# Patient Record
Sex: FEMALE | Race: WHITE | Employment: OTHER | URBAN - NONMETROPOLITAN AREA
[De-identification: names, ages, dates, MRNs, and addresses within clinical notes are randomized per-mention and may not be internally consistent; named-entity substitution may affect disease eponyms.]

---

## 2017-01-02 DIAGNOSIS — E11.42 DIABETIC PERIPHERAL NEUROPATHY (HCC): ICD-10-CM

## 2017-01-03 ENCOUNTER — OFFICE VISIT (OUTPATIENT)
Dept: PULMONOLOGY | Age: 77
End: 2017-01-03

## 2017-01-03 VITALS
HEIGHT: 61 IN | SYSTOLIC BLOOD PRESSURE: 146 MMHG | WEIGHT: 254.3 LBS | BODY MASS INDEX: 48.01 KG/M2 | OXYGEN SATURATION: 98 % | HEART RATE: 79 BPM | DIASTOLIC BLOOD PRESSURE: 80 MMHG

## 2017-01-03 DIAGNOSIS — F51.01 PRIMARY INSOMNIA: ICD-10-CM

## 2017-01-03 DIAGNOSIS — G47.33 OSA ON CPAP: ICD-10-CM

## 2017-01-03 DIAGNOSIS — Z99.89 OSA ON CPAP: ICD-10-CM

## 2017-01-03 PROCEDURE — 99213 OFFICE O/P EST LOW 20 MIN: CPT | Performed by: PHYSICIAN ASSISTANT

## 2017-01-03 RX ORDER — ZOLPIDEM TARTRATE 10 MG/1
10 TABLET ORAL NIGHTLY PRN
Qty: 30 TABLET | Refills: 5 | Status: SHIPPED | OUTPATIENT
Start: 2017-01-03 | End: 2017-06-21 | Stop reason: SDUPTHER

## 2017-01-03 RX ORDER — NEBULIZER ACCESSORIES
1 EACH MISCELLANEOUS ONCE
Qty: 1 EACH | Refills: 3 | Status: SHIPPED | OUTPATIENT
Start: 2017-01-03 | End: 2017-01-03

## 2017-01-09 ENCOUNTER — TELEPHONE (OUTPATIENT)
Dept: FAMILY MEDICINE CLINIC | Age: 77
End: 2017-01-09

## 2017-01-09 ENCOUNTER — OFFICE VISIT (OUTPATIENT)
Dept: FAMILY MEDICINE CLINIC | Age: 77
End: 2017-01-09

## 2017-01-09 VITALS
WEIGHT: 243.8 LBS | DIASTOLIC BLOOD PRESSURE: 80 MMHG | RESPIRATION RATE: 12 BRPM | HEIGHT: 61 IN | SYSTOLIC BLOOD PRESSURE: 110 MMHG | BODY MASS INDEX: 46.03 KG/M2 | HEART RATE: 88 BPM

## 2017-01-09 DIAGNOSIS — E11.42 DIABETIC PERIPHERAL NEUROPATHY (HCC): ICD-10-CM

## 2017-01-09 DIAGNOSIS — E66.01 MORBID OBESITY WITH BMI OF 45.0-49.9, ADULT (HCC): ICD-10-CM

## 2017-01-09 DIAGNOSIS — E11.40 TYPE 2 DIABETES MELLITUS WITH DIABETIC NEUROPATHY, WITHOUT LONG-TERM CURRENT USE OF INSULIN (HCC): Primary | ICD-10-CM

## 2017-01-09 DIAGNOSIS — M81.0 OSTEOPOROSIS: ICD-10-CM

## 2017-01-09 DIAGNOSIS — E78.00 PURE HYPERCHOLESTEROLEMIA: ICD-10-CM

## 2017-01-09 DIAGNOSIS — I10 ESSENTIAL HYPERTENSION: ICD-10-CM

## 2017-01-09 DIAGNOSIS — I10 BENIGN ESSENTIAL HTN: ICD-10-CM

## 2017-01-09 DIAGNOSIS — K21.9 GASTROESOPHAGEAL REFLUX DISEASE, ESOPHAGITIS PRESENCE NOT SPECIFIED: ICD-10-CM

## 2017-01-09 LAB
CREATININE URINE POCT: NORMAL
MICROALBUMIN/CREAT 24H UR: 50 MG/G{CREAT}

## 2017-01-09 PROCEDURE — 99214 OFFICE O/P EST MOD 30 MIN: CPT | Performed by: FAMILY MEDICINE

## 2017-01-09 PROCEDURE — 82044 UR ALBUMIN SEMIQUANTITATIVE: CPT | Performed by: FAMILY MEDICINE

## 2017-01-09 RX ORDER — PREGABALIN 150 MG/1
CAPSULE ORAL
Qty: 90 CAPSULE | Refills: 5 | Status: SHIPPED | OUTPATIENT
Start: 2017-01-09 | End: 2017-06-21 | Stop reason: SDUPTHER

## 2017-01-09 RX ORDER — PIOGLITAZONEHYDROCHLORIDE 30 MG/1
30 TABLET ORAL DAILY
Qty: 90 TABLET | Refills: 3 | Status: SHIPPED | OUTPATIENT
Start: 2017-01-09 | End: 2018-01-05 | Stop reason: SDUPTHER

## 2017-01-09 RX ORDER — OMEPRAZOLE 20 MG/1
CAPSULE, DELAYED RELEASE ORAL
Qty: 90 CAPSULE | Refills: 3 | Status: SHIPPED | OUTPATIENT
Start: 2017-01-09 | End: 2017-09-22 | Stop reason: SDUPTHER

## 2017-01-09 RX ORDER — GLIMEPIRIDE 4 MG/1
TABLET ORAL
Qty: 180 TABLET | Refills: 3 | Status: SHIPPED | OUTPATIENT
Start: 2017-01-09 | End: 2018-01-05 | Stop reason: SDUPTHER

## 2017-01-09 RX ORDER — LISINOPRIL AND HYDROCHLOROTHIAZIDE 20; 12.5 MG/1; MG/1
1 TABLET ORAL DAILY
Qty: 90 TABLET | Refills: 3 | Status: ON HOLD | OUTPATIENT
Start: 2017-01-09 | End: 2017-06-15

## 2017-01-09 RX ORDER — ATORVASTATIN CALCIUM 40 MG/1
40 TABLET, FILM COATED ORAL NIGHTLY
Qty: 90 TABLET | Refills: 3 | Status: SHIPPED | OUTPATIENT
Start: 2017-01-09 | End: 2017-12-26 | Stop reason: SDUPTHER

## 2017-01-09 RX ORDER — RISEDRONATE SODIUM 35 MG/1
35 TABLET, FILM COATED ORAL
Qty: 13 TABLET | Refills: 3 | Status: SHIPPED | OUTPATIENT
Start: 2017-01-09 | End: 2018-01-05 | Stop reason: SDUPTHER

## 2017-01-09 ASSESSMENT — ENCOUNTER SYMPTOMS
BLOOD IN STOOL: 0
VOMITING: 0
SORE THROAT: 0
SHORTNESS OF BREATH: 0
DIARRHEA: 0
RHINORRHEA: 0
ABDOMINAL PAIN: 0
CHEST TIGHTNESS: 0
NAUSEA: 0
BACK PAIN: 0
CONSTIPATION: 0
EYE PAIN: 0
WHEEZING: 0
COUGH: 0

## 2017-01-09 ASSESSMENT — PATIENT HEALTH QUESTIONNAIRE - PHQ9
SUM OF ALL RESPONSES TO PHQ QUESTIONS 1-9: 0
2. FEELING DOWN, DEPRESSED OR HOPELESS: 0
1. LITTLE INTEREST OR PLEASURE IN DOING THINGS: 0
SUM OF ALL RESPONSES TO PHQ9 QUESTIONS 1 & 2: 0

## 2017-01-10 ENCOUNTER — TELEPHONE (OUTPATIENT)
Dept: FAMILY MEDICINE CLINIC | Age: 77
End: 2017-01-10

## 2017-01-10 DIAGNOSIS — R80.9 TYPE 2 DIABETES MELLITUS WITH MICROALBUMINURIA, WITHOUT LONG-TERM CURRENT USE OF INSULIN (HCC): Primary | ICD-10-CM

## 2017-01-10 DIAGNOSIS — E11.29 TYPE 2 DIABETES MELLITUS WITH MICROALBUMINURIA, WITHOUT LONG-TERM CURRENT USE OF INSULIN (HCC): Primary | ICD-10-CM

## 2017-01-10 LAB
ALBUMIN SERPL-MCNC: 4.1 G/DL (ref 3.2–5.3)
ALK PHOSPHATASE: 99 IU/L (ref 35–121)
ALT SERPL-CCNC: 20 IU/L (ref 5–59)
ANION GAP SERPL CALCULATED.3IONS-SCNC: 14 MMOL/L
AST SERPL-CCNC: 15 IU/L (ref 10–42)
AVERAGE GLUCOSE: 128 MG/DL (ref 66–114)
BILIRUB SERPL-MCNC: 0.4 MG/DL (ref 0.2–1.3)
BUN BLDV-MCNC: 14 MG/DL (ref 9–24)
CALCIUM SERPL-MCNC: 9.7 MG/DL (ref 8.7–10.8)
CHLORIDE BLD-SCNC: 99 MMOL/L (ref 95–111)
CHOLESTEROL, TOTAL: 112 MG/DL
CHOLESTEROL/HDL RATIO: 2.7
CHOLESTEROL/HDL RATIO: 2.7
CHOLESTEROL: 112 MG/DL
CO2: 27 MMOL/L (ref 21–32)
CREAT SERPL-MCNC: 0.9 MG/DL (ref 0.5–1.3)
EGFR AFRICAN AMERICAN: 74
EGFR IF NONAFRICAN AMERICAN: 61
GLUCOSE: 157 MG/DL (ref 70–100)
HBA1C MFR BLD: 6.1 %
HBA1C MFR BLD: 6.1 % (ref 4.2–5.8)
HDLC SERPL-MCNC: 41 MG/DL (ref 35–70)
HDLC SERPL-MCNC: 41 MG/DL (ref 40–60)
LDL CHOLESTEROL CALCULATED: 41 MG/DL
LDL CHOLESTEROL CALCULATED: 41 MG/DL (ref 0–160)
LDL/HDL RATIO: 1
POTASSIUM SERPL-SCNC: 4.5 MMOL/L (ref 3.5–5.4)
SODIUM BLD-SCNC: 135 MMOL/L (ref 134–147)
TOTAL PROTEIN: 6.5 G/DL (ref 5.8–8)
TRIGL SERPL-MCNC: 148 MG/DL
TRIGL SERPL-MCNC: 148 MG/DL
VLDLC SERPL CALC-MCNC: 30 MG/DL
VLDLC SERPL CALC-MCNC: 30 MG/DL

## 2017-03-28 ENCOUNTER — TELEPHONE (OUTPATIENT)
Dept: PULMONOLOGY | Age: 77
End: 2017-03-28

## 2017-06-15 PROBLEM — G44.53 PRIMARY THUNDERCLAP HEADACHE: Status: ACTIVE | Noted: 2017-06-15

## 2017-06-15 PROBLEM — J01.41 ACUTE RECURRENT PANSINUSITIS: Status: ACTIVE | Noted: 2017-06-15

## 2017-06-21 ENCOUNTER — OFFICE VISIT (OUTPATIENT)
Dept: FAMILY MEDICINE CLINIC | Age: 77
End: 2017-06-21

## 2017-06-21 VITALS
DIASTOLIC BLOOD PRESSURE: 74 MMHG | HEART RATE: 60 BPM | SYSTOLIC BLOOD PRESSURE: 142 MMHG | WEIGHT: 243.6 LBS | BODY MASS INDEX: 46.03 KG/M2 | RESPIRATION RATE: 16 BRPM | OXYGEN SATURATION: 96 %

## 2017-06-21 DIAGNOSIS — I10 BENIGN ESSENTIAL HTN: ICD-10-CM

## 2017-06-21 DIAGNOSIS — E11.42 DIABETIC PERIPHERAL NEUROPATHY (HCC): ICD-10-CM

## 2017-06-21 DIAGNOSIS — J32.4 CHRONIC PANSINUSITIS: Primary | ICD-10-CM

## 2017-06-21 DIAGNOSIS — E66.01 MORBID OBESITY WITH BMI OF 45.0-49.9, ADULT (HCC): ICD-10-CM

## 2017-06-21 DIAGNOSIS — F51.01 PRIMARY INSOMNIA: ICD-10-CM

## 2017-06-21 PROCEDURE — G8427 DOCREV CUR MEDS BY ELIG CLIN: HCPCS | Performed by: FAMILY MEDICINE

## 2017-06-21 PROCEDURE — 1123F ACP DISCUSS/DSCN MKR DOCD: CPT | Performed by: FAMILY MEDICINE

## 2017-06-21 PROCEDURE — 1111F DSCHRG MED/CURRENT MED MERGE: CPT | Performed by: FAMILY MEDICINE

## 2017-06-21 PROCEDURE — G8417 CALC BMI ABV UP PARAM F/U: HCPCS | Performed by: FAMILY MEDICINE

## 2017-06-21 PROCEDURE — 99214 OFFICE O/P EST MOD 30 MIN: CPT | Performed by: FAMILY MEDICINE

## 2017-06-21 PROCEDURE — 1090F PRES/ABSN URINE INCON ASSESS: CPT | Performed by: FAMILY MEDICINE

## 2017-06-21 PROCEDURE — 3017F COLORECTAL CA SCREEN DOC REV: CPT | Performed by: FAMILY MEDICINE

## 2017-06-21 PROCEDURE — 1036F TOBACCO NON-USER: CPT | Performed by: FAMILY MEDICINE

## 2017-06-21 PROCEDURE — G8400 PT W/DXA NO RESULTS DOC: HCPCS | Performed by: FAMILY MEDICINE

## 2017-06-21 PROCEDURE — 4040F PNEUMOC VAC/ADMIN/RCVD: CPT | Performed by: FAMILY MEDICINE

## 2017-06-21 PROCEDURE — 3046F HEMOGLOBIN A1C LEVEL >9.0%: CPT | Performed by: FAMILY MEDICINE

## 2017-06-21 RX ORDER — PREGABALIN 150 MG/1
CAPSULE ORAL
Qty: 90 CAPSULE | Refills: 5 | Status: SHIPPED | OUTPATIENT
Start: 2017-06-30 | End: 2018-01-05 | Stop reason: SDUPTHER

## 2017-06-21 RX ORDER — ZOLPIDEM TARTRATE 10 MG/1
10 TABLET ORAL NIGHTLY PRN
Qty: 30 TABLET | Refills: 5 | Status: SHIPPED | OUTPATIENT
Start: 2017-06-21 | End: 2018-01-05 | Stop reason: SDUPTHER

## 2017-06-21 ASSESSMENT — ENCOUNTER SYMPTOMS
SHORTNESS OF BREATH: 0
DIARRHEA: 0
RHINORRHEA: 0
CONSTIPATION: 0
VOMITING: 0
SORE THROAT: 0
EYE PAIN: 0
ABDOMINAL PAIN: 0
BLOOD IN STOOL: 0
CHEST TIGHTNESS: 0
WHEEZING: 0
COUGH: 0
BACK PAIN: 0
NAUSEA: 0

## 2017-07-18 ENCOUNTER — OFFICE VISIT (OUTPATIENT)
Dept: INTERNAL MEDICINE CLINIC | Age: 77
End: 2017-07-18
Payer: MEDICARE

## 2017-07-18 VITALS
BODY MASS INDEX: 47.58 KG/M2 | DIASTOLIC BLOOD PRESSURE: 84 MMHG | HEIGHT: 61 IN | WEIGHT: 252 LBS | HEART RATE: 64 BPM | SYSTOLIC BLOOD PRESSURE: 160 MMHG

## 2017-07-18 DIAGNOSIS — E11.29 TYPE 2 DIABETES MELLITUS WITH MICROALBUMINURIA, WITHOUT LONG-TERM CURRENT USE OF INSULIN (HCC): ICD-10-CM

## 2017-07-18 DIAGNOSIS — G47.33 OSA ON CPAP: ICD-10-CM

## 2017-07-18 DIAGNOSIS — Z01.818 PREOP EXAMINATION: Primary | ICD-10-CM

## 2017-07-18 DIAGNOSIS — M51.36 DDD (DEGENERATIVE DISC DISEASE), LUMBAR: ICD-10-CM

## 2017-07-18 DIAGNOSIS — I10 BENIGN ESSENTIAL HTN: ICD-10-CM

## 2017-07-18 DIAGNOSIS — E66.01 MORBID OBESITY WITH BMI OF 45.0-49.9, ADULT (HCC): ICD-10-CM

## 2017-07-18 DIAGNOSIS — Z99.89 OSA ON CPAP: ICD-10-CM

## 2017-07-18 DIAGNOSIS — R80.9 TYPE 2 DIABETES MELLITUS WITH MICROALBUMINURIA, WITHOUT LONG-TERM CURRENT USE OF INSULIN (HCC): ICD-10-CM

## 2017-07-18 PROCEDURE — 99214 OFFICE O/P EST MOD 30 MIN: CPT | Performed by: INTERNAL MEDICINE

## 2017-07-18 PROCEDURE — 3046F HEMOGLOBIN A1C LEVEL >9.0%: CPT | Performed by: INTERNAL MEDICINE

## 2017-07-18 PROCEDURE — 3017F COLORECTAL CA SCREEN DOC REV: CPT | Performed by: INTERNAL MEDICINE

## 2017-07-18 PROCEDURE — 1036F TOBACCO NON-USER: CPT | Performed by: INTERNAL MEDICINE

## 2017-07-18 PROCEDURE — 4040F PNEUMOC VAC/ADMIN/RCVD: CPT | Performed by: INTERNAL MEDICINE

## 2017-07-18 PROCEDURE — G8400 PT W/DXA NO RESULTS DOC: HCPCS | Performed by: INTERNAL MEDICINE

## 2017-07-18 PROCEDURE — G8427 DOCREV CUR MEDS BY ELIG CLIN: HCPCS | Performed by: INTERNAL MEDICINE

## 2017-07-18 PROCEDURE — 1123F ACP DISCUSS/DSCN MKR DOCD: CPT | Performed by: INTERNAL MEDICINE

## 2017-07-18 PROCEDURE — G8417 CALC BMI ABV UP PARAM F/U: HCPCS | Performed by: INTERNAL MEDICINE

## 2017-07-18 PROCEDURE — 1090F PRES/ABSN URINE INCON ASSESS: CPT | Performed by: INTERNAL MEDICINE

## 2017-07-26 ENCOUNTER — HOSPITAL ENCOUNTER (OUTPATIENT)
Dept: NON INVASIVE DIAGNOSTICS | Age: 77
Discharge: HOME | End: 2017-07-26
Payer: MEDICARE

## 2017-07-26 LAB
LV EF: 55 %
LVEF MODALITY: NORMAL

## 2017-07-26 PROCEDURE — 93306 TTE W/DOPPLER COMPLETE: CPT

## 2017-07-27 ENCOUNTER — INITIAL CONSULT (OUTPATIENT)
Dept: PHYSICAL MEDICINE AND REHAB | Age: 77
End: 2017-07-27
Payer: MEDICARE

## 2017-07-27 VITALS
BODY MASS INDEX: 47.63 KG/M2 | DIASTOLIC BLOOD PRESSURE: 78 MMHG | SYSTOLIC BLOOD PRESSURE: 144 MMHG | WEIGHT: 252.3 LBS | HEART RATE: 65 BPM | HEIGHT: 61 IN

## 2017-07-27 DIAGNOSIS — G44.209 TENSION HEADACHE: Primary | ICD-10-CM

## 2017-07-27 PROCEDURE — 3017F COLORECTAL CA SCREEN DOC REV: CPT | Performed by: PSYCHIATRY & NEUROLOGY

## 2017-07-27 PROCEDURE — 1090F PRES/ABSN URINE INCON ASSESS: CPT | Performed by: PSYCHIATRY & NEUROLOGY

## 2017-07-27 PROCEDURE — 1123F ACP DISCUSS/DSCN MKR DOCD: CPT | Performed by: PSYCHIATRY & NEUROLOGY

## 2017-07-27 PROCEDURE — G8427 DOCREV CUR MEDS BY ELIG CLIN: HCPCS | Performed by: PSYCHIATRY & NEUROLOGY

## 2017-07-27 PROCEDURE — G8417 CALC BMI ABV UP PARAM F/U: HCPCS | Performed by: PSYCHIATRY & NEUROLOGY

## 2017-07-27 PROCEDURE — G8400 PT W/DXA NO RESULTS DOC: HCPCS | Performed by: PSYCHIATRY & NEUROLOGY

## 2017-07-27 PROCEDURE — 1036F TOBACCO NON-USER: CPT | Performed by: PSYCHIATRY & NEUROLOGY

## 2017-07-27 PROCEDURE — 4040F PNEUMOC VAC/ADMIN/RCVD: CPT | Performed by: PSYCHIATRY & NEUROLOGY

## 2017-07-27 PROCEDURE — 99204 OFFICE O/P NEW MOD 45 MIN: CPT | Performed by: PSYCHIATRY & NEUROLOGY

## 2017-07-27 RX ORDER — TOPIRAMATE 25 MG/1
25 TABLET ORAL DAILY
Qty: 30 TABLET | Refills: 1 | Status: ON HOLD | OUTPATIENT
Start: 2017-07-27 | End: 2018-03-07

## 2017-07-27 RX ORDER — CIPROFLOXACIN 500 MG/1
500 TABLET, FILM COATED ORAL
COMMUNITY
End: 2018-01-05 | Stop reason: ALTCHOICE

## 2017-08-01 ENCOUNTER — HOSPITAL ENCOUNTER (OUTPATIENT)
Dept: NEUROLOGY | Age: 77
Discharge: HOME | End: 2017-08-01
Payer: MEDICARE

## 2017-08-01 DIAGNOSIS — G44.209 TENSION HEADACHE: ICD-10-CM

## 2017-08-01 PROCEDURE — 95819 EEG AWAKE AND ASLEEP: CPT

## 2017-09-22 ENCOUNTER — HOSPITAL ENCOUNTER (OUTPATIENT)
Age: 77
Setting detail: SPECIMEN
Discharge: HOME | End: 2017-09-22
Payer: MEDICARE

## 2017-09-22 DIAGNOSIS — K21.9 GASTROESOPHAGEAL REFLUX DISEASE, ESOPHAGITIS PRESENCE NOT SPECIFIED: ICD-10-CM

## 2017-09-22 LAB
ALBUMIN SERPL-MCNC: 3.9 G/DL (ref 3.5–5.1)
ALP BLD-CCNC: 79 U/L (ref 38–126)
ALT SERPL-CCNC: 24 U/L (ref 11–66)
ANION GAP SERPL CALCULATED.3IONS-SCNC: 14 MEQ/L (ref 8–16)
AST SERPL-CCNC: 24 U/L (ref 5–40)
BILIRUB SERPL-MCNC: 0.4 MG/DL (ref 0.3–1.2)
BILIRUBIN DIRECT: < 0.2 MG/DL (ref 0–0.3)
BUN BLDV-MCNC: 19 MG/DL (ref 7–22)
CALCIUM SERPL-MCNC: 9 MG/DL (ref 8.5–10.5)
CHLORIDE BLD-SCNC: 95 MEQ/L (ref 98–111)
CO2: 26 MEQ/L (ref 23–33)
CREAT SERPL-MCNC: 0.9 MG/DL (ref 0.4–1.2)
GFR SERPL CREATININE-BSD FRML MDRD: 61 ML/MIN/1.73M2
GLUCOSE BLD-MCNC: 150 MG/DL (ref 70–108)
HCT VFR BLD CALC: 32.1 % (ref 37–47)
HEMOGLOBIN: 10.8 GM/DL (ref 12–16)
MCH RBC QN AUTO: 28.9 PG (ref 27–31)
MCHC RBC AUTO-ENTMCNC: 33.6 GM/DL (ref 33–37)
MCV RBC AUTO: 86 FL (ref 81–99)
PDW BLD-RTO: 15.3 % (ref 11.5–14.5)
PLATELET # BLD: 171 THOU/MM3 (ref 130–400)
PMV BLD AUTO: 7.5 MCM (ref 7.4–10.4)
POTASSIUM SERPL-SCNC: 4 MEQ/L (ref 3.5–5.2)
RBC # BLD: 3.74 MILL/MM3 (ref 4.2–5.4)
SODIUM BLD-SCNC: 135 MEQ/L (ref 135–145)
TOTAL PROTEIN: 6.4 G/DL (ref 6.1–8)
WBC # BLD: 6.9 THOU/MM3 (ref 4.8–10.8)

## 2017-09-22 PROCEDURE — 85027 COMPLETE CBC AUTOMATED: CPT

## 2017-09-22 PROCEDURE — 86140 C-REACTIVE PROTEIN: CPT

## 2017-09-22 PROCEDURE — 80053 COMPREHEN METABOLIC PANEL: CPT

## 2017-09-22 PROCEDURE — 82248 BILIRUBIN DIRECT: CPT

## 2017-09-22 PROCEDURE — 82550 ASSAY OF CK (CPK): CPT

## 2017-09-22 RX ORDER — OMEPRAZOLE 20 MG/1
CAPSULE, DELAYED RELEASE ORAL
Qty: 90 CAPSULE | Refills: 1 | Status: SHIPPED | OUTPATIENT
Start: 2017-09-22 | End: 2018-01-05 | Stop reason: SDUPTHER

## 2017-09-25 LAB
C-REACTIVE PROTEIN: 0.75 MG/DL (ref 0–1)
TOTAL CK: 45 U/L (ref 30–135)

## 2017-09-27 ENCOUNTER — HOSPITAL ENCOUNTER (OUTPATIENT)
Age: 77
Setting detail: SPECIMEN
Discharge: HOME | End: 2017-09-27
Payer: MEDICARE

## 2017-09-27 LAB
ALBUMIN SERPL-MCNC: 3.7 G/DL (ref 3.5–5.1)
ALP BLD-CCNC: 89 U/L (ref 38–126)
ALT SERPL-CCNC: 20 U/L (ref 11–66)
ANION GAP SERPL CALCULATED.3IONS-SCNC: 11 MEQ/L (ref 8–16)
ANISOCYTOSIS: ABNORMAL
AST SERPL-CCNC: 15 U/L (ref 5–40)
BASOPHILS # BLD: 0.5 %
BASOPHILS ABSOLUTE: 0 THOU/MM3 (ref 0–0.1)
BILIRUB SERPL-MCNC: 0.2 MG/DL (ref 0.3–1.2)
BILIRUBIN DIRECT: < 0.2 MG/DL (ref 0–0.3)
BUN BLDV-MCNC: 18 MG/DL (ref 7–22)
C-REACTIVE PROTEIN: 0.2 MG/DL (ref 0–1)
CALCIUM SERPL-MCNC: 9 MG/DL (ref 8.5–10.5)
CHLORIDE BLD-SCNC: 96 MEQ/L (ref 98–111)
CO2: 26 MEQ/L (ref 23–33)
CREAT SERPL-MCNC: 1.1 MG/DL (ref 0.4–1.2)
EOSINOPHIL # BLD: 3.6 %
EOSINOPHILS ABSOLUTE: 0.3 THOU/MM3 (ref 0–0.4)
GFR SERPL CREATININE-BSD FRML MDRD: 48 ML/MIN/1.73M2
GLUCOSE BLD-MCNC: 120 MG/DL (ref 70–108)
HCT VFR BLD CALC: 29.6 % (ref 37–47)
HEMOGLOBIN: 10.3 GM/DL (ref 12–16)
LYMPHOCYTES # BLD: 24.1 %
LYMPHOCYTES ABSOLUTE: 1.7 THOU/MM3 (ref 1–4.8)
MCH RBC QN AUTO: 29.9 PG (ref 27–31)
MCHC RBC AUTO-ENTMCNC: 34.7 GM/DL (ref 33–37)
MCV RBC AUTO: 86.1 FL (ref 81–99)
MONOCYTES # BLD: 9.7 %
MONOCYTES ABSOLUTE: 0.7 THOU/MM3 (ref 0.4–1.3)
NUCLEATED RED BLOOD CELLS: 0 /100 WBC
PDW BLD-RTO: 15.7 % (ref 11.5–14.5)
PLATELET # BLD: 162 THOU/MM3 (ref 130–400)
PMV BLD AUTO: 7.4 MCM (ref 7.4–10.4)
POTASSIUM SERPL-SCNC: 4.1 MEQ/L (ref 3.5–5.2)
RBC # BLD: 3.44 MILL/MM3 (ref 4.2–5.4)
RBC # BLD: NORMAL 10*6/UL
SEG NEUTROPHILS: 62.1 %
SEGMENTED NEUTROPHILS ABSOLUTE COUNT: 4.3 THOU/MM3 (ref 1.8–7.7)
SODIUM BLD-SCNC: 133 MEQ/L (ref 135–145)
TOTAL CK: 61 U/L (ref 30–135)
TOTAL PROTEIN: 6.3 G/DL (ref 6.1–8)
WBC # BLD: 7 THOU/MM3 (ref 4.8–10.8)

## 2017-09-27 PROCEDURE — 86140 C-REACTIVE PROTEIN: CPT

## 2017-09-27 PROCEDURE — 85025 COMPLETE CBC W/AUTO DIFF WBC: CPT

## 2017-09-27 PROCEDURE — 82248 BILIRUBIN DIRECT: CPT

## 2017-09-27 PROCEDURE — 82550 ASSAY OF CK (CPK): CPT

## 2017-09-27 PROCEDURE — 80053 COMPREHEN METABOLIC PANEL: CPT

## 2017-10-03 ENCOUNTER — APPOINTMENT (OUTPATIENT)
Dept: CT IMAGING | Age: 77
End: 2017-10-03
Payer: MEDICARE

## 2017-10-03 ENCOUNTER — HOSPITAL ENCOUNTER (EMERGENCY)
Age: 77
Discharge: HOME | End: 2017-10-03
Payer: MEDICARE

## 2017-10-03 VITALS
TEMPERATURE: 97.8 F | RESPIRATION RATE: 18 BRPM | HEIGHT: 60 IN | WEIGHT: 250 LBS | OXYGEN SATURATION: 100 % | SYSTOLIC BLOOD PRESSURE: 155 MMHG | DIASTOLIC BLOOD PRESSURE: 73 MMHG | BODY MASS INDEX: 49.08 KG/M2 | HEART RATE: 62 BPM
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
## 2017-10-03 DIAGNOSIS — S09.90XA HEAD INJURY, INITIAL ENCOUNTER: Primary | ICD-10-CM
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
## 2017-10-03 LAB
EKG ATRIAL RATE: 71 BPM
EKG P AXIS: -25 DEGREES
EKG P-R INTERVAL: 184 MS
EKG Q-T INTERVAL: 408 MS
EKG QRS DURATION: 98 MS
EKG QTC CALCULATION (BAZETT): 443 MS
EKG R AXIS: 55 DEGREES
EKG T AXIS: 8 DEGREES
EKG VENTRICULAR RATE: 71 BPM

## 2017-10-03 PROCEDURE — 70450 CT HEAD/BRAIN W/O DYE: CPT

## 2017-10-03 PROCEDURE — 6360000002 HC RX W HCPCS: Performed by: PHYSICIAN ASSISTANT

## 2017-10-03 PROCEDURE — 99284 EMERGENCY DEPT VISIT MOD MDM: CPT

## 2017-10-03 PROCEDURE — 6370000000 HC RX 637 (ALT 250 FOR IP): Performed by: PHYSICIAN ASSISTANT

## 2017-10-03 PROCEDURE — 90471 IMMUNIZATION ADMIN: CPT | Performed by: PHYSICIAN ASSISTANT

## 2017-10-03 PROCEDURE — 90715 TDAP VACCINE 7 YRS/> IM: CPT | Performed by: PHYSICIAN ASSISTANT

## 2017-10-03 PROCEDURE — 93005 ELECTROCARDIOGRAM TRACING: CPT | Performed by: INTERNAL MEDICINE

## 2017-10-03 RX ORDER — TRAMADOL HYDROCHLORIDE 50 MG/1
50 TABLET ORAL ONCE
Status: COMPLETED | OUTPATIENT
Start: 2017-10-03 | End: 2017-10-03

## 2017-10-03 RX ADMIN — TRAMADOL HYDROCHLORIDE 50 MG: 50 TABLET, FILM COATED ORAL at 10:55

## 2017-10-03 RX ADMIN — TETANUS TOXOID, REDUCED DIPHTHERIA TOXOID AND ACELLULAR PERTUSSIS VACCINE, ADSORBED 0.5 ML: 5; 2.5; 8; 8; 2.5 SUSPENSION INTRAMUSCULAR at 09:41

## 2017-10-03 ASSESSMENT — ENCOUNTER SYMPTOMS
WHEEZING: 0
DIARRHEA: 0
VOMITING: 0
EYE DISCHARGE: 0
COUGH: 0
RHINORRHEA: 0
ABDOMINAL PAIN: 0
EYE PAIN: 0
BACK PAIN: 0
SORE THROAT: 0
SHORTNESS OF BREATH: 0
NAUSEA: 0

## 2017-10-03 ASSESSMENT — PAIN DESCRIPTION - PAIN TYPE: TYPE: ACUTE PAIN

## 2017-10-03 ASSESSMENT — PAIN SCALES - GENERAL
PAINLEVEL_OUTOF10: 4
PAINLEVEL_OUTOF10: 8

## 2017-10-03 ASSESSMENT — PAIN DESCRIPTION - ORIENTATION: ORIENTATION: POSTERIOR

## 2017-10-03 ASSESSMENT — PAIN DESCRIPTION - DESCRIPTORS: DESCRIPTORS: ACHING

## 2017-10-03 ASSESSMENT — PAIN DESCRIPTION - LOCATION: LOCATION: HEAD

## 2017-10-03 NOTE — ED PROVIDER NOTES
by mouth nightly as needed for Sleep, Disp-30 tablet, R-5Fill monthly, refill as of 2017. Normal      hydrALAZINE (APRESOLINE) 50 MG tablet Take 1 tablet by mouth 3 times daily, Disp-90 tablet, R-3Normal      lisinopril-hydrochlorothiazide (PRINZIDE;ZESTORETIC) 20-12.5 MG per tablet Take 2 tablets by mouth daily, Disp-180 tablet, R-3Normal      propranolol (INDERAL) 20 MG tablet Take 1 tablet by mouth 3 times daily, Disp-90 tablet, R-3Normal      glimepiride (AMARYL) 4 MG tablet TAKE 1 TABLET BY MOUTH TWO TIMES A DAY WITH MEALS, Disp-180 tablet, R-3      atorvastatin (LIPITOR) 40 MG tablet Take 1 tablet by mouth nightly, Disp-90 tablet, R-3      metFORMIN (GLUCOPHAGE) 1000 MG tablet Take 1 tabs in AM and 1 tab in PM, Disp-180 tablet, R-3      pioglitazone (ACTOS) 30 MG tablet Take 1 tablet by mouth daily, Disp-90 tablet, R-3      risedronate (ACTONEL) 35 MG tablet Take 1 tablet by mouth every 7 days, Disp-13 tablet, R-3      Handicap Placard MISC Starting 2014, Until Discontinued, Disp-1 each, R-0, PrintRequest parking placard due to medical conditions. Duration of 5 years. CycloSPORINE (RESTASIS OP) Place 1 drop into both eyes 2 times daily Historical Med      tetrahydrozoline 0.05 % ophthalmic solution Place 1 drop into both eyes 3 times daily Historical Med      pregabalin (LYRICA) 150 MG capsule TAKE 1 CAPSULE BY MOUTH IN THE MORNING AND 2 CAPS AT BEDTIME, Disp-90 capsule, R-5Normal      fluticasone (FLONASE) 50 MCG/ACT nasal spray 2 sprays by Nasal route 2 times daily, Disp-1 Bottle, R-3Normal             ALLERGIES     is allergic to hydrocodone; morphine; percocet [oxycodone-acetaminophen]; tylenol [acetaminophen]; and tape [adhesive tape]. FAMILY HISTORY     indicated that her mother is . She indicated that her father is . She indicated that two of her three sisters are alive. She indicated that eight of her ten brothers are alive.   family history includes Cancer in her brother and sister; Diabetes in her mother; Heart Attack in her brother; Heart Disease in her brother, brother, and father; No Known Problems in her brother, brother, and brother; Obesity in her sister and sister; Other in her brother, brother, and brother. SOCIAL HISTORY      reports that she has never smoked. She has never used smokeless tobacco. She reports that she does not drink alcohol or use illicit drugs. PHYSICAL EXAM     INITIAL VITALS:  height is 5' (1.524 m) and weight is 250 lb (113.4 kg). Her oral temperature is 97.8 °F (36.6 °C). Her blood pressure is 155/73 (abnormal) and her pulse is 62. Her respiration is 18 and oxygen saturation is 100%. Physical Exam   Constitutional: She is oriented to person, place, and time. Vital signs are normal. She appears well-developed and well-nourished. She is active and cooperative. Non-toxic appearance. No distress. HENT:   Head: Normocephalic. Head is without Trammell's sign. Right Ear: Tympanic membrane, external ear and ear canal normal. No hemotympanum. Left Ear: Tympanic membrane, external ear and ear canal normal. No hemotympanum. Nose: Nose normal. No rhinorrhea or nasal deformity. No epistaxis. Mouth/Throat: Uvula is midline, oropharynx is clear and moist and mucous membranes are normal.   Hematoma with small abrasion in the occipital area. Eyes: Conjunctivae, EOM and lids are normal. Pupils are equal, round, and reactive to light. No periorbital trauma   Neck: Trachea normal and normal range of motion. Neck supple. No spinous process tenderness and no muscular tenderness present. No tracheal deviation and normal range of motion present. Cardiovascular: Normal rate, regular rhythm and normal heart sounds. Pulmonary/Chest: Effort normal and breath sounds normal. No respiratory distress. She has no decreased breath sounds. She has no wheezes. Abdominal: Soft. There is no tenderness. There is no rigidity and no guarding. Musculoskeletal: Normal range of motion. Cervical back: Normal.        Thoracic back: Normal.        Lumbar back: Normal.   Good strength appreciated in all muscle groups. Lymphadenopathy:     She has no cervical adenopathy. Neurological: She is alert and oriented to person, place, and time. She has normal strength. No cranial nerve deficit or sensory deficit. Gait normal. GCS eye subscore is 4. GCS verbal subscore is 5. GCS motor subscore is 6. Cranial nerves II-XII intact   Skin: Skin is warm, dry and intact. No bruising, no ecchymosis and no rash noted. She is not diaphoretic. No pallor. Psychiatric: She has a normal mood and affect. Her speech is normal and behavior is normal. Thought content normal. Cognition and memory are normal.   Nursing note and vitals reviewed. DIFFERENTIAL DIAGNOSIS:   Including but not limited to Head injury, dizziness, intracranial bleeding, abrasion, contusion    DIAGNOSTIC RESULTS     EKG: All EKG's are interpreted by the Emergency Department Physician who either signs or Co-signs this chart in the absence of a cardiologist.  EKG read and interpreted by myself with comparison to February 15, 2017 gives impression of normal sinus rhythm with heart rate of 71; interval 184; QRS 98;QTc 443; axis -25 55 8. No STEMI      RADIOLOGY: non-plain film images(s) such as CT, Ultrasound and MRI are read by the radiologist.  Plain radiographic images are visualized and preliminarily interpreted by the emergency physician unless otherwise stated below. CT HEAD WO CONTRAST   Final Result   1. No acute intracranial findings. 2. Left parieto-occipital scalp hematoma. No acute skull fracture. **This report has been created using voice recognition software. It may contain minor errors which are inherent in voice recognition technology. **      Final report electronically signed by Dr. Jerrod Hyatt on 10/3/2017 10:42 AM          LABS:   Labs Reviewed - No data to display    EMERGENCY DEPARTMENT COURSE:   Vitals:    Vitals:    10/03/17 0842 10/03/17 0948 10/03/17 1053   BP: (!) 102/31 134/86 (!) 155/73   Pulse: 74 64 62   Resp:  18 18   Temp: 97.8 °F (36.6 °C)     TempSrc: Oral     SpO2: 100% 100% 100%   Weight: 250 lb (113.4 kg)     Height: 5' (1.524 m)         8:50 AM Patient was seen and evaluated in a timely fashion. Patient was seen and evaluated by me at bedside For injuries sustained in a mechanical fall. Patient did not appear in any distress. History and physical exam were obtained. Appropriate imaging studies were ordered and reviewed. Patient was given ultram and boostrix. Patient verbalized relief with these medications. All results were discussed with patient. The patient has been observed in the ER for some time. The patient has remained stable, neurologically intact with good vital signs. No distress has been apparent. The patient has tolerated oral intake with no emesis. Steady gait has been observed. The patient was comfortable with the plan of discharge home and to follow up with PCP as discussed. Anticipatory guidance was given. The patient is instructed to return to the emergency department for any worsening or otherwise change in their symptoms. Patient discharged from the emergency department in good condition with all questions answered. See disposition below. I have given the patient strict written and verbal instructions about care at home, follow-up, and sign and symptoms of worsening of condition and they did verbalize understanding. CRITICAL CARE:   None    CONSULTS:  None    PROCEDURES:  None    FINAL IMPRESSION      1. Head injury, initial encounter    2. Fall at home, initial encounter          DISPOSITION/PLAN   Patient was discharged in stable condition. Will return if symptoms change or worsen, or for any sign or symptom deemed emergent by the patient or family members. Follow up as an outpatient, sooner if symptoms warrant. PATIENT REFERRED TO:  Macario Ramirez MD  [address]  [phone]    In 1 day        DISCHARGE MEDICATIONS:  Discharge Medication List as of 10/3/2017 11:11 AM          (Please note that portions of this note were completed with a voice recognition program.  Efforts were made to edit the dictations but occasionally words are mis-transcribed.)      Scribe: This document serves as a record of the services and decisions personally performed and made by Jennifer Tran PA-C. It was created on their behalf by Henry Hernandez, a trained medical scribe. The creation of this document is based the provider's statements to the medical scribe. Signed by: Viv Harp, 10/05/17 11:24 PM    Provider: The information in this document, created by the medical scribe for me, accurately reflects the services I personally performed and the decisions made by me.       Jennifer Tran PA-C 11:24 PM               Jennifer Tran PA-C  10/05/17 3098

## 2017-10-03 NOTE — ED AVS SNAPSHOT
this packet, you will find information about the topics listed below:    · Instructions about your medications including a list of your home medications  · A summary of your hospital visit  · Follow-up appointments once you have left the hospital  · Your care plan at home      You may receive a survey regarding the care you received during your stay. Your input is valuable to us. We encourage you to complete and return your survey in the envelope provided. We hope you will choose us in the future for your healthcare needs. Patient Information     Patient Name DOB Rachele Hamman 1940      Care Provided at:     Name Address Phone       [address] [phone]            Your Visit    Here you will find information about your visit, including the reason for your visit. Please take this sheet with you when you visit your doctor or other health care provider in the future. It will help determine the best possible medical care for you at that time. If you have any questions once you leave the hospital, please call the department phone number listed below. Diagnoses this visit     Your diagnoses were HEAD INJURY, INITIAL ENCOUNTER and FALL AT [address] [place] [address]. Visit Information     Date of Visit Department Dept Phone    10/3/2017 [facility] EMERGENCY DEPT [phone]      You were seen by     You were seen by Lillian Gunter PA-C. Follow-up Appointments    Below is a list of your follow-up and future appointments. This may not be a complete list as you may have made appointments directly with providers that we are not aware of or your providers may have made some for you. Please call your providers to confirm appointments. It is important to keep your appointments.  Please bring your current insurance card, photo ID, co-pay, and all medication bottles to your Learning About a Closed Head Injury  What is a closed head injury? A closed head injury happens when your head gets hit hard. The strong force of the blow causes your brain to shake in your skull. This movement can cause the brain to bruise, swell, or tear. Sometimes nerves or blood vessels also get damaged. This can cause bleeding in or around the brain. A concussion is a type of closed head injury. What are the symptoms? If you have a mild concussion, you may have a mild headache or feel \"not quite right. \" These symptoms are common. They usually go away over a few days to 4 weeks. But sometimes after a concussion, you feel like you can't function as well as before the injury. And you have new symptoms. This is called postconcussive syndrome. You may:  · Find it harder to solve problems, think, concentrate, or remember. · Have headaches. · Have changes in your sleep patterns, such as not being able to sleep or sleeping all the time. · Have changes in your personality. · Not be interested in your usual activities. · Feel angry or anxious without a clear reason. · Lose your sense of taste or smell. · Be dizzy, lightheaded, or unsteady. It may be hard to stand or walk. How is a closed head injury treated? Any person who may have a concussion needs to see a doctor. Some people have to stay in the hospital to be watched. Others can go home safely. If you go home, follow your doctor's instructions. He or she will tell you if you need someone to watch you closely for the next 24 hours or longer. Rest is the best treatment. Get plenty of sleep at night. And try to rest during the day. · Avoid activities that are physically or mentally demanding. These include housework, exercise, and schoolwork. And don't play video games, send text messages, or use the computer. You may need to change your school or work schedule to be able to avoid these activities.

## 2017-10-04 ENCOUNTER — HOSPITAL ENCOUNTER (OUTPATIENT)
Age: 77
Setting detail: SPECIMEN
Discharge: HOME | End: 2017-10-04
Payer: MEDICARE

## 2017-10-04 LAB
ALBUMIN SERPL-MCNC: 3.7 G/DL (ref 3.5–5.1)
ALP BLD-CCNC: 98 U/L (ref 38–126)
ALT SERPL-CCNC: 15 U/L (ref 11–66)
ANION GAP SERPL CALCULATED.3IONS-SCNC: 13 MEQ/L (ref 8–16)
AST SERPL-CCNC: 16 U/L (ref 5–40)
BILIRUB SERPL-MCNC: 0.3 MG/DL (ref 0.3–1.2)
BILIRUBIN DIRECT: < 0.2 MG/DL (ref 0–0.3)
BUN BLDV-MCNC: 16 MG/DL (ref 7–22)
CALCIUM SERPL-MCNC: 8.7 MG/DL (ref 8.5–10.5)
CHLORIDE BLD-SCNC: 96 MEQ/L (ref 98–111)
CO2: 23 MEQ/L (ref 23–33)
CREAT SERPL-MCNC: 0.7 MG/DL (ref 0.4–1.2)
GFR SERPL CREATININE-BSD FRML MDRD: 81 ML/MIN/1.73M2
GLUCOSE BLD-MCNC: 206 MG/DL (ref 70–108)
HCT VFR BLD CALC: 31.4 % (ref 37–47)
HEMOGLOBIN: 10.6 GM/DL (ref 12–16)
MCH RBC QN AUTO: 29.2 PG (ref 27–31)
MCHC RBC AUTO-ENTMCNC: 33.9 GM/DL (ref 33–37)
MCV RBC AUTO: 86.1 FL (ref 81–99)
PDW BLD-RTO: 15.2 % (ref 11.5–14.5)
PLATELET # BLD: 150 THOU/MM3 (ref 130–400)
PMV BLD AUTO: 8.1 MCM (ref 7.4–10.4)
POTASSIUM SERPL-SCNC: 4.2 MEQ/L (ref 3.5–5.2)
RBC # BLD: 3.65 MILL/MM3 (ref 4.2–5.4)
SODIUM BLD-SCNC: 132 MEQ/L (ref 135–145)
TOTAL CK: 92 U/L (ref 30–135)
TOTAL PROTEIN: 5.9 G/DL (ref 6.1–8)
WBC # BLD: 5.6 THOU/MM3 (ref 4.8–10.8)

## 2017-10-04 PROCEDURE — 80053 COMPREHEN METABOLIC PANEL: CPT

## 2017-10-04 PROCEDURE — 85027 COMPLETE CBC AUTOMATED: CPT

## 2017-10-04 PROCEDURE — 82248 BILIRUBIN DIRECT: CPT

## 2017-10-04 PROCEDURE — 82550 ASSAY OF CK (CPK): CPT

## 2017-10-05 ENCOUNTER — OFFICE VISIT (OUTPATIENT)
Dept: FAMILY MEDICINE CLINIC | Age: 77
End: 2017-10-05
Payer: MEDICARE

## 2017-10-05 VITALS
BODY MASS INDEX: 49.76 KG/M2 | RESPIRATION RATE: 20 BRPM | HEART RATE: 72 BPM | DIASTOLIC BLOOD PRESSURE: 72 MMHG | WEIGHT: 254.8 LBS | TEMPERATURE: 97.6 F | SYSTOLIC BLOOD PRESSURE: 134 MMHG

## 2017-10-05 DIAGNOSIS — S00.03XD SCALP HEMATOMA, SUBSEQUENT ENCOUNTER: Primary | ICD-10-CM

## 2017-10-05 DIAGNOSIS — C30.0 CARCINOMA OF NASAL CAVITY (HCC): ICD-10-CM

## 2017-10-05 PROCEDURE — 3017F COLORECTAL CA SCREEN DOC REV: CPT | Performed by: FAMILY MEDICINE

## 2017-10-05 PROCEDURE — G8417 CALC BMI ABV UP PARAM F/U: HCPCS | Performed by: FAMILY MEDICINE

## 2017-10-05 PROCEDURE — 4040F PNEUMOC VAC/ADMIN/RCVD: CPT | Performed by: FAMILY MEDICINE

## 2017-10-05 PROCEDURE — 1090F PRES/ABSN URINE INCON ASSESS: CPT | Performed by: FAMILY MEDICINE

## 2017-10-05 PROCEDURE — 1036F TOBACCO NON-USER: CPT | Performed by: FAMILY MEDICINE

## 2017-10-05 PROCEDURE — 99213 OFFICE O/P EST LOW 20 MIN: CPT | Performed by: FAMILY MEDICINE

## 2017-10-05 PROCEDURE — G8427 DOCREV CUR MEDS BY ELIG CLIN: HCPCS | Performed by: FAMILY MEDICINE

## 2017-10-05 PROCEDURE — G8484 FLU IMMUNIZE NO ADMIN: HCPCS | Performed by: FAMILY MEDICINE

## 2017-10-05 PROCEDURE — 1123F ACP DISCUSS/DSCN MKR DOCD: CPT | Performed by: FAMILY MEDICINE

## 2017-10-05 PROCEDURE — G8400 PT W/DXA NO RESULTS DOC: HCPCS | Performed by: FAMILY MEDICINE

## 2017-10-05 RX ORDER — ERTAPENEM 1 G/1
1000 INJECTION, POWDER, LYOPHILIZED, FOR SOLUTION INTRAMUSCULAR; INTRAVENOUS EVERY 24 HOURS
Status: ON HOLD | COMMUNITY
End: 2018-03-07 | Stop reason: ALTCHOICE

## 2017-10-05 ASSESSMENT — ENCOUNTER SYMPTOMS
CONSTIPATION: 0
ABDOMINAL PAIN: 0
COUGH: 0
SHORTNESS OF BREATH: 0
BLOOD IN STOOL: 0
WHEEZING: 0
VOMITING: 0
SORE THROAT: 0
DIARRHEA: 0
NAUSEA: 0
EYE PAIN: 0
RHINORRHEA: 0
CHEST TIGHTNESS: 0
BACK PAIN: 0

## 2017-10-05 NOTE — PROGRESS NOTES
Subjective:      Patient ID: Waqas Esqueda is a 77 y.o. female. HPI  Pt here to follow up from ED after fall at home. Was putting on sweater and lost balance, fell backward, hit back of head. REviewed CT scan. Scalp hematoma. Still having headaches. No LOC. Saw [facility] today, recent sinus surgery for cancer. Reviewed BMI of 49. Encouraged diet, exercise and weight loss. , non smoker,pmh reviewed. Review of Systems   Constitutional: Negative for chills, fatigue, fever and unexpected weight change. HENT: Negative for congestion, ear pain, rhinorrhea and sore throat. Eyes: Negative for pain and visual disturbance. Respiratory: Negative for cough, chest tightness, shortness of breath and wheezing. Cardiovascular: Negative for chest pain and palpitations. Gastrointestinal: Negative for abdominal pain, blood in stool, constipation, diarrhea, nausea and vomiting. Genitourinary: Negative for difficulty urinating, frequency, hematuria and urgency. Musculoskeletal: Negative for back pain, joint swelling, myalgias and neck pain. Skin: Negative for rash. Neurological: Positive for headaches. Negative for dizziness. Hematological: Negative for adenopathy. Does not bruise/bleed easily. Psychiatric/Behavioral: Negative for behavioral problems and sleep disturbance. The patient is not nervous/anxious. Objective:   Physical Exam   Constitutional: She is oriented to person, place, and time. She appears well-developed and well-nourished. HENT:   Head: Normocephalic and atraumatic. Right Ear: External ear normal.   Left Ear: External ear normal.   Nose: Nose normal.   Mouth/Throat: Oropharynx is clear and moist.   Eyes: EOM are normal. Pupils are equal, round, and reactive to light. Neck: Neck supple. No thyromegaly present. Cardiovascular: Normal rate, regular rhythm and normal heart sounds. Pulmonary/Chest: Breath sounds normal. She has no wheezes. She has no rales. Abdominal: Soft. Bowel sounds are normal. There is no tenderness. There is no rebound and no guarding. Musculoskeletal: Normal range of motion. She exhibits no edema. Lymphadenopathy:     She has no cervical adenopathy. Neurological: She is alert and oriented to person, place, and time. She has normal reflexes. No cranial nerve deficit. Skin: Skin is warm and dry. No rash noted. Psychiatric: She has a normal mood and affect. Nursing note and vitals reviewed. Assessment:      Scalp hematoma  Adenocarcinoma of sinuses      Plan:      Heat to hematoma 2 -3 times daily  Follow up with [facility] as scheduled.

## 2017-10-05 NOTE — MR AVS SNAPSHOT
Learn more at: [email or web address]          Instructions         Hematoma: Care Instructions  Your Care Instructions  A hematoma is a bad bruise. It happens when an injury causes blood to collect and pool under the skin. The pooling blood gives the skin a spongy, rubbery, lumpy feel. A hematoma usually is not a cause for concern. It is not the same thing as a blood clot in a vein, and it does not cause blood clots. Follow-up care is a key part of your treatment and safety. Be sure to make and go to all appointments, and call your doctor if you are having problems. It's also a good idea to know your test results and keep a list of the medicines you take. How can you care for yourself at home? · Rest and protect the bruised area. · Put ice or a cold pack on the area for 10 to 20 minutes at a time. · Prop up the bruised area on a pillow when you ice it or anytime you sit or lie down during the next 3 days. Try to keep it above the level of your heart. This will help reduce swelling. · Wrapping the bruised area with an elastic bandage such as an Ace wrap will help decrease swelling. Don't wrap it too tightly, as this can cause more swelling below the affected area. · Take an over-the-counter pain medicine, such as acetaminophen (Tylenol), ibuprofen (Advil, Motrin), or naproxen (Aleve). · Do not take two or more pain medicines at the same time unless the doctor told you to. Many pain medicines have acetaminophen, which is Tylenol. Too much acetaminophen (Tylenol) can be harmful. When should you call for help? Call your doctor now or seek immediate medical care if:  · You have signs of skin infection, such as:  ¨ Increased pain, swelling, warmth, or redness. ¨ Red streaks leading from the area. ¨ Pus draining from the area. ¨ A fever. Watch closely for changes in your health, and be sure to contact your doctor if:  · The bruise lasts longer than 4 weeks. · The bruise gets bigger or becomes more painful. · You do not get better as expected. Where can you learn more? Go to https://chpepiceweb.[facility]. org and sign in to your [employer] account. Enter P911 in the C4M box to learn more about \"Hematoma: Care Instructions. \"     If you do not have an account, please click on the \"Sign Up Now\" link. Current as of: March 20, 2017  Content Version: 11.3  © 7111-6003 [employer]. Care instructions adapted under license by [facility] ([facility]). If you have questions about a medical condition or this instruction, always ask your healthcare professional. [place] [address] any warranty or liability for your use of this information. Head Injury: Care Instructions  Your Care Instructions  Most injuries to the head are minor. Bumps, cuts, and scrapes on the head and face usually heal well and can be treated the same as injuries to other parts of the body. Although it's rare, once in a while a more serious problem shows up after you are home. So it's good to be on the lookout for symptoms for a day or two. Follow-up care is a key part of your treatment and safety. Be sure to make and go to all appointments, and call your doctor if you are having problems. It's also a good idea to know your test results and keep a list of the medicines you take. How can you care for yourself at home? · Follow your doctor's instructions. He or she will tell you if you need someone to watch you closely for the next 24 hours or longer. · Take it easy for the next few days or more if you are not feeling well. · Ask your doctor when it's okay for you to go back to activities like driving a car, riding a bike, or operating machinery. When should you call for help? Call 911 anytime you think you may need emergency care. For example, call if:  · You have a seizure. · You passed out (lost consciousness). · You are confused or can't stay awake. Call your doctor now or seek immediate medical care if:  · You have new or worse vomiting. · You feel less alert. · You have new weakness or numbness in any part of your body. Watch closely for changes in your health, and be sure to contact your doctor if:  · You do not get better as expected. · You have new symptoms, such as headaches, trouble concentrating, or changes in mood. Where can you learn more? Go to https://[employer].eb.[employer]. org and sign in to your [employer] account. Enter G648 in the [employer] box to learn more about \"Head Injury: Care Instructions. \"     If you do not have an account, please click on the \"Sign Up Now\" link. Current as of: October 14, 2016  Content Version: 11.3  © 6872-7902 [employer]. Care instructions adapted under license by [facility] ([facility]). If you have questions about a medical condition or this instruction, always ask your healthcare professional. [address] any warranty or liability for your use of this information.               Medications and Orders      Your Current Medications Are              DAPTOMYCIN IV Infuse intravenously    ertapenem (INVANZ) 1 GM injection Inject 1,000 mg into the muscle every 24 hours    omeprazole (PRILOSEC) 20 MG delayed release capsule TAKE ONE CAPSULE BY MOUTH ONE TIME DAILY    ciprofloxacin (CIPRO) 500 MG tablet Take 500 mg by mouth    topiramate (TOPAMAX) 25 MG tablet Take 1 tablet by mouth daily    zolpidem (AMBIEN) 10 MG tablet Take 1 tablet by mouth nightly as needed for Sleep    pregabalin (LYRICA) 150 MG capsule TAKE 1 CAPSULE BY MOUTH IN THE MORNING AND 2 CAPS AT BEDTIME    hydrALAZINE (APRESOLINE) 50 MG tablet Take 1 tablet by mouth 3 times daily    lisinopril-hydrochlorothiazide (PRINZIDE;ZESTORETIC) 20-12.5 MG per tablet Take 2 tablets by mouth daily    propranolol (INDERAL) 20 MG tablet Take 1 tablet by mouth 3 times daily fluticasone (FLONASE) 50 MCG/ACT nasal spray 2 sprays by Nasal route 2 times daily    glimepiride (AMARYL) 4 MG tablet TAKE 1 TABLET BY MOUTH TWO TIMES A DAY WITH MEALS    atorvastatin (LIPITOR) 40 MG tablet Take 1 tablet by mouth nightly    metFORMIN (GLUCOPHAGE) 1000 MG tablet Take 1 tabs in AM and 1 tab in PM    pioglitazone (ACTOS) 30 MG tablet Take 1 tablet by mouth daily    risedronate (ACTONEL) 35 MG tablet Take 1 tablet by mouth every 7 days    Handicap Placard MISC by Does not apply route. Request parking placard due to medical conditions. Duration of 5 years.     CycloSPORINE (RESTASIS OP) Place 1 drop into both eyes 2 times daily     tetrahydrozoline 0.05 % ophthalmic solution Place 1 drop into both eyes 3 times daily       Allergies              Hydrocodone     headaches    Morphine     headaches    Percocet [Oxycodone-acetaminophen] Other (See Comments)    Headaches    Tylenol [Acetaminophen] Other (See Comments)    TYLENOL 3 causes hallucinations    Tape [Adhesive Tape] Rash         Additional Information        Basic Information     Date Of Birth Sex Race Ethnicity Preferred Language Preferred Written Language    1940 Female White Non-/Non  English English      Problem List as of 10/5/2017  Date Reviewed: 10/5/2017                Acute recurrent pansinusitis    Primary thunderclap headache    Diabetic peripheral neuropathy (HCC)    SWAPNA on CPAP    Benign essential HTN    DDD (degenerative disc disease), lumbar    Morbid obesity with BMI of 45.0-49.9, adult ([facility]    Type 2 diabetes mellitus with microalbuminuria, without long-term current use of insulin ([facility]    Hypercholesterolemia    Osteoarthritis    Osteoporosis      Your Goals as of 10/5/2017 at 3:39 PM              Today    Today    Today       Blood Pressure    Blood Pressure < 140/90   134/72  146/70  156/80       Result Component    HEMOGLOBIN A1C < 8           LDL CALC < 100              Weight    Weight < 241

## 2017-10-05 NOTE — PATIENT INSTRUCTIONS
Hematoma: Care Instructions  Your Care Instructions  A hematoma is a bad bruise. It happens when an injury causes blood to collect and pool under the skin. The pooling blood gives the skin a spongy, rubbery, lumpy feel. A hematoma usually is not a cause for concern. It is not the same thing as a blood clot in a vein, and it does not cause blood clots. Follow-up care is a key part of your treatment and safety. Be sure to make and go to all appointments, and call your doctor if you are having problems. It's also a good idea to know your test results and keep a list of the medicines you take. How can you care for yourself at home? · Rest and protect the bruised area. · Put ice or a cold pack on the area for 10 to 20 minutes at a time. · Prop up the bruised area on a pillow when you ice it or anytime you sit or lie down during the next 3 days. Try to keep it above the level of your heart. This will help reduce swelling. · Wrapping the bruised area with an elastic bandage such as an Ace wrap will help decrease swelling. Don't wrap it too tightly, as this can cause more swelling below the affected area. · Take an over-the-counter pain medicine, such as acetaminophen (Tylenol), ibuprofen (Advil, Motrin), or naproxen (Aleve). · Do not take two or more pain medicines at the same time unless the doctor told you to. Many pain medicines have acetaminophen, which is Tylenol. Too much acetaminophen (Tylenol) can be harmful. When should you call for help? Call your doctor now or seek immediate medical care if:  · You have signs of skin infection, such as:  ¨ Increased pain, swelling, warmth, or redness. ¨ Red streaks leading from the area. ¨ Pus draining from the area. ¨ A fever. Watch closely for changes in your health, and be sure to contact your doctor if:  · The bruise lasts longer than 4 weeks. · The bruise gets bigger or becomes more painful. · You do not get better as expected.   Where can you learn

## 2017-10-11 ENCOUNTER — HOSPITAL ENCOUNTER (OUTPATIENT)
Age: 77
Setting detail: SPECIMEN
Discharge: HOME | End: 2017-10-11
Payer: MEDICARE

## 2017-10-11 LAB
ALBUMIN SERPL-MCNC: 3.9 G/DL (ref 3.5–5.1)
ALP BLD-CCNC: 83 U/L (ref 38–126)
ALT SERPL-CCNC: 18 U/L (ref 11–66)
ANION GAP SERPL CALCULATED.3IONS-SCNC: 17 MEQ/L (ref 8–16)
ANISOCYTOSIS: ABNORMAL
AST SERPL-CCNC: 20 U/L (ref 5–40)
BASOPHILS # BLD: 0.5 %
BASOPHILS ABSOLUTE: 0 THOU/MM3 (ref 0–0.1)
BILIRUB SERPL-MCNC: 0.4 MG/DL (ref 0.3–1.2)
BILIRUBIN DIRECT: < 0.2 MG/DL (ref 0–0.3)
BUN BLDV-MCNC: 18 MG/DL (ref 7–22)
C-REACTIVE PROTEIN: 0.21 MG/DL (ref 0–1)
CHLORIDE BLD-SCNC: 96 MEQ/L (ref 98–111)
CO2: 24 MEQ/L (ref 23–33)
CREAT SERPL-MCNC: 0.7 MG/DL (ref 0.4–1.2)
EOSINOPHIL # BLD: 7.4 %
EOSINOPHILS ABSOLUTE: 0.4 THOU/MM3 (ref 0–0.4)
GFR SERPL CREATININE-BSD FRML MDRD: 81 ML/MIN/1.73M2
HCT VFR BLD CALC: 32.3 % (ref 37–47)
HEMOGLOBIN: 11.1 GM/DL (ref 12–16)
LYMPHOCYTES # BLD: 18.6 %
LYMPHOCYTES ABSOLUTE: 1.1 THOU/MM3 (ref 1–4.8)
MCH RBC QN AUTO: 29.2 PG (ref 27–31)
MCHC RBC AUTO-ENTMCNC: 34.3 GM/DL (ref 33–37)
MCV RBC AUTO: 85.3 FL (ref 81–99)
MONOCYTES # BLD: 8.5 %
MONOCYTES ABSOLUTE: 0.5 THOU/MM3 (ref 0.4–1.3)
NUCLEATED RED BLOOD CELLS: 0 /100 WBC
PDW BLD-RTO: 14.6 % (ref 11.5–14.5)
PLATELET # BLD: 145 THOU/MM3 (ref 130–400)
PMV BLD AUTO: 8.1 MCM (ref 7.4–10.4)
POTASSIUM SERPL-SCNC: 4.2 MEQ/L (ref 3.5–5.2)
RBC # BLD: 3.79 MILL/MM3 (ref 4.2–5.4)
RBC # BLD: NORMAL 10*6/UL
SEDIMENTATION RATE, ERYTHROCYTE: 25 MM/HR (ref 0–20)
SEG NEUTROPHILS: 65 %
SEGMENTED NEUTROPHILS ABSOLUTE COUNT: 3.9 THOU/MM3 (ref 1.8–7.7)
SODIUM BLD-SCNC: 137 MEQ/L (ref 135–145)
TOTAL CK: 150 U/L (ref 30–135)
TOTAL PROTEIN: 6.3 G/DL (ref 6.1–8)
WBC # BLD: 6 THOU/MM3 (ref 4.8–10.8)

## 2017-10-11 PROCEDURE — 85651 RBC SED RATE NONAUTOMATED: CPT

## 2017-10-11 PROCEDURE — 80051 ELECTROLYTE PANEL: CPT

## 2017-10-11 PROCEDURE — 85025 COMPLETE CBC W/AUTO DIFF WBC: CPT

## 2017-10-11 PROCEDURE — 84520 ASSAY OF UREA NITROGEN: CPT

## 2017-10-11 PROCEDURE — 80076 HEPATIC FUNCTION PANEL: CPT

## 2017-10-11 PROCEDURE — 82550 ASSAY OF CK (CPK): CPT

## 2017-10-11 PROCEDURE — 82565 ASSAY OF CREATININE: CPT

## 2017-10-11 PROCEDURE — 86140 C-REACTIVE PROTEIN: CPT

## 2017-10-18 ENCOUNTER — HOSPITAL ENCOUNTER (OUTPATIENT)
Age: 77
Setting detail: SPECIMEN
Discharge: HOME | End: 2017-10-18
Payer: MEDICARE

## 2017-10-18 LAB
ALBUMIN SERPL-MCNC: 3.9 G/DL (ref 3.5–5.1)
ALP BLD-CCNC: 90 U/L (ref 38–126)
ALT SERPL-CCNC: 23 U/L (ref 11–66)
ANION GAP SERPL CALCULATED.3IONS-SCNC: 16 MEQ/L (ref 8–16)
ANISOCYTOSIS: ABNORMAL
AST SERPL-CCNC: 27 U/L (ref 5–40)
BASOPHILS # BLD: 0.6 %
BASOPHILS ABSOLUTE: 0 THOU/MM3 (ref 0–0.1)
BILIRUB SERPL-MCNC: 0.3 MG/DL (ref 0.3–1.2)
BILIRUBIN DIRECT: < 0.2 MG/DL (ref 0–0.3)
BUN BLDV-MCNC: 16 MG/DL (ref 7–22)
C-REACTIVE PROTEIN: 0.42 MG/DL (ref 0–1)
CALCIUM SERPL-MCNC: 9 MG/DL (ref 8.5–10.5)
CHLORIDE BLD-SCNC: 93 MEQ/L (ref 98–111)
CO2: 26 MEQ/L (ref 23–33)
CREAT SERPL-MCNC: 0.7 MG/DL (ref 0.4–1.2)
EOSINOPHIL # BLD: 6.2 %
EOSINOPHILS ABSOLUTE: 0.3 THOU/MM3 (ref 0–0.4)
GFR SERPL CREATININE-BSD FRML MDRD: 81 ML/MIN/1.73M2
GLUCOSE BLD-MCNC: 168 MG/DL (ref 70–108)
HCT VFR BLD CALC: 34.7 % (ref 37–47)
HEMOGLOBIN: 12.1 GM/DL (ref 12–16)
LYMPHOCYTES # BLD: 22.9 %
LYMPHOCYTES ABSOLUTE: 1.2 THOU/MM3 (ref 1–4.8)
MCH RBC QN AUTO: 29.6 PG (ref 27–31)
MCHC RBC AUTO-ENTMCNC: 34.8 GM/DL (ref 33–37)
MCV RBC AUTO: 85.1 FL (ref 81–99)
MONOCYTES # BLD: 9.8 %
MONOCYTES ABSOLUTE: 0.5 THOU/MM3 (ref 0.4–1.3)
NUCLEATED RED BLOOD CELLS: 0 /100 WBC
PDW BLD-RTO: 14.7 % (ref 11.5–14.5)
PLATELET # BLD: 171 THOU/MM3 (ref 130–400)
PMV BLD AUTO: 7.9 MCM (ref 7.4–10.4)
POTASSIUM SERPL-SCNC: 4.2 MEQ/L (ref 3.5–5.2)
RBC # BLD: 4.07 MILL/MM3 (ref 4.2–5.4)
RBC # BLD: NORMAL 10*6/UL
SEDIMENTATION RATE, ERYTHROCYTE: 15 MM/HR (ref 0–20)
SEG NEUTROPHILS: 60.5 %
SEGMENTED NEUTROPHILS ABSOLUTE COUNT: 3.3 THOU/MM3 (ref 1.8–7.7)
SODIUM BLD-SCNC: 135 MEQ/L (ref 135–145)
TOTAL CK: 320 U/L (ref 30–135)
TOTAL PROTEIN: 6.3 G/DL (ref 6.1–8)
WBC # BLD: 5.4 THOU/MM3 (ref 4.8–10.8)

## 2017-10-18 PROCEDURE — 80053 COMPREHEN METABOLIC PANEL: CPT

## 2017-10-18 PROCEDURE — 85025 COMPLETE CBC W/AUTO DIFF WBC: CPT

## 2017-10-18 PROCEDURE — 82550 ASSAY OF CK (CPK): CPT

## 2017-10-18 PROCEDURE — 82248 BILIRUBIN DIRECT: CPT

## 2017-10-18 PROCEDURE — 85651 RBC SED RATE NONAUTOMATED: CPT

## 2017-10-18 PROCEDURE — 86140 C-REACTIVE PROTEIN: CPT

## 2017-10-25 ENCOUNTER — HOSPITAL ENCOUNTER (OUTPATIENT)
Age: 77
Setting detail: SPECIMEN
Discharge: HOME | End: 2017-10-25
Payer: MEDICARE

## 2017-10-25 LAB
ALBUMIN SERPL-MCNC: 3.7 G/DL (ref 3.5–5.1)
ALP BLD-CCNC: 111 U/L (ref 38–126)
ALT SERPL-CCNC: 18 U/L (ref 11–66)
ANION GAP SERPL CALCULATED.3IONS-SCNC: 10 MEQ/L (ref 8–16)
ANISOCYTOSIS: ABNORMAL
AST SERPL-CCNC: 18 U/L (ref 5–40)
BASOPHILS # BLD: 0.4 %
BASOPHILS ABSOLUTE: 0 THOU/MM3 (ref 0–0.1)
BILIRUB SERPL-MCNC: 0.3 MG/DL (ref 0.3–1.2)
BILIRUBIN DIRECT: < 0.2 MG/DL (ref 0–0.3)
BUN BLDV-MCNC: 16 MG/DL (ref 7–22)
C-REACTIVE PROTEIN: 0.08 MG/DL (ref 0–1)
CHLORIDE BLD-SCNC: 91 MEQ/L (ref 98–111)
CO2: 25 MEQ/L (ref 23–33)
CREAT SERPL-MCNC: 0.6 MG/DL (ref 0.4–1.2)
EOSINOPHIL # BLD: 5.4 %
EOSINOPHILS ABSOLUTE: 0.3 THOU/MM3 (ref 0–0.4)
GFR SERPL CREATININE-BSD FRML MDRD: > 90 ML/MIN/1.73M2
HCT VFR BLD CALC: 34.3 % (ref 37–47)
HEMOGLOBIN: 11.5 GM/DL (ref 12–16)
LYMPHOCYTES # BLD: 22.5 %
LYMPHOCYTES ABSOLUTE: 1.2 THOU/MM3 (ref 1–4.8)
MCH RBC QN AUTO: 28.2 PG (ref 27–31)
MCHC RBC AUTO-ENTMCNC: 33.5 GM/DL (ref 33–37)
MCV RBC AUTO: 84.2 FL (ref 81–99)
MONOCYTES # BLD: 9 %
MONOCYTES ABSOLUTE: 0.5 THOU/MM3 (ref 0.4–1.3)
NUCLEATED RED BLOOD CELLS: 0 /100 WBC
PDW BLD-RTO: 14.9 % (ref 11.5–14.5)
PLATELET # BLD: 176 THOU/MM3 (ref 130–400)
PMV BLD AUTO: 7.7 MCM (ref 7.4–10.4)
POTASSIUM SERPL-SCNC: 4.1 MEQ/L (ref 3.5–5.2)
RBC # BLD: 4.07 MILL/MM3 (ref 4.2–5.4)
SEDIMENTATION RATE, ERYTHROCYTE: 12 MM/HR (ref 0–20)
SEG NEUTROPHILS: 62.7 %
SEGMENTED NEUTROPHILS ABSOLUTE COUNT: 3.4 THOU/MM3 (ref 1.8–7.7)
SODIUM BLD-SCNC: 126 MEQ/L (ref 135–145)
TOTAL CK: 106 U/L (ref 30–135)
TOTAL PROTEIN: 6.1 G/DL (ref 6.1–8)
WBC # BLD: 5.4 THOU/MM3 (ref 4.8–10.8)

## 2017-10-25 PROCEDURE — 80076 HEPATIC FUNCTION PANEL: CPT

## 2017-10-25 PROCEDURE — 82565 ASSAY OF CREATININE: CPT

## 2017-10-25 PROCEDURE — 80051 ELECTROLYTE PANEL: CPT

## 2017-10-25 PROCEDURE — 85651 RBC SED RATE NONAUTOMATED: CPT

## 2017-10-25 PROCEDURE — 82550 ASSAY OF CK (CPK): CPT

## 2017-10-25 PROCEDURE — 86140 C-REACTIVE PROTEIN: CPT

## 2017-10-25 PROCEDURE — 85025 COMPLETE CBC W/AUTO DIFF WBC: CPT

## 2017-10-25 PROCEDURE — 84520 ASSAY OF UREA NITROGEN: CPT

## 2017-11-14 DIAGNOSIS — I10 ESSENTIAL HYPERTENSION: ICD-10-CM

## 2017-11-14 RX ORDER — LISINOPRIL AND HYDROCHLOROTHIAZIDE 20; 12.5 MG/1; MG/1
2 TABLET ORAL DAILY
Qty: 60 TABLET | Refills: 0 | Status: SHIPPED | OUTPATIENT
Start: 2017-11-14 | End: 2017-11-16 | Stop reason: SDUPTHER

## 2017-11-14 RX ORDER — PROPRANOLOL HYDROCHLORIDE 20 MG/1
20 TABLET ORAL 3 TIMES DAILY
Qty: 90 TABLET | Refills: 0 | Status: SHIPPED | OUTPATIENT
Start: 2017-11-14 | End: 2017-11-16 | Stop reason: SDUPTHER

## 2017-11-16 ENCOUNTER — OFFICE VISIT (OUTPATIENT)
Dept: FAMILY MEDICINE CLINIC | Age: 77
End: 2017-11-16
Payer: MEDICARE

## 2017-11-16 VITALS
BODY MASS INDEX: 49.8 KG/M2 | RESPIRATION RATE: 14 BRPM | HEART RATE: 76 BPM | WEIGHT: 255 LBS | SYSTOLIC BLOOD PRESSURE: 130 MMHG | DIASTOLIC BLOOD PRESSURE: 76 MMHG

## 2017-11-16 DIAGNOSIS — C30.0 CARCINOMA OF NASAL CAVITY (HCC): ICD-10-CM

## 2017-11-16 DIAGNOSIS — E66.01 MORBID OBESITY WITH BMI OF 45.0-49.9, ADULT (HCC): ICD-10-CM

## 2017-11-16 DIAGNOSIS — E11.29 TYPE 2 DIABETES MELLITUS WITH MICROALBUMINURIA, WITHOUT LONG-TERM CURRENT USE OF INSULIN (HCC): ICD-10-CM

## 2017-11-16 DIAGNOSIS — I10 ESSENTIAL HYPERTENSION: ICD-10-CM

## 2017-11-16 DIAGNOSIS — R80.9 TYPE 2 DIABETES MELLITUS WITH MICROALBUMINURIA, WITHOUT LONG-TERM CURRENT USE OF INSULIN (HCC): ICD-10-CM

## 2017-11-16 DIAGNOSIS — I10 BENIGN ESSENTIAL HTN: Primary | ICD-10-CM

## 2017-11-16 PROCEDURE — 99214 OFFICE O/P EST MOD 30 MIN: CPT | Performed by: FAMILY MEDICINE

## 2017-11-16 PROCEDURE — 1090F PRES/ABSN URINE INCON ASSESS: CPT | Performed by: FAMILY MEDICINE

## 2017-11-16 PROCEDURE — G8417 CALC BMI ABV UP PARAM F/U: HCPCS | Performed by: FAMILY MEDICINE

## 2017-11-16 PROCEDURE — G8484 FLU IMMUNIZE NO ADMIN: HCPCS | Performed by: FAMILY MEDICINE

## 2017-11-16 PROCEDURE — G8427 DOCREV CUR MEDS BY ELIG CLIN: HCPCS | Performed by: FAMILY MEDICINE

## 2017-11-16 PROCEDURE — G8400 PT W/DXA NO RESULTS DOC: HCPCS | Performed by: FAMILY MEDICINE

## 2017-11-16 PROCEDURE — 4040F PNEUMOC VAC/ADMIN/RCVD: CPT | Performed by: FAMILY MEDICINE

## 2017-11-16 PROCEDURE — 1036F TOBACCO NON-USER: CPT | Performed by: FAMILY MEDICINE

## 2017-11-16 PROCEDURE — 1123F ACP DISCUSS/DSCN MKR DOCD: CPT | Performed by: FAMILY MEDICINE

## 2017-11-16 RX ORDER — LISINOPRIL AND HYDROCHLOROTHIAZIDE 20; 12.5 MG/1; MG/1
2 TABLET ORAL DAILY
Qty: 180 TABLET | Refills: 0 | Status: SHIPPED | OUTPATIENT
Start: 2017-11-16 | End: 2018-01-05 | Stop reason: SDUPTHER

## 2017-11-16 RX ORDER — PROPRANOLOL HYDROCHLORIDE 20 MG/1
20 TABLET ORAL 3 TIMES DAILY
Qty: 270 TABLET | Refills: 0 | Status: SHIPPED | OUTPATIENT
Start: 2017-11-16 | End: 2018-01-05 | Stop reason: SDUPTHER

## 2017-11-16 ASSESSMENT — ENCOUNTER SYMPTOMS
ABDOMINAL PAIN: 0
VOMITING: 0
SORE THROAT: 0
SHORTNESS OF BREATH: 0
WHEEZING: 0
NAUSEA: 0
CONSTIPATION: 0
BLOOD IN STOOL: 0
EYE PAIN: 0
CHEST TIGHTNESS: 0
BACK PAIN: 0
RHINORRHEA: 0
DIARRHEA: 0
COUGH: 0

## 2017-11-16 NOTE — PATIENT INSTRUCTIONS
[employer] account. Enter C553 in the [facility] box to learn more about \"Type 2 Diabetes: Care Instructions. \"     If you do not have an account, please click on the \"Sign Up Now\" link. Current as of: March 13, 2017  Content Version: 11.3  © 0945-6134 [email or web address]. Care instructions adapted under license by [facility] ([facility]). If you have questions about a medical condition or this instruction, always ask your healthcare professional. [address] any warranty or liability for your use of this information. Patient Education        DASH Diet: Care Instructions  Your Care Instructions  The DASH diet is an eating plan that can help lower your blood pressure. DASH stands for Dietary Approaches to Stop Hypertension. Hypertension is high blood pressure. The DASH diet focuses on eating foods that are high in calcium, potassium, and magnesium. These nutrients can lower blood pressure. The foods that are highest in these nutrients are fruits, vegetables, low-fat dairy products, nuts, seeds, and legumes. But taking calcium, potassium, and magnesium supplements instead of eating foods that are high in those nutrients does not have the same effect. The DASH diet also includes whole grains, fish, and poultry. The DASH diet is one of several lifestyle changes your doctor may recommend to lower your high blood pressure. Your doctor may also want you to decrease the amount of sodium in your diet. Lowering sodium while following the DASH diet can lower blood pressure even further than just the DASH diet alone. Follow-up care is a key part of your treatment and safety. Be sure to make and go to all appointments, and call your doctor if you are having problems. It's also a good idea to know your test results and keep a list of the medicines you take. How can you care for yourself at home? Following the DASH diet  · Eat 4 to 5 servings of fruit each day.  A serving is 1 medium-sized day.  ¨ Try yogurt topped with fruit and nuts for a snack or healthy dessert. ¨ Add lettuce, tomato, cucumber, and onion to sandwiches. ¨ Combine a ready-made pizza crust with low-fat mozzarella cheese and lots of vegetable toppings. Try using tomatoes, squash, spinach, broccoli, carrots, cauliflower, and onions. ¨ Have a variety of cut-up vegetables with a low-fat dip as an appetizer instead of chips and dip. ¨ Sprinkle sunflower seeds or chopped almonds over salads. Or try adding chopped walnuts or almonds to cooked vegetables. ¨ Try some vegetarian meals using beans and peas. Add garbanzo or kidney beans to salads. Make burritos and tacos with mashed lockhart beans or black beans. Where can you learn more? Go to https://[employer].ZeaVision. org and sign in to your [employer] account. Enter O335 in the [employer] box to learn more about \"DASH Diet: Care Instructions. \"     If you do not have an account, please click on the \"Sign Up Now\" link. Current as of: April 3, 2017  Content Version: 11.3  © 2348-3094 [employer]. Care instructions adapted under license by [facility] ([facility]). If you have questions about a medical condition or this instruction, always ask your healthcare professional. [place] [address] any warranty or liability for your use of this information. Patient Education        High Blood Pressure: Care Instructions  Your Care Instructions  If your blood pressure is usually above 140/90, you have high blood pressure, or hypertension. That means the top number is 140 or higher or the bottom number is 90 or higher, or both. Despite what a lot of people think, high blood pressure usually doesn't cause headaches or make you feel dizzy or lightheaded. It usually has no symptoms. But it does increase your risk for heart attack, stroke, and kidney or eye damage. The higher your blood pressure, the more your risk increases.   Your doctor will give you a goal away.  Call your doctor now or seek immediate care if:  · Your blood pressure is much higher than normal (such as 180/110 or higher), but you don't have symptoms. · You think high blood pressure is causing symptoms, such as:  ¨ Severe headache. ¨ Blurry vision. Watch closely for changes in your health, and be sure to contact your doctor if:  · Your blood pressure measures 140/90 or higher at least 2 times. That means the top number is 140 or higher or the bottom number is 90 or higher, or both. · You think you may be having side effects from your blood pressure medicine. · Your blood pressure is usually normal, but it goes above normal at least 2 times. Where can you learn more? Go to https://Sketchfab.Burt. org and sign in to your [employer] account. Enter L980 in the [employer] box to learn more about \"High Blood Pressure: Care Instructions. \"     If you do not have an account, please click on the \"Sign Up Now\" link. Current as of: August 8, 2016  Content Version: 11.3  © 2954-6129 [employer]. Care instructions adapted under license by [facility] ([facility]). If you have questions about a medical condition or this instruction, always ask your healthcare professional. [address] any warranty or liability for your use of this information.

## 2017-11-16 NOTE — PROGRESS NOTES
Subjective:      Patient ID: Lili Gallegos is a 69 y.o. female. HPI  Pt here for a check up. Reviewed BMI of 49. Encouraged diet, exercise and weight loss. Reviewed health maintenance. Neds med refills. Blood pressure has been spiking. Cardiologist not concerned. , non smoker, pmh reviewed. Having falls, ?? Etiology. Review of Systems   Constitutional: Negative for chills, fatigue, fever and unexpected weight change. HENT: Negative for congestion, ear pain, rhinorrhea and sore throat. Eyes: Negative for pain and visual disturbance. Respiratory: Negative for cough, chest tightness, shortness of breath and wheezing. Cardiovascular: Negative for chest pain and palpitations. Gastrointestinal: Negative for abdominal pain, blood in stool, constipation, diarrhea, nausea and vomiting. Genitourinary: Negative for difficulty urinating, frequency, hematuria and urgency. Musculoskeletal: Negative for back pain, joint swelling, myalgias and neck pain. Skin: Negative for rash. Neurological: Positive for weakness. Negative for dizziness and headaches. Hematological: Negative for adenopathy. Does not bruise/bleed easily. Psychiatric/Behavioral: Negative for behavioral problems and sleep disturbance. The patient is not nervous/anxious. Objective:   Physical Exam   Constitutional: She is oriented to person, place, and time. She appears well-developed and well-nourished. HENT:   Head: Normocephalic and atraumatic. Right Ear: External ear normal.   Left Ear: External ear normal.   Nose: Nose normal.   Mouth/Throat: Oropharynx is clear and moist.   Eyes: EOM are normal. Pupils are equal, round, and reactive to light. Neck: Neck supple. Carotid bruit is not present. No thyromegaly present. Cardiovascular: Normal rate, regular rhythm and normal heart sounds. Pulmonary/Chest: Breath sounds normal. She has no wheezes. She has no rales. Abdominal: Soft.  Bowel sounds are normal. There is no tenderness. There is no rebound and no guarding. Musculoskeletal: Normal range of motion. She exhibits no edema. Lymphadenopathy:     She has no cervical adenopathy. Neurological: She is alert and oriented to person, place, and time. She has normal reflexes. No cranial nerve deficit. Skin: Skin is warm and dry. No rash noted. Psychiatric: She has a normal mood and affect. Nursing note and vitals reviewed. Health Maintenance   Topic Date Due    Lipid screen  01/10/2022    DTaP/Tdap/Td vaccine (4 - Td) 10/03/2027    Zostavax vaccine  Addressed    DEXA (modify frequency per FRAX score)  Addressed    Flu vaccine  Completed    Pneumococcal low/med risk  Completed     Lab Results   Component Value Date    LABA1C 6.1 01/10/2017     No results found for: EAG    Assessment:      Essential HTN  DM 2  Carcinoma of nasal cavity  Morbid obesity      Plan:      Refill meds  Reviewed blood work  Encouraged diet, exercise and weight loss. Dash diet  Reviewed health maintenance    Cate received counseling on the following healthy behaviors: nutrition, exercise and medication adherence    Patient given educational materials on Diabetes, Hyperlipidemia, Nutrition, Exercise and Hypertension    I have instructed Caryn Yeager to complete a self tracking handout on Blood Sugars , Blood Pressures  and Weights and instructed them to bring it with them to her next appointment. Discussed use, benefit, and side effects of prescribed medications. Barriers to medication compliance addressed. All patient questions answered. Pt voiced understanding.

## 2017-11-27 RX ORDER — HYDRALAZINE HYDROCHLORIDE 50 MG/1
50 TABLET, FILM COATED ORAL 3 TIMES DAILY
Qty: 90 TABLET | Refills: 3 | Status: SHIPPED | OUTPATIENT
Start: 2017-11-27 | End: 2018-01-05 | Stop reason: SDUPTHER

## 2017-12-26 DIAGNOSIS — E78.00 PURE HYPERCHOLESTEROLEMIA: ICD-10-CM

## 2017-12-26 RX ORDER — ATORVASTATIN CALCIUM 40 MG/1
TABLET, FILM COATED ORAL
Qty: 30 TABLET | Refills: 0 | Status: SHIPPED | OUTPATIENT
Start: 2017-12-26 | End: 2018-01-05 | Stop reason: SDUPTHER

## 2017-12-27 NOTE — TELEPHONE ENCOUNTER
Called/LM for pt Zee Alicea to call regarding a medicine refill request and need to schedule annual appt.

## 2018-01-05 ENCOUNTER — OFFICE VISIT (OUTPATIENT)
Dept: FAMILY MEDICINE CLINIC | Age: 78
End: 2018-01-05
Payer: MEDICARE

## 2018-01-05 VITALS
WEIGHT: 245 LBS | RESPIRATION RATE: 12 BRPM | HEART RATE: 72 BPM | SYSTOLIC BLOOD PRESSURE: 130 MMHG | HEIGHT: 61 IN | BODY MASS INDEX: 46.26 KG/M2 | DIASTOLIC BLOOD PRESSURE: 66 MMHG

## 2018-01-05 DIAGNOSIS — E11.29 TYPE 2 DIABETES MELLITUS WITH MICROALBUMINURIA, WITHOUT LONG-TERM CURRENT USE OF INSULIN (HCC): ICD-10-CM

## 2018-01-05 DIAGNOSIS — I10 BENIGN ESSENTIAL HTN: ICD-10-CM

## 2018-01-05 DIAGNOSIS — E11.42 DIABETIC PERIPHERAL NEUROPATHY (HCC): ICD-10-CM

## 2018-01-05 DIAGNOSIS — M81.0 AGE-RELATED OSTEOPOROSIS WITHOUT CURRENT PATHOLOGICAL FRACTURE: ICD-10-CM

## 2018-01-05 DIAGNOSIS — I10 ESSENTIAL HYPERTENSION: ICD-10-CM

## 2018-01-05 DIAGNOSIS — R80.9 TYPE 2 DIABETES MELLITUS WITH MICROALBUMINURIA, WITHOUT LONG-TERM CURRENT USE OF INSULIN (HCC): ICD-10-CM

## 2018-01-05 DIAGNOSIS — E11.40 TYPE 2 DIABETES MELLITUS WITH DIABETIC NEUROPATHY, WITHOUT LONG-TERM CURRENT USE OF INSULIN (HCC): ICD-10-CM

## 2018-01-05 DIAGNOSIS — K21.9 GASTROESOPHAGEAL REFLUX DISEASE, ESOPHAGITIS PRESENCE NOT SPECIFIED: ICD-10-CM

## 2018-01-05 DIAGNOSIS — E78.00 PURE HYPERCHOLESTEROLEMIA: ICD-10-CM

## 2018-01-05 DIAGNOSIS — E66.01 MORBID OBESITY WITH BMI OF 45.0-49.9, ADULT (HCC): ICD-10-CM

## 2018-01-05 DIAGNOSIS — F51.01 PRIMARY INSOMNIA: ICD-10-CM

## 2018-01-05 PROCEDURE — G8400 PT W/DXA NO RESULTS DOC: HCPCS | Performed by: FAMILY MEDICINE

## 2018-01-05 PROCEDURE — 99214 OFFICE O/P EST MOD 30 MIN: CPT | Performed by: FAMILY MEDICINE

## 2018-01-05 PROCEDURE — G8427 DOCREV CUR MEDS BY ELIG CLIN: HCPCS | Performed by: FAMILY MEDICINE

## 2018-01-05 PROCEDURE — 1090F PRES/ABSN URINE INCON ASSESS: CPT | Performed by: FAMILY MEDICINE

## 2018-01-05 PROCEDURE — 4040F PNEUMOC VAC/ADMIN/RCVD: CPT | Performed by: FAMILY MEDICINE

## 2018-01-05 PROCEDURE — G8484 FLU IMMUNIZE NO ADMIN: HCPCS | Performed by: FAMILY MEDICINE

## 2018-01-05 PROCEDURE — G8417 CALC BMI ABV UP PARAM F/U: HCPCS | Performed by: FAMILY MEDICINE

## 2018-01-05 PROCEDURE — 1036F TOBACCO NON-USER: CPT | Performed by: FAMILY MEDICINE

## 2018-01-05 PROCEDURE — 1123F ACP DISCUSS/DSCN MKR DOCD: CPT | Performed by: FAMILY MEDICINE

## 2018-01-05 RX ORDER — LISINOPRIL AND HYDROCHLOROTHIAZIDE 20; 12.5 MG/1; MG/1
2 TABLET ORAL DAILY
Qty: 180 TABLET | Refills: 3 | Status: SHIPPED | OUTPATIENT
Start: 2018-01-05 | End: 2018-04-19 | Stop reason: SINTOL

## 2018-01-05 RX ORDER — PREGABALIN 150 MG/1
CAPSULE ORAL
Qty: 90 CAPSULE | Refills: 5 | Status: SHIPPED | OUTPATIENT
Start: 2018-01-05 | End: 2018-07-31 | Stop reason: SDUPTHER

## 2018-01-05 RX ORDER — PIOGLITAZONEHYDROCHLORIDE 30 MG/1
30 TABLET ORAL DAILY
Qty: 90 TABLET | Refills: 3 | Status: ON HOLD | OUTPATIENT
Start: 2018-01-05 | End: 2018-09-28

## 2018-01-05 RX ORDER — GLIMEPIRIDE 4 MG/1
TABLET ORAL
Qty: 180 TABLET | Refills: 3 | Status: ON HOLD | OUTPATIENT
Start: 2018-01-05 | End: 2018-03-29 | Stop reason: HOSPADM

## 2018-01-05 RX ORDER — OMEPRAZOLE 20 MG/1
CAPSULE, DELAYED RELEASE ORAL
Qty: 90 CAPSULE | Refills: 3 | Status: SHIPPED | OUTPATIENT
Start: 2018-01-05 | End: 2019-02-09 | Stop reason: SDUPTHER

## 2018-01-05 RX ORDER — HYDRALAZINE HYDROCHLORIDE 50 MG/1
50 TABLET, FILM COATED ORAL 3 TIMES DAILY
Qty: 90 TABLET | Refills: 3 | Status: SHIPPED | OUTPATIENT
Start: 2018-01-05 | End: 2018-06-09 | Stop reason: SDUPTHER

## 2018-01-05 RX ORDER — PROPRANOLOL HYDROCHLORIDE 20 MG/1
20 TABLET ORAL 3 TIMES DAILY
Qty: 270 TABLET | Refills: 3 | Status: ON HOLD | OUTPATIENT
Start: 2018-01-05 | End: 2018-09-28

## 2018-01-05 RX ORDER — ZOLPIDEM TARTRATE 10 MG/1
10 TABLET ORAL NIGHTLY PRN
Qty: 30 TABLET | Refills: 5 | Status: SHIPPED | OUTPATIENT
Start: 2018-01-05 | End: 2018-07-30 | Stop reason: SDUPTHER

## 2018-01-05 RX ORDER — ATORVASTATIN CALCIUM 40 MG/1
TABLET, FILM COATED ORAL
Qty: 90 TABLET | Refills: 3 | Status: ON HOLD | OUTPATIENT
Start: 2018-01-05 | End: 2018-10-12 | Stop reason: HOSPADM

## 2018-01-05 RX ORDER — RISEDRONATE SODIUM 35 MG/1
35 TABLET, FILM COATED ORAL
Qty: 13 TABLET | Refills: 3 | Status: ON HOLD | OUTPATIENT
Start: 2018-01-05 | End: 2018-09-27 | Stop reason: ALTCHOICE

## 2018-01-05 ASSESSMENT — ENCOUNTER SYMPTOMS
SORE THROAT: 0
RHINORRHEA: 0
DIARRHEA: 0
VOMITING: 0
SHORTNESS OF BREATH: 0
CHEST TIGHTNESS: 0
WHEEZING: 0
BLOOD IN STOOL: 0
COUGH: 0
EYE PAIN: 0
BACK PAIN: 0
ABDOMINAL PAIN: 0
CONSTIPATION: 0
NAUSEA: 0

## 2018-01-05 NOTE — PROGRESS NOTES
LABALBU 3.7 10/25/2017    BILITOT 0.3 10/25/2017    ALKPHOS 111 10/25/2017    AST 18 10/25/2017    ALT 18 10/25/2017    LABGLOM >90 10/25/2017         Assessment:      DM 2  Diabetic peripheral neuropathy  Morbid obesity      Plan:      Refill meds  Fasting blood work orders  Encouraged diet, exercise and weight loss. Dash diet  Reviewed health maintenance    Cate received counseling on the following healthy behaviors: nutrition, exercise and medication adherence    Patient given educational materials on Diabetes, Hyperlipidemia, Nutrition, Exercise and Hypertension    I have instructed Leah Rick to complete a self tracking handout on Blood Sugars , Blood Pressures  and Weights and instructed them to bring it with them to her next appointment. Discussed use, benefit, and side effects of prescribed medications. Barriers to medication compliance addressed. All patient questions answered. Pt voiced understanding. Quality & Risk Score Accuracy - MEDICARE ADVANTAGE    Visit Dx:  Diabetic peripheral neuropathy ([facility] [email or web address] [facility]  Stable Diabetes type 2 and complications based on lab values and symptoms. Continue present treatment plan, control of risk factors, and recheck at least yearly. Visit Dx:  Type 2 diabetes mellitus with microalbuminuria, without long-term current use of insulin (HCC)  Stable Diabetes type 2 and complications based on lab values and symptoms. Continue present treatment plan, control of risk factors, and recheck at least yearly. Visit Dx:  Morbid obesity with BMI of 45.0-49.9, adult ([facility] [address]  Stable on review of most recent BMI. Patient counseled on diet and exercise. Additional documentation:  Based on review of the following: The BMI is 46.29 kg/m2. Last edited 01/05/18 15:58 EST by Kj Sauceda MD        Controlled Substances Monitoring:     Documentation: No signs of potential drug abuse or diversion identified.  Kj Sauceda MD)

## 2018-01-05 NOTE — PATIENT INSTRUCTIONS
Patient Education        Type 2 Diabetes: Care Instructions  Your Care Instructions    Type 2 diabetes is a disease that develops when the body's tissues cannot use insulin properly. Over time, the pancreas cannot make enough insulin. Insulin is a hormone that helps the body's cells use sugar (glucose) for energy. It also helps the body store extra sugar in muscle, fat, and liver cells. Without insulin, the sugar cannot get into the cells to do its work. It stays in the blood instead. This can cause high blood sugar levels. A person has diabetes when the blood sugar stays too high too much of the time. Over time, diabetes can lead to diseases of the heart, blood vessels, nerves, kidneys, and eyes. You may be able to control your blood sugar by losing weight, eating a healthy diet, and getting daily exercise. You may also have to take insulin or other diabetes medicine. Follow-up care is a key part of your treatment and safety. Be sure to make and go to all appointments. Call your doctor if you are having problems. It's also a good idea to know your test results and keep a list of the medicines you take. How can you care for yourself at home? · Keep your blood sugar at a target level (which you set with your doctor). ¨ Eat a good diet that spreads carbohydrate throughout the day. Carbohydrate-the body's main source of fuel-affects blood sugar more than any other nutrient. Carbohydrate is in fruits, vegetables, milk, and yogurt. It also is in breads, cereals, vegetables such as potatoes and corn, and sugary foods such as candy and cakes. ¨ Aim for 30 minutes of exercise on most, preferably all, days of the week. Walking is a good choice. You also may want to do other activities, such as running, swimming, cycling, or playing tennis or team sports. If your doctor says it's okay, do muscle-strengthening exercises at least 2 times a week. ¨ Take your medicines exactly as prescribed.  Call your doctor if you give you a goal for your blood pressure. Your goal will be based on your health and your age. An example of a goal is to keep your blood pressure below 140/90. Lifestyle changes, such as eating healthy and being active, are always important to help lower blood pressure. You might also take medicine to reach your blood pressure goal.  Follow-up care is a key part of your treatment and safety. Be sure to make and go to all appointments, and call your doctor if you are having problems. It's also a good idea to know your test results and keep a list of the medicines you take. How can you care for yourself at home? Medical treatment  · If you stop taking your medicine, your blood pressure will go back up. You may take one or more types of medicine to lower your blood pressure. Be safe with medicines. Take your medicine exactly as prescribed. Call your doctor if you think you are having a problem with your medicine. · Talk to your doctor before you start taking aspirin every day. Aspirin can help certain people lower their risk of a heart attack or stroke. But taking aspirin isn't right for everyone, because it can cause serious bleeding. · See your doctor regularly. You may need to see the doctor more often at first or until your blood pressure comes down. · If you are taking blood pressure medicine, talk to your doctor before you take decongestants or anti-inflammatory medicine, such as ibuprofen. Some of these medicines can raise blood pressure. · Learn how to check your blood pressure at home. Lifestyle changes  · Stay at a healthy weight. This is especially important if you put on weight around the waist. Losing even 10 pounds can help you lower your blood pressure. · If your doctor recommends it, get more exercise. Walking is a good choice. Bit by bit, increase the amount you walk every day. Try for at least 30 minutes on most days of the week.  You also may want to swim, bike, or do other activities. · Avoid or limit alcohol. Talk to your doctor about whether you can drink any alcohol. · Try to limit how much sodium you eat to less than 2,300 milligrams (mg) a day. Your doctor may ask you to try to eat less than 1,500 mg a day. · Eat plenty of fruits (such as bananas and oranges), vegetables, legumes, whole grains, and low-fat dairy products. · Lower the amount of saturated fat in your diet. Saturated fat is found in animal products such as milk, cheese, and meat. Limiting these foods may help you lose weight and also lower your risk for heart disease. · Do not smoke. Smoking increases your risk for heart attack and stroke. If you need help quitting, talk to your doctor about stop-smoking programs and medicines. These can increase your chances of quitting for good. When should you call for help? Call 911 anytime you think you may need emergency care. This may mean having symptoms that suggest that your blood pressure is causing a serious heart or blood vessel problem. Your blood pressure may be over 180/110. ? For example, call 911 if:  ? · You have symptoms of a heart attack. These may include:  ¨ Chest pain or pressure, or a strange feeling in the chest.  ¨ Sweating. ¨ Shortness of breath. ¨ Nausea or vomiting. ¨ Pain, pressure, or a strange feeling in the back, neck, jaw, or upper belly or in one or both shoulders or arms. ¨ Lightheadedness or sudden weakness. ¨ A fast or irregular heartbeat. ? · You have symptoms of a stroke. These may include:  ¨ Sudden numbness, tingling, weakness, or loss of movement in your face, arm, or leg, especially on only one side of your body. ¨ Sudden vision changes. ¨ Sudden trouble speaking. ¨ Sudden confusion or trouble understanding simple statements. ¨ Sudden problems with walking or balance. ¨ A sudden, severe headache that is different from past headaches. ? · You have severe back or belly pain.    ?Do not wait until your blood pressure comes down on its own. Get help right away. ?Call your doctor now or seek immediate care if:  ? · Your blood pressure is much higher than normal (such as 180/110 or higher), but you don't have symptoms. ? · You think high blood pressure is causing symptoms, such as:  ¨ Severe headache. ¨ Blurry vision. ? Watch closely for changes in your health, and be sure to contact your doctor if:  ? · Your blood pressure measures 140/90 or higher at least 2 times. That means the top number is 140 or higher or the bottom number is 90 or higher, or both. ? · You think you may be having side effects from your blood pressure medicine. ? · Your blood pressure is usually normal, but it goes above normal at least 2 times. Where can you learn more? Go to https://Workday.Zin.gl. org and sign in to your [employer] account. Enter N980 in the [employer] box to learn more about \"High Blood Pressure: Care Instructions. \"     If you do not have an account, please click on the \"Sign Up Now\" link. Current as of: Alida 10, 2017  Content Version: 11.5  © 6272-5079 Healthwise, Incorporated. Care instructions adapted under license by [facility] ([facility]). If you have questions about a medical condition or this instruction, always ask your healthcare professional. [address] any warranty or liability for your use of this information.

## 2018-01-24 ENCOUNTER — OFFICE VISIT (OUTPATIENT)
Dept: FAMILY MEDICINE CLINIC | Age: 78
End: 2018-01-24
Payer: MEDICARE

## 2018-01-24 VITALS
WEIGHT: 248.2 LBS | RESPIRATION RATE: 28 BRPM | BODY MASS INDEX: 46.9 KG/M2 | HEART RATE: 84 BPM | SYSTOLIC BLOOD PRESSURE: 158 MMHG | DIASTOLIC BLOOD PRESSURE: 90 MMHG

## 2018-01-24 DIAGNOSIS — L30.9 DERMATITIS: Primary | ICD-10-CM

## 2018-01-24 DIAGNOSIS — E66.01 MORBID OBESITY WITH BMI OF 45.0-49.9, ADULT (HCC): ICD-10-CM

## 2018-01-24 DIAGNOSIS — J01.41 ACUTE RECURRENT PANSINUSITIS: ICD-10-CM

## 2018-01-24 PROCEDURE — 99214 OFFICE O/P EST MOD 30 MIN: CPT | Performed by: FAMILY MEDICINE

## 2018-01-24 PROCEDURE — 1036F TOBACCO NON-USER: CPT | Performed by: FAMILY MEDICINE

## 2018-01-24 PROCEDURE — 4040F PNEUMOC VAC/ADMIN/RCVD: CPT | Performed by: FAMILY MEDICINE

## 2018-01-24 PROCEDURE — 1123F ACP DISCUSS/DSCN MKR DOCD: CPT | Performed by: FAMILY MEDICINE

## 2018-01-24 PROCEDURE — 1090F PRES/ABSN URINE INCON ASSESS: CPT | Performed by: FAMILY MEDICINE

## 2018-01-24 PROCEDURE — G8484 FLU IMMUNIZE NO ADMIN: HCPCS | Performed by: FAMILY MEDICINE

## 2018-01-24 PROCEDURE — G8417 CALC BMI ABV UP PARAM F/U: HCPCS | Performed by: FAMILY MEDICINE

## 2018-01-24 PROCEDURE — G8400 PT W/DXA NO RESULTS DOC: HCPCS | Performed by: FAMILY MEDICINE

## 2018-01-24 PROCEDURE — G8427 DOCREV CUR MEDS BY ELIG CLIN: HCPCS | Performed by: FAMILY MEDICINE

## 2018-01-24 RX ORDER — PREDNISONE 20 MG/1
TABLET ORAL
Qty: 15 TABLET | Refills: 0 | Status: ON HOLD | OUTPATIENT
Start: 2018-01-24 | End: 2018-03-07 | Stop reason: ALTCHOICE

## 2018-01-24 RX ORDER — SULFAMETHOXAZOLE AND TRIMETHOPRIM 800; 160 MG/1; MG/1
1 TABLET ORAL 2 TIMES DAILY
Qty: 20 TABLET | Refills: 0 | Status: SHIPPED | OUTPATIENT
Start: 2018-01-24 | End: 2018-02-03

## 2018-01-24 ASSESSMENT — ENCOUNTER SYMPTOMS
EYE ITCHING: 1
BLOOD IN STOOL: 0
VOMITING: 0
CONSTIPATION: 0
WHEEZING: 0
CHEST TIGHTNESS: 0
DIARRHEA: 0
EYE DISCHARGE: 1
BACK PAIN: 0
NAUSEA: 0
EYE PAIN: 0
ABDOMINAL PAIN: 0
RHINORRHEA: 0
SHORTNESS OF BREATH: 0
SORE THROAT: 0
COUGH: 0

## 2018-01-24 ASSESSMENT — PATIENT HEALTH QUESTIONNAIRE - PHQ9
1. LITTLE INTEREST OR PLEASURE IN DOING THINGS: 0
SUM OF ALL RESPONSES TO PHQ9 QUESTIONS 1 & 2: 0
2. FEELING DOWN, DEPRESSED OR HOPELESS: 0
SUM OF ALL RESPONSES TO PHQ QUESTIONS 1-9: 0

## 2018-01-24 NOTE — PATIENT INSTRUCTIONS
account, please click on the \"Sign Up Now\" link. Current as of: October 13, 2016  Content Version: 11.5  © 2838-5024 [employer]. Care instructions adapted under license by [facility] ([facility]). If you have questions about a medical condition or this instruction, always ask your healthcare professional. [address] any warranty or liability for your use of this information. Patient Education        DASH Diet: Care Instructions  Your Care Instructions    The DASH diet is an eating plan that can help lower your blood pressure. DASH stands for Dietary Approaches to Stop Hypertension. Hypertension is high blood pressure. The DASH diet focuses on eating foods that are high in calcium, potassium, and magnesium. These nutrients can lower blood pressure. The foods that are highest in these nutrients are fruits, vegetables, low-fat dairy products, nuts, seeds, and legumes. But taking calcium, potassium, and magnesium supplements instead of eating foods that are high in those nutrients does not have the same effect. The DASH diet also includes whole grains, fish, and poultry. The DASH diet is one of several lifestyle changes your doctor may recommend to lower your high blood pressure. Your doctor may also want you to decrease the amount of sodium in your diet. Lowering sodium while following the DASH diet can lower blood pressure even further than just the DASH diet alone. Follow-up care is a key part of your treatment and safety. Be sure to make and go to all appointments, and call your doctor if you are having problems. It's also a good idea to know your test results and keep a list of the medicines you take. How can you care for yourself at home? Following the DASH diet  · Eat 4 to 5 servings of fruit each day. A serving is 1 medium-sized piece of fruit, ½ cup chopped or canned fruit, 1/4 cup dried fruit, or 4 ounces (½ cup) of fruit juice.  Choose fruit more often than fruit

## 2018-01-24 NOTE — PROGRESS NOTES
sounds. Pulmonary/Chest: Breath sounds normal. She has no wheezes. She has no rales. Abdominal: Soft. Bowel sounds are normal. There is no tenderness. There is no rebound and no guarding. Musculoskeletal: Normal range of motion. She exhibits no edema. Lymphadenopathy:     She has no cervical adenopathy. Neurological: She is alert and oriented to person, place, and time. She has normal reflexes. No cranial nerve deficit. Skin: Skin is warm and dry. No rash noted. Diffuse sores throughout body   Psychiatric: She has a normal mood and affect. Nursing note and vitals reviewed. Assessment:      Dermatitis  Acute sinusitis  Morbid obesity      Plan:      Bactrim DS BID x 10 days  Prednisone 20 mg BID /QD  Encouraged diet, exercise and weight loss. Dash diet    Cate received counseling on the following healthy behaviors: nutrition, exercise and medication adherence    Patient given educational materials on Diabetes, Hyperlipidemia, Nutrition, Exercise and Hypertension    I have instructed Analy Walker to complete a self tracking handout on Blood Sugars , Blood Pressures  and Weights and instructed them to bring it with them to her next appointment. Discussed use, benefit, and side effects of prescribed medications. Barriers to medication compliance addressed. All patient questions answered. Pt voiced understanding.

## 2018-02-04 ENCOUNTER — APPOINTMENT (OUTPATIENT)
Dept: GENERAL RADIOLOGY | Age: 78
End: 2018-02-04
Payer: MEDICARE

## 2018-02-04 ENCOUNTER — HOSPITAL ENCOUNTER (EMERGENCY)
Age: 78
Discharge: HOME | End: 2018-02-04
Attending: EMERGENCY MEDICINE
Payer: MEDICARE

## 2018-02-04 ENCOUNTER — APPOINTMENT (OUTPATIENT)
Dept: CT IMAGING | Age: 78
End: 2018-02-04
Payer: MEDICARE

## 2018-02-04 VITALS
TEMPERATURE: 98.6 F | HEART RATE: 67 BPM | OXYGEN SATURATION: 97 % | RESPIRATION RATE: 18 BRPM | DIASTOLIC BLOOD PRESSURE: 76 MMHG | SYSTOLIC BLOOD PRESSURE: 169 MMHG

## 2018-02-04 DIAGNOSIS — S42.91XA SHOULDER FRACTURE, RIGHT, CLOSED, INITIAL ENCOUNTER: Primary | ICD-10-CM

## 2018-02-04 DIAGNOSIS — S00.83XA CONTUSION OF FACE, INITIAL ENCOUNTER: ICD-10-CM

## 2018-02-04 LAB
ALBUMIN SERPL-MCNC: 3.4 G/DL (ref 3.5–5.1)
ALP BLD-CCNC: 86 U/L (ref 38–126)
ALT SERPL-CCNC: 10 U/L (ref 11–66)
ANION GAP SERPL CALCULATED.3IONS-SCNC: 12 MEQ/L (ref 8–16)
ANISOCYTOSIS: ABNORMAL
AST SERPL-CCNC: 9 U/L (ref 5–40)
BASOPHILS # BLD: 0.3 %
BASOPHILS ABSOLUTE: 0 THOU/MM3 (ref 0–0.1)
BILIRUB SERPL-MCNC: 0.2 MG/DL (ref 0.3–1.2)
BILIRUBIN DIRECT: < 0.2 MG/DL (ref 0–0.3)
BUN BLDV-MCNC: 25 MG/DL (ref 7–22)
CALCIUM SERPL-MCNC: 8.7 MG/DL (ref 8.5–10.5)
CHLORIDE BLD-SCNC: 99 MEQ/L (ref 98–111)
CO2: 24 MEQ/L (ref 23–33)
CREAT SERPL-MCNC: 1.2 MG/DL (ref 0.4–1.2)
EKG ATRIAL RATE: 63 BPM
EKG P AXIS: -22 DEGREES
EKG P-R INTERVAL: 170 MS
EKG Q-T INTERVAL: 396 MS
EKG QRS DURATION: 94 MS
EKG QTC CALCULATION (BAZETT): 405 MS
EKG R AXIS: 58 DEGREES
EKG T AXIS: 19 DEGREES
EKG VENTRICULAR RATE: 63 BPM
EOSINOPHIL # BLD: 2.8 %
EOSINOPHILS ABSOLUTE: 0.2 THOU/MM3 (ref 0–0.4)
GFR SERPL CREATININE-BSD FRML MDRD: 44 ML/MIN/1.73M2
GLUCOSE BLD-MCNC: 152 MG/DL (ref 70–108)
HCT VFR BLD CALC: 32 % (ref 37–47)
HEMOGLOBIN: 10.8 GM/DL (ref 12–16)
LIPASE: 49.8 U/L (ref 5.6–51.3)
LYMPHOCYTES # BLD: 10.2 %
LYMPHOCYTES ABSOLUTE: 0.7 THOU/MM3 (ref 1–4.8)
MAGNESIUM: 1.8 MG/DL (ref 1.6–2.4)
MCH RBC QN AUTO: 28.9 PG (ref 27–31)
MCHC RBC AUTO-ENTMCNC: 33.8 GM/DL (ref 33–37)
MCV RBC AUTO: 85.5 FL (ref 81–99)
MONOCYTES # BLD: 11.6 %
MONOCYTES ABSOLUTE: 0.8 THOU/MM3 (ref 0.4–1.3)
NUCLEATED RED BLOOD CELLS: 0 /100 WBC
OSMOLALITY CALCULATION: 277.5 MOSMOL/KG (ref 275–300)
PDW BLD-RTO: 19.4 % (ref 11.5–14.5)
PLATELET # BLD: 161 THOU/MM3 (ref 130–400)
PMV BLD AUTO: 7.1 FL (ref 7.4–10.4)
POTASSIUM SERPL-SCNC: 5.3 MEQ/L (ref 3.5–5.2)
RBC # BLD: 3.74 MILL/MM3 (ref 4.2–5.4)
SEG NEUTROPHILS: 75.1 %
SEGMENTED NEUTROPHILS ABSOLUTE COUNT: 5.3 THOU/MM3 (ref 1.8–7.7)
SODIUM BLD-SCNC: 135 MEQ/L (ref 135–145)
TOTAL PROTEIN: 6 G/DL (ref 6.1–8)
WBC # BLD: 7 THOU/MM3 (ref 4.8–10.8)

## 2018-02-04 PROCEDURE — 36415 COLL VENOUS BLD VENIPUNCTURE: CPT

## 2018-02-04 PROCEDURE — 99284 EMERGENCY DEPT VISIT MOD MDM: CPT

## 2018-02-04 PROCEDURE — 83735 ASSAY OF MAGNESIUM: CPT

## 2018-02-04 PROCEDURE — 72170 X-RAY EXAM OF PELVIS: CPT

## 2018-02-04 PROCEDURE — 83690 ASSAY OF LIPASE: CPT

## 2018-02-04 PROCEDURE — 93005 ELECTROCARDIOGRAM TRACING: CPT

## 2018-02-04 PROCEDURE — 96372 THER/PROPH/DIAG INJ SC/IM: CPT

## 2018-02-04 PROCEDURE — 70450 CT HEAD/BRAIN W/O DYE: CPT

## 2018-02-04 PROCEDURE — 6360000002 HC RX W HCPCS: Performed by: EMERGENCY MEDICINE

## 2018-02-04 PROCEDURE — 70486 CT MAXILLOFACIAL W/O DYE: CPT

## 2018-02-04 PROCEDURE — 73030 X-RAY EXAM OF SHOULDER: CPT

## 2018-02-04 PROCEDURE — 93000 ELECTROCARDIOGRAM COMPLETE: CPT | Performed by: EMERGENCY MEDICINE

## 2018-02-04 PROCEDURE — 85025 COMPLETE CBC W/AUTO DIFF WBC: CPT

## 2018-02-04 PROCEDURE — 71045 X-RAY EXAM CHEST 1 VIEW: CPT

## 2018-02-04 PROCEDURE — 82248 BILIRUBIN DIRECT: CPT

## 2018-02-04 PROCEDURE — A4565 SLINGS: HCPCS

## 2018-02-04 PROCEDURE — 6370000000 HC RX 637 (ALT 250 FOR IP): Performed by: EMERGENCY MEDICINE

## 2018-02-04 PROCEDURE — 80053 COMPREHEN METABOLIC PANEL: CPT

## 2018-02-04 RX ORDER — OXYCODONE HYDROCHLORIDE AND ACETAMINOPHEN 5; 325 MG/1; MG/1
1 TABLET ORAL EVERY 6 HOURS PRN
Qty: 12 TABLET | Refills: 0 | Status: SHIPPED | OUTPATIENT
Start: 2018-02-04 | End: 2018-02-07

## 2018-02-04 RX ORDER — HYDROCODONE BITARTRATE AND ACETAMINOPHEN 5; 325 MG/1; MG/1
1 TABLET ORAL ONCE
Status: COMPLETED | OUTPATIENT
Start: 2018-02-04 | End: 2018-02-04

## 2018-02-04 RX ADMIN — HYDROMORPHONE HYDROCHLORIDE 1 MG: 1 INJECTION, SOLUTION INTRAMUSCULAR; INTRAVENOUS; SUBCUTANEOUS at 10:58

## 2018-02-04 RX ADMIN — HYDROCODONE BITARTRATE AND ACETAMINOPHEN 1 TABLET: 5; 325 TABLET ORAL at 09:35

## 2018-02-04 ASSESSMENT — ENCOUNTER SYMPTOMS
COUGH: 0
PHOTOPHOBIA: 0
RHINORRHEA: 0
EYE DISCHARGE: 0
BLOOD IN STOOL: 0
CHEST TIGHTNESS: 0
ABDOMINAL PAIN: 0
SORE THROAT: 0
CONSTIPATION: 0
DIARRHEA: 0
EYE ITCHING: 0
SINUS PRESSURE: 0
EYE REDNESS: 0
WHEEZING: 0
SHORTNESS OF BREATH: 0
EYE PAIN: 0
NAUSEA: 0
VOICE CHANGE: 0
BACK PAIN: 0
VOMITING: 0
CHOKING: 0
ABDOMINAL DISTENTION: 0
TROUBLE SWALLOWING: 0

## 2018-02-04 ASSESSMENT — PAIN SCALES - GENERAL: PAINLEVEL_OUTOF10: 10

## 2018-02-04 NOTE — ED PROVIDER NOTES
Positive for arthralgias (right shoulder pain without deformity). Negative for back pain, gait problem, myalgias, neck pain and neck stiffness. Skin: Negative for pallor and rash. Allergic/Immunologic: Negative for immunocompromised state. Neurological: Negative for dizziness, tremors, seizures, syncope, facial asymmetry, weakness, light-headedness, numbness and headaches. Hematological: Negative for adenopathy. Does not bruise/bleed easily. Psychiatric/Behavioral: Negative for agitation, hallucinations and suicidal ideas. The patient is not nervous/anxious. 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 SURGICAL HISTORY      has a past surgical history that includes ventral hernia repair (1992); Cholecystectomy (03/1988); Hysterectomy, total abdominal (1980); Hemorrhoid surgery (1980s); Total knee arthroplasty (08/2003); Carpal tunnel release (Bilateral, 2008, 2009); sinus surgery (last 2009); Cataract removal (2011); Eye surgery (Left, 01/30/2015); sinus surgery (02/01/2016); sinus surgery (2016 x 2); joint replacement (bilat 2005/ 2007); Colonoscopy (2016); Endoscopy, colon, diagnostic; eye surgery; hernia repair; Tonsillectomy; sinus surgery (09/18/2017); and sinus surgery (08/09/2017).     CURRENT MEDICATIONS       Previous Medications    ATORVASTATIN (LIPITOR) 40 MG TABLET

## 2018-02-04 NOTE — ED NOTES
Bed: 001A  Expected date: 2/4/18  Expected time:   Means of arrival: [facility] Dept  Comments:     Minh Norton MA  02/04/18 5871

## 2018-02-06 ENCOUNTER — CARE COORDINATION (OUTPATIENT)
Dept: CASE MANAGEMENT | Age: 78
End: 2018-02-06

## 2018-02-06 NOTE — CARE COORDINATION
Care Transition  ED Follow up Call    Reason for ED visit: Fall, right shoulder fracture  How are you feeling? \"okay\" rated pain 4/10  Status:     improved      Do you have any questions related to your discharge instructions? no    Review of Instructions:    Discharged with new prescription? yes - Percocet, taking without difficulty    Review Medications:  Yes   Do you have any questions related to your medications? no    Understands what to report/when to return?:  Yes   Do you have a follow up appointment scheduled? Yes  Specific review if indicated (symptom, services, etc): Spoke with Analy Walker, introduced self/role. Patient rates right arm pain 4/10, taking Percocet with relief. Denies headache, dizziness, blurred vision, confusion, difficulty with ambulation or balance. Patient had follow up appointment with [facility] 2/5/18. Scheduled follow up appointment with Dr. Fallon Infante on Friday, February 9th at 1030. Patient denies further needs/services at this time. Reported family is taking turns staying with her. Advised to call Dr. Shan Mac office or [facility] for any new or worsening concerns. Verbalized understanding. Do you have any needs or concerns I can assist you with? no     FU appts/Provider:    No future appointments.

## 2018-02-09 ENCOUNTER — OFFICE VISIT (OUTPATIENT)
Dept: FAMILY MEDICINE CLINIC | Age: 78
End: 2018-02-09
Payer: MEDICARE

## 2018-02-09 VITALS
WEIGHT: 260 LBS | HEART RATE: 122 BPM | HEIGHT: 61 IN | DIASTOLIC BLOOD PRESSURE: 66 MMHG | RESPIRATION RATE: 24 BRPM | BODY MASS INDEX: 49.09 KG/M2 | SYSTOLIC BLOOD PRESSURE: 134 MMHG | TEMPERATURE: 97.3 F | OXYGEN SATURATION: 93 %

## 2018-02-09 DIAGNOSIS — S42.221D CLOSED 2-PART DISPLACED FRACTURE OF SURGICAL NECK OF RIGHT HUMERUS WITH ROUTINE HEALING, SUBSEQUENT ENCOUNTER: Primary | ICD-10-CM

## 2018-02-09 DIAGNOSIS — E66.01 MORBID OBESITY WITH BODY MASS INDEX (BMI) OF 45.0 TO 49.9 IN ADULT (HCC): ICD-10-CM

## 2018-02-09 DIAGNOSIS — A49.02 MRSA INFECTION: ICD-10-CM

## 2018-02-09 PROCEDURE — 99214 OFFICE O/P EST MOD 30 MIN: CPT | Performed by: FAMILY MEDICINE

## 2018-02-09 PROCEDURE — 1123F ACP DISCUSS/DSCN MKR DOCD: CPT | Performed by: FAMILY MEDICINE

## 2018-02-09 PROCEDURE — G8400 PT W/DXA NO RESULTS DOC: HCPCS | Performed by: FAMILY MEDICINE

## 2018-02-09 PROCEDURE — G8427 DOCREV CUR MEDS BY ELIG CLIN: HCPCS | Performed by: FAMILY MEDICINE

## 2018-02-09 PROCEDURE — 1036F TOBACCO NON-USER: CPT | Performed by: FAMILY MEDICINE

## 2018-02-09 PROCEDURE — G8484 FLU IMMUNIZE NO ADMIN: HCPCS | Performed by: FAMILY MEDICINE

## 2018-02-09 PROCEDURE — G8417 CALC BMI ABV UP PARAM F/U: HCPCS | Performed by: FAMILY MEDICINE

## 2018-02-09 PROCEDURE — 1090F PRES/ABSN URINE INCON ASSESS: CPT | Performed by: FAMILY MEDICINE

## 2018-02-09 PROCEDURE — 4040F PNEUMOC VAC/ADMIN/RCVD: CPT | Performed by: FAMILY MEDICINE

## 2018-02-09 RX ORDER — OXYCODONE HYDROCHLORIDE AND ACETAMINOPHEN 5; 325 MG/1; MG/1
1 TABLET ORAL EVERY 6 HOURS PRN
Status: ON HOLD | COMMUNITY
End: 2018-03-29

## 2018-02-09 RX ORDER — SULFAMETHOXAZOLE AND TRIMETHOPRIM 800; 160 MG/1; MG/1
1 TABLET ORAL 2 TIMES DAILY
Qty: 20 TABLET | Refills: 0 | Status: SHIPPED | OUTPATIENT
Start: 2018-02-09 | End: 2018-02-19

## 2018-02-09 ASSESSMENT — ENCOUNTER SYMPTOMS
BLOOD IN STOOL: 0
DIARRHEA: 0
WHEEZING: 0
BACK PAIN: 0
NAUSEA: 0
VOMITING: 0
SORE THROAT: 0
SHORTNESS OF BREATH: 0
ABDOMINAL PAIN: 0
EYE PAIN: 0
CHEST TIGHTNESS: 0
RHINORRHEA: 0
COUGH: 0
CONSTIPATION: 0

## 2018-02-09 NOTE — PATIENT INSTRUCTIONS
meals using beans and peas. Add garbanzo or kidney beans to salads. Make burritos and tacos with mashed lockhart beans or black beans. Where can you learn more? Go to https://west.Quofore. org and sign in to your careersmore account. Enter L052 in the [facility] box to learn more about \"DASH Diet: Care Instructions. \"     If you do not have an account, please click on the \"Sign Up Now\" link. Current as of: September 21, 2016  Content Version: 11.5  © 2706-1460 [employer]. Care instructions adapted under license by [facility] ([facility]). If you have questions about a medical condition or this instruction, always ask your healthcare professional. [address] any warranty or liability for your use of this information. Patient Education        MRSA: Care Instructions  Your Care Instructions    MRSA stands for methicillin-resistant Staphylococcus aureus. It is a type of bacteria that can cause a staph infection. But it cannot be killed by the antibiotic methicillin and some other antibiotics. This sometimes makes it harder to treat. The bacteria are widespread on skin and in the nose. MRSA can cause infections of the skin, heart, blood, and bones. The bacteria can spread quickly in the body and cause serious problems. MRSA can also be spread from person to person. Depending on how serious your infection is, the doctor may drain your wound and you may get antibiotics through a small tube placed in a vein (IV). Your doctor may also give you an antibiotic ointment to use on sores or in your nose. Follow-up care is a key part of your treatment and safety. Be sure to make and go to all appointments, and call your doctor if you are having problems. It's also a good idea to know your test results and keep a list of the medicines you take. How can you care for yourself at home? · Take your antibiotics as directed. Do not stop taking them just because you feel better. You need to take the full course of antibiotics. · Keep any cuts or other wounds covered while they heal.  · Wash your hands often, especially after you touch elastic bandages or other dressings over a wound. This can keep the bacteria from spreading. Wrap bandages in a plastic bag before you throw them away. · Do not share towels, washcloths, razors, clothing, or other items that touched your wound or bandage. Wash your sheets, towels, and clothes with warm water and detergent. Dry them in a hot dryer, if possible. · Keep shared areas clean by wiping down surfaces (such as countertops, doorknobs, and light switches) with a disinfectant. When should you call for help? Call your doctor now or seek immediate medical care if:  ? · You have worse symptoms of infection, such as:  ¨ Increased pain, swelling, warmth, or redness. ¨ Red streaks leading from the area. ¨ Pus draining from the area. ¨ A fever. ? Watch closely for changes in your health, and be sure to contact your doctor if:  ? · You do not get better as expected. Where can you learn more? Go to https://[employer]."[employer]. org and sign in to your [employer]ius account. Enter K704 in the [employer] box to learn more about \"MRSA: Care Instructions. \"     If you do not have an account, please click on the \"Sign Up Now\" link. Current as of: March 3, 2017  Content Version: 11.5  © 8375-5073 [employer], [employer]. Care instructions adapted under license by [facility] ([facility]). If you have questions about a medical condition or this instruction, always ask your healthcare professional. [place] [address] any warranty or liability for your use of this information.

## 2018-02-09 NOTE — PROGRESS NOTES
Visit Information    Have you changed or started any medications since your last visit including any over-the-counter medicines, vitamins, or herbal medicines? yes - see med list   Are you having any side effects from any of your medications? -  no  Have you stopped taking any of your medications? Is so, why? -  yes - see med list    Have you seen any other physician or provider since your last visit? Yes - Records Obtained  Have you had any other diagnostic tests since your last visit? Yes - Records Obtained  Have you been seen in the emergency room and/or had an admission to a hospital since we last saw you? Yes - Records Obtained  Have you had your routine dental cleaning in the past 6 months? no    Have you activated your [employer] account? If not, what are your barriers?  Yes     Patient Care Team:  Saintclair Payment, MD as PCP - General (Family Medicine)  Saintclair Payment, MD as PCP - [facility] Attributed Provider    Medical History Review  Past Medical, Family, and Social History reviewed and does contribute to the patient presenting condition    Health Maintenance   Topic Date Due    Potassium monitoring  02/04/2019    Creatinine monitoring  02/04/2019    Lipid screen  01/10/2022    DTaP/Tdap/Td vaccine (4 - Td) 10/03/2027    Zostavax vaccine  Addressed    DEXA (modify frequency per FRAX score)  Addressed    Flu vaccine  Completed    Pneumococcal low/med risk  Completed

## 2018-03-05 ENCOUNTER — HOSPITAL ENCOUNTER (INPATIENT)
Age: 78
LOS: 7 days | Discharge: HOME | DRG: 868 | End: 2018-03-13
Attending: INTERNAL MEDICINE | Admitting: INTERNAL MEDICINE
Payer: MEDICARE

## 2018-03-05 DIAGNOSIS — E66.01 MORBID OBESITY WITH BMI OF 45.0-49.9, ADULT (HCC): ICD-10-CM

## 2018-03-05 DIAGNOSIS — L03.119 CELLULITIS OF LOWER EXTREMITY, UNSPECIFIED LATERALITY: Primary | ICD-10-CM

## 2018-03-05 DIAGNOSIS — L58.9 POST-RADIATION DERMATITIS: ICD-10-CM

## 2018-03-05 DIAGNOSIS — E87.1 HYPONATREMIA: ICD-10-CM

## 2018-03-05 DIAGNOSIS — R21 RASH OF ENTIRE BODY: ICD-10-CM

## 2018-03-05 DIAGNOSIS — B95.62 INFECTION OF SKIN DUE TO METHICILLIN RESISTANT STAPHYLOCOCCUS AUREUS (MRSA): ICD-10-CM

## 2018-03-05 DIAGNOSIS — L08.9 INFECTION OF SKIN DUE TO METHICILLIN RESISTANT STAPHYLOCOCCUS AUREUS (MRSA): ICD-10-CM

## 2018-03-05 LAB
ALBUMIN SERPL-MCNC: 3.2 G/DL (ref 3.5–5.1)
ALP BLD-CCNC: 107 U/L (ref 38–126)
ALT SERPL-CCNC: 9 U/L (ref 11–66)
ANION GAP SERPL CALCULATED.3IONS-SCNC: 14 MEQ/L (ref 8–16)
ANISOCYTOSIS: ABNORMAL
AST SERPL-CCNC: 14 U/L (ref 5–40)
BASOPHILS # BLD: 1.3 %
BASOPHILS ABSOLUTE: 0.1 THOU/MM3 (ref 0–0.1)
BILIRUB SERPL-MCNC: 0.3 MG/DL (ref 0.3–1.2)
BILIRUBIN DIRECT: < 0.2 MG/DL (ref 0–0.3)
BUN BLDV-MCNC: 25 MG/DL (ref 7–22)
CALCIUM SERPL-MCNC: 8.6 MG/DL (ref 8.5–10.5)
CHLORIDE BLD-SCNC: 90 MEQ/L (ref 98–111)
CO2: 24 MEQ/L (ref 23–33)
CREAT SERPL-MCNC: 1.1 MG/DL (ref 0.4–1.2)
EOSINOPHIL # BLD: 3 %
EOSINOPHILS ABSOLUTE: 0.3 THOU/MM3 (ref 0–0.4)
GFR SERPL CREATININE-BSD FRML MDRD: 48 ML/MIN/1.73M2
GLUCOSE BLD-MCNC: 127 MG/DL (ref 70–108)
HCT VFR BLD CALC: 25 % (ref 37–47)
HEMOGLOBIN: 8.3 GM/DL (ref 12–16)
LACTIC ACID: 1.1 MMOL/L (ref 0.5–2.2)
LIPASE: 25.9 U/L (ref 5.6–51.3)
LYMPHOCYTES # BLD: 6.8 %
LYMPHOCYTES ABSOLUTE: 0.6 THOU/MM3 (ref 1–4.8)
MCH RBC QN AUTO: 28.8 PG (ref 27–31)
MCHC RBC AUTO-ENTMCNC: 33.4 GM/DL (ref 33–37)
MCV RBC AUTO: 86.2 FL (ref 81–99)
MONOCYTES # BLD: 8 %
MONOCYTES ABSOLUTE: 0.7 THOU/MM3 (ref 0.4–1.3)
NUCLEATED RED BLOOD CELLS: 0 /100 WBC
OSMOLALITY CALCULATION: 263.1 MOSMOL/KG (ref 275–300)
PDW BLD-RTO: 19.3 % (ref 11.5–14.5)
PLATELET # BLD: 252 THOU/MM3 (ref 130–400)
PMV BLD AUTO: 6.6 FL (ref 7.4–10.4)
POTASSIUM SERPL-SCNC: 4.7 MEQ/L (ref 3.5–5.2)
PRO-BNP: 379.2 PG/ML (ref 0–1800)
RBC # BLD: 2.9 MILL/MM3 (ref 4.2–5.4)
SEG NEUTROPHILS: 80.9 %
SEGMENTED NEUTROPHILS ABSOLUTE COUNT: 7.3 THOU/MM3 (ref 1.8–7.7)
SODIUM BLD-SCNC: 128 MEQ/L (ref 135–145)
TOTAL PROTEIN: 6 G/DL (ref 6.1–8)
TROPONIN T: < 0.01 NG/ML
WBC # BLD: 9 THOU/MM3 (ref 4.8–10.8)

## 2018-03-05 PROCEDURE — 86694 HERPES SIMPLEX NES ANTBDY: CPT

## 2018-03-05 PROCEDURE — 80053 COMPREHEN METABOLIC PANEL: CPT

## 2018-03-05 PROCEDURE — 87040 BLOOD CULTURE FOR BACTERIA: CPT

## 2018-03-05 PROCEDURE — 87070 CULTURE OTHR SPECIMN AEROBIC: CPT

## 2018-03-05 PROCEDURE — 87529 HSV DNA AMP PROBE: CPT

## 2018-03-05 PROCEDURE — 99284 EMERGENCY DEPT VISIT MOD MDM: CPT

## 2018-03-05 PROCEDURE — 84484 ASSAY OF TROPONIN QUANT: CPT

## 2018-03-05 PROCEDURE — 87186 SC STD MICRODIL/AGAR DIL: CPT

## 2018-03-05 PROCEDURE — 87077 CULTURE AEROBIC IDENTIFY: CPT

## 2018-03-05 PROCEDURE — 82248 BILIRUBIN DIRECT: CPT

## 2018-03-05 PROCEDURE — 87147 CULTURE TYPE IMMUNOLOGIC: CPT

## 2018-03-05 PROCEDURE — 87205 SMEAR GRAM STAIN: CPT

## 2018-03-05 PROCEDURE — 84145 PROCALCITONIN (PCT): CPT

## 2018-03-05 PROCEDURE — 96375 TX/PRO/DX INJ NEW DRUG ADDON: CPT

## 2018-03-05 PROCEDURE — 83605 ASSAY OF LACTIC ACID: CPT

## 2018-03-05 PROCEDURE — 83880 ASSAY OF NATRIURETIC PEPTIDE: CPT

## 2018-03-05 PROCEDURE — 6360000002 HC RX W HCPCS: Performed by: NURSE PRACTITIONER

## 2018-03-05 PROCEDURE — 36415 COLL VENOUS BLD VENIPUNCTURE: CPT

## 2018-03-05 PROCEDURE — 96374 THER/PROPH/DIAG INJ IV PUSH: CPT

## 2018-03-05 PROCEDURE — 99223 1ST HOSP IP/OBS HIGH 75: CPT | Performed by: INTERNAL MEDICINE

## 2018-03-05 PROCEDURE — 83690 ASSAY OF LIPASE: CPT

## 2018-03-05 PROCEDURE — 85025 COMPLETE CBC W/AUTO DIFF WBC: CPT

## 2018-03-05 RX ORDER — HYDROCODONE BITARTRATE AND ACETAMINOPHEN 5; 325 MG/1; MG/1
1 TABLET ORAL ONCE
Status: DISCONTINUED | OUTPATIENT
Start: 2018-03-05 | End: 2018-03-05

## 2018-03-05 RX ORDER — 0.9 % SODIUM CHLORIDE 0.9 %
1000 INTRAVENOUS SOLUTION INTRAVENOUS ONCE
Status: COMPLETED | OUTPATIENT
Start: 2018-03-06 | End: 2018-03-06

## 2018-03-05 RX ORDER — ONDANSETRON 2 MG/ML
4 INJECTION INTRAMUSCULAR; INTRAVENOUS ONCE
Status: COMPLETED | OUTPATIENT
Start: 2018-03-05 | End: 2018-03-05

## 2018-03-05 RX ADMIN — ONDANSETRON 4 MG: 2 INJECTION INTRAMUSCULAR; INTRAVENOUS at 23:37

## 2018-03-05 RX ADMIN — HYDROMORPHONE HYDROCHLORIDE 1 MG: 1 INJECTION, SOLUTION INTRAMUSCULAR; INTRAVENOUS; SUBCUTANEOUS at 23:37

## 2018-03-05 ASSESSMENT — PAIN DESCRIPTION - DESCRIPTORS: DESCRIPTORS: BURNING

## 2018-03-05 ASSESSMENT — ENCOUNTER SYMPTOMS
BACK PAIN: 0
EYE DISCHARGE: 0
COUGH: 0
NAUSEA: 0
DIARRHEA: 0
WHEEZING: 0
VOMITING: 0
EYE PAIN: 0
SHORTNESS OF BREATH: 0
SORE THROAT: 0
ABDOMINAL PAIN: 0
RHINORRHEA: 0

## 2018-03-05 ASSESSMENT — PAIN SCALES - GENERAL
PAINLEVEL_OUTOF10: 10

## 2018-03-05 ASSESSMENT — PAIN DESCRIPTION - ORIENTATION: ORIENTATION: RIGHT;LEFT

## 2018-03-05 ASSESSMENT — PAIN DESCRIPTION - PAIN TYPE: TYPE: ACUTE PAIN

## 2018-03-05 ASSESSMENT — PAIN DESCRIPTION - LOCATION: LOCATION: LEG

## 2018-03-06 ENCOUNTER — APPOINTMENT (OUTPATIENT)
Dept: INTERVENTIONAL RADIOLOGY/VASCULAR | Age: 78
DRG: 868 | End: 2018-03-06
Payer: MEDICARE

## 2018-03-06 PROBLEM — Z88.1 DRUG ALLERGY, ANTIBIOTIC: Status: ACTIVE | Noted: 2018-03-06

## 2018-03-06 PROBLEM — L08.9 INFECTION OF SKIN DUE TO METHICILLIN RESISTANT STAPHYLOCOCCUS AUREUS (MRSA): Status: ACTIVE | Noted: 2018-03-06

## 2018-03-06 PROBLEM — B95.62 INFECTION OF SKIN DUE TO METHICILLIN RESISTANT STAPHYLOCOCCUS AUREUS (MRSA): Status: ACTIVE | Noted: 2018-03-06

## 2018-03-06 PROBLEM — C31.0: Status: ACTIVE | Noted: 2018-03-06

## 2018-03-06 PROBLEM — R21 RASH OF ENTIRE BODY: Status: ACTIVE | Noted: 2018-03-06

## 2018-03-06 LAB
ALBUMIN SERPL-MCNC: 3 G/DL (ref 3.5–5.1)
ANION GAP SERPL CALCULATED.3IONS-SCNC: 16 MEQ/L (ref 8–16)
ANISOCYTOSIS: ABNORMAL
AVERAGE GLUCOSE: 105 MG/DL (ref 70–126)
BASOPHILS # BLD: 0.3 %
BASOPHILS ABSOLUTE: 0 THOU/MM3 (ref 0–0.1)
BILIRUBIN URINE: NEGATIVE
BLOOD, URINE: NEGATIVE
BUN BLDV-MCNC: 20 MG/DL (ref 7–22)
C-REACTIVE PROTEIN: 12.5 MG/DL (ref 0–1)
CA 125: 9 U/ML
CALCIUM SERPL-MCNC: 8.6 MG/DL (ref 8.5–10.5)
CEA: 2 NG/ML (ref 0–5)
CHARACTER, URINE: CLEAR
CHLORIDE BLD-SCNC: 94 MEQ/L (ref 98–111)
CO2: 20 MEQ/L (ref 23–33)
COLOR: YELLOW
CREAT SERPL-MCNC: 1 MG/DL (ref 0.4–1.2)
EOSINOPHIL # BLD: 2.4 %
EOSINOPHILS ABSOLUTE: 0.2 THOU/MM3 (ref 0–0.4)
GFR SERPL CREATININE-BSD FRML MDRD: 54 ML/MIN/1.73M2
GLUCOSE BLD-MCNC: 181 MG/DL (ref 70–108)
GLUCOSE BLD-MCNC: 216 MG/DL (ref 70–108)
GLUCOSE BLD-MCNC: 294 MG/DL (ref 70–108)
GLUCOSE BLD-MCNC: 336 MG/DL (ref 70–108)
GLUCOSE BLD-MCNC: 345 MG/DL (ref 70–108)
GLUCOSE URINE: NEGATIVE MG/DL
HBA1C MFR BLD: 5.5 % (ref 4.4–6.4)
HCT VFR BLD CALC: 23.5 % (ref 37–47)
HCT VFR BLD CALC: 26.6 % (ref 37–47)
HEMOGLOBIN: 7.8 GM/DL (ref 12–16)
HEMOGLOBIN: 8.8 GM/DL (ref 12–16)
KETONES, URINE: NEGATIVE
LEUKOCYTE ESTERASE, URINE: NEGATIVE
LYMPHOCYTES # BLD: 5.6 %
LYMPHOCYTES ABSOLUTE: 0.5 THOU/MM3 (ref 1–4.8)
MCH RBC QN AUTO: 28.7 PG (ref 27–31)
MCH RBC QN AUTO: 29 PG (ref 27–31)
MCHC RBC AUTO-ENTMCNC: 33 GM/DL (ref 33–37)
MCHC RBC AUTO-ENTMCNC: 33.2 GM/DL (ref 33–37)
MCV RBC AUTO: 86.3 FL (ref 81–99)
MCV RBC AUTO: 87.7 FL (ref 81–99)
MONOCYTES # BLD: 6.8 %
MONOCYTES ABSOLUTE: 0.6 THOU/MM3 (ref 0.4–1.3)
NITRITE, URINE: NEGATIVE
NUCLEATED RED BLOOD CELLS: 0 /100 WBC
PDW BLD-RTO: 19.3 % (ref 11.5–14.5)
PDW BLD-RTO: 19.4 % (ref 11.5–14.5)
PH UA: 5.5
PHOSPHORUS: 2.9 MG/DL (ref 2.4–4.7)
PLATELET # BLD: 245 THOU/MM3 (ref 130–400)
PLATELET # BLD: 270 THOU/MM3 (ref 130–400)
PMV BLD AUTO: 6.9 FL (ref 7.4–10.4)
PMV BLD AUTO: 6.9 FL (ref 7.4–10.4)
POTASSIUM SERPL-SCNC: 4.9 MEQ/L (ref 3.5–5.2)
PROCALCITONIN: 0.09 NG/ML (ref 0.01–0.09)
PROTEIN UA: NEGATIVE
RBC # BLD: 2.72 MILL/MM3 (ref 4.2–5.4)
RBC # BLD: 3.03 MILL/MM3 (ref 4.2–5.4)
SEDIMENTATION RATE, ERYTHROCYTE: 102 MM/HR (ref 0–20)
SEG NEUTROPHILS: 84.9 %
SEGMENTED NEUTROPHILS ABSOLUTE COUNT: 7.9 THOU/MM3 (ref 1.8–7.7)
SODIUM BLD-SCNC: 130 MEQ/L (ref 135–145)
SPECIFIC GRAVITY, URINE: 1.01 (ref 1–1.03)
UROBILINOGEN, URINE: 0.2 EU/DL
WBC # BLD: 10.4 THOU/MM3 (ref 4.8–10.8)
WBC # BLD: 9.3 THOU/MM3 (ref 4.8–10.8)

## 2018-03-06 PROCEDURE — 6370000000 HC RX 637 (ALT 250 FOR IP): Performed by: INTERNAL MEDICINE

## 2018-03-06 PROCEDURE — 6370000000 HC RX 637 (ALT 250 FOR IP): Performed by: HOSPITALIST

## 2018-03-06 PROCEDURE — 99233 SBSQ HOSP IP/OBS HIGH 50: CPT | Performed by: HOSPITALIST

## 2018-03-06 PROCEDURE — 6360000002 HC RX W HCPCS: Performed by: INTERNAL MEDICINE

## 2018-03-06 PROCEDURE — 80069 RENAL FUNCTION PANEL: CPT

## 2018-03-06 PROCEDURE — 2580000003 HC RX 258: Performed by: INTERNAL MEDICINE

## 2018-03-06 PROCEDURE — 86304 IMMUNOASSAY TUMOR CA 125: CPT

## 2018-03-06 PROCEDURE — 2580000003 HC RX 258: Performed by: NURSE PRACTITIONER

## 2018-03-06 PROCEDURE — 82378 CARCINOEMBRYONIC ANTIGEN: CPT

## 2018-03-06 PROCEDURE — 81003 URINALYSIS AUTO W/O SCOPE: CPT

## 2018-03-06 PROCEDURE — 85025 COMPLETE CBC W/AUTO DIFF WBC: CPT

## 2018-03-06 PROCEDURE — 85027 COMPLETE CBC AUTOMATED: CPT

## 2018-03-06 PROCEDURE — 1200000003 HC TELEMETRY R&B

## 2018-03-06 PROCEDURE — 85651 RBC SED RATE NONAUTOMATED: CPT

## 2018-03-06 PROCEDURE — 87040 BLOOD CULTURE FOR BACTERIA: CPT

## 2018-03-06 PROCEDURE — 96376 TX/PRO/DX INJ SAME DRUG ADON: CPT

## 2018-03-06 PROCEDURE — 83036 HEMOGLOBIN GLYCOSYLATED A1C: CPT

## 2018-03-06 PROCEDURE — 86140 C-REACTIVE PROTEIN: CPT

## 2018-03-06 PROCEDURE — 6360000002 HC RX W HCPCS

## 2018-03-06 PROCEDURE — 96375 TX/PRO/DX INJ NEW DRUG ADDON: CPT

## 2018-03-06 PROCEDURE — 82948 REAGENT STRIP/BLOOD GLUCOSE: CPT

## 2018-03-06 PROCEDURE — 93970 EXTREMITY STUDY: CPT

## 2018-03-06 PROCEDURE — 86038 ANTINUCLEAR ANTIBODIES: CPT

## 2018-03-06 PROCEDURE — 36415 COLL VENOUS BLD VENIPUNCTURE: CPT

## 2018-03-06 PROCEDURE — 51702 INSERT TEMP BLADDER CATH: CPT

## 2018-03-06 PROCEDURE — 6360000002 HC RX W HCPCS: Performed by: NURSE PRACTITIONER

## 2018-03-06 PROCEDURE — P9046 ALBUMIN (HUMAN), 25%, 20 ML: HCPCS | Performed by: INTERNAL MEDICINE

## 2018-03-06 RX ORDER — GLIMEPIRIDE 4 MG/1
4 TABLET ORAL
Status: DISCONTINUED | OUTPATIENT
Start: 2018-03-06 | End: 2018-03-13 | Stop reason: HOSPADM

## 2018-03-06 RX ORDER — DEXTROSE MONOHYDRATE 50 MG/ML
100 INJECTION, SOLUTION INTRAVENOUS PRN
Status: DISCONTINUED | OUTPATIENT
Start: 2018-03-06 | End: 2018-03-13 | Stop reason: HOSPADM

## 2018-03-06 RX ORDER — CHLORHEXIDINE GLUCONATE 0.12 MG/ML
15 RINSE ORAL 2 TIMES DAILY
Status: DISCONTINUED | OUTPATIENT
Start: 2018-03-06 | End: 2018-03-13 | Stop reason: HOSPADM

## 2018-03-06 RX ORDER — PROPRANOLOL HYDROCHLORIDE 20 MG/1
20 TABLET ORAL 3 TIMES DAILY
Status: DISCONTINUED | OUTPATIENT
Start: 2018-03-06 | End: 2018-03-13 | Stop reason: HOSPADM

## 2018-03-06 RX ORDER — HYDRALAZINE HYDROCHLORIDE 50 MG/1
50 TABLET, FILM COATED ORAL 3 TIMES DAILY
Status: DISCONTINUED | OUTPATIENT
Start: 2018-03-06 | End: 2018-03-13 | Stop reason: HOSPADM

## 2018-03-06 RX ORDER — OXYCODONE HYDROCHLORIDE AND ACETAMINOPHEN 5; 325 MG/1; MG/1
2 TABLET ORAL EVERY 6 HOURS PRN
Status: DISCONTINUED | OUTPATIENT
Start: 2018-03-06 | End: 2018-03-13 | Stop reason: HOSPADM

## 2018-03-06 RX ORDER — DEXTROSE MONOHYDRATE 25 G/50ML
12.5 INJECTION, SOLUTION INTRAVENOUS PRN
Status: DISCONTINUED | OUTPATIENT
Start: 2018-03-06 | End: 2018-03-13 | Stop reason: HOSPADM

## 2018-03-06 RX ORDER — ATORVASTATIN CALCIUM 40 MG/1
40 TABLET, FILM COATED ORAL NIGHTLY
Status: DISCONTINUED | OUTPATIENT
Start: 2018-03-06 | End: 2018-03-13 | Stop reason: HOSPADM

## 2018-03-06 RX ORDER — OXYCODONE HYDROCHLORIDE AND ACETAMINOPHEN 5; 325 MG/1; MG/1
1 TABLET ORAL EVERY 6 HOURS PRN
Status: DISCONTINUED | OUTPATIENT
Start: 2018-03-06 | End: 2018-03-06

## 2018-03-06 RX ORDER — LISINOPRIL 40 MG/1
40 TABLET ORAL DAILY
Status: DISCONTINUED | OUTPATIENT
Start: 2018-03-06 | End: 2018-03-13 | Stop reason: HOSPADM

## 2018-03-06 RX ORDER — METHYLPREDNISOLONE SODIUM SUCCINATE 40 MG/ML
40 INJECTION, POWDER, LYOPHILIZED, FOR SOLUTION INTRAMUSCULAR; INTRAVENOUS EVERY 6 HOURS
Status: DISCONTINUED | OUTPATIENT
Start: 2018-03-06 | End: 2018-03-06

## 2018-03-06 RX ORDER — ERTAPENEM 1 G/1
1000 INJECTION, POWDER, LYOPHILIZED, FOR SOLUTION INTRAMUSCULAR; INTRAVENOUS EVERY 24 HOURS
Status: DISCONTINUED | OUTPATIENT
Start: 2018-03-06 | End: 2018-03-06 | Stop reason: ALTCHOICE

## 2018-03-06 RX ORDER — HYDROCORTISONE ACETATE 25 MG/1
25 SUPPOSITORY RECTAL 2 TIMES DAILY
Status: DISCONTINUED | OUTPATIENT
Start: 2018-03-06 | End: 2018-03-13 | Stop reason: HOSPADM

## 2018-03-06 RX ORDER — PROMETHAZINE HYDROCHLORIDE 25 MG/ML
INJECTION, SOLUTION INTRAMUSCULAR; INTRAVENOUS
Status: COMPLETED
Start: 2018-03-06 | End: 2018-03-06

## 2018-03-06 RX ORDER — CHLORHEXIDINE GLUCONATE 4 G/100ML
SOLUTION TOPICAL DAILY
Status: DISCONTINUED | OUTPATIENT
Start: 2018-03-06 | End: 2018-03-13 | Stop reason: HOSPADM

## 2018-03-06 RX ORDER — NICOTINE POLACRILEX 4 MG
15 LOZENGE BUCCAL PRN
Status: DISCONTINUED | OUTPATIENT
Start: 2018-03-06 | End: 2018-03-13 | Stop reason: HOSPADM

## 2018-03-06 RX ORDER — TETRAHYDROZOLINE HCL 0.05 %
1 DROPS OPHTHALMIC (EYE) 3 TIMES DAILY
Status: DISCONTINUED | OUTPATIENT
Start: 2018-03-06 | End: 2018-03-13 | Stop reason: HOSPADM

## 2018-03-06 RX ORDER — PREGABALIN 75 MG/1
300 CAPSULE ORAL NIGHTLY
Status: DISCONTINUED | OUTPATIENT
Start: 2018-03-06 | End: 2018-03-06 | Stop reason: ALTCHOICE

## 2018-03-06 RX ORDER — RISEDRONATE SODIUM 35 MG/1
35 TABLET, FILM COATED ORAL
Status: DISCONTINUED | OUTPATIENT
Start: 2018-03-06 | End: 2018-03-06 | Stop reason: SDUPTHER

## 2018-03-06 RX ORDER — PANTOPRAZOLE SODIUM 40 MG/1
40 TABLET, DELAYED RELEASE ORAL
Status: DISCONTINUED | OUTPATIENT
Start: 2018-03-06 | End: 2018-03-13 | Stop reason: HOSPADM

## 2018-03-06 RX ORDER — ALBUMIN (HUMAN) 12.5 G/50ML
25 SOLUTION INTRAVENOUS 2 TIMES DAILY
Status: DISCONTINUED | OUTPATIENT
Start: 2018-03-06 | End: 2018-03-06

## 2018-03-06 RX ORDER — PROMETHAZINE HYDROCHLORIDE 25 MG/ML
12.5 INJECTION, SOLUTION INTRAMUSCULAR; INTRAVENOUS EVERY 6 HOURS PRN
Status: DISCONTINUED | OUTPATIENT
Start: 2018-03-06 | End: 2018-03-06

## 2018-03-06 RX ORDER — TOPIRAMATE 25 MG/1
25 TABLET ORAL DAILY
Status: DISCONTINUED | OUTPATIENT
Start: 2018-03-06 | End: 2018-03-07 | Stop reason: CLARIF

## 2018-03-06 RX ORDER — CYCLOSPORINE 0.5 MG/ML
1 EMULSION OPHTHALMIC 2 TIMES DAILY
Status: DISCONTINUED | OUTPATIENT
Start: 2018-03-06 | End: 2018-03-13 | Stop reason: HOSPADM

## 2018-03-06 RX ORDER — OMEPRAZOLE 40 MG/1
40 CAPSULE, DELAYED RELEASE ORAL
Status: DISCONTINUED | OUTPATIENT
Start: 2018-03-06 | End: 2018-03-06 | Stop reason: CLARIF

## 2018-03-06 RX ORDER — PIOGLITAZONEHYDROCHLORIDE 30 MG/1
30 TABLET ORAL DAILY
Status: DISCONTINUED | OUTPATIENT
Start: 2018-03-06 | End: 2018-03-06

## 2018-03-06 RX ORDER — LISINOPRIL AND HYDROCHLOROTHIAZIDE 20; 12.5 MG/1; MG/1
2 TABLET ORAL DAILY
Status: DISCONTINUED | OUTPATIENT
Start: 2018-03-06 | End: 2018-03-06 | Stop reason: SDUPTHER

## 2018-03-06 RX ORDER — VANCOMYCIN HYDROCHLORIDE 1 G/200ML
1000 INJECTION, SOLUTION INTRAVENOUS EVERY 8 HOURS
Status: DISCONTINUED | OUTPATIENT
Start: 2018-03-06 | End: 2018-03-06 | Stop reason: SDUPTHER

## 2018-03-06 RX ORDER — FLUTICASONE PROPIONATE 50 MCG
2 SPRAY, SUSPENSION (ML) NASAL 2 TIMES DAILY
Status: DISCONTINUED | OUTPATIENT
Start: 2018-03-06 | End: 2018-03-13 | Stop reason: HOSPADM

## 2018-03-06 RX ORDER — HYDROCHLOROTHIAZIDE 25 MG/1
25 TABLET ORAL DAILY
Status: DISCONTINUED | OUTPATIENT
Start: 2018-03-06 | End: 2018-03-07

## 2018-03-06 RX ADMIN — OXYCODONE HYDROCHLORIDE AND ACETAMINOPHEN 1 TABLET: 5; 325 TABLET ORAL at 04:51

## 2018-03-06 RX ADMIN — CEFTRIAXONE 1 G: 1 INJECTION, POWDER, FOR SOLUTION INTRAMUSCULAR; INTRAVENOUS at 22:15

## 2018-03-06 RX ADMIN — CYCLOSPORINE 1 DROP: 0.5 EMULSION OPHTHALMIC at 20:20

## 2018-03-06 RX ADMIN — MUPIROCIN: 20 OINTMENT TOPICAL at 16:18

## 2018-03-06 RX ADMIN — HYDROCORTISONE ACETATE 25 MG: 25 SUPPOSITORY RECTAL at 08:39

## 2018-03-06 RX ADMIN — HYDROMORPHONE HYDROCHLORIDE 1 MG: 1 INJECTION, SOLUTION INTRAMUSCULAR; INTRAVENOUS; SUBCUTANEOUS at 01:47

## 2018-03-06 RX ADMIN — TETRAHYDROZOLINE HYDROCHLORIDE 1 DROP: 0.05 SOLUTION/ DROPS OPHTHALMIC at 08:39

## 2018-03-06 RX ADMIN — PROMETHAZINE HYDROCHLORIDE 25 MG: 25 INJECTION INTRAMUSCULAR; INTRAVENOUS at 01:48

## 2018-03-06 RX ADMIN — HYDROCHLOROTHIAZIDE 25 MG: 25 TABLET ORAL at 08:29

## 2018-03-06 RX ADMIN — PIPERACILLIN SODIUM,TAZOBACTAM SODIUM 3.38 G: 3; .375 INJECTION, POWDER, FOR SOLUTION INTRAVENOUS at 11:57

## 2018-03-06 RX ADMIN — PROPRANOLOL HYDROCHLORIDE 20 MG: 20 TABLET ORAL at 08:29

## 2018-03-06 RX ADMIN — CHLORHEXIDINE GLUCONATE: 4 SOLUTION TOPICAL at 11:57

## 2018-03-06 RX ADMIN — PIOGLITAZONE 30 MG: 30 TABLET ORAL at 08:29

## 2018-03-06 RX ADMIN — MUPIROCIN: 20 OINTMENT TOPICAL at 20:47

## 2018-03-06 RX ADMIN — METHYLPREDNISOLONE SODIUM SUCCINATE 40 MG: 40 INJECTION, POWDER, FOR SOLUTION INTRAMUSCULAR; INTRAVENOUS at 05:01

## 2018-03-06 RX ADMIN — ENOXAPARIN SODIUM 40 MG: 40 INJECTION SUBCUTANEOUS at 08:49

## 2018-03-06 RX ADMIN — PROPRANOLOL HYDROCHLORIDE 20 MG: 20 TABLET ORAL at 13:13

## 2018-03-06 RX ADMIN — FLUTICASONE PROPIONATE 2 SPRAY: 50 SPRAY, METERED NASAL at 08:30

## 2018-03-06 RX ADMIN — PIPERACILLIN SODIUM,TAZOBACTAM SODIUM 3.38 G: 3; .375 INJECTION, POWDER, FOR SOLUTION INTRAVENOUS at 05:01

## 2018-03-06 RX ADMIN — MUPIROCIN: 20 OINTMENT TOPICAL at 08:31

## 2018-03-06 RX ADMIN — METHYLPREDNISOLONE SODIUM SUCCINATE 40 MG: 40 INJECTION, POWDER, FOR SOLUTION INTRAMUSCULAR; INTRAVENOUS at 08:39

## 2018-03-06 RX ADMIN — SODIUM CHLORIDE 1000 ML: 9 INJECTION, SOLUTION INTRAVENOUS at 00:32

## 2018-03-06 RX ADMIN — TOPIRAMATE 25 MG: 25 TABLET, FILM COATED ORAL at 08:39

## 2018-03-06 RX ADMIN — PROPRANOLOL HYDROCHLORIDE 20 MG: 20 TABLET ORAL at 20:45

## 2018-03-06 RX ADMIN — Medication 15 ML: at 05:01

## 2018-03-06 RX ADMIN — LISINOPRIL 40 MG: 40 TABLET ORAL at 08:29

## 2018-03-06 RX ADMIN — HYDRALAZINE HYDROCHLORIDE 50 MG: 50 TABLET ORAL at 13:13

## 2018-03-06 RX ADMIN — HYDROMORPHONE HYDROCHLORIDE 1 MG: 1 INJECTION, SOLUTION INTRAMUSCULAR; INTRAVENOUS; SUBCUTANEOUS at 02:45

## 2018-03-06 RX ADMIN — Medication 15 ML: at 20:43

## 2018-03-06 RX ADMIN — INSULIN LISPRO 3 UNITS: 100 INJECTION, SOLUTION INTRAVENOUS; SUBCUTANEOUS at 20:57

## 2018-03-06 RX ADMIN — TETRAHYDROZOLINE HYDROCHLORIDE 1 DROP: 0.05 SOLUTION/ DROPS OPHTHALMIC at 13:13

## 2018-03-06 RX ADMIN — VANCOMYCIN HYDROCHLORIDE 1500 MG: 1 INJECTION, POWDER, LYOPHILIZED, FOR SOLUTION INTRAVENOUS at 01:25

## 2018-03-06 RX ADMIN — HYDRALAZINE HYDROCHLORIDE 50 MG: 50 TABLET ORAL at 08:31

## 2018-03-06 RX ADMIN — GLIMEPIRIDE 4 MG: 4 TABLET ORAL at 08:29

## 2018-03-06 RX ADMIN — TETRAHYDROZOLINE HYDROCHLORIDE 1 DROP: 0.05 SOLUTION/ DROPS OPHTHALMIC at 20:46

## 2018-03-06 RX ADMIN — MUPIROCIN: 20 OINTMENT TOPICAL at 11:58

## 2018-03-06 RX ADMIN — ATORVASTATIN CALCIUM 40 MG: 40 TABLET, FILM COATED ORAL at 20:44

## 2018-03-06 RX ADMIN — PANTOPRAZOLE SODIUM 40 MG: 40 TABLET, DELAYED RELEASE ORAL at 08:29

## 2018-03-06 RX ADMIN — ALBUMIN (HUMAN) 25 G: 0.25 INJECTION, SOLUTION INTRAVENOUS at 08:39

## 2018-03-06 RX ADMIN — HYDROMORPHONE HYDROCHLORIDE 1 MG: 1 INJECTION, SOLUTION INTRAMUSCULAR; INTRAVENOUS; SUBCUTANEOUS at 06:48

## 2018-03-06 RX ADMIN — CYCLOSPORINE 1 DROP: 0.5 EMULSION OPHTHALMIC at 08:29

## 2018-03-06 RX ADMIN — OXYCODONE HYDROCHLORIDE AND ACETAMINOPHEN 1 TABLET: 5; 325 TABLET ORAL at 19:06

## 2018-03-06 RX ADMIN — FLUTICASONE PROPIONATE 2 SPRAY: 50 SPRAY, METERED NASAL at 20:43

## 2018-03-06 RX ADMIN — Medication 8 UNITS: at 17:26

## 2018-03-06 RX ADMIN — HYDRALAZINE HYDROCHLORIDE 50 MG: 50 TABLET ORAL at 20:44

## 2018-03-06 ASSESSMENT — PAIN DESCRIPTION - DESCRIPTORS: DESCRIPTORS: BURNING

## 2018-03-06 ASSESSMENT — PAIN DESCRIPTION - PAIN TYPE
TYPE: ACUTE PAIN
TYPE: ACUTE PAIN

## 2018-03-06 ASSESSMENT — PAIN DESCRIPTION - LOCATION: LOCATION: SHOULDER

## 2018-03-06 ASSESSMENT — PAIN DESCRIPTION - ORIENTATION: ORIENTATION: RIGHT

## 2018-03-06 ASSESSMENT — PAIN SCALES - GENERAL
PAINLEVEL_OUTOF10: 8
PAINLEVEL_OUTOF10: 6
PAINLEVEL_OUTOF10: 10
PAINLEVEL_OUTOF10: 10
PAINLEVEL_OUTOF10: 7
PAINLEVEL_OUTOF10: 9

## 2018-03-06 ASSESSMENT — PAIN DESCRIPTION - FREQUENCY: FREQUENCY: CONTINUOUS

## 2018-03-06 NOTE — ED NOTES
Patient resting on cot, respirations easy and unlabored. Anderson catheter placed per verbal order from Dr. Emily Welch.       [address] [place] [address]  03/06/18 4013

## 2018-03-06 NOTE — CARE COORDINATION
3/6/18, 1:36 PM      Milagro Hammond       Admitted from: ER, patient presented with a rash noted on her body in multiple areas. 3/5/2018/ 2056 Hospital day: 0   Location: -12/012-A Reason for admit: Rash of entire body [R21] Status: Inpt. 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 Procedure: N/A  Pertinent abnormal Imaging:Dopplers of bilateral lower extremities. Medications:  Scheduled Meds:   tetrahydrozoline  1 drop Both Eyes TID    cycloSPORINE  1 drop Both Eyes BID    fluticasone  2 spray Nasal BID    topiramate  25 mg Oral Daily    glimepiride  4 mg Oral Daily with breakfast    propranolol  20 mg Oral TID    hydrALAZINE  50 mg Oral TID    atorvastatin  40 mg Oral Nightly    mupirocin   Topical 4x Daily    enoxaparin  40 mg Subcutaneous Daily    hydrocortisone  25 mg Rectal BID    chlorhexidine   Topical Daily    chlorhexidine  15 mL Mouth/Throat BID    pantoprazole  40 mg Oral QAM AC    lisinopril  40 mg Oral Daily    And    hydrochlorothiazide  25 mg Oral Daily    predniSONE  50 mg Oral Daily     Continuous Infusions:   Pertinent Info/Orders/Treatment Plan:  ID consult, IV Vancomycin x 1 dose, Zosyn x 2 doses,  IV Solumedrol x 1 dose, prn Dilaudid or Percocet for pain management, contact isolation, telemetry.    Diet: DIET CARDIAC;   DVT Prophylaxis: Lovenox  Smoking status:  reports that she has never smoked. She has never used smokeless tobacco.   Influenza Vaccination Screening Completed: yes  Pneumonia Vaccination Screening Completed: yes  Core measures: VTE  PCP: Pearl Lopez MD  Readmission:   No  Risk Score: 8.75   Discharge Planning  Current Residence:  Private Residence (Lives with daughter Just since she broke her arm)  Living Arrangements:  Family Members   Support Systems:  Family Members, Children  Current Services PTA:     Potential Assistance Needed:     Potential Assistance Purchasing Medications:  No  Does patient want to participate in local refill/ meds to beds program?  No  Type of Home Care Services:  None  Patient expects to be discharged to:  private residence  Expected Discharge date:  03/08/18  Follow Up Appointment: Best Day/ Time: Wednesday PM  Discharge Plan: Met with [facility]. She was living alone prior to her fall and fracture of her right humerus. Her daughter has been staying with her since the fall. [facility] states her daughters are very helpful to her. They wrap her legs and when she was home with iv antibiotic therapy her daughters administered it for her. [facility] states she has has medical issue since September when the cancer was found in her nasal cavity. [facility] denies a need for services at discharge, she says her daughters provide all the help she needs at home.  Evaluation: no, not at this time.

## 2018-03-06 NOTE — H&P
[address] [place] [address]                               HISTORY AND PHYSICAL    PATIENT NAME: Romana Bellamy                       :        1940  MED REC NO:   [id number]                           ROOM:       0012  ACCOUNT NO:   [de-identified]                           ADMIT DATE: 2018  PROVIDER:     Ramandeep Aguilera MD, [facility]    CHIEF COMPLAINT:  A 36-year-old white female with chief complaint of  generalized body rash, itching, and painful; hemorrhoids; adenocarcinoma of  the nasopharynx. HISTORY OF PRESENT ILLNESS:  Saw this patient a couple of years ago. She  had a lesion in the nasopharynx, had an MRI of the head. It showed diffuse  extensive pansinusitis with complete opacification of the bilateral  maxillary sinuses. Soft tissue masses could not be ruled out. She went to  [facility] and was diagnosed as having, she had several ENT surgeries, and finally  they found adenocarcinoma. Dr. Rosalia Jhaveri was the ENT oncologist at [facility]. Several radiations were done last year. No chemotherapy. While she was  there, she developed MRSA infection, was put on daptomycin. She has  several antibiotics. Five weeks ago, she noted a rash which was  generalized, went and saw her primary care doctor. She was put on Bactrim. She went back for something else and the rash was a little better but not  gone completely. She had fallen and had a fracture of her right arm, right  subcapital fracture. While he was there, he decided to put her back on  another dose of Bactrim. This was getting into February. Five days ago,  she broke out in this rash which is all over her body. There are  maculopapular rashes all over the body, especially in the lower extremities  and in the buttocks area, in the lower abdomen. They are itching. They  are painful. She cannot scratch them because her right arm is held up in  the sling.   She is miserable from

## 2018-03-06 NOTE — PROGRESS NOTES
Topical 4x Daily    enoxaparin  40 mg Subcutaneous Daily    hydrocortisone  25 mg Rectal BID    chlorhexidine   Topical Daily    chlorhexidine  15 mL Mouth/Throat BID    pantoprazole  40 mg Oral QAM AC    lisinopril  40 mg Oral Daily    And    hydrochlorothiazide  25 mg Oral Daily    predniSONE  50 mg Oral Daily     PRN Meds: oxyCODONE-acetaminophen, HYDROmorphone, promethazine, mupirocin    No intake or output data in the 24 hours ending 03/06/18 1508    Diet:  DIET CARDIAC; Exam:  /63   Pulse 111   Temp 99.1 °F (37.3 °C) (Oral)   Resp 20   Ht 5' (1.524 m)   Wt 265 lb (120.2 kg)   SpO2 93%   BMI 51.75 kg/m²      Gen: Not in distress. Alert. Chronically ill. Head: Normocephalic. Atraumatic. Eyes: Conjunctivae/corneas clear. ENT: Oral mucosa dry  Neck: No JVD. No obvious thyromegaly. CVS: Nml S1S2, no MRG, RRR  Pulmomary: Clear bilaterally. No crackles. No wheezes. Gastrointestinal: Soft, non tender, non distend,  Positive bowel sounds. Musculoskeletal: right arm sling  Neuro: No focal deficit. Moves extremity spontaneously. Psychiatry: Appropriate affect. Not agitated. Skin, multiple   Skin plaque with scaling. Periureteric. Labs:   Recent Labs      03/05/18 2252 03/06/18   0642  03/06/18   0947   WBC  9.0  9.3  10.4   HGB  8.3*  7.8*  8.8*   HCT  25.0*  23.5*  26.6*   PLT  252  245  270     Recent Labs      03/05/18 2252 03/06/18   0947   NA  128*  130*   K  4.7  4.9   CL  90*  94*   CO2  24  20*   BUN  25*  20   CREATININE  1.1  1.0   CALCIUM  8.6  8.6   PHOS   --   2.9     Recent Labs      03/05/18 2252   AST  14   ALT  9*   BILIDIR  <0.2   BILITOT  0.3   ALKPHOS  107     No results for input(s): INR in the last 72 hours. No results for input(s): [place] in the last 72 hours.     Urinalysis:      Lab Results   Component Value Date    NITRU NEGATIVE 03/06/2018    WBCUA 0 07/25/2014    BACTERIA NONE 07/25/2014    RBCUA 0 07/25/2014    BLOODU NEGATIVE

## 2018-03-06 NOTE — PROGRESS NOTES
PHARMACY NOTE  Sofia London was ordered Actonel. Per the [address], this medication is non-formulary and not stocked by pharmacy. The medication can be reordered at discharge.     Charli Soriano 3/6/2018 2:42 AM

## 2018-03-06 NOTE — ED PROVIDER NOTES
This patient presents to the emergency department for evaluation of a diffuse rash. 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 She states that her last round of radiation therapy was in January. Since that time, the patient's family states that the patient has developed a painful, burning, and itching rash of her extremities. She states that this rash is worse on her legs. The patient states that her PCP thought she had radiation sickness. The patient's family states that she has had multiple rounds of Bactrim prescribed by her PCP and finished her most recent round of Bactrim approximately 5 days ago. Despite several rounds of antibiotics, the patient's family states that this rash has only worsened. She states that the rash has been draining profusely, particularly on the legs. The patient's family states that any dressings become soaked with this drainage and that it is dripping on the floor. The patient denies any fevers, chest pain, shortness of breath, nausea, or vomiting. She denies any weakness, numbness, or tingling. She reports chills. There are no additional collision this time. The patient has her lower extremities wrapped in gauze bandaging. There is exudate draining from the rash on her right leg onto a paper towel laid on the floor. Rash has appeared open lesion. I do not believe that this rash can be managed appropriately in Intake due to the patient's history of radiation therapy and multiple rounds of antibiotics with worsening of her rash. Therefore, the patient was transferred to [place] [address] for additional evaluation and management.     Bing Zaragoza PA-C  3/5/18  2203     Bing Zaragoza PA-C  03/05/18 2203
drink alcohol or use drugs. PHYSICAL EXAM     INITIAL VITALS:  height is 5' (1.524 m) and weight is 265 lb (120.2 kg). Her oral temperature is 98.1 °F (36.7 °C). Her blood pressure is 138/62 and her pulse is 108. Her respiration is 20 and oxygen saturation is 95%. Physical Exam   Constitutional: She is oriented to person, place, and time. She appears well-developed and well-nourished. HENT:   Head: Normocephalic and atraumatic. Right Ear: External ear normal.   Left Ear: External ear normal.   Bilateral nasal cavities eluded with scar tissue   Eyes: Conjunctivae are normal. Right eye exhibits no discharge. Left eye exhibits no discharge. No scleral icterus. Neck: Normal range of motion. Neck supple. No JVD present. Cardiovascular: Normal rate, regular rhythm and normal heart sounds. Exam reveals no gallop and no friction rub. No murmur heard. Pulmonary/Chest: Effort normal and breath sounds normal. No respiratory distress. She has no decreased breath sounds. She has no wheezes. She has no rhonchi. She has no rales. Abdominal: Soft. She exhibits no distension. There is no tenderness. There is no rebound and no guarding. Musculoskeletal: Normal range of motion. She exhibits no edema. Neurological: She is alert and oriented to person, place, and time. She exhibits normal muscle tone. She displays no seizure activity. GCS eye subscore is 4. GCS verbal subscore is 5. GCS motor subscore is 6. Skin: Skin is warm and dry. Rash noted. Rash is pustular (bilateral lower extremities has pustular rash with painful draining. Ascending pustualar crusting over the body). She is not diaphoretic. Psychiatric: She has a normal mood and affect. Her behavior is normal. Thought content normal.   Nursing note and vitals reviewed.                 DIFFERENTIAL DIAGNOSIS:   Including but not limited to Radiation sickness, Cellulitis, and Herpes simplex    DIAGNOSTIC RESULTS     EKG: All EKG's are interpreted by

## 2018-03-07 ENCOUNTER — APPOINTMENT (OUTPATIENT)
Dept: GENERAL RADIOLOGY | Age: 78
DRG: 868 | End: 2018-03-07
Payer: MEDICARE

## 2018-03-07 LAB
ABO: NORMAL
ALBUMIN SERPL-MCNC: 3 G/DL (ref 3.5–5.1)
ANION GAP SERPL CALCULATED.3IONS-SCNC: 13 MEQ/L (ref 8–16)
ANISOCYTOSIS: ABNORMAL
ANTIBODY SCREEN: NORMAL
BACTERIA: ABNORMAL
BASOPHILIA: SLIGHT
BASOPHILS # BLD: 0 %
BASOPHILS ABSOLUTE: 0 THOU/MM3 (ref 0–0.1)
BILIRUBIN URINE: NEGATIVE
BLOOD, URINE: ABNORMAL
BUN BLDV-MCNC: 17 MG/DL (ref 7–22)
CALCIUM SERPL-MCNC: 8.1 MG/DL (ref 8.5–10.5)
CASTS: ABNORMAL /LPF
CASTS: ABNORMAL /LPF
CHARACTER, URINE: CLEAR
CHLORIDE BLD-SCNC: 86 MEQ/L (ref 98–111)
CO2: 24 MEQ/L (ref 23–33)
COLOR: YELLOW
CREAT SERPL-MCNC: 0.9 MG/DL (ref 0.4–1.2)
CREATININE URINE: 8 MG/DL
CRENATED RBC'S: ABNORMAL
CRYSTALS: ABNORMAL
EOSINOPHIL # BLD: 0 %
EOSINOPHILS ABSOLUTE: 0 THOU/MM3 (ref 0–0.4)
EPITHELIAL CELLS, UA: ABNORMAL /HPF
FERRITIN: 251 NG/ML (ref 10–291)
FOLATE: 7.6 NG/ML (ref 4.8–24.2)
GFR SERPL CREATININE-BSD FRML MDRD: 61 ML/MIN/1.73M2
GLUCOSE BLD-MCNC: 154 MG/DL (ref 70–108)
GLUCOSE BLD-MCNC: 192 MG/DL (ref 70–108)
GLUCOSE BLD-MCNC: 208 MG/DL (ref 70–108)
GLUCOSE BLD-MCNC: 251 MG/DL (ref 70–108)
GLUCOSE BLD-MCNC: 355 MG/DL (ref 70–108)
GLUCOSE, URINE: NEGATIVE MG/DL
HCT VFR BLD CALC: 20.1 % (ref 37–47)
HCT VFR BLD CALC: 21.5 % (ref 37–47)
HEMOGLOBIN: 6.7 GM/DL (ref 12–16)
HEMOGLOBIN: 6.9 GM/DL (ref 12–16)
IRON SATURATION: 16 % (ref 20–50)
IRON: 31 UG/DL (ref 50–170)
KETONES, URINE: NEGATIVE
LD: 158 U/L (ref 100–190)
LEUKOCYTE ESTERASE, URINE: NEGATIVE
LYMPHOCYTES # BLD: 6.5 %
LYMPHOCYTES ABSOLUTE: 0.7 THOU/MM3 (ref 1–4.8)
MCH RBC QN AUTO: 28.5 PG (ref 27–31)
MCHC RBC AUTO-ENTMCNC: 33.4 GM/DL (ref 33–37)
MCV RBC AUTO: 85.4 FL (ref 81–99)
MISCELLANEOUS LAB TEST RESULT: ABNORMAL
MONOCYTES # BLD: 6 %
MONOCYTES ABSOLUTE: 0.7 THOU/MM3 (ref 0.4–1.3)
NITRITE, URINE: NEGATIVE
NUCLEATED RED BLOOD CELLS: 0 /100 WBC
OSMOLALITY URINE: 232 MOSMOL/KG (ref 250–750)
PDW BLD-RTO: 19.9 % (ref 11.5–14.5)
PH UA: 5.5
PHOSPHORUS: 2 MG/DL (ref 2.4–4.7)
PLATELET # BLD: 261 THOU/MM3 (ref 130–400)
PLATELET ESTIMATE: ADEQUATE
PMV BLD AUTO: 6.4 FL (ref 7.4–10.4)
POIKILOCYTES: SLIGHT
POTASSIUM SERPL-SCNC: 4.1 MEQ/L (ref 3.5–5.2)
PROT/CREAT RATIO, UR: 0.5
PROTEIN UA: NEGATIVE MG/DL
PROTEIN, URINE: < 4 MG/DL
RBC # BLD: 2.35 MILL/MM3 (ref 4.2–5.4)
RBC URINE: ABNORMAL /HPF
RENAL EPITHELIAL, UA: ABNORMAL
RETICULOCYTE ABSOLUTE COUNT: 3.3 % (ref 0.5–2)
RH FACTOR: NORMAL
SCAN OF BLOOD SMEAR: NORMAL
SEG NEUTROPHILS: 87.5 %
SEGMENTED NEUTROPHILS ABSOLUTE COUNT: 9.9 THOU/MM3 (ref 1.8–7.7)
SODIUM BLD-SCNC: 123 MEQ/L (ref 135–145)
SODIUM BLD-SCNC: 125 MEQ/L (ref 135–145)
SODIUM URINE: 81 MEQ/L
SPECIFIC GRAVITY UA: 1.01 (ref 1–1.03)
T4 FREE: 1.43 NG/DL (ref 0.93–1.76)
TOTAL IRON BINDING CAPACITY: 195 UG/DL (ref 171–450)
TOXIC GRANULATION: SLIGHT
TSH SERPL DL<=0.05 MIU/L-ACNC: 1.62 UIU/ML (ref 0.4–4.2)
UROBILINOGEN, URINE: 0.2 EU/DL
VITAMIN B-12: 646 PG/ML (ref 211–911)
WBC # BLD: 11.3 THOU/MM3 (ref 4.8–10.8)
WBC UA: ABNORMAL /HPF
YEAST: ABNORMAL

## 2018-03-07 PROCEDURE — 85045 AUTOMATED RETICULOCYTE COUNT: CPT

## 2018-03-07 PROCEDURE — 84439 ASSAY OF FREE THYROXINE: CPT

## 2018-03-07 PROCEDURE — 99233 SBSQ HOSP IP/OBS HIGH 50: CPT | Performed by: HOSPITALIST

## 2018-03-07 PROCEDURE — 86900 BLOOD TYPING SEROLOGIC ABO: CPT

## 2018-03-07 PROCEDURE — 85025 COMPLETE CBC W/AUTO DIFF WBC: CPT

## 2018-03-07 PROCEDURE — 36430 TRANSFUSION BLD/BLD COMPNT: CPT

## 2018-03-07 PROCEDURE — 83010 ASSAY OF HAPTOGLOBIN QUANT: CPT

## 2018-03-07 PROCEDURE — 83540 ASSAY OF IRON: CPT

## 2018-03-07 PROCEDURE — 36415 COLL VENOUS BLD VENIPUNCTURE: CPT

## 2018-03-07 PROCEDURE — 82607 VITAMIN B-12: CPT

## 2018-03-07 PROCEDURE — 83550 IRON BINDING TEST: CPT

## 2018-03-07 PROCEDURE — 81001 URINALYSIS AUTO W/SCOPE: CPT

## 2018-03-07 PROCEDURE — 2580000003 HC RX 258: Performed by: HOSPITALIST

## 2018-03-07 PROCEDURE — 83935 ASSAY OF URINE OSMOLALITY: CPT

## 2018-03-07 PROCEDURE — 82570 ASSAY OF URINE CREATININE: CPT

## 2018-03-07 PROCEDURE — 6370000000 HC RX 637 (ALT 250 FOR IP): Performed by: INTERNAL MEDICINE

## 2018-03-07 PROCEDURE — P9016 RBC LEUKOCYTES REDUCED: HCPCS

## 2018-03-07 PROCEDURE — 82728 ASSAY OF FERRITIN: CPT

## 2018-03-07 PROCEDURE — 84156 ASSAY OF PROTEIN URINE: CPT

## 2018-03-07 PROCEDURE — 6360000002 HC RX W HCPCS: Performed by: INTERNAL MEDICINE

## 2018-03-07 PROCEDURE — 6370000000 HC RX 637 (ALT 250 FOR IP): Performed by: HOSPITALIST

## 2018-03-07 PROCEDURE — 84300 ASSAY OF URINE SODIUM: CPT

## 2018-03-07 PROCEDURE — 82948 REAGENT STRIP/BLOOD GLUCOSE: CPT

## 2018-03-07 PROCEDURE — 85018 HEMOGLOBIN: CPT

## 2018-03-07 PROCEDURE — 86850 RBC ANTIBODY SCREEN: CPT

## 2018-03-07 PROCEDURE — 85014 HEMATOCRIT: CPT

## 2018-03-07 PROCEDURE — 71046 X-RAY EXAM CHEST 2 VIEWS: CPT

## 2018-03-07 PROCEDURE — 84443 ASSAY THYROID STIM HORMONE: CPT

## 2018-03-07 PROCEDURE — 84295 ASSAY OF SERUM SODIUM: CPT

## 2018-03-07 PROCEDURE — 80069 RENAL FUNCTION PANEL: CPT

## 2018-03-07 PROCEDURE — 86923 COMPATIBILITY TEST ELECTRIC: CPT

## 2018-03-07 PROCEDURE — 2580000003 HC RX 258: Performed by: INTERNAL MEDICINE

## 2018-03-07 PROCEDURE — 6360000002 HC RX W HCPCS: Performed by: HOSPITALIST

## 2018-03-07 PROCEDURE — 83615 LACTATE (LD) (LDH) ENZYME: CPT

## 2018-03-07 PROCEDURE — 1200000003 HC TELEMETRY R&B

## 2018-03-07 PROCEDURE — 86901 BLOOD TYPING SEROLOGIC RH(D): CPT

## 2018-03-07 PROCEDURE — 84466 ASSAY OF TRANSFERRIN: CPT

## 2018-03-07 PROCEDURE — 82746 ASSAY OF FOLIC ACID SERUM: CPT

## 2018-03-07 PROCEDURE — 99221 1ST HOSP IP/OBS SF/LOW 40: CPT | Performed by: INTERNAL MEDICINE

## 2018-03-07 PROCEDURE — 99221 1ST HOSP IP/OBS SF/LOW 40: CPT | Performed by: NURSE PRACTITIONER

## 2018-03-07 RX ORDER — CYCLOSPORINE 0.5 MG/ML
1 EMULSION OPHTHALMIC 2 TIMES DAILY
Status: ON HOLD | COMMUNITY
End: 2020-12-28

## 2018-03-07 RX ORDER — ZOLPIDEM TARTRATE 10 MG/1
10 TABLET ORAL NIGHTLY PRN
COMMUNITY
End: 2018-07-30 | Stop reason: SDUPTHER

## 2018-03-07 RX ORDER — FUROSEMIDE 10 MG/ML
40 INJECTION INTRAMUSCULAR; INTRAVENOUS ONCE
Status: DISCONTINUED | OUTPATIENT
Start: 2018-03-07 | End: 2018-03-07

## 2018-03-07 RX ORDER — FUROSEMIDE 10 MG/ML
40 INJECTION INTRAMUSCULAR; INTRAVENOUS 2 TIMES DAILY
Status: DISCONTINUED | OUTPATIENT
Start: 2018-03-07 | End: 2018-03-08

## 2018-03-07 RX ORDER — ACETAMINOPHEN 325 MG/1
650 TABLET ORAL EVERY 4 HOURS PRN
Status: DISCONTINUED | OUTPATIENT
Start: 2018-03-07 | End: 2018-03-13 | Stop reason: HOSPADM

## 2018-03-07 RX ORDER — 0.9 % SODIUM CHLORIDE 0.9 %
250 INTRAVENOUS SOLUTION INTRAVENOUS ONCE
Status: COMPLETED | OUTPATIENT
Start: 2018-03-07 | End: 2018-03-08

## 2018-03-07 RX ORDER — 0.9 % SODIUM CHLORIDE 0.9 %
250 INTRAVENOUS SOLUTION INTRAVENOUS ONCE
Status: DISCONTINUED | OUTPATIENT
Start: 2018-03-07 | End: 2018-03-07 | Stop reason: CLARIF

## 2018-03-07 RX ADMIN — FUROSEMIDE 40 MG: 10 INJECTION, SOLUTION INTRAMUSCULAR; INTRAVENOUS at 17:03

## 2018-03-07 RX ADMIN — MUPIROCIN: 20 OINTMENT TOPICAL at 17:30

## 2018-03-07 RX ADMIN — FLUTICASONE PROPIONATE 2 SPRAY: 50 SPRAY, METERED NASAL at 10:54

## 2018-03-07 RX ADMIN — LISINOPRIL 40 MG: 40 TABLET ORAL at 10:54

## 2018-03-07 RX ADMIN — TETRAHYDROZOLINE HYDROCHLORIDE 1 DROP: 0.05 SOLUTION/ DROPS OPHTHALMIC at 10:55

## 2018-03-07 RX ADMIN — MUPIROCIN: 20 OINTMENT TOPICAL at 10:54

## 2018-03-07 RX ADMIN — Medication 4 UNITS: at 12:37

## 2018-03-07 RX ADMIN — HYDROCHLOROTHIAZIDE 25 MG: 25 TABLET ORAL at 10:54

## 2018-03-07 RX ADMIN — Medication 5 ML: at 20:45

## 2018-03-07 RX ADMIN — HYDROCORTISONE ACETATE 25 MG: 25 SUPPOSITORY RECTAL at 10:54

## 2018-03-07 RX ADMIN — CEFTRIAXONE 1 G: 1 INJECTION, POWDER, FOR SOLUTION INTRAMUSCULAR; INTRAVENOUS at 22:04

## 2018-03-07 RX ADMIN — GLIMEPIRIDE 4 MG: 4 TABLET ORAL at 10:54

## 2018-03-07 RX ADMIN — CYCLOSPORINE 1 DROP: 0.5 EMULSION OPHTHALMIC at 20:45

## 2018-03-07 RX ADMIN — PROPRANOLOL HYDROCHLORIDE 20 MG: 20 TABLET ORAL at 15:26

## 2018-03-07 RX ADMIN — FUROSEMIDE 40 MG: 10 INJECTION, SOLUTION INTRAMUSCULAR; INTRAVENOUS at 20:57

## 2018-03-07 RX ADMIN — Medication 6 UNITS: at 17:46

## 2018-03-07 RX ADMIN — OXYCODONE HYDROCHLORIDE AND ACETAMINOPHEN 2 TABLET: 5; 325 TABLET ORAL at 17:43

## 2018-03-07 RX ADMIN — ACETAMINOPHEN 650 MG: 325 TABLET ORAL at 07:57

## 2018-03-07 RX ADMIN — TOPIRAMATE 25 MG: 25 TABLET, FILM COATED ORAL at 10:54

## 2018-03-07 RX ADMIN — CHLORHEXIDINE GLUCONATE: 4 SOLUTION TOPICAL at 10:00

## 2018-03-07 RX ADMIN — Medication 15 ML: at 10:54

## 2018-03-07 RX ADMIN — Medication 15 ML: at 20:46

## 2018-03-07 RX ADMIN — ATORVASTATIN CALCIUM 40 MG: 40 TABLET, FILM COATED ORAL at 20:45

## 2018-03-07 RX ADMIN — HYDROMORPHONE HYDROCHLORIDE 1 MG: 1 INJECTION, SOLUTION INTRAMUSCULAR; INTRAVENOUS; SUBCUTANEOUS at 02:38

## 2018-03-07 RX ADMIN — OXYCODONE HYDROCHLORIDE AND ACETAMINOPHEN 2 TABLET: 5; 325 TABLET ORAL at 11:22

## 2018-03-07 RX ADMIN — TETRAHYDROZOLINE HYDROCHLORIDE 1 DROP: 0.05 SOLUTION/ DROPS OPHTHALMIC at 15:27

## 2018-03-07 RX ADMIN — PANTOPRAZOLE SODIUM 40 MG: 40 TABLET, DELAYED RELEASE ORAL at 05:43

## 2018-03-07 RX ADMIN — MUPIROCIN: 20 OINTMENT TOPICAL at 15:27

## 2018-03-07 RX ADMIN — OXYCODONE HYDROCHLORIDE AND ACETAMINOPHEN 2 TABLET: 5; 325 TABLET ORAL at 23:30

## 2018-03-07 RX ADMIN — HYDRALAZINE HYDROCHLORIDE 50 MG: 50 TABLET ORAL at 05:43

## 2018-03-07 RX ADMIN — FLUTICASONE PROPIONATE 2 SPRAY: 50 SPRAY, METERED NASAL at 20:44

## 2018-03-07 RX ADMIN — TETRAHYDROZOLINE HYDROCHLORIDE 1 DROP: 0.05 SOLUTION/ DROPS OPHTHALMIC at 20:45

## 2018-03-07 RX ADMIN — PROPRANOLOL HYDROCHLORIDE 20 MG: 20 TABLET ORAL at 10:54

## 2018-03-07 RX ADMIN — PREDNISONE 50 MG: 10 TABLET ORAL at 10:54

## 2018-03-07 RX ADMIN — CYCLOSPORINE 1 DROP: 0.5 EMULSION OPHTHALMIC at 10:54

## 2018-03-07 RX ADMIN — HYDRALAZINE HYDROCHLORIDE 50 MG: 50 TABLET ORAL at 20:45

## 2018-03-07 RX ADMIN — HYDRALAZINE HYDROCHLORIDE 50 MG: 50 TABLET ORAL at 15:27

## 2018-03-07 RX ADMIN — PROPRANOLOL HYDROCHLORIDE 20 MG: 20 TABLET ORAL at 20:45

## 2018-03-07 RX ADMIN — OXYCODONE HYDROCHLORIDE AND ACETAMINOPHEN 2 TABLET: 5; 325 TABLET ORAL at 00:14

## 2018-03-07 RX ADMIN — SODIUM CHLORIDE 250 ML: 9 INJECTION, SOLUTION INTRAVENOUS at 17:07

## 2018-03-07 RX ADMIN — ENOXAPARIN SODIUM 40 MG: 40 INJECTION SUBCUTANEOUS at 10:55

## 2018-03-07 RX ADMIN — INSULIN LISPRO 5 UNITS: 100 INJECTION, SOLUTION INTRAVENOUS; SUBCUTANEOUS at 23:07

## 2018-03-07 RX ADMIN — MUPIROCIN: 20 OINTMENT TOPICAL at 20:42

## 2018-03-07 ASSESSMENT — PAIN SCALES - GENERAL
PAINLEVEL_OUTOF10: 10
PAINLEVEL_OUTOF10: 10
PAINLEVEL_OUTOF10: 8
PAINLEVEL_OUTOF10: 7
PAINLEVEL_OUTOF10: 3
PAINLEVEL_OUTOF10: 10
PAINLEVEL_OUTOF10: 4
PAINLEVEL_OUTOF10: 8
PAINLEVEL_OUTOF10: 3
PAINLEVEL_OUTOF10: 3
PAINLEVEL_OUTOF10: 8
PAINLEVEL_OUTOF10: 10
PAINLEVEL_OUTOF10: 6

## 2018-03-07 ASSESSMENT — PAIN DESCRIPTION - ORIENTATION: ORIENTATION: RIGHT

## 2018-03-07 ASSESSMENT — PAIN DESCRIPTION - LOCATION
LOCATION: SHOULDER
LOCATION: LEG;ARM

## 2018-03-07 ASSESSMENT — PAIN DESCRIPTION - PAIN TYPE: TYPE: ACUTE PAIN

## 2018-03-07 NOTE — PROGRESS NOTES
Hospitalist Progress Note    Patient: Asha Sanchez      Unit/Bed:5K-12/012-A    YOB: 1940    MRN: [id number]       Acct: [de-identified]     PCP: Beatriz Haddad MD    Date of Admission: 3/5/2018  --------------------------    Chief Complaint:      skin rash    Hospital Course: Asha Sanchez is a 69 y.o. female admitted to [address] [place] [address] on 3/5/2018   With skin rash . Assessment:       1. Generalized skin rash ( plaques with scale ) ? Infection, ? Guttate psoriasis, ? Drug eruption. No mucosal involvement ? Radiation related. 2. Worsening hypoNa  3. Anasarca   4. Severe hyperglycemia sec to steroid   5. Worsening hb in the setting of anemia , no overt bleeding       comorbidities:    · Adenocarcinoma of the nasopharynx  Status post surgical resection 9/19/2017,   Completed adjuvant radiation therapy. · morbid obesity  · Right humerus fracture status post sling  · Obesity Body mass index is 51.75 kg/m². · Ess HNT  · Dyslipidemia   · DM type on Amaryl   ·      Plan:  1. Obtain CXR, echo, start IV diuresis, check protein creatinine ratio  2. Consult nephrology for hypoNa , monitor Na  3. Abx per ID  4. Anemia workup , transfuse 2 unit  of PRBC, anemia workup    Continue trial of steroid . Patient already Has follow-up with dermatology   5. Continue Percocet as needed for pain. Code Status: Full Code         DVT prophylaxis:  lovenox     Disposition: PT/OT home vs ecf    I discussed my though processes at length with patient/family and patient understood.  Question and concerns  Addressed     Discussed with RN     ----------------  Subjective:     Patient seen and examined  Overnight events noted  RN and ancillary staff note reviewed     report   orthopnea and LE edema   Hs mild sore throat              Diet: DIET CARDIAC;      Medications:  Reviewed    Infusion Medications    dextrose       Scheduled Medications    0.9 % sodium chloride  250 mL Intravenous Once   

## 2018-03-07 NOTE — CONSULTS
Nephrology Consult Note    Patient - Munira Kaplan   MRN -  [id number]      - 1940   69 y.o. Date of Admission -  3/5/2018  8:56 PM        Date of evaluation -  3/7/2018 3:07 PM    Reason for Consult  Hyponatremia  Requesting Physician:  Grupo Chen MD  History Obtained From: Nursing staff, Patient, Electronic medical records    [facility] / HPI:   The patient is a 69 y.o. female with past medical history of DM II, HTN, CAD, hyponatremia, adenocarcinoma of nasopharynx who presented with c/o of gradually worsening generalized blistering rash that began several weeks ago, associated with itching and lower extremity edema. Pt reports that her Radiation Oncologist, from [facility], said that the rash may be related to her Radiation therapy since the rash started at the rim of her facial mask she wore during her Radiation therapy. Has been taking bactrim as out pt. She has history of hyponatremia with Na range 126-135 since . Na on admission was 128 and dropped to 123 today. Home meds include lisinopril/HCTZ for several years. Recent fall with Fracture Rt humerus. Blood sugar was up to 345 on 3/6. Pt reports that she has been drinking \"like a rainstorm\". UOP 3625/24h post Lasix. Active Problems:    Rash of entire body    Infection of skin due to methicillin resistant Staphylococcus aureus (MRSA)    Drug allergy, antibiotic  likely bactrim    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[address]  Resolved Problems:    * No resolved hospital problems.  *       Past Medical History:      Diagnosis Date    Cancer [facility])     adenocarcinoma of her sinuses    Cataract of both eyes     DDD (degenerative disc disease), lumbar     Dysphagia, pharyngoesophageal 2016    Fibrocystic breast     H/O ventral hernia repair     Headache 2017    Hypercholesteremia     Hyperlipidemia     Hypertension     Iron deficiency anemia     LPRD (laryngopharyngeal reflux disease) 2016    Neuropathy
release, cataract removal, cholecystectomy, esophageal dilatation,  hemorrhoid surgery, hysterectomy, sinus surgery, and ventral hernia repair. CURRENT MEDICATIONS:  Include tetrahydrozoline, cephalosporin, _____,  propanolol, hydralazine, mupirocin, hydrocortisone suppository, and  chlorhexidine. ALLERGIES:  Include BACTRIM, PERCOCET, TYLENOL, TAPE, HYDROCODONE, and  MORPHINE. REVIEW OF SYSTEMS:  As noted in the HPI. She is hard of hearing. She has  nasal blockage. No chest pain. No abdominal pain. The rest were as noted  in the HPI. PHYSICAL EXAMINATION:  VITAL SIGNS:  Temperature is 97.4, respirations 18, pulse 76, and blood  pressure 149/70. HEENT:  She has erythematous rash on her face. Nose is very dry and  crusted. CHEST:  Bilateral air entry. CARDIOVASCULAR:  Regular. ABDOMEN:  Soft. EXTREMITIES:  She has edema of both legs. She has multiple maculopapular  eruptions with pustules distributed on her upper arms and legs. CNS:  She is conscious, oriented, to person, place, and time. DIAGNOSTICS:  Available lab work was reviewed. IMPRESSION:  She is a 71-year-old female patient admitted to the hospital  with eruption on her lower extremity with pustules, precipitating factor is  not clear. Concern for previously administered Bactrim. However, she has  secondary bacterial infection as  evidenced by the growth of gram-positive  cocci in cluster and chains. RECOMMENDATIONS:  We will place her on IV antibiotics. We will apply Ace  wrap and Kerlix to the legs. We will continue to monitor her progress. If  there is no improvement in few days, we will make arrangement for punch  biopsy. Thank you for the consultation.         Catherine Pearson M.D.    D: 03/06/2018 18:37:37       T: 03/06/2018 19:06:53     [email or web address]  Job#: [id number]     Doc#: [id number]    CC:

## 2018-03-08 LAB
AEROBIC CULTURE: ABNORMAL
ALBUMIN SERPL-MCNC: 3.2 G/DL (ref 3.5–5.1)
ANA SCREEN: NORMAL
ANION GAP SERPL CALCULATED.3IONS-SCNC: 13 MEQ/L (ref 8–16)
ANISOCYTOSIS: ABNORMAL
BASOPHILS # BLD: 0.2 %
BASOPHILS ABSOLUTE: 0 THOU/MM3 (ref 0–0.1)
BUN BLDV-MCNC: 18 MG/DL (ref 7–22)
CALCIUM SERPL-MCNC: 8.4 MG/DL (ref 8.5–10.5)
CHLORIDE BLD-SCNC: 86 MEQ/L (ref 98–111)
CO2: 27 MEQ/L (ref 23–33)
CREAT SERPL-MCNC: 0.7 MG/DL (ref 0.4–1.2)
EOSINOPHIL # BLD: 0.1 %
EOSINOPHILS ABSOLUTE: 0 THOU/MM3 (ref 0–0.4)
GFR SERPL CREATININE-BSD FRML MDRD: 81 ML/MIN/1.73M2
GLUCOSE BLD-MCNC: 151 MG/DL (ref 70–108)
GLUCOSE BLD-MCNC: 162 MG/DL (ref 70–108)
GLUCOSE BLD-MCNC: 196 MG/DL (ref 70–108)
GLUCOSE BLD-MCNC: 257 MG/DL (ref 70–108)
GLUCOSE BLD-MCNC: 328 MG/DL (ref 70–108)
GRAM STAIN RESULT: ABNORMAL
HAPTOGLOBIN: 286 MG/DL (ref 30–200)
HCT VFR BLD CALC: 27.7 % (ref 37–47)
HEMOGLOBIN: 9.1 GM/DL (ref 12–16)
HERPES SIMPLEX VIRUS BY PCR (MISC): NOT DETECTED
HERPES TYPE 1/2 IGM COMBINED: 0.27 IV
HSV SOURCE: NORMAL
LYMPHOCYTES # BLD: 8.5 %
LYMPHOCYTES ABSOLUTE: 1 THOU/MM3 (ref 1–4.8)
MCH RBC QN AUTO: 28.5 PG (ref 27–31)
MCHC RBC AUTO-ENTMCNC: 32.9 GM/DL (ref 33–37)
MCV RBC AUTO: 86.7 FL (ref 81–99)
MONOCYTES # BLD: 7.9 %
MONOCYTES ABSOLUTE: 0.9 THOU/MM3 (ref 0.4–1.3)
NUCLEATED RED BLOOD CELLS: 0 /100 WBC
ORGANISM: ABNORMAL
ORGANISM: ABNORMAL
PDW BLD-RTO: 18.1 % (ref 11.5–14.5)
PHOSPHORUS: 2 MG/DL (ref 2.4–4.7)
PLATELET # BLD: 304 THOU/MM3 (ref 130–400)
PMV BLD AUTO: 6.6 FL (ref 7.4–10.4)
POTASSIUM SERPL-SCNC: 3.8 MEQ/L (ref 3.5–5.2)
RBC # BLD: 3.19 MILL/MM3 (ref 4.2–5.4)
SEG NEUTROPHILS: 83.3 %
SEGMENTED NEUTROPHILS ABSOLUTE COUNT: 9.5 THOU/MM3 (ref 1.8–7.7)
SODIUM BLD-SCNC: 126 MEQ/L (ref 135–145)
TRANSFERRIN: 146 MG/DL (ref 200–400)
WBC # BLD: 11.4 THOU/MM3 (ref 4.8–10.8)

## 2018-03-08 PROCEDURE — G8988 SELF CARE GOAL STATUS: HCPCS

## 2018-03-08 PROCEDURE — 99233 SBSQ HOSP IP/OBS HIGH 50: CPT | Performed by: HOSPITALIST

## 2018-03-08 PROCEDURE — 80069 RENAL FUNCTION PANEL: CPT

## 2018-03-08 PROCEDURE — 97530 THERAPEUTIC ACTIVITIES: CPT

## 2018-03-08 PROCEDURE — 97166 OT EVAL MOD COMPLEX 45 MIN: CPT

## 2018-03-08 PROCEDURE — 82948 REAGENT STRIP/BLOOD GLUCOSE: CPT

## 2018-03-08 PROCEDURE — A6402 STERILE GAUZE <= 16 SQ IN: HCPCS

## 2018-03-08 PROCEDURE — 6370000000 HC RX 637 (ALT 250 FOR IP): Performed by: NURSE PRACTITIONER

## 2018-03-08 PROCEDURE — G8987 SELF CARE CURRENT STATUS: HCPCS

## 2018-03-08 PROCEDURE — 93307 TTE W/O DOPPLER COMPLETE: CPT

## 2018-03-08 PROCEDURE — 36415 COLL VENOUS BLD VENIPUNCTURE: CPT

## 2018-03-08 PROCEDURE — 99232 SBSQ HOSP IP/OBS MODERATE 35: CPT | Performed by: NURSE PRACTITIONER

## 2018-03-08 PROCEDURE — 6370000000 HC RX 637 (ALT 250 FOR IP): Performed by: HOSPITALIST

## 2018-03-08 PROCEDURE — 6360000002 HC RX W HCPCS: Performed by: INTERNAL MEDICINE

## 2018-03-08 PROCEDURE — 6370000000 HC RX 637 (ALT 250 FOR IP): Performed by: INTERNAL MEDICINE

## 2018-03-08 PROCEDURE — 85025 COMPLETE CBC W/AUTO DIFF WBC: CPT

## 2018-03-08 PROCEDURE — 6360000002 HC RX W HCPCS: Performed by: HOSPITALIST

## 2018-03-08 PROCEDURE — 1200000003 HC TELEMETRY R&B

## 2018-03-08 PROCEDURE — 2580000003 HC RX 258: Performed by: INTERNAL MEDICINE

## 2018-03-08 RX ORDER — BISACODYL 10 MG
10 SUPPOSITORY, RECTAL RECTAL DAILY PRN
Status: DISCONTINUED | OUTPATIENT
Start: 2018-03-08 | End: 2018-03-13 | Stop reason: HOSPADM

## 2018-03-08 RX ORDER — SODIUM PHOSPHATE,MONO-DIBASIC 19G-7G/118
1 ENEMA (ML) RECTAL
Status: ACTIVE | OUTPATIENT
Start: 2018-03-08 | End: 2018-03-08

## 2018-03-08 RX ORDER — POLYETHYLENE GLYCOL 3350 17 G/17G
17 POWDER, FOR SOLUTION ORAL DAILY
Status: DISCONTINUED | OUTPATIENT
Start: 2018-03-08 | End: 2018-03-13 | Stop reason: HOSPADM

## 2018-03-08 RX ORDER — PREDNISONE 20 MG/1
40 TABLET ORAL DAILY
Status: DISCONTINUED | OUTPATIENT
Start: 2018-03-09 | End: 2018-03-10

## 2018-03-08 RX ORDER — FUROSEMIDE 40 MG/1
40 TABLET ORAL 2 TIMES DAILY
Status: DISCONTINUED | OUTPATIENT
Start: 2018-03-08 | End: 2018-03-11

## 2018-03-08 RX ORDER — SENNA AND DOCUSATE SODIUM 50; 8.6 MG/1; MG/1
2 TABLET, FILM COATED ORAL DAILY PRN
Status: DISCONTINUED | OUTPATIENT
Start: 2018-03-08 | End: 2018-03-13 | Stop reason: HOSPADM

## 2018-03-08 RX ORDER — POTASSIUM CHLORIDE 750 MG/1
40 TABLET, FILM COATED, EXTENDED RELEASE ORAL ONCE
Status: COMPLETED | OUTPATIENT
Start: 2018-03-08 | End: 2018-03-08

## 2018-03-08 RX ADMIN — Medication 15 ML: at 20:59

## 2018-03-08 RX ADMIN — HYDRALAZINE HYDROCHLORIDE 50 MG: 50 TABLET ORAL at 09:40

## 2018-03-08 RX ADMIN — HYDRALAZINE HYDROCHLORIDE 50 MG: 50 TABLET ORAL at 16:48

## 2018-03-08 RX ADMIN — MUPIROCIN: 20 OINTMENT TOPICAL at 14:18

## 2018-03-08 RX ADMIN — VANCOMYCIN HYDROCHLORIDE 1750 MG: 10 INJECTION, POWDER, LYOPHILIZED, FOR SOLUTION INTRAVENOUS at 14:14

## 2018-03-08 RX ADMIN — GLIMEPIRIDE 4 MG: 4 TABLET ORAL at 09:40

## 2018-03-08 RX ADMIN — TETRAHYDROZOLINE HYDROCHLORIDE 1 DROP: 0.05 SOLUTION/ DROPS OPHTHALMIC at 14:18

## 2018-03-08 RX ADMIN — CYCLOSPORINE 1 DROP: 0.5 EMULSION OPHTHALMIC at 09:41

## 2018-03-08 RX ADMIN — HYDROMORPHONE HYDROCHLORIDE 0.5 MG: 1 INJECTION, SOLUTION INTRAMUSCULAR; INTRAVENOUS; SUBCUTANEOUS at 12:36

## 2018-03-08 RX ADMIN — HYDROCORTISONE ACETATE 25 MG: 25 SUPPOSITORY RECTAL at 20:59

## 2018-03-08 RX ADMIN — OXYCODONE HYDROCHLORIDE AND ACETAMINOPHEN 2 TABLET: 5; 325 TABLET ORAL at 06:49

## 2018-03-08 RX ADMIN — PANTOPRAZOLE SODIUM 40 MG: 40 TABLET, DELAYED RELEASE ORAL at 05:18

## 2018-03-08 RX ADMIN — FUROSEMIDE 40 MG: 10 INJECTION, SOLUTION INTRAMUSCULAR; INTRAVENOUS at 09:40

## 2018-03-08 RX ADMIN — MUPIROCIN: 20 OINTMENT TOPICAL at 21:00

## 2018-03-08 RX ADMIN — HYDRALAZINE HYDROCHLORIDE 50 MG: 50 TABLET ORAL at 20:59

## 2018-03-08 RX ADMIN — OXYCODONE HYDROCHLORIDE AND ACETAMINOPHEN 2 TABLET: 5; 325 TABLET ORAL at 20:27

## 2018-03-08 RX ADMIN — CHLORHEXIDINE GLUCONATE: 4 SOLUTION TOPICAL at 11:00

## 2018-03-08 RX ADMIN — PROPRANOLOL HYDROCHLORIDE 20 MG: 20 TABLET ORAL at 20:59

## 2018-03-08 RX ADMIN — TETRAHYDROZOLINE HYDROCHLORIDE 1 DROP: 0.05 SOLUTION/ DROPS OPHTHALMIC at 09:40

## 2018-03-08 RX ADMIN — FUROSEMIDE 40 MG: 40 TABLET ORAL at 16:48

## 2018-03-08 RX ADMIN — PROPRANOLOL HYDROCHLORIDE 20 MG: 20 TABLET ORAL at 09:40

## 2018-03-08 RX ADMIN — PROPRANOLOL HYDROCHLORIDE 20 MG: 20 TABLET ORAL at 16:48

## 2018-03-08 RX ADMIN — CYCLOSPORINE 1 DROP: 0.5 EMULSION OPHTHALMIC at 21:00

## 2018-03-08 RX ADMIN — Medication 2 UNITS: at 09:44

## 2018-03-08 RX ADMIN — POTASSIUM CHLORIDE 40 MEQ: 750 TABLET, FILM COATED, EXTENDED RELEASE ORAL at 14:14

## 2018-03-08 RX ADMIN — LISINOPRIL 40 MG: 40 TABLET ORAL at 09:40

## 2018-03-08 RX ADMIN — PREDNISONE 50 MG: 10 TABLET ORAL at 09:40

## 2018-03-08 RX ADMIN — MUPIROCIN: 20 OINTMENT TOPICAL at 09:57

## 2018-03-08 RX ADMIN — MUPIROCIN: 20 OINTMENT TOPICAL at 16:48

## 2018-03-08 RX ADMIN — Medication 8 UNITS: at 17:40

## 2018-03-08 RX ADMIN — INSULIN LISPRO 3 UNITS: 100 INJECTION, SOLUTION INTRAVENOUS; SUBCUTANEOUS at 21:00

## 2018-03-08 RX ADMIN — Medication 2 UNITS: at 12:30

## 2018-03-08 RX ADMIN — FLUTICASONE PROPIONATE 2 SPRAY: 50 SPRAY, METERED NASAL at 21:00

## 2018-03-08 RX ADMIN — ACETAMINOPHEN 650 MG: 325 TABLET ORAL at 04:24

## 2018-03-08 RX ADMIN — POLYETHYLENE GLYCOL 3350 17 G: 17 POWDER, FOR SOLUTION ORAL at 14:14

## 2018-03-08 RX ADMIN — ATORVASTATIN CALCIUM 40 MG: 40 TABLET, FILM COATED ORAL at 20:59

## 2018-03-08 RX ADMIN — Medication 15 ML: at 09:40

## 2018-03-08 RX ADMIN — FLUTICASONE PROPIONATE 2 SPRAY: 50 SPRAY, METERED NASAL at 09:40

## 2018-03-08 RX ADMIN — OXYCODONE HYDROCHLORIDE AND ACETAMINOPHEN 2 TABLET: 5; 325 TABLET ORAL at 15:27

## 2018-03-08 RX ADMIN — TETRAHYDROZOLINE HYDROCHLORIDE 1 DROP: 0.05 SOLUTION/ DROPS OPHTHALMIC at 21:00

## 2018-03-08 ASSESSMENT — PAIN SCALES - GENERAL
PAINLEVEL_OUTOF10: 10
PAINLEVEL_OUTOF10: 10
PAINLEVEL_OUTOF10: 9
PAINLEVEL_OUTOF10: 3
PAINLEVEL_OUTOF10: 10
PAINLEVEL_OUTOF10: 8
PAINLEVEL_OUTOF10: 8
PAINLEVEL_OUTOF10: 9
PAINLEVEL_OUTOF10: 10
PAINLEVEL_OUTOF10: 2
PAINLEVEL_OUTOF10: 5
PAINLEVEL_OUTOF10: 10

## 2018-03-08 ASSESSMENT — PAIN DESCRIPTION - FREQUENCY
FREQUENCY: CONTINUOUS
FREQUENCY: CONTINUOUS
FREQUENCY: INTERMITTENT

## 2018-03-08 ASSESSMENT — PAIN DESCRIPTION - ORIENTATION
ORIENTATION: RIGHT

## 2018-03-08 ASSESSMENT — PAIN DESCRIPTION - DESCRIPTORS
DESCRIPTORS: BURNING;CONSTANT
DESCRIPTORS: SHARP;SHOOTING

## 2018-03-08 ASSESSMENT — PAIN DESCRIPTION - LOCATION
LOCATION: SHOULDER

## 2018-03-08 ASSESSMENT — PAIN DESCRIPTION - PAIN TYPE
TYPE: ACUTE PAIN

## 2018-03-08 ASSESSMENT — PAIN DESCRIPTION - ONSET: ONSET: ON-GOING

## 2018-03-08 NOTE — PROGRESS NOTES
recognition technology. **      Final report electronically signed by Dr. Kolb Lay on 3/7/2018 3:00 PM      VL DUP LOWER EXTREMITY VENOUS BILATERAL   Final Result   Normal venous ultrasound. No evidence for acute deep venous thrombosis. **This report has been created using voice recognition software. It may contain minor errors which are inherent in voice recognition technology. **      Final report electronically signed by Dr. Kolb Lay on 3/6/2018 9:17 AM                  Electronically signed by Jeancarlos Green MD on 3/8/2018 at 12:57 PM

## 2018-03-08 NOTE — PROGRESS NOTES
Patient with complaints of pain status post therapy session. Secure message to Dr. Abraham Mock to update. Waiting for response.

## 2018-03-08 NOTE — PLAN OF CARE
Problem: Pain:  Goal: Pain level will decrease  Pain level will decrease   Outcome: Ongoing  Patient pain being controlled with 10 mg oxycodone Q6. Patient not fully satisfied with pain interventions; however, patient is being weaned off IV dilaudid. Problem: Falls - Risk of  Goal: Absence of falls  Outcome: Ongoing  Patient was reeducated about the call light button to summon assistance. Patient belongings were all kept within reach. Side rails were up 2/4, and the bed was in the lowest position. Patient did not have a fall throughout this shift. Problem: Risk for Impaired Skin Integrity  Goal: Tissue integrity - skin and mucous membranes  Structural intactness and normal physiological function of skin and  mucous membranes. Outcome: Ongoing  The patient was turned throughout the night to promote tissue perfusion. The patient tolerated the turns well. During turning, the patient's back was checked for any signs of skin breakdown. Patient spent most of the day in the chair. She continues to have the rash with sores on her BLE and on her arms and lower back.

## 2018-03-08 NOTE — PLAN OF CARE
Problem: Pain:  Goal: Pain level will decrease  Pain level will decrease   Outcome: Ongoing  PRN pain medication per physician order. Patient reassessed 30 minutes post medication. Problem: Falls - Risk of  Goal: Absence of falls  Outcome: Ongoing  Fall precautions in place. Bed in low position. Call light and side table within reach. No falls noted this shift. Problem: Risk for Impaired Skin Integrity  Goal: Tissue integrity - skin and mucous membranes  Structural intactness and normal physiological function of skin and  mucous membranes. Outcome: Ongoing  Rash continues to improve. No new skin issues noted this shift. Comments: Care plan reviewed with patient. Patient verbalized understanding of the plan of care and contribute to goal setting.

## 2018-03-08 NOTE — PROGRESS NOTES
and oriented to person, place, and time. Recent and remote memory intact. Pt is attentive. Thought is coherant. SKIN: Diffuse rash, No significant bruises  MUSCULOSKELETAL: Movement is coordinated. Moves all extremities   EXTREMITIES: Distal lower extremity temp is warm, trace lower extremity edema. Ace wraps bilaterally  PSYCHIATRIC: mood and affect appropriate. Medications:   Scheduled Meds:   vancomycin (VANCOCIN) intermittent dosing (placeholder)   Other RX Placeholder    vancomycin  1,750 mg Intravenous Q24H    polyethylene glycol  17 g Oral Daily    furosemide  40 mg Intravenous BID    tetrahydrozoline  1 drop Both Eyes TID    cycloSPORINE  1 drop Both Eyes BID    fluticasone  2 spray Nasal BID    glimepiride  4 mg Oral Daily with breakfast    propranolol  20 mg Oral TID    hydrALAZINE  50 mg Oral TID    atorvastatin  40 mg Oral Nightly    mupirocin   Topical 4x Daily    hydrocortisone  25 mg Rectal BID    chlorhexidine   Topical Daily    chlorhexidine  15 mL Mouth/Throat BID    pantoprazole  40 mg Oral QAM AC    lisinopril  40 mg Oral Daily    predniSONE  50 mg Oral Daily    insulin lispro  0-12 Units Subcutaneous TID WC    insulin lispro  0-6 Units Subcutaneous Nightly     Continuous Infusions:   dextrose         Labs:     Recent Labs      03/06/18   0947  03/07/18   1216  03/07/18   1437  03/08/18   0556   NA  130*  123*  125*  126*   K  4.9  4.1   --   3.8   CL  94*  86*   --   86*   CO2  20*  24   --   27   BUN  20  17   --   18   CREATININE  1.0  0.9   --   0.7   LABGLOM  54*  61*   --   81*   GLUCOSE  216*  192*   --   162*   CALCIUM  8.6  8.1*   --   8.4*     Recent Labs      03/06/18   0947  03/07/18   0617  03/07/18   1216  03/08/18   0556   WBC  10.4  11.3*   --   11.4*   RBC  3.03*  2.35*   --   3.19*   HGB  8.8*  6.7*  6.9*  9.1*   HCT  26.6*  20.1*  21.5*  27.7*   PLT  270  261   --   304        ASSESSMENT:  1.  Acute on chronic hyponatremia, likely 2nd to  thiazide

## 2018-03-08 NOTE — PROGRESS NOTES
Pharmacy Note  Vancomycin Consult    James Lama is a 69 y.o. female started on Vancomycin for MRSA cellulitis; consult received from Dr. Allan Perla to manage therapy. Patient Active Problem List   Diagnosis    Hypercholesterolemia    Osteoarthritis    Osteoporosis    Type 2 diabetes mellitus with microalbuminuria, without long-term current use of insulin ([facility]    Morbid obesity with BMI of 45.0-49.9, adult ([facility]    DDD (degenerative disc disease), lumbar    Benign essential HTN    SWAPNA on CPAP    Diabetic peripheral neuropathy (HCC)    Acute recurrent pansinusitis    Primary thunderclap headache    Rash of entire body    Infection of skin due to methicillin resistant Staphylococcus aureus (MRSA)    Drug allergy, antibiotic  likely bactrim    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HCC)    Skin plaque    Hyponatremia    Hypervolemia    Cellulitis of lower extremity       Allergies:  Bactrim [sulfamethoxazole-trimethoprim]; Hydrocodone; Morphine; Percocet [oxycodone-acetaminophen]; Tylenol [acetaminophen]; and Tape [adhesive tape]     Temp max: 99.4    Recent Labs      03/07/18   1216  03/08/18   0556   BUN  17  18       Recent Labs      03/07/18   1216  03/08/18   0556   CREATININE  0.9  0.7       Recent Labs      03/07/18   0617  03/08/18   0556   WBC  11.3*  11.4*         Intake/Output Summary (Last 24 hours) at 03/08/18 0815  Last data filed at 03/08/18 0505   Gross per 24 hour   Intake 1213.33 ml   Output 4900 ml   Net -3686.67 ml       Culture Date Source Results   3/5/18 Leg MRSA, group B strep   3/5/18 Blood NGTD x4   3/6/18 HSV PCR None       Ht Readings from Last 1 Encounters:   03/05/18 5' (1.524 m)        Wt Readings from Last 1 Encounters:   03/05/18 265 lb (120.2 kg)         Body mass index is 51.75 kg/m². CrCl: 49 when calculated using ideal body weight      Assessment/Plan:  Will initiate vancomycin 1,750 mg IV every 24 hours.   Timing of trough level will be determined based on culture results, renal function, and clinical response. Thank you for the consult. Will continue to follow. Nikhil Scott, PharmD  3/8/2018 8:22 AM

## 2018-03-08 NOTE — PROGRESS NOTES
[facility]PATIENT OCCUPATIONAL THERAPY  [facility] ONC MED 5K  EVALUATION    Time:  Time In: 4815  Time Out: 1141  Timed Code Treatment Minutes: 15 Minutes  Minutes: 30        Date: 3/8/2018  Patient Name: Jeanette Meredith,   Gender: female      MRN: [id number]  : 1940  (69 y.o.)  Referring Practitioner: Kimmy Roberts MD  Diagnosis: Rash of entire body  Additional Pertinent Hx: patient presents to the emergency department for evaluation of a diffuse rash. The patient's family reports that the patient has been treated at [facility] with radiation therapy for adenocarcinoma of her sinuses. She states that her last round of radiation therapy was in January. Since that time, the patient's family states that the patient has developed a painful, burning, and itching rash of her extremities. She states that this rash is worse on her legs. The patient states that her PCP thought she had radiation sickness. The patient's family states that she has had multiple rounds of Bactrim prescribed by her PCP and finished her most recent round of Bactrim approximately 5 days ago. Despite several rounds of antibiotics, the patient's family states that this rash has only worsened.     Restrictions/Precautions:  Restrictions/Precautions: Contact Precautions, General Precautions, Fall Risk  Required Braces or Orthoses?: No  Other position/activity restrictions: R humerus fx, NWB, shoulder immobilizer    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    Cataract of both eyes     DDD (degenerative disc disease), lumbar     Dysphagia, pharyngoesophageal 2016    Fibrocystic breast     H/O ventral hernia repair     Headache 2017    Hypercholesteremia     Hyperlipidemia     Hypertension     Iron deficiency anemia     LPRD (laryngopharyngeal reflux disease) 2016    Neuropathy ([facility] [address]     peripheral    Obesity     Orbital abscess     Orbital cellulitis assistance  Functional Mobility Comments: min assist due to assist required to progress RW as NWB to RUE      Activity Tolerance:  Activity Tolerance: Patient Tolerated treatment well, Patient limited by pain  Activity Tolerance: Treatment initiated: Pt demonstrates impulsivity with functional mobility and decreased safety awareness requiring cueing of this therapist.  Pt demonstrates decreased activity tolerance with ADL taks and is significantly limited by RUE fx. Assessment:  Assessment: Pt requires skilled OT intervention to address above deficits and for safe return to PLOF and next level of care  Performance deficits / Impairments: Decreased functional mobility , Decreased ADL status, Decreased ROM, Decreased strength, Decreased safe awareness, Decreased balance, Decreased endurance  Prognosis: Fair  Discharge Recommendations: Continue to assess pending progress, Home with Home health OT, Patient would benefit from continued therapy after discharge (pt resistant to [address])    Clinical Decision Making: Clinical Decision making was of Moderate Complexity as the result of analysis of data from a detailed assessment, a consideration of several treatment options, the presence of comorbidities affecting the plan of care and the need for minimal to moderate modifications or assistance required to complete the evaluation. Patient Education:  Patient Education: Role of OT, OT POC and goals, safety, t/f tech    Equipment Recommendations: Other: cont to assess    Safety:  Safety Devices in place: Yes  Type of devices:  All fall risk precautions in place, Chair alarm in place, Call light within reach, Nurse notified, Patient at risk for falls (left with STNA in bathroom)    Plan:  Times per week: 3-5x  Current Treatment Recommendations: Strengthening, Self-Care / ADL, Balance Training, Functional Mobility Training, Endurance Training, Patient/Caregiver Education & Training, Safety Education & Training    Goals:  Patient goals : To be independent and go home    Short term goals  Time Frame for Short term goals: Two weeks  Short term goal 1: Pt to complete functional mobility using cane at [facility] for increased independence with toileting  Short term goal 2: Pt to identify 2 energy conservation strategies to increase ease with ADL tasks and therefore increase safety  Short term goal 3: Pt to complete sit<>stand txs at [facility] from a variety of surfaces to increase safety accessing home evniroment  Short term goal 4: Pt to tolerate sitting EOB for greater than 10 minutes at A for increased safety with bathin gtasks  Long term goals  Time Frame for Long term goals : No LTG d/t short est. LOS    Evaluation Complexity: Based on the findings of patient history, examination, clinical presentation, and decision making during this evaluation, this patient is of medium complexity.     AM-PAC Inpatient Daily Activity Raw Score: 11  AM-PAC Inpatient ADL T-Scale Score : 29.04  ADL Inpatient CMS 0-100% Score: 70.42  ADL Inpatient CMS G-Code Modifier : CL

## 2018-03-09 LAB
ALBUMIN SERPL-MCNC: 3.3 G/DL (ref 3.5–5.1)
ANION GAP SERPL CALCULATED.3IONS-SCNC: 11 MEQ/L (ref 8–16)
ANISOCYTOSIS: ABNORMAL
BASOPHILS # BLD: 0.3 %
BASOPHILS ABSOLUTE: 0 THOU/MM3 (ref 0–0.1)
BUN BLDV-MCNC: 15 MG/DL (ref 7–22)
CALCIUM SERPL-MCNC: 9.2 MG/DL (ref 8.5–10.5)
CHLORIDE BLD-SCNC: 91 MEQ/L (ref 98–111)
CO2: 30 MEQ/L (ref 23–33)
CREAT SERPL-MCNC: 0.6 MG/DL (ref 0.4–1.2)
EOSINOPHIL # BLD: 0.3 %
EOSINOPHILS ABSOLUTE: 0 THOU/MM3 (ref 0–0.4)
GFR SERPL CREATININE-BSD FRML MDRD: > 90 ML/MIN/1.73M2
GLUCOSE BLD-MCNC: 136 MG/DL (ref 70–108)
GLUCOSE BLD-MCNC: 141 MG/DL (ref 70–108)
GLUCOSE BLD-MCNC: 252 MG/DL (ref 70–108)
GLUCOSE BLD-MCNC: 276 MG/DL (ref 70–108)
GLUCOSE BLD-MCNC: 302 MG/DL (ref 70–108)
HCT VFR BLD CALC: 30 % (ref 37–47)
HEMOGLOBIN: 9.8 GM/DL (ref 12–16)
IGE: 189 IU/ML
LYMPHOCYTES # BLD: 12 %
LYMPHOCYTES ABSOLUTE: 1.1 THOU/MM3 (ref 1–4.8)
MCH RBC QN AUTO: 28.3 PG (ref 27–31)
MCHC RBC AUTO-ENTMCNC: 32.8 GM/DL (ref 33–37)
MCV RBC AUTO: 86.5 FL (ref 81–99)
MONOCYTES # BLD: 11.5 %
MONOCYTES ABSOLUTE: 1 THOU/MM3 (ref 0.4–1.3)
NUCLEATED RED BLOOD CELLS: 0 /100 WBC
PDW BLD-RTO: 18.4 % (ref 11.5–14.5)
PHOSPHORUS: 1.8 MG/DL (ref 2.4–4.7)
PLATELET # BLD: 356 THOU/MM3 (ref 130–400)
PMV BLD AUTO: 6.4 FL (ref 7.4–10.4)
POTASSIUM SERPL-SCNC: 3.8 MEQ/L (ref 3.5–5.2)
RBC # BLD: 3.46 MILL/MM3 (ref 4.2–5.4)
SEG NEUTROPHILS: 75.9 %
SEGMENTED NEUTROPHILS ABSOLUTE COUNT: 6.7 THOU/MM3 (ref 1.8–7.7)
SODIUM BLD-SCNC: 132 MEQ/L (ref 135–145)
WBC # BLD: 8.8 THOU/MM3 (ref 4.8–10.8)

## 2018-03-09 PROCEDURE — 80069 RENAL FUNCTION PANEL: CPT

## 2018-03-09 PROCEDURE — 6370000000 HC RX 637 (ALT 250 FOR IP): Performed by: HOSPITALIST

## 2018-03-09 PROCEDURE — 6370000000 HC RX 637 (ALT 250 FOR IP): Performed by: FAMILY MEDICINE

## 2018-03-09 PROCEDURE — 6370000000 HC RX 637 (ALT 250 FOR IP): Performed by: INTERNAL MEDICINE

## 2018-03-09 PROCEDURE — 6360000002 HC RX W HCPCS: Performed by: FAMILY MEDICINE

## 2018-03-09 PROCEDURE — G8979 MOBILITY GOAL STATUS: HCPCS

## 2018-03-09 PROCEDURE — 99233 SBSQ HOSP IP/OBS HIGH 50: CPT | Performed by: HOSPITALIST

## 2018-03-09 PROCEDURE — 6370000000 HC RX 637 (ALT 250 FOR IP): Performed by: NURSE PRACTITIONER

## 2018-03-09 PROCEDURE — 85025 COMPLETE CBC W/AUTO DIFF WBC: CPT

## 2018-03-09 PROCEDURE — 2500000003 HC RX 250 WO HCPCS: Performed by: HOSPITALIST

## 2018-03-09 PROCEDURE — 99231 SBSQ HOSP IP/OBS SF/LOW 25: CPT | Performed by: INTERNAL MEDICINE

## 2018-03-09 PROCEDURE — 2580000003 HC RX 258: Performed by: INTERNAL MEDICINE

## 2018-03-09 PROCEDURE — 6360000002 HC RX W HCPCS: Performed by: INTERNAL MEDICINE

## 2018-03-09 PROCEDURE — 0HBKXZX EXCISION OF RIGHT LOWER LEG SKIN, EXTERNAL APPROACH, DIAGNOSTIC: ICD-10-PCS | Performed by: INTERNAL MEDICINE

## 2018-03-09 PROCEDURE — 97162 PT EVAL MOD COMPLEX 30 MIN: CPT

## 2018-03-09 PROCEDURE — 36415 COLL VENOUS BLD VENIPUNCTURE: CPT

## 2018-03-09 PROCEDURE — 82948 REAGENT STRIP/BLOOD GLUCOSE: CPT

## 2018-03-09 PROCEDURE — 88350 IMFLUOR EA ADDL 1ANTB STN PX: CPT

## 2018-03-09 PROCEDURE — 97110 THERAPEUTIC EXERCISES: CPT

## 2018-03-09 PROCEDURE — 88346 IMFLUOR 1ST 1ANTB STAIN PX: CPT

## 2018-03-09 PROCEDURE — 1200000003 HC TELEMETRY R&B

## 2018-03-09 PROCEDURE — 82785 ASSAY OF IGE: CPT

## 2018-03-09 PROCEDURE — G8978 MOBILITY CURRENT STATUS: HCPCS

## 2018-03-09 PROCEDURE — 88300 SURGICAL PATH GROSS: CPT

## 2018-03-09 RX ORDER — LIDOCAINE HYDROCHLORIDE 20 MG/ML
2 INJECTION, SOLUTION EPIDURAL; INFILTRATION; INTRACAUDAL; PERINEURAL ONCE
Status: DISCONTINUED | OUTPATIENT
Start: 2018-03-09 | End: 2018-03-13 | Stop reason: HOSPADM

## 2018-03-09 RX ORDER — PROMETHAZINE HYDROCHLORIDE 25 MG/1
12.5 TABLET ORAL ONCE
Status: COMPLETED | OUTPATIENT
Start: 2018-03-09 | End: 2018-03-09

## 2018-03-09 RX ORDER — ZOLPIDEM TARTRATE 10 MG/1
10 TABLET ORAL NIGHTLY PRN
Status: DISCONTINUED | OUTPATIENT
Start: 2018-03-09 | End: 2018-03-13 | Stop reason: HOSPADM

## 2018-03-09 RX ORDER — SODIUM PHOSPHATE,MONO-DIBASIC 19G-7G/118
1 ENEMA (ML) RECTAL DAILY PRN
Status: DISCONTINUED | OUTPATIENT
Start: 2018-03-09 | End: 2018-03-13 | Stop reason: HOSPADM

## 2018-03-09 RX ORDER — LIDOCAINE 50 MG/G
1 PATCH TOPICAL ONCE
Status: COMPLETED | OUTPATIENT
Start: 2018-03-09 | End: 2018-03-09

## 2018-03-09 RX ADMIN — POLYETHYLENE GLYCOL 3350 17 G: 17 POWDER, FOR SOLUTION ORAL at 09:09

## 2018-03-09 RX ADMIN — MUPIROCIN: 20 OINTMENT TOPICAL at 14:35

## 2018-03-09 RX ADMIN — PROPRANOLOL HYDROCHLORIDE 20 MG: 20 TABLET ORAL at 21:35

## 2018-03-09 RX ADMIN — ATORVASTATIN CALCIUM 40 MG: 40 TABLET, FILM COATED ORAL at 21:34

## 2018-03-09 RX ADMIN — INSULIN LISPRO 3 UNITS: 100 INJECTION, SOLUTION INTRAVENOUS; SUBCUTANEOUS at 21:36

## 2018-03-09 RX ADMIN — OXYCODONE HYDROCHLORIDE AND ACETAMINOPHEN 2 TABLET: 5; 325 TABLET ORAL at 10:07

## 2018-03-09 RX ADMIN — ZOLPIDEM TARTRATE 10 MG: 10 TABLET, FILM COATED ORAL at 01:57

## 2018-03-09 RX ADMIN — MUPIROCIN: 20 OINTMENT TOPICAL at 21:34

## 2018-03-09 RX ADMIN — MUPIROCIN: 20 OINTMENT TOPICAL at 10:14

## 2018-03-09 RX ADMIN — MICONAZOLE NITRATE: 2 POWDER TOPICAL at 13:13

## 2018-03-09 RX ADMIN — Medication 8 UNITS: at 13:04

## 2018-03-09 RX ADMIN — VANCOMYCIN HYDROCHLORIDE 1750 MG: 10 INJECTION, POWDER, LYOPHILIZED, FOR SOLUTION INTRAVENOUS at 10:09

## 2018-03-09 RX ADMIN — LISINOPRIL 40 MG: 40 TABLET ORAL at 09:05

## 2018-03-09 RX ADMIN — TETRAHYDROZOLINE HYDROCHLORIDE 1 DROP: 0.05 SOLUTION/ DROPS OPHTHALMIC at 14:36

## 2018-03-09 RX ADMIN — MUPIROCIN: 20 OINTMENT TOPICAL at 18:50

## 2018-03-09 RX ADMIN — SODIUM PHOSPHATE 1 ENEMA: 7; 19 ENEMA RECTAL at 12:20

## 2018-03-09 RX ADMIN — OXYCODONE HYDROCHLORIDE AND ACETAMINOPHEN 2 TABLET: 5; 325 TABLET ORAL at 22:23

## 2018-03-09 RX ADMIN — FLUTICASONE PROPIONATE 2 SPRAY: 50 SPRAY, METERED NASAL at 21:45

## 2018-03-09 RX ADMIN — FLUTICASONE PROPIONATE 2 SPRAY: 50 SPRAY, METERED NASAL at 09:21

## 2018-03-09 RX ADMIN — OXYCODONE HYDROCHLORIDE AND ACETAMINOPHEN 2 TABLET: 5; 325 TABLET ORAL at 16:18

## 2018-03-09 RX ADMIN — DOCUSATE SODIUM AND SENNOSIDES 2 TABLET: 8.6; 5 TABLET, FILM COATED ORAL at 09:04

## 2018-03-09 RX ADMIN — PROMETHAZINE HYDROCHLORIDE 12.5 MG: 25 TABLET ORAL at 09:56

## 2018-03-09 RX ADMIN — Medication 15 ML: at 21:38

## 2018-03-09 RX ADMIN — ACETAMINOPHEN 650 MG: 325 TABLET ORAL at 06:20

## 2018-03-09 RX ADMIN — CHLORHEXIDINE GLUCONATE: 4 SOLUTION TOPICAL at 12:35

## 2018-03-09 RX ADMIN — GLIMEPIRIDE 4 MG: 4 TABLET ORAL at 09:03

## 2018-03-09 RX ADMIN — HYDRALAZINE HYDROCHLORIDE 50 MG: 50 TABLET ORAL at 09:05

## 2018-03-09 RX ADMIN — PROMETHAZINE HYDROCHLORIDE 12.5 MG: 25 TABLET ORAL at 02:18

## 2018-03-09 RX ADMIN — Medication 6 UNITS: at 17:49

## 2018-03-09 RX ADMIN — FUROSEMIDE 40 MG: 40 TABLET ORAL at 09:03

## 2018-03-09 RX ADMIN — Medication 15 ML: at 09:06

## 2018-03-09 RX ADMIN — OXYCODONE HYDROCHLORIDE AND ACETAMINOPHEN 2 TABLET: 5; 325 TABLET ORAL at 02:15

## 2018-03-09 RX ADMIN — PANTOPRAZOLE SODIUM 40 MG: 40 TABLET, DELAYED RELEASE ORAL at 02:07

## 2018-03-09 RX ADMIN — TETRAHYDROZOLINE HYDROCHLORIDE 1 DROP: 0.05 SOLUTION/ DROPS OPHTHALMIC at 21:36

## 2018-03-09 RX ADMIN — HYDRALAZINE HYDROCHLORIDE 50 MG: 50 TABLET ORAL at 21:34

## 2018-03-09 RX ADMIN — CYCLOSPORINE 1 DROP: 0.5 EMULSION OPHTHALMIC at 09:07

## 2018-03-09 RX ADMIN — MICONAZOLE NITRATE: 2 POWDER TOPICAL at 21:34

## 2018-03-09 RX ADMIN — CYCLOSPORINE 1 DROP: 0.5 EMULSION OPHTHALMIC at 21:35

## 2018-03-09 RX ADMIN — PROPRANOLOL HYDROCHLORIDE 20 MG: 20 TABLET ORAL at 14:37

## 2018-03-09 RX ADMIN — HYDROCORTISONE ACETATE 25 MG: 25 SUPPOSITORY RECTAL at 12:29

## 2018-03-09 RX ADMIN — PROPRANOLOL HYDROCHLORIDE 20 MG: 20 TABLET ORAL at 09:03

## 2018-03-09 RX ADMIN — POTASSIUM & SODIUM PHOSPHATES POWDER PACK 280-160-250 MG 500 MG: 280-160-250 PACK at 14:45

## 2018-03-09 RX ADMIN — FUROSEMIDE 40 MG: 40 TABLET ORAL at 18:50

## 2018-03-09 RX ADMIN — HYDRALAZINE HYDROCHLORIDE 50 MG: 50 TABLET ORAL at 14:36

## 2018-03-09 RX ADMIN — PREDNISONE 40 MG: 20 TABLET ORAL at 09:03

## 2018-03-09 ASSESSMENT — PAIN DESCRIPTION - ORIENTATION
ORIENTATION: RIGHT;LEFT
ORIENTATION: RIGHT;LEFT

## 2018-03-09 ASSESSMENT — PAIN SCALES - GENERAL
PAINLEVEL_OUTOF10: 8
PAINLEVEL_OUTOF10: 0
PAINLEVEL_OUTOF10: 0
PAINLEVEL_OUTOF10: 10
PAINLEVEL_OUTOF10: 6
PAINLEVEL_OUTOF10: 6
PAINLEVEL_OUTOF10: 10
PAINLEVEL_OUTOF10: 3
PAINLEVEL_OUTOF10: 8
PAINLEVEL_OUTOF10: 0

## 2018-03-09 ASSESSMENT — PAIN DESCRIPTION - PAIN TYPE
TYPE: ACUTE PAIN
TYPE: ACUTE PAIN

## 2018-03-09 ASSESSMENT — PAIN DESCRIPTION - LOCATION
LOCATION: LEG
LOCATION: LEG

## 2018-03-09 ASSESSMENT — PAIN DESCRIPTION - DESCRIPTORS: DESCRIPTORS: DISCOMFORT;SORE

## 2018-03-09 NOTE — PROGRESS NOTES
INFECTIOUS DISEASES  PROGRESS NOTE    Pt Name: Shaheed Huff  MRN: [id number]  [id number]  YOB: 1940  Admit Date: 3/5/2018  8:56 PM  Date of evaluation: 3/9/2018  Primary Care Physician: Jorge Park MD   5K-12/012-A    71-year-old female patient currently on treatment of  adenocarcinoma of the nasopharynx. She has been on radiation treatment, she has had rash present. She does not sleep in bed. Cultures grew MRSA and Strep. Subjective: Interval History: patient up in chair and educated about punch biopsy. Questions answered    Active ATBs: vancomycin 3/8    Pt/Chart review:  Fever No, Diarrhea No, Dyspnea no but orthopnea, Nausea:None      Data:   Scheduled Meds:   lidocaine  1 patch Transdermal Once    promethazine  12.5 mg Oral Once    lidocaine PF  2 mL Intradermal Once    vancomycin (VANCOCIN) intermittent dosing (placeholder)   Other RX Placeholder    vancomycin  1,750 mg Intravenous Q24H    polyethylene glycol  17 g Oral Daily    furosemide  40 mg Oral BID    predniSONE  40 mg Oral Daily    tetrahydrozoline  1 drop Both Eyes TID    cycloSPORINE  1 drop Both Eyes BID    fluticasone  2 spray Nasal BID    glimepiride  4 mg Oral Daily with breakfast    propranolol  20 mg Oral TID    hydrALAZINE  50 mg Oral TID    atorvastatin  40 mg Oral Nightly    mupirocin   Topical 4x Daily    hydrocortisone  25 mg Rectal BID    chlorhexidine   Topical Daily    chlorhexidine  15 mL Mouth/Throat BID    pantoprazole  40 mg Oral QAM AC    lisinopril  40 mg Oral Daily    insulin lispro  0-12 Units Subcutaneous TID WC    insulin lispro  0-6 Units Subcutaneous Nightly     Continuous Infusions:   dextrose         I & O's:  I/O last 3 completed shifts: In: 4019 [P.O.:1660; I.V.:10]  Out: 6875 [Urine:6875]  No intake/output data recorded.     Intake/Output Summary (Last 24 hours) at 03/09/18 0837  Last data filed at 03/09/18 0614   Gross per 24 hour   Intake             1670 ml Output             6875 ml   Net            -5205 ml       Date 03/09/18 0000 - 03/09/18 2359   Shift 0103-9584 7675-3580 5844-2359 24 Hour Total   I  N  T  A  K  E   P.O. 300   300    I.V.  (mL/kg/hr) 0  (0)   0    Shift Total  (mL/kg) 300  (2.6)   300  (2.6)   O  U  T  P  U  T   Urine  (mL/kg/hr) 1945  (2.1)   1945    Shift Total  (mL/kg) 1945  (16.8)   1945  (16.8)   Weight (kg) 115.6 115.6 115.6 115.6           CBC:   Recent Labs      03/07/18   0617  03/07/18   1216  03/08/18   0556  03/09/18   0622   WBC  11.3*   --   11.4*  8.8   HGB  6.7*  6.9*  9.1*  9.8*   PLT  261   --   304  356     BMP:    Recent Labs      03/07/18   1216  03/07/18   1437  03/08/18   0556  03/09/18   0622   NA  123*  125*  126*  132*   K  4.1   --   3.8  3.8   CL  86*   --   86*  91*   CO2  24   --   27  30   BUN  17   --   18  15   CREATININE  0.9   --   0.7  0.6   GLUCOSE  192*   --   162*  136*     Hepatic: No results for input(s): AST, ALT, ALB, BILITOT, ALKPHOS in the last 72 hours. BNP: No results for input(s): BNP in the last 72 hours. URINALYSIS:   Results for Elba Brooks (MRN [id number]) as of 3/8/2018 19:23   Ref.  Range 3/7/2018 18:02   Color, UA Latest Ref Range: YELLOW-STR  YELLOW   Bilirubin, Urine Latest Ref Range: NEGATIVE  NEGATIVE   Ketones, Urine Latest Ref Range: NEGATIVE  NEGATIVE   Specific Gravity, UA Latest Ref Range: 1.002 - 1.03  1.006   Blood, Urine Latest Ref Range: NEGATIVE  MODERATE (A)   pH, UA Latest Ref Range: 5.0 - 9.0  5.5   Protein, UA Latest Ref Range: NEGATIVE mg/dl NEGATIVE   Urobilinogen, Urine Latest Ref Range: 0.0 - 1.0 eu/dl 0.2   Nitrite, Urine Latest Ref Range: NEGATIVE  NEGATIVE   Leukocyte Esterase, Urine Latest Ref Range: NEGATIVE  NEGATIVE   Glucose, Urine Latest Ref Range: NEGATIVE mg/dl NEGATIVE   Protein, Urine Latest Units: mg/dl < 4.0   Casts Latest Units: /lpf NONE SEEN   WBC, UA Latest Ref Range: 0-4/hpf /hpf 0-2   RBC, UA Latest Ref Range: 0-2/hpf /hpf 15-25   Epi Cells Latest

## 2018-03-09 NOTE — PROGRESS NOTES
Hospitalist Progress Note    Patient: Wei Ramirez      Unit/Bed:5K-12/012-A    YOB: 1940    MRN: [id number]       Acct: [de-identified]     PCP: Alejandro Zamarripa MD    Date of Admission: 3/5/2018  --------------------------    Chief Complaint:      skin rash    Hospital Course: Wei Ramirez is a 69 y.o. female admitted to [facility] on 3/5/2018   With skin rash . Assessment:       1. Generalized skin rash  Suspecting infectious etiology. Culture grew strep and staph. Other differential diagnosis including drug reaction, guttate psoriasis  2.  hyponatremia, multifactorial (volume overload, hydrochlorothiazide use)  3. Anasarca likely secondary to hypoalbuminemia. , chest x-ray  No pulmonary edema. Proteinuria is not significant. 0.5 mg/dl  4. Severe hyperglycemia sec to steroid , improving   5. Worsening hb in the setting of anemia , no overt bleeding, hb stable after 2 unit of PRBC  6. hypophosphatemia        comorbidities:    · Adenocarcinoma of the nasopharynx  Status post surgical resection 9/19/2017,   Completed adjuvant radiation therapy. · morbid obesity Body mass index is 49.76 kg/m². · Right humerus fracture status post sling  · Obesity Body mass index is 49.76 kg/m². · Ess HNT  · Dyslipidemia   · DM type on Amaryl   ·      Plan:  1. Continue PO lasix, Na improving within the goal. PO4 repleted   2.  echo noted and normal   3. Enema today   4. Continue to monitor hemoglobin, check fecal occult blood testing. Recent colonoscopy 2 years ago. 5.   Continue  vancomycin in the light of the MRSA growth  6.  weaned off his steroid. Outpatient follow-up with orthopedics for PTA right humerus fracture. 7.        Code Status: Full Code         DVT prophylaxis:  lovenox     Disposition: PT/OT home vs ecf d/ic in 1-2  days    I discussed my though processes at length with patient/family and patient understood.  Question and concerns  Addressed     Discussed Final report electronically signed by Dr. Magda Lennon on 3/7/2018 3:00 PM      VL DUP LOWER EXTREMITY VENOUS BILATERAL   Final Result   Normal venous ultrasound. No evidence for acute deep venous thrombosis. **This report has been created using voice recognition software. It may contain minor errors which are inherent in voice recognition technology. **      Final report electronically signed by Dr. Magda Lennon on 3/6/2018 9:17 AM                  Electronically signed by Kody Underwood MD on 3/9/2018 at 1:07 PM

## 2018-03-09 NOTE — PLAN OF CARE
Problem: Pain:  Goal: Pain level will decrease  Pain level will decrease   Outcome: Ongoing  Pt experiencing pain in both BLE and right shoulder throughout night. PRN percocet given q 6 hrs with pt verbalizing that pain was remaining around a 10/10. Pain goal of \"3\" not met. Pt refusing to wear brace multiple times and removing while staff was not in room. Pt educated multiple times throughout night on importance to stabilize arm and help decrease pain due to prior injury and fall. Pt verbalized understanding, but then would continue to remove and state that, \"the doctors didn't know what they were talking about\". Daughter came around 10PM to help pt with comfort of brace and attempted second one, which she stated was more uncomfortable. [facility] nurse asked to come to floor and was able to check brace and ensure it was on correctly and fit to pt for most comfort. Goal: Control of acute pain  Control of acute pain   Outcome: Ongoing  Pt experiencing acute pain in shoulder throughout night, rated around a 10. Due to pt's loss of IV access a lidocaine patch was able to be put on right shoulder, which pt verbalized did help decrease pain along with PRN percocet. Pain goal of \"3\" not met, however. Goal: Control of chronic pain  Control of chronic pain   Outcome: Completed Date Met: 03/09/18  Pt denied any chronic pain at this time. Will re-add if needed. Problem: Falls - Risk of  Goal: Absence of falls  Outcome: Ongoing  Pt at very high risk for falls. Pt set both bed and chair alarm off frequently during night with frequent rounding performed by staff. Pt verbalized understanding with need for staff to be in room (especially due to caicedo cather in place and pt ambulating with risk of pulling at this), however pt continue to ambulate by self. When educated pt would state that she, \"was not an invalid and able to walk by herself\". Staff continue to monitor pt and re-educate on need for safety.   Pt steady with 1assist/walker, but forgot about IV and/or catheter frequently. Problem: Risk for Impaired Skin Integrity  Goal: Tissue integrity - skin and mucous membranes  Structural intactness and normal physiological function of skin and  mucous membranes. Outcome: Ongoing  Pt did not have any new skin breakdown per skin assessment, but does have multiple areas at risk due to prior rash and/or open sores. Skin is excoriated and red/damp under breasts, around groin, and under abdominal folds. Pt refused bath and assistance with staff for powder and new silver sheets--became very tearful/emotional and anxious with this. Was educated on risk for skin breakdown due to dampness and current redness, but pt continued to refuse. Comments: Pt continues to plan on discharge to come with daughter Johanny Martínez and declines need for further assistance at this time. Care plan reviewed with patient this shift. Patient verbalizes understanding of the plan of care and is attempting to contribute to goal setting.

## 2018-03-09 NOTE — PROGRESS NOTES
R.N. and specific instructions given and infectious disease issues addressed. Will follow the patient closely with you. Also please see the orders for updated patient care orders written by ID team.    Thank you for involving us in this patient's care. I will be discussing this case with the treating physicians. Please don't hesitate to call me if any questions, issues  or concerns      Please see orders for updated patient care orders written today.   Electronically signed by Torres Ellis on 3/8/2018 at 7:29 PM

## 2018-03-09 NOTE — PROGRESS NOTES
Renal Progress Note    Assessment and Plan: 1. Hyponatremia much improved  2. Diabetes mellitus type 2  3. Hypertension primary  4.   Deconditioning  PLAN:   labs reviewed  Medications reviewed  No changes  Follow    Patient Active Problem List:     Hypercholesterolemia     Osteoarthritis     Osteoporosis     Type 2 diabetes mellitus with microalbuminuria, without long-term current use of insulin (HCC)     Morbid obesity with BMI of 45.0-49.9, adult (HCC)     DDD (degenerative disc disease), lumbar     Benign essential HTN     SWAPNA on CPAP     Diabetic peripheral neuropathy (HCC)     Acute recurrent pansinusitis     Primary thunderclap headache     Rash of entire body     Infection of skin due to methicillin resistant Staphylococcus aureus (MRSA)     Drug allergy, antibiotic  likely bactrim     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HCC)     Skin plaque     Hyponatremia     Hypervolemia     Cellulitis of lower extremity      Subjective:   Admit Date: 3/5/2018    Interval History: Late entry  Seen for hyponatremia  Very sleepy but easily arousable  No complaint  Blood pressure is stable  Urine output is good      Medications:   Scheduled Meds:   lidocaine PF  2 mL Intradermal Once    miconazole   Topical BID    vancomycin (VANCOCIN) intermittent dosing (placeholder)   Other RX Placeholder    vancomycin  1,750 mg Intravenous Q24H    polyethylene glycol  17 g Oral Daily    furosemide  40 mg Oral BID    predniSONE  40 mg Oral Daily    tetrahydrozoline  1 drop Both Eyes TID    cycloSPORINE  1 drop Both Eyes BID    fluticasone  2 spray Nasal BID    glimepiride  4 mg Oral Daily with breakfast    propranolol  20 mg Oral TID    hydrALAZINE  50 mg Oral TID    atorvastatin  40 mg Oral Nightly    mupirocin   Topical 4x Daily    hydrocortisone  25 mg Rectal BID    chlorhexidine   Topical Daily    chlorhexidine  15 mL Mouth/Throat BID    pantoprazole  40 mg Oral QAM AC    lisinopril  40 mg Oral Daily    with no deficit      Electronically signed by Veto Chapman MD on 3/9/2018 at 5:55 PM

## 2018-03-10 LAB
ALBUMIN SERPL-MCNC: 3.5 G/DL (ref 3.5–5.1)
ANION GAP SERPL CALCULATED.3IONS-SCNC: 13 MEQ/L (ref 8–16)
ANISOCYTOSIS: ABNORMAL
BASOPHILS # BLD: 0.3 %
BASOPHILS ABSOLUTE: 0 THOU/MM3 (ref 0–0.1)
BUN BLDV-MCNC: 13 MG/DL (ref 7–22)
CALCIUM SERPL-MCNC: 9.2 MG/DL (ref 8.5–10.5)
CHLORIDE BLD-SCNC: 94 MEQ/L (ref 98–111)
CO2: 31 MEQ/L (ref 23–33)
CREAT SERPL-MCNC: 0.6 MG/DL (ref 0.4–1.2)
EOSINOPHIL # BLD: 0.8 %
EOSINOPHILS ABSOLUTE: 0.1 THOU/MM3 (ref 0–0.4)
GFR SERPL CREATININE-BSD FRML MDRD: > 90 ML/MIN/1.73M2
GLUCOSE BLD-MCNC: 120 MG/DL (ref 70–108)
GLUCOSE BLD-MCNC: 139 MG/DL (ref 70–108)
GLUCOSE BLD-MCNC: 285 MG/DL (ref 70–108)
GLUCOSE BLD-MCNC: 291 MG/DL (ref 70–108)
GLUCOSE BLD-MCNC: 330 MG/DL (ref 70–108)
HCT VFR BLD CALC: 30.9 % (ref 37–47)
HEMOGLOBIN: 10.2 GM/DL (ref 12–16)
LYMPHOCYTES # BLD: 15 %
LYMPHOCYTES ABSOLUTE: 1.5 THOU/MM3 (ref 1–4.8)
MCH RBC QN AUTO: 28.6 PG (ref 27–31)
MCHC RBC AUTO-ENTMCNC: 33 GM/DL (ref 33–37)
MCV RBC AUTO: 86.6 FL (ref 81–99)
MONOCYTES # BLD: 9.8 %
MONOCYTES ABSOLUTE: 1 THOU/MM3 (ref 0.4–1.3)
NUCLEATED RED BLOOD CELLS: 0 /100 WBC
PDW BLD-RTO: 19.1 % (ref 11.5–14.5)
PHOSPHORUS: 3.2 MG/DL (ref 2.4–4.7)
PLATELET # BLD: 373 THOU/MM3 (ref 130–400)
PMV BLD AUTO: 6.2 FL (ref 7.4–10.4)
POTASSIUM SERPL-SCNC: 3.6 MEQ/L (ref 3.5–5.2)
RBC # BLD: 3.56 MILL/MM3 (ref 4.2–5.4)
SEG NEUTROPHILS: 74.1 %
SEGMENTED NEUTROPHILS ABSOLUTE COUNT: 7.4 THOU/MM3 (ref 1.8–7.7)
SODIUM BLD-SCNC: 138 MEQ/L (ref 135–145)
WBC # BLD: 10 THOU/MM3 (ref 4.8–10.8)

## 2018-03-10 PROCEDURE — 82948 REAGENT STRIP/BLOOD GLUCOSE: CPT

## 2018-03-10 PROCEDURE — 1200000003 HC TELEMETRY R&B

## 2018-03-10 PROCEDURE — 85025 COMPLETE CBC W/AUTO DIFF WBC: CPT

## 2018-03-10 PROCEDURE — 6370000000 HC RX 637 (ALT 250 FOR IP): Performed by: INTERNAL MEDICINE

## 2018-03-10 PROCEDURE — 80069 RENAL FUNCTION PANEL: CPT

## 2018-03-10 PROCEDURE — 6360000002 HC RX W HCPCS: Performed by: INTERNAL MEDICINE

## 2018-03-10 PROCEDURE — 2580000003 HC RX 258: Performed by: INTERNAL MEDICINE

## 2018-03-10 PROCEDURE — 6370000000 HC RX 637 (ALT 250 FOR IP): Performed by: HOSPITALIST

## 2018-03-10 PROCEDURE — 36415 COLL VENOUS BLD VENIPUNCTURE: CPT

## 2018-03-10 PROCEDURE — 6370000000 HC RX 637 (ALT 250 FOR IP): Performed by: NURSE PRACTITIONER

## 2018-03-10 PROCEDURE — 99233 SBSQ HOSP IP/OBS HIGH 50: CPT | Performed by: HOSPITALIST

## 2018-03-10 PROCEDURE — 6370000000 HC RX 637 (ALT 250 FOR IP): Performed by: FAMILY MEDICINE

## 2018-03-10 RX ORDER — POTASSIUM CHLORIDE 750 MG/1
40 TABLET, FILM COATED, EXTENDED RELEASE ORAL ONCE
Status: COMPLETED | OUTPATIENT
Start: 2018-03-10 | End: 2018-03-10

## 2018-03-10 RX ADMIN — PANTOPRAZOLE SODIUM 40 MG: 40 TABLET, DELAYED RELEASE ORAL at 06:35

## 2018-03-10 RX ADMIN — CYCLOSPORINE 1 DROP: 0.5 EMULSION OPHTHALMIC at 20:54

## 2018-03-10 RX ADMIN — TETRAHYDROZOLINE HYDROCHLORIDE 1 DROP: 0.05 SOLUTION/ DROPS OPHTHALMIC at 14:05

## 2018-03-10 RX ADMIN — ZOLPIDEM TARTRATE 10 MG: 10 TABLET, FILM COATED ORAL at 00:03

## 2018-03-10 RX ADMIN — FUROSEMIDE 40 MG: 40 TABLET ORAL at 08:56

## 2018-03-10 RX ADMIN — ACETAMINOPHEN 650 MG: 325 TABLET ORAL at 17:41

## 2018-03-10 RX ADMIN — PROPRANOLOL HYDROCHLORIDE 20 MG: 20 TABLET ORAL at 14:05

## 2018-03-10 RX ADMIN — POTASSIUM CHLORIDE 40 MEQ: 750 TABLET, FILM COATED, EXTENDED RELEASE ORAL at 11:32

## 2018-03-10 RX ADMIN — PROPRANOLOL HYDROCHLORIDE 20 MG: 20 TABLET ORAL at 08:56

## 2018-03-10 RX ADMIN — FLUTICASONE PROPIONATE 2 SPRAY: 50 SPRAY, METERED NASAL at 08:58

## 2018-03-10 RX ADMIN — HYDRALAZINE HYDROCHLORIDE 50 MG: 50 TABLET ORAL at 08:56

## 2018-03-10 RX ADMIN — PROPRANOLOL HYDROCHLORIDE 20 MG: 20 TABLET ORAL at 20:55

## 2018-03-10 RX ADMIN — ATORVASTATIN CALCIUM 40 MG: 40 TABLET, FILM COATED ORAL at 20:54

## 2018-03-10 RX ADMIN — POLYETHYLENE GLYCOL 3350 17 G: 17 POWDER, FOR SOLUTION ORAL at 08:57

## 2018-03-10 RX ADMIN — DOCUSATE SODIUM AND SENNOSIDES 2 TABLET: 8.6; 5 TABLET, FILM COATED ORAL at 08:56

## 2018-03-10 RX ADMIN — HYDRALAZINE HYDROCHLORIDE 50 MG: 50 TABLET ORAL at 20:54

## 2018-03-10 RX ADMIN — MUPIROCIN: 20 OINTMENT TOPICAL at 20:55

## 2018-03-10 RX ADMIN — OXYCODONE HYDROCHLORIDE AND ACETAMINOPHEN 2 TABLET: 5; 325 TABLET ORAL at 08:57

## 2018-03-10 RX ADMIN — TETRAHYDROZOLINE HYDROCHLORIDE 1 DROP: 0.05 SOLUTION/ DROPS OPHTHALMIC at 08:58

## 2018-03-10 RX ADMIN — FUROSEMIDE 40 MG: 40 TABLET ORAL at 17:26

## 2018-03-10 RX ADMIN — GLIMEPIRIDE 4 MG: 4 TABLET ORAL at 08:56

## 2018-03-10 RX ADMIN — INSULIN LISPRO 4 UNITS: 100 INJECTION, SOLUTION INTRAVENOUS; SUBCUTANEOUS at 20:55

## 2018-03-10 RX ADMIN — HYDROCORTISONE ACETATE 25 MG: 25 SUPPOSITORY RECTAL at 08:56

## 2018-03-10 RX ADMIN — Medication 6 UNITS: at 12:20

## 2018-03-10 RX ADMIN — Medication 6 UNITS: at 17:39

## 2018-03-10 RX ADMIN — PREDNISONE 40 MG: 20 TABLET ORAL at 08:56

## 2018-03-10 RX ADMIN — VANCOMYCIN HYDROCHLORIDE 1750 MG: 10 INJECTION, POWDER, LYOPHILIZED, FOR SOLUTION INTRAVENOUS at 08:58

## 2018-03-10 RX ADMIN — MUPIROCIN: 20 OINTMENT TOPICAL at 08:58

## 2018-03-10 RX ADMIN — MICONAZOLE NITRATE: 2 POWDER TOPICAL at 20:55

## 2018-03-10 RX ADMIN — HYDRALAZINE HYDROCHLORIDE 50 MG: 50 TABLET ORAL at 14:05

## 2018-03-10 RX ADMIN — TETRAHYDROZOLINE HYDROCHLORIDE 1 DROP: 0.05 SOLUTION/ DROPS OPHTHALMIC at 20:54

## 2018-03-10 RX ADMIN — FLUTICASONE PROPIONATE 2 SPRAY: 50 SPRAY, METERED NASAL at 20:53

## 2018-03-10 RX ADMIN — CHLORHEXIDINE GLUCONATE: 4 SOLUTION TOPICAL at 10:30

## 2018-03-10 RX ADMIN — CYCLOSPORINE 1 DROP: 0.5 EMULSION OPHTHALMIC at 08:57

## 2018-03-10 RX ADMIN — LISINOPRIL 40 MG: 40 TABLET ORAL at 08:56

## 2018-03-10 RX ADMIN — ZOLPIDEM TARTRATE 10 MG: 10 TABLET, FILM COATED ORAL at 23:33

## 2018-03-10 RX ADMIN — MICONAZOLE NITRATE: 2 POWDER TOPICAL at 08:58

## 2018-03-10 ASSESSMENT — PAIN SCALES - GENERAL
PAINLEVEL_OUTOF10: 0
PAINLEVEL_OUTOF10: 8

## 2018-03-10 NOTE — PROGRESS NOTES
Hospitalist Progress Note    Patient: Mao Taylor      Unit/Bed:5K-12/012-A    YOB: 1940    MRN: [id number]       Acct: [de-identified]     PCP: Sharmin Medeiros MD    Date of Admission: 3/5/2018  --------------------------    Chief Complaint:      skin rash    Hospital Course: Mao Taylor is a 69 y.o. female admitted to [address] [place] [address] on 3/5/2018   With skin rash . Assessment:       1. Generalized skin rash  Suspecting infectious etiology. Culture grew strep and staph. Other differential diagnosis including drug reaction, guttate psoriasis  2.  hyponatremia, multifactorial (volume overload, hydrochlorothiazide use)  3. Anasarca likely secondary to hypoalbuminemia. , chest x-ray  No pulmonary edema. Proteinuria is not significant. 0.5 mg/dl  4. Severe hyperglycemia sec to steroid , improving   5. Worsening hb in the setting of anemia , no overt bleeding, hb stable after 2 unit of PRBC  6. Hypophosphatemia. 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 · morbid obesity Body mass index is 47.57 kg/m². · Right humerus fracture status post sling  · Obesity Body mass index is 47.57 kg/m². · Ess HNT  · Dyslipidemia   · DM type on Amaryl   ·      Plan:  1. Continue PO lasix, Na improved    2. Continue  vancomycin per ID ? duration    3. Wound care, best kept open to dry out  4.  further weaned off  steroid. Outpatient follow-up with orthopedics for PTA right humerus fracture. Code Status: Full Code         DVT prophylaxis:  lovenox     Disposition: ecf on Monday     I discussed my though processes at length with patient/family and patient understood.  Question and concerns  Addressed     Discussed with RN     ----------------  Subjective:     Patient seen and examined  Overnight events noted  RN and ancillary staff note reviewed    Feels better  Diuresing 12 L negative so for since admission        Diet: DIET CARDIAC; Daily Fluid Restriction: 1500 ml      Medications:  Reviewed    Infusion Medications    dextrose       Scheduled Medications    lidocaine PF  2 mL Intradermal Once    miconazole   Topical BID    vancomycin (VANCOCIN) intermittent dosing (placeholder)   Other RX Placeholder    vancomycin  1,750 mg Intravenous Q24H    polyethylene glycol  17 g Oral Daily    furosemide  40 mg Oral BID    predniSONE  40 mg Oral Daily    tetrahydrozoline  1 drop Both Eyes TID    cycloSPORINE  1 drop Both Eyes BID    fluticasone  2 spray Nasal BID    glimepiride  4 mg Oral Daily with breakfast    propranolol  20 mg Oral TID    hydrALAZINE  50 mg Oral TID    atorvastatin  40 mg Oral Nightly    mupirocin   Topical 4x Daily    hydrocortisone  25 mg Rectal BID    chlorhexidine   Topical Daily    chlorhexidine  15 mL Mouth/Throat BID    pantoprazole  40 mg Oral QAM AC    lisinopril  40 mg Oral Daily    insulin lispro  0-12 Units Subcutaneous TID WC    insulin lispro  0-6 Units Subcutaneous Nightly     PRN Meds: zolpidem, sodium phosphate, bisacodyl, sennosides-docusate sodium, acetaminophen, Magic Mouthwash, mupirocin, glucose, dextrose, glucagon (rDNA), dextrose, sodium chloride, oxyCODONE-acetaminophen      Intake/Output Summary (Last 24 hours) at 03/10/18 1337  Last data filed at 03/10/18 0330   Gross per 24 hour   Intake              700 ml   Output             4100 ml   Net            -3400 ml       Diet:  DIET CARDIAC; Daily Fluid Restriction: 1500 ml    Exam:  /81   Pulse 73   Temp 97.6 °F (36.4 °C) (Oral)   Resp 16   Ht 5' (1.524 m)   Wt 243 lb 9.6 oz (110.5 kg)   SpO2 95%   BMI 47.57 kg/m²      Gen: Not in distress. Alert. Chronically ill. Head: Normocephalic. Atraumatic. Eyes: Conjunctivae/corneas clear. ENT:   Mucus drainage from her nose. Neck: No JVD. No obvious thyromegaly.   CVS: Nml S1S2, no MRG,

## 2018-03-10 NOTE — PLAN OF CARE
Problem: Pain:  Goal: Pain level will decrease  Pain level will decrease   Outcome: Ongoing  Patient states pain relief from PRN pain medications. Pain reassessed one hour post PRN pain medication given. Patient rates pain 7 on VIMAL 0-10 scale. Problem: Falls - Risk of  Goal: Absence of falls  Outcome: Met This Shift  Fall assessment completed. Patient using call light appropriately to call for assistance with ambulation to bathroom. Personal items within reach. Patient is also compliant with use of non-skid slippers. Problem: Risk for Impaired Skin Integrity  Goal: Tissue integrity - skin and mucous membranes  Structural intactness and normal physiological function of skin and  mucous membranes. Outcome: Ongoing  No skin breakdown this shift. Patient being assisted with turning. Patients states understanding of repositioning every two hours. Problem: Discharge Planning:  Goal: Discharged to appropriate level of care  Discharged to appropriate level of care   Outcome: Ongoing  Discharge plan is  In process. Plan discharge home with family. Problem: Bowel/Gastric:  Goal: Ability to achieve a regular elimination pattern will improve  Ability to achieve a regular elimination pattern will improve   Outcome: Ongoing  Patient bowel sounds active. Passing/not passing flatus. Pt taking prescribed medication to assist with BM. 1 BM today. Problem: Coping:  Goal: Ability to remain calm will improve  Ability to remain calm will improve   Outcome: Ongoing  Emotional support given. Comments: Care plan reviewed with patient andfamily. Patient and family verbalize understanding of the plan of care and contribute to goal setting.

## 2018-03-10 NOTE — PROGRESS NOTES
to get up to ambulate  Short term goal 3: Pt to ambulate 70 ft with RW with Supervision for household distances  Long term goals  Time Frame for Long term goals : not set due to short ELOS    Evaluation Complexity: Based on the findings of patient history, examination, clinical presentation, and decision making during this evaluation, the evaluation of Tamica Macedo is of medium complexity. PT G-Codes  Functional Limitation: Mobility: Walking and moving around  Mobility: Walking and Moving Around Current Status (): At least 40 percent but less than 60 percent impaired, limited or restricted  Mobility: Walking and Moving Around Goal Status (): At least 40 percent but less than 60 percent impaired, limited or restricted       AM-PAC Inpatient Mobility without Stair Climbing Raw Score : 15  AM-PAC Inpatient without Stair Climbing T-Scale Score : 43.03  Mobility Inpatient CMS 0-100% Score: 47.43  Mobility Inpatient without Stair CMS G-Code Modifier : LIANA Mcghee, [place] 8

## 2018-03-10 NOTE — PROGRESS NOTES
INFECTIOUS DISEASES  PROGRESS NOTE    Pt Name: Luis Herman  MRN: [id number]  [id number]  YOB: 1940  Admit Date: 3/5/2018  8:56 PM  Date of evaluation: 3/10/2018  Primary Care Physician: Fela Ramos MD   5K-12/012-A    36-year-old female patient currently on treatment of  adenocarcinoma of the nasopharynx. She has been on radiation treatment, she has had rash present. She does not sleep in bed. Cultures grew MRSA and Strep. Subjective: Interval History: patient up in chair and no new complaint. Questions answered    Active ATBs: vancomycin 3/8    Pt/Chart review:  Fever No, Diarrhea No, Dyspnea no but orthopnea, Nausea:None      Data:   Scheduled Meds:   potassium chloride  40 mEq Oral Once    lidocaine PF  2 mL Intradermal Once    miconazole   Topical BID    vancomycin (VANCOCIN) intermittent dosing (placeholder)   Other RX Placeholder    vancomycin  1,750 mg Intravenous Q24H    polyethylene glycol  17 g Oral Daily    furosemide  40 mg Oral BID    predniSONE  40 mg Oral Daily    tetrahydrozoline  1 drop Both Eyes TID    cycloSPORINE  1 drop Both Eyes BID    fluticasone  2 spray Nasal BID    glimepiride  4 mg Oral Daily with breakfast    propranolol  20 mg Oral TID    hydrALAZINE  50 mg Oral TID    atorvastatin  40 mg Oral Nightly    mupirocin   Topical 4x Daily    hydrocortisone  25 mg Rectal BID    chlorhexidine   Topical Daily    chlorhexidine  15 mL Mouth/Throat BID    pantoprazole  40 mg Oral QAM AC    lisinopril  40 mg Oral Daily    insulin lispro  0-12 Units Subcutaneous TID WC    insulin lispro  0-6 Units Subcutaneous Nightly     Continuous Infusions:   dextrose         I & O's:  I/O last 3 completed shifts: In: 2447 [P.O.:740; I.V.:300]  Out: 4100 [Urine:4100]  No intake/output data recorded.     Intake/Output Summary (Last 24 hours) at 03/10/18 1107  Last data filed at 03/10/18 0330   Gross per 24 hour   Intake              800 ml   Output 4100 ml   Net            -3300 ml       Date 03/10/18 0000 - 03/10/18 2359   Shift 9068-2875 2571-5086 9502-0694 24 Hour Total   I  N  T  A  K  E   P.O.  (mL/kg/hr) 50  (0.1)   50    I.V.  (mL/kg) 0  (0)   0  (0)    Shift Total  (mL/kg) 50  (0.4)   50  (0.5)   O  U  T  P  U  T   Urine  (mL/kg/hr) 350  (0.4)   350    Shift Total  (mL/kg) 350  (3)   350  (3.2)   Weight (kg) 115.6 110.5 110.5 110.5           CBC:   Recent Labs      03/08/18   0556  03/09/18   0622  03/10/18   0733   WBC  11.4*  8.8  10.0   HGB  9.1*  9.8*  10.2*   PLT  304  356  373     BMP:    Recent Labs      03/08/18   0556  03/09/18   0622  03/10/18   0733   NA  126*  132*  138   K  3.8  3.8  3.6   CL  86*  91*  94*   CO2  27  30  31   BUN  18  15  13   CREATININE  0.7  0.6  0.6   GLUCOSE  162*  136*  120*     Hepatic: No results for input(s): AST, ALT, ALB, BILITOT, ALKPHOS in the last 72 hours. BNP: No results for input(s): BNP in the last 72 hours. URINALYSIS:   Results for Benigno Andres (MRN [id number]) as of 3/8/2018 19:23   Ref.  Range 3/7/2018 18:02   Color, UA Latest Ref Range: YELLOW-STR  YELLOW   Bilirubin, Urine Latest Ref Range: NEGATIVE  NEGATIVE   Ketones, Urine Latest Ref Range: NEGATIVE  NEGATIVE   Specific Gravity, UA Latest Ref Range: 1.002 - 1.03  1.006   Blood, Urine Latest Ref Range: NEGATIVE  MODERATE (A)   pH, UA Latest Ref Range: 5.0 - 9.0  5.5   Protein, UA Latest Ref Range: NEGATIVE mg/dl NEGATIVE   Urobilinogen, Urine Latest Ref Range: 0.0 - 1.0 eu/dl 0.2   Nitrite, Urine Latest Ref Range: NEGATIVE  NEGATIVE   Leukocyte Esterase, Urine Latest Ref Range: NEGATIVE  NEGATIVE   Glucose, Urine Latest Ref Range: NEGATIVE mg/dl NEGATIVE   Protein, Urine Latest Units: mg/dl < 4.0   Casts Latest Units: /lpf NONE SEEN   WBC, UA Latest Ref Range: 0-4/hpf /hpf 0-2   RBC, UA Latest Ref Range: 0-2/hpf /hpf 15-25   Epi Cells Latest Ref Range: 3-5/hpf /hpf NONE SEEN   Renal Epithelial, Urine Latest Ref Range: NONE SEEN  NONE SEEN 2. There is a known Relatively recent nondisplaced, mildly angulated surgical neck fracture proximal right humerus. .             Objective:   Vitals: BP (!) 174/77   Pulse 120   Temp 97.8 °F (36.6 °C) (Oral)   Resp 16   Ht 5' (1.524 m)   Wt 243 lb 9.6 oz (110.5 kg)   SpO2 95%   BMI 47.57 kg/m²   HEENT: Head: Normocephalic, no lesions, without obvious abnormality. Lungs: clear to auscultation bilaterally  Heart: S1, S2 normal  Abdomen: soft, non-tender; bowel sounds normal; no masses,  no organomegaly  Extremities: edema with seepage positive with rash positive   Neurologic: Mental status: Alert, oriented, thought content appropriate   Wound/Ulcers: Skin Ulcer: legs wrapped     Assessment:   Skin rash/eputions lower extremities   Cellulitis of the legs  Venous ulcer ?   S/p adenocarcinoma of the sinuses   S/p fall and right shoulder fracture  Leg secondarily infected with MRSA and streptococcus  Type 2 diabetes mellitus with microalbuminuria, without long-term current use of insulin (HCC)     Morbid obesity with BMI of 45.0-49.9, adult [facility])      Patient Active Problem List:     Hypercholesterolemia     Osteoarthritis     Osteoporosis        DDD (degenerative disc disease), lumbar     Benign essential HTN     SWAPNA on CPAP     Diabetic peripheral neuropathy (HCC)     Acute recurrent pansinusitis     Primary thunderclap headache     Rash of entire body     Infection of skin due to methicillin resistant Staphylococcus aureus (MRSA)     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HCC)     Skin plaque     Hyponatremia     Hypervolemia     Cellulitis of lower extremity      Plan:  Continue  vancomycin  Follow  IGE levels   Compression to legs      Electronically signed by Yahaira Arriola MD on 3/10/2018 at 11:07 AM    I

## 2018-03-11 LAB
ALBUMIN SERPL-MCNC: 3.3 G/DL (ref 3.5–5.1)
ANION GAP SERPL CALCULATED.3IONS-SCNC: 14 MEQ/L (ref 8–16)
BLOOD CULTURE, ROUTINE: NORMAL
BUN BLDV-MCNC: 14 MG/DL (ref 7–22)
CALCIUM SERPL-MCNC: 9.1 MG/DL (ref 8.5–10.5)
CHLORIDE BLD-SCNC: 92 MEQ/L (ref 98–111)
CO2: 28 MEQ/L (ref 23–33)
CREAT SERPL-MCNC: 0.6 MG/DL (ref 0.4–1.2)
GFR SERPL CREATININE-BSD FRML MDRD: > 90 ML/MIN/1.73M2
GLUCOSE BLD-MCNC: 120 MG/DL (ref 70–108)
GLUCOSE BLD-MCNC: 121 MG/DL (ref 70–108)
GLUCOSE BLD-MCNC: 225 MG/DL (ref 70–108)
GLUCOSE BLD-MCNC: 268 MG/DL (ref 70–108)
GLUCOSE BLD-MCNC: 284 MG/DL (ref 70–108)
PHOSPHORUS: 3.3 MG/DL (ref 2.4–4.7)
POTASSIUM SERPL-SCNC: 3.8 MEQ/L (ref 3.5–5.2)
SODIUM BLD-SCNC: 134 MEQ/L (ref 135–145)
TROPONIN T: < 0.01 NG/ML
VANCOMYCIN TROUGH: 14.1 UG/ML (ref 5–15)

## 2018-03-11 PROCEDURE — 2580000003 HC RX 258: Performed by: INTERNAL MEDICINE

## 2018-03-11 PROCEDURE — 36415 COLL VENOUS BLD VENIPUNCTURE: CPT

## 2018-03-11 PROCEDURE — 82948 REAGENT STRIP/BLOOD GLUCOSE: CPT

## 2018-03-11 PROCEDURE — 99232 SBSQ HOSP IP/OBS MODERATE 35: CPT | Performed by: HOSPITALIST

## 2018-03-11 PROCEDURE — 80069 RENAL FUNCTION PANEL: CPT

## 2018-03-11 PROCEDURE — 6370000000 HC RX 637 (ALT 250 FOR IP): Performed by: INTERNAL MEDICINE

## 2018-03-11 PROCEDURE — 80202 ASSAY OF VANCOMYCIN: CPT

## 2018-03-11 PROCEDURE — 6370000000 HC RX 637 (ALT 250 FOR IP): Performed by: FAMILY MEDICINE

## 2018-03-11 PROCEDURE — 84484 ASSAY OF TROPONIN QUANT: CPT

## 2018-03-11 PROCEDURE — 1200000003 HC TELEMETRY R&B

## 2018-03-11 PROCEDURE — 6370000000 HC RX 637 (ALT 250 FOR IP): Performed by: HOSPITALIST

## 2018-03-11 PROCEDURE — 6370000000 HC RX 637 (ALT 250 FOR IP): Performed by: NURSE PRACTITIONER

## 2018-03-11 PROCEDURE — 6360000002 HC RX W HCPCS: Performed by: INTERNAL MEDICINE

## 2018-03-11 RX ORDER — FUROSEMIDE 40 MG/1
40 TABLET ORAL DAILY
Status: DISCONTINUED | OUTPATIENT
Start: 2018-03-12 | End: 2018-03-13 | Stop reason: HOSPADM

## 2018-03-11 RX ORDER — DOXYCYCLINE HYCLATE 100 MG
100 TABLET ORAL EVERY 12 HOURS SCHEDULED
Status: DISCONTINUED | OUTPATIENT
Start: 2018-03-11 | End: 2018-03-13 | Stop reason: HOSPADM

## 2018-03-11 RX ORDER — AMLODIPINE BESYLATE 5 MG/1
5 TABLET ORAL DAILY
Status: DISCONTINUED | OUTPATIENT
Start: 2018-03-11 | End: 2018-03-12

## 2018-03-11 RX ORDER — ONDANSETRON 2 MG/ML
4 INJECTION INTRAMUSCULAR; INTRAVENOUS EVERY 6 HOURS PRN
Status: DISCONTINUED | OUTPATIENT
Start: 2018-03-11 | End: 2018-03-13 | Stop reason: HOSPADM

## 2018-03-11 RX ADMIN — POLYETHYLENE GLYCOL 3350 17 G: 17 POWDER, FOR SOLUTION ORAL at 08:19

## 2018-03-11 RX ADMIN — LISINOPRIL 40 MG: 40 TABLET ORAL at 08:19

## 2018-03-11 RX ADMIN — DOXYCYCLINE HYCLATE 100 MG: 100 TABLET, COATED ORAL at 20:36

## 2018-03-11 RX ADMIN — GLIMEPIRIDE 4 MG: 4 TABLET ORAL at 08:19

## 2018-03-11 RX ADMIN — ATORVASTATIN CALCIUM 40 MG: 40 TABLET, FILM COATED ORAL at 20:36

## 2018-03-11 RX ADMIN — HYDRALAZINE HYDROCHLORIDE 50 MG: 50 TABLET ORAL at 06:35

## 2018-03-11 RX ADMIN — Medication 6 UNITS: at 17:03

## 2018-03-11 RX ADMIN — CYCLOSPORINE 1 DROP: 0.5 EMULSION OPHTHALMIC at 08:19

## 2018-03-11 RX ADMIN — MICONAZOLE NITRATE: 2 POWDER TOPICAL at 20:37

## 2018-03-11 RX ADMIN — NITROGLYCERIN 0.5 INCH: 20 OINTMENT TOPICAL at 06:53

## 2018-03-11 RX ADMIN — FLUTICASONE PROPIONATE 2 SPRAY: 50 SPRAY, METERED NASAL at 08:19

## 2018-03-11 RX ADMIN — MUPIROCIN: 20 OINTMENT TOPICAL at 08:25

## 2018-03-11 RX ADMIN — PROPRANOLOL HYDROCHLORIDE 20 MG: 20 TABLET ORAL at 20:36

## 2018-03-11 RX ADMIN — PANTOPRAZOLE SODIUM 40 MG: 40 TABLET, DELAYED RELEASE ORAL at 06:18

## 2018-03-11 RX ADMIN — VANCOMYCIN HYDROCHLORIDE 1750 MG: 10 INJECTION, POWDER, LYOPHILIZED, FOR SOLUTION INTRAVENOUS at 10:05

## 2018-03-11 RX ADMIN — MUPIROCIN: 20 OINTMENT TOPICAL at 14:05

## 2018-03-11 RX ADMIN — MUPIROCIN: 20 OINTMENT TOPICAL at 20:36

## 2018-03-11 RX ADMIN — Medication 4 UNITS: at 11:49

## 2018-03-11 RX ADMIN — Medication 15 ML: at 20:37

## 2018-03-11 RX ADMIN — CYCLOSPORINE 1 DROP: 0.5 EMULSION OPHTHALMIC at 20:36

## 2018-03-11 RX ADMIN — ZOLPIDEM TARTRATE 10 MG: 10 TABLET, FILM COATED ORAL at 23:42

## 2018-03-11 RX ADMIN — AMLODIPINE BESYLATE 5 MG: 5 TABLET ORAL at 11:49

## 2018-03-11 RX ADMIN — TETRAHYDROZOLINE HYDROCHLORIDE 1 DROP: 0.05 SOLUTION/ DROPS OPHTHALMIC at 14:05

## 2018-03-11 RX ADMIN — MICONAZOLE NITRATE: 2 POWDER TOPICAL at 08:20

## 2018-03-11 RX ADMIN — PREDNISONE 30 MG: 10 TABLET ORAL at 08:23

## 2018-03-11 RX ADMIN — TETRAHYDROZOLINE HYDROCHLORIDE 1 DROP: 0.05 SOLUTION/ DROPS OPHTHALMIC at 20:41

## 2018-03-11 RX ADMIN — TETRAHYDROZOLINE HYDROCHLORIDE 1 DROP: 0.05 SOLUTION/ DROPS OPHTHALMIC at 08:23

## 2018-03-11 RX ADMIN — ACETAMINOPHEN 650 MG: 325 TABLET ORAL at 14:38

## 2018-03-11 RX ADMIN — DOCUSATE SODIUM AND SENNOSIDES 2 TABLET: 8.6; 5 TABLET, FILM COATED ORAL at 06:18

## 2018-03-11 RX ADMIN — INSULIN LISPRO 3 UNITS: 100 INJECTION, SOLUTION INTRAVENOUS; SUBCUTANEOUS at 20:42

## 2018-03-11 RX ADMIN — CHLORHEXIDINE GLUCONATE: 4 SOLUTION TOPICAL at 08:25

## 2018-03-11 RX ADMIN — FLUTICASONE PROPIONATE 2 SPRAY: 50 SPRAY, METERED NASAL at 20:37

## 2018-03-11 RX ADMIN — HYDROCORTISONE ACETATE 25 MG: 25 SUPPOSITORY RECTAL at 08:20

## 2018-03-11 RX ADMIN — HYDRALAZINE HYDROCHLORIDE 50 MG: 50 TABLET ORAL at 14:04

## 2018-03-11 RX ADMIN — PROPRANOLOL HYDROCHLORIDE 20 MG: 20 TABLET ORAL at 14:05

## 2018-03-11 RX ADMIN — PROPRANOLOL HYDROCHLORIDE 20 MG: 20 TABLET ORAL at 08:19

## 2018-03-11 RX ADMIN — OXYCODONE HYDROCHLORIDE AND ACETAMINOPHEN 2 TABLET: 5; 325 TABLET ORAL at 21:40

## 2018-03-11 RX ADMIN — HYDRALAZINE HYDROCHLORIDE 50 MG: 50 TABLET ORAL at 20:36

## 2018-03-11 RX ADMIN — OXYCODONE HYDROCHLORIDE AND ACETAMINOPHEN 2 TABLET: 5; 325 TABLET ORAL at 03:33

## 2018-03-11 RX ADMIN — FUROSEMIDE 40 MG: 40 TABLET ORAL at 08:19

## 2018-03-11 ASSESSMENT — PAIN DESCRIPTION - PAIN TYPE
TYPE: ACUTE PAIN

## 2018-03-11 ASSESSMENT — PAIN SCALES - GENERAL
PAINLEVEL_OUTOF10: 2
PAINLEVEL_OUTOF10: 6
PAINLEVEL_OUTOF10: 6
PAINLEVEL_OUTOF10: 7
PAINLEVEL_OUTOF10: 8
PAINLEVEL_OUTOF10: 6

## 2018-03-11 ASSESSMENT — PAIN DESCRIPTION - LOCATION
LOCATION: LEG
LOCATION: LEG
LOCATION: FOOT

## 2018-03-11 ASSESSMENT — PAIN DESCRIPTION - ORIENTATION
ORIENTATION: LEFT
ORIENTATION: LOWER
ORIENTATION: RIGHT;LEFT

## 2018-03-11 ASSESSMENT — PAIN DESCRIPTION - FREQUENCY
FREQUENCY: INTERMITTENT
FREQUENCY: INTERMITTENT

## 2018-03-11 NOTE — PROGRESS NOTES
I  N  T  A  K  E   P.O.  (mL/kg/hr) 100  (0.1)   100    I.V.  (mL/kg) 0  (0)   0  (0)    Shift Total  (mL/kg) 100  (0.9)   100  (0.9)   O  U  T  P  U  T   Urine  (mL/kg/hr) 800  (0.9)   800    Shift Total  (mL/kg) 800  (7.2)   800  (7.2)   Weight (kg) 110.5 110.5 110.5 110.5           CBC:   Recent Labs      03/09/18   0622  03/10/18   0733   WBC  8.8  10.0   HGB  9.8*  10.2*   PLT  356  373     BMP:    Recent Labs      03/09/18   0622  03/10/18   0733  03/11/18   0710   NA  132*  138  134*   K  3.8  3.6  3.8   CL  91*  94*  92*   CO2  30  31  28   BUN  15  13  14   CREATININE  0.6  0.6  0.6   GLUCOSE  136*  120*  121*     Hepatic: No results for input(s): AST, ALT, ALB, BILITOT, ALKPHOS in the last 72 hours. BNP: No results for input(s): BNP in the last 72 hours. URINALYSIS:   Results for Reddy Wynn (MRN [id number]) as of 3/8/2018 19:23   Ref.  Range 3/7/2018 18:02   Color, UA Latest Ref Range: YELLOW-STR  YELLOW   Bilirubin, Urine Latest Ref Range: NEGATIVE  NEGATIVE   Ketones, Urine Latest Ref Range: NEGATIVE  NEGATIVE   Specific Gravity, UA Latest Ref Range: 1.002 - 1.03  1.006   Blood, Urine Latest Ref Range: NEGATIVE  MODERATE (A)   pH, UA Latest Ref Range: 5.0 - 9.0  5.5   Protein, UA Latest Ref Range: NEGATIVE mg/dl NEGATIVE   Urobilinogen, Urine Latest Ref Range: 0.0 - 1.0 eu/dl 0.2   Nitrite, Urine Latest Ref Range: NEGATIVE  NEGATIVE   Leukocyte Esterase, Urine Latest Ref Range: NEGATIVE  NEGATIVE   Glucose, Urine Latest Ref Range: NEGATIVE mg/dl NEGATIVE   Protein, Urine Latest Units: mg/dl < 4.0   Casts Latest Units: /lpf NONE SEEN   WBC, UA Latest Ref Range: 0-4/hpf /hpf 0-2   RBC, UA Latest Ref Range: 0-2/hpf /hpf 15-25   Epi Cells Latest Ref Range: 3-5/hpf /hpf NONE SEEN   Renal Epithelial, Urine Latest Ref Range: NONE SEEN  NONE SEEN   Bacteria, UA Latest Ref Range: FEW/NONE S  NONE   Yeast, Urine Latest Ref Range: NONE SEEN  NONE SEEN   Crystals Latest Ref Range: NONE SEEN  NONE SEEN

## 2018-03-11 NOTE — PROGRESS NOTES
Amlodipine was added for blood pressure control. Patient to follow up with her oncology team and dermatology and [facility]. I discussed my though processes at length with patient/family and patient understood. Question and concerns  Addressed     Discussed with RN     ----------------  Subjective:     Patient seen and examined  Overnight events noted  RN and ancillary staff note reviewed    No new complain   Elevated bp noted.  Nitro patch placed         Diet: DIET CARDIAC; Daily Fluid Restriction: 1500 ml      Medications:  Reviewed    Infusion Medications    dextrose       Scheduled Medications    [START ON 3/12/2018] furosemide  40 mg Oral Daily    amLODIPine  5 mg Oral Daily    doxycycline hyclate  100 mg Oral 2 times per day    lidocaine PF  2 mL Intradermal Once    miconazole   Topical BID    polyethylene glycol  17 g Oral Daily    tetrahydrozoline  1 drop Both Eyes TID    cycloSPORINE  1 drop Both Eyes BID    fluticasone  2 spray Nasal BID    glimepiride  4 mg Oral Daily with breakfast    propranolol  20 mg Oral TID    hydrALAZINE  50 mg Oral TID    atorvastatin  40 mg Oral Nightly    mupirocin   Topical 4x Daily    hydrocortisone  25 mg Rectal BID    chlorhexidine   Topical Daily    chlorhexidine  15 mL Mouth/Throat BID    pantoprazole  40 mg Oral QAM AC    lisinopril  40 mg Oral Daily    insulin lispro  0-12 Units Subcutaneous TID WC    insulin lispro  0-6 Units Subcutaneous Nightly     PRN Meds: zolpidem, sodium phosphate, bisacodyl, sennosides-docusate sodium, acetaminophen, Magic Mouthwash, mupirocin, glucose, dextrose, glucagon (rDNA), dextrose, sodium chloride, oxyCODONE-acetaminophen      Intake/Output Summary (Last 24 hours) at 03/11/18 1250  Last data filed at 03/11/18 0333   Gross per 24 hour   Intake             1366 ml   Output             2650 ml   Net            -1284 ml       Diet:  DIET CARDIAC; Daily Fluid Restriction: 1500 ml    Exam:  BP (!) 170/76   Pulse 83   Temp 98 3/7/2018 3:00 PM      VL DUP LOWER EXTREMITY VENOUS BILATERAL   Final Result   Normal venous ultrasound. No evidence for acute deep venous thrombosis. **This report has been created using voice recognition software. It may contain minor errors which are inherent in voice recognition technology. **      Final report electronically signed by Dr. Linda Cartagena on 3/6/2018 9:17 AM                  Electronically signed by Shaaron Leyden, MD on 3/11/2018 at 12:50 PM

## 2018-03-11 NOTE — PLAN OF CARE
Problem: Pain:  Goal: Pain level will decrease  Pain level will decrease   Outcome: Ongoing  Pt with intermittent BLE pain related to wounds. Pain relieved with PRN pain medications. Goal: Control of acute pain  Control of acute pain   Outcome: Ongoing  Pain goal = 4/10. Pain goal intermittently achieved with PRN pain medications. Problem: Falls - Risk of  Goal: Absence of falls  Outcome: Met This Shift  Pt free of falls this shift. Pt up with 1 person assist with walker. Alarms utilized. Call light within reach. Problem: Risk for Impaired Skin Integrity  Goal: Tissue integrity - skin and mucous membranes  Structural intactness and normal physiological function of skin and  mucous membranes. Outcome: Met This Shift  Pt with wounds to BLE. No further skin breakdown noted this shift. Encourage pt to reposition frequently. Problem: Discharge Planning:  Goal: Discharged to appropriate level of care  Discharged to appropriate level of care   Outcome: Met This Shift  Pt active participant in plan of care. Potential discharge to nursing facility for rehab on Monday. Social work consulted to assist with placement. Problem: Bowel/Gastric:  Goal: Ability to achieve a regular elimination pattern will improve  Ability to achieve a regular elimination pattern will improve   Outcome: Ongoing  Pt with constipation this admission. Mirilax and senna given. Encourage fluids and ambulation. Problem: Coping:  Goal: Ability to remain calm will improve  Ability to remain calm will improve   Outcome: Met This Shift  Pt calm and cooperative this shift. Emotional support offered as needed. Problem: Safety:  Goal: Ability to remain free from injury will improve  Ability to remain free from injury will improve   Outcome: Met This Shift  Pt free of falls this shift. Comments: Care plan reviewed with patient. Patient verbalizes understanding of the plan of care and contribute to goal setting.

## 2018-03-12 LAB
ALBUMIN SERPL-MCNC: 2.9 G/DL (ref 3.5–5.1)
ANION GAP SERPL CALCULATED.3IONS-SCNC: 15 MEQ/L (ref 8–16)
BUN BLDV-MCNC: 13 MG/DL (ref 7–22)
CALCIUM SERPL-MCNC: 8.6 MG/DL (ref 8.5–10.5)
CHLORIDE BLD-SCNC: 89 MEQ/L (ref 98–111)
CO2: 28 MEQ/L (ref 23–33)
CREAT SERPL-MCNC: 0.6 MG/DL (ref 0.4–1.2)
EKG ATRIAL RATE: 72 BPM
EKG P AXIS: -20 DEGREES
EKG P-R INTERVAL: 166 MS
EKG Q-T INTERVAL: 410 MS
EKG QRS DURATION: 100 MS
EKG QTC CALCULATION (BAZETT): 448 MS
EKG R AXIS: 46 DEGREES
EKG T AXIS: 16 DEGREES
EKG VENTRICULAR RATE: 72 BPM
GFR SERPL CREATININE-BSD FRML MDRD: > 90 ML/MIN/1.73M2
GLUCOSE BLD-MCNC: 130 MG/DL (ref 70–108)
GLUCOSE BLD-MCNC: 138 MG/DL (ref 70–108)
GLUCOSE BLD-MCNC: 169 MG/DL (ref 70–108)
GLUCOSE BLD-MCNC: 193 MG/DL (ref 70–108)
GLUCOSE BLD-MCNC: 230 MG/DL (ref 70–108)
PHOSPHORUS: 3.2 MG/DL (ref 2.4–4.7)
POTASSIUM SERPL-SCNC: 3.4 MEQ/L (ref 3.5–5.2)
SODIUM BLD-SCNC: 132 MEQ/L (ref 135–145)
TROPONIN T: < 0.01 NG/ML
TROPONIN T: < 0.01 NG/ML

## 2018-03-12 PROCEDURE — 84484 ASSAY OF TROPONIN QUANT: CPT

## 2018-03-12 PROCEDURE — 93005 ELECTROCARDIOGRAM TRACING: CPT | Performed by: FAMILY MEDICINE

## 2018-03-12 PROCEDURE — 97116 GAIT TRAINING THERAPY: CPT

## 2018-03-12 PROCEDURE — 99232 SBSQ HOSP IP/OBS MODERATE 35: CPT | Performed by: NURSE PRACTITIONER

## 2018-03-12 PROCEDURE — 80069 RENAL FUNCTION PANEL: CPT

## 2018-03-12 PROCEDURE — 97110 THERAPEUTIC EXERCISES: CPT

## 2018-03-12 PROCEDURE — 6370000000 HC RX 637 (ALT 250 FOR IP): Performed by: NURSE PRACTITIONER

## 2018-03-12 PROCEDURE — 82948 REAGENT STRIP/BLOOD GLUCOSE: CPT

## 2018-03-12 PROCEDURE — 97535 SELF CARE MNGMENT TRAINING: CPT

## 2018-03-12 PROCEDURE — 6370000000 HC RX 637 (ALT 250 FOR IP): Performed by: INTERNAL MEDICINE

## 2018-03-12 PROCEDURE — 6370000000 HC RX 637 (ALT 250 FOR IP): Performed by: HOSPITALIST

## 2018-03-12 PROCEDURE — 6370000000 HC RX 637 (ALT 250 FOR IP): Performed by: FAMILY MEDICINE

## 2018-03-12 PROCEDURE — 93010 ELECTROCARDIOGRAM REPORT: CPT | Performed by: INTERNAL MEDICINE

## 2018-03-12 PROCEDURE — 36415 COLL VENOUS BLD VENIPUNCTURE: CPT

## 2018-03-12 PROCEDURE — 1200000003 HC TELEMETRY R&B

## 2018-03-12 PROCEDURE — 99232 SBSQ HOSP IP/OBS MODERATE 35: CPT | Performed by: INTERNAL MEDICINE

## 2018-03-12 RX ORDER — AMLODIPINE BESYLATE 10 MG/1
10 TABLET ORAL DAILY
Status: DISCONTINUED | OUTPATIENT
Start: 2018-03-12 | End: 2018-03-13 | Stop reason: HOSPADM

## 2018-03-12 RX ORDER — POTASSIUM CHLORIDE 750 MG/1
40 TABLET, FILM COATED, EXTENDED RELEASE ORAL ONCE
Status: COMPLETED | OUTPATIENT
Start: 2018-03-12 | End: 2018-03-12

## 2018-03-12 RX ORDER — HYDRALAZINE HYDROCHLORIDE 20 MG/ML
10 INJECTION INTRAMUSCULAR; INTRAVENOUS EVERY 6 HOURS PRN
Status: DISCONTINUED | OUTPATIENT
Start: 2018-03-12 | End: 2018-03-13 | Stop reason: HOSPADM

## 2018-03-12 RX ADMIN — OXYCODONE HYDROCHLORIDE AND ACETAMINOPHEN 2 TABLET: 5; 325 TABLET ORAL at 21:36

## 2018-03-12 RX ADMIN — Medication 2 UNITS: at 17:09

## 2018-03-12 RX ADMIN — MUPIROCIN: 20 OINTMENT TOPICAL at 12:29

## 2018-03-12 RX ADMIN — HYDRALAZINE HYDROCHLORIDE 50 MG: 50 TABLET ORAL at 13:16

## 2018-03-12 RX ADMIN — MUPIROCIN: 20 OINTMENT TOPICAL at 09:45

## 2018-03-12 RX ADMIN — DOXYCYCLINE HYCLATE 100 MG: 100 TABLET, COATED ORAL at 09:42

## 2018-03-12 RX ADMIN — MICONAZOLE NITRATE: 2 POWDER TOPICAL at 09:45

## 2018-03-12 RX ADMIN — CYCLOSPORINE 1 DROP: 0.5 EMULSION OPHTHALMIC at 21:29

## 2018-03-12 RX ADMIN — MUPIROCIN: 20 OINTMENT TOPICAL at 21:29

## 2018-03-12 RX ADMIN — FLUTICASONE PROPIONATE 2 SPRAY: 50 SPRAY, METERED NASAL at 09:52

## 2018-03-12 RX ADMIN — POTASSIUM CHLORIDE 40 MEQ: 750 TABLET, FILM COATED, EXTENDED RELEASE ORAL at 09:41

## 2018-03-12 RX ADMIN — CHLORHEXIDINE GLUCONATE: 4 SOLUTION TOPICAL at 10:50

## 2018-03-12 RX ADMIN — LISINOPRIL 40 MG: 40 TABLET ORAL at 09:41

## 2018-03-12 RX ADMIN — MICONAZOLE NITRATE: 2 POWDER TOPICAL at 21:29

## 2018-03-12 RX ADMIN — ATORVASTATIN CALCIUM 40 MG: 40 TABLET, FILM COATED ORAL at 21:28

## 2018-03-12 RX ADMIN — TETRAHYDROZOLINE HYDROCHLORIDE 1 DROP: 0.05 SOLUTION/ DROPS OPHTHALMIC at 13:17

## 2018-03-12 RX ADMIN — Medication 4 UNITS: at 12:28

## 2018-03-12 RX ADMIN — PROPRANOLOL HYDROCHLORIDE 20 MG: 20 TABLET ORAL at 13:16

## 2018-03-12 RX ADMIN — PROPRANOLOL HYDROCHLORIDE 20 MG: 20 TABLET ORAL at 09:41

## 2018-03-12 RX ADMIN — FUROSEMIDE 40 MG: 40 TABLET ORAL at 09:42

## 2018-03-12 RX ADMIN — DOXYCYCLINE HYCLATE 100 MG: 100 TABLET, COATED ORAL at 21:28

## 2018-03-12 RX ADMIN — HYDRALAZINE HYDROCHLORIDE 50 MG: 50 TABLET ORAL at 21:28

## 2018-03-12 RX ADMIN — INSULIN LISPRO 1 UNITS: 100 INJECTION, SOLUTION INTRAVENOUS; SUBCUTANEOUS at 21:32

## 2018-03-12 RX ADMIN — AMLODIPINE BESYLATE 10 MG: 10 TABLET ORAL at 09:42

## 2018-03-12 RX ADMIN — HYDRALAZINE HYDROCHLORIDE 50 MG: 50 TABLET ORAL at 09:42

## 2018-03-12 RX ADMIN — TETRAHYDROZOLINE HYDROCHLORIDE 1 DROP: 0.05 SOLUTION/ DROPS OPHTHALMIC at 09:46

## 2018-03-12 RX ADMIN — TETRAHYDROZOLINE HYDROCHLORIDE 1 DROP: 0.05 SOLUTION/ DROPS OPHTHALMIC at 21:29

## 2018-03-12 RX ADMIN — PROPRANOLOL HYDROCHLORIDE 20 MG: 20 TABLET ORAL at 22:32

## 2018-03-12 RX ADMIN — ZOLPIDEM TARTRATE 10 MG: 10 TABLET, FILM COATED ORAL at 23:22

## 2018-03-12 RX ADMIN — OXYCODONE HYDROCHLORIDE AND ACETAMINOPHEN 2 TABLET: 5; 325 TABLET ORAL at 13:16

## 2018-03-12 RX ADMIN — PANTOPRAZOLE SODIUM 40 MG: 40 TABLET, DELAYED RELEASE ORAL at 06:04

## 2018-03-12 RX ADMIN — FLUTICASONE PROPIONATE 2 SPRAY: 50 SPRAY, METERED NASAL at 21:29

## 2018-03-12 RX ADMIN — CYCLOSPORINE 1 DROP: 0.5 EMULSION OPHTHALMIC at 09:44

## 2018-03-12 RX ADMIN — Medication 15 ML: at 09:44

## 2018-03-12 RX ADMIN — GLIMEPIRIDE 4 MG: 4 TABLET ORAL at 09:42

## 2018-03-12 ASSESSMENT — PAIN SCALES - GENERAL
PAINLEVEL_OUTOF10: 0
PAINLEVEL_OUTOF10: 10
PAINLEVEL_OUTOF10: 9
PAINLEVEL_OUTOF10: 3
PAINLEVEL_OUTOF10: 0
PAINLEVEL_OUTOF10: 0
PAINLEVEL_OUTOF10: 9

## 2018-03-12 ASSESSMENT — PAIN DESCRIPTION - DESCRIPTORS: DESCRIPTORS: ACHING;DISCOMFORT;BURNING

## 2018-03-12 ASSESSMENT — PAIN DESCRIPTION - LOCATION: LOCATION: LEG

## 2018-03-12 ASSESSMENT — PAIN DESCRIPTION - PAIN TYPE: TYPE: ACUTE PAIN

## 2018-03-12 ASSESSMENT — PAIN DESCRIPTION - ORIENTATION: ORIENTATION: RIGHT;LEFT

## 2018-03-12 ASSESSMENT — PAIN DESCRIPTION - FREQUENCY: FREQUENCY: CONTINUOUS

## 2018-03-12 NOTE — PROGRESS NOTES
Physical Therapy   [address] ONC MED 5K - 5K-12/012-A    Time In: 3717  Time Out: 6114  Timed Code Treatment Minutes: 25 Minutes  Minutes: 25          Date: 3/12/2018  Patient Name: Shaheed Huff,  Gender:  female        MRN: [id number]  : 1940  (69 y.o.)     Referring Practitioner: Tammie Mills. Kailey Segovia MD     Additional Pertinent Hx: is a 69 y.o. female who presents to the ED for evaluation of rash. The patient has a diffuse rash covering her body. 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 She had surgery and then 8 weeks of radiation 5 days a week that ended January 10. The rash began shortly after this and has been worsening for the past few days. The patient's rash began to drain on her legs and become extremely painful. She describes the pain as burning and rates it as 10/10. The patient has seen her PCP Dr Zonia Gonzalez. She has been put on Bactrim. The rash was clearing up. The patient fell in February and broke her shoulder. When this happened, the rash returned. The patient was put on a second round Bactrim with little relief. The patient has been using anti itch and pain relief creams that seemed to help the rash slightly. When the creams were applied less, the rash returned. The patient has soaked her legs in warm water and baking soda with no relief. The patient wraps her legs with 4x4 and compression tape.      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    Cataract of both eyes     DDD (degenerative disc disease), lumbar     Dysphagia, pharyngoesophageal 2016    Fibrocystic breast     H/O ventral hernia repair     Headache 2017    Hypercholesteremia     Hyperlipidemia     Hypertension     Iron deficiency anemia     LPRD (laryngopharyngeal reflux disease) 2016    Neuropathy ([address]     peripheral    Obesity     Orbital abscess     Orbital cellulitis 01/22/2014    SWAPNA (obstructive sleep apnea)     Osteoarthritis     Osteoporosis     Persistent proteinuria associated with type 2 diabetes mellitus ([facility] [address] 01/2017    urine micral of 50.  Sinusitis     Type II or unspecified type diabetes mellitus without mention of complication, not stated as uncontrolled     diagnoses approx 2000    Velopharyngeal incompetence 09/26/2016     Past Surgical History:   Procedure Laterality Date    ABDOMEN SURGERY      APPENDECTOMY      CARPAL TUNNEL RELEASE Bilateral 2008, 2009    CATARACT REMOVAL  2011    bilat    CHOLECYSTECTOMY  03/1988    COLONOSCOPY  2016    DILATATION, ESOPHAGUS      ENDOSCOPY, COLON, DIAGNOSTIC      EYE SURGERY Left 01/30/2015    EYE SURGERY      CATARACT REMOVAL- BILATERAL    HEMORRHOID SURGERY  1980s    HERNIA REPAIR      HYSTERECTOMY, TOTAL ABDOMINAL  1980    JOINT REPLACEMENT  bilat 2005/ 2007    knees    SINUS SURGERY  last 2009    removed a bone (2)--2 times no construction     SINUS SURGERY  02/01/2016    SINUS SURGERY  2016 x 2    SINUS SURGERY  09/18/2017    [facility] - Dr Shelli Germain SINUS SURGERY  08/09/2017 [facility]    TONSILLECTOMY      TOTAL KNEE ARTHROPLASTY  08/2003    Left TKR    VENTRAL HERNIA REPAIR  1992       Restrictions/Precautions:  Contact Precautions, General Precautions, Fall Risk      Other position/activity restrictions: R humerus fx, NWB, shoulder immobilizer       Subjective:  Chart Reviewed: Yes  Family / Caregiver Present: No       Pain:  Denies.           Social/Functional:  Lives With: Family (daughter)  Type of Home: House  Home Layout: One level  Home Access:  (1/2 step to enter)  Home Equipment: 4 wheeled walker, Standard walker, Cane     Objective:       Transfers  Sit to Stand: Contact guard assistance (Several transfers from bedside chair. )  Stand to sit: Stand by assistance    WB Status: R UE NWB  Ambulation 1  Surface: level tile  Device: Rollator  Assistance: Contact guard

## 2018-03-12 NOTE — CARE COORDINATION
DISCHARGE BARRIERS  3/12/18, 10:31 AM    Reason for Referral: discharge to rehab  Mental Status: alert and oriented-communicates appropriately  Decision Making: patient makes her own decisions. Family/Social/Home Environment: Patient is a 69year old,  female. She resides with her daughter, Shelly Edwards. They reside in a 1 story home with level entry. Bathroom has a walk in shower. Patient states she and daughter are considering having bars installed. She does have a shower seat and reports  that she remains independent with bathing. She states that she helps with cooking, cleaning and laundry when she can. Children transport to appointments. Daughter is employed outside the home. Patient states that her her other children (2 daughters and a son) and her sister in laws (2) check on her throughout the day. Patient admitted due to rash on her legs. She has a history of cancer of the nasopharynx and a fractured right humerus (2/18). Current Services: none PTA per patient  Current Equipment: walker with seat and wheels and a shower seat. Patient states that she does not feel that she needs other equipment. Payment Source: Medicare/Medicaid  Concerns or Barriers to Discharge: none indicated  Collabrative List of ECF/HH were provided: not at this time. Teach Back Method used with patient regarding care plan   Patient verbalize understanding of the plan of care and contribute to goal setting. Anticipated Needs/Discharge Plan: patient does not foresee a need for any services at discharge including home health for continued therapy. She is willing to discuss needs further when family arrives later today.      Electronically signed by DINORAH Duval on 3/12/2018 at 10:31 AM

## 2018-03-12 NOTE — PROGRESS NOTES
fatigue  Activity Tolerance: increased SOB during activity requiring rest break after     Assessment:     Performance deficits / Impairments: Decreased functional mobility , Decreased ADL status, Decreased ROM, Decreased strength, Decreased safe awareness, Decreased balance, Decreased endurance  Prognosis: Fair  Discharge Recommendations: Continue to assess pending progress, Home with Home health OT, Patient would benefit from continued therapy after discharge (pt resistant to [facility])    Patient Education:  Patient Education: Role of OT, OT POC and goals, safety, t/f tech    Equipment Recommendations: Other: cont to assess    Safety:  Safety Devices in place: Yes  Type of devices: All fall risk precautions in place, Chair alarm in place, Call light within reach, Nurse notified, Patient at risk for falls, Left in chair    Plan:  Times per week: 3-5x  Current Treatment Recommendations: Strengthening, Self-Care / ADL, Balance Training, Functional Mobility Training, Endurance Training, Patient/Caregiver Education & Training, Safety Education & Training    Goals:  Patient goals : To be independent and go home    Short term goals  Time Frame for Short term goals:  Two weeks  Short term goal 1: Pt to complete functional mobility using cane at [facility] for increased independence with toileting  Short term goal 2: Pt to identify 2 energy conservation strategies to increase ease with ADL tasks and therefore increase safety  Short term goal 3: Pt to complete sit<>stand txs at [facility] from a variety of surfaces to increase safety accessing home evniroment  Short term goal 4: Pt to tolerate sitting EOB for greater than 10 minutes at A for increased safety with bathin gtasks  Long term goals  Time Frame for Long term goals : No LTG d/t short est. LOS

## 2018-03-12 NOTE — PROGRESS NOTES
5' (1.524 m)   Wt 244 lb (110.7 kg)   SpO2 94%   BMI 47.65 kg/m²     Gen/overall appearance: Not in acute distress. Alert. Head: Normocephalic, atraumatic  Eyes: EOMI, no scleral icterus  CVS: regular rate and rhythm, Normal S1S2  Pulm: Clear to auscultation bilaterally. No crackles/wheezes  Gastrointestinal: Soft, nontender, nondistended, no guarding or rebound  Extremities: R arm sling, skin biopsy lesion, LEs wrapped, no significant erythema  Neuro: No gross focal deficits noted  Skin: Warm, dry    Labs:   Recent Labs      03/10/18   0733   WBC  10.0   HGB  10.2*   HCT  30.9*   PLT  373     Recent Labs      03/10/18   0733  03/11/18   0710  03/12/18   0431   NA  138  134*  132*   K  3.6  3.8  3.4*   CL  94*  92*  89*   CO2  31  28  28   BUN  13  14  13   CREATININE  0.6  0.6  0.6   CALCIUM  9.2  9.1  8.6   PHOS  3.2  3.3  3.2     No results for input(s): AST, ALT, BILIDIR, BILITOT, ALKPHOS in the last 72 hours. No results for input(s): INR in the last 72 hours. No results for input(s): Davy Dus in the last 72 hours. Urinalysis:    Lab Results   Component Value Date    NITRU NEGATIVE 03/07/2018    WBCUA 0-2 03/07/2018    BACTERIA NONE 03/07/2018    RBCUA 15-25 03/07/2018    BLOODU MODERATE 03/07/2018    SPECGRAV 1.006 03/07/2018    GLUCOSEU NEGATIVE 03/06/2018       Radiology:  Xr Chest Standard (2 Vw)    Result Date: 3/7/2018  PROCEDURE: XR CHEST (2 VW) CLINICAL INFORMATION: orthopenia. Cough., COMPARISON: 2/4/2018 TECHNIQUE: AP upright and lateral views of the chest were obtained. 1. Normal chest. Normal heart size. No acute infiltrates or effusions are seen. 2. There is a known Relatively recent nondisplaced, mildly angulated surgical neck fracture proximal right humerus. . **This report has been created using voice recognition software. It may contain minor errors which are inherent in voice recognition technology. ** Final report electronically signed by Dr. Eliud Ordonez on 3/7/2018 3:00 PM    Vl Dup Lower Extremity Venous Bilateral    Result Date: 3/6/2018  PROCEDURE: VL DUP LOWER EXTREMITY VENOUS BILATERAL CLINICAL INFORMATION: leg swellings and possible dvt, COMPARISON: No prior study. TECHNIQUE: Multiple grayscale and color flow images of the major veins of both lower extremities were obtained from the level of the groin to the level of the ankle. Multiple compression and augmentation maneuvers were performed. Examination somewhat difficult, due to patient movement and body habitus. FINDINGS: RIGHT LOWER EXTREMITY:All the deep veins of the right lower extremity are widely patent with normal flow and normal compressibility. LEFT LOWER EXTREMITY:All the  deep veins of the left are widely patent with normal flow and normal compressibility. Normal venous ultrasound. No evidence for acute deep venous thrombosis. **This report has been created using voice recognition software. It may contain minor errors which are inherent in voice recognition technology. ** Final report electronically signed by Dr. Gerson Macedo on 3/6/2018 9:17 AM      Diet: DIET CARDIAC; Daily Fluid Restriction: 1500 ml     Disposition:    [x] Home       [] TCU       [] Rehab       [] Psych       [x] SNF       [] [place]       [] Other-    Code Status: Full Code    Assessment/Plan:    Active Hospital Problems    Diagnosis Date Noted    Rash of entire body [R21] 03/06/2018     Priority: High     Class: Acute    Infection of skin due to methicillin resistant Staphylococcus aureus (MRSA) [A49.02] 03/06/2018     Priority: High     Class: Acute    Drug allergy, antibiotic  likely bactrim [Z88.1] 03/06/2018     Priority: High     Class: Acute    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 [address] [address] [C31.0] 03/06/2018     Priority: High     Class: Acute    Cellulitis of lower extremity [L03.119]     Skin plaque [L98.8]     Hyponatremia [E87.1]     Hypervolemia [E87.70]      Assessment:         1.  Generalized

## 2018-03-12 NOTE — PROGRESS NOTES
Renal Progress Note    Patient - Adelaida Hernandez   MRN -  [id number]   Lindsey # - [de-identified]      - 1940    69 y.o. Admit Date: 3/5/2018  Hospital Day: 6  Location: -[facility]  Date of evaluation -  3/12/2018    Subjective:   CC: rash  Denies sob or cough. UOP 1050 + voids  /-180/  Afebrile    Objective:   VITALS:  BP (!) 187/89   Pulse 72   Temp 99.7 °F (37.6 °C) (Oral)   Resp 18   Ht 5' (1.524 m)   Wt 244 lb (110.7 kg)   SpO2 98%   BMI 47.65 kg/m²    Patient Vitals for the past 24 hrs:   BP Temp Temp src Pulse Resp SpO2 Weight   18 0400 (!) 187/89 99.7 °F (37.6 °C) Oral 72 18 98 % -   18 0000 (!) 187/90 99 °F (37.2 °C) Oral 70 18 98 % -   18 2000 (!) 182/84 99.5 °F (37.5 °C) Oral 84 18 98 % -   18 1515 (!) 162/68 98.2 °F (36.8 °C) Oral 75 16 95 % -   18 1438 - - - - - - 244 lb (110.7 kg)   18 1030 (!) 170/76 - - - - - -   18 0806 (!) 174/80 98 °F (36.7 °C) Oral 83 16 96 % -        Patient Vitals for the past 96 hrs (Last 3 readings):   Weight   18 1438 244 lb (110.7 kg)   03/10/18 1026 243 lb 9.6 oz (110.5 kg)   18 2145 254 lb 12.8 oz (115.6 kg)       Intake/Output Summary (Last 24 hours) at 18 0800  Last data filed at 18 0600   Gross per 24 hour   Intake             1100 ml   Output             1050 ml   Net               50 ml       EXAM:  CONSTITUTIONAL:  No acute distress. Lying comfortable in bed. HEENT:  Head is normocephalic, Extraocular movement intact, Mucus membranes moist. Neck is supple. Voice is clear. CARDIOVASCULAR:  S1, S2  regular rate and rhythm. RESPIRATORY: Clear to ausculation bilaterally. Equal breath sounds. No wheezes. No shortness of breath noted at rest.  ABDOMEN: soft, non tender  NEUROLOGICAL: Patient is alert and oriented to person, place, and time. Recent and remote memory intact. Pt is attentive. Thought is coherant.   SKIN: Diffuse rash, No significant bruises  MUSCULOSKELETAL: Movement is coordinated. Moves all extremities   EXTREMITIES: Distal lower extremity temp is warm, trace lower extremity edema. Ace wraps bilaterally  PSYCHIATRIC: mood and affect appropriate. Medications:   Scheduled Meds:   furosemide  40 mg Oral Daily    amLODIPine  5 mg Oral Daily    doxycycline hyclate  100 mg Oral 2 times per day    lidocaine PF  2 mL Intradermal Once    miconazole   Topical BID    polyethylene glycol  17 g Oral Daily    tetrahydrozoline  1 drop Both Eyes TID    cycloSPORINE  1 drop Both Eyes BID    fluticasone  2 spray Nasal BID    glimepiride  4 mg Oral Daily with breakfast    propranolol  20 mg Oral TID    hydrALAZINE  50 mg Oral TID    atorvastatin  40 mg Oral Nightly    mupirocin   Topical 4x Daily    hydrocortisone  25 mg Rectal BID    chlorhexidine   Topical Daily    chlorhexidine  15 mL Mouth/Throat BID    pantoprazole  40 mg Oral QAM AC    lisinopril  40 mg Oral Daily    insulin lispro  0-12 Units Subcutaneous TID WC    insulin lispro  0-6 Units Subcutaneous Nightly     Continuous Infusions:   dextrose         Labs:     Recent Labs      03/10/18   0733  03/11/18   0710  03/12/18   0431   NA  138  134*  132*   K  3.6  3.8  3.4*   CL  94*  92*  89*   CO2  31  28  28   BUN  13  14  13   CREATININE  0.6  0.6  0.6   LABGLOM  >90  >90  >90   GLUCOSE  120*  121*  130*   CALCIUM  9.2  9.1  8.6     Recent Labs      03/10/18   0733   WBC  10.0   RBC  3.56*   HGB  10.2*   HCT  30.9*   PLT  373        ASSESSMENT:  1. Chronic hyponatremia, likely 2nd to  thiazide diuretic compounded by hypervolemia from large water intake, TSH and T 4 ok . Continue Fluid restriction 1500 ml/24h. 2. Generalized skin rash, with edema. Cultures positive for MRSA and Streptococcus agalactiae , ID following,   3. Hypokalemia, Replace with 40 meq PO  4. Adenocarcinoma nasopharynx on radiation from [place]. 5. 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  Rt humerus

## 2018-03-12 NOTE — PROGRESS NOTES
INFECTIOUS DISEASES  PROGRESS NOTE    Pt Name: James Lama  MRN: [id number]  [id number]  YOB: 1940  Admit Date: 3/5/2018  8:56 PM  Date of evaluation: 3/12/2018  Primary Care Physician: Mercy Paulson MD   5K-12/012-A    78-year-old female patient currently on treatment of  adenocarcinoma of the nasopharynx. She has been on radiation treatment, she has had rash present on the legs . She does not sleep in bed. Cultures grew MRSA and Strep. Subjective: Interval History: patient up in chair and doing well. Plan of care discussed with nurse   Active ATBs: vancomycin 3/8-3/11 doxy 3/11    Pt/Chart review:  Fever No, Diarrhea No, Dyspnea no but orthopnea, Nausea:None      Data:   Scheduled Meds:   amLODIPine  10 mg Oral Daily    potassium replacement protocol   Other RX Placeholder    furosemide  40 mg Oral Daily    doxycycline hyclate  100 mg Oral 2 times per day    lidocaine PF  2 mL Intradermal Once    miconazole   Topical BID    polyethylene glycol  17 g Oral Daily    tetrahydrozoline  1 drop Both Eyes TID    cycloSPORINE  1 drop Both Eyes BID    fluticasone  2 spray Nasal BID    glimepiride  4 mg Oral Daily with breakfast    propranolol  20 mg Oral TID    hydrALAZINE  50 mg Oral TID    atorvastatin  40 mg Oral Nightly    mupirocin   Topical 4x Daily    hydrocortisone  25 mg Rectal BID    chlorhexidine   Topical Daily    chlorhexidine  15 mL Mouth/Throat BID    pantoprazole  40 mg Oral QAM AC    lisinopril  40 mg Oral Daily    insulin lispro  0-12 Units Subcutaneous TID WC    insulin lispro  0-6 Units Subcutaneous Nightly     Continuous Infusions:   dextrose         I & O's:  I/O last 3 completed shifts: In: 1100 [P.O.:1100]  Out: 1050 [Urine:1050]  No intake/output data recorded.     Intake/Output Summary (Last 24 hours) at 03/12/18 1009  Last data filed at 03/12/18 0600   Gross per 24 hour   Intake              680 ml   Output             1050 ml   Net -370 ml       Date 03/12/18 0000 - 03/12/18 2359   Shift 6432-7742 4345-5618 6078-9375 24 Hour Total   I  N  T  A  K  E   P.O. 80   80    I.V.  (mL/kg/hr) 0  (0)   0    Shift Total  (mL/kg) 80  (0.7)   80  (0.7)   O  U  T  P  U  T   Shift Total  (mL/kg)       Weight (kg) 110.7 110.7 110.7 110.7           CBC:   Recent Labs      03/10/18   0733   WBC  10.0   HGB  10.2*   PLT  373     BMP:    Recent Labs      03/10/18   0733  03/11/18   0710  03/12/18   0431   NA  138  134*  132*   K  3.6  3.8  3.4*   CL  94*  92*  89*   CO2  31  28  28   BUN  13  14  13   CREATININE  0.6  0.6  0.6   GLUCOSE  120*  121*  130*     Hepatic: No results for input(s): AST, ALT, ALB, BILITOT, ALKPHOS in the last 72 hours. BNP: No results for input(s): BNP in the last 72 hours. URINALYSIS:   Results for Betsy Gregorio (MRN [id number]) as of 3/8/2018 19:23   Ref.  Range 3/7/2018 18:02   Color, UA Latest Ref Range: YELLOW-STR  YELLOW   Bilirubin, Urine Latest Ref Range: NEGATIVE  NEGATIVE   Ketones, Urine Latest Ref Range: NEGATIVE  NEGATIVE   Specific Gravity, UA Latest Ref Range: 1.002 - 1.03  1.006   Blood, Urine Latest Ref Range: NEGATIVE  MODERATE (A)   pH, UA Latest Ref Range: 5.0 - 9.0  5.5   Protein, UA Latest Ref Range: NEGATIVE mg/dl NEGATIVE   Urobilinogen, Urine Latest Ref Range: 0.0 - 1.0 eu/dl 0.2   Nitrite, Urine Latest Ref Range: NEGATIVE  NEGATIVE   Leukocyte Esterase, Urine Latest Ref Range: NEGATIVE  NEGATIVE   Glucose, Urine Latest Ref Range: NEGATIVE mg/dl NEGATIVE   Protein, Urine Latest Units: mg/dl < 4.0   Casts Latest Units: /lpf NONE SEEN   WBC, UA Latest Ref Range: 0-4/hpf /hpf 0-2   RBC, UA Latest Ref Range: 0-2/hpf /hpf 15-25   Epi Cells Latest Ref Range: 3-5/hpf /hpf NONE SEEN   Renal Epithelial, Urine Latest Ref Range: NONE SEEN  NONE SEEN   Bacteria, UA Latest Ref Range: FEW/NONE S  NONE   Yeast, Urine Latest Ref Range: NONE SEEN  NONE SEEN   Crystals Latest Ref Range: NONE SEEN  NONE SEEN   Character, Urine signed by Antony Oliva MD on 3/12/18 at 4:10 PM

## 2018-03-13 ENCOUNTER — TELEPHONE (OUTPATIENT)
Dept: FAMILY MEDICINE CLINIC | Age: 78
End: 2018-03-13

## 2018-03-13 VITALS
HEIGHT: 60 IN | DIASTOLIC BLOOD PRESSURE: 80 MMHG | BODY MASS INDEX: 47.91 KG/M2 | HEART RATE: 82 BPM | WEIGHT: 244 LBS | SYSTOLIC BLOOD PRESSURE: 144 MMHG | TEMPERATURE: 97.8 F | OXYGEN SATURATION: 98 % | RESPIRATION RATE: 18 BRPM

## 2018-03-13 LAB
ALBUMIN SERPL-MCNC: 3.2 G/DL (ref 3.5–5.1)
ANION GAP SERPL CALCULATED.3IONS-SCNC: 13 MEQ/L (ref 8–16)
BUN BLDV-MCNC: 14 MG/DL (ref 7–22)
CALCIUM SERPL-MCNC: 8.9 MG/DL (ref 8.5–10.5)
CHLORIDE BLD-SCNC: 92 MEQ/L (ref 98–111)
CO2: 30 MEQ/L (ref 23–33)
CREAT SERPL-MCNC: 0.7 MG/DL (ref 0.4–1.2)
GFR SERPL CREATININE-BSD FRML MDRD: 81 ML/MIN/1.73M2
GLUCOSE BLD-MCNC: 148 MG/DL (ref 70–108)
GLUCOSE BLD-MCNC: 162 MG/DL (ref 70–108)
GLUCOSE BLD-MCNC: 195 MG/DL (ref 70–108)
HCT VFR BLD CALC: 32.7 % (ref 37–47)
HEMOGLOBIN: 10.9 GM/DL (ref 12–16)
MCH RBC QN AUTO: 29.1 PG (ref 27–31)
MCHC RBC AUTO-ENTMCNC: 33.3 GM/DL (ref 33–37)
MCV RBC AUTO: 87.6 FL (ref 81–99)
PDW BLD-RTO: 18.5 % (ref 11.5–14.5)
PHOSPHORUS: 3.8 MG/DL (ref 2.4–4.7)
PLATELET # BLD: 358 THOU/MM3 (ref 130–400)
PMV BLD AUTO: 6 FL (ref 7.4–10.4)
POTASSIUM SERPL-SCNC: 3.7 MEQ/L (ref 3.5–5.2)
RBC # BLD: 3.74 MILL/MM3 (ref 4.2–5.4)
SODIUM BLD-SCNC: 135 MEQ/L (ref 135–145)
WBC # BLD: 11.2 THOU/MM3 (ref 4.8–10.8)

## 2018-03-13 PROCEDURE — 6370000000 HC RX 637 (ALT 250 FOR IP): Performed by: NURSE PRACTITIONER

## 2018-03-13 PROCEDURE — 80069 RENAL FUNCTION PANEL: CPT

## 2018-03-13 PROCEDURE — 82948 REAGENT STRIP/BLOOD GLUCOSE: CPT

## 2018-03-13 PROCEDURE — 99239 HOSP IP/OBS DSCHRG MGMT >30: CPT | Performed by: INTERNAL MEDICINE

## 2018-03-13 PROCEDURE — 85027 COMPLETE CBC AUTOMATED: CPT

## 2018-03-13 PROCEDURE — 6370000000 HC RX 637 (ALT 250 FOR IP): Performed by: INTERNAL MEDICINE

## 2018-03-13 PROCEDURE — 97110 THERAPEUTIC EXERCISES: CPT

## 2018-03-13 PROCEDURE — 97116 GAIT TRAINING THERAPY: CPT

## 2018-03-13 PROCEDURE — 6370000000 HC RX 637 (ALT 250 FOR IP): Performed by: HOSPITALIST

## 2018-03-13 PROCEDURE — 36415 COLL VENOUS BLD VENIPUNCTURE: CPT

## 2018-03-13 RX ORDER — CALCIUM CARBONATE 200(500)MG
500 TABLET,CHEWABLE ORAL 3 TIMES DAILY PRN
Status: DISCONTINUED | OUTPATIENT
Start: 2018-03-13 | End: 2018-03-13 | Stop reason: HOSPADM

## 2018-03-13 RX ORDER — DOXYCYCLINE HYCLATE 100 MG
100 TABLET ORAL EVERY 12 HOURS SCHEDULED
Qty: 14 TABLET | Refills: 0 | Status: SHIPPED | OUTPATIENT
Start: 2018-03-13 | End: 2018-03-20

## 2018-03-13 RX ORDER — AMLODIPINE BESYLATE 10 MG/1
10 TABLET ORAL DAILY
Qty: 30 TABLET | Refills: 3 | Status: SHIPPED | OUTPATIENT
Start: 2018-03-14 | End: 2018-03-13 | Stop reason: HOSPADM

## 2018-03-13 RX ADMIN — MUPIROCIN: 20 OINTMENT TOPICAL at 13:30

## 2018-03-13 RX ADMIN — CYCLOSPORINE 1 DROP: 0.5 EMULSION OPHTHALMIC at 11:27

## 2018-03-13 RX ADMIN — HYDRALAZINE HYDROCHLORIDE 50 MG: 50 TABLET ORAL at 08:59

## 2018-03-13 RX ADMIN — TETRAHYDROZOLINE HYDROCHLORIDE 1 DROP: 0.05 SOLUTION/ DROPS OPHTHALMIC at 08:57

## 2018-03-13 RX ADMIN — Medication 2 UNITS: at 09:00

## 2018-03-13 RX ADMIN — POLYETHYLENE GLYCOL 3350 17 G: 17 POWDER, FOR SOLUTION ORAL at 08:57

## 2018-03-13 RX ADMIN — ANTACID TABLETS 500 MG: 500 TABLET, CHEWABLE ORAL at 13:48

## 2018-03-13 RX ADMIN — HYDRALAZINE HYDROCHLORIDE 50 MG: 50 TABLET ORAL at 16:22

## 2018-03-13 RX ADMIN — PROPRANOLOL HYDROCHLORIDE 20 MG: 20 TABLET ORAL at 08:56

## 2018-03-13 RX ADMIN — LISINOPRIL 40 MG: 40 TABLET ORAL at 08:56

## 2018-03-13 RX ADMIN — MICONAZOLE NITRATE: 2 POWDER TOPICAL at 09:00

## 2018-03-13 RX ADMIN — OXYCODONE HYDROCHLORIDE AND ACETAMINOPHEN 2 TABLET: 5; 325 TABLET ORAL at 04:33

## 2018-03-13 RX ADMIN — AMLODIPINE BESYLATE 10 MG: 10 TABLET ORAL at 08:56

## 2018-03-13 RX ADMIN — PANTOPRAZOLE SODIUM 40 MG: 40 TABLET, DELAYED RELEASE ORAL at 06:35

## 2018-03-13 RX ADMIN — FUROSEMIDE 40 MG: 40 TABLET ORAL at 08:56

## 2018-03-13 RX ADMIN — PROPRANOLOL HYDROCHLORIDE 20 MG: 20 TABLET ORAL at 16:22

## 2018-03-13 RX ADMIN — Medication 2 UNITS: at 12:36

## 2018-03-13 RX ADMIN — DOXYCYCLINE HYCLATE 100 MG: 100 TABLET, COATED ORAL at 08:59

## 2018-03-13 RX ADMIN — FLUTICASONE PROPIONATE 2 SPRAY: 50 SPRAY, METERED NASAL at 08:57

## 2018-03-13 RX ADMIN — OXYCODONE HYDROCHLORIDE AND ACETAMINOPHEN 2 TABLET: 5; 325 TABLET ORAL at 15:12

## 2018-03-13 RX ADMIN — TETRAHYDROZOLINE HYDROCHLORIDE 1 DROP: 0.05 SOLUTION/ DROPS OPHTHALMIC at 16:22

## 2018-03-13 RX ADMIN — GLIMEPIRIDE 4 MG: 4 TABLET ORAL at 08:56

## 2018-03-13 ASSESSMENT — PAIN SCALES - GENERAL
PAINLEVEL_OUTOF10: 7
PAINLEVEL_OUTOF10: 0
PAINLEVEL_OUTOF10: 8
PAINLEVEL_OUTOF10: 8
PAINLEVEL_OUTOF10: 3
PAINLEVEL_OUTOF10: 3
PAINLEVEL_OUTOF10: 0

## 2018-03-13 ASSESSMENT — PAIN DESCRIPTION - DESCRIPTORS: DESCRIPTORS: ACHING;DISCOMFORT

## 2018-03-13 ASSESSMENT — PAIN DESCRIPTION - LOCATION: LOCATION: LEG

## 2018-03-13 ASSESSMENT — PAIN DESCRIPTION - ORIENTATION: ORIENTATION: RIGHT;LEFT

## 2018-03-13 ASSESSMENT — PAIN DESCRIPTION - PAIN TYPE: TYPE: ACUTE PAIN

## 2018-03-13 NOTE — PROGRESS NOTES
INFECTIOUS DISEASES  PROGRESS NOTE    Pt Name: Wei Ramirez  MRN: [id number]  [id number]  YOB: 1940  Admit Date: 3/5/2018  8:56 PM  Date of evaluation: 3/13/2018  Primary Care Physician: Alejandro Zamarripa MD   5K-12/012-A    52-year-old female patient currently on treatment of  adenocarcinoma of the nasopharynx. She has been on radiation treatment, she has had rash present on the legs . She does not sleep in bed. Cultures grew MRSA and Strep. Subjective: Interval History: patient up in chair and doing well. pathology pending and can be followed up as outpt   Active ATBs: vancomycin 3/8-3/11 doxy 3/11    Pt/Chart review:  Fever No, Diarrhea No, Dyspnea no but orthopnea, Nausea:None      Data:   Scheduled Meds:   amLODIPine  10 mg Oral Daily    potassium replacement protocol   Other RX Placeholder    furosemide  40 mg Oral Daily    doxycycline hyclate  100 mg Oral 2 times per day    lidocaine PF  2 mL Intradermal Once    miconazole   Topical BID    polyethylene glycol  17 g Oral Daily    tetrahydrozoline  1 drop Both Eyes TID    cycloSPORINE  1 drop Both Eyes BID    fluticasone  2 spray Nasal BID    glimepiride  4 mg Oral Daily with breakfast    propranolol  20 mg Oral TID    hydrALAZINE  50 mg Oral TID    atorvastatin  40 mg Oral Nightly    mupirocin   Topical 4x Daily    hydrocortisone  25 mg Rectal BID    chlorhexidine   Topical Daily    chlorhexidine  15 mL Mouth/Throat BID    pantoprazole  40 mg Oral QAM AC    lisinopril  40 mg Oral Daily    insulin lispro  0-12 Units Subcutaneous TID WC    insulin lispro  0-6 Units Subcutaneous Nightly     Continuous Infusions:   dextrose         I & O's:  I/O last 3 completed shifts: In: 1050 [P.O.:1050]  Out: 300 [Urine:300]  No intake/output data recorded.     Intake/Output Summary (Last 24 hours) at 03/13/18 0924  Last data filed at 03/13/18 0432   Gross per 24 hour   Intake             1050 ml   Output              300 ml   Net

## 2018-03-13 NOTE — CARE COORDINATION
3/13/18, 10:20 AM      Lucy He day: 7  Location: [facility]12/012- Reason for admit: Rash of entire body [R21]   Procedure: 3/09/18 skin punch biopsy per Dr. Humera Arana of Care: Nephrology and ID following, PT/OT, DM management, blood pressure medication adjustments, Potassium replacement protocol, PO Vibra Tabs, bilateral lower extremities wrapped daily and prn reinforced, discharge planning. Core Measures: VTE  PCP: Sofia Hobbs MD  Discharge Plan: Home with her daughter residing with her as prior to admission. Analy Walker [facility].

## 2018-03-13 NOTE — CARE COORDINATION
Discharge orders on chart. Met with Maykel Dill and she is in agreement for discharge to home today without any services.  Electronically signed by Evette Randall RN on 3/13/18 at 2:00 PM

## 2018-03-13 NOTE — PROGRESS NOTES
Discharge teaching and instructions for diagnosis/procedure of rash/MRSA completed with patient using teachback method. AVS reviewed. Prescriptions reviewed with patient including possible side effects Patient voiced understanding regarding prescriptions, follow up appointments, and care of self at home. Discharged in a wheelchair to  home with support per family. Patient given lab order & instructed to talk to PCP in regards to outpatient therapy. Patient refused flu vaccination.

## 2018-03-13 NOTE — PROGRESS NOTES
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  Sinusitis     Type II or unspecified type diabetes mellitus without mention of complication, not stated as uncontrolled     diagnoses approx 2000    Velopharyngeal incompetence 09/26/2016     Past Surgical History:   Procedure Laterality Date    ABDOMEN SURGERY      APPENDECTOMY      CARPAL TUNNEL RELEASE Bilateral 2008, 2009    CATARACT REMOVAL  2011    bilat    CHOLECYSTECTOMY  03/1988    COLONOSCOPY  2016    DILATATION, ESOPHAGUS      ENDOSCOPY, COLON, DIAGNOSTIC      EYE SURGERY Left 01/30/2015    EYE SURGERY      CATARACT REMOVAL- BILATERAL    HEMORRHOID SURGERY  1980s    HERNIA REPAIR      HYSTERECTOMY, TOTAL ABDOMINAL  1980    JOINT REPLACEMENT  bilat 2005/ 2007    knees    SINUS SURGERY  last 2009    removed a bone (2)--2 times no construction     SINUS SURGERY  02/01/2016    SINUS SURGERY  2016 x 2    SINUS SURGERY  09/18/2017    [facility] - Dr Tamir Mckoy SINUS SURGERY  08/09/2017 [facility]    TONSILLECTOMY      TOTAL KNEE ARTHROPLASTY  08/2003    Left TKR    VENTRAL HERNIA REPAIR  1992       Restrictions/Precautions:  Contact Precautions, General Precautions, Fall Risk        Other position/activity restrictions: R humerus fx, NWB, shoulder immobilizer       Subjective:  Chart Reviewed: Yes  Family / Caregiver Present: No  Comments: RN approved session. Pt resting in bedside chair upon arrival. Pleasant and agreeable to therapy. Pain:  Denies.   Pain Assessment  Pain Level: 0       Social/Functional:  Lives With: Family (daughter)  Type of Home: House  Home Layout: One level  Home Access:  (1/2 step to enter)  Home Equipment: 4 wheeled walker, Standard walker, Cane     Objective:       Transfers  Sit to Stand: Contact guard assistance (From bedside chair and from toilet. )  Stand to Patient/Caregiver Education & Training    Goals:  Patient goals : go home    Short term goals  Time Frame for Short term goals: 3 days  Short term goal 1: Pt to be Mod I for supine <> sit to get in/out of bed  Short term goal 2: Pt to be Mod I for sit <> stand to get up to ambulate  Short term goal 3: Pt to ambulate 70 ft with RW with Supervision for household distances    Long term goals  Time Frame for Long term goals : not set due to short ELOS

## 2018-03-13 NOTE — FLOWSHEET NOTE
· Subjective: Patient was sitting in her chair with her daughter on the couch taking a phone call. She was approachable and excited to be \"going home today. \" She was excited to be able to see her grandchildren again. · Objective: Per report, patient was dealing with a rash on her entire body. · Assessment: Patient was receptive to the  and engaged in conversation during the encounter. Patient is sustained through support by her children and other family members. In addition, she is supported through her [address] [facility] and prayer. Patient is coping well with her situation and excited to be returning home. She is excited to see her grandchildren. · Plan:  provided an empathic listening presence for the patient during the encounter.  had prayer with the patient prior to departing. Chaplains remain available for further emotional and spiritual support as needed. 03/13/18 1415   Encounter Summary   Services provided to: Patient and family together   Referral/Consult From: [facility];Family members   Place of Holiness None   Continue Visiting Yes  (3/13/2018)   Complexity of Encounter Moderate   Length of Encounter 15 minutes   Spiritual Assessment Completed Yes   Spiritual/Anglican   Type Spiritual support   Assessment Approachable; Hopeful   Intervention Active listening;Explored feelings, thoughts, concerns;Sustaining presence/ Ministry of presence;Prayer   Outcome Receptive; Expressed feelings/needs/concerns;Expressed gratitude

## 2018-03-13 NOTE — CARE COORDINATION
3/13/18, 1:48 PM    DISCHARGE BARRIERS    Spoke with patient and daughter re: discharge plans. They discussed options further and patient has decided that she wants to try it at home with family assistance before committing to home health. SW explained to patient and daughter that if they change their minds on the home health, they can contact PCP office for assistance with arranging service. 3/13/18, 2:01 PM    Discharge plan discussed by  and . Discharge plan reviewed with patient/ family. Patient/ family verbalize understanding of discharge plan and are in agreement with plan. Understanding was demonstrated using the teach back method.    Services After Discharge  Services At/After Discharge: None   IMM Letter  IMM Letter given to Patient/Family/Significant other/Guardian/POA/by[de-identified] Updated  IMM Letter date given[de-identified] 03/13/18  IMM Letter time given[de-identified] [phone]

## 2018-03-13 NOTE — CARE COORDINATION
Loriemed Espinozasheryl has orders for outpatient therapy at discharge. Advised her that she needs to have her PCP place the order for therapy as he will be the one following for orders. Advised her nurse of this as well.  Electronically signed by Gerard Mauro RN on 3/13/18 at 3:44 PM    .

## 2018-03-13 NOTE — DISCHARGE SUMMARY
patient was seen and examined on day of discharge and this discharge summary is in conjunction with any daily progress note from day of discharge. Exam:     Vitals:  Vitals:    03/12/18 2119 03/13/18 0415 03/13/18 0815 03/13/18 1300   BP: (!) 140/78 (!) 170/80 (!) 140/78 (!) 144/80   Pulse: 80 86 90 82   Resp: 18 18 18 18   Temp: 98.2 °F (36.8 °C) 98.3 °F (36.8 °C) 98 °F (36.7 °C) 97.8 °F (36.6 °C)   TempSrc: Oral Oral Oral Oral   SpO2: 94% 95% 97% 98%   Weight:       Height:         Weight: Weight: 244 lb (110.7 kg)     24 hour intake/output:  Intake/Output Summary (Last 24 hours) at 03/13/18 1728  Last data filed at 03/13/18 1300   Gross per 24 hour   Intake              790 ml   Output                0 ml   Net              790 ml       Gen/overall appearance: Not in acute distress. Alert. Head: Normocephalic, atraumatic  Eyes: EOMI, no scleral icterus  CVS: regular rate and rhythm, Normal S1S2  Pulm: Clear to auscultation bilaterally. No crackles/wheezes  Gastrointestinal: Soft, nontender, nondistended, no guarding or rebound  Extremities: R arm sling, skin biopsy lesion, LEs wrapped, no significant erythema  Neuro: No gross focal deficits noted  Skin: Warm, dry    Labs: For convenience and continuity at follow-up the following most recent labs are provided:      CBC:    Lab Results   Component Value Date    WBC 11.2 03/13/2018    HGB 10.9 03/13/2018    HCT 32.7 03/13/2018     03/13/2018       Renal:  Lab Results   Component Value Date     03/13/2018    K 3.7 03/13/2018    CL 92 03/13/2018    CO2 30 03/13/2018    BUN 14 03/13/2018    CREATININE 0.7 03/13/2018    CALCIUM 8.9 03/13/2018    PHOS 3.8 03/13/2018         Significant Diagnostic Studies    Radiology:   Xr Chest Standard (2 Vw)    Result Date: 3/7/2018  PROCEDURE: XR CHEST (2 VW) CLINICAL INFORMATION: orthopenia. Cough., COMPARISON: 2/4/2018 TECHNIQUE: AP upright and lateral views of the chest were obtained.      1. Normal chest. Normal heart size. No acute infiltrates or effusions are seen. 2. There is a known Relatively recent nondisplaced, mildly angulated surgical neck fracture proximal right humerus. . **This report has been created using voice recognition software. It may contain minor errors which are inherent in voice recognition technology. ** Final report electronically signed by Dr. Boateng Neither on 3/7/2018 3:00 PM    Vl Dup Lower Extremity Venous Bilateral    Result Date: 3/6/2018  PROCEDURE: VL DUP LOWER EXTREMITY VENOUS BILATERAL CLINICAL INFORMATION: leg swellings and possible dvt, COMPARISON: No prior study. TECHNIQUE: Multiple grayscale and color flow images of the major veins of both lower extremities were obtained from the level of the groin to the level of the ankle. Multiple compression and augmentation maneuvers were performed. Examination somewhat difficult, due to patient movement and body habitus. FINDINGS: RIGHT LOWER EXTREMITY:All the deep veins of the right lower extremity are widely patent with normal flow and normal compressibility. LEFT LOWER EXTREMITY:All the  deep veins of the left are widely patent with normal flow and normal compressibility. Normal venous ultrasound. No evidence for acute deep venous thrombosis. **This report has been created using voice recognition software. It may contain minor errors which are inherent in voice recognition technology. ** Final report electronically signed by Dr. Boateng Neither on 3/6/2018 9:17 AM         Consults:     IP CONSULT TO PHARMACY  IP CONSULT TO INFECTIOUS DISEASES  IP CONSULT TO INFECTIOUS DISEASES  IP CONSULT TO NEPHROLOGY  PHARMACY TO DOSE VANCOMYCIN  IP CONSULT TO SOCIAL WORK    Disposition:    [x] Home       [] TCU       [] Rehab       [] Psych       [] SNF       [] [place]       [] Other-    Condition at Discharge: Stable    Code Status:  Full Code    Patient Instructions:     Activity: activity as tolerated  Diet: DIET CARDIAC; Daily Fluid Restriction: 1500 ml      Follow-up visits:   Sharmin Medeiros MD  [address]  [address] [id number]  [phone]    On 3/19/2018  Follow up @ [address], MD  [address] [id number]    On 3/20/2018  for suture removal @ [place] [facility], [address] [place] [address] [address]  [phone]    On 4/19/2018  Follow up @ 140PM, have labs drawn prior to this day         Discharge Medications:      Eric Strickland Medication Instructions CANDY:[id number]    Printed on:03/13/18 7780   Medication Information                      atorvastatin (LIPITOR) 40 MG tablet  TAKE ONE TABLET BY MOUTH IN THE EVENING             cycloSPORINE (RESTASIS) 0.05 % ophthalmic emulsion  Place 1 drop into both eyes 2 times daily Patient uses 2-4 times per day. doxycycline hyclate (VIBRA-TABS) 100 MG tablet  Take 1 tablet by mouth every 12 hours for 7 days             fluticasone (FLONASE) 50 MCG/ACT nasal spray  2 sprays by Nasal route 2 times daily             glimepiride (AMARYL) 4 MG tablet  TAKE 1 TABLET BY MOUTH TWO TIMES A DAY WITH MEALS             Handicap Placard MISC  by Does not apply route. Request parking placard due to medical conditions. Duration of 5 years.              hydrALAZINE (APRESOLINE) 50 MG tablet  Take 1 tablet by mouth 3 times daily             lisinopril-hydrochlorothiazide (PRINZIDE;ZESTORETIC) 20-12.5 MG per tablet  Take 2 tablets by mouth daily             metFORMIN (GLUCOPHAGE) 1000 MG tablet  Take 1 tabs in AM and 1 tab in PM             omeprazole (PRILOSEC) 20 MG delayed release capsule  TAKE ONE CAPSULE BY MOUTH ONE TIME DAILY             oxyCODONE-acetaminophen (PERCOCET) 5-325 MG per tablet  Take 1 tablet by mouth every 6 hours as needed for Pain.             pioglitazone (ACTOS) 30 MG tablet  Take 1 tablet by mouth daily             pregabalin (LYRICA) 150 MG capsule  TAKE 1 CAPSULE BY MOUTH IN THE MORNING AND 2

## 2018-03-14 ENCOUNTER — CARE COORDINATION (OUTPATIENT)
Dept: CASE MANAGEMENT | Age: 78
End: 2018-03-14

## 2018-03-14 DIAGNOSIS — R21 RASH OF ENTIRE BODY: Primary | ICD-10-CM

## 2018-03-14 PROCEDURE — 1111F DSCHRG MED/CURRENT MED MERGE: CPT | Performed by: FAMILY MEDICINE

## 2018-03-14 NOTE — TELEPHONE ENCOUNTER
Have the medications prescribed at discharge been filled? yes  Does the patient understand what the medications are for? yes  Has the patient experienced any new symptoms or have previous symptoms worsened? no  Is the patient experiencing any pain? Some-comes and goes. If yes, is the pain well controlled? yes  We want to be sure the patient has the best recovery possible. Does the patient understand all the discharge instructions? yes    Nicoleingealexis 45 Transitions Initial Follow Up Call    Call within 2 business days of discharge: Yes    Patient: Sofia London Patient : 1940   MRN: [id number]  Reason for Admission: There are no discharge diagnoses documented for the most recent discharge.   Discharge Date: 3/13/18 RARS: [facility] Risk Score: 8.75     Spoke with: [address] Shabbir: [unfilled]    Non-face-to-face services provided:  Obtained and reviewed discharge summary and/or continuity of care documents    Follow Up  Future Appointments  Date Time Provider David Adler   3/19/2018 11:15 AM Silvia Farley MD [facility] [place]   2018 1:40 PM JUAN MANUEL [facility] Мария Bajwa

## 2018-03-19 ENCOUNTER — OFFICE VISIT (OUTPATIENT)
Dept: FAMILY MEDICINE CLINIC | Age: 78
End: 2018-03-19
Payer: MEDICARE

## 2018-03-19 VITALS
BODY MASS INDEX: 46.6 KG/M2 | WEIGHT: 238.6 LBS | HEART RATE: 68 BPM | RESPIRATION RATE: 16 BRPM | DIASTOLIC BLOOD PRESSURE: 56 MMHG | SYSTOLIC BLOOD PRESSURE: 102 MMHG

## 2018-03-19 DIAGNOSIS — R60.1 ANASARCA: ICD-10-CM

## 2018-03-19 DIAGNOSIS — E11.29 TYPE 2 DIABETES MELLITUS WITH MICROALBUMINURIA, WITHOUT LONG-TERM CURRENT USE OF INSULIN (HCC): ICD-10-CM

## 2018-03-19 DIAGNOSIS — R80.9 TYPE 2 DIABETES MELLITUS WITH MICROALBUMINURIA, WITHOUT LONG-TERM CURRENT USE OF INSULIN (HCC): ICD-10-CM

## 2018-03-19 DIAGNOSIS — R21 RASH: Primary | ICD-10-CM

## 2018-03-19 DIAGNOSIS — R53.1 GENERALIZED WEAKNESS: ICD-10-CM

## 2018-03-19 PROCEDURE — 1111F DSCHRG MED/CURRENT MED MERGE: CPT | Performed by: FAMILY MEDICINE

## 2018-03-19 PROCEDURE — 99495 TRANSJ CARE MGMT MOD F2F 14D: CPT | Performed by: FAMILY MEDICINE

## 2018-03-19 RX ORDER — PREDNISOLONE ACETATE 10 MG/ML
1 SUSPENSION/ DROPS OPHTHALMIC 2 TIMES DAILY
Status: ON HOLD | COMMUNITY
End: 2018-09-27 | Stop reason: ALTCHOICE

## 2018-03-19 ASSESSMENT — ENCOUNTER SYMPTOMS
WHEEZING: 0
EYE PAIN: 0
SHORTNESS OF BREATH: 0
BLOOD IN STOOL: 0
COUGH: 0
ABDOMINAL PAIN: 0
RHINORRHEA: 0
VOMITING: 0
SORE THROAT: 0
CHEST TIGHTNESS: 0
DIARRHEA: 0
NAUSEA: 0
BACK PAIN: 0
CONSTIPATION: 0

## 2018-03-19 NOTE — PROGRESS NOTES
day.             doxycycline hyclate (VIBRA-TABS) 100 MG tablet  Take 1 tablet by mouth every 12 hours for 7 days             fluticasone (FLONASE) 50 MCG/ACT nasal spray  2 sprays by Nasal route 2 times daily             glimepiride (AMARYL) 4 MG tablet  TAKE 1 TABLET BY MOUTH TWO TIMES A DAY WITH MEALS             Handicap Placard MISC  by Does not apply route. Request parking placard due to medical conditions. Duration of 5 years. hydrALAZINE (APRESOLINE) 50 MG tablet  Take 1 tablet by mouth 3 times daily             lisinopril-hydrochlorothiazide (PRINZIDE;ZESTORETIC) 20-12.5 MG per tablet  Take 2 tablets by mouth daily             metFORMIN (GLUCOPHAGE) 1000 MG tablet  Take 1 tabs in AM and 1 tab in PM             mupirocin (BACTROBAN) 2 % ointment  Apply topically 3 times daily Apply topically 3 times daily. omeprazole (PRILOSEC) 20 MG delayed release capsule  TAKE ONE CAPSULE BY MOUTH ONE TIME DAILY             oxyCODONE-acetaminophen (PERCOCET) 5-325 MG per tablet  Take 1 tablet by mouth every 6 hours as needed for Pain.             pioglitazone (ACTOS) 30 MG tablet  Take 1 tablet by mouth daily             prednisoLONE acetate (PRED FORTE) 1 % ophthalmic suspension  Place 1 drop into both eyes 2 times daily             pregabalin (LYRICA) 150 MG capsule  TAKE 1 CAPSULE BY MOUTH IN THE MORNING AND 2 CAPS AT BEDTIME, E11.42.             propranolol (INDERAL) 20 MG tablet  Take 1 tablet by mouth 3 times daily             risedronate (ACTONEL) 35 MG tablet  Take 1 tablet by mouth every 7 days             sodium chloride (OCEAN, BABY AYR) 0.65 % nasal spray  1 spray by Nasal route as needed for Congestion Patient states she uses 4-6x per day. tetrahydrozoline 0.05 % ophthalmic solution  Place 1 drop into both eyes 3 times daily              zolpidem (AMBIEN) 10 MG tablet  Take 10 mg by mouth nightly as needed for Sleep.                    Medications marked \"taking\" at this

## 2018-03-20 LAB — MISC REFERENCE: NORMAL

## 2018-03-21 ENCOUNTER — CARE COORDINATION (OUTPATIENT)
Dept: CASE MANAGEMENT | Age: 78
End: 2018-03-21

## 2018-03-21 NOTE — CARE COORDINATION
Sandie 45 Transitions Follow Up Call    3/21/2018    Patient: Shaheed Huff  Patient : 1940   MRN: [id number]  Reason for Admission: generalized skin rash     Discharge Date: 3/13/18 RARS: Risk Score: 8.75 CMRS 13     Attempted to reach the patient's daughter Rocio Jarvis for Care Transition post hospital discharge. Message left with CTC's contact information requesting return phone call.     Follow Up  Future Appointments  Date Time Provider David Adler   3/23/2018 8:30 AM Maria Eugenia Atwood, PT [facility] PT None   2018 1:40 PM Adrain Epley, JUAN MANUEL [facility] P - Margaret Fam RN

## 2018-03-23 ENCOUNTER — HOSPITAL ENCOUNTER (OUTPATIENT)
Dept: PHYSICAL THERAPY | Age: 78
Setting detail: THERAPIES SERIES
Discharge: HOME | End: 2018-03-23
Payer: MEDICARE

## 2018-03-23 PROCEDURE — G8978 MOBILITY CURRENT STATUS: HCPCS

## 2018-03-23 PROCEDURE — 97162 PT EVAL MOD COMPLEX 30 MIN: CPT

## 2018-03-23 PROCEDURE — 97110 THERAPEUTIC EXERCISES: CPT

## 2018-03-23 PROCEDURE — G8979 MOBILITY GOAL STATUS: HCPCS

## 2018-03-23 ASSESSMENT — PAIN SCALES - GENERAL: PAINLEVEL_OUTOF10: 3

## 2018-03-23 ASSESSMENT — PAIN DESCRIPTION - ORIENTATION: ORIENTATION: RIGHT;LEFT

## 2018-03-23 ASSESSMENT — PAIN DESCRIPTION - LOCATION: LOCATION: BACK;LEG

## 2018-03-23 NOTE — PROGRESS NOTES
I certify that I have examined the patient below and determined that Physical Medicine and Rehabilitation service is necessary; that the secondary diagnosis for the provision of rehabilitation services is consistent with identified needs; that service will be furnished on an outpatient basis while the patient is in my care; that I approve the above plan of care for up to 90 days or as specifically noted above and will review it within that time frame or more often if the patients condition requires. Attestation, signature or co-signature of physician indicates approval of certification requirements.    ________________________ ____________ __________  Physician Signature   Date   Time       Alex Lenylilli     Time In: 830  Time Out: 930  Minutes: 60  Timed Code Treatment Minutes: 10 Minutes     Date: 3/23/2018  Patient Name: Jeanette Meredith,  Gender:  female        CSN: [id number]   : 1940  (69 y.o.)  Referral Date : 18     Referring Practitioner: Jennifer Quiroga MD      Diagnosis: R53.1 (ICD-10-CM) - Generalized weakness  Treatment Diagnosis: Difficulty with ambulation  Additional Pertinent Hx: High Blood Pressure, Diabetes, Arthritis, Osteoporosis, Sinus Cancer       General:  PT Visit Information  Onset Date: 18  PT Insurance Information: Medicare-Unlimited visits. Aquatics and modalities are covered except ionto and hot/cold packs. Total # of Visits to Date: 1  Plan of Care/Certification Expiration Date: 06/15/18  Progress Note Counter: 1/10 for PN                        See Medical History Questionnaire for information related to allergies and medications. Subjective:  Family / Caregiver Present: No  Comments: Patient reports her lift is full of doctor's appointments and does not know when she returns to Dr. Jacy Siu. Subjective: Patient presents to therapy with weakness.   Patient reports she Supervision  Quality of Gait: Decreased pace, just using left arm to control walker, decreased heel strike to toe off  Distance: 150 feet                               Exercises  Exercise 1: HEP: sitting in chair: glut set, march, LAQ, hamstring curl with yellow band, hip abduction with yellow band, hip adduction with ball x10 each. Exercise 2: Next session: NuStep  Exercise 3: Next session: 3-way hip  Exercise 4: Next session: standing heel/toe raise, march, mini squat  Exercise 5: Next session: NK table  Exercise 6: Next session: hydrochest                     TINETTI BALANCE TEST    BALANCE Patient is seated in a hard, armless chair   1 SITTING BALANCE 1 - Steady, safe   2. RISES FROM CHAIR 1 - Able, uses arms to help   3. ATTEMPTS TO RISE 2 - Able to rise, 1 attempt   4. IMMEDIATE STANDING BALANCE (FIRST 5 SECONDS) 1 - Steady but uses walker or other support   5. STANDING BALANCE 1 - Steady but wide stance and uses support   6. NUDGED 1 - Staggers, grabs, catches   7. EYES CLOSED 1 - Steady   8. TURNING 360 DEGREES 1 - Continuous and 1 - Steady   9. SITTING DOWN 1 - Uses arms or not a smooth motion   BALANCE SCORE 11/16       GAIT SECTION Patient stands with therapist, walks across room (+/- aids), fist at usual pace, then at rapid pace. 1.  INDICATION OF GAIT (Immediately after told to \"go\" 1 - No hesitancy   2. STEP LENGTH AND HEIGHT 0 - Step to, 1 - Step through Right and 1 - Step through Left   3. FOOT CLEARANCE 1 - Left foot clears floor and 1 - Right foot clears floor   4. STEP SYMMETRY 1 - Right and left step length appear equal   5. STEP CONTINUITY 1 - Steps appear continuous   6. PATH 1 - Mild/moderate deviation or uses walking aid   7. TRUNK 0 - Marked sway or uses walking aid   8.   WALKING TIME 1 - Heels almost touching while walking   GAIT SCORE 9/12   TOTAL SCORE 20/28   Risk Indicators:  Less than/equal to 18 = high risk  19-23 Moderate risk  Greater than/equal to 24 = low risk

## 2018-03-28 ENCOUNTER — APPOINTMENT (OUTPATIENT)
Dept: CT IMAGING | Age: 78
End: 2018-03-28
Payer: MEDICARE

## 2018-03-28 ENCOUNTER — HOSPITAL ENCOUNTER (OUTPATIENT)
Dept: PHYSICAL THERAPY | Age: 78
Setting detail: THERAPIES SERIES
Discharge: HOME | End: 2018-03-28
Payer: MEDICARE

## 2018-03-28 ENCOUNTER — HOSPITAL ENCOUNTER (OUTPATIENT)
Age: 78
Setting detail: OBSERVATION
Discharge: HOME | End: 2018-03-29
Attending: FAMILY MEDICINE | Admitting: INTERNAL MEDICINE
Payer: MEDICARE

## 2018-03-28 ENCOUNTER — APPOINTMENT (OUTPATIENT)
Dept: GENERAL RADIOLOGY | Age: 78
End: 2018-03-28
Payer: MEDICARE

## 2018-03-28 DIAGNOSIS — R41.82 ALTERED MENTAL STATUS, UNSPECIFIED ALTERED MENTAL STATUS TYPE: ICD-10-CM

## 2018-03-28 DIAGNOSIS — E16.2 HYPOGLYCEMIA: Primary | ICD-10-CM

## 2018-03-28 LAB
ALBUMIN SERPL-MCNC: 3 G/DL (ref 3.5–5.1)
ALP BLD-CCNC: 90 U/L (ref 38–126)
ALT SERPL-CCNC: 9 U/L (ref 11–66)
AMMONIA: 36 UMOL/L (ref 11–60)
AMPHETAMINE+METHAMPHETAMINE URINE SCREEN: NEGATIVE
ANION GAP SERPL CALCULATED.3IONS-SCNC: 14 MEQ/L (ref 8–16)
ANISOCYTOSIS: ABNORMAL
AST SERPL-CCNC: 11 U/L (ref 5–40)
BACTERIA: ABNORMAL /HPF
BARBITURATE QUANTITATIVE URINE: NEGATIVE
BASOPHILS # BLD: 0.1 %
BASOPHILS ABSOLUTE: 0 THOU/MM3 (ref 0–0.1)
BENZODIAZEPINE QUANTITATIVE URINE: NEGATIVE
BILIRUB SERPL-MCNC: 0.3 MG/DL (ref 0.3–1.2)
BILIRUBIN DIRECT: < 0.2 MG/DL (ref 0–0.3)
BILIRUBIN URINE: NEGATIVE
BLOOD, URINE: NEGATIVE
BUN BLDV-MCNC: 17 MG/DL (ref 7–22)
CALCIUM SERPL-MCNC: 8.3 MG/DL (ref 8.5–10.5)
CANNABINOID QUANTITATIVE URINE: NEGATIVE
CASTS 2: ABNORMAL /LPF
CASTS UA: ABNORMAL /LPF
CHARACTER, URINE: ABNORMAL
CHLORIDE BLD-SCNC: 96 MEQ/L (ref 98–111)
CO2: 20 MEQ/L (ref 23–33)
COCAINE METABOLITE QUANTITATIVE URINE: NEGATIVE
COLOR: YELLOW
CREAT SERPL-MCNC: 1.1 MG/DL (ref 0.4–1.2)
CRYSTALS, UA: ABNORMAL
EKG ATRIAL RATE: 79 BPM
EKG P AXIS: -16 DEGREES
EKG P-R INTERVAL: 162 MS
EKG Q-T INTERVAL: 384 MS
EKG QRS DURATION: 90 MS
EKG QTC CALCULATION (BAZETT): 440 MS
EKG R AXIS: 62 DEGREES
EKG T AXIS: 14 DEGREES
EKG VENTRICULAR RATE: 79 BPM
EOSINOPHIL # BLD: 5.6 %
EOSINOPHILS ABSOLUTE: 0.7 THOU/MM3 (ref 0–0.4)
EPITHELIAL CELLS, UA: ABNORMAL /HPF
GFR SERPL CREATININE-BSD FRML MDRD: 48 ML/MIN/1.73M2
GLUCOSE BLD-MCNC: 112 MG/DL (ref 70–108)
GLUCOSE BLD-MCNC: 120 MG/DL (ref 70–108)
GLUCOSE BLD-MCNC: 135 MG/DL (ref 70–108)
GLUCOSE URINE: NEGATIVE MG/DL
HCT VFR BLD CALC: 28.9 % (ref 37–47)
HEMOGLOBIN: 9.5 GM/DL (ref 12–16)
KETONES, URINE: NEGATIVE
LEUKOCYTE ESTERASE, URINE: ABNORMAL
LYMPHOCYTES # BLD: 8.9 %
LYMPHOCYTES ABSOLUTE: 1.1 THOU/MM3 (ref 1–4.8)
MCH RBC QN AUTO: 28.3 PG (ref 27–31)
MCHC RBC AUTO-ENTMCNC: 32.8 GM/DL (ref 33–37)
MCV RBC AUTO: 86.5 FL (ref 81–99)
MISCELLANEOUS 2: ABNORMAL
MONOCYTES # BLD: 5 %
MONOCYTES ABSOLUTE: 0.6 THOU/MM3 (ref 0.4–1.3)
MUCUS: ABNORMAL
NITRITE, URINE: NEGATIVE
NUCLEATED RED BLOOD CELLS: 0 /100 WBC
OPIATES, URINE: NEGATIVE
OSMOLALITY CALCULATION: 263.5 MOSMOL/KG (ref 275–300)
OXYCODONE: NEGATIVE
PDW BLD-RTO: 17.7 % (ref 11.5–14.5)
PH UA: 5
PHENCYCLIDINE QUANTITATIVE URINE: NEGATIVE
PLATELET # BLD: 245 THOU/MM3 (ref 130–400)
PMV BLD AUTO: 7.5 FL (ref 7.4–10.4)
POTASSIUM SERPL-SCNC: 4 MEQ/L (ref 3.5–5.2)
PRO-BNP: 570.6 PG/ML (ref 0–1800)
PROTEIN UA: NEGATIVE
RBC # BLD: 3.34 MILL/MM3 (ref 4.2–5.4)
RBC URINE: ABNORMAL /HPF
RENAL EPITHELIAL, UA: ABNORMAL
SEG NEUTROPHILS: 80.4 %
SEGMENTED NEUTROPHILS ABSOLUTE COUNT: 9.7 THOU/MM3 (ref 1.8–7.7)
SODIUM BLD-SCNC: 130 MEQ/L (ref 135–145)
SPECIFIC GRAVITY, URINE: 1.02 (ref 1–1.03)
TOTAL PROTEIN: 6 G/DL (ref 6.1–8)
TROPONIN T: < 0.01 NG/ML
TROPONIN T: < 0.01 NG/ML
TSH SERPL DL<=0.05 MIU/L-ACNC: 1.39 UIU/ML (ref 0.4–4.2)
UROBILINOGEN, URINE: 0.2 EU/DL
WBC # BLD: 12.1 THOU/MM3 (ref 4.8–10.8)
WBC UA: ABNORMAL /HPF
YEAST: ABNORMAL

## 2018-03-28 PROCEDURE — 82948 REAGENT STRIP/BLOOD GLUCOSE: CPT

## 2018-03-28 PROCEDURE — 83880 ASSAY OF NATRIURETIC PEPTIDE: CPT

## 2018-03-28 PROCEDURE — 82248 BILIRUBIN DIRECT: CPT

## 2018-03-28 PROCEDURE — 99285 EMERGENCY DEPT VISIT HI MDM: CPT

## 2018-03-28 PROCEDURE — 70450 CT HEAD/BRAIN W/O DYE: CPT

## 2018-03-28 PROCEDURE — 93005 ELECTROCARDIOGRAM TRACING: CPT | Performed by: FAMILY MEDICINE

## 2018-03-28 PROCEDURE — 80053 COMPREHEN METABOLIC PANEL: CPT

## 2018-03-28 PROCEDURE — 36415 COLL VENOUS BLD VENIPUNCTURE: CPT

## 2018-03-28 PROCEDURE — 84484 ASSAY OF TROPONIN QUANT: CPT

## 2018-03-28 PROCEDURE — 71045 X-RAY EXAM CHEST 1 VIEW: CPT

## 2018-03-28 PROCEDURE — 84443 ASSAY THYROID STIM HORMONE: CPT

## 2018-03-28 PROCEDURE — 87086 URINE CULTURE/COLONY COUNT: CPT

## 2018-03-28 PROCEDURE — 80307 DRUG TEST PRSMV CHEM ANLYZR: CPT

## 2018-03-28 PROCEDURE — 85025 COMPLETE CBC W/AUTO DIFF WBC: CPT

## 2018-03-28 PROCEDURE — 97110 THERAPEUTIC EXERCISES: CPT

## 2018-03-28 PROCEDURE — 82140 ASSAY OF AMMONIA: CPT

## 2018-03-28 PROCEDURE — 81001 URINALYSIS AUTO W/SCOPE: CPT

## 2018-03-28 ASSESSMENT — PAIN DESCRIPTION - LOCATION
LOCATION: SHOULDER;ARM
LOCATION: SHOULDER

## 2018-03-28 ASSESSMENT — ENCOUNTER SYMPTOMS
RHINORRHEA: 0
WHEEZING: 0
SORE THROAT: 0
ABDOMINAL PAIN: 0
EYE DISCHARGE: 0
VOMITING: 0
EYE PAIN: 0
COUGH: 0
NAUSEA: 0
DIARRHEA: 0
SHORTNESS OF BREATH: 0
BACK PAIN: 0

## 2018-03-28 ASSESSMENT — PAIN DESCRIPTION - PAIN TYPE
TYPE: ACUTE PAIN
TYPE: CHRONIC PAIN

## 2018-03-28 ASSESSMENT — PAIN SCALES - GENERAL
PAINLEVEL_OUTOF10: 6
PAINLEVEL_OUTOF10: 8
PAINLEVEL_OUTOF10: 7

## 2018-03-28 ASSESSMENT — PAIN DESCRIPTION - ORIENTATION: ORIENTATION: RIGHT

## 2018-03-28 NOTE — PROGRESS NOTES
15  Exercise 7: Rest breaks x2     Activity Tolerance: Tolerated well. Fatigue after session. Assessment: Body structures, Functions, Activity limitations: Decreased ROM, Decreased strength, Decreased endurance  Assessment: Seated and standing exercise this date. x 2 seated rest breaks , Patient states she is tired, \" I haven't done much all winter. \"  Cues to stand morgan when ambulating. Discharge Recommendations: Continue to assess pending progress    Patient Education:  Patient Education: Seated and standing ex. Pace self. Plan:  Times per week: 2-3 times per week  Plan weeks: 12 weeks  Specific instructions for Next Treatment: Core and LE stretches and strengthening, balance, ambulation, modalities as needed  Current Treatment Recommendations: Strengthening, Balance Training, Endurance Training, Stair training, Pain Management, Positioning, Modalities, Gait Training, Manual Therapy - Soft Tissue Mobilization, Transfer Training, Home Exercise Program  Plan Comment: POC initiated today    Goals:  Patient goals : \"Getting better\"    Short term goals  Time Frame for Short term goals: 4 weeks  Short term goal 1: Improve balance with Tinetti score of 23/28 to assist with walking around home. Short term goal 2: Increase bilateral leg strength to 4+/5 to assist with stability on steps. Short term goal 3: Patient able to ambulate with rolling walker x200 feet with no increased shortness of breath to assist with community outings. Short term goal 4: Patient to have no falls to assist with safety at home. Long term goals  Time Frame for Long term goals : 12 weeks  Long term goal 1: Independent with HEP and with progression to assist with increasing strength.       Mann Gooden  PTA [id number]

## 2018-03-29 VITALS
RESPIRATION RATE: 16 BRPM | SYSTOLIC BLOOD PRESSURE: 133 MMHG | DIASTOLIC BLOOD PRESSURE: 67 MMHG | WEIGHT: 243 LBS | HEART RATE: 83 BPM | BODY MASS INDEX: 47.71 KG/M2 | TEMPERATURE: 98.6 F | HEIGHT: 60 IN | OXYGEN SATURATION: 97 %

## 2018-03-29 PROBLEM — E16.2 HYPOGLYCEMIA: Status: ACTIVE | Noted: 2018-03-29

## 2018-03-29 LAB
ANION GAP SERPL CALCULATED.3IONS-SCNC: 13 MEQ/L (ref 8–16)
BUN BLDV-MCNC: 17 MG/DL (ref 7–22)
CALCIUM SERPL-MCNC: 8.3 MG/DL (ref 8.5–10.5)
CHLORIDE BLD-SCNC: 98 MEQ/L (ref 98–111)
CO2: 22 MEQ/L (ref 23–33)
CREAT SERPL-MCNC: 0.9 MG/DL (ref 0.4–1.2)
FLU A ANTIGEN: NEGATIVE
FLU B ANTIGEN: NEGATIVE
GFR SERPL CREATININE-BSD FRML MDRD: 61 ML/MIN/1.73M2
GLUCOSE BLD-MCNC: 100 MG/DL (ref 70–108)
GLUCOSE BLD-MCNC: 118 MG/DL (ref 70–108)
GLUCOSE BLD-MCNC: 129 MG/DL (ref 70–108)
GLUCOSE BLD-MCNC: 173 MG/DL (ref 70–108)
GLUCOSE BLD-MCNC: 198 MG/DL (ref 70–108)
POTASSIUM REFLEX MAGNESIUM: 4.3 MEQ/L (ref 3.5–5.2)
SODIUM BLD-SCNC: 133 MEQ/L (ref 135–145)

## 2018-03-29 PROCEDURE — 99220 PR INITIAL OBSERVATION CARE/DAY 70 MINUTES: CPT | Performed by: INTERNAL MEDICINE

## 2018-03-29 PROCEDURE — 6370000000 HC RX 637 (ALT 250 FOR IP): Performed by: INTERNAL MEDICINE

## 2018-03-29 PROCEDURE — 6360000002 HC RX W HCPCS: Performed by: INTERNAL MEDICINE

## 2018-03-29 PROCEDURE — 80048 BASIC METABOLIC PNL TOTAL CA: CPT

## 2018-03-29 PROCEDURE — G0378 HOSPITAL OBSERVATION PER HR: HCPCS

## 2018-03-29 PROCEDURE — 87804 INFLUENZA ASSAY W/OPTIC: CPT

## 2018-03-29 PROCEDURE — 99217 PR OBSERVATION CARE DISCHARGE MANAGEMENT: CPT | Performed by: INTERNAL MEDICINE

## 2018-03-29 PROCEDURE — 36415 COLL VENOUS BLD VENIPUNCTURE: CPT

## 2018-03-29 PROCEDURE — 2580000003 HC RX 258: Performed by: INTERNAL MEDICINE

## 2018-03-29 PROCEDURE — 82948 REAGENT STRIP/BLOOD GLUCOSE: CPT

## 2018-03-29 RX ORDER — PANTOPRAZOLE SODIUM 40 MG/1
40 TABLET, DELAYED RELEASE ORAL
Status: DISCONTINUED | OUTPATIENT
Start: 2018-03-29 | End: 2018-03-29 | Stop reason: HOSPADM

## 2018-03-29 RX ORDER — SODIUM CHLORIDE 0.9 % (FLUSH) 0.9 %
10 SYRINGE (ML) INJECTION PRN
Status: DISCONTINUED | OUTPATIENT
Start: 2018-03-29 | End: 2018-03-29 | Stop reason: HOSPADM

## 2018-03-29 RX ORDER — HYDROCHLOROTHIAZIDE 25 MG/1
25 TABLET ORAL DAILY
Status: DISCONTINUED | OUTPATIENT
Start: 2018-03-29 | End: 2018-03-29 | Stop reason: HOSPADM

## 2018-03-29 RX ORDER — ATORVASTATIN CALCIUM 40 MG/1
40 TABLET, FILM COATED ORAL NIGHTLY
Status: DISCONTINUED | OUTPATIENT
Start: 2018-03-29 | End: 2018-03-29 | Stop reason: HOSPADM

## 2018-03-29 RX ORDER — PREDNISOLONE ACETATE 10 MG/ML
1 SUSPENSION/ DROPS OPHTHALMIC 2 TIMES DAILY
Status: DISCONTINUED | OUTPATIENT
Start: 2018-03-29 | End: 2018-03-29 | Stop reason: HOSPADM

## 2018-03-29 RX ORDER — PROPRANOLOL HYDROCHLORIDE 20 MG/1
20 TABLET ORAL 3 TIMES DAILY
Status: DISCONTINUED | OUTPATIENT
Start: 2018-03-29 | End: 2018-03-29 | Stop reason: HOSPADM

## 2018-03-29 RX ORDER — POLYVINYL ALCOHOL 14 MG/ML
1 SOLUTION/ DROPS OPHTHALMIC EVERY 4 HOURS PRN
Status: DISCONTINUED | OUTPATIENT
Start: 2018-03-29 | End: 2018-03-29 | Stop reason: HOSPADM

## 2018-03-29 RX ORDER — SODIUM CHLORIDE 0.9 % (FLUSH) 0.9 %
10 SYRINGE (ML) INJECTION EVERY 12 HOURS SCHEDULED
Status: DISCONTINUED | OUTPATIENT
Start: 2018-03-29 | End: 2018-03-29 | Stop reason: HOSPADM

## 2018-03-29 RX ORDER — ONDANSETRON 2 MG/ML
4 INJECTION INTRAMUSCULAR; INTRAVENOUS EVERY 6 HOURS PRN
Status: DISCONTINUED | OUTPATIENT
Start: 2018-03-29 | End: 2018-03-29 | Stop reason: CLARIF

## 2018-03-29 RX ORDER — DOCUSATE SODIUM 100 MG/1
100 CAPSULE, LIQUID FILLED ORAL 2 TIMES DAILY
Status: DISCONTINUED | OUTPATIENT
Start: 2018-03-29 | End: 2018-03-29 | Stop reason: HOSPADM

## 2018-03-29 RX ORDER — LISINOPRIL AND HYDROCHLOROTHIAZIDE 20; 12.5 MG/1; MG/1
2 TABLET ORAL DAILY
Status: DISCONTINUED | OUTPATIENT
Start: 2018-03-29 | End: 2018-03-29 | Stop reason: CLARIF

## 2018-03-29 RX ORDER — PREGABALIN 50 MG/1
150 CAPSULE ORAL 2 TIMES DAILY
Status: DISCONTINUED | OUTPATIENT
Start: 2018-03-29 | End: 2018-03-29 | Stop reason: HOSPADM

## 2018-03-29 RX ORDER — FLUTICASONE PROPIONATE 50 MCG
2 SPRAY, SUSPENSION (ML) NASAL 2 TIMES DAILY
Status: DISCONTINUED | OUTPATIENT
Start: 2018-03-29 | End: 2018-03-29 | Stop reason: HOSPADM

## 2018-03-29 RX ORDER — DEXTROSE AND SODIUM CHLORIDE 5; .45 G/100ML; G/100ML
INJECTION, SOLUTION INTRAVENOUS CONTINUOUS
Status: DISCONTINUED | OUTPATIENT
Start: 2018-03-29 | End: 2018-03-29

## 2018-03-29 RX ORDER — TETRAHYDROZOLINE HCL 0.05 %
1 DROPS OPHTHALMIC (EYE) 3 TIMES DAILY
Status: DISCONTINUED | OUTPATIENT
Start: 2018-03-29 | End: 2018-03-29 | Stop reason: HOSPADM

## 2018-03-29 RX ORDER — PIOGLITAZONEHYDROCHLORIDE 30 MG/1
30 TABLET ORAL DAILY
Status: DISCONTINUED | OUTPATIENT
Start: 2018-03-29 | End: 2018-03-29 | Stop reason: HOSPADM

## 2018-03-29 RX ORDER — HYDRALAZINE HYDROCHLORIDE 50 MG/1
50 TABLET, FILM COATED ORAL 3 TIMES DAILY
Status: DISCONTINUED | OUTPATIENT
Start: 2018-03-29 | End: 2018-03-29 | Stop reason: HOSPADM

## 2018-03-29 RX ORDER — RISEDRONATE SODIUM 35 MG/1
35 TABLET, FILM COATED ORAL
Status: DISCONTINUED | OUTPATIENT
Start: 2018-03-29 | End: 2018-03-29 | Stop reason: RX

## 2018-03-29 RX ORDER — OXYCODONE HYDROCHLORIDE AND ACETAMINOPHEN 5; 325 MG/1; MG/1
1 TABLET ORAL EVERY 6 HOURS PRN
Status: DISCONTINUED | OUTPATIENT
Start: 2018-03-29 | End: 2018-03-29 | Stop reason: HOSPADM

## 2018-03-29 RX ORDER — ONDANSETRON 4 MG/1
4 TABLET, ORALLY DISINTEGRATING ORAL EVERY 6 HOURS PRN
Status: DISCONTINUED | OUTPATIENT
Start: 2018-03-29 | End: 2018-03-29 | Stop reason: HOSPADM

## 2018-03-29 RX ORDER — NITROFURANTOIN 25; 75 MG/1; MG/1
100 CAPSULE ORAL 2 TIMES DAILY
Qty: 10 CAPSULE | Refills: 0 | Status: SHIPPED | OUTPATIENT
Start: 2018-03-29 | End: 2018-04-03

## 2018-03-29 RX ORDER — ZOLPIDEM TARTRATE 5 MG/1
5 TABLET ORAL NIGHTLY PRN
Status: DISCONTINUED | OUTPATIENT
Start: 2018-03-29 | End: 2018-03-29 | Stop reason: HOSPADM

## 2018-03-29 RX ORDER — LISINOPRIL 40 MG/1
40 TABLET ORAL DAILY
Status: DISCONTINUED | OUTPATIENT
Start: 2018-03-29 | End: 2018-03-29 | Stop reason: HOSPADM

## 2018-03-29 RX ADMIN — PREGABALIN 150 MG: 50 CAPSULE ORAL at 07:59

## 2018-03-29 RX ADMIN — PROPRANOLOL HYDROCHLORIDE 20 MG: 20 TABLET ORAL at 08:00

## 2018-03-29 RX ADMIN — ENOXAPARIN SODIUM 40 MG: 40 INJECTION SUBCUTANEOUS at 08:03

## 2018-03-29 RX ADMIN — LISINOPRIL 40 MG: 40 TABLET ORAL at 07:54

## 2018-03-29 RX ADMIN — HYDRALAZINE HYDROCHLORIDE 50 MG: 50 TABLET ORAL at 14:08

## 2018-03-29 RX ADMIN — PIOGLITAZONE 30 MG: 30 TABLET ORAL at 07:57

## 2018-03-29 RX ADMIN — TETRAHYDROZOLINE HYDROCHLORIDE 1 DROP: 0.05 SOLUTION/ DROPS OPHTHALMIC at 07:58

## 2018-03-29 RX ADMIN — HYDRALAZINE HYDROCHLORIDE 50 MG: 50 TABLET ORAL at 07:53

## 2018-03-29 RX ADMIN — PREDNISOLONE ACETATE 1 DROP: 10 SUSPENSION/ DROPS OPHTHALMIC at 07:58

## 2018-03-29 RX ADMIN — MUPIROCIN: 20 OINTMENT TOPICAL at 14:09

## 2018-03-29 RX ADMIN — DOCUSATE SODIUM 100 MG: 100 CAPSULE ORAL at 08:02

## 2018-03-29 RX ADMIN — MUPIROCIN: 20 OINTMENT TOPICAL at 07:56

## 2018-03-29 RX ADMIN — DEXTROSE AND SODIUM CHLORIDE: 5; 450 INJECTION, SOLUTION INTRAVENOUS at 03:33

## 2018-03-29 RX ADMIN — HYDROCHLOROTHIAZIDE 25 MG: 25 TABLET ORAL at 07:55

## 2018-03-29 RX ADMIN — PANTOPRAZOLE SODIUM 40 MG: 40 TABLET, DELAYED RELEASE ORAL at 06:46

## 2018-03-29 RX ADMIN — OXYCODONE HYDROCHLORIDE AND ACETAMINOPHEN 1 TABLET: 5; 325 TABLET ORAL at 03:39

## 2018-03-29 RX ADMIN — PROPRANOLOL HYDROCHLORIDE 20 MG: 20 TABLET ORAL at 14:10

## 2018-03-29 RX ADMIN — TETRAHYDROZOLINE HYDROCHLORIDE 1 DROP: 0.05 SOLUTION/ DROPS OPHTHALMIC at 14:10

## 2018-03-29 ASSESSMENT — PAIN SCALES - GENERAL
PAINLEVEL_OUTOF10: 6
PAINLEVEL_OUTOF10: 9
PAINLEVEL_OUTOF10: 0
PAINLEVEL_OUTOF10: 10

## 2018-03-29 ASSESSMENT — PAIN DESCRIPTION - LOCATION: LOCATION: SHOULDER

## 2018-03-29 ASSESSMENT — PAIN DESCRIPTION - PAIN TYPE: TYPE: ACUTE PAIN

## 2018-03-30 ENCOUNTER — CARE COORDINATION (OUTPATIENT)
Dept: CASE MANAGEMENT | Age: 78
End: 2018-03-30

## 2018-03-30 ENCOUNTER — APPOINTMENT (OUTPATIENT)
Dept: PHYSICAL THERAPY | Age: 78
End: 2018-03-30
Payer: MEDICARE

## 2018-03-30 DIAGNOSIS — E16.2 HYPOGLYCEMIA: Primary | ICD-10-CM

## 2018-03-30 LAB
ORGANISM: ABNORMAL
URINE CULTURE REFLEX: ABNORMAL

## 2018-03-30 PROCEDURE — 1111F DSCHRG MED/CURRENT MED MERGE: CPT | Performed by: FAMILY MEDICINE

## 2018-04-03 ENCOUNTER — CARE COORDINATION (OUTPATIENT)
Dept: CASE MANAGEMENT | Age: 78
End: 2018-04-03

## 2018-04-03 ENCOUNTER — HOSPITAL ENCOUNTER (OUTPATIENT)
Dept: PHYSICAL THERAPY | Age: 78
Setting detail: THERAPIES SERIES
Discharge: HOME | End: 2018-04-03
Payer: MEDICARE

## 2018-04-03 PROCEDURE — 97110 THERAPEUTIC EXERCISES: CPT

## 2018-04-03 ASSESSMENT — PAIN SCALES - GENERAL: PAINLEVEL_OUTOF10: 10

## 2018-04-03 ASSESSMENT — PAIN DESCRIPTION - LOCATION: LOCATION: ARM;SHOULDER

## 2018-04-04 ENCOUNTER — OFFICE VISIT (OUTPATIENT)
Dept: FAMILY MEDICINE CLINIC | Age: 78
End: 2018-04-04
Payer: MEDICARE

## 2018-04-04 VITALS
HEART RATE: 80 BPM | RESPIRATION RATE: 12 BRPM | DIASTOLIC BLOOD PRESSURE: 74 MMHG | WEIGHT: 239.2 LBS | SYSTOLIC BLOOD PRESSURE: 138 MMHG | BODY MASS INDEX: 46.72 KG/M2

## 2018-04-04 DIAGNOSIS — R80.9 TYPE 2 DIABETES MELLITUS WITH MICROALBUMINURIA, WITHOUT LONG-TERM CURRENT USE OF INSULIN (HCC): ICD-10-CM

## 2018-04-04 DIAGNOSIS — R53.1 GENERALIZED WEAKNESS: ICD-10-CM

## 2018-04-04 DIAGNOSIS — E11.29 TYPE 2 DIABETES MELLITUS WITH MICROALBUMINURIA, WITHOUT LONG-TERM CURRENT USE OF INSULIN (HCC): ICD-10-CM

## 2018-04-04 DIAGNOSIS — E16.2 HYPOGLYCEMIA: Primary | ICD-10-CM

## 2018-04-04 PROCEDURE — 99495 TRANSJ CARE MGMT MOD F2F 14D: CPT | Performed by: FAMILY MEDICINE

## 2018-04-04 PROCEDURE — 1111F DSCHRG MED/CURRENT MED MERGE: CPT | Performed by: FAMILY MEDICINE

## 2018-04-04 RX ORDER — CLOTRIMAZOLE 1 %
CREAM (GRAM) TOPICAL 2 TIMES DAILY
Status: ON HOLD | COMMUNITY
End: 2018-09-27 | Stop reason: ALTCHOICE

## 2018-04-04 ASSESSMENT — ENCOUNTER SYMPTOMS
RHINORRHEA: 0
CHEST TIGHTNESS: 0
ABDOMINAL PAIN: 0
SORE THROAT: 0
SHORTNESS OF BREATH: 0
EYE PAIN: 0
DIARRHEA: 0
CONSTIPATION: 0
VOMITING: 0
BLOOD IN STOOL: 0
BACK PAIN: 0
NAUSEA: 0
WHEEZING: 0
COUGH: 0

## 2018-04-09 ENCOUNTER — CARE COORDINATION (OUTPATIENT)
Dept: CASE MANAGEMENT | Age: 78
End: 2018-04-09

## 2018-04-11 ENCOUNTER — HOSPITAL ENCOUNTER (OUTPATIENT)
Dept: PHYSICAL THERAPY | Age: 78
Setting detail: THERAPIES SERIES
Discharge: HOME | End: 2018-04-11
Payer: MEDICARE

## 2018-04-11 PROCEDURE — 97110 THERAPEUTIC EXERCISES: CPT

## 2018-04-11 ASSESSMENT — PAIN SCALES - GENERAL: PAINLEVEL_OUTOF10: 10

## 2018-04-11 ASSESSMENT — PAIN DESCRIPTION - LOCATION: LOCATION: SHOULDER;ARM

## 2018-04-11 ASSESSMENT — PAIN DESCRIPTION - PAIN TYPE: TYPE: CHRONIC PAIN

## 2018-04-11 ASSESSMENT — PAIN DESCRIPTION - ORIENTATION: ORIENTATION: RIGHT

## 2018-04-13 ENCOUNTER — HOSPITAL ENCOUNTER (OUTPATIENT)
Dept: PHYSICAL THERAPY | Age: 78
Setting detail: THERAPIES SERIES
Discharge: HOME | End: 2018-04-13
Payer: MEDICARE

## 2018-04-13 PROCEDURE — 97110 THERAPEUTIC EXERCISES: CPT

## 2018-04-13 ASSESSMENT — PAIN SCALES - GENERAL: PAINLEVEL_OUTOF10: 6

## 2018-04-13 ASSESSMENT — PAIN DESCRIPTION - ORIENTATION: ORIENTATION: RIGHT

## 2018-04-13 ASSESSMENT — PAIN DESCRIPTION - PAIN TYPE: TYPE: CHRONIC PAIN

## 2018-04-14 LAB
ANION GAP SERPL CALCULATED.3IONS-SCNC: 16 MEQ/L (ref 10–19)
BUN BLDV-MCNC: 10 MG/DL (ref 8–23)
CALCIUM SERPL-MCNC: 8.4 MG/DL (ref 8.5–10.5)
CHLORIDE BLD-SCNC: 89 MEQ/L (ref 95–107)
CO2: 24 MEQ/L (ref 19–31)
CREAT SERPL-MCNC: 0.9 MG/DL (ref 0.6–1.3)
EGFR AFRICAN AMERICAN: 72 ML/MIN/1.73 M2
EGFR IF NONAFRICAN AMERICAN: 62.1 ML/MIN/1.73 M2
GLUCOSE: 202 MG/DL (ref 70–99)
POTASSIUM SERPL-SCNC: 4.2 MEQ/L (ref 3.5–5.4)
SODIUM BLD-SCNC: 129 MEQ/L (ref 135–146)

## 2018-04-16 ENCOUNTER — TELEPHONE (OUTPATIENT)
Dept: FAMILY MEDICINE CLINIC | Age: 78
End: 2018-04-16

## 2018-04-16 DIAGNOSIS — E87.1 HYPONATREMIA: Primary | ICD-10-CM

## 2018-04-18 ENCOUNTER — APPOINTMENT (OUTPATIENT)
Dept: PHYSICAL THERAPY | Age: 78
End: 2018-04-18
Payer: MEDICARE

## 2018-04-18 DIAGNOSIS — I10 BENIGN ESSENTIAL HTN: ICD-10-CM

## 2018-04-18 DIAGNOSIS — K21.9 GASTROESOPHAGEAL REFLUX DISEASE, ESOPHAGITIS PRESENCE NOT SPECIFIED: ICD-10-CM

## 2018-04-18 RX ORDER — PROPRANOLOL HYDROCHLORIDE 20 MG/1
TABLET ORAL
Qty: 270 TABLET | Refills: 0 | OUTPATIENT
Start: 2018-04-18

## 2018-04-18 RX ORDER — OMEPRAZOLE 20 MG/1
CAPSULE, DELAYED RELEASE ORAL
Qty: 90 CAPSULE | Refills: 0 | OUTPATIENT
Start: 2018-04-18

## 2018-04-19 ENCOUNTER — OFFICE VISIT (OUTPATIENT)
Dept: NEPHROLOGY | Age: 78
End: 2018-04-19
Payer: MEDICARE

## 2018-04-19 ENCOUNTER — HOSPITAL ENCOUNTER (INPATIENT)
Age: 78
LOS: 3 days | Discharge: HOME | DRG: 641 | End: 2018-04-23
Attending: EMERGENCY MEDICINE | Admitting: INTERNAL MEDICINE
Payer: MEDICARE

## 2018-04-19 VITALS
SYSTOLIC BLOOD PRESSURE: 110 MMHG | OXYGEN SATURATION: 96 % | HEART RATE: 95 BPM | WEIGHT: 239.4 LBS | BODY MASS INDEX: 46.75 KG/M2 | DIASTOLIC BLOOD PRESSURE: 58 MMHG

## 2018-04-19 DIAGNOSIS — I10 BENIGN ESSENTIAL HTN: ICD-10-CM

## 2018-04-19 DIAGNOSIS — E83.42 HYPOMAGNESEMIA: ICD-10-CM

## 2018-04-19 DIAGNOSIS — R41.82 ALTERED MENTAL STATUS, UNSPECIFIED ALTERED MENTAL STATUS TYPE: ICD-10-CM

## 2018-04-19 DIAGNOSIS — I10 PRIMARY HYPERTENSION: ICD-10-CM

## 2018-04-19 DIAGNOSIS — E87.1 HYPONATREMIA: Primary | ICD-10-CM

## 2018-04-19 PROCEDURE — G8428 CUR MEDS NOT DOCUMENT: HCPCS | Performed by: NURSE PRACTITIONER

## 2018-04-19 PROCEDURE — G8400 PT W/DXA NO RESULTS DOC: HCPCS | Performed by: NURSE PRACTITIONER

## 2018-04-19 PROCEDURE — G8417 CALC BMI ABV UP PARAM F/U: HCPCS | Performed by: NURSE PRACTITIONER

## 2018-04-19 PROCEDURE — 1036F TOBACCO NON-USER: CPT | Performed by: NURSE PRACTITIONER

## 2018-04-19 PROCEDURE — 99213 OFFICE O/P EST LOW 20 MIN: CPT | Performed by: NURSE PRACTITIONER

## 2018-04-19 PROCEDURE — 99285 EMERGENCY DEPT VISIT HI MDM: CPT

## 2018-04-19 PROCEDURE — 1123F ACP DISCUSS/DSCN MKR DOCD: CPT | Performed by: NURSE PRACTITIONER

## 2018-04-19 PROCEDURE — 4040F PNEUMOC VAC/ADMIN/RCVD: CPT | Performed by: NURSE PRACTITIONER

## 2018-04-19 PROCEDURE — 1090F PRES/ABSN URINE INCON ASSESS: CPT | Performed by: NURSE PRACTITIONER

## 2018-04-19 RX ORDER — LISINOPRIL 5 MG/1
5 TABLET ORAL DAILY
Qty: 30 TABLET | Refills: 3 | Status: SHIPPED | OUTPATIENT
Start: 2018-04-19 | End: 2018-07-26 | Stop reason: SDUPTHER

## 2018-04-19 RX ORDER — OXYCODONE HYDROCHLORIDE AND ACETAMINOPHEN 5; 325 MG/1; MG/1
TABLET ORAL
Status: ON HOLD | COMMUNITY
Start: 2018-04-16 | End: 2018-04-23 | Stop reason: HOSPADM

## 2018-04-19 RX ORDER — CEFUROXIME AXETIL 500 MG/1
500 TABLET ORAL EVERY 12 HOURS
COMMUNITY
Start: 2018-04-16 | End: 2018-04-23

## 2018-04-20 ENCOUNTER — APPOINTMENT (OUTPATIENT)
Dept: CT IMAGING | Age: 78
DRG: 641 | End: 2018-04-20
Payer: MEDICARE

## 2018-04-20 ENCOUNTER — APPOINTMENT (OUTPATIENT)
Dept: GENERAL RADIOLOGY | Age: 78
DRG: 641 | End: 2018-04-20
Payer: MEDICARE

## 2018-04-20 ENCOUNTER — APPOINTMENT (OUTPATIENT)
Dept: OCCUPATIONAL THERAPY | Age: 78
End: 2018-04-20
Payer: MEDICARE

## 2018-04-20 ENCOUNTER — HOSPITAL ENCOUNTER (OUTPATIENT)
Dept: PHYSICAL THERAPY | Age: 78
Setting detail: THERAPIES SERIES
Discharge: HOME | End: 2018-04-20
Payer: MEDICARE

## 2018-04-20 LAB
ALBUMIN SERPL-MCNC: 3.1 G/DL (ref 3.5–5.1)
ALBUMIN SERPL-MCNC: 3.1 G/DL (ref 3.5–5.1)
ALP BLD-CCNC: 72 U/L (ref 38–126)
ALP BLD-CCNC: 77 U/L (ref 38–126)
ALT SERPL-CCNC: 13 U/L (ref 11–66)
ALT SERPL-CCNC: 14 U/L (ref 11–66)
AMMONIA: 29 UMOL/L (ref 11–60)
AMPHETAMINE+METHAMPHETAMINE URINE SCREEN: NEGATIVE
ANION GAP SERPL CALCULATED.3IONS-SCNC: 10 MEQ/L (ref 8–16)
ANION GAP SERPL CALCULATED.3IONS-SCNC: 12 MEQ/L (ref 8–16)
ANISOCYTOSIS: ABNORMAL
AST SERPL-CCNC: 17 U/L (ref 5–40)
AST SERPL-CCNC: 19 U/L (ref 5–40)
BARBITURATE QUANTITATIVE URINE: NEGATIVE
BASOPHILS # BLD: 0.7 %
BASOPHILS ABSOLUTE: 0 THOU/MM3 (ref 0–0.1)
BENZODIAZEPINE QUANTITATIVE URINE: NEGATIVE
BILIRUB SERPL-MCNC: 0.4 MG/DL (ref 0.3–1.2)
BILIRUB SERPL-MCNC: 0.4 MG/DL (ref 0.3–1.2)
BILIRUBIN DIRECT: < 0.2 MG/DL (ref 0–0.3)
BILIRUBIN DIRECT: < 0.2 MG/DL (ref 0–0.3)
BILIRUBIN URINE: NEGATIVE
BLOOD, URINE: NEGATIVE
BUN BLDV-MCNC: 14 MG/DL (ref 7–22)
BUN BLDV-MCNC: 17 MG/DL (ref 7–22)
CALCIUM SERPL-MCNC: 8.5 MG/DL (ref 8.5–10.5)
CALCIUM SERPL-MCNC: 9 MG/DL (ref 8.5–10.5)
CANNABINOID QUANTITATIVE URINE: NEGATIVE
CHARACTER, URINE: CLEAR
CHLORIDE BLD-SCNC: 93 MEQ/L (ref 98–111)
CHLORIDE BLD-SCNC: 98 MEQ/L (ref 98–111)
CO2: 24 MEQ/L (ref 23–33)
CO2: 25 MEQ/L (ref 23–33)
COCAINE METABOLITE QUANTITATIVE URINE: NEGATIVE
COLOR: YELLOW
CREAT SERPL-MCNC: 0.6 MG/DL (ref 0.4–1.2)
CREAT SERPL-MCNC: 0.7 MG/DL (ref 0.4–1.2)
CREATININE URINE: 33.8 MG/DL
D-DIMER QUANTITATIVE: 2198 NG/ML FEU (ref 0–500)
EKG ATRIAL RATE: 84 BPM
EKG P AXIS: -24 DEGREES
EKG P-R INTERVAL: 172 MS
EKG Q-T INTERVAL: 364 MS
EKG QRS DURATION: 86 MS
EKG QTC CALCULATION (BAZETT): 430 MS
EKG R AXIS: 90 DEGREES
EKG T AXIS: 12 DEGREES
EKG VENTRICULAR RATE: 84 BPM
EOSINOPHIL # BLD: 2.7 %
EOSINOPHILS ABSOLUTE: 0.2 THOU/MM3 (ref 0–0.4)
ETHYL ALCOHOL, SERUM: < 0.01 %
GFR SERPL CREATININE-BSD FRML MDRD: 81 ML/MIN/1.73M2
GFR SERPL CREATININE-BSD FRML MDRD: > 90 ML/MIN/1.73M2
GLUCOSE BLD-MCNC: 111 MG/DL (ref 70–108)
GLUCOSE BLD-MCNC: 138 MG/DL (ref 70–108)
GLUCOSE BLD-MCNC: 153 MG/DL (ref 70–108)
GLUCOSE URINE: NEGATIVE MG/DL
HCT VFR BLD CALC: 27.3 % (ref 37–47)
HEMOGLOBIN: 9.2 GM/DL (ref 12–16)
KETONES, URINE: NEGATIVE
LEUKOCYTE ESTERASE, URINE: NEGATIVE
LIPASE: 23.2 U/L (ref 5.6–51.3)
LYMPHOCYTES # BLD: 16.6 %
LYMPHOCYTES ABSOLUTE: 1.1 THOU/MM3 (ref 1–4.8)
MAGNESIUM: 1.4 MG/DL (ref 1.6–2.4)
MAGNESIUM: 1.6 MG/DL (ref 1.6–2.4)
MCH RBC QN AUTO: 29 PG (ref 27–31)
MCHC RBC AUTO-ENTMCNC: 33.7 GM/DL (ref 33–37)
MCV RBC AUTO: 86.2 FL (ref 81–99)
MONOCYTES # BLD: 10.4 %
MONOCYTES ABSOLUTE: 0.7 THOU/MM3 (ref 0.4–1.3)
NITRITE, URINE: NEGATIVE
NUCLEATED RED BLOOD CELLS: 0 /100 WBC
OPIATES, URINE: NEGATIVE
OSMOLALITY CALCULATION: 262.7 MOSMOL/KG (ref 275–300)
OSMOLALITY URINE: 347 MOSMOL/KG (ref 250–750)
OSMOLALITY: 282 MOSMOL/KG (ref 275–295)
OXYCODONE: POSITIVE
PDW BLD-RTO: 17.6 % (ref 11.5–14.5)
PH UA: 7.5
PHENCYCLIDINE QUANTITATIVE URINE: NEGATIVE
PLATELET # BLD: 255 THOU/MM3 (ref 130–400)
PMV BLD AUTO: 7 FL (ref 7.4–10.4)
POTASSIUM REFLEX MAGNESIUM: 4.5 MEQ/L (ref 3.5–5.2)
POTASSIUM SERPL-SCNC: 5 MEQ/L (ref 3.5–5.2)
PROTEIN UA: NEGATIVE
RBC # BLD: 3.16 MILL/MM3 (ref 4.2–5.4)
SEG NEUTROPHILS: 69.6 %
SEGMENTED NEUTROPHILS ABSOLUTE COUNT: 4.8 THOU/MM3 (ref 1.8–7.7)
SODIUM BLD-SCNC: 129 MEQ/L (ref 135–145)
SODIUM BLD-SCNC: 133 MEQ/L (ref 135–145)
SODIUM URINE: 82 MEQ/L
SPECIFIC GRAVITY, URINE: 1.01 (ref 1–1.03)
TOTAL CK: 45 U/L (ref 30–135)
TOTAL PROTEIN: 6.1 G/DL (ref 6.1–8)
TOTAL PROTEIN: 6.2 G/DL (ref 6.1–8)
TROPONIN T: < 0.01 NG/ML
TSH SERPL DL<=0.05 MIU/L-ACNC: 1.57 UIU/ML (ref 0.4–4.2)
UROBILINOGEN, URINE: 0.2 EU/DL
WBC # BLD: 6.9 THOU/MM3 (ref 4.8–10.8)

## 2018-04-20 PROCEDURE — 70450 CT HEAD/BRAIN W/O DYE: CPT

## 2018-04-20 PROCEDURE — 87147 CULTURE TYPE IMMUNOLOGIC: CPT

## 2018-04-20 PROCEDURE — 80053 COMPREHEN METABOLIC PANEL: CPT

## 2018-04-20 PROCEDURE — 84484 ASSAY OF TROPONIN QUANT: CPT

## 2018-04-20 PROCEDURE — 1200000000 HC SEMI PRIVATE

## 2018-04-20 PROCEDURE — 82248 BILIRUBIN DIRECT: CPT

## 2018-04-20 PROCEDURE — 83735 ASSAY OF MAGNESIUM: CPT

## 2018-04-20 PROCEDURE — 81003 URINALYSIS AUTO W/O SCOPE: CPT

## 2018-04-20 PROCEDURE — 2580000003 HC RX 258: Performed by: INTERNAL MEDICINE

## 2018-04-20 PROCEDURE — 82570 ASSAY OF URINE CREATININE: CPT

## 2018-04-20 PROCEDURE — G0480 DRUG TEST DEF 1-7 CLASSES: HCPCS

## 2018-04-20 PROCEDURE — 83690 ASSAY OF LIPASE: CPT

## 2018-04-20 PROCEDURE — 6360000002 HC RX W HCPCS: Performed by: EMERGENCY MEDICINE

## 2018-04-20 PROCEDURE — 71275 CT ANGIOGRAPHY CHEST: CPT

## 2018-04-20 PROCEDURE — 84300 ASSAY OF URINE SODIUM: CPT

## 2018-04-20 PROCEDURE — 6360000004 HC RX CONTRAST MEDICATION: Performed by: EMERGENCY MEDICINE

## 2018-04-20 PROCEDURE — 85379 FIBRIN DEGRADATION QUANT: CPT

## 2018-04-20 PROCEDURE — 82550 ASSAY OF CK (CPK): CPT

## 2018-04-20 PROCEDURE — 6370000000 HC RX 637 (ALT 250 FOR IP): Performed by: FAMILY MEDICINE

## 2018-04-20 PROCEDURE — 93005 ELECTROCARDIOGRAM TRACING: CPT | Performed by: EMERGENCY MEDICINE

## 2018-04-20 PROCEDURE — 84443 ASSAY THYROID STIM HORMONE: CPT

## 2018-04-20 PROCEDURE — 6370000000 HC RX 637 (ALT 250 FOR IP): Performed by: INTERNAL MEDICINE

## 2018-04-20 PROCEDURE — 87040 BLOOD CULTURE FOR BACTERIA: CPT

## 2018-04-20 PROCEDURE — 36415 COLL VENOUS BLD VENIPUNCTURE: CPT

## 2018-04-20 PROCEDURE — 83935 ASSAY OF URINE OSMOLALITY: CPT

## 2018-04-20 PROCEDURE — 80048 BASIC METABOLIC PNL TOTAL CA: CPT

## 2018-04-20 PROCEDURE — 82140 ASSAY OF AMMONIA: CPT

## 2018-04-20 PROCEDURE — 85025 COMPLETE CBC W/AUTO DIFF WBC: CPT

## 2018-04-20 PROCEDURE — 99223 1ST HOSP IP/OBS HIGH 75: CPT | Performed by: INTERNAL MEDICINE

## 2018-04-20 PROCEDURE — 83930 ASSAY OF BLOOD OSMOLALITY: CPT

## 2018-04-20 PROCEDURE — 2580000003 HC RX 258: Performed by: EMERGENCY MEDICINE

## 2018-04-20 PROCEDURE — 71045 X-RAY EXAM CHEST 1 VIEW: CPT

## 2018-04-20 PROCEDURE — 82948 REAGENT STRIP/BLOOD GLUCOSE: CPT

## 2018-04-20 PROCEDURE — 6360000002 HC RX W HCPCS: Performed by: INTERNAL MEDICINE

## 2018-04-20 PROCEDURE — 96374 THER/PROPH/DIAG INJ IV PUSH: CPT

## 2018-04-20 PROCEDURE — 80307 DRUG TEST PRSMV CHEM ANLYZR: CPT

## 2018-04-20 PROCEDURE — 80076 HEPATIC FUNCTION PANEL: CPT

## 2018-04-20 RX ORDER — DIPHENHYDRAMINE HCL 25 MG
25 TABLET ORAL ONCE
Status: COMPLETED | OUTPATIENT
Start: 2018-04-20 | End: 2018-04-20

## 2018-04-20 RX ORDER — CLOTRIMAZOLE 1 %
CREAM (GRAM) TOPICAL 2 TIMES DAILY
Status: DISCONTINUED | OUTPATIENT
Start: 2018-04-20 | End: 2018-04-23 | Stop reason: HOSPADM

## 2018-04-20 RX ORDER — ONDANSETRON 2 MG/ML
4 INJECTION INTRAMUSCULAR; INTRAVENOUS EVERY 6 HOURS PRN
Status: DISCONTINUED | OUTPATIENT
Start: 2018-04-20 | End: 2018-04-23 | Stop reason: HOSPADM

## 2018-04-20 RX ORDER — HYDRALAZINE HYDROCHLORIDE 50 MG/1
50 TABLET, FILM COATED ORAL 3 TIMES DAILY
Status: DISCONTINUED | OUTPATIENT
Start: 2018-04-20 | End: 2018-04-23 | Stop reason: HOSPADM

## 2018-04-20 RX ORDER — ATORVASTATIN CALCIUM 40 MG/1
40 TABLET, FILM COATED ORAL NIGHTLY
Status: DISCONTINUED | OUTPATIENT
Start: 2018-04-20 | End: 2018-04-23 | Stop reason: HOSPADM

## 2018-04-20 RX ORDER — PREGABALIN 75 MG/1
150 CAPSULE ORAL DAILY
Status: DISCONTINUED | OUTPATIENT
Start: 2018-04-20 | End: 2018-04-23 | Stop reason: HOSPADM

## 2018-04-20 RX ORDER — PREDNISOLONE ACETATE 10 MG/ML
1 SUSPENSION/ DROPS OPHTHALMIC 2 TIMES DAILY
Status: DISCONTINUED | OUTPATIENT
Start: 2018-04-20 | End: 2018-04-23 | Stop reason: HOSPADM

## 2018-04-20 RX ORDER — DIAPER,BRIEF,INFANT-TODD,DISP
EACH MISCELLANEOUS 2 TIMES DAILY
Status: DISCONTINUED | OUTPATIENT
Start: 2018-04-20 | End: 2018-04-23 | Stop reason: HOSPADM

## 2018-04-20 RX ORDER — PANTOPRAZOLE SODIUM 40 MG/1
40 TABLET, DELAYED RELEASE ORAL
Status: DISCONTINUED | OUTPATIENT
Start: 2018-04-20 | End: 2018-04-23 | Stop reason: HOSPADM

## 2018-04-20 RX ORDER — TETRAHYDROZOLINE HCL 0.05 %
1 DROPS OPHTHALMIC (EYE) 3 TIMES DAILY
Status: DISCONTINUED | OUTPATIENT
Start: 2018-04-20 | End: 2018-04-23 | Stop reason: HOSPADM

## 2018-04-20 RX ORDER — LISINOPRIL AND HYDROCHLOROTHIAZIDE 20; 12.5 MG/1; MG/1
2 TABLET ORAL DAILY
Status: ON HOLD | COMMUNITY
End: 2018-04-20 | Stop reason: ALTCHOICE

## 2018-04-20 RX ORDER — SODIUM CHLORIDE 0.9 % (FLUSH) 0.9 %
10 SYRINGE (ML) INJECTION PRN
Status: DISCONTINUED | OUTPATIENT
Start: 2018-04-20 | End: 2018-04-23 | Stop reason: HOSPADM

## 2018-04-20 RX ORDER — PREGABALIN 75 MG/1
150 CAPSULE ORAL 2 TIMES DAILY
Status: DISCONTINUED | OUTPATIENT
Start: 2018-04-20 | End: 2018-04-20 | Stop reason: SDUPTHER

## 2018-04-20 RX ORDER — SODIUM CHLORIDE 0.9 % (FLUSH) 0.9 %
10 SYRINGE (ML) INJECTION EVERY 12 HOURS SCHEDULED
Status: DISCONTINUED | OUTPATIENT
Start: 2018-04-20 | End: 2018-04-23 | Stop reason: HOSPADM

## 2018-04-20 RX ORDER — PIOGLITAZONEHYDROCHLORIDE 30 MG/1
30 TABLET ORAL DAILY
Status: DISCONTINUED | OUTPATIENT
Start: 2018-04-20 | End: 2018-04-23 | Stop reason: HOSPADM

## 2018-04-20 RX ORDER — FUROSEMIDE 10 MG/ML
20 INJECTION INTRAMUSCULAR; INTRAVENOUS DAILY
Status: DISCONTINUED | OUTPATIENT
Start: 2018-04-20 | End: 2018-04-21

## 2018-04-20 RX ORDER — ACETAMINOPHEN 325 MG/1
650 TABLET ORAL EVERY 4 HOURS PRN
Status: DISCONTINUED | OUTPATIENT
Start: 2018-04-20 | End: 2018-04-23 | Stop reason: HOSPADM

## 2018-04-20 RX ORDER — PREGABALIN 75 MG/1
300 CAPSULE ORAL NIGHTLY
Status: DISCONTINUED | OUTPATIENT
Start: 2018-04-20 | End: 2018-04-23 | Stop reason: HOSPADM

## 2018-04-20 RX ORDER — SODIUM CHLORIDE 9 MG/ML
INJECTION, SOLUTION INTRAVENOUS CONTINUOUS
Status: DISCONTINUED | OUTPATIENT
Start: 2018-04-20 | End: 2018-04-22 | Stop reason: ALTCHOICE

## 2018-04-20 RX ORDER — POLYVINYL ALCOHOL 14 MG/ML
1 SOLUTION/ DROPS OPHTHALMIC PRN
Status: DISCONTINUED | OUTPATIENT
Start: 2018-04-20 | End: 2018-04-23 | Stop reason: HOSPADM

## 2018-04-20 RX ORDER — PROPRANOLOL HYDROCHLORIDE 20 MG/1
20 TABLET ORAL 3 TIMES DAILY
Status: DISCONTINUED | OUTPATIENT
Start: 2018-04-20 | End: 2018-04-23 | Stop reason: HOSPADM

## 2018-04-20 RX ORDER — SODIUM CHLORIDE 9 MG/ML
INJECTION, SOLUTION INTRAVENOUS CONTINUOUS
Status: DISCONTINUED | OUTPATIENT
Start: 2018-04-20 | End: 2018-04-20 | Stop reason: SDUPTHER

## 2018-04-20 RX ORDER — OXYCODONE HYDROCHLORIDE AND ACETAMINOPHEN 5; 325 MG/1; MG/1
1 TABLET ORAL EVERY 6 HOURS PRN
Status: DISCONTINUED | OUTPATIENT
Start: 2018-04-20 | End: 2018-04-23 | Stop reason: HOSPADM

## 2018-04-20 RX ORDER — ZOLPIDEM TARTRATE 5 MG/1
5 TABLET ORAL NIGHTLY PRN
Status: DISCONTINUED | OUTPATIENT
Start: 2018-04-20 | End: 2018-04-23 | Stop reason: HOSPADM

## 2018-04-20 RX ORDER — LISINOPRIL 5 MG/1
5 TABLET ORAL DAILY
Status: DISCONTINUED | OUTPATIENT
Start: 2018-04-20 | End: 2018-04-23 | Stop reason: HOSPADM

## 2018-04-20 RX ADMIN — PIOGLITAZONE 30 MG: 30 TABLET ORAL at 09:43

## 2018-04-20 RX ADMIN — HYDRALAZINE HYDROCHLORIDE 50 MG: 50 TABLET, FILM COATED ORAL at 09:44

## 2018-04-20 RX ADMIN — PANTOPRAZOLE SODIUM 40 MG: 40 TABLET, DELAYED RELEASE ORAL at 09:30

## 2018-04-20 RX ADMIN — HYDRALAZINE HYDROCHLORIDE 50 MG: 50 TABLET, FILM COATED ORAL at 14:39

## 2018-04-20 RX ADMIN — ATORVASTATIN CALCIUM 40 MG: 40 TABLET, FILM COATED ORAL at 20:52

## 2018-04-20 RX ADMIN — PREDNISOLONE ACETATE 1 DROP: 10 SUSPENSION/ DROPS OPHTHALMIC at 09:54

## 2018-04-20 RX ADMIN — LISINOPRIL 5 MG: 5 TABLET ORAL at 09:44

## 2018-04-20 RX ADMIN — ENOXAPARIN SODIUM 40 MG: 40 INJECTION SUBCUTANEOUS at 09:30

## 2018-04-20 RX ADMIN — OXYCODONE HYDROCHLORIDE AND ACETAMINOPHEN 1 TABLET: 5; 325 TABLET ORAL at 10:50

## 2018-04-20 RX ADMIN — PREDNISOLONE ACETATE 1 DROP: 10 SUSPENSION/ DROPS OPHTHALMIC at 20:51

## 2018-04-20 RX ADMIN — FUROSEMIDE 20 MG: 10 INJECTION, SOLUTION INTRAMUSCULAR; INTRAVENOUS at 11:17

## 2018-04-20 RX ADMIN — TETRAHYDROZOLINE HYDROCHLORIDE 1 DROP: 0.05 SOLUTION/ DROPS OPHTHALMIC at 14:45

## 2018-04-20 RX ADMIN — SODIUM CHLORIDE: 9 INJECTION, SOLUTION INTRAVENOUS at 02:11

## 2018-04-20 RX ADMIN — Medication 10 ML: at 20:59

## 2018-04-20 RX ADMIN — ENOXAPARIN SODIUM 40 MG: 40 INJECTION SUBCUTANEOUS at 20:58

## 2018-04-20 RX ADMIN — TETRAHYDROZOLINE HYDROCHLORIDE 1 DROP: 0.05 SOLUTION/ DROPS OPHTHALMIC at 09:55

## 2018-04-20 RX ADMIN — POLYVINYL ALCOHOL 1 DROP: 14 SOLUTION/ DROPS OPHTHALMIC at 09:30

## 2018-04-20 RX ADMIN — MAGNESIUM SULFATE HEPTAHYDRATE 1 G: 500 INJECTION, SOLUTION INTRAMUSCULAR; INTRAVENOUS at 02:59

## 2018-04-20 RX ADMIN — DIPHENHYDRAMINE HCL 25 MG: 25 TABLET ORAL at 22:16

## 2018-04-20 RX ADMIN — CLOTRIMAZOLE: 10 CREAM TOPICAL at 20:51

## 2018-04-20 RX ADMIN — ACETAMINOPHEN 650 MG: 325 TABLET ORAL at 16:12

## 2018-04-20 RX ADMIN — OXYCODONE HYDROCHLORIDE AND ACETAMINOPHEN 1 TABLET: 5; 325 TABLET ORAL at 22:54

## 2018-04-20 RX ADMIN — PROPRANOLOL HYDROCHLORIDE 20 MG: 20 TABLET ORAL at 20:52

## 2018-04-20 RX ADMIN — PROPRANOLOL HYDROCHLORIDE 20 MG: 20 TABLET ORAL at 14:40

## 2018-04-20 RX ADMIN — IOPAMIDOL 80 ML: 755 INJECTION, SOLUTION INTRAVENOUS at 04:24

## 2018-04-20 RX ADMIN — POLYVINYL ALCOHOL 1 DROP: 14 SOLUTION/ DROPS OPHTHALMIC at 20:51

## 2018-04-20 RX ADMIN — CLOTRIMAZOLE 1 %: 10 CREAM TOPICAL at 09:52

## 2018-04-20 RX ADMIN — SODIUM CHLORIDE: 9 INJECTION, SOLUTION INTRAVENOUS at 14:36

## 2018-04-20 RX ADMIN — HYDROCORTISONE: 1 CREAM TOPICAL at 22:16

## 2018-04-20 RX ADMIN — HYDRALAZINE HYDROCHLORIDE 50 MG: 50 TABLET, FILM COATED ORAL at 20:51

## 2018-04-20 RX ADMIN — TETRAHYDROZOLINE HYDROCHLORIDE 1 DROP: 0.05 SOLUTION/ DROPS OPHTHALMIC at 20:51

## 2018-04-20 RX ADMIN — PROPRANOLOL HYDROCHLORIDE 20 MG: 20 TABLET ORAL at 09:44

## 2018-04-20 RX ADMIN — PREGABALIN 300 MG: 75 CAPSULE ORAL at 20:58

## 2018-04-20 RX ADMIN — PREGABALIN 150 MG: 75 CAPSULE ORAL at 09:30

## 2018-04-20 ASSESSMENT — ENCOUNTER SYMPTOMS
EYE PAIN: 0
ABDOMINAL PAIN: 0
VOMITING: 0
RHINORRHEA: 0
NAUSEA: 0
SHORTNESS OF BREATH: 0
COUGH: 0
SORE THROAT: 0
WHEEZING: 0
BACK PAIN: 0
DIARRHEA: 0
EYE DISCHARGE: 0

## 2018-04-20 ASSESSMENT — PAIN DESCRIPTION - PAIN TYPE: TYPE: CHRONIC PAIN

## 2018-04-20 ASSESSMENT — PAIN SCALES - GENERAL
PAINLEVEL_OUTOF10: 6
PAINLEVEL_OUTOF10: 6
PAINLEVEL_OUTOF10: 10
PAINLEVEL_OUTOF10: 4
PAINLEVEL_OUTOF10: 8

## 2018-04-20 ASSESSMENT — PAIN DESCRIPTION - LOCATION: LOCATION: SHOULDER

## 2018-04-21 LAB
ANION GAP SERPL CALCULATED.3IONS-SCNC: 9 MEQ/L (ref 8–16)
BUN BLDV-MCNC: 11 MG/DL (ref 7–22)
CALCIUM SERPL-MCNC: 8.7 MG/DL (ref 8.5–10.5)
CHLORIDE BLD-SCNC: 98 MEQ/L (ref 98–111)
CO2: 25 MEQ/L (ref 23–33)
CREAT SERPL-MCNC: 0.6 MG/DL (ref 0.4–1.2)
GFR SERPL CREATININE-BSD FRML MDRD: > 90 ML/MIN/1.73M2
GLUCOSE BLD-MCNC: 137 MG/DL (ref 70–108)
HCT VFR BLD CALC: 27.2 % (ref 37–47)
HEMOGLOBIN: 9.1 GM/DL (ref 12–16)
MCH RBC QN AUTO: 28.4 PG (ref 27–31)
MCHC RBC AUTO-ENTMCNC: 33.2 GM/DL (ref 33–37)
MCV RBC AUTO: 85.6 FL (ref 81–99)
PDW BLD-RTO: 16.7 % (ref 11.5–14.5)
PLATELET # BLD: 229 THOU/MM3 (ref 130–400)
PMV BLD AUTO: 6.8 FL (ref 7.4–10.4)
POTASSIUM SERPL-SCNC: 4.5 MEQ/L (ref 3.5–5.2)
RBC # BLD: 3.18 MILL/MM3 (ref 4.2–5.4)
SODIUM BLD-SCNC: 132 MEQ/L (ref 135–145)
WBC # BLD: 5.6 THOU/MM3 (ref 4.8–10.8)

## 2018-04-21 PROCEDURE — 2580000003 HC RX 258: Performed by: INTERNAL MEDICINE

## 2018-04-21 PROCEDURE — 1200000000 HC SEMI PRIVATE

## 2018-04-21 PROCEDURE — 87801 DETECT AGNT MULT DNA AMPLI: CPT

## 2018-04-21 PROCEDURE — 85027 COMPLETE CBC AUTOMATED: CPT

## 2018-04-21 PROCEDURE — 80048 BASIC METABOLIC PNL TOTAL CA: CPT

## 2018-04-21 PROCEDURE — 99232 SBSQ HOSP IP/OBS MODERATE 35: CPT | Performed by: FAMILY MEDICINE

## 2018-04-21 PROCEDURE — 36415 COLL VENOUS BLD VENIPUNCTURE: CPT

## 2018-04-21 PROCEDURE — 6370000000 HC RX 637 (ALT 250 FOR IP): Performed by: INTERNAL MEDICINE

## 2018-04-21 PROCEDURE — 6360000002 HC RX W HCPCS: Performed by: INTERNAL MEDICINE

## 2018-04-21 RX ADMIN — ZOLPIDEM TARTRATE 5 MG: 5 TABLET ORAL at 23:23

## 2018-04-21 RX ADMIN — CLOTRIMAZOLE: 10 CREAM TOPICAL at 20:52

## 2018-04-21 RX ADMIN — TETRAHYDROZOLINE HYDROCHLORIDE 1 DROP: 0.05 SOLUTION/ DROPS OPHTHALMIC at 09:17

## 2018-04-21 RX ADMIN — ENOXAPARIN SODIUM 40 MG: 40 INJECTION SUBCUTANEOUS at 09:20

## 2018-04-21 RX ADMIN — LISINOPRIL 5 MG: 5 TABLET ORAL at 09:14

## 2018-04-21 RX ADMIN — PREGABALIN 150 MG: 75 CAPSULE ORAL at 09:19

## 2018-04-21 RX ADMIN — TETRAHYDROZOLINE HYDROCHLORIDE 1 DROP: 0.05 SOLUTION/ DROPS OPHTHALMIC at 15:29

## 2018-04-21 RX ADMIN — TETRAHYDROZOLINE HYDROCHLORIDE 1 DROP: 0.05 SOLUTION/ DROPS OPHTHALMIC at 20:53

## 2018-04-21 RX ADMIN — HYDROCORTISONE: 1 CREAM TOPICAL at 09:22

## 2018-04-21 RX ADMIN — OXYCODONE HYDROCHLORIDE AND ACETAMINOPHEN 1 TABLET: 5; 325 TABLET ORAL at 09:14

## 2018-04-21 RX ADMIN — CLOTRIMAZOLE: 10 CREAM TOPICAL at 09:11

## 2018-04-21 RX ADMIN — ENOXAPARIN SODIUM 40 MG: 40 INJECTION SUBCUTANEOUS at 20:52

## 2018-04-21 RX ADMIN — PREDNISOLONE ACETATE 1 DROP: 10 SUSPENSION/ DROPS OPHTHALMIC at 20:53

## 2018-04-21 RX ADMIN — PREDNISOLONE ACETATE 1 DROP: 10 SUSPENSION/ DROPS OPHTHALMIC at 09:21

## 2018-04-21 RX ADMIN — OXYCODONE HYDROCHLORIDE AND ACETAMINOPHEN 1 TABLET: 5; 325 TABLET ORAL at 23:23

## 2018-04-21 RX ADMIN — PROPRANOLOL HYDROCHLORIDE 20 MG: 20 TABLET ORAL at 09:14

## 2018-04-21 RX ADMIN — PREGABALIN 300 MG: 75 CAPSULE ORAL at 20:53

## 2018-04-21 RX ADMIN — HYDROCORTISONE: 1 CREAM TOPICAL at 20:52

## 2018-04-21 RX ADMIN — HYDRALAZINE HYDROCHLORIDE 50 MG: 50 TABLET, FILM COATED ORAL at 15:29

## 2018-04-21 RX ADMIN — PROPRANOLOL HYDROCHLORIDE 20 MG: 20 TABLET ORAL at 15:29

## 2018-04-21 RX ADMIN — HYDRALAZINE HYDROCHLORIDE 50 MG: 50 TABLET, FILM COATED ORAL at 09:14

## 2018-04-21 RX ADMIN — PROPRANOLOL HYDROCHLORIDE 20 MG: 20 TABLET ORAL at 21:00

## 2018-04-21 RX ADMIN — HYDRALAZINE HYDROCHLORIDE 50 MG: 50 TABLET, FILM COATED ORAL at 20:52

## 2018-04-21 RX ADMIN — POLYVINYL ALCOHOL 1 DROP: 14 SOLUTION/ DROPS OPHTHALMIC at 20:53

## 2018-04-21 RX ADMIN — ATORVASTATIN CALCIUM 40 MG: 40 TABLET, FILM COATED ORAL at 20:52

## 2018-04-21 RX ADMIN — SALINE NASAL SPRAY 1 SPRAY: 1.5 SOLUTION NASAL at 20:53

## 2018-04-21 RX ADMIN — Medication 10 ML: at 21:00

## 2018-04-21 RX ADMIN — PIOGLITAZONE 30 MG: 30 TABLET ORAL at 09:14

## 2018-04-21 ASSESSMENT — PAIN SCALES - GENERAL
PAINLEVEL_OUTOF10: 4
PAINLEVEL_OUTOF10: 8
PAINLEVEL_OUTOF10: 8
PAINLEVEL_OUTOF10: 5

## 2018-04-22 LAB
ANION GAP SERPL CALCULATED.3IONS-SCNC: 12 MEQ/L (ref 8–16)
AVERAGE GLUCOSE: 123 MG/DL (ref 70–126)
BLOOD CULTURE, ROUTINE: ABNORMAL
BUN BLDV-MCNC: 8 MG/DL (ref 7–22)
CALCIUM SERPL-MCNC: 8.6 MG/DL (ref 8.5–10.5)
CHLORIDE BLD-SCNC: 97 MEQ/L (ref 98–111)
CO2: 24 MEQ/L (ref 23–33)
CREAT SERPL-MCNC: 0.5 MG/DL (ref 0.4–1.2)
FOLATE: 7 NG/ML (ref 4.8–24.2)
GFR SERPL CREATININE-BSD FRML MDRD: > 90 ML/MIN/1.73M2
GLUCOSE BLD-MCNC: 133 MG/DL (ref 70–108)
HBA1C MFR BLD: 6.1 % (ref 4.4–6.4)
MAGNESIUM: 1.3 MG/DL (ref 1.6–2.4)
ORGANISM: ABNORMAL
ORGANISM: ABNORMAL
PHOSPHORUS: 3.4 MG/DL (ref 2.4–4.7)
POTASSIUM SERPL-SCNC: 4 MEQ/L (ref 3.5–5.2)
SODIUM BLD-SCNC: 133 MEQ/L (ref 135–145)
VITAMIN B-12: 367 PG/ML (ref 211–911)

## 2018-04-22 PROCEDURE — 2580000003 HC RX 258: Performed by: INTERNAL MEDICINE

## 2018-04-22 PROCEDURE — 99232 SBSQ HOSP IP/OBS MODERATE 35: CPT | Performed by: HOSPITALIST

## 2018-04-22 PROCEDURE — 83735 ASSAY OF MAGNESIUM: CPT

## 2018-04-22 PROCEDURE — 84100 ASSAY OF PHOSPHORUS: CPT

## 2018-04-22 PROCEDURE — 6370000000 HC RX 637 (ALT 250 FOR IP): Performed by: INTERNAL MEDICINE

## 2018-04-22 PROCEDURE — 82607 VITAMIN B-12: CPT

## 2018-04-22 PROCEDURE — 6370000000 HC RX 637 (ALT 250 FOR IP): Performed by: HOSPITALIST

## 2018-04-22 PROCEDURE — 36415 COLL VENOUS BLD VENIPUNCTURE: CPT

## 2018-04-22 PROCEDURE — 6360000002 HC RX W HCPCS: Performed by: INTERNAL MEDICINE

## 2018-04-22 PROCEDURE — 83036 HEMOGLOBIN GLYCOSYLATED A1C: CPT

## 2018-04-22 PROCEDURE — 82746 ASSAY OF FOLIC ACID SERUM: CPT

## 2018-04-22 PROCEDURE — 1200000000 HC SEMI PRIVATE

## 2018-04-22 PROCEDURE — 6360000002 HC RX W HCPCS: Performed by: HOSPITALIST

## 2018-04-22 PROCEDURE — 80048 BASIC METABOLIC PNL TOTAL CA: CPT

## 2018-04-22 RX ORDER — SODIUM CHLORIDE 1000 MG
1 TABLET, SOLUBLE MISCELLANEOUS
Status: DISCONTINUED | OUTPATIENT
Start: 2018-04-22 | End: 2018-04-23 | Stop reason: HOSPADM

## 2018-04-22 RX ORDER — MAGNESIUM SULFATE IN WATER 40 MG/ML
2 INJECTION, SOLUTION INTRAVENOUS ONCE
Status: COMPLETED | OUTPATIENT
Start: 2018-04-22 | End: 2018-04-22

## 2018-04-22 RX ADMIN — Medication 10 ML: at 20:59

## 2018-04-22 RX ADMIN — ZOLPIDEM TARTRATE 5 MG: 5 TABLET ORAL at 23:09

## 2018-04-22 RX ADMIN — HYDROCORTISONE: 1 CREAM TOPICAL at 09:40

## 2018-04-22 RX ADMIN — TETRAHYDROZOLINE HYDROCHLORIDE 1 DROP: 0.05 SOLUTION/ DROPS OPHTHALMIC at 17:23

## 2018-04-22 RX ADMIN — SALINE NASAL SPRAY 1 SPRAY: 1.5 SOLUTION NASAL at 23:09

## 2018-04-22 RX ADMIN — PREDNISOLONE ACETATE 1 DROP: 10 SUSPENSION/ DROPS OPHTHALMIC at 09:41

## 2018-04-22 RX ADMIN — PANTOPRAZOLE SODIUM 40 MG: 40 TABLET, DELAYED RELEASE ORAL at 05:33

## 2018-04-22 RX ADMIN — PREGABALIN 150 MG: 75 CAPSULE ORAL at 09:40

## 2018-04-22 RX ADMIN — TETRAHYDROZOLINE HYDROCHLORIDE 1 DROP: 0.05 SOLUTION/ DROPS OPHTHALMIC at 20:55

## 2018-04-22 RX ADMIN — SODIUM CHLORIDE TAB 1 GM 1 G: 1 TAB at 17:23

## 2018-04-22 RX ADMIN — HYDROCORTISONE: 1 CREAM TOPICAL at 20:54

## 2018-04-22 RX ADMIN — SODIUM CHLORIDE: 9 INJECTION, SOLUTION INTRAVENOUS at 05:00

## 2018-04-22 RX ADMIN — HYDRALAZINE HYDROCHLORIDE 50 MG: 50 TABLET, FILM COATED ORAL at 20:52

## 2018-04-22 RX ADMIN — CLOTRIMAZOLE: 10 CREAM TOPICAL at 09:41

## 2018-04-22 RX ADMIN — PIOGLITAZONE 30 MG: 30 TABLET ORAL at 09:41

## 2018-04-22 RX ADMIN — ENOXAPARIN SODIUM 40 MG: 40 INJECTION SUBCUTANEOUS at 09:40

## 2018-04-22 RX ADMIN — OXYCODONE HYDROCHLORIDE AND ACETAMINOPHEN 1 TABLET: 5; 325 TABLET ORAL at 14:06

## 2018-04-22 RX ADMIN — PREDNISOLONE ACETATE 1 DROP: 10 SUSPENSION/ DROPS OPHTHALMIC at 20:33

## 2018-04-22 RX ADMIN — PROPRANOLOL HYDROCHLORIDE 20 MG: 20 TABLET ORAL at 09:41

## 2018-04-22 RX ADMIN — ENOXAPARIN SODIUM 40 MG: 40 INJECTION SUBCUTANEOUS at 20:54

## 2018-04-22 RX ADMIN — TETRAHYDROZOLINE HYDROCHLORIDE 1 DROP: 0.05 SOLUTION/ DROPS OPHTHALMIC at 09:41

## 2018-04-22 RX ADMIN — PREGABALIN 300 MG: 75 CAPSULE ORAL at 20:54

## 2018-04-22 RX ADMIN — HYDRALAZINE HYDROCHLORIDE 50 MG: 50 TABLET, FILM COATED ORAL at 17:23

## 2018-04-22 RX ADMIN — HYDRALAZINE HYDROCHLORIDE 50 MG: 50 TABLET, FILM COATED ORAL at 09:41

## 2018-04-22 RX ADMIN — OXYCODONE HYDROCHLORIDE AND ACETAMINOPHEN 1 TABLET: 5; 325 TABLET ORAL at 20:52

## 2018-04-22 RX ADMIN — LISINOPRIL 5 MG: 5 TABLET ORAL at 09:41

## 2018-04-22 RX ADMIN — ACETAMINOPHEN 650 MG: 325 TABLET ORAL at 05:32

## 2018-04-22 RX ADMIN — PROPRANOLOL HYDROCHLORIDE 20 MG: 20 TABLET ORAL at 23:13

## 2018-04-22 RX ADMIN — CLOTRIMAZOLE: 10 CREAM TOPICAL at 20:54

## 2018-04-22 RX ADMIN — MAGNESIUM SULFATE HEPTAHYDRATE 2 G: 40 INJECTION, SOLUTION INTRAVENOUS at 17:23

## 2018-04-22 RX ADMIN — ATORVASTATIN CALCIUM 40 MG: 40 TABLET, FILM COATED ORAL at 20:52

## 2018-04-22 ASSESSMENT — PAIN SCALES - GENERAL
PAINLEVEL_OUTOF10: 8
PAINLEVEL_OUTOF10: 8
PAINLEVEL_OUTOF10: 6
PAINLEVEL_OUTOF10: 6
PAINLEVEL_OUTOF10: 4

## 2018-04-22 ASSESSMENT — PAIN DESCRIPTION - LOCATION: LOCATION: HEAD

## 2018-04-22 ASSESSMENT — ENCOUNTER SYMPTOMS
SHORTNESS OF BREATH: 0
DIARRHEA: 0
VOMITING: 0
NAUSEA: 0
COUGH: 0

## 2018-04-22 ASSESSMENT — PAIN DESCRIPTION - PAIN TYPE: TYPE: ACUTE PAIN

## 2018-04-23 ENCOUNTER — TELEPHONE (OUTPATIENT)
Dept: FAMILY MEDICINE CLINIC | Age: 78
End: 2018-04-23

## 2018-04-23 VITALS
DIASTOLIC BLOOD PRESSURE: 61 MMHG | HEIGHT: 60 IN | RESPIRATION RATE: 16 BRPM | BODY MASS INDEX: 46.92 KG/M2 | OXYGEN SATURATION: 96 % | TEMPERATURE: 98.3 F | WEIGHT: 239 LBS | HEART RATE: 71 BPM | SYSTOLIC BLOOD PRESSURE: 130 MMHG

## 2018-04-23 LAB
ANION GAP SERPL CALCULATED.3IONS-SCNC: 13 MEQ/L (ref 8–16)
BUN BLDV-MCNC: 8 MG/DL (ref 7–22)
CALCIUM SERPL-MCNC: 8.7 MG/DL (ref 8.5–10.5)
CHLORIDE BLD-SCNC: 100 MEQ/L (ref 98–111)
CHOLESTEROL, TOTAL: 105 MG/DL (ref 100–199)
CO2: 22 MEQ/L (ref 23–33)
CREAT SERPL-MCNC: 0.5 MG/DL (ref 0.4–1.2)
GFR SERPL CREATININE-BSD FRML MDRD: > 90 ML/MIN/1.73M2
GLUCOSE BLD-MCNC: 153 MG/DL (ref 70–108)
HCT VFR BLD CALC: 27.6 % (ref 37–47)
HDLC SERPL-MCNC: 36 MG/DL
HEMOGLOBIN: 9.2 GM/DL (ref 12–16)
LDL CHOLESTEROL CALCULATED: 48 MG/DL
MAGNESIUM: 1.7 MG/DL (ref 1.6–2.4)
MCH RBC QN AUTO: 28.9 PG (ref 27–31)
MCHC RBC AUTO-ENTMCNC: 33.5 GM/DL (ref 33–37)
MCV RBC AUTO: 86.3 FL (ref 81–99)
PDW BLD-RTO: 16.7 % (ref 11.5–14.5)
PLATELET # BLD: 215 THOU/MM3 (ref 130–400)
PMV BLD AUTO: 7.3 FL (ref 7.4–10.4)
POTASSIUM SERPL-SCNC: 4.1 MEQ/L (ref 3.5–5.2)
RBC # BLD: 3.19 MILL/MM3 (ref 4.2–5.4)
SODIUM BLD-SCNC: 135 MEQ/L (ref 135–145)
TRIGL SERPL-MCNC: 107 MG/DL (ref 0–199)
WBC # BLD: 6.7 THOU/MM3 (ref 4.8–10.8)

## 2018-04-23 PROCEDURE — G8978 MOBILITY CURRENT STATUS: HCPCS

## 2018-04-23 PROCEDURE — 97530 THERAPEUTIC ACTIVITIES: CPT

## 2018-04-23 PROCEDURE — 6360000002 HC RX W HCPCS: Performed by: INTERNAL MEDICINE

## 2018-04-23 PROCEDURE — 6370000000 HC RX 637 (ALT 250 FOR IP): Performed by: INTERNAL MEDICINE

## 2018-04-23 PROCEDURE — 6370000000 HC RX 637 (ALT 250 FOR IP): Performed by: HOSPITALIST

## 2018-04-23 PROCEDURE — 36415 COLL VENOUS BLD VENIPUNCTURE: CPT

## 2018-04-23 PROCEDURE — 97163 PT EVAL HIGH COMPLEX 45 MIN: CPT

## 2018-04-23 PROCEDURE — 83735 ASSAY OF MAGNESIUM: CPT

## 2018-04-23 PROCEDURE — 85027 COMPLETE CBC AUTOMATED: CPT

## 2018-04-23 PROCEDURE — 2580000003 HC RX 258: Performed by: INTERNAL MEDICINE

## 2018-04-23 PROCEDURE — 80061 LIPID PANEL: CPT

## 2018-04-23 PROCEDURE — G8979 MOBILITY GOAL STATUS: HCPCS

## 2018-04-23 PROCEDURE — 99238 HOSP IP/OBS DSCHRG MGMT 30/<: CPT | Performed by: HOSPITALIST

## 2018-04-23 PROCEDURE — 80048 BASIC METABOLIC PNL TOTAL CA: CPT

## 2018-04-23 RX ADMIN — Medication 10 ML: at 08:48

## 2018-04-23 RX ADMIN — TETRAHYDROZOLINE HYDROCHLORIDE 1 DROP: 0.05 SOLUTION/ DROPS OPHTHALMIC at 08:47

## 2018-04-23 RX ADMIN — PREGABALIN 150 MG: 75 CAPSULE ORAL at 08:47

## 2018-04-23 RX ADMIN — SODIUM CHLORIDE TAB 1 GM 1 G: 1 TAB at 08:46

## 2018-04-23 RX ADMIN — LISINOPRIL 5 MG: 5 TABLET ORAL at 08:47

## 2018-04-23 RX ADMIN — PROPRANOLOL HYDROCHLORIDE 20 MG: 20 TABLET ORAL at 08:46

## 2018-04-23 RX ADMIN — CLOTRIMAZOLE: 10 CREAM TOPICAL at 08:46

## 2018-04-23 RX ADMIN — PIOGLITAZONE 30 MG: 30 TABLET ORAL at 08:47

## 2018-04-23 RX ADMIN — OXYCODONE HYDROCHLORIDE AND ACETAMINOPHEN 1 TABLET: 5; 325 TABLET ORAL at 06:25

## 2018-04-23 RX ADMIN — ENOXAPARIN SODIUM 40 MG: 40 INJECTION SUBCUTANEOUS at 08:48

## 2018-04-23 RX ADMIN — SODIUM CHLORIDE TAB 1 GM 1 G: 1 TAB at 12:25

## 2018-04-23 RX ADMIN — HYDRALAZINE HYDROCHLORIDE 50 MG: 50 TABLET, FILM COATED ORAL at 08:46

## 2018-04-23 RX ADMIN — PANTOPRAZOLE SODIUM 40 MG: 40 TABLET, DELAYED RELEASE ORAL at 06:25

## 2018-04-23 RX ADMIN — HYDROCORTISONE: 1 CREAM TOPICAL at 08:47

## 2018-04-23 RX ADMIN — PREDNISOLONE ACETATE 1 DROP: 10 SUSPENSION/ DROPS OPHTHALMIC at 08:47

## 2018-04-23 ASSESSMENT — PAIN SCALES - GENERAL
PAINLEVEL_OUTOF10: 0
PAINLEVEL_OUTOF10: 0
PAINLEVEL_OUTOF10: 5
PAINLEVEL_OUTOF10: 8
PAINLEVEL_OUTOF10: 2

## 2018-04-23 ASSESSMENT — PAIN DESCRIPTION - LOCATION
LOCATION: HEAD
LOCATION: GENERALIZED
LOCATION: SHOULDER

## 2018-04-23 ASSESSMENT — PAIN DESCRIPTION - DESCRIPTORS
DESCRIPTORS: ACHING
DESCRIPTORS: ACHING

## 2018-04-23 ASSESSMENT — PAIN DESCRIPTION - ORIENTATION: ORIENTATION: RIGHT

## 2018-04-23 ASSESSMENT — PAIN DESCRIPTION - DIRECTION: RADIATING_TOWARDS: HEADACHE

## 2018-04-23 ASSESSMENT — PAIN DESCRIPTION - PAIN TYPE
TYPE: CHRONIC PAIN
TYPE: CHRONIC PAIN
TYPE: ACUTE PAIN

## 2018-04-24 ENCOUNTER — CARE COORDINATION (OUTPATIENT)
Dept: CASE MANAGEMENT | Age: 78
End: 2018-04-24

## 2018-04-25 ENCOUNTER — APPOINTMENT (OUTPATIENT)
Dept: PHYSICAL THERAPY | Age: 78
End: 2018-04-25
Payer: MEDICARE

## 2018-04-25 LAB — BLOOD CULTURE, ROUTINE: NORMAL

## 2018-04-27 ENCOUNTER — OFFICE VISIT (OUTPATIENT)
Dept: FAMILY MEDICINE CLINIC | Age: 78
End: 2018-04-27
Payer: MEDICARE

## 2018-04-27 VITALS
HEART RATE: 72 BPM | WEIGHT: 231.2 LBS | SYSTOLIC BLOOD PRESSURE: 116 MMHG | RESPIRATION RATE: 12 BRPM | BODY MASS INDEX: 45.15 KG/M2 | DIASTOLIC BLOOD PRESSURE: 74 MMHG

## 2018-04-27 DIAGNOSIS — R53.1 GENERALIZED WEAKNESS: ICD-10-CM

## 2018-04-27 DIAGNOSIS — I10 BENIGN ESSENTIAL HTN: ICD-10-CM

## 2018-04-27 DIAGNOSIS — E87.1 HYPONATREMIA: Primary | ICD-10-CM

## 2018-04-27 DIAGNOSIS — H65.03 BILATERAL ACUTE SEROUS OTITIS MEDIA, RECURRENCE NOT SPECIFIED: ICD-10-CM

## 2018-04-27 PROCEDURE — 99495 TRANSJ CARE MGMT MOD F2F 14D: CPT | Performed by: FAMILY MEDICINE

## 2018-04-27 PROCEDURE — 1111F DSCHRG MED/CURRENT MED MERGE: CPT | Performed by: FAMILY MEDICINE

## 2018-04-27 ASSESSMENT — ENCOUNTER SYMPTOMS
DIARRHEA: 1
ABDOMINAL PAIN: 0
SHORTNESS OF BREATH: 0
WHEEZING: 0
NAUSEA: 0
EYE PAIN: 0
BACK PAIN: 0
RHINORRHEA: 0
CHEST TIGHTNESS: 0
SORE THROAT: 0
VOMITING: 0
COUGH: 0
BLOOD IN STOOL: 0
CONSTIPATION: 0

## 2018-04-30 ENCOUNTER — CARE COORDINATION (OUTPATIENT)
Dept: CASE MANAGEMENT | Age: 78
End: 2018-04-30

## 2018-05-03 ENCOUNTER — CARE COORDINATION (OUTPATIENT)
Dept: CASE MANAGEMENT | Age: 78
End: 2018-05-03

## 2018-05-07 ENCOUNTER — CARE COORDINATION (OUTPATIENT)
Dept: CASE MANAGEMENT | Age: 78
End: 2018-05-07

## 2018-05-09 ENCOUNTER — HOSPITAL ENCOUNTER (OUTPATIENT)
Dept: PHYSICAL THERAPY | Age: 78
Setting detail: THERAPIES SERIES
Discharge: HOME | End: 2018-05-09
Payer: MEDICARE

## 2018-05-09 ENCOUNTER — CARE COORDINATION (OUTPATIENT)
Dept: CASE MANAGEMENT | Age: 78
End: 2018-05-09

## 2018-05-14 ENCOUNTER — CARE COORDINATION (OUTPATIENT)
Dept: CASE MANAGEMENT | Age: 78
End: 2018-05-14

## 2018-05-18 LAB
ACINETOBACTER BAUMANNII FILM ARRAY: NOT DETECTED
BOTTLE TYPE: ABNORMAL
CANDIDA ALBICANS FILM ARRAY: NOT DETECTED
CANDIDA GLABRATA FILM ARRAY: NOT DETECTED
CANDIDA KRUSEI FILM ARRAY: NOT DETECTED
CANDIDA PARAPSILOSIS FILM ARRAY: NOT DETECTED
CANDIDA TROPICALIS FILM ARRAY: NOT DETECTED
CARBAPENEM RESITANT FILM ARRAY: ABNORMAL
ENTERBACTER CLOACAE FILM ARRAY: NOT DETECTED
ENTERBACTERIACEAE FILM ARRAY: NOT DETECTED
ENTEROCOCCUS FILM ARRAY: NOT DETECTED
ESCHERICHIA COLI FILM ARRAY: NOT DETECTED
HAEMOPHILUS INFLUENZA FILM ARRAY: NOT DETECTED
KLEBSIELLA OXYTOCA FILM ARRAY: NOT DETECTED
KLEBSIELLA PNEUMONIAE FILM ARRAY: NOT DETECTED
LISTERIA MONOCYTOGENES FILM ARRAY: NOT DETECTED
METHICILLIN RESISTANT FILM ARRAY: DETECTED
NEISSERIA MENIGITIDIS FILM ARRAY: NOT DETECTED
PROTEUS FILM ARRAY: NOT DETECTED
PSEUDOMONAS AERUGINOSA FILM ARRAY: NOT DETECTED
SERRATIA MARCESCENS FILM ARRAY: NOT DETECTED
SOURCE OF BLOOD CULTURE: ABNORMAL
STAPH AUREUS FILM ARRAY: NOT DETECTED
STAPHYLOCOCCUS FILM ARRAY: DETECTED
STREP AGALACTIAE FILM ARRAY: NOT DETECTED
STREP PNEUMONIAE FILM ARRAY: NOT DETECTED
STREP PYOCGENES FILM ARRAY: NOT DETECTED
STREPTOCOCCUS FILM ARRAY: NOT DETECTED
VANCOMYCIN RESISTANT FILM ARRAY: ABNORMAL

## 2018-06-11 RX ORDER — HYDRALAZINE HYDROCHLORIDE 50 MG/1
TABLET, FILM COATED ORAL
Qty: 270 TABLET | Refills: 1 | Status: SHIPPED | OUTPATIENT
Start: 2018-06-11 | End: 2019-02-15 | Stop reason: SDUPTHER

## 2018-06-14 ENCOUNTER — OFFICE VISIT (OUTPATIENT)
Dept: FAMILY MEDICINE CLINIC | Age: 78
End: 2018-06-14
Payer: MEDICARE

## 2018-06-14 VITALS
HEART RATE: 80 BPM | RESPIRATION RATE: 24 BRPM | WEIGHT: 230.8 LBS | DIASTOLIC BLOOD PRESSURE: 74 MMHG | BODY MASS INDEX: 45.08 KG/M2 | SYSTOLIC BLOOD PRESSURE: 138 MMHG

## 2018-06-14 DIAGNOSIS — E66.01 MORBID OBESITY WITH BODY MASS INDEX (BMI) OF 45.0 TO 49.9 IN ADULT (HCC): ICD-10-CM

## 2018-06-14 DIAGNOSIS — I50.23 ACUTE ON CHRONIC SYSTOLIC CHF (CONGESTIVE HEART FAILURE) (HCC): Primary | ICD-10-CM

## 2018-06-14 DIAGNOSIS — A49.02 MRSA INFECTION: ICD-10-CM

## 2018-06-14 PROCEDURE — 1090F PRES/ABSN URINE INCON ASSESS: CPT | Performed by: FAMILY MEDICINE

## 2018-06-14 PROCEDURE — 4040F PNEUMOC VAC/ADMIN/RCVD: CPT | Performed by: FAMILY MEDICINE

## 2018-06-14 PROCEDURE — 1123F ACP DISCUSS/DSCN MKR DOCD: CPT | Performed by: FAMILY MEDICINE

## 2018-06-14 PROCEDURE — 1036F TOBACCO NON-USER: CPT | Performed by: FAMILY MEDICINE

## 2018-06-14 PROCEDURE — G8400 PT W/DXA NO RESULTS DOC: HCPCS | Performed by: FAMILY MEDICINE

## 2018-06-14 PROCEDURE — 99214 OFFICE O/P EST MOD 30 MIN: CPT | Performed by: FAMILY MEDICINE

## 2018-06-14 PROCEDURE — G8427 DOCREV CUR MEDS BY ELIG CLIN: HCPCS | Performed by: FAMILY MEDICINE

## 2018-06-14 PROCEDURE — G8417 CALC BMI ABV UP PARAM F/U: HCPCS | Performed by: FAMILY MEDICINE

## 2018-06-14 RX ORDER — FUROSEMIDE 40 MG/1
40 TABLET ORAL DAILY
Qty: 30 TABLET | Refills: 0 | Status: SHIPPED | OUTPATIENT
Start: 2018-06-14 | End: 2018-07-07 | Stop reason: SDUPTHER

## 2018-06-14 RX ORDER — CLINDAMYCIN HYDROCHLORIDE 300 MG/1
300 CAPSULE ORAL 3 TIMES DAILY
COMMUNITY
End: 2018-07-26 | Stop reason: ALTCHOICE

## 2018-06-14 RX ORDER — ERYTHROMYCIN 5 MG/G
OINTMENT OPHTHALMIC 2 TIMES DAILY
COMMUNITY
End: 2018-12-04 | Stop reason: ALTCHOICE

## 2018-06-14 ASSESSMENT — ENCOUNTER SYMPTOMS
DIARRHEA: 0
VOMITING: 0
WHEEZING: 0
CHEST TIGHTNESS: 0
BLOOD IN STOOL: 0
NAUSEA: 0
CONSTIPATION: 0
COUGH: 1
RHINORRHEA: 0
SHORTNESS OF BREATH: 0
BACK PAIN: 0
SORE THROAT: 0
EYE PAIN: 0
ABDOMINAL PAIN: 0

## 2018-06-22 ENCOUNTER — OFFICE VISIT (OUTPATIENT)
Dept: FAMILY MEDICINE CLINIC | Age: 78
End: 2018-06-22
Payer: MEDICARE

## 2018-06-22 VITALS
RESPIRATION RATE: 20 BRPM | BODY MASS INDEX: 43.82 KG/M2 | TEMPERATURE: 97.6 F | DIASTOLIC BLOOD PRESSURE: 68 MMHG | WEIGHT: 223.2 LBS | SYSTOLIC BLOOD PRESSURE: 136 MMHG | HEIGHT: 60 IN | HEART RATE: 75 BPM

## 2018-06-22 DIAGNOSIS — I50.23 ACUTE ON CHRONIC SYSTOLIC CHF (CONGESTIVE HEART FAILURE) (HCC): Primary | ICD-10-CM

## 2018-06-22 LAB
ANION GAP SERPL CALCULATED.3IONS-SCNC: 14 MEQ/L (ref 8–16)
BUN BLDV-MCNC: 23 MG/DL (ref 7–22)
CALCIUM SERPL-MCNC: 8.7 MG/DL (ref 8.5–10.5)
CHLORIDE BLD-SCNC: 98 MEQ/L (ref 98–111)
CO2: 26 MEQ/L (ref 23–33)
CREAT SERPL-MCNC: 0.9 MG/DL (ref 0.4–1.2)
GFR SERPL CREATININE-BSD FRML MDRD: 61 ML/MIN/1.73M2
GLUCOSE BLD-MCNC: 154 MG/DL (ref 70–108)
POTASSIUM SERPL-SCNC: 4 MEQ/L (ref 3.5–5.2)
PRO-BNP: 257.2 PG/ML (ref 0–1800)
SODIUM BLD-SCNC: 138 MEQ/L (ref 135–145)

## 2018-06-22 PROCEDURE — G8400 PT W/DXA NO RESULTS DOC: HCPCS | Performed by: FAMILY MEDICINE

## 2018-06-22 PROCEDURE — 1123F ACP DISCUSS/DSCN MKR DOCD: CPT | Performed by: FAMILY MEDICINE

## 2018-06-22 PROCEDURE — 1036F TOBACCO NON-USER: CPT | Performed by: FAMILY MEDICINE

## 2018-06-22 PROCEDURE — G8427 DOCREV CUR MEDS BY ELIG CLIN: HCPCS | Performed by: FAMILY MEDICINE

## 2018-06-22 PROCEDURE — 36415 COLL VENOUS BLD VENIPUNCTURE: CPT | Performed by: FAMILY MEDICINE

## 2018-06-22 PROCEDURE — 99213 OFFICE O/P EST LOW 20 MIN: CPT | Performed by: FAMILY MEDICINE

## 2018-06-22 PROCEDURE — G8417 CALC BMI ABV UP PARAM F/U: HCPCS | Performed by: FAMILY MEDICINE

## 2018-06-22 PROCEDURE — 1090F PRES/ABSN URINE INCON ASSESS: CPT | Performed by: FAMILY MEDICINE

## 2018-06-22 PROCEDURE — 4040F PNEUMOC VAC/ADMIN/RCVD: CPT | Performed by: FAMILY MEDICINE

## 2018-06-22 ASSESSMENT — ENCOUNTER SYMPTOMS
BACK PAIN: 0
SHORTNESS OF BREATH: 0
COUGH: 0
WHEEZING: 0
BLOOD IN STOOL: 0
VOMITING: 0
DIARRHEA: 0
CHEST TIGHTNESS: 0
SORE THROAT: 0
NAUSEA: 0
RHINORRHEA: 0
CONSTIPATION: 0
EYE PAIN: 0
ABDOMINAL PAIN: 0

## 2018-06-25 ENCOUNTER — TELEPHONE (OUTPATIENT)
Dept: FAMILY MEDICINE CLINIC | Age: 78
End: 2018-06-25

## 2018-07-07 DIAGNOSIS — I50.23 ACUTE ON CHRONIC SYSTOLIC CHF (CONGESTIVE HEART FAILURE) (HCC): ICD-10-CM

## 2018-07-09 RX ORDER — FUROSEMIDE 40 MG/1
TABLET ORAL
Qty: 30 TABLET | Refills: 0 | Status: SHIPPED | OUTPATIENT
Start: 2018-07-09 | End: 2018-08-06 | Stop reason: SDUPTHER

## 2018-07-09 NOTE — TELEPHONE ENCOUNTER
Yaz Cardenas needs refill of   Requested Prescriptions     Pending Prescriptions Disp Refills    furosemide (LASIX) 40 MG tablet [Pharmacy Med Name: FUROSEMIDE 40MG     TAB] 30 tablet 0     Sig: TAKE 1 TABLET BY MOUTH ONCE DAILY       Last Filled on:  6/14/18 #30/0    Last Visit Date:  6/22/2018    Next Visit Date:    Visit date not found

## 2018-07-21 LAB
ANION GAP SERPL CALCULATED.3IONS-SCNC: 12 MEQ/L (ref 10–19)
BUN BLDV-MCNC: 19 MG/DL (ref 8–23)
CALCIUM SERPL-MCNC: 8.8 MG/DL (ref 8.5–10.5)
CHLORIDE BLD-SCNC: 100 MEQ/L (ref 95–107)
CO2: 27 MEQ/L (ref 19–31)
CREAT SERPL-MCNC: 0.9 MG/DL (ref 0.6–1.3)
EGFR AFRICAN AMERICAN: 72 ML/MIN/1.73 M2
EGFR IF NONAFRICAN AMERICAN: 62.1 ML/MIN/1.73 M2
GLUCOSE: 121 MG/DL (ref 70–99)
POTASSIUM SERPL-SCNC: 4.1 MEQ/L (ref 3.5–5.4)
SODIUM BLD-SCNC: 139 MEQ/L (ref 135–146)

## 2018-07-26 ENCOUNTER — OFFICE VISIT (OUTPATIENT)
Dept: NEPHROLOGY | Age: 78
End: 2018-07-26
Payer: MEDICARE

## 2018-07-26 VITALS
WEIGHT: 223.2 LBS | SYSTOLIC BLOOD PRESSURE: 125 MMHG | OXYGEN SATURATION: 98 % | HEART RATE: 73 BPM | BODY MASS INDEX: 43.59 KG/M2 | DIASTOLIC BLOOD PRESSURE: 69 MMHG

## 2018-07-26 DIAGNOSIS — I10 BENIGN ESSENTIAL HTN: Primary | ICD-10-CM

## 2018-07-26 DIAGNOSIS — F51.01 PRIMARY INSOMNIA: ICD-10-CM

## 2018-07-26 DIAGNOSIS — E87.1 HYPONATREMIA: ICD-10-CM

## 2018-07-26 DIAGNOSIS — E11.22 TYPE 2 DIABETES MELLITUS WITH CHRONIC KIDNEY DISEASE, WITHOUT LONG-TERM CURRENT USE OF INSULIN, UNSPECIFIED CKD STAGE (HCC): ICD-10-CM

## 2018-07-26 PROCEDURE — 4040F PNEUMOC VAC/ADMIN/RCVD: CPT | Performed by: NURSE PRACTITIONER

## 2018-07-26 PROCEDURE — G8417 CALC BMI ABV UP PARAM F/U: HCPCS | Performed by: NURSE PRACTITIONER

## 2018-07-26 PROCEDURE — 1090F PRES/ABSN URINE INCON ASSESS: CPT | Performed by: NURSE PRACTITIONER

## 2018-07-26 PROCEDURE — 99213 OFFICE O/P EST LOW 20 MIN: CPT | Performed by: NURSE PRACTITIONER

## 2018-07-26 PROCEDURE — 1123F ACP DISCUSS/DSCN MKR DOCD: CPT | Performed by: NURSE PRACTITIONER

## 2018-07-26 PROCEDURE — G8427 DOCREV CUR MEDS BY ELIG CLIN: HCPCS | Performed by: NURSE PRACTITIONER

## 2018-07-26 PROCEDURE — 1101F PT FALLS ASSESS-DOCD LE1/YR: CPT | Performed by: NURSE PRACTITIONER

## 2018-07-26 PROCEDURE — G8400 PT W/DXA NO RESULTS DOC: HCPCS | Performed by: NURSE PRACTITIONER

## 2018-07-26 PROCEDURE — 1036F TOBACCO NON-USER: CPT | Performed by: NURSE PRACTITIONER

## 2018-07-26 RX ORDER — LISINOPRIL 5 MG/1
5 TABLET ORAL DAILY
Qty: 30 TABLET | Refills: 3 | Status: SHIPPED | OUTPATIENT
Start: 2018-07-26 | End: 2018-12-22 | Stop reason: SDUPTHER

## 2018-07-26 NOTE — PROGRESS NOTES
and 1 tab in  tablet 3    pregabalin (LYRICA) 150 MG capsule TAKE 1 CAPSULE BY MOUTH IN THE MORNING AND 2 CAPS AT BEDTIME, E11.42. 90 capsule 5    omeprazole (PRILOSEC) 20 MG delayed release capsule TAKE ONE CAPSULE BY MOUTH ONE TIME DAILY 90 capsule 3    propranolol (INDERAL) 20 MG tablet Take 1 tablet by mouth 3 times daily 270 tablet 3    atorvastatin (LIPITOR) 40 MG tablet TAKE ONE TABLET BY MOUTH IN THE EVENING 90 tablet 3    Handicap Placard MISC by Does not apply route. Request parking placard due to medical conditions. Duration of 5 years. 1 each 0    tetrahydrozoline 0.05 % ophthalmic solution Place 1 drop into both eyes 3 times daily        No current facility-administered medications for this visit. 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    Cataract of both eyes     DDD (degenerative disc disease), lumbar     Dysphagia, pharyngoesophageal 09/26/2016    Fibrocystic breast     H/O ventral hernia repair     Headache 02/09/2017    Hypercholesteremia     Hyperlipidemia     Hypertension     Iron deficiency anemia     LPRD (laryngopharyngeal reflux disease) 09/26/2016    Neuropathy ([address]     peripheral    Obesity     Orbital abscess     Orbital cellulitis 01/22/2014    SWAPNA (obstructive sleep apnea)     Osteoarthritis     Osteoporosis     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       Sinusitis     Type II or unspecified type diabetes mellitus without mention of complication, not stated as uncontrolled     diagnoses approx 2000    Velopharyngeal incompetence 09/26/2016     SURGICAL HISTORY:    Past Surgical History:   Procedure Laterality Date    ABDOMEN SURGERY      APPENDECTOMY      CARPAL TUNNEL RELEASE Bilateral 2008, 2009    CATARACT REMOVAL  2011    bilat    CHOLECYSTECTOMY  03/1988    COLONOSCOPY  2016    DILATATION, ESOPHAGUS      ENDOSCOPY, COLON, DIAGNOSTIC      EYE SURGERY Left 01/30/2015    EYE SURGERY      CATARACT REMOVAL- BILATERAL    HEMORRHOID SURGERY  1980s    HERNIA REPAIR      HYSTERECTOMY, TOTAL ABDOMINAL  1980    JOINT REPLACEMENT  bilat 2005/ 2007    knees    SINUS SURGERY  last 2009    removed a bone (2)--2 times no construction     SINUS SURGERY  02/01/2016    SINUS SURGERY  2016 x 2    SINUS SURGERY  09/18/2017    [facility] - Dr Dmitriy Gordon SINUS SURGERY  08/09/2017 [facility]    TONSILLECTOMY      TOTAL KNEE ARTHROPLASTY  08/2003    Left TKR    VENTRAL HERNIA REPAIR  1992     FAMILY HISTORY:   Family History   Problem Relation Age of Onset    Diabetes Mother     Heart Disease Father         whooping cough as child    Obesity Sister     Other Brother         tumor on back    Obesity Sister     Cancer Sister         Skin     Cancer Brother         Bone cancer from metal    Other Brother         passed as a child    Heart Disease Brother     Other Brother         tremor    Heart Attack Brother     No Known Problems Brother     Heart Disease Brother     No Known Problems Brother     No Known Problems Brother      ALLERGIES:    Allergies   Allergen Reactions    Bactrim [Sulfamethoxazole-Trimethoprim] Rash    Morphine Other (See Comments)     headaches    Hydrocodone      headaches    Percocet [Oxycodone-Acetaminophen] Other (See Comments)     Headaches    Tylenol [Acetaminophen] Other (See Comments)     TYLENOL 3 causes hallucinations    Tape [Adhesive Tape] Rash     Social and Occupational History:    TOBACCO:   reports that she has never smoked. She has never used smokeless tobacco.  ETOH:   reports that she does not drink alcohol. REVIEW OF SYSTEMS:   GENERAL: Usual state of health. Denies fever  HEENT: Denies recent vision change, Is without her teeth. CARDIOVASCULAR: Denies chest pain/angina. RESPIRATORY:Denies sob, cough, wheezing.  Recent nasal procedure [address]: Denies nausea, vomiting, diarrhea, or abdominal pain  CNS:Denies headache, dizziness  MUSCULOSKELETAL: Reports joint arthralgia  SKIN: Denies rash, pruritis  HEMATOLOGIC: Denies bruising  ENDOCRINE:  Negative for heat or cold intolerance     EXAM:  VITALS:  /69 (Site: Left Arm, Position: Sitting, Cuff Size: Large Adult)   Pulse 73   Wt 223 lb 3.2 oz (101.2 kg)   SpO2 98%   BMI 43.59 kg/m²    Body mass index is 43.59 kg/m². CONSTITUTIONAL:  No acute distress. Sitting comfortable in chair  HEENT:  Head is normocephalic, Extraocular movement intact, mucus membranes moist.. Neck is supple  CARDIOVASCULAR:  No edema  RESPIRATORY: No shortness of breath. ABDOMEN: soft,   NEUROLOGICAL: Patient is alert and oriented to person, place, and time. Recent and remote memory is intact. Thought is coherant. SKIN: No rash on exposed surfaces,  MUSCULOSKELETAL: Movement is coordinated. EXTREMITIES: Distal lower extremity temp is warm,   PSYCHIATRIC: mood and affect appropriate    Diagnostic Data:    Labs reviewed and discussed with pt  Lab Results   Component Value Date     07/20/2018    K 4.1 07/20/2018    K 4.5 04/20/2018     07/20/2018    CO2 27 07/20/2018    BUN 19 07/20/2018    CREATININE 0.9 07/20/2018    LABGLOM 61 06/22/2018    GLUCOSE 121 07/20/2018     Lab Results   Component Value Date    WBC 6.7 04/23/2018    HGB 9.2 (L) 04/23/2018    HCT 27.6 (L) 04/23/2018     04/23/2018     Summary 3/8/18   limited echo   Ejection fraction is visually estimated at 60%.   Overall left ventricular function is normal.    Assessment:    1. Chronic hyponatremia, likely 2nd to  thiazide diuretic compounded by hypervolemia from high water intake. Continue Fluid restriction 6075-9875 ml/24h. 2. 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 3. 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 Hx Essential Hypertension at control. Refill Lisinopril 5 mg daily.       Schedule

## 2018-07-30 DIAGNOSIS — E11.42 DIABETIC PERIPHERAL NEUROPATHY (HCC): ICD-10-CM

## 2018-07-30 RX ORDER — ZOLPIDEM TARTRATE 10 MG/1
TABLET ORAL
Qty: 30 TABLET | Refills: 5 | OUTPATIENT
Start: 2018-07-30

## 2018-07-30 RX ORDER — ZOLPIDEM TARTRATE 10 MG/1
10 TABLET ORAL NIGHTLY PRN
Qty: 30 TABLET | Refills: 5 | Status: SHIPPED | OUTPATIENT
Start: 2018-07-30 | End: 2018-08-29

## 2018-07-30 NOTE — TELEPHONE ENCOUNTER
brittany'ed ambien to pharm requested    Controlled Substances Monitoring:     RX Monitoring 7/30/2018   Attestation The Prescription Monitoring Report for this patient was reviewed today. Documentation No signs of potential drug abuse or diversion identified.

## 2018-07-30 NOTE — TELEPHONE ENCOUNTER
Briseida Carey needs refill of   Requested Prescriptions     Pending Prescriptions Disp Refills    zolpidem (AMBIEN) 10 MG tablet [Pharmacy Med Name: ZOLPIDEM 10MG       TAB] 30 tablet 5     Sig: TAKE ONE TABLET BY MOUTH AT BEDTIME AS NEEDED FOR SLEEP       Last Filled on:  1/5/2018 #30/5.     Last Visit Date:  6/22/2018    Next Visit Date:    Visit date not found

## 2018-07-31 RX ORDER — PREGABALIN 150 MG/1
CAPSULE ORAL
Qty: 90 CAPSULE | Refills: 5 | Status: SHIPPED | OUTPATIENT
Start: 2018-07-31 | End: 2018-09-05 | Stop reason: SDUPTHER

## 2018-08-04 DIAGNOSIS — I50.23 ACUTE ON CHRONIC SYSTOLIC CHF (CONGESTIVE HEART FAILURE) (HCC): ICD-10-CM

## 2018-08-06 RX ORDER — FUROSEMIDE 40 MG/1
TABLET ORAL
Qty: 30 TABLET | Refills: 0 | OUTPATIENT
Start: 2018-08-06

## 2018-08-06 RX ORDER — FUROSEMIDE 40 MG/1
TABLET ORAL
Qty: 90 TABLET | Refills: 1 | Status: ON HOLD | OUTPATIENT
Start: 2018-08-06 | End: 2018-09-29

## 2018-08-25 ENCOUNTER — HOSPITAL ENCOUNTER (EMERGENCY)
Age: 78
Discharge: HOME | End: 2018-08-25
Payer: MEDICARE

## 2018-08-25 VITALS
OXYGEN SATURATION: 100 % | BODY MASS INDEX: 42.6 KG/M2 | RESPIRATION RATE: 18 BRPM | DIASTOLIC BLOOD PRESSURE: 52 MMHG | HEART RATE: 84 BPM | SYSTOLIC BLOOD PRESSURE: 117 MMHG | WEIGHT: 217 LBS | HEIGHT: 60 IN | TEMPERATURE: 97.7 F

## 2018-08-25 DIAGNOSIS — L03.011 CELLULITIS OF FINGER OF RIGHT HAND: Primary | ICD-10-CM

## 2018-08-25 PROCEDURE — 99213 OFFICE O/P EST LOW 20 MIN: CPT | Performed by: NURSE PRACTITIONER

## 2018-08-25 PROCEDURE — 99213 OFFICE O/P EST LOW 20 MIN: CPT

## 2018-08-25 RX ORDER — DOXYCYCLINE HYCLATE 100 MG
100 TABLET ORAL 2 TIMES DAILY
Qty: 20 TABLET | Refills: 0 | Status: SHIPPED | OUTPATIENT
Start: 2018-08-25 | End: 2018-09-04

## 2018-08-25 RX ORDER — CEPHALEXIN 500 MG/1
500 CAPSULE ORAL 4 TIMES DAILY
Qty: 40 CAPSULE | Refills: 0 | Status: SHIPPED | OUTPATIENT
Start: 2018-08-25 | End: 2018-08-25

## 2018-08-25 ASSESSMENT — PAIN SCALES - GENERAL: PAINLEVEL_OUTOF10: 10

## 2018-08-25 ASSESSMENT — PAIN DESCRIPTION - DESCRIPTORS: DESCRIPTORS: THROBBING;SHARP

## 2018-08-25 ASSESSMENT — PAIN DESCRIPTION - ORIENTATION: ORIENTATION: RIGHT

## 2018-08-25 ASSESSMENT — PAIN DESCRIPTION - PAIN TYPE: TYPE: ACUTE PAIN

## 2018-08-25 ASSESSMENT — PAIN DESCRIPTION - FREQUENCY: FREQUENCY: CONTINUOUS

## 2018-08-25 ASSESSMENT — PAIN DESCRIPTION - ONSET: ONSET: GRADUAL

## 2018-08-25 ASSESSMENT — PAIN DESCRIPTION - PROGRESSION: CLINICAL_PROGRESSION: GRADUALLY WORSENING

## 2018-08-25 ASSESSMENT — PAIN DESCRIPTION - LOCATION: LOCATION: FINGER (COMMENT WHICH ONE)

## 2018-08-25 NOTE — ED PROVIDER NOTES
[facility] [address] 89  Urgent Care Encounter      CHIEF COMPLAINT       Chief Complaint   Patient presents with    Other     wound to right 4th finger       Nurses Notes reviewed and I agree except as noted in the HPI. HISTORY OF PRESENT ILLNESS   Jose Wilson is a 69 y.o. female who presents The history is provided by the patient and a caregiver. Hand Problem   Location:  Finger  Finger location:  R ring finger  Injury: yes (stuck by cactus she was working around.)    Time since incident:  1 day  Pain details:     Quality:  Aching and burning    Radiates to:  Does not radiate    Severity:  Mild    Onset quality:  Sudden    Duration:  1 day    Timing:  Intermittent    Progression:  Worsening  Handedness:  Right-handed  Dislocation: no    Foreign body present:  No foreign bodies  Tetanus status:  Unknown  Prior injury to area:  No  Relieved by:  Ice, rest and NSAIDs  Worsened by: Movement  Ineffective treatments:  Acetaminophen  Associated symptoms: fatigue and swelling    Associated symptoms: no back pain and no fever    Risk factors: no recent illness        REVIEW OF SYSTEMS     Review of Systems   Constitutional: Positive for fatigue. Negative for activity change, appetite change, diaphoresis and fever. HENT: Negative for congestion, facial swelling, postnasal drip and sore throat. Eyes: Negative for photophobia, pain, discharge and visual disturbance. Respiratory: Negative for apnea, cough, chest tightness and shortness of breath. Cardiovascular: Positive for leg swelling. Negative for chest pain. Gastrointestinal: Negative for abdominal distention, constipation, diarrhea and nausea. Endocrine: Negative for cold intolerance and heat intolerance. Genitourinary: Negative for dysuria, flank pain, frequency and urgency. Musculoskeletal: Negative for arthralgias, back pain and myalgias. Skin: Positive for wound (right finger). Negative for pallor and rash.    Allergic/Immunologic: Negative for environmental allergies and food allergies. Neurological: Negative for dizziness, weakness, numbness and headaches. Hematological: Negative for adenopathy. Does not bruise/bleed easily. Psychiatric/Behavioral: Negative for agitation, confusion and suicidal ideas. 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    Cataract of both eyes     DDD (degenerative disc disease), lumbar     Dysphagia, pharyngoesophageal 09/26/2016    Fibrocystic breast     H/O ventral hernia repair     Headache 02/09/2017    Hypercholesteremia     Hyperlipidemia     Hypertension     Iron deficiency anemia     LPRD (laryngopharyngeal reflux disease) 09/26/2016    Neuropathy     peripheral    Obesity     Orbital abscess     Orbital cellulitis 01/22/2014    SWAPNA (obstructive sleep apnea)     Osteoarthritis     Osteoporosis     Persistent proteinuria associated with type 2 diabetes mellitus ([facility] 01/2017    urine micral of 50.  Sinusitis     Type II or unspecified type diabetes mellitus without mention of complication, not stated as uncontrolled     diagnoses approx 2000    Velopharyngeal incompetence 09/26/2016       SURGICAL HISTORY     Patient  has a past surgical history that includes ventral hernia repair (1992); Cholecystectomy (03/1988); Hysterectomy, total abdominal (1980); Hemorrhoid surgery (1980s); Total knee arthroplasty (08/2003); Carpal tunnel release (Bilateral, 2008, 2009); sinus surgery (last 2009); Cataract removal (2011); Eye surgery (Left, 01/30/2015); sinus surgery (02/01/2016); sinus surgery (2016 x 2); joint replacement (bilat 2005/ 2007); Colonoscopy (2016); Endoscopy, colon, diagnostic; eye surgery; hernia repair; Tonsillectomy; sinus surgery (09/18/2017); sinus surgery (08/09/2017); Abdomen surgery; Dilatation, esophagus; and Appendectomy.     CURRENT MEDICATIONS       Discharge Medication List as of 8/25/2018  7:46 PM R-3Normal      atorvastatin (LIPITOR) 40 MG tablet TAKE ONE TABLET BY MOUTH IN THE EVENING, Disp-90 tablet, R-3Normal      fluticasone (FLONASE) 50 MCG/ACT nasal spray 2 sprays by Nasal route 2 times daily, Disp-1 Bottle, R-3Normal      Handicap Placard [facility] Starting 9/4/2014, Until Discontinued, Disp-1 each, R-0, PrintRequest parking placard due to medical conditions. Duration of 5 years. tetrahydrozoline 0.05 % ophthalmic solution Place 1 drop into both eyes 3 times daily Historical Med             ALLERGIES     Patient is is allergic to bactrim [sulfamethoxazole-trimethoprim]; morphine; hydrocodone; percocet [oxycodone-acetaminophen]; tylenol [acetaminophen]; and tape [adhesive tape]. FAMILY HISTORY     Patient's family history includes Cancer in her brother and sister; Diabetes in her mother; Heart Attack in her brother; Heart Disease in her brother, brother, and father; No Known Problems in her brother, brother, and brother; Obesity in her sister and sister; Other in her brother, brother, and brother. SOCIAL HISTORY     Patient  reports that she has never smoked. She has never used smokeless tobacco. She reports that she does not drink alcohol or use drugs. PHYSICAL EXAM     ED TRIAGE VITALS  BP: (!) 117/52, Temp: 97.7 °F (36.5 °C), Pulse: 84, Resp: 18, SpO2: 100 %  Physical Exam   Constitutional: She is oriented to person, place, and time. She appears well-developed and well-nourished. No distress. HENT:   Head: Normocephalic. Nose: Nose normal.   Mouth/Throat: Oropharynx is clear and moist.   Eyes: Right eye exhibits no discharge. Left eye exhibits no discharge. No scleral icterus. Neck: Normal range of motion. No thyromegaly present. Cardiovascular: Normal rate, regular rhythm, normal heart sounds and intact distal pulses. Pulmonary/Chest: Effort normal and breath sounds normal. She has no wheezes. She has no rales. Abdominal: Soft.  Bowel sounds are normal. She exhibits no distension. There is no tenderness. Musculoskeletal: Normal range of motion. She exhibits edema (ble wrapped with aces). She exhibits no tenderness. Lymphadenopathy:     She has no cervical adenopathy. Neurological: She is alert and oriented to person, place, and time. Skin: Skin is warm and dry. Rash noted. Rash is pustular (right 4th finger with 7mm oval lesion dorsal surface, small amount of purulent drainage. No foreign bodies noted on the finger. ). There is erythema. Psychiatric: She has a normal mood and affect. Her behavior is normal. Judgment and thought content normal.   Nursing note and vitals reviewed. DIAGNOSTIC RESULTS   Labs:No results found for this visit on 08/25/18. IMAGING:  No orders to display     URGENT CARE COURSE:     Vitals:    08/25/18 1915   BP: (!) 117/52   Pulse: 84   Resp: 18   Temp: 97.7 °F (36.5 °C)   TempSrc: Temporal   SpO2: 100%   Weight: 217 lb (98.4 kg)   Height: 5' (1.524 m)       Medications - No data to display  PROCEDURES:  None  FINAL IMPRESSION      1. Cellulitis of finger of right hand      Cg has been given instructions on soaking and cleansing of the finger, medication schedule and side effects, and signs of deterioration of condition. Able to verbalize understanding of instructions. DISPOSITION/PLAN   DISPOSITION Decision To Discharge 08/25/2018 07:41:20 PM    PATIENT REFERRED TO:  Meggan Mock MD  [address] [place] [address]  [phone]    In 3 days  As needed, If symptoms worsen    DISCHARGE MEDICATIONS:  Discharge Medication List as of 8/25/2018  7:46 PM      START taking these medications    Details   mupirocin (BACTROBAN) 2 % ointment Apply topically 3 times daily. , Disp-15 g, R-0, Print      cephALEXin (KEFLEX) 500 MG capsule Take 1 capsule by mouth 4 times daily for 10 days, Disp-40 capsule, R-0Print           Discharge Medication List as of 8/25/2018  7:46 PM          AMANDA Cantrell - CNP

## 2018-08-25 NOTE — ED TRIAGE NOTES
Pt ambulatory to [facility] with daughter with cactus needle to right 4th finger. Pt stated she got this yesterday. Daughter stated she expressed green \"puss\" out of right 4th finger today. Finger is red and slightly swollen. Clear drainage noted from right 4th finer.

## 2018-08-27 ASSESSMENT — ENCOUNTER SYMPTOMS
SORE THROAT: 0
ABDOMINAL DISTENTION: 0
SHORTNESS OF BREATH: 0
FACIAL SWELLING: 0
PHOTOPHOBIA: 0
CHEST TIGHTNESS: 0
DIARRHEA: 0
BACK PAIN: 0
APNEA: 0
EYE DISCHARGE: 0
NAUSEA: 0
COUGH: 0
CONSTIPATION: 0
EYE PAIN: 0

## 2018-08-28 ENCOUNTER — CARE COORDINATION (OUTPATIENT)
Dept: CARE COORDINATION | Age: 78
End: 2018-08-28

## 2018-09-05 ENCOUNTER — OFFICE VISIT (OUTPATIENT)
Dept: FAMILY MEDICINE CLINIC | Age: 78
End: 2018-09-05
Payer: MEDICARE

## 2018-09-05 ENCOUNTER — CARE COORDINATION (OUTPATIENT)
Dept: CARE COORDINATION | Age: 78
End: 2018-09-05

## 2018-09-05 VITALS
BODY MASS INDEX: 40.15 KG/M2 | SYSTOLIC BLOOD PRESSURE: 118 MMHG | WEIGHT: 205.6 LBS | RESPIRATION RATE: 10 BRPM | HEART RATE: 88 BPM | DIASTOLIC BLOOD PRESSURE: 62 MMHG

## 2018-09-05 DIAGNOSIS — E11.42 DIABETIC PERIPHERAL NEUROPATHY (HCC): ICD-10-CM

## 2018-09-05 DIAGNOSIS — R60.9 PERIPHERAL EDEMA: Primary | ICD-10-CM

## 2018-09-05 DIAGNOSIS — E66.01 MORBID OBESITY WITH BMI OF 40.0-44.9, ADULT (HCC): ICD-10-CM

## 2018-09-05 DIAGNOSIS — E11.29 TYPE 2 DIABETES MELLITUS WITH MICROALBUMINURIA, WITHOUT LONG-TERM CURRENT USE OF INSULIN (HCC): ICD-10-CM

## 2018-09-05 DIAGNOSIS — R80.9 TYPE 2 DIABETES MELLITUS WITH MICROALBUMINURIA, WITHOUT LONG-TERM CURRENT USE OF INSULIN (HCC): ICD-10-CM

## 2018-09-05 PROCEDURE — 99214 OFFICE O/P EST MOD 30 MIN: CPT | Performed by: FAMILY MEDICINE

## 2018-09-05 PROCEDURE — G8427 DOCREV CUR MEDS BY ELIG CLIN: HCPCS | Performed by: FAMILY MEDICINE

## 2018-09-05 PROCEDURE — 1123F ACP DISCUSS/DSCN MKR DOCD: CPT | Performed by: FAMILY MEDICINE

## 2018-09-05 PROCEDURE — 1036F TOBACCO NON-USER: CPT | Performed by: FAMILY MEDICINE

## 2018-09-05 PROCEDURE — G8417 CALC BMI ABV UP PARAM F/U: HCPCS | Performed by: FAMILY MEDICINE

## 2018-09-05 PROCEDURE — 1090F PRES/ABSN URINE INCON ASSESS: CPT | Performed by: FAMILY MEDICINE

## 2018-09-05 PROCEDURE — 1101F PT FALLS ASSESS-DOCD LE1/YR: CPT | Performed by: FAMILY MEDICINE

## 2018-09-05 PROCEDURE — 4040F PNEUMOC VAC/ADMIN/RCVD: CPT | Performed by: FAMILY MEDICINE

## 2018-09-05 PROCEDURE — G8400 PT W/DXA NO RESULTS DOC: HCPCS | Performed by: FAMILY MEDICINE

## 2018-09-05 RX ORDER — PREGABALIN 150 MG/1
CAPSULE ORAL
Qty: 120 CAPSULE | Refills: 5 | Status: ON HOLD | OUTPATIENT
Start: 2018-09-05 | End: 2018-09-28

## 2018-09-05 RX ORDER — BUMETANIDE 1 MG/1
1 TABLET ORAL DAILY
Qty: 10 TABLET | Refills: 0 | Status: SHIPPED | OUTPATIENT
Start: 2018-09-05 | End: 2018-09-19

## 2018-09-05 ASSESSMENT — ENCOUNTER SYMPTOMS
BLOOD IN STOOL: 0
BACK PAIN: 0
VOMITING: 0
SHORTNESS OF BREATH: 0
CONSTIPATION: 0
EYE PAIN: 0
WHEEZING: 0
DIARRHEA: 0
NAUSEA: 0
COUGH: 0
RHINORRHEA: 0
ABDOMINAL PAIN: 0
CHEST TIGHTNESS: 0
SORE THROAT: 0

## 2018-09-05 NOTE — PROGRESS NOTES
There is no tenderness. There is no rebound and no guarding. Musculoskeletal: Normal range of motion. Right lower leg: She exhibits edema. Left lower leg: She exhibits edema. 2-3+ pitting edema bilaterally, open areas on the skin. Lymphadenopathy:     She has no cervical adenopathy. Neurological: She is alert and oriented to person, place, and time. She has normal reflexes. No cranial nerve deficit. Skin: Skin is warm and dry. No rash noted. Psychiatric: She has a normal mood and affect. Nursing note and vitals reviewed. Lab Results   Component Value Date    LABA1C 6.1 04/22/2018     No results found for: EAG    Assessment:      Peripheral edema  Morbid obesity  DM 2      Plan:      Trial of bumex 1 mg daily  Hold lasix while on the bumex. Encouraged diet, exercise and weight loss. Dash diet  Continue meds  Increase lyrica to 2 BID      Controlled Substances Monitoring:     RX Monitoring 9/5/2018   Attestation The Prescription Monitoring Report for this patient was reviewed today. Documentation Possible medication side effects, risk of tolerance/dependence & alternative treatments discussed. ;No signs of potential drug abuse or diversion identified.      Brayan Tejeda MD

## 2018-09-05 NOTE — PATIENT INSTRUCTIONS
sign in to your [email or web address] account. Enter C553 in the [facility] box to learn more about \"Type 2 Diabetes: Care Instructions. \"     If you do not have an account, please click on the \"Sign Up Now\" link. Current as of: December 7, 2017  Content Version: 11.7  © 7940-9917 [employer]. Care instructions adapted under license by [employer] [facility] ([facility]). If you have questions about a medical condition or this instruction, always ask your healthcare professional. [place] [address] any warranty or liability for your use of this information. Patient Education        Leg and Ankle Edema: Care Instructions  Your Care Instructions  Swelling in the legs, ankles, and feet is called edema. It is common after you sit or stand for a while. Long plane flights or car rides often cause swelling in the legs and feet. You may also have swelling if you have to stand for long periods of time at your job. Problems with the veins in the legs (varicose veins) and changes in hormones can also cause swelling. Sometimes the swelling in the ankles and feet is caused by a more serious problem, such as heart failure, infection, blood clots, or liver or kidney disease. Follow-up care is a key part of your treatment and safety. Be sure to make and go to all appointments, and call your doctor if you are having problems. It's also a good idea to know your test results and keep a list of the medicines you take. How can you care for yourself at home? · If your doctor gave you medicine, take it as prescribed. Call your doctor if you think you are having a problem with your medicine. · Whenever you are resting, raise your legs up. Try to keep the swollen area higher than the level of your heart. · Take breaks from standing or sitting in one position. ¨ Walk around to increase the blood flow in your lower legs. ¨ Move your feet and ankles often while you stand, or tighten and relax your leg muscles.   · Wear is high blood pressure. The DASH diet focuses on eating foods that are high in calcium, potassium, and magnesium. These nutrients can lower blood pressure. The foods that are highest in these nutrients are fruits, vegetables, low-fat dairy products, nuts, seeds, and legumes. But taking calcium, potassium, and magnesium supplements instead of eating foods that are high in those nutrients does not have the same effect. The DASH diet also includes whole grains, fish, and poultry. The DASH diet is one of several lifestyle changes your doctor may recommend to lower your high blood pressure. Your doctor may also want you to decrease the amount of sodium in your diet. Lowering sodium while following the DASH diet can lower blood pressure even further than just the DASH diet alone. Follow-up care is a key part of your treatment and safety. Be sure to make and go to all appointments, and call your doctor if you are having problems. It's also a good idea to know your test results and keep a list of the medicines you take. How can you care for yourself at home? Following the DASH diet  · Eat 4 to 5 servings of fruit each day. A serving is 1 medium-sized piece of fruit, ½ cup chopped or canned fruit, 1/4 cup dried fruit, or 4 ounces (½ cup) of fruit juice. Choose fruit more often than fruit juice. · Eat 4 to 5 servings of vegetables each day. A serving is 1 cup of lettuce or raw leafy vegetables, ½ cup of chopped or cooked vegetables, or 4 ounces (½ cup) of vegetable juice. Choose vegetables more often than vegetable juice. · Get 2 to 3 servings of low-fat and fat-free dairy each day. A serving is 8 ounces of milk, 1 cup of yogurt, or 1 ½ ounces of cheese. · Eat 6 to 8 servings of grains each day. A serving is 1 slice of bread, 1 ounce of dry cereal, or ½ cup of cooked rice, pasta, or cooked cereal. Try to choose whole-grain products as much as possible. · Limit lean meat, poultry, and fish to 2 servings each day.  A

## 2018-09-19 ENCOUNTER — OFFICE VISIT (OUTPATIENT)
Dept: FAMILY MEDICINE CLINIC | Age: 78
End: 2018-09-19
Payer: MEDICARE

## 2018-09-19 VITALS
DIASTOLIC BLOOD PRESSURE: 72 MMHG | SYSTOLIC BLOOD PRESSURE: 114 MMHG | WEIGHT: 215.6 LBS | BODY MASS INDEX: 42.11 KG/M2 | RESPIRATION RATE: 16 BRPM | HEART RATE: 60 BPM

## 2018-09-19 DIAGNOSIS — R60.9 PERIPHERAL EDEMA: Primary | ICD-10-CM

## 2018-09-19 DIAGNOSIS — E11.29 TYPE 2 DIABETES MELLITUS WITH MICROALBUMINURIA, WITHOUT LONG-TERM CURRENT USE OF INSULIN (HCC): ICD-10-CM

## 2018-09-19 DIAGNOSIS — S81.802D OPEN WOUND OF LEFT LOWER LEG, SUBSEQUENT ENCOUNTER: ICD-10-CM

## 2018-09-19 DIAGNOSIS — R80.9 TYPE 2 DIABETES MELLITUS WITH MICROALBUMINURIA, WITHOUT LONG-TERM CURRENT USE OF INSULIN (HCC): ICD-10-CM

## 2018-09-19 PROCEDURE — 4040F PNEUMOC VAC/ADMIN/RCVD: CPT | Performed by: FAMILY MEDICINE

## 2018-09-19 PROCEDURE — 1090F PRES/ABSN URINE INCON ASSESS: CPT | Performed by: FAMILY MEDICINE

## 2018-09-19 PROCEDURE — 1101F PT FALLS ASSESS-DOCD LE1/YR: CPT | Performed by: FAMILY MEDICINE

## 2018-09-19 PROCEDURE — 1036F TOBACCO NON-USER: CPT | Performed by: FAMILY MEDICINE

## 2018-09-19 PROCEDURE — G8417 CALC BMI ABV UP PARAM F/U: HCPCS | Performed by: FAMILY MEDICINE

## 2018-09-19 PROCEDURE — G8400 PT W/DXA NO RESULTS DOC: HCPCS | Performed by: FAMILY MEDICINE

## 2018-09-19 PROCEDURE — G8427 DOCREV CUR MEDS BY ELIG CLIN: HCPCS | Performed by: FAMILY MEDICINE

## 2018-09-19 PROCEDURE — 99213 OFFICE O/P EST LOW 20 MIN: CPT | Performed by: FAMILY MEDICINE

## 2018-09-19 PROCEDURE — 1123F ACP DISCUSS/DSCN MKR DOCD: CPT | Performed by: FAMILY MEDICINE

## 2018-09-19 ASSESSMENT — ENCOUNTER SYMPTOMS
BACK PAIN: 0
NAUSEA: 0
CHEST TIGHTNESS: 0
WHEEZING: 0
DIARRHEA: 0
ABDOMINAL PAIN: 0
BLOOD IN STOOL: 0
SORE THROAT: 0
COUGH: 0
CONSTIPATION: 0
EYE PAIN: 0
VOMITING: 0
SHORTNESS OF BREATH: 0
RHINORRHEA: 0

## 2018-09-19 NOTE — PATIENT INSTRUCTIONS
sign in to your [employer] account. Enter C553 in the [facility] box to learn more about \"Type 2 Diabetes: Care Instructions. \"     If you do not have an account, please click on the \"Sign Up Now\" link. Current as of: December 7, 2017  Content Version: 11.7  © 0205-3048 [employer]. Care instructions adapted under license by [employer] [facility] ([facility]). If you have questions about a medical condition or this instruction, always ask your healthcare professional. [place] [address] any warranty or liability for your use of this information. Patient Education        Leg and Ankle Edema: Care Instructions  Your Care Instructions  Swelling in the legs, ankles, and feet is called edema. It is common after you sit or stand for a while. Long plane flights or car rides often cause swelling in the legs and feet. You may also have swelling if you have to stand for long periods of time at your job. Problems with the veins in the legs (varicose veins) and changes in hormones can also cause swelling. Sometimes the swelling in the ankles and feet is caused by a more serious problem, such as heart failure, infection, blood clots, or liver or kidney disease. Follow-up care is a key part of your treatment and safety. Be sure to make and go to all appointments, and call your doctor if you are having problems. It's also a good idea to know your test results and keep a list of the medicines you take. How can you care for yourself at home? · If your doctor gave you medicine, take it as prescribed. Call your doctor if you think you are having a problem with your medicine. · Whenever you are resting, raise your legs up. Try to keep the swollen area higher than the level of your heart. · Take breaks from standing or sitting in one position. ¨ Walk around to increase the blood flow in your lower legs. ¨ Move your feet and ankles often while you stand, or tighten and relax your leg muscles.   · Wear support stockings. Put them on in the morning, before swelling gets worse. · Eat a balanced diet. Lose weight if you need to. · Limit the amount of salt (sodium) in your diet. Salt holds fluid in the body and may increase swelling. When should you call for help? Call 911 anytime you think you may need emergency care. For example, call if:    · You have symptoms of a blood clot in your lung (called a pulmonary embolism). These may include:  ¨ Sudden chest pain. ¨ Trouble breathing. ¨ Coughing up blood.    Call your doctor now or seek immediate medical care if:    · You have signs of a blood clot, such as:  ¨ Pain in your calf, back of the knee, thigh, or groin. ¨ Redness and swelling in your leg or groin.     · You have symptoms of infection, such as:  ¨ Increased pain, swelling, warmth, or redness. ¨ Red streaks or pus. ¨ A fever.    Watch closely for changes in your health, and be sure to contact your doctor if:    · Your swelling is getting worse.     · You have new or worsening pain in your legs.     · You do not get better as expected. Where can you learn more? Go to https://[employer].Hylete. org and sign in to your "[employer] account. Enter L123 in the [employer] box to learn more about \"Leg and Ankle Edema: Care Instructions. \"     If you do not have an account, please click on the \"Sign Up Now\" link. Current as of: November 20, 2017  Content Version: 11.7  © 2226-1181 [employer]. Care instructions adapted under license by [facility] [employer] [facility] ([facility]). If you have questions about a medical condition or this instruction, always ask your healthcare professional. [place] [address] any warranty or liability for your use of this information. Patient Education        Wound Check: Care Instructions  Your Care Instructions  People have wounds that need care for many reasons. You may have a cut that needs care after surgery.  You may have a cut or puncture wound care if:    · You have new pain, or the pain gets worse.     · The skin near the wound is cold or pale or changes color.     · You have tingling, weakness, or numbness near the wound.     · The wound starts to bleed, and blood soaks through the bandage. Oozing small amounts of blood is normal.     · You have symptoms of infection, such as:  ¨ Increased pain, swelling, warmth, or redness. ¨ Red streaks leading from the wound. ¨ Pus draining from the wound. ¨ A fever.    Watch closely for changes in your health, and be sure to contact your doctor if:    · You do not get better as expected. Where can you learn more? Go to https://[employer].JDLab. org and sign in to your [employer] account. Enter  in the [employer] box to learn more about \"Wound Check: Care Instructions. \"     If you do not have an account, please click on the \"Sign Up Now\" link. Current as of: November 20, 2017  Content Version: 11.7  © 8821-5794 [employer]. Care instructions adapted under license by [facility] ([facility]). If you have questions about a medical condition or this instruction, always ask your healthcare professional. [address] any warranty or liability for your use of this information.

## 2018-09-19 NOTE — PROGRESS NOTES
Subjective:      Patient ID: Viridiana Abreu is a 69 y.o. female. HPI  Pt here for recheck of edema. REviewed BMI of 42. Encouraged diet, exercise and weight loss. ( gained 10 pounds in last 2 weeks). Bumex seemed to help the swelling. Is on chronic lasix. Legs are red, swollen and still seeping. , nonsmoker, pmh reviewed. Review of Systems   Constitutional: Negative for chills, fatigue, fever and unexpected weight change. HENT: Negative for congestion, ear pain, rhinorrhea and sore throat. Eyes: Negative for pain and visual disturbance. Respiratory: Negative for cough, chest tightness, shortness of breath and wheezing. Cardiovascular: Positive for leg swelling. Negative for chest pain and palpitations. Gastrointestinal: Negative for abdominal pain, blood in stool, constipation, diarrhea, nausea and vomiting. Genitourinary: Negative for difficulty urinating, frequency, hematuria and urgency. Musculoskeletal: Negative for back pain, joint swelling, myalgias and neck pain. Skin: Positive for wound. Negative for rash. Neurological: Negative for dizziness and headaches. Hematological: Negative for adenopathy. Does not bruise/bleed easily. Psychiatric/Behavioral: Negative for behavioral problems and sleep disturbance. The patient is not nervous/anxious. Objective:   Physical Exam   Constitutional: She is oriented to person, place, and time. She appears well-developed and well-nourished. HENT:   Head: Normocephalic and atraumatic. Right Ear: External ear normal.   Left Ear: External ear normal.   Nose: Nose normal.   Mouth/Throat: Oropharynx is clear and moist.   Eyes: Pupils are equal, round, and reactive to light. EOM are normal.   Neck: Neck supple. No thyromegaly present. Cardiovascular: Normal rate, regular rhythm and normal heart sounds. Pulmonary/Chest: Breath sounds normal. She has no wheezes. She has no rales. Abdominal: Soft.  Bowel sounds are normal.

## 2018-09-27 ENCOUNTER — HOSPITAL ENCOUNTER (INPATIENT)
Age: 78
LOS: 3 days | Discharge: NURSING, REHAB OR LONG-TERM CARE | DRG: 603 | End: 2018-09-30
Attending: INTERNAL MEDICINE | Admitting: INTERNAL MEDICINE
Payer: MEDICARE

## 2018-09-27 ENCOUNTER — APPOINTMENT (OUTPATIENT)
Dept: INTERVENTIONAL RADIOLOGY/VASCULAR | Age: 78
DRG: 603 | End: 2018-09-27
Attending: INTERNAL MEDICINE
Payer: MEDICARE

## 2018-09-27 PROBLEM — I83.029 VENOUS ULCERS OF BOTH LOWER EXTREMITIES (HCC): Status: ACTIVE | Noted: 2018-09-27

## 2018-09-27 PROBLEM — L03.116 BILATERAL LOWER LEG CELLULITIS: Status: ACTIVE | Noted: 2018-09-27

## 2018-09-27 PROBLEM — E66.01 CLASS 3 SEVERE OBESITY DUE TO EXCESS CALORIES WITH SERIOUS COMORBIDITY AND BODY MASS INDEX (BMI) OF 40.0 TO 44.9 IN ADULT (HCC): Status: ACTIVE | Noted: 2018-09-27

## 2018-09-27 PROBLEM — I83.019 VENOUS ULCERS OF BOTH LOWER EXTREMITIES (HCC): Status: ACTIVE | Noted: 2018-09-27

## 2018-09-27 PROBLEM — L97.929 VENOUS ULCERS OF BOTH LOWER EXTREMITIES (HCC): Status: ACTIVE | Noted: 2018-09-27

## 2018-09-27 PROBLEM — L97.919 VENOUS ULCERS OF BOTH LOWER EXTREMITIES (HCC): Status: ACTIVE | Noted: 2018-09-27

## 2018-09-27 PROBLEM — L03.115 BILATERAL LOWER LEG CELLULITIS: Status: ACTIVE | Noted: 2018-09-27

## 2018-09-27 LAB
ANION GAP SERPL CALCULATED.3IONS-SCNC: 12 MEQ/L (ref 8–16)
BUN BLDV-MCNC: 36 MG/DL (ref 7–22)
CALCIUM SERPL-MCNC: 9 MG/DL (ref 8.5–10.5)
CHLORIDE BLD-SCNC: 102 MEQ/L (ref 98–111)
CO2: 26 MEQ/L (ref 23–33)
CREAT SERPL-MCNC: 1.2 MG/DL (ref 0.4–1.2)
ERYTHROCYTE [DISTWIDTH] IN BLOOD BY AUTOMATED COUNT: 18.6 % (ref 11.5–14.5)
ERYTHROCYTE [DISTWIDTH] IN BLOOD BY AUTOMATED COUNT: 55.5 FL (ref 35–45)
GFR SERPL CREATININE-BSD FRML MDRD: 44 ML/MIN/1.73M2
GLUCOSE BLD-MCNC: 100 MG/DL (ref 70–108)
HCT VFR BLD CALC: 27 % (ref 37–47)
HEMOGLOBIN: 8.5 GM/DL (ref 12–16)
MAGNESIUM: 1.2 MG/DL (ref 1.6–2.4)
MCH RBC QN AUTO: 25.8 PG (ref 26–33)
MCHC RBC AUTO-ENTMCNC: 31.5 GM/DL (ref 32.2–35.5)
MCV RBC AUTO: 82.1 FL (ref 81–99)
PLATELET # BLD: 211 THOU/MM3 (ref 130–400)
PMV BLD AUTO: 9.6 FL (ref 9.4–12.4)
POTASSIUM SERPL-SCNC: 4.7 MEQ/L (ref 3.5–5.2)
PRO-BNP: 447.3 PG/ML (ref 0–1800)
RBC # BLD: 3.29 MILL/MM3 (ref 4.2–5.4)
SODIUM BLD-SCNC: 140 MEQ/L (ref 135–145)
WBC # BLD: 7.4 THOU/MM3 (ref 4.8–10.8)

## 2018-09-27 PROCEDURE — 1200000000 HC SEMI PRIVATE

## 2018-09-27 PROCEDURE — 87186 SC STD MICRODIL/AGAR DIL: CPT

## 2018-09-27 PROCEDURE — 87070 CULTURE OTHR SPECIMN AEROBIC: CPT

## 2018-09-27 PROCEDURE — 93970 EXTREMITY STUDY: CPT

## 2018-09-27 PROCEDURE — 83036 HEMOGLOBIN GLYCOSYLATED A1C: CPT

## 2018-09-27 PROCEDURE — 6370000000 HC RX 637 (ALT 250 FOR IP): Performed by: INTERNAL MEDICINE

## 2018-09-27 PROCEDURE — 6360000002 HC RX W HCPCS: Performed by: INTERNAL MEDICINE

## 2018-09-27 PROCEDURE — 85027 COMPLETE CBC AUTOMATED: CPT

## 2018-09-27 PROCEDURE — 83735 ASSAY OF MAGNESIUM: CPT

## 2018-09-27 PROCEDURE — 87040 BLOOD CULTURE FOR BACTERIA: CPT

## 2018-09-27 PROCEDURE — 83880 ASSAY OF NATRIURETIC PEPTIDE: CPT

## 2018-09-27 PROCEDURE — 2580000003 HC RX 258: Performed by: INTERNAL MEDICINE

## 2018-09-27 PROCEDURE — 87205 SMEAR GRAM STAIN: CPT

## 2018-09-27 PROCEDURE — 87077 CULTURE AEROBIC IDENTIFY: CPT

## 2018-09-27 PROCEDURE — 87147 CULTURE TYPE IMMUNOLOGIC: CPT

## 2018-09-27 PROCEDURE — 80048 BASIC METABOLIC PNL TOTAL CA: CPT

## 2018-09-27 PROCEDURE — 36415 COLL VENOUS BLD VENIPUNCTURE: CPT

## 2018-09-27 RX ORDER — BUMETANIDE 0.25 MG/ML
2 INJECTION, SOLUTION INTRAMUSCULAR; INTRAVENOUS 2 TIMES DAILY
Status: DISCONTINUED | OUTPATIENT
Start: 2018-09-27 | End: 2018-09-28

## 2018-09-27 RX ORDER — LEVOFLOXACIN 250 MG/1
250 TABLET ORAL DAILY
Status: DISCONTINUED | OUTPATIENT
Start: 2018-09-28 | End: 2018-09-27

## 2018-09-27 RX ORDER — PREGABALIN 75 MG/1
150 CAPSULE ORAL 2 TIMES DAILY
Status: DISCONTINUED | OUTPATIENT
Start: 2018-09-27 | End: 2018-09-30 | Stop reason: HOSPADM

## 2018-09-27 RX ORDER — PANTOPRAZOLE SODIUM 40 MG/1
40 TABLET, DELAYED RELEASE ORAL EVERY MORNING
Status: DISCONTINUED | OUTPATIENT
Start: 2018-09-28 | End: 2018-09-30 | Stop reason: HOSPADM

## 2018-09-27 RX ORDER — ATORVASTATIN CALCIUM 10 MG/1
10 TABLET, FILM COATED ORAL DAILY
Status: DISCONTINUED | OUTPATIENT
Start: 2018-09-27 | End: 2018-09-30 | Stop reason: HOSPADM

## 2018-09-27 RX ORDER — FLUTICASONE PROPIONATE 50 MCG
2 SPRAY, SUSPENSION (ML) NASAL 2 TIMES DAILY
Status: DISCONTINUED | OUTPATIENT
Start: 2018-09-27 | End: 2018-09-30 | Stop reason: HOSPADM

## 2018-09-27 RX ORDER — PROPRANOLOL HYDROCHLORIDE 20 MG/1
20 TABLET ORAL 3 TIMES DAILY
Status: DISCONTINUED | OUTPATIENT
Start: 2018-09-27 | End: 2018-09-28

## 2018-09-27 RX ORDER — FUROSEMIDE 40 MG/1
40 TABLET ORAL DAILY
Status: DISCONTINUED | OUTPATIENT
Start: 2018-09-28 | End: 2018-09-27

## 2018-09-27 RX ORDER — POLYVINYL ALCOHOL 14 MG/ML
1 SOLUTION/ DROPS OPHTHALMIC EVERY 12 HOURS SCHEDULED
Status: DISCONTINUED | OUTPATIENT
Start: 2018-09-27 | End: 2018-09-27

## 2018-09-27 RX ORDER — ERYTHROMYCIN 5 MG/G
OINTMENT OPHTHALMIC 2 TIMES DAILY
Status: DISCONTINUED | OUTPATIENT
Start: 2018-09-27 | End: 2018-09-30 | Stop reason: HOSPADM

## 2018-09-27 RX ORDER — ACETAMINOPHEN 325 MG/1
650 TABLET ORAL EVERY 6 HOURS PRN
Status: DISCONTINUED | OUTPATIENT
Start: 2018-09-27 | End: 2018-09-30 | Stop reason: HOSPADM

## 2018-09-27 RX ORDER — LEVOFLOXACIN 250 MG/1
250 TABLET ORAL DAILY
Status: ON HOLD | COMMUNITY
End: 2018-09-28

## 2018-09-27 RX ORDER — HYDRALAZINE HYDROCHLORIDE 50 MG/1
50 TABLET, FILM COATED ORAL EVERY 8 HOURS
Status: DISCONTINUED | OUTPATIENT
Start: 2018-09-27 | End: 2018-09-30 | Stop reason: HOSPADM

## 2018-09-27 RX ORDER — PIOGLITAZONEHYDROCHLORIDE 30 MG/1
30 TABLET ORAL DAILY
Status: DISCONTINUED | OUTPATIENT
Start: 2018-09-28 | End: 2018-09-28

## 2018-09-27 RX ORDER — TETRAHYDROZOLINE HCL 0.05 %
1 DROPS OPHTHALMIC (EYE) 3 TIMES DAILY
Status: DISCONTINUED | OUTPATIENT
Start: 2018-09-27 | End: 2018-09-30 | Stop reason: HOSPADM

## 2018-09-27 RX ORDER — LISINOPRIL 5 MG/1
5 TABLET ORAL DAILY
Status: DISCONTINUED | OUTPATIENT
Start: 2018-09-28 | End: 2018-09-30 | Stop reason: HOSPADM

## 2018-09-27 RX ADMIN — CARBOXYMETHYLCELLULOSE SODIUM 1 DROP: 10 GEL OPHTHALMIC at 22:02

## 2018-09-27 RX ADMIN — ERYTHROMYCIN: 5 OINTMENT OPHTHALMIC at 22:02

## 2018-09-27 RX ADMIN — METFORMIN HYDROCHLORIDE 1000 MG: 500 TABLET ORAL at 18:10

## 2018-09-27 RX ADMIN — ATORVASTATIN CALCIUM 10 MG: 10 TABLET, FILM COATED ORAL at 22:01

## 2018-09-27 RX ADMIN — Medication 1 DROP: at 22:02

## 2018-09-27 RX ADMIN — CEFEPIME HYDROCHLORIDE 2 G: 2 INJECTION, POWDER, FOR SOLUTION INTRAVENOUS at 18:45

## 2018-09-27 RX ADMIN — HYDRALAZINE HYDROCHLORIDE 50 MG: 50 TABLET, FILM COATED ORAL at 18:10

## 2018-09-27 RX ADMIN — PREGABALIN 150 MG: 75 CAPSULE ORAL at 22:01

## 2018-09-27 RX ADMIN — MUPIROCIN: 20 OINTMENT TOPICAL at 22:02

## 2018-09-27 RX ADMIN — FLUTICASONE PROPIONATE 2 SPRAY: 50 SPRAY, METERED NASAL at 22:02

## 2018-09-27 RX ADMIN — PROPRANOLOL HYDROCHLORIDE 20 MG: 20 TABLET ORAL at 22:01

## 2018-09-27 RX ADMIN — ENOXAPARIN SODIUM 40 MG: 40 INJECTION SUBCUTANEOUS at 22:02

## 2018-09-27 ASSESSMENT — PAIN DESCRIPTION - LOCATION
LOCATION: LEG
LOCATION: LEG

## 2018-09-27 ASSESSMENT — PAIN DESCRIPTION - PAIN TYPE
TYPE: ACUTE PAIN
TYPE: ACUTE PAIN

## 2018-09-27 ASSESSMENT — PAIN SCALES - GENERAL
PAINLEVEL_OUTOF10: 10
PAINLEVEL_OUTOF10: 10

## 2018-09-27 ASSESSMENT — PAIN DESCRIPTION - ORIENTATION: ORIENTATION: RIGHT;LEFT

## 2018-09-27 NOTE — PROGRESS NOTES
1500 mg IV x 1. Will order additional doses after results of SCr are determined. Timing of trough level will be determined based on culture results, renal function, and clinical response. Thank you for the consult. Will continue to follow.     David Rivera PharmD  9/27/2018  4:34 PM

## 2018-09-28 LAB
AEROBIC CULTURE: ABNORMAL
ALBUMIN SERPL-MCNC: 2.6 G/DL (ref 3.5–5.1)
ALP BLD-CCNC: 68 U/L (ref 38–126)
ALT SERPL-CCNC: 9 U/L (ref 11–66)
ANION GAP SERPL CALCULATED.3IONS-SCNC: 12 MEQ/L (ref 8–16)
AST SERPL-CCNC: 11 U/L (ref 5–40)
AVERAGE GLUCOSE: 144 MG/DL (ref 70–126)
BILIRUB SERPL-MCNC: 0.2 MG/DL (ref 0.3–1.2)
BILIRUBIN DIRECT: < 0.2 MG/DL (ref 0–0.3)
BILIRUBIN URINE: NEGATIVE
BLOOD, URINE: NEGATIVE
BUN BLDV-MCNC: 34 MG/DL (ref 7–22)
CALCIUM SERPL-MCNC: 8.3 MG/DL (ref 8.5–10.5)
CHARACTER, URINE: CLEAR
CHLORIDE BLD-SCNC: 100 MEQ/L (ref 98–111)
CO2: 25 MEQ/L (ref 23–33)
COLOR: YELLOW
CREAT SERPL-MCNC: 1.2 MG/DL (ref 0.4–1.2)
GFR SERPL CREATININE-BSD FRML MDRD: 44 ML/MIN/1.73M2
GLUCOSE BLD-MCNC: 166 MG/DL (ref 70–108)
GLUCOSE, URINE: NEGATIVE MG/DL
GRAM STAIN RESULT: ABNORMAL
HBA1C MFR BLD: 6.8 % (ref 4.4–6.4)
KETONES, URINE: NEGATIVE
LEUKOCYTE EST, POC: NEGATIVE
MAGNESIUM: 1.1 MG/DL (ref 1.6–2.4)
NITRITE, URINE: NEGATIVE
ORGANISM: ABNORMAL
PH UA: 5.5
POTASSIUM SERPL-SCNC: 4 MEQ/L (ref 3.5–5.2)
PROTEIN UA: NEGATIVE MG/DL
SODIUM BLD-SCNC: 137 MEQ/L (ref 135–145)
SPECIFIC GRAVITY UA: 1.01 (ref 1–1.03)
TOTAL PROTEIN: 5.5 G/DL (ref 6.1–8)
UROBILINOGEN, URINE: 0.2 EU/DL

## 2018-09-28 PROCEDURE — 51702 INSERT TEMP BLADDER CATH: CPT

## 2018-09-28 PROCEDURE — 6360000002 HC RX W HCPCS: Performed by: INTERNAL MEDICINE

## 2018-09-28 PROCEDURE — G8987 SELF CARE CURRENT STATUS: HCPCS

## 2018-09-28 PROCEDURE — 2709999900 HC NON-CHARGEABLE SUPPLY

## 2018-09-28 PROCEDURE — 97116 GAIT TRAINING THERAPY: CPT

## 2018-09-28 PROCEDURE — 36415 COLL VENOUS BLD VENIPUNCTURE: CPT

## 2018-09-28 PROCEDURE — C1751 CATH, INF, PER/CENT/MIDLINE: HCPCS

## 2018-09-28 PROCEDURE — 05HY33Z INSERTION OF INFUSION DEVICE INTO UPPER VEIN, PERCUTANEOUS APPROACH: ICD-10-PCS | Performed by: INTERNAL MEDICINE

## 2018-09-28 PROCEDURE — 6370000000 HC RX 637 (ALT 250 FOR IP): Performed by: INTERNAL MEDICINE

## 2018-09-28 PROCEDURE — 83735 ASSAY OF MAGNESIUM: CPT

## 2018-09-28 PROCEDURE — 97162 PT EVAL MOD COMPLEX 30 MIN: CPT

## 2018-09-28 PROCEDURE — 76937 US GUIDE VASCULAR ACCESS: CPT

## 2018-09-28 PROCEDURE — 97166 OT EVAL MOD COMPLEX 45 MIN: CPT

## 2018-09-28 PROCEDURE — 36569 INSJ PICC 5 YR+ W/O IMAGING: CPT

## 2018-09-28 PROCEDURE — 97530 THERAPEUTIC ACTIVITIES: CPT

## 2018-09-28 PROCEDURE — 97535 SELF CARE MNGMENT TRAINING: CPT

## 2018-09-28 PROCEDURE — G8978 MOBILITY CURRENT STATUS: HCPCS

## 2018-09-28 PROCEDURE — 80053 COMPREHEN METABOLIC PANEL: CPT

## 2018-09-28 PROCEDURE — 1200000000 HC SEMI PRIVATE

## 2018-09-28 PROCEDURE — 82248 BILIRUBIN DIRECT: CPT

## 2018-09-28 PROCEDURE — G8979 MOBILITY GOAL STATUS: HCPCS

## 2018-09-28 PROCEDURE — G8988 SELF CARE GOAL STATUS: HCPCS

## 2018-09-28 PROCEDURE — 2500000003 HC RX 250 WO HCPCS: Performed by: INTERNAL MEDICINE

## 2018-09-28 PROCEDURE — 81003 URINALYSIS AUTO W/O SCOPE: CPT

## 2018-09-28 PROCEDURE — 2580000003 HC RX 258: Performed by: INTERNAL MEDICINE

## 2018-09-28 RX ORDER — SODIUM CHLORIDE 0.9 % (FLUSH) 0.9 %
10 SYRINGE (ML) INJECTION PRN
Status: DISCONTINUED | OUTPATIENT
Start: 2018-09-28 | End: 2018-09-30 | Stop reason: HOSPADM

## 2018-09-28 RX ORDER — SODIUM CHLORIDE 0.9 % (FLUSH) 0.9 %
10 SYRINGE (ML) INJECTION EVERY 12 HOURS SCHEDULED
Status: DISCONTINUED | OUTPATIENT
Start: 2018-09-28 | End: 2018-09-30 | Stop reason: HOSPADM

## 2018-09-28 RX ORDER — LIDOCAINE HYDROCHLORIDE 10 MG/ML
5 INJECTION, SOLUTION EPIDURAL; INFILTRATION; INTRACAUDAL; PERINEURAL ONCE
Status: DISCONTINUED | OUTPATIENT
Start: 2018-09-28 | End: 2018-09-30 | Stop reason: HOSPADM

## 2018-09-28 RX ORDER — DIPHENHYDRAMINE HCL 25 MG
25 TABLET ORAL EVERY 8 HOURS PRN
Status: DISCONTINUED | OUTPATIENT
Start: 2018-09-28 | End: 2018-09-30 | Stop reason: HOSPADM

## 2018-09-28 RX ORDER — MAGNESIUM SULFATE IN WATER 40 MG/ML
4 INJECTION, SOLUTION INTRAVENOUS ONCE
Status: COMPLETED | OUTPATIENT
Start: 2018-09-28 | End: 2018-09-28

## 2018-09-28 RX ORDER — BUMETANIDE 1 MG/1
2 TABLET ORAL 2 TIMES DAILY
Status: DISCONTINUED | OUTPATIENT
Start: 2018-09-28 | End: 2018-09-30 | Stop reason: HOSPADM

## 2018-09-28 RX ORDER — PREGABALIN 300 MG/1
300 CAPSULE ORAL 2 TIMES DAILY
COMMUNITY
End: 2019-04-15 | Stop reason: SDUPTHER

## 2018-09-28 RX ORDER — LINEZOLID 2 MG/ML
600 INJECTION, SOLUTION INTRAVENOUS EVERY 12 HOURS
Status: DISCONTINUED | OUTPATIENT
Start: 2018-09-28 | End: 2018-09-30 | Stop reason: HOSPADM

## 2018-09-28 RX ADMIN — BUMETANIDE 2 MG: 1 TABLET ORAL at 17:18

## 2018-09-28 RX ADMIN — FLUTICASONE PROPIONATE 2 SPRAY: 50 SPRAY, METERED NASAL at 20:19

## 2018-09-28 RX ADMIN — Medication 10 ML: at 09:56

## 2018-09-28 RX ADMIN — CEFEPIME HYDROCHLORIDE 2 G: 2 INJECTION, POWDER, FOR SOLUTION INTRAVENOUS at 17:18

## 2018-09-28 RX ADMIN — ERYTHROMYCIN: 5 OINTMENT OPHTHALMIC at 09:44

## 2018-09-28 RX ADMIN — Medication 1 DROP: at 14:44

## 2018-09-28 RX ADMIN — ENOXAPARIN SODIUM 40 MG: 40 INJECTION SUBCUTANEOUS at 19:41

## 2018-09-28 RX ADMIN — Medication 1 DROP: at 09:44

## 2018-09-28 RX ADMIN — CARBOXYMETHYLCELLULOSE SODIUM 1 DROP: 10 GEL OPHTHALMIC at 09:51

## 2018-09-28 RX ADMIN — ACETAMINOPHEN 650 MG: 325 TABLET ORAL at 00:43

## 2018-09-28 RX ADMIN — CARBOXYMETHYLCELLULOSE SODIUM 1 DROP: 10 GEL OPHTHALMIC at 20:18

## 2018-09-28 RX ADMIN — CEFEPIME HYDROCHLORIDE 2 G: 2 INJECTION, POWDER, FOR SOLUTION INTRAVENOUS at 05:21

## 2018-09-28 RX ADMIN — FLUTICASONE PROPIONATE 2 SPRAY: 50 SPRAY, METERED NASAL at 09:43

## 2018-09-28 RX ADMIN — MUPIROCIN: 20 OINTMENT TOPICAL at 09:42

## 2018-09-28 RX ADMIN — HYDRALAZINE HYDROCHLORIDE 50 MG: 50 TABLET, FILM COATED ORAL at 00:32

## 2018-09-28 RX ADMIN — METFORMIN HYDROCHLORIDE 1000 MG: 500 TABLET ORAL at 17:19

## 2018-09-28 RX ADMIN — Medication 1 DROP: at 20:18

## 2018-09-28 RX ADMIN — Medication 10 ML: at 20:19

## 2018-09-28 RX ADMIN — PREGABALIN 150 MG: 75 CAPSULE ORAL at 09:51

## 2018-09-28 RX ADMIN — PANTOPRAZOLE SODIUM 40 MG: 40 TABLET, DELAYED RELEASE ORAL at 09:51

## 2018-09-28 RX ADMIN — PREGABALIN 150 MG: 75 CAPSULE ORAL at 20:18

## 2018-09-28 RX ADMIN — METFORMIN HYDROCHLORIDE 1000 MG: 500 TABLET ORAL at 09:41

## 2018-09-28 RX ADMIN — ACETAMINOPHEN 650 MG: 325 TABLET ORAL at 17:21

## 2018-09-28 RX ADMIN — BUMETANIDE 2 MG: 0.25 INJECTION INTRAMUSCULAR; INTRAVENOUS at 00:32

## 2018-09-28 RX ADMIN — VANCOMYCIN HYDROCHLORIDE 1500 MG: 1 INJECTION, POWDER, LYOPHILIZED, FOR SOLUTION INTRAVENOUS at 00:32

## 2018-09-28 RX ADMIN — MAGNESIUM SULFATE IN WATER 4 G: 40 INJECTION, SOLUTION INTRAVENOUS at 16:01

## 2018-09-28 RX ADMIN — BUMETANIDE 2 MG: 0.25 INJECTION INTRAMUSCULAR; INTRAVENOUS at 09:52

## 2018-09-28 RX ADMIN — PIOGLITAZONE 30 MG: 30 TABLET ORAL at 09:41

## 2018-09-28 RX ADMIN — ACETAMINOPHEN 650 MG: 325 TABLET ORAL at 09:51

## 2018-09-28 RX ADMIN — HYDRALAZINE HYDROCHLORIDE 50 MG: 50 TABLET, FILM COATED ORAL at 17:18

## 2018-09-28 RX ADMIN — ERYTHROMYCIN: 5 OINTMENT OPHTHALMIC at 20:19

## 2018-09-28 RX ADMIN — ATORVASTATIN CALCIUM 10 MG: 10 TABLET, FILM COATED ORAL at 20:18

## 2018-09-28 RX ADMIN — MUPIROCIN: 20 OINTMENT TOPICAL at 20:18

## 2018-09-28 ASSESSMENT — PAIN SCALES - GENERAL
PAINLEVEL_OUTOF10: 8
PAINLEVEL_OUTOF10: 6
PAINLEVEL_OUTOF10: 6
PAINLEVEL_OUTOF10: 10
PAINLEVEL_OUTOF10: 8
PAINLEVEL_OUTOF10: 10
PAINLEVEL_OUTOF10: 6
PAINLEVEL_OUTOF10: 8
PAINLEVEL_OUTOF10: 6

## 2018-09-28 ASSESSMENT — PAIN DESCRIPTION - DESCRIPTORS: DESCRIPTORS: ACHING

## 2018-09-28 ASSESSMENT — PAIN DESCRIPTION - LOCATION
LOCATION: LEG
LOCATION: SHOULDER;LEG

## 2018-09-28 ASSESSMENT — PAIN DESCRIPTION - PAIN TYPE
TYPE: ACUTE PAIN

## 2018-09-28 ASSESSMENT — PAIN DESCRIPTION - ONSET: ONSET: ON-GOING

## 2018-09-28 ASSESSMENT — PAIN DESCRIPTION - ORIENTATION
ORIENTATION: RIGHT;LEFT
ORIENTATION: LOWER
ORIENTATION: RIGHT
ORIENTATION: RIGHT;LEFT

## 2018-09-28 ASSESSMENT — PAIN DESCRIPTION - FREQUENCY: FREQUENCY: INTERMITTENT

## 2018-09-28 NOTE — PROGRESS NOTES
limited by pain  Activity Tolerance: Pt demonstrated decreased endurance with short distance functional mobility and simple ADL tasks this date. Pt did c/o minimal inc in pain with fucntional activity and RUE ROM limitations decrease pt independence with ADL tasks    Treatment Initiated: OT macy completed, see above for more details. Assessment:  Assessment: Pt would benefit from skilled OT intervention for maximizing pt safety and independence with ADL tasks and functional mobility for safe transition to the next level of care  Performance deficits / Impairments: Decreased functional mobility , Decreased ADL status, Decreased ROM, Decreased strength, Decreased safe awareness, Decreased high-level IADLs, Decreased balance, Decreased endurance  Prognosis: Good, Fair    Clinical Decision Making: Clinical Decision making was of Moderate Complexity as the result of analysis of data from a detailed assessment, a consideration of several treatment options, the presence of comorbidities affecting the plan of care and the need for minimal to moderate modifications or assistance required to complete the evaluation. Discharge Recommendations:  Discharge Recommendations: Continue to assess pending progress, Patient would benefit from continued therapy after discharge    Patient Education:  Patient Education: Role of OT, POC and goals, safety, t/f training, ADLs  Barriers to Learning: hearing    Equipment Recommendations:  Equipment Needed: No    Safety:  Safety Devices in place: Yes  Type of devices:  All fall risk precautions in place, Left in chair, Call light within reach, Nurse notified, Chair alarm in place, Gait belt, Patient at risk for falls (RN in room at end of session)    Plan:  Times per week: 3-5x  Current Treatment Recommendations: Strengthening, Balance Training, ROM, Endurance Training, Functional Mobility Training, Safety Education & Training, Self-Care / ADL, Pain Management, Positioning, Equipment

## 2018-09-28 NOTE — PROGRESS NOTES
Diabetic peripheral neuropathy (HCC)     Acute recurrent pansinusitis     Primary thunderclap headache     Rash of entire body     Infection of skin due to methicillin resistant Staphylococcus aureus (MRSA)     Drug allergy, antibiotic  likely bactrim     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HCC)     Skin plaque     Hyponatremia     Hypervolemia     Cellulitis of lower extremity     Hypoglycemia     Acute on chronic systolic CHF (congestive heart failure) (HCC)     Bilateral cellulitis of lower leg     Venous ulcers of both lower extremities (HCC)     Class 3 severe obesity due to excess calories with serious comorbidity and body mass index (BMI) of 40.0 to 44.9 in adult [facility])      Plan:    amd gauze with ace wrap tid  Diuretics  Monitor creatinine  Tcu eval   Pt/ot  Mid line    Labs, cultures, and radiographs reviewed. Changes made as necessary. D/w Patient's R.N. and specific instructions given and infectious disease issues addressed. Will follow the patient closely with you. Also please see the orders for updated patient care orders written by ID team.    Thank you for involving us in this patient's care. I will be discussing this case with the treating physicians. Please don't hesitate to call me if any questions, issues  or concerns      Please see orders for updated patient care orders written today.   Electronically signed by Jenaro Anthony on 9/28/2018 at 8:05 AM

## 2018-09-29 LAB
ANION GAP SERPL CALCULATED.3IONS-SCNC: 14 MEQ/L (ref 8–16)
BUN BLDV-MCNC: 33 MG/DL (ref 7–22)
CALCIUM SERPL-MCNC: 8.4 MG/DL (ref 8.5–10.5)
CHLORIDE BLD-SCNC: 98 MEQ/L (ref 98–111)
CO2: 24 MEQ/L (ref 23–33)
CREAT SERPL-MCNC: 1.1 MG/DL (ref 0.4–1.2)
GFR SERPL CREATININE-BSD FRML MDRD: 48 ML/MIN/1.73M2
GLUCOSE BLD-MCNC: 131 MG/DL (ref 70–108)
MAGNESIUM: 1.9 MG/DL (ref 1.6–2.4)
POTASSIUM SERPL-SCNC: 3.7 MEQ/L (ref 3.5–5.2)
SODIUM BLD-SCNC: 136 MEQ/L (ref 135–145)

## 2018-09-29 PROCEDURE — 83735 ASSAY OF MAGNESIUM: CPT

## 2018-09-29 PROCEDURE — 1200000000 HC SEMI PRIVATE

## 2018-09-29 PROCEDURE — 6370000000 HC RX 637 (ALT 250 FOR IP): Performed by: INTERNAL MEDICINE

## 2018-09-29 PROCEDURE — 6360000002 HC RX W HCPCS: Performed by: INTERNAL MEDICINE

## 2018-09-29 PROCEDURE — 2580000003 HC RX 258: Performed by: INTERNAL MEDICINE

## 2018-09-29 PROCEDURE — 36415 COLL VENOUS BLD VENIPUNCTURE: CPT

## 2018-09-29 PROCEDURE — 80048 BASIC METABOLIC PNL TOTAL CA: CPT

## 2018-09-29 RX ORDER — BUMETANIDE 1 MG/1
2 TABLET ORAL 2 TIMES DAILY
Status: CANCELLED | OUTPATIENT
Start: 2018-09-30

## 2018-09-29 RX ORDER — ERYTHROMYCIN 5 MG/G
OINTMENT OPHTHALMIC 2 TIMES DAILY
Status: CANCELLED | OUTPATIENT
Start: 2018-09-29

## 2018-09-29 RX ORDER — SIMETHICONE 80 MG
80 TABLET,CHEWABLE ORAL EVERY 6 HOURS PRN
Status: DISCONTINUED | OUTPATIENT
Start: 2018-09-29 | End: 2018-09-30 | Stop reason: HOSPADM

## 2018-09-29 RX ORDER — SODIUM CHLORIDE 0.9 % (FLUSH) 0.9 %
10 SYRINGE (ML) INJECTION EVERY 12 HOURS SCHEDULED
Status: CANCELLED | OUTPATIENT
Start: 2018-09-29

## 2018-09-29 RX ORDER — DIPHENHYDRAMINE HCL 25 MG
25 TABLET ORAL EVERY 8 HOURS PRN
Status: CANCELLED | OUTPATIENT
Start: 2018-09-29

## 2018-09-29 RX ORDER — TETRAHYDROZOLINE HCL 0.05 %
1 DROPS OPHTHALMIC (EYE) 3 TIMES DAILY
Status: CANCELLED | OUTPATIENT
Start: 2018-09-29

## 2018-09-29 RX ORDER — ATORVASTATIN CALCIUM 10 MG/1
10 TABLET, FILM COATED ORAL DAILY
Status: CANCELLED | OUTPATIENT
Start: 2018-09-29

## 2018-09-29 RX ORDER — FLUTICASONE PROPIONATE 50 MCG
2 SPRAY, SUSPENSION (ML) NASAL 2 TIMES DAILY
Status: CANCELLED | OUTPATIENT
Start: 2018-09-29

## 2018-09-29 RX ORDER — ACETAMINOPHEN 325 MG/1
650 TABLET ORAL EVERY 6 HOURS PRN
Status: CANCELLED | OUTPATIENT
Start: 2018-09-29

## 2018-09-29 RX ORDER — SODIUM CHLORIDE 0.9 % (FLUSH) 0.9 %
10 SYRINGE (ML) INJECTION PRN
Status: CANCELLED | OUTPATIENT
Start: 2018-09-29

## 2018-09-29 RX ORDER — LISINOPRIL 5 MG/1
5 TABLET ORAL DAILY
Status: CANCELLED | OUTPATIENT
Start: 2018-09-30

## 2018-09-29 RX ORDER — HYDROCODONE BITARTRATE AND ACETAMINOPHEN 5; 325 MG/1; MG/1
2 TABLET ORAL EVERY 12 HOURS PRN
Status: DISCONTINUED | OUTPATIENT
Start: 2018-09-29 | End: 2018-09-30 | Stop reason: HOSPADM

## 2018-09-29 RX ORDER — PANTOPRAZOLE SODIUM 40 MG/1
40 TABLET, DELAYED RELEASE ORAL EVERY MORNING
Status: CANCELLED | OUTPATIENT
Start: 2018-09-30

## 2018-09-29 RX ORDER — LINEZOLID 2 MG/ML
600 INJECTION, SOLUTION INTRAVENOUS EVERY 12 HOURS
Status: CANCELLED | OUTPATIENT
Start: 2018-09-29

## 2018-09-29 RX ORDER — PREGABALIN 75 MG/1
150 CAPSULE ORAL 2 TIMES DAILY
Status: CANCELLED | OUTPATIENT
Start: 2018-09-29

## 2018-09-29 RX ORDER — HYDRALAZINE HYDROCHLORIDE 50 MG/1
50 TABLET, FILM COATED ORAL EVERY 8 HOURS
Status: CANCELLED | OUTPATIENT
Start: 2018-09-30

## 2018-09-29 RX ADMIN — SIMETHICONE CHEW TAB 80 MG 80 MG: 80 TABLET ORAL at 21:52

## 2018-09-29 RX ADMIN — Medication 10 ML: at 21:56

## 2018-09-29 RX ADMIN — MUPIROCIN: 20 OINTMENT TOPICAL at 09:26

## 2018-09-29 RX ADMIN — Medication 1 DROP: at 21:52

## 2018-09-29 RX ADMIN — ACETAMINOPHEN 650 MG: 325 TABLET ORAL at 01:15

## 2018-09-29 RX ADMIN — LINEZOLID 600 MG: 600 INJECTION, SOLUTION INTRAVENOUS at 00:02

## 2018-09-29 RX ADMIN — LISINOPRIL 5 MG: 5 TABLET ORAL at 09:27

## 2018-09-29 RX ADMIN — PREGABALIN 150 MG: 75 CAPSULE ORAL at 09:25

## 2018-09-29 RX ADMIN — HYDROCODONE BITARTRATE AND ACETAMINOPHEN 2 TABLET: 5; 325 TABLET ORAL at 12:43

## 2018-09-29 RX ADMIN — Medication 1 DROP: at 14:00

## 2018-09-29 RX ADMIN — METFORMIN HYDROCHLORIDE 1000 MG: 500 TABLET ORAL at 09:26

## 2018-09-29 RX ADMIN — FLUTICASONE PROPIONATE 2 SPRAY: 50 SPRAY, METERED NASAL at 21:52

## 2018-09-29 RX ADMIN — HYDRALAZINE HYDROCHLORIDE 50 MG: 50 TABLET, FILM COATED ORAL at 09:28

## 2018-09-29 RX ADMIN — PREGABALIN 150 MG: 75 CAPSULE ORAL at 21:51

## 2018-09-29 RX ADMIN — CARBOXYMETHYLCELLULOSE SODIUM 1 DROP: 10 GEL OPHTHALMIC at 21:52

## 2018-09-29 RX ADMIN — MAGNESIUM GLUCONATE 500 MG ORAL TABLET 400 MG: 500 TABLET ORAL at 13:12

## 2018-09-29 RX ADMIN — PANTOPRAZOLE SODIUM 40 MG: 40 TABLET, DELAYED RELEASE ORAL at 09:24

## 2018-09-29 RX ADMIN — BUMETANIDE 2 MG: 1 TABLET ORAL at 09:26

## 2018-09-29 RX ADMIN — CEFEPIME HYDROCHLORIDE 2 G: 2 INJECTION, POWDER, FOR SOLUTION INTRAVENOUS at 05:53

## 2018-09-29 RX ADMIN — CEFEPIME HYDROCHLORIDE 2 G: 2 INJECTION, POWDER, FOR SOLUTION INTRAVENOUS at 17:55

## 2018-09-29 RX ADMIN — DIPHENHYDRAMINE HCL 25 MG: 25 TABLET ORAL at 21:52

## 2018-09-29 RX ADMIN — ATORVASTATIN CALCIUM 10 MG: 10 TABLET, FILM COATED ORAL at 21:52

## 2018-09-29 RX ADMIN — Medication 10 ML: at 10:16

## 2018-09-29 RX ADMIN — ERYTHROMYCIN: 5 OINTMENT OPHTHALMIC at 09:26

## 2018-09-29 RX ADMIN — Medication 1 DROP: at 09:26

## 2018-09-29 RX ADMIN — LINEZOLID 600 MG: 600 INJECTION, SOLUTION INTRAVENOUS at 10:13

## 2018-09-29 RX ADMIN — BUMETANIDE 2 MG: 1 TABLET ORAL at 16:15

## 2018-09-29 RX ADMIN — METFORMIN HYDROCHLORIDE 1000 MG: 500 TABLET ORAL at 16:15

## 2018-09-29 RX ADMIN — ERYTHROMYCIN: 5 OINTMENT OPHTHALMIC at 22:04

## 2018-09-29 RX ADMIN — CARBOXYMETHYLCELLULOSE SODIUM 1 DROP: 10 GEL OPHTHALMIC at 09:26

## 2018-09-29 RX ADMIN — MUPIROCIN: 20 OINTMENT TOPICAL at 21:52

## 2018-09-29 RX ADMIN — FLUTICASONE PROPIONATE 2 SPRAY: 50 SPRAY, METERED NASAL at 09:26

## 2018-09-29 RX ADMIN — LINEZOLID 600 MG: 600 INJECTION, SOLUTION INTRAVENOUS at 21:52

## 2018-09-29 RX ADMIN — ENOXAPARIN SODIUM 40 MG: 40 INJECTION SUBCUTANEOUS at 17:58

## 2018-09-29 RX ADMIN — DIPHENHYDRAMINE HCL 25 MG: 25 TABLET ORAL at 00:02

## 2018-09-29 ASSESSMENT — PAIN DESCRIPTION - PAIN TYPE: TYPE: ACUTE PAIN

## 2018-09-29 ASSESSMENT — PAIN SCALES - GENERAL
PAINLEVEL_OUTOF10: 8
PAINLEVEL_OUTOF10: 5
PAINLEVEL_OUTOF10: 7
PAINLEVEL_OUTOF10: 10
PAINLEVEL_OUTOF10: 3
PAINLEVEL_OUTOF10: 9

## 2018-09-29 NOTE — PROGRESS NOTES
 fluticasone  2 spray Nasal BID    metFORMIN  1,000 mg Oral BID WC    pantoprazole  40 mg Oral QAM    atorvastatin  10 mg Oral Daily    hydrALAZINE  50 mg Oral Q8H    erythromycin   Both Eyes BID    lisinopril  5 mg Oral Daily    pregabalin  150 mg Oral BID    mupirocin   Nasal BID    cefepime  2 g Intravenous Q12H    enoxaparin  40 mg Subcutaneous Q24H    carboxymethylcellulose  1 drop Both Eyes Q12H     Continuous Infusions:    I & O's:  I/O last 3 completed shifts: In: 1630 [P.O.:1630]  Out: 2750 [Urine:2750]  I/O this shift:  In: 180 [P.O.:180]  Out: -     Intake/Output Summary (Last 24 hours) at 09/29/18 1123  Last data filed at 09/29/18 0844   Gross per 24 hour   Intake             1570 ml   Output             2750 ml   Net            -1180 ml       Date 09/29/18 0000 - 09/29/18 2359   Shift 7348-1676 [id number] 7084-7112 24 Hour Total   I  N  T  A  K  E   P.O.  (mL/kg/hr) 200  (0.3) 180  380    I.V.  (mL/kg) 0  (0)   0  (0)    Shift Total  (mL/kg) 200  (2.1) 180  (1.9)  380  (4)   O  U  T  P  U  T   Urine  (mL/kg/hr) 550  (0.7)   550    Shift Total  (mL/kg) 550  (5.7)   550  (5.7)   Weight (kg) 95.9 95.9 95.9 95.9           CBC:   Recent Labs      09/27/18   1832   WBC  7.4   HGB  8.5*   PLT  211     BMP:    Recent Labs      09/27/18   1832  09/28/18   0504  09/29/18   0435   NA  140  137  136   K  4.7  4.0  3.7   CL  102  100  98   CO2  26  25  24   BUN  36*  34*  33*   CREATININE  1.2  1.2  1.1   GLUCOSE  100  166*  131*     Hepatic:   Recent Labs      09/28/18   0504   AST  11   ALT  9*   BILITOT  0.2*   ALKPHOS  68     BNP: No results for input(s): BNP in the last 72 hours.   URINALYSIS: neg    MICRO:     Collected: 09/27/18 1150   Resulting lab: [facility]   Value: Staphylococcus aureus (Abnormal)   Comment:    *Additional information available - narrative   9/28/2018  8:40 PM - Rosalina Rodriguez Incoming Lab Results From Soft     Component Results     Component Collected without long-term current use of insulin (HCC)     Morbid obesity with body mass index (BMI) of 45.0 to 49.9 in adult [facility])     DDD (degenerative disc disease), lumbar     Benign essential HTN     SWAPNA on CPAP     Diabetic peripheral neuropathy ([facility])     Acute recurrent pansinusitis     Primary thunderclap headache     Rash of entire body     Infection of skin due to methicillin resistant Staphylococcus aureus (MRSA)     Drug allergy, antibiotic  likely bactrim     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[facility])     Skin plaque     Hyponatremia     Hypervolemia     Cellulitis of lower extremity     Hypoglycemia     Acute on chronic systolic CHF (congestive heart failure) ([facility])     Bilateral cellulitis of lower leg     Venous ulcers of both lower extremities ([facility])     Class 3 severe obesity due to excess calories with serious comorbidity and body mass index (BMI) of 40.0 to 44.9 in adult [facility])      Plan:  Follow final cultures  Replace magnesium per protocol  Po magnesim supplementation   amd gauze with ace wrap tid  Diuretics  Monitor creatinine  Tcu transfer tomorrow   Pt/ot  Mid line    Labs, cultures, and radiographs reviewed. Changes made as necessary. D/w Patient's R.N. and specific instructions given and infectious disease issues addressed. Will follow the patient closely with you. Also please see the orders for updated patient care orders written by ID team.    Thank you for involving us in this patient's care. I will be discussing this case with the treating physicians. Please don't hesitate to call me if any questions, issues  or concerns      Please see orders for updated patient care orders written today.   Electronically signed by Kelly Dumont on 9/29/2018 at 11:23 AM

## 2018-09-30 ENCOUNTER — HOSPITAL ENCOUNTER (INPATIENT)
Age: 78
LOS: 4 days | Discharge: ANOTHER HOSPITAL | DRG: 603 | End: 2018-10-04
Attending: FAMILY MEDICINE | Admitting: FAMILY MEDICINE
Payer: MEDICARE

## 2018-09-30 VITALS
HEART RATE: 70 BPM | WEIGHT: 211.5 LBS | RESPIRATION RATE: 16 BRPM | HEIGHT: 60 IN | BODY MASS INDEX: 41.52 KG/M2 | TEMPERATURE: 97.5 F | DIASTOLIC BLOOD PRESSURE: 55 MMHG | SYSTOLIC BLOOD PRESSURE: 92 MMHG | OXYGEN SATURATION: 96 %

## 2018-09-30 PROBLEM — N18.30 CKD (CHRONIC KIDNEY DISEASE) STAGE 3, GFR 30-59 ML/MIN (HCC): Status: ACTIVE | Noted: 2018-09-30

## 2018-09-30 PROBLEM — D50.9 IRON DEFICIENCY ANEMIA: Status: ACTIVE | Noted: 2018-09-30

## 2018-09-30 PROBLEM — L03.115 BILATERAL LOWER LEG CELLULITIS: Status: ACTIVE | Noted: 2018-09-30

## 2018-09-30 PROBLEM — L03.116 BILATERAL LOWER LEG CELLULITIS: Status: ACTIVE | Noted: 2018-09-30

## 2018-09-30 PROBLEM — E83.42 HYPOMAGNESEMIA: Status: ACTIVE | Noted: 2018-09-30

## 2018-09-30 LAB
AEROBIC CULTURE: ABNORMAL
AEROBIC CULTURE: ABNORMAL
ANION GAP SERPL CALCULATED.3IONS-SCNC: 12 MEQ/L (ref 8–16)
BUN BLDV-MCNC: 51 MG/DL (ref 7–22)
CALCIUM SERPL-MCNC: 9.2 MG/DL (ref 8.5–10.5)
CHLORIDE BLD-SCNC: 93 MEQ/L (ref 98–111)
CO2: 27 MEQ/L (ref 23–33)
CREAT SERPL-MCNC: 1.2 MG/DL (ref 0.4–1.2)
GFR SERPL CREATININE-BSD FRML MDRD: 44 ML/MIN/1.73M2
GLUCOSE BLD-MCNC: 110 MG/DL (ref 70–108)
GRAM STAIN RESULT: ABNORMAL
MAGNESIUM: 1.5 MG/DL (ref 1.6–2.4)
ORGANISM: ABNORMAL
ORGANISM: ABNORMAL
POTASSIUM SERPL-SCNC: 4.4 MEQ/L (ref 3.5–5.2)
SODIUM BLD-SCNC: 132 MEQ/L (ref 135–145)

## 2018-09-30 PROCEDURE — 36592 COLLECT BLOOD FROM PICC: CPT

## 2018-09-30 PROCEDURE — 0220000000 HC SKILLED NURSING FACILITY

## 2018-09-30 PROCEDURE — 6360000002 HC RX W HCPCS: Performed by: INTERNAL MEDICINE

## 2018-09-30 PROCEDURE — 2580000003 HC RX 258: Performed by: INTERNAL MEDICINE

## 2018-09-30 PROCEDURE — 36415 COLL VENOUS BLD VENIPUNCTURE: CPT

## 2018-09-30 PROCEDURE — 6370000000 HC RX 637 (ALT 250 FOR IP): Performed by: FAMILY MEDICINE

## 2018-09-30 PROCEDURE — 2709999900 HC NON-CHARGEABLE SUPPLY

## 2018-09-30 PROCEDURE — 1290000000 HC SEMI PRIVATE OTHER R&B

## 2018-09-30 PROCEDURE — 83735 ASSAY OF MAGNESIUM: CPT

## 2018-09-30 PROCEDURE — 6370000000 HC RX 637 (ALT 250 FOR IP): Performed by: INTERNAL MEDICINE

## 2018-09-30 PROCEDURE — 80048 BASIC METABOLIC PNL TOTAL CA: CPT

## 2018-09-30 RX ORDER — PREGABALIN 75 MG/1
150 CAPSULE ORAL 2 TIMES DAILY
Status: DISCONTINUED | OUTPATIENT
Start: 2018-09-30 | End: 2018-10-04 | Stop reason: HOSPADM

## 2018-09-30 RX ORDER — SENNA PLUS 8.6 MG/1
1 TABLET ORAL NIGHTLY PRN
Status: DISCONTINUED | OUTPATIENT
Start: 2018-09-30 | End: 2018-10-04 | Stop reason: HOSPADM

## 2018-09-30 RX ORDER — HYDROCODONE BITARTRATE AND ACETAMINOPHEN 5; 325 MG/1; MG/1
2 TABLET ORAL 3 TIMES DAILY PRN
Status: DISCONTINUED | OUTPATIENT
Start: 2018-09-30 | End: 2018-10-04 | Stop reason: HOSPADM

## 2018-09-30 RX ORDER — LISINOPRIL 5 MG/1
5 TABLET ORAL DAILY
Status: DISCONTINUED | OUTPATIENT
Start: 2018-10-01 | End: 2018-10-04 | Stop reason: HOSPADM

## 2018-09-30 RX ORDER — ACETAMINOPHEN 325 MG/1
650 TABLET ORAL EVERY 6 HOURS PRN
Status: DISCONTINUED | OUTPATIENT
Start: 2018-09-30 | End: 2018-10-04 | Stop reason: HOSPADM

## 2018-09-30 RX ORDER — SODIUM CHLORIDE 0.9 % (FLUSH) 0.9 %
10 SYRINGE (ML) INJECTION PRN
Status: DISCONTINUED | OUTPATIENT
Start: 2018-09-30 | End: 2018-10-04 | Stop reason: HOSPADM

## 2018-09-30 RX ORDER — ERYTHROMYCIN 5 MG/G
OINTMENT OPHTHALMIC 2 TIMES DAILY
Status: DISCONTINUED | OUTPATIENT
Start: 2018-09-30 | End: 2018-10-04 | Stop reason: HOSPADM

## 2018-09-30 RX ORDER — ATORVASTATIN CALCIUM 10 MG/1
10 TABLET, FILM COATED ORAL DAILY
Status: DISCONTINUED | OUTPATIENT
Start: 2018-09-30 | End: 2018-10-04 | Stop reason: HOSPADM

## 2018-09-30 RX ORDER — TETRAHYDROZOLINE HCL 0.05 %
1 DROPS OPHTHALMIC (EYE) 3 TIMES DAILY
Status: DISCONTINUED | OUTPATIENT
Start: 2018-09-30 | End: 2018-10-04 | Stop reason: HOSPADM

## 2018-09-30 RX ORDER — FLUTICASONE PROPIONATE 50 MCG
2 SPRAY, SUSPENSION (ML) NASAL 2 TIMES DAILY
Status: DISCONTINUED | OUTPATIENT
Start: 2018-09-30 | End: 2018-10-04 | Stop reason: HOSPADM

## 2018-09-30 RX ORDER — BUMETANIDE 1 MG/1
2 TABLET ORAL 2 TIMES DAILY
Status: DISCONTINUED | OUTPATIENT
Start: 2018-09-30 | End: 2018-10-04 | Stop reason: HOSPADM

## 2018-09-30 RX ORDER — LINEZOLID 2 MG/ML
600 INJECTION, SOLUTION INTRAVENOUS EVERY 12 HOURS
Status: DISCONTINUED | OUTPATIENT
Start: 2018-09-30 | End: 2018-10-04 | Stop reason: HOSPADM

## 2018-09-30 RX ORDER — DEXTROSE MONOHYDRATE 50 MG/ML
100 INJECTION, SOLUTION INTRAVENOUS PRN
Status: DISCONTINUED | OUTPATIENT
Start: 2018-09-30 | End: 2018-10-04 | Stop reason: HOSPADM

## 2018-09-30 RX ORDER — DIPHENHYDRAMINE HCL 25 MG
25 TABLET ORAL EVERY 8 HOURS PRN
Status: DISCONTINUED | OUTPATIENT
Start: 2018-09-30 | End: 2018-10-04 | Stop reason: HOSPADM

## 2018-09-30 RX ORDER — HYDRALAZINE HYDROCHLORIDE 50 MG/1
50 TABLET, FILM COATED ORAL EVERY 8 HOURS
Status: DISCONTINUED | OUTPATIENT
Start: 2018-09-30 | End: 2018-10-04 | Stop reason: HOSPADM

## 2018-09-30 RX ORDER — PANTOPRAZOLE SODIUM 40 MG/1
40 TABLET, DELAYED RELEASE ORAL EVERY MORNING
Status: DISCONTINUED | OUTPATIENT
Start: 2018-10-01 | End: 2018-10-04 | Stop reason: HOSPADM

## 2018-09-30 RX ORDER — NICOTINE POLACRILEX 4 MG
15 LOZENGE BUCCAL PRN
Status: DISCONTINUED | OUTPATIENT
Start: 2018-09-30 | End: 2018-10-04 | Stop reason: HOSPADM

## 2018-09-30 RX ORDER — POLYETHYLENE GLYCOL 3350 17 G/17G
17 POWDER, FOR SOLUTION ORAL DAILY PRN
Status: DISCONTINUED | OUTPATIENT
Start: 2018-09-30 | End: 2018-10-04 | Stop reason: HOSPADM

## 2018-09-30 RX ORDER — DOCUSATE SODIUM 100 MG/1
100 CAPSULE, LIQUID FILLED ORAL DAILY PRN
Status: DISCONTINUED | OUTPATIENT
Start: 2018-09-30 | End: 2018-10-04 | Stop reason: HOSPADM

## 2018-09-30 RX ORDER — HYDROCODONE BITARTRATE AND ACETAMINOPHEN 5; 325 MG/1; MG/1
2 TABLET ORAL 3 TIMES DAILY PRN
Status: CANCELLED | OUTPATIENT
Start: 2018-09-30

## 2018-09-30 RX ORDER — MAGNESIUM SULFATE IN WATER 40 MG/ML
2 INJECTION, SOLUTION INTRAVENOUS ONCE
Status: COMPLETED | OUTPATIENT
Start: 2018-09-30 | End: 2018-09-30

## 2018-09-30 RX ORDER — FERROUS SULFATE 325(65) MG
325 TABLET ORAL 2 TIMES DAILY WITH MEALS
Status: DISCONTINUED | OUTPATIENT
Start: 2018-09-30 | End: 2018-10-04 | Stop reason: HOSPADM

## 2018-09-30 RX ORDER — SODIUM CHLORIDE 0.9 % (FLUSH) 0.9 %
10 SYRINGE (ML) INJECTION EVERY 12 HOURS SCHEDULED
Status: DISCONTINUED | OUTPATIENT
Start: 2018-09-30 | End: 2018-10-04 | Stop reason: HOSPADM

## 2018-09-30 RX ORDER — DEXTROSE MONOHYDRATE 25 G/50ML
12.5 INJECTION, SOLUTION INTRAVENOUS PRN
Status: DISCONTINUED | OUTPATIENT
Start: 2018-09-30 | End: 2018-10-04 | Stop reason: HOSPADM

## 2018-09-30 RX ORDER — SIMETHICONE 80 MG
80 TABLET,CHEWABLE ORAL EVERY 6 HOURS PRN
Status: DISCONTINUED | OUTPATIENT
Start: 2018-09-30 | End: 2018-10-04 | Stop reason: HOSPADM

## 2018-09-30 RX ADMIN — FLUTICASONE PROPIONATE 2 SPRAY: 50 SPRAY, METERED NASAL at 21:25

## 2018-09-30 RX ADMIN — HYDRALAZINE HYDROCHLORIDE 50 MG: 50 TABLET, FILM COATED ORAL at 08:41

## 2018-09-30 RX ADMIN — HYDROCODONE BITARTRATE AND ACETAMINOPHEN 2 TABLET: 5; 325 TABLET ORAL at 00:00

## 2018-09-30 RX ADMIN — ERYTHROMYCIN: 5 OINTMENT OPHTHALMIC at 08:40

## 2018-09-30 RX ADMIN — FLUTICASONE PROPIONATE 2 SPRAY: 50 SPRAY, METERED NASAL at 08:41

## 2018-09-30 RX ADMIN — PREGABALIN 150 MG: 75 CAPSULE ORAL at 08:40

## 2018-09-30 RX ADMIN — CEFEPIME HYDROCHLORIDE 2 G: 2 INJECTION, POWDER, FOR SOLUTION INTRAVENOUS at 05:57

## 2018-09-30 RX ADMIN — ERYTHROMYCIN: 5 OINTMENT OPHTHALMIC at 21:25

## 2018-09-30 RX ADMIN — LINEZOLID 600 MG: 600 INJECTION, SOLUTION INTRAVENOUS at 10:10

## 2018-09-30 RX ADMIN — Medication 10 ML: at 08:55

## 2018-09-30 RX ADMIN — LISINOPRIL 5 MG: 5 TABLET ORAL at 08:42

## 2018-09-30 RX ADMIN — DIPHENHYDRAMINE HCL 25 MG: 25 TABLET ORAL at 05:57

## 2018-09-30 RX ADMIN — ACETAMINOPHEN 650 MG: 325 TABLET ORAL at 05:57

## 2018-09-30 RX ADMIN — HYDROCODONE BITARTRATE AND ACETAMINOPHEN 2 TABLET: 5; 325 TABLET ORAL at 10:58

## 2018-09-30 RX ADMIN — MAGNESIUM GLUCONATE 500 MG ORAL TABLET 400 MG: 500 TABLET ORAL at 08:42

## 2018-09-30 RX ADMIN — HYDROCODONE BITARTRATE AND ACETAMINOPHEN 2 TABLET: 5; 325 TABLET ORAL at 22:40

## 2018-09-30 RX ADMIN — PANTOPRAZOLE SODIUM 40 MG: 40 TABLET, DELAYED RELEASE ORAL at 08:41

## 2018-09-30 RX ADMIN — SIMETHICONE CHEW TAB 80 MG 80 MG: 80 TABLET ORAL at 21:24

## 2018-09-30 RX ADMIN — CARBOXYMETHYLCELLULOSE SODIUM 1 DROP: 10 GEL OPHTHALMIC at 08:41

## 2018-09-30 RX ADMIN — MUPIROCIN: 20 OINTMENT TOPICAL at 08:42

## 2018-09-30 RX ADMIN — BUMETANIDE 2 MG: 1 TABLET ORAL at 08:41

## 2018-09-30 RX ADMIN — MUPIROCIN: 20 OINTMENT TOPICAL at 21:26

## 2018-09-30 RX ADMIN — ATORVASTATIN CALCIUM 10 MG: 10 TABLET, FILM COATED ORAL at 21:24

## 2018-09-30 RX ADMIN — CEFEPIME HYDROCHLORIDE 2 G: 2 INJECTION, POWDER, FOR SOLUTION INTRAVENOUS at 17:58

## 2018-09-30 RX ADMIN — METFORMIN HYDROCHLORIDE 1000 MG: 500 TABLET ORAL at 08:42

## 2018-09-30 RX ADMIN — MAGNESIUM SULFATE HEPTAHYDRATE 2 G: 40 INJECTION, SOLUTION INTRAVENOUS at 10:10

## 2018-09-30 RX ADMIN — ENOXAPARIN SODIUM 40 MG: 40 INJECTION SUBCUTANEOUS at 16:09

## 2018-09-30 RX ADMIN — PREGABALIN 150 MG: 75 CAPSULE ORAL at 21:24

## 2018-09-30 RX ADMIN — Medication 10 ML: at 21:27

## 2018-09-30 RX ADMIN — Medication 1 DROP: at 21:25

## 2018-09-30 RX ADMIN — CARBOXYMETHYLCELLULOSE SODIUM 1 DROP: 10 GEL OPHTHALMIC at 21:26

## 2018-09-30 RX ADMIN — LINEZOLID 600 MG: 600 INJECTION, SOLUTION INTRAVENOUS at 21:25

## 2018-09-30 RX ADMIN — METFORMIN HYDROCHLORIDE 1000 MG: 500 TABLET ORAL at 16:08

## 2018-09-30 RX ADMIN — Medication 1 DROP: at 08:42

## 2018-09-30 RX ADMIN — BUMETANIDE 2 MG: 1 TABLET ORAL at 16:09

## 2018-09-30 ASSESSMENT — PAIN DESCRIPTION - ORIENTATION
ORIENTATION: RIGHT;LEFT

## 2018-09-30 ASSESSMENT — PAIN DESCRIPTION - DESCRIPTORS
DESCRIPTORS: BURNING

## 2018-09-30 ASSESSMENT — PAIN SCALES - GENERAL
PAINLEVEL_OUTOF10: 7
PAINLEVEL_OUTOF10: 5
PAINLEVEL_OUTOF10: 8
PAINLEVEL_OUTOF10: 4
PAINLEVEL_OUTOF10: 10
PAINLEVEL_OUTOF10: 4
PAINLEVEL_OUTOF10: 10
PAINLEVEL_OUTOF10: 10

## 2018-09-30 ASSESSMENT — PAIN DESCRIPTION - ONSET
ONSET: ON-GOING

## 2018-09-30 ASSESSMENT — PAIN DESCRIPTION - PROGRESSION
CLINICAL_PROGRESSION: GRADUALLY WORSENING

## 2018-09-30 ASSESSMENT — PAIN DESCRIPTION - PAIN TYPE
TYPE: ACUTE PAIN

## 2018-09-30 ASSESSMENT — PAIN DESCRIPTION - FREQUENCY
FREQUENCY: CONTINUOUS

## 2018-09-30 ASSESSMENT — PAIN DESCRIPTION - LOCATION
LOCATION: LEG

## 2018-09-30 ASSESSMENT — ACTIVITIES OF DAILY LIVING (ADL): EFFECT OF PAIN ON DAILY ACTIVITIES: WALKING

## 2018-09-30 NOTE — PLAN OF CARE
Problem: Nutrition  Goal: Optimal nutrition therapy  Outcome: Ongoing  Nutrition Problem: Increased nutrient needs  Intervention: Food and/or Nutrient Delivery:  (Plan ONS start when diet initiated.)  Nutritional Goals: Pt will consume 75% or more of meals when diet initiated to assist with wound healing during LOS.
Problem: Pain:  Goal: Pain level will decrease  Pain level will decrease   Outcome: Ongoing  Complains of leg pain, prn tylenol being given with minimal relief    Problem: Falls - Risk of:  Goal: Will remain free from falls  Will remain free from falls   Outcome: Ongoing  No falls this shift, call light in reach. Chair alarm on. Up with 1 assist and walker    Problem: Risk for Impaired Skin Integrity  Goal: Tissue integrity - skin and mucous membranes  Structural intactness and normal physiological function of skin and  mucous membranes. Outcome: Ongoing  No new skin breakdown noted, pt repositions self frequently. Wound care here and evaluated leg and buttocks wound. EPC applied to buttocks. bilat legs wrapped with AMD guaze, kerlix, and ace wrap. Problem: Nutrition  Goal: Optimal nutrition therapy  Outcome: Ongoing  Tolerating diet, eating % of meals    Problem: Discharge Planning:  Goal: Discharged to appropriate level of care  Discharged to appropriate level of care   Outcome: Ongoing  Unsure of discharge plans. Pt is from home with daughter but Timbo Gillis is consulted. Comments: Care plan reviewed with patient. Patient verbalize understanding of the plan of care and contribute to goal setting.
Care plan reviewed with patient.   Patient verbalized understanding of the plan of care and contribute to goal setting.

## 2018-09-30 NOTE — PROGRESS NOTES
Two person skin check completed by this nurse and Young Guardian, LPN. Dry, scabbed rash noted across lower and mid back. Open area noted to left inner buttocks-Patient states previous blister sit. Scattered, fading bruising present. ABD/breast folds/groin free from redness or open areas. Dressings in place to bilateral lower extremities, will assess with next dressing change.        Left buttocks- 2.2cmx2.3cm

## 2018-10-01 LAB
ALBUMIN SERPL-MCNC: 2.8 G/DL (ref 3.5–5.1)
ALP BLD-CCNC: 73 U/L (ref 38–126)
ALT SERPL-CCNC: 10 U/L (ref 11–66)
ANION GAP SERPL CALCULATED.3IONS-SCNC: 16 MEQ/L (ref 8–16)
AST SERPL-CCNC: 13 U/L (ref 5–40)
BILIRUB SERPL-MCNC: 0.2 MG/DL (ref 0.3–1.2)
BILIRUBIN DIRECT: < 0.2 MG/DL (ref 0–0.3)
BUN BLDV-MCNC: 49 MG/DL (ref 7–22)
CALCIUM SERPL-MCNC: 9.6 MG/DL (ref 8.5–10.5)
CHLORIDE BLD-SCNC: 91 MEQ/L (ref 98–111)
CO2: 24 MEQ/L (ref 23–33)
CREAT SERPL-MCNC: 1.1 MG/DL (ref 0.4–1.2)
GFR SERPL CREATININE-BSD FRML MDRD: 48 ML/MIN/1.73M2
GLUCOSE BLD-MCNC: 143 MG/DL (ref 70–108)
GLUCOSE BLD-MCNC: 181 MG/DL (ref 70–108)
GLUCOSE BLD-MCNC: 186 MG/DL (ref 70–108)
LIPASE: 24.1 U/L (ref 5.6–51.3)
MAGNESIUM: 1.8 MG/DL (ref 1.6–2.4)
POTASSIUM SERPL-SCNC: 4.3 MEQ/L (ref 3.5–5.2)
SODIUM BLD-SCNC: 131 MEQ/L (ref 135–145)
TOTAL PROTEIN: 6.2 G/DL (ref 6.1–8)

## 2018-10-01 PROCEDURE — 97535 SELF CARE MNGMENT TRAINING: CPT

## 2018-10-01 PROCEDURE — 6360000004 HC RX CONTRAST MEDICATION: Performed by: FAMILY MEDICINE

## 2018-10-01 PROCEDURE — 2580000003 HC RX 258: Performed by: INTERNAL MEDICINE

## 2018-10-01 PROCEDURE — 1290000000 HC SEMI PRIVATE OTHER R&B

## 2018-10-01 PROCEDURE — 82248 BILIRUBIN DIRECT: CPT

## 2018-10-01 PROCEDURE — 36415 COLL VENOUS BLD VENIPUNCTURE: CPT

## 2018-10-01 PROCEDURE — 6360000002 HC RX W HCPCS: Performed by: INTERNAL MEDICINE

## 2018-10-01 PROCEDURE — 90686 IIV4 VACC NO PRSV 0.5 ML IM: CPT | Performed by: FAMILY MEDICINE

## 2018-10-01 PROCEDURE — 2709999900 HC NON-CHARGEABLE SUPPLY

## 2018-10-01 PROCEDURE — 83735 ASSAY OF MAGNESIUM: CPT

## 2018-10-01 PROCEDURE — 6370000000 HC RX 637 (ALT 250 FOR IP): Performed by: INTERNAL MEDICINE

## 2018-10-01 PROCEDURE — 82948 REAGENT STRIP/BLOOD GLUCOSE: CPT

## 2018-10-01 PROCEDURE — 97110 THERAPEUTIC EXERCISES: CPT

## 2018-10-01 PROCEDURE — 86580 TB INTRADERMAL TEST: CPT

## 2018-10-01 PROCEDURE — G0008 ADMIN INFLUENZA VIRUS VAC: HCPCS | Performed by: FAMILY MEDICINE

## 2018-10-01 PROCEDURE — 97163 PT EVAL HIGH COMPLEX 45 MIN: CPT

## 2018-10-01 PROCEDURE — 97530 THERAPEUTIC ACTIVITIES: CPT

## 2018-10-01 PROCEDURE — 83690 ASSAY OF LIPASE: CPT

## 2018-10-01 PROCEDURE — 2500000003 HC RX 250 WO HCPCS: Performed by: FAMILY MEDICINE

## 2018-10-01 PROCEDURE — 97166 OT EVAL MOD COMPLEX 45 MIN: CPT

## 2018-10-01 PROCEDURE — 80053 COMPREHEN METABOLIC PANEL: CPT

## 2018-10-01 PROCEDURE — 6370000000 HC RX 637 (ALT 250 FOR IP): Performed by: FAMILY MEDICINE

## 2018-10-01 PROCEDURE — 6360000002 HC RX W HCPCS: Performed by: FAMILY MEDICINE

## 2018-10-01 RX ORDER — ONDANSETRON 4 MG/1
4 TABLET, ORALLY DISINTEGRATING ORAL EVERY 8 HOURS PRN
Status: DISCONTINUED | OUTPATIENT
Start: 2018-10-01 | End: 2018-10-04 | Stop reason: HOSPADM

## 2018-10-01 RX ADMIN — MUPIROCIN: 20 OINTMENT TOPICAL at 21:11

## 2018-10-01 RX ADMIN — Medication 10 ML: at 21:15

## 2018-10-01 RX ADMIN — ONDANSETRON 4 MG: 4 TABLET, ORALLY DISINTEGRATING ORAL at 15:52

## 2018-10-01 RX ADMIN — HYDRALAZINE HYDROCHLORIDE 50 MG: 50 TABLET, FILM COATED ORAL at 17:16

## 2018-10-01 RX ADMIN — ENOXAPARIN SODIUM 40 MG: 40 INJECTION SUBCUTANEOUS at 17:13

## 2018-10-01 RX ADMIN — Medication 1 DROP: at 21:08

## 2018-10-01 RX ADMIN — CEFEPIME HYDROCHLORIDE 2 G: 2 INJECTION, POWDER, FOR SOLUTION INTRAVENOUS at 17:08

## 2018-10-01 RX ADMIN — Medication 5 UNITS: at 15:45

## 2018-10-01 RX ADMIN — INFLUENZA A VIRUS A/MICHIGAN/45/2015 X-275 (H1N1) ANTIGEN (FORMALDEHYDE INACTIVATED), INFLUENZA A VIRUS A/SINGAPORE/INFIMH-16-0019/2016 IVR-186 (H3N2) ANTIGEN (FORMALDEHYDE INACTIVATED), INFLUENZA B VIRUS B/PHUKET/3073/2013 ANTIGEN (FORMALDEHYDE INACTIVATED), AND INFLUENZA B VIRUS B/MARYLAND/15/2016 BX-69A ANTIGEN (FORMALDEHYDE INACTIVATED) 0.5 ML: 15; 15; 15; 15 INJECTION, SUSPENSION INTRAMUSCULAR at 15:49

## 2018-10-01 RX ADMIN — ERYTHROMYCIN: 5 OINTMENT OPHTHALMIC at 11:46

## 2018-10-01 RX ADMIN — CARBOXYMETHYLCELLULOSE SODIUM 1 DROP: 10 GEL OPHTHALMIC at 21:08

## 2018-10-01 RX ADMIN — MUPIROCIN: 20 OINTMENT TOPICAL at 11:45

## 2018-10-01 RX ADMIN — ATORVASTATIN CALCIUM 10 MG: 10 TABLET, FILM COATED ORAL at 21:05

## 2018-10-01 RX ADMIN — FERROUS SULFATE TAB 325 MG (65 MG ELEMENTAL FE) 325 MG: 325 (65 FE) TAB at 17:14

## 2018-10-01 RX ADMIN — ONDANSETRON 4 MG: 4 TABLET, ORALLY DISINTEGRATING ORAL at 08:01

## 2018-10-01 RX ADMIN — FLUTICASONE PROPIONATE 2 SPRAY: 50 SPRAY, METERED NASAL at 21:08

## 2018-10-01 RX ADMIN — PREGABALIN 150 MG: 75 CAPSULE ORAL at 21:05

## 2018-10-01 RX ADMIN — CARBOXYMETHYLCELLULOSE SODIUM 1 DROP: 10 GEL OPHTHALMIC at 11:46

## 2018-10-01 RX ADMIN — BUMETANIDE 2 MG: 1 TABLET ORAL at 17:14

## 2018-10-01 RX ADMIN — PANTOPRAZOLE SODIUM 40 MG: 40 TABLET, DELAYED RELEASE ORAL at 15:53

## 2018-10-01 RX ADMIN — Medication 1 DROP: at 16:07

## 2018-10-01 RX ADMIN — CEFEPIME HYDROCHLORIDE 2 G: 2 INJECTION, POWDER, FOR SOLUTION INTRAVENOUS at 06:16

## 2018-10-01 RX ADMIN — MAGNESIUM GLUCONATE 500 MG ORAL TABLET 400 MG: 500 TABLET ORAL at 15:53

## 2018-10-01 RX ADMIN — FLUTICASONE PROPIONATE 2 SPRAY: 50 SPRAY, METERED NASAL at 11:47

## 2018-10-01 RX ADMIN — HYDROCODONE BITARTRATE AND ACETAMINOPHEN 2 TABLET: 5; 325 TABLET ORAL at 16:07

## 2018-10-01 RX ADMIN — ERYTHROMYCIN: 5 OINTMENT OPHTHALMIC at 21:11

## 2018-10-01 RX ADMIN — PREGABALIN 150 MG: 75 CAPSULE ORAL at 15:51

## 2018-10-01 RX ADMIN — LINEZOLID 600 MG: 600 INJECTION, SOLUTION INTRAVENOUS at 11:43

## 2018-10-01 RX ADMIN — IOPAMIDOL 80 ML: 755 INJECTION, SOLUTION INTRAVENOUS at 12:42

## 2018-10-01 RX ADMIN — LINEZOLID 600 MG: 600 INJECTION, SOLUTION INTRAVENOUS at 23:41

## 2018-10-01 ASSESSMENT — PAIN DESCRIPTION - PROGRESSION
CLINICAL_PROGRESSION: GRADUALLY WORSENING

## 2018-10-01 ASSESSMENT — PAIN SCALES - GENERAL
PAINLEVEL_OUTOF10: 10
PAINLEVEL_OUTOF10: 7
PAINLEVEL_OUTOF10: 7

## 2018-10-01 ASSESSMENT — PAIN DESCRIPTION - PAIN TYPE
TYPE: ACUTE PAIN

## 2018-10-01 ASSESSMENT — PAIN DESCRIPTION - LOCATION
LOCATION: ABDOMEN

## 2018-10-01 ASSESSMENT — PAIN DESCRIPTION - ORIENTATION: ORIENTATION: MID

## 2018-10-01 ASSESSMENT — PAIN DESCRIPTION - DESCRIPTORS: DESCRIPTORS: SORE

## 2018-10-01 NOTE — PROGRESS NOTES
step length B, fair heel strike B, forward flexed trunk with downward gaze, mild instability while turning  Distance: 55ft x2  Comments: cues to increase step length B, improve upright trunk, and improve heel strike B with minimal change in gait noted  Stairs  Curbs: 6\"  Device: Rolling walker  Assistance: Contact guard assistance  Comment: cues for sequencing, unsteady during ascension  PT Equipment Recommendations  Equipment Needed: No    Occupational  Therapy:   ADL  Grooming: Supervision, Setup, Increased time to complete  UE Bathing: Increased time to complete, Setup, Supervision  LE Bathing: Increased time to complete, Moderate assistance (B lower legs and feet)  UE Dressing: Minimal assistance, Increased time to complete (hospital gown)  LE Dressing: Minimal assistance, Increased time to complete (slip on shoes)       Speech Therapy:       Nutrition:    Weight: 210 lb 8 oz (95.5 kg) / Body mass index is 41.11 kg/m². Please see nutrition note for details. Family Education: No family available for education  Fall Risk:  Falling star program initiated    Goal Achievement:   Patient Continues to progress toward goal achievement    Discharge Plan   Estimated Length of Stay: re eval  Destination: discharge home with supervision  Services at Discharge: [address] [place] [address], Occupational Therapy, Nursing and aide 3x week  Equipment at Discharge: No equipment needs  Factors facilitating achievement of predicted outcomes:  Motivated and Pleasant  Barriers to the achievement of predicted outcomes: Decreased endurance, Upper extremity weakness and Lower extremity weakness    Readmission Risk              Risk of Unplanned Readmission:        26           High (20-31)        Team Members Present at Conference:  Physician: Dr. Diego Richey MD  Nurse: Bere Neumann RN, Jada Woods RN  :  DINORAH Ibarra  Occupational Therapist:  JJ Whitaker  Physical Therapist:   Russel Mcclendon, PT  Speech Therapist: Speech Therapist: N/A. All team members have reviewed and updated as necessary and appropriate the established interdisciplinary plan of care within the medical record of Nico Soares. Pt's team conference attended (see above). Pt seen on rounds with LSW, to review the results with patient and family. Discussed length of stay as above. Pt's team conference attended (see above.)  Pt seen on rounds with LSW, to review the results with patient and family. Discussed length of stay as above.     Physical Exam:  General:  Alert and oriented  Vitals:   Vitals:    10/02/18 0100   BP: (!) 125/58   Pulse:    Resp:    Temp:    SpO2:      Heart:  Regular rate and rhythm  Lungs:  Clear to auscultation  Abdomen:  soft with positive bowel sounds     Assessment:  Progressing in full rehabilitation program (see above)    Plan:  Continue Rehab            Continue current medications            Spent 33 minutes today in patient management and care    Electronically signed by Moses Mansfield MD on 10/2/2018 at 8:26 AM

## 2018-10-01 NOTE — H&P
Narrative    No narrative on file           Family History:   Family History   Problem Relation Age of Onset    Diabetes Mother     Heart Disease Father         whooping cough as child    Obesity Sister     Other Brother         tumor on back    Obesity Sister     Cancer Sister         Skin     Cancer Brother         Bone cancer from metal    Other Brother         passed as a child    Heart Disease Brother     Other Brother         tremor    Heart Attack Brother     No Known Problems Brother     Heart Disease Brother     No Known Problems Brother     No Known Problems Brother        Review of Systems:  CONSTITUTIONAL:  positive for  fatigue  EYES:  negative for  eye discharge  HEENT:  negative for  nasal congestion  RESPIRATORY:  negative for  dyspnea  CARDIOVASCULAR:  negative for  chest pain  GASTROINTESTINAL:  positive for abdominal distention  GENITOURINARY:  negative for dysuria  SKIN:  negative  HEMATOLOGIC/LYMPHATIC:  negative for easy bruising  MUSCULOSKELETAL:  positive for  myalgias, arthralgias, decreased range of motion, muscle weakness and bone pain  NEUROLOGICAL:  positive for gait problems and weakness  BEHAVIOR/PSYCH:  negative  10 point system review otherwise negative    Physical Exam:  BP (!) 100/56   Pulse 74   Temp 98.1 °F (36.7 °C) (Oral)   Resp 20   Ht 5' (1.524 m)   LMP  (LMP Unknown)   SpO2 96%   BMI 41.31 kg/m²   Well developed well nourished white female who is awake alert and cooperative sitting in the chair  Skin atrophic  Membranes moist  Head normocephalic  Neck without mass  Chest symmetrical expansion  Lungs CTA  Heart S1S2 without murmur  Abd soft, non tender, normoactive  BS and no mass  Ext with dressings on the lower legs  Neuro weak  Psy cooperative    Diagnostics:  Recent Results (from the past 24 hour(s))   Basic Metabolic Panel    Collection Time: 09/30/18  6:06 AM   Result Value Ref Range    Sodium 132 (L) 135 - 145 meq/L    Potassium 4.4 3.5 - 5.2 meq/L    Chloride 93 (L) 98 - 111 meq/L    CO2 27 23 - 33 meq/L    Glucose 110 (H) 70 - 108 mg/dL    BUN 51 (H) 7 - 22 mg/dL    CREATININE 1.2 0.4 - 1.2 mg/dL    Calcium 9.2 8.5 - 10.5 mg/dL   Magnesium    Collection Time: 09/30/18  6:06 AM   Result Value Ref Range    Magnesium 1.5 (L) 1.6 - 2.4 mg/dL   Anion Gap    Collection Time: 09/30/18  6:06 AM   Result Value Ref Range    Anion Gap 12.0 8.0 - 16.0 meq/L   Glomerular Filtration Rate, Estimated    Collection Time: 09/30/18  6:06 AM   Result Value Ref Range    Est, Glom Filt Rate 44 (A) ml/min/1.73m2       Impression:  Active Hospital Problems    Diagnosis Date Noted    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[facility] [C31.0] 03/06/2018     Priority: High     Class: Acute    Bilateral lower leg cellulitis [L03.116, L03.115] 09/30/2018    CKD (chronic kidney disease) stage 3, GFR 30-59 ml/min ([facility]) [N18.3] 09/30/2018    Iron deficiency anemia [D50.9] 09/30/2018    Hypomagnesemia [E83.42] 09/30/2018    Class 3 severe obesity due to excess calories with serious comorbidity and body mass index (BMI) of 40.0 to 44.9 in adult ([facility] [E66.01, Z68.41] 09/27/2018    Venous ulcers of both lower extremities ([facility] [I87.2, L97.919, E59.716] 09/27/2018    Hyponatremia [E87.1]     Diabetic peripheral neuropathy ([facility] [E11.42] 01/09/2017    Benign essential HTN [I10] 02/05/2016    DDD (degenerative disc disease), lumbar [M51.36]     Hypercholesterolemia [E78.00] 07/20/2011    Osteoarthritis [M19.90] 07/20/2011   ·      Plan:   · The patient demonstrates good potential to participate in a skilled rehabilitation program on the transitional care unit. Rehabilitation services will include PT and OT/RT in order to improve functional status prior to discharge. Family education and training will be completed. Equipment evaluations and recommendations will be completed as appropriate. · Nursing will be involved for bowel, bladder, skin, and pain management.   Nursing

## 2018-10-01 NOTE — PROGRESS NOTES
[facility]  INPATIENT PHYSICAL THERAPY  EVALUATION  [facility]/057-[facility]    Time In: 0861  Time Out: 5694  Timed Code Treatment Minutes: 30 Minutes  Minutes: 42          Date: 10/1/2018  Patient Name: Max Pace,  Gender:  female        MRN: [id number]  : 1940  (69 y.o.)  Referral Date : 18   Referring Practitioner: Ordering & Attending: ANISHA Topete MD  Diagnosis: cellulitis of bilateral lower extremities  Additional Pertinent Hx: Max Pace  is a 69 y.o. female admitted to the transitional care unit on 2018. She has a history of skin problems and was a direct admit from Dr Tram Warren office with cellulitis of both lower legs. She states that they are feeling better curently but she requires more time with the IV ATB. 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    Cataract of both eyes     DDD (degenerative disc disease), lumbar     Dysphagia, pharyngoesophageal 2016    Fibrocystic breast     H/O ventral hernia repair     Headache 2017    Hypercholesteremia     Hyperlipidemia     Hypertension     Iron deficiency anemia     LPRD (laryngopharyngeal reflux disease) 2016    Neuropathy     peripheral    Obesity     Orbital abscess     Orbital cellulitis 2014    SWAPNA (obstructive sleep apnea)     Osteoarthritis     Osteoporosis     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       Sinusitis     Type II or unspecified type diabetes mellitus without mention of complication, not stated as uncontrolled     diagnoses approx     Velopharyngeal incompetence 2016     Past Surgical History:   Procedure Laterality Date    ABDOMEN SURGERY      APPENDECTOMY      CARPAL TUNNEL RELEASE Bilateral , 2009    CATARACT REMOVAL  2011    bilat    CHOLECYSTECTOMY  1988    COLONOSCOPY  2016    DILATATION, ESOPHAGUS     

## 2018-10-01 NOTE — PROGRESS NOTES
Pt returned to [address] [place] [address] after cts completed . Awaiting return call from Dr Chapman Pod .  18 Dr Ari Flores called 4A neuro rn - states pts scan ok - no furtther treatment at this time . Pt resting  quietly . No complaints .  Right arm weaker than left due to pain in shoulder .hand grasp pedal push and pull equal . CADEN 3 with brisk reaction

## 2018-10-01 NOTE — PROGRESS NOTES
Sensation  Overall Sensation Status: WFL                           LUE AROM (degrees)  LUE AROM : WNL          RUE AROM (degrees)  RUE General AROM: limited AROM to shoulder (pt reports prior UE fx in February), WFL distally       LUE Strength  LUE Strength Comment: 4/5                RUE Strength  Gross RUE Strength: Exceptions to [facility]           ADL  Grooming: Supervision, Setup, Increased time to complete  UE Bathing: Increased time to complete, Setup, Supervision  LE Bathing: Increased time to complete, Moderate assistance (B lower legs and feet)  UE Dressing: Minimal assistance, Increased time to complete (hospital gown)  LE Dressing: Minimal assistance, Increased time to complete (slip on shoes)          Transfers  Sit to stand: Contact guard assistance  Stand to sit: Contact guard assistance    Balance  Sitting Balance: Supervision  Standing Balance: Contact guard assistance     Time: x 1-2 min x 3 trials during ADLs     Functional Mobility  Functional - Mobility Device: Rolling Walker  Activity: Other  Assist Level: Contact guard assistance  Functional Mobility Comments: x 5 feet within room, slow pace          Activity Tolerance:  Activity Tolerance: Patient limited by fatigue, Patient Tolerated treatment well, Patient limited by pain  Activity Tolerance: Treatment initiated, eval completed: Pt completed ADL routine as detailed above. Pt with slow pace and talkative throughout session. Difficulty with R UE movements d/t prior fx.       Assessment:  Assessment: Pt would benefit from skilled OT intervention for maximizing pt safety and independence with ADL tasks and functional mobility for safe transition to the next level of care  Performance deficits / Impairments: Decreased functional mobility , Decreased ADL status, Decreased ROM, Decreased strength, Decreased safe awareness, Decreased high-level IADLs, Decreased balance, Decreased endurance  Prognosis: Good    Clinical Decision Making: Clinical Decision making was of Moderate Complexity as the result of analysis of data from a detailed assessment, a consideration of several treatment options, the presence of comorbidities affecting the plan of care and the need for minimal to moderate modifications or assistance required to complete the evaluation. Discharge Recommendations:  Discharge Recommendations: 24 hour supervision or assist, Continue to assess pending progress    Patient Education:  Patient Education: OT POC, TCU scheduling, OT goals    Equipment Recommendations:  Equipment Needed: No  Other: no anticipated needs at this time. Safety:  Safety Devices in place: Yes  Type of devices: All fall risk precautions in place, Left in chair, Call light within reach, Nurse notified, Chair alarm in place, Gait belt, Patient at risk for falls    Plan:  Times per week: 6x  Current Treatment Recommendations: Strengthening, Balance Training, ROM, Endurance Training, Functional Mobility Training, Safety Education & Training, Self-Care / ADL, Pain Management, Positioning, Equipment Evaluation, Education, & procurement, Patient/Caregiver Education & Training, Home Management Training, Neuromuscular Re-education    Goals:       Short term goals  Time Frame for Short term goals: 1 week  Short term goal 1: Pt to complete toileting tasks with no greater than CGA for safe return to PLOF. Short term goal 2: Pt to complete functional mobility at [facility] distances with no greater than SBA for inc ind with accessing environment  Short term goal 3: Pt to tolerate standing greater than 4 mins with occassional BUE release at SBA in preparation for grooming tasks  Short term goal 4: Pt to complete AAROM/PROM to RUE and AROM/RROM to LUE for increasing functional ROM and strength for BADL tasks  Short term goal 5: Pt will complete LB ADL with Margaret to increase indep with self care.   Long term goals  Time Frame for Long term goals : 2 weeks  Long term goal 1: Pt will complete ADL

## 2018-10-02 LAB
ANION GAP SERPL CALCULATED.3IONS-SCNC: 12 MEQ/L (ref 8–16)
BUN BLDV-MCNC: 56 MG/DL (ref 7–22)
CALCIUM SERPL-MCNC: 9.7 MG/DL (ref 8.5–10.5)
CHLORIDE BLD-SCNC: 91 MEQ/L (ref 98–111)
CO2: 28 MEQ/L (ref 23–33)
CREAT SERPL-MCNC: 1.2 MG/DL (ref 0.4–1.2)
GFR SERPL CREATININE-BSD FRML MDRD: 44 ML/MIN/1.73M2
GLUCOSE BLD-MCNC: 125 MG/DL (ref 70–108)
GLUCOSE BLD-MCNC: 144 MG/DL (ref 70–108)
GLUCOSE BLD-MCNC: 214 MG/DL (ref 70–108)
MAGNESIUM: 1.7 MG/DL (ref 1.6–2.4)
POTASSIUM SERPL-SCNC: 4.4 MEQ/L (ref 3.5–5.2)
SODIUM BLD-SCNC: 131 MEQ/L (ref 135–145)

## 2018-10-02 PROCEDURE — 36415 COLL VENOUS BLD VENIPUNCTURE: CPT

## 2018-10-02 PROCEDURE — 97110 THERAPEUTIC EXERCISES: CPT

## 2018-10-02 PROCEDURE — 80048 BASIC METABOLIC PNL TOTAL CA: CPT

## 2018-10-02 PROCEDURE — 97530 THERAPEUTIC ACTIVITIES: CPT

## 2018-10-02 PROCEDURE — 1290000000 HC SEMI PRIVATE OTHER R&B

## 2018-10-02 PROCEDURE — 2580000003 HC RX 258: Performed by: INTERNAL MEDICINE

## 2018-10-02 PROCEDURE — 2W0RX6Z CHANGE PRESSURE DRESSING ON LEFT LOWER LEG: ICD-10-PCS | Performed by: FAMILY MEDICINE

## 2018-10-02 PROCEDURE — 6360000002 HC RX W HCPCS: Performed by: FAMILY MEDICINE

## 2018-10-02 PROCEDURE — 29580 STRAPPING UNNA BOOT: CPT

## 2018-10-02 PROCEDURE — 2709999900 HC NON-CHARGEABLE SUPPLY

## 2018-10-02 PROCEDURE — 82948 REAGENT STRIP/BLOOD GLUCOSE: CPT

## 2018-10-02 PROCEDURE — 2W0QX6Z CHANGE PRESSURE DRESSING ON RIGHT LOWER LEG: ICD-10-PCS | Performed by: FAMILY MEDICINE

## 2018-10-02 PROCEDURE — 6360000002 HC RX W HCPCS: Performed by: INTERNAL MEDICINE

## 2018-10-02 PROCEDURE — 6370000000 HC RX 637 (ALT 250 FOR IP): Performed by: FAMILY MEDICINE

## 2018-10-02 PROCEDURE — 6370000000 HC RX 637 (ALT 250 FOR IP): Performed by: INTERNAL MEDICINE

## 2018-10-02 PROCEDURE — 97116 GAIT TRAINING THERAPY: CPT

## 2018-10-02 PROCEDURE — 83735 ASSAY OF MAGNESIUM: CPT

## 2018-10-02 RX ORDER — PIOGLITAZONEHYDROCHLORIDE 30 MG/1
30 TABLET ORAL DAILY
Status: DISCONTINUED | OUTPATIENT
Start: 2018-10-02 | End: 2018-10-04 | Stop reason: HOSPADM

## 2018-10-02 RX ADMIN — ONDANSETRON 4 MG: 4 TABLET, ORALLY DISINTEGRATING ORAL at 09:03

## 2018-10-02 RX ADMIN — FERROUS SULFATE TAB 325 MG (65 MG ELEMENTAL FE) 325 MG: 325 (65 FE) TAB at 17:22

## 2018-10-02 RX ADMIN — FLUTICASONE PROPIONATE 2 SPRAY: 50 SPRAY, METERED NASAL at 21:55

## 2018-10-02 RX ADMIN — PREGABALIN 150 MG: 75 CAPSULE ORAL at 21:55

## 2018-10-02 RX ADMIN — ERYTHROMYCIN: 5 OINTMENT OPHTHALMIC at 21:55

## 2018-10-02 RX ADMIN — FERROUS SULFATE TAB 325 MG (65 MG ELEMENTAL FE) 325 MG: 325 (65 FE) TAB at 08:54

## 2018-10-02 RX ADMIN — CARBOXYMETHYLCELLULOSE SODIUM 1 DROP: 10 GEL OPHTHALMIC at 21:55

## 2018-10-02 RX ADMIN — MUPIROCIN: 20 OINTMENT TOPICAL at 21:55

## 2018-10-02 RX ADMIN — CARBOXYMETHYLCELLULOSE SODIUM 1 DROP: 10 GEL OPHTHALMIC at 15:39

## 2018-10-02 RX ADMIN — CEFEPIME HYDROCHLORIDE 2 G: 2 INJECTION, POWDER, FOR SOLUTION INTRAVENOUS at 06:20

## 2018-10-02 RX ADMIN — HYDROCODONE BITARTRATE AND ACETAMINOPHEN 2 TABLET: 5; 325 TABLET ORAL at 18:57

## 2018-10-02 RX ADMIN — ENOXAPARIN SODIUM 40 MG: 40 INJECTION SUBCUTANEOUS at 17:31

## 2018-10-02 RX ADMIN — Medication 1 DROP: at 11:50

## 2018-10-02 RX ADMIN — PIOGLITAZONE 30 MG: 30 TABLET ORAL at 16:08

## 2018-10-02 RX ADMIN — Medication 10 ML: at 21:55

## 2018-10-02 RX ADMIN — PANTOPRAZOLE SODIUM 40 MG: 40 TABLET, DELAYED RELEASE ORAL at 08:54

## 2018-10-02 RX ADMIN — PREGABALIN 150 MG: 75 CAPSULE ORAL at 09:03

## 2018-10-02 RX ADMIN — BUMETANIDE 2 MG: 1 TABLET ORAL at 17:22

## 2018-10-02 RX ADMIN — HYDRALAZINE HYDROCHLORIDE 50 MG: 50 TABLET, FILM COATED ORAL at 01:02

## 2018-10-02 RX ADMIN — BUMETANIDE 2 MG: 1 TABLET ORAL at 08:54

## 2018-10-02 RX ADMIN — ERYTHROMYCIN: 5 OINTMENT OPHTHALMIC at 11:50

## 2018-10-02 RX ADMIN — HYDRALAZINE HYDROCHLORIDE 50 MG: 50 TABLET, FILM COATED ORAL at 17:30

## 2018-10-02 RX ADMIN — FLUTICASONE PROPIONATE 2 SPRAY: 50 SPRAY, METERED NASAL at 08:54

## 2018-10-02 RX ADMIN — ATORVASTATIN CALCIUM 10 MG: 10 TABLET, FILM COATED ORAL at 21:55

## 2018-10-02 RX ADMIN — Medication 10 ML: at 11:51

## 2018-10-02 RX ADMIN — ONDANSETRON 4 MG: 4 TABLET, ORALLY DISINTEGRATING ORAL at 17:19

## 2018-10-02 RX ADMIN — MUPIROCIN: 20 OINTMENT TOPICAL at 08:54

## 2018-10-02 RX ADMIN — Medication 1 DROP: at 17:22

## 2018-10-02 RX ADMIN — HYDROCODONE BITARTRATE AND ACETAMINOPHEN 2 TABLET: 5; 325 TABLET ORAL at 08:27

## 2018-10-02 RX ADMIN — MAGNESIUM GLUCONATE 500 MG ORAL TABLET 400 MG: 500 TABLET ORAL at 08:53

## 2018-10-02 RX ADMIN — CEFEPIME HYDROCHLORIDE 2 G: 2 INJECTION, POWDER, FOR SOLUTION INTRAVENOUS at 17:22

## 2018-10-02 RX ADMIN — LINEZOLID 600 MG: 600 INJECTION, SOLUTION INTRAVENOUS at 11:50

## 2018-10-02 RX ADMIN — Medication 1 DROP: at 21:55

## 2018-10-02 RX ADMIN — LINEZOLID 600 MG: 600 INJECTION, SOLUTION INTRAVENOUS at 21:57

## 2018-10-02 ASSESSMENT — PAIN DESCRIPTION - PROGRESSION
CLINICAL_PROGRESSION: GRADUALLY WORSENING
CLINICAL_PROGRESSION: GRADUALLY WORSENING
CLINICAL_PROGRESSION: RAPIDLY WORSENING
CLINICAL_PROGRESSION: GRADUALLY WORSENING

## 2018-10-02 ASSESSMENT — PAIN SCALES - GENERAL
PAINLEVEL_OUTOF10: 10

## 2018-10-02 ASSESSMENT — PAIN DESCRIPTION - LOCATION
LOCATION: SHOULDER
LOCATION: ABDOMEN
LOCATION: ABDOMEN

## 2018-10-02 ASSESSMENT — PAIN DESCRIPTION - PAIN TYPE
TYPE: ACUTE PAIN

## 2018-10-02 ASSESSMENT — PAIN DESCRIPTION - ONSET
ONSET: ON-GOING

## 2018-10-02 ASSESSMENT — PAIN DESCRIPTION - FREQUENCY
FREQUENCY: CONTINUOUS

## 2018-10-02 ASSESSMENT — PAIN DESCRIPTION - DESCRIPTORS
DESCRIPTORS: DISCOMFORT
DESCRIPTORS: ACHING;CONSTANT;SORE
DESCRIPTORS: DISCOMFORT

## 2018-10-02 ASSESSMENT — PAIN DESCRIPTION - ORIENTATION: ORIENTATION: RIGHT

## 2018-10-02 NOTE — PROGRESS NOTES
last 2009    removed a bone (2)--2 times no construction     SINUS SURGERY  02/01/2016    SINUS SURGERY  2016 x 2    SINUS SURGERY  09/18/2017    [facility] - Dr Toney Duran SINUS SURGERY  [facility]2017 [facility]    TONSILLECTOMY      TOTAL KNEE ARTHROPLASTY  08/2003    Left TKR    VENTRAL HERNIA REPAIR  1992       Restrictions/Precautions:  Contact Precautions, General Precautions, Fall Risk, Isolation                            Prior Level of Function:  ADL Assistance: Independent  Homemaking Assistance: Needs assistance  Ambulation Assistance: Independent  Transfer Assistance: Independent  Additional Comments: reports use of walker at times for mobility in the home. Daughter works during the day, pt is at home alone at times. Good family support. Indep with ADLs PTA. Subjective   Subjective: Up in chair upon arrival, agreeable to OT session,   Comments: patient reported feeling woozy and light headed, BP slightly high, Nurse Unique Olivas notified    Overall Orientation Status: Within Functional Limits    Pain:  Pain Assessment  Patient Currently in Pain: Yes (Gas pain)       Objective  Overall Cognitive Status: Exceptions  Cognition Comment: slow processing at times       ADL  LE Dressing: Setup (slipped on shoes with set up)        Transfers  Sit to stand: Contact guard assistance (bedside chair and arm chair)  Stand to sit: Contact guard assistance           Functional Mobility  Functional - Mobility Device: Rolling Walker  Assist Level: Contact guard assistance  Functional Mobility Comments: in hallway greater than 25 feet x 2        Comment: Completed L UE exercise with #1 weight all joints in all planes and R UE elbow, wrist and digit flexion/extension and pronation/supination 1 set x 10 repetitions. Rest break between each section of exercise.   Exercises completed to increase endurance for ADLs     Activity Tolerance:  Activity Tolerance: Patient limited by fatigue;Patient Tolerated treatment well;Patient limited by pain    Assessment:     Performance deficits / Impairments: Decreased functional mobility , Decreased ADL status, Decreased ROM, Decreased strength, Decreased safe awareness, Decreased high-level IADLs, Decreased balance, Decreased endurance  Prognosis: Good    Discharge Recommendations:  Discharge Recommendations: 24 hour supervision or assist, Home with Home health OT    Patient Education:  Patient Education: UE exercise and deep breathing    Equipment Recommendations:  Equipment Needed: No  Other: no anticipated needs at this time. Safety:  Safety Devices in place: Yes  Type of devices: All fall risk precautions in place, Left in chair, Call light within reach, Nurse notified, Chair alarm in place, Gait belt, Patient at risk for falls    Plan:  Times per week: 6x  Current Treatment Recommendations: Strengthening, Balance Training, ROM, Endurance Training, Functional Mobility Training, Safety Education & Training, Self-Care / ADL, Pain Management, Positioning, Equipment Evaluation, Education, & procurement, Patient/Caregiver Education & Training, Home Management Training, Neuromuscular Re-education    Goals:       Short term goals  Time Frame for Short term goals: 1 week  Short term goal 1: Pt to complete toileting tasks with no greater than CGA for safe return to PLOF. Short term goal 2: Pt to complete functional mobility at [facility] distances with no greater than SBA for inc ind with accessing environment  Short term goal 3: Pt to tolerate standing greater than 4 mins with occassional BUE release at SBA in preparation for grooming tasks  Short term goal 4: Pt to complete AAROM/PROM to RUE and AROM/RROM to LUE for increasing functional ROM and strength for BADL tasks  Short term goal 5: Pt will complete LB ADL with Margaret to increase indep with self care.   Long term goals  Time Frame for Long term goals : 2 weeks  Long term goal 1: Pt will complete ADL routine with SBA to increase indep with self care routine. Long term goal 2: Pt will complete light IADL task with SBA to increase indep with light cooking and cleaning.

## 2018-10-02 NOTE — PLAN OF CARE
Problem: Risk for Impaired Skin Integrity  Goal: Tissue integrity - skin and mucous membranes  Structural intactness and normal physiological function of skin and  mucous membranes. Outcome: Ongoing  Into apply Unna boots to bilateral lower legs. Patient sitting up in chair. Removed dressings to bilateral lower legs. Cleansed and dried legs. Cleansed open venous ulcers to bilateral lower legs. Applied 1st layer of unna boot to just below the toes, to just below the knees and including the heels bilaterally. Applied alginate to open venous ulcers, and then applied another layer of unna boot. Applied sure press padding to ankle and just below the knee. Applied coban to just below the toes, to just below the knees and including the heels bilaterally. Recommended to patient to keep legs elevated while in chair. Will plan on changing unna boots on Friday. Will continue to follow. Care plan reviewed with patient and RN. Patient and RN verbalize understanding of the plan of care and contribute to goal setting.
Problem: Skin Integrity:  Goal: Will show no infection signs and symptoms  Will show no infection signs and symptoms   Outcome: Ongoing  11:45pm In to see patient to assess leg wounds for Dr. [facility]. Staff removed the amd gauze, kerlix and ace dressings. See photos below of the lower legs. Right lateral lower leg have slough venous ulcers largest measuring 2x1cm with 50% pink wound bed and 50% slough. Left lateral lower leg ulcer with venous ulcers that have slough as well,  largest ulcer bed is 8x5cm with red wound bed. Patient states the swelling in her legs has improved as has the redness. Lower legs continue to have 1+pitting edema in feet and non pitting edema of the legs. Legs with scaly skin. Venous staining noted. Pedal pulses palpable but weak. Feet and toes are warm to the touch. Dr. [facility] phoned with an assessment and would like unna boots applied, with aquacele between layers of unna boot with coban. Will obtain the supplies and return to the room to wrap the legs. Legs are on a moisture wicking chux with foot of the recliner up to elevate the legs. 12:20PM returned to room and primary nurse in process of calling a code stroke. Nursing at bedside caring for patient. Will check back as to patient's status. 1:40PM  Spoke with primary nurse, she states patient is out of the room for testing related to stroke. Dr. [facility] notified. Instructed to resume amd gauze order to the legs and wound ostomy will re evaluate tomorrow for the appropriateness of the unna boot application. Care plan reviewed with patient and RN. Patient and RN verbalize understanding of the plan of care and contribute to goal setting.           Comments:
 Vital signs    HYPOGLYCEMIA TREATMENT: blood glucose less than 50 mg/dL and patient  ALERT and TOLERATING PO    HYPOGLYCEMIA TREATMENT: blood glucose less than 70 mg/dL and patient ALERT and TOLERATING PO    HYPOGLYCEMIA TREATMENT: blood glucose less than 70 mg/dL and patient NOT ALERT or NPO    Wound care    Application unnaboot    Full code    Consult to Recreation Therapy    Consult to Social Work    Inpatient consult to Dietitian    Contact isolation    Dietary Nutrition Supplements: Wound Healing Oral Supplement    OT eval and treat    PT eval and treat    POCT Glucose       Current Medications:  Current Facility-Administered Medications   Medication Dose Route Frequency Provider Last Rate Last Dose    ondansetron (ZOFRAN-ODT) disintegrating tablet 4 mg  4 mg Oral Q8H PRN Tatiana Rasmussen MD   4 mg at 10/01/18 1552    zinc oxide (PINXAV) 30 % ointment   Topical 4x Daily PRN MD Carlton Thomasry Janet Mukund Isaac ON 10/3/2018] ppd (tuberculin skin test) read   Does not apply Weekly Tatiana Rasmussen MD        tuberculin injection 5 Units  5 Units Intradermal Weekly Tatiana Rasmussen MD   5 Units at 10/01/18 1545    acetaminophen (TYLENOL) tablet 650 mg  650 mg Oral Q6H PRN Peggy Lopez MD        atorvastatin (LIPITOR) tablet 10 mg  10 mg Oral Daily Peggy Lopez MD   10 mg at 10/01/18 2105    bumetanide (BUMEX) tablet 2 mg  2 mg Oral BID Peggy Lopez MD   2 mg at 10/01/18 1714    carboxymethylcellulose 1 % ophthalmic solution 1 drop  1 drop Both Eyes Q12H Peggy Lopez MD   1 drop at 10/01/18 2108    cefepime (MAXIPIME) 2 g IVPB minibag  2 g Intravenous Q12H Peggy Lopez MD   Stopped at 10/02/18 0650    diphenhydrAMINE (BENADRYL) tablet 25 mg  25 mg Oral Q8H PRN Peggy Lopez MD        enoxaparin (LOVENOX) injection 40 mg  40 mg Subcutaneous Q24H Peggy Lopez MD   40 mg at 10/01/18 1713    erythromycin [facility]) ophthalmic

## 2018-10-02 NOTE — PROGRESS NOTES
Patient saying her right arm hurt and her abdomen was hurting. Gave her a dose of zofran and some tylenol for her right arm. The tylenol did not help the pain in right arm and the zofran did not help the abdomen hurtting. Edis Muhammad

## 2018-10-02 NOTE — PROGRESS NOTES
ENDOSCOPY, COLON, DIAGNOSTIC      EYE SURGERY Left 01/30/2015    EYE SURGERY      CATARACT REMOVAL- BILATERAL    HEMORRHOID SURGERY  1980s    HERNIA REPAIR      HYSTERECTOMY, TOTAL ABDOMINAL  1980    JOINT REPLACEMENT  bilat 2005/ 2007    knees    SINUS SURGERY  last 2009    removed a bone (2)--2 times no construction     SINUS SURGERY  02/01/2016    SINUS SURGERY  2016 x 2    SINUS SURGERY  09/18/2017    [facility] - Dr Romy Land SINUS SURGERY  08[facility]09[facility]2017 [facility]    TONSILLECTOMY      TOTAL KNEE ARTHROPLASTY  08/2003    Left TKR    VENTRAL HERNIA REPAIR  1992       Restrictions/Precautions:  Contact Precautions, General Precautions, Fall Risk, Isolation     Prior Level of Function:  ADL Assistance: Independent  Homemaking Assistance: Needs assistance  Ambulation Assistance: Independent  Transfer Assistance: Independent  Additional Comments: reports use of walker at times for mobility in the home. Daughter works during the day, pt is at home alone at times. Good family support. Indep with ADLs PTA. Subjective:  Response To Previous Treatment: Patient with no complaints from previous session. Family / Caregiver Present: No  Comments: pt. reporting alot of right shoulder pain this am. \"It's really bad today! \"  Subjective: patient in chair on arrival, agrees to therapy session. reports feeling nauseous this AM and that she vomited \"alot\" during the night. Pain:   .  Pain Assessment  Pain Level: 10  Pain Type: Acute pain  Pain Location: Shoulder  Pain Orientation: Right  Pain Descriptors: Aching;Constant; Sore  Pain Frequency: Continuous  Pain Onset: On-going  Clinical Progression: Rapidly worsening       Social/Functional:  Lives With: Daughter  Type of Home: House  Home Layout: One level  Home Access: Level entry  Home Equipment: Rolling walker, Cane, 4 wheeled walker, Standard walker, Wheelchair-manual     Objective:  Scooting: Supervision (scoot to edge of chairs)    Transfers  Sit to Stand:

## 2018-10-03 ENCOUNTER — APPOINTMENT (OUTPATIENT)
Dept: GENERAL RADIOLOGY | Age: 78
DRG: 603 | End: 2018-10-03
Attending: FAMILY MEDICINE
Payer: MEDICARE

## 2018-10-03 LAB
BLOOD CULTURE, ROUTINE: NORMAL
BLOOD CULTURE, ROUTINE: NORMAL
GLUCOSE BLD-MCNC: 152 MG/DL (ref 70–108)
GLUCOSE BLD-MCNC: 204 MG/DL (ref 70–108)

## 2018-10-03 PROCEDURE — 97116 GAIT TRAINING THERAPY: CPT

## 2018-10-03 PROCEDURE — 97530 THERAPEUTIC ACTIVITIES: CPT

## 2018-10-03 PROCEDURE — 97110 THERAPEUTIC EXERCISES: CPT

## 2018-10-03 PROCEDURE — 2709999900 HC NON-CHARGEABLE SUPPLY

## 2018-10-03 PROCEDURE — 82948 REAGENT STRIP/BLOOD GLUCOSE: CPT

## 2018-10-03 PROCEDURE — 97535 SELF CARE MNGMENT TRAINING: CPT

## 2018-10-03 PROCEDURE — 6360000002 HC RX W HCPCS: Performed by: FAMILY MEDICINE

## 2018-10-03 PROCEDURE — 1290000000 HC SEMI PRIVATE OTHER R&B

## 2018-10-03 PROCEDURE — 6360000002 HC RX W HCPCS: Performed by: INTERNAL MEDICINE

## 2018-10-03 PROCEDURE — 2580000003 HC RX 258: Performed by: INTERNAL MEDICINE

## 2018-10-03 PROCEDURE — 6370000000 HC RX 637 (ALT 250 FOR IP): Performed by: INTERNAL MEDICINE

## 2018-10-03 PROCEDURE — 74018 RADEX ABDOMEN 1 VIEW: CPT

## 2018-10-03 PROCEDURE — 6370000000 HC RX 637 (ALT 250 FOR IP): Performed by: FAMILY MEDICINE

## 2018-10-03 RX ADMIN — BUMETANIDE 2 MG: 1 TABLET ORAL at 13:46

## 2018-10-03 RX ADMIN — CARBOXYMETHYLCELLULOSE SODIUM 1 DROP: 10 GEL OPHTHALMIC at 21:41

## 2018-10-03 RX ADMIN — PIOGLITAZONE 30 MG: 30 TABLET ORAL at 13:45

## 2018-10-03 RX ADMIN — CARBOXYMETHYLCELLULOSE SODIUM 1 DROP: 10 GEL OPHTHALMIC at 11:06

## 2018-10-03 RX ADMIN — FLUTICASONE PROPIONATE 2 SPRAY: 50 SPRAY, METERED NASAL at 11:06

## 2018-10-03 RX ADMIN — LINEZOLID 600 MG: 600 INJECTION, SOLUTION INTRAVENOUS at 21:48

## 2018-10-03 RX ADMIN — ATORVASTATIN CALCIUM 10 MG: 10 TABLET, FILM COATED ORAL at 21:40

## 2018-10-03 RX ADMIN — ENOXAPARIN SODIUM 40 MG: 40 INJECTION SUBCUTANEOUS at 18:57

## 2018-10-03 RX ADMIN — MUPIROCIN: 20 OINTMENT TOPICAL at 11:07

## 2018-10-03 RX ADMIN — CEFEPIME HYDROCHLORIDE 2 G: 2 INJECTION, POWDER, FOR SOLUTION INTRAVENOUS at 18:40

## 2018-10-03 RX ADMIN — Medication 1 DROP: at 11:08

## 2018-10-03 RX ADMIN — HYDROCODONE BITARTRATE AND ACETAMINOPHEN 2 TABLET: 5; 325 TABLET ORAL at 19:14

## 2018-10-03 RX ADMIN — Medication 1 DROP: at 18:59

## 2018-10-03 RX ADMIN — Medication 1 DROP: at 21:30

## 2018-10-03 RX ADMIN — LISINOPRIL 5 MG: 5 TABLET ORAL at 13:45

## 2018-10-03 RX ADMIN — MAGNESIUM GLUCONATE 500 MG ORAL TABLET 400 MG: 500 TABLET ORAL at 11:12

## 2018-10-03 RX ADMIN — CEFEPIME HYDROCHLORIDE 2 G: 2 INJECTION, POWDER, FOR SOLUTION INTRAVENOUS at 05:52

## 2018-10-03 RX ADMIN — FLUTICASONE PROPIONATE 2 SPRAY: 50 SPRAY, METERED NASAL at 21:41

## 2018-10-03 RX ADMIN — MUPIROCIN: 20 OINTMENT TOPICAL at 21:41

## 2018-10-03 RX ADMIN — LINEZOLID 600 MG: 600 INJECTION, SOLUTION INTRAVENOUS at 11:22

## 2018-10-03 RX ADMIN — ERYTHROMYCIN: 5 OINTMENT OPHTHALMIC at 11:05

## 2018-10-03 RX ADMIN — PREGABALIN 150 MG: 75 CAPSULE ORAL at 21:41

## 2018-10-03 RX ADMIN — BUMETANIDE 2 MG: 1 TABLET ORAL at 18:53

## 2018-10-03 RX ADMIN — Medication 10 ML: at 11:11

## 2018-10-03 RX ADMIN — ONDANSETRON 4 MG: 4 TABLET, ORALLY DISINTEGRATING ORAL at 06:05

## 2018-10-03 RX ADMIN — ONDANSETRON 4 MG: 4 TABLET, ORALLY DISINTEGRATING ORAL at 13:53

## 2018-10-03 RX ADMIN — POLYETHYLENE GLYCOL 3350 17 G: 17 POWDER, FOR SOLUTION ORAL at 13:42

## 2018-10-03 RX ADMIN — HYDROCODONE BITARTRATE AND ACETAMINOPHEN 2 TABLET: 5; 325 TABLET ORAL at 06:04

## 2018-10-03 RX ADMIN — Medication 10 ML: at 12:35

## 2018-10-03 RX ADMIN — Medication 10 ML: at 21:41

## 2018-10-03 RX ADMIN — ERYTHROMYCIN: 5 OINTMENT OPHTHALMIC at 21:41

## 2018-10-03 RX ADMIN — HYDRALAZINE HYDROCHLORIDE 50 MG: 50 TABLET, FILM COATED ORAL at 13:46

## 2018-10-03 RX ADMIN — SIMETHICONE CHEW TAB 80 MG 80 MG: 80 TABLET ORAL at 11:03

## 2018-10-03 ASSESSMENT — PAIN SCALES - GENERAL
PAINLEVEL_OUTOF10: 0
PAINLEVEL_OUTOF10: 9
PAINLEVEL_OUTOF10: 10
PAINLEVEL_OUTOF10: 7
PAINLEVEL_OUTOF10: 10
PAINLEVEL_OUTOF10: 4
PAINLEVEL_OUTOF10: 5

## 2018-10-03 ASSESSMENT — PAIN DESCRIPTION - ORIENTATION
ORIENTATION: RIGHT
ORIENTATION: LOWER;MID
ORIENTATION: RIGHT
ORIENTATION: LOWER;MID

## 2018-10-03 ASSESSMENT — PAIN DESCRIPTION - PROGRESSION
CLINICAL_PROGRESSION: GRADUALLY WORSENING
CLINICAL_PROGRESSION: NOT CHANGED

## 2018-10-03 ASSESSMENT — PAIN DESCRIPTION - ONSET
ONSET: ON-GOING

## 2018-10-03 ASSESSMENT — PAIN DESCRIPTION - DESCRIPTORS
DESCRIPTORS: CRAMPING;DISCOMFORT
DESCRIPTORS: CRAMPING;DISCOMFORT
DESCRIPTORS: ACHING;SORE

## 2018-10-03 ASSESSMENT — PAIN DESCRIPTION - PAIN TYPE
TYPE: ACUTE PAIN

## 2018-10-03 ASSESSMENT — PAIN DESCRIPTION - LOCATION
LOCATION: SHOULDER
LOCATION: ABDOMEN
LOCATION: ABDOMEN
LOCATION: SHOULDER

## 2018-10-03 ASSESSMENT — PAIN DESCRIPTION - FREQUENCY
FREQUENCY: CONTINUOUS

## 2018-10-03 NOTE — PROGRESS NOTES
Large black/green emesis. Bowel sounds hypoactive. Abdomen tender to touch and firm, she states \"abdomen hurts\". No bm since 9-30-18. Zofran had been administered x2 today. Perfect serve message sent to .

## 2018-10-03 NOTE — PROGRESS NOTES
[facility]  INPATIENT PHYSICAL THERAPY  DAILY NOTE  [facility] TCU 8E - 8E-57/057-A    Time In: 0848  Time Out: 9674  Timed Code Treatment Minutes: 60 Minutes  Minutes: 60          Date: 10/3/2018  Patient Name: Sabino Nguyen,  Gender:  female        MRN: [id number]  : 1940  (69 y.o.)  Referral Date : 18  Referring Practitioner: Ordering & Attending: ANISHA Jeter MD  Diagnosis: cellulitis of bilateral lower extremities  Additional Pertinent Hx: Sabino Nguyen  is a 69 y.o. female admitted to the transitional care unit on 2018. She has a history of skin problems and was a direct admit from Dr Eduin Mills office with cellulitis of both lower legs. She states that they are feeling better curently but she requires more time with the IV ATB. 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    Cataract of both eyes     DDD (degenerative disc disease), lumbar     Dysphagia, pharyngoesophageal 2016    Fibrocystic breast     H/O ventral hernia repair     Headache 2017    Hypercholesteremia     Hyperlipidemia     Hypertension     Iron deficiency anemia     LPRD (laryngopharyngeal reflux disease) 2016    Neuropathy     peripheral    Obesity     Orbital abscess     Orbital cellulitis 2014    SWAPNA (obstructive sleep apnea)     Osteoarthritis     Osteoporosis     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       Sinusitis     Type II or unspecified type diabetes mellitus without mention of complication, not stated as uncontrolled     diagnoses approx     Velopharyngeal incompetence 2016     Past Surgical History:   Procedure Laterality Date    ABDOMEN SURGERY      APPENDECTOMY      CARPAL TUNNEL RELEASE Bilateral , 2009    CATARACT REMOVAL  2011    bilat    CHOLECYSTECTOMY  1988    COLONOSCOPY  2016    DILATATION, ESOPHAGUS     

## 2018-10-03 NOTE — PROGRESS NOTES
Order received to check for impaction and abdominal series xray per . No impaction noted. Xray ordered. Notified patient.

## 2018-10-04 ENCOUNTER — HOSPITAL ENCOUNTER (INPATIENT)
Age: 78
LOS: 9 days | Discharge: HOME | DRG: 389 | End: 2018-10-13
Attending: INTERNAL MEDICINE | Admitting: INTERNAL MEDICINE
Payer: MEDICARE

## 2018-10-04 VITALS
WEIGHT: 206 LBS | DIASTOLIC BLOOD PRESSURE: 51 MMHG | TEMPERATURE: 98.2 F | SYSTOLIC BLOOD PRESSURE: 105 MMHG | OXYGEN SATURATION: 95 % | RESPIRATION RATE: 20 BRPM | HEART RATE: 73 BPM | HEIGHT: 60 IN | BODY MASS INDEX: 40.44 KG/M2

## 2018-10-04 PROBLEM — K56.609 SMALL BOWEL OBSTRUCTION (HCC): Status: ACTIVE | Noted: 2018-10-04

## 2018-10-04 LAB
ALBUMIN SERPL-MCNC: 3.1 G/DL (ref 3.5–5.1)
ALP BLD-CCNC: 82 U/L (ref 38–126)
ALT SERPL-CCNC: 12 U/L (ref 11–66)
ANION GAP SERPL CALCULATED.3IONS-SCNC: 14 MEQ/L (ref 8–16)
ANION GAP SERPL CALCULATED.3IONS-SCNC: 14 MEQ/L (ref 8–16)
AST SERPL-CCNC: 15 U/L (ref 5–40)
BACTERIA: ABNORMAL /HPF
BASOPHILS # BLD: 0.2 %
BASOPHILS ABSOLUTE: 0 THOU/MM3 (ref 0–0.1)
BILIRUB SERPL-MCNC: 0.2 MG/DL (ref 0.3–1.2)
BILIRUBIN URINE: NEGATIVE
BLOOD, URINE: NEGATIVE
BUN BLDV-MCNC: 43 MG/DL (ref 7–22)
BUN BLDV-MCNC: 47 MG/DL (ref 7–22)
CALCIUM SERPL-MCNC: 9.8 MG/DL (ref 8.5–10.5)
CALCIUM SERPL-MCNC: 9.8 MG/DL (ref 8.5–10.5)
CASTS 2: ABNORMAL /LPF
CASTS UA: ABNORMAL /LPF
CHARACTER, URINE: CLEAR
CHLORIDE BLD-SCNC: 88 MEQ/L (ref 98–111)
CHLORIDE BLD-SCNC: 89 MEQ/L (ref 98–111)
CO2: 30 MEQ/L (ref 23–33)
CO2: 32 MEQ/L (ref 23–33)
COLOR: YELLOW
CREAT SERPL-MCNC: 1.5 MG/DL (ref 0.4–1.2)
CREAT SERPL-MCNC: 1.6 MG/DL (ref 0.4–1.2)
CRYSTALS, UA: ABNORMAL
EOSINOPHIL # BLD: 1.6 %
EOSINOPHILS ABSOLUTE: 0.1 THOU/MM3 (ref 0–0.4)
EPITHELIAL CELLS, UA: ABNORMAL /HPF
ERYTHROCYTE [DISTWIDTH] IN BLOOD BY AUTOMATED COUNT: 18.8 % (ref 11.5–14.5)
ERYTHROCYTE [DISTWIDTH] IN BLOOD BY AUTOMATED COUNT: 54.4 FL (ref 35–45)
GFR SERPL CREATININE-BSD FRML MDRD: 31 ML/MIN/1.73M2
GFR SERPL CREATININE-BSD FRML MDRD: 34 ML/MIN/1.73M2
GLUCOSE BLD-MCNC: 132 MG/DL (ref 70–108)
GLUCOSE BLD-MCNC: 144 MG/DL (ref 70–108)
GLUCOSE BLD-MCNC: 165 MG/DL (ref 70–108)
GLUCOSE URINE: NEGATIVE MG/DL
HCT VFR BLD CALC: 25.6 % (ref 37–47)
HEMOGLOBIN: 8.2 GM/DL (ref 12–16)
IMMATURE GRANS (ABS): 0.05 THOU/MM3 (ref 0–0.07)
IMMATURE GRANULOCYTES: 0.6 %
KETONES, URINE: NEGATIVE
LEUKOCYTE ESTERASE, URINE: NEGATIVE
LIPASE: 27.2 U/L (ref 5.6–51.3)
LYMPHOCYTES # BLD: 10.4 %
LYMPHOCYTES ABSOLUTE: 0.9 THOU/MM3 (ref 1–4.8)
MCH RBC QN AUTO: 25.6 PG (ref 26–33)
MCHC RBC AUTO-ENTMCNC: 32 GM/DL (ref 32.2–35.5)
MCV RBC AUTO: 80 FL (ref 81–99)
MISCELLANEOUS 2: ABNORMAL
MONOCYTES # BLD: 7.6 %
MONOCYTES ABSOLUTE: 0.6 THOU/MM3 (ref 0.4–1.3)
NITRITE, URINE: NEGATIVE
NUCLEATED RED BLOOD CELLS: 0 /100 WBC
PH UA: 5.5
PLATELET # BLD: 194 THOU/MM3 (ref 130–400)
PMV BLD AUTO: 9.9 FL (ref 9.4–12.4)
POTASSIUM SERPL-SCNC: 3.8 MEQ/L (ref 3.5–5.2)
POTASSIUM SERPL-SCNC: 3.9 MEQ/L (ref 3.5–5.2)
PROTEIN UA: 30
RBC # BLD: 3.2 MILL/MM3 (ref 4.2–5.4)
RBC URINE: ABNORMAL /HPF
RENAL EPITHELIAL, UA: ABNORMAL
SEG NEUTROPHILS: 79.6 %
SEGMENTED NEUTROPHILS ABSOLUTE COUNT: 6.7 THOU/MM3 (ref 1.8–7.7)
SODIUM BLD-SCNC: 133 MEQ/L (ref 135–145)
SODIUM BLD-SCNC: 134 MEQ/L (ref 135–145)
SPECIFIC GRAVITY, URINE: 1.01 (ref 1–1.03)
TOTAL PROTEIN: 6.3 G/DL (ref 6.1–8)
UROBILINOGEN, URINE: 0.2 EU/DL
WBC # BLD: 8.4 THOU/MM3 (ref 4.8–10.8)
WBC UA: ABNORMAL /HPF
YEAST: ABNORMAL

## 2018-10-04 PROCEDURE — 80048 BASIC METABOLIC PNL TOTAL CA: CPT

## 2018-10-04 PROCEDURE — 2709999900 HC NON-CHARGEABLE SUPPLY

## 2018-10-04 PROCEDURE — 1200000000 HC SEMI PRIVATE

## 2018-10-04 PROCEDURE — 0220000000 HC SKILLED NURSING FACILITY

## 2018-10-04 PROCEDURE — 80053 COMPREHEN METABOLIC PANEL: CPT

## 2018-10-04 PROCEDURE — 2580000003 HC RX 258: Performed by: INTERNAL MEDICINE

## 2018-10-04 PROCEDURE — 36415 COLL VENOUS BLD VENIPUNCTURE: CPT

## 2018-10-04 PROCEDURE — 85025 COMPLETE CBC W/AUTO DIFF WBC: CPT

## 2018-10-04 PROCEDURE — 97116 GAIT TRAINING THERAPY: CPT

## 2018-10-04 PROCEDURE — 99223 1ST HOSP IP/OBS HIGH 75: CPT | Performed by: INTERNAL MEDICINE

## 2018-10-04 PROCEDURE — 97530 THERAPEUTIC ACTIVITIES: CPT

## 2018-10-04 PROCEDURE — 82948 REAGENT STRIP/BLOOD GLUCOSE: CPT

## 2018-10-04 PROCEDURE — 6370000000 HC RX 637 (ALT 250 FOR IP): Performed by: FAMILY MEDICINE

## 2018-10-04 PROCEDURE — 2580000003 HC RX 258: Performed by: FAMILY MEDICINE

## 2018-10-04 PROCEDURE — 6370000000 HC RX 637 (ALT 250 FOR IP): Performed by: INTERNAL MEDICINE

## 2018-10-04 PROCEDURE — 6360000002 HC RX W HCPCS: Performed by: INTERNAL MEDICINE

## 2018-10-04 PROCEDURE — 6360000002 HC RX W HCPCS: Performed by: FAMILY MEDICINE

## 2018-10-04 PROCEDURE — 81001 URINALYSIS AUTO W/SCOPE: CPT

## 2018-10-04 PROCEDURE — 97535 SELF CARE MNGMENT TRAINING: CPT

## 2018-10-04 PROCEDURE — 83690 ASSAY OF LIPASE: CPT

## 2018-10-04 PROCEDURE — 97110 THERAPEUTIC EXERCISES: CPT

## 2018-10-04 RX ORDER — FLUTICASONE PROPIONATE 50 MCG
2 SPRAY, SUSPENSION (ML) NASAL 2 TIMES DAILY
Status: DISCONTINUED | OUTPATIENT
Start: 2018-10-04 | End: 2018-10-13 | Stop reason: HOSPADM

## 2018-10-04 RX ORDER — DEXTROSE MONOHYDRATE 50 MG/ML
100 INJECTION, SOLUTION INTRAVENOUS PRN
Status: DISCONTINUED | OUTPATIENT
Start: 2018-10-04 | End: 2018-10-13 | Stop reason: HOSPADM

## 2018-10-04 RX ORDER — BUMETANIDE 1 MG/1
2 TABLET ORAL 2 TIMES DAILY
Status: CANCELLED | OUTPATIENT
Start: 2018-10-05

## 2018-10-04 RX ORDER — HYDRALAZINE HYDROCHLORIDE 20 MG/ML
10 INJECTION INTRAMUSCULAR; INTRAVENOUS EVERY 4 HOURS PRN
Status: DISCONTINUED | OUTPATIENT
Start: 2018-10-04 | End: 2018-10-13 | Stop reason: HOSPADM

## 2018-10-04 RX ORDER — FLUTICASONE PROPIONATE 50 MCG
2 SPRAY, SUSPENSION (ML) NASAL 2 TIMES DAILY
Status: CANCELLED | OUTPATIENT
Start: 2018-10-04

## 2018-10-04 RX ORDER — SODIUM CHLORIDE 9 MG/ML
INJECTION, SOLUTION INTRAVENOUS CONTINUOUS
Status: DISCONTINUED | OUTPATIENT
Start: 2018-10-04 | End: 2018-10-13 | Stop reason: HOSPADM

## 2018-10-04 RX ORDER — SODIUM CHLORIDE 0.9 % (FLUSH) 0.9 %
10 SYRINGE (ML) INJECTION EVERY 12 HOURS SCHEDULED
Status: DISCONTINUED | OUTPATIENT
Start: 2018-10-04 | End: 2018-10-13 | Stop reason: HOSPADM

## 2018-10-04 RX ORDER — DEXTROSE MONOHYDRATE 25 G/50ML
12.5 INJECTION, SOLUTION INTRAVENOUS PRN
Status: DISCONTINUED | OUTPATIENT
Start: 2018-10-04 | End: 2018-10-13 | Stop reason: HOSPADM

## 2018-10-04 RX ORDER — ACETAMINOPHEN 650 MG/1
650 SUPPOSITORY RECTAL EVERY 4 HOURS PRN
Status: DISCONTINUED | OUTPATIENT
Start: 2018-10-04 | End: 2018-10-07

## 2018-10-04 RX ORDER — ONDANSETRON 2 MG/ML
4 INJECTION INTRAMUSCULAR; INTRAVENOUS EVERY 8 HOURS PRN
Status: DISCONTINUED | OUTPATIENT
Start: 2018-10-04 | End: 2018-10-08

## 2018-10-04 RX ORDER — SODIUM CHLORIDE 0.9 % (FLUSH) 0.9 %
10 SYRINGE (ML) INJECTION PRN
Status: CANCELLED | OUTPATIENT
Start: 2018-10-04

## 2018-10-04 RX ORDER — HYDRALAZINE HYDROCHLORIDE 50 MG/1
50 TABLET, FILM COATED ORAL EVERY 8 HOURS
Status: CANCELLED | OUTPATIENT
Start: 2018-10-05

## 2018-10-04 RX ORDER — ONDANSETRON 2 MG/ML
4 INJECTION INTRAMUSCULAR; INTRAVENOUS EVERY 8 HOURS PRN
Status: CANCELLED | OUTPATIENT
Start: 2018-10-04

## 2018-10-04 RX ORDER — DEXTROSE MONOHYDRATE 25 G/50ML
12.5 INJECTION, SOLUTION INTRAVENOUS PRN
Status: CANCELLED | OUTPATIENT
Start: 2018-10-04

## 2018-10-04 RX ORDER — SODIUM CHLORIDE 9 MG/ML
INJECTION, SOLUTION INTRAVENOUS CONTINUOUS
Status: DISCONTINUED | OUTPATIENT
Start: 2018-10-04 | End: 2018-10-04 | Stop reason: HOSPADM

## 2018-10-04 RX ORDER — NICOTINE POLACRILEX 4 MG
15 LOZENGE BUCCAL PRN
Status: DISCONTINUED | OUTPATIENT
Start: 2018-10-04 | End: 2018-10-13 | Stop reason: HOSPADM

## 2018-10-04 RX ORDER — ONDANSETRON 4 MG/1
4 TABLET, ORALLY DISINTEGRATING ORAL EVERY 8 HOURS PRN
Status: DISCONTINUED | OUTPATIENT
Start: 2018-10-04 | End: 2018-10-13 | Stop reason: HOSPADM

## 2018-10-04 RX ORDER — ERYTHROMYCIN 5 MG/G
OINTMENT OPHTHALMIC 2 TIMES DAILY
Status: DISCONTINUED | OUTPATIENT
Start: 2018-10-04 | End: 2018-10-13 | Stop reason: HOSPADM

## 2018-10-04 RX ORDER — ACETAMINOPHEN 325 MG/1
650 TABLET ORAL EVERY 6 HOURS PRN
Status: CANCELLED | OUTPATIENT
Start: 2018-10-04

## 2018-10-04 RX ORDER — POLYETHYLENE GLYCOL 3350 17 G/17G
17 POWDER, FOR SOLUTION ORAL DAILY PRN
Status: CANCELLED | OUTPATIENT
Start: 2018-10-04

## 2018-10-04 RX ORDER — HYDROCODONE BITARTRATE AND ACETAMINOPHEN 5; 325 MG/1; MG/1
2 TABLET ORAL 3 TIMES DAILY PRN
Status: CANCELLED | OUTPATIENT
Start: 2018-10-04

## 2018-10-04 RX ORDER — LINEZOLID 2 MG/ML
600 INJECTION, SOLUTION INTRAVENOUS EVERY 12 HOURS
Status: COMPLETED | OUTPATIENT
Start: 2018-10-04 | End: 2018-10-13

## 2018-10-04 RX ORDER — ONDANSETRON 4 MG/1
4 TABLET, ORALLY DISINTEGRATING ORAL EVERY 8 HOURS PRN
Status: CANCELLED | OUTPATIENT
Start: 2018-10-04

## 2018-10-04 RX ORDER — TETRAHYDROZOLINE HCL 0.05 %
1 DROPS OPHTHALMIC (EYE) 3 TIMES DAILY
Status: CANCELLED | OUTPATIENT
Start: 2018-10-04

## 2018-10-04 RX ORDER — DEXTROSE MONOHYDRATE 50 MG/ML
100 INJECTION, SOLUTION INTRAVENOUS PRN
Status: CANCELLED | OUTPATIENT
Start: 2018-10-04

## 2018-10-04 RX ORDER — SIMETHICONE 80 MG
80 TABLET,CHEWABLE ORAL EVERY 6 HOURS PRN
Status: CANCELLED | OUTPATIENT
Start: 2018-10-04

## 2018-10-04 RX ORDER — ERYTHROMYCIN 5 MG/G
OINTMENT OPHTHALMIC 2 TIMES DAILY
Status: CANCELLED | OUTPATIENT
Start: 2018-10-04

## 2018-10-04 RX ORDER — SODIUM CHLORIDE 9 MG/ML
INJECTION, SOLUTION INTRAVENOUS CONTINUOUS
Status: CANCELLED | OUTPATIENT
Start: 2018-10-04

## 2018-10-04 RX ORDER — LISINOPRIL 5 MG/1
5 TABLET ORAL DAILY
Status: CANCELLED | OUTPATIENT
Start: 2018-10-05

## 2018-10-04 RX ORDER — NICOTINE POLACRILEX 4 MG
15 LOZENGE BUCCAL PRN
Status: CANCELLED | OUTPATIENT
Start: 2018-10-04

## 2018-10-04 RX ORDER — BUMETANIDE 0.25 MG/ML
2 INJECTION, SOLUTION INTRAMUSCULAR; INTRAVENOUS EVERY 12 HOURS
Status: DISCONTINUED | OUTPATIENT
Start: 2018-10-04 | End: 2018-10-07

## 2018-10-04 RX ORDER — TETRAHYDROZOLINE HCL 0.05 %
1 DROPS OPHTHALMIC (EYE) 3 TIMES DAILY
Status: DISCONTINUED | OUTPATIENT
Start: 2018-10-04 | End: 2018-10-13 | Stop reason: HOSPADM

## 2018-10-04 RX ORDER — SODIUM CHLORIDE 0.9 % (FLUSH) 0.9 %
10 SYRINGE (ML) INJECTION EVERY 12 HOURS SCHEDULED
Status: CANCELLED | OUTPATIENT
Start: 2018-10-04

## 2018-10-04 RX ORDER — LINEZOLID 2 MG/ML
600 INJECTION, SOLUTION INTRAVENOUS EVERY 12 HOURS
Status: CANCELLED | OUTPATIENT
Start: 2018-10-04 | End: 2018-10-12

## 2018-10-04 RX ORDER — ONDANSETRON 2 MG/ML
4 INJECTION INTRAMUSCULAR; INTRAVENOUS EVERY 8 HOURS PRN
Status: DISCONTINUED | OUTPATIENT
Start: 2018-10-04 | End: 2018-10-04 | Stop reason: HOSPADM

## 2018-10-04 RX ORDER — PREGABALIN 75 MG/1
150 CAPSULE ORAL 2 TIMES DAILY
Status: CANCELLED | OUTPATIENT
Start: 2018-10-04

## 2018-10-04 RX ORDER — SODIUM CHLORIDE 0.9 % (FLUSH) 0.9 %
10 SYRINGE (ML) INJECTION PRN
Status: DISCONTINUED | OUTPATIENT
Start: 2018-10-04 | End: 2018-10-13 | Stop reason: HOSPADM

## 2018-10-04 RX ADMIN — MAGNESIUM HYDROXIDE 30 ML: 400 SUSPENSION ORAL at 06:33

## 2018-10-04 RX ADMIN — MUPIROCIN: 20 OINTMENT TOPICAL at 10:16

## 2018-10-04 RX ADMIN — Medication 4 MG: at 15:01

## 2018-10-04 RX ADMIN — LISINOPRIL 5 MG: 5 TABLET ORAL at 10:00

## 2018-10-04 RX ADMIN — SODIUM CHLORIDE: 9 INJECTION, SOLUTION INTRAVENOUS at 15:33

## 2018-10-04 RX ADMIN — CEFEPIME HYDROCHLORIDE 2 G: 2 INJECTION, POWDER, FOR SOLUTION INTRAVENOUS at 18:25

## 2018-10-04 RX ADMIN — ONDANSETRON 4 MG: 4 TABLET, ORALLY DISINTEGRATING ORAL at 00:04

## 2018-10-04 RX ADMIN — LINEZOLID 600 MG: 600 INJECTION, SOLUTION INTRAVENOUS at 09:59

## 2018-10-04 RX ADMIN — ONDANSETRON 4 MG: 4 TABLET, ORALLY DISINTEGRATING ORAL at 10:01

## 2018-10-04 RX ADMIN — BUMETANIDE 2 MG: 1 TABLET ORAL at 10:00

## 2018-10-04 RX ADMIN — ERYTHROMYCIN: 5 OINTMENT OPHTHALMIC at 10:11

## 2018-10-04 RX ADMIN — CEFEPIME HYDROCHLORIDE 2 G: 2 INJECTION, POWDER, FOR SOLUTION INTRAVENOUS at 05:44

## 2018-10-04 RX ADMIN — HYDRALAZINE HYDROCHLORIDE 50 MG: 50 TABLET, FILM COATED ORAL at 10:00

## 2018-10-04 RX ADMIN — ACETAMINOPHEN 650 MG: 325 TABLET ORAL at 02:01

## 2018-10-04 RX ADMIN — FLUTICASONE PROPIONATE 2 SPRAY: 50 SPRAY, METERED NASAL at 10:15

## 2018-10-04 RX ADMIN — PANTOPRAZOLE SODIUM 40 MG: 40 TABLET, DELAYED RELEASE ORAL at 09:59

## 2018-10-04 RX ADMIN — HYDROCODONE BITARTRATE AND ACETAMINOPHEN 2 TABLET: 5; 325 TABLET ORAL at 10:00

## 2018-10-04 RX ADMIN — Medication 10 ML: at 10:02

## 2018-10-04 RX ADMIN — PREGABALIN 150 MG: 75 CAPSULE ORAL at 10:00

## 2018-10-04 RX ADMIN — Medication 10 ML: at 15:01

## 2018-10-04 ASSESSMENT — PAIN SCALES - GENERAL
PAINLEVEL_OUTOF10: 10
PAINLEVEL_OUTOF10: 0
PAINLEVEL_OUTOF10: 8

## 2018-10-04 ASSESSMENT — PAIN DESCRIPTION - PROGRESSION
CLINICAL_PROGRESSION: GRADUALLY WORSENING
CLINICAL_PROGRESSION: GRADUALLY WORSENING
CLINICAL_PROGRESSION: NOT CHANGED
CLINICAL_PROGRESSION: GRADUALLY WORSENING
CLINICAL_PROGRESSION: GRADUALLY WORSENING

## 2018-10-04 ASSESSMENT — PAIN DESCRIPTION - LOCATION
LOCATION: ABDOMEN;SHOULDER;LEG
LOCATION: GENERALIZED

## 2018-10-04 ASSESSMENT — PAIN DESCRIPTION - PAIN TYPE
TYPE: ACUTE PAIN
TYPE: ACUTE PAIN

## 2018-10-04 ASSESSMENT — PAIN DESCRIPTION - ORIENTATION: ORIENTATION: RIGHT

## 2018-10-04 ASSESSMENT — PAIN DESCRIPTION - FREQUENCY: FREQUENCY: INTERMITTENT

## 2018-10-04 ASSESSMENT — PAIN DESCRIPTION - DESCRIPTORS: DESCRIPTORS: ACHING;SORE;DISCOMFORT

## 2018-10-04 ASSESSMENT — PAIN DESCRIPTION - ONSET: ONSET: ON-GOING

## 2018-10-04 NOTE — DISCHARGE SUMMARY
10/4/2018 17:55   Ref.  Range 10/4/2018 05:40 10/4/2018 05:51 10/4/2018 15:15 10/4/2018 15:35   Sodium Latest Ref Range: 135 - 145 meq/L 133 (L)  134 (L)    Potassium Latest Ref Range: 3.5 - 5.2 meq/L 3.9  3.8    Chloride Latest Ref Range: 98 - 111 meq/L 89 (L)  88 (L)    CO2 Latest Ref Range: 23 - 33 meq/L 30  32    BUN Latest Ref Range: 7 - 22 mg/dL 47 (H)  43 (H)    Creatinine Latest Ref Range: 0.4 - 1.2 mg/dL 1.5 (H)  1.6 (H)    Anion Gap Latest Ref Range: 8.0 - 16.0 meq/L 14.0  14.0    Est, Glom Filt Rate Latest Units: ml/min/1.73m2 34 (A)  31 (A)    Glucose Latest Ref Range: 70 - 108 mg/dL 144 (H)  132 (H)    POC Glucose Latest Ref Range: 70 - 108 mg/dl  165 (H)     Calcium Latest Ref Range: 8.5 - 10.5 mg/dL 9.8  9.8    Total Protein Latest Ref Range: 6.1 - 8.0 g/dL   6.3    Albumin Latest Ref Range: 3.5 - 5.1 g/dL   3.1 (L)    Alk Phos Latest Ref Range: 38 - 126 U/L   82    ALT Latest Ref Range: 11 - 66 U/L   12    AST Latest Ref Range: 5 - 40 U/L   15    Bilirubin Latest Ref Range: 0.3 - 1.2 mg/dL   0.2 (L)    Lipase Latest Ref Range: 5.6 - 51.3 U/L   27.2    WBC Latest Ref Range: 4.8 - 10.8 thou/mm3   8.4    RBC Latest Ref Range: 4.20 - 5.40 mill/mm3   3.20 (L)    Hemoglobin Quant Latest Ref Range: 12.0 - 16.0 gm/dl   8.2 (L)    Hematocrit Latest Ref Range: 37.0 - 47.0 %   25.6 (L)    MCV Latest Ref Range: 81.0 - 99.0 fL   80.0 (L)    MCH Latest Ref Range: 26.0 - 33.0 pg   25.6 (L)    MCHC Latest Ref Range: 32.2 - 35.5 gm/dl   32.0 (L)    MPV Latest Ref Range: 9.4 - 12.4 fL   9.9    RDW-CV Latest Ref Range: 11.5 - 14.5 %   18.8 (H)    RDW-SD Latest Ref Range: 35.0 - 45.0 fL   54.4 (H)    Platelet Count Latest Ref Range: 130 - 400 thou/mm3   194    Lymphocytes # Latest Ref Range: 1.0 - 4.8 thou/mm3   0.9 (L)    Monocytes # Latest Ref Range: 0.4 - 1.3 thou/mm3   0.6    Eosinophils # Latest Ref Range: 0.0 - 0.4 thou/mm3   0.1    Basophils # Latest Ref Range: 0.0 - 0.1 thou/mm3   0.0    Seg Neutrophils Latest discharge    Anastasiya Gao MD

## 2018-10-04 NOTE — FLOWSHEET NOTE
10/04/18 1448   Provider Notification   Reason for Communication Evaluate   Provider Name Dr. Lilliana Rebolledo   Provider Notification Physician   Method of Communication Call   Response See orders   Notification Time 21    Patient complaining of persistent nausea, two episodes of emesis noted within last two hours. New onset of sharp abdominal pain and cramping. Afebrile, VS WNL, ABD tender, firm. Heat applied, MD notified, see new orders.

## 2018-10-04 NOTE — PROGRESS NOTES
last 2009    removed a bone (2)--2 times no construction     SINUS SURGERY  02/01/2016    SINUS SURGERY  2016 x 2    SINUS SURGERY  09/18/2017    [facility] - Dr Jong Lechuga [facility]  08[facility]09[facility]2017 [facility]    TONSILLECTOMY      TOTAL KNEE ARTHROPLASTY  08/2003    Left TKR    VENTRAL HERNIA REPAIR  1992       Restrictions/Precautions:  Contact Precautions, General Precautions, Fall Risk, Isolation                            Prior Level of Function:  ADL Assistance: Independent  Homemaking Assistance: Needs assistance  Ambulation Assistance: Independent  Transfer Assistance: Independent  Additional Comments: reports use of walker at times for mobility in the home. Daughter works during the day, pt is at home alone at times. Good family support. Indep with ADLs PTA. Subjective     Subjective: Patient seated in bedside chair upon arrival. Agreeable to OT session. Patient reported not feeling well    Overall Orientation Status: Within Functional Limits     Pain:  Pain Assessment  Patient Currently in Pain: Yes  Pain Assessment: 0-10  Pain Level: 10  Pain Type: Acute pain  Pain Location: Generalized       Objective     ADL  Grooming: Supervision (Rinsed with mouthwash standing sinkside)  UE Bathing: Increased time to complete;Setup;Supervision (Completed sitting in bedside chair)  LE Bathing: Increased time to complete;Minimal assistance (for washing buttocks, completed sitting on RTS, below B knees not completed secondary having wraps on B LE)  UE Dressing: Increased time to complete;Setup (donned shirt sitting in bedside chair)  LE Dressing:  Moderate assistance (donned shorts sitting on RTS)  Toileting: Minimal assistance        Transfers  Sit to stand: Contact guard assistance  Stand to sit: Contact guard assistance  Toilet Transfers  Toilet - Technique: Ambulating  Equipment Used: Raised toilet seat with rails  Toilet Transfer: Contact guard assistance    Balance  Standing Balance: Contact guard

## 2018-10-05 ENCOUNTER — APPOINTMENT (OUTPATIENT)
Dept: GENERAL RADIOLOGY | Age: 78
DRG: 389 | End: 2018-10-05
Attending: INTERNAL MEDICINE
Payer: MEDICARE

## 2018-10-05 LAB
ANION GAP SERPL CALCULATED.3IONS-SCNC: 9 MEQ/L (ref 8–16)
BUN BLDV-MCNC: 45 MG/DL (ref 7–22)
CALCIUM SERPL-MCNC: 9.4 MG/DL (ref 8.5–10.5)
CHLORIDE BLD-SCNC: 93 MEQ/L (ref 98–111)
CO2: 34 MEQ/L (ref 23–33)
CREAT SERPL-MCNC: 1.7 MG/DL (ref 0.4–1.2)
EKG ATRIAL RATE: 78 BPM
EKG P AXIS: -21 DEGREES
EKG P-R INTERVAL: 188 MS
EKG Q-T INTERVAL: 428 MS
EKG QRS DURATION: 112 MS
EKG QTC CALCULATION (BAZETT): 487 MS
EKG R AXIS: 71 DEGREES
EKG T AXIS: -12 DEGREES
EKG VENTRICULAR RATE: 78 BPM
GFR SERPL CREATININE-BSD FRML MDRD: 29 ML/MIN/1.73M2
GLUCOSE BLD-MCNC: 107 MG/DL (ref 70–108)
GLUCOSE BLD-MCNC: 114 MG/DL (ref 70–108)
GLUCOSE BLD-MCNC: 115 MG/DL (ref 70–108)
GLUCOSE BLD-MCNC: 116 MG/DL (ref 70–108)
GLUCOSE BLD-MCNC: 175 MG/DL (ref 70–108)
POTASSIUM SERPL-SCNC: 3.7 MEQ/L (ref 3.5–5.2)
SODIUM BLD-SCNC: 136 MEQ/L (ref 135–145)

## 2018-10-05 PROCEDURE — 6370000000 HC RX 637 (ALT 250 FOR IP): Performed by: FAMILY MEDICINE

## 2018-10-05 PROCEDURE — 2500000003 HC RX 250 WO HCPCS: Performed by: INTERNAL MEDICINE

## 2018-10-05 PROCEDURE — 82948 REAGENT STRIP/BLOOD GLUCOSE: CPT

## 2018-10-05 PROCEDURE — 6360000002 HC RX W HCPCS: Performed by: FAMILY MEDICINE

## 2018-10-05 PROCEDURE — 93010 ELECTROCARDIOGRAM REPORT: CPT | Performed by: INTERNAL MEDICINE

## 2018-10-05 PROCEDURE — 93005 ELECTROCARDIOGRAM TRACING: CPT | Performed by: INTERNAL MEDICINE

## 2018-10-05 PROCEDURE — 2580000003 HC RX 258: Performed by: FAMILY MEDICINE

## 2018-10-05 PROCEDURE — 36415 COLL VENOUS BLD VENIPUNCTURE: CPT

## 2018-10-05 PROCEDURE — 1200000000 HC SEMI PRIVATE

## 2018-10-05 PROCEDURE — 74018 RADEX ABDOMEN 1 VIEW: CPT

## 2018-10-05 PROCEDURE — 29580 STRAPPING UNNA BOOT: CPT

## 2018-10-05 PROCEDURE — 2709999900 HC NON-CHARGEABLE SUPPLY

## 2018-10-05 PROCEDURE — 80048 BASIC METABOLIC PNL TOTAL CA: CPT

## 2018-10-05 PROCEDURE — 99232 SBSQ HOSP IP/OBS MODERATE 35: CPT | Performed by: INTERNAL MEDICINE

## 2018-10-05 PROCEDURE — 71046 X-RAY EXAM CHEST 2 VIEWS: CPT

## 2018-10-05 PROCEDURE — 99222 1ST HOSP IP/OBS MODERATE 55: CPT | Performed by: SURGERY

## 2018-10-05 RX ADMIN — BUMETANIDE 2 MG: 0.25 INJECTION INTRAMUSCULAR; INTRAVENOUS at 23:29

## 2018-10-05 RX ADMIN — LINEZOLID 600 MG: 600 INJECTION, SOLUTION INTRAVENOUS at 01:15

## 2018-10-05 RX ADMIN — BUMETANIDE 2 MG: 0.25 INJECTION INTRAMUSCULAR; INTRAVENOUS at 12:27

## 2018-10-05 RX ADMIN — SODIUM CHLORIDE: 9 INJECTION, SOLUTION INTRAVENOUS at 01:42

## 2018-10-05 RX ADMIN — Medication: at 21:37

## 2018-10-05 RX ADMIN — CARBOXYMETHYLCELLULOSE SODIUM 1 DROP: 10 GEL OPHTHALMIC at 12:40

## 2018-10-05 RX ADMIN — CARBOXYMETHYLCELLULOSE SODIUM 1 DROP: 10 GEL OPHTHALMIC at 21:42

## 2018-10-05 RX ADMIN — FLUTICASONE PROPIONATE 2 SPRAY: 50 SPRAY, METERED NASAL at 01:12

## 2018-10-05 RX ADMIN — Medication 1 DROP: at 21:42

## 2018-10-05 RX ADMIN — LINEZOLID 600 MG: 600 INJECTION, SOLUTION INTRAVENOUS at 23:29

## 2018-10-05 RX ADMIN — Medication 1 DROP: at 12:40

## 2018-10-05 RX ADMIN — ERYTHROMYCIN: 5 OINTMENT OPHTHALMIC at 01:12

## 2018-10-05 RX ADMIN — FLUTICASONE PROPIONATE 2 SPRAY: 50 SPRAY, METERED NASAL at 12:40

## 2018-10-05 RX ADMIN — ERYTHROMYCIN: 5 OINTMENT OPHTHALMIC at 21:41

## 2018-10-05 RX ADMIN — ENOXAPARIN SODIUM 40 MG: 40 INJECTION SUBCUTANEOUS at 21:33

## 2018-10-05 RX ADMIN — Medication 1 DROP: at 15:47

## 2018-10-05 RX ADMIN — SODIUM CHLORIDE: 9 INJECTION, SOLUTION INTRAVENOUS at 15:46

## 2018-10-05 RX ADMIN — FLUTICASONE PROPIONATE 2 SPRAY: 50 SPRAY, METERED NASAL at 21:41

## 2018-10-05 RX ADMIN — MUPIROCIN: 20 OINTMENT TOPICAL at 01:12

## 2018-10-05 RX ADMIN — LINEZOLID 600 MG: 600 INJECTION, SOLUTION INTRAVENOUS at 12:28

## 2018-10-05 RX ADMIN — MUPIROCIN: 20 OINTMENT TOPICAL at 21:41

## 2018-10-05 RX ADMIN — Medication 1 UNITS: at 01:34

## 2018-10-05 RX ADMIN — MUPIROCIN: 20 OINTMENT TOPICAL at 12:40

## 2018-10-05 RX ADMIN — CEFEPIME HYDROCHLORIDE 2 G: 2 INJECTION, POWDER, FOR SOLUTION INTRAVENOUS at 21:30

## 2018-10-05 RX ADMIN — CEFEPIME HYDROCHLORIDE 2 G: 2 INJECTION, POWDER, FOR SOLUTION INTRAVENOUS at 06:11

## 2018-10-05 RX ADMIN — CARBOXYMETHYLCELLULOSE SODIUM 1 DROP: 10 GEL OPHTHALMIC at 01:14

## 2018-10-05 RX ADMIN — ERYTHROMYCIN: 5 OINTMENT OPHTHALMIC at 12:40

## 2018-10-05 RX ADMIN — Medication 1 DROP: at 01:12

## 2018-10-05 ASSESSMENT — PAIN SCALES - GENERAL
PAINLEVEL_OUTOF10: 0
PAINLEVEL_OUTOF10: 10
PAINLEVEL_OUTOF10: 0

## 2018-10-05 ASSESSMENT — PAIN DESCRIPTION - LOCATION: LOCATION: ABDOMEN

## 2018-10-05 ASSESSMENT — PAIN DESCRIPTION - PAIN TYPE: TYPE: ACUTE PAIN

## 2018-10-05 NOTE — H&P
 Type II or unspecified type diabetes mellitus without mention of complication, not stated as uncontrolled     diagnoses approx 2000    Velopharyngeal incompetence 09/26/2016       Past Surgical History:          Procedure Laterality Date    ABDOMEN SURGERY      APPENDECTOMY      CARPAL TUNNEL RELEASE Bilateral 2008, 2009    CATARACT REMOVAL  2011    bilat    CHOLECYSTECTOMY  03/1988    COLONOSCOPY  2016    DILATATION, ESOPHAGUS      ENDOSCOPY, COLON, DIAGNOSTIC      EYE SURGERY Left 01/30/2015    EYE SURGERY      CATARACT REMOVAL- BILATERAL    HEMORRHOID SURGERY  1980s    HERNIA REPAIR      HYSTERECTOMY, TOTAL ABDOMINAL  1980    JOINT REPLACEMENT  bilat 2005/ 2007    knees    SINUS SURGERY  last 2009    removed a bone (2)--2 times no construction     SINUS SURGERY  02/01/2016    SINUS SURGERY  2016 x 2    SINUS SURGERY  09/18/2017    [facility] - Dr Jeronimo Barragan S[facility]US SURGERY  [facility]09[facility]2017 [facility]    TONSILLECTOMY      TOTAL KNEE ARTHROPLASTY  08/2003    Left TKR    VENTRAL HERNIA REPAIR  1992       Medications Prior to Admission:      Prior to Admission medications    Medication Sig Start Date End Date Taking? Authorizing Provider   pregabalin (LYRICA) 300 MG capsule Take 300 mg by mouth 2 times daily. Luzmaria Umanzor Historical Provider, MD   mupirocin [facility] NASAL) 2 % nasal ointment by Nasal route 2 times daily Take by Nasal route 2 times daily. Historical Provider, MD   lisinopril (PRINIVIL;ZESTRIL) 5 MG tablet Take 1 tablet by mouth daily 7/26/18   AMANDA Scott - CNP   erythromycin [facility]) 5 MG/GM ophthalmic ointment Place into both eyes 2 times daily Nightly. Historical Provider, MD   hydrALAZINE (APRESOLINE) 50 MG tablet TAKE ONE TABLET BY MOUTH THREE TIMES DAILY 6/11/18   Dinora Ward MD   cycloSPORINE (RESTASIS) 0.05 % ophthalmic emulsion Place 1 drop into both eyes 2 times daily Patient uses 2-4 times per day.     Historical Provider, MD   sodium chloride (OCEAN, BABY AYR) 0.65 % nasal spray 1 spray by Nasal route as needed for Congestion Patient states she uses 4-6x per day. Historical Provider, MD   metFORMIN (GLUCOPHAGE) 1000 MG tablet Take 1 tabs in AM and 1 tab in PM 1/5/18   Lincoln Machuca MD   omeprazole (PRILOSEC) 20 MG delayed release capsule TAKE ONE CAPSULE BY MOUTH ONE TIME DAILY 1/5/18   Lincoln Machuca MD   atorvastatin (LIPITOR) 40 MG tablet TAKE ONE TABLET BY MOUTH IN THE EVENING 1/5/18   Lincoln Machuca MD   fluticasone (FLONASE) 50 MCG/ACT nasal spray 2 sprays by Nasal route 2 times daily 6/15/17   Mounika Gandara MD   Handicap Placard MISC by Does not apply route. Request parking placard due to medical conditions. Duration of 5 years. 9/4/14   Lincoln Machuca MD   tetrahydrozoline 0.05 % ophthalmic solution Place 1 drop into both eyes 3 times daily     Historical Provider, MD       Allergies:  Bactrim [sulfamethoxazole-trimethoprim]; Morphine; Hydrocodone; Percocet [oxycodone-acetaminophen]; Tylenol [acetaminophen]; and Tape [adhesive tape]    Social History:      The patient currently lives home is [place]    TOBACCO:   reports that she has never smoked. She has never used smokeless tobacco.  ETOH:   reports that she does not drink alcohol. Family History:          Family History   Problem Relation Age of Onset    Diabetes Mother     Heart Disease Father         whooping cough as child    Obesity Sister     Other Brother         tumor on back    Obesity Sister     Cancer Sister         Skin     Cancer Brother         Bone cancer from metal    Other Brother         passed as a child    Heart Disease Brother     Other Brother         tremor    Heart Attack Brother     No Known Problems Brother     Heart Disease Brother     No Known Problems Brother     No Known Problems Brother        Diet:  Diet NPO Effective Now Exceptions are: Sips with Meds    REVIEW OF SYSTEMS:   Pertinent positives as noted in the HPI.  All other RBCUA 3-5 10/04/2018    BLOODU NEGATIVE 10/04/2018    SPECGRAV 1.011 09/28/2018    GLUCOSEU NEGATIVE 10/04/2018       Radiology:     CXR: I have reviewed the CXR with the following interpretation:   EKG:  I have reviewed the EKG with the following interpretation:     Ct Abdomen Pelvis Wo Contrast Additional Contrast? None    Result Date: 10/4/2018  PROCEDURE: CT ABDOMEN PELVIS WO CONTRAST CLINICAL INFORMATION: abd pain R/O obstruction . COMPARISON: 24 August 2012 TECHNIQUE: Noncontrast images of the abdomen and pelvis with multiplanar reconstructions All CT scans at this facility use dose modulation, iterative reconstruction, and/or weight-based dosing when appropriate to reduce radiation dose to as low as reasonably achievable. FINDINGS: Lung bases Degradation of the images due to respiratory motion. Calcified granuloma right lung base elevated right hemidiaphragm. Coronary artery calcifications. Abdomen pelvis Solid organ evaluation limited without IV contrast. There is fluid distention of the stomach and multiple fluid dilated small bowel loops. Transition is difficult to determine there are multiple decompressed small bowel loops in the right and mid abdomen. A believe the transition is at the level of the hernia mesh in the midline of the lower abdomen within the patient's panniculus. There is gas to the distal colon and rectum left colon does appear to be somewhat decompressed. Findings are highly suspicious for distal small bowel mechanical obstruction. The noncontrasted liver and spleen appear normal. The pancreas is fatty infiltrated. Gallbladder is surgically absent. The adrenals are normal. There are bilateral renal cysts. 6 cm left renal cyst 18 mm right renal cyst. There is a partially calcified cystic structure in the right mid abdomen adjacent to the inferior edge of the liver which may represent a partially calcified mesenteric cyst. No abnormal fluid collections urinary bladder is unremarkable. visualized lung apices are clear. 1. Atherosclerotic calcification is present within the bilateral cavernous and clinoid internal carotid arteries resulting in mild luminal narrowing. No major branch occlusion, flow-limiting stenosis or aneurysm is otherwise identified intracranially. 2. No occlusion, flow-limiting stenosis, dissection or aneurysm is identified of the major cervical arterial structures. Soft atherosclerotic plaque is present at the left carotid bifurcation resulting in less than 50% luminal narrowing by NASCET criteria. 3. Age indeterminant fracture of the right proximal humerus. **This report has been created using voice recognition software. It may contain minor errors which are inherent in voice recognition technology. ** Final report electronically signed by Dr. Rony Norton on 10/1/2018 1:24 PM    Xr Abdomen (kub) (single Ap View)    Result Date: 10/3/2018  PROCEDURE: XR ABDOMEN (KUB) (SINGLE AP VIEW) CLINICAL INFORMATION: n/v, abdominal pain, . COMPARISON: No prior study. TECHNIQUE: A supine AP view of the abdomen was obtained. FINDINGS: Lung bases are clear. Degenerative changes with S-shaped scoliosis of the lumbar spine. SI joints are symmetric. Degenerative changes both hips. Osteopenia. Stool in the visualized colon. Postsurgical clips in the right upper quadrant. Nonspecific bowel gas pattern. Nonspecific bowel gas pattern. **This report has been created using voice recognition software. It may contain minor errors which are inherent in voice recognition technology. ** Final report electronically signed by Dr. Tiffani Domingo on 10/3/2018 6:46 PM    Ct Head Wo Contrast    Result Date: 10/1/2018  PROCEDURE: CT HEAD WO CONTRAST CLINICAL INFORMATION: FACIAL MUSCLE WEAKNESS/PARALYSIS, . COMPARISON: 4/20/2018 TECHNIQUE: Noncontrast 5 mm axial images were obtained through the brain.  All CT scans at this facility use dose modulation, iterative reconstruction, and/or weight-based

## 2018-10-05 NOTE — PROGRESS NOTES
Jacquelin Douglas        MRN: [id number]    : 1940  (78 y.o.)  Gender: female   Principal Problem: SBO (small bowel obstruction) [facility]    Section [facility]    Health Conditions    Should Pain Assessment Interview be Conducted? Attempt to conduct interview with all residents. If resident is comatose, skip to , Shortness of Breath (dyspnea)   Enter Code  1 0 - No à (resident is rarely/never understood) à Skip to [address] of Breath  1 - Yes à Continue to , Pain Presence   Pain Assessment Interview   Pain Presence   Enter Code  1 Ask resident: Greg Bajwa you had pain or hurting at any time in the last 5 days?   0 - No à Skip to , Shortness of Breath  1 - Yes  à Continue to , Pain Frequency  9  Unable to answer à Skip to , Shortness of Breath    Pain Frequency   Enter Code          1 Ask resident: [facility] much of the time have you experienced pain or hurting over the last 5 days?   1  Almost constantly  2  Frequently  3  Occasionally  4  Rarely  9  Unable to answer    Pain Effect on Function   Enter Code      1 Ask resident: Maggie Vimal the last 5 days, has pain made it hard for you to sleep at night?   0 - No   1 - Yes   9  Unable to answer    Enter Code      1 Ask resident: Maggie Vimal the last 5 days, have you limited your day-to-day activities because of pain?   0 - No   1 - Yes   9  Unable to answer     Pain Intensity   Enter Code      10 A. Numeric Rating Scale (00-10)  Ask resident: Yaa Olmstead rate your worst pain over the last 5 days on a zero to ten scale, with zero being no pain and ten as the worst pain you can imagine.  (Show resident 00-10 pain scale)  Enter two-digit response. Enter 99 if unable to answer.

## 2018-10-05 NOTE — PROGRESS NOTES
134*  136   K  3.9  3.8  3.7   CL  89*  88*  93*   CO2  30  32  34*   BUN  47*  43*  45*   CREATININE  1.5*  1.6*  1.7*   CALCIUM  9.8  9.8  9.4     Recent Labs      10/04/18   1515   AST  15   ALT  12   BILITOT  0.2*   ALKPHOS  82     No results for input(s): INR in the last 72 hours. No results for input(s): Juddriss Burdick in the last 72 hours. Urinalysis:      Lab Results   Component Value Date    NITRU NEGATIVE 10/04/2018    WBCUA 2-4 10/04/2018    BACTERIA NONE 10/04/2018    RBCUA 3-5 10/04/2018    BLOODU NEGATIVE 10/04/2018    SPECGRAV 1.011 09/28/2018    GLUCOSEU NEGATIVE 10/04/2018       Radiology:     CXR: I have reviewed the CXR with the following interpretation:   EKG:  I have reviewed the EKG with the following interpretation:     Ct Abdomen Pelvis Wo Contrast Additional Contrast? None    Result Date: 10/4/2018  PROCEDURE: CT ABDOMEN PELVIS WO CONTRAST CLINICAL INFORMATION: abd pain R/O obstruction . COMPARISON: 24 August 2012 TECHNIQUE: Noncontrast images of the abdomen and pelvis with multiplanar reconstructions All CT scans at this facility use dose modulation, iterative reconstruction, and/or weight-based dosing when appropriate to reduce radiation dose to as low as reasonably achievable. FINDINGS: Lung bases Degradation of the images due to respiratory motion. Calcified granuloma right lung base elevated right hemidiaphragm. Coronary artery calcifications. Abdomen pelvis Solid organ evaluation limited without IV contrast. There is fluid distention of the stomach and multiple fluid dilated small bowel loops. Transition is difficult to determine there are multiple decompressed small bowel loops in the right and mid abdomen. A believe the transition is at the level of the hernia mesh in the midline of the lower abdomen within the patient's panniculus. There is gas to the distal colon and rectum left colon does appear to be somewhat decompressed.  Findings are highly suspicious for distal small bowel mechanical obstruction. The noncontrasted liver and spleen appear normal. The pancreas is fatty infiltrated. Gallbladder is surgically absent. The adrenals are normal. There are bilateral renal cysts. 6 cm left renal cyst 18 mm right renal cyst. There is a partially calcified cystic structure in the right mid abdomen adjacent to the inferior edge of the liver which may represent a partially calcified mesenteric cyst. No abnormal fluid collections urinary bladder is unremarkable. There are no suspicious bone lesions. Degenerative changes and scoliosis are present. Findings compatible with a distal mechanical small bowel obstruction likely at the level of the hernia mesh. Discussed with , approximately 5:00 PM **This report has been created using voice recognition software. It may contain minor errors which are inherent in voice recognition technology. ** Final report electronically signed by Dr. Irene Mane on 10/4/2018 4:59 PM    Cta Head W Wo Contrast (code Stroke Nihss 4 Or Above)    Result Date: 10/1/2018  PROCEDURE: CTA HEAD W WO CONTRAST, CTA NECK W WO CONTRAST CLINICAL INFORMATION: STROKE. Additional history obtained from the electronic medical record indicates the patient has facial numbness and facial droop. COMPARISON: None available. TECHNIQUE: 1 mm axial images were obtained through the head and neck after the fast bolus administration of contrast. A noncontrast localizer was obtained. 3-D reconstructions were performed on a dedicated 3-D workstation. These include multiplanar MPR images and multiplanar MIP images. Centerline reconstructions were obtained of the carotid systems. 80 mL Isovue-370 intravenous contrast was given. All CT scans at this facility use dose modulation, iterative reconstruction, and/or weight-based dosing when appropriate to reduce radiation dose to as low as reasonably achievable.  FINDINGS: CTA NECK: Aortic arch and branches: Atherosclerotic calcification is present within the aortic arch. There is a common origin of the innominate and left common carotid arteries off the aortic arch. The origins of the great vessels off the arch are otherwise patent  without flow-limiting stenosis. Right common carotid artery/ICA: The right common carotid artery is tortuous and takes a medial, retropharyngeal course. The right carotid bifurcation and right cervical internal carotid artery are within normal limits. Left common carotid artery/ICA: Soft atherosclerotic plaque is present at the left carotid bifurcation resulting in less than 50% luminal narrowing by NASCET criteria. The proximal left cervical internal carotid artery takes a medial retropharyngeal course. Vertebral arteries: The vertebral arteries are codominant. The vertebral arteries are otherwise patent. The proximal left vertebral artery is noted to be tortuous. CTA HEAD: Internal carotid arteries: Atherosclerotic calcification is present involving the bilateral cavernous and clinoid internal carotid arteries resulting in mild luminal narrowing. Middle cerebral arteries: Patent with normal arborization of the distal branches. Anterior cerebral arteries: Patent with normal arborization of the distal branches. There is a patent anterior communicating artery. Vertebral arteries: Unremarkable. Basilar artery: Unremarkable. Superior cerebellar arteries: Unremarkable. Posterior cerebral arteries: Unremarkable. Bilateral posterior communicating arteries are not visualized, a normal variant. The superior sagittal sinus, vein of Bon, internal cerebral veins, straight sinus, transverse sinuses and sigmoid sinuses are patent. No acute intracranial abnormality is identified. There is absence of the native lenses within the bilateral orbits. Partial opacification is noted within the bilateral mastoid air cells which may correspond to mastoid effusions or mastoiditis.  Postsurgical changes are present within the bilateral paranasal increase in debris, mucosal thickening within the visualized sinuses. **This report has been created using voice recognition software. It may contain minor errors which are inherent in voice recognition technology. ** Final report electronically signed by Dr. Mary Zhu on 10/1/2018 12:49 PM    Cta Neck W Wo Contrast (code Stroke Nihss 4 Or Above)    Result Date: 10/1/2018  PROCEDURE: CTA HEAD W WO CONTRAST, CTA NECK W WO CONTRAST CLINICAL INFORMATION: STROKE. Additional history obtained from the electronic medical record indicates the patient has facial numbness and facial droop. COMPARISON: None available. TECHNIQUE: 1 mm axial images were obtained through the head and neck after the fast bolus administration of contrast. A noncontrast localizer was obtained. 3-D reconstructions were performed on a dedicated 3-D workstation. These include multiplanar MPR images and multiplanar MIP images. Centerline reconstructions were obtained of the carotid systems. 80 mL Isovue-370 intravenous contrast was given. All CT scans at this facility use dose modulation, iterative reconstruction, and/or weight-based dosing when appropriate to reduce radiation dose to as low as reasonably achievable. FINDINGS: CTA NECK: Aortic arch and branches: Atherosclerotic calcification is present within the aortic arch. There is a common origin of the innominate and left common carotid arteries off the aortic arch. The origins of the great vessels off the arch are otherwise patent  without flow-limiting stenosis. Right common carotid artery/ICA: The right common carotid artery is tortuous and takes a medial, retropharyngeal course. The right carotid bifurcation and right cervical internal carotid artery are within normal limits. Left common carotid artery/ICA: Soft atherosclerotic plaque is present at the left carotid bifurcation resulting in less than 50% luminal narrowing by NASCET criteria.  The proximal left cervical internal carotid artery narrowing. No major branch occlusion, flow-limiting stenosis or aneurysm is otherwise identified intracranially. 2. No occlusion, flow-limiting stenosis, dissection or aneurysm is identified of the major cervical arterial structures. Soft atherosclerotic plaque is present at the left carotid bifurcation resulting in less than 50% luminal narrowing by NASCET criteria. 3. Age indeterminant fracture of the right proximal humerus. **This report has been created using voice recognition software. It may contain minor errors which are inherent in voice recognition technology. ** Final report electronically signed by Dr. Scott Pruitt on 10/1/2018 1:24 PM     Dup Lower Extremity Venous Bilateral    Result Date: 9/27/2018  PROCEDURE: Bilateral lower extremity venous duplex examination CLINICAL INFORMATION: bilateral venous doppler, COMPARISON: No prior study. TECHNIQUE: Grayscale, color-flow, and spectral waveform ultrasound images were obtained through the bilateral lower extremity venous structures. Augmentation and compression maneuvers were performed at multiple levels. FINDINGS: RIGHT SIDE: The common femoral vein demonstrates normal flow, augmentation and compressibility. The saphenofemoral junction appears patent and compressible. The greater saphenous vein demonstrates normal flow proximally. The superficial femoral and popliteal veins demonstrate normal flow, compressibility and augmentation. The deep veins of the calf appear patent including the gastrocnemius, posterior tibial, peroneal and anterior tibial veins. LEFT SIDE: The common femoral vein demonstrates normal flow, augmentation and compressibility. The saphenofemoral junction appears patent and compressible. The greater saphenous vein demonstrates normal flow proximally. The superficial femoral and popliteal veins demonstrate normal flow, compressibility and augmentation.  The deep veins of the calf appear patent including the gastrocnemius, posterior

## 2018-10-05 NOTE — PROGRESS NOTES
lb (45.4 kg)   · BMI Classification: BMI > or equal to 40.0 Obese Class III (42)  · Comparative Standards (Estimated Nutrition Needs):  · Estimated Daily Total Kcal: 8643-9374 kcal/day  · Estimated Daily Protein (g): 54+ g/day    Estimated Intake vs Estimated Needs: Intake Less Than Needs    Nutrition Risk Level: High (Pt is NPO)    Nutrition Interventions:   Start oral diet, Start ONS, Vitamin Supplement (Advance diet when medically feasible. Will start Keenan BID when diet is Clear Liquid or greater. Recommend starting a Multivitamin w/minerals daily)  Continued Inpatient Monitoring, Education not appropriate at this time    Nutrition Evaluation:   · Evaluation: Goals set   · Goals: Pt will receive adequate po intake within 1-4 days. · Monitoring: NPO Status, Diet Progression, Skin Integrity, Wound Healing, Ascites/Edema, Fluid Balance, Weight, Comparative Standards, Pertinent Labs, Constipation    See Adult Nutrition Doc Flowsheet for more detail.      Electronically signed by Zuleima Yen RD, NAY on 10/5/18 at 2:54 PM    Contact Number: [phone]

## 2018-10-06 ENCOUNTER — APPOINTMENT (OUTPATIENT)
Dept: GENERAL RADIOLOGY | Age: 78
DRG: 389 | End: 2018-10-06
Attending: INTERNAL MEDICINE
Payer: MEDICARE

## 2018-10-06 LAB
ANION GAP SERPL CALCULATED.3IONS-SCNC: 15 MEQ/L (ref 8–16)
BUN BLDV-MCNC: 33 MG/DL (ref 7–22)
CALCIUM SERPL-MCNC: 8.5 MG/DL (ref 8.5–10.5)
CHLORIDE BLD-SCNC: 100 MEQ/L (ref 98–111)
CO2: 23 MEQ/L (ref 23–33)
CREAT SERPL-MCNC: 1.2 MG/DL (ref 0.4–1.2)
ERYTHROCYTE [DISTWIDTH] IN BLOOD BY AUTOMATED COUNT: 19.6 % (ref 11.5–14.5)
ERYTHROCYTE [DISTWIDTH] IN BLOOD BY AUTOMATED COUNT: 57.8 FL (ref 35–45)
GFR SERPL CREATININE-BSD FRML MDRD: 44 ML/MIN/1.73M2
GLUCOSE BLD-MCNC: 108 MG/DL (ref 70–108)
GLUCOSE BLD-MCNC: 118 MG/DL (ref 70–108)
GLUCOSE BLD-MCNC: 118 MG/DL (ref 70–108)
GLUCOSE BLD-MCNC: 123 MG/DL (ref 70–108)
HCT VFR BLD CALC: 26.6 % (ref 37–47)
HEMOGLOBIN: 8.2 GM/DL (ref 12–16)
MCH RBC QN AUTO: 25.6 PG (ref 26–33)
MCHC RBC AUTO-ENTMCNC: 30.8 GM/DL (ref 32.2–35.5)
MCV RBC AUTO: 83.1 FL (ref 81–99)
PLATELET # BLD: 185 THOU/MM3 (ref 130–400)
PMV BLD AUTO: 9.9 FL (ref 9.4–12.4)
POTASSIUM SERPL-SCNC: 3.4 MEQ/L (ref 3.5–5.2)
RBC # BLD: 3.2 MILL/MM3 (ref 4.2–5.4)
SODIUM BLD-SCNC: 138 MEQ/L (ref 135–145)
WBC # BLD: 8.7 THOU/MM3 (ref 4.8–10.8)

## 2018-10-06 PROCEDURE — 2500000003 HC RX 250 WO HCPCS: Performed by: INTERNAL MEDICINE

## 2018-10-06 PROCEDURE — 99232 SBSQ HOSP IP/OBS MODERATE 35: CPT | Performed by: INTERNAL MEDICINE

## 2018-10-06 PROCEDURE — 6370000000 HC RX 637 (ALT 250 FOR IP): Performed by: INTERNAL MEDICINE

## 2018-10-06 PROCEDURE — 2580000003 HC RX 258: Performed by: FAMILY MEDICINE

## 2018-10-06 PROCEDURE — 6360000002 HC RX W HCPCS: Performed by: FAMILY MEDICINE

## 2018-10-06 PROCEDURE — 1200000000 HC SEMI PRIVATE

## 2018-10-06 PROCEDURE — 80048 BASIC METABOLIC PNL TOTAL CA: CPT

## 2018-10-06 PROCEDURE — 36415 COLL VENOUS BLD VENIPUNCTURE: CPT

## 2018-10-06 PROCEDURE — 6370000000 HC RX 637 (ALT 250 FOR IP): Performed by: NURSE PRACTITIONER

## 2018-10-06 PROCEDURE — 74018 RADEX ABDOMEN 1 VIEW: CPT

## 2018-10-06 PROCEDURE — 2709999900 HC NON-CHARGEABLE SUPPLY

## 2018-10-06 PROCEDURE — 85027 COMPLETE CBC AUTOMATED: CPT

## 2018-10-06 PROCEDURE — 82948 REAGENT STRIP/BLOOD GLUCOSE: CPT

## 2018-10-06 PROCEDURE — APPSS30 APP SPLIT SHARED TIME 16-30 MINUTES: Performed by: NURSE PRACTITIONER

## 2018-10-06 PROCEDURE — 99232 SBSQ HOSP IP/OBS MODERATE 35: CPT | Performed by: SURGERY

## 2018-10-06 RX ORDER — HYDROCORTISONE ACETATE 25 MG/1
25 SUPPOSITORY RECTAL 2 TIMES DAILY
Status: DISCONTINUED | OUTPATIENT
Start: 2018-10-06 | End: 2018-10-13 | Stop reason: HOSPADM

## 2018-10-06 RX ORDER — POTASSIUM CHLORIDE 20 MEQ/1
40 TABLET, EXTENDED RELEASE ORAL ONCE
Status: COMPLETED | OUTPATIENT
Start: 2018-10-06 | End: 2018-10-06

## 2018-10-06 RX ADMIN — SODIUM CHLORIDE: 9 INJECTION, SOLUTION INTRAVENOUS at 04:18

## 2018-10-06 RX ADMIN — CARBOXYMETHYLCELLULOSE SODIUM 1 DROP: 10 GEL OPHTHALMIC at 11:10

## 2018-10-06 RX ADMIN — FLUTICASONE PROPIONATE 2 SPRAY: 50 SPRAY, METERED NASAL at 20:40

## 2018-10-06 RX ADMIN — LINEZOLID 600 MG: 600 INJECTION, SOLUTION INTRAVENOUS at 23:44

## 2018-10-06 RX ADMIN — LINEZOLID 600 MG: 600 INJECTION, SOLUTION INTRAVENOUS at 12:17

## 2018-10-06 RX ADMIN — POTASSIUM CHLORIDE 40 MEQ: 20 TABLET, EXTENDED RELEASE ORAL at 23:46

## 2018-10-06 RX ADMIN — Medication 1 DROP: at 11:10

## 2018-10-06 RX ADMIN — ACETAMINOPHEN 650 MG: 650 SUPPOSITORY RECTAL at 20:41

## 2018-10-06 RX ADMIN — CEFEPIME HYDROCHLORIDE 2 G: 2 INJECTION, POWDER, FOR SOLUTION INTRAVENOUS at 11:03

## 2018-10-06 RX ADMIN — Medication 10 ML: at 20:42

## 2018-10-06 RX ADMIN — BUMETANIDE 2 MG: 0.25 INJECTION INTRAMUSCULAR; INTRAVENOUS at 14:03

## 2018-10-06 RX ADMIN — MUPIROCIN: 20 OINTMENT TOPICAL at 20:40

## 2018-10-06 RX ADMIN — ERYTHROMYCIN: 5 OINTMENT OPHTHALMIC at 20:40

## 2018-10-06 RX ADMIN — FLUTICASONE PROPIONATE 2 SPRAY: 50 SPRAY, METERED NASAL at 11:10

## 2018-10-06 RX ADMIN — SODIUM CHLORIDE: 9 INJECTION, SOLUTION INTRAVENOUS at 23:51

## 2018-10-06 RX ADMIN — Medication 1 DROP: at 15:44

## 2018-10-06 RX ADMIN — SODIUM CHLORIDE: 9 INJECTION, SOLUTION INTRAVENOUS at 15:41

## 2018-10-06 RX ADMIN — MUPIROCIN: 20 OINTMENT TOPICAL at 11:10

## 2018-10-06 RX ADMIN — Medication 1 DROP: at 20:40

## 2018-10-06 RX ADMIN — HYDROMORPHONE HYDROCHLORIDE 0.5 MG: 1 INJECTION, SOLUTION INTRAMUSCULAR; INTRAVENOUS; SUBCUTANEOUS at 23:44

## 2018-10-06 RX ADMIN — BUMETANIDE 2 MG: 0.25 INJECTION INTRAMUSCULAR; INTRAVENOUS at 23:44

## 2018-10-06 RX ADMIN — ENOXAPARIN SODIUM 40 MG: 40 INJECTION SUBCUTANEOUS at 20:41

## 2018-10-06 RX ADMIN — HYDROMORPHONE HYDROCHLORIDE 0.5 MG: 1 INJECTION, SOLUTION INTRAMUSCULAR; INTRAVENOUS; SUBCUTANEOUS at 19:04

## 2018-10-06 RX ADMIN — HYDROCORTISONE ACETATE 25 MG: 25 SUPPOSITORY RECTAL at 20:41

## 2018-10-06 RX ADMIN — ERYTHROMYCIN: 5 OINTMENT OPHTHALMIC at 11:10

## 2018-10-06 RX ADMIN — CEFEPIME HYDROCHLORIDE 2 G: 2 INJECTION, POWDER, FOR SOLUTION INTRAVENOUS at 21:04

## 2018-10-06 RX ADMIN — CARBOXYMETHYLCELLULOSE SODIUM 1 DROP: 10 GEL OPHTHALMIC at 20:40

## 2018-10-06 ASSESSMENT — PAIN SCALES - GENERAL
PAINLEVEL_OUTOF10: 8
PAINLEVEL_OUTOF10: 10
PAINLEVEL_OUTOF10: 8
PAINLEVEL_OUTOF10: 10
PAINLEVEL_OUTOF10: 8
PAINLEVEL_OUTOF10: 0

## 2018-10-06 ASSESSMENT — PAIN DESCRIPTION - PAIN TYPE
TYPE: ACUTE PAIN
TYPE: ACUTE PAIN

## 2018-10-06 ASSESSMENT — PAIN DESCRIPTION - LOCATION
LOCATION: HIP
LOCATION: ABDOMEN

## 2018-10-06 ASSESSMENT — PAIN DESCRIPTION - ORIENTATION
ORIENTATION: LEFT
ORIENTATION: RIGHT;LEFT;ANTERIOR;UPPER

## 2018-10-06 ASSESSMENT — PAIN DESCRIPTION - DESCRIPTORS: DESCRIPTORS: SORE

## 2018-10-06 NOTE — PROGRESS NOTES
[place] notified of patient having soft formed stools and unable to send for C-diff. New order received to discontinue order.

## 2018-10-06 NOTE — PROGRESS NOTES
10/05/18   0610  10/06/18   0632   NA  134*  136  138   K  3.8  3.7  3.4*   CL  88*  93*  100   CO2  32  34*  23   BUN  43*  45*  33*   CREATININE  1.6*  1.7*  1.2   CALCIUM  9.8  9.4  8.5     Recent Labs      10/04/18   1515   AST  15   ALT  12   BILITOT  0.2*   ALKPHOS  82     No results for input(s): INR in the last 72 hours. No results for input(s): Lord Fitzgerald in the last 72 hours. Urinalysis:      Lab Results   Component Value Date    NITRU NEGATIVE 10/04/2018    WBCUA 2-4 10/04/2018    BACTERIA NONE 10/04/2018    RBCUA 3-5 10/04/2018    BLOODU NEGATIVE 10/04/2018    SPECGRAV 1.011 09/28/2018    GLUCOSEU NEGATIVE 10/04/2018       Radiology:     CXR: I have reviewed the CXR with the following interpretation:   EKG:  I have reviewed the EKG with the following interpretation:     Ct Abdomen Pelvis Wo Contrast Additional Contrast? None    Result Date: 10/4/2018  PROCEDURE: CT ABDOMEN PELVIS WO CONTRAST CLINICAL INFORMATION: abd pain R/O obstruction . COMPARISON: 24 August 2012 TECHNIQUE: Noncontrast images of the abdomen and pelvis with multiplanar reconstructions All CT scans at this facility use dose modulation, iterative reconstruction, and/or weight-based dosing when appropriate to reduce radiation dose to as low as reasonably achievable. FINDINGS: Lung bases Degradation of the images due to respiratory motion. Calcified granuloma right lung base elevated right hemidiaphragm. Coronary artery calcifications. Abdomen pelvis Solid organ evaluation limited without IV contrast. There is fluid distention of the stomach and multiple fluid dilated small bowel loops. Transition is difficult to determine there are multiple decompressed small bowel loops in the right and mid abdomen. A believe the transition is at the level of the hernia mesh in the midline of the lower abdomen within the patient's panniculus. There is gas to the distal colon and rectum left colon does appear to be somewhat decompressed.

## 2018-10-06 NOTE — PLAN OF CARE
Problem: Falls - Risk of:  Goal: Will remain free from falls  Will remain free from falls   Outcome: Ongoing  No falls this shift. Pt using call light appropriately to call for assistance with ambulation to the bathroom and to chair. Pt uses own shoes for ambulation. Pt reports understanding of fall prevention when discussed. Goal: Absence of physical injury  Absence of physical injury   Outcome: Ongoing  No physical injury this shift. Will continue to monitor. Problem: Risk for Impaired Skin Integrity  Goal: Tissue integrity - skin and mucous membranes  Structural intactness and normal physiological function of skin and  mucous membranes. Outcome: Ongoing  Patient with stage 2 pressure ulcer on buttocks. Pink suave cream applied as needed. No other new skin impairments noted. Pt understands the importance of frequent repositioning in order to prevent any skin breakdown. Problem: Pain:  Goal: Pain level will decrease  Pain level will decrease    Outcome: Ongoing  Pt report pain at 0 on scale. Pt states oral/iv/im medication helping to achieve pain goal of a 0 on scale. Problem: Skin Integrity/Risk  Goal: No skin breakdown during hospitalization  Outcome: Ongoing  Patient with stage 2 pressure ulcer on buttocks. Pink suave cream applied as needed. No other new skin impairments noted. Pt understands the importance of frequent repositioning in order to prevent any skin breakdown. Goal: Wound healing  Outcome: Ongoing  Patient with stage 2 pressure ulcer on buttocks. Pink suave cream applied as needed. No other new skin impairments noted. Pt understands the importance of frequent repositioning in order to prevent any skin breakdown. Problem: Spiritual  Goal: Coping methods/spiritual issues explored  Outcome: Ongoing  Patient voices appropriate coping methods. Problem: Nutrition  Goal: Optimal nutrition therapy  Outcome: Ongoing  Patient tolerating diet well.      Problem: Cardiovascular  Goal: No

## 2018-10-06 NOTE — PROGRESS NOTES
(24h): Resp  Av.5  Min: 14  Max: 18  Current Pulse Ox (24h):  SpO2: 98 %  Pulse Ox Range (24h):  SpO2  Av.3 %  Min: 96 %  Max: 98 %  Oxygen Amount and Delivery:    Incentive Spirometry Tx:            GENERAL: alert, cooperative, no distress, up in chair, elderly   SKIN: Skin color, texture, turgor normal. Chronic lower leg wounds. HEENT: Head is normocephalic, atraumatic. NECK: Supple, symmetrical, trachea midline, no adenopathy,  LUNGS: lungs diminished  HEART: normal rate and regular rhythm  ABDOMEN: soft, LLQ tender - very mild, non-distended, bowel sounds present   NEUROLOGIC: There are no focalizing motor or sensory deficits. CN II-XII are grossly intact. EXTREMITIES: no cyanosis, no clubbing. Lower legs with wraps bilaterally. In: 1578.3 [I.V.:1578.3]  Out: -     Date 10/06/18 0000 - 10/06/18 2359   Shift 4540-6668 5940-9293 1799-7025 24 Hour Total   I  N  T  A  K  E   P. O. 0   0    I.V. 848   848    Shift Total 848   848   O  U  T  P  U  T   Shift Total       Weight (kg)         LABS     Recent Labs      10/04/18   1515  10/05/18   0610  10/06/18   0632   WBC  8.4   --   8.7   HGB  8.2*   --   8.2*   HCT  25.6*   --   26.6*   PLT  194   --   185   NA  134*  136  138   K  3.8  3.7  3.4*   CL  88*  93*  100   CO2  32  34*  23   BUN  43*  45*  33*   CREATININE  1.6*  1.7*  1.2   CALCIUM  9.8  9.4  8.5      No results for input(s): PTT, INR in the last 72 hours. Invalid input(s): PT  Recent Labs      10/04/18   1515   AST  15   ALT  12   BILITOT  0.2*   LIPASE  27.2     RADIOLOGY     PROCEDURE: XR CHEST (2 VW)       CLINICAL INFORMATION: 81-year-old female with shortness of breath. Tube placement.       COMPARISON: Chest x-ray 2018.       TECHNIQUE: PA and lateral views the chest.       FINDINGS:    There has been interval placement of a gastric tube which is seen coiled upon itself within the esophagus.  The distal portion of the tube courses superiorly in the esophagus terminating in the where available were reviewed. I discussed patient concerns with the patient's nurse and instructions were given. Please see our orders for the updated patient care plan. - NG tube unable to be placed. Seems to be doing well without it today. Await KUB today. Patient has had multiple bowel movements. Passing flatus. Still having some left-sided abdominal pain. Start liquid diet. DVT prophylaxis. Serial abdominal examinations. Replace electrolytes as needed per protocol. Check stool studies due to multiple bowel movements. Abdomen benign. Continue conservative treatment plan at this time. If worsens with diet may still need explored but discussed with patient that this will be a large operation given current status and history of multiple abdominal surgeries including hernia repair with mesh insertion. Patient in agreement.     Electronically signed by Kimberlyn Chery MD on 10/6/2018 at 9:31 AM

## 2018-10-07 ENCOUNTER — APPOINTMENT (OUTPATIENT)
Dept: CT IMAGING | Age: 78
DRG: 389 | End: 2018-10-07
Attending: INTERNAL MEDICINE
Payer: MEDICARE

## 2018-10-07 LAB
GLUCOSE BLD-MCNC: 127 MG/DL (ref 70–108)
GLUCOSE BLD-MCNC: 134 MG/DL (ref 70–108)
GLUCOSE BLD-MCNC: 139 MG/DL (ref 70–108)
GLUCOSE BLD-MCNC: 143 MG/DL (ref 70–108)
MAGNESIUM: 1.6 MG/DL (ref 1.6–2.4)
MAGNESIUM: 1.7 MG/DL (ref 1.6–2.4)
POTASSIUM SERPL-SCNC: 3.4 MEQ/L (ref 3.5–5.2)

## 2018-10-07 PROCEDURE — 36592 COLLECT BLOOD FROM PICC: CPT

## 2018-10-07 PROCEDURE — APPSS30 APP SPLIT SHARED TIME 16-30 MINUTES: Performed by: NURSE PRACTITIONER

## 2018-10-07 PROCEDURE — 99233 SBSQ HOSP IP/OBS HIGH 50: CPT | Performed by: INTERNAL MEDICINE

## 2018-10-07 PROCEDURE — 6360000004 HC RX CONTRAST MEDICATION: Performed by: NURSE PRACTITIONER

## 2018-10-07 PROCEDURE — 36415 COLL VENOUS BLD VENIPUNCTURE: CPT

## 2018-10-07 PROCEDURE — 99232 SBSQ HOSP IP/OBS MODERATE 35: CPT | Performed by: SURGERY

## 2018-10-07 PROCEDURE — 6360000002 HC RX W HCPCS: Performed by: NURSE PRACTITIONER

## 2018-10-07 PROCEDURE — 84132 ASSAY OF SERUM POTASSIUM: CPT

## 2018-10-07 PROCEDURE — 83735 ASSAY OF MAGNESIUM: CPT

## 2018-10-07 PROCEDURE — 2580000003 HC RX 258: Performed by: FAMILY MEDICINE

## 2018-10-07 PROCEDURE — 6360000002 HC RX W HCPCS: Performed by: FAMILY MEDICINE

## 2018-10-07 PROCEDURE — 6370000000 HC RX 637 (ALT 250 FOR IP): Performed by: NURSE PRACTITIONER

## 2018-10-07 PROCEDURE — 1200000000 HC SEMI PRIVATE

## 2018-10-07 PROCEDURE — 6370000000 HC RX 637 (ALT 250 FOR IP): Performed by: INTERNAL MEDICINE

## 2018-10-07 PROCEDURE — 74177 CT ABD & PELVIS W/CONTRAST: CPT

## 2018-10-07 PROCEDURE — 82948 REAGENT STRIP/BLOOD GLUCOSE: CPT

## 2018-10-07 PROCEDURE — 2709999900 HC NON-CHARGEABLE SUPPLY

## 2018-10-07 RX ORDER — BUMETANIDE 1 MG/1
2 TABLET ORAL DAILY
Status: DISCONTINUED | OUTPATIENT
Start: 2018-10-07 | End: 2018-10-13 | Stop reason: HOSPADM

## 2018-10-07 RX ORDER — ACETAMINOPHEN 325 MG/1
650 TABLET ORAL EVERY 4 HOURS PRN
Status: DISCONTINUED | OUTPATIENT
Start: 2018-10-07 | End: 2018-10-13 | Stop reason: HOSPADM

## 2018-10-07 RX ORDER — POTASSIUM CHLORIDE 20 MEQ/1
40 TABLET, EXTENDED RELEASE ORAL ONCE
Status: COMPLETED | OUTPATIENT
Start: 2018-10-07 | End: 2018-10-07

## 2018-10-07 RX ADMIN — Medication 1 DROP: at 14:30

## 2018-10-07 RX ADMIN — HYDROMORPHONE HYDROCHLORIDE 0.5 MG: 1 INJECTION, SOLUTION INTRAMUSCULAR; INTRAVENOUS; SUBCUTANEOUS at 14:26

## 2018-10-07 RX ADMIN — LINEZOLID 600 MG: 600 INJECTION, SOLUTION INTRAVENOUS at 11:53

## 2018-10-07 RX ADMIN — HYDROMORPHONE HYDROCHLORIDE 0.5 MG: 1 INJECTION, SOLUTION INTRAMUSCULAR; INTRAVENOUS; SUBCUTANEOUS at 21:58

## 2018-10-07 RX ADMIN — BUMETANIDE 2 MG: 1 TABLET ORAL at 14:30

## 2018-10-07 RX ADMIN — LINEZOLID 600 MG: 600 INJECTION, SOLUTION INTRAVENOUS at 21:53

## 2018-10-07 RX ADMIN — MUPIROCIN: 20 OINTMENT TOPICAL at 21:24

## 2018-10-07 RX ADMIN — Medication 10 ML: at 21:23

## 2018-10-07 RX ADMIN — ERYTHROMYCIN: 5 OINTMENT OPHTHALMIC at 08:09

## 2018-10-07 RX ADMIN — Medication 1 DROP: at 08:09

## 2018-10-07 RX ADMIN — FLUTICASONE PROPIONATE 2 SPRAY: 50 SPRAY, METERED NASAL at 08:09

## 2018-10-07 RX ADMIN — HYDROCORTISONE ACETATE 25 MG: 25 SUPPOSITORY RECTAL at 21:23

## 2018-10-07 RX ADMIN — IOPAMIDOL 100 ML: 755 INJECTION, SOLUTION INTRAVENOUS at 11:04

## 2018-10-07 RX ADMIN — ACETAMINOPHEN 650 MG: 650 SUPPOSITORY RECTAL at 06:44

## 2018-10-07 RX ADMIN — SODIUM CHLORIDE: 9 INJECTION, SOLUTION INTRAVENOUS at 21:21

## 2018-10-07 RX ADMIN — Medication 1 DROP: at 21:21

## 2018-10-07 RX ADMIN — POTASSIUM CHLORIDE 40 MEQ: 20 TABLET, EXTENDED RELEASE ORAL at 08:44

## 2018-10-07 RX ADMIN — CEFEPIME HYDROCHLORIDE 2 G: 2 INJECTION, POWDER, FOR SOLUTION INTRAVENOUS at 21:40

## 2018-10-07 RX ADMIN — ONDANSETRON 4 MG: 2 INJECTION INTRAMUSCULAR; INTRAVENOUS at 00:49

## 2018-10-07 RX ADMIN — MUPIROCIN: 20 OINTMENT TOPICAL at 08:09

## 2018-10-07 RX ADMIN — FLUTICASONE PROPIONATE 2 SPRAY: 50 SPRAY, METERED NASAL at 21:21

## 2018-10-07 RX ADMIN — ERYTHROMYCIN: 5 OINTMENT OPHTHALMIC at 21:21

## 2018-10-07 RX ADMIN — CARBOXYMETHYLCELLULOSE SODIUM 1 DROP: 10 GEL OPHTHALMIC at 21:21

## 2018-10-07 RX ADMIN — ENOXAPARIN SODIUM 40 MG: 40 INJECTION SUBCUTANEOUS at 21:21

## 2018-10-07 RX ADMIN — Medication 10 ML: at 08:45

## 2018-10-07 RX ADMIN — SODIUM CHLORIDE: 9 INJECTION, SOLUTION INTRAVENOUS at 11:53

## 2018-10-07 RX ADMIN — CEFEPIME HYDROCHLORIDE 2 G: 2 INJECTION, POWDER, FOR SOLUTION INTRAVENOUS at 08:47

## 2018-10-07 RX ADMIN — ONDANSETRON 4 MG: 4 TABLET, ORALLY DISINTEGRATING ORAL at 06:44

## 2018-10-07 RX ADMIN — CARBOXYMETHYLCELLULOSE SODIUM 1 DROP: 10 GEL OPHTHALMIC at 08:09

## 2018-10-07 RX ADMIN — ONDANSETRON 4 MG: 2 INJECTION INTRAMUSCULAR; INTRAVENOUS at 21:21

## 2018-10-07 RX ADMIN — HYDROMORPHONE HYDROCHLORIDE 0.5 MG: 1 INJECTION, SOLUTION INTRAMUSCULAR; INTRAVENOUS; SUBCUTANEOUS at 19:01

## 2018-10-07 ASSESSMENT — PAIN DESCRIPTION - LOCATION
LOCATION: ABDOMEN

## 2018-10-07 ASSESSMENT — PAIN SCALES - GENERAL
PAINLEVEL_OUTOF10: 7
PAINLEVEL_OUTOF10: 7
PAINLEVEL_OUTOF10: 10
PAINLEVEL_OUTOF10: 8
PAINLEVEL_OUTOF10: 7
PAINLEVEL_OUTOF10: 8
PAINLEVEL_OUTOF10: 10
PAINLEVEL_OUTOF10: 8
PAINLEVEL_OUTOF10: 10
PAINLEVEL_OUTOF10: 8

## 2018-10-07 ASSESSMENT — PAIN DESCRIPTION - PAIN TYPE
TYPE: ACUTE PAIN

## 2018-10-07 ASSESSMENT — PAIN DESCRIPTION - DESCRIPTORS: DESCRIPTORS: SORE

## 2018-10-07 NOTE — PROGRESS NOTES
results. SUBJECTIVE   Chief complaint: Epigastric/diffuse tenderness    Patient was stable overnight. Chart reviewed. Updated by nursing staff. Did okay with clear liquids. Had increase in epigastric/diffuse abdominal pain. Feels more bloated today. Describes pain as sharp, intermittent. Denies chest discomfort or dyspnea. Minimal nausea. No emesis. (+) belching, flatus and multiple BMs - semiformed. Tolerating DIET CLEAR LIQUID; diet. Pain controlled with analgesia. Bilateral lower legs wrapped. CURRENT MEDICATIONS   Scheduled Meds:   potassium replacement protocol   Other RX Placeholder    hydrocortisone  25 mg Rectal BID    bumetanide  2 mg Intravenous Q12H    cefepime  2 g Intravenous Q12H    enoxaparin  40 mg Subcutaneous Q24H    erythromycin   Both Eyes BID    fluticasone  2 spray Nasal BID    linezolid  600 mg Intravenous Q12H    [START ON 2019] magnesium replacement protocol   Other RX Placeholder    mupirocin   Nasal BID    sodium chloride flush  10 mL Intravenous 2 times per day    tetrahydrozoline  1 drop Both Eyes TID    carboxymethylcellulose  1 drop Both Eyes Q12H    insulin lispro  0-6 Units Subcutaneous TID WC    insulin lispro  0-3 Units Subcutaneous Nightly     Continuous Infusions:   dextrose      sodium chloride 100 mL/hr at 10/06/18 2351     PRN Meds:.hydrALAZINE, acetaminophen, zinc oxide, sodium chloride, sodium chloride flush, glucose, dextrose, glucagon (rDNA), dextrose, ondansetron, ondansetron  OBJECTIVE   CURRENT VITALS:  height is 5' (1.524 m). Her oral temperature is 97.8 °F (36.6 °C). Her blood pressure is 156/71 (abnormal) and her pulse is 79. Her respiration is 18 and oxygen saturation is 98%.    Temperature Range (24h):Temp: 97.8 °F (36.6 °C) Temp  Av.5 °F (36.4 °C)  Min: 95.4 °F (35.2 °C)  Max: 98.6 °F (37 °C)  BP Range (52E): Systolic (60WEL), COK:684 , Min:120 , DIOGENES:577     Diastolic (08XNC), RSK:98, Min:60, Max:71    Pulse Range (24h): Pulse  Avg:

## 2018-10-07 NOTE — PROGRESS NOTES
Progress      Patient: Tammy Race  YOB: 1940    MRN: [id number]     Acct: [de-identified]    PCP: Kayla Whitten MD    Date of Admission: 10/4/2018    Date of Service: Pt seen/examined on 10/7/2018 and Admitted to Inpatient with expected LOS greater than two midnights due to medical therapy. Chief Complaint:  No chief complaint on file. History Of Present Illness:      69 y.o. female who presented to [facility] with \" transitional care unit on 9/30/2018 for continuation of IV ATB following the acute hospital stay for cellulitis of the lower legs. She was showing some progress with therapies however in the past day began with abd distension and vomiting. Labs and plain abd xrays were unremarkable for her but today with the continuation of symptoms she was made NPO except meds and a CT of the ABD/Pelvis was ordered. The radiologist called me to discuss the likely SBO with a transition point near the ABD wall mesh. I spoke with Dr Harley Hyman who kindly accepted the [facility]. \"-per TCU note and confirmed by myself. Addendum: I recommeded surgery be notified, but they were in [address] [place] [address].  I elected to admit to accept the tranfer and consult surgery    ssubjective-  Abdominal pain 5/10 crampy  Reviewed CT enterography  Will need surgery    Scheduled Meds:   bumetanide  2 mg Oral Daily    potassium replacement protocol   Other RX Placeholder    magnesium replacement protocol   Other RX Placeholder    hydrocortisone  25 mg Rectal BID    cefepime  2 g Intravenous Q12H    enoxaparin  40 mg Subcutaneous Q24H    erythromycin   Both Eyes BID    fluticasone  2 spray Nasal BID    linezolid  600 mg Intravenous Q12H    mupirocin   Nasal BID    sodium chloride flush  10 mL Intravenous 2 times per day    tetrahydrozoline  1 drop Both Eyes TID    carboxymethylcellulose  1 drop Both Eyes Q12H    insulin lispro  0-6 Units Subcutaneous TID WC    insulin lispro  0-3 Units

## 2018-10-08 LAB
ANION GAP SERPL CALCULATED.3IONS-SCNC: 13 MEQ/L (ref 8–16)
BUN BLDV-MCNC: 14 MG/DL (ref 7–22)
CALCIUM SERPL-MCNC: 7.9 MG/DL (ref 8.5–10.5)
CHLORIDE BLD-SCNC: 101 MEQ/L (ref 98–111)
CO2: 22 MEQ/L (ref 23–33)
CREAT SERPL-MCNC: 0.8 MG/DL (ref 0.4–1.2)
GFR SERPL CREATININE-BSD FRML MDRD: 70 ML/MIN/1.73M2
GLUCOSE BLD-MCNC: 124 MG/DL (ref 70–108)
GLUCOSE BLD-MCNC: 128 MG/DL (ref 70–108)
GLUCOSE BLD-MCNC: 135 MG/DL (ref 70–108)
GLUCOSE BLD-MCNC: 137 MG/DL (ref 70–108)
GLUCOSE BLD-MCNC: 148 MG/DL (ref 70–108)
LV EF: 60 %
LVEF MODALITY: NORMAL
MAGNESIUM: 1.5 MG/DL (ref 1.6–2.4)
POTASSIUM SERPL-SCNC: 3.4 MEQ/L (ref 3.5–5.2)
SODIUM BLD-SCNC: 136 MEQ/L (ref 135–145)

## 2018-10-08 PROCEDURE — 6360000002 HC RX W HCPCS: Performed by: FAMILY MEDICINE

## 2018-10-08 PROCEDURE — A9500 TC99M SESTAMIBI: HCPCS | Performed by: INTERNAL MEDICINE

## 2018-10-08 PROCEDURE — 2709999900 HC NON-CHARGEABLE SUPPLY

## 2018-10-08 PROCEDURE — 6360000002 HC RX W HCPCS: Performed by: HOSPITALIST

## 2018-10-08 PROCEDURE — 93017 CV STRESS TEST TRACING ONLY: CPT | Performed by: INTERNAL MEDICINE

## 2018-10-08 PROCEDURE — 6360000002 HC RX W HCPCS: Performed by: NURSE PRACTITIONER

## 2018-10-08 PROCEDURE — 80048 BASIC METABOLIC PNL TOTAL CA: CPT

## 2018-10-08 PROCEDURE — 82948 REAGENT STRIP/BLOOD GLUCOSE: CPT

## 2018-10-08 PROCEDURE — 99232 SBSQ HOSP IP/OBS MODERATE 35: CPT | Performed by: SURGERY

## 2018-10-08 PROCEDURE — 36592 COLLECT BLOOD FROM PICC: CPT

## 2018-10-08 PROCEDURE — 2580000003 HC RX 258: Performed by: FAMILY MEDICINE

## 2018-10-08 PROCEDURE — 6370000000 HC RX 637 (ALT 250 FOR IP): Performed by: INTERNAL MEDICINE

## 2018-10-08 PROCEDURE — APPSS30 APP SPLIT SHARED TIME 16-30 MINUTES: Performed by: NURSE PRACTITIONER

## 2018-10-08 PROCEDURE — 36415 COLL VENOUS BLD VENIPUNCTURE: CPT

## 2018-10-08 PROCEDURE — 78452 HT MUSCLE IMAGE SPECT MULT: CPT

## 2018-10-08 PROCEDURE — 99232 SBSQ HOSP IP/OBS MODERATE 35: CPT | Performed by: INTERNAL MEDICINE

## 2018-10-08 PROCEDURE — 1200000000 HC SEMI PRIVATE

## 2018-10-08 PROCEDURE — 6360000002 HC RX W HCPCS

## 2018-10-08 PROCEDURE — 93306 TTE W/DOPPLER COMPLETE: CPT

## 2018-10-08 PROCEDURE — 83735 ASSAY OF MAGNESIUM: CPT

## 2018-10-08 PROCEDURE — 3430000000 HC RX DIAGNOSTIC RADIOPHARMACEUTICAL: Performed by: INTERNAL MEDICINE

## 2018-10-08 RX ORDER — ONDANSETRON 2 MG/ML
4 INJECTION INTRAMUSCULAR; INTRAVENOUS EVERY 6 HOURS PRN
Status: DISCONTINUED | OUTPATIENT
Start: 2018-10-08 | End: 2018-10-13 | Stop reason: HOSPADM

## 2018-10-08 RX ADMIN — ENOXAPARIN SODIUM 40 MG: 40 INJECTION SUBCUTANEOUS at 22:51

## 2018-10-08 RX ADMIN — Medication 1 DROP: at 08:53

## 2018-10-08 RX ADMIN — CARBOXYMETHYLCELLULOSE SODIUM 1 DROP: 10 GEL OPHTHALMIC at 08:53

## 2018-10-08 RX ADMIN — HYDROMORPHONE HYDROCHLORIDE 0.5 MG: 1 INJECTION, SOLUTION INTRAMUSCULAR; INTRAVENOUS; SUBCUTANEOUS at 17:14

## 2018-10-08 RX ADMIN — ONDANSETRON 4 MG: 2 INJECTION INTRAMUSCULAR; INTRAVENOUS at 19:19

## 2018-10-08 RX ADMIN — Medication 9.7 MILLICURIE: at 14:50

## 2018-10-08 RX ADMIN — LINEZOLID 600 MG: 600 INJECTION, SOLUTION INTRAVENOUS at 23:20

## 2018-10-08 RX ADMIN — PROCHLORPERAZINE EDISYLATE 5 MG: 5 INJECTION INTRAMUSCULAR; INTRAVENOUS at 22:51

## 2018-10-08 RX ADMIN — HYDROCORTISONE ACETATE 25 MG: 25 SUPPOSITORY RECTAL at 22:50

## 2018-10-08 RX ADMIN — ERYTHROMYCIN: 5 OINTMENT OPHTHALMIC at 22:51

## 2018-10-08 RX ADMIN — Medication 1 DROP: at 22:51

## 2018-10-08 RX ADMIN — FLUTICASONE PROPIONATE 2 SPRAY: 50 SPRAY, METERED NASAL at 08:53

## 2018-10-08 RX ADMIN — CARBOXYMETHYLCELLULOSE SODIUM 1 DROP: 10 GEL OPHTHALMIC at 22:51

## 2018-10-08 RX ADMIN — ONDANSETRON 4 MG: 2 INJECTION INTRAMUSCULAR; INTRAVENOUS at 06:30

## 2018-10-08 RX ADMIN — BUMETANIDE 2 MG: 1 TABLET ORAL at 08:53

## 2018-10-08 RX ADMIN — HYDROMORPHONE HYDROCHLORIDE 0.5 MG: 1 INJECTION, SOLUTION INTRAMUSCULAR; INTRAVENOUS; SUBCUTANEOUS at 22:50

## 2018-10-08 RX ADMIN — HYDROCORTISONE ACETATE 25 MG: 25 SUPPOSITORY RECTAL at 08:53

## 2018-10-08 RX ADMIN — FLUTICASONE PROPIONATE 2 SPRAY: 50 SPRAY, METERED NASAL at 22:51

## 2018-10-08 RX ADMIN — MUPIROCIN: 20 OINTMENT TOPICAL at 22:51

## 2018-10-08 RX ADMIN — HYDROMORPHONE HYDROCHLORIDE 0.5 MG: 1 INJECTION, SOLUTION INTRAMUSCULAR; INTRAVENOUS; SUBCUTANEOUS at 12:06

## 2018-10-08 RX ADMIN — CEFEPIME HYDROCHLORIDE 2 G: 2 INJECTION, POWDER, FOR SOLUTION INTRAVENOUS at 08:54

## 2018-10-08 RX ADMIN — ERYTHROMYCIN: 5 OINTMENT OPHTHALMIC at 08:57

## 2018-10-08 RX ADMIN — HYDROMORPHONE HYDROCHLORIDE 0.5 MG: 1 INJECTION, SOLUTION INTRAMUSCULAR; INTRAVENOUS; SUBCUTANEOUS at 06:30

## 2018-10-08 RX ADMIN — MUPIROCIN: 20 OINTMENT TOPICAL at 08:53

## 2018-10-08 RX ADMIN — Medication 31 MILLICURIE: at 16:05

## 2018-10-08 RX ADMIN — Medication 10 ML: at 22:56

## 2018-10-08 RX ADMIN — LINEZOLID 600 MG: 600 INJECTION, SOLUTION INTRAVENOUS at 12:06

## 2018-10-08 RX ADMIN — CEFEPIME HYDROCHLORIDE 2 G: 2 INJECTION, POWDER, FOR SOLUTION INTRAVENOUS at 22:55

## 2018-10-08 RX ADMIN — ONDANSETRON 4 MG: 2 INJECTION INTRAMUSCULAR; INTRAVENOUS at 12:19

## 2018-10-08 ASSESSMENT — PAIN DESCRIPTION - DESCRIPTORS
DESCRIPTORS: SORE
DESCRIPTORS: SORE;BURNING

## 2018-10-08 ASSESSMENT — PAIN SCALES - GENERAL
PAINLEVEL_OUTOF10: 7
PAINLEVEL_OUTOF10: 7
PAINLEVEL_OUTOF10: 8
PAINLEVEL_OUTOF10: 8
PAINLEVEL_OUTOF10: 5
PAINLEVEL_OUTOF10: 6
PAINLEVEL_OUTOF10: 3
PAINLEVEL_OUTOF10: 6

## 2018-10-08 ASSESSMENT — PAIN DESCRIPTION - ONSET: ONSET: ON-GOING

## 2018-10-08 ASSESSMENT — PAIN DESCRIPTION - PAIN TYPE
TYPE: ACUTE PAIN

## 2018-10-08 ASSESSMENT — PAIN DESCRIPTION - LOCATION
LOCATION: BUTTOCKS
LOCATION: ABDOMEN
LOCATION: ABDOMEN

## 2018-10-08 ASSESSMENT — PAIN DESCRIPTION - PROGRESSION: CLINICAL_PROGRESSION: NOT CHANGED

## 2018-10-08 ASSESSMENT — PAIN DESCRIPTION - ORIENTATION
ORIENTATION: POSTERIOR;INNER
ORIENTATION: LEFT;ANTERIOR

## 2018-10-08 ASSESSMENT — PAIN DESCRIPTION - FREQUENCY: FREQUENCY: CONTINUOUS

## 2018-10-08 ASSESSMENT — PAIN DESCRIPTION - DIRECTION: RADIATING_TOWARDS: BACK

## 2018-10-08 NOTE — CONSULTS
Brother     Other Brother         tremor    Heart Attack Brother     No Known Problems Brother     Heart Disease Brother     No Known Problems Brother     No Known Problems Brother         Review of systems:   General : no fever chills or weight loss   Eyes: No cataract, glaucoma or loss of vision  ENT: No sinus infection, or vocal hoarseness   Cardiovascular: + hypertension, + hyperlipidemia    Respiratory: No asthma, emphysema or chronic bronchitis      GI: small bowel obstruction, possible ileocecal mass, GERD    : + chronic kidney disease   GYN: No abnormal vaginal bleeding, no history of STD. Musculoskeletal: No painful or swollen joints. No arthritis. Skin: no rashes, tattoos or skin cancer  Endocrine: DM type II   Neurologic: no frequent headaches, migraines, or seizure disorder  Emotional: No anxiety, depression or suicidal thoughts. Blood: + anemia, iron deficiency   Allergic/Immunologic: No lupus or HIV  Cancer: no personal history of cancer     Physical exam Reveals   Vitals BP (!) 149/75   Pulse 82   Temp 96 °F (35.6 °C) (Oral)   Resp 16   Ht 5' (1.524 m)   LMP  (LMP Unknown)   SpO2 99%   BMI 40.23 kg/m²   HEENT: head is normocephalic atraumatic. Conjunctiva are pink. Mucous membranes   moist.   Neck: supple w/o thyromegaly or lymphadenopathy. Trachea is midline. No JVD or   carotid bruits ascultated. Heart: regular rate and rhythm w/o murmur rub or gallop   Chest: lungs are clear to ascultation. AP diameter is normal   Back: no scoliosis or kyphosis. No CVA tenderness  Abdomen: soft + genera;ized tender to plapation. Normal active bowel sounds heard all   quadrants. No organomegaly or masses noted. Extremities: warm. No clubbing cyanosis or peripheral edema  noted. Skin: no rashes or ulcerations   Neurologic: patient is alert and fully oriented with appropriate affect.      DATA REVIEW: WBC:    Recent Labs      10/06/18   0632   WBC  8.7   PLT  185   HGB  8.2*   HCT  26.6*

## 2018-10-08 NOTE — PLAN OF CARE
Problem: Risk for Impaired Skin Integrity  Goal: Tissue integrity - skin and mucous membranes  Structural intactness and normal physiological function of skin and  mucous membranes. Outcome: Ongoing  pinvax applied to bottom    Problem: Pain:  Goal: Pain level will decrease  Pain level will decrease    Outcome: Ongoing  Heating pad applied to help with pain. Problem: Skin Integrity/Risk  Goal: Wound healing  Outcome: Ongoing  pinvaxz applied to patient bottom    Comments: ,Care plan reviewed with patient . Patient  verbalize understanding of the plan of care and contribute to goal setting.

## 2018-10-08 NOTE — PROGRESS NOTES
Progress      Patient: Ya Waggoner  YOB: 1940    MRN: [id number]     Acct: [de-identified]    PCP: Lincoln Machuca MD    Date of Admission: 10/4/2018    Date of Service: Pt seen/examined on 10/8/2018 and Admitted to Inpatient with expected LOS greater than two midnights due to medical therapy. Chief Complaint:  No chief complaint on file. History Of Present Illness:      69 y.o. female who presented to [facility] with \" transitional care unit on 9/30/2018 for continuation of IV ATB following the acute hospital stay for cellulitis of the lower legs. She was showing some progress with therapies however in the past day began with abd distension and vomiting. Labs and plain abd xrays were unremarkable for her but today with the continuation of symptoms she was made NPO except meds and a CT of the ABD/Pelvis was ordered. The radiologist called me to discuss the likely SBO with a transition point near the ABD wall mesh. I spoke with Dr Jason Sanabria who kindly accepted the [facility]. \"-per TCU note and confirmed by myself. Addendum: I recommeded surgery be notified, but they were in [address] [place] [address].  I elected to admit to accept the tranfer and consult surgery    ssubjective-  Abdominal pain 5/10 crampy  Reviewed CT enterography  Will need surgery  Awaiting stress and 2-D echo results    Scheduled Meds:   bumetanide  2 mg Oral Daily    potassium replacement protocol   Other RX Placeholder    magnesium replacement protocol   Other RX Placeholder    hydrocortisone  25 mg Rectal BID    cefepime  2 g Intravenous Q12H    enoxaparin  40 mg Subcutaneous Q24H    erythromycin   Both Eyes BID    fluticasone  2 spray Nasal BID    linezolid  600 mg Intravenous Q12H    mupirocin   Nasal BID    sodium chloride flush  10 mL Intravenous 2 times per day    tetrahydrozoline  1 drop Both Eyes TID    carboxymethylcellulose  1 drop Both Eyes Q12H    insulin lispro  0-6 Units Subcutaneous TID  orbital wall. When compared to prior study there has been increase in debris, mucosal thickening within the visualized sinuses. **This report has been created using voice recognition software. It may contain minor errors which are inherent in voice recognition technology. ** Final report electronically signed by Dr. Tarik Cooper on 10/1/2018 12:49 PM    Cta Neck W Wo Contrast (code Stroke Nihss 4 Or Above)    Result Date: 10/1/2018  PROCEDURE: CTA HEAD W WO CONTRAST, CTA NECK W WO CONTRAST CLINICAL INFORMATION: STROKE. Additional history obtained from the electronic medical record indicates the patient has facial numbness and facial droop. COMPARISON: None available. TECHNIQUE: 1 mm axial images were obtained through the head and neck after the fast bolus administration of contrast. A noncontrast localizer was obtained. 3-D reconstructions were performed on a dedicated 3-D workstation. These include multiplanar MPR images and multiplanar MIP images. Centerline reconstructions were obtained of the carotid systems. 80 mL Isovue-370 intravenous contrast was given. All CT scans at this facility use dose modulation, iterative reconstruction, and/or weight-based dosing when appropriate to reduce radiation dose to as low as reasonably achievable. FINDINGS: CTA NECK: Aortic arch and branches: Atherosclerotic calcification is present within the aortic arch. There is a common origin of the innominate and left common carotid arteries off the aortic arch. The origins of the great vessels off the arch are otherwise patent  without flow-limiting stenosis. Right common carotid artery/ICA: The right common carotid artery is tortuous and takes a medial, retropharyngeal course. The right carotid bifurcation and right cervical internal carotid artery are within normal limits. Left common carotid artery/ICA:  Soft atherosclerotic plaque is present at the left carotid bifurcation resulting in less than 50% luminal narrowing by NASCET care.    Electronically signed by Virginia Farley MD on 10/8/2018 at 2:35 PM

## 2018-10-08 NOTE — PROGRESS NOTES
[facility] - Dr. Samantha Maier  Daily Progress Note    Pt Name: Salvatore Doshi Record Number: [id number]  Date of Birth 1940   Today's Date: 10/8/2018    Hospital day # 4     ASSESSMENT   1. Partial small bowel obstruction   2. Morbid obesity (BMI 40)  3. Chronic kidney disease  4. Diabetes mellitus  5. Hypertension  6. Bilateral lower extremity cellulitis  7. Bilateral lower extremity venous insufficiency with ulcerations  8. Hypokalemia   9. External hemorrhoids    has a past medical history of Cancer (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 [address] Sinusitis; Type II or unspecified type diabetes mellitus without mention of complication, not stated as uncontrolled; and Velopharyngeal incompetence. PLAN   1. CT enterography reviewed. A possible distal small bowel obstruction noted, along with a mass in the region of the ileocecal valve. 2. Consult GI for evaluation. Possible colonoscopy to evaluate ileocecal mass. Discussed case with them. Appreciate assistance. 3. Keep on clears as tolerated. Continues to have bowel function. 4. Wound/ostomy following for lower leg cellulitis/wounds. Consult Dr. Iam Cruz for dressing changes. 5. Antibiotics per admitting   6. Risk stratification per medicine. Having stress and echo today. 7. DVT & GI prophylaxis   8. Okay for tucks pads  9. IV hydration  10. Pain & nausea control  11. Repeat electrolytes per protocol  12.  After reviewing CT enterography our recommendation would be to have GI evaluate for possible scope. If unable to do scope then would proceed with abdominal exploration. Patient understands and agrees with plan. Patient will still likely need surgery for possible obstruction and understands this will be a large operation. SUBJECTIVE   Chief complaint: Nausea    Patient was stable overnight. Chart reviewed. Updated by nursing staff. Sipping on clears. Off and on nausea. No emesis though. Describes pain as sharp, intermittent in the epigastric region. Radiates to back at times. Denies chest discomfort or dyspnea. (+) belching, flatus and multiple BMs - semiformed. Pain controlled with analgesia. Bilateral lower legs wrapped. CURRENT MEDICATIONS   Scheduled Meds:   bumetanide  2 mg Oral Daily    potassium replacement protocol   Other RX Placeholder    magnesium replacement protocol   Other RX Placeholder    hydrocortisone  25 mg Rectal BID    cefepime  2 g Intravenous Q12H    enoxaparin  40 mg Subcutaneous Q24H    erythromycin   Both Eyes BID    fluticasone  2 spray Nasal BID    linezolid  600 mg Intravenous Q12H    mupirocin   Nasal BID    sodium chloride flush  10 mL Intravenous 2 times per day    tetrahydrozoline  1 drop Both Eyes TID    carboxymethylcellulose  1 drop Both Eyes Q12H    insulin lispro  0-6 Units Subcutaneous TID WC    insulin lispro  0-3 Units Subcutaneous Nightly     Continuous Infusions:   dextrose      sodium chloride 100 mL/hr at 10/07/18 2121     PRN Meds:.ondansetron, acetaminophen, HYDROmorphone, hydrALAZINE, zinc oxide, sodium chloride, sodium chloride flush, glucose, dextrose, glucagon (rDNA), dextrose, ondansetron  OBJECTIVE   CURRENT VITALS:  height is 5' (1.524 m). Her oral temperature is 99 °F (37.2 °C). Her blood pressure is 154/72 (abnormal) and her pulse is 74. Her respiration is 16 and oxygen saturation is 98%.    Temperature Range (24h):Temp: 99 °F (37.2 °C) Temp  Av.7 °F (36.5 °C)  Min: 96 °F (35.6 °C)  Max: 99 °F (37.2 °C)  BP Range (75T): Systolic Rory Trejo CNP. See my additional comments below for updated orders and plan. Labs, cultures, and radiographs where available were reviewed. I discussed patient concerns with the patient's nurse and instructions were given. Please see our orders for the updated patient care plan. - CT enterography completed yesterday and reviewed. Possible mass in the region of the ileocecal valve. Possible distal partial small bowel obstruction. Does not appear to have concern for obstruction at anterior abdominal wall site by hernia as previous CT demonstrated. Exact area of obstruction unknown and likely due to adhesions. Patient having bowel function but still having pain on the left side and pelvic region. GI consultation in process for possible colonoscopy. Risk stratification/medical clearance in process. Continue with wound care. Infectious disease following. DVT prophylaxis. Discussed with patient again about possible exploration. Difficult to tell if partial obstruction really causing the pain or not.     Electronically signed by Lenny Dave MD on 10/8/2018 at 1:22 PM

## 2018-10-09 LAB
ANION GAP SERPL CALCULATED.3IONS-SCNC: 15 MEQ/L (ref 8–16)
BUN BLDV-MCNC: 9 MG/DL (ref 7–22)
CALCIUM SERPL-MCNC: 8 MG/DL (ref 8.5–10.5)
CHLORIDE BLD-SCNC: 103 MEQ/L (ref 98–111)
CO2: 21 MEQ/L (ref 23–33)
CREAT SERPL-MCNC: 0.7 MG/DL (ref 0.4–1.2)
ERYTHROCYTE [DISTWIDTH] IN BLOOD BY AUTOMATED COUNT: 20.2 % (ref 11.5–14.5)
ERYTHROCYTE [DISTWIDTH] IN BLOOD BY AUTOMATED COUNT: 59.7 FL (ref 35–45)
GFR SERPL CREATININE-BSD FRML MDRD: 81 ML/MIN/1.73M2
GLUCOSE BLD-MCNC: 130 MG/DL (ref 70–108)
GLUCOSE BLD-MCNC: 145 MG/DL (ref 70–108)
GLUCOSE BLD-MCNC: 153 MG/DL (ref 70–108)
GLUCOSE BLD-MCNC: 171 MG/DL (ref 70–108)
HCT VFR BLD CALC: 24.8 % (ref 37–47)
HEMOGLOBIN: 7.8 GM/DL (ref 12–16)
MCH RBC QN AUTO: 26.1 PG (ref 26–33)
MCHC RBC AUTO-ENTMCNC: 31.5 GM/DL (ref 32.2–35.5)
MCV RBC AUTO: 82.9 FL (ref 81–99)
PLATELET # BLD: 146 THOU/MM3 (ref 130–400)
PMV BLD AUTO: 9.5 FL (ref 9.4–12.4)
POTASSIUM SERPL-SCNC: 2.9 MEQ/L (ref 3.5–5.2)
RBC # BLD: 2.99 MILL/MM3 (ref 4.2–5.4)
SODIUM BLD-SCNC: 139 MEQ/L (ref 135–145)
WBC # BLD: 7.7 THOU/MM3 (ref 4.8–10.8)

## 2018-10-09 PROCEDURE — 36415 COLL VENOUS BLD VENIPUNCTURE: CPT

## 2018-10-09 PROCEDURE — 6360000002 HC RX W HCPCS: Performed by: FAMILY MEDICINE

## 2018-10-09 PROCEDURE — 6370000000 HC RX 637 (ALT 250 FOR IP): Performed by: NURSE PRACTITIONER

## 2018-10-09 PROCEDURE — 85027 COMPLETE CBC AUTOMATED: CPT

## 2018-10-09 PROCEDURE — 99232 SBSQ HOSP IP/OBS MODERATE 35: CPT | Performed by: INTERNAL MEDICINE

## 2018-10-09 PROCEDURE — 36592 COLLECT BLOOD FROM PICC: CPT

## 2018-10-09 PROCEDURE — 80048 BASIC METABOLIC PNL TOTAL CA: CPT

## 2018-10-09 PROCEDURE — 2709999900 HC NON-CHARGEABLE SUPPLY

## 2018-10-09 PROCEDURE — 1200000000 HC SEMI PRIVATE

## 2018-10-09 PROCEDURE — 29580 STRAPPING UNNA BOOT: CPT

## 2018-10-09 PROCEDURE — 2580000003 HC RX 258: Performed by: FAMILY MEDICINE

## 2018-10-09 PROCEDURE — 2500000003 HC RX 250 WO HCPCS: Performed by: INTERNAL MEDICINE

## 2018-10-09 PROCEDURE — 6360000002 HC RX W HCPCS: Performed by: INTERNAL MEDICINE

## 2018-10-09 PROCEDURE — 6370000000 HC RX 637 (ALT 250 FOR IP): Performed by: INTERNAL MEDICINE

## 2018-10-09 PROCEDURE — 99232 SBSQ HOSP IP/OBS MODERATE 35: CPT | Performed by: SURGERY

## 2018-10-09 PROCEDURE — 6360000002 HC RX W HCPCS: Performed by: NURSE PRACTITIONER

## 2018-10-09 PROCEDURE — 82948 REAGENT STRIP/BLOOD GLUCOSE: CPT

## 2018-10-09 RX ORDER — POLYETHYLENE GLYCOL 3350 17 G/17G
238 POWDER, FOR SOLUTION ORAL ONCE
Status: COMPLETED | OUTPATIENT
Start: 2018-10-09 | End: 2018-10-09

## 2018-10-09 RX ORDER — SENNA PLUS 8.6 MG/1
15 TABLET ORAL ONCE
Status: COMPLETED | OUTPATIENT
Start: 2018-10-09 | End: 2018-10-09

## 2018-10-09 RX ORDER — AMLODIPINE BESYLATE 10 MG/1
10 TABLET ORAL DAILY
Status: DISCONTINUED | OUTPATIENT
Start: 2018-10-09 | End: 2018-10-13 | Stop reason: HOSPADM

## 2018-10-09 RX ADMIN — LINEZOLID 600 MG: 600 INJECTION, SOLUTION INTRAVENOUS at 11:35

## 2018-10-09 RX ADMIN — Medication 1 DROP: at 10:18

## 2018-10-09 RX ADMIN — HYDROCORTISONE ACETATE 25 MG: 25 SUPPOSITORY RECTAL at 10:18

## 2018-10-09 RX ADMIN — SODIUM CHLORIDE: 9 INJECTION, SOLUTION INTRAVENOUS at 10:13

## 2018-10-09 RX ADMIN — ENOXAPARIN SODIUM 40 MG: 40 INJECTION SUBCUTANEOUS at 20:56

## 2018-10-09 RX ADMIN — CEFEPIME HYDROCHLORIDE 2 G: 2 INJECTION, POWDER, FOR SOLUTION INTRAVENOUS at 10:13

## 2018-10-09 RX ADMIN — FLUTICASONE PROPIONATE 2 SPRAY: 50 SPRAY, METERED NASAL at 10:18

## 2018-10-09 RX ADMIN — METRONIDAZOLE 500 MG: 500 INJECTION, SOLUTION INTRAVENOUS at 22:43

## 2018-10-09 RX ADMIN — CARBOXYMETHYLCELLULOSE SODIUM 1 DROP: 10 GEL OPHTHALMIC at 10:18

## 2018-10-09 RX ADMIN — BUMETANIDE 2 MG: 1 TABLET ORAL at 10:18

## 2018-10-09 RX ADMIN — Medication 1 DROP: at 20:57

## 2018-10-09 RX ADMIN — MUPIROCIN: 20 OINTMENT TOPICAL at 10:18

## 2018-10-09 RX ADMIN — ERYTHROMYCIN: 5 OINTMENT OPHTHALMIC at 10:18

## 2018-10-09 RX ADMIN — SODIUM CHLORIDE: 9 INJECTION, SOLUTION INTRAVENOUS at 22:43

## 2018-10-09 RX ADMIN — MUPIROCIN: 20 OINTMENT TOPICAL at 20:57

## 2018-10-09 RX ADMIN — CEFEPIME HYDROCHLORIDE 2 G: 2 INJECTION, POWDER, FOR SOLUTION INTRAVENOUS at 20:59

## 2018-10-09 RX ADMIN — SODIUM CHLORIDE: 9 INJECTION, SOLUTION INTRAVENOUS at 21:12

## 2018-10-09 RX ADMIN — ONDANSETRON 4 MG: 2 INJECTION INTRAMUSCULAR; INTRAVENOUS at 15:39

## 2018-10-09 RX ADMIN — HYDROMORPHONE HYDROCHLORIDE 0.5 MG: 1 INJECTION, SOLUTION INTRAMUSCULAR; INTRAVENOUS; SUBCUTANEOUS at 11:47

## 2018-10-09 RX ADMIN — HYDROCORTISONE ACETATE 25 MG: 25 SUPPOSITORY RECTAL at 21:12

## 2018-10-09 RX ADMIN — HYDRALAZINE HYDROCHLORIDE 10 MG: 20 INJECTION INTRAMUSCULAR; INTRAVENOUS at 18:47

## 2018-10-09 RX ADMIN — CARBOXYMETHYLCELLULOSE SODIUM 1 DROP: 10 GEL OPHTHALMIC at 21:05

## 2018-10-09 RX ADMIN — Medication 1 DROP: at 15:39

## 2018-10-09 RX ADMIN — ONDANSETRON 4 MG: 2 INJECTION INTRAMUSCULAR; INTRAVENOUS at 04:59

## 2018-10-09 RX ADMIN — POLYETHYLENE GLYCOL 3350 238 G: 17 POWDER, FOR SOLUTION ORAL at 14:57

## 2018-10-09 RX ADMIN — Medication 10 ML: at 21:06

## 2018-10-09 RX ADMIN — HYDROMORPHONE HYDROCHLORIDE 0.5 MG: 1 INJECTION, SOLUTION INTRAMUSCULAR; INTRAVENOUS; SUBCUTANEOUS at 04:59

## 2018-10-09 RX ADMIN — ERYTHROMYCIN: 5 OINTMENT OPHTHALMIC at 20:57

## 2018-10-09 RX ADMIN — HYDROMORPHONE HYDROCHLORIDE 0.5 MG: 1 INJECTION, SOLUTION INTRAMUSCULAR; INTRAVENOUS; SUBCUTANEOUS at 20:52

## 2018-10-09 RX ADMIN — SENNOSIDES 129 MG: 8.6 TABLET, FILM COATED ORAL at 14:57

## 2018-10-09 RX ADMIN — AMLODIPINE BESYLATE 10 MG: 10 TABLET ORAL at 20:58

## 2018-10-09 RX ADMIN — FLUTICASONE PROPIONATE 2 SPRAY: 50 SPRAY, METERED NASAL at 20:58

## 2018-10-09 ASSESSMENT — PAIN DESCRIPTION - LOCATION
LOCATION: ABDOMEN
LOCATION: ABDOMEN
LOCATION_2: SHOULDER

## 2018-10-09 ASSESSMENT — PAIN DESCRIPTION - ORIENTATION
ORIENTATION: LEFT;ANTERIOR
ORIENTATION_2: RIGHT

## 2018-10-09 ASSESSMENT — PAIN DESCRIPTION - DESCRIPTORS: DESCRIPTORS: SORE

## 2018-10-09 ASSESSMENT — PAIN SCALES - GENERAL
PAINLEVEL_OUTOF10: 10
PAINLEVEL_OUTOF10: 6
PAINLEVEL_OUTOF10: 10
PAINLEVEL_OUTOF10: 6
PAINLEVEL_OUTOF10: 7

## 2018-10-09 ASSESSMENT — PAIN DESCRIPTION - ONSET: ONSET: ON-GOING

## 2018-10-09 ASSESSMENT — PAIN DESCRIPTION - FREQUENCY: FREQUENCY: CONTINUOUS

## 2018-10-09 ASSESSMENT — PAIN DESCRIPTION - PROGRESSION
CLINICAL_PROGRESSION: NOT CHANGED

## 2018-10-09 ASSESSMENT — PAIN DESCRIPTION - PAIN TYPE
TYPE_2: CHRONIC PAIN
TYPE: ACUTE PAIN
TYPE: ACUTE PAIN

## 2018-10-09 ASSESSMENT — PAIN DESCRIPTION - INTENSITY: RATING_2: 10

## 2018-10-09 ASSESSMENT — PAIN DESCRIPTION - DIRECTION: RADIATING_TOWARDS: BACK

## 2018-10-09 NOTE — PROGRESS NOTES
weight-based dosing when appropriate to reduce radiation dose to as low as reasonably achievable. FINDINGS: CTA NECK: Aortic arch and branches: Atherosclerotic calcification is present within the aortic arch. There is a common origin of the innominate and left common carotid arteries off the aortic arch. The origins of the great vessels off the arch are otherwise patent  without flow-limiting stenosis. Right common carotid artery/ICA: The right common carotid artery is tortuous and takes a medial, retropharyngeal course. The right carotid bifurcation and right cervical internal carotid artery are within normal limits. Left common carotid artery/ICA: Soft atherosclerotic plaque is present at the left carotid bifurcation resulting in less than 50% luminal narrowing by NASCET criteria. The proximal left cervical internal carotid artery takes a medial retropharyngeal course. Vertebral arteries: The vertebral arteries are codominant. The vertebral arteries are otherwise patent. The proximal left vertebral artery is noted to be tortuous. CTA HEAD: Internal carotid arteries: Atherosclerotic calcification is present involving the bilateral cavernous and clinoid internal carotid arteries resulting in mild luminal narrowing. Middle cerebral arteries: Patent with normal arborization of the distal branches. Anterior cerebral arteries: Patent with normal arborization of the distal branches. There is a patent anterior communicating artery. Vertebral arteries: Unremarkable. Basilar artery: Unremarkable. Superior cerebellar arteries: Unremarkable. Posterior cerebral arteries: Unremarkable. Bilateral posterior communicating arteries are not visualized, a normal variant. The superior sagittal sinus, vein of Bon, internal cerebral veins, straight sinus, transverse sinuses and sigmoid sinuses are patent. No acute intracranial abnormality is identified. There is absence of the native lenses within the bilateral orbits.  Partial opacification is noted within the bilateral mastoid air cells which may correspond to mastoid effusions or mastoiditis. Postsurgical changes are present within the bilateral paranasal sinuses and there are fluid levels in the bilateral maxillary sinuses with opacification of the bilateral frontal sinuses. Multilevel degenerative changes are present within the cervical spine. This results in mild to moderate spinal canal narrowing at C3-C4. No suspicious osseous lesion is identified. There is partial visualization of an age indeterminant fracture involving the right proximal humerus. No suspicious finding is seen within the visualized paraspinal soft tissues. The visualized lung apices are clear. 1. Atherosclerotic calcification is present within the bilateral cavernous and clinoid internal carotid arteries resulting in mild luminal narrowing. No major branch occlusion, flow-limiting stenosis or aneurysm is otherwise identified intracranially. 2. No occlusion, flow-limiting stenosis, dissection or aneurysm is identified of the major cervical arterial structures. Soft atherosclerotic plaque is present at the left carotid bifurcation resulting in less than 50% luminal narrowing by NASCET criteria. 3. Age indeterminant fracture of the right proximal humerus. **This report has been created using voice recognition software. It may contain minor errors which are inherent in voice recognition technology. ** Final report electronically signed by Dr. Rony Norton on 10/1/2018 1:24 PM    Xr Abdomen (kub) (single Ap View)    Result Date: 10/3/2018  PROCEDURE: XR ABDOMEN (KUB) (SINGLE AP VIEW) CLINICAL INFORMATION: n/v, abdominal pain, . COMPARISON: No prior study. TECHNIQUE: A supine AP view of the abdomen was obtained. FINDINGS: Lung bases are clear. Degenerative changes with S-shaped scoliosis of the lumbar spine. SI joints are symmetric. Degenerative changes both hips. Osteopenia. Stool in the visualized colon.

## 2018-10-09 NOTE — PROGRESS NOTES
opacification is noted within the bilateral mastoid air cells which may correspond to mastoid effusions or mastoiditis. Postsurgical changes are present within the bilateral paranasal sinuses and there are fluid levels in the bilateral maxillary sinuses with opacification of the bilateral frontal sinuses. Multilevel degenerative changes are present within the cervical spine. This results in mild to moderate spinal canal narrowing at C3-C4. No suspicious osseous lesion is identified. There is partial visualization of an age indeterminant fracture involving the right proximal humerus. No suspicious finding is seen within the visualized paraspinal soft tissues. The visualized lung apices are clear. 1. Atherosclerotic calcification is present within the bilateral cavernous and clinoid internal carotid arteries resulting in mild luminal narrowing. No major branch occlusion, flow-limiting stenosis or aneurysm is otherwise identified intracranially. 2. No occlusion, flow-limiting stenosis, dissection or aneurysm is identified of the major cervical arterial structures. Soft atherosclerotic plaque is present at the left carotid bifurcation resulting in less than 50% luminal narrowing by NASCET criteria. 3. Age indeterminant fracture of the right proximal humerus. **This report has been created using voice recognition software. It may contain minor errors which are inherent in voice recognition technology. ** Final report electronically signed by Dr. Anupam Funes on 10/1/2018 1:24 PM    Xr Abdomen (kub) (single Ap View)    Result Date: 10/3/2018  PROCEDURE: XR ABDOMEN (KUB) (SINGLE AP VIEW) CLINICAL INFORMATION: n/v, abdominal pain, . COMPARISON: No prior study. TECHNIQUE: A supine AP view of the abdomen was obtained. FINDINGS: Lung bases are clear. Degenerative changes with S-shaped scoliosis of the lumbar spine. SI joints are symmetric. Degenerative changes both hips. Osteopenia. Stool in the visualized colon. Postsurgical clips in the right upper quadrant. Nonspecific bowel gas pattern. Nonspecific bowel gas pattern. **This report has been created using voice recognition software. It may contain minor errors which are inherent in voice recognition technology. ** Final report electronically signed by Dr. Mary Zhu on 10/3/2018 6:46 PM    Ct Head Wo Contrast    Result Date: 10/1/2018  PROCEDURE: CT HEAD WO CONTRAST CLINICAL INFORMATION: FACIAL MUSCLE WEAKNESS/PARALYSIS, . COMPARISON: 4/20/2018 TECHNIQUE: Noncontrast 5 mm axial images were obtained through the brain. All CT scans at this facility use dose modulation, iterative reconstruction, and/or weight-based dosing when appropriate to reduce radiation dose to as low as reasonably achievable. FINDINGS: No acute intracranial findings. Mild generalized volume loss and small vessel ischemic changes. Old right basal ganglia lacunar infarction. No acute hemorrhage or midline shift. Gray-white differentiation is preserved. No acute hemorrhage or midline shift. Ventricles are within normal limits for age. Partially opacified mastoid air cells and paranasal sinuses with fluid in both maxillary sinuses and sphenoid. There is bony remodeling of the maxillary sinuses. Findings may be related to chronic changes or postsurgical changes. Superimposed acute sinusitis is suspected. Correlation with symptoms and further evaluation is clinically warranted. There is debris within the ethmoid sinus cavity. Postsurgical changes ethmoid sinuses and bilateral medial orbital wall. Skull: Unremarkable. Soft tissues: Unremarkable. No acute intracranial findings. Partially opacified mastoid air cells and paranasal sinuses with fluid in both maxillary sinuses and sphenoid. There is bony remodeling of the maxillary sinuses. Findings may be related to chronic changes or postsurgical changes. Superimposed acute sinusitis is suspected.  Correlation with symptoms and further evaluation as clinically warranted. There is debris within the ethmoid sinus cavity. Postsurgical changes ethmoid sinuses and bilateral medial orbital wall. When compared to prior study there has been increase in debris, mucosal thickening within the visualized sinuses. **This report has been created using voice recognition software. It may contain minor errors which are inherent in voice recognition technology. ** Final report electronically signed by Dr. April Hanna on 10/1/2018 12:49 PM    Cta Neck W Wo Contrast (code Stroke Nihss 4 Or Above)    Result Date: 10/1/2018  PROCEDURE: CTA HEAD W WO CONTRAST, CTA NECK W WO CONTRAST CLINICAL INFORMATION: STROKE. Additional history obtained from the electronic medical record indicates the patient has facial numbness and facial droop. COMPARISON: None available. TECHNIQUE: 1 mm axial images were obtained through the head and neck after the fast bolus administration of contrast. A noncontrast localizer was obtained. 3-D reconstructions were performed on a dedicated 3-D workstation. These include multiplanar MPR images and multiplanar MIP images. Centerline reconstructions were obtained of the carotid systems. 80 mL Isovue-370 intravenous contrast was given. All CT scans at this facility use dose modulation, iterative reconstruction, and/or weight-based dosing when appropriate to reduce radiation dose to as low as reasonably achievable. FINDINGS: CTA NECK: Aortic arch and branches: Atherosclerotic calcification is present within the aortic arch. There is a common origin of the innominate and left common carotid arteries off the aortic arch. The origins of the great vessels off the arch are otherwise patent  without flow-limiting stenosis. Right common carotid artery/ICA: The right common carotid artery is tortuous and takes a medial, retropharyngeal course. The right carotid bifurcation and right cervical internal carotid artery are within normal limits.  Left common carotid artery/ICA: tissues. The visualized lung apices are clear. 1. Atherosclerotic calcification is present within the bilateral cavernous and clinoid internal carotid arteries resulting in mild luminal narrowing. No major branch occlusion, flow-limiting stenosis or aneurysm is otherwise identified intracranially. 2. No occlusion, flow-limiting stenosis, dissection or aneurysm is identified of the major cervical arterial structures. Soft atherosclerotic plaque is present at the left carotid bifurcation resulting in less than 50% luminal narrowing by NASCET criteria. 3. Age indeterminant fracture of the right proximal humerus. **This report has been created using voice recognition software. It may contain minor errors which are inherent in voice recognition technology. ** Final report electronically signed by Dr. Yael Awad on 10/1/2018 1:24 PM    Vl Dup Lower Extremity Venous Bilateral    Result Date: 9/27/2018  PROCEDURE: Bilateral lower extremity venous duplex examination CLINICAL INFORMATION: bilateral venous doppler, COMPARISON: No prior study. TECHNIQUE: Grayscale, color-flow, and spectral waveform ultrasound images were obtained through the bilateral lower extremity venous structures. Augmentation and compression maneuvers were performed at multiple levels. FINDINGS: RIGHT SIDE: The common femoral vein demonstrates normal flow, augmentation and compressibility. The saphenofemoral junction appears patent and compressible. The greater saphenous vein demonstrates normal flow proximally. The superficial femoral and popliteal veins demonstrate normal flow, compressibility and augmentation. The deep veins of the calf appear patent including the gastrocnemius, posterior tibial, peroneal and anterior tibial veins. LEFT SIDE: The common femoral vein demonstrates normal flow, augmentation and compressibility. The saphenofemoral junction appears patent and compressible.  The greater saphenous vein demonstrates normal flow

## 2018-10-09 NOTE — PROGRESS NOTES
INFECTIOUS DISEASES  PROGRESS NOTE    Pt Name: Wendy Hobbs  MRN: [id number]  [id number]  YOB: 1940  Admit Date: 10/4/2018  7:49 PM  Date of evaluation: 10/9/2018  Primary Care Physician: Mendel Beck MD   [id number]     67-year-old female known to our service from her  previous hospitalization in [address] [place] [address]. [facility] and essentially she was seen for her  problems with cellulitis of the legs and ulcers that had growth of MRSA and  streptococcus in the past and she was noted to have spongiotic dermatitis  which was determined to be contact versus eczematous dermatitis on biopsies  that were done. The patient was seen last in our office in 05/2018 and the  patient also has had history of adenocarcinoma of the nasopharynx for which  she had seen ENT doctor and she was placed on long-term Levaquin for 21  days. The patient had been seen recently by Dr. Vasile Burch, her family  provider, and it appears that the patient continues to have ulcers on the  legs and drainage from the legs. Since the legs are worsening, she was  asked to be evaluated. She presented to the office today given her  appearance of the legs and cellulitis of the legs and overall suffering and  taking outpatient treatment, decided to admit her to the hospital given her  complications with diabetes and overall venous insufficiency and peripheral edematous changes. [facility] for antibiotic therapy and because of sbo was transferred 10/4/18   Subjective: Interval History: As above. Notes reviewed.  Patient complains      Active ATBs: cefepime 9/27                       vanco 9/27 - 10/30                       zyvox                        Flagyl 10/9    Pt/Chart review:  Fever No, DiarrheaNo, Dyspnea No, Nausea:None      Data:   Scheduled Meds:   bumetanide  2 mg Oral Daily    potassium replacement protocol   Other RX Placeholder    magnesium replacement protocol   Other RX Placeholder    hydrocortisone  25 mg Rectal BID  cefepime  2 g Intravenous Q12H    enoxaparin  40 mg Subcutaneous Q24H    erythromycin   Both Eyes BID    fluticasone  2 spray Nasal BID    linezolid  600 mg Intravenous Q12H    mupirocin   Nasal BID    sodium chloride flush  10 mL Intravenous 2 times per day    tetrahydrozoline  1 drop Both Eyes TID    carboxymethylcellulose  1 drop Both Eyes Q12H    insulin lispro  0-6 Units Subcutaneous TID WC    insulin lispro  0-3 Units Subcutaneous Nightly     Continuous Infusions:   dextrose      sodium chloride 100 mL/hr at 10/09/18 1013       I & O's:  I/O last 3 completed shifts: In: 2834.6 [P.O.:400; I.V.:2434.6]  Out: 600 [Urine:600]  No intake/output data recorded. Intake/Output Summary (Last 24 hours) at 10/09/18 1533  Last data filed at 10/09/18 1214   Gross per 24 hour   Intake           2834.6 ml   Output              600 ml   Net           2234.6 ml       Date 10/09/18 0000 - 10/09/18 2359   Shift 4979-1180 4001-5219 3063-0250 24 Hour Total   I  N  T  A  K  E   P.O.  (mL/kg/hr) 0  (0) 300  300    I.V.  (mL/kg) 1072.4  (11.4)   1072.4  (11.4)    Shift Total  (mL/kg) 1072.4  (11.4) 300  (3.2)  1372.4  (14.6)   O  U  T  P  U  T   Urine  (mL/kg/hr) 200  (0.3) 400  600    Shift Total  (mL/kg) 200  (2.1) 400  (4.3)  600  (6.4)   Weight (kg) 94 94 94 94           CBC:   Recent Labs      10/09/18   0507   WBC  7.7   HGB  7.8*   PLT  146     BMP:    Recent Labs      10/07/18   0437  10/08/18   0635  10/09/18   0507   NA   --   136  139   K  3.4*  3.4*  2.9*   CL   --   101  103   CO2   --   22*  21*   BUN   --   14  9   CREATININE   --   0.8  0.7   GLUCOSE   --   124*  130*     Hepatic:   No results for input(s): AST, ALT, ALB, BILITOT, ALKPHOS in the last 72 hours. BNP: No results for input(s): BNP in the last 72 hours.   URINALYSIS: neg    MICRO:     Collected: 09/27/18 1150   Resulting lab: [facility]   Value: Staphylococcus aureus (Abnormal)   Comment:    *Additional S1, S2 normal  Abdomen: bowel sounds reduced and tenderness positive  Extremities:  Leg ulcer bilaterally and they are tender to touch  Neurologic: Mental status: Alert, oriented, thought content appropriate   Wound/Ulcers: Skin Ulcer: unaboots to the legs        Assessment:     1. Bilateral cellulitis of the legs. 2.  Bilateral venous insufficiency with ulcers. 3.  Severe pedal edema. 4.  Diabetes mellitus type 2.  5.  Morbid obesity with  BMI 42.4.  6.  Hypertension. 7.  Past history of hyponatremia. 8. Infection with MRSA and pseudomonas  9. SBO     Patient Active Problem List:     Hypercholesterolemia     Osteoarthritis     Osteoporosis     Type 2 diabetes mellitus with microalbuminuria, without long-term current use of insulin (HCC)     Morbid obesity with body mass index (BMI) of 45.0 to 49.9 in [facility])     DDD (degenerative disc disease), lumbar     Benign essential HTN     SWAPNA on CPAP     Diabetic peripheral neuropathy (HCC)     Acute recurrent pansinusitis     Primary thunderclap headache     Rash of entire body     Infection of skin due to methicillin resistant Staphylococcus aureus (MRSA)     Drug allergy, antibiotic  likely bactrim     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[facility])     Skin plaque     Hyponatremia     Hypervolemia     Cellulitis of lower extremity     Hypoglycemia     Acute on chronic systolic CHF (congestive heart failure) ([facility])     Bilateral cellulitis of lower leg     Venous ulcers of both lower extremities ([facility])     Class 3 severe obesity due to excess calories with serious comorbidity and body mass index (BMI) of 40.0 to 44.9 in [facility])      Plan: Add flagyl till gi issues resolve  unaboots to the legs   Pt/ot  Follow surgical and gi plans    Labs, cultures, and radiographs reviewed. Changes made as necessary. D/w Patient's R.N. and specific instructions given and infectious disease issues addressed. Will follow the patient closely with you.  Also please see the orders

## 2018-10-09 NOTE — CARE COORDINATION
10/9/18, 2:03 PM      [facility] day: 5  Location: -09/009-A Reason for admit: [facility] [K56.609]   Procedure: 10/08/18 Stress-results normal   Echo-EF 60%  10/10/18 Colonoscopy  10/11/18 possible surgery pending results of colonoscopy  Treatment Plan of Care: GI and general surgery following, Wound/Ostomy RN, SS, Dietician, bowl prep for colonoscopy tomorrow, IV fluids, IV Maxipime, IV Zyvox, DM management, electrolyte replacement protocol, Lovenox, prn Dilaudid for pain, unna boots bilateral-change Tuesday and Friday and prn if soiled, wound care, strict I & O.    PCP: Brigid Koo MD  Readmission Risk Score: 30%  Discharge Plan: Return to TCU vs. SNF/.

## 2018-10-09 NOTE — PROGRESS NOTES
Soft     Component Results     Component Collected Lab   Gram Stain Result 09/27/2018 11:50 AM  - [facility] Lab   No segmented neutrophils observed. No epithelial cells observed. No bacteria seen. Organism  (Abnormal) 09/27/2018 11:50  [address] [facility]   Staphylococcus aureus     Aerobic Culture 09/27/2018 11:50 AM [address] Lab   light growth   This is a MRSA (Methicillin Resistant Staphylococcus   aureus)isolate. Isolates of MRSA (ORSA) Methicillin (Oxacillin) Resistant   Staphylococcus aureus (coagulase positive) require patient   be placed in CONTACT isolation. Methicillin(Oxacillin)resistant strains of staphylococci   (MRSA)or(MRSE)should be considered resistant to all classes   of cephalosporins, penems and beta-lactams. In the treatment of gram positive infections, GENTAMICIN   should be CONSIDERED a SYNERGYSTIC agent ONLY. Testing Performed By     Justino Navarro Name Director Address Valid Date Range   250-JF - [address] LAB Joel Spence MD [address] [place] [address] 08/30/17 1255-Present   Narrative     Source: leg       Site: swab left          Current Antibiotics: not stated   Culture & Susceptibility     STAPHYLOCOCCUS AUREUS     Antibiotic Interpretation CARI Unit Status   ICR (D test)  Neg mcg/mL Final   Comment: (Dtest) ICR- inducible clinda resistance    If +, then inducible erm gene      present.  Clindamycin may be      effective in some patients.       Penicillin G Resistant >=0.5 mcg/mL Final   ciprofloxacin Resistant >=8 mcg/mL Final   clindamycin Resistant >=8 mcg/mL Final   erythromycin Resistant >=8 mcg/mL Final   gentamicin Sensitive <=0.5 mcg/mL Final   oxacillin Resistant >=4 mcg/mL Final   rifampin Sensitive <=0.5 mcg/mL Final   tetracycline Sensitive <=1 mcg/mL Final   trimethoprim-sulfamethoxazole Resistant =80 mcg/mL Final   vancomycin Sensitive <=0.5 mcg/mL Final Collected: 09/27/18 1150   Resulting lab: [address] [facility]   Value: gram negative bacilli (Abnormal)   Comment:    *Additional information available - narrative   9/28/2018  7:56 AM - Cristal Rodriguez Incoming Lab Results From Soft     Component Results     Component Collected Lab   Gram Stain Result 09/27/2018 11:50 AM [address] Lab   Rare segmented neutrophils observed. No epithelial cells observed. Rare gram positive cocci occurring singly and in pairs. Organism  (Abnormal) 09/27/2018 11:50 AM [facility] Lab   Staphylococcus (coagulase positive)     Aerobic Culture 09/27/2018 11:50 AM [address] Lab   moderate growth    Organism  (Abnormal) 09/27/2018 11:50 AM [facility] Lab   gram negative bacilli     Aerobic Culture 09/27/2018 11:50  [address] [facility]   light growth            IMAGING:   Impression       1. No sonographic evidence of lower extremity DVT. 10/4 ct abdomen   Impression   Findings compatible with a distal mechanical small bowel obstruction likely at the level of the hernia mesh. Discussed with , approximately 5:00 PM     Ct enterography 10/7     Impression   1. Findings are suggestive of a possible distal small bowel obstruction without definite transition identified. 2. A mass in the region of the ileocecal valve and extending slightly distally is also a consideration although this does not appear to be the cause of obstruction. 3. Severe uncomplicated diverticulosis           Objective:   Vitals: BP (!) 153/72   Pulse 66   Temp 96.6 °F (35.9 °C) (Oral)   Resp 18   Ht 5' (1.524 m)   LMP  (LMP Unknown)   SpO2 98%   BMI 40.23 kg/m²   HEENT: Head: Normocephalic, no lesions, without obvious abnormality.   Lungs: clear to auscultation bilaterally  Heart: S1, S2 normal  Abdomen: bowel sounds reduced and tenderness positive  Extremities:  Leg ulcer bilaterally and they are tender to touch  Neurologic: Mental status: Alert, oriented, thought content appropriate   Wound/Ulcers: Skin Ulcer: unaboots to the legs        Assessment:     1. Bilateral cellulitis of the legs. 2.  Bilateral venous insufficiency with ulcers. 3.  Severe pedal edema. 4.  Diabetes mellitus type 2.  5.  Morbid obesity with  BMI 42.4.  6.  Hypertension. 7.  Past history of hyponatremia. 8. Infection with MRSA and pseudomonas  9. SBO     Patient Active Problem List:     Hypercholesterolemia     Osteoarthritis     Osteoporosis     Type 2 diabetes mellitus with microalbuminuria, without long-term current use of insulin (HCC)     Morbid obesity with body mass index (BMI) of 45.0 to 49.9 in [facility])     DDD (degenerative disc disease), lumbar     Benign essential HTN     SWAPNA on CPAP     Diabetic peripheral neuropathy (HCC)     Acute recurrent pansinusitis     Primary thunderclap headache     Rash of entire body     Infection of skin due to methicillin resistant Staphylococcus aureus (MRSA)     Drug allergy, antibiotic  likely bactrim     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 Changes made as necessary. D/w Patient's R.N. and specific instructions given and infectious disease issues addressed. Will follow the patient closely with you. Also please see the orders for updated patient care orders written by ID team.    Thank you for involving us in this patient's care.  I will be discussing this case with the

## 2018-10-10 LAB
ANION GAP SERPL CALCULATED.3IONS-SCNC: 15 MEQ/L (ref 8–16)
BUN BLDV-MCNC: 7 MG/DL (ref 7–22)
CALCIUM IONIZED: 1.17 MMOL/L (ref 1.12–1.32)
CALCIUM SERPL-MCNC: 8.2 MG/DL (ref 8.5–10.5)
CHLORIDE BLD-SCNC: 103 MEQ/L (ref 98–111)
CO2: 21 MEQ/L (ref 23–33)
CREAT SERPL-MCNC: 0.7 MG/DL (ref 0.4–1.2)
ERYTHROCYTE [DISTWIDTH] IN BLOOD BY AUTOMATED COUNT: 20.4 % (ref 11.5–14.5)
ERYTHROCYTE [DISTWIDTH] IN BLOOD BY AUTOMATED COUNT: 58.5 FL (ref 35–45)
GFR SERPL CREATININE-BSD FRML MDRD: 81 ML/MIN/1.73M2
GLUCOSE BLD-MCNC: 119 MG/DL (ref 70–108)
GLUCOSE BLD-MCNC: 130 MG/DL (ref 70–108)
GLUCOSE BLD-MCNC: 134 MG/DL (ref 70–108)
GLUCOSE BLD-MCNC: 151 MG/DL (ref 70–108)
GLUCOSE BLD-MCNC: 152 MG/DL (ref 70–108)
GLUCOSE BLD-MCNC: 176 MG/DL (ref 70–108)
HCT VFR BLD CALC: 26.1 % (ref 37–47)
HEMOGLOBIN: 8.4 GM/DL (ref 12–16)
MAGNESIUM: 1.3 MG/DL (ref 1.6–2.4)
MCH RBC QN AUTO: 26.4 PG (ref 26–33)
MCHC RBC AUTO-ENTMCNC: 32.2 GM/DL (ref 32.2–35.5)
MCV RBC AUTO: 82.1 FL (ref 81–99)
PLATELET # BLD: 149 THOU/MM3 (ref 130–400)
PMV BLD AUTO: 10 FL (ref 9.4–12.4)
POTASSIUM SERPL-SCNC: 2.8 MEQ/L (ref 3.5–5.2)
POTASSIUM SERPL-SCNC: 3 MEQ/L (ref 3.5–5.2)
RBC # BLD: 3.18 MILL/MM3 (ref 4.2–5.4)
SODIUM BLD-SCNC: 139 MEQ/L (ref 135–145)
WBC # BLD: 7.3 THOU/MM3 (ref 4.8–10.8)

## 2018-10-10 PROCEDURE — 97166 OT EVAL MOD COMPLEX 45 MIN: CPT

## 2018-10-10 PROCEDURE — 97162 PT EVAL MOD COMPLEX 30 MIN: CPT

## 2018-10-10 PROCEDURE — 82948 REAGENT STRIP/BLOOD GLUCOSE: CPT

## 2018-10-10 PROCEDURE — 2500000003 HC RX 250 WO HCPCS: Performed by: HOSPITALIST

## 2018-10-10 PROCEDURE — 97535 SELF CARE MNGMENT TRAINING: CPT

## 2018-10-10 PROCEDURE — G8987 SELF CARE CURRENT STATUS: HCPCS

## 2018-10-10 PROCEDURE — 2500000003 HC RX 250 WO HCPCS: Performed by: INTERNAL MEDICINE

## 2018-10-10 PROCEDURE — 83735 ASSAY OF MAGNESIUM: CPT

## 2018-10-10 PROCEDURE — 1200000000 HC SEMI PRIVATE

## 2018-10-10 PROCEDURE — 2580000003 HC RX 258: Performed by: INTERNAL MEDICINE

## 2018-10-10 PROCEDURE — 6360000002 HC RX W HCPCS: Performed by: HOSPITALIST

## 2018-10-10 PROCEDURE — 2709999900 HC NON-CHARGEABLE SUPPLY

## 2018-10-10 PROCEDURE — 6370000000 HC RX 637 (ALT 250 FOR IP): Performed by: INTERNAL MEDICINE

## 2018-10-10 PROCEDURE — 36591 DRAW BLOOD OFF VENOUS DEVICE: CPT

## 2018-10-10 PROCEDURE — G8979 MOBILITY GOAL STATUS: HCPCS

## 2018-10-10 PROCEDURE — 99232 SBSQ HOSP IP/OBS MODERATE 35: CPT | Performed by: INTERNAL MEDICINE

## 2018-10-10 PROCEDURE — 80048 BASIC METABOLIC PNL TOTAL CA: CPT

## 2018-10-10 PROCEDURE — 85027 COMPLETE CBC AUTOMATED: CPT

## 2018-10-10 PROCEDURE — APPSS30 APP SPLIT SHARED TIME 16-30 MINUTES: Performed by: NURSE PRACTITIONER

## 2018-10-10 PROCEDURE — 6370000000 HC RX 637 (ALT 250 FOR IP): Performed by: FAMILY MEDICINE

## 2018-10-10 PROCEDURE — 2580000003 HC RX 258: Performed by: FAMILY MEDICINE

## 2018-10-10 PROCEDURE — 6360000002 HC RX W HCPCS: Performed by: NURSE PRACTITIONER

## 2018-10-10 PROCEDURE — 82330 ASSAY OF CALCIUM: CPT

## 2018-10-10 PROCEDURE — G8978 MOBILITY CURRENT STATUS: HCPCS

## 2018-10-10 PROCEDURE — 97530 THERAPEUTIC ACTIVITIES: CPT

## 2018-10-10 PROCEDURE — G8988 SELF CARE GOAL STATUS: HCPCS

## 2018-10-10 PROCEDURE — 97110 THERAPEUTIC EXERCISES: CPT

## 2018-10-10 PROCEDURE — 84132 ASSAY OF SERUM POTASSIUM: CPT

## 2018-10-10 PROCEDURE — 99232 SBSQ HOSP IP/OBS MODERATE 35: CPT | Performed by: SURGERY

## 2018-10-10 PROCEDURE — 6360000002 HC RX W HCPCS: Performed by: FAMILY MEDICINE

## 2018-10-10 RX ORDER — MAGNESIUM SULFATE IN WATER 40 MG/ML
4 INJECTION, SOLUTION INTRAVENOUS ONCE
Status: COMPLETED | OUTPATIENT
Start: 2018-10-10 | End: 2018-10-10

## 2018-10-10 RX ORDER — POTASSIUM CHLORIDE 7.45 MG/ML
10 INJECTION INTRAVENOUS
Status: DISPENSED | OUTPATIENT
Start: 2018-10-10 | End: 2018-10-10

## 2018-10-10 RX ADMIN — LINEZOLID 600 MG: 600 INJECTION, SOLUTION INTRAVENOUS at 00:06

## 2018-10-10 RX ADMIN — BUMETANIDE 2 MG: 1 TABLET ORAL at 10:00

## 2018-10-10 RX ADMIN — MUPIROCIN: 20 OINTMENT TOPICAL at 10:00

## 2018-10-10 RX ADMIN — AMLODIPINE BESYLATE 10 MG: 10 TABLET ORAL at 10:00

## 2018-10-10 RX ADMIN — FLUTICASONE PROPIONATE 2 SPRAY: 50 SPRAY, METERED NASAL at 10:00

## 2018-10-10 RX ADMIN — FLUTICASONE PROPIONATE 2 SPRAY: 50 SPRAY, METERED NASAL at 21:50

## 2018-10-10 RX ADMIN — Medication 1 DROP: at 10:00

## 2018-10-10 RX ADMIN — MICONAZOLE NITRATE 1 APPLICATOR: 2 POWDER TOPICAL at 10:39

## 2018-10-10 RX ADMIN — CEFEPIME HYDROCHLORIDE 2 G: 2 INJECTION, POWDER, FOR SOLUTION INTRAVENOUS at 21:54

## 2018-10-10 RX ADMIN — ERYTHROMYCIN: 5 OINTMENT OPHTHALMIC at 21:53

## 2018-10-10 RX ADMIN — CARBOXYMETHYLCELLULOSE SODIUM 1 DROP: 10 GEL OPHTHALMIC at 15:00

## 2018-10-10 RX ADMIN — MUPIROCIN: 20 OINTMENT TOPICAL at 21:50

## 2018-10-10 RX ADMIN — MICONAZOLE NITRATE: 2 POWDER TOPICAL at 21:54

## 2018-10-10 RX ADMIN — CARBOXYMETHYLCELLULOSE SODIUM 1 DROP: 10 GEL OPHTHALMIC at 21:50

## 2018-10-10 RX ADMIN — POTASSIUM CHLORIDE 10 MEQ: 7.46 INJECTION, SOLUTION INTRAVENOUS at 19:21

## 2018-10-10 RX ADMIN — HYDROMORPHONE HYDROCHLORIDE 0.5 MG: 1 INJECTION, SOLUTION INTRAMUSCULAR; INTRAVENOUS; SUBCUTANEOUS at 21:51

## 2018-10-10 RX ADMIN — POTASSIUM CHLORIDE 10 MEQ: 7.46 INJECTION, SOLUTION INTRAVENOUS at 20:24

## 2018-10-10 RX ADMIN — POTASSIUM CHLORIDE 10 MEQ: 7.46 INJECTION, SOLUTION INTRAVENOUS at 18:25

## 2018-10-10 RX ADMIN — ENOXAPARIN SODIUM 40 MG: 40 INJECTION SUBCUTANEOUS at 21:52

## 2018-10-10 RX ADMIN — Medication 10 ML: at 15:00

## 2018-10-10 RX ADMIN — LINEZOLID 600 MG: 600 INJECTION, SOLUTION INTRAVENOUS at 14:57

## 2018-10-10 RX ADMIN — SODIUM CHLORIDE: 9 INJECTION, SOLUTION INTRAVENOUS at 18:29

## 2018-10-10 RX ADMIN — Medication 1 DROP: at 17:00

## 2018-10-10 RX ADMIN — POTASSIUM CHLORIDE 10 MEQ: 7.46 INJECTION, SOLUTION INTRAVENOUS at 14:50

## 2018-10-10 RX ADMIN — METRONIDAZOLE 500 MG: 500 INJECTION, SOLUTION INTRAVENOUS at 05:48

## 2018-10-10 RX ADMIN — METRONIDAZOLE 500 MG: 500 INJECTION, SOLUTION INTRAVENOUS at 17:20

## 2018-10-10 RX ADMIN — ONDANSETRON 4 MG: 2 INJECTION INTRAMUSCULAR; INTRAVENOUS at 17:22

## 2018-10-10 RX ADMIN — Medication 1 DROP: at 21:50

## 2018-10-10 RX ADMIN — METRONIDAZOLE 500 MG: 500 INJECTION, SOLUTION INTRAVENOUS at 23:17

## 2018-10-10 RX ADMIN — HYDROCORTISONE ACETATE 25 MG: 25 SUPPOSITORY RECTAL at 21:54

## 2018-10-10 RX ADMIN — HYDROCORTISONE ACETATE 25 MG: 25 SUPPOSITORY RECTAL at 10:00

## 2018-10-10 RX ADMIN — HYDROMORPHONE HYDROCHLORIDE 0.5 MG: 1 INJECTION, SOLUTION INTRAMUSCULAR; INTRAVENOUS; SUBCUTANEOUS at 05:47

## 2018-10-10 RX ADMIN — MAGNESIUM SULFATE IN WATER 4 G: 40 INJECTION, SOLUTION INTRAVENOUS at 09:47

## 2018-10-10 RX ADMIN — Medication 10 ML: at 21:55

## 2018-10-10 RX ADMIN — Medication 1 UNITS: at 17:38

## 2018-10-10 RX ADMIN — ERYTHROMYCIN: 5 OINTMENT OPHTHALMIC at 10:00

## 2018-10-10 RX ADMIN — CEFEPIME HYDROCHLORIDE 2 G: 2 INJECTION, POWDER, FOR SOLUTION INTRAVENOUS at 10:40

## 2018-10-10 ASSESSMENT — PAIN DESCRIPTION - PAIN TYPE
TYPE_2: CHRONIC PAIN
TYPE: ACUTE PAIN

## 2018-10-10 ASSESSMENT — PAIN DESCRIPTION - LOCATION
LOCATION_2: SHOULDER
LOCATION: ABDOMEN
LOCATION_2: SHOULDER
LOCATION: ABDOMEN

## 2018-10-10 ASSESSMENT — PAIN DESCRIPTION - INTENSITY
RATING_2: 10
RATING_2: 4

## 2018-10-10 ASSESSMENT — PAIN DESCRIPTION - ORIENTATION: ORIENTATION_2: RIGHT

## 2018-10-10 ASSESSMENT — PAIN SCALES - GENERAL
PAINLEVEL_OUTOF10: 0
PAINLEVEL_OUTOF10: 4
PAINLEVEL_OUTOF10: 10
PAINLEVEL_OUTOF10: 9
PAINLEVEL_OUTOF10: 2
PAINLEVEL_OUTOF10: 9

## 2018-10-10 NOTE — PROGRESS NOTES
[facility] Surgery - Dr. Blu Christian  Daily Progress Note    Pt Name: Marck Doctor Record Number: [id number]  Date of Birth 1940   Today's Date: 10/11/2018    Hospital day # 7     ASSESSMENT   1. Partial small bowel obstruction   2. Morbid obesity (BMI 40)  3. Chronic kidney disease  4. Diabetes mellitus  5. Hypertension  6. Bilateral lower extremity cellulitis  7. Bilateral lower extremity venous insufficiency with ulcerations  8. Hypokalemia   9. External hemorrhoids    has a past medical history of Cancer ([facility] [address] Cataract of both eyes; DDD (degenerative disc disease), lumbar; Dysphagia, pharyngoesophageal; Fibrocystic breast; H/O ventral hernia repair; Headache; Hypercholesteremia; Hyperlipidemia; Hypertension; Iron deficiency anemia; LPRD (laryngopharyngeal reflux disease); Neuropathy; Obesity; Orbital abscess; Orbital cellulitis; SWAPNA (obstructive sleep apnea); Osteoarthritis; Osteoporosis; Persistent proteinuria associated with type 2 diabetes mellitus ([facility] [address] Sinusitis; Type II or unspecified type diabetes mellitus without mention of complication, not stated as uncontrolled; and Velopharyngeal incompetence. PLAN   1. CT enterography with possible distal small bowel obstruction noted, along with a mass in the region of the ileocecal valve. 2. GI following. Colonoscopy was canceled yesterday secondary to K+ levels. Plan is today at 3pm.   3. On clears  4. Still having bowel function   5. Risk stratification per medicine. Stress test looked okay yesterday. Echo with EF 60%. 6. DVT & GI prophylaxis   7. Pain & nausea control  8. Repeat electrolytes per protocol. Continue to monitor labs. 9. Abdomen benign. She is having good bowel function so does not seem like she is obstructed, even with the prep. Having a colonoscopy today to evaluate for possible mass. Will follow along. Patient clinically does not appear to be obstructed.  At this time there is no acute surgical

## 2018-10-10 NOTE — PROGRESS NOTES
[address] [address]  INPATIENT PHYSICAL THERAPY  EVALUATION  [facility] ONC MED 5K - [id number]    Time In: 9779  Time Out: 1235  Timed Code Treatment Minutes: 10 Minutes  Minutes: 25          Date: 10/10/2018  Patient Name: Orville Scott,  Gender:  female        MRN: [id number]  : 1940  (69 y.o.)      Referring Practitioner: Dr Elmer Modi  Diagnosis: SBO  Additional Pertinent Hx: Izabella Newby came from [address] [place] [address] on 10-04 where she was getting therapy following an admission for cellultis. Pt transferred from TCU to acute floor with SBO. 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    Cataract of both eyes     DDD (degenerative disc disease), lumbar     Dysphagia, pharyngoesophageal 2016    Fibrocystic breast     H/O ventral hernia repair     Headache 2017    Hypercholesteremia     Hyperlipidemia     Hypertension     Iron deficiency anemia     LPRD (laryngopharyngeal reflux disease) 2016    Neuropathy     peripheral    Obesity     Orbital abscess     Orbital cellulitis 2014    SWAPNA (obstructive sleep apnea)     Osteoarthritis     Osteoporosis     Persistent proteinuria associated with type 2 diabetes mellitus ([facility] [address] 2017    urine micral of 50.       Sinusitis     Type II or unspecified type diabetes mellitus without mention of complication, not stated as uncontrolled     diagnoses approx     Velopharyngeal incompetence 2016     Past Surgical History:   Procedure Laterality Date    ABDOMEN SURGERY      APPENDECTOMY      CARPAL TUNNEL RELEASE Bilateral , 2009    CATARACT REMOVAL  2011    bilat    CHOLECYSTECTOMY  1988    COLONOSCOPY  2016    DILATATION, ESOPHAGUS      ENDOSCOPY, COLON, DIAGNOSTIC      EYE SURGERY Left 2015    EYE SURGERY      CATARACT REMOVAL- BILATERAL    HEMORRHOID SURGERY      HERNIA REPAIR      HYSTERECTOMY, TOTAL ABDOMINAL  1980    JOINT REPLACEMENT  bilat  2007    knees    SINUS SURGERY  last 2009    removed a bone (2)--2 times no construction     SINUS SURGERY  02/01/2016    SINUS SURGERY  2016 x 2    SINUS SURGERY  09/18/2017    [facility] - Dr Leesa Gibson SINUS SURGERY  08/09/2017 [facility]17/2017 [facility]    TONSILLECTOMY      TOTAL KNEE ARTHROPLASTY  08/2003    Left TKR    VENTRAL HERNIA REPAIR  1992       Restrictions/Precautions:  Contact Precautions, General Precautions, Fall Risk, Isolation                            Subjective:  Chart Reviewed: Yes  Patient assessed for rehabilitation services?: Yes  Family / Caregiver Present: Yes  Comments:    Subjective: Pt in chair , agreeable to PT and ambulation    General:  Overall Orientation Status: Within Functional Limits  Follows Commands: Within Functional Limits    Vision: Impaired  Vision Exceptions: Wears glasses for reading    Hearing: Exceptions to [employer]  Hearing Exceptions: Hard of hearing/hearing concerns, No hearing aid         Pain:  Yes. Pain Assessment  Pain Assessment: 0-10  Pain Level: 2  Pain Type: Acute pain  Pain Location: Abdomen       Social/Functional History:    Lives With: Daughter  Type of Home: House  Home Layout: One level  Home Access: Level entry  Home Equipment: Rolling walker, Cane, 4 wheeled walker, Standard walker, Wheelchair-manual     Bathroom Toilet: Handicap height  Bathroom Accessibility: Accessible  IADL Comments: completes laundry, cooking, gardening, daughter assists with meals at times. Receives Help From: Family  ADL Assistance: Independent  Homemaking Assistance: Needs assistance  Ambulation Assistance: Independent  Transfer Assistance: Independent    Active : No  Leisure & Hobbies: playing with grandchildren  Additional Comments: reports use of walker at times for mobility in the home. Daughter works during the day, pt is at home alone at times. Good family support. Indep with ADLs PTA.       Objective:       RLE AROM: WFL         LLE AROM : WFL            Strength RLE: Exception  Comment: 4-/5    Strength LLE: Exception  Comment: 4-/5               Balance  Sitting - Static: Good  Sitting - Dynamic: Good, -  Standing - Static: Fair, +  Standing - Dynamic: Fair    Supine to Sit: Minimal assistance (or CGA anticipated )  Scooting: Supervision (in chair)    Transfers  Sit to Stand: Stand by assistance (from chair, good hand placement, steady)  Stand to sit: Stand by assistance (to chair, good techniuqe)  Bed to Chair: Unable to assess       Ambulation 1  Surface: level tile  Device: Rolling Walker  Assistance: Stand by assistance  Quality of Gait: decreased velocity and ranulfo, decreased but equal step length B, fair heel strike B, forward flexed trunk with downward gaze, 1-2 cues on correcting posture   Distance: 100 feet          Exercises:  Comments: Pt completed seated marches, long arc quads, toe and heel raises, and reclined hip abd/add and S.l.R's to imporve strength for  function     Activity Tolerance:  Activity Tolerance: Patient limited by fatigue;Patient limited by pain    Treatment Initiated: see therex  Assessment: Body structures, Functions, Activity limitations: Decreased functional mobility , Decreased strength, Decreased balance, Decreased endurance, Decreased safe awareness  Assessment: Pt with generalized weakness and slow pace. Rec PT while here to work on imporved safety in the home with gait and transfers  Prognosis: Good    Clinical Presentation: Moderate - Evolving with Changing Characteristics:  Pt with generalized weakness and slow pace. Rec PT while here to work on imporved safety in the home with gait and transfers    Decision Making:  Moderate Complexitybased on patient assessment and decision making process of determining plan of care and establishing reasonable expectations for measurable functional outcomes    REQUIRES PT FOLLOW UP: Yes    Discharge Recommendations:  Discharge Recommendations: Continue to assess pending progress    Patient 47.43  Mobility Inpatient without Stair CMS G-Code Modifier : CK

## 2018-10-10 NOTE — PROGRESS NOTES
Progress      Patient: Lindy Lazaro  YOB: 1940    MRN: [id number]     Acct: [de-identified]    PCP: Rocío Emery MD    Date of Admission: 10/4/2018    Date of Service: Pt seen/examined on 10/10/2018 and Admitted to Inpatient with expected LOS greater than two midnights due to medical therapy. Chief Complaint:  No chief complaint on file. History Of Present Illness:      69 y.o. female who presented to [facility] with \" transitional care unit on 9/30/2018 for continuation of IV ATB following the acute hospital stay for cellulitis of the lower legs. She was showing some progress with therapies however in the past day began with abd distension and vomiting. Labs and plain abd xrays were unremarkable for her but today with the continuation of symptoms she was made NPO except meds and a CT of the ABD/Pelvis was ordered. The radiologist called me to discuss the likely SBO with a transition point near the ABD wall mesh. I spoke with Dr Umm Russell who kindly accepted the [facility]. \"-per TCU note and confirmed by myself. Addendum: I recommeded surgery be notified, but they were in [address] [place] [address].  I elected to admit to accept the tranfer and consult surgery    ssubjective-  Abdominal pain 5/10 crampy  Questionable mass in the ileocecal area  Plans for colonoscopy per GI tomorrow  Low potassium    Scheduled Meds:   miconazole   Topical BID    magnesium replacement protocol   Other RX Placeholder    potassium replacement protocol   Other RX Placeholder    potassium chloride  10 mEq Intravenous every 2 hours    amLODIPine  10 mg Oral Daily    metroNIDAZOLE  500 mg Intravenous Q8H    bumetanide  2 mg Oral Daily    hydrocortisone  25 mg Rectal BID    cefepime  2 g Intravenous Q12H    enoxaparin  40 mg Subcutaneous Q24H    erythromycin   Both Eyes BID    fluticasone  2 spray Nasal BID    linezolid  600 mg Intravenous Q12H    mupirocin   Nasal BID    sodium chloride flush  10 mL acute intracranial abnormality is identified. There is absence of the native lenses within the bilateral orbits. Partial opacification is noted within the bilateral mastoid air cells which may correspond to mastoid effusions or mastoiditis. Postsurgical changes are present within the bilateral paranasal sinuses and there are fluid levels in the bilateral maxillary sinuses with opacification of the bilateral frontal sinuses. Multilevel degenerative changes are present within the cervical spine. This results in mild to moderate spinal canal narrowing at C3-C4. No suspicious osseous lesion is identified. There is partial visualization of an age indeterminant fracture involving the right proximal humerus. No suspicious finding is seen within the visualized paraspinal soft tissues. The visualized lung apices are clear. 1. Atherosclerotic calcification is present within the bilateral cavernous and clinoid internal carotid arteries resulting in mild luminal narrowing. No major branch occlusion, flow-limiting stenosis or aneurysm is otherwise identified intracranially. 2. No occlusion, flow-limiting stenosis, dissection or aneurysm is identified of the major cervical arterial structures. Soft atherosclerotic plaque is present at the left carotid bifurcation resulting in less than 50% luminal narrowing by NASCET criteria. 3. Age indeterminant fracture of the right proximal humerus. **This report has been created using voice recognition software. It may contain minor errors which are inherent in voice recognition technology. ** Final report electronically signed by Dr. Kellie Brunner on 10/1/2018 1:24 PM    Xr Abdomen (kub) (single Ap View)    Result Date: 10/3/2018  PROCEDURE: XR ABDOMEN (KUB) (SINGLE AP VIEW) CLINICAL INFORMATION: n/v, abdominal pain, . COMPARISON: No prior study. TECHNIQUE: A supine AP view of the abdomen was obtained. FINDINGS: Lung bases are clear.  Degenerative changes with S-shaped scoliosis of postsurgical changes. Superimposed acute sinusitis is suspected. Correlation with symptoms and further evaluation as clinically warranted. There is debris within the ethmoid sinus cavity. Postsurgical changes ethmoid sinuses and bilateral medial orbital wall. When compared to prior study there has been increase in debris, mucosal thickening within the visualized sinuses. **This report has been created using voice recognition software. It may contain minor errors which are inherent in voice recognition technology. ** Final report electronically signed by Dr. Mary Zhu on 10/1/2018 12:49 PM    Cta Neck W Wo Contrast (code Stroke Nihss 4 Or Above)    Result Date: 10/1/2018  PROCEDURE: CTA HEAD W WO CONTRAST, CTA NECK W WO CONTRAST CLINICAL INFORMATION: STROKE. Additional history obtained from the electronic medical record indicates the patient has facial numbness and facial droop. COMPARISON: None available. TECHNIQUE: 1 mm axial images were obtained through the head and neck after the fast bolus administration of contrast. A noncontrast localizer was obtained. 3-D reconstructions were performed on a dedicated 3-D workstation. These include multiplanar MPR images and multiplanar MIP images. Centerline reconstructions were obtained of the carotid systems. 80 mL Isovue-370 intravenous contrast was given. All CT scans at this facility use dose modulation, iterative reconstruction, and/or weight-based dosing when appropriate to reduce radiation dose to as low as reasonably achievable. FINDINGS: CTA NECK: Aortic arch and branches: Atherosclerotic calcification is present within the aortic arch. There is a common origin of the innominate and left common carotid arteries off the aortic arch. The origins of the great vessels off the arch are otherwise patent  without flow-limiting stenosis. Right common carotid artery/ICA: The right common carotid artery is tortuous and takes a medial, retropharyngeal course.  The right Stage III- secondary to nausea vomiting and dehydration- IV fluid BMP in the a.m.- resolved  Diabetes type 2 controlled- on current regimen  Hypokalemia and hypomagnesemia llikely secondary to loose stools and poor oral intake- replace magnesium first and then pota  Hypertension- benign essential- controlled on  current regime  Morbid obesity  Chronic anemia- between 8.5 and 9  Colonoscopy tomorrow  PT /INR      Thank you Padmini Escobar MD for the opportunity to be involved in this patient's care.     Electronically signed by Mansoor Pedersen MD on 10/10/2018 at 2:55 PM

## 2018-10-10 NOTE — PLAN OF CARE
Problem: Nutrition  Goal: Optimal nutrition therapy  Outcome: Ongoing  Nutrition Problem: Increased nutrient needs  Intervention: Food and/or Nutrient Delivery: Continue current diet, Continue current ONS, Vitamin Supplement (Recommend a Multivitamin w/minerals daily. Advance diet when medically feasible. Continue Keenan BID.)  Nutritional Goals: Pt will receive adequate po intake within 1-4 days.

## 2018-10-10 NOTE — PROGRESS NOTES
issues addressed. Will follow the patient closely with you. Also please see the orders for updated patient care orders written by ID team.    Thank you for involving us in this patient's care. I will be discussing this case with the treating physicians. Please don't hesitate to call me if any questions, issues  or concerns      Please see orders for updated patient care orders written today.   Electronically signed by Percilla Market on 10/10/2018 at 6:48 PM

## 2018-10-10 NOTE — PROGRESS NOTES
alone at times. Good family support. Indep with ADLs PTA. Objective        Overall Cognitive Status: WFL         Sensation  Overall Sensation Status: WFL                           LUE AROM (degrees)  LUE AROM : WNL          RUE AROM (degrees)  RUE General AROM: limited AROM to shoulder (pt reports prior UE fx in February), WFL distally       LUE Strength  LUE Strength Comment: BUE general deconditioning noted                 RUE Strength  RUE Strength Comment: BUE general deconditioning noted                     RUE Tone: Normotonic  LUE Tone: Normotonic    Movements Are Fluid And Coordinated: Yes               ADL  Grooming: Stand by assistance (standing for oral care)  UE Bathing: Increased time to complete, Setup, Supervision  LE Bathing: Increased time to complete, Minimal assistance  UE Dressing: Increased time to complete, Minimal assistance  LE Dressing: Maximum assistance (shoes )  Toileting: Moderate assistance (thoroughness after BM)          Transfers  Sit to stand: Contact guard assistance  Stand to sit: Contact guard assistance  Toilet Transfers  Equipment Used: Grab bars  Toilet Transfer: Contact guard assistance    Balance  Sitting Balance: Supervision  Standing Balance: Stand by assistance     Time: 3 minutes  Activity: standing ADL     Functional Mobility  Functional - Mobility Device: Rolling Walker  Activity: To/from bathroom  Assist Level: Contact guard assistance            Activity Tolerance:  Activity Tolerance: Patient limited by fatigue  Activity Tolerance: slow moving, rest breaks needed     Treatment Initiated:  OT eval completed, see above for more details.      Assessment:  Assessment: Pt would benefit from skilled OT intervention for maximizing pt safety and independence with ADL tasks and functional mobility for safe transition to the next level of care  Performance deficits / Impairments: Decreased functional mobility , Decreased ADL status, Decreased ROM, Decreased strength,

## 2018-10-10 NOTE — FLOWSHEET NOTE
Notified of morning potassium and calcium level. Received orders for Ionized calcium and  Magnesium. Magnesium and potassium protocol and replace magnesium before replacing potassium.

## 2018-10-11 ENCOUNTER — ANESTHESIA (OUTPATIENT)
Dept: ENDOSCOPY | Age: 78
DRG: 389 | End: 2018-10-11
Payer: MEDICARE

## 2018-10-11 ENCOUNTER — ANESTHESIA EVENT (OUTPATIENT)
Dept: ENDOSCOPY | Age: 78
DRG: 389 | End: 2018-10-11
Payer: MEDICARE

## 2018-10-11 VITALS
DIASTOLIC BLOOD PRESSURE: 70 MMHG | RESPIRATION RATE: 16 BRPM | SYSTOLIC BLOOD PRESSURE: 139 MMHG | OXYGEN SATURATION: 99 %

## 2018-10-11 LAB
GLUCOSE BLD-MCNC: 130 MG/DL (ref 70–108)
GLUCOSE BLD-MCNC: 135 MG/DL (ref 70–108)
GLUCOSE BLD-MCNC: 148 MG/DL (ref 70–108)
GLUCOSE BLD-MCNC: 174 MG/DL (ref 70–108)
MAGNESIUM: 1.8 MG/DL (ref 1.6–2.4)
POTASSIUM REFLEX MAGNESIUM: 3.2 MEQ/L (ref 3.5–5.2)
POTASSIUM SERPL-SCNC: 3.4 MEQ/L (ref 3.5–5.2)

## 2018-10-11 PROCEDURE — 3700000001 HC ADD 15 MINUTES (ANESTHESIA): Performed by: INTERNAL MEDICINE

## 2018-10-11 PROCEDURE — 84132 ASSAY OF SERUM POTASSIUM: CPT

## 2018-10-11 PROCEDURE — APPSS30 APP SPLIT SHARED TIME 16-30 MINUTES: Performed by: NURSE PRACTITIONER

## 2018-10-11 PROCEDURE — 1200000000 HC SEMI PRIVATE

## 2018-10-11 PROCEDURE — 2500000003 HC RX 250 WO HCPCS: Performed by: INTERNAL MEDICINE

## 2018-10-11 PROCEDURE — 99232 SBSQ HOSP IP/OBS MODERATE 35: CPT | Performed by: INTERNAL MEDICINE

## 2018-10-11 PROCEDURE — 7100000001 HC PACU RECOVERY - ADDTL 15 MIN: Performed by: INTERNAL MEDICINE

## 2018-10-11 PROCEDURE — 2580000003 HC RX 258: Performed by: FAMILY MEDICINE

## 2018-10-11 PROCEDURE — 6370000000 HC RX 637 (ALT 250 FOR IP): Performed by: HOSPITALIST

## 2018-10-11 PROCEDURE — 6360000002 HC RX W HCPCS: Performed by: HOSPITALIST

## 2018-10-11 PROCEDURE — 83735 ASSAY OF MAGNESIUM: CPT

## 2018-10-11 PROCEDURE — 3700000000 HC ANESTHESIA ATTENDED CARE: Performed by: INTERNAL MEDICINE

## 2018-10-11 PROCEDURE — 97116 GAIT TRAINING THERAPY: CPT

## 2018-10-11 PROCEDURE — 0DBK8ZX EXCISION OF ASCENDING COLON, VIA NATURAL OR ARTIFICIAL OPENING ENDOSCOPIC, DIAGNOSTIC: ICD-10-PCS | Performed by: INTERNAL MEDICINE

## 2018-10-11 PROCEDURE — 0DBL8ZX EXCISION OF TRANSVERSE COLON, VIA NATURAL OR ARTIFICIAL OPENING ENDOSCOPIC, DIAGNOSTIC: ICD-10-PCS | Performed by: INTERNAL MEDICINE

## 2018-10-11 PROCEDURE — C1773 RET DEV, INSERTABLE: HCPCS | Performed by: INTERNAL MEDICINE

## 2018-10-11 PROCEDURE — 6370000000 HC RX 637 (ALT 250 FOR IP): Performed by: INTERNAL MEDICINE

## 2018-10-11 PROCEDURE — 82948 REAGENT STRIP/BLOOD GLUCOSE: CPT

## 2018-10-11 PROCEDURE — 99232 SBSQ HOSP IP/OBS MODERATE 35: CPT | Performed by: SURGERY

## 2018-10-11 PROCEDURE — 6360000002 HC RX W HCPCS: Performed by: NURSE PRACTITIONER

## 2018-10-11 PROCEDURE — 97110 THERAPEUTIC EXERCISES: CPT

## 2018-10-11 PROCEDURE — 6360000002 HC RX W HCPCS: Performed by: FAMILY MEDICINE

## 2018-10-11 PROCEDURE — 3609019800 HC COLONOSCOPY WITH SUBMUCOSAL INJECTION: Performed by: INTERNAL MEDICINE

## 2018-10-11 PROCEDURE — 6360000002 HC RX W HCPCS: Performed by: NURSE ANESTHETIST, CERTIFIED REGISTERED

## 2018-10-11 PROCEDURE — 2709999900 HC NON-CHARGEABLE SUPPLY

## 2018-10-11 PROCEDURE — 7100000000 HC PACU RECOVERY - FIRST 15 MIN: Performed by: INTERNAL MEDICINE

## 2018-10-11 PROCEDURE — 88305 TISSUE EXAM BY PATHOLOGIST: CPT

## 2018-10-11 PROCEDURE — 2709999900 HC NON-CHARGEABLE SUPPLY: Performed by: INTERNAL MEDICINE

## 2018-10-11 PROCEDURE — 3609010600 HC COLONOSCOPY POLYPECTOMY SNARE/COLD BIOPSY: Performed by: INTERNAL MEDICINE

## 2018-10-11 RX ORDER — POTASSIUM CHLORIDE 20 MEQ/1
40 TABLET, EXTENDED RELEASE ORAL ONCE
Status: COMPLETED | OUTPATIENT
Start: 2018-10-11 | End: 2018-10-11

## 2018-10-11 RX ORDER — POTASSIUM CHLORIDE 7.45 MG/ML
10 INJECTION INTRAVENOUS
Status: DISPENSED | OUTPATIENT
Start: 2018-10-11 | End: 2018-10-11

## 2018-10-11 RX ORDER — PROPOFOL 10 MG/ML
INJECTION, EMULSION INTRAVENOUS PRN
Status: DISCONTINUED | OUTPATIENT
Start: 2018-10-11 | End: 2018-10-11 | Stop reason: SDUPTHER

## 2018-10-11 RX ADMIN — Medication 1 UNITS: at 22:17

## 2018-10-11 RX ADMIN — POTASSIUM CHLORIDE 10 MEQ: 7.46 INJECTION, SOLUTION INTRAVENOUS at 07:57

## 2018-10-11 RX ADMIN — Medication 1 DROP: at 17:31

## 2018-10-11 RX ADMIN — MICONAZOLE NITRATE: 2 POWDER TOPICAL at 22:22

## 2018-10-11 RX ADMIN — POTASSIUM CHLORIDE 10 MEQ: 7.46 INJECTION, SOLUTION INTRAVENOUS at 01:17

## 2018-10-11 RX ADMIN — PROPOFOL 100 MG: 10 INJECTION, EMULSION INTRAVENOUS at 13:45

## 2018-10-11 RX ADMIN — CEFEPIME HYDROCHLORIDE 2 G: 2 INJECTION, POWDER, FOR SOLUTION INTRAVENOUS at 22:21

## 2018-10-11 RX ADMIN — MICONAZOLE NITRATE: 2 POWDER TOPICAL at 09:15

## 2018-10-11 RX ADMIN — ENOXAPARIN SODIUM 40 MG: 40 INJECTION SUBCUTANEOUS at 22:15

## 2018-10-11 RX ADMIN — MUPIROCIN: 20 OINTMENT TOPICAL at 22:16

## 2018-10-11 RX ADMIN — ERYTHROMYCIN: 5 OINTMENT OPHTHALMIC at 22:16

## 2018-10-11 RX ADMIN — ACETAMINOPHEN 650 MG: 325 TABLET ORAL at 11:08

## 2018-10-11 RX ADMIN — ONDANSETRON 4 MG: 2 INJECTION INTRAMUSCULAR; INTRAVENOUS at 10:05

## 2018-10-11 RX ADMIN — MUPIROCIN: 20 OINTMENT TOPICAL at 09:16

## 2018-10-11 RX ADMIN — CARBOXYMETHYLCELLULOSE SODIUM 1 DROP: 10 GEL OPHTHALMIC at 22:18

## 2018-10-11 RX ADMIN — METRONIDAZOLE 500 MG: 500 INJECTION, SOLUTION INTRAVENOUS at 06:04

## 2018-10-11 RX ADMIN — HYDROCORTISONE ACETATE 25 MG: 25 SUPPOSITORY RECTAL at 22:22

## 2018-10-11 RX ADMIN — FLUTICASONE PROPIONATE 2 SPRAY: 50 SPRAY, METERED NASAL at 09:14

## 2018-10-11 RX ADMIN — CARBOXYMETHYLCELLULOSE SODIUM 1 DROP: 10 GEL OPHTHALMIC at 09:15

## 2018-10-11 RX ADMIN — Medication 10 ML: at 10:31

## 2018-10-11 RX ADMIN — Medication 1 DROP: at 22:16

## 2018-10-11 RX ADMIN — HYDROMORPHONE HYDROCHLORIDE 0.5 MG: 1 INJECTION, SOLUTION INTRAMUSCULAR; INTRAVENOUS; SUBCUTANEOUS at 22:15

## 2018-10-11 RX ADMIN — ONDANSETRON 4 MG: 2 INJECTION INTRAMUSCULAR; INTRAVENOUS at 01:05

## 2018-10-11 RX ADMIN — LINEZOLID 600 MG: 600 INJECTION, SOLUTION INTRAVENOUS at 03:33

## 2018-10-11 RX ADMIN — AMLODIPINE BESYLATE 10 MG: 10 TABLET ORAL at 09:14

## 2018-10-11 RX ADMIN — PROPOFOL 200 MG: 10 INJECTION, EMULSION INTRAVENOUS at 13:35

## 2018-10-11 RX ADMIN — HYDROCORTISONE ACETATE 25 MG: 25 SUPPOSITORY RECTAL at 10:33

## 2018-10-11 RX ADMIN — POTASSIUM CHLORIDE 10 MEQ: 7.46 INJECTION, SOLUTION INTRAVENOUS at 05:08

## 2018-10-11 RX ADMIN — POTASSIUM CHLORIDE 10 MEQ: 7.46 INJECTION, SOLUTION INTRAVENOUS at 06:51

## 2018-10-11 RX ADMIN — ERYTHROMYCIN: 5 OINTMENT OPHTHALMIC at 11:09

## 2018-10-11 RX ADMIN — FLUTICASONE PROPIONATE 2 SPRAY: 50 SPRAY, METERED NASAL at 22:16

## 2018-10-11 RX ADMIN — Medication 1 DROP: at 09:14

## 2018-10-11 RX ADMIN — POTASSIUM CHLORIDE 10 MEQ: 7.46 INJECTION, SOLUTION INTRAVENOUS at 03:24

## 2018-10-11 RX ADMIN — METRONIDAZOLE 500 MG: 500 INJECTION, SOLUTION INTRAVENOUS at 15:43

## 2018-10-11 RX ADMIN — LINEZOLID 600 MG: 600 INJECTION, SOLUTION INTRAVENOUS at 17:20

## 2018-10-11 RX ADMIN — BUMETANIDE 2 MG: 1 TABLET ORAL at 09:14

## 2018-10-11 RX ADMIN — CEFEPIME HYDROCHLORIDE 2 G: 2 INJECTION, POWDER, FOR SOLUTION INTRAVENOUS at 09:32

## 2018-10-11 RX ADMIN — POTASSIUM CHLORIDE 40 MEQ: 20 TABLET, EXTENDED RELEASE ORAL at 19:04

## 2018-10-11 RX ADMIN — PROPOFOL 100 MG: 10 INJECTION, EMULSION INTRAVENOUS at 13:56

## 2018-10-11 ASSESSMENT — PAIN SCALES - GENERAL
PAINLEVEL_OUTOF10: 7
PAINLEVEL_OUTOF10: 3
PAINLEVEL_OUTOF10: 7
PAINLEVEL_OUTOF10: 0
PAINLEVEL_OUTOF10: 4
PAINLEVEL_OUTOF10: 0

## 2018-10-11 ASSESSMENT — PAIN - FUNCTIONAL ASSESSMENT: PAIN_FUNCTIONAL_ASSESSMENT: 0-10

## 2018-10-11 NOTE — PROGRESS NOTES
S-shaped scoliosis of the lumbar spine. SI joints are symmetric. Degenerative changes both hips. Osteopenia. Stool in the visualized colon. Postsurgical clips in the right upper quadrant. Nonspecific bowel gas pattern. Nonspecific bowel gas pattern. **This report has been created using voice recognition software. It may contain minor errors which are inherent in voice recognition technology. ** Final report electronically signed by Dr. Tarik Cooper on 10/3/2018 6:46 PM    Ct Head Wo Contrast    Result Date: 10/1/2018  PROCEDURE: CT HEAD WO CONTRAST CLINICAL INFORMATION: FACIAL MUSCLE WEAKNESS/PARALYSIS, . COMPARISON: 4/20/2018 TECHNIQUE: Noncontrast 5 mm axial images were obtained through the brain. All CT scans at this facility use dose modulation, iterative reconstruction, and/or weight-based dosing when appropriate to reduce radiation dose to as low as reasonably achievable. FINDINGS: No acute intracranial findings. Mild generalized volume loss and small vessel ischemic changes. Old right basal ganglia lacunar infarction. No acute hemorrhage or midline shift. Gray-white differentiation is preserved. No acute hemorrhage or midline shift. Ventricles are within normal limits for age. Partially opacified mastoid air cells and paranasal sinuses with fluid in both maxillary sinuses and sphenoid. There is bony remodeling of the maxillary sinuses. Findings may be related to chronic changes or postsurgical changes. Superimposed acute sinusitis is suspected. Correlation with symptoms and further evaluation is clinically warranted. There is debris within the ethmoid sinus cavity. Postsurgical changes ethmoid sinuses and bilateral medial orbital wall. Skull: Unremarkable. Soft tissues: Unremarkable. No acute intracranial findings. Partially opacified mastoid air cells and paranasal sinuses with fluid in both maxillary sinuses and sphenoid. There is bony remodeling of the maxillary sinuses.  Findings may be related to course. The right carotid bifurcation and right cervical internal carotid artery are within normal limits. Left common carotid artery/ICA: Soft atherosclerotic plaque is present at the left carotid bifurcation resulting in less than 50% luminal narrowing by NASCET criteria. The proximal left cervical internal carotid artery takes a medial retropharyngeal course. Vertebral arteries: The vertebral arteries are codominant. The vertebral arteries are otherwise patent. The proximal left vertebral artery is noted to be tortuous. CTA HEAD: Internal carotid arteries: Atherosclerotic calcification is present involving the bilateral cavernous and clinoid internal carotid arteries resulting in mild luminal narrowing. Middle cerebral arteries: Patent with normal arborization of the distal branches. Anterior cerebral arteries: Patent with normal arborization of the distal branches. There is a patent anterior communicating artery. Vertebral arteries: Unremarkable. Basilar artery: Unremarkable. Superior cerebellar arteries: Unremarkable. Posterior cerebral arteries: Unremarkable. Bilateral posterior communicating arteries are not visualized, a normal variant. The superior sagittal sinus, vein of Bon, internal cerebral veins, straight sinus, transverse sinuses and sigmoid sinuses are patent. No acute intracranial abnormality is identified. There is absence of the native lenses within the bilateral orbits. Partial opacification is noted within the bilateral mastoid air cells which may correspond to mastoid effusions or mastoiditis. Postsurgical changes are present within the bilateral paranasal sinuses and there are fluid levels in the bilateral maxillary sinuses with opacification of the bilateral frontal sinuses. Multilevel degenerative changes are present within the cervical spine. This results in mild to moderate spinal canal narrowing at C3-C4. No suspicious osseous lesion is identified.  There is partial visualization of an

## 2018-10-11 NOTE — PROGRESS NOTES
INFECTIOUS DISEASES  PROGRESS NOTE    Pt Name: Ya Waggoner  MRN: [id number]  [id number]  YOB: 1940  Admit Date: 10/4/2018  7:49 PM  Date of evaluation: 10/11/2018  Primary Care Physician: Lincoln Machuca MD   [id number]     80-year-old female known to our service from her  previous hospitalization in [facility]. Tarsha's and essentially she was seen for her  problems with cellulitis of the legs and ulcers that had growth of MRSA and  streptococcus in the past and she was noted to have spongiotic dermatitis  which was determined to be contact versus eczematous dermatitis on biopsies  that were done. The patient was seen last in our office in 05/2018 and the  patient also has had history of adenocarcinoma of the nasopharynx for which  she had seen ENT doctor and she was placed on long-term Levaquin for 21  days. The patient had been seen recently by Dr. Janell Oneill, her family  provider, and it appears that the patient continues to have ulcers on the  legs and drainage from the legs. Since the legs are worsening, she was  asked to be evaluated. She presented to the office today given her  appearance of the legs and cellulitis of the legs and overall suffering and  taking outpatient treatment, decided to admit her to the hospital given her  complications with diabetes and overall venous insufficiency and peripheral edematous changes. Went to tcu on 9/30/18 for antibiotic therapy and because of sbo was transferred 10/4/18     Subjective: Interval History: As above. Notes reviewed.  D/w nursing staff    Active ATBs: cefepime 9/27                       vanco 9/27 - 10/30                       zyvox 10/8                        Flagyl 10/9 - 10/11    Pt/Chart review:  Fever No, DiarrheaNo, Dyspnea No, Nausea:None       Data:   Scheduled Meds:   miconazole   Topical BID    magnesium replacement protocol   Other RX Placeholder    potassium replacement protocol   Other RX Placeholder    amLODIPine  10 mg serious comorbidity and body mass index (BMI) of 40.0 to 44.9 in adult [facility])      Plan:  Follow wound healing at unaboots changes  to the legs   Pt/ot  Follow surgical and gi plans  Stop flagyl   Labs in am     Labs, cultures, and radiographs reviewed. Changes made as necessary. D/w Patient's R.N. and specific instructions given and infectious disease issues addressed. Will follow the patient closely with you. Also please see the orders for updated patient care orders written by ID team.    Thank you for involving us in this patient's care. I will be discussing this case with the treating physicians. Please don't hesitate to call me if any questions, issues  or concerns      Please see orders for updated patient care orders written today.   Electronically signed by Kelly Dumont on 10/11/2018 at 3:37 PM

## 2018-10-11 NOTE — CARE COORDINATION
10/11/18, 9:43 AM    DISCHARGE BARRIERS    Spoke with patient she is hoping to return to TCU after discharge from the hospital.  TCU consult as been placed.

## 2018-10-11 NOTE — ANESTHESIA POSTPROCEDURE EVALUATION
Department of Anesthesiology  Postprocedure Note    Patient: Mercedes Cha  MRN: [id number]  YOB: 1940  Date of evaluation: 10/11/2018  Time:  2:11 PM     Procedure Summary     Date:  10/11/18 Room / Location:  [facility] SSN Funding ENDO 6 / 2000 Jose [facility] SSN Funding Endoscopy    Anesthesia Start:  1333 Anesthesia Stop:      Procedures:       COLONOSCOPY SUBMUCOSAL/BOTOX INJECTION      COLONOSCOPY POLYPECTOMY SNARE/COLD BIOPSY Diagnosis:  (screening)    Surgeon:  Billie Martinez MD Responsible Provider:  Tasneem Escamilla MD    Anesthesia Type:  MAC ASA Status:  3          Anesthesia Type: MAC    Greta Phase I: Greta Score: 8    Greta Phase II:      Last vitals: Reviewed and per EMR flowsheets.        Anesthesia Post Evaluation    Patient location during evaluation: bedside  Patient participation: complete - patient participated  Level of consciousness: awake  Pain score: 0  Airway patency: patent  Nausea & Vomiting: no nausea and no vomiting  Complications: no  Cardiovascular status: blood pressure returned to baseline and hemodynamically stable  Respiratory status: acceptable, room air and spontaneous ventilation  Hydration status: stable

## 2018-10-11 NOTE — PLAN OF CARE
Problem: Falls - Risk of:  Goal: Will remain free from falls  Will remain free from falls   Outcome: Ongoing  Patient free of falls throughout shift. Patient is up with one assist and walker. Patient is not impulsive, paitents chair alarm in place throughout shift. Problem: Risk for Impaired Skin Integrity  Goal: Tissue integrity - skin and mucous membranes  Structural intactness and normal physiological function of skin and  mucous membranes. Outcome: Ongoing  Patient has current stage 2 on her buttocks. Patient bring turned to the best of her ability in the chair. Refuses to go into the bed. Patient is being turned throughout shift and skin assessed frequently. Problem: Pain:  Goal: Pain level will decrease  Pain level will decrease    Outcome: Ongoing  Patient continues to have abdominal pain throughout shift. Patient being medicated per physician order. Problem: Skin Integrity/Risk  Goal: Wound healing  Outcome: Ongoing  No new skin issues noted throughout shift. Problem: Nutrition  Goal: Optimal nutrition therapy  Outcome: Ongoing  Patient currently on a clear liquid diet. States she has not been able to keep much down. Problem: Cardiovascular  Goal: No DVT, peripheral vascular complications  Outcome: Not Met This Shift  Patient free of signs and symptoms of DVT. Patient currently receiving lovenox as DVT prophylaxis. Problem: GI  Goal: No bowel complications  Outcome: Ongoing  Patient has small bowel movement this shift. Problem:   Goal: Adequate urinary output  Outcome: Ongoing  Patient has had adequate urine output throughout shift. Problem: Discharge Planning:  Goal: Discharged to appropriate level of care  Discharged to appropriate level of care   Outcome: Ongoing  Discharge planning is ongoing at this time. Comments: Care plan reviewed with patient. Patient verbalizes understanding of plan of care.

## 2018-10-11 NOTE — PROGRESS NOTES
Physical Therapy   [address] [address]  INPATIENT PHYSICAL THERAPY  DAILY NOTE  [facility] ENDOSCOPY - [facility] ENDO POOL RM/NONE    Time In: 7916  Time Out: 1100  Timed Code Treatment Minutes: 23 Minutes  Minutes: 23          Date: 10/11/2018  Patient Name: Nusrat Mijares,  Gender:  female        MRN: [id number]  : 1940  (69 y.o.)     Referring Practitioner: Dr Lonn Severs Chundngapuyil  Diagnosis: SBO  Additional Pertinent Hx: Alfred Alas came from [address] [place] [address] on 10-04 where she was getting therapy following an admission for cellultis. Pt transferred from TCU to acute floor with SBO. Past Medical History:   Diagnosis Date    Cancer [facility])     adenocarcinoma of her sinuses    Cataract of both eyes     DDD (degenerative disc disease), lumbar     Dysphagia, pharyngoesophageal 2016    Fibrocystic breast     H/O ventral hernia repair     Headache 2017    Hypercholesteremia     Hyperlipidemia     Hypertension     Iron deficiency anemia     LPRD (laryngopharyngeal reflux disease) 2016    Neuropathy     peripheral    Obesity     Orbital abscess     Orbital cellulitis 2014    SWAPNA (obstructive sleep apnea)     Osteoarthritis     Osteoporosis     Persistent proteinuria associated with type 2 diabetes mellitus ([facility] [address] 2017    urine micral of 50.       Sinusitis     Type II or unspecified type diabetes mellitus without mention of complication, not stated as uncontrolled     diagnoses approx 2000    Velopharyngeal incompetence 2016     Past Surgical History:   Procedure Laterality Date    ABDOMEN SURGERY      APPENDECTOMY      CARPAL TUNNEL RELEASE Bilateral , 2009    CATARACT REMOVAL      bilat    CHOLECYSTECTOMY  1988    COLONOSCOPY  2016    DILATATION, ESOPHAGUS      ENDOSCOPY, COLON, DIAGNOSTIC      EYE SURGERY Left 2015    EYE SURGERY      CATARACT REMOVAL- BILATERAL    HEMORRHOID SURGERY  1980s    HERNIA REPAIR      HYSTERECTOMY, TOTAL ABDOMINAL  1980    JOINT REPLACEMENT  bilat 2005/ 2007    knees    SINUS SURGERY  last 2009    removed a bone (2)--2 times no construction     SINUS SURGERY  02/01/2016    SINUS SURGERY  2016 x 2    SINUS SURGERY  09/18/2017    [facility] - Dr Claudio Shen SINUS SURGERY  08/09/2017 [facility]17[facility]2017 [facility]    TONSILLECTOMY      TOTAL KNEE ARTHROPLASTY  08/2003    Left TKR    VENTRAL HERNIA REPAIR  1992       Restrictions/Precautions:  Contact Precautions, General Precautions, Fall Risk, Isolation        Other position/activity restrictions: stage II on bottom       Prior Level of Function:  ADL Assistance: Independent  Homemaking Assistance: Needs assistance  Ambulation Assistance: Independent  Transfer Assistance: Independent  Additional Comments: reports use of walker at times for mobility in the home. Daughter works during the day, pt is at home alone at times. Good family support. Indep with ADLs PTA. Subjective:  Chart Reviewed: Yes  Family / Caregiver Present: No  Subjective: RN approved session, pt resting in bedside chair upon arrival, just finished using the commode with RN student. Pleasant and agreeable to therapy. Pain:  Denies (Pt reports having R UE pain with ambulating/using the AD).           Social/Functional:  Lives With: Daughter  Type of Home: House  Home Layout: One level  Home Access: Level entry  Home Equipment: Rolling walker, Cane, 4 wheeled walker, Standard walker, Wheelchair-manual     Objective:       Transfers  Sit to Stand: Supervision (chsir, good technique)  Stand to sit: Supervision       Ambulation 1  Surface: level tile  Device: Rolling Walker  Assistance: Stand by assistance  Quality of Gait: fair velocity and ranulfo, fair but equal step length B, fair heel strike B, slight forward flexed trunk   Distance: 150ft         Balance  Comments: Static and dynamic standing balance performed with cloth management, steady, SBA x3 min    Exercises:  Exercises  Comments: Pt completed seated marches, long arc quads, toe and heel raises, and reclined hip abd/add, SLR, x15 reps each, all to to improve strength for function    Activity Tolerance:  Activity Tolerance: Patient limited by endurance; Patient limited by fatigue    Assessment: Body structures, Functions, Activity limitations: Decreased functional mobility , Decreased strength, Decreased balance, Decreased endurance, Decreased safe awareness  Assessment: Pt tolerated session fairly well, demo'd good safety with tfers. Pt would benefit from cont skilled PT services while here to [facility] to increase strength and endurance for functional mobility. Prognosis: Good     REQUIRES PT FOLLOW UP: Yes    Discharge Recommendations:  Discharge Recommendations: Continue to assess pending progress, Patient would benefit from continued therapy after discharge    Patient Education:  Patient Education: POC, transfers, gait, therex    Equipment Recommendations:       Safety:  Type of devices:  All fall risk precautions in place, Patient at risk for falls, Call light within reach, Left in chair, Chair alarm in place, Gait belt    Plan:  Times per week: 5 X GM  Times per day: Daily  Specific instructions for Next Treatment: ther ex, mobility, gait, balance, endurance , step  Current Treatment Recommendations: Strengthening, Functional Mobility Training, Transfer Training, Balance Training, Gait Training, Endurance Training, Stair training    Goals:  Patient goals : Go home    Short term goals  Time Frame for Short term goals: 1week  Short term goal 1: patient to perform bed mobility at [facility] to get in and out of bed  Short term goal 2: patient to perform transfers to and from various surfaces at Mod I  to rise from bed or chair  Short term goal 3: patient to ambulate 200ft with RW at S for community  mobility  Short term goal 4: patient to negotiate 6\" step with RW at [facility] for curb negotiation    Long term goals  Time Frame for Long term goals : NA  Long term goal 1:    Long term goal 2:

## 2018-10-11 NOTE — PROGRESS NOTES
12:24 Report off to primary RN Racheal.  Electronically signed by Kaitlin Nunes on 10/11/2018 at 12:25 PM

## 2018-10-12 ENCOUNTER — TELEPHONE (OUTPATIENT)
Dept: FAMILY MEDICINE CLINIC | Age: 78
End: 2018-10-12

## 2018-10-12 LAB
ANION GAP SERPL CALCULATED.3IONS-SCNC: 14 MEQ/L (ref 8–16)
BUN BLDV-MCNC: 6 MG/DL (ref 7–22)
CALCIUM SERPL-MCNC: 8.4 MG/DL (ref 8.5–10.5)
CHLORIDE BLD-SCNC: 98 MEQ/L (ref 98–111)
CO2: 22 MEQ/L (ref 23–33)
CREAT SERPL-MCNC: 0.7 MG/DL (ref 0.4–1.2)
GFR SERPL CREATININE-BSD FRML MDRD: 81 ML/MIN/1.73M2
GLUCOSE BLD-MCNC: 143 MG/DL (ref 70–108)
GLUCOSE BLD-MCNC: 145 MG/DL (ref 70–108)
GLUCOSE BLD-MCNC: 155 MG/DL (ref 70–108)
POTASSIUM SERPL-SCNC: 3.1 MEQ/L (ref 3.5–5.2)
POTASSIUM SERPL-SCNC: 3.2 MEQ/L (ref 3.5–5.2)
SODIUM BLD-SCNC: 134 MEQ/L (ref 135–145)

## 2018-10-12 PROCEDURE — 6370000000 HC RX 637 (ALT 250 FOR IP): Performed by: INTERNAL MEDICINE

## 2018-10-12 PROCEDURE — 36592 COLLECT BLOOD FROM PICC: CPT

## 2018-10-12 PROCEDURE — 97116 GAIT TRAINING THERAPY: CPT

## 2018-10-12 PROCEDURE — 84132 ASSAY OF SERUM POTASSIUM: CPT

## 2018-10-12 PROCEDURE — 80048 BASIC METABOLIC PNL TOTAL CA: CPT

## 2018-10-12 PROCEDURE — 82948 REAGENT STRIP/BLOOD GLUCOSE: CPT

## 2018-10-12 PROCEDURE — 1200000000 HC SEMI PRIVATE

## 2018-10-12 PROCEDURE — APPSS30 APP SPLIT SHARED TIME 16-30 MINUTES: Performed by: NURSE PRACTITIONER

## 2018-10-12 PROCEDURE — 6360000002 HC RX W HCPCS: Performed by: FAMILY MEDICINE

## 2018-10-12 PROCEDURE — 2580000003 HC RX 258: Performed by: INTERNAL MEDICINE

## 2018-10-12 PROCEDURE — 6360000002 HC RX W HCPCS: Performed by: NURSE PRACTITIONER

## 2018-10-12 PROCEDURE — 2580000003 HC RX 258: Performed by: FAMILY MEDICINE

## 2018-10-12 PROCEDURE — 29580 STRAPPING UNNA BOOT: CPT

## 2018-10-12 PROCEDURE — 2709999900 HC NON-CHARGEABLE SUPPLY

## 2018-10-12 PROCEDURE — 6360000002 HC RX W HCPCS: Performed by: INTERNAL MEDICINE

## 2018-10-12 PROCEDURE — 99232 SBSQ HOSP IP/OBS MODERATE 35: CPT | Performed by: INTERNAL MEDICINE

## 2018-10-12 PROCEDURE — 99232 SBSQ HOSP IP/OBS MODERATE 35: CPT | Performed by: SURGERY

## 2018-10-12 PROCEDURE — 6370000000 HC RX 637 (ALT 250 FOR IP): Performed by: HOSPITALIST

## 2018-10-12 PROCEDURE — 97110 THERAPEUTIC EXERCISES: CPT

## 2018-10-12 RX ORDER — POTASSIUM CHLORIDE 7.45 MG/ML
10 INJECTION INTRAVENOUS ONCE
Status: COMPLETED | OUTPATIENT
Start: 2018-10-12 | End: 2018-10-12

## 2018-10-12 RX ORDER — POTASSIUM CHLORIDE 20 MEQ/1
40 TABLET, EXTENDED RELEASE ORAL ONCE
Status: COMPLETED | OUTPATIENT
Start: 2018-10-12 | End: 2018-10-12

## 2018-10-12 RX ORDER — GREEN TEA/HOODIA GORDONII 315-12.5MG
1 CAPSULE ORAL DAILY
Qty: 30 TABLET | Refills: 0 | Status: SHIPPED | OUTPATIENT
Start: 2018-10-12 | End: 2018-12-04 | Stop reason: ALTCHOICE

## 2018-10-12 RX ORDER — ONDANSETRON 4 MG/1
4 TABLET, ORALLY DISINTEGRATING ORAL EVERY 8 HOURS PRN
Qty: 30 TABLET | Refills: 0 | Status: SHIPPED | OUTPATIENT
Start: 2018-10-12 | End: 2018-12-04 | Stop reason: ALTCHOICE

## 2018-10-12 RX ORDER — HYDROCORTISONE ACETATE 25 MG/1
25 SUPPOSITORY RECTAL 2 TIMES DAILY
Qty: 60 SUPPOSITORY | Refills: 1 | Status: SHIPPED | OUTPATIENT
Start: 2018-10-12 | End: 2018-10-15 | Stop reason: ALTCHOICE

## 2018-10-12 RX ORDER — POTASSIUM CHLORIDE 20 MEQ/1
20 TABLET, EXTENDED RELEASE ORAL ONCE
Status: COMPLETED | OUTPATIENT
Start: 2018-10-12 | End: 2018-10-12

## 2018-10-12 RX ORDER — CIPROFLOXACIN 500 MG/1
500 TABLET, FILM COATED ORAL 2 TIMES DAILY
Qty: 20 TABLET | Refills: 0 | Status: SHIPPED | OUTPATIENT
Start: 2018-10-12 | End: 2018-10-22

## 2018-10-12 RX ORDER — POTASSIUM CHLORIDE 20 MEQ/1
20 TABLET, EXTENDED RELEASE ORAL DAILY
Qty: 60 TABLET | Refills: 3 | Status: SHIPPED | OUTPATIENT
Start: 2018-10-12 | End: 2018-12-04 | Stop reason: SDUPTHER

## 2018-10-12 RX ORDER — BUMETANIDE 2 MG/1
2 TABLET ORAL DAILY
Qty: 30 TABLET | Refills: 3 | Status: SHIPPED | OUTPATIENT
Start: 2018-10-13 | End: 2019-02-15 | Stop reason: SDUPTHER

## 2018-10-12 RX ORDER — DOXYCYCLINE HYCLATE 100 MG/1
100 CAPSULE ORAL 2 TIMES DAILY
Qty: 20 CAPSULE | Refills: 0 | Status: SHIPPED | OUTPATIENT
Start: 2018-10-12 | End: 2018-10-22

## 2018-10-12 RX ADMIN — Medication 1 DROP: at 21:56

## 2018-10-12 RX ADMIN — CEFEPIME HYDROCHLORIDE 2 G: 2 INJECTION, POWDER, FOR SOLUTION INTRAVENOUS at 10:30

## 2018-10-12 RX ADMIN — HYDROCORTISONE ACETATE 25 MG: 25 SUPPOSITORY RECTAL at 21:57

## 2018-10-12 RX ADMIN — BUMETANIDE 2 MG: 1 TABLET ORAL at 10:10

## 2018-10-12 RX ADMIN — ONDANSETRON 4 MG: 2 INJECTION INTRAMUSCULAR; INTRAVENOUS at 02:41

## 2018-10-12 RX ADMIN — ERYTHROMYCIN: 5 OINTMENT OPHTHALMIC at 10:10

## 2018-10-12 RX ADMIN — HYDROCORTISONE ACETATE 25 MG: 25 SUPPOSITORY RECTAL at 10:33

## 2018-10-12 RX ADMIN — POTASSIUM CHLORIDE 10 MEQ: 7.46 INJECTION, SOLUTION INTRAVENOUS at 15:42

## 2018-10-12 RX ADMIN — FLUTICASONE PROPIONATE 2 SPRAY: 50 SPRAY, METERED NASAL at 10:10

## 2018-10-12 RX ADMIN — CARBOXYMETHYLCELLULOSE SODIUM 1 DROP: 10 GEL OPHTHALMIC at 21:56

## 2018-10-12 RX ADMIN — ERYTHROMYCIN: 5 OINTMENT OPHTHALMIC at 21:56

## 2018-10-12 RX ADMIN — MUPIROCIN: 20 OINTMENT TOPICAL at 21:56

## 2018-10-12 RX ADMIN — Medication 10 ML: at 10:36

## 2018-10-12 RX ADMIN — Medication 1 DROP: at 15:47

## 2018-10-12 RX ADMIN — AMLODIPINE BESYLATE 10 MG: 10 TABLET ORAL at 10:30

## 2018-10-12 RX ADMIN — CEFEPIME HYDROCHLORIDE 2 G: 2 INJECTION, POWDER, FOR SOLUTION INTRAVENOUS at 22:02

## 2018-10-12 RX ADMIN — ENOXAPARIN SODIUM 40 MG: 40 INJECTION SUBCUTANEOUS at 21:56

## 2018-10-12 RX ADMIN — MICONAZOLE NITRATE: 2 POWDER TOPICAL at 10:37

## 2018-10-12 RX ADMIN — FLUTICASONE PROPIONATE 2 SPRAY: 50 SPRAY, METERED NASAL at 21:56

## 2018-10-12 RX ADMIN — MUPIROCIN: 20 OINTMENT TOPICAL at 10:10

## 2018-10-12 RX ADMIN — LINEZOLID 600 MG: 600 INJECTION, SOLUTION INTRAVENOUS at 04:57

## 2018-10-12 RX ADMIN — POTASSIUM CHLORIDE 20 MEQ: 20 TABLET, EXTENDED RELEASE ORAL at 15:42

## 2018-10-12 RX ADMIN — POTASSIUM CHLORIDE 40 MEQ: 20 TABLET, EXTENDED RELEASE ORAL at 21:57

## 2018-10-12 RX ADMIN — SODIUM CHLORIDE: 9 INJECTION, SOLUTION INTRAVENOUS at 04:57

## 2018-10-12 RX ADMIN — Medication 1 DROP: at 10:10

## 2018-10-12 RX ADMIN — LINEZOLID 600 MG: 600 INJECTION, SOLUTION INTRAVENOUS at 18:28

## 2018-10-12 RX ADMIN — CARBOXYMETHYLCELLULOSE SODIUM 1 DROP: 10 GEL OPHTHALMIC at 10:10

## 2018-10-12 ASSESSMENT — PAIN SCALES - GENERAL
PAINLEVEL_OUTOF10: 0

## 2018-10-12 NOTE — FLOWSHEET NOTE
10/11/18 2040   Encounter Summary   Services provided to: Patient   Referral/Consult From: [facility] [facility] none   Continue Visiting Yes  (10/11/18 Excited and happy )   Complexity of Encounter Moderate   Length of Encounter 15 minutes   Spiritual/Rastafari   Type Spiritual support   Assessment Approachable;Peaceful   Intervention Active listening   Outcome Engaged in conversation     Patient is a 69year old white female who was for special test. At the time of entry, patient was sitting in her recliner chair talking with her daughter on the phone. Patient then finished the conversation and said to this staff. \"I have good news today. I was told that I do not need surgery because my colonoscopy shows that I do not have cancer. \" Patient said she was very excited and happy about the news. She also shared with this staff t hat she has been concern that rabbits and other animals might eat her fruits she has planted at her house. This staff offered words of encouragement and thanked God for the good news the patient has received from her doctors. SC will continue to provide emotional support and provide active listening to determine appropriate steps for additional support for patient.

## 2018-10-12 NOTE — PROGRESS NOTES
INFECTIOUS DISEASES  PROGRESS NOTE    Pt Name: Wendy Hobbs  MRN: [id number]  [id number]  YOB: 1940  Admit Date: 10/4/2018  7:49 PM  Date of evaluation: 10/12/2018  Primary Care Physician: Mendel Beck MD   [id number]     61-year-old female known to our service from her  previous hospitalization in [address] [place] [address]. Tarsha's and essentially she was seen for her  problems with cellulitis of the legs and ulcers that had growth of MRSA and  streptococcus in the past and she was noted to have spongiotic dermatitis  which was determined to be contact versus eczematous dermatitis on biopsies  that were done. The patient was seen last in our office in 05/2018 and the  patient also has had history of adenocarcinoma of the nasopharynx for which  she had seen ENT doctor and she was placed on long-term Levaquin for 21  days. The patient had been seen recently by Dr. Vasile Burch, her family  provider, and it appears that the patient continues to have ulcers on the  legs and drainage from the legs. Since the legs are worsening, she was  asked to be evaluated. She presented to the office today given her  appearance of the legs and cellulitis of the legs and overall suffering and  taking outpatient treatment, decided to admit her to the hospital given her  complications with diabetes and overall venous insufficiency and peripheral edematous changes. Went to tcu on 9/30/18 for antibiotic therapy and because of sbo was transferred 10/4/18     Subjective: Interval History: As above. Notes reviewed.  D/w nursing staff    Active ATBs: cefepime 9/27                       vanco 9/27 - 10/30                       zyvox 10/8                        Flagyl 10/9 - 10/11    Pt/Chart review:  Fever No, DiarrheaNo, Dyspnea No, Nausea:None       Data:   Scheduled Meds:   miconazole   Topical BID    magnesium replacement protocol   Other RX Placeholder    potassium replacement protocol   Other RX Placeholder    amLODIPine  10 mg last 72 hours. URINALYSIS: neg    MICRO:   Collected: 09/27/18 1150   Resulting lab: [facility]   Value: Pseudomonas aeruginosa (Abnormal)   Comment:    *Additional information available - narrative   9/30/2018  8:55 AM - Adis Rodriguez Incoming Lab Results From Soft     Component Results     Component Collected Lab   Gram Stain Result 09/27/2018 11:50 AM  - [address] [place] [address] Lab   Rare segmented neutrophils observed. No epithelial cells observed. Rare gram positive cocci occurring singly and in pairs. Organism  (Abnormal) 09/27/2018 11:50  [email or web address] Lab   Staphylococcus aureus     Aerobic Culture 09/27/2018 11:50 AM MH - [address] [place] [address] Lab   moderate growth   This is a MRSA (Methicillin Resistant Staphylococcus   aureus)isolate. Isolates of MRSA (ORSA) Methicillin (Oxacillin) Resistant   Staphylococcus aureus (coagulase positive) require patient   be placed in CONTACT isolation. Methicillin(Oxacillin)resistant strains of staphylococci   (MRSA)or(MRSE)should be considered resistant to all classes   of cephalosporins, penems and beta-lactams. In the treatment of gram positive infections, GENTAMICIN   should be CONSIDERED a SYNERGYSTIC agent ONLY. Organism  (Abnormal) 09/27/2018 11:50  [email or web address] Lab   Pseudomonas aeruginosa     Aerobic Culture 09/27/2018 11:50 AM  - [address] Lab   light growth    Testing Performed By     Justino Navarro Name Director Address Valid Date Range   056-IE - [address] Domenic Red MD [address] [place] [id number] 08/30/17 1255-Present   Narrative     Source: leg       Site: swab right          Current Antibiotics: Linezolid, Cefepime   Culture & Susceptibility     PSEUDOMONAS AERUGINOSA     Antibiotic Interpretation CARI Unit Status   cefTAZidime Sensitive =2 mcg/mL Final   cefepime Sensitive <=1 mcg/mL Final   ciprofloxacin Sensitive content appropriate   Wound/Ulcers: Skin Ulcer: unaboots to the legs        Assessment:     1. Bilateral cellulitis of the legs. 2.  Bilateral venous insufficiency with ulcers. 3.  Severe pedal edema. 4.  Diabetes mellitus type 2.  5.  Morbid obesity with  BMI 42.4.  6.  Hypertension. 7.  Past history of hyponatremia. 8.leg  Infection with MRSA and pseudomonas  9. SBO better    Patient Active Problem List:     Hypercholesterolemia     Osteoarthritis     Osteoporosis     Type 2 diabetes mellitus with microalbuminuria, without long-term current use of insulin (HCC)     Morbid obesity with body mass index (BMI) of 45.0 to 49.9 in [facility])     DDD (degenerative disc disease), lumbar     Benign essential HTN     SWAPNA on CPAP     Diabetic peripheral neuropathy ([facility])     Acute recurrent pansinusitis     Primary thunderclap headache     Rash of entire body     Infection of skin due to methicillin resistant Staphylococcus aureus (MRSA)     Drug allergy, antibiotic  likely bactrim     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[facility])     Skin plaque     Hyponatremia     Hypervolemia     Cellulitis of lower extremity     Hypoglycemia     Acute on chronic systolic CHF (congestive heart failure) ([facility])     Bilateral cellulitis of lower leg     Venous ulcers of both lower extremities ([facility])     Class 3 severe obesity due to excess calories with serious comorbidity and body mass index (BMI) of 40.0 to 44.9 in [facility])      Plan:  Follow wound healing at [facility]s changes  to the legs  Twice a week  Pt/ot  Follow surgical and gi plans    Change po doxycyline and cipro in 10 day    Labs, cultures, and radiographs reviewed. Changes made as necessary. D/w Patient's R.N. and specific instructions given and infectious disease issues addressed. Will follow the patient closely with you. Also please see the orders for updated patient care orders written by ID team.    Thank you for involving us in this patient's care.  I will be

## 2018-10-12 NOTE — CARE COORDINATION
Discharge orders on chart. Met with Lali Orellana and her daughter, Abril Gonzalez present at bedside. Both are in agreement for discharge to home today. Lali Orellana and her daughter both state they do not want [facility] services. They would rather follow-up in the wound clinic. Lali Orellana and her daughter also decline outpatient therapy as Lali Orellana has been up and ambulating around the unit with therapies and Lali Orellana feels she is at her baseline. Lali Orellana and her daughter are aware Dr. Say Marshall can order [facility] and outpatient therapies if they feel they need the services after discharge. 10/12/18, 3:04 PM    Discharge plan discussed by  and . Discharge plan reviewed with patient/ family. Patient/ family verbalize understanding of discharge plan and are in agreement with plan. Understanding was demonstrated using the teach back method.        IMM Letter  IMM Letter given to Patient/Family/Significant other/Guardian/POA/by[de-identified] Updated  IMM Letter date given[de-identified] 10/12/18  IMM Letter time given[de-identified] 3605

## 2018-10-12 NOTE — PROGRESS NOTES
imaging. Electronically signed by AMANDA Lama CNP on 10/12/2018 at 12:22 PM     Patient seen and examined independently by me early this AM. Above discussed and I agree with Radha Dewey CNP. See my additional comments below for updated orders and plan. Labs, cultures, and radiographs where available were reviewed. I discussed patient concerns with the patient's nurse and instructions were given. Please see our orders for the updated patient care plan. - Patient tolerating food better. Pain improved today. Bowel function okay. Planning possible home today. Agree with no surgical intervention at this point. Await biopsy results from colonoscopy. Follow-up in office.     Electronically signed by Robbin Payne MD on 10/12/2018 at 1:58 PM

## 2018-10-12 NOTE — TELEPHONE ENCOUNTER
.Transition of Care visit scheduled. 10/17/2018  Patient is being discharged to home.       [facility] F/U 10/12, low bowel obstruction, GS 28%,liane, 1 week f/u

## 2018-10-12 NOTE — DISCHARGE SUMMARY
drop Both Eyes TID    carboxymethylcellulose  1 drop Both Eyes Q12H    insulin lispro  0-6 Units Subcutaneous TID WC    insulin lispro  0-3 Units Subcutaneous Nightly     Continuous Infusions:   dextrose      sodium chloride 50 mL/hr at 10/12/18 0457     PRN Meds:.ondansetron, acetaminophen, HYDROmorphone, hydrALAZINE, zinc oxide, sodium chloride, sodium chloride flush, glucose, dextrose, glucagon (rDNA), dextrose, ondansetron      Diet:  Dietary Nutrition Supplements: Wound Healing Oral Supplement  DIET CARB CONTROL;    REVIEW OF SYSTEMS:   Pertinent positives as noted in the HPI. All other systems reviewed and negative. PHYSICAL EXAM:    /74   Pulse 75   Temp 97.9 °F (36.6 °C) (Axillary)   Resp 16   Ht 5' (1.524 m)   Wt 207 lb 4.8 oz (94 kg)   LMP  (LMP Unknown)   SpO2 98%   BMI 40.49 kg/m²     General appearance:  No apparent distress, appears stated age and cooperative. HEENT:  Normal cephalic, atraumatic without obvious deformity. Pupils equal, round, and reactive to light. Extra ocular muscles intact. Conjunctivae/corneas clear. Neck: Supple, with full range of motion. no jugular venous distention. Trachea midline. no carotid bruits  Respiratory:  Normal respiratory effort. Clear to auscultation, bilaterally without Rales/Wheezes/Rhonchi. Breath sounds equal bilaterally  Cardiovascular:  Regular rate and rhythm with normal S1/S2 without murmurs, rubs or gallops. PMI non displaced  Abdomen: distended, no bsx4, ttp. Musculoskeletal:  No clubbing, cyanosis or edema bilaterally. Full range of motion without deformity. Skin :lowere xtremity consistant with cellulitis  Neurologic:  Neurovascularly intact without any focal sensory/motor deficits.  Cranial nerves: II-XII intact, grossly non-focal.  Psychiatric:  Alert and oriented, thought content appropriate, normal insight  Capillary Refill: Brisk,< 2 seconds   Peripheral Pulses: +2 palpable, equal bilaterally upper and lower extremities  Lymphatics: no lymphadenopathy      Labs:     Recent Labs      10/10/18   0538   WBC  7.3   HGB  8.4*   HCT  26.1*   PLT  149     Recent Labs      10/10/18   0538   10/11/18   0615  10/11/18   1145  10/12/18   1140   NA  139   --    --    --   134*   K  2.8*   < >  3.2*  3.4*  3.1*   CL  103   --    --    --   98   CO2  21*   --    --    --   22*   BUN  7   --    --    --   6*   CREATININE  0.7   --    --    --   0.7   CALCIUM  8.2*   --    --    --   8.4*    < > = values in this interval not displayed. No results for input(s): AST, ALT, BILIDIR, BILITOT, ALKPHOS in the last 72 hours. No results for input(s): INR in the last 72 hours. No results for input(s): Lord Lia in the last 72 hours. Urinalysis:      Lab Results   Component Value Date    NITRU NEGATIVE 10/04/2018    WBCUA 2-4 10/04/2018    BACTERIA NONE 10/04/2018    RBCUA 3-5 10/04/2018    BLOODU NEGATIVE 10/04/2018    SPECGRAV 1.011 09/28/2018    GLUCOSEU NEGATIVE 10/04/2018       Radiology:     CXR: I have reviewed the CXR with the following interpretation:   EKG:  I have reviewed the EKG with the following interpretation:     Ct Abdomen Pelvis Wo Contrast Additional Contrast? None    Result Date: 10/4/2018  PROCEDURE: CT ABDOMEN PELVIS WO CONTRAST CLINICAL INFORMATION: abd pain R/O obstruction . COMPARISON: 24 August 2012 TECHNIQUE: Noncontrast images of the abdomen and pelvis with multiplanar reconstructions All CT scans at this facility use dose modulation, iterative reconstruction, and/or weight-based dosing when appropriate to reduce radiation dose to as low as reasonably achievable. FINDINGS: Lung bases Degradation of the images due to respiratory motion. Calcified granuloma right lung base elevated right hemidiaphragm. Coronary artery calcifications. Abdomen pelvis Solid organ evaluation limited without IV contrast. There is fluid distention of the stomach and multiple fluid dilated small bowel loops.  Transition is difficult to determine there are multiple decompressed small bowel loops in the right and mid abdomen. A believe the transition is at the level of the hernia mesh in the midline of the lower abdomen within the patient's panniculus. There is gas to the distal colon and rectum left colon does appear to be somewhat decompressed. Findings are highly suspicious for distal small bowel mechanical obstruction. The noncontrasted liver and spleen appear normal. The pancreas is fatty infiltrated. Gallbladder is surgically absent. The adrenals are normal. There are bilateral renal cysts. 6 cm left renal cyst 18 mm right renal cyst. There is a partially calcified cystic structure in the right mid abdomen adjacent to the inferior edge of the liver which may represent a partially calcified mesenteric cyst. No abnormal fluid collections urinary bladder is unremarkable. There are no suspicious bone lesions. Degenerative changes and scoliosis are present. Findings compatible with a distal mechanical small bowel obstruction likely at the level of the hernia mesh. Discussed with , approximately 5:00 PM **This report has been created using voice recognition software. It may contain minor errors which are inherent in voice recognition technology. ** Final report electronically signed by Dr. Adonay Alvarenga on 10/4/2018 4:59 PM    Cta Head W Wo Contrast (code Stroke Nihss 4 Or Above)    Result Date: 10/1/2018  PROCEDURE: CTA HEAD W WO CONTRAST, CTA NECK W WO CONTRAST CLINICAL INFORMATION: STROKE. Additional history obtained from the electronic medical record indicates the patient has facial numbness and facial droop. COMPARISON: None available. TECHNIQUE: 1 mm axial images were obtained through the head and neck after the fast bolus administration of contrast. A noncontrast localizer was obtained. 3-D reconstructions were performed on a dedicated 3-D workstation. These include multiplanar MPR images and multiplanar MIP images. Centerline reconstructions were obtained of the carotid systems. 80 mL Isovue-370 intravenous contrast was given. All CT scans at this facility use dose modulation, iterative reconstruction, and/or weight-based dosing when appropriate to reduce radiation dose to as low as reasonably achievable. FINDINGS: CTA NECK: Aortic arch and branches: Atherosclerotic calcification is present within the aortic arch. There is a common origin of the innominate and left common carotid arteries off the aortic arch. The origins of the great vessels off the arch are otherwise patent  without flow-limiting stenosis. Right common carotid artery/ICA: The right common carotid artery is tortuous and takes a medial, retropharyngeal course. The right carotid bifurcation and right cervical internal carotid artery are within normal limits. Left common carotid artery/ICA: Soft atherosclerotic plaque is present at the left carotid bifurcation resulting in less than 50% luminal narrowing by NASCET criteria. The proximal left cervical internal carotid artery takes a medial retropharyngeal course. Vertebral arteries: The vertebral arteries are codominant. The vertebral arteries are otherwise patent. The proximal left vertebral artery is noted to be tortuous. CTA HEAD: Internal carotid arteries: Atherosclerotic calcification is present involving the bilateral cavernous and clinoid internal carotid arteries resulting in mild luminal narrowing. Middle cerebral arteries: Patent with normal arborization of the distal branches. Anterior cerebral arteries: Patent with normal arborization of the distal branches. There is a patent anterior communicating artery. Vertebral arteries: Unremarkable. Basilar artery: Unremarkable. Superior cerebellar arteries: Unremarkable. Posterior cerebral arteries: Unremarkable. Bilateral posterior communicating arteries are not visualized, a normal variant.  The superior sagittal sinus, vein of Bon, internal cerebral veins,

## 2018-10-12 NOTE — PROGRESS NOTES
Physical Therapy   [address] ONC MED 5K - [id number]    Time In: 1030  Time Out: 1055  Timed Code Treatment Minutes: 25 Minutes  Minutes: 25          Date: 10/12/2018  Patient Name: Ana M Solomon,  Gender:  female        MRN: [id number]  : 1940  (69 y.o.)     Referring Practitioner: Dr Suyapa Bliss  Diagnosis: SBO  Additional Pertinent Hx: Evangelina Hansen came from [facility] on 10[facility]04 where she was getting therapy following an admission for cellultis. Pt transferred from TCU to acute floor with SBO. 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    Cataract of both eyes     DDD (degenerative disc disease), lumbar     Dysphagia, pharyngoesophageal 2016    Fibrocystic breast     H/O ventral hernia repair     Headache 2017    Hypercholesteremia     Hyperlipidemia     Hypertension     Iron deficiency anemia     LPRD (laryngopharyngeal reflux disease) 2016    Neuropathy     peripheral    Obesity     Orbital abscess     Orbital cellulitis 2014    SWAPNA (obstructive sleep apnea)     Osteoarthritis     Osteoporosis     Persistent proteinuria associated with type 2 diabetes mellitus ([facility] [address] 2017    urine micral of 50.       Sinusitis     Type II or unspecified type diabetes mellitus without mention of complication, not stated as uncontrolled     diagnoses approx     Velopharyngeal incompetence 2016     Past Surgical History:   Procedure Laterality Date    ABDOMEN SURGERY      APPENDECTOMY      CARPAL TUNNEL RELEASE Bilateral ,     CATARACT REMOVAL      bilat    CHOLECYSTECTOMY  1988    COLONOSCOPY  2016    COLONOSCOPY  10/11/2018    COLONOSCOPY POLYPECTOMY SNARE/COLD BIOPSY performed by Renetta Kim MD at [address] ESOPHAGUS      ENDOSCOPY, COLON, DIAGNOSTIC      EYE SURGERY Left 2015    EYE SURGERY      CATARACT REMOVAL- BILATERAL    HEMORRHOID SURGERY  1980s    HERNIA REPAIR      HYSTERECTOMY, TOTAL ABDOMINAL  1980    JOINT REPLACEMENT  bilat 2005/ 2007    knees    AR COLSC FLX WITH DIRECTED SUBMUCOSAL NJX ANY SBST  10/11/2018    COLONOSCOPY SUBMUCOSAL/BOTOX INJECTION performed by Paul Evans MD at [address]  last 2009    removed a bone (2)--2 times no construction     SINUS SURGERY  02/01/2016    SINUS SURGERY  2016 x 2    SINUS SURGERY  09/18/2017    [facility] - Dr Jong Lechuga SINUS SURGERY  08/09/2017 [facility]17[facility]    TONSILLECTOMY      TOTAL KNEE ARTHROPLASTY  08/2003    Left TKR    VENTRAL HERNIA REPAIR  1992       Restrictions/Precautions:  Contact Precautions, General Precautions, Fall Risk, Isolation                    Other position/activity restrictions: stage II on bottom       Prior Level of Function:  ADL Assistance: Independent  Homemaking Assistance: Needs assistance  Ambulation Assistance: Independent  Transfer Assistance: Independent  Additional Comments: reports use of walker at times for mobility in the home. Daughter works during the day, pt is at home alone at times. Good family support. Indep with ADLs PTA. Subjective:  Chart Reviewed: Yes  Subjective: RN present in room at start of treatment session okaying therapy. Patient in bedside chair and agreeable to therapy. Patient requested to return to bedside chair at conclusion of treatment session. Pain:  Denies. Pain Assessment  Pain Level: 0       Social/Functional:  Lives With: Daughter  Type of Home: House  Home Layout: One level  Home Access: Level entry  Home Equipment: Rolling walker, Cane, 4 wheeled walker, Standard walker, Wheelchair-manual     Objective:  Transfers  Sit to Stand: Supervision  Stand to sit: Supervision  Comment: Patient demonstrated good hand placement with functional transfers.         Ambulation 1  Surface: level tile  Device: Rolling Walker  Assistance: Stand by assistance  Quality Gait Training, Endurance Training, Stair training    Goals:  Patient goals : Go home    Short term goals  Time Frame for Short term goals: 1week  Short term goal 1: patient to perform bed mobility at [facility] to get in and out of bed  Short term goal 2: patient to perform transfers to and from various surfaces at [facility]  to rise from bed or chair  Short term goal 3: patient to ambulate 200ft with RW at S for community  mobility  Short term goal 4: patient to negotiate 6\" step with RW at [facility] for curb negotiation    Long term goals  Time Frame for Long term goals : NA  Long term goal 1:    Long term goal 2:    Long term goal 3:    Long term goal 4:    Long term goal 5:       AM-PAC Inpatient Mobility without Stair Climbing Raw Score : 15  AM-PAC Inpatient without Stair Climbing T-Scale Score : 43.03  Mobility Inpatient CMS 0-100% Score: 47.43  Mobility Inpatient without Stair CMS G-Code Modifier : CK

## 2018-10-13 VITALS
OXYGEN SATURATION: 97 % | RESPIRATION RATE: 16 BRPM | HEIGHT: 60 IN | BODY MASS INDEX: 39.58 KG/M2 | SYSTOLIC BLOOD PRESSURE: 147 MMHG | DIASTOLIC BLOOD PRESSURE: 70 MMHG | WEIGHT: 201.6 LBS | HEART RATE: 73 BPM | TEMPERATURE: 97.9 F

## 2018-10-13 LAB
GLUCOSE BLD-MCNC: 154 MG/DL (ref 70–108)
GLUCOSE BLD-MCNC: 159 MG/DL (ref 70–108)
POTASSIUM SERPL-SCNC: 3.5 MEQ/L (ref 3.5–5.2)

## 2018-10-13 PROCEDURE — 6360000002 HC RX W HCPCS: Performed by: FAMILY MEDICINE

## 2018-10-13 PROCEDURE — 6360000002 HC RX W HCPCS: Performed by: NURSE PRACTITIONER

## 2018-10-13 PROCEDURE — 99239 HOSP IP/OBS DSCHRG MGMT >30: CPT | Performed by: INTERNAL MEDICINE

## 2018-10-13 PROCEDURE — 6370000000 HC RX 637 (ALT 250 FOR IP): Performed by: HOSPITALIST

## 2018-10-13 PROCEDURE — 2580000003 HC RX 258: Performed by: INTERNAL MEDICINE

## 2018-10-13 PROCEDURE — 2709999900 HC NON-CHARGEABLE SUPPLY

## 2018-10-13 PROCEDURE — 84132 ASSAY OF SERUM POTASSIUM: CPT

## 2018-10-13 PROCEDURE — 6370000000 HC RX 637 (ALT 250 FOR IP): Performed by: INTERNAL MEDICINE

## 2018-10-13 PROCEDURE — 82948 REAGENT STRIP/BLOOD GLUCOSE: CPT

## 2018-10-13 RX ORDER — PANTOPRAZOLE SODIUM 40 MG/1
40 TABLET, DELAYED RELEASE ORAL
Status: DISCONTINUED | OUTPATIENT
Start: 2018-10-13 | End: 2018-10-13 | Stop reason: HOSPADM

## 2018-10-13 RX ADMIN — HYDROMORPHONE HYDROCHLORIDE 0.5 MG: 1 INJECTION, SOLUTION INTRAMUSCULAR; INTRAVENOUS; SUBCUTANEOUS at 04:13

## 2018-10-13 RX ADMIN — Medication 1 DROP: at 08:11

## 2018-10-13 RX ADMIN — FLUTICASONE PROPIONATE 2 SPRAY: 50 SPRAY, METERED NASAL at 08:11

## 2018-10-13 RX ADMIN — BUMETANIDE 2 MG: 1 TABLET ORAL at 08:11

## 2018-10-13 RX ADMIN — SODIUM CHLORIDE: 9 INJECTION, SOLUTION INTRAVENOUS at 04:13

## 2018-10-13 RX ADMIN — LINEZOLID 600 MG: 600 INJECTION, SOLUTION INTRAVENOUS at 05:36

## 2018-10-13 RX ADMIN — MUPIROCIN: 20 OINTMENT TOPICAL at 08:12

## 2018-10-13 RX ADMIN — CARBOXYMETHYLCELLULOSE SODIUM 1 DROP: 10 GEL OPHTHALMIC at 08:11

## 2018-10-13 RX ADMIN — MICONAZOLE NITRATE: 2 POWDER TOPICAL at 08:15

## 2018-10-13 RX ADMIN — AMLODIPINE BESYLATE 10 MG: 10 TABLET ORAL at 08:11

## 2018-10-13 RX ADMIN — PANTOPRAZOLE SODIUM 40 MG: 40 TABLET, DELAYED RELEASE ORAL at 05:36

## 2018-10-13 ASSESSMENT — PAIN DESCRIPTION - DESCRIPTORS: DESCRIPTORS: ACHING

## 2018-10-13 ASSESSMENT — PAIN SCALES - GENERAL
PAINLEVEL_OUTOF10: 0
PAINLEVEL_OUTOF10: 8
PAINLEVEL_OUTOF10: 0

## 2018-10-13 ASSESSMENT — PAIN DESCRIPTION - FREQUENCY: FREQUENCY: INTERMITTENT

## 2018-10-13 ASSESSMENT — PAIN DESCRIPTION - PAIN TYPE: TYPE: CHRONIC PAIN

## 2018-10-13 ASSESSMENT — PAIN DESCRIPTION - PROGRESSION: CLINICAL_PROGRESSION: NOT CHANGED

## 2018-10-13 ASSESSMENT — PAIN DESCRIPTION - ORIENTATION: ORIENTATION: RIGHT

## 2018-10-13 ASSESSMENT — PAIN DESCRIPTION - LOCATION: LOCATION: SHOULDER

## 2018-10-13 ASSESSMENT — PAIN DESCRIPTION - ONSET: ONSET: ON-GOING

## 2018-10-13 NOTE — DISCHARGE SUMMARY
for Nausea or Vomiting     potassium chloride 20 MEQ extended release tablet  Commonly known as:  KLOR-CON M  Take 1 tablet by mouth daily     PROBIOTIC ACIDOPHILUS Tabs  Take 1 tablet by mouth daily     SITagliptin 100 MG tablet  Commonly known as:  JANUVIA  Take 1 tablet by mouth daily     zinc oxide 30 % Oint  Commonly known as:  PINXAV  Apply 113.4 g topically 4 times daily as needed (to affected area)        CONTINUE taking these medications    BACTROBAN NASAL 2 % nasal ointment  Generic drug:  mupirocin     cycloSPORINE 0.05 % ophthalmic emulsion  Commonly known as:  RESTASIS     erythromycin 5 MG/GM ophthalmic ointment  Commonly known as:  ROMYCIN     fluticasone 50 MCG/ACT nasal spray  Commonly known as:  FLONASE  2 sprays by Nasal route 2 times daily     Handicap Placard Misc  by Does not apply route. Request parking placard due to medical conditions. Duration of 5 years.      hydrALAZINE 50 MG tablet  Commonly known as:  APRESOLINE  TAKE ONE TABLET BY MOUTH THREE TIMES DAILY     lisinopril 5 MG tablet  Commonly known as:  PRINIVIL;ZESTRIL  Take 1 tablet by mouth daily     omeprazole 20 MG delayed release capsule  Commonly known as:  PRILOSEC  TAKE ONE CAPSULE BY MOUTH ONE TIME DAILY     pregabalin 300 MG capsule  Commonly known as:  LYRICA     sodium chloride 0.65 % nasal spray  Commonly known as:  OCEAN, BABY AYR     tetrahydrozoline 0.05 % ophthalmic solution        STOP taking these medications    atorvastatin 40 MG tablet  Commonly known as:  LIPITOR     metFORMIN 1000 MG tablet  Commonly known as:  GLUCOPHAGE           Where to Get Your Medications      These medications were sent to [address] [place] [address] [address]  [place], [place] - [address] [phone] - F [phone] [address] [place] [address]    Phone:  [phone]   · bumetanide 2 MG tablet  · ciprofloxacin 500 MG tablet  · doxycycline hyclate 100 MG capsule  · hydrocortisone 25 MG suppository  · miconazole 2 % powder  · ondansetron 4 MG 6*   --    --    CREATININE  0.7   --    --    CALCIUM  8.4*   --    --      No results for input(s): AST, ALT, BILIDIR, BILITOT, ALKPHOS in the last 72 hours. No results for input(s): INR in the last 72 hours. No results for input(s): Delle Loges in the last 72 hours. Urinalysis:      Lab Results   Component Value Date    NITRU NEGATIVE 10/04/2018    WBCUA 2-4 10/04/2018    BACTERIA NONE 10/04/2018    RBCUA 3-5 10/04/2018    BLOODU NEGATIVE 10/04/2018    SPECGRAV 1.011 09/28/2018    GLUCOSEU NEGATIVE 10/04/2018       Radiology:     CXR: I have reviewed the CXR with the following interpretation:   EKG:  I have reviewed the EKG with the following interpretation:     Ct Abdomen Pelvis Wo Contrast Additional Contrast? None    Result Date: 10/4/2018  PROCEDURE: CT ABDOMEN PELVIS WO CONTRAST CLINICAL INFORMATION: abd pain R/O obstruction . COMPARISON: 24 August 2012 TECHNIQUE: Noncontrast images of the abdomen and pelvis with multiplanar reconstructions All CT scans at this facility use dose modulation, iterative reconstruction, and/or weight-based dosing when appropriate to reduce radiation dose to as low as reasonably achievable. FINDINGS: Lung bases Degradation of the images due to respiratory motion. Calcified granuloma right lung base elevated right hemidiaphragm. Coronary artery calcifications. Abdomen pelvis Solid organ evaluation limited without IV contrast. There is fluid distention of the stomach and multiple fluid dilated small bowel loops. Transition is difficult to determine there are multiple decompressed small bowel loops in the right and mid abdomen. A believe the transition is at the level of the hernia mesh in the midline of the lower abdomen within the patient's panniculus. There is gas to the distal colon and rectum left colon does appear to be somewhat decompressed. Findings are highly suspicious for distal small bowel mechanical obstruction.  The noncontrasted liver and spleen appear aneurysm is otherwise identified intracranially. 2. No occlusion, flow-limiting stenosis, dissection or aneurysm is identified of the major cervical arterial structures. Soft atherosclerotic plaque is present at the left carotid bifurcation resulting in less than 50% luminal narrowing by NASCET criteria. 3. Age indeterminant fracture of the right proximal humerus. **This report has been created using voice recognition software. It may contain minor errors which are inherent in voice recognition technology. ** Final report electronically signed by Dr. Cal Badillo on 10/1/2018 1:24 PM    Vl Dup Lower Extremity Venous Bilateral    Result Date: 9/27/2018  PROCEDURE: Bilateral lower extremity venous duplex examination CLINICAL INFORMATION: bilateral venous doppler, COMPARISON: No prior study. TECHNIQUE: Grayscale, color-flow, and spectral waveform ultrasound images were obtained through the bilateral lower extremity venous structures. Augmentation and compression maneuvers were performed at multiple levels. FINDINGS: RIGHT SIDE: The common femoral vein demonstrates normal flow, augmentation and compressibility. The saphenofemoral junction appears patent and compressible. The greater saphenous vein demonstrates normal flow proximally. The superficial femoral and popliteal veins demonstrate normal flow, compressibility and augmentation. The deep veins of the calf appear patent including the gastrocnemius, posterior tibial, peroneal and anterior tibial veins. LEFT SIDE: The common femoral vein demonstrates normal flow, augmentation and compressibility. The saphenofemoral junction appears patent and compressible. The greater saphenous vein demonstrates normal flow proximally. The superficial femoral and popliteal veins demonstrate normal flow, compressibility and augmentation. The deep veins of the calf appear patent including the gastrocnemius, posterior tibial, peroneal and anterior tibial veins.      1. No sonographic evidence of lower extremity DVT. **This report has been created using voice recognition software. It may contain minor errors which are inherent in voice recognition technology. ** Final report electronically signed by Dr. Deena Luong on 9/27/2018 10:21 PM           DVT prophylaxis: [x] Lovenox                                 [] SCDs                                 [] SQ Heparin                                 [] Encourage ambulation           [] Already on Anticoagulation    Code Status: Full Code      PT/OT Eval Status:    Disposition:    [x] Home       [] TCU       [] Rehab       [] Psych       [] SNF       [] [place]       [] Other-    ASSESSMENT:    C/Patricio Duque [address] Problems    Diagnosis Date Noted    [facility] obstruction) [facility] [T95.393] 10/04/2018    CKD (chronic kidney disease) stage 3, GFR 30-59 ml/min ([facility] [address] [N18.3] 09/30/2018    Class 3 severe obesity due to excess calories with serious comorbidity and body mass index (BMI) of 40.0 to 44.9 in adult ([facility] [address] [E66.01, Z68.41] 09/27/2018    Benign essential HTN [I10] 02/05/2016    Type 2 diabetes mellitus with microalbuminuria, without long-term current use of insulin (HCC) [E11.29, R80.9] 07/25/2014   Chronic leg wounds- ID following- on Zyvox and cefepime- changed to oral abs per ID on discharge    Small bowel obstruction- NG has been removed, on clears for GI- CT enterography shows obstruction d small bowel- no surgical plans per surgery. colonoscopy per GI to evaluate the ileocecal area - however no mass found- general surgery cancelled plans for surgery as the obstruction was relieved and diet advanced and pt had regular BMs.   Acute kidney injury on chronic kidny  Disease  Stage III- secondary to nausea vomiting and dehydration- IV fluid BMP in the a.m.- resolved  Diabetes type 2 controlled- on current regimen  Hypokalemia and hypomagnesemia llikely secondary to loose stools and poor oral intake- replace magnesium

## 2018-10-14 ENCOUNTER — CARE COORDINATION (OUTPATIENT)
Dept: CASE MANAGEMENT | Age: 78
End: 2018-10-14

## 2018-10-14 DIAGNOSIS — K56.609 SBO (SMALL BOWEL OBSTRUCTION) (HCC): Primary | ICD-10-CM

## 2018-10-15 ENCOUNTER — HOSPITAL ENCOUNTER (OUTPATIENT)
Dept: WOUND CARE | Age: 78
Discharge: HOME | End: 2018-10-15
Payer: MEDICARE

## 2018-10-15 VITALS
HEIGHT: 60 IN | SYSTOLIC BLOOD PRESSURE: 134 MMHG | RESPIRATION RATE: 20 BRPM | DIASTOLIC BLOOD PRESSURE: 63 MMHG | HEART RATE: 84 BPM | WEIGHT: 204.1 LBS | OXYGEN SATURATION: 99 % | TEMPERATURE: 98 F | BODY MASS INDEX: 40.07 KG/M2

## 2018-10-15 PROCEDURE — 29580 STRAPPING UNNA BOOT: CPT

## 2018-10-15 PROCEDURE — 2709999900 HC NON-CHARGEABLE SUPPLY

## 2018-10-15 ASSESSMENT — PAIN SCALES - GENERAL: PAINLEVEL_OUTOF10: 0

## 2018-10-15 NOTE — CARE COORDINATION
insurance does not cover, suggested to try Sitz bath until discuss with the PCP. Reviewed upcoming appt, Yanel[facility] Richmond will transport. Pt denied any needs or concerns. CTC will refer back to the [facility] continue to follow.     Follow Up  Future Appointments  Date Time Provider David Adler   10/15/2018 2:30 PM [address] None   10/17/2018 9:45 AM MD Norma Bains [facility] - [facility] [address]   10/18/2018 10:00 AM STR[facility] NURSE SCHEDULE [facility] WOUND None   10/31/2018 10:30 AM Britney Lomas MD Adv Surg P - [place]   7/25/2019 3:00 PM AMANDA Holland - CNP [place] [facility] - Miguelito Duran RN  Care Transition Coordinator  [phone]

## 2018-10-17 ENCOUNTER — OFFICE VISIT (OUTPATIENT)
Dept: FAMILY MEDICINE CLINIC | Age: 78
End: 2018-10-17
Payer: MEDICARE

## 2018-10-17 VITALS
DIASTOLIC BLOOD PRESSURE: 80 MMHG | SYSTOLIC BLOOD PRESSURE: 116 MMHG | BODY MASS INDEX: 40.84 KG/M2 | HEART RATE: 72 BPM | WEIGHT: 208 LBS | HEIGHT: 60 IN | RESPIRATION RATE: 14 BRPM

## 2018-10-17 DIAGNOSIS — E11.29 TYPE 2 DIABETES MELLITUS WITH MICROALBUMINURIA, WITHOUT LONG-TERM CURRENT USE OF INSULIN (HCC): ICD-10-CM

## 2018-10-17 DIAGNOSIS — L03.116 BILATERAL LOWER LEG CELLULITIS: Primary | ICD-10-CM

## 2018-10-17 DIAGNOSIS — R80.9 TYPE 2 DIABETES MELLITUS WITH MICROALBUMINURIA, WITHOUT LONG-TERM CURRENT USE OF INSULIN (HCC): ICD-10-CM

## 2018-10-17 DIAGNOSIS — L03.115 BILATERAL LOWER LEG CELLULITIS: Primary | ICD-10-CM

## 2018-10-17 DIAGNOSIS — I83.029 VENOUS ULCERS OF BOTH LOWER EXTREMITIES (HCC): ICD-10-CM

## 2018-10-17 DIAGNOSIS — S81.802D OPEN WOUND OF LEFT LOWER LEG, SUBSEQUENT ENCOUNTER: ICD-10-CM

## 2018-10-17 DIAGNOSIS — L97.929 VENOUS ULCERS OF BOTH LOWER EXTREMITIES (HCC): ICD-10-CM

## 2018-10-17 DIAGNOSIS — K56.609 SBO (SMALL BOWEL OBSTRUCTION) (HCC): ICD-10-CM

## 2018-10-17 DIAGNOSIS — I83.019 VENOUS ULCERS OF BOTH LOWER EXTREMITIES (HCC): ICD-10-CM

## 2018-10-17 DIAGNOSIS — L97.919 VENOUS ULCERS OF BOTH LOWER EXTREMITIES (HCC): ICD-10-CM

## 2018-10-17 PROCEDURE — 1111F DSCHRG MED/CURRENT MED MERGE: CPT | Performed by: FAMILY MEDICINE

## 2018-10-17 PROCEDURE — 99495 TRANSJ CARE MGMT MOD F2F 14D: CPT | Performed by: FAMILY MEDICINE

## 2018-10-17 ASSESSMENT — ENCOUNTER SYMPTOMS
CHEST TIGHTNESS: 0
BLOOD IN STOOL: 0
RHINORRHEA: 0
CONSTIPATION: 0
DIARRHEA: 0
BACK PAIN: 0
SHORTNESS OF BREATH: 0
NAUSEA: 0
SORE THROAT: 0
COUGH: 0
EYE PAIN: 0
WHEEZING: 0
ABDOMINAL PAIN: 0
VOMITING: 0

## 2018-10-17 NOTE — PATIENT INSTRUCTIONS
Patient Education        Cellulitis: Care Instructions  Your Care Instructions    Cellulitis is a skin infection caused by bacteria, most often strep or staph. It often occurs after a break in the skin from a scrape, cut, bite, or puncture, or after a rash. Cellulitis may be treated without doing tests to find out what caused it. But your doctor may do tests, if needed, to look for a specific bacteria, like methicillin-resistant Staphylococcus aureus (MRSA). The doctor has checked you carefully, but problems can develop later. If you notice any problems or new symptoms, get medical treatment right away. Follow-up care is a key part of your treatment and safety. Be sure to make and go to all appointments, and call your doctor if you are having problems. It's also a good idea to know your test results and keep a list of the medicines you take. How can you care for yourself at home? · Take your antibiotics as directed. Do not stop taking them just because you feel better. You need to take the full course of antibiotics. · Prop up the infected area on pillows to reduce pain and swelling. Try to keep the area above the level of your heart as often as you can. · If your doctor told you how to care for your wound, follow your doctor's instructions. If you did not get instructions, follow this general advice:  ¨ Wash the wound with clean water 2 times a day. Don't use hydrogen peroxide or alcohol, which can slow healing. ¨ You may cover the wound with a thin layer of petroleum jelly, such as Vaseline, and a nonstick bandage. ¨ Apply more petroleum jelly and replace the bandage as needed. · Be safe with medicines. Take pain medicines exactly as directed. ¨ If the doctor gave you a prescription medicine for pain, take it as prescribed. ¨ If you are not taking a prescription pain medicine, ask your doctor if you can take an over-the-counter medicine.   To prevent cellulitis in the future  · Try to prevent cuts, [facility] ([facility] [place] If you have questions about a medical condition or this instruction, always ask your healthcare professional. [place] [address] any warranty or liability for your use of this information.

## 2018-10-17 NOTE — PROGRESS NOTES
palpitations. Gastrointestinal: Negative for abdominal pain, blood in stool, constipation, diarrhea, nausea and vomiting. Genitourinary: Negative for difficulty urinating, frequency, hematuria and urgency. Musculoskeletal: Negative for back pain, joint swelling, myalgias and neck pain. Skin: Negative for rash. Neurological: Negative for dizziness and headaches. Hematological: Negative for adenopathy. Does not bruise/bleed easily. Psychiatric/Behavioral: Negative for behavioral problems and sleep disturbance. The patient is not nervous/anxious. Vitals:    10/17/18 0951   BP: 116/80   Site: Left Wrist   Position: Sitting   Cuff Size: Small Adult   Pulse: 72   Resp: 14   Weight: 208 lb (94.3 kg)   Height: 5' (1.524 m)     Body mass index is 40.62 kg/m². Wt Readings from Last 3 Encounters:   10/17/18 208 lb (94.3 kg)   10/15/18 204 lb 1.6 oz (92.6 kg)   10/13/18 201 lb 9.6 oz (91.4 kg)     BP Readings from Last 3 Encounters:   10/17/18 116/80   10/15/18 134/63   10/13/18 (!) 147/70       Physical Exam   Constitutional: She is oriented to person, place, and time. She appears well-developed and well-nourished. HENT:   Head: Normocephalic and atraumatic. Right Ear: External ear normal.   Left Ear: External ear normal.   Nose: Nose normal.   Mouth/Throat: Oropharynx is clear and moist.   Eyes: Pupils are equal, round, and reactive to light. EOM are normal.   Neck: Neck supple. Carotid bruit is not present. No thyromegaly present. Cardiovascular: Normal rate, regular rhythm and normal heart sounds. Pulmonary/Chest: Breath sounds normal. She has no wheezes. She has no rales. Abdominal: Soft. Bowel sounds are normal. There is no tenderness. There is no rebound and no guarding. Musculoskeletal: Normal range of motion. She exhibits no edema. Bilateral DONAVAN boots. Lymphadenopathy:     She has no cervical adenopathy. Neurological: She is alert and oriented to person, place, and time.  She

## 2018-10-18 ENCOUNTER — HOSPITAL ENCOUNTER (OUTPATIENT)
Dept: WOUND CARE | Age: 78
Discharge: HOME | End: 2018-10-18
Payer: MEDICARE

## 2018-10-18 VITALS
DIASTOLIC BLOOD PRESSURE: 69 MMHG | SYSTOLIC BLOOD PRESSURE: 143 MMHG | RESPIRATION RATE: 20 BRPM | OXYGEN SATURATION: 100 % | HEART RATE: 88 BPM | TEMPERATURE: 97.8 F

## 2018-10-18 PROCEDURE — 2709999900 HC NON-CHARGEABLE SUPPLY

## 2018-10-18 PROCEDURE — 29580 STRAPPING UNNA BOOT: CPT

## 2018-10-22 ENCOUNTER — HOSPITAL ENCOUNTER (OUTPATIENT)
Dept: WOUND CARE | Age: 78
Discharge: HOME | End: 2018-10-22
Payer: MEDICARE

## 2018-10-22 ENCOUNTER — CARE COORDINATION (OUTPATIENT)
Dept: CARE COORDINATION | Age: 78
End: 2018-10-22

## 2018-10-22 VITALS
DIASTOLIC BLOOD PRESSURE: 65 MMHG | TEMPERATURE: 98 F | HEART RATE: 74 BPM | SYSTOLIC BLOOD PRESSURE: 137 MMHG | OXYGEN SATURATION: 97 % | RESPIRATION RATE: 18 BRPM

## 2018-10-22 PROCEDURE — 29580 STRAPPING UNNA BOOT: CPT

## 2018-10-22 PROCEDURE — 2709999900 HC NON-CHARGEABLE SUPPLY

## 2018-10-22 ASSESSMENT — PAIN SCALES - GENERAL: PAINLEVEL_OUTOF10: 0

## 2018-10-25 ENCOUNTER — HOSPITAL ENCOUNTER (OUTPATIENT)
Dept: WOUND CARE | Age: 78
Discharge: HOME | End: 2018-10-25
Payer: MEDICARE

## 2018-10-25 VITALS
HEART RATE: 67 BPM | BODY MASS INDEX: 40.62 KG/M2 | RESPIRATION RATE: 18 BRPM | OXYGEN SATURATION: 99 % | TEMPERATURE: 97.6 F | WEIGHT: 208 LBS | DIASTOLIC BLOOD PRESSURE: 78 MMHG | SYSTOLIC BLOOD PRESSURE: 148 MMHG

## 2018-10-25 PROCEDURE — 29580 STRAPPING UNNA BOOT: CPT

## 2018-10-25 PROCEDURE — 2709999900 HC NON-CHARGEABLE SUPPLY

## 2018-10-25 ASSESSMENT — PAIN SCALES - GENERAL: PAINLEVEL_OUTOF10: 0

## 2018-10-31 ENCOUNTER — OFFICE VISIT (OUTPATIENT)
Dept: SURGERY | Age: 78
End: 2018-10-31
Payer: MEDICARE

## 2018-10-31 VITALS
BODY MASS INDEX: 40.43 KG/M2 | SYSTOLIC BLOOD PRESSURE: 132 MMHG | RESPIRATION RATE: 20 BRPM | OXYGEN SATURATION: 99 % | TEMPERATURE: 96.8 F | HEART RATE: 82 BPM | DIASTOLIC BLOOD PRESSURE: 64 MMHG | WEIGHT: 207 LBS

## 2018-10-31 DIAGNOSIS — K56.609 SBO (SMALL BOWEL OBSTRUCTION) (HCC): Primary | ICD-10-CM

## 2018-10-31 PROCEDURE — 1111F DSCHRG MED/CURRENT MED MERGE: CPT | Performed by: SURGERY

## 2018-10-31 PROCEDURE — G8427 DOCREV CUR MEDS BY ELIG CLIN: HCPCS | Performed by: SURGERY

## 2018-10-31 PROCEDURE — 4040F PNEUMOC VAC/ADMIN/RCVD: CPT | Performed by: SURGERY

## 2018-10-31 PROCEDURE — 1036F TOBACCO NON-USER: CPT | Performed by: SURGERY

## 2018-10-31 PROCEDURE — G8482 FLU IMMUNIZE ORDER/ADMIN: HCPCS | Performed by: SURGERY

## 2018-10-31 PROCEDURE — G8400 PT W/DXA NO RESULTS DOC: HCPCS | Performed by: SURGERY

## 2018-10-31 PROCEDURE — 1101F PT FALLS ASSESS-DOCD LE1/YR: CPT | Performed by: SURGERY

## 2018-10-31 PROCEDURE — 1090F PRES/ABSN URINE INCON ASSESS: CPT | Performed by: SURGERY

## 2018-10-31 PROCEDURE — G8417 CALC BMI ABV UP PARAM F/U: HCPCS | Performed by: SURGERY

## 2018-10-31 PROCEDURE — 1123F ACP DISCUSS/DSCN MKR DOCD: CPT | Performed by: SURGERY

## 2018-10-31 PROCEDURE — 99213 OFFICE O/P EST LOW 20 MIN: CPT | Performed by: SURGERY

## 2018-10-31 ASSESSMENT — ENCOUNTER SYMPTOMS
VOICE CHANGE: 0
ANAL BLEEDING: 0
DIARRHEA: 0
EYE DISCHARGE: 1
APNEA: 0
RECTAL PAIN: 0
ALLERGIC/IMMUNOLOGIC NEGATIVE: 1
VOMITING: 0
EYE ITCHING: 0
STRIDOR: 0
EYE REDNESS: 0
BACK PAIN: 0
NAUSEA: 0
RHINORRHEA: 0
PHOTOPHOBIA: 0
ABDOMINAL PAIN: 0
SORE THROAT: 0
WHEEZING: 0
CHOKING: 0
SINUS PAIN: 1
FACIAL SWELLING: 0
SHORTNESS OF BREATH: 0
SINUS PRESSURE: 1
COLOR CHANGE: 0
CHEST TIGHTNESS: 0
EYE PAIN: 0
BLOOD IN STOOL: 0
COUGH: 0
TROUBLE SWALLOWING: 0
CONSTIPATION: 0
ABDOMINAL DISTENTION: 0

## 2018-11-01 ENCOUNTER — HOSPITAL ENCOUNTER (OUTPATIENT)
Dept: WOUND CARE | Age: 78
Discharge: HOME | End: 2018-11-01
Payer: MEDICARE

## 2018-11-01 VITALS
SYSTOLIC BLOOD PRESSURE: 156 MMHG | DIASTOLIC BLOOD PRESSURE: 72 MMHG | HEART RATE: 69 BPM | RESPIRATION RATE: 18 BRPM | OXYGEN SATURATION: 98 % | TEMPERATURE: 97.7 F

## 2018-11-01 PROCEDURE — 2709999900 HC NON-CHARGEABLE SUPPLY

## 2018-11-01 PROCEDURE — 29580 STRAPPING UNNA BOOT: CPT

## 2018-11-01 NOTE — PROGRESS NOTES
Date    ABDOMEN SURGERY      APPENDECTOMY      CARPAL TUNNEL RELEASE Bilateral 2008, 2009    CATARACT REMOVAL  2011    bilat    CHOLECYSTECTOMY  03/1988    COLONOSCOPY  2016    COLONOSCOPY  10/11/2018    COLONOSCOPY POLYPECTOMY SNARE/COLD BIOPSY performed by Lona Montoya MD at [address] ESOPHAGUS      ENDOSCOPY, COLON, DIAGNOSTIC      EYE SURGERY Left 01/30/2015    EYE SURGERY      CATARACT REMOVAL- BILATERAL    HEMORRHOID SURGERY  1980s    HERNIA REPAIR      HYSTERECTOMY, TOTAL ABDOMINAL  1980    JOINT REPLACEMENT  bilat 2005/ 2007    knees    CT COLSC FLX WITH DIRECTED SUBMUCOSAL NJX ANY SBST  10/11/2018    COLONOSCOPY SUBMUCOSAL/BOTOX INJECTION performed by Lona Montoya MD at [facility]   [address]  last 2009    removed a bone (2)--2 times no construction     SINUS SURGERY  02/01/2016    SINUS SURGERY  2016 x 2    SINUS SURGERY  09/18/2017    [facility] Ty [facility]    TONSILLECTOMY      TOTAL KNEE ARTHROPLASTY  08/2003    Left TKR    VENTRAL HERNIA REPAIR  1992       Current Outpatient Prescriptions   Medication Sig Dispense Refill    metFORMIN (GLUCOPHAGE) 500 MG tablet Take 500 mg by mouth 2 times daily (with meals)      zinc oxide (PINXAV) 30 % OINT Apply 113.4 g topically 4 times daily as needed (to affected area) 113.4 g 0    miconazole (MICOTIN) 2 % powder Apply topically 2 times daily. 45 g 1    bumetanide (BUMEX) 2 MG tablet Take 1 tablet by mouth daily 30 tablet 3    Lactobacillus (PROBIOTIC ACIDOPHILUS) TABS Take 1 tablet by mouth daily 30 tablet 0    potassium chloride (KLOR-CON M) 20 MEQ extended release tablet Take 1 tablet by mouth daily 60 tablet 3    SITagliptin (JANUVIA) 100 MG tablet Take 1 tablet by mouth daily 30 tablet 3    pregabalin (LYRICA) 300 MG capsule Take 300 mg by mouth 2 times daily. Ibapah Sever mupirocin (BACTROBAN NASAL) 2 % nasal ointment by Nasal route 2 times daily Take by tenderness. There is no rigidity, no rebound and no guarding. No hernia. Hernia confirmed negative in the ventral area. Musculoskeletal: Normal range of motion. She exhibits no edema or tenderness. Neurological: She is alert and oriented to person, place, and time. She has normal strength. No cranial nerve deficit or sensory deficit. Skin: Skin is warm and dry. No rash noted. She is not diaphoretic. No erythema. No pallor. Psychiatric: She has a normal mood and affect. Her speech is normal and behavior is normal. Judgment and thought content normal. Cognition and memory are normal.   Vitals reviewed. Lab Results   Component Value Date     (L) 10/12/2018    K 3.5 10/13/2018    CL 98 10/12/2018    CO2 22 (L) 10/12/2018     Lab Results   Component Value Date    CREATININE 0.7 10/12/2018     Lab Results   Component Value Date    WBC 7.3 10/10/2018    HGB 8.4 (L) 10/10/2018    HCT 26.1 (L) 10/10/2018    MCV 82.1 10/10/2018     10/10/2018     Imaging -   Narrative   PROCEDURE: CT ENTEROGRAPHY W CONTRAST       CLINICAL INFORMATION: SBO, abdominal pain, nausea .       COMPARISON: 10/4/2018       TECHNIQUE: Images of the abdomen and pelvis after enterography specific oral contrast and Isovue-370 IV contrast   All CT scans at this facility use dose modulation, iterative reconstruction, and/or weight-based dosing when appropriate to reduce radiation dose to as low as reasonably achievable.       FINDINGS: Lung bases   Lung bases are clear undersurface the heart shows coronary artery calcifications   Abdomen pelvis   Multiple dilated fluid-filled loops of small bowel are present. Exact transition is not identified. There is fluid distended but normal caliber terminal ileum at the region of the ileocecal valve there is soft tissue density and caliber change to greater    than 7 cm in length. The hepatic flexure than is normal caliber and contains fluid fluid and contrast are present to the rectum.  Marked uncomplicated diverticulosis is seen involving the sigmoid colon. The liver, spleen, are normal. Pancreas is fatty infiltrated. Prior cholecystectomy. Large left renal cyst. Small right renal cyst.   Aorta is mildly atherosclerotic. Urinary bladder is unremarkable.           Impression   1. Findings are suggestive of a possible distal small bowel obstruction without definite transition identified. 2. A mass in the region of the ileocecal valve and extending slightly distally is also a consideration although this does not appear to be the cause of obstruction. 3. Severe uncomplicated diverticulosis       Patient Active Problem List   Diagnosis    Hypercholesterolemia    Osteoarthritis    Osteoporosis    Type 2 diabetes mellitus with microalbuminuria, without long-term current use of insulin ([facility])    Morbid obesity with body mass index (BMI) of 45.0 to 49.9 in [facility])    DDD (degenerative disc disease), lumbar    Benign essential HTN    SWAPNA on CPAP    Diabetic peripheral neuropathy ([facility])    Acute recurrent pansinusitis    Primary thunderclap headache    Rash of entire body    Infection of skin due to methicillin resistant Staphylococcus aureus (MRSA)    Drug allergy, antibiotic  likely bactrim    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[facility])    Skin plaque    Hyponatremia    Hypervolemia    Cellulitis of lower extremity    Hypoglycemia    Acute on chronic systolic CHF (congestive heart failure) ([facility])    Bilateral cellulitis of lower leg    Venous ulcers of both lower extremities ([facility])    Class 3 severe obesity due to excess calories with serious comorbidity and body mass index (BMI) of 40.0 to 44.9 in [facility])    Bilateral lower leg cellulitis    CKD (chronic kidney disease) stage 3, GFR 30-59 ml/min ([facility])    Iron deficiency anemia    Hypomagnesemia    SBO (small bowel obstruction) ([facility])     Assessment:      1. Partial small bowel obstruction  2.   Morbid obesity (BMI

## 2018-11-07 ENCOUNTER — CARE COORDINATION (OUTPATIENT)
Dept: FAMILY MEDICINE CLINIC | Age: 78
End: 2018-11-07

## 2018-11-07 NOTE — CARE COORDINATION
mouth daily 10/12/18   [address], MD   potassium chloride (KLOR-CON M) 20 MEQ extended release tablet Take 1 tablet by mouth daily 10/12/18   Gabo Cooney MD   SITagliptin (JANUVIA) 100 MG tablet Take 1 tablet by mouth daily 10/12/18   Gabo Cooney MD   pregabalin (LYRICA) 300 MG capsule Take 300 mg by mouth 2 times daily. Amanda Diaz Historical Provider, MD   mupirocin [facility] NASAL) 2 % nasal ointment by Nasal route 2 times daily Take by Nasal route 2 times daily. Historical Provider, MD   lisinopril (PRINIVIL;ZESTRIL) 5 MG tablet Take 1 tablet by mouth daily 7/26/18   AMANDA Amaya CNP   erythromycin [facility]) 5 MG/GM ophthalmic ointment Place into both eyes 2 times daily Nightly. Historical Provider, MD   hydrALAZINE (APRESOLINE) 50 MG tablet TAKE ONE TABLET BY MOUTH THREE TIMES DAILY 6/11/18   Mendel Beck MD   cycloSPORINE (RESTASIS) 0.05 % ophthalmic emulsion Place 1 drop into both eyes 2 times daily Patient uses 2-4 times per day. Historical Provider, MD   sodium chloride (OCEAN, BABY AYR) 0.65 % nasal spray 1 spray by Nasal route as needed for Congestion Patient states she uses 4-6x per day. Historical Provider, MD   omeprazole (PRILOSEC) 20 MG delayed release capsule TAKE ONE CAPSULE BY MOUTH ONE TIME DAILY 1/5/18   Mendel Beck MD   fluticasone (FLONASE) 50 MCG/ACT nasal spray 2 sprays by Nasal route 2 times daily 6/15/17   Elisa Salinas MD   Handicap Placard MISC by Does not apply route. Request parking placard due to medical conditions. Duration of 5 years.  9/4/14   Mendel Beck MD   tetrahydrozoline 0.05 % ophthalmic solution Place 1 drop into both eyes 3 times daily     Historical Provider, MD       Future Appointments  Date Time Provider David Adler   7/25/2019 3:00 PM AMANDA Amaya CNP [facility] [place]

## 2018-11-07 NOTE — PATIENT INSTRUCTIONS
Patient Education        Learning About Diabetes Food Guidelines  Your Care Instructions    Meal planning is important to manage diabetes. It helps keep your blood sugar at a target level (which you set with your doctor). You don't have to eat special foods. You can eat what your family eats, including sweets once in a while. But you do have to pay attention to how often you eat and how much you eat of certain foods. You may want to work with a dietitian or a certified diabetes educator (CDE) to help you plan meals and snacks. A dietitian or CDE can also help you lose weight if that is one of your goals. What should you know about eating carbs? Managing the amount of carbohydrate (carbs) you eat is an important part of healthy meals when you have diabetes. Carbohydrate is found in many foods. · Learn which foods have carbs. And learn the amounts of carbs in different foods. ? Bread, cereal, pasta, and rice have about 15 grams of carbs in a serving. A serving is 1 slice of bread (1 ounce), ½ cup of cooked cereal, or 1/3 cup of cooked pasta or rice. ? Fruits have 15 grams of carbs in a serving. A serving is 1 small fresh fruit, such as an apple or orange; ½ of a banana; ½ cup of cooked or canned fruit; ½ cup of fruit juice; 1 cup of melon or raspberries; or 2 tablespoons of dried fruit. ? Milk and no-sugar-added yogurt have 15 grams of carbs in a serving. A serving is 1 cup of milk or 2/3 cup of no-sugar-added yogurt. ? Starchy vegetables have 15 grams of carbs in a serving. A serving is ½ cup of mashed potatoes or sweet potato; 1 cup winter squash; ½ of a small baked potato; ½ cup of cooked beans; or ½ cup cooked corn or green peas. · Learn how much carbs to eat each day and at each meal. A dietitian or CDE can teach you how to keep track of the amount of carbs you eat. This is called carbohydrate counting. · If you are not sure how to count carbohydrate grams, use the Plate Method to plan meals.  It is a when cooking. · Don't skip meals. Your blood sugar may drop too low if you skip meals and take insulin or certain medicines for diabetes. · Check with your doctor before you drink alcohol. Alcohol can cause your blood sugar to drop too low. Alcohol can also cause a bad reaction if you take certain diabetes medicines. Follow-up care is a key part of your treatment and safety. Be sure to make and go to all appointments, and call your doctor if you are having problems. It's also a good idea to know your test results and keep a list of the medicines you take. Where can you learn more? Go to [email or web address] org and sign in to your [employer] account. Enter K504 in the Dashbook box to learn more about \"Learning About Diabetes Food Guidelines. \"     If you do not have an account, please click on the \"Sign Up Now\" link. Current as of: December 7, 2017  Content Version: 11.8  © 5676-4223 Healthwise, Incorporated. Care instructions adapted under license by [facility] ([facility]). If you have questions about a medical condition or this instruction, always ask your healthcare professional. [place] [address] any warranty or liability for your use of this information.

## 2018-11-21 DIAGNOSIS — E87.1 HYPONATREMIA: ICD-10-CM

## 2018-11-26 RX ORDER — LISINOPRIL 5 MG/1
TABLET ORAL
Qty: 30 TABLET | Refills: 3 | Status: SHIPPED | OUTPATIENT
Start: 2018-11-26 | End: 2018-12-04 | Stop reason: SDUPTHER

## 2018-11-30 ENCOUNTER — HOSPITAL ENCOUNTER (OUTPATIENT)
Dept: WOUND CARE | Age: 78
Discharge: HOME | End: 2018-11-30
Payer: MEDICARE

## 2018-11-30 VITALS
RESPIRATION RATE: 18 BRPM | HEART RATE: 93 BPM | WEIGHT: 205.4 LBS | SYSTOLIC BLOOD PRESSURE: 125 MMHG | TEMPERATURE: 98 F | BODY MASS INDEX: 40.33 KG/M2 | HEIGHT: 60 IN | OXYGEN SATURATION: 100 % | DIASTOLIC BLOOD PRESSURE: 66 MMHG

## 2018-11-30 DIAGNOSIS — I83.019 VENOUS ULCER OF RIGHT LEG (HCC): ICD-10-CM

## 2018-11-30 DIAGNOSIS — I87.2 VENOUS INSUFFICIENCY OF BOTH LOWER EXTREMITIES: ICD-10-CM

## 2018-11-30 DIAGNOSIS — L97.929 VENOUS ULCER OF LEFT LEG (HCC): Primary | ICD-10-CM

## 2018-11-30 DIAGNOSIS — L30.9 ACUTE ECZEMA: ICD-10-CM

## 2018-11-30 DIAGNOSIS — L89.322 PRESSURE INJURY OF LEFT BUTTOCK, STAGE 2 (HCC): ICD-10-CM

## 2018-11-30 DIAGNOSIS — L97.919 VENOUS ULCER OF RIGHT LEG (HCC): ICD-10-CM

## 2018-11-30 DIAGNOSIS — I89.0 LYMPHEDEMA OF BOTH LOWER EXTREMITIES: ICD-10-CM

## 2018-11-30 DIAGNOSIS — I83.029 VENOUS ULCER OF LEFT LEG (HCC): Primary | ICD-10-CM

## 2018-11-30 PROCEDURE — 6370000000 HC RX 637 (ALT 250 FOR IP): Performed by: NURSE PRACTITIONER

## 2018-11-30 PROCEDURE — 29580 STRAPPING UNNA BOOT: CPT

## 2018-11-30 PROCEDURE — G0463 HOSPITAL OUTPT CLINIC VISIT: HCPCS

## 2018-11-30 PROCEDURE — 2709999900 HC NON-CHARGEABLE SUPPLY

## 2018-11-30 PROCEDURE — 99214 OFFICE O/P EST MOD 30 MIN: CPT | Performed by: NURSE PRACTITIONER

## 2018-11-30 RX ADMIN — FLUOCINONIDE 4 G: 0.5 CREAM TOPICAL at 09:38

## 2018-11-30 ASSESSMENT — PAIN DESCRIPTION - LOCATION: LOCATION: FOOT

## 2018-11-30 ASSESSMENT — PAIN DESCRIPTION - ORIENTATION: ORIENTATION: RIGHT;LEFT

## 2018-11-30 ASSESSMENT — PAIN SCALES - GENERAL: PAINLEVEL_OUTOF10: 10

## 2018-11-30 ASSESSMENT — PAIN DESCRIPTION - DESCRIPTORS: DESCRIPTORS: CONSTANT

## 2018-11-30 NOTE — PROGRESS NOTES
Orbital cellulitis 01/22/2014    SWAPNA (obstructive sleep apnea)     Osteoarthritis     Osteoporosis     Persistent proteinuria associated with type 2 diabetes mellitus ([facility] [address] 01/2017    urine micral of 50.       Sinusitis     Type II or unspecified type diabetes mellitus without mention of complication, not stated as uncontrolled     diagnoses approx 2000    Velopharyngeal incompetence 09/26/2016       PAST SURGICAL HISTORY    Past Surgical History:   Procedure Laterality Date    ABDOMEN SURGERY      APPENDECTOMY      CARPAL TUNNEL RELEASE Bilateral 2008, 2009    CATARACT REMOVAL  2011    bilat    CHOLECYSTECTOMY  03/1988    COLONOSCOPY  2016    COLONOSCOPY  10/11/2018    COLONOSCOPY POLYPECTOMY SNARE/COLD BIOPSY performed by Rolando Mera MD at [address] ESOPHAGUS      ENDOSCOPY, COLON, DIAGNOSTIC      EYE SURGERY Left 01/30/2015    EYE SURGERY      CATARACT REMOVAL- BILATERAL    HEMORRHOID SURGERY  1980s    HERNIA REPAIR      HYSTERECTOMY, TOTAL ABDOMINAL  1980    JOINT REPLACEMENT  bilat 2005/ 2007    knees    MA COLSC FLX WITH DIRECTED SUBMUCOSAL NJX ANY SBST  10/11/2018    COLONOSCOPY SUBMUCOSAL/BOTOX INJECTION performed by Rolando Mera MD at [address]  last 2009    removed a bone (2)--2 times no construction     SINUS SURGERY  02/01/2016    SINUS SURGERY  2016 x 2    SINUS SURGERY  09/18/2017    [facility] Dr Sherly Lancaster [facility]    TONSILLECTOMY      TOTAL KNEE ARTHROPLASTY  08/2003    Left TKR    VENTRAL HERNIA REPAIR  1992       FAMILY HISTORY    Family History   Problem Relation Age of Onset    Diabetes Mother     Heart Disease Father         whooping cough as child    Obesity Sister     Other Brother         tumor on back    Obesity Sister     Cancer Sister         Skin     Cancer Brother         Bone cancer from metal    Other Brother         passed as a child    Heart Disease Brother     hydrALAZINE (APRESOLINE) 50 MG tablet TAKE ONE TABLET BY MOUTH THREE TIMES DAILY 270 tablet 1    cycloSPORINE (RESTASIS) 0.05 % ophthalmic emulsion Place 1 drop into both eyes 2 times daily Patient uses 2-4 times per day.  sodium chloride (OCEAN, BABY AYR) 0.65 % nasal spray 1 spray by Nasal route as needed for Congestion Patient states she uses 4-6x per day.  omeprazole (PRILOSEC) 20 MG delayed release capsule TAKE ONE CAPSULE BY MOUTH ONE TIME DAILY 90 capsule 3    fluticasone (FLONASE) 50 MCG/ACT nasal spray 2 sprays by Nasal route 2 times daily 1 Bottle 3    Handicap Placard MISC by Does not apply route. Request parking placard due to medical conditions. Duration of 5 years. 1 each 0    tetrahydrozoline 0.05 % ophthalmic solution Place 1 drop into both eyes 3 times daily        No current facility-administered medications on file prior to encounter.         REVIEW OF SYSTEMS    Constitutional: negative for chills, fevers and sweats  Eyes: negative for irritation and redness  Ears, nose, mouth, throat, and face: negative for hearing loss and hoarseness  Respiratory: negative for cough and shortness of breath  Cardiovascular: positive for lower extremity edema, negative for chest pain  Gastrointestinal: positive for occasional fecal incontinence, negative for abdominal pain, nausea and vomiting  Integument/breast: positive for rash to bilateral thighs, bilateral lower legs, back, and buttocks; ulcers to bilateral lower legs and feet and left buttock  Musculoskeletal:negative for muscle weakness  Neurological: negative for coordination problems and speech problems  Behavioral/Psych: positive for good mood    Objective:      /66   Pulse 93   Temp 98 °F (36.7 °C) (Oral)   Resp 18   Ht 5' (1.524 m)   Wt 205 lb 6.4 oz (93.2 kg)   LMP  (LMP Unknown)   SpO2 100%   BMI 40.11 kg/m²     Wt Readings from Last 3 Encounters:   11/30/18 205 lb 6.4 oz (93.2 kg)   10/31/18 207 lb (93.9 kg)   10/25/18 Wound Length (cm) 10 cm 11/30/2018  9:02 AM   Wound Width (cm) 30 cm 11/30/2018  9:02 AM   Wound Depth (cm) 0.05 cm 11/30/2018  9:02 AM   Wound Surface Area (cm^2) 300 cm^2 11/30/2018  9:02 AM   Wound Volume (cm^3) 15 cm^3 11/30/2018  9:02 AM   Wound Assessment Epithelialization;Yellow;Pink 11/30/2018  9:02 AM   Drainage Amount Large 11/30/2018  9:02 AM   Drainage Description Serous 11/30/2018  9:02 AM   Odor None 11/30/2018  9:02 AM   Margins Attached edges 11/30/2018  9:02 AM   [place]-wound Assessment Dry;Pink;Swelling 11/30/2018  9:02 AM   [place]%Wound Bed 20 11/30/2018  9:02 AM   Yellow%Wound Bed 10 11/30/2018  9:02 AM   Other%Wound Bed 70 epi 11/30/2018  9:02 AM   Number of days: 0       Wound 11/30/18 Foot Right #4 (Active)   Wound Image   11/30/2018  9:02 AM   Wound Venous 11/30/2018  9:02 AM   Dressing Status Intact 11/30/2018  9:02 AM   Dressing Changed Changed/New 11/30/2018  9:02 AM   Wound Cleansed Rinsed/Irrigated with saline 11/30/2018  9:02 AM   Wound Length (cm) 6.5 cm 11/30/2018  9:02 AM   Wound Width (cm) 4 cm 11/30/2018  9:02 AM   Wound Depth (cm) 0.05 cm 11/30/2018  9:02 AM   Wound Surface Area (cm^2) 26 cm^2 11/30/2018  9:02 AM   Wound Volume (cm^3) 1.3 cm^3 11/30/2018  9:02 AM   Wound Assessment Epithelialization;Pink 11/30/2018  9:02 AM   Drainage Amount Large 11/30/2018  9:02 AM   Drainage Description Serous 11/30/2018  9:02 AM   Odor None 11/30/2018  9:02 AM   Margins Attached edges 11/30/2018  9:02 AM   [place]-wound Assessment Dry;Pink;Swelling 11/30/2018  9:02 AM   [place]%Wound Bed 30 11/30/2018  9:02 AM   Other%Wound Bed 70 epi 11/30/2018  9:02 AM   Number of days: 0       Wound 11/30/18 Buttocks Left #5 (Active)   Wound Image   11/30/2018  9:02 AM   Wound Pressure Stage  2 11/30/2018  9:02 AM   Dressing Changed Changed/New 11/30/2018  9:02 AM   Wound Cleansed Rinsed/Irrigated with saline 11/30/2018  9:02 AM   Wound Length (cm) 1 cm 11/30/2018  9:02 AM   Wound Width (cm) 1 cm 11/30/2018  9:02 AM   Wound Depth (cm) 0.05 cm 11/30/2018  9:02 AM   Wound Surface Area (cm^2) 1 cm^2 11/30/2018  9:02 AM   Wound Volume (cm^3) 0.05 cm^3 11/30/2018  9:02 AM   Wound Assessment Pink;Yellow 11/30/2018  9:02 AM   Drainage Amount Scant 11/30/2018  9:02 AM   Drainage Description Serosanguinous 11/30/2018  9:02 AM   Odor None 11/30/2018  9:02 AM   Margins Attached edges 11/30/2018  9:02 AM   [place]%Wound Bed 50 11/30/2018  9:02 AM   Yellow%Wound Bed 50 11/30/2018  9:02 AM   Number of days: 0     LABS       CBC:   Lab Results   Component Value Date    WBC 7.3 10/10/2018    HGB 8.4 10/10/2018    HCT 26.1 10/10/2018    MCV 82.1 10/10/2018     10/10/2018     BMP:   Lab Results   Component Value Date     10/12/2018    K 3.5 10/13/2018    K 3.2 10/11/2018    CL 98 10/12/2018    CO2 22 10/12/2018    PHOS 3.4 04/22/2018    BUN 6 10/12/2018    CREATININE 0.7 10/12/2018     PT/INR:   Lab Results   Component Value Date    INR 1.03 08/25/2016     Prealbumin: No results found for: PREALBUMIN  Albumin:  Lab Results   Component Value Date    LABALBU 3.1 10/04/2018     Sed Rate:  Lab Results   Component Value Date    SEDRATE 102 03/06/2018     CRP:   Lab Results   Component Value Date    CRP 12.50 03/06/2018     Micro:   Lab Results   Component Value Date    BC No growth-preliminary  No growth   09/27/2018      Hemoglobin A1C:   Lab Results   Component Value Date    LABA1C 6.8 09/27/2018       Assessment:     Venous ulcer of left leg, limited to breakdown of skin  Venous ulcer of right leg, limited to breakdown of skin  Acute eczema  Venous insufficiency of both lower extremities  Lymphedema of both lower extremities  Pressure injury of left buttock, stage 2    Contributing Factors: edema, venous stasis, lymphedema, diabetes and obesity    Patient Active Problem List   Diagnosis Code    Hypercholesterolemia E78.00    Osteoarthritis M19.90    Osteoporosis M81.0    Type 2 diabetes mellitus with microalbuminuria, without

## 2018-12-04 ENCOUNTER — OFFICE VISIT (OUTPATIENT)
Dept: NEPHROLOGY | Age: 78
End: 2018-12-04
Payer: MEDICARE

## 2018-12-04 VITALS
WEIGHT: 205 LBS | OXYGEN SATURATION: 99 % | SYSTOLIC BLOOD PRESSURE: 121 MMHG | HEART RATE: 84 BPM | BODY MASS INDEX: 40.25 KG/M2 | DIASTOLIC BLOOD PRESSURE: 74 MMHG | HEIGHT: 60 IN

## 2018-12-04 DIAGNOSIS — N18.30 CKD (CHRONIC KIDNEY DISEASE) STAGE 3, GFR 30-59 ML/MIN (HCC): Primary | ICD-10-CM

## 2018-12-04 DIAGNOSIS — E87.70 HYPERVOLEMIA, UNSPECIFIED HYPERVOLEMIA TYPE: ICD-10-CM

## 2018-12-04 DIAGNOSIS — I10 BENIGN ESSENTIAL HTN: ICD-10-CM

## 2018-12-04 DIAGNOSIS — E87.1 HYPONATREMIA: ICD-10-CM

## 2018-12-04 PROCEDURE — G8482 FLU IMMUNIZE ORDER/ADMIN: HCPCS | Performed by: NURSE PRACTITIONER

## 2018-12-04 PROCEDURE — 1123F ACP DISCUSS/DSCN MKR DOCD: CPT | Performed by: NURSE PRACTITIONER

## 2018-12-04 PROCEDURE — 4040F PNEUMOC VAC/ADMIN/RCVD: CPT | Performed by: NURSE PRACTITIONER

## 2018-12-04 PROCEDURE — G8400 PT W/DXA NO RESULTS DOC: HCPCS | Performed by: NURSE PRACTITIONER

## 2018-12-04 PROCEDURE — G8427 DOCREV CUR MEDS BY ELIG CLIN: HCPCS | Performed by: NURSE PRACTITIONER

## 2018-12-04 PROCEDURE — 1036F TOBACCO NON-USER: CPT | Performed by: NURSE PRACTITIONER

## 2018-12-04 PROCEDURE — G8417 CALC BMI ABV UP PARAM F/U: HCPCS | Performed by: NURSE PRACTITIONER

## 2018-12-04 PROCEDURE — 1101F PT FALLS ASSESS-DOCD LE1/YR: CPT | Performed by: NURSE PRACTITIONER

## 2018-12-04 PROCEDURE — 99213 OFFICE O/P EST LOW 20 MIN: CPT | Performed by: NURSE PRACTITIONER

## 2018-12-04 PROCEDURE — 1090F PRES/ABSN URINE INCON ASSESS: CPT | Performed by: NURSE PRACTITIONER

## 2018-12-04 RX ORDER — POTASSIUM CHLORIDE 20 MEQ/1
40 TABLET, EXTENDED RELEASE ORAL DAILY
Qty: 60 TABLET | Refills: 3 | Status: ON HOLD | OUTPATIENT
Start: 2018-12-04 | End: 2019-02-25 | Stop reason: DRUGHIGH

## 2018-12-04 NOTE — PROGRESS NOTES
route as needed for Congestion Patient states she uses 4-6x per day.  omeprazole (PRILOSEC) 20 MG delayed release capsule TAKE ONE CAPSULE BY MOUTH ONE TIME DAILY 90 capsule 3    fluticasone (FLONASE) 50 MCG/ACT nasal spray 2 sprays by Nasal route 2 times daily 1 Bottle 3    Handicap Placard MISC by Does not apply route. Request parking placard due to medical conditions. Duration of 5 years. 1 each 0    tetrahydrozoline 0.05 % ophthalmic solution Place 1 drop into both eyes 3 times daily        No current facility-administered medications for this visit. PAST MEDICAL HISTORY:       Diagnosis Date    Cancer [facility])     adenocarcinoma of her sinuses    Cataract of both eyes     DDD (degenerative disc disease), lumbar     Dysphagia, pharyngoesophageal 09/26/2016    Eczema 03/2018    Fibrocystic breast     H/O ventral hernia repair     Headache 02/09/2017    Hypercholesteremia     Hyperlipidemia     Hypertension     Iron deficiency anemia     LPRD (laryngopharyngeal reflux disease) 09/26/2016    Neuropathy     peripheral    Obesity     Orbital abscess     Orbital cellulitis 01/22/2014    SWAPNA (obstructive sleep apnea)     Osteoarthritis     Osteoporosis     Persistent proteinuria associated with type 2 diabetes mellitus ([facility] [address] 01/2017    urine micral of 50.       Sinusitis     Type II or unspecified type diabetes mellitus without mention of complication, not stated as uncontrolled     diagnoses approx 2000    Velopharyngeal incompetence 09/26/2016     SURGICAL HISTORY:    Past Surgical History:   Procedure Laterality Date    ABDOMEN SURGERY      APPENDECTOMY      CARPAL TUNNEL RELEASE Bilateral 2008, 2009    CATARACT REMOVAL  2011    bilat    CHOLECYSTECTOMY  03/1988    COLONOSCOPY  2016    COLONOSCOPY  10/11/2018    COLONOSCOPY POLYPECTOMY SNARE/COLD BIOPSY performed by Jerry Blanton MD at [address] ESOPHAGUS      ENDOSCOPY, COLON, DIAGNOSTIC      EYE

## 2018-12-13 ENCOUNTER — HOSPITAL ENCOUNTER (OUTPATIENT)
Dept: WOUND CARE | Age: 78
Discharge: HOME | End: 2018-12-13
Payer: MEDICARE

## 2018-12-13 VITALS
HEIGHT: 60 IN | HEART RATE: 77 BPM | BODY MASS INDEX: 40.03 KG/M2 | RESPIRATION RATE: 20 BRPM | TEMPERATURE: 97.8 F | OXYGEN SATURATION: 98 % | DIASTOLIC BLOOD PRESSURE: 61 MMHG | WEIGHT: 203.9 LBS | SYSTOLIC BLOOD PRESSURE: 128 MMHG

## 2018-12-13 DIAGNOSIS — L03.115 BILATERAL CELLULITIS OF LOWER LEG: Primary | ICD-10-CM

## 2018-12-13 DIAGNOSIS — L89.322 PRESSURE INJURY OF LEFT BUTTOCK, STAGE 2 (HCC): ICD-10-CM

## 2018-12-13 DIAGNOSIS — L03.116 BILATERAL CELLULITIS OF LOWER LEG: Primary | ICD-10-CM

## 2018-12-13 DIAGNOSIS — L97.919 VENOUS ULCER OF RIGHT LEG (HCC): ICD-10-CM

## 2018-12-13 DIAGNOSIS — I83.019 VENOUS ULCER OF RIGHT LEG (HCC): ICD-10-CM

## 2018-12-13 DIAGNOSIS — I87.2 VENOUS INSUFFICIENCY OF BOTH LOWER EXTREMITIES: ICD-10-CM

## 2018-12-13 DIAGNOSIS — B95.62 INFECTION OF SKIN DUE TO METHICILLIN RESISTANT STAPHYLOCOCCUS AUREUS (MRSA): ICD-10-CM

## 2018-12-13 DIAGNOSIS — L30.9 ACUTE ECZEMA: ICD-10-CM

## 2018-12-13 DIAGNOSIS — I89.0 LYMPHEDEMA OF BOTH LOWER EXTREMITIES: ICD-10-CM

## 2018-12-13 DIAGNOSIS — R21 RASH OF ENTIRE BODY: ICD-10-CM

## 2018-12-13 DIAGNOSIS — L03.119 CELLULITIS OF LOWER EXTREMITY, UNSPECIFIED LATERALITY: ICD-10-CM

## 2018-12-13 DIAGNOSIS — L08.9 INFECTION OF SKIN DUE TO METHICILLIN RESISTANT STAPHYLOCOCCUS AUREUS (MRSA): ICD-10-CM

## 2018-12-13 DIAGNOSIS — I83.029 VENOUS ULCER OF LEFT LEG (HCC): ICD-10-CM

## 2018-12-13 DIAGNOSIS — L97.929 VENOUS ULCER OF LEFT LEG (HCC): ICD-10-CM

## 2018-12-13 PROCEDURE — 87147 CULTURE TYPE IMMUNOLOGIC: CPT

## 2018-12-13 PROCEDURE — 6370000000 HC RX 637 (ALT 250 FOR IP): Performed by: NURSE PRACTITIONER

## 2018-12-13 PROCEDURE — 87077 CULTURE AEROBIC IDENTIFY: CPT

## 2018-12-13 PROCEDURE — 29580 STRAPPING UNNA BOOT: CPT

## 2018-12-13 PROCEDURE — 2709999900 HC NON-CHARGEABLE SUPPLY

## 2018-12-13 PROCEDURE — 87205 SMEAR GRAM STAIN: CPT

## 2018-12-13 PROCEDURE — 87186 SC STD MICRODIL/AGAR DIL: CPT

## 2018-12-13 PROCEDURE — 87070 CULTURE OTHR SPECIMN AEROBIC: CPT

## 2018-12-13 PROCEDURE — 99214 OFFICE O/P EST MOD 30 MIN: CPT | Performed by: NURSE PRACTITIONER

## 2018-12-13 RX ORDER — CIPROFLOXACIN 500 MG/1
500 TABLET, FILM COATED ORAL 2 TIMES DAILY
Qty: 28 TABLET | Refills: 0 | Status: SHIPPED | OUTPATIENT
Start: 2018-12-13 | End: 2018-12-27

## 2018-12-13 RX ADMIN — FLUOCINONIDE 15 G: 0.5 CREAM TOPICAL at 11:53

## 2018-12-13 ASSESSMENT — PAIN DESCRIPTION - DESCRIPTORS: DESCRIPTORS: BURNING;ITCHING

## 2018-12-13 ASSESSMENT — PAIN DESCRIPTION - ONSET: ONSET: ON-GOING

## 2018-12-13 ASSESSMENT — PAIN DESCRIPTION - ORIENTATION: ORIENTATION: RIGHT;LEFT

## 2018-12-13 ASSESSMENT — PAIN DESCRIPTION - PAIN TYPE: TYPE: ACUTE PAIN

## 2018-12-13 ASSESSMENT — PAIN DESCRIPTION - LOCATION: LOCATION: LEG;FOOT

## 2018-12-13 ASSESSMENT — PAIN DESCRIPTION - PROGRESSION: CLINICAL_PROGRESSION: NOT CHANGED

## 2018-12-13 ASSESSMENT — PAIN SCALES - GENERAL: PAINLEVEL_OUTOF10: 8

## 2018-12-13 ASSESSMENT — PAIN DESCRIPTION - FREQUENCY: FREQUENCY: CONTINUOUS

## 2018-12-13 NOTE — PROGRESS NOTES
cream.     Lymphedema pumps to be ordered for bilateral lower legs to be used twice daily for 1 hour each time     Waffle cushion to chair when sitting     Right and left legs/feet- Cleanse wounds with normal saline and gauze, pat dry. Apply steroid cream (lidex) to irritated/itchy skin around the wounds. Apply silver alginate to wounds. Cover with ABD pads. Wrap with unna boot, cast padding, then coban from base of toes to 1-2 inches below bend of knees. Home Health to change dressings 3 times weekly.     Left buttock- Apply zinc oxide cream 2-3 times daily. 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    Cataract of both eyes     DDD (degenerative disc disease), lumbar     Dysphagia, pharyngoesophageal 09/26/2016    Eczema 03/2018    Fibrocystic breast     H/O ventral hernia repair     Headache 02/09/2017    Hypercholesteremia     Hyperlipidemia     Hypertension     Iron deficiency anemia     LPRD (laryngopharyngeal reflux disease) 09/26/2016    Neuropathy     peripheral    Obesity     Orbital abscess     Orbital cellulitis 01/22/2014    SWAPNA (obstructive sleep apnea)     Osteoarthritis     Osteoporosis     Persistent proteinuria associated with type 2 diabetes mellitus ([facility] [address] 01/2017    urine micral of 50.       Sinusitis     Type II or unspecified type diabetes mellitus without mention of complication, not stated as uncontrolled     diagnoses approx 2000    Velopharyngeal incompetence 09/26/2016       PAST SURGICAL HISTORY    Past Surgical History:   Procedure Laterality Date    ABDOMEN SURGERY      APPENDECTOMY      CARPAL TUNNEL RELEASE Bilateral 2008, 2009    CATARACT REMOVAL  2011    bilat    CHOLECYSTECTOMY  03/1988    COLONOSCOPY  2016    COLONOSCOPY  10/11/2018    COLONOSCOPY POLYPECTOMY SNARE/COLD BIOPSY performed by Zaki Coffey MD at [facility]    DILATATION, ESOPHAGUS      ENDOSCOPY, COLON, DIAGNOSTIC      EYE release tablet Take 2 tablets by mouth daily 60 tablet 3    metFORMIN (GLUCOPHAGE) 500 MG tablet Take 500 mg by mouth 2 times daily (with meals)      bumetanide (BUMEX) 2 MG tablet Take 1 tablet by mouth daily 30 tablet 3    SITagliptin (JANUVIA) 100 MG tablet Take 1 tablet by mouth daily 30 tablet 3    pregabalin (LYRICA) 300 MG capsule Take 300 mg by mouth 2 times daily. Buster Auguste mupirocin (BACTROBAN NASAL) 2 % nasal ointment by Nasal route 2 times daily Take by Nasal route 2 times daily.  lisinopril (PRINIVIL;ZESTRIL) 5 MG tablet Take 1 tablet by mouth daily 30 tablet 3    hydrALAZINE (APRESOLINE) 50 MG tablet TAKE ONE TABLET BY MOUTH THREE TIMES DAILY 270 tablet 1    cycloSPORINE (RESTASIS) 0.05 % ophthalmic emulsion Place 1 drop into both eyes 2 times daily Patient uses 2-4 times per day.  sodium chloride (OCEAN, BABY AYR) 0.65 % nasal spray 1 spray by Nasal route as needed for Congestion Patient states she uses 4-6x per day.  omeprazole (PRILOSEC) 20 MG delayed release capsule TAKE ONE CAPSULE BY MOUTH ONE TIME DAILY 90 capsule 3    fluticasone (FLONASE) 50 MCG/ACT nasal spray 2 sprays by Nasal route 2 times daily 1 Bottle 3    tetrahydrozoline 0.05 % ophthalmic solution Place 1 drop into both eyes 3 times daily       Handicap Placard MISC by Does not apply route. Request parking placard due to medical conditions. Duration of 5 years. 1 each 0     No current facility-administered medications on file prior to encounter.         REVIEW OF SYSTEMS    Constitutional: negative for chills, fevers and sweats  Eyes: negative for irritation and redness  Ears, nose, mouth, throat, and face: negative for hearing loss and hoarseness  Respiratory: negative for cough and shortness of breath  Cardiovascular: positive for lower extremity edema, negative for chest pain  Gastrointestinal: positive for occasional fecal incontinence, negative for abdominal pain, nausea and vomiting  Integument/breast: 10/10/2018    HCT 26.1 10/10/2018    MCV 82.1 10/10/2018     10/10/2018     BMP:   Lab Results   Component Value Date     10/12/2018    K 3.5 10/13/2018    K 3.2 10/11/2018    CL 98 10/12/2018    CO2 22 10/12/2018    PHOS 3.4 04/22/2018    BUN 6 10/12/2018    CREATININE 0.7 10/12/2018     PT/INR:   Lab Results   Component Value Date    INR 1.03 08/25/2016     Prealbumin: No results found for: PREALBUMIN  Albumin:  Lab Results   Component Value Date    LABALBU 3.1 10/04/2018     Sed Rate:  Lab Results   Component Value Date    SEDRATE 102 03/06/2018     CRP:   Lab Results   Component Value Date    CRP 12.50 03/06/2018     Micro:   Lab Results   Component Value Date    BC No growth-preliminary  No growth   09/27/2018      Hemoglobin A1C:   Lab Results   Component Value Date    LABA1C 6.8 09/27/2018       Assessment:     Venous ulcer of left leg, limited to breakdown of skin  Venous ulcer of right leg, limited to breakdown of skin  Acute eczema  Rash of entire body  Venous insufficiency of both lower extremities  Lymphedema of both lower extremities  Pressure injury of left buttock, stage 2  Cellulitis of bilateral lower legs     Contributing Factors: edema, venous stasis, lymphedema, diabetes and obesity    Patient Active Problem List   Diagnosis Code    Hypercholesterolemia E78.00    Osteoarthritis M19.90    Osteoporosis M81.0    Type 2 diabetes mellitus with microalbuminuria, without long-term current use of insulin (HCC) E11.29, R80.9    Morbid obesity with body mass index (BMI) of 45.0 to 49.9 in adult ([facility] [address] E66.01, Z68.42    DDD (degenerative disc disease), lumbar M51.36    Benign essential HTN I10    SWAPNA on CPAP G47.33, Z99.89    Diabetic peripheral neuropathy (HCC) E11.42    Acute recurrent pansinusitis J01.41    Primary thunderclap headache G44.53    Rash of entire body R21    Infection of skin due to methicillin resistant Staphylococcus aureus (MRSA) A49.02    Drug allergy, slippage, breakthrough drainage     If you need medical attention outside of business hours, please contact your Primary Care Doctor or go to the nearest emergency room.      Electronically signed by AMANDA Greenfield CNP on 12/13/18 at 11:50 AM            Electronically signed by AMANDA Greenfield CNP on 12/13/2018 at 11:53 AM

## 2018-12-15 LAB
AEROBIC CULTURE: ABNORMAL
AEROBIC CULTURE: ABNORMAL
GRAM STAIN RESULT: ABNORMAL
ORGANISM: ABNORMAL
ORGANISM: ABNORMAL

## 2018-12-17 ENCOUNTER — TELEPHONE (OUTPATIENT)
Dept: WOUND CARE | Age: 78
End: 2018-12-17

## 2018-12-17 ENCOUNTER — CARE COORDINATION (OUTPATIENT)
Dept: CARE COORDINATION | Age: 78
End: 2018-12-17

## 2018-12-17 RX ORDER — DOXYCYCLINE HYCLATE 100 MG/1
100 CAPSULE ORAL 2 TIMES DAILY
COMMUNITY
Start: 2018-12-17 | End: 2018-12-31

## 2018-12-18 ENCOUNTER — OFFICE VISIT (OUTPATIENT)
Dept: DERMATOLOGY | Age: 78
End: 2018-12-18
Payer: MEDICARE

## 2018-12-18 ENCOUNTER — TELEPHONE (OUTPATIENT)
Dept: DERMATOLOGY | Age: 78
End: 2018-12-18

## 2018-12-18 VITALS — WEIGHT: 203.6 LBS | BODY MASS INDEX: 39.76 KG/M2

## 2018-12-18 DIAGNOSIS — L30.9 ECZEMA, UNSPECIFIED TYPE: Primary | ICD-10-CM

## 2018-12-18 DIAGNOSIS — L28.0 LICHEN SIMPLEX CHRONICUS: ICD-10-CM

## 2018-12-18 DIAGNOSIS — L29.9 PRURITUS: ICD-10-CM

## 2018-12-18 PROCEDURE — G8417 CALC BMI ABV UP PARAM F/U: HCPCS | Performed by: DERMATOLOGY

## 2018-12-18 PROCEDURE — G8482 FLU IMMUNIZE ORDER/ADMIN: HCPCS | Performed by: DERMATOLOGY

## 2018-12-18 PROCEDURE — 1101F PT FALLS ASSESS-DOCD LE1/YR: CPT | Performed by: DERMATOLOGY

## 2018-12-18 PROCEDURE — 1090F PRES/ABSN URINE INCON ASSESS: CPT | Performed by: DERMATOLOGY

## 2018-12-18 PROCEDURE — G8400 PT W/DXA NO RESULTS DOC: HCPCS | Performed by: DERMATOLOGY

## 2018-12-18 PROCEDURE — 99202 OFFICE O/P NEW SF 15 MIN: CPT | Performed by: DERMATOLOGY

## 2018-12-18 PROCEDURE — 4040F PNEUMOC VAC/ADMIN/RCVD: CPT | Performed by: DERMATOLOGY

## 2018-12-18 PROCEDURE — 1036F TOBACCO NON-USER: CPT | Performed by: DERMATOLOGY

## 2018-12-18 PROCEDURE — 1123F ACP DISCUSS/DSCN MKR DOCD: CPT | Performed by: DERMATOLOGY

## 2018-12-18 PROCEDURE — G8428 CUR MEDS NOT DOCUMENT: HCPCS | Performed by: DERMATOLOGY

## 2018-12-18 RX ORDER — DOXEPIN HYDROCHLORIDE 10 MG/1
10 CAPSULE ORAL NIGHTLY
Qty: 60 CAPSULE | Refills: 3 | Status: ON HOLD | OUTPATIENT
Start: 2018-12-18 | End: 2019-02-25 | Stop reason: DRUGHIGH

## 2018-12-18 RX ORDER — ERYTHROMYCIN 5 MG/G
OINTMENT OPHTHALMIC
COMMUNITY
End: 2018-12-27 | Stop reason: ALTCHOICE

## 2018-12-18 NOTE — PATIENT INSTRUCTIONS
new blisters or bruises, or the rash spreads and looks like a sunburn.     · You have signs of infection, such as:  ? Increased pain, swelling, warmth, or redness. ? Red streaks leading from the rash. ? Pus draining from the rash. ? A fever.     · You have crusting or oozing sores.     · You have joint aches or body aches along with your rash.    Watch closely for changes in your health, and be sure to contact your doctor if:    · Your rash does not clear up after 2 to 3 weeks of home treatment.     · Itching interferes with your sleep or daily activities. Where can you learn more? Go to https://[email or web address]. org and sign in to your [email or web address] account. Enter X636 in the Chief Trunk box to learn more about \"Atopic Dermatitis: Care Instructions. \"     If you do not have an account, please click on the \"Sign Up Now\" link. Current as of: April 18, 2018  Content Version: 11.8  © 3295-7233 Healthwise, Incorporated. Care instructions adapted under license by [facility] ([facility]). If you have questions about a medical condition or this instruction, always ask your healthcare professional. [place] [address] any warranty or liability for your use of this information.

## 2018-12-18 NOTE — PROGRESS NOTES
whooping cough as child    Obesity Sister     Other Brother         tumor on back    Obesity Sister     Cancer Sister         Skin     Cancer Brother         Bone cancer from metal    Other Brother         passed as a child    Heart Disease Brother     Other Brother         tremor    Heart Attack Brother     No Known Problems Brother     Heart Disease Brother     No Known Problems Brother     No Known Problems Brother        ROS: yes seasonal allergies, no fever, yes easy bruisability and/or prolonged bleeding after procedures, yes. RA, joint pain, yes itching, no new lesions       Physical Examination:  Gen: alert,  Nad, normal mood/affect  Examination was performed of the following:   psych/neuro, scalp/hair, head/face, conjunctivae/eyelids, gums/teeth/lips, neck, breast/axilla/chest, abdomen, back, RUE, LUE, RLE, LLE, nails/digits, oral mucosa/tongue, genitalia/groin/buttocks, peripheral vascular and lymphatic     LichenificationXerosis, excoriations and eczematous patches diffusely on arms, chest, shoulders, back, BUE/BLE          Assessment/Plan  1-Eczematous Dermatitis and LSC on trunk/BUE/BLE   TAC 0.1% BID; use 2 weeks, wait one week prior to reapplying     Atopic Dry skin care measures reviewed at length; written instructions provided   Limit shower<10 min    Limit soap application strictly to underarms, groins, buttocks    Avoid Zest/Coast/Ivory/Dial; Recommend Dove or Oil of Olay   Pat skin dry after shower, then apply hydrophilic emollients immediately after    Emphasized liberal use of vaseline as much as possible     Doxepin 10mg PO QHS; use and s/e reviewed including droswiness. Not to take with other antihistamines.   Start 10mg QHS first week, then increase to 20mg QHS 2nd week onwards    RTC 4 weeks          Sammi JORGE, milton scribing for and in the presence of Jose G Alvarenga MD, 12/18/2018 at 9:48 AM.    Freddy JORGE MD, personally performed the services described in

## 2018-12-18 NOTE — TELEPHONE ENCOUNTER
Moshe Moran calling to clarify rx from this morning.  Per Dr Darrin Rodney; use BID for 2 weeks then take a break for 1 week before using again

## 2018-12-22 DIAGNOSIS — E87.1 HYPONATREMIA: ICD-10-CM

## 2018-12-26 RX ORDER — LISINOPRIL 5 MG/1
TABLET ORAL
Qty: 90 TABLET | Refills: 1 | Status: SHIPPED | OUTPATIENT
Start: 2018-12-26 | End: 2019-02-15 | Stop reason: SDUPTHER

## 2018-12-27 ENCOUNTER — HOSPITAL ENCOUNTER (OUTPATIENT)
Dept: WOUND CARE | Age: 78
Discharge: HOME | End: 2018-12-27
Payer: MEDICARE

## 2018-12-27 VITALS
HEART RATE: 76 BPM | WEIGHT: 211.4 LBS | OXYGEN SATURATION: 99 % | TEMPERATURE: 97.9 F | BODY MASS INDEX: 41.5 KG/M2 | RESPIRATION RATE: 22 BRPM | SYSTOLIC BLOOD PRESSURE: 141 MMHG | HEIGHT: 60 IN | DIASTOLIC BLOOD PRESSURE: 68 MMHG

## 2018-12-27 DIAGNOSIS — C31.0: ICD-10-CM

## 2018-12-27 DIAGNOSIS — R21 RASH OF ENTIRE BODY: Primary | ICD-10-CM

## 2018-12-27 DIAGNOSIS — I89.0 LYMPHEDEMA OF BOTH LOWER EXTREMITIES: ICD-10-CM

## 2018-12-27 DIAGNOSIS — B95.62 INFECTION OF SKIN DUE TO METHICILLIN RESISTANT STAPHYLOCOCCUS AUREUS (MRSA): ICD-10-CM

## 2018-12-27 DIAGNOSIS — L08.9 INFECTION OF SKIN DUE TO METHICILLIN RESISTANT STAPHYLOCOCCUS AUREUS (MRSA): ICD-10-CM

## 2018-12-27 DIAGNOSIS — L30.9 ACUTE ECZEMA: ICD-10-CM

## 2018-12-27 DIAGNOSIS — L89.322 PRESSURE INJURY OF LEFT BUTTOCK, STAGE 2 (HCC): ICD-10-CM

## 2018-12-27 DIAGNOSIS — I83.019 VENOUS ULCER OF RIGHT LEG (HCC): ICD-10-CM

## 2018-12-27 DIAGNOSIS — I87.2 VENOUS INSUFFICIENCY OF BOTH LOWER EXTREMITIES: ICD-10-CM

## 2018-12-27 DIAGNOSIS — I83.029 VENOUS ULCER OF LEFT LEG (HCC): ICD-10-CM

## 2018-12-27 DIAGNOSIS — L97.929 VENOUS ULCER OF LEFT LEG (HCC): ICD-10-CM

## 2018-12-27 DIAGNOSIS — L97.919 VENOUS ULCER OF RIGHT LEG (HCC): ICD-10-CM

## 2018-12-27 DIAGNOSIS — Z88.1 DRUG ALLERGY, ANTIBIOTIC: ICD-10-CM

## 2018-12-27 PROCEDURE — 29580 STRAPPING UNNA BOOT: CPT

## 2018-12-27 PROCEDURE — 11721 DEBRIDE NAIL 6 OR MORE: CPT

## 2018-12-27 PROCEDURE — 11721 DEBRIDE NAIL 6 OR MORE: CPT | Performed by: NURSE PRACTITIONER

## 2018-12-27 PROCEDURE — 99213 OFFICE O/P EST LOW 20 MIN: CPT | Performed by: NURSE PRACTITIONER

## 2018-12-27 PROCEDURE — 2709999900 HC NON-CHARGEABLE SUPPLY

## 2018-12-27 ASSESSMENT — PAIN DESCRIPTION - PAIN TYPE: TYPE: CHRONIC PAIN

## 2018-12-27 ASSESSMENT — PAIN DESCRIPTION - ONSET: ONSET: ON-GOING

## 2018-12-27 ASSESSMENT — PAIN DESCRIPTION - LOCATION: LOCATION: SHOULDER

## 2018-12-27 ASSESSMENT — PAIN DESCRIPTION - ORIENTATION: ORIENTATION: RIGHT;LEFT

## 2018-12-27 ASSESSMENT — PAIN DESCRIPTION - DESCRIPTORS: DESCRIPTORS: OTHER (COMMENT)

## 2018-12-27 ASSESSMENT — PAIN SCALES - GENERAL: PAINLEVEL_OUTOF10: 10

## 2018-12-27 ASSESSMENT — PAIN DESCRIPTION - PROGRESSION: CLINICAL_PROGRESSION: GRADUALLY WORSENING

## 2018-12-27 ASSESSMENT — PAIN DESCRIPTION - FREQUENCY: FREQUENCY: INTERMITTENT

## 2018-12-27 NOTE — PLAN OF CARE
Problem: Wound:  Goal: Will show signs of wound healing; wound closure and no evidence of infection  Will show signs of wound healing; wound closure and no evidence of infection   Outcome: Ongoing  Pt presents to wound clinic for follow up of left buttocks, bilateral lower leg/feet wounds. Left buttocks, left leg and foot Wounds measures smaller in size. Right leg/foot wounds healed. Bilateral lower leg swelling present. Trace pitting in left lower leg and non pitting noted in right lower leg. No s/s of infection. Pt was afebrile. No new skin breakdown or wounds noted. Pt advised per provider to Apply steroid cream (lidex) to back and upper legs/feet 1-2 times daily for itching and alternate with vaseline. Pt advised to Use waffle cushion in chair when sitting. Pt advised to Off load wound area on left buttocks. Pt advised to Try to move more out of chair. Pt advised per provider to Conitnue Cipro and Doxycycline antibiotic. Pt advised to Take as prescribed. Pt adviased to Take over the counter probiotic or eat yogurt to help with stomach issues while on antibiotics. Pt instructed to Follow Dermatologist Dr. Jessica Burch recommendations. Per provider Bilateral toe nails debrided. Pt advised when lymphedema pumps received . Pt advised to Use 2 times daily for 1 hr each time. Faxed new orders to [facility]. Removed old dressing. Cleansed legs/feet with warm soapy water and dry completely. Cleansed wounds with normal saline and gauze, pat dry. Then applied thick layer of aloe vesta to bilateral lower legs/feet. Pt advised Do not apply ointments in-between toes. Applied silver alginate to open wounds. Covered with ABD pads. Wrapped with coban 2 lites. Start at the base of the toes to 2 inches below the bend of the knee. Pt advised home Health to change dressings 3 times weekly. Applied betadine in between all toes and to left foot/toes open wound area . Weaved gauze between toes.  Pt advised to Change once

## 2018-12-28 NOTE — PROGRESS NOTES
headaches    Hydrocodone      headaches    Percocet [Oxycodone-Acetaminophen] Other (See Comments)     Headaches    Tylenol [Acetaminophen] Other (See Comments)     TYLENOL 3 causes hallucinations    Tape [Adhesive Tape] Rash       MEDICATIONS    Current Outpatient Prescriptions on File Prior to Encounter   Medication Sig Dispense Refill    lisinopril (PRINIVIL;ZESTRIL) 5 MG tablet TAKE 1 TABLET BY MOUTH ONE TIME A DAY  90 tablet 1    fluocinonide (LIDEX) 0.05 % cream fluocinonide 0.05 % topical cream      triamcinolone (KENALOG) 0.1 % ointment Apply to effected areas. 454 g 3    doxepin (SINEQUAN) 10 MG capsule Take 1 capsule by mouth nightly Take 1 capsule at night at bedtime week 1. Then take 2 capsules at night at bedtime starting week 2. 60 capsule 3    doxycycline hyclate (VIBRAMYCIN) 100 MG capsule Take 100 mg by mouth 2 times daily      potassium chloride (KLOR-CON M) 20 MEQ extended release tablet Take 2 tablets by mouth daily 60 tablet 3    metFORMIN (GLUCOPHAGE) 500 MG tablet Take 500 mg by mouth 2 times daily (with meals)      bumetanide (BUMEX) 2 MG tablet Take 1 tablet by mouth daily 30 tablet 3    SITagliptin (JANUVIA) 100 MG tablet Take 1 tablet by mouth daily 30 tablet 3    pregabalin (LYRICA) 300 MG capsule Take 300 mg by mouth 2 times daily. Jem Blas mupirocin (BACTROBAN NASAL) 2 % nasal ointment by Nasal route 2 times daily Take by Nasal route 2 times daily.  hydrALAZINE (APRESOLINE) 50 MG tablet TAKE ONE TABLET BY MOUTH THREE TIMES DAILY 270 tablet 1    cycloSPORINE (RESTASIS) 0.05 % ophthalmic emulsion Place 1 drop into both eyes 2 times daily Patient uses 2-4 times per day.  sodium chloride (OCEAN, BABY AYR) 0.65 % nasal spray 1 spray by Nasal route as needed for Congestion Patient states she uses 4-6x per day.       omeprazole (PRILOSEC) 20 MG delayed release capsule TAKE ONE CAPSULE BY MOUTH ONE TIME DAILY 90 capsule 3    fluticasone (FLONASE) 50 MCG/ACT nasal spray LABA1C 6.8 09/27/2018       Assessment:     Venous ulcer of left leg, limited to breakdown of skin  Venous ulcer of right leg, limited to breakdown of skin  Acute eczema  Venous insufficiency of both lower extremities  Lymphedema of both lower extremities  Pressure injury of left buttock, stage 2     Contributing Factors: edema, venous stasis, lymphedema, diabetes and obesity    Patient Active Problem List   Diagnosis Code    Hypercholesterolemia E78.00    Osteoarthritis M19.90    Osteoporosis M81.0    Type 2 diabetes mellitus with microalbuminuria, without long-term current use of insulin ([facility]) E11.29, R80.9    Morbid obesity with body mass index (BMI) of 45.0 to 49.9 in adult ([facility] E66.01, Z68.42    DDD (degenerative disc disease), lumbar M51.36    Benign essential HTN I10    SWAPNA on CPAP G47.33, Z99.89    Diabetic peripheral neuropathy ([facility]) E11.42    Acute recurrent pansinusitis J01.41    Primary thunderclap headache G44.53    Rash of entire body R21    Infection of skin due to methicillin resistant Staphylococcus aureus (MRSA) A49.02    Drug allergy, antibiotic  likely bactrim Z88.1    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    Skin plaque L98.8    Hyponatremia E87.1    Hypervolemia E87.70    Cellulitis of lower extremity L03.119    Hypoglycemia E16.2    Acute on chronic systolic CHF (congestive heart failure) ([facility]) I50.23    Bilateral cellulitis of lower leg L03.116, L03.115    Venous ulcers of both lower extremities ([facility]) I87.2, L97.919, L97.929    Class 3 severe obesity due to excess calories with serious comorbidity and body mass index (BMI) of 40.0 to 44.9 in adult ([facility]) E66.01, Z68.41    Bilateral lower leg cellulitis L03.116, L03.115    CKD (chronic kidney disease) stage 3, GFR 30-59 ml/min ([facility]) N18.3    Iron deficiency anemia D50.9    Hypomagnesemia E83.42    SBO (small bowel obstruction) ([facility] K56.609    Venous ulcer of left leg ([facility] I83.029, L97.929   

## 2018-12-31 ENCOUNTER — CARE COORDINATION (OUTPATIENT)
Dept: CARE COORDINATION | Age: 78
End: 2018-12-31

## 2019-01-04 ENCOUNTER — TELEPHONE (OUTPATIENT)
Dept: NEPHROLOGY | Age: 79
End: 2019-01-04

## 2019-01-04 DIAGNOSIS — N17.9 AKI (ACUTE KIDNEY INJURY) (HCC): Primary | ICD-10-CM

## 2019-01-04 DIAGNOSIS — E87.5 HYPERKALEMIA: ICD-10-CM

## 2019-01-04 LAB
ANION GAP SERPL CALCULATED.3IONS-SCNC: 9 MEQ/L (ref 10–19)
BUN BLDV-MCNC: 56 MG/DL (ref 8–23)
CALCIUM SERPL-MCNC: 9.3 MG/DL (ref 8.5–10.5)
CHLORIDE BLD-SCNC: 100 MEQ/L (ref 95–107)
CO2: 28 MEQ/L (ref 19–31)
CREAT SERPL-MCNC: 1.6 MG/DL (ref 0.6–1.3)
EGFR AFRICAN AMERICAN: 35.7 ML/MIN/1.73 M2
EGFR IF NONAFRICAN AMERICAN: 30.8 ML/MIN/1.73 M2
GLUCOSE: 112 MG/DL (ref 70–99)
POTASSIUM SERPL-SCNC: 6.3 MEQ/L (ref 3.5–5.4)
SODIUM BLD-SCNC: 137 MEQ/L (ref 135–146)

## 2019-01-04 RX ORDER — SODIUM POLYSTYRENE SULFONATE 15 G/60ML
30 SUSPENSION ORAL; RECTAL ONCE
Qty: 1 BOTTLE | Refills: 0 | Status: SHIPPED | OUTPATIENT
Start: 2019-01-04 | End: 2019-01-10 | Stop reason: ALTCHOICE

## 2019-01-08 ENCOUNTER — TELEPHONE (OUTPATIENT)
Dept: NEPHROLOGY | Age: 79
End: 2019-01-08

## 2019-01-08 DIAGNOSIS — N18.30 CHRONIC KIDNEY DISEASE, STAGE III (MODERATE) (HCC): Primary | ICD-10-CM

## 2019-01-08 LAB
ANION GAP SERPL CALCULATED.3IONS-SCNC: 10 MEQ/L (ref 10–19)
BUN BLDV-MCNC: 28 MG/DL (ref 8–23)
CALCIUM SERPL-MCNC: 8.8 MG/DL (ref 8.5–10.5)
CHLORIDE BLD-SCNC: 104 MEQ/L (ref 95–107)
CO2: 26 MEQ/L (ref 19–31)
CREAT SERPL-MCNC: 1.1 MG/DL (ref 0.6–1.3)
EGFR AFRICAN AMERICAN: 56.1 ML/MIN/1.73 M2
EGFR IF NONAFRICAN AMERICAN: 48.4 ML/MIN/1.73 M2
GLUCOSE: 116 MG/DL (ref 70–99)
POTASSIUM SERPL-SCNC: 4.7 MEQ/L (ref 3.5–5.4)
SODIUM BLD-SCNC: 140 MEQ/L (ref 135–146)

## 2019-01-09 ENCOUNTER — CARE COORDINATION (OUTPATIENT)
Dept: CARE COORDINATION | Age: 79
End: 2019-01-09

## 2019-01-10 ENCOUNTER — HOSPITAL ENCOUNTER (OUTPATIENT)
Dept: WOUND CARE | Age: 79
Discharge: HOME | End: 2019-01-10
Payer: MEDICARE

## 2019-01-10 VITALS
WEIGHT: 211.4 LBS | BODY MASS INDEX: 41.5 KG/M2 | TEMPERATURE: 97.8 F | HEIGHT: 60 IN | OXYGEN SATURATION: 99 % | RESPIRATION RATE: 18 BRPM | SYSTOLIC BLOOD PRESSURE: 149 MMHG | HEART RATE: 74 BPM | DIASTOLIC BLOOD PRESSURE: 69 MMHG

## 2019-01-10 DIAGNOSIS — L30.9 ACUTE ECZEMA: ICD-10-CM

## 2019-01-10 DIAGNOSIS — L97.919 VENOUS ULCER OF RIGHT LEG (HCC): ICD-10-CM

## 2019-01-10 DIAGNOSIS — I89.0 LYMPHEDEMA OF BOTH LOWER EXTREMITIES: ICD-10-CM

## 2019-01-10 DIAGNOSIS — I87.2 VENOUS INSUFFICIENCY OF BOTH LOWER EXTREMITIES: ICD-10-CM

## 2019-01-10 DIAGNOSIS — L89.322 PRESSURE INJURY OF LEFT BUTTOCK, STAGE 2 (HCC): ICD-10-CM

## 2019-01-10 DIAGNOSIS — I83.019 VENOUS ULCER OF RIGHT LEG (HCC): ICD-10-CM

## 2019-01-10 DIAGNOSIS — L97.929 VENOUS ULCER OF LEFT LEG (HCC): ICD-10-CM

## 2019-01-10 DIAGNOSIS — I83.029 VENOUS ULCER OF LEFT LEG (HCC): ICD-10-CM

## 2019-01-10 PROCEDURE — 2709999900 HC NON-CHARGEABLE SUPPLY

## 2019-01-10 PROCEDURE — 6370000000 HC RX 637 (ALT 250 FOR IP): Performed by: NURSE PRACTITIONER

## 2019-01-10 PROCEDURE — 29580 STRAPPING UNNA BOOT: CPT

## 2019-01-10 PROCEDURE — 99213 OFFICE O/P EST LOW 20 MIN: CPT | Performed by: NURSE PRACTITIONER

## 2019-01-10 RX ADMIN — FLUOCINONIDE 1 TUBE: 0.5 CREAM TOPICAL at 10:31

## 2019-01-10 ASSESSMENT — PAIN SCALES - GENERAL: PAINLEVEL_OUTOF10: 0

## 2019-01-22 ENCOUNTER — OFFICE VISIT (OUTPATIENT)
Dept: DERMATOLOGY | Age: 79
End: 2019-01-22
Payer: MEDICARE

## 2019-01-22 VITALS — BODY MASS INDEX: 40.04 KG/M2 | WEIGHT: 205 LBS

## 2019-01-22 DIAGNOSIS — L30.9 ECZEMA, UNSPECIFIED TYPE: Primary | ICD-10-CM

## 2019-01-22 PROCEDURE — 1123F ACP DISCUSS/DSCN MKR DOCD: CPT | Performed by: DERMATOLOGY

## 2019-01-22 PROCEDURE — G8427 DOCREV CUR MEDS BY ELIG CLIN: HCPCS | Performed by: DERMATOLOGY

## 2019-01-22 PROCEDURE — 1036F TOBACCO NON-USER: CPT | Performed by: DERMATOLOGY

## 2019-01-22 PROCEDURE — 1101F PT FALLS ASSESS-DOCD LE1/YR: CPT | Performed by: DERMATOLOGY

## 2019-01-22 PROCEDURE — G8482 FLU IMMUNIZE ORDER/ADMIN: HCPCS | Performed by: DERMATOLOGY

## 2019-01-22 PROCEDURE — 1090F PRES/ABSN URINE INCON ASSESS: CPT | Performed by: DERMATOLOGY

## 2019-01-22 PROCEDURE — G8417 CALC BMI ABV UP PARAM F/U: HCPCS | Performed by: DERMATOLOGY

## 2019-01-22 PROCEDURE — G8400 PT W/DXA NO RESULTS DOC: HCPCS | Performed by: DERMATOLOGY

## 2019-01-22 PROCEDURE — 99213 OFFICE O/P EST LOW 20 MIN: CPT | Performed by: DERMATOLOGY

## 2019-01-22 PROCEDURE — 4040F PNEUMOC VAC/ADMIN/RCVD: CPT | Performed by: DERMATOLOGY

## 2019-01-22 RX ORDER — BETAMETHASONE DIPROPIONATE 0.05 %
OINTMENT (GRAM) TOPICAL
Qty: 1 TUBE | Refills: 2 | Status: SHIPPED | OUTPATIENT
Start: 2019-01-22 | End: 2019-04-15 | Stop reason: ALTCHOICE

## 2019-01-24 ENCOUNTER — HOSPITAL ENCOUNTER (OUTPATIENT)
Dept: WOUND CARE | Age: 79
Discharge: HOME | End: 2019-01-24
Payer: MEDICARE

## 2019-01-24 VITALS
SYSTOLIC BLOOD PRESSURE: 117 MMHG | WEIGHT: 206.2 LBS | BODY MASS INDEX: 40.48 KG/M2 | RESPIRATION RATE: 18 BRPM | HEIGHT: 60 IN | HEART RATE: 73 BPM | DIASTOLIC BLOOD PRESSURE: 58 MMHG | OXYGEN SATURATION: 96 % | TEMPERATURE: 97.9 F

## 2019-01-24 DIAGNOSIS — I83.029 VENOUS ULCER OF LEFT LEG (HCC): ICD-10-CM

## 2019-01-24 DIAGNOSIS — L97.929 VENOUS ULCERS OF BOTH LOWER EXTREMITIES (HCC): ICD-10-CM

## 2019-01-24 DIAGNOSIS — I89.0 LYMPHEDEMA OF BOTH LOWER EXTREMITIES: ICD-10-CM

## 2019-01-24 DIAGNOSIS — L97.929 VENOUS ULCER OF LEFT LEG (HCC): ICD-10-CM

## 2019-01-24 DIAGNOSIS — L97.919 VENOUS ULCERS OF BOTH LOWER EXTREMITIES (HCC): ICD-10-CM

## 2019-01-24 DIAGNOSIS — L03.119 CELLULITIS OF LOWER EXTREMITY, UNSPECIFIED LATERALITY: ICD-10-CM

## 2019-01-24 DIAGNOSIS — L97.919 VENOUS ULCER OF RIGHT LEG (HCC): ICD-10-CM

## 2019-01-24 DIAGNOSIS — L30.9 ECZEMA, UNSPECIFIED TYPE: ICD-10-CM

## 2019-01-24 DIAGNOSIS — L03.116 BILATERAL CELLULITIS OF LOWER LEG: ICD-10-CM

## 2019-01-24 DIAGNOSIS — I87.2 VENOUS INSUFFICIENCY OF BOTH LOWER EXTREMITIES: ICD-10-CM

## 2019-01-24 DIAGNOSIS — I83.019 VENOUS ULCERS OF BOTH LOWER EXTREMITIES (HCC): ICD-10-CM

## 2019-01-24 DIAGNOSIS — L03.115 BILATERAL CELLULITIS OF LOWER LEG: ICD-10-CM

## 2019-01-24 DIAGNOSIS — L89.322 PRESSURE INJURY OF LEFT BUTTOCK, STAGE 2 (HCC): ICD-10-CM

## 2019-01-24 DIAGNOSIS — I83.029 VENOUS ULCERS OF BOTH LOWER EXTREMITIES (HCC): ICD-10-CM

## 2019-01-24 DIAGNOSIS — R21 RASH OF ENTIRE BODY: Primary | ICD-10-CM

## 2019-01-24 DIAGNOSIS — L30.9 ACUTE ECZEMA: ICD-10-CM

## 2019-01-24 DIAGNOSIS — I83.019 VENOUS ULCER OF RIGHT LEG (HCC): ICD-10-CM

## 2019-01-24 PROBLEM — L20.84 INTRINSIC ECZEMA: Status: ACTIVE | Noted: 2019-01-24

## 2019-01-24 PROBLEM — L28.0 LICHEN SIMPLEX CHRONICUS: Status: ACTIVE | Noted: 2019-01-24

## 2019-01-24 PROCEDURE — 87186 SC STD MICRODIL/AGAR DIL: CPT

## 2019-01-24 PROCEDURE — 87070 CULTURE OTHR SPECIMN AEROBIC: CPT

## 2019-01-24 PROCEDURE — 87077 CULTURE AEROBIC IDENTIFY: CPT

## 2019-01-24 PROCEDURE — 87205 SMEAR GRAM STAIN: CPT

## 2019-01-24 PROCEDURE — 29580 STRAPPING UNNA BOOT: CPT

## 2019-01-24 PROCEDURE — 87184 SC STD DISK METHOD PER PLATE: CPT

## 2019-01-24 PROCEDURE — 2709999900 HC NON-CHARGEABLE SUPPLY

## 2019-01-24 PROCEDURE — 99213 OFFICE O/P EST LOW 20 MIN: CPT | Performed by: NURSE PRACTITIONER

## 2019-01-24 ASSESSMENT — PAIN DESCRIPTION - PROGRESSION: CLINICAL_PROGRESSION: NOT CHANGED

## 2019-01-24 ASSESSMENT — PAIN DESCRIPTION - ORIENTATION: ORIENTATION: RIGHT;LEFT

## 2019-01-24 ASSESSMENT — PAIN DESCRIPTION - DESCRIPTORS: DESCRIPTORS: ACHING;BURNING;THROBBING

## 2019-01-24 ASSESSMENT — PAIN DESCRIPTION - FREQUENCY: FREQUENCY: INTERMITTENT

## 2019-01-24 ASSESSMENT — PAIN DESCRIPTION - ONSET: ONSET: ON-GOING

## 2019-01-24 ASSESSMENT — PAIN DESCRIPTION - LOCATION: LOCATION: FOOT

## 2019-01-24 ASSESSMENT — PAIN SCALES - GENERAL: PAINLEVEL_OUTOF10: 4

## 2019-01-24 ASSESSMENT — PAIN DESCRIPTION - PAIN TYPE: TYPE: ACUTE PAIN

## 2019-01-28 ENCOUNTER — TELEPHONE (OUTPATIENT)
Dept: WOUND CARE | Age: 79
End: 2019-01-28

## 2019-01-28 DIAGNOSIS — S81.801A OPEN WOUND OF BOTH LEGS WITH COMPLICATION: Primary | ICD-10-CM

## 2019-01-28 DIAGNOSIS — S81.802A OPEN WOUND OF BOTH LEGS WITH COMPLICATION: Primary | ICD-10-CM

## 2019-01-28 RX ORDER — GENTAMICIN SULFATE 1 MG/G
OINTMENT TOPICAL
COMMUNITY
End: 2019-04-15 | Stop reason: ALTCHOICE

## 2019-02-02 DIAGNOSIS — K21.9 GASTROESOPHAGEAL REFLUX DISEASE, ESOPHAGITIS PRESENCE NOT SPECIFIED: ICD-10-CM

## 2019-02-05 RX ORDER — OMEPRAZOLE 20 MG/1
CAPSULE, DELAYED RELEASE ORAL
Qty: 90 CAPSULE | Refills: 3 | OUTPATIENT
Start: 2019-02-05

## 2019-02-06 ENCOUNTER — CARE COORDINATION (OUTPATIENT)
Dept: CARE COORDINATION | Age: 79
End: 2019-02-06

## 2019-02-07 ENCOUNTER — HOSPITAL ENCOUNTER (OUTPATIENT)
Dept: WOUND CARE | Age: 79
Discharge: HOME | End: 2019-02-07
Payer: MEDICARE

## 2019-02-07 ENCOUNTER — CARE COORDINATION (OUTPATIENT)
Dept: CARE COORDINATION | Age: 79
End: 2019-02-07

## 2019-02-07 VITALS
RESPIRATION RATE: 18 BRPM | HEART RATE: 79 BPM | WEIGHT: 204 LBS | BODY MASS INDEX: 40.05 KG/M2 | DIASTOLIC BLOOD PRESSURE: 57 MMHG | OXYGEN SATURATION: 95 % | HEIGHT: 60 IN | TEMPERATURE: 97.8 F | SYSTOLIC BLOOD PRESSURE: 118 MMHG

## 2019-02-07 DIAGNOSIS — Z22.39 COLONIZATION WITH DRUG-RESISTANT BACTERIA: ICD-10-CM

## 2019-02-07 DIAGNOSIS — I87.2 VENOUS INSUFFICIENCY OF BOTH LOWER EXTREMITIES: ICD-10-CM

## 2019-02-07 DIAGNOSIS — L97.929 VENOUS ULCER OF LEFT LEG (HCC): ICD-10-CM

## 2019-02-07 DIAGNOSIS — L30.8 OTHER ECZEMA: Primary | ICD-10-CM

## 2019-02-07 DIAGNOSIS — L60.3 DYSTROPHIC NAIL: ICD-10-CM

## 2019-02-07 DIAGNOSIS — L30.9 ACUTE ECZEMA: ICD-10-CM

## 2019-02-07 DIAGNOSIS — L97.919 VENOUS ULCER OF RIGHT LEG (HCC): ICD-10-CM

## 2019-02-07 DIAGNOSIS — L89.322 PRESSURE INJURY OF LEFT BUTTOCK, STAGE 2 (HCC): ICD-10-CM

## 2019-02-07 DIAGNOSIS — I83.029 VENOUS ULCER OF LEFT LEG (HCC): ICD-10-CM

## 2019-02-07 DIAGNOSIS — I89.0 LYMPHEDEMA OF BOTH LOWER EXTREMITIES: ICD-10-CM

## 2019-02-07 DIAGNOSIS — I83.019 VENOUS ULCER OF RIGHT LEG (HCC): ICD-10-CM

## 2019-02-07 PROCEDURE — 29580 STRAPPING UNNA BOOT: CPT

## 2019-02-07 PROCEDURE — 2709999900 HC NON-CHARGEABLE SUPPLY

## 2019-02-07 PROCEDURE — 11721 DEBRIDE NAIL 6 OR MORE: CPT

## 2019-02-07 PROCEDURE — 99213 OFFICE O/P EST LOW 20 MIN: CPT | Performed by: NURSE PRACTITIONER

## 2019-02-07 PROCEDURE — 11721 DEBRIDE NAIL 6 OR MORE: CPT | Performed by: NURSE PRACTITIONER

## 2019-02-07 PROCEDURE — 6370000000 HC RX 637 (ALT 250 FOR IP): Performed by: NURSE PRACTITIONER

## 2019-02-07 RX ORDER — GENTAMICIN SULFATE 1 MG/G
OINTMENT TOPICAL ONCE
Status: COMPLETED | OUTPATIENT
Start: 2019-02-07 | End: 2019-02-07

## 2019-02-07 RX ADMIN — GENTAMICIN SULFATE 5 G: 1 OINTMENT TOPICAL at 12:13

## 2019-02-07 RX ADMIN — FLUOCINONIDE 1 TUBE: 0.5 CREAM TOPICAL at 12:03

## 2019-02-07 ASSESSMENT — PAIN DESCRIPTION - PAIN TYPE: TYPE: ACUTE PAIN

## 2019-02-07 ASSESSMENT — PAIN DESCRIPTION - LOCATION: LOCATION: LEG;FOOT

## 2019-02-07 ASSESSMENT — PAIN DESCRIPTION - ORIENTATION: ORIENTATION: RIGHT;LEFT

## 2019-02-07 ASSESSMENT — PAIN DESCRIPTION - PROGRESSION: CLINICAL_PROGRESSION: GRADUALLY WORSENING

## 2019-02-07 ASSESSMENT — PAIN DESCRIPTION - ONSET: ONSET: ON-GOING

## 2019-02-07 ASSESSMENT — PAIN DESCRIPTION - FREQUENCY: FREQUENCY: CONTINUOUS

## 2019-02-07 ASSESSMENT — PAIN DESCRIPTION - DESCRIPTORS: DESCRIPTORS: ACHING;BURNING;CONSTANT

## 2019-02-07 ASSESSMENT — PAIN SCALES - GENERAL: PAINLEVEL_OUTOF10: 10

## 2019-02-09 DIAGNOSIS — K21.9 GASTROESOPHAGEAL REFLUX DISEASE, ESOPHAGITIS PRESENCE NOT SPECIFIED: ICD-10-CM

## 2019-02-11 RX ORDER — OMEPRAZOLE 20 MG/1
CAPSULE, DELAYED RELEASE ORAL
Qty: 90 CAPSULE | Refills: 0 | Status: SHIPPED | OUTPATIENT
Start: 2019-02-11 | End: 2019-02-15 | Stop reason: SDUPTHER

## 2019-02-12 ENCOUNTER — TELEPHONE (OUTPATIENT)
Dept: WOUND CARE | Age: 79
End: 2019-02-12

## 2019-02-12 DIAGNOSIS — Z22.39 COLONIZATION WITH DRUG-RESISTANT BACTERIA: ICD-10-CM

## 2019-02-12 DIAGNOSIS — I83.019 VENOUS ULCER OF RIGHT LEG (HCC): ICD-10-CM

## 2019-02-12 DIAGNOSIS — I89.0 LYMPHEDEMA OF BOTH LOWER EXTREMITIES: ICD-10-CM

## 2019-02-12 DIAGNOSIS — I83.029 VENOUS ULCER OF LEFT LEG (HCC): ICD-10-CM

## 2019-02-12 DIAGNOSIS — L97.929 VENOUS ULCER OF LEFT LEG (HCC): ICD-10-CM

## 2019-02-12 DIAGNOSIS — I87.2 VENOUS INSUFFICIENCY OF BOTH LOWER EXTREMITIES: ICD-10-CM

## 2019-02-12 DIAGNOSIS — L89.322 PRESSURE INJURY OF LEFT BUTTOCK, STAGE 2 (HCC): ICD-10-CM

## 2019-02-12 DIAGNOSIS — L30.9 ACUTE ECZEMA: ICD-10-CM

## 2019-02-12 DIAGNOSIS — L97.919 VENOUS ULCER OF RIGHT LEG (HCC): ICD-10-CM

## 2019-02-12 LAB
ANION GAP SERPL CALCULATED.3IONS-SCNC: 11 MEQ/L (ref 10–19)
BUN BLDV-MCNC: 38 MG/DL (ref 8–23)
CALCIUM SERPL-MCNC: 8.9 MG/DL (ref 8.5–10.5)
CHLORIDE BLD-SCNC: 102 MEQ/L (ref 95–107)
CO2: 28 MEQ/L (ref 19–31)
CREAT SERPL-MCNC: 1.5 MG/DL (ref 0.6–1.3)
EGFR AFRICAN AMERICAN: 38.5 ML/MIN/1.73 M2
EGFR IF NONAFRICAN AMERICAN: 33.3 ML/MIN/1.73 M2
GLUCOSE: 150 MG/DL (ref 70–99)
POTASSIUM SERPL-SCNC: 4.8 MEQ/L (ref 3.5–5.4)
SODIUM BLD-SCNC: 141 MEQ/L (ref 135–146)

## 2019-02-14 ENCOUNTER — HOSPITAL ENCOUNTER (OUTPATIENT)
Dept: WOUND CARE | Age: 79
Discharge: HOME | End: 2019-02-14
Payer: MEDICARE

## 2019-02-14 VITALS
OXYGEN SATURATION: 96 % | BODY MASS INDEX: 39.88 KG/M2 | HEART RATE: 71 BPM | RESPIRATION RATE: 18 BRPM | SYSTOLIC BLOOD PRESSURE: 136 MMHG | WEIGHT: 204.2 LBS | TEMPERATURE: 97.7 F | DIASTOLIC BLOOD PRESSURE: 65 MMHG

## 2019-02-14 PROCEDURE — 2709999900 HC NON-CHARGEABLE SUPPLY

## 2019-02-14 PROCEDURE — 99214 OFFICE O/P EST MOD 30 MIN: CPT | Performed by: NURSE PRACTITIONER

## 2019-02-14 PROCEDURE — 99213 OFFICE O/P EST LOW 20 MIN: CPT

## 2019-02-15 ENCOUNTER — OFFICE VISIT (OUTPATIENT)
Dept: FAMILY MEDICINE CLINIC | Age: 79
End: 2019-02-15
Payer: MEDICARE

## 2019-02-15 VITALS
HEIGHT: 61 IN | BODY MASS INDEX: 38.29 KG/M2 | WEIGHT: 202.8 LBS | DIASTOLIC BLOOD PRESSURE: 60 MMHG | HEART RATE: 72 BPM | RESPIRATION RATE: 16 BRPM | SYSTOLIC BLOOD PRESSURE: 112 MMHG

## 2019-02-15 DIAGNOSIS — E11.29 TYPE 2 DIABETES MELLITUS WITH MICROALBUMINURIA, WITHOUT LONG-TERM CURRENT USE OF INSULIN (HCC): Primary | ICD-10-CM

## 2019-02-15 DIAGNOSIS — I83.019 VENOUS ULCERS OF BOTH LOWER EXTREMITIES (HCC): ICD-10-CM

## 2019-02-15 DIAGNOSIS — L97.919 VENOUS ULCER OF RIGHT LEG (HCC): ICD-10-CM

## 2019-02-15 DIAGNOSIS — L97.929 VENOUS ULCERS OF BOTH LOWER EXTREMITIES (HCC): ICD-10-CM

## 2019-02-15 DIAGNOSIS — I83.019 VENOUS ULCER OF RIGHT LEG (HCC): ICD-10-CM

## 2019-02-15 DIAGNOSIS — I83.029 VENOUS ULCERS OF BOTH LOWER EXTREMITIES (HCC): ICD-10-CM

## 2019-02-15 DIAGNOSIS — I10 BENIGN ESSENTIAL HTN: ICD-10-CM

## 2019-02-15 DIAGNOSIS — R60.9 PERIPHERAL EDEMA: ICD-10-CM

## 2019-02-15 DIAGNOSIS — I83.029 VENOUS ULCER OF LEFT LEG (HCC): ICD-10-CM

## 2019-02-15 DIAGNOSIS — L97.929 VENOUS ULCER OF LEFT LEG (HCC): ICD-10-CM

## 2019-02-15 DIAGNOSIS — E11.42 DIABETIC PERIPHERAL NEUROPATHY (HCC): ICD-10-CM

## 2019-02-15 DIAGNOSIS — K21.9 GASTROESOPHAGEAL REFLUX DISEASE, ESOPHAGITIS PRESENCE NOT SPECIFIED: ICD-10-CM

## 2019-02-15 DIAGNOSIS — R80.9 TYPE 2 DIABETES MELLITUS WITH MICROALBUMINURIA, WITHOUT LONG-TERM CURRENT USE OF INSULIN (HCC): Primary | ICD-10-CM

## 2019-02-15 DIAGNOSIS — L97.919 VENOUS ULCERS OF BOTH LOWER EXTREMITIES (HCC): ICD-10-CM

## 2019-02-15 DIAGNOSIS — E87.1 HYPONATREMIA: ICD-10-CM

## 2019-02-15 PROCEDURE — G8482 FLU IMMUNIZE ORDER/ADMIN: HCPCS | Performed by: FAMILY MEDICINE

## 2019-02-15 PROCEDURE — 1090F PRES/ABSN URINE INCON ASSESS: CPT | Performed by: FAMILY MEDICINE

## 2019-02-15 PROCEDURE — 1123F ACP DISCUSS/DSCN MKR DOCD: CPT | Performed by: FAMILY MEDICINE

## 2019-02-15 PROCEDURE — 4040F PNEUMOC VAC/ADMIN/RCVD: CPT | Performed by: FAMILY MEDICINE

## 2019-02-15 PROCEDURE — 99214 OFFICE O/P EST MOD 30 MIN: CPT | Performed by: FAMILY MEDICINE

## 2019-02-15 PROCEDURE — G8427 DOCREV CUR MEDS BY ELIG CLIN: HCPCS | Performed by: FAMILY MEDICINE

## 2019-02-15 PROCEDURE — 1036F TOBACCO NON-USER: CPT | Performed by: FAMILY MEDICINE

## 2019-02-15 PROCEDURE — 1101F PT FALLS ASSESS-DOCD LE1/YR: CPT | Performed by: FAMILY MEDICINE

## 2019-02-15 PROCEDURE — G8400 PT W/DXA NO RESULTS DOC: HCPCS | Performed by: FAMILY MEDICINE

## 2019-02-15 PROCEDURE — G8417 CALC BMI ABV UP PARAM F/U: HCPCS | Performed by: FAMILY MEDICINE

## 2019-02-15 RX ORDER — HYDRALAZINE HYDROCHLORIDE 50 MG/1
TABLET, FILM COATED ORAL
Qty: 270 TABLET | Refills: 3 | Status: ON HOLD | OUTPATIENT
Start: 2019-02-15 | End: 2019-02-25 | Stop reason: DRUGHIGH

## 2019-02-15 RX ORDER — BUMETANIDE 2 MG/1
1 TABLET ORAL 2 TIMES DAILY
Qty: 90 TABLET | Refills: 3 | Status: SHIPPED | OUTPATIENT
Start: 2019-02-15 | End: 2020-02-18 | Stop reason: SDUPTHER

## 2019-02-15 RX ORDER — LISINOPRIL 5 MG/1
TABLET ORAL
Qty: 90 TABLET | Refills: 3 | Status: ON HOLD | OUTPATIENT
Start: 2019-02-15 | End: 2019-02-25 | Stop reason: HOSPADM

## 2019-02-15 RX ORDER — OMEPRAZOLE 20 MG/1
CAPSULE, DELAYED RELEASE ORAL
Qty: 90 CAPSULE | Refills: 3 | Status: SHIPPED | OUTPATIENT
Start: 2019-02-15 | End: 2020-02-26 | Stop reason: SDUPTHER

## 2019-02-15 ASSESSMENT — ENCOUNTER SYMPTOMS
EYE PAIN: 0
CONSTIPATION: 0
BACK PAIN: 0
VOMITING: 0
ABDOMINAL PAIN: 0
SHORTNESS OF BREATH: 0
SORE THROAT: 0
BLOOD IN STOOL: 0
CHEST TIGHTNESS: 0
WHEEZING: 0
NAUSEA: 0
DIARRHEA: 0
COUGH: 0
RHINORRHEA: 0

## 2019-02-15 ASSESSMENT — PATIENT HEALTH QUESTIONNAIRE - PHQ9
1. LITTLE INTEREST OR PLEASURE IN DOING THINGS: 0
SUM OF ALL RESPONSES TO PHQ9 QUESTIONS 1 & 2: 0
2. FEELING DOWN, DEPRESSED OR HOPELESS: 0
SUM OF ALL RESPONSES TO PHQ QUESTIONS 1-9: 0
SUM OF ALL RESPONSES TO PHQ QUESTIONS 1-9: 0

## 2019-02-21 ENCOUNTER — HOSPITAL ENCOUNTER (OUTPATIENT)
Dept: WOUND CARE | Age: 79
Discharge: HOME | DRG: 916 | End: 2019-02-21
Payer: MEDICARE

## 2019-02-21 VITALS
HEIGHT: 60 IN | OXYGEN SATURATION: 95 % | TEMPERATURE: 97.9 F | DIASTOLIC BLOOD PRESSURE: 73 MMHG | HEART RATE: 74 BPM | SYSTOLIC BLOOD PRESSURE: 148 MMHG | WEIGHT: 207 LBS | RESPIRATION RATE: 18 BRPM | BODY MASS INDEX: 40.64 KG/M2

## 2019-02-21 DIAGNOSIS — R21 RASH OF ENTIRE BODY: ICD-10-CM

## 2019-02-21 DIAGNOSIS — I83.019 VENOUS ULCER OF RIGHT LEG (HCC): ICD-10-CM

## 2019-02-21 DIAGNOSIS — L30.9 ACUTE ECZEMA: ICD-10-CM

## 2019-02-21 DIAGNOSIS — L97.919 VENOUS ULCER OF RIGHT LEG (HCC): ICD-10-CM

## 2019-02-21 DIAGNOSIS — I87.2 VENOUS INSUFFICIENCY OF BOTH LOWER EXTREMITIES: ICD-10-CM

## 2019-02-21 DIAGNOSIS — L30.9 ECZEMA, UNSPECIFIED TYPE: Primary | ICD-10-CM

## 2019-02-21 DIAGNOSIS — L89.322 PRESSURE INJURY OF LEFT BUTTOCK, STAGE 2 (HCC): ICD-10-CM

## 2019-02-21 DIAGNOSIS — L97.929 VENOUS ULCER OF LEFT LEG (HCC): ICD-10-CM

## 2019-02-21 DIAGNOSIS — I89.0 LYMPHEDEMA OF BOTH LOWER EXTREMITIES: ICD-10-CM

## 2019-02-21 DIAGNOSIS — I83.029 VENOUS ULCER OF LEFT LEG (HCC): ICD-10-CM

## 2019-02-21 DIAGNOSIS — Z22.39 COLONIZATION WITH DRUG-RESISTANT BACTERIA: ICD-10-CM

## 2019-02-21 DIAGNOSIS — L08.9 INFECTION OF SKIN DUE TO METHICILLIN RESISTANT STAPHYLOCOCCUS AUREUS (MRSA): ICD-10-CM

## 2019-02-21 DIAGNOSIS — B95.62 INFECTION OF SKIN DUE TO METHICILLIN RESISTANT STAPHYLOCOCCUS AUREUS (MRSA): ICD-10-CM

## 2019-02-21 PROCEDURE — 2709999900 HC NON-CHARGEABLE SUPPLY

## 2019-02-21 PROCEDURE — 99212 OFFICE O/P EST SF 10 MIN: CPT

## 2019-02-21 PROCEDURE — 99213 OFFICE O/P EST LOW 20 MIN: CPT | Performed by: NURSE PRACTITIONER

## 2019-02-21 ASSESSMENT — PAIN DESCRIPTION - FREQUENCY: FREQUENCY: CONTINUOUS

## 2019-02-21 ASSESSMENT — PAIN DESCRIPTION - ORIENTATION: ORIENTATION: RIGHT;LEFT

## 2019-02-21 ASSESSMENT — PAIN DESCRIPTION - LOCATION: LOCATION: BACK;LEG;FOOT

## 2019-02-21 ASSESSMENT — PAIN DESCRIPTION - ONSET: ONSET: ON-GOING

## 2019-02-21 ASSESSMENT — PAIN SCALES - GENERAL: PAINLEVEL_OUTOF10: 10

## 2019-02-21 ASSESSMENT — PAIN DESCRIPTION - DESCRIPTORS: DESCRIPTORS: ACHING;BURNING;CONSTANT

## 2019-02-21 ASSESSMENT — PAIN DESCRIPTION - PROGRESSION: CLINICAL_PROGRESSION: GRADUALLY WORSENING

## 2019-02-21 ASSESSMENT — PAIN DESCRIPTION - PAIN TYPE: TYPE: ACUTE PAIN

## 2019-02-23 ENCOUNTER — HOSPITAL ENCOUNTER (INPATIENT)
Age: 79
LOS: 1 days | Discharge: HOME HEALTH | DRG: 916 | End: 2019-02-25
Attending: EMERGENCY MEDICINE | Admitting: INTERNAL MEDICINE
Payer: MEDICARE

## 2019-02-23 ENCOUNTER — APPOINTMENT (OUTPATIENT)
Dept: CT IMAGING | Age: 79
DRG: 916 | End: 2019-02-23
Payer: MEDICARE

## 2019-02-23 DIAGNOSIS — L03.211 FACIAL CELLULITIS: ICD-10-CM

## 2019-02-23 DIAGNOSIS — T78.3XXA ANGIOEDEMA, INITIAL ENCOUNTER: Primary | ICD-10-CM

## 2019-02-23 LAB
BASOPHILS # BLD: 0.3 %
BASOPHILS ABSOLUTE: 0 THOU/MM3 (ref 0–0.1)
EOSINOPHIL # BLD: 0.6 %
EOSINOPHILS ABSOLUTE: 0.1 THOU/MM3 (ref 0–0.4)
ERYTHROCYTE [DISTWIDTH] IN BLOOD BY AUTOMATED COUNT: 18.7 % (ref 11.5–14.5)
ERYTHROCYTE [DISTWIDTH] IN BLOOD BY AUTOMATED COUNT: 53.9 FL (ref 35–45)
HCT VFR BLD CALC: 30.9 % (ref 37–47)
HEMOGLOBIN: 9.5 GM/DL (ref 12–16)
IMMATURE GRANS (ABS): 0.05 THOU/MM3 (ref 0–0.07)
IMMATURE GRANULOCYTES: 0.4 %
LYMPHOCYTES # BLD: 8.2 %
LYMPHOCYTES ABSOLUTE: 1 THOU/MM3 (ref 1–4.8)
MCH RBC QN AUTO: 24.5 PG (ref 26–33)
MCHC RBC AUTO-ENTMCNC: 30.7 GM/DL (ref 32.2–35.5)
MCV RBC AUTO: 79.6 FL (ref 81–99)
MONOCYTES # BLD: 7 %
MONOCYTES ABSOLUTE: 0.9 THOU/MM3 (ref 0.4–1.3)
NUCLEATED RED BLOOD CELLS: 0 /100 WBC
PLATELET # BLD: 181 THOU/MM3 (ref 130–400)
PMV BLD AUTO: 10.5 FL (ref 9.4–12.4)
RBC # BLD: 3.88 MILL/MM3 (ref 4.2–5.4)
SEG NEUTROPHILS: 83.5 %
SEGMENTED NEUTROPHILS ABSOLUTE COUNT: 10.6 THOU/MM3 (ref 1.8–7.7)
WBC # BLD: 12.7 THOU/MM3 (ref 4.8–10.8)

## 2019-02-23 PROCEDURE — 70487 CT MAXILLOFACIAL W/DYE: CPT

## 2019-02-23 PROCEDURE — 99285 EMERGENCY DEPT VISIT HI MDM: CPT

## 2019-02-23 PROCEDURE — 80053 COMPREHEN METABOLIC PANEL: CPT

## 2019-02-23 PROCEDURE — 96375 TX/PRO/DX INJ NEW DRUG ADDON: CPT

## 2019-02-23 PROCEDURE — 86900 BLOOD TYPING SEROLOGIC ABO: CPT

## 2019-02-23 PROCEDURE — 86901 BLOOD TYPING SEROLOGIC RH(D): CPT

## 2019-02-23 PROCEDURE — 36415 COLL VENOUS BLD VENIPUNCTURE: CPT

## 2019-02-23 PROCEDURE — 2580000003 HC RX 258: Performed by: EMERGENCY MEDICINE

## 2019-02-23 PROCEDURE — 85025 COMPLETE CBC W/AUTO DIFF WBC: CPT

## 2019-02-23 PROCEDURE — P9017 PLASMA 1 DONOR FRZ W/IN 8 HR: HCPCS

## 2019-02-23 PROCEDURE — 86850 RBC ANTIBODY SCREEN: CPT

## 2019-02-23 PROCEDURE — 6360000002 HC RX W HCPCS: Performed by: EMERGENCY MEDICINE

## 2019-02-23 PROCEDURE — 2500000003 HC RX 250 WO HCPCS: Performed by: EMERGENCY MEDICINE

## 2019-02-23 RX ORDER — 0.9 % SODIUM CHLORIDE 0.9 %
250 INTRAVENOUS SOLUTION INTRAVENOUS ONCE
Status: COMPLETED | OUTPATIENT
Start: 2019-02-23 | End: 2019-02-24

## 2019-02-23 RX ORDER — METHYLPREDNISOLONE SODIUM SUCCINATE 125 MG/2ML
125 INJECTION, POWDER, LYOPHILIZED, FOR SOLUTION INTRAMUSCULAR; INTRAVENOUS ONCE
Status: COMPLETED | OUTPATIENT
Start: 2019-02-23 | End: 2019-02-23

## 2019-02-23 RX ADMIN — EPINEPHRINE 0.3 MG: 1 INJECTION INTRAMUSCULAR; INTRAVENOUS; SUBCUTANEOUS at 23:50

## 2019-02-23 RX ADMIN — FAMOTIDINE 40 MG: 10 INJECTION, SOLUTION INTRAVENOUS at 23:50

## 2019-02-23 RX ADMIN — METHYLPREDNISOLONE SODIUM SUCCINATE 125 MG: 125 INJECTION, POWDER, FOR SOLUTION INTRAMUSCULAR; INTRAVENOUS at 23:50

## 2019-02-23 RX ADMIN — SODIUM CHLORIDE 250 ML: 9 INJECTION, SOLUTION INTRAVENOUS at 23:50

## 2019-02-23 ASSESSMENT — PAIN SCALES - GENERAL: PAINLEVEL_OUTOF10: 6

## 2019-02-23 ASSESSMENT — ENCOUNTER SYMPTOMS
RHINORRHEA: 0
EYE ITCHING: 0
WHEEZING: 0
VOMITING: 0
NAUSEA: 0
SHORTNESS OF BREATH: 0
ABDOMINAL PAIN: 0
COUGH: 0
EYE DISCHARGE: 0
ABDOMINAL DISTENTION: 0
DIARRHEA: 0

## 2019-02-23 ASSESSMENT — PAIN DESCRIPTION - LOCATION: LOCATION: FACE

## 2019-02-23 ASSESSMENT — PAIN DESCRIPTION - PAIN TYPE: TYPE: ACUTE PAIN

## 2019-02-24 PROBLEM — T78.3XXA ANGIO-EDEMA, INITIAL ENCOUNTER: Status: ACTIVE | Noted: 2019-02-24

## 2019-02-24 LAB
ABO: NORMAL
ALBUMIN SERPL-MCNC: 3.9 G/DL (ref 3.5–5.1)
ALP BLD-CCNC: 94 U/L (ref 38–126)
ALT SERPL-CCNC: 10 U/L (ref 11–66)
ANION GAP SERPL CALCULATED.3IONS-SCNC: 14 MEQ/L (ref 8–16)
ANION GAP SERPL CALCULATED.3IONS-SCNC: 15 MEQ/L (ref 8–16)
ANTIBODY SCREEN: NORMAL
AST SERPL-CCNC: 15 U/L (ref 5–40)
BASOPHILS # BLD: 0.2 %
BASOPHILS ABSOLUTE: 0 THOU/MM3 (ref 0–0.1)
BILIRUB SERPL-MCNC: 0.4 MG/DL (ref 0.3–1.2)
BUN BLDV-MCNC: 24 MG/DL (ref 7–22)
BUN BLDV-MCNC: 26 MG/DL (ref 7–22)
CALCIUM SERPL-MCNC: 9 MG/DL (ref 8.5–10.5)
CALCIUM SERPL-MCNC: 9.2 MG/DL (ref 8.5–10.5)
CHLORIDE BLD-SCNC: 94 MEQ/L (ref 98–111)
CHLORIDE BLD-SCNC: 97 MEQ/L (ref 98–111)
CO2: 23 MEQ/L (ref 23–33)
CO2: 24 MEQ/L (ref 23–33)
CREAT SERPL-MCNC: 1 MG/DL (ref 0.4–1.2)
CREAT SERPL-MCNC: 1 MG/DL (ref 0.4–1.2)
EOSINOPHIL # BLD: 0 %
EOSINOPHILS ABSOLUTE: 0 THOU/MM3 (ref 0–0.4)
ERYTHROCYTE [DISTWIDTH] IN BLOOD BY AUTOMATED COUNT: 18.6 % (ref 11.5–14.5)
ERYTHROCYTE [DISTWIDTH] IN BLOOD BY AUTOMATED COUNT: 54.4 FL (ref 35–45)
FERRITIN: 65 NG/ML (ref 10–291)
GFR SERPL CREATININE-BSD FRML MDRD: 54 ML/MIN/1.73M2
GFR SERPL CREATININE-BSD FRML MDRD: 54 ML/MIN/1.73M2
GLUCOSE BLD-MCNC: 145 MG/DL (ref 70–108)
GLUCOSE BLD-MCNC: 214 MG/DL (ref 70–108)
GLUCOSE BLD-MCNC: 254 MG/DL (ref 70–108)
GLUCOSE BLD-MCNC: 258 MG/DL (ref 70–108)
GLUCOSE BLD-MCNC: 293 MG/DL (ref 70–108)
HCT VFR BLD CALC: 28.5 % (ref 37–47)
HEMOGLOBIN: 8.8 GM/DL (ref 12–16)
IMMATURE GRANS (ABS): 0.08 THOU/MM3 (ref 0–0.07)
IMMATURE GRANULOCYTES: 0.7 %
IRON: 17 UG/DL (ref 50–170)
LYMPHOCYTES # BLD: 2.5 %
LYMPHOCYTES ABSOLUTE: 0.3 THOU/MM3 (ref 1–4.8)
MCH RBC QN AUTO: 24.8 PG (ref 26–33)
MCHC RBC AUTO-ENTMCNC: 30.9 GM/DL (ref 32.2–35.5)
MCV RBC AUTO: 80.3 FL (ref 81–99)
MONOCYTES # BLD: 1.2 %
MONOCYTES ABSOLUTE: 0.1 THOU/MM3 (ref 0.4–1.3)
MRSA SCREEN RT-PCR: POSITIVE
NUCLEATED RED BLOOD CELLS: 0 /100 WBC
OSMOLALITY CALCULATION: 277.4 MOSMOL/KG (ref 275–300)
OSMOLALITY CALCULATION: 279.4 MOSMOL/KG (ref 275–300)
PLATELET # BLD: 171 THOU/MM3 (ref 130–400)
PMV BLD AUTO: 9.7 FL (ref 9.4–12.4)
POTASSIUM SERPL-SCNC: 4.1 MEQ/L (ref 3.5–5.2)
POTASSIUM SERPL-SCNC: 4.4 MEQ/L (ref 3.5–5.2)
RBC # BLD: 3.55 MILL/MM3 (ref 4.2–5.4)
RH FACTOR: NORMAL
SEG NEUTROPHILS: 95.4 %
SEGMENTED NEUTROPHILS ABSOLUTE COUNT: 11.4 THOU/MM3 (ref 1.8–7.7)
SODIUM BLD-SCNC: 132 MEQ/L (ref 135–145)
SODIUM BLD-SCNC: 135 MEQ/L (ref 135–145)
TOTAL CK: 62 U/L (ref 30–135)
TOTAL PROTEIN: 7.4 G/DL (ref 6.1–8)
VANCOMYCIN RESISTANT ENTEROCOCCUS: NEGATIVE
WBC # BLD: 12 THOU/MM3 (ref 4.8–10.8)

## 2019-02-24 PROCEDURE — 87641 MR-STAPH DNA AMP PROBE: CPT

## 2019-02-24 PROCEDURE — 6360000004 HC RX CONTRAST MEDICATION: Performed by: EMERGENCY MEDICINE

## 2019-02-24 PROCEDURE — 2580000003 HC RX 258: Performed by: EMERGENCY MEDICINE

## 2019-02-24 PROCEDURE — 2709999900 HC NON-CHARGEABLE SUPPLY

## 2019-02-24 PROCEDURE — 80048 BASIC METABOLIC PNL TOTAL CA: CPT

## 2019-02-24 PROCEDURE — 36430 TRANSFUSION BLD/BLD COMPNT: CPT

## 2019-02-24 PROCEDURE — P9017 PLASMA 1 DONOR FRZ W/IN 8 HR: HCPCS

## 2019-02-24 PROCEDURE — 87147 CULTURE TYPE IMMUNOLOGIC: CPT

## 2019-02-24 PROCEDURE — 6360000002 HC RX W HCPCS: Performed by: INTERNAL MEDICINE

## 2019-02-24 PROCEDURE — 6370000000 HC RX 637 (ALT 250 FOR IP): Performed by: INTERNAL MEDICINE

## 2019-02-24 PROCEDURE — 6360000002 HC RX W HCPCS: Performed by: EMERGENCY MEDICINE

## 2019-02-24 PROCEDURE — 82948 REAGENT STRIP/BLOOD GLUCOSE: CPT

## 2019-02-24 PROCEDURE — 2000000000 HC ICU R&B

## 2019-02-24 PROCEDURE — 85025 COMPLETE CBC W/AUTO DIFF WBC: CPT

## 2019-02-24 PROCEDURE — 96365 THER/PROPH/DIAG IV INF INIT: CPT

## 2019-02-24 PROCEDURE — 82550 ASSAY OF CK (CPK): CPT

## 2019-02-24 PROCEDURE — 99222 1ST HOSP IP/OBS MODERATE 55: CPT | Performed by: INTERNAL MEDICINE

## 2019-02-24 PROCEDURE — 87081 CULTURE SCREEN ONLY: CPT

## 2019-02-24 PROCEDURE — 2580000003 HC RX 258: Performed by: INTERNAL MEDICINE

## 2019-02-24 PROCEDURE — 84466 ASSAY OF TRANSFERRIN: CPT

## 2019-02-24 PROCEDURE — 83540 ASSAY OF IRON: CPT

## 2019-02-24 PROCEDURE — 87040 BLOOD CULTURE FOR BACTERIA: CPT

## 2019-02-24 PROCEDURE — 6370000000 HC RX 637 (ALT 250 FOR IP): Performed by: NURSE PRACTITIONER

## 2019-02-24 PROCEDURE — 36415 COLL VENOUS BLD VENIPUNCTURE: CPT

## 2019-02-24 PROCEDURE — 82728 ASSAY OF FERRITIN: CPT

## 2019-02-24 PROCEDURE — 87184 SC STD DISK METHOD PER PLATE: CPT

## 2019-02-24 PROCEDURE — 99223 1ST HOSP IP/OBS HIGH 75: CPT | Performed by: INTERNAL MEDICINE

## 2019-02-24 PROCEDURE — 87500 VANOMYCIN DNA AMP PROBE: CPT

## 2019-02-24 RX ORDER — BUMETANIDE 1 MG/1
1 TABLET ORAL 2 TIMES DAILY
Status: DISCONTINUED | OUTPATIENT
Start: 2019-02-24 | End: 2019-02-25 | Stop reason: HOSPADM

## 2019-02-24 RX ORDER — SODIUM CHLORIDE, SODIUM LACTATE, POTASSIUM CHLORIDE, CALCIUM CHLORIDE 600; 310; 30; 20 MG/100ML; MG/100ML; MG/100ML; MG/100ML
INJECTION, SOLUTION INTRAVENOUS CONTINUOUS
Status: CANCELLED | OUTPATIENT
Start: 2019-02-24

## 2019-02-24 RX ORDER — CYCLOSPORINE 0.5 MG/ML
1 EMULSION OPHTHALMIC 2 TIMES DAILY
Status: DISCONTINUED | OUTPATIENT
Start: 2019-02-24 | End: 2019-02-24 | Stop reason: CLARIF

## 2019-02-24 RX ORDER — HEPARIN SODIUM 5000 [USP'U]/ML
5000 INJECTION, SOLUTION INTRAVENOUS; SUBCUTANEOUS EVERY 8 HOURS SCHEDULED
Status: DISCONTINUED | OUTPATIENT
Start: 2019-02-24 | End: 2019-02-25 | Stop reason: HOSPADM

## 2019-02-24 RX ORDER — HYDRALAZINE HYDROCHLORIDE 50 MG/1
50 TABLET, FILM COATED ORAL EVERY 8 HOURS SCHEDULED
Status: DISCONTINUED | OUTPATIENT
Start: 2019-02-24 | End: 2019-02-24

## 2019-02-24 RX ORDER — PANTOPRAZOLE SODIUM 40 MG/1
40 TABLET, DELAYED RELEASE ORAL
Status: DISCONTINUED | OUTPATIENT
Start: 2019-02-24 | End: 2019-02-25 | Stop reason: HOSPADM

## 2019-02-24 RX ORDER — HYDRALAZINE HYDROCHLORIDE 50 MG/1
50 TABLET, FILM COATED ORAL 2 TIMES DAILY
Status: DISCONTINUED | OUTPATIENT
Start: 2019-02-24 | End: 2019-02-25 | Stop reason: HOSPADM

## 2019-02-24 RX ORDER — DOXEPIN HYDROCHLORIDE 10 MG/1
10 CAPSULE ORAL NIGHTLY
Status: DISCONTINUED | OUTPATIENT
Start: 2019-02-24 | End: 2019-02-25 | Stop reason: HOSPADM

## 2019-02-24 RX ORDER — METHYLPREDNISOLONE SODIUM SUCCINATE 40 MG/ML
40 INJECTION, POWDER, LYOPHILIZED, FOR SOLUTION INTRAMUSCULAR; INTRAVENOUS DAILY
Status: DISCONTINUED | OUTPATIENT
Start: 2019-02-24 | End: 2019-02-25 | Stop reason: HOSPADM

## 2019-02-24 RX ORDER — BUMETANIDE 1 MG/1
1 TABLET ORAL DAILY
Status: DISCONTINUED | OUTPATIENT
Start: 2019-02-24 | End: 2019-02-24

## 2019-02-24 RX ORDER — POTASSIUM CHLORIDE 20 MEQ/1
20 TABLET, EXTENDED RELEASE ORAL DAILY
Status: DISCONTINUED | OUTPATIENT
Start: 2019-02-24 | End: 2019-02-25 | Stop reason: HOSPADM

## 2019-02-24 RX ORDER — PREGABALIN 75 MG/1
300 CAPSULE ORAL 2 TIMES DAILY
Status: DISCONTINUED | OUTPATIENT
Start: 2019-02-24 | End: 2019-02-25 | Stop reason: HOSPADM

## 2019-02-24 RX ORDER — ACETAMINOPHEN 325 MG/1
650 TABLET ORAL EVERY 4 HOURS PRN
Status: DISCONTINUED | OUTPATIENT
Start: 2019-02-24 | End: 2019-02-25 | Stop reason: HOSPADM

## 2019-02-24 RX ORDER — LISINOPRIL 5 MG/1
5 TABLET ORAL DAILY
Status: DISCONTINUED | OUTPATIENT
Start: 2019-02-24 | End: 2019-02-24

## 2019-02-24 RX ORDER — TETRAHYDROZOLINE HCL 0.05 %
1 DROPS OPHTHALMIC (EYE) 3 TIMES DAILY
Status: DISCONTINUED | OUTPATIENT
Start: 2019-02-24 | End: 2019-02-25 | Stop reason: HOSPADM

## 2019-02-24 RX ORDER — POLYVINYL ALCOHOL 14 MG/ML
1 SOLUTION/ DROPS OPHTHALMIC 2 TIMES DAILY
Status: DISCONTINUED | OUTPATIENT
Start: 2019-02-24 | End: 2019-02-25 | Stop reason: HOSPADM

## 2019-02-24 RX ADMIN — VANCOMYCIN HYDROCHLORIDE 1000 MG: 1 INJECTION, POWDER, LYOPHILIZED, FOR SOLUTION INTRAVENOUS at 01:25

## 2019-02-24 RX ADMIN — POLYVINYL ALCOHOL 1 DROP: 14 SOLUTION/ DROPS OPHTHALMIC at 09:39

## 2019-02-24 RX ADMIN — POLYVINYL ALCOHOL 1 DROP: 14 SOLUTION/ DROPS OPHTHALMIC at 20:48

## 2019-02-24 RX ADMIN — SODIUM CHLORIDE 3 G: 900 INJECTION INTRAVENOUS at 03:42

## 2019-02-24 RX ADMIN — POTASSIUM CHLORIDE 20 MEQ: 1500 TABLET, EXTENDED RELEASE ORAL at 09:05

## 2019-02-24 RX ADMIN — HYDRALAZINE HYDROCHLORIDE 50 MG: 50 TABLET, FILM COATED ORAL at 09:38

## 2019-02-24 RX ADMIN — PREGABALIN 300 MG: 75 CAPSULE ORAL at 20:50

## 2019-02-24 RX ADMIN — TETRAHYDROZOLINE HYDROCHLORIDE 1 DROP: 0.05 SOLUTION/ DROPS OPHTHALMIC at 09:39

## 2019-02-24 RX ADMIN — HEPARIN SODIUM 5000 UNITS: 5000 INJECTION INTRAVENOUS; SUBCUTANEOUS at 22:27

## 2019-02-24 RX ADMIN — HYDRALAZINE HYDROCHLORIDE 50 MG: 50 TABLET, FILM COATED ORAL at 20:50

## 2019-02-24 RX ADMIN — PIPERACILLIN SODIUM,TAZOBACTAM SODIUM 3.38 G: 3; .375 INJECTION, POWDER, FOR SOLUTION INTRAVENOUS at 18:23

## 2019-02-24 RX ADMIN — ACETAMINOPHEN 650 MG: 325 TABLET ORAL at 23:40

## 2019-02-24 RX ADMIN — PIPERACILLIN SODIUM,TAZOBACTAM SODIUM 3.38 G: 3; .375 INJECTION, POWDER, FOR SOLUTION INTRAVENOUS at 09:38

## 2019-02-24 RX ADMIN — Medication 3 UNITS: at 16:21

## 2019-02-24 RX ADMIN — HEPARIN SODIUM 5000 UNITS: 5000 INJECTION INTRAVENOUS; SUBCUTANEOUS at 13:56

## 2019-02-24 RX ADMIN — BUMETANIDE 1 MG: 1 TABLET ORAL at 12:13

## 2019-02-24 RX ADMIN — DOXEPIN HYDROCHLORIDE 10 MG: 10 CAPSULE ORAL at 22:23

## 2019-02-24 RX ADMIN — PANTOPRAZOLE SODIUM 40 MG: 40 TABLET, DELAYED RELEASE ORAL at 09:05

## 2019-02-24 RX ADMIN — IOPAMIDOL 80 ML: 755 INJECTION, SOLUTION INTRAVENOUS at 00:26

## 2019-02-24 RX ADMIN — PREGABALIN 300 MG: 75 CAPSULE ORAL at 09:05

## 2019-02-24 RX ADMIN — METHYLPREDNISOLONE SODIUM SUCCINATE 40 MG: 40 INJECTION, POWDER, FOR SOLUTION INTRAMUSCULAR; INTRAVENOUS at 09:38

## 2019-02-24 RX ADMIN — TETRAHYDROZOLINE HYDROCHLORIDE 1 DROP: 0.05 SOLUTION/ DROPS OPHTHALMIC at 14:25

## 2019-02-24 RX ADMIN — TETRAHYDROZOLINE HYDROCHLORIDE 1 DROP: 0.05 SOLUTION/ DROPS OPHTHALMIC at 20:48

## 2019-02-24 RX ADMIN — INSULIN LISPRO 1 UNITS: 100 INJECTION, SOLUTION INTRAVENOUS; SUBCUTANEOUS at 21:01

## 2019-02-24 RX ADMIN — BUMETANIDE 1 MG: 1 TABLET ORAL at 20:50

## 2019-02-24 ASSESSMENT — PAIN SCALES - GENERAL
PAINLEVEL_OUTOF10: 0
PAINLEVEL_OUTOF10: 8
PAINLEVEL_OUTOF10: 0
PAINLEVEL_OUTOF10: 0

## 2019-02-24 ASSESSMENT — PAIN DESCRIPTION - ORIENTATION: ORIENTATION: RIGHT

## 2019-02-24 ASSESSMENT — PAIN DESCRIPTION - LOCATION: LOCATION: SHOULDER

## 2019-02-24 ASSESSMENT — PAIN DESCRIPTION - PAIN TYPE: TYPE: ACUTE PAIN

## 2019-02-24 ASSESSMENT — PAIN DESCRIPTION - DESCRIPTORS: DESCRIPTORS: ACHING

## 2019-02-24 ASSESSMENT — PAIN DESCRIPTION - FREQUENCY: FREQUENCY: CONTINUOUS

## 2019-02-25 VITALS
SYSTOLIC BLOOD PRESSURE: 132 MMHG | BODY MASS INDEX: 38.41 KG/M2 | OXYGEN SATURATION: 96 % | HEART RATE: 77 BPM | WEIGHT: 196.65 LBS | RESPIRATION RATE: 18 BRPM | DIASTOLIC BLOOD PRESSURE: 63 MMHG | TEMPERATURE: 97.5 F

## 2019-02-25 LAB — GLUCOSE BLD-MCNC: 124 MG/DL (ref 70–108)

## 2019-02-25 PROCEDURE — 6370000000 HC RX 637 (ALT 250 FOR IP): Performed by: NURSE PRACTITIONER

## 2019-02-25 PROCEDURE — 2709999900 HC NON-CHARGEABLE SUPPLY

## 2019-02-25 PROCEDURE — 6370000000 HC RX 637 (ALT 250 FOR IP): Performed by: INTERNAL MEDICINE

## 2019-02-25 PROCEDURE — 99233 SBSQ HOSP IP/OBS HIGH 50: CPT | Performed by: INTERNAL MEDICINE

## 2019-02-25 PROCEDURE — 2W1LX6Z COMPRESSION OF RIGHT LOWER EXTREMITY USING PRESSURE DRESSING: ICD-10-PCS | Performed by: INTERNAL MEDICINE

## 2019-02-25 PROCEDURE — 2W1MX6Z COMPRESSION OF LEFT LOWER EXTREMITY USING PRESSURE DRESSING: ICD-10-PCS | Performed by: INTERNAL MEDICINE

## 2019-02-25 PROCEDURE — 2580000003 HC RX 258: Performed by: INTERNAL MEDICINE

## 2019-02-25 PROCEDURE — 6360000002 HC RX W HCPCS: Performed by: INTERNAL MEDICINE

## 2019-02-25 PROCEDURE — 82948 REAGENT STRIP/BLOOD GLUCOSE: CPT

## 2019-02-25 PROCEDURE — APPSS180 APP SPLIT SHARED TIME > 60 MINUTES: Performed by: NURSE PRACTITIONER

## 2019-02-25 PROCEDURE — 29580 STRAPPING UNNA BOOT: CPT

## 2019-02-25 RX ORDER — NICOTINE POLACRILEX 4 MG
15 LOZENGE BUCCAL PRN
Status: DISCONTINUED | OUTPATIENT
Start: 2019-02-25 | End: 2019-02-25 | Stop reason: HOSPADM

## 2019-02-25 RX ORDER — DEXTROSE MONOHYDRATE 25 G/50ML
12.5 INJECTION, SOLUTION INTRAVENOUS PRN
Status: DISCONTINUED | OUTPATIENT
Start: 2019-02-25 | End: 2019-02-25 | Stop reason: HOSPADM

## 2019-02-25 RX ORDER — DOXEPIN HYDROCHLORIDE 10 MG/1
10 CAPSULE ORAL NIGHTLY
Status: ON HOLD | COMMUNITY
End: 2020-12-28

## 2019-02-25 RX ORDER — POTASSIUM CHLORIDE 20 MEQ/1
20 TABLET, EXTENDED RELEASE ORAL DAILY
COMMUNITY
End: 2019-06-14 | Stop reason: SDUPTHER

## 2019-02-25 RX ORDER — PREDNISONE 1 MG/1
TABLET ORAL
Qty: 48 TABLET | Refills: 0 | Status: SHIPPED | OUTPATIENT
Start: 2019-02-26 | End: 2019-04-15 | Stop reason: ALTCHOICE

## 2019-02-25 RX ORDER — HYDRALAZINE HYDROCHLORIDE 50 MG/1
50 TABLET, FILM COATED ORAL 2 TIMES DAILY
COMMUNITY
End: 2020-03-19 | Stop reason: SDUPTHER

## 2019-02-25 RX ORDER — DEXTROSE MONOHYDRATE 50 MG/ML
100 INJECTION, SOLUTION INTRAVENOUS PRN
Status: DISCONTINUED | OUTPATIENT
Start: 2019-02-25 | End: 2019-02-25 | Stop reason: HOSPADM

## 2019-02-25 RX ADMIN — PANTOPRAZOLE SODIUM 40 MG: 40 TABLET, DELAYED RELEASE ORAL at 06:29

## 2019-02-25 RX ADMIN — PIPERACILLIN SODIUM,TAZOBACTAM SODIUM 3.38 G: 3; .375 INJECTION, POWDER, FOR SOLUTION INTRAVENOUS at 02:07

## 2019-02-25 RX ADMIN — BUMETANIDE 1 MG: 1 TABLET ORAL at 09:00

## 2019-02-25 RX ADMIN — HYDRALAZINE HYDROCHLORIDE 50 MG: 50 TABLET, FILM COATED ORAL at 09:00

## 2019-02-25 RX ADMIN — POTASSIUM CHLORIDE 20 MEQ: 1500 TABLET, EXTENDED RELEASE ORAL at 09:02

## 2019-02-25 RX ADMIN — LINAGLIPTIN 5 MG: 5 TABLET, FILM COATED ORAL at 09:33

## 2019-02-25 RX ADMIN — METHYLPREDNISOLONE SODIUM SUCCINATE 40 MG: 40 INJECTION, POWDER, FOR SOLUTION INTRAMUSCULAR; INTRAVENOUS at 09:00

## 2019-02-25 RX ADMIN — TETRAHYDROZOLINE HYDROCHLORIDE 1 DROP: 0.05 SOLUTION/ DROPS OPHTHALMIC at 09:00

## 2019-02-25 RX ADMIN — HEPARIN SODIUM 5000 UNITS: 5000 INJECTION INTRAVENOUS; SUBCUTANEOUS at 06:30

## 2019-02-25 RX ADMIN — PIPERACILLIN SODIUM,TAZOBACTAM SODIUM 3.38 G: 3; .375 INJECTION, POWDER, FOR SOLUTION INTRAVENOUS at 09:33

## 2019-02-25 RX ADMIN — POLYVINYL ALCOHOL 1 DROP: 14 SOLUTION/ DROPS OPHTHALMIC at 08:59

## 2019-02-25 ASSESSMENT — PAIN SCALES - GENERAL
PAINLEVEL_OUTOF10: 0
PAINLEVEL_OUTOF10: 0
PAINLEVEL_OUTOF10: 5

## 2019-02-26 ENCOUNTER — TELEPHONE (OUTPATIENT)
Dept: FAMILY MEDICINE CLINIC | Age: 79
End: 2019-02-26

## 2019-02-26 ENCOUNTER — CARE COORDINATION (OUTPATIENT)
Dept: CASE MANAGEMENT | Age: 79
End: 2019-02-26

## 2019-02-26 LAB — TRANSFERRIN: 192 MG/DL (ref 200–400)

## 2019-02-27 LAB
MRSA SCREEN: ABNORMAL
ORGANISM: ABNORMAL

## 2019-02-28 ENCOUNTER — CARE COORDINATION (OUTPATIENT)
Dept: CARE COORDINATION | Age: 79
End: 2019-02-28

## 2019-03-01 LAB — BLOOD CULTURE, ROUTINE: NORMAL

## 2019-03-04 ENCOUNTER — TELEPHONE (OUTPATIENT)
Dept: FAMILY MEDICINE CLINIC | Age: 79
End: 2019-03-04

## 2019-03-04 ENCOUNTER — OFFICE VISIT (OUTPATIENT)
Dept: FAMILY MEDICINE CLINIC | Age: 79
End: 2019-03-04
Payer: MEDICARE

## 2019-03-04 VITALS
RESPIRATION RATE: 14 BRPM | DIASTOLIC BLOOD PRESSURE: 72 MMHG | WEIGHT: 206 LBS | HEART RATE: 64 BPM | BODY MASS INDEX: 40.23 KG/M2 | SYSTOLIC BLOOD PRESSURE: 130 MMHG

## 2019-03-04 DIAGNOSIS — T78.3XXD ANGIOEDEMA, SUBSEQUENT ENCOUNTER: Primary | ICD-10-CM

## 2019-03-04 DIAGNOSIS — I10 BENIGN ESSENTIAL HTN: ICD-10-CM

## 2019-03-04 DIAGNOSIS — E11.29 TYPE 2 DIABETES MELLITUS WITH MICROALBUMINURIA, WITHOUT LONG-TERM CURRENT USE OF INSULIN (HCC): Primary | ICD-10-CM

## 2019-03-04 DIAGNOSIS — R80.9 TYPE 2 DIABETES MELLITUS WITH MICROALBUMINURIA, WITHOUT LONG-TERM CURRENT USE OF INSULIN (HCC): ICD-10-CM

## 2019-03-04 DIAGNOSIS — R80.9 TYPE 2 DIABETES MELLITUS WITH MICROALBUMINURIA, WITHOUT LONG-TERM CURRENT USE OF INSULIN (HCC): Primary | ICD-10-CM

## 2019-03-04 DIAGNOSIS — E11.29 TYPE 2 DIABETES MELLITUS WITH MICROALBUMINURIA, WITHOUT LONG-TERM CURRENT USE OF INSULIN (HCC): ICD-10-CM

## 2019-03-04 DIAGNOSIS — E66.01 MORBID OBESITY WITH BMI OF 40.0-44.9, ADULT (HCC): ICD-10-CM

## 2019-03-04 LAB — HBA1C MFR BLD: 6 %

## 2019-03-04 PROCEDURE — 99495 TRANSJ CARE MGMT MOD F2F 14D: CPT | Performed by: FAMILY MEDICINE

## 2019-03-04 PROCEDURE — 1111F DSCHRG MED/CURRENT MED MERGE: CPT | Performed by: FAMILY MEDICINE

## 2019-03-04 PROCEDURE — 83036 HEMOGLOBIN GLYCOSYLATED A1C: CPT | Performed by: FAMILY MEDICINE

## 2019-03-04 ASSESSMENT — ENCOUNTER SYMPTOMS
EYE PAIN: 0
WHEEZING: 0
COUGH: 0
BACK PAIN: 0
BLOOD IN STOOL: 0
NAUSEA: 0
CHEST TIGHTNESS: 0
SORE THROAT: 0
RHINORRHEA: 0
VOMITING: 0
CONSTIPATION: 0
SHORTNESS OF BREATH: 0
ABDOMINAL PAIN: 0
DIARRHEA: 0

## 2019-03-07 ENCOUNTER — HOSPITAL ENCOUNTER (OUTPATIENT)
Dept: WOUND CARE | Age: 79
Discharge: HOME | End: 2019-03-07
Payer: MEDICARE

## 2019-03-07 VITALS
TEMPERATURE: 97.4 F | DIASTOLIC BLOOD PRESSURE: 72 MMHG | SYSTOLIC BLOOD PRESSURE: 148 MMHG | HEART RATE: 64 BPM | OXYGEN SATURATION: 97 % | RESPIRATION RATE: 116 BRPM

## 2019-03-07 DIAGNOSIS — L97.929 VENOUS ULCER OF LEFT LEG (HCC): ICD-10-CM

## 2019-03-07 DIAGNOSIS — L89.322 PRESSURE INJURY OF LEFT BUTTOCK, STAGE 2 (HCC): ICD-10-CM

## 2019-03-07 DIAGNOSIS — I83.019 VENOUS ULCER OF RIGHT LEG (HCC): ICD-10-CM

## 2019-03-07 DIAGNOSIS — L30.9 ACUTE ECZEMA: ICD-10-CM

## 2019-03-07 DIAGNOSIS — I89.0 LYMPHEDEMA OF BOTH LOWER EXTREMITIES: ICD-10-CM

## 2019-03-07 DIAGNOSIS — I83.029 VENOUS ULCER OF LEFT LEG (HCC): ICD-10-CM

## 2019-03-07 DIAGNOSIS — L97.919 VENOUS ULCER OF RIGHT LEG (HCC): ICD-10-CM

## 2019-03-07 DIAGNOSIS — Z22.39 COLONIZATION WITH DRUG-RESISTANT BACTERIA: ICD-10-CM

## 2019-03-07 DIAGNOSIS — I87.2 VENOUS INSUFFICIENCY OF BOTH LOWER EXTREMITIES: ICD-10-CM

## 2019-03-07 PROCEDURE — 11721 DEBRIDE NAIL 6 OR MORE: CPT | Performed by: NURSE PRACTITIONER

## 2019-03-07 PROCEDURE — 99213 OFFICE O/P EST LOW 20 MIN: CPT | Performed by: NURSE PRACTITIONER

## 2019-03-07 PROCEDURE — 11721 DEBRIDE NAIL 6 OR MORE: CPT

## 2019-04-10 ENCOUNTER — CARE COORDINATION (OUTPATIENT)
Dept: CARE COORDINATION | Age: 79
End: 2019-04-10

## 2019-04-11 ENCOUNTER — OFFICE VISIT (OUTPATIENT)
Dept: DERMATOLOGY | Age: 79
End: 2019-04-11
Payer: MEDICARE

## 2019-04-11 DIAGNOSIS — L28.0 LICHEN SIMPLEX CHRONICUS: ICD-10-CM

## 2019-04-11 DIAGNOSIS — L30.9 ECZEMA, UNSPECIFIED TYPE: Primary | ICD-10-CM

## 2019-04-11 DIAGNOSIS — L29.9 PRURITUS: ICD-10-CM

## 2019-04-11 PROCEDURE — 1036F TOBACCO NON-USER: CPT | Performed by: DERMATOLOGY

## 2019-04-11 PROCEDURE — 4040F PNEUMOC VAC/ADMIN/RCVD: CPT | Performed by: DERMATOLOGY

## 2019-04-11 PROCEDURE — 99213 OFFICE O/P EST LOW 20 MIN: CPT | Performed by: DERMATOLOGY

## 2019-04-11 PROCEDURE — 1090F PRES/ABSN URINE INCON ASSESS: CPT | Performed by: DERMATOLOGY

## 2019-04-11 PROCEDURE — 1123F ACP DISCUSS/DSCN MKR DOCD: CPT | Performed by: DERMATOLOGY

## 2019-04-11 PROCEDURE — G8427 DOCREV CUR MEDS BY ELIG CLIN: HCPCS | Performed by: DERMATOLOGY

## 2019-04-11 PROCEDURE — G8400 PT W/DXA NO RESULTS DOC: HCPCS | Performed by: DERMATOLOGY

## 2019-04-11 PROCEDURE — G8417 CALC BMI ABV UP PARAM F/U: HCPCS | Performed by: DERMATOLOGY

## 2019-04-11 NOTE — PROGRESS NOTES
4/11/2019    Subjective   CC:  Chief Complaint   Patient presents with    Eczema     F/U    Pruritis     all over body     HPI:  Pt is a 70 y/o femal here for F/U Eczema  There has been significant resolution of excoriations and moderate improvement   States still itching but believes it is d/t an allergic reaction to medication, especially on arms  Using Diprolene 0.05% top. 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    Cataract of both eyes     DDD (degenerative disc disease), lumbar     Dysphagia, pharyngoesophageal 09/26/2016    Eczema 03/2018    Fibrocystic breast     H/O ventral hernia repair     Headache 02/09/2017    Hypercholesteremia     Hyperlipidemia     Hypertension     Iron deficiency anemia     LPRD (laryngopharyngeal reflux disease) 09/26/2016    Neuropathy     peripheral    Obesity     Orbital abscess     Orbital cellulitis 01/22/2014    SWAPNA (obstructive sleep apnea)     Osteoarthritis     Osteoporosis     Persistent proteinuria associated with type 2 diabetes mellitus ([facility] [address] 01/2017    urine micral of 50.       Sinusitis     Type II or unspecified type diabetes mellitus without mention of complication, not stated as uncontrolled     diagnoses approx 2000    Velopharyngeal incompetence 09/26/2016     Past Social Hx:  Social History     Tobacco Use    Smoking status: Never Smoker    Smokeless tobacco: Never Used   Substance Use Topics    Alcohol use: No     Past Family Hx:   Family History   Problem Relation Age of Onset    Diabetes Mother     Heart Disease Father         whooping cough as child    Obesity Sister     Other Brother         tumor on back    Obesity Sister     Cancer Sister         Skin     Cancer Brother         Bone cancer from metal    Other Brother         passed as a child    Heart Disease Brother     Other Brother tremor    Heart Attack Brother     No Known Problems Brother     Heart Disease Brother     No Known Problems Brother     No Known Problems Brother        ROS: yes seasonal allergies, no fever, yes easy bruisability and/or prolonged bleeding after procedures, yes. RA, joint pain, yes itching, no new lesions       Physical Examination:  Gen: alert,  Nad, normal mood/affect  Examination was performed of the following:   psych/neuro, scalp/hair, head/face, conjunctivae/eyelids, gums/teeth/lips, neck, breast/axilla/chest, abdomen, back, RUE, LUE, RLE, LLE, nails/digits, oral mucosa/tongue, genitalia/groin/buttocks, peripheral vascular and lymphatic   Improved areas of   LichenificationXerosis, excoriations and eczematous patches on arms, chest, shoulders, back, BUE/BLE          Assessment/Plan  1-Eczematous Dermatitis and LSC on trunk/BUE/BLE: Moderate improvement   Cont Diprolene ointment BID; nte 2 weeks/mo   Cont Atopic Dry Skin Care measures   Doxepin 10mg PO QHS prn night time pruritus. Not to take with other sleep agents or antihistamines. Referral to Dr. Zoltan Raymond for NBUVB phototherapy and ongoing derm care  Kayleen Vasquez MD FAAD   Board-Certified Dermatologist      I Bhavana, milton scribing for and in the presence of Kelly Hand MD, 4/11/2019 at 10:59 AM.    Kayleen JORGE MD, personally performed the services described in this documentation as scribed by Quang Bender  in my presence, and it is both accurate and complete.

## 2019-04-15 ENCOUNTER — HOSPITAL ENCOUNTER (OUTPATIENT)
Age: 79
Discharge: HOME | End: 2019-04-15
Payer: MEDICARE

## 2019-04-15 ENCOUNTER — HOSPITAL ENCOUNTER (OUTPATIENT)
Dept: WOUND CARE | Age: 79
Discharge: HOME | End: 2019-04-15
Payer: MEDICARE

## 2019-04-15 ENCOUNTER — HOSPITAL ENCOUNTER (OUTPATIENT)
Dept: GENERAL RADIOLOGY | Age: 79
Discharge: HOME | End: 2019-04-15
Payer: MEDICARE

## 2019-04-15 VITALS
HEART RATE: 81 BPM | HEIGHT: 60 IN | SYSTOLIC BLOOD PRESSURE: 146 MMHG | TEMPERATURE: 97.8 F | OXYGEN SATURATION: 95 % | DIASTOLIC BLOOD PRESSURE: 86 MMHG | WEIGHT: 207 LBS | BODY MASS INDEX: 40.64 KG/M2 | RESPIRATION RATE: 18 BRPM
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
## 2019-04-15 DIAGNOSIS — T66.XXXS LATE EFFECT OF RADIATION: ICD-10-CM

## 2019-04-15 DIAGNOSIS — M87.30 OSTEORADIONECROSIS (HCC): Primary | ICD-10-CM

## 2019-04-15 DIAGNOSIS — E11.42 DIABETIC PERIPHERAL NEUROPATHY (HCC): ICD-10-CM

## 2019-04-15 DIAGNOSIS — I10 BENIGN ESSENTIAL HTN: ICD-10-CM

## 2019-04-15 DIAGNOSIS — M87.30 OSTEORADIONECROSIS (HCC): ICD-10-CM

## 2019-04-15 DIAGNOSIS — M87.38 OSTEORADIONECROSIS OF SKULL (HCC): ICD-10-CM
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
## 2019-04-15 LAB
EKG ATRIAL RATE: 70 BPM
EKG P AXIS: 0 DEGREES
EKG P-R INTERVAL: 168 MS
EKG Q-T INTERVAL: 406 MS
EKG QRS DURATION: 96 MS
EKG QTC CALCULATION (BAZETT): 438 MS
EKG R AXIS: 99 DEGREES
EKG T AXIS: 32 DEGREES
EKG VENTRICULAR RATE: 70 BPM

## 2019-04-15 PROCEDURE — 93005 ELECTROCARDIOGRAM TRACING: CPT

## 2019-04-15 PROCEDURE — 71046 X-RAY EXAM CHEST 2 VIEWS: CPT

## 2019-04-15 PROCEDURE — 99215 OFFICE O/P EST HI 40 MIN: CPT | Performed by: NURSE PRACTITIONER

## 2019-04-15 PROCEDURE — 99213 OFFICE O/P EST LOW 20 MIN: CPT

## 2019-04-15 RX ORDER — PREGABALIN 150 MG/1
CAPSULE ORAL
Qty: 120 CAPSULE | Refills: 5 | Status: SHIPPED | OUTPATIENT
Start: 2019-04-15 | End: 2019-10-18 | Stop reason: SDUPTHER

## 2019-04-15 NOTE — TELEPHONE ENCOUNTER
ep'ed lyrica to pharm requested    Controlled Substances Monitoring:     RX Monitoring 4/15/2019   Attestation The Prescription Monitoring Report for this patient was reviewed today.    Chronic Pain Routine Monitoring No signs of potential drug abuse or diversion identified: otherwise, see note documentation

## 2019-04-15 NOTE — PROGRESS NOTES
[facility]   Hyperbaric Oxygen Therapy Consultation  History and Physical    NAME: [id number] Navarro RECORD NUMBER:  [id number]  AGE: 69 y.o. GENDER: female  : 1940  EPISODE DATE:  4/15/2019    Subjective: Lexx Chavez is a 69 y.o. 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 Dr. Lenny Esquivel requested a consultation regarding the possible utility of hyperbaric oxygen therapy to assist with healing. 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 She has been having issues with recurrent sinus infections since that time. She had surgery on 3/25/2019 at [facility] for nasal endoscopy with sinonasal debridement and was noted to have supposed bone in the skull base both anteriorly and superiorly as well as within the sphenoid cavity. Sinus maxillary culture on 3/25/2019 was positive for multiple organisms including MRSA, Klebsiella pneumoniae, stenotrophomonas maltophilia, Streptococcus, and Enterococcus faecalis. She has sinus congestion as well as sinus drainage. She does saline rinses twice daily. She has tubes in place to the bilateral ears. EKG from 10/5/2018 showed normal sinus rhythm with possible inferior infarct. Echocardiogram from 10/8/2018 shows ejection fraction 60% percent. Stress test from 10/4/2018 was negative for myocardial ischemia. Patient has a history of bilateral lower leg venous ulcers and lymphedema. She has been using her lymphedema pumps twice daily as recommended however she has not been using her Velcro compression wraps.     Wound Pain Timing/Severity: none  Quality of pain: N/A  Severity:  0 / 10   Modifying Factors: None  Associated Signs/Symptoms: nasal drainage      Ms. Marlene Lyles has a past medical history of Cancer ([address] Cataract of both eyes, DDD (degenerative disc disease), lumbar, Dysphagia, pharyngoesophageal, Eczema, Fibrocystic breast, H/O ventral hernia repair, Headache, Hypercholesteremia, Hyperlipidemia, Hypertension, Iron deficiency anemia, LPRD (laryngopharyngeal reflux disease), Neuropathy, Obesity, Orbital abscess, Orbital cellulitis, SWAPNA (obstructive sleep apnea), Osteoarthritis, Osteoporosis, Persistent proteinuria associated with type 2 diabetes mellitus ([facility] Sinusitis, Type II or unspecified type diabetes mellitus without mention of complication, not stated as uncontrolled, and Velopharyngeal incompetence. She has a past surgical history that includes ventral hernia repair (1992); Cholecystectomy (03/1988); Hysterectomy, total abdominal (1980); Hemorrhoid surgery (1980s); Total knee arthroplasty (08/2003); Carpal tunnel release (Bilateral, 2008, 2009); sinus surgery (last 2009); Cataract removal (2011); Eye surgery (Left, 01/30/2015); sinus surgery (02/01/2016); sinus surgery (2016 x 2); joint replacement (bilat 2005/ 2007); Colonoscopy (2016); Endoscopy, colon, diagnostic; eye surgery; hernia repair; Tonsillectomy; sinus surgery (09/18/2017); sinus surgery (08/09/2017); Abdomen surgery; Dilatation, esophagus; Appendectomy; pr colsc flx with directed submucosal njx any sbst (10/11/2018); and Colonoscopy (10/11/2018). Her family history includes Cancer in her brother and sister; Diabetes in her mother; Heart Attack in her brother; Heart Disease in her brother, brother, and father; No Known Problems in her brother, brother, and brother; Obesity in her sister and sister; Other in her brother, brother, and brother. Ms. Monta Carrel reports that she has never smoked. She has never used smokeless tobacco. She reports that she does not drink alcohol or use drugs.     Her current medication list consists of Handicap Placard, SITagliptin, bumetanide, cycloSPORINE, doxepin, fluticasone, hydrALAZINE, metFORMIN, mupirocin, omeprazole, potassium Hyponatremia    Hypervolemia    Cellulitis of lower extremity    Hypoglycemia    Acute on chronic systolic CHF (congestive heart failure) (HCC)    Bilateral cellulitis of lower leg    Venous ulcers of both lower extremities ([facility])    Class 3 severe obesity due to excess calories with serious comorbidity and body mass index (BMI) of 40.0 to 44.9 in adult [facility])    Bilateral lower leg cellulitis    CKD (chronic kidney disease) stage 3, GFR 30-59 ml/min ([facility])    Iron deficiency anemia    Hypomagnesemia    SBO (small bowel obstruction) ([facility])    Venous ulcer of left leg (HCC)    Venous ulcer of right leg (HCC)    Acute eczema    Venous insufficiency of both lower extremities    Lymphedema of both lower extremities    Pressure injury of left buttock, stage 2    Eczematous dermatitis    Colonization with drug-resistant bacteria    Dystrophic nail    Angio-edema    Facial cellulitis    Osteoradionecrosis of skull/sinus    Late effect of radiation       We also discussed the inherent risks of HBOT, including confinement anxiety, otic-dental-sinus barotrauma, hypoglycemia in diabetes, seizure, progressive but usually reversible myopia, cardiac arrest, and fire & explosion. There IS a history of diabetes, which could increase the risk of hypoglycemia; our plans regarding this will be close monitoring of glucose levels. Plan:   I believe HBOT is indicated as an important adjunctive therapy in this case because of Ms. Marcia Cruz refractory condition, to speed healing and to decrease the chance of serious complications. Due to the potential adverse effects of HBO therapy, I reviewed some particular aspects of Ms. Marcia Cruz medical history.  There is no history of idiopathic epilepsy, secondary seizures, stroke, recent head trauma, frequent hypoglycemia, diabetic retinopathy or retinal detachment surgery, untreated cataracts, optic neuritis, bleomycin-associated pulmonary fibrosis or recent bleomycin use, recent pulmonary lobectomy or pneumonectomy, obstructive or bullous lung disease, TB, radiation therapy to the head and neck, otosclerosis surgery, congenital spherocytosis or vitamin E deficiency. In addition there is no current fever, upper respiratory infection or sinus obstruction, tracheostomy, decompensated CHF, decompensated asthma, untreated pneumothorax or untreated hyperthyroidism; no current use of high-doses of narcotic analgesics, corticosteroids, parenteral beta-lactams, vitamin C or amiodarone; and no current use of any dose of acetazolamide, phenothiazines, digoxin, disulfiram, cis-platinum, doxorubicin or mafenide. Chest xray and EKG ordered for clearance    Start insurance authorization for HBO    May have issues with sinus pressure due to her congestion and drainage. May need to use a decongestant. She already has bilateral ET tubes in place. Patient to use her Velcro compression wraps to the bilateral lower legs daily. Apply in a.m. and remove at bedtime. Continue lymphedema pumps to bilateral lower legs twice daily for 1 hour each time    Hyperbaric Oxygen Therapy Orders 20 sessions @ 2  ELSY for 90 minutes without air breaks. Treatments to be provided once daily, Monday through Friday. The hypoglycemia prevention management protocol for monitoring and intervention will be followed. Patient instructed to self administer Oxymetazoline Hydrochloride 0.05% (e.g. Afrin) nasal spray 2 sprays in each nostril, 30 minutes prior to HBO treatment as needed for congestion. Goals:     [x] Hyperbaric Oxygen Therapy (HBO) is to provide an adequate granulation to support     spontaneous or surgically supported wound healing    The patient verbalized understanding of the risks associated with HBOT and has agreed to the above plan.      Electronically signed by AMANDA Victor CNP on 4/15/2019 at 7:49 PM.

## 2019-04-18 ENCOUNTER — TELEPHONE (OUTPATIENT)
Dept: HYPERBARIC MEDICINE | Age: 79
End: 2019-04-18

## 2019-04-18 PROBLEM — M87.38: Status: ACTIVE | Noted: 2019-04-15

## 2019-04-18 NOTE — TELEPHONE ENCOUNTER
Called to update daughter (POA) Joaquín Schumacher about hyperbaric therapy. Patient was given cardiac clearance. Patient to start Monday at [address] with daughter patient recently put on antibiotics and is experiencing several  Episodes of runny stool daughter concerned about patient starting treatment with diarrhea. Explained that if patient still having multiple episodes of diarrhea that she should contact the physician that prescribed the anabiotic therapy. Also recommended patient to eat yogurt while on antibiotics per best practice. Patient to start HBOT Monday at 1pm. Explained that patient could not have lotion, deodorant, perfume, nail polish, battery operated objects and medication patches or pumps daughter stated understanding at this time.

## 2019-04-22 ENCOUNTER — CARE COORDINATION (OUTPATIENT)
Dept: CARE COORDINATION | Age: 79
End: 2019-04-22

## 2019-04-22 ENCOUNTER — HOSPITAL ENCOUNTER (OUTPATIENT)
Dept: HYPERBARIC MEDICINE | Age: 79
Setting detail: THERAPIES SERIES
Discharge: HOME | End: 2019-04-22
Payer: MEDICARE

## 2019-04-22 VITALS
BODY MASS INDEX: 40.64 KG/M2 | WEIGHT: 207 LBS | HEART RATE: 68 BPM | DIASTOLIC BLOOD PRESSURE: 70 MMHG | RESPIRATION RATE: 18 BRPM | TEMPERATURE: 97.9 F | OXYGEN SATURATION: 99 % | HEIGHT: 60 IN | SYSTOLIC BLOOD PRESSURE: 145 MMHG

## 2019-04-22 LAB
GLUCOSE BLD-MCNC: 111 MG/DL (ref 70–108)
GLUCOSE BLD-MCNC: 148 MG/DL (ref 70–108)

## 2019-04-22 PROCEDURE — G0277 HBOT, FULL BODY CHAMBER, 30M: HCPCS

## 2019-04-22 PROCEDURE — 82948 REAGENT STRIP/BLOOD GLUCOSE: CPT

## 2019-04-22 ASSESSMENT — PAIN DESCRIPTION - DESCRIPTORS: DESCRIPTORS: ACHING

## 2019-04-22 ASSESSMENT — PAIN SCALES - GENERAL
PAINLEVEL_OUTOF10: 0
PAINLEVEL_OUTOF10: 4

## 2019-04-22 ASSESSMENT — PAIN DESCRIPTION - PAIN TYPE: TYPE: ACUTE PAIN

## 2019-04-22 ASSESSMENT — PAIN DESCRIPTION - LOCATION: LOCATION: GENERALIZED

## 2019-04-22 NOTE — PROGRESS NOTES
RN HYPERBARIC OXYGEN THERAPY RISK ASSESSMENT TOOL   [facility]     [id number] Nam[address] Navarro RECORD NUMBER:  [id number]  AGE: 69 y.o. GENDER: female  : 1940  EPISODE DATE:  2019       PAST MEDICAL HISTORY      Diagnosis Date    Cancer [facility])     adenocarcinoma of her sinuses    Cataract of both eyes     DDD (degenerative disc disease), lumbar     Dysphagia, pharyngoesophageal 2016    Eczema 2018    Fibrocystic breast     H/O ventral hernia repair     Headache 2017    Hypercholesteremia     Hyperlipidemia     Hypertension     Iron deficiency anemia     LPRD (laryngopharyngeal reflux disease) 2016    Neuropathy     peripheral    Obesity     Orbital abscess     Orbital cellulitis 2014    SWAPNA (obstructive sleep apnea)     Osteoarthritis     Osteoporosis     Persistent proteinuria associated with type 2 diabetes mellitus ([facility] 2017    urine micral of 50.       Sinusitis     Type II or unspecified type diabetes mellitus without mention of complication, not stated as uncontrolled     diagnoses approx     Velopharyngeal incompetence 2016       PAST SURGICAL HISTORY  Past Surgical History:   Procedure Laterality Date    ABDOMEN SURGERY      APPENDECTOMY      CARPAL TUNNEL RELEASE Bilateral ,     CATARACT REMOVAL      bilat    CHOLECYSTECTOMY  1988    COLONOSCOPY  2016    COLONOSCOPY  10/11/2018    COLONOSCOPY POLYPECTOMY SNARE/COLD BIOPSY performed by Dany Silverman MD at [address] ESOPHAGUS      ENDOSCOPY, COLON, DIAGNOSTIC      EYE SURGERY Left 2015    EYE SURGERY      CATARACT REMOVAL- BILATERAL    HEMORRHOID SURGERY  1980s    HERNIA REPAIR      HYSTERECTOMY, TOTAL ABDOMINAL  1980    JOINT REPLACEMENT  bilat 2007    knees    LA COLSC FLX WITH DIRECTED SUBMUCOSAL [address] ANY SBST  10/11/2018    COLONOSCOPY SUBMUCOSAL/BOTOX INJECTION performed by Dany Silverman MD at [facility]    SINUS SURGERY  last 2009    removed a bone (2)--2 times no construction     SINUS SURGERY  02/01/2016    SINUS SURGERY  2016 x 2    SINUS SURGERY  09/18/2017    [facility] - Dr Tien Cross SINUS SURGERY  08/09/2017 [facility]17/2017 [facility]    TONSILLECTOMY      TOTAL KNEE ARTHROPLASTY  08/2003    Left TKR    VENTRAL HERNIA REPAIR  1992       FAMILY HISTORY  Family History   Problem Relation Age of Onset    Diabetes Mother     Heart Disease Father         whooping cough as child    Obesity Sister     Other Brother         tumor on back    Obesity Sister     Cancer Sister         Skin     Cancer Brother         Bone cancer from metal    Other Brother         passed as a child    Heart Disease Brother     Other Brother         tremor    Heart Attack Brother     No Known Problems Brother     Heart Disease Brother     No Known Problems Brother     No Known Problems Brother        SOCIAL HISTORY  Social History     Tobacco Use    Smoking status: Never Smoker    Smokeless tobacco: Never Used   Substance Use Topics    Alcohol use: No    Drug use: No       ALLERGIES  Allergies   Allergen Reactions    Bactrim [Sulfamethoxazole-Trimethoprim] Rash    Morphine Other (See Comments)     headaches    Hydrocodone      headaches    Lisinopril Swelling    Percocet [Oxycodone-Acetaminophen] Other (See Comments)     Headaches    Tylenol [Acetaminophen] Other (See Comments)     TYLENOL 3 causes hallucinations    Tape [Adhesive Tape] Rash       MEDICATIONS  Current Outpatient Medications on File Prior to Encounter   Medication Sig Dispense Refill    pregabalin (LYRICA) 150 MG capsule TAKE 2 CAPSULES BY MOUTH IN THE MORNING AND 2 CAPS AT BEDTIME, E11.42 120 capsule 5    doxepin (SINEQUAN) 10 MG capsule Take 10 mg by mouth nightly      hydrALAZINE (APRESOLINE) 50 MG tablet Take 50 mg by mouth 2 times daily      potassium chloride (KLOR-CON M) 20 MEQ extended release tablet Take 20 mEq by mouth daily

## 2019-04-22 NOTE — PROGRESS NOTES
[facility]  Hyperbaric Oxygen Therapy   Progress Note      NAME: [id number] Rigo Navarro RECORD NUMBER:  [id number]  AGE: 69 y.o. GENDER: female  : 1940  EPISODE DATE:  2019     Subjective     HBO Treatment Number: 1 out of Total Treatments: 20    HBO Diagnosis:               Indications: Late Effect of Radiation(Osteoradionecrosis of skull and of sinus )    Safety checks performed prior to treatment. See doc flowsheets for documentation.     Objective        Patient Active Problem List   Diagnosis Code    Hypercholesterolemia E78.00    Osteoarthritis M19.90    Osteoporosis M81.0    Type 2 diabetes mellitus without complication, without long-term current use of insulin ([facility] [address] E11.9    Morbid obesity with body mass index (BMI) of 45.0 to 49.9 in adult ([facility] E66.01, Z68.42    DDD (degenerative disc disease), lumbar M51.36    Benign essential HTN I10    SWAPNA on CPAP G47.33, Z99.89    Diabetic peripheral neuropathy (HCC) E11.42    Acute recurrent pansinusitis J01.41    Primary thunderclap headache G44.53    Rash of entire body R21    Infection of skin due to methicillin resistant Staphylococcus aureus (MRSA) A49.02    Drug allergy, antibiotic  likely bactrim Z88.1    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[facility]) C31.0    Skin plaque L98.8    Hyponatremia E87.1    Hypervolemia E87.70    Cellulitis of lower extremity L03.119    Hypoglycemia E16.2    Acute on chronic systolic CHF (congestive heart failure) ([facility]) I50.23    Bilateral cellulitis of lower leg L03.116, L03.115    Venous ulcers of both lower extremities ([facility]) I87.2, L97.919, L97.929    Class 3 severe obesity due to excess calories with serious comorbidity and body mass index (BMI) of 40.0 to 44.9 in adult ([facility]) E66.01, Z68.41    Bilateral lower leg cellulitis L03.116, L03.115    CKD (chronic kidney disease) stage 3, GFR 30-59 ml/min ([facility]) N18.3    Iron deficiency anemia D50.9    Hypomagnesemia E83.42  SBO (small bowel obstruction) ([facility]) K56.609    Venous ulcer of left leg ([facility]) I83.029, L97.929    Venous ulcer of right leg ([facility]) I83.019, L97.919    Acute eczema L30.9    Venous insufficiency of both lower extremities I87.2    Lymphedema of both lower extremities I89.0    Pressure injury of left buttock, stage 2 L89.322    Eczematous dermatitis L30.9    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 3XXA    Facial cellulitis L03. 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 XXXS          Recent Labs     04/22/19  1247 04/22/19  1511   POCGLU 148* 111*       Pre treatment Vital Signs       Temp: 99.1 °F (37.3 °C)     Pulse: 93     Resp: 16     BP: (!) 141/84       Post treatment Vital Signs  Temp: 97.9 °F (36.6 °C)  Pulse: 68  Resp: 18  BP: (!) 145/70      Assessment        Physical Exam:  General Appearance:  alert and oriented to person, place and time, well-developed and well-nourished, in no acute distress    Pre Tympanic Membrane Assessment:  tympanic membranes intact bilaterally    Post Tympanic Membrane Assessment:  Right: Normal  Left: Normal    Pulmonary/Chest:  clear to auscultation bilaterally- no wheezes, rales or rhonchi, normal air movement, no respiratory distress    Cardiovascular:  normal       Treatment Start Time: 1314     Pressure Reached Time: 1324  ELSY : 2  Treatment Status: No Air break      Decompression Time: 1454   Treatment End Time: 1504    Symptoms Noted During Treatment: None    Adverse Event: no      I was present on these premises and immediately available to furnish assistance & direction throughout the procedure. Plan          Samira Cowart is a 69 y.o. female  successfully completed today's hyperbaric oxygen treatment at [address] [facility].     In my clinical judgement, ongoing HBO therapy is necessary at this time, given a threat to patient function,

## 2019-04-22 NOTE — PROGRESS NOTES
Called patient and left message about earlier appointment available  and explained that patient could dive earlier awaiting call back

## 2019-04-22 NOTE — PLAN OF CARE
No s/s of seizure noted. No s/s of barotrauma noted. Pt demonstrated equalization of ears during pressurization. Care plan reviewed with patient. Patient  verbalize understanding of the plan of care and contribute to goal setting.

## 2019-04-23 ENCOUNTER — HOSPITAL ENCOUNTER (OUTPATIENT)
Dept: HYPERBARIC MEDICINE | Age: 79
Setting detail: THERAPIES SERIES
Discharge: HOME | End: 2019-04-23
Payer: MEDICARE

## 2019-04-23 VITALS
TEMPERATURE: 96.8 F | DIASTOLIC BLOOD PRESSURE: 74 MMHG | HEART RATE: 67 BPM | SYSTOLIC BLOOD PRESSURE: 161 MMHG | OXYGEN SATURATION: 100 % | RESPIRATION RATE: 16 BRPM

## 2019-04-23 LAB
GLUCOSE BLD-MCNC: 122 MG/DL (ref 70–108)
GLUCOSE BLD-MCNC: 142 MG/DL (ref 70–108)

## 2019-04-23 PROCEDURE — 82948 REAGENT STRIP/BLOOD GLUCOSE: CPT

## 2019-04-23 PROCEDURE — G0277 HBOT, FULL BODY CHAMBER, 30M: HCPCS

## 2019-04-23 ASSESSMENT — PAIN SCALES - GENERAL
PAINLEVEL_OUTOF10: 0
PAINLEVEL_OUTOF10: 0

## 2019-04-23 NOTE — PROGRESS NOTES
[facility]  Hyperbaric Oxygen Therapy   Progress Note      NAME: [id number] Rigo Navarro RECORD NUMBER:  [id number]  AGE: 69 y.o. GENDER: female  : 1940  EPISODE DATE:  2019     Subjective     HBO Treatment Number: 2 out of Total Treatments: 20    HBO Diagnosis:               Indications: Late Effect of Radiation(Osteoradionecrosis of skull/ sinus)    Safety checks performed prior to treatment. See doc flowsheets for documentation.     Objective        Patient Active Problem List   Diagnosis Code    Hypercholesterolemia E78.00    Osteoarthritis M19.90    Osteoporosis M81.0    Type 2 diabetes mellitus without complication, without long-term current use of insulin ([facility] [address] E11.9    Morbid obesity with body mass index (BMI) of 45.0 to 49.9 in adult ([facility] [address] E66.01, Z68.42    DDD (degenerative disc disease), lumbar M51.36    Benign essential HTN I10    SWAPNA on CPAP G47.33, Z99.89    Diabetic peripheral neuropathy (HCC) E11.42    Acute recurrent pansinusitis J01.41    Primary thunderclap headache G44.53    Rash of entire body R21    Infection of skin due to methicillin resistant Staphylococcus aureus (MRSA) A49.02    Drug allergy, antibiotic  likely bactrim Z88.1    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[facility]) C31.0    Skin plaque L98.8    Hyponatremia E87.1    Hypervolemia E87.70    Cellulitis of lower extremity L03.119    Hypoglycemia E16.2    Acute on chronic systolic CHF (congestive heart failure) ([facility]) I50.23    Bilateral cellulitis of lower leg L03.116, L03.115    Venous ulcers of both lower extremities ([facility]) I87.2, L97.919, L97.929    Class 3 severe obesity due to excess calories with serious comorbidity and body mass index (BMI) of 40.0 to 44.9 in adult ([facility]) E66.01, Z68.41    Bilateral lower leg cellulitis L03.116, L03.115    CKD (chronic kidney disease) stage 3, GFR 30-59 ml/min ([facility]) N18.3    Iron deficiency anemia D50.9    Hypomagnesemia E83.42    SBO (small bowel obstruction) ([facility] [address] K56.609    Venous ulcer of left leg (HCC) I83.029, L97.929    Venous ulcer of right leg (HCC) I83.019, L97.919    Acute eczema L30.9    Venous insufficiency of both lower extremities I87.2    Lymphedema of both lower extremities I89.0    Pressure injury of left buttock, stage 2 L89.322    Eczematous dermatitis L30.9    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 3XXA    Facial cellulitis L03. 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 XXXS          Recent Labs     04/23/19  1047 04/23/19  1256   POCGLU 142* 122*       Pre treatment Vital Signs       Temp: 99 °F (37.2 °C)     Pulse: 92     Resp: 18     BP: (!) 145/95       Post treatment Vital Signs  Temp: 96.8 °F (36 °C)  Pulse: 67  Resp: 16  BP: (!) 161/74      Assessment        Physical Exam:  General Appearance:  alert and oriented to person, place and time, well-developed and well-nourished, in no acute distress    Pre Tympanic Membrane Assessment:  tympanic membranes intact bilaterally    Post Tympanic Membrane Assessment:  Right: Normal  Left: Normal    Pulmonary/Chest:  clear to auscultation bilaterally- no wheezes, rales or rhonchi, normal air movement, no respiratory distress    Cardiovascular:  normal       Treatment Start Time: 1059     Pressure Reached Time: 1109  ELSY : 2  Treatment Status: No Air break      Decompression Time: 1239   Treatment End Time: 1249    Symptoms Noted During Treatment: None    Adverse Event: no      I was present on these premises and immediately available to furnish assistance & direction throughout the procedure. Plan          Brian Kelly is a 69 y.o. female  successfully completed today's hyperbaric oxygen treatment at [address] [facility].     In my clinical judgement, ongoing HBO therapy is necessary at this time, given a threat to patient function, limb or life

## 2019-04-24 ENCOUNTER — HOSPITAL ENCOUNTER (OUTPATIENT)
Dept: HYPERBARIC MEDICINE | Age: 79
Setting detail: THERAPIES SERIES
Discharge: HOME | End: 2019-04-24
Payer: MEDICARE

## 2019-04-24 VITALS
RESPIRATION RATE: 16 BRPM | DIASTOLIC BLOOD PRESSURE: 77 MMHG | SYSTOLIC BLOOD PRESSURE: 173 MMHG | TEMPERATURE: 98.6 F | OXYGEN SATURATION: 100 % | HEART RATE: 67 BPM

## 2019-04-24 LAB
GLUCOSE BLD-MCNC: 149 MG/DL (ref 70–108)
GLUCOSE BLD-MCNC: 229 MG/DL (ref 70–108)

## 2019-04-24 PROCEDURE — 82948 REAGENT STRIP/BLOOD GLUCOSE: CPT

## 2019-04-24 PROCEDURE — G0277 HBOT, FULL BODY CHAMBER, 30M: HCPCS

## 2019-04-24 ASSESSMENT — PAIN SCALES - GENERAL: PAINLEVEL_OUTOF10: 0

## 2019-04-25 ENCOUNTER — HOSPITAL ENCOUNTER (OUTPATIENT)
Dept: HYPERBARIC MEDICINE | Age: 79
Setting detail: THERAPIES SERIES
Discharge: HOME | End: 2019-04-25
Payer: MEDICARE

## 2019-04-25 VITALS
HEART RATE: 68 BPM | TEMPERATURE: 98.1 F | DIASTOLIC BLOOD PRESSURE: 75 MMHG | RESPIRATION RATE: 16 BRPM | OXYGEN SATURATION: 97 % | SYSTOLIC BLOOD PRESSURE: 162 MMHG

## 2019-04-25 LAB
GLUCOSE BLD-MCNC: 107 MG/DL (ref 70–108)
GLUCOSE BLD-MCNC: 111 MG/DL (ref 70–108)

## 2019-04-25 PROCEDURE — 82948 REAGENT STRIP/BLOOD GLUCOSE: CPT

## 2019-04-25 NOTE — PROGRESS NOTES
1045 POC Glucose 111 Glucerna given and waited for 15 min for repeat POC glucose  1114 Repeat POC glucose 107 Dr. Angelo Yoder notified and treatment aborted patietn unable to dive today due to low blood glucose.  Discharge instructions given verbal and written instructions given to patient and patient son

## 2019-04-25 NOTE — PROGRESS NOTES
[facility]  Hyperbaric Oxygen Therapy   Progress Note      NAME: [id number] Rigo Navarro RECORD NUMBER:  [id number]  AGE: 69 y.o. GENDER: female  : 1940  EPISODE DATE:  2019     Subjective     HBO Treatment Number: 3 out of Total Treatments: 20    HBO Diagnosis:               Indications: Late Effect of Radiation(Osteoradionecrosis of Skull/Sinus )    Safety checks performed prior to treatment. See doc flowsheets for documentation.     Objective        Patient Active Problem List   Diagnosis Code    Hypercholesterolemia E78.00    Osteoarthritis M19.90    Osteoporosis M81.0    Type 2 diabetes mellitus without complication, without long-term current use of insulin ([facility] E11.9    Morbid obesity with body mass index (BMI) of 45.0 to 49.9 in adult ([facility] E66.01, Z68.42    DDD (degenerative disc disease), lumbar M51.36    Benign essential HTN I10    SWAPNA on CPAP G47.33, Z99.89    Diabetic peripheral neuropathy (HCC) E11.42    Acute recurrent pansinusitis J01.41    Primary thunderclap headache G44.53    Rash of entire body R21    Infection of skin due to methicillin resistant Staphylococcus aureus (MRSA) A49.02    Drug allergy, antibiotic  likely bactrim Z88.1    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    Skin plaque L98.8    Hyponatremia E87.1    Hypervolemia E87.70    Cellulitis of lower extremity L03.119    Hypoglycemia E16.2    Acute on chronic systolic CHF (congestive heart failure) ([facility]) I50.23    Bilateral cellulitis of lower leg L03.116, L03.115    Venous ulcers of both lower extremities ([facility]) I87.2, L97.919, L97.929    Class 3 severe obesity due to excess calories with serious comorbidity and body mass index (BMI) of 40.0 to 44.9 in adult ([facility]) E66.01, Z68.41    Bilateral lower leg cellulitis L03.116, L03.115    CKD (chronic kidney disease) stage 3, GFR 30-59 ml/min ([facility]) N18.3    Iron deficiency anemia D50.9    Hypomagnesemia E83.42    SBO from the current condition. Supervision and attendance of Hyperbaric Oxygen Therapy provided. Continue HBO treatment as outlined in the treatment plan. Hyperbaric Oxygen:  Pt tolerated Treatment Number: 3 well today without complications.      Electronically signed by Reyna Johnson MD on 4/24/2019 at 8:03 PM

## 2019-04-26 ENCOUNTER — HOSPITAL ENCOUNTER (OUTPATIENT)
Dept: HYPERBARIC MEDICINE | Age: 79
Setting detail: THERAPIES SERIES
Discharge: HOME | End: 2019-04-26
Payer: MEDICARE

## 2019-04-26 NOTE — PROGRESS NOTES
Patient daughter Negra yo states that Jennifer Cortez has massive headache from sinuses Pt has hx of sinusitis and has flare-ups with weather change. Treatment to be cancelled.  Patient also has appointment with oncology on Tuesday and will not be a treatment

## 2019-04-29 ENCOUNTER — HOSPITAL ENCOUNTER (OUTPATIENT)
Dept: HYPERBARIC MEDICINE | Age: 79
Setting detail: THERAPIES SERIES
Discharge: HOME | End: 2019-04-29
Payer: MEDICARE

## 2019-04-29 VITALS
TEMPERATURE: 96.9 F | OXYGEN SATURATION: 100 % | SYSTOLIC BLOOD PRESSURE: 167 MMHG | HEART RATE: 61 BPM | DIASTOLIC BLOOD PRESSURE: 86 MMHG | RESPIRATION RATE: 18 BRPM

## 2019-04-29 LAB
GLUCOSE BLD-MCNC: 110 MG/DL (ref 70–108)
GLUCOSE BLD-MCNC: 111 MG/DL (ref 70–108)
GLUCOSE BLD-MCNC: 125 MG/DL (ref 70–108)

## 2019-04-29 PROCEDURE — 99183 HYPERBARIC OXYGEN THERAPY: CPT | Performed by: NURSE PRACTITIONER

## 2019-04-29 PROCEDURE — G0277 HBOT, FULL BODY CHAMBER, 30M: HCPCS

## 2019-04-29 PROCEDURE — 82948 REAGENT STRIP/BLOOD GLUCOSE: CPT

## 2019-04-29 NOTE — PROGRESS NOTES
[address] [address]  Hyperbaric Oxygen Therapy   Progress Note      NAME: [id number] Rigo Navarro RECORD NUMBER:  [id number]  AGE: 69 y.o. 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 See doc flowsheets for documentation.     Objective        Patient Active Problem List   Diagnosis Code    Hypercholesterolemia E78.00    Osteoarthritis M19.90    Osteoporosis M81.0    Type 2 diabetes mellitus without complication, without long-term current use of insulin ([facility] E11.9    Morbid obesity with body mass index (BMI) of 45.0 to 49.9 in adult ([facility] E66.01, Z68.42    DDD (degenerative disc disease), lumbar M51.36    Benign essential HTN I10    SWAPNA on CPAP G47.33, Z99.89    Diabetic peripheral neuropathy ([facility]) E11.42    Acute recurrent pansinusitis J01.41    Primary thunderclap headache G44.53    Rash of entire body R21    Infection of skin due to methicillin resistant Staphylococcus aureus (MRSA) A49.02    Drug allergy, antibiotic  likely bactrim Z88.1    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    Skin plaque L98.8    Hyponatremia E87.1    Hypervolemia E87.70    Cellulitis of lower extremity L03.119    Hypoglycemia E16.2    Acute on chronic systolic CHF (congestive heart failure) ([facility]) I50.23    Bilateral cellulitis of lower leg L03.116, L03.115    Venous ulcers of both lower extremities ([facility]) I87.2, L97.919, L97.929    Class 3 severe obesity due to excess calories with serious comorbidity and body mass index (BMI) of 40.0 to 44.9 in adult ([facility]) E66.01, Z68.41    Bilateral lower leg cellulitis L03.116, L03.115    CKD (chronic kidney disease) stage 3, GFR 30-59 ml/min ([facility]) N18.3    Iron deficiency anemia D50.9    Hypomagnesemia E83.42    SBO (small bowel obstruction) ([facility] K56.609    Venous ulcer of left leg (HCC) I83.029, L97.929    Venous ulcer of right leg (HCC) I83.019, L97.919    Acute eczema L30.9    Venous insufficiency of both lower extremities I87.2    Lymphedema of both lower extremities I89.0    Pressure injury of left buttock, stage 2 L89.322    Eczematous dermatitis L30.9    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 3XXA    Facial cellulitis L03. 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 XXXS          Recent Labs     04/29/19  1400 04/29/19  1559   POCGLU 125* 110*       Pre treatment Vital Signs       Temp: 98.6 °F (37 °C)     Pulse: 86     Resp: 16     BP: (!) 168/87       Post treatment Vital Signs  Temp: 96.9 °F (36.1 °C)  Pulse: 61  Resp: 18  BP: (!) 167/86      Assessment        Physical Exam:  General Appearance:  alert and oriented to person, place and time    Pre Tympanic Membrane Assessment:  bilateral tympanic membrane(s) is/has tympanostomy tube in place    Post Tympanic Membrane Assessment:  Right: Normal  Left: Normal    Pulmonary/Chest:  clear to auscultation bilaterally- no wheezes, rales or rhonchi, normal air movement, no respiratory distress    Cardiovascular:  normal, regular rate and rhythm       Treatment Start Time: 1403     Pressure Reached Time: 1413  ELSY : 2  Treatment Status: No Air break      Decompression Time: 1443   Treatment End Time: 1453         Adverse Event: no      I was present on these premises and immediately available to furnish assistance & direction throughout the procedure. Luisito Hood is a 69 y.o. female  successfully completed today's hyperbaric oxygen treatment at [address] [facility]. In my clinical judgement, ongoing HBO therapy is necessary at this time, given a threat to patient function, limb or life from the current condition.   Supervision and attendance of Hyperbaric Oxygen Therapy provided. Continue HBO treatment as outlined in the treatment plan. Hyperbaric Oxygen:  Pt tolerated Treatment Number: 4 well today without complications.      Electronically signed by AMANDA Pedersen CNP on 4/29/2019 at 6:44 PM

## 2019-04-30 ENCOUNTER — APPOINTMENT (OUTPATIENT)
Dept: HYPERBARIC MEDICINE | Age: 79
End: 2019-04-30
Payer: MEDICARE

## 2019-04-30 ENCOUNTER — CARE COORDINATION (OUTPATIENT)
Dept: CARE COORDINATION | Age: 79
End: 2019-04-30

## 2019-04-30 NOTE — CARE COORDINATION
Attempted to reach patient by telephone. Daughter answered phone and states patient is currently \"getting her sinuses cleaned out\" by her doctor. States patient will return call later this afternoon.

## 2019-05-01 ENCOUNTER — HOSPITAL ENCOUNTER (OUTPATIENT)
Dept: HYPERBARIC MEDICINE | Age: 79
Setting detail: THERAPIES SERIES
Discharge: HOME | End: 2019-05-01
Payer: MEDICARE

## 2019-05-01 VITALS
DIASTOLIC BLOOD PRESSURE: 88 MMHG | RESPIRATION RATE: 16 BRPM | OXYGEN SATURATION: 100 % | HEART RATE: 89 BPM | SYSTOLIC BLOOD PRESSURE: 144 MMHG | TEMPERATURE: 98.6 F

## 2019-05-01 LAB
GLUCOSE BLD-MCNC: 141 MG/DL (ref 70–108)
GLUCOSE BLD-MCNC: 160 MG/DL (ref 70–108)

## 2019-05-01 PROCEDURE — G0277 HBOT, FULL BODY CHAMBER, 30M: HCPCS

## 2019-05-01 PROCEDURE — 82948 REAGENT STRIP/BLOOD GLUCOSE: CPT

## 2019-05-01 ASSESSMENT — PAIN SCALES - GENERAL: PAINLEVEL_OUTOF10: 0

## 2019-05-02 ENCOUNTER — HOSPITAL ENCOUNTER (OUTPATIENT)
Dept: HYPERBARIC MEDICINE | Age: 79
Setting detail: THERAPIES SERIES
Discharge: HOME | End: 2019-05-02
Payer: MEDICARE

## 2019-05-02 VITALS
TEMPERATURE: 97.9 F | HEART RATE: 67 BPM | DIASTOLIC BLOOD PRESSURE: 74 MMHG | SYSTOLIC BLOOD PRESSURE: 156 MMHG | OXYGEN SATURATION: 98 % | RESPIRATION RATE: 16 BRPM

## 2019-05-02 LAB
GLUCOSE BLD-MCNC: 112 MG/DL (ref 70–108)
GLUCOSE BLD-MCNC: 127 MG/DL (ref 70–108)
GLUCOSE BLD-MCNC: 132 MG/DL (ref 70–108)

## 2019-05-02 PROCEDURE — 99183 HYPERBARIC OXYGEN THERAPY: CPT | Performed by: NURSE PRACTITIONER

## 2019-05-02 PROCEDURE — G0277 HBOT, FULL BODY CHAMBER, 30M: HCPCS

## 2019-05-02 PROCEDURE — 82948 REAGENT STRIP/BLOOD GLUCOSE: CPT

## 2019-05-02 ASSESSMENT — PAIN SCALES - GENERAL
PAINLEVEL_OUTOF10: 0
PAINLEVEL_OUTOF10: 0

## 2019-05-02 NOTE — PROGRESS NOTES
[address] [address]  Hyperbaric Oxygen Therapy   Progress Note      NAME: [id number] Rigo Navarro RECORD NUMBER:  [id number]  AGE: 69 y.o. 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 See doc flowsheets for documentation.     Objective        Patient Active Problem List   Diagnosis Code    Hypercholesterolemia E78.00    Osteoarthritis M19.90    Osteoporosis M81.0    Type 2 diabetes mellitus without complication, without long-term current use of insulin ([facility] E11.9    Morbid obesity with body mass index (BMI) of 45.0 to 49.9 in adult ([facility] E66.01, Z68.42    DDD (degenerative disc disease), lumbar M51.36    Benign essential HTN I10    SWAPNA on CPAP G47.33, Z99.89    Diabetic peripheral neuropathy ([facility]) E11.42    Acute recurrent pansinusitis J01.41    Primary thunderclap headache G44.53    Rash of entire body R21    Infection of skin due to methicillin resistant Staphylococcus aureus (MRSA) A49.02    Drug allergy, antibiotic  likely bactrim Z88.1    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    Skin plaque L98.8    Hyponatremia E87.1    Hypervolemia E87.70    Cellulitis of lower extremity L03.119    Hypoglycemia E16.2    Acute on chronic systolic CHF (congestive heart failure) ([facility]) I50.23    Bilateral cellulitis of lower leg L03.116, L03.115    Venous ulcers of both lower extremities ([facility]) I87.2, L97.919, L97.929    Class 3 severe obesity due to excess calories with serious comorbidity and body mass index (BMI) of 40.0 to 44.9 in adult ([facility]) E66.01, Z68.41    Bilateral lower leg cellulitis L03.116, L03.115    CKD (chronic kidney disease) stage 3, GFR 30-59 ml/min ([facility]) N18.3    Iron deficiency anemia D50.9    Hypomagnesemia E83.42    SBO (small bowel obstruction) ([facility] K56.609    Venous ulcer of left leg (HCC) I83.029, L97.929    Venous ulcer of right leg (HCC) I83.019, L97.919    Acute eczema L30.9    Venous insufficiency of both lower extremities I87.2    Lymphedema of both lower extremities I89.0    Pressure injury of left buttock, stage 2 L89.322    Eczematous dermatitis L30.9    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 3XXA    Facial cellulitis L03. 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 XXXS          Recent Labs     05/01/19  1301 05/01/19  1513   POCGLU 160* 141*       Pre treatment Vital Signs       Temp: 98.6 °F (37 °C)     Pulse: 101     Resp: 18     BP: (!) 164/88(manual)       Post treatment Vital Signs  Temp: 98.6 °F (37 °C)  Pulse: 89  Resp: 16  BP: (!) 144/88      Assessment        Physical Exam:  General Appearance:  alert and oriented to person, place and time, well-developed and well-nourished, in no acute distress    Pre Tympanic Membrane Assessment:  tympanic membranes intact bilaterally    Post Tympanic Membrane Assessment:  Right: Normal  Left: Normal    Pulmonary/Chest:  clear to auscultation bilaterally- no wheezes, rales or rhonchi, normal air movement, no respiratory distress    Cardiovascular:  normal       Treatment Start Time: 1319     Pressure Reached Time: 1329  ELSY : 2  Treatment Status: No Air break      Decompression Time: 1459   Treatment End Time: 1509    Symptoms Noted During Treatment: None    Adverse Event: no      I was present on these premises and immediately available to furnish assistance & direction throughout the procedure. Luisito Sr is a 69 y.o. female  successfully completed today's hyperbaric oxygen treatment at [address] [facility].     In my clinical judgement, ongoing HBO therapy is necessary at this time, given a threat to patient function, limb or life from the current condition. Supervision and attendance of Hyperbaric Oxygen Therapy provided. Continue HBO treatment as outlined in the treatment plan. Hyperbaric Oxygen:  Pt tolerated Treatment Number: 5 well today without complications.      Electronically signed by Yeison Esteban MD on 5/1/2019 at 7:56 AM

## 2019-05-03 ENCOUNTER — HOSPITAL ENCOUNTER (OUTPATIENT)
Dept: HYPERBARIC MEDICINE | Age: 79
Setting detail: THERAPIES SERIES
Discharge: HOME | End: 2019-05-03
Payer: MEDICARE

## 2019-05-03 VITALS
RESPIRATION RATE: 18 BRPM | OXYGEN SATURATION: 100 % | DIASTOLIC BLOOD PRESSURE: 88 MMHG | SYSTOLIC BLOOD PRESSURE: 165 MMHG | TEMPERATURE: 97.4 F | HEART RATE: 67 BPM

## 2019-05-03 LAB
GLUCOSE BLD-MCNC: 111 MG/DL (ref 70–108)
GLUCOSE BLD-MCNC: 122 MG/DL (ref 70–108)
GLUCOSE BLD-MCNC: 145 MG/DL (ref 70–108)

## 2019-05-03 PROCEDURE — G0277 HBOT, FULL BODY CHAMBER, 30M: HCPCS

## 2019-05-03 PROCEDURE — 82948 REAGENT STRIP/BLOOD GLUCOSE: CPT

## 2019-05-03 PROCEDURE — 99183 HYPERBARIC OXYGEN THERAPY: CPT | Performed by: NURSE PRACTITIONER

## 2019-05-03 ASSESSMENT — PAIN SCALES - GENERAL
PAINLEVEL_OUTOF10: 0
PAINLEVEL_OUTOF10: 0

## 2019-05-03 NOTE — PROGRESS NOTES
[address] [address]  Hyperbaric Oxygen Therapy   Progress Note      NAME: [id number] Rigo Navarro RECORD NUMBER:  [id number]  AGE: 69 y.o. 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 See doc flowsheets for documentation.     Objective        Patient Active Problem List   Diagnosis Code    Hypercholesterolemia E78.00    Osteoarthritis M19.90    Osteoporosis M81.0    Type 2 diabetes mellitus without complication, without long-term current use of insulin ([facility] E11.9    Morbid obesity with body mass index (BMI) of 45.0 to 49.9 in adult ([facility] E66.01, Z68.42    DDD (degenerative disc disease), lumbar M51.36    Benign essential HTN I10    SWAPNA on CPAP G47.33, Z99.89    Diabetic peripheral neuropathy ([facility]) E11.42    Acute recurrent pansinusitis J01.41    Primary thunderclap headache G44.53    Rash of entire body R21    Infection of skin due to methicillin resistant Staphylococcus aureus (MRSA) A49.02    Drug allergy, antibiotic  likely bactrim Z88.1    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    Skin plaque L98.8    Hyponatremia E87.1    Hypervolemia E87.70    Cellulitis of lower extremity L03.119    Hypoglycemia E16.2    Acute on chronic systolic CHF (congestive heart failure) ([facility]) I50.23    Bilateral cellulitis of lower leg L03.116, L03.115    Venous ulcers of both lower extremities ([facility]) I87.2, L97.919, L97.929    Class 3 severe obesity due to excess calories with serious comorbidity and body mass index (BMI) of 40.0 to 44.9 in adult ([facility]) E66.01, Z68.41    Bilateral lower leg cellulitis L03.116, L03.115    CKD (chronic kidney disease) stage 3, GFR 30-59 ml/min ([facility]) N18.3    Iron deficiency anemia D50.9    Hypomagnesemia E83.42    SBO (small bowel obstruction) ([facility] K56.609    Venous ulcer of left leg (HCC) I83.029, L97.929    Venous ulcer of right leg (HCC) I83.019, L97.919    Acute eczema L30.9    Venous insufficiency of both lower extremities I87.2    Lymphedema of both lower extremities I89.0    Pressure injury of left buttock, stage 2 L89.322    Eczematous dermatitis L30.9    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 3XXA    Facial cellulitis L03. 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 XXXS          Recent Labs     05/03/19  1330 05/03/19  1524   POCGLU 145* 111*       Pre treatment Vital Signs       Temp: 98.6 °F (37 °C)     Pulse: 77     Resp: 18     BP: (!) 158/83       Post treatment Vital Signs  Temp: 97.4 °F (36.3 °C)  Pulse: 67  Resp: 18  BP: (!) 165/88      Assessment        Physical Exam:  General Appearance:  alert and oriented to person, place and time    Pre Tympanic Membrane Assessment:  bilateral tympanic membrane(s) is/has tympanostomy tube in place    Post Tympanic Membrane Assessment:  Right: Normal  Left: Normal    Pulmonary/Chest:  clear to auscultation bilaterally- no wheezes, rales or rhonchi, normal air movement, no respiratory distress    Cardiovascular:  normal, regular rate and rhythm       Treatment Start Time: 1340     Pressure Reached Time: 1350  ELSY : 2  Treatment Status: No Air break      Decompression Time: 1510   Treatment End Time: 1520    Symptoms Noted During Treatment: None    Adverse Event: no      I was present on these premises and immediately available to furnish assistance & direction throughout the procedure. Luisito Marcelo is a 69 y.o. female  successfully completed today's hyperbaric oxygen treatment at [address] [facility].     In my clinical judgement, ongoing HBO therapy is necessary at this time, given a threat to patient function, limb or life from the current condition. Supervision and attendance of Hyperbaric Oxygen Therapy provided. Continue HBO treatment as outlined in the treatment plan. Hyperbaric Oxygen:  Pt tolerated Treatment Number: 7 well today without complications.      Electronically signed by AMANDA Treviño CNP on 5/3/2019 at 4:50 PM

## 2019-05-03 NOTE — PROGRESS NOTES
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 3XXA    Facial cellulitis L03. 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 XXXS          Recent Labs     05/02/19  1320 05/02/19  1525   POCGLU 127* 132*       Pre treatment Vital Signs       Temp: 98.6 °F (37 °C)     Pulse: 77     Resp: 16     BP: (!) 146/85       Post treatment Vital Signs  Temp: 97.9 °F (36.6 °C)  Pulse: 67  Resp: 16  BP: (!) 156/74      Assessment        Physical Exam:  General Appearance:  alert and oriented to person, place and time    Pre Tympanic Membrane Assessment:  bilateral tympanic membrane(s) is/has tympanostomy tube in place    Post Tympanic Membrane Assessment:  Right: Normal  Left: Normal    Pulmonary/Chest:  clear to auscultation bilaterally- no wheezes, rales or rhonchi, normal air movement, no respiratory distress    Cardiovascular:  normal, regular rate and rhythm       Treatment Start Time: 1334     Pressure Reached Time: 1344  ELSY : 2  Treatment Status: No Air break      Decompression Time: 1514   Treatment End Time: 1524    Symptoms Noted During Treatment: None    Adverse Event: no      I was present on these premises and immediately available to furnish assistance & direction throughout the procedure. Luisito Candelaria is a 69 y.o. female  successfully completed today's hyperbaric oxygen treatment at [address] [facility].     In my clinical judgement, ongoing HBO therapy is necessary at this time, given a threat to patient function, limb or life from the current condition. Supervision and attendance of Hyperbaric Oxygen Therapy provided. Continue HBO treatment as outlined in the treatment plan. Hyperbaric Oxygen:  Pt tolerated Treatment Number: 6 well today without complications.      Electronically signed by AMANDA Victor CNP on 5/2/2019 at 9:38 PM

## 2019-05-06 ENCOUNTER — HOSPITAL ENCOUNTER (OUTPATIENT)
Dept: HYPERBARIC MEDICINE | Age: 79
Setting detail: THERAPIES SERIES
Discharge: HOME | End: 2019-05-06
Payer: MEDICARE

## 2019-05-06 VITALS
TEMPERATURE: 96.6 F | SYSTOLIC BLOOD PRESSURE: 165 MMHG | RESPIRATION RATE: 16 BRPM | OXYGEN SATURATION: 100 % | DIASTOLIC BLOOD PRESSURE: 75 MMHG | HEART RATE: 62 BPM

## 2019-05-06 LAB
GLUCOSE BLD-MCNC: 120 MG/DL (ref 70–108)
GLUCOSE BLD-MCNC: 126 MG/DL (ref 70–108)
GLUCOSE BLD-MCNC: 128 MG/DL (ref 70–108)

## 2019-05-06 PROCEDURE — 82948 REAGENT STRIP/BLOOD GLUCOSE: CPT

## 2019-05-06 PROCEDURE — G0277 HBOT, FULL BODY CHAMBER, 30M: HCPCS

## 2019-05-06 ASSESSMENT — PAIN SCALES - GENERAL: PAINLEVEL_OUTOF10: 0

## 2019-05-06 NOTE — PROGRESS NOTES
[address] [address]  Hyperbaric Oxygen Therapy   Progress Note      NAME: [id number] Rigo Navarro RECORD NUMBER:  [id number]  AGE: 69 y.o. 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 See doc flowsheets for documentation.     Objective        Patient Active Problem List   Diagnosis Code    Hypercholesterolemia E78.00    Osteoarthritis M19.90    Osteoporosis M81.0    Type 2 diabetes mellitus without complication, without long-term current use of insulin ([facility] E11.9    Morbid obesity with body mass index (BMI) of 45.0 to 49.9 in adult ([facility] E66.01, Z68.42    DDD (degenerative disc disease), lumbar M51.36    Benign essential HTN I10    SWAPNA on CPAP G47.33, Z99.89    Diabetic peripheral neuropathy ([facility] E11.42    Acute recurrent pansinusitis J01.41    Primary thunderclap headache G44.53    Rash of entire body R21    Infection of skin due to methicillin resistant Staphylococcus aureus (MRSA) A49.02    Drug allergy, antibiotic  likely bactrim Z88.1    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    Skin plaque L98.8    Hyponatremia E87.1    Hypervolemia E87.70    Cellulitis of lower extremity L03.119    Hypoglycemia E16.2    Acute on chronic systolic CHF (congestive heart failure) ([facility]) I50.23    Bilateral cellulitis of lower leg L03.116, L03.115    Venous ulcers of both lower extremities ([facility]) I87.2, L97.919, L97.929    Class 3 severe obesity due to excess calories with serious comorbidity and body mass index (BMI) of 40.0 to 44.9 in adult ([facility]) E66.01, Z68.41    Bilateral lower leg cellulitis L03.116, L03.115    CKD (chronic kidney disease) stage 3, GFR 30-59 ml/min ([facility]) N18.3    Iron deficiency anemia D50.9    Hypomagnesemia E83.42    SBO (small bowel obstruction) ([facility] K56.609    Venous ulcer of left leg (HCC) I83.029, L97.929    Venous ulcer of right leg (HCC) I83.019, L97.919    Acute eczema L30.9    Venous insufficiency of both lower extremities I87.2    Lymphedema of both lower extremities I89.0    Pressure injury of left buttock, stage 2 L89.322    Eczematous dermatitis L30.9    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 3XXA    Facial cellulitis L03. 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 XXXS          Recent Labs     05/06/19  1259 05/06/19  1508   POCGLU 126* 120*       Pre treatment Vital Signs       Temp: 98.5 °F (36.9 °C)     Pulse: 73     Resp: 18     BP: (!) 165/75       Post treatment Vital Signs  Temp: 96.6 °F (35.9 °C)  Pulse: 62  Resp: 16  BP: (!) 165/75      Assessment        Physical Exam:  General Appearance:  alert and oriented to person, place and time, well-developed and well-nourished, in no acute distress    Pre Tympanic Membrane Assessment:  tympanic membranes intact bilaterally    Post Tympanic Membrane Assessment:  Right: Normal  Left: Normal    Pulmonary/Chest:  clear to auscultation bilaterally- no wheezes, rales or rhonchi, normal air movement, no respiratory distress    Cardiovascular:  normal       Treatment Start Time: 1313     Pressure Reached Time: 1323  ELSY : 2  Treatment Status: No Air break      Decompression Time: 1453   Treatment End Time: 1503    Symptoms Noted During Treatment: None    Adverse Event: no      I was present on these premises and immediately available to furnish assistance & direction throughout the procedure. Luisito Candelaria is a 69 y.o. female  successfully completed today's hyperbaric oxygen treatment at [address] [facility].     In my clinical judgement, ongoing HBO therapy is necessary at this time, given a threat to patient function, limb or life from the current condition. Supervision and attendance of Hyperbaric Oxygen Therapy provided. Continue HBO treatment as outlined in the treatment plan. Hyperbaric Oxygen:  Pt tolerated Treatment Number: 8 well today without complications.      Electronically signed by Tristan Rollins MD on 5/6/2019 at 6:27 PM

## 2019-05-07 ENCOUNTER — HOSPITAL ENCOUNTER (OUTPATIENT)
Dept: HYPERBARIC MEDICINE | Age: 79
Setting detail: THERAPIES SERIES
Discharge: HOME | End: 2019-05-07
Payer: MEDICARE

## 2019-05-07 VITALS
DIASTOLIC BLOOD PRESSURE: 81 MMHG | HEART RATE: 68 BPM | TEMPERATURE: 97.3 F | RESPIRATION RATE: 16 BRPM | SYSTOLIC BLOOD PRESSURE: 166 MMHG | OXYGEN SATURATION: 100 %

## 2019-05-07 LAB
GLUCOSE BLD-MCNC: 118 MG/DL (ref 70–108)
GLUCOSE BLD-MCNC: 145 MG/DL (ref 70–108)

## 2019-05-07 PROCEDURE — 82948 REAGENT STRIP/BLOOD GLUCOSE: CPT

## 2019-05-07 PROCEDURE — G0277 HBOT, FULL BODY CHAMBER, 30M: HCPCS

## 2019-05-07 ASSESSMENT — PAIN SCALES - GENERAL: PAINLEVEL_OUTOF10: 0

## 2019-05-07 NOTE — PROGRESS NOTES
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 3XXA    Facial cellulitis L03. 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 XXXS          Recent Labs     05/07/19  1252 05/07/19  1507   POCGLU 145* 118*       Pre treatment Vital Signs       Temp: 97.7 °F (36.5 °C)     Pulse: 78     Resp: 16     BP: (!) 155/70       Post treatment Vital Signs  Temp: 97.3 °F (36.3 °C)  Pulse: 68  Resp: 16  BP: (!) 166/81      Assessment        Physical Exam:  General Appearance:  alert and oriented to person, place and time, well-developed and well-nourished, in no acute distress    Pre Tympanic Membrane Assessment:  tympanic membranes intact bilaterally    Post Tympanic Membrane Assessment:  Right: Normal  Left: Normal    Pulmonary/Chest:  clear to auscultation bilaterally- no wheezes, rales or rhonchi, normal air movement, no respiratory distress    Cardiovascular:  normal                          Decompression Time: 1451   Treatment End Time: 1501    Symptoms Noted During Treatment: None    Adverse Event: no      I was present on these premises and immediately available to furnish assistance & direction throughout the procedure. Luisito Babcock is a 69 y.o. female  successfully completed today's hyperbaric oxygen treatment at [address] [facility]. In my clinical judgement, ongoing HBO therapy is necessary at this time, given a threat to patient function, limb or life from the current condition.   Supervision and attendance of Hyperbaric Oxygen Therapy provided. Continue HBO treatment as outlined in the treatment plan. Hyperbaric Oxygen:  Pt tolerated Treatment Number: 9 well today without complications.      Electronically signed by Daryl Ziegler MD on 5/7/2019 at 3:39 PM

## 2019-05-08 ENCOUNTER — CARE COORDINATION (OUTPATIENT)
Dept: CARE COORDINATION | Age: 79
End: 2019-05-08

## 2019-05-08 ENCOUNTER — APPOINTMENT (OUTPATIENT)
Dept: HYPERBARIC MEDICINE | Age: 79
End: 2019-05-08
Payer: MEDICARE

## 2019-05-08 NOTE — CARE COORDINATION
First attempt to call patient for enrollment in [facility].     Left message on voice mail with explanation of the benefits of enrolling in [facility] which was approved for the patient by their PCP. Please call the office (phone number given) for more information and let us know if you would like to enroll in the Tele-Care Program.     Jewel Trivedi.  [address] [place] [address], [address] [place] [address] Nurse /

## 2019-05-09 ENCOUNTER — HOSPITAL ENCOUNTER (OUTPATIENT)
Dept: HYPERBARIC MEDICINE | Age: 79
Setting detail: THERAPIES SERIES
Discharge: HOME | End: 2019-05-09
Payer: MEDICARE

## 2019-05-09 VITALS
RESPIRATION RATE: 18 BRPM | DIASTOLIC BLOOD PRESSURE: 72 MMHG | TEMPERATURE: 98.4 F | OXYGEN SATURATION: 98 % | SYSTOLIC BLOOD PRESSURE: 166 MMHG | HEART RATE: 65 BPM

## 2019-05-09 LAB
GLUCOSE BLD-MCNC: 174 MG/DL (ref 70–108)
GLUCOSE BLD-MCNC: 222 MG/DL (ref 70–108)

## 2019-05-09 PROCEDURE — 82948 REAGENT STRIP/BLOOD GLUCOSE: CPT

## 2019-05-09 PROCEDURE — G0277 HBOT, FULL BODY CHAMBER, 30M: HCPCS

## 2019-05-09 PROCEDURE — 99183 HYPERBARIC OXYGEN THERAPY: CPT | Performed by: NURSE PRACTITIONER

## 2019-05-09 ASSESSMENT — PAIN SCALES - GENERAL
PAINLEVEL_OUTOF10: 0
PAINLEVEL_OUTOF10: 0

## 2019-05-09 NOTE — PROGRESS NOTES
[address] [address]  Hyperbaric Oxygen Therapy   Progress Note      NAME: [id number] Rigo Navarro RECORD NUMBER:  [id number]  AGE: 69 y.o. GENDER: female  : 1940  EPISODE DATE:  2019     Subjective     HBO Treatment Number: 10 out of Total Treatments: 20    HBO Diagnosis:               Indications: Late Effect of Radiation(Osteoradionecrosis of skull/sinus)    Safety checks performed prior to treatment. See doc flowsheets for documentation.     Objective        Patient Active Problem List   Diagnosis Code    Hypercholesterolemia E78.00    Osteoarthritis M19.90    Osteoporosis M81.0    Type 2 diabetes mellitus without complication, without long-term current use of insulin ([facility] [address] E11.9    Morbid obesity with body mass index (BMI) of 45.0 to 49.9 in adult ([facility] [address] E66.01, Z68.42    DDD (degenerative disc disease), lumbar M51.36    Benign essential HTN I10    SWAPNA on CPAP G47.33, Z99.89    Diabetic peripheral neuropathy ([facility]) E11.42    Acute recurrent pansinusitis J01.41    Primary thunderclap headache G44.53    Rash of entire body R21    Infection of skin due to methicillin resistant Staphylococcus aureus (MRSA) A49.02    Drug allergy, antibiotic  likely bactrim Z88.1    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01, Z68.41    Bilateral lower leg cellulitis L03.116, L03.115    CKD (chronic kidney disease) stage 3, GFR 30-59 ml/min ([facility]) N18.3    Iron deficiency anemia D50.9    Hypomagnesemia E83.42    SBO (small bowel obstruction) ([facility] [address] K56.609    Venous ulcer of left leg (HCC) I83.029, L97.929    Venous ulcer of right leg (HCC) I83.019, L97.919    Acute eczema L30.9    Venous insufficiency of both lower extremities I87.2    Lymphedema of both lower extremities I89.0    Pressure injury of left buttock, stage 2 L89.322    Eczematous dermatitis L30.9    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 3XXA    Facial cellulitis L03. 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 XXXS          Recent Labs     05/09/19  1311 05/09/19  1518   POCGLU 174* 222*       Pre treatment Vital Signs       Temp: 98 °F (36.7 °C)     Pulse: 79     Resp: 16     BP: (!) 156/70       Post treatment Vital Signs  Temp: 98.4 °F (36.9 °C)  Pulse: 65  Resp: 18  BP: (!) 166/72      Assessment        Physical Exam:  General Appearance:  alert and oriented to person, place and time    Pre Tympanic Membrane Assessment:  bilateral tympanic membrane(s) is/has tympanostomy tube in place    Post Tympanic Membrane Assessment:  Right: Normal  Left: Normal    Pulmonary/Chest:  clear to auscultation bilaterally- no wheezes, rales or rhonchi, normal air movement, no respiratory distress    Cardiovascular:  normal, regular rate and rhythm       Treatment Start Time: 1326     Pressure Reached Time: 1336  ELSY : 2  Treatment Status: No Air break      Decompression Time: 1506   Treatment End Time: 1516    Symptoms Noted During Treatment: None    Adverse Event: no      I was present on these premises and immediately available to furnish assistance & direction throughout the procedure. Luisito Garcia is a 69 y.o. female  successfully completed today's hyperbaric oxygen treatment at [address] [facility].     In my clinical judgement, ongoing HBO therapy is necessary at this time, given a threat to patient function, limb or life from the current condition. Supervision and attendance of Hyperbaric Oxygen Therapy provided. Continue HBO treatment as outlined in the treatment plan. Hyperbaric Oxygen:  Pt tolerated Treatment Number: 10 well today without complications.      Electronically signed by AMANDA Lorenzo CNP on 5/9/2019 at 5:58 PM

## 2019-05-10 ENCOUNTER — HOSPITAL ENCOUNTER (OUTPATIENT)
Dept: HYPERBARIC MEDICINE | Age: 79
Setting detail: THERAPIES SERIES
Discharge: HOME | End: 2019-05-10
Payer: MEDICARE

## 2019-05-10 VITALS
DIASTOLIC BLOOD PRESSURE: 70 MMHG | OXYGEN SATURATION: 97 % | SYSTOLIC BLOOD PRESSURE: 168 MMHG | RESPIRATION RATE: 16 BRPM | HEART RATE: 64 BPM | TEMPERATURE: 97.9 F

## 2019-05-10 LAB
GLUCOSE BLD-MCNC: 113 MG/DL (ref 70–108)
GLUCOSE BLD-MCNC: 115 MG/DL (ref 70–108)
GLUCOSE BLD-MCNC: 135 MG/DL (ref 70–108)

## 2019-05-10 PROCEDURE — G0277 HBOT, FULL BODY CHAMBER, 30M: HCPCS

## 2019-05-10 PROCEDURE — 99183 HYPERBARIC OXYGEN THERAPY: CPT | Performed by: NURSE PRACTITIONER

## 2019-05-10 PROCEDURE — 82948 REAGENT STRIP/BLOOD GLUCOSE: CPT

## 2019-05-10 NOTE — PROGRESS NOTES
1043 Patient POC glucose 459 per policy patient given 1 8oz Glucerna and will repeat POC glucose in 15 minutes     1107 Repeat glucose 722 per policy proceed with treatment Patient educated on s/s of Hypoglycemia will monitor patient for further signs

## 2019-05-12 NOTE — PROGRESS NOTES
[facility]  Hyperbaric Oxygen Therapy   Progress Note      NAME: [id number] Rigo Navarro RECORD NUMBER:  [id number]  AGE: 69 y.o. GENDER: female  : 1940  EPISODE DATE:  5/10/2019     Subjective     HBO Treatment Number: 11 out of Total Treatments: 20    HBO Diagnosis:               Indications: Late Effect of Radiation(Osteoradionecrosis of skull/sinus)    Safety checks performed prior to treatment. See doc flowsheets for documentation.     Objective        Patient Active Problem List   Diagnosis Code    Hypercholesterolemia E78.00    Osteoarthritis M19.90    Osteoporosis M81.0    Type 2 diabetes mellitus without complication, without long-term current use of insulin ([facility] [address] E11.9    Morbid obesity with body mass index (BMI) of 45.0 to 49.9 in adult ([facility] [address] E66.01, Z68.42    DDD (degenerative disc disease), lumbar M51.36    Benign essential HTN I10    SWAPNA on CPAP G47.33, Z99.89    Diabetic peripheral neuropathy ([facility]) E11.42    Acute recurrent pansinusitis J01.41    Primary thunderclap headache G44.53    Rash of entire body R21    Infection of skin due to methicillin resistant Staphylococcus aureus (MRSA) A49.02    Drug allergy, antibiotic  likely bactrim Z88.1    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    Skin plaque L98.8    Hyponatremia E87.1    Hypervolemia E87.70    Cellulitis of lower extremity L03.119    Hypoglycemia E16.2    Acute on chronic systolic CHF (congestive heart failure) ([facility]) I50.23    Bilateral cellulitis of lower leg L03.116, L03.115    Venous ulcers of both lower extremities ([facility]) I87.2, L97.919, L97.929    Class 3 severe obesity due to excess calories with serious comorbidity and body mass index (BMI) of 40.0 to 44.9 in adult ([facility]) E66.01, Z68.41    Bilateral lower leg cellulitis L03.116, L03.115    CKD (chronic kidney disease) stage 3, GFR 30-59 ml/min ([facility]) N18.3    Iron deficiency anemia D50.9    Hypomagnesemia E83.42    SBO (small bowel obstruction) ([facility] [address] K56.609    Venous ulcer of left leg (HCC) I83.029, L97.929    Venous ulcer of right leg (HCC) I83.019, L97.919    Acute eczema L30.9    Venous insufficiency of both lower extremities I87.2    Lymphedema of both lower extremities I89.0    Pressure injury of left buttock, stage 2 L89.322    Eczematous dermatitis L30.9    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 3XXA    Facial cellulitis L03. 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 XXXS          Recent Labs     05/10/19  1107 05/10/19  1313   POCGLU 115* 135*       Pre treatment Vital Signs       Temp: 97 °F (36.1 °C)     Pulse: 68     Resp: 18     BP: (!) 154/70       Post treatment Vital Signs  Temp: 97.9 °F (36.6 °C)  Pulse: 64  Resp: 16  BP: (!) 168/70      Assessment        Physical Exam:  General Appearance:  alert and oriented to person, place and time    Pre Tympanic Membrane Assessment:  bilateral tympanic membrane(s) is/has tympanostomy tube in place    Post Tympanic Membrane Assessment:  Right: Normal  Left: Normal    Pulmonary/Chest:  clear to auscultation bilaterally- no wheezes, rales or rhonchi, normal air movement, no respiratory distress    Cardiovascular:  normal, regular rate and rhythm       Treatment Start Time: 1115     Pressure Reached Time: 1125  ELSY : 2  Treatment Status: No Air break      Decompression Time: 1255   Treatment End Time: 1305    Symptoms Noted During Treatment: None    Adverse Event: no      I was present on these premises and immediately available to furnish assistance & direction throughout the procedure. Luisito Wagner is a 69 y.o. female  successfully completed today's hyperbaric oxygen treatment at [address] [facility].     In my clinical judgement, ongoing HBO therapy is necessary at this time, given a threat to patient function, limb or life from the current condition. Supervision and attendance of Hyperbaric Oxygen Therapy provided. Continue HBO treatment as outlined in the treatment plan. Hyperbaric Oxygen:  Pt tolerated Treatment Number: 67 well today without complications.      Electronically signed by AMANDA Catherine CNP on 5/10/2019 at 11:04 PM

## 2019-05-13 ENCOUNTER — CARE COORDINATION (OUTPATIENT)
Dept: CARE COORDINATION | Age: 79
End: 2019-05-13

## 2019-05-13 ENCOUNTER — HOSPITAL ENCOUNTER (OUTPATIENT)
Dept: HYPERBARIC MEDICINE | Age: 79
Setting detail: THERAPIES SERIES
Discharge: HOME | End: 2019-05-13
Payer: MEDICARE

## 2019-05-13 ENCOUNTER — HOSPITAL ENCOUNTER (OUTPATIENT)
Dept: WOUND CARE | Age: 79
Discharge: HOME | End: 2019-05-13
Payer: MEDICARE

## 2019-05-13 VITALS
TEMPERATURE: 98.1 F | SYSTOLIC BLOOD PRESSURE: 158 MMHG | RESPIRATION RATE: 16 BRPM | HEART RATE: 75 BPM | OXYGEN SATURATION: 98 % | DIASTOLIC BLOOD PRESSURE: 60 MMHG

## 2019-05-13 VITALS
SYSTOLIC BLOOD PRESSURE: 160 MMHG | TEMPERATURE: 98.6 F | DIASTOLIC BLOOD PRESSURE: 85 MMHG | OXYGEN SATURATION: 99 % | HEART RATE: 72 BPM | RESPIRATION RATE: 16 BRPM

## 2019-05-13 DIAGNOSIS — L97.919 VENOUS ULCER OF RIGHT LEG (HCC): ICD-10-CM

## 2019-05-13 DIAGNOSIS — L30.9 ACUTE ECZEMA: ICD-10-CM

## 2019-05-13 DIAGNOSIS — I89.0 LYMPHEDEMA OF BOTH LOWER EXTREMITIES: ICD-10-CM

## 2019-05-13 DIAGNOSIS — I87.2 VENOUS INSUFFICIENCY OF BOTH LOWER EXTREMITIES: ICD-10-CM

## 2019-05-13 DIAGNOSIS — L89.322 PRESSURE INJURY OF LEFT BUTTOCK, STAGE 2 (HCC): ICD-10-CM

## 2019-05-13 DIAGNOSIS — L97.929 VENOUS ULCER OF LEFT LEG (HCC): ICD-10-CM

## 2019-05-13 DIAGNOSIS — Z22.39 COLONIZATION WITH DRUG-RESISTANT BACTERIA: ICD-10-CM

## 2019-05-13 DIAGNOSIS — I83.019 VENOUS ULCER OF RIGHT LEG (HCC): ICD-10-CM

## 2019-05-13 DIAGNOSIS — I83.029 VENOUS ULCER OF LEFT LEG (HCC): ICD-10-CM

## 2019-05-13 DIAGNOSIS — L30.9 ECZEMA, UNSPECIFIED TYPE: ICD-10-CM

## 2019-05-13 LAB
GLUCOSE BLD-MCNC: 121 MG/DL (ref 70–108)
GLUCOSE BLD-MCNC: 122 MG/DL (ref 70–108)
GLUCOSE BLD-MCNC: 123 MG/DL (ref 70–108)

## 2019-05-13 PROCEDURE — 99211 OFF/OP EST MAY X REQ PHY/QHP: CPT

## 2019-05-13 PROCEDURE — 99213 OFFICE O/P EST LOW 20 MIN: CPT | Performed by: NURSE PRACTITIONER

## 2019-05-13 PROCEDURE — 82948 REAGENT STRIP/BLOOD GLUCOSE: CPT

## 2019-05-13 PROCEDURE — G0277 HBOT, FULL BODY CHAMBER, 30M: HCPCS

## 2019-05-13 PROCEDURE — 99183 HYPERBARIC OXYGEN THERAPY: CPT | Performed by: NURSE PRACTITIONER

## 2019-05-13 ASSESSMENT — PAIN SCALES - GENERAL
PAINLEVEL_OUTOF10: 0

## 2019-05-13 NOTE — PROGRESS NOTES
[facility]  Hyperbaric Oxygen Therapy   Progress Note      NAME: [id number] Rigo Navarro RECORD NUMBER:  [id number]  AGE: 69 y.o. GENDER: female  : 1940  EPISODE DATE:  2019     Subjective     HBO Treatment Number: 12 out of Total Treatments: 20    HBO Diagnosis:               Indications: Late Effect of Radiation(Osteoradionecrosis of skull/sinus)    Safety checks performed prior to treatment. See doc flowsheets for documentation.     Objective        Patient Active Problem List   Diagnosis Code    Hypercholesterolemia E78.00    Osteoarthritis M19.90    Osteoporosis M81.0    Type 2 diabetes mellitus without complication, without long-term current use of insulin ([facility] [address] E11.9    Morbid obesity with body mass index (BMI) of 45.0 to 49.9 in adult ([facility] [address] E66.01, Z68.42    DDD (degenerative disc disease), lumbar M51.36    Benign essential HTN I10    SWAPNA on CPAP G47.33, Z99.89    Diabetic peripheral neuropathy (HCC) E11.42    Acute recurrent pansinusitis J01.41    Primary thunderclap headache G44.53    Rash of entire body R21    Infection of skin due to methicillin resistant Staphylococcus aureus (MRSA) A49.02    Drug allergy, antibiotic  likely bactrim Z88.1    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    Skin plaque L98.8    Hyponatremia E87.1    Hypervolemia E87.70    Cellulitis of lower extremity L03.119    Hypoglycemia E16.2    Acute on chronic systolic CHF (congestive heart failure) ([facility]) I50.23    Bilateral cellulitis of lower leg L03.116, L03.115    Venous ulcers of both lower extremities ([facility]) I87.2, L97.919, L97.929    Class 3 severe obesity due to excess calories with serious comorbidity and body mass index (BMI) of 40.0 to 44.9 in adult ([facility]) E66.01, Z68.41    Bilateral lower leg cellulitis L03.116, L03.115    CKD (chronic kidney disease) stage 3, GFR 30-59 ml/min ([facility]) N18.3    Iron deficiency anemia D50.9    Hypomagnesemia E83.42    SBO (small bowel obstruction) ([facility] [address] K56.609    Venous ulcer of left leg (HCC) I83.029, L97.929    Venous ulcer of right leg (HCC) I83.019, L97.919    Acute eczema L30.9    Venous insufficiency of both lower extremities I87.2    Lymphedema of both lower extremities I89.0    Pressure injury of left buttock, stage 2 L89.322    Eczematous dermatitis L30.9    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 3XXA    Facial cellulitis L03. 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 XXXS          Recent Labs     05/13/19  1055 05/13/19  1259   POCGLU 123* 121*       Pre treatment Vital Signs       Temp: 98.1 °F (36.7 °C)     Pulse: 75     Resp: 16     BP: (!) 158/60(manual )       Post treatment Vital Signs  Temp: 98.6 °F (37 °C)  Pulse: 72  Resp: 16  BP: (!) 160/85      Assessment        Physical Exam:  General Appearance:  alert and oriented to person, place and time    Pre Tympanic Membrane Assessment:  bilateral tympanic membrane(s) is/has tympanostomy tube in place    Post Tympanic Membrane Assessment:  Right: Normal  Left: Normal    Pulmonary/Chest:  clear to auscultation bilaterally- no wheezes, rales or rhonchi, normal air movement, no respiratory distress    Cardiovascular:  normal, regular rate and rhythm       Treatment Start Time: 1058     Pressure Reached Time: 1108  ELSY : 2  Treatment Status: No Air break      Decompression Time: 1238   Treatment End Time: 1248    Symptoms Noted During Treatment: None    Adverse Event: no      I was present on these premises and immediately available to furnish assistance & direction throughout the procedure. Plan          Tatyana Gunter is a 69 y.o. female  successfully completed today's hyperbaric oxygen treatment at [address] [facility].     In my clinical judgement, ongoing HBO therapy is necessary at this time, given a threat to patient function, limb or

## 2019-05-13 NOTE — PLAN OF CARE
Patient arrived for Hyperbaric Therapy and 10 visit eval. Patient to continue with Hyperbaric Therapy extension discussed today. Patient to continue rinsing sinuses as previously prescribed and to continue to follow up with Francia Woodward 5/16/2019. Patient to follow up with Katja Villatoro CNP on 5/23/2019 at 12pm for next 10 visit eval.       Care plan reviewed with patient. Patient verbalize understanding of the plan of care and contribute to goal setting.

## 2019-05-13 NOTE — CARE COORDINATION
Attempted to reach Brian Juan for Care Coordination Follow up. Cate's daughter, Lauryn Ross, answered phone and states that pt is currently undergoing hyperbaric treatment at [facility]. Lauryn Ross reports that Brian Juan has been doing very well with treatment and Cate's surgeon told them that Cate's sinus cavity has improved significantly since beginning treatments. Brian Juan is scheduled for an appt with the ENT at [facility] on 5/16. Lauryn Ross is aware of this appointment and states she will provide transportation for Brian Juan. Elma Pulse to have Brian Juan contact this [facility] if any needs arise. She voiced understanding.

## 2019-05-13 NOTE — CARE COORDINATION
Second Attempt to call patient for enrollment in the [facility].     Left message on voice mail with explanation of the benefits of enrolling in [facility] which was approved for the patient by their PCP. Please call the office (phone number given) for more information and let us know if you would like to enroll in the Tele-Care Program.     Patient Declined from program. Calls stopped. Felicity Gilford.  [address] [place] [address], [address] [place] [address] Nurse /

## 2019-05-13 NOTE — PROGRESS NOTES
[facility]          Progress Note and Procedure Note      [id number] RECORD NUMBER:  [id number]  AGE: 69 y.o. GENDER: female  : 1940  EPISODE DATE:  2019    Subjective:     Chief Complaint   Patient presents with    Wound Check     Osteo of sinus and 10th visist eval          HISTORY of PRESENT ILLNESS HPI     Rohini Reveles is a 69 y.o. female Established patient referred by Dr. Emi Ly, who presents today for wound/ulcer evaluation. 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 She has been having issues with recurrent sinus infections since that time. She had surgery on 3/25/2019 at [facility] for nasal endoscopy with sinonasal debridement and was noted to have supposed bone in the skull base both anteriorly and superiorly as well as within the sphenoid cavity. Sinus maxillary culture on 3/25/2019 was positive for multiple organisms including MRSA, Klebsiella pneumoniae, stenotrophomonas maltophilia, Streptococcus, and Enterococcus faecalis. She has sinus congestion as well as sinus drainage. She does saline rinses twice daily. She has tubes in place to the bilateral ears. EKG from 10/5/2018 showed normal sinus rhythm with possible inferior infarct. Echocardiogram from 10/8/2018 shows ejection fraction 60% percent. Stress test from 10/4/2018 was negative for myocardial ischemia. Patient has a history of bilateral lower leg venous ulcers and lymphedema. She has been using her lymphedema pumps twice daily as recommended however she has not been using her Velcro compression wraps consistently. 19: She is tolerating hyperbarics well.   She had endoscopic sinus surgery with debridement on 2019 at [facility], significant crusting was noted throughout the nasal cavity which was removed and copiously irrigated, there was also exposed bone along SURGERY  1980s    HERNIA REPAIR      HYSTERECTOMY, TOTAL ABDOMINAL  1980    JOINT REPLACEMENT  bilat 2005/ 2007    knees    MO COLSC FLX WITH DIRECTED SUBMUCOSAL NJX ANY SBST  10/11/2018    COLONOSCOPY SUBMUCOSAL/BOTOX INJECTION performed by Dany Silverman MD at [address]  last 2009    removed a bone (2)--2 times no construction     SINUS SURGERY  02/01/2016    SINUS SURGERY  2016 x 2    SINUS SURGERY  09/18/2017    [facility] - Dr Jessica Butterfield SINUS SURGERY  08/09/2017 8/17/2017 - [facility]    TONSILLECTOMY      TOTAL KNEE ARTHROPLASTY  08/2003    Left TKR    VENTRAL HERNIA REPAIR  1992       FAMILY HISTORY    Family History   Problem Relation Age of Onset    Diabetes Mother     Heart Disease Father         whooping cough as child    Obesity Sister     Other Brother         tumor on back    Obesity Sister     Cancer Sister         Skin     Cancer Brother         Bone cancer from metal    Other Brother         passed as a child    Heart Disease Brother     Other Brother         tremor    Heart Attack Brother     No Known Problems Brother     Heart Disease Brother     No Known Problems Brother     No Known Problems Brother        SOCIAL HISTORY    Social History     Tobacco Use    Smoking status: Never Smoker    Smokeless tobacco: Never Used   Substance Use Topics    Alcohol use: No    Drug use: No       ALLERGIES    Allergies   Allergen Reactions    Bactrim [Sulfamethoxazole-Trimethoprim] Rash    Morphine Other (See Comments)     headaches    Hydrocodone      headaches    Lisinopril Swelling    Percocet [Oxycodone-Acetaminophen] Other (See Comments)     Headaches    Tylenol [Acetaminophen] Other (See Comments)     TYLENOL 3 causes hallucinations    Tape [Adhesive Tape] Rash       MEDICATIONS    Current Outpatient Medications on File Prior to Encounter   Medication Sig Dispense Refill    pregabalin (LYRICA) 150 MG capsule TAKE 2 CAPSULES BY MOUTH IN THE MORNING AND 2 negative for abdominal pain, nausea and vomiting  Integument/breast: positive for eczema   Musculoskeletal:negative for muscle weakness  Neurological: negative for coordination problems and speech problems  Behavioral/Psych: positive for good mood    Objective:      BP (!) 158/60   Pulse 75   Temp 98.1 °F (36.7 °C) (Tympanic)   Resp 16   LMP  (LMP Unknown)   SpO2 98%     Wt Readings from Last 3 Encounters:   04/22/19 207 lb (93.9 kg)   04/15/19 207 lb (93.9 kg)   03/04/19 206 lb (93.4 kg)       PHYSICAL EXAM    General Appearance: alert and oriented to person, place and time, pale  Skin: warm and dry  Head: normocephalic and atraumatic  Eyes: pupils equal, round, and reactive to light, extraocular eye movements intact, conjunctivae normal  ENT: tympanic membrane, external ear and ear canal normal bilaterally with bilateral PE tubes in place, nose without deformity, nasal mucosa is pale pink with dried bloody/serous drainage noted  Neck: supple and non-tender without mass,   Pulmonary/Chest: clear to auscultation bilaterally- no wheezes, rales or rhonchi, normal air movement, no respiratory distress  Cardiovascular: normal rate, regular rhythm  Abdomen: soft, non-tender, non-distended, normal bowel sounds  Extremities: no cyanosis, clubbing; 2-3 + edema-  bilateral lower legs and feet with scarred, fibrotic skin, and chronic discoloration to bilateral lower legs with chronic discoloration  Musculoskeletal: normal range of motion of extremities ×3, limited range of motion to right arm  Neurologic: coordination and speech normal; gait abnormal-uses walker     LABS      CBC:   Lab Results   Component Value Date    WBC 12.0 02/24/2019    HGB 8.8 02/24/2019    HCT 28.5 02/24/2019    MCV 80.3 02/24/2019     02/24/2019     BMP:   Lab Results   Component Value Date     02/24/2019    K 4.1 02/24/2019    K 3.2 10/11/2018    CL 94 02/24/2019    CO2 23 02/24/2019    PHOS 3.4 04/22/2018    BUN 24 02/24/2019 CREATININE 1.0 02/24/2019     PT/INR:   Lab Results   Component Value Date    INR 1.03 08/25/2016     Prealbumin: No results found for: PREALBUMIN  Albumin:  Lab Results   Component Value Date    LABALBU 3.9 02/23/2019     Sed Rate:  Lab Results   Component Value Date    SEDRATE 102 03/06/2018     CRP:   Lab Results   Component Value Date    CRP 12.50 03/06/2018     Micro:   Lab Results   Component Value Date    BC No growth-preliminary  No growth   02/24/2019      Hemoglobin A1C:   Lab Results   Component Value Date    LABA1C 6.0 03/04/2019       Assessment:     Osteoradionecrosis of sinus/skull  Late effect of radiation     Contributing Factors: radiation damage    Patient Active Problem List   Diagnosis Code    Hypercholesterolemia E78.00    Osteoarthritis M19.90    Osteoporosis M81.0    Type 2 diabetes mellitus without complication, without long-term current use of insulin ([facility]) E11.9    Morbid obesity with body mass index (BMI) of 45.0 to 49.9 in adult ([facility] E66.01, Z68.42    DDD (degenerative disc disease), lumbar M51.36    Benign essential HTN I10    SWAPNA on CPAP G47.33, Z99.89    Diabetic peripheral neuropathy ([facility]) E11.42    Acute recurrent pansinusitis J01.41    Primary thunderclap headache G44.53    Rash of entire body R21    Infection of skin due to methicillin resistant Staphylococcus aureus (MRSA) A49.02    Drug allergy, antibiotic  likely bactrim Z88.1    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    Skin plaque L98.8    Hyponatremia E87.1    Hypervolemia E87.70    Cellulitis of lower extremity L03.119    Hypoglycemia E16.2    Acute on chronic systolic CHF (congestive heart failure) ([facility]) I50.23    Bilateral cellulitis of lower leg L03.116, L03.115    Venous ulcers of both lower extremities ([facility]) I87.2, L97.919, L97.929    Class 3 severe obesity due to excess calories with serious comorbidity and body mass index (BMI) of 40.0 to 44.9 in adult ([facility]) E66.01, Z68.41    patient dismissal instructions given to patient and signed by patient or POA. Discharge Instructions       Visit Discharge/Physician Orders:  - continue rinsing sinuses as previously prescribed  - Continue Hyperbaric therapy (discussed extension)  - Continue to follow with [facility] Next appointment  5/16/2019      Wound Location: SINUS     Follow up visit:   2 Weeks May 2 rd at 1200 pm (Before or after HBO treatment)     Keep next scheduled appointment. Please give 24 hour notice if unable to keep appointment. [phone]    If you experience any of the following, please call the Wound Care Service during business hours: Monday through Friday 8:00 am - 4:30 pm  ([phone]). *Increase in pain   *Temperature over 101   *Increase in drainage from your wound or a foul odor   *Uncontrolled swelling   *Need for compression bandage changes due to slippage, breakthrough drainage    If you need medical attention outside of business hours, please contact your Primary Care Doctor or go to the nearest emergency room.      Electronically signed by AMANDA Garner CNP on 5/13/19 at 11:44 AM                   Electronically signed by AMANDA Garner CNP on 5/13/2019 at 11:45 AM

## 2019-05-14 ENCOUNTER — HOSPITAL ENCOUNTER (OUTPATIENT)
Dept: HYPERBARIC MEDICINE | Age: 79
Setting detail: THERAPIES SERIES
Discharge: HOME | End: 2019-05-14
Payer: MEDICARE

## 2019-05-14 VITALS
RESPIRATION RATE: 16 BRPM | TEMPERATURE: 97.6 F | DIASTOLIC BLOOD PRESSURE: 80 MMHG | SYSTOLIC BLOOD PRESSURE: 167 MMHG | OXYGEN SATURATION: 100 % | HEART RATE: 70 BPM

## 2019-05-14 LAB
GLUCOSE BLD-MCNC: 124 MG/DL (ref 70–108)
GLUCOSE BLD-MCNC: 188 MG/DL (ref 70–108)

## 2019-05-14 PROCEDURE — 82948 REAGENT STRIP/BLOOD GLUCOSE: CPT

## 2019-05-14 PROCEDURE — G0277 HBOT, FULL BODY CHAMBER, 30M: HCPCS

## 2019-05-14 ASSESSMENT — PAIN SCALES - GENERAL: PAINLEVEL_OUTOF10: 0

## 2019-05-14 NOTE — PROGRESS NOTES
[address] [address]  Hyperbaric Oxygen Therapy   Progress Note      NAME: [id number]arro RECORD NUMBER:  [id number]  AGE: 69 y.o. 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 See doc flowsheets for documentation.     Objective        Patient Active Problem List   Diagnosis Code    Hypercholesterolemia E78.00    Osteoarthritis M19.90    Osteoporosis M81.0    Type 2 diabetes mellitus without complication, without long-term current use of insulin ([facility] E11.9    Morbid obesity with body mass index (BMI) of 45.0 to 49.9 in adult ([facility] E66.01, Z68.42    DDD (degenerative disc disease), lumbar M51.36    Benign essential HTN I10    SWAPNA on CPAP G47.33, Z99.89    Diabetic peripheral neuropathy ([facility]) E11.42    Acute recurrent pansinusitis J01.41    Primary thunderclap headache G44.53    Rash of entire body R21    Infection of skin due to methicillin resistant Staphylococcus aureus (MRSA) A49.02    Drug allergy, antibiotic  likely bactrim Z88.1    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    Skin plaque L98.8    Hyponatremia E87.1    Hypervolemia E87.70    Cellulitis of lower extremity L03.119    Hypoglycemia E16.2    Acute on chronic systolic CHF (congestive heart failure) ([facility]) I50.23    Bilateral cellulitis of lower leg L03.116, L03.115    Venous ulcers of both lower extremities ([facility]) I87.2, L97.919, L97.929    Class 3 severe obesity due to excess calories with serious comorbidity and body mass index (BMI) of 40.0 to 44.9 in adult ([facility]) E66.01, Z68.41    Bilateral lower leg cellulitis L03.116, L03.115    CKD (chronic kidney disease) stage 3, GFR 30-59 ml/min ([facility]) N18.3    Iron deficiency anemia D50.9    Hypomagnesemia E83.42    SBO (small bowel obstruction) ([facility] K56.609    Venous ulcer of left leg (HCC) I83.029, L97.929    Venous ulcer of right leg (HCC) I83.019, L97.919    Acute eczema L30.9    Venous insufficiency of both lower extremities I87.2    Lymphedema of both lower extremities I89.0    Pressure injury of left buttock, stage 2 L89.322    Eczematous dermatitis L30.9    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 3XXA    Facial cellulitis L03. 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 XXXS          Recent Labs     05/14/19  1029 05/14/19  1247   POCGLU 188* 124*       Pre treatment Vital Signs       Temp: 96.8 °F (36 °C)     Pulse: 73     Resp: 18     BP: (!) 158/60(manual )       Post treatment Vital Signs  Temp: 97.6 °F (36.4 °C)  Pulse: 70  Resp: 16  BP: (!) 167/80      Assessment        Physical Exam:  General Appearance:  alert and oriented to person, place and time, well-developed and well-nourished, in no acute distress    Pre Tympanic Membrane Assessment:  tympanic membranes intact bilaterally    Post Tympanic Membrane Assessment:  Right: Normal  Left: Normal    Pulmonary/Chest:  clear to auscultation bilaterally- no wheezes, rales or rhonchi, normal air movement, no respiratory distress    Cardiovascular:  normal       Treatment Start Time: 1045     Pressure Reached Time: 1055  ELSY : 2  Treatment Status: No Air break      Decompression Time: 1225   Treatment End Time: 1235    Symptoms Noted During Treatment: None    Adverse Event: no      I was present on these premises and immediately available to furnish assistance & direction throughout the procedure. Luisito Soto is a 69 y.o. female  successfully completed today's hyperbaric oxygen treatment at [address].     In my clinical judgement, ongoing HBO therapy is necessary at this time, given a threat to patient function, limb or life from the current condition. Supervision and attendance of Hyperbaric Oxygen Therapy provided. Continue HBO treatment as outlined in the treatment plan. Hyperbaric Oxygen:  Pt tolerated Treatment Number: 13 well today without complications.      Electronically signed by Nohemy Stewart MD on 5/14/2019 at 4:10 PM

## 2019-05-15 ENCOUNTER — HOSPITAL ENCOUNTER (OUTPATIENT)
Dept: HYPERBARIC MEDICINE | Age: 79
Setting detail: THERAPIES SERIES
Discharge: HOME | End: 2019-05-15
Payer: MEDICARE

## 2019-05-15 NOTE — PROGRESS NOTES
Patient daughter called left message patient will not be in today patient has sinus headache and will not be in tomorrow either due to appointment at [facility]

## 2019-05-16 ENCOUNTER — APPOINTMENT (OUTPATIENT)
Dept: HYPERBARIC MEDICINE | Age: 79
End: 2019-05-16
Payer: MEDICARE

## 2019-05-16 RX ORDER — POTASSIUM CHLORIDE 20 MEQ/1
TABLET, EXTENDED RELEASE ORAL
Qty: 60 TABLET | Refills: 2 | Status: SHIPPED | OUTPATIENT
Start: 2019-05-16 | End: 2020-01-13 | Stop reason: SDUPTHER

## 2019-05-17 ENCOUNTER — HOSPITAL ENCOUNTER (OUTPATIENT)
Dept: HYPERBARIC MEDICINE | Age: 79
Setting detail: THERAPIES SERIES
Discharge: HOME | End: 2019-05-17
Payer: MEDICARE

## 2019-05-20 ENCOUNTER — HOSPITAL ENCOUNTER (OUTPATIENT)
Dept: HYPERBARIC MEDICINE | Age: 79
Setting detail: THERAPIES SERIES
Discharge: HOME | End: 2019-05-20
Payer: MEDICARE

## 2019-05-20 VITALS
HEART RATE: 75 BPM | OXYGEN SATURATION: 100 % | SYSTOLIC BLOOD PRESSURE: 168 MMHG | TEMPERATURE: 96.9 F | RESPIRATION RATE: 16 BRPM | DIASTOLIC BLOOD PRESSURE: 82 MMHG

## 2019-05-20 LAB
GLUCOSE BLD-MCNC: 109 MG/DL (ref 70–108)
GLUCOSE BLD-MCNC: 110 MG/DL (ref 70–108)
GLUCOSE BLD-MCNC: 110 MG/DL (ref 70–108)

## 2019-05-20 PROCEDURE — G0277 HBOT, FULL BODY CHAMBER, 30M: HCPCS

## 2019-05-20 PROCEDURE — 82948 REAGENT STRIP/BLOOD GLUCOSE: CPT

## 2019-05-20 ASSESSMENT — PAIN SCALES - GENERAL
PAINLEVEL_OUTOF10: 0
PAINLEVEL_OUTOF10: 0

## 2019-05-20 NOTE — PLAN OF CARE
No s/s of seizure noted. No s/s of barotrauma noted. Pt demonstrated equalization of ears during pressurization. Care plan reviewed with patient. Patient  verbalize understanding of the plan of care and contribute to goal setting. Daily Assessment

## 2019-05-20 NOTE — PROGRESS NOTES
[facility]  Hyperbaric Oxygen Therapy   Progress Note      NAME: [id number] Rigo Navarro RECORD NUMBER:  [id number]  AGE: 69 y.o. 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 See doc flowsheets for documentation.     Objective        Patient Active Problem List   Diagnosis Code    Hypercholesterolemia E78.00    Osteoarthritis M19.90    Osteoporosis M81.0    Type 2 diabetes mellitus without complication, without long-term current use of insulin ([facility] E11.9    Morbid obesity with body mass index (BMI) of 45.0 to 49.9 in adult ([facility] E66.01, Z68.42    DDD (degenerative disc disease), lumbar M51.36    Benign essential HTN I10    SWAPNA on CPAP G47.33, Z99.89    Diabetic peripheral neuropathy (HCC) E11.42    Acute recurrent pansinusitis J01.41    Primary thunderclap headache G44.53    Rash of entire body R21    Infection of skin due to methicillin resistant Staphylococcus aureus (MRSA) A49.02    Drug allergy, antibiotic  likely bactrim Z88.1    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    Skin plaque L98.8    Hyponatremia E87.1    Hypervolemia E87.70    Cellulitis of lower extremity L03.119    Hypoglycemia E16.2    Acute on chronic systolic CHF (congestive heart failure) ([facility]) I50.23    Bilateral cellulitis of lower leg L03.116, L03.115    Venous ulcers of both lower extremities ([facility]) I87.2, L97.919, L97.929    Class 3 severe obesity due to excess calories with serious comorbidity and body mass index (BMI) of 40.0 to 44.9 in adult ([facility]) E66.01, Z68.41    Bilateral lower leg cellulitis L03.116, L03.115    CKD (chronic kidney disease) stage 3, GFR 30-59 ml/min ([facility]) N18.3    Iron deficiency anemia D50.9    Hypomagnesemia E83.42    SBO (small bowel obstruction) ([facility] K56.609    Venous ulcer of left leg (HCC) I83.029, L97.929    Venous ulcer of right leg (HCC) I83.019, L97.919    Acute eczema L30.9    Venous insufficiency of both lower extremities I87.2    Lymphedema of both lower extremities I89.0    Pressure injury of left buttock, stage 2 L89.322    Eczematous dermatitis L30.9    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 3XXA    Facial cellulitis L03. 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 XXXS          Recent Labs     05/20/19  1059 05/20/19  1307   POCGLU 110* 109*       Pre treatment Vital Signs       Temp: 97.7 °F (36.5 °C)     Pulse: 75     Resp: 16     BP: (!) 155/74       Post treatment Vital Signs  Temp: 96.9 °F (36.1 °C)  Pulse: 75  Resp: 16  BP: (!) 168/82      Assessment        Physical Exam:  General Appearance:  alert and oriented to person, place and time, well-developed and well-nourished, in no acute distress    Pre Tympanic Membrane Assessment:  tympanic membranes intact bilaterally    Post Tympanic Membrane Assessment:  Right: Normal  Left: Normal    Pulmonary/Chest:  clear to auscultation bilaterally- no wheezes, rales or rhonchi, normal air movement, no respiratory distress    Cardiovascular:  normal       Treatment Start Time: 1107     Pressure Reached Time: 1117  ELSY : 2  Treatment Status: No Air break      Decompression Time: 1247   Treatment End Time: 1257    Symptoms Noted During Treatment: None    Adverse Event: no      I was present on these premises and immediately available to furnish assistance & direction throughout the procedure. Luisito Sr is a 69 y.o. female  successfully completed today's hyperbaric oxygen treatment at [address] [facility].     In my clinical judgement, ongoing HBO therapy is necessary at this time, given a threat to patient function, limb or

## 2019-05-21 ENCOUNTER — HOSPITAL ENCOUNTER (OUTPATIENT)
Dept: HYPERBARIC MEDICINE | Age: 79
Setting detail: THERAPIES SERIES
Discharge: HOME | End: 2019-05-21
Payer: MEDICARE

## 2019-05-22 ENCOUNTER — HOSPITAL ENCOUNTER (OUTPATIENT)
Dept: HYPERBARIC MEDICINE | Age: 79
Setting detail: THERAPIES SERIES
Discharge: HOME | End: 2019-05-22
Payer: MEDICARE

## 2019-05-23 ENCOUNTER — APPOINTMENT (OUTPATIENT)
Dept: HYPERBARIC MEDICINE | Age: 79
End: 2019-05-23
Payer: MEDICARE

## 2019-05-24 ENCOUNTER — APPOINTMENT (OUTPATIENT)
Dept: HYPERBARIC MEDICINE | Age: 79
End: 2019-05-24
Payer: MEDICARE

## 2019-05-24 ENCOUNTER — HOSPITAL ENCOUNTER (OUTPATIENT)
Dept: HYPERBARIC MEDICINE | Age: 79
Setting detail: THERAPIES SERIES
Discharge: HOME | End: 2019-05-24
Payer: MEDICARE

## 2019-05-24 VITALS
OXYGEN SATURATION: 100 % | RESPIRATION RATE: 16 BRPM | HEART RATE: 69 BPM | SYSTOLIC BLOOD PRESSURE: 161 MMHG | DIASTOLIC BLOOD PRESSURE: 79 MMHG | TEMPERATURE: 97 F

## 2019-05-24 LAB
GLUCOSE BLD-MCNC: 148 MG/DL (ref 70–108)
GLUCOSE BLD-MCNC: 152 MG/DL (ref 70–108)

## 2019-05-24 PROCEDURE — G0277 HBOT, FULL BODY CHAMBER, 30M: HCPCS

## 2019-05-24 PROCEDURE — 82948 REAGENT STRIP/BLOOD GLUCOSE: CPT

## 2019-05-24 PROCEDURE — 99183 HYPERBARIC OXYGEN THERAPY: CPT | Performed by: NURSE PRACTITIONER

## 2019-05-24 ASSESSMENT — PAIN SCALES - GENERAL
PAINLEVEL_OUTOF10: 3
PAINLEVEL_OUTOF10: 0

## 2019-05-24 ASSESSMENT — PAIN DESCRIPTION - LOCATION: LOCATION: HEAD

## 2019-05-24 ASSESSMENT — PAIN DESCRIPTION - DESCRIPTORS: DESCRIPTORS: HEADACHE

## 2019-05-24 ASSESSMENT — PAIN DESCRIPTION - PAIN TYPE: TYPE: ACUTE PAIN

## 2019-05-24 NOTE — PROGRESS NOTES
Per patient's daughter Lauryn Cody, Patient will not be here for Hyperbaric treatment on Tuesday due to appointment out of town. RN notified [facility] Garcia will be closed on Monday for Memorial Day. Patient will resume HBO treatments on Wednesday 5/29.

## 2019-05-24 NOTE — PROGRESS NOTES
[facility]  Hyperbaric Oxygen Therapy   Progress Note      NAME: [id number] Rigo Navarro RECORD NUMBER:  [id number]  AGE: 69 y.o. 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 See doc flowsheets for documentation.     Objective        Patient Active Problem List   Diagnosis Code    Hypercholesterolemia E78.00    Osteoarthritis M19.90    Osteoporosis M81.0    Type 2 diabetes mellitus without complication, without long-term current use of insulin ([facility] E11.9    Morbid obesity with body mass index (BMI) of 45.0 to 49.9 in adult ([facility] E66.01, Z68.42    DDD (degenerative disc disease), lumbar M51.36    Benign essential HTN I10    SWAPNA on CPAP G47.33, Z99.89    Diabetic peripheral neuropathy ([facility]) E11.42    Acute recurrent pansinusitis J01.41    Primary thunderclap headache G44.53    Rash of entire body R21    Infection of skin due to methicillin resistant Staphylococcus aureus (MRSA) A49.02    Drug allergy, antibiotic  likely bactrim Z88.1    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    Skin plaque L98.8    Hyponatremia E87.1    Hypervolemia E87.70    Cellulitis of lower extremity L03.119    Hypoglycemia E16.2    Acute on chronic systolic CHF (congestive heart failure) ([facility]) I50.23    Bilateral cellulitis of lower leg L03.116, L03.115    Venous ulcers of both lower extremities ([facility]) I87.2, L97.919, L97.929    Class 3 severe obesity due to excess calories with serious comorbidity and body mass index (BMI) of 40.0 to 44.9 in adult ([facility]) E66.01, Z68.41    Bilateral lower leg cellulitis L03.116, L03.115    CKD (chronic kidney disease) stage 3, GFR 30-59 ml/min ([facility]) N18.3    Iron deficiency anemia D50.9    Hypomagnesemia E83.42    SBO (small bowel obstruction) ([facility] K56.609    Venous ulcer of left leg (HCC) I83.029, L97.929    Venous ulcer of right leg (HCC) I83.019, L97.919    Acute eczema L30.9    Venous insufficiency of both lower extremities I87.2    Lymphedema of both lower extremities I89.0    Pressure injury of left buttock, stage 2 L89.322    Eczematous dermatitis L30.9    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 3XXA    Facial cellulitis L03. 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 XXXS          Recent Labs     05/24/19  1317 05/24/19  1525   POCGLU 152* 148*       Pre treatment Vital Signs       Temp: 98.6 °F (37 °C)     Pulse: 71     Resp: 16     BP: (!) 158/80(manual)       Post treatment Vital Signs  Temp: 97 °F (36.1 °C)  Pulse: 69  Resp: 16  BP: (!) 161/79      Assessment        Physical Exam:  General Appearance:  alert and oriented to person, place and time    Pre Tympanic Membrane Assessment:  tympanic membranes with bilateral PE tubes noted    Post Tympanic Membrane Assessment:  Right: Normal  Left: Normal    Pulmonary/Chest:  clear to auscultation bilaterally- no wheezes, rales or rhonchi, normal air movement, no respiratory distress    Cardiovascular:  normal, regular rate and rhythm       Treatment Start Time: 1331     Pressure Reached Time: 1341  ELSY : 2  Treatment Status: No Air break      Decompression Time: 1511   Treatment End Time: 1521    Symptoms Noted During Treatment: None    Adverse Event: no      I was present on these premises and immediately available to furnish assistance & direction throughout the procedure. Luisito Win is a 69 y.o. female  successfully completed today's hyperbaric oxygen treatment at [address] [place] [address].     In my clinical judgement, ongoing HBO therapy is necessary at this time, given a threat to patient function, limb or life from the current condition. Supervision and attendance of Hyperbaric Oxygen Therapy provided. Continue HBO treatment as outlined in the treatment plan. Hyperbaric Oxygen:  Pt tolerated Treatment Number: 15 well today without complications.      Electronically signed by AMANDA Echevarria CNP on 5/24/2019 at 5:47 PM

## 2019-05-28 ENCOUNTER — TELEPHONE (OUTPATIENT)
Dept: HYPERBARIC MEDICINE | Age: 79
End: 2019-05-28

## 2019-05-28 ENCOUNTER — CARE COORDINATION (OUTPATIENT)
Dept: CARE COORDINATION | Age: 79
End: 2019-05-28

## 2019-05-28 RX ORDER — DOCUSATE SODIUM 100 MG/1
100 CAPSULE, LIQUID FILLED ORAL 2 TIMES DAILY PRN
COMMUNITY
End: 2019-06-05

## 2019-05-28 NOTE — CARE COORDINATION
Ambulatory Care Coordination Note  5/28/2019  CM Risk Score: 11  Rupali Mortality Risk Score: 28    ACC: Amrik Matos, RN    Summary Note: Spoke with Cate's daughter, Praveen Goldsmith, for Care Coordination follow up. Praveen Goldsmith states that Breanna Bermudez has been having sinus pain/pressure for the last two days. Breanna Bermudez has a follow up appt with ENT at [address] [place] [address] today and he will address this issue. Praveen Goldsmith reports that Breanna Bermudez was not feeling well last Wednesday and Thursday and missed two hyperbaric treatments. She was able to attend her session on Friday. Praveen Goldsmith reports that Breanna Bermudez is feeling much better and has had no further episodes of vomiting since Thursday. Praveen Goldsmith reports that Breanna Bermudez was constipated, so Colace was started last evening. No results yet. DM: Praveen Goldsmith reports that pt BS is \"good\", but does not remember recent readings. She states pt has had no hypoglycemic episodes since last CC outreach. CHF: Praveen Goldsmiht reports that Cate's weight is 216 lb, which is her baseline. Praveen Goldsmith denies that Breanna Bermudez has edema or has complained of SOB/CP. Reviewed Zone Management tool and when to call PCP/RNCC. Praveen Goldsmith voiced understanding. PLAN: Continue to provide care coordination to manage chronic illnesses. Continue to educate pt regarding CHF/DM using zone management tools. Care Coordination Interventions    Program Enrollment:  Complex Care  Referral from Primary Care Provider:  No  Suggested Interventions and Community Resources  Diabetes Education: In Process  Fall Risk Prevention: In Process  Home Health Services:  Completed  Medi Set or Pill Pack:  Declined  Specialty Services Referral:  Completed (Comment: Wound care)  Zone Management Tools: In Process         Goals Addressed                 This Visit's Progress     Conditions and Symptoms   On track     I will notify my provider of any symptoms that indicate a worsening of my condition. Barriers: overwhelmed by complexity of regimen  Plan for overcoming my barriers:  Will discuss Patient states she uses 4-6x per day. Yes Historical Provider, MD   fluticasone (FLONASE) 50 MCG/ACT nasal spray 2 sprays by Nasal route 2 times daily 6/15/17  Yes Maria Fernanda Tenorio MD   Handicap Placard [facility] by Does not apply route. Request parking placard due to medical conditions. Duration of 5 years. 9/4/14  Yes Dio Salamanca MD   tetrahydrozoline 0.05 % ophthalmic solution Place 1 drop into both eyes 3 times daily    Yes Historical Provider, MD       Future Appointments   Date Time Provider David Nayely   5/28/2019  1:00 PM [place] [facility] HYPER None   5/29/2019  1:00 PM [place] [facility] HYPER None   5/30/2019  1:00 PM [place] [facility] HYPER None   5/31/2019 12:30 PM AMANDA Lipscomb - CNP [facility] WOUND None   5/31/2019  1:00 PM [place] [facility] HYPER None   7/25/2019  3:00 PM Nimesh Rushing APRN - CNP [facility] [address]     ,   Diabetes Assessment    Medic Alert ID:  No  Meal Planning:  Avoidance of concentrated sweets   How often do you test your blood sugar?:  Daily   Do you have barriers with adherence to non-pharmacologic self-management interventions?  (Nutrition/Exercise/Self-Monitoring):  No   Have you ever had to go to the ED for symptoms of low blood sugar?:  No       No patient-reported symptoms   Do you have hyperglycemia symptoms?:  No   Do you have hypoglycemia symptoms?:  No   Blood Sugar Monitoring Regimen:  Morning Fasting   Blood Sugar Trends:  No Change      ,   Congestive Heart Failure Assessment    Do you understand a low sodium diet?:  Yes  How many restaurant meals do you eat per week?:  0     No patient-reported symptoms      Symptoms:   None:  Yes      Symptom course:  stable  Weight trend:  stable  Salt intake watch compared to last visit:  stable      and   General Assessment    Do you have any symptoms that are causing concern?:  Yes  Progression since Onset:  Gradually Worsening  Reported Symptoms:  Other

## 2019-05-28 NOTE — CARE COORDINATION
I agree with the Care Coordinator's Plan of Care  Electronically signed by Scot Caal MD on 5/28/2019 at 9:32 AM

## 2019-05-29 ENCOUNTER — HOSPITAL ENCOUNTER (OUTPATIENT)
Dept: HYPERBARIC MEDICINE | Age: 79
Setting detail: THERAPIES SERIES
Discharge: HOME | End: 2019-05-29
Payer: MEDICARE

## 2019-05-29 NOTE — PROGRESS NOTES
Patient's daughter/ POA called and cancelled treatment today.  Patient has issues with sinuses today and unable to attend treatment

## 2019-05-30 ENCOUNTER — HOSPITAL ENCOUNTER (OUTPATIENT)
Dept: HYPERBARIC MEDICINE | Age: 79
Setting detail: THERAPIES SERIES
Discharge: HOME | End: 2019-05-30
Payer: MEDICARE

## 2019-05-30 DIAGNOSIS — E11.40 TYPE 2 DIABETES MELLITUS WITH DIABETIC NEUROPATHY, WITHOUT LONG-TERM CURRENT USE OF INSULIN (HCC): ICD-10-CM

## 2019-05-31 ENCOUNTER — HOSPITAL ENCOUNTER (OUTPATIENT)
Dept: HYPERBARIC MEDICINE | Age: 79
Setting detail: THERAPIES SERIES
Discharge: HOME | End: 2019-05-31
Payer: MEDICARE

## 2019-05-31 NOTE — TELEPHONE ENCOUNTER
Juju Win needs refill of   Requested Prescriptions     Pending Prescriptions Disp Refills    metFORMIN (GLUCOPHAGE) 1000 MG tablet [Pharmacy Med Name: METFORMIN 1000MG TAB] 90 tablet 3     Sig: TAKE ONE TABLET IN THE EVENING       Last Filled on:  1/5/2018, Pt daughter states she only takes one tablet every evening, please send to Carlos Goldsmith    Last Visit Date:  3/4/2019    Next Visit Date:    Visit date not found

## 2019-05-31 NOTE — PROGRESS NOTES
Called patient to see if she would like to come in early for HBOT patient still ill and wants to cancel treatment this AM

## 2019-06-03 ENCOUNTER — HOSPITAL ENCOUNTER (OUTPATIENT)
Dept: HYPERBARIC MEDICINE | Age: 79
Setting detail: THERAPIES SERIES
Discharge: HOME | End: 2019-06-03
Payer: MEDICARE

## 2019-06-03 ENCOUNTER — TELEPHONE (OUTPATIENT)
Dept: WOUND CARE | Age: 79
End: 2019-06-03

## 2019-06-03 NOTE — PROGRESS NOTES
Kin Calvert patient;s daughter called and cancelled treatment due to patient being ill patient will not be attending Tuesday treatment either due to [address] [place] [address] appointment. Explained to Kin Calvert that Tasneem Venegas CNP would need to re-evalutate patient before continuing with HBO. Kin Calvert stated understanding.

## 2019-06-05 ENCOUNTER — OFFICE VISIT (OUTPATIENT)
Dept: FAMILY MEDICINE CLINIC | Age: 79
End: 2019-06-05
Payer: MEDICARE

## 2019-06-05 ENCOUNTER — TELEPHONE (OUTPATIENT)
Dept: HYPERBARIC MEDICINE | Age: 79
End: 2019-06-05

## 2019-06-05 VITALS
RESPIRATION RATE: 16 BRPM | DIASTOLIC BLOOD PRESSURE: 82 MMHG | WEIGHT: 214.4 LBS | HEART RATE: 84 BPM | SYSTOLIC BLOOD PRESSURE: 132 MMHG | BODY MASS INDEX: 41.87 KG/M2

## 2019-06-05 DIAGNOSIS — R11.0 NAUSEA: ICD-10-CM

## 2019-06-05 DIAGNOSIS — K59.00 CONSTIPATION, UNSPECIFIED CONSTIPATION TYPE: Primary | ICD-10-CM

## 2019-06-05 DIAGNOSIS — L98.9 SKIN LESION OF LEFT ARM: ICD-10-CM

## 2019-06-05 PROBLEM — Z87.19 HISTORY OF VENTRAL HERNIA REPAIR: Status: ACTIVE | Noted: 2019-06-05

## 2019-06-05 PROBLEM — M43.14: Status: ACTIVE | Noted: 2019-06-05

## 2019-06-05 PROBLEM — Z98.890 HISTORY OF VENTRAL HERNIA REPAIR: Status: ACTIVE | Noted: 2019-06-05

## 2019-06-05 PROBLEM — M50.320 OTHER CERVICAL DISC DEGENERATION, MID-CERVICAL REGION: Status: ACTIVE | Noted: 2019-06-05

## 2019-06-05 PROBLEM — C30.0: Status: ACTIVE | Noted: 2017-08-24

## 2019-06-05 PROBLEM — M43.12 SPONDYLOLISTHESIS OF CERVICAL REGION: Status: ACTIVE | Noted: 2019-06-05

## 2019-06-05 PROBLEM — H26.9 CATARACT OF BOTH EYES: Status: ACTIVE | Noted: 2019-06-05

## 2019-06-05 PROCEDURE — 1036F TOBACCO NON-USER: CPT | Performed by: NURSE PRACTITIONER

## 2019-06-05 PROCEDURE — G8427 DOCREV CUR MEDS BY ELIG CLIN: HCPCS | Performed by: NURSE PRACTITIONER

## 2019-06-05 PROCEDURE — 99214 OFFICE O/P EST MOD 30 MIN: CPT | Performed by: NURSE PRACTITIONER

## 2019-06-05 PROCEDURE — 1123F ACP DISCUSS/DSCN MKR DOCD: CPT | Performed by: NURSE PRACTITIONER

## 2019-06-05 PROCEDURE — G8417 CALC BMI ABV UP PARAM F/U: HCPCS | Performed by: NURSE PRACTITIONER

## 2019-06-05 PROCEDURE — 1090F PRES/ABSN URINE INCON ASSESS: CPT | Performed by: NURSE PRACTITIONER

## 2019-06-05 PROCEDURE — 4040F PNEUMOC VAC/ADMIN/RCVD: CPT | Performed by: NURSE PRACTITIONER

## 2019-06-05 PROCEDURE — G8400 PT W/DXA NO RESULTS DOC: HCPCS | Performed by: NURSE PRACTITIONER

## 2019-06-05 RX ORDER — DOCUSATE SODIUM 100 MG/1
100 CAPSULE, LIQUID FILLED ORAL 2 TIMES DAILY PRN
Qty: 28 CAPSULE | Refills: 2 | Status: SHIPPED | OUTPATIENT
Start: 2019-06-05 | End: 2021-04-23 | Stop reason: ALTCHOICE

## 2019-06-05 RX ORDER — ONDANSETRON 4 MG/1
4 TABLET, FILM COATED ORAL EVERY 8 HOURS PRN
Qty: 20 TABLET | Refills: 0 | Status: SHIPPED | OUTPATIENT
Start: 2019-06-05 | End: 2019-07-01

## 2019-06-05 RX ORDER — BETAMETHASONE DIPROPIONATE 0.5 MG/G
1 CREAM TOPICAL DAILY
COMMUNITY
End: 2020-03-11 | Stop reason: ALTCHOICE

## 2019-06-05 ASSESSMENT — ENCOUNTER SYMPTOMS
BLOOD IN STOOL: 0
DIARRHEA: 0
BACK PAIN: 0
ANAL BLEEDING: 0
VOMITING: 1
WHEEZING: 0
COUGH: 0
COLOR CHANGE: 0
NAUSEA: 1
SHORTNESS OF BREATH: 1
ABDOMINAL DISTENTION: 1
SINUS PRESSURE: 1
RECTAL PAIN: 0
EYE ITCHING: 0
EYE PAIN: 0
CONSTIPATION: 1
ABDOMINAL PAIN: 1
EYE DISCHARGE: 0

## 2019-06-05 NOTE — PROGRESS NOTES
capsule, Rfl: 5    doxepin (SINEQUAN) 10 MG capsule, Take 10 mg by mouth nightly, Disp: , Rfl:     hydrALAZINE (APRESOLINE) 50 MG tablet, Take 50 mg by mouth 2 times daily, Disp: , Rfl:     potassium chloride (KLOR-CON M) 20 MEQ extended release tablet, Take 20 mEq by mouth daily, Disp: , Rfl:     bumetanide (BUMEX) 2 MG tablet, Take 0.5 tablets by mouth 2 times daily, Disp: 90 tablet, Rfl: 3    SITagliptin (JANUVIA) 100 MG tablet, Take 1 tablet by mouth daily, Disp: 90 tablet, Rfl: 3    omeprazole (PRILOSEC) 20 MG delayed release capsule, TAKE ONE CAPSULE BY MOUTH ONCE DAILY, Disp: 90 capsule, Rfl: 3    triamcinolone (KENALOG) 0.1 % ointment, Apply to effected areas. , Disp: 454 g, Rfl: 3    mupirocin (BACTROBAN NASAL) 2 % nasal ointment, by Nasal route 2 times daily Take by Nasal route 2 times daily. , Disp: , Rfl:     cycloSPORINE (RESTASIS) 0.05 % ophthalmic emulsion, Place 1 drop into both eyes 2 times daily Patient uses 2-4 times per day., Disp: , Rfl:     sodium chloride (OCEAN, BABY AYR) 0.65 % nasal spray, 1 spray by Nasal route as needed for Congestion Patient states she uses 4-6x per day., Disp: , Rfl:     fluticasone (FLONASE) 50 MCG/ACT nasal spray, 2 sprays by Nasal route 2 times daily, Disp: 1 Bottle, Rfl: 3    Handicap Placard [facility], by Does not apply route. Request parking placard due to medical conditions. Duration of 5 years. , Disp: 1 each, Rfl: 0    tetrahydrozoline 0.05 % ophthalmic solution, Place 1 drop into both eyes 3 times daily , Disp: , Rfl:     betamethasone dipropionate (DIPROLENE) 0.05 % cream, 1 Dose by Intratympanic route daily, Disp: , Rfl:     Allergies:  is allergic to bactrim [sulfamethoxazole-trimethoprim]; morphine; hydrocodone; lisinopril; percocet [oxycodone-acetaminophen]; tylenol [acetaminophen]; and tape [adhesive tape].     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 Subjective:      Review of Systems   Constitutional: Positive for appetite change (decreased). Negative for chills, fatigue and fever. HENT: Positive for congestion (chronic) and sinus pressure. Negative for ear pain and sneezing. Eyes: Negative for pain, discharge and itching. Respiratory: Positive for shortness of breath (occasional at night). Negative for cough and wheezing. Cardiovascular: Negative for chest pain, palpitations and leg swelling. Gastrointestinal: Positive for abdominal distention, abdominal pain (BLQ), constipation, nausea and vomiting (x1). Negative for anal bleeding, blood in stool, diarrhea and rectal pain. Endocrine: Negative for cold intolerance, polydipsia, polyphagia and polyuria. Genitourinary: Negative for difficulty urinating, flank pain and hematuria. Musculoskeletal: Negative for arthralgias, back pain and neck pain. Skin: Positive for wound (lesion to left forearm). Negative for color change, pallor and rash. Allergic/Immunologic: Negative for environmental allergies, food allergies and immunocompromised state. Neurological: Negative for dizziness, light-headedness, numbness and headaches. Hematological: Negative for adenopathy. Does not bruise/bleed easily. Psychiatric/Behavioral: Negative for agitation and confusion. The patient is not nervous/anxious.         Objective:     /82   Pulse 84   Resp 16 Wt 214 lb 6.4 oz (97.3 kg)   LMP  (LMP Unknown)   BMI 41.87 kg/m²     Physical Exam   Constitutional: She is oriented to person, place, and time. She appears well-developed and well-nourished. HENT:   Head: Normocephalic. Right Ear: External ear normal.   Left Ear: External ear normal.   Eyes: Pupils are equal, round, and reactive to light. Conjunctivae are normal. Right eye exhibits no discharge. Left eye exhibits no discharge. Neck: No JVD present. Cardiovascular: Normal rate, regular rhythm, normal heart sounds and intact distal pulses. No murmur heard. Pulmonary/Chest: Effort normal and breath sounds normal. No stridor. She has no wheezes. Abdominal: Soft. Bowel sounds are normal. She exhibits distension. There is tenderness. Musculoskeletal: Normal range of motion. She exhibits no edema or deformity. Right shoulder: Normal.        Left shoulder: Normal.        Right knee: Normal.        Left knee: Normal.        Right ankle: Normal.        Left ankle: Normal.   Neurological: She is alert and oriented to person, place, and time. Coordination normal.   Skin: Skin is warm and dry. Capillary refill takes less than 2 seconds. No rash noted. Psychiatric: She has a normal mood and affect. Her behavior is normal. Judgment and thought content normal.   Nursing note and vitals reviewed. Assessment/Plan: [facility] was seen today for constipation. Diagnoses and all orders for this visit:    Constipation, unspecified constipation type  -     docusate sodium (COLACE) 100 MG capsule; Take 1 capsule by mouth 2 times daily as needed for Constipation    Nausea  -     ondansetron (ZOFRAN) 4 MG tablet; Take 1 tablet by mouth every 8 hours as needed for Nausea or Vomiting    Skin lesion of left arm         -     Likely malignant.         -     Referal to dermatology for excision      Return if symptoms worsen or fail to improve.     Patient given educational materials - see patient

## 2019-06-05 NOTE — PATIENT INSTRUCTIONS
Patient Education        Constipation: Care Instructions  Your Care Instructions    Constipation means that you have a hard time passing stools (bowel movements). People pass stools from 3 times a day to once every 3 days. What is normal for you may be different. Constipation may occur with pain in the rectum and cramping. The pain may get worse when you try to pass stools. Sometimes there are small amounts of bright red blood on toilet paper or the surface of stools. This is because of enlarged veins near the rectum (hemorrhoids). A few changes in your diet and lifestyle may help you avoid ongoing constipation. Your doctor may also prescribe medicine to help loosen your stool. Some medicines can cause constipation. These include pain medicines and antidepressants. Tell your doctor about all the medicines you take. Your doctor may want to make a medicine change to ease your symptoms. Follow-up care is a key part of your treatment and safety. Be sure to make and go to all appointments, and call your doctor if you are having problems. It's also a good idea to know your test results and keep a list of the medicines you take. How can you care for yourself at home? · Drink plenty of fluids, enough so that your urine is light yellow or clear like water. If you have kidney, heart, or liver disease and have to limit fluids, talk with your doctor before you increase the amount of fluids you drink. · Include high-fiber foods in your diet each day. These include fruits, vegetables, beans, and whole grains. · Get at least 30 minutes of exercise on most days of the week. Walking is a good choice. You also may want to do other activities, such as running, swimming, cycling, or playing tennis or team sports. · Take a fiber supplement, such as Citrucel or Metamucil, every day. Read and follow all instructions on the label. · Schedule time each day for a bowel movement. A daily routine may help.  Take your time having your bowel movement. · Support your feet with a small step stool when you sit on the toilet. This helps flex your hips and places your pelvis in a squatting position. · Your doctor may recommend an over-the-counter laxative to relieve your constipation. Examples are Milk of Magnesia and MiraLax. Read and follow all instructions on the label. Do not use laxatives on a long-term basis. When should you call for help? Call your doctor now or seek immediate medical care if:    · You have new or worse belly pain.     · You have new or worse nausea or vomiting.     · You have blood in your stools.    Watch closely for changes in your health, and be sure to contact your doctor if:    · Your constipation is getting worse.     · You do not get better as expected. Where can you learn more? Go to https://Advizzereduardoeb.[employer]. org and sign in to your [employer] account. Enter 21 [phone] in the [place] box to learn more about \"Constipation: Care Instructions. \"     If you do not have an account, please click on the \"Sign Up Now\" link. Current as of: September 23, 2018  Content Version: 12.0  © 3448-1251 Healthwise, [employer]. Care instructions adapted under license by [facility] ([facility]). If you have questions about a medical condition or this instruction, always ask your healthcare professional. [address] any warranty or liability for your use of this information. Patient Education        DASH Diet: Care Instructions  Your Care Instructions    The DASH diet is an eating plan that can help lower your blood pressure. DASH stands for Dietary Approaches to Stop Hypertension. Hypertension is high blood pressure. The DASH diet focuses on eating foods that are high in calcium, potassium, and magnesium. These nutrients can lower blood pressure. The foods that are highest in these nutrients are fruits, vegetables, low-fat dairy products, nuts, seeds, and legumes.  But taking calcium, potassium, and magnesium supplements instead of eating foods that are high in those nutrients does not have the same effect. The DASH diet also includes whole grains, fish, and poultry. The DASH diet is one of several lifestyle changes your doctor may recommend to lower your high blood pressure. Your doctor may also want you to decrease the amount of sodium in your diet. Lowering sodium while following the DASH diet can lower blood pressure even further than just the DASH diet alone. Follow-up care is a key part of your treatment and safety. Be sure to make and go to all appointments, and call your doctor if you are having problems. It's also a good idea to know your test results and keep a list of the medicines you take. How can you care for yourself at home? Following the DASH diet  · Eat 4 to 5 servings of fruit each day. A serving is 1 medium-sized piece of fruit, ½ cup chopped or canned fruit, 1/4 cup dried fruit, or 4 ounces (½ cup) of fruit juice. Choose fruit more often than fruit juice. · Eat 4 to 5 servings of vegetables each day. A serving is 1 cup of lettuce or raw leafy vegetables, ½ cup of chopped or cooked vegetables, or 4 ounces (½ cup) of vegetable juice. Choose vegetables more often than vegetable juice. · Get 2 to 3 servings of low-fat and fat-free dairy each day. A serving is 8 ounces of milk, 1 cup of yogurt, or 1 ½ ounces of cheese. · Eat 6 to 8 servings of grains each day. A serving is 1 slice of bread, 1 ounce of dry cereal, or ½ cup of cooked rice, pasta, or cooked cereal. Try to choose whole-grain products as much as possible. · Limit lean meat, poultry, and fish to 2 servings each day. A serving is 3 ounces, about the size of a deck of cards. · Eat 4 to 5 servings of nuts, seeds, and legumes (cooked dried beans, lentils, and split peas) each week. A serving is 1/3 cup of nuts, 2 tablespoons of seeds, or ½ cup of cooked beans or peas.   · Limit fats and oils to 2 to 3 servings each day. A serving is 1 teaspoon of vegetable oil or 2 tablespoons of salad dressing. · Limit sweets and added sugars to 5 servings or less a week. A serving is 1 tablespoon jelly or jam, ½ cup sorbet, or 1 cup of lemonade. · Eat less than 2,300 milligrams (mg) of sodium a day. If you limit your sodium to 1,500 mg a day, you can lower your blood pressure even more. Tips for success  · Start small. Do not try to make dramatic changes to your diet all at once. You might feel that you are missing out on your favorite foods and then be more likely to not follow the plan. Make small changes, and stick with them. Once those changes become habit, add a few more changes. · Try some of the following:  ? Make it a goal to eat a fruit or vegetable at every meal and at snacks. This will make it easy to get the recommended amount of fruits and vegetables each day. ? Try yogurt topped with fruit and nuts for a snack or healthy dessert. ? Add lettuce, tomato, cucumber, and onion to sandwiches. ? Combine a ready-made pizza crust with low-fat mozzarella cheese and lots of vegetable toppings. Try using tomatoes, squash, spinach, broccoli, carrots, cauliflower, and onions. ? Have a variety of cut-up vegetables with a low-fat dip as an appetizer instead of chips and dip. ? Sprinkle sunflower seeds or chopped almonds over salads. Or try adding chopped walnuts or almonds to cooked vegetables. ? Try some vegetarian meals using beans and peas. Add garbanzo or kidney beans to salads. Make burritos and tacos with mashed lockhart beans or black beans. Where can you learn more? Go to https://[employer]. org and sign in to your [employer] account. Enter L588 in the [employer] box to learn more about \"DASH Diet: Care Instructions. \"     If you do not have an account, please click on the \"Sign Up Now\" link. Current as of: July 22, 2018  Content Version: 12.0  © 8712-8699 Healthwise, Inc[employer].  Care instructions

## 2019-06-06 ENCOUNTER — CARE COORDINATION (OUTPATIENT)
Dept: CARE COORDINATION | Age: 79
End: 2019-06-06

## 2019-06-06 NOTE — CARE COORDINATION
6/5/19 Dandy Jack, pts daughter, left vm for this [facility] stating her mother had constipation x 1 week. States she has tried senna, colace, and miralax with no results. 6/5/19-Pt was seen in office by Zuhair Leyva CNP. Pt reported she was not taking any medications for constipation. Pt also nauseous and vomited while at appt. Prescriptions for Colace and Zofran given. 6/6/19-This RNCC called [facility] to follow up. She reports her mother continues to be bloated and has not had a BM. She reports that she is still nauseated, but denies any vomiting today. Discussed with Trinidad Moreno, as he was unaware pt was already taking medications for constipation. He recommended trying an enema and if no results advises Donna to take Prasad Moan to ED to be evaluated further. Dandy Jack voiced understanding and is agreeable to this plan.

## 2019-06-10 ENCOUNTER — CARE COORDINATION (OUTPATIENT)
Dept: CARE COORDINATION | Age: 79
End: 2019-06-10

## 2019-06-10 NOTE — CARE COORDINATION
Ambulatory Care Coordination Note  6/10/2019  CM Risk Score: 11  Rupali Mortality Risk Score: 28    ACC: Marge Leahy, RN    Summary Note: Spoke with pt daughter, Dandy Jcak, for Care Coordination follow up. Dandy Jack reports that Prasad Tatum was able to have 2 large bowel movements on Friday (6/7) following an enema. Dandy Jack reports that since Friday Prasad Tatum has only had one small bowel movement and she is beginning to feel bloated again. She has been taking Colace. Karla Mills to give Prasad Tatum a dose of miralax today. Dandy Jack voiced understanding. Dandy Jack reports other than constipation, Prasad Tatum has been doing well. DM: Prasad Tatum has been checking her FBS and reports that she has had good readings. Unable to give latest reading stating she isn't near the meter at this time. Reviewed s/s and treatment of hypoglycemia with Dandy Jack and she voiced understanding. CHF: Dandy Jack reports that Prasad Tatum doesn't have edema and denies CP or SOB. Dandy Jack reports she is unsure if Prasad Tatum is weighing self daily. Education provided regarding daily weights and early symptom recognition. Dandy Jack voiced understanding. PLAN: Continue to provide education and support in management of DM and CHF. Care Coordination Interventions    Program Enrollment:  Complex Care  Referral from Primary Care Provider:  No  Suggested Interventions and Community Resources  Diabetes Education: In Process  Fall Risk Prevention: In Process  Home Health Services:  Completed  Medi Set or Pill Pack:  Declined  Specialty Services Referral:  Completed (Comment: Wound care)  Zone Management Tools: In Process         Goals Addressed                 This Visit's Progress     Conditions and Symptoms   On track     I will notify my provider of any symptoms that indicate a worsening of my condition. Barriers: overwhelmed by complexity of regimen  Plan for overcoming my barriers:  Will discuss questions/concerns with office or care coordinator  Confidence: 9/10  Anticipated Goal Yes Historical Provider, MD   cycloSPORINE (RESTASIS) 0.05 % ophthalmic emulsion Place 1 drop into both eyes 2 times daily Patient uses 2-4 times per day. Yes Historical Provider, MD   sodium chloride (OCEAN, BABY AYR) 0.65 % nasal spray 1 spray by Nasal route as needed for Congestion Patient states she uses 4-6x per day. Yes Historical Provider, MD   fluticasone (FLONASE) 50 MCG/ACT nasal spray 2 sprays by Nasal route 2 times daily 6/15/17  Yes Jayde North MD   Handicap Placard MISC by Does not apply route. Request parking placard due to medical conditions. Duration of 5 years. 9/4/14  Yes Ignacio Alston MD   tetrahydrozoline 0.05 % ophthalmic solution Place 1 drop into both eyes 3 times daily    Yes Historical Provider, MD       Future Appointments   Date Time Provider David Adler   6/14/2019 10:30 AM AMANDA Lopez CNP [facility] None   7/25/2019  3:00 PM AMANDA Chacon CNP [facility] [address]     ,   Diabetes Assessment    Medic Alert ID:  No  Meal Planning:  Avoidance of concentrated sweets   How often do you test your blood sugar?:  Daily   Do you have barriers with adherence to non-pharmacologic self-management interventions?  (Nutrition/Exercise/Self-Monitoring):  No   Have you ever had to go to the ED for symptoms of low blood sugar?:  No       No patient-reported symptoms   Do you have hyperglycemia symptoms?:  No   Do you have hypoglycemia symptoms?:  No   Blood Sugar Monitoring Regimen:  Morning Fasting   Blood Sugar Trends:  No Change      ,   Congestive Heart Failure Assessment    Do you understand a low sodium diet?:  Yes  How many restaurant meals do you eat per week?:  0     No patient-reported symptoms      Symptoms:   None:  Yes      Symptom course:  stable  Weight trend:  stable  Salt intake watch compared to last visit:  stable      and   General Assessment    Do you have any symptoms that are causing concern?:  Yes  Progression since Onset:  Gradually Improving  Reported Symptoms:  Abdominal Pain, Constipation

## 2019-06-14 ENCOUNTER — HOSPITAL ENCOUNTER (OUTPATIENT)
Dept: WOUND CARE | Age: 79
Discharge: HOME | End: 2019-06-14
Payer: MEDICARE

## 2019-06-14 VITALS
HEART RATE: 73 BPM | OXYGEN SATURATION: 98 % | TEMPERATURE: 98.1 F | SYSTOLIC BLOOD PRESSURE: 137 MMHG | DIASTOLIC BLOOD PRESSURE: 78 MMHG | RESPIRATION RATE: 18 BRPM

## 2019-06-14 PROCEDURE — G0463 HOSPITAL OUTPT CLINIC VISIT: HCPCS

## 2019-06-14 PROCEDURE — 99214 OFFICE O/P EST MOD 30 MIN: CPT | Performed by: NURSE PRACTITIONER

## 2019-06-14 PROCEDURE — 99212 OFFICE O/P EST SF 10 MIN: CPT

## 2019-06-14 RX ORDER — AMOXICILLIN AND CLAVULANATE POTASSIUM 875; 125 MG/1; MG/1
1 TABLET, FILM COATED ORAL 2 TIMES DAILY
COMMUNITY
Start: 2019-06-13 | End: 2019-07-13

## 2019-06-14 RX ORDER — CIPROFLOXACIN 500 MG/1
500 TABLET, FILM COATED ORAL 2 TIMES DAILY
COMMUNITY
Start: 2019-06-13 | End: 2019-07-13

## 2019-06-14 ASSESSMENT — PAIN SCALES - GENERAL: PAINLEVEL_OUTOF10: 0

## 2019-06-14 NOTE — PLAN OF CARE
Patient presents to wound clinic for HBO re-eval. CNP instructed patient that she may need the diluted bleach solution in shower again if having skin issues. Instructed patient/family they can use [facility] for transportation and will need to call them to set up and left them know it is a 2 hour treatment and will need picked up around 12:30 pm. Plan to restart hyperbaric treatments on Monday June 17th at 10 am. Continue sinus rinses as ordered by [facility]. Follow up visit: Will be scheduled once hyperbarics begins. Care plan reviewed with patient and daughter. Patient and daughter verbalize understanding of the plan of care and contribute to goal setting.

## 2019-06-19 NOTE — PROGRESS NOTES
[facility]          Progress Note and Hyperbaric Evaluation      [address] RECORD NUMBER:  [id number]  AGE: 69 y.o. GENDER: female  : 1940  EPISODE DATE:  2019    Subjective:     Chief Complaint   Patient presents with    Other     Sinus HBO re-eval         HISTORY of PRESENT ILLNESS HPI     Doroteo Duffy is a 69 y.o. female Established patient referred by [facility] [place], who presents today for wound/ulcer evaluation. 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  She has been having issues with recurrent sinus infections since that time.  She had surgery on 3/25/2019 at [facility] for nasal endoscopy with sinonasal debridement and was noted to have exposed bone in the skull base both anteriorly and superiorly as well as within the sphenoid cavity.  Sinus maxillary culture on 3/25/2019 was positive for multiple organisms including MRSA, Klebsiella pneumoniae, stenotrophomonas maltophilia, Streptococcus, and Enterococcus faecalis.  She has sinus congestion as well as sinus drainage.  She does saline rinses twice daily.  She has tubes in place to the bilateral ears.  EKG from 10/5/2018 showed normal sinus rhythm with possible inferior infarct.  Echocardiogram from 10/8/2018 shows ejection fraction 60% percent.  Stress test from 10/4/2018 was negative for myocardial ischemia.    She had endoscopic sinus surgery with debridement on 2019 at [facility], significant crusting was noted throughout the nasal cavity which was removed and copiously irrigated, there was also exposed bone along sphenoid rostrum and anterior skull base which was drilled away and removed-she had continued exposed bone post surgery.  Patient has a history of bilateral lower leg venous ulcers and lymphedema. Katie Bosekayla has been using her lymphedema pumps twice daily as recommended however she has not been using her Velcro compression wraps consistently.    Patient has not completed hyperbaric treatments since 5/24/19 due to having issues with constipation and diarrhea. She had also missed multiple treatments in the 3 weeks prior for various reasons. Her surgeon at [facility] has informed staff that she is not complaint with her sinus rinses she is currently on antibiotics due to infection of the sinuses. She is wanting to restart hyperbaric treatments. Wound/Ulcer Pain Timing/Severity: none  Quality of pain: N/A  Severity:  0 / 10   Modifying Factors: None  Associated Signs/Symptoms: drainage    Interval History:   Patient presents today for follow up on wound/ulcer's progression. The patient is currently on antibiotics. Current dressing care includes saline rinses twice daily. PAST MEDICAL HISTORY        Diagnosis Date    Cancer [facility])     adenocarcinoma of her sinuses    Cataract of both eyes     DDD (degenerative disc disease), lumbar     Dysphagia, pharyngoesophageal 09/26/2016    Eczema 03/2018    Fibrocystic breast     H/O ventral hernia repair     Headache 02/09/2017    Hypercholesteremia     Hyperlipidemia     Hypertension     Iron deficiency anemia     LPRD (laryngopharyngeal reflux disease) 09/26/2016    Neuropathy     peripheral    Obesity     Orbital abscess     Orbital cellulitis 01/22/2014    SWAPNA (obstructive sleep apnea)     Osteoarthritis     Osteoporosis     Persistent proteinuria associated with type 2 diabetes mellitus ([facility] [address] 01/2017    urine micral of 50.       Sinusitis     Type II or unspecified type diabetes mellitus without mention of complication, not stated as uncontrolled     diagnoses approx 2000    Velopharyngeal incompetence 09/26/2016       PAST SURGICAL HISTORY    Past Surgical History:   Procedure Laterality Date    ABDOMEN SURGERY      APPENDECTOMY      CARPAL TUNNEL RELEASE Bilateral 2008, 2009    CATARACT REMOVAL  2011    bilat    CHOLECYSTECTOMY  03/1988    COLONOSCOPY  2016    COLONOSCOPY  10/11/2018    COLONOSCOPY POLYPECTOMY SNARE/COLD BIOPSY performed by Theo Easton MD at [address] ESOPHAGUS      ENDOSCOPY, COLON, DIAGNOSTIC      EYE SURGERY Left 01/30/2015    EYE SURGERY      CATARACT REMOVAL- BILATERAL    HEMORRHOID SURGERY  1980s    HERNIA REPAIR      HYSTERECTOMY, TOTAL ABDOMINAL  1980    JOINT REPLACEMENT  bilat 2005/ 2007    knees    NM COLSC FLX WITH DIRECTED SUBMUCOSAL Simone Oskar [address] ANY SBST  10/11/2018    COLONOSCOPY SUBMUCOSAL/BOTOX INJECTION performed by Theo Easton MD at [address]  last 2009    removed a bone (2)--2 times no construction     SINUS SURGERY  02/01/2016    SINUS SURGERY  2016 x 2    SINUS SURGERY  09/18/2017    [facility] - Dr Ruthy Antunez SINUS SURGERY  08/09/2017 8/17/2017 - [facility]    TONSILLECTOMY      TOTAL KNEE ARTHROPLASTY  08/2003    Left TKR    VENTRAL HERNIA REPAIR  1992       FAMILY HISTORY    Family History   Problem Relation Age of Onset    Diabetes Mother     Heart Disease Father         whooping cough as child    Obesity Sister     Other Brother         tumor on back    Obesity Sister     Cancer Sister         Skin     Cancer Brother         Bone cancer from metal    Other Brother         passed as a child    Heart Disease Brother     Other Brother         tremor    Heart Attack Brother     No Known Problems Brother     Heart Disease Brother     No Known Problems Brother     No Known Problems Brother        SOCIAL HISTORY    Social History     Tobacco Use    Smoking status: Never Smoker    Smokeless tobacco: Never Used   Substance Use Topics    Alcohol use: No    Drug use: No       ALLERGIES    Allergies   Allergen Reactions    Bactrim [Sulfamethoxazole-Trimethoprim] Rash    Morphine Other (See Comments)     headaches    Hydrocodone      headaches    Lisinopril Swelling    Percocet [Oxycodone-Acetaminophen] Other (See Comments)     Headaches    Tylenol [Acetaminophen] Other (See Comments)     TYLENOL 3 causes hallucinations    Tape [Adhesive Tape] Rash       MEDICATIONS    Current Outpatient Medications on File Prior to Encounter   Medication Sig Dispense Refill    ciprofloxacin (CIPRO) 500 MG tablet Take 500 mg by mouth 2 times daily      amoxicillin-clavulanate (AUGMENTIN) 875-125 MG per tablet Take 1 tablet by mouth 2 times daily      betamethasone dipropionate (DIPROLENE) 0.05 % cream 1 Dose by Intratympanic route daily      mupirocin (BACTROBAN) 2 % ointment Apply 1 spray topically 3 times daily as needed  11    docusate sodium (COLACE) 100 MG capsule Take 1 capsule by mouth 2 times daily as needed for Constipation 28 capsule 2    ondansetron (ZOFRAN) 4 MG tablet Take 1 tablet by mouth every 8 hours as needed for Nausea or Vomiting 20 tablet 0    metFORMIN (GLUCOPHAGE) 1000 MG tablet TAKE ONE TABLET IN THE EVENING 90 tablet 3    potassium chloride (KLOR-CON M) 20 MEQ extended release tablet TAKE 2 TABLETS BY MOUTH ONE TIME A DAY  60 tablet 2    pregabalin (LYRICA) 150 MG capsule TAKE 2 CAPSULES BY MOUTH IN THE MORNING AND 2 CAPS AT BEDTIME, E11.42 120 capsule 5    doxepin (SINEQUAN) 10 MG capsule Take 10 mg by mouth nightly      hydrALAZINE (APRESOLINE) 50 MG tablet Take 50 mg by mouth 2 times daily      bumetanide (BUMEX) 2 MG tablet Take 0.5 tablets by mouth 2 times daily 90 tablet 3    SITagliptin (JANUVIA) 100 MG tablet Take 1 tablet by mouth daily 90 tablet 3    omeprazole (PRILOSEC) 20 MG delayed release capsule TAKE ONE CAPSULE BY MOUTH ONCE DAILY 90 capsule 3    triamcinolone (KENALOG) 0.1 % ointment Apply to effected areas. 454 g 3    mupirocin (BACTROBAN NASAL) 2 % nasal ointment by Nasal route 2 times daily Take by Nasal route 2 times daily.  cycloSPORINE (RESTASIS) 0.05 % ophthalmic emulsion Place 1 drop into both eyes 2 times daily Patient uses 2-4 times per day.       sodium chloride (OCEAN, BABY AYR) 0.65 % nasal spray 1 spray by Nasal route as needed for Congestion Patient states she uses 4-6x per day.  fluticasone (FLONASE) 50 MCG/ACT nasal spray 2 sprays by Nasal route 2 times daily 1 Bottle 3    Handicap Placard MISC by Does not apply route. Request parking placard due to medical conditions. Duration of 5 years. 1 each 0    tetrahydrozoline 0.05 % ophthalmic solution Place 1 drop into both eyes 3 times daily        No current facility-administered medications on file prior to encounter.         REVIEW OF SYSTEMS    Constitutional: negative for chills, fevers and sweats  Eyes: negative for irritation and redness  Ears, nose, mouth, throat, and face: positive for sinus drainage, negative for hearing loss and hoarseness  Respiratory: negative for cough and shortness of breath  Cardiovascular: positive for lower extremity edema, negative for chest pain  Gastrointestinal: positive for occasional fecal incontinence, negative for abdominal pain, nausea and vomiting  Integument/breast: positive for eczema   Musculoskeletal:negative for muscle weakness  Neurological: negative for coordination problems and speech problems  Behavioral/Psych: positive for good mood    Objective:      /78   Pulse 73   Temp 98.1 °F (36.7 °C) (Oral)   Resp 18   LMP  (LMP Unknown)   SpO2 98%     Wt Readings from Last 3 Encounters:   06/05/19 214 lb 6.4 oz (97.3 kg)   04/22/19 207 lb (93.9 kg)   04/15/19 207 lb (93.9 kg)       PHYSICAL EXAM      General Appearance: alert and oriented to person, place and time, pale  Skin: warm and dry  Head: normocephalic and atraumatic  Eyes: pupils equal, round, and reactive to light, conjunctivae normal  ENT: tympanic membrane, external ear and ear canal normal bilaterally with bilateral PE tubes in place, nose without deformity, nasal mucosa is pale pink  Neck: supple and non-tender without mass,   Pulmonary/Chest: clear to auscultation bilaterally- no wheezes, rales Plan:     Patient examined and evaluated. 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 She has not been compliant with following through with her treatments, last treatment was 5/24/2019 and prior to that she had missed many treatments in the past 3 weeks. Her surgeon would like her to continue with hyperbaric oxygen therapy. Had a lengthy discussion with the patient and her daughter on the importance of being compliant with her treatments in order for her to get any benefit, they verbalized understanding and would like to proceed with treatment however patient does have some concerns with transportation. Discussed treatment options including counseling aging to help transport her. Patient would benefit from hyperbaric oxygen therapy treatments if she is consistent with her treatments, also stressed the importance of her doing her sinus rinses as recommended by her surgeon in order to keep sinuses clear of dried drainage and infection. Patient to contact  on aging to set up transportation    Hyperbaric oxygen therapy to be continued for a total of 40 treatments picked up where it was previously left off at 2 ELSY. Antibiotics: Yes    Follow up: After 10 hyperbaric treatments    Please see attached Discharge Instructions    Written patient dismissal instructions given to patient and signed by patient or POA. Discharge Instructions       Visit Discharge/Physician Orders:  -May need the diluted bleach solution in shower again if having skin issues.  -Can use [facility] for transportation. You will need to call them to set up and left them know it is a 2 hour treatment and will need  around 12:30 pm.  -Plan to restart hyperbaric treatments on Monday June 17th at 10 am.  -Continue sinus rinses as ordered by [facility]. Follow up visit: Will be scheduled once hyperbarics begins. Keep next scheduled appointment.  Please give 24 hour

## 2019-06-24 ENCOUNTER — CARE COORDINATION (OUTPATIENT)
Dept: CARE COORDINATION | Age: 79
End: 2019-06-24

## 2019-06-24 ENCOUNTER — HOSPITAL ENCOUNTER (OUTPATIENT)
Dept: HYPERBARIC MEDICINE | Age: 79
Setting detail: THERAPIES SERIES
Discharge: HOME | End: 2019-06-24
Payer: MEDICARE

## 2019-06-24 VITALS
TEMPERATURE: 98.6 F | DIASTOLIC BLOOD PRESSURE: 90 MMHG | RESPIRATION RATE: 16 BRPM | OXYGEN SATURATION: 100 % | SYSTOLIC BLOOD PRESSURE: 168 MMHG | HEART RATE: 71 BPM

## 2019-06-24 LAB
GLUCOSE BLD-MCNC: 114 MG/DL (ref 70–108)
GLUCOSE BLD-MCNC: 120 MG/DL (ref 70–108)
GLUCOSE BLD-MCNC: 126 MG/DL (ref 70–108)

## 2019-06-24 PROCEDURE — 99183 HYPERBARIC OXYGEN THERAPY: CPT | Performed by: NURSE PRACTITIONER

## 2019-06-24 PROCEDURE — G0277 HBOT, FULL BODY CHAMBER, 30M: HCPCS

## 2019-06-24 PROCEDURE — 82948 REAGENT STRIP/BLOOD GLUCOSE: CPT

## 2019-06-24 ASSESSMENT — PAIN SCALES - GENERAL
PAINLEVEL_OUTOF10: 5
PAINLEVEL_OUTOF10: 0

## 2019-06-24 ASSESSMENT — PAIN DESCRIPTION - PAIN TYPE: TYPE: CHRONIC PAIN

## 2019-06-24 ASSESSMENT — PAIN DESCRIPTION - LOCATION: LOCATION: BACK;NECK

## 2019-06-24 NOTE — CARE COORDINATION
Ambulatory Care Coordination Note  6/24/2019  CM Risk Score: 11  Rupali Mortality Risk Score: 28    ACC: Boy Knox RN    Summary Note: Spoke with Cate's daughter, Vandana Rasmussen, for Care Coordination Follow Up. Sue Butt was seen by Dr. Mackenzie Coleman, Otolaryngology at [facility] on 6/13 for follow up. Sue Butt reported she had not been compliant with nasal flushes twice daily as ordered. She was prescribed Cipro and Augmentin to treat sinus infection. Vandana Rasmussen reports Sue Butt has been using her sinus flushes twice a day since her appt and has started both antibiotics. Vandana Rasmussen denies any side effects with the antibiotics. Vandana Eis states that Dr. Mackenzie Coleman advised to hold all stool softeners/laxitives until antibiotics are completed (7/13/19). She reports that Sue Butt has been having bowel movements about every other day and they remained formed. Dr. Mackenzie Coleman also advised that Sue Butt begin Hyperbaric treatments again. She had missed several appts leading up to appt with Dr. Mackenzie Coleman. Sue Butt expressed issues with transportation. [place] [facility] contacted and are now providing transportation to appts. Sue Butt began appts today. DM: Donna unsure of FBS levels. States she believes they have been decent. Denies that Sue Butt has had any s/s of hypoglycemia. CHF: Vandana Rasmussen does not know if Sue Butt has been weighing self daily, but states they weigh her at her hyperbaric treatments. Vandana Rasmussen denies that Sue Butt has any edema or SOB. States she believes CHF is stable. PLAN: Provide support and education about self care and daily monitoring for early symptom recognition regarding DM and CHF. Continue to provide support regarding chronic illnesses and community resources.        Care Coordination Interventions    Program Enrollment:  Complex Care  Referral from Primary Care Provider:  No  Suggested Interventions and Community Resources  Diabetes Education:  Completed  Fall Risk Prevention:  Completed  Home Health Services:  Completed  Medi Set or Pill Pack:  Declined  Specialty Services Referral:  Completed (Comment: Wound care)  Transportation Support:  Completed  Zone Management Tools: In Process  Other Services or Interventions:  Hyperbaric tx at [address] [place]                 This Visit's Progress     COMPLETED: Conditions and Symptoms        I will notify my provider of any symptoms that indicate a worsening of my condition. Barriers: overwhelmed by complexity of regimen  Plan for overcoming my barriers: Will discuss questions/concerns with office or care coordinator  Confidence: 9/10  Anticipated Goal Completion Date: 2/7/19: update 5/28/19          Self Monitoring        Self-Monitored Blood Glucose - I will check my blood sugar Fasting blood sugar  I will notify my provider of any trends of increasing or decreasing blood sugars over a 1 month period. I will notify my provider if I have any blood sugar readings less than 70 more than 2 times a month. Daily Weights - I will weight myself as directed - Daily and write down weights    Patient Reported Weight 214 lb  Patient Reported Blood Glucose No flowsheet data found. Barriers: overwhelmed by complexity of regimen, stress, time constraints and lack of education  Plan for overcoming my barriers: Will discuss any questions or concerns with RNCC/PCP  Confidence: 8/10  Anticipated Goal Completion Date: 9/24/19              Prior to Admission medications    Medication Sig Start Date End Date Taking?  Authorizing Provider   ciprofloxacin (CIPRO) 500 MG tablet Take 500 mg by mouth 2 times daily 6/13/19 7/13/19 Yes Historical Provider, MD   amoxicillin-clavulanate (AUGMENTIN) 875-125 MG per tablet Take 1 tablet by mouth 2 times daily 6/13/19 7/13/19 Yes Historical Provider, MD   betamethasone dipropionate (DIPROLENE) 0.05 % cream 1 Dose by Intratympanic route daily   Yes Historical Provider, MD   mupirocin (BACTROBAN) 2 % ointment Apply 1 spray topically 3 times daily as needed 6/4/19  Yes Historical Provider, MD   ondansetron (ZOFRAN) 4 MG tablet Take 1 tablet by mouth every 8 hours as needed for Nausea or Vomiting 6/5/19  Yes AMANDA Lucio CNP   metFORMIN (GLUCOPHAGE) 1000 MG tablet TAKE ONE TABLET IN THE EVENING 5/31/19  Yes Maudine Closs, MD   potassium chloride (KLOR-CON M) 20 MEQ extended release tablet TAKE 2 TABLETS BY MOUTH ONE TIME A DAY  5/16/19  Yes AMANDA Mercado CNP   pregabalin (LYRICA) 150 MG capsule TAKE 2 CAPSULES BY MOUTH IN THE MORNING AND 2 CAPS AT BEDTIME, E11.42 4/15/19 11/3/19 Yes Maudine Closs, MD   doxepin (SINEQUAN) 10 MG capsule Take 10 mg by mouth nightly   Yes Historical Provider, MD   hydrALAZINE (APRESOLINE) 50 MG tablet Take 50 mg by mouth 2 times daily   Yes Historical Provider, MD   bumetanide (BUMEX) 2 MG tablet Take 0.5 tablets by mouth 2 times daily 2/15/19  Yes Maudine Closs, MD   SITagliptin (JANUVIA) 100 MG tablet Take 1 tablet by mouth daily 2/15/19  Yes Maudine Closs, MD   omeprazole (PRILOSEC) 20 MG delayed release capsule TAKE ONE CAPSULE BY MOUTH ONCE DAILY 2/15/19  Yes Maudine Closs, MD   triamcinolone (KENALOG) 0.1 % ointment Apply to effected areas. 12/18/18  Yes Ld Adkins MD   [facility]) 2 % nasal ointment by Nasal route 2 times daily Take by Nasal route 2 times daily. Yes Historical Provider, MD   cycloSPORINE (RESTASIS) 0.05 % ophthalmic emulsion Place 1 drop into both eyes 2 times daily Patient uses 2-4 times per day. Yes Historical Provider, MD   sodium chloride (OCEAN, BABY AYR) 0.65 % nasal spray 1 spray by Nasal route as needed for Congestion Patient states she uses 4-6x per day. Yes Historical Provider, MD   fluticasone (FLONASE) 50 MCG/ACT nasal spray 2 sprays by Nasal route 2 times daily 6/15/17  Yes Robinson Hendrickson MD   Handicap Placard MISC by Does not apply route. Request parking placard due to medical conditions. Duration of 5 years.  9/4/14  Yes Maudine Closs, MD tetrahydrozoline 0.05 % ophthalmic solution Place 1 drop into both eyes 3 times daily    Yes Historical Provider, MD   docusate sodium (COLACE) 100 MG capsule Take 1 capsule by mouth 2 times daily as needed for Constipation 6/5/19   AMANDA Meyer CNP       Future Appointments   Date Time Provider David Francei   6/24/2019 10:00 AM [facility] HYPERBARIC CHAMBER 2 [facility] HYPER None   6/25/2019 10:00 AM [facility] HYPERBARIC CHAMBER 2 [facility] HYPER None   6/26/2019 10:00 AM [facility] HYPERBARIC CHAMBER 2 STRZ HYPER None   6/27/2019 10:00 AM [facility] HYPERBARIC CHAMBER 2 STRZ HYPER None   6/28/2019 10:00 AM [facility] HYPERBARIC CHAMBER 2 STRZ HYPER None   7/25/2019  3:00 PM AMANDA Jung CNP [facility]     ,   Diabetes Assessment    Medic Alert ID:  No  Meal Planning:  Avoidance of concentrated sweets   How often do you test your blood sugar?:  Daily   Do you have barriers with adherence to non-pharmacologic self-management interventions?  (Nutrition/Exercise/Self-Monitoring):  No   Have you ever had to go to the ED for symptoms of low blood sugar?:  No       No patient-reported symptoms   Do you have hyperglycemia symptoms?:  No   Do you have hypoglycemia symptoms?:  No      ,   Congestive Heart Failure Assessment    Do you understand a low sodium diet?:  Yes  Do you understand how to read food labels?:  Yes  How many restaurant meals do you eat per week?:  0  Do you salt your food before tasting it?:  No     No patient-reported symptoms      Symptoms:      Symptom course:  stable  Salt intake watch compared to last visit:  stable      and   General Assessment    Do you have any symptoms that are causing concern?:  Yes  Progression since Onset:  Unchanged  Reported Symptoms:  Congestion (Comment: Continued congestion d/t chronic sinus issues)

## 2019-06-24 NOTE — PROGRESS NOTES
RN HYPERBARIC OXYGEN THERAPY RISK ASSESSMENT TOOL   [facility]     [id number] RECORD NUMBER:  [id number]  AGE: 69 y.o. 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    Cataract of both eyes     DDD (degenerative disc disease), lumbar     Dysphagia, pharyngoesophageal 2016    Eczema 2018    Fibrocystic breast     H/O ventral hernia repair     Headache 2017    Hypercholesteremia     Hyperlipidemia     Hypertension     Iron deficiency anemia     LPRD (laryngopharyngeal reflux disease) 2016    Neuropathy     peripheral    Obesity     Orbital abscess     Orbital cellulitis 2014    SWAPNA (obstructive sleep apnea)     Osteoarthritis     Osteoporosis     Persistent proteinuria associated with type 2 diabetes mellitus ([facility] [address] 2017    urine micral of 50.       Sinusitis     Type II or unspecified type diabetes mellitus without mention of complication, not stated as uncontrolled     diagnoses approx     Velopharyngeal incompetence 2016       PAST SURGICAL HISTORY  Past Surgical History:   Procedure Laterality Date    ABDOMEN SURGERY      APPENDECTOMY      CARPAL TUNNEL RELEASE Bilateral ,     CATARACT REMOVAL      bilat    CHOLECYSTECTOMY  1988    COLONOSCOPY  2016    COLONOSCOPY  10/11/2018    COLONOSCOPY POLYPECTOMY SNARE/COLD BIOPSY performed by Milo Ku MD at [address] ESOPHAGUS      ENDOSCOPY, COLON, DIAGNOSTIC      EYE SURGERY Left 2015    EYE SURGERY      CATARACT REMOVAL- BILATERAL    HEMORRHOID SURGERY  1980s    HERNIA REPAIR      HYSTERECTOMY, TOTAL ABDOMINAL  1980    JOINT REPLACEMENT  bilat 2007    knees    CO COLSC FLX WITH DIRECTED SUBMUCOSAL Simone [address] ANY SBST  10/11/2018    COLONOSCOPY SUBMUCOSAL/BOTOX INJECTION performed by [facility]o Ku MD at [facility]    SINUS SURGERY  last 2009    removed a bone (2)--2 times no construction     SINUS SURGERY  02/01/2016    SINUS SURGERY  2016 x 2    SINUS SURGERY  09/18/2017    [facility] - Dr Samia Bustillo SINUS SURGERY  08/09/2017 [facility]17[facility]2017 [facility]    TONSILLECTOMY      TOTAL KNEE ARTHROPLASTY  08/2003    Left TKR    VENTRAL HERNIA REPAIR  1992       FAMILY HISTORY  Family History   Problem Relation Age of Onset    Diabetes Mother     Heart Disease Father         whooping cough as child    Obesity Sister     Other Brother         tumor on back    Obesity Sister     Cancer Sister         Skin     Cancer Brother         Bone cancer from metal    Other Brother         passed as a child    Heart Disease Brother     Other Brother         tremor    Heart Attack Brother     No Known Problems Brother     Heart Disease Brother     No Known Problems Brother     No Known Problems Brother        SOCIAL HISTORY  Social History     Tobacco Use    Smoking status: Never Smoker    Smokeless tobacco: Never Used   Substance Use Topics    Alcohol use: No    Drug use: No       ALLERGIES  Allergies   Allergen Reactions    Bactrim [Sulfamethoxazole-Trimethoprim] Rash    Morphine Other (See Comments)     headaches    Hydrocodone      headaches    Lisinopril Swelling    Percocet [Oxycodone-Acetaminophen] Other (See Comments)     Headaches    Tylenol [Acetaminophen] Other (See Comments)     TYLENOL 3 causes hallucinations    Tape [Adhesive Tape] Rash       MEDICATIONS  Current Outpatient Medications on File Prior to Encounter   Medication Sig Dispense Refill    amoxicillin-clavulanate (AUGMENTIN) 875-125 MG per tablet Take 1 tablet by mouth 2 times daily      betamethasone dipropionate (DIPROLENE) 0.05 % cream 1 Dose by Intratympanic route daily      mupirocin (BACTROBAN) 2 % ointment Apply 1 spray topically 3 times daily as needed  11    docusate sodium (COLACE) 100 MG capsule Take 1 capsule by mouth 2 times Please add comments to any \"yes\" answers. Do you have a history of: Comments   Seizure no   Congenital spherocytosis no   Optic Neuritis no   Cataracts yes - HX   Eye Surgery no   Ear problems yes - PE TUBES   Ear reconstructive surgery no   Sinus problems yes - CHRONIC SINUSITIS    Asthma no   Bronchitis no   Emphysema no   Pneumothorax no   Tuberculosis no   Other lung problems no   Hypertension yes - TREATED WITH MEDICATIONS    Pacemaker/AICD no                                              Congestive heart failure no   EF% >30% no   Any implanted device no                                               Dialysis no   Current pregnancy No   Claustrophobia no   Diabetes yes - TREATED WITH MEDICATIONS    Currently using these medications:     a. Disulfiram (Antabuse®) no    b. Mafenide acetate        (Sulfamylon®) no    c.  Diuretics for CHF no    d. Amiodarone dose > 400mg/day no    e. Lanoxin/Digoxin no    f. Current Steroid use     no   Cancer:  yes - HX    a. Surgery for Cancer yes - HX    b. Radiation therapy yes - HX    c. Chemotherapy no    If yes, did you receive:     a. Doxorubicin (Adriamycin®) No    b.   Cisplatin (Platinol AQ®) No    c.  Bleomycin (Blenoxane®) No     The above was reviewed with: Patient    Electronically signed by Diego Maldonado RN on 6/24/2019 at 10:22AM

## 2019-06-25 ENCOUNTER — HOSPITAL ENCOUNTER (OUTPATIENT)
Dept: HYPERBARIC MEDICINE | Age: 79
Setting detail: THERAPIES SERIES
Discharge: HOME | End: 2019-06-25
Payer: MEDICARE

## 2019-06-25 VITALS
DIASTOLIC BLOOD PRESSURE: 76 MMHG | TEMPERATURE: 98.6 F | RESPIRATION RATE: 16 BRPM | SYSTOLIC BLOOD PRESSURE: 169 MMHG | OXYGEN SATURATION: 99 % | HEART RATE: 61 BPM

## 2019-06-25 LAB
GLUCOSE BLD-MCNC: 106 MG/DL (ref 70–108)
GLUCOSE BLD-MCNC: 122 MG/DL (ref 70–108)
GLUCOSE BLD-MCNC: 129 MG/DL (ref 70–108)

## 2019-06-25 PROCEDURE — G0277 HBOT, FULL BODY CHAMBER, 30M: HCPCS

## 2019-06-25 PROCEDURE — 82948 REAGENT STRIP/BLOOD GLUCOSE: CPT

## 2019-06-25 ASSESSMENT — PAIN SCALES - GENERAL
PAINLEVEL_OUTOF10: 0
PAINLEVEL_OUTOF10: 0

## 2019-06-25 NOTE — PROGRESS NOTES
Poc Glucose 129 Glucerna was given  immediatley per policy. Repeat POC Glucose preformed 15 minutes after Glucerna given per policy POC glucose 741. Proceeded with dive patient per policy.

## 2019-06-25 NOTE — PROGRESS NOTES
[address] [address]  Hyperbaric Oxygen Therapy   Progress Note      NAME: [id number] [address]arro RECORD NUMBER:  [id number]  AGE: 69 y.o. GENDER: female  : 1940  EPISODE DATE:  2019     Subjective     HBO Treatment Number: 17 out of Total Treatments: 40    HBO Diagnosis:               Indications: Late Effect of Radiation(Osteoradionecrosis of skull/sinus)    Safety checks performed prior to treatment. See doc flowsheets for documentation.     Objective        Patient Active Problem List   Diagnosis Code    Hypercholesterolemia E78.00    Osteoarthritis M19.90    Osteoporosis M81.0    Type 2 diabetes mellitus without complication, without long-term current use of insulin [facility] E11.9    Morbid obesity with body mass index (BMI) of 45.0 to 49.9 in adult ([facility] [address] E66.01, Z68.42    DDD (degenerative disc disease), lumbar M51.36    Benign essential HTN I10    SWAPNA on CPAP G47.33, Z99.89    Diabetic peripheral neuropathy ([facility]) E11.42    Acute recurrent pansinusitis J01.41    Primary thunderclap headache G44.53    Rash of entire body R21    Infection of skin due to methicillin resistant Staphylococcus aureus (MRSA) A49.02    Drug allergy, antibiotic  likely bactrim Z88.1    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    Skin plaque L98.8    Hyponatremia E87.1    Hypervolemia E87.70    Cellulitis of lower extremity L03.119    Hypoglycemia E16.2    Acute on chronic systolic CHF (congestive heart failure) ([facility]) I50.23    Bilateral cellulitis of lower leg L03.116, L03.115    Venous ulcers of both lower extremities ([facility]) I87.2, L97.919, L97.929    Class 3 severe obesity due to excess calories with serious comorbidity and body mass index (BMI) of 40.0 to 44.9 in adult ([facility]) E66.01, Z68.41    Bilateral lower leg cellulitis L03.116, L03.115    CKD (chronic kidney disease) stage 3, GFR 30-59 ml/min ([facility]) N18.3    Iron deficiency anemia D50.9    Hypomagnesemia E83.42    SBO (small bowel obstruction) ([facility] [address] K56.609    Venous ulcer of left leg (HCC) I83.029, L97.929    Venous ulcer of right leg (HCC) I83.019, L97.919    Acute eczema L30.9    Venous insufficiency of both lower extremities I87.2    Lymphedema of both lower extremities I89.0    Pressure injury of left buttock, stage 2 L89.322    Eczematous dermatitis L30.9    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 3XXA    Facial cellulitis L03. 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    Cataract of both eyes H26.9    History of ventral hernia repair Z98.890, Z87.19    Other cervical disc degeneration, mid-cervical region M50.320    Spondylolisthesis of cervical region M43.12    Spondylolisthesis of thoracic region M43.14          Recent Labs     06/25/19  1037 06/25/19  1231   POCGLU 122* 106       Pre treatment Vital Signs       Temp: 98.6 °F (37 °C)     Pulse: 68     Resp: 16     BP: (!) 166/76       Post treatment Vital Signs  Temp: 98.6 °F (37 °C)  Pulse: 61  Resp: 16  BP: (!) 169/76      Assessment        Physical Exam:  General Appearance:  alert and oriented to person, place and time, well-developed and well-nourished, in no acute distress    Pre Tympanic Membrane Assessment:  tympanic membranes intact bilaterally    Post Tympanic Membrane Assessment:  Right: Normal  Left: Normal    Pulmonary/Chest:  clear to auscultation bilaterally- no wheezes, rales or rhonchi, normal air movement, no respiratory distress    Cardiovascular:  normal       Treatment Start Time: 1038     Pressure Reached Time: 1048  ELSY : 2  Treatment Status: No Air break      Decompression Time: 1218   Treatment End Time: 1228    Symptoms Noted During Treatment: None    Adverse Event: no      I was present on these premises and immediately available to furnish assistance & direction throughout the procedure. Plan          Trace Sr is a 69 y.o. female  successfully completed today's hyperbaric oxygen treatment at [address]. In my clinical judgement, ongoing HBO therapy is necessary at this time, given a threat to patient function, limb or life from the current condition. Supervision and attendance of Hyperbaric Oxygen Therapy provided. Continue HBO treatment as outlined in the treatment plan. Hyperbaric Oxygen:  Pt tolerated Treatment Number: 94 well today without complications.      Electronically signed by Melanie Lester MD on 6/25/2019 at 6:45 PM

## 2019-06-25 NOTE — PROGRESS NOTES
[address] [address]  Hyperbaric Oxygen Therapy   Progress Note      NAME: [id number] R[address] Navarro RECORD NUMBER:  [id number]  AGE: 69 y.o. GENDER: female  : 1940  EPISODE DATE:  2019     Subjective     HBO Treatment Number: 16 out of Total Treatments: 40    HBO Diagnosis:               Indications: Late Effect of Radiation(Osteoradionecrosis of skull/sinus)    Safety checks performed prior to treatment. See doc flowsheets for documentation.     Objective        Patient Active Problem List   Diagnosis Code    Hypercholesterolemia E78.00    Osteoarthritis M19.90    Osteoporosis M81.0    Type 2 diabetes mellitus without complication, without long-term current use of insulin ([facility] [address] E11.9    Morbid obesity with body mass index (BMI) of 45.0 to 49.9 in adult ([facility] [address] E66.01, Z68.42    DDD (degenerative disc disease), lumbar M51.36    Benign essential HTN I10    SWAPNA on CPAP G47.33, Z99.89    Diabetic peripheral neuropathy ([facility]) E11.42    Acute recurrent pansinusitis J01.41    Primary thunderclap headache G44.53    Rash of entire body R21    Infection of skin due to methicillin resistant Staphylococcus aureus (MRSA) A49.02    Drug allergy, antibiotic  likely bactrim Z88.1    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    Skin plaque L98.8    Hyponatremia E87.1    Hypervolemia E87.70    Cellulitis of lower extremity L03.119    Hypoglycemia E16.2    Acute on chronic systolic CHF (congestive heart failure) ([facility]) I50.23    Bilateral cellulitis of lower leg L03.116, L03.115    Venous ulcers of both lower extremities ([facility]) I87.2, L97.919, L97.929    Class 3 severe obesity due to excess calories with serious comorbidity and body mass index (BMI) of 40.0 to 44.9 in adult ([facility]) E66.01, Z68.41    Bilateral lower leg cellulitis L03.116, L03.115    CKD (chronic kidney disease) stage 3, GFR 30-59 ml/min ([facility]) N18.3    Iron deficiency anemia D50.9    Hypomagnesemia E83.42    SBO (small bowel obstruction) ([facility] [address] K56.609    Venous ulcer of left leg (HCC) I83.029, L97.929    Venous ulcer of right leg (HCC) I83.019, L97.919    Acute eczema L30.9    Venous insufficiency of both lower extremities I87.2    Lymphedema of both lower extremities I89.0    Pressure injury of left buttock, stage 2 L89.322    Eczematous dermatitis L30.9    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 3XXA    Facial cellulitis L03. 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    Cataract of both eyes H26.9    History of ventral hernia repair Z98.890, Z87.19    Other cervical disc degeneration, mid-cervical region M50.320    Spondylolisthesis of cervical region M43.12    Spondylolisthesis of thoracic region M43.14          Recent Labs     06/24/19  1038 06/24/19  1235   POCGLU 126* 114*       Pre treatment Vital Signs       Temp: 98.3 °F (36.8 °C)     Pulse: 77     Resp: 16     BP: (!) 168/74       Post treatment Vital Signs  Temp: 98.6 °F (37 °C)  Pulse: 71  Resp: 16  BP: (!) 168/90      Assessment        Physical Exam:  General Appearance:  alert and oriented to person, place and time    Pre Tympanic Membrane Assessment:  bilateral tympanic membrane(s) is/has tympanostomy tube in place    Post Tympanic Membrane Assessment:  Right: Normal  Left: Normal    Pulmonary/Chest:  clear to auscultation bilaterally- no wheezes, rales or rhonchi, normal air movement, no respiratory distress    Cardiovascular:  normal, regular rate and rhythm       Treatment Start Time: 1041     Pressure Reached Time: 1051  ELSY : 2  Treatment Status: No Air break      Decompression Time: 1221   Treatment End Time: 1231    Symptoms Noted During Treatment: None    Adverse Event: no      I was present on these premises and immediately available to furnish assistance & direction throughout the procedure. Luisito          Phylicia Marcelo is a 69 y.o. female  successfully completed today's hyperbaric oxygen treatment at [address] [facility]. In my clinical judgement, ongoing HBO therapy is necessary at this time, given a threat to patient function, limb or life from the current condition. Supervision and attendance of Hyperbaric Oxygen Therapy provided. Continue HBO treatment as outlined in the treatment plan. Hyperbaric Oxygen:  Pt tolerated Treatment Number: 16 well today without complications.      Electronically signed by AMANDA Garner CNP on 6/24/2019 at 9:21 AM

## 2019-06-26 ENCOUNTER — HOSPITAL ENCOUNTER (OUTPATIENT)
Dept: HYPERBARIC MEDICINE | Age: 79
Setting detail: THERAPIES SERIES
Discharge: HOME | End: 2019-06-26
Payer: MEDICARE

## 2019-06-26 VITALS
SYSTOLIC BLOOD PRESSURE: 168 MMHG | TEMPERATURE: 96.8 F | DIASTOLIC BLOOD PRESSURE: 83 MMHG | OXYGEN SATURATION: 100 % | HEART RATE: 71 BPM | RESPIRATION RATE: 16 BRPM

## 2019-06-26 LAB
GLUCOSE BLD-MCNC: 104 MG/DL (ref 70–108)
GLUCOSE BLD-MCNC: 114 MG/DL (ref 70–108)
GLUCOSE BLD-MCNC: 119 MG/DL (ref 70–108)

## 2019-06-26 PROCEDURE — G0277 HBOT, FULL BODY CHAMBER, 30M: HCPCS

## 2019-06-26 PROCEDURE — 82948 REAGENT STRIP/BLOOD GLUCOSE: CPT

## 2019-06-26 ASSESSMENT — PAIN DESCRIPTION - FREQUENCY: FREQUENCY: CONTINUOUS

## 2019-06-26 ASSESSMENT — PAIN SCALES - GENERAL: PAINLEVEL_OUTOF10: 5

## 2019-06-26 ASSESSMENT — PAIN DESCRIPTION - ORIENTATION: ORIENTATION: RIGHT

## 2019-06-26 ASSESSMENT — PAIN DESCRIPTION - PAIN TYPE: TYPE: CHRONIC PAIN

## 2019-06-26 ASSESSMENT — PAIN DESCRIPTION - LOCATION: LOCATION: BACK;SHOULDER;ARM;NECK

## 2019-06-26 NOTE — PROGRESS NOTES
[facility]  Hyperbaric Oxygen Therapy   Progress Note      NAME: [id number] Rigo Navarro RECORD NUMBER:  [id number]  AGE: 69 y.o. GENDER: female  : 1940  EPISODE DATE:  2019     Subjective     HBO Treatment Number: 18 out of Total Treatments: 40    HBO Diagnosis:               Indications: Late Effect of Radiation(Osteoradionecrosis of skull/sinus)    Safety checks performed prior to treatment. See doc flowsheets for documentation.     Objective        Patient Active Problem List   Diagnosis Code    Hypercholesterolemia E78.00    Osteoarthritis M19.90    Osteoporosis M81.0    Type 2 diabetes mellitus without complication, without long-term current use of insulin ([facility] E11.9    Morbid obesity with body mass index (BMI) of 45.0 to 49.9 in adult ([facility] E66.01, Z68.42    DDD (degenerative disc disease), lumbar M51.36    Benign essential HTN I10    SWAPNA on CPAP G47.33, Z99.89    Diabetic peripheral neuropathy (HCC) E11.42    Acute recurrent pansinusitis J01.41    Primary thunderclap headache G44.53    Rash of entire body R21    Infection of skin due to methicillin resistant Staphylococcus aureus (MRSA) A49.02    Drug allergy, antibiotic  likely bactrim Z88.1    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    Skin plaque L98.8    Hyponatremia E87.1    Hypervolemia E87.70    Cellulitis of lower extremity L03.119    Hypoglycemia E16.2    Acute on chronic systolic CHF (congestive heart failure) ([facility]) I50.23    Bilateral cellulitis of lower leg L03.116, L03.115    Venous ulcers of both lower extremities ([facility]) I87.2, L97.919, L97.929    Class 3 severe obesity due to excess calories with serious comorbidity and body mass index (BMI) of 40.0 to 44.9 in adult ([facility]) E66.01, Z68.41    Bilateral lower leg cellulitis L03.116, L03.115    CKD (chronic kidney disease) stage 3, GFR 30-59 ml/min ([facility]) N18.3    Iron deficiency anemia D50.9    Hypomagnesemia E83.42    SBO procedure. Plan          Christiano Chase is a 69 y.o. female  successfully completed today's hyperbaric oxygen treatment at [address] [facility]. In my clinical judgement, ongoing HBO therapy is necessary at this time, given a threat to patient function, limb or life from the current condition. Supervision and attendance of Hyperbaric Oxygen Therapy provided. Continue HBO treatment as outlined in the treatment plan. Hyperbaric Oxygen:  Pt tolerated Treatment Number: 18 well today without complications.      Electronically signed by Janeth Kaye MD on 6/26/2019 at 3:24 PM

## 2019-06-27 ENCOUNTER — HOSPITAL ENCOUNTER (OUTPATIENT)
Dept: HYPERBARIC MEDICINE | Age: 79
Setting detail: THERAPIES SERIES
Discharge: HOME | End: 2019-06-27
Payer: MEDICARE

## 2019-06-28 ENCOUNTER — HOSPITAL ENCOUNTER (OUTPATIENT)
Dept: HYPERBARIC MEDICINE | Age: 79
Setting detail: THERAPIES SERIES
Discharge: HOME | End: 2019-06-28
Payer: MEDICARE

## 2019-06-28 VITALS
HEART RATE: 70 BPM | SYSTOLIC BLOOD PRESSURE: 155 MMHG | RESPIRATION RATE: 16 BRPM | DIASTOLIC BLOOD PRESSURE: 67 MMHG | OXYGEN SATURATION: 99 % | TEMPERATURE: 96.8 F

## 2019-06-28 LAB
GLUCOSE BLD-MCNC: 113 MG/DL (ref 70–108)
GLUCOSE BLD-MCNC: 119 MG/DL (ref 70–108)
GLUCOSE BLD-MCNC: 124 MG/DL (ref 70–108)

## 2019-06-28 PROCEDURE — G0277 HBOT, FULL BODY CHAMBER, 30M: HCPCS

## 2019-06-28 PROCEDURE — 82948 REAGENT STRIP/BLOOD GLUCOSE: CPT

## 2019-06-28 PROCEDURE — 99183 HYPERBARIC OXYGEN THERAPY: CPT | Performed by: NURSE PRACTITIONER

## 2019-06-28 ASSESSMENT — PAIN SCALES - GENERAL: PAINLEVEL_OUTOF10: 0

## 2019-07-01 ENCOUNTER — HOSPITAL ENCOUNTER (OUTPATIENT)
Dept: HYPERBARIC MEDICINE | Age: 79
Setting detail: THERAPIES SERIES
Discharge: HOME | End: 2019-07-01
Payer: MEDICARE

## 2019-07-01 VITALS
OXYGEN SATURATION: 100 % | TEMPERATURE: 97.2 F | DIASTOLIC BLOOD PRESSURE: 72 MMHG | SYSTOLIC BLOOD PRESSURE: 164 MMHG | RESPIRATION RATE: 16 BRPM | HEART RATE: 70 BPM

## 2019-07-01 LAB
GLUCOSE BLD-MCNC: 105 MG/DL (ref 70–108)
GLUCOSE BLD-MCNC: 118 MG/DL (ref 70–108)
GLUCOSE BLD-MCNC: 121 MG/DL (ref 70–108)

## 2019-07-01 PROCEDURE — G0277 HBOT, FULL BODY CHAMBER, 30M: HCPCS

## 2019-07-01 PROCEDURE — 82948 REAGENT STRIP/BLOOD GLUCOSE: CPT

## 2019-07-01 ASSESSMENT — PAIN SCALES - GENERAL
PAINLEVEL_OUTOF10: 0
PAINLEVEL_OUTOF10: 0

## 2019-07-01 NOTE — PROGRESS NOTES
(small bowel obstruction) ([facility] [address] K56.609    Venous ulcer of left leg (HCC) I83.029, L97.929    Venous ulcer of right leg (HCC) I83.019, L97.919    Acute eczema L30.9    Venous insufficiency of both lower extremities I87.2    Lymphedema of both lower extremities I89.0    Pressure injury of left buttock, stage 2 L89.322    Eczematous dermatitis L30.9    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 3XXA    Facial cellulitis L03. 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    Cataract of both eyes H26.9    History of ventral hernia repair Z98.890, Z87.19    Other cervical disc degeneration, mid-cervical region M50.320    Spondylolisthesis of cervical region M43.12    Spondylolisthesis of thoracic region M43.14          Recent Labs     06/28/19  1040 06/28/19  1237   POCGLU 124* 113*       Pre treatment Vital Signs       Temp: 96.8 °F (36 °C)     Pulse: 70     Resp: 16     BP: (!) 155/67       Post treatment Vital Signs  Temp: 96.8 °F (36 °C)  Pulse: 70  Resp: 16  BP: (!) 155/67      Assessment        Physical Exam:  General Appearance:  alert and oriented to person, place and time    Pre Tympanic Membrane Assessment:  bilateral tympanic membrane(s) is/has tympanostomy tube in place    Post Tympanic Membrane Assessment:      bilateral PE tubes in place    Pulmonary/Chest:  clear to auscultation bilaterally- no wheezes, rales or rhonchi, normal air movement, no respiratory distress    Cardiovascular:  normal, regular rate and rhythm       Treatment Start Time: 1043     Pressure Reached Time: 1053  ELSY : 2  Treatment Status: No Air break      Decompression Time: 1223   Treatment End Time: 1233    Symptoms Noted During Treatment: None    Adverse Event: no      I was present on these premises and immediately available to furnish assistance & direction throughout the

## 2019-07-01 NOTE — PROGRESS NOTES
(small bowel obstruction) ([facility] [address] K56.609    Venous ulcer of left leg (HCC) I83.029, L97.929    Venous ulcer of right leg (HCC) I83.019, L97.919    Acute eczema L30.9    Venous insufficiency of both lower extremities I87.2    Lymphedema of both lower extremities I89.0    Pressure injury of left buttock, stage 2 L89.322    Eczematous dermatitis L30.9    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 3XXA    Facial cellulitis L03. 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    Cataract of both eyes H26.9    History of ventral hernia repair Z98.890, Z87.19    Other cervical disc degeneration, mid-cervical region M50.320    Spondylolisthesis of cervical region M43.12    Spondylolisthesis of thoracic region M43.14          Recent Labs     07/01/19  1036 07/01/19  1234   POCGLU 118* 105       Pre treatment Vital Signs       Temp: 97 °F (36.1 °C)     Pulse: 72     Resp: 16     BP: (!) 169/72       Post treatment Vital Signs  Temp: 97.2 °F (36.2 °C)  Pulse: 70  Resp: 16  BP: (!) 164/72      Assessment        Physical Exam:  General Appearance:  alert and oriented to person, place and time, well-developed and well-nourished, in no acute distress    Pre Tympanic Membrane Assessment:  tympanic membranes intact bilaterally    Post Tympanic Membrane Assessment:  Right: Normal  Left: Normal    Pulmonary/Chest:  clear to auscultation bilaterally- no wheezes, rales or rhonchi, normal air movement, no respiratory distress    Cardiovascular:  normal       Treatment Start Time: 1038     Pressure Reached Time: 1048  ELSY : 2  Treatment Status: No Air break      Decompression Time: 1218   Treatment End Time: 1228    Symptoms Noted During Treatment: None    Adverse Event: no      I was present on these premises and immediately available to furnish assistance & direction throughout the procedure. Plan          Evelina Moya is a 69 y.o. female  successfully completed today's hyperbaric oxygen treatment at [address] [facility]. In my clinical judgement, ongoing HBO therapy is necessary at this time, given a threat to patient function, limb or life from the current condition. Supervision and attendance of Hyperbaric Oxygen Therapy provided. Continue HBO treatment as outlined in the treatment plan. Hyperbaric Oxygen:  Pt tolerated Treatment Number: 20 well today without complications.      Electronically signed by Beau Thrasher MD on 7/1/2019 at 4:19 PM

## 2019-07-02 ENCOUNTER — HOSPITAL ENCOUNTER (OUTPATIENT)
Dept: HYPERBARIC MEDICINE | Age: 79
Setting detail: THERAPIES SERIES
Discharge: HOME | End: 2019-07-02
Payer: MEDICARE

## 2019-07-02 VITALS
RESPIRATION RATE: 16 BRPM | SYSTOLIC BLOOD PRESSURE: 168 MMHG | TEMPERATURE: 97 F | OXYGEN SATURATION: 100 % | DIASTOLIC BLOOD PRESSURE: 78 MMHG | HEART RATE: 71 BPM

## 2019-07-02 LAB
GLUCOSE BLD-MCNC: 124 MG/DL (ref 70–108)
GLUCOSE BLD-MCNC: 125 MG/DL (ref 70–108)
GLUCOSE BLD-MCNC: 133 MG/DL (ref 70–108)

## 2019-07-02 PROCEDURE — G0277 HBOT, FULL BODY CHAMBER, 30M: HCPCS

## 2019-07-02 PROCEDURE — 82948 REAGENT STRIP/BLOOD GLUCOSE: CPT

## 2019-07-02 ASSESSMENT — PAIN SCALES - GENERAL: PAINLEVEL_OUTOF10: 0

## 2019-07-03 ENCOUNTER — HOSPITAL ENCOUNTER (OUTPATIENT)
Dept: HYPERBARIC MEDICINE | Age: 79
Setting detail: THERAPIES SERIES
Discharge: HOME | End: 2019-07-03
Payer: MEDICARE

## 2019-07-03 VITALS
TEMPERATURE: 98.9 F | RESPIRATION RATE: 16 BRPM | DIASTOLIC BLOOD PRESSURE: 70 MMHG | SYSTOLIC BLOOD PRESSURE: 154 MMHG | HEART RATE: 72 BPM | OXYGEN SATURATION: 100 %

## 2019-07-03 LAB
GLUCOSE BLD-MCNC: 129 MG/DL (ref 70–108)
GLUCOSE BLD-MCNC: 144 MG/DL (ref 70–108)

## 2019-07-03 PROCEDURE — G0277 HBOT, FULL BODY CHAMBER, 30M: HCPCS

## 2019-07-03 PROCEDURE — 82948 REAGENT STRIP/BLOOD GLUCOSE: CPT

## 2019-07-03 ASSESSMENT — PAIN SCALES - GENERAL: PAINLEVEL_OUTOF10: 0

## 2019-07-05 ENCOUNTER — APPOINTMENT (OUTPATIENT)
Dept: HYPERBARIC MEDICINE | Age: 79
End: 2019-07-05
Payer: MEDICARE

## 2019-07-08 ENCOUNTER — HOSPITAL ENCOUNTER (OUTPATIENT)
Dept: HYPERBARIC MEDICINE | Age: 79
Setting detail: THERAPIES SERIES
Discharge: HOME | End: 2019-07-08
Payer: MEDICARE

## 2019-07-08 VITALS
RESPIRATION RATE: 16 BRPM | TEMPERATURE: 98.6 F | OXYGEN SATURATION: 100 % | HEART RATE: 72 BPM | DIASTOLIC BLOOD PRESSURE: 89 MMHG | SYSTOLIC BLOOD PRESSURE: 168 MMHG

## 2019-07-08 LAB
GLUCOSE BLD-MCNC: 160 MG/DL (ref 70–108)
GLUCOSE BLD-MCNC: 97 MG/DL (ref 70–108)

## 2019-07-08 PROCEDURE — G0277 HBOT, FULL BODY CHAMBER, 30M: HCPCS

## 2019-07-08 PROCEDURE — 82948 REAGENT STRIP/BLOOD GLUCOSE: CPT

## 2019-07-08 PROCEDURE — 99183 HYPERBARIC OXYGEN THERAPY: CPT | Performed by: NURSE PRACTITIONER

## 2019-07-08 ASSESSMENT — PAIN SCALES - GENERAL
PAINLEVEL_OUTOF10: 0
PAINLEVEL_OUTOF10: 0

## 2019-07-08 NOTE — PROGRESS NOTES
[address] [address]  Hyperbaric Oxygen Therapy   Progress Note      NAME: [id number] Rigo Navarro RECORD NUMBER:  [id number]  AGE: 69 y.o. GENDER: female  : 1940  EPISODE DATE:  2019     Subjective     HBO Treatment Number: 23 out of Total Treatments: 40    HBO Diagnosis:               Indications: Late Effect of Radiation(Osteoradionecrosis of skull/sinus)    Safety checks performed prior to treatment. See doc flowsheets for documentation.     Objective        Patient Active Problem List   Diagnosis Code    Hypercholesterolemia E78.00    Osteoarthritis M19.90    Osteoporosis M81.0    Type 2 diabetes mellitus without complication, without long-term current use of insulin ([facility] [address] E11.9    Morbid obesity with body mass index (BMI) of 45.0 to 49.9 in adult ([facility] [address] E66.01, Z68.42    DDD (degenerative disc disease), lumbar M51.36    Benign essential HTN I10    SWAPNA on CPAP G47.33, Z99.89    Diabetic peripheral neuropathy ([facility]) E11.42    Acute recurrent pansinusitis J01.41    Primary thunderclap headache G44.53    Rash of entire body R21    Infection of skin due to methicillin resistant Staphylococcus aureus (MRSA) A49.02    Drug allergy, antibiotic  likely bactrim Z88.1    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    Skin plaque L98.8    Hyponatremia E87.1    Hypervolemia E87.70    Cellulitis of lower extremity L03.119    Hypoglycemia E16.2    Acute on chronic systolic CHF (congestive heart failure) ([facility]) I50.23    Bilateral cellulitis of lower leg L03.116, L03.115    Venous ulcers of both lower extremities ([facility]) I87.2, L97.919, L97.929    Class 3 severe obesity due to excess calories with serious comorbidity and body mass index (BMI) of 40.0 to 44.9 in adult ([facility]) E66.01, Z68.41    Bilateral lower leg cellulitis L03.116, L03.115    CKD (chronic kidney disease) stage 3, GFR 30-59 ml/min ([facility]) N18.3    Iron deficiency anemia D50.9    Hypomagnesemia E83.42    SBO Plan          Yadiel Miles is a 69 y.o. female  successfully completed today's hyperbaric oxygen treatment at [address] [facility]. In my clinical judgement, ongoing HBO therapy is necessary at this time, given a threat to patient function, limb or life from the current condition. Supervision and attendance of Hyperbaric Oxygen Therapy provided. Continue HBO treatment as outlined in the treatment plan. Hyperbaric Oxygen:  Pt tolerated Treatment Number: 23 well today without complications.      Electronically signed by AMANDA Yee CNP on 7/8/2019 at 12:43 PM

## 2019-07-09 ENCOUNTER — HOSPITAL ENCOUNTER (OUTPATIENT)
Dept: HYPERBARIC MEDICINE | Age: 79
Setting detail: THERAPIES SERIES
Discharge: HOME | End: 2019-07-09
Payer: MEDICARE

## 2019-07-09 VITALS
HEART RATE: 69 BPM | RESPIRATION RATE: 20 BRPM | OXYGEN SATURATION: 100 % | SYSTOLIC BLOOD PRESSURE: 146 MMHG | DIASTOLIC BLOOD PRESSURE: 82 MMHG | TEMPERATURE: 96.7 F

## 2019-07-09 LAB
GLUCOSE BLD-MCNC: 134 MG/DL (ref 70–108)
GLUCOSE BLD-MCNC: 162 MG/DL (ref 70–108)

## 2019-07-09 PROCEDURE — G0277 HBOT, FULL BODY CHAMBER, 30M: HCPCS

## 2019-07-09 PROCEDURE — 82948 REAGENT STRIP/BLOOD GLUCOSE: CPT

## 2019-07-09 ASSESSMENT — PAIN SCALES - GENERAL: PAINLEVEL_OUTOF10: 5

## 2019-07-09 ASSESSMENT — PAIN DESCRIPTION - PAIN TYPE: TYPE: CHRONIC PAIN

## 2019-07-09 ASSESSMENT — PAIN DESCRIPTION - LOCATION: LOCATION: SHOULDER

## 2019-07-09 NOTE — PROGRESS NOTES
[address] [address]  Hyperbaric Oxygen Therapy   Progress Note      NAME: [id number] Rigo Navarro RECORD NUMBER:  [id number]  AGE: 69 y.o. GENDER: female  : 1940  EPISODE DATE:  2019     Subjective     HBO Treatment Number: 24 out of Total Treatments: 40    HBO Diagnosis:               Indications: Late Effect of Radiation(Osteoradionecrosis of skull/sinus)    Safety checks performed prior to treatment. See doc flowsheets for documentation.     Objective        Patient Active Problem List   Diagnosis Code    Hypercholesterolemia E78.00    Osteoarthritis M19.90    Osteoporosis M81.0    Type 2 diabetes mellitus without complication, without long-term current use of insulin ([facility] E11.9    Morbid obesity with body mass index (BMI) of 45.0 to 49.9 in adult ([facility] E66.01, Z68.42    DDD (degenerative disc disease), lumbar M51.36    Benign essential HTN I10    SWAPNA on CPAP G47.33, Z99.89    Diabetic peripheral neuropathy (HCC) E11.42    Acute recurrent pansinusitis J01.41    Primary thunderclap headache G44.53    Rash of entire body R21    Infection of skin due to methicillin resistant Staphylococcus aureus (MRSA) A49.02    Drug allergy, antibiotic  likely bactrim Z88.1    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    Skin plaque L98.8    Hyponatremia E87.1    Hypervolemia E87.70    Cellulitis of lower extremity L03.119    Hypoglycemia E16.2    Acute on chronic systolic CHF (congestive heart failure) ([facility]) I50.23    Bilateral cellulitis of lower leg L03.116, L03.115    Venous ulcers of both lower extremities ([facility]) I87.2, L97.919, L97.929    Class 3 severe obesity due to excess calories with serious comorbidity and body mass index (BMI) of 40.0 to 44.9 in adult ([facility]) E66.01, Z68.41    Bilateral lower leg cellulitis L03.116, L03.115    CKD (chronic kidney disease) stage 3, GFR 30-59 ml/min ([facility]) N18.3    Iron deficiency anemia D50.9    Hypomagnesemia E83.42    SBO

## 2019-07-10 ENCOUNTER — HOSPITAL ENCOUNTER (OUTPATIENT)
Dept: HYPERBARIC MEDICINE | Age: 79
Setting detail: THERAPIES SERIES
Discharge: HOME | End: 2019-07-10
Payer: MEDICARE

## 2019-07-10 VITALS
SYSTOLIC BLOOD PRESSURE: 168 MMHG | OXYGEN SATURATION: 98 % | DIASTOLIC BLOOD PRESSURE: 88 MMHG | HEART RATE: 74 BPM | TEMPERATURE: 98.6 F | RESPIRATION RATE: 16 BRPM

## 2019-07-10 LAB
GLUCOSE BLD-MCNC: 127 MG/DL (ref 70–108)
GLUCOSE BLD-MCNC: 161 MG/DL (ref 70–108)

## 2019-07-10 PROCEDURE — 82948 REAGENT STRIP/BLOOD GLUCOSE: CPT

## 2019-07-10 PROCEDURE — G0277 HBOT, FULL BODY CHAMBER, 30M: HCPCS

## 2019-07-10 ASSESSMENT — PAIN SCALES - GENERAL
PAINLEVEL_OUTOF10: 0
PAINLEVEL_OUTOF10: 0

## 2019-07-11 ENCOUNTER — APPOINTMENT (OUTPATIENT)
Dept: HYPERBARIC MEDICINE | Age: 79
End: 2019-07-11
Payer: MEDICARE

## 2019-07-11 ENCOUNTER — CARE COORDINATION (OUTPATIENT)
Dept: CARE COORDINATION | Age: 79
End: 2019-07-11

## 2019-07-11 RX ORDER — GENTAMICIN SULFATE 1 MG/G
CREAM TOPICAL 3 TIMES DAILY
COMMUNITY
End: 2020-03-11 | Stop reason: ALTCHOICE

## 2019-07-12 ENCOUNTER — HOSPITAL ENCOUNTER (OUTPATIENT)
Dept: WOUND CARE | Age: 79
Discharge: HOME | End: 2019-07-12
Payer: MEDICARE

## 2019-07-12 ENCOUNTER — HOSPITAL ENCOUNTER (OUTPATIENT)
Dept: HYPERBARIC MEDICINE | Age: 79
Setting detail: THERAPIES SERIES
Discharge: HOME | End: 2019-07-12
Payer: MEDICARE

## 2019-07-12 VITALS
SYSTOLIC BLOOD PRESSURE: 140 MMHG | OXYGEN SATURATION: 100 % | TEMPERATURE: 97.5 F | HEART RATE: 68 BPM | DIASTOLIC BLOOD PRESSURE: 78 MMHG | RESPIRATION RATE: 18 BRPM

## 2019-07-12 VITALS
DIASTOLIC BLOOD PRESSURE: 79 MMHG | RESPIRATION RATE: 18 BRPM | TEMPERATURE: 96.8 F | HEART RATE: 74 BPM | OXYGEN SATURATION: 97 % | SYSTOLIC BLOOD PRESSURE: 169 MMHG

## 2019-07-12 DIAGNOSIS — I87.2 VENOUS INSUFFICIENCY OF BOTH LOWER EXTREMITIES: ICD-10-CM

## 2019-07-12 DIAGNOSIS — L89.322 PRESSURE INJURY OF LEFT BUTTOCK, STAGE 2 (HCC): ICD-10-CM

## 2019-07-12 DIAGNOSIS — I83.019 VENOUS ULCER OF RIGHT LEG (HCC): ICD-10-CM

## 2019-07-12 DIAGNOSIS — I83.029 VENOUS ULCER OF LEFT LEG (HCC): ICD-10-CM

## 2019-07-12 DIAGNOSIS — Z22.39 COLONIZATION WITH DRUG-RESISTANT BACTERIA: ICD-10-CM

## 2019-07-12 DIAGNOSIS — I89.0 LYMPHEDEMA OF BOTH LOWER EXTREMITIES: ICD-10-CM

## 2019-07-12 DIAGNOSIS — L97.919 VENOUS ULCER OF RIGHT LEG (HCC): ICD-10-CM

## 2019-07-12 DIAGNOSIS — L97.929 VENOUS ULCER OF LEFT LEG (HCC): ICD-10-CM

## 2019-07-12 DIAGNOSIS — L30.9 ACUTE ECZEMA: ICD-10-CM

## 2019-07-12 LAB
GLUCOSE BLD-MCNC: 118 MG/DL (ref 70–108)
GLUCOSE BLD-MCNC: 149 MG/DL (ref 70–108)

## 2019-07-12 PROCEDURE — 99212 OFFICE O/P EST SF 10 MIN: CPT

## 2019-07-12 PROCEDURE — 82948 REAGENT STRIP/BLOOD GLUCOSE: CPT

## 2019-07-12 PROCEDURE — G0277 HBOT, FULL BODY CHAMBER, 30M: HCPCS

## 2019-07-12 PROCEDURE — 99213 OFFICE O/P EST LOW 20 MIN: CPT | Performed by: NURSE PRACTITIONER

## 2019-07-12 ASSESSMENT — PAIN SCALES - GENERAL
PAINLEVEL_OUTOF10: 0
PAINLEVEL_OUTOF10: 0

## 2019-07-12 NOTE — PROGRESS NOTES
been using her Velcro compression wraps consistently.    She is currently tolerating hyperbaric oxygen therapy well. She states that she is doing her sinus rinses twice daily as needed. Wound/Ulcer Pain Timing/Severity: none  Quality of pain: N/A  Severity:  0 / 10   Modifying Factors: None  Associated Signs/Symptoms: none        PAST MEDICAL HISTORY        Diagnosis Date    Cancer [facility])     adenocarcinoma of her sinuses    Cataract of both eyes     DDD (degenerative disc disease), lumbar     Dysphagia, pharyngoesophageal 09/26/2016    Eczema 03/2018    Fibrocystic breast     H/O ventral hernia repair     Headache 02/09/2017    Hypercholesteremia     Hyperlipidemia     Hypertension     Iron deficiency anemia     LPRD (laryngopharyngeal reflux disease) 09/26/2016    Neuropathy     peripheral    Obesity     Orbital abscess     Orbital cellulitis 01/22/2014    SWAPNA (obstructive sleep apnea)     Osteoarthritis     Osteoporosis     Persistent proteinuria associated with type 2 diabetes mellitus ([facility] [address] 01/2017    urine micral of 50.       Sinusitis     Type II or unspecified type diabetes mellitus without mention of complication, not stated as uncontrolled     diagnoses approx 2000    Velopharyngeal incompetence 09/26/2016       PAST SURGICAL HISTORY    Past Surgical History:   Procedure Laterality Date    ABDOMEN SURGERY      APPENDECTOMY      CARPAL TUNNEL RELEASE Bilateral 2008, 2009    CATARACT REMOVAL  2011    bilat    CHOLECYSTECTOMY  03/1988    COLONOSCOPY  2016    COLONOSCOPY  10/11/2018    COLONOSCOPY POLYPECTOMY SNARE/COLD BIOPSY performed by Manuel Ohara MD at [address] ESOPHAGUS      ENDOSCOPY, COLON, DIAGNOSTIC      EYE SURGERY Left 01/30/2015    EYE SURGERY      CATARACT REMOVAL- BILATERAL    HEMORRHOID SURGERY  1980s    HERNIA REPAIR      HYSTERECTOMY, TOTAL ABDOMINAL  1980    JOINT REPLACEMENT  bilat 2005/ 2007    knees    MO COLSC FLX WITH arm  Neurologic: coordination and speech normal; gait abnormal-uses walker     LABS      CBC:   Lab Results   Component Value Date    WBC 12.0 02/24/2019    HGB 8.8 02/24/2019    HCT 28.5 02/24/2019    MCV 80.3 02/24/2019     02/24/2019     BMP:   Lab Results   Component Value Date     02/24/2019    K 4.1 02/24/2019    K 3.2 10/11/2018    CL 94 02/24/2019    CO2 23 02/24/2019    PHOS 3.4 04/22/2018    BUN 24 02/24/2019    CREATININE 1.0 02/24/2019     PT/INR:   Lab Results   Component Value Date    INR 1.03 08/25/2016     Prealbumin: No results found for: PREALBUMIN  Albumin:  Lab Results   Component Value Date    LABALBU 3.9 02/23/2019     Sed Rate:  Lab Results   Component Value Date    SEDRATE 102 03/06/2018     CRP:   Lab Results   Component Value Date    CRP 12.50 03/06/2018     Micro:   Lab Results   Component Value Date    BC No growth-preliminary  No growth   02/24/2019      Hemoglobin A1C:   Lab Results   Component Value Date    LABA1C 6.0 03/04/2019       Assessment:     Osteoradionecrosis of sinus/skull  Late effect of radiation     Contributing Factors: radiation damage    Patient Active Problem List   Diagnosis Code    Hypercholesterolemia E78.00    Osteoarthritis M19.90    Osteoporosis M81.0    Type 2 diabetes mellitus without complication, without long-term current use of insulin ([facility]) E11.9    Morbid obesity with body mass index (BMI) of 45.0 to 49.9 in adult ([facility]) E66.01, Z68.42    DDD (degenerative disc disease), lumbar M51.36    Benign essential HTN I10    SWAPNA on CPAP G47.33, Z99.89    Diabetic peripheral neuropathy ([facility]) E11.42    Acute recurrent pansinusitis J01.41    Primary thunderclap headache G44.53    Rash of entire body R21    Infection of skin due to methicillin resistant Staphylococcus aureus (MRSA) A49.02    Drug allergy, antibiotic  likely bactrim Z88.1    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    Skin plaque L98.8    Hyponatremia E87.1    Hypervolemia E87.70    Cellulitis of lower extremity L03.119    Hypoglycemia E16.2    Acute on chronic systolic CHF (congestive heart failure) ([facility]) I50.23    Bilateral cellulitis of lower leg L03.116, L03.115    Venous ulcers of both lower extremities ([facility]) I87.2, L97.919, L97.929    Class 3 severe obesity due to excess calories with serious comorbidity and body mass index (BMI) of 40.0 to 44.9 in adult ([facility] E66.01, Z68.41    Bilateral lower leg cellulitis L03.116, L03.115    CKD (chronic kidney disease) stage 3, GFR 30-59 ml/min ([facility]) N18.3    Iron deficiency anemia D50.9    Hypomagnesemia E83.42    SBO (small bowel obstruction) ([facility] K56.609    Venous ulcer of left leg ([facility]) I83.029, L97.929    Venous ulcer of right leg ([facility]) I83.019, L97.919    Acute eczema L30.9    Venous insufficiency of both lower extremities I87.2    Lymphedema of both lower extremities I89.0    Pressure injury of left buttock, stage 2 L89.322    Eczematous dermatitis L30.9    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 3XXA    Facial cellulitis L03. 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 Patient is tolerating hyperbaric oxygen therapy well. She continues to follow-up at [facility] for her sinuses. She had her last debridement of her sinuses done on 7/11/2019. Recommend continuing hyperbaric oxygen therapy in order to continue to promote healing.     Patient to continue with her sinus rinses as ordered by [facility]    HBO Diagnosis: Osteoradionecrosis of sinus/skull,  Late effect of

## 2019-07-15 ENCOUNTER — HOSPITAL ENCOUNTER (OUTPATIENT)
Dept: HYPERBARIC MEDICINE | Age: 79
Setting detail: THERAPIES SERIES
Discharge: HOME | End: 2019-07-15
Payer: MEDICARE

## 2019-07-15 VITALS
RESPIRATION RATE: 16 BRPM | TEMPERATURE: 97 F | OXYGEN SATURATION: 100 % | HEART RATE: 72 BPM | DIASTOLIC BLOOD PRESSURE: 67 MMHG | SYSTOLIC BLOOD PRESSURE: 154 MMHG

## 2019-07-15 LAB
GLUCOSE BLD-MCNC: 114 MG/DL (ref 70–108)
GLUCOSE BLD-MCNC: 154 MG/DL (ref 70–108)

## 2019-07-15 PROCEDURE — 82948 REAGENT STRIP/BLOOD GLUCOSE: CPT

## 2019-07-15 PROCEDURE — G0277 HBOT, FULL BODY CHAMBER, 30M: HCPCS

## 2019-07-15 ASSESSMENT — PAIN SCALES - GENERAL
PAINLEVEL_OUTOF10: 0
PAINLEVEL_OUTOF10: 0

## 2019-07-16 ENCOUNTER — HOSPITAL ENCOUNTER (OUTPATIENT)
Dept: HYPERBARIC MEDICINE | Age: 79
Setting detail: THERAPIES SERIES
Discharge: HOME | End: 2019-07-16
Payer: MEDICARE

## 2019-07-17 ENCOUNTER — HOSPITAL ENCOUNTER (OUTPATIENT)
Dept: HYPERBARIC MEDICINE | Age: 79
Setting detail: THERAPIES SERIES
Discharge: HOME | End: 2019-07-17
Payer: MEDICARE

## 2019-07-17 VITALS
RESPIRATION RATE: 16 BRPM | OXYGEN SATURATION: 100 % | DIASTOLIC BLOOD PRESSURE: 68 MMHG | HEART RATE: 67 BPM | TEMPERATURE: 98.6 F | SYSTOLIC BLOOD PRESSURE: 170 MMHG

## 2019-07-17 LAB
GLUCOSE BLD-MCNC: 112 MG/DL (ref 70–108)
GLUCOSE BLD-MCNC: 123 MG/DL (ref 70–108)
GLUCOSE BLD-MCNC: 128 MG/DL (ref 70–108)

## 2019-07-17 PROCEDURE — G0277 HBOT, FULL BODY CHAMBER, 30M: HCPCS

## 2019-07-17 PROCEDURE — 82948 REAGENT STRIP/BLOOD GLUCOSE: CPT

## 2019-07-17 ASSESSMENT — PAIN SCALES - GENERAL
PAINLEVEL_OUTOF10: 0
PAINLEVEL_OUTOF10: 0

## 2019-07-18 ENCOUNTER — HOSPITAL ENCOUNTER (OUTPATIENT)
Dept: HYPERBARIC MEDICINE | Age: 79
Setting detail: THERAPIES SERIES
Discharge: HOME | End: 2019-07-18
Payer: MEDICARE

## 2019-07-19 ENCOUNTER — HOSPITAL ENCOUNTER (OUTPATIENT)
Dept: HYPERBARIC MEDICINE | Age: 79
Setting detail: THERAPIES SERIES
Discharge: HOME | End: 2019-07-19
Payer: MEDICARE

## 2019-07-19 VITALS
RESPIRATION RATE: 16 BRPM | OXYGEN SATURATION: 100 % | SYSTOLIC BLOOD PRESSURE: 169 MMHG | DIASTOLIC BLOOD PRESSURE: 69 MMHG | HEART RATE: 68 BPM | TEMPERATURE: 98.3 F

## 2019-07-19 LAB
GLUCOSE BLD-MCNC: 125 MG/DL (ref 70–108)
GLUCOSE BLD-MCNC: 130 MG/DL (ref 70–108)
GLUCOSE BLD-MCNC: 140 MG/DL (ref 70–108)

## 2019-07-19 PROCEDURE — 82948 REAGENT STRIP/BLOOD GLUCOSE: CPT

## 2019-07-19 PROCEDURE — 99183 HYPERBARIC OXYGEN THERAPY: CPT | Performed by: NURSE PRACTITIONER

## 2019-07-19 PROCEDURE — G0277 HBOT, FULL BODY CHAMBER, 30M: HCPCS

## 2019-07-19 NOTE — PROGRESS NOTES
procedure. Plan          Evelina Moya is a 69 y.o. female  successfully completed today's hyperbaric oxygen treatment at [address] [facility]. In my clinical judgement, ongoing HBO therapy is necessary at this time, given a threat to patient function, limb or life from the current condition. Supervision and attendance of Hyperbaric Oxygen Therapy provided. Continue HBO treatment as outlined in the treatment plan. Hyperbaric Oxygen:  Pt tolerated Treatment Number: 29 well today without complications.      Electronically signed by AMANDA Conde CNP on 7/19/2019 at 12:41 PM

## 2019-07-22 ENCOUNTER — HOSPITAL ENCOUNTER (OUTPATIENT)
Dept: HYPERBARIC MEDICINE | Age: 79
Setting detail: THERAPIES SERIES
Discharge: HOME | End: 2019-07-22
Payer: MEDICARE

## 2019-07-23 ENCOUNTER — HOSPITAL ENCOUNTER (OUTPATIENT)
Dept: HYPERBARIC MEDICINE | Age: 79
Setting detail: THERAPIES SERIES
Discharge: HOME | End: 2019-07-23
Payer: MEDICARE

## 2019-07-24 ENCOUNTER — HOSPITAL ENCOUNTER (OUTPATIENT)
Dept: HYPERBARIC MEDICINE | Age: 79
Setting detail: THERAPIES SERIES
Discharge: HOME | End: 2019-07-24
Payer: MEDICARE

## 2019-07-24 VITALS
SYSTOLIC BLOOD PRESSURE: 163 MMHG | DIASTOLIC BLOOD PRESSURE: 77 MMHG | HEART RATE: 64 BPM | OXYGEN SATURATION: 99 % | TEMPERATURE: 98.6 F | RESPIRATION RATE: 16 BRPM

## 2019-07-24 LAB
ANION GAP SERPL CALCULATED.3IONS-SCNC: 11 MMOL/L (ref 4–12)
BUN BLDV-MCNC: 18 MG/DL (ref 5–27)
CALCIUM SERPL-MCNC: 9.1 MG/DL (ref 8.5–10.5)
CHLORIDE BLD-SCNC: 103 MMOL/L (ref 98–109)
CO2: 29 MMOL/L (ref 22–32)
CREAT SERPL-MCNC: 0.9 MG/DL (ref 0.4–1)
EGFR AFRICAN AMERICAN: >60 ML/MIN/1.73SQ.M
EGFR IF NONAFRICAN AMERICAN: >60 ML/MIN/1.73SQ.M
GLUCOSE BLD-MCNC: 140 MG/DL (ref 70–108)
GLUCOSE BLD-MCNC: 183 MG/DL (ref 70–108)
GLUCOSE: 115 MG/DL (ref 65–99)
POTASSIUM SERPL-SCNC: 3.7 MMOL/L (ref 3.5–5)
SODIUM BLD-SCNC: 143 MMOL/L (ref 134–146)

## 2019-07-24 PROCEDURE — G0277 HBOT, FULL BODY CHAMBER, 30M: HCPCS

## 2019-07-24 PROCEDURE — 82948 REAGENT STRIP/BLOOD GLUCOSE: CPT

## 2019-07-24 ASSESSMENT — PAIN SCALES - GENERAL
PAINLEVEL_OUTOF10: 0
PAINLEVEL_OUTOF10: 0

## 2019-07-25 ENCOUNTER — OFFICE VISIT (OUTPATIENT)
Dept: NEPHROLOGY | Age: 79
End: 2019-07-25
Payer: MEDICARE

## 2019-07-25 ENCOUNTER — HOSPITAL ENCOUNTER (OUTPATIENT)
Dept: HYPERBARIC MEDICINE | Age: 79
Setting detail: THERAPIES SERIES
Discharge: HOME | End: 2019-07-25
Payer: MEDICARE

## 2019-07-25 VITALS
RESPIRATION RATE: 16 BRPM | HEART RATE: 75 BPM | OXYGEN SATURATION: 100 % | TEMPERATURE: 96.6 F | SYSTOLIC BLOOD PRESSURE: 169 MMHG | DIASTOLIC BLOOD PRESSURE: 79 MMHG

## 2019-07-25 VITALS
BODY MASS INDEX: 40.82 KG/M2 | WEIGHT: 209 LBS | SYSTOLIC BLOOD PRESSURE: 145 MMHG | OXYGEN SATURATION: 96 % | HEART RATE: 79 BPM | DIASTOLIC BLOOD PRESSURE: 79 MMHG

## 2019-07-25 DIAGNOSIS — E11.22 TYPE 2 DIABETES MELLITUS WITH CHRONIC KIDNEY DISEASE, WITHOUT LONG-TERM CURRENT USE OF INSULIN, UNSPECIFIED CKD STAGE (HCC): ICD-10-CM

## 2019-07-25 DIAGNOSIS — N18.30 CHRONIC KIDNEY DISEASE, STAGE III (MODERATE) (HCC): Primary | ICD-10-CM

## 2019-07-25 DIAGNOSIS — I10 BENIGN ESSENTIAL HTN: ICD-10-CM

## 2019-07-25 DIAGNOSIS — E87.1 HYPONATREMIA: ICD-10-CM

## 2019-07-25 LAB
GLUCOSE BLD-MCNC: 122 MG/DL (ref 70–108)
GLUCOSE BLD-MCNC: 141 MG/DL (ref 70–108)

## 2019-07-25 PROCEDURE — G0277 HBOT, FULL BODY CHAMBER, 30M: HCPCS

## 2019-07-25 PROCEDURE — 4040F PNEUMOC VAC/ADMIN/RCVD: CPT | Performed by: NURSE PRACTITIONER

## 2019-07-25 PROCEDURE — 99213 OFFICE O/P EST LOW 20 MIN: CPT | Performed by: NURSE PRACTITIONER

## 2019-07-25 PROCEDURE — G8417 CALC BMI ABV UP PARAM F/U: HCPCS | Performed by: NURSE PRACTITIONER

## 2019-07-25 PROCEDURE — 1090F PRES/ABSN URINE INCON ASSESS: CPT | Performed by: NURSE PRACTITIONER

## 2019-07-25 PROCEDURE — 1036F TOBACCO NON-USER: CPT | Performed by: NURSE PRACTITIONER

## 2019-07-25 PROCEDURE — 82948 REAGENT STRIP/BLOOD GLUCOSE: CPT

## 2019-07-25 PROCEDURE — 1123F ACP DISCUSS/DSCN MKR DOCD: CPT | Performed by: NURSE PRACTITIONER

## 2019-07-25 PROCEDURE — G8400 PT W/DXA NO RESULTS DOC: HCPCS | Performed by: NURSE PRACTITIONER

## 2019-07-25 PROCEDURE — G8427 DOCREV CUR MEDS BY ELIG CLIN: HCPCS | Performed by: NURSE PRACTITIONER

## 2019-07-25 ASSESSMENT — PAIN SCALES - GENERAL
PAINLEVEL_OUTOF10: 0
PAINLEVEL_OUTOF10: 0

## 2019-07-25 NOTE — PATIENT INSTRUCTIONS
Assessment:    1. Previous history of Chronic hyponatremia,  Resolving. Ok to continue fluid restriction due to chronic edema  2. Chronic Kidney Disease Stage III, Creatinine at baseline  3. 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 4. 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 Hx Essential Hypertension     Schedule for follow up appt in 6 months with BMP  Pt asked to bring pill bottles to next office visit. Please make early appointment if needed and to call office for questions, concerns, persistent, changing or worsening symptoms.   Discussed with the patient and answered their questions     Herrera PATEL, CNP

## 2019-07-25 NOTE — PROGRESS NOTES
by mouth daily 90 tablet 3    omeprazole (PRILOSEC) 20 MG delayed release capsule TAKE ONE CAPSULE BY MOUTH ONCE DAILY 90 capsule 3    triamcinolone (KENALOG) 0.1 % ointment Apply to effected areas. 454 g 3    mupirocin (BACTROBAN NASAL) 2 % nasal ointment by Nasal route 2 times daily Take by Nasal route 2 times daily.  cycloSPORINE (RESTASIS) 0.05 % ophthalmic emulsion Place 1 drop into both eyes 2 times daily Patient uses 2-4 times per day.  sodium chloride (OCEAN, BABY AYR) 0.65 % nasal spray 1 spray by Nasal route as needed for Congestion Patient states she uses 4-6x per day.  fluticasone (FLONASE) 50 MCG/ACT nasal spray 2 sprays by Nasal route 2 times daily 1 Bottle 3    Handicap Placard MISC by Does not apply route. Request parking placard due to medical conditions. Duration of 5 years. 1 each 0    tetrahydrozoline 0.05 % ophthalmic solution Place 1 drop into both eyes 3 times daily        No current facility-administered medications for this visit. PAST MEDICAL HISTORY:       Diagnosis Date    Cancer [facility])     adenocarcinoma of her sinuses    Cataract of both eyes     DDD (degenerative disc disease), lumbar     Dysphagia, pharyngoesophageal 09/26/2016    Eczema 03/2018    Fibrocystic breast     H/O ventral hernia repair     Headache 02/09/2017    Hypercholesteremia     Hyperlipidemia     Hypertension     Iron deficiency anemia     LPRD (laryngopharyngeal reflux disease) 09/26/2016    Neuropathy     peripheral    Obesity     Orbital abscess     Orbital cellulitis 01/22/2014    SWAPNA (obstructive sleep apnea)     Osteoarthritis     Osteoporosis     Persistent proteinuria associated with type 2 diabetes mellitus ([facility] [address] 01/2017    urine micral of 50.       Sinusitis     Type II or unspecified type diabetes mellitus without mention of complication, not stated as uncontrolled     diagnoses approx 2000    Velopharyngeal incompetence 09/26/2016     SURGICAL HISTORY:

## 2019-07-26 ENCOUNTER — HOSPITAL ENCOUNTER (OUTPATIENT)
Dept: HYPERBARIC MEDICINE | Age: 79
Setting detail: THERAPIES SERIES
Discharge: HOME | End: 2019-07-26
Payer: MEDICARE

## 2019-07-26 VITALS
HEART RATE: 68 BPM | TEMPERATURE: 98.6 F | SYSTOLIC BLOOD PRESSURE: 161 MMHG | DIASTOLIC BLOOD PRESSURE: 72 MMHG | OXYGEN SATURATION: 100 % | RESPIRATION RATE: 18 BRPM

## 2019-07-26 LAB
GLUCOSE BLD-MCNC: 119 MG/DL (ref 70–108)
GLUCOSE BLD-MCNC: 166 MG/DL (ref 70–108)

## 2019-07-26 PROCEDURE — G0277 HBOT, FULL BODY CHAMBER, 30M: HCPCS

## 2019-07-26 PROCEDURE — 82948 REAGENT STRIP/BLOOD GLUCOSE: CPT

## 2019-07-26 ASSESSMENT — PAIN SCALES - GENERAL
PAINLEVEL_OUTOF10: 0
PAINLEVEL_OUTOF10: 0

## 2019-07-26 NOTE — PROGRESS NOTES
[facility]  Hyperbaric Oxygen Therapy   Progress Note      NAME: [id number] Navarro RECORD NUMBER:  [id number]  AGE: 69 y.o. GENDER: female  : 1940  EPISODE DATE:  2019     Subjective     HBO Treatment Number: 31 out of Total Treatments: 40    HBO Diagnosis:               Indications: Late Effect of Radiation(Osteoradionecrosis of skull/sinus)    Safety checks performed prior to treatment. See doc flowsheets for documentation.     Objective        Patient Active Problem List   Diagnosis Code    Hypercholesterolemia E78.00    Osteoarthritis M19.90    Osteoporosis M81.0    Type 2 diabetes mellitus without complication, without long-term current use of insulin ([facility] E11.9    Morbid obesity with body mass index (BMI) of 45.0 to 49.9 in adult ([facility] E66.01, Z68.42    DDD (degenerative disc disease), lumbar M51.36    Benign essential HTN I10    SWAPNA on CPAP G47.33, Z99.89    Diabetic peripheral neuropathy (HCC) E11.42    Acute recurrent pansinusitis J01.41    Primary thunderclap headache G44.53    Rash of entire body R21    Infection of skin due to methicillin resistant Staphylococcus aureus (MRSA) A49.02    Drug allergy, antibiotic  likely bactrim Z88.1    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) I50.23    Bilateral cellulitis of lower leg L03.116, L03.115    Venous ulcers of both lower extremities ([facility]) I87.2, L97.919, L97.929    Class 3 severe obesity due to excess calories with serious comorbidity and body mass index (BMI) of 40.0 to 44.9 in adult ([facility]) E66.01, Z68.41    Bilateral lower leg cellulitis L03.116, L03.115    CKD (chronic kidney disease) stage 3, GFR 30-59 ml/min ([facility]) N18.3    Iron deficiency anemia D50.9    Hypomagnesemia E83.42    SBO (small bowel obstruction) ([facility] K56.609    Venous ulcer of left leg (HCC) I83.029, L97.929    Venous ulcer of right leg (HCC) I83.019, L97.919    Acute eczema L30.9    Venous insufficiency of both lower extremities I87.2    Lymphedema of both lower extremities I89.0    Pressure injury of left buttock, stage 2 L89.322    Eczematous dermatitis L30.9    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 3XXA    Facial cellulitis L03. 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    Cataract of both eyes H26.9    History of ventral hernia repair Z98.890, Z87.19    Other cervical disc degeneration, mid-cervical region M50.320    Spondylolisthesis of cervical region M43.12    Spondylolisthesis of thoracic region M43.14          Recent Labs     07/25/19  1025 07/25/19  1233   POCGLU 141* 122*       Pre treatment Vital Signs       Temp: 98.3 °F (36.8 °C)     Pulse: 72     Resp: 18     BP: (!) 144/67       Post treatment Vital Signs  Temp: 96.6 °F (35.9 °C)  Pulse: 75  Resp: 16  BP: (!) 169/79      Assessment        Physical Exam:  General Appearance:  alert and oriented to person, place and time, well-developed and well-nourished, in no acute distress    Pre Tympanic Membrane Assessment:  tympanic membranes intact bilaterally    Post Tympanic Membrane Assessment:  Right: Normal  Left: Normal    Pulmonary/Chest:  clear to auscultation bilaterally- no wheezes, rales or rhonchi, normal air movement, no respiratory distress    Cardiovascular:  normal       Treatment Start Time: 1035     Pressure Reached Time: 1045     Treatment Status: No Air break      Decompression Time: 1215   Treatment End Time: 1225    Symptoms Noted During Treatment: None    Adverse Event: no      I was present on these premises and immediately available to furnish assistance & direction throughout the

## 2019-07-29 ENCOUNTER — HOSPITAL ENCOUNTER (OUTPATIENT)
Dept: HYPERBARIC MEDICINE | Age: 79
Setting detail: THERAPIES SERIES
Discharge: HOME | End: 2019-07-29
Payer: MEDICARE

## 2019-07-29 ENCOUNTER — CARE COORDINATION (OUTPATIENT)
Dept: CARE COORDINATION | Age: 79
End: 2019-07-29

## 2019-07-29 VITALS
OXYGEN SATURATION: 100 % | HEART RATE: 67 BPM | RESPIRATION RATE: 16 BRPM | TEMPERATURE: 98.6 F | DIASTOLIC BLOOD PRESSURE: 79 MMHG | SYSTOLIC BLOOD PRESSURE: 161 MMHG

## 2019-07-29 LAB
GLUCOSE BLD-MCNC: 111 MG/DL (ref 70–108)
GLUCOSE BLD-MCNC: 134 MG/DL (ref 70–108)

## 2019-07-29 PROCEDURE — G0277 HBOT, FULL BODY CHAMBER, 30M: HCPCS

## 2019-07-29 PROCEDURE — 82948 REAGENT STRIP/BLOOD GLUCOSE: CPT

## 2019-07-29 ASSESSMENT — PAIN SCALES - GENERAL
PAINLEVEL_OUTOF10: 0
PAINLEVEL_OUTOF10: 0

## 2019-07-30 ENCOUNTER — HOSPITAL ENCOUNTER (OUTPATIENT)
Dept: HYPERBARIC MEDICINE | Age: 79
Setting detail: THERAPIES SERIES
Discharge: HOME | End: 2019-07-30
Payer: MEDICARE

## 2019-07-30 NOTE — PROGRESS NOTES
procedure. Plan          Zay Lewis is a 69 y.o. female  successfully completed today's hyperbaric oxygen treatment at [facility]. In my clinical judgement, ongoing HBO therapy is necessary at this time, given a threat to patient function, limb or life from the current condition. Supervision and attendance of Hyperbaric Oxygen Therapy provided. Continue HBO treatment as outlined in the treatment plan. Hyperbaric Oxygen:  Pt tolerated Treatment Number: 33 well today without complications.      Electronically signed by Vinod Bender MD on 7/29/2019 at 8:31 PM

## 2019-07-31 ENCOUNTER — HOSPITAL ENCOUNTER (OUTPATIENT)
Dept: HYPERBARIC MEDICINE | Age: 79
Setting detail: THERAPIES SERIES
Discharge: HOME | End: 2019-07-31
Payer: MEDICARE

## 2019-07-31 VITALS
HEART RATE: 67 BPM | DIASTOLIC BLOOD PRESSURE: 79 MMHG | OXYGEN SATURATION: 100 % | TEMPERATURE: 97.6 F | RESPIRATION RATE: 16 BRPM | SYSTOLIC BLOOD PRESSURE: 161 MMHG

## 2019-07-31 LAB
GLUCOSE BLD-MCNC: 120 MG/DL (ref 70–108)
GLUCOSE BLD-MCNC: 187 MG/DL (ref 70–108)

## 2019-07-31 PROCEDURE — G0277 HBOT, FULL BODY CHAMBER, 30M: HCPCS

## 2019-07-31 PROCEDURE — 99183 HYPERBARIC OXYGEN THERAPY: CPT | Performed by: NURSE PRACTITIONER

## 2019-07-31 PROCEDURE — 82948 REAGENT STRIP/BLOOD GLUCOSE: CPT

## 2019-07-31 ASSESSMENT — PAIN SCALES - GENERAL: PAINLEVEL_OUTOF10: 0

## 2019-07-31 NOTE — PROGRESS NOTES
[address] [address]  Hyperbaric Oxygen Therapy   Progress Note      NAME: [id number] Rigo Navarro RECORD NUMBER:  [id number]  AGE: 69 y.o. GENDER: female  : 1940  EPISODE DATE:  2019     Subjective     HBO Treatment Number: 34 out of Total Treatments: 40    HBO Diagnosis:               Indications: Late Effect of Radiation(Osteoradionecrosis of skull/sinus)    Safety checks performed prior to treatment. See doc flowsheets for documentation.     Objective        Patient Active Problem List   Diagnosis Code    Hypercholesterolemia E78.00    Osteoarthritis M19.90    Osteoporosis M81.0    Type 2 diabetes mellitus without complication, without long-term current use of insulin [facility] E11.9    Morbid obesity with body mass index (BMI) of 45.0 to 49.9 in adult ([facility] E66.01, Z68.42    DDD (degenerative disc disease), lumbar M51.36    Benign essential HTN I10    SWAPNA on CPAP G47.33, Z99.89    Diabetic peripheral neuropathy ([facility]) E11.42    Acute recurrent pansinusitis J01.41    Primary thunderclap headache G44.53    Rash of entire body R21    Infection of skin due to methicillin resistant Staphylococcus aureus (MRSA) A49.02    Drug allergy, antibiotic  likely bactrim Z88.1    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    Skin plaque L98.8    Hyponatremia E87.1    Hypervolemia E87.70    Cellulitis of lower extremity L03.119    Hypoglycemia E16.2    Acute on chronic systolic CHF (congestive heart failure) ([facility]) I50.23    Bilateral cellulitis of lower leg L03.116, L03.115    Venous ulcers of both lower extremities ([facility]) I87.2, L97.919, L97.929    Class 3 severe obesity due to excess calories with serious comorbidity and body mass index (BMI) of 40.0 to 44.9 in adult ([facility]) E66.01, Z68.41    Bilateral lower leg cellulitis L03.116, L03.115    CKD (chronic kidney disease) stage 3, GFR 30-59 ml/min ([facility]) N18.3    Iron deficiency anemia D50.9    Hypomagnesemia E83.42    SBO (small bowel obstruction) ([facility] K56.609    Venous ulcer of left leg (HCC) I83.029, L97.929    Venous ulcer of right leg (HCC) I83.019, L97.919    Acute eczema L30.9    Venous insufficiency of both lower extremities I87.2    Lymphedema of both lower extremities I89.0    Pressure injury of left buttock, stage 2 L89.322    Eczematous dermatitis L30.9    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 3XXA    Facial cellulitis L03. 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    Cataract of both eyes H26.9    History of ventral hernia repair Z98.890, Z87.19    Other cervical disc degeneration, mid-cervical region M50.320    Spondylolisthesis of cervical region M43.12    Spondylolisthesis of thoracic region M43.14          Recent Labs     07/31/19  1037 07/31/19  1252   POCGLU 187* 120*       Pre treatment Vital Signs       Temp: 97.8 °F (36.6 °C)     Pulse: 80     Resp: 16     BP: (!) 155/70       Post treatment Vital Signs  Temp: 97.6 °F (36.4 °C)  Pulse: 67  Resp: 16  BP: (!) 161/79      Assessment        Physical Exam:  General Appearance:  alert and oriented to person, place and time    Pre Tympanic Membrane Assessment:  bilateral tympanic membrane(s) is/has tympanostomy tube in place    Post Tympanic Membrane Assessment:  Right: Normal  Left: Normal    Pulmonary/Chest:  clear to auscultation bilaterally- no wheezes, rales or rhonchi, normal air movement, no respiratory distress    Cardiovascular:  normal, regular rate and rhythm       Treatment Start Time: 1052     Pressure Reached Time: 1102  ELSY : 2  Treatment Status: No Air break      Decompression Time: 1232   Treatment End Time: 1242    Symptoms Noted During Treatment: None    Adverse Event: no      I was present on these premises and immediately available to furnish assistance & direction throughout the

## 2019-08-01 ENCOUNTER — HOSPITAL ENCOUNTER (OUTPATIENT)
Dept: HYPERBARIC MEDICINE | Age: 79
Setting detail: THERAPIES SERIES
Discharge: HOME | End: 2019-08-01
Payer: MEDICARE

## 2019-08-01 VITALS
SYSTOLIC BLOOD PRESSURE: 171 MMHG | RESPIRATION RATE: 18 BRPM | DIASTOLIC BLOOD PRESSURE: 74 MMHG | HEART RATE: 64 BPM | OXYGEN SATURATION: 97 % | TEMPERATURE: 96.4 F

## 2019-08-01 LAB
GLUCOSE BLD-MCNC: 118 MG/DL (ref 70–108)
GLUCOSE BLD-MCNC: 170 MG/DL (ref 70–108)

## 2019-08-01 PROCEDURE — 99183 HYPERBARIC OXYGEN THERAPY: CPT | Performed by: NURSE PRACTITIONER

## 2019-08-01 PROCEDURE — G0277 HBOT, FULL BODY CHAMBER, 30M: HCPCS

## 2019-08-01 PROCEDURE — 82948 REAGENT STRIP/BLOOD GLUCOSE: CPT

## 2019-08-01 ASSESSMENT — PAIN SCALES - GENERAL
PAINLEVEL_OUTOF10: 0
PAINLEVEL_OUTOF10: 0

## 2019-08-02 ENCOUNTER — HOSPITAL ENCOUNTER (OUTPATIENT)
Dept: HYPERBARIC MEDICINE | Age: 79
Setting detail: THERAPIES SERIES
Discharge: HOME | End: 2019-08-02
Payer: MEDICARE

## 2019-08-02 ENCOUNTER — HOSPITAL ENCOUNTER (OUTPATIENT)
Dept: WOUND CARE | Age: 79
Discharge: HOME | End: 2019-08-02
Payer: MEDICARE

## 2019-08-02 VITALS
OXYGEN SATURATION: 97 % | HEART RATE: 73 BPM | DIASTOLIC BLOOD PRESSURE: 70 MMHG | TEMPERATURE: 98.6 F | SYSTOLIC BLOOD PRESSURE: 156 MMHG | RESPIRATION RATE: 16 BRPM

## 2019-08-02 VITALS
DIASTOLIC BLOOD PRESSURE: 77 MMHG | SYSTOLIC BLOOD PRESSURE: 176 MMHG | RESPIRATION RATE: 16 BRPM | OXYGEN SATURATION: 100 % | TEMPERATURE: 96.8 F | HEART RATE: 70 BPM

## 2019-08-02 DIAGNOSIS — L30.9 ACUTE ECZEMA: ICD-10-CM

## 2019-08-02 DIAGNOSIS — L89.322 PRESSURE INJURY OF LEFT BUTTOCK, STAGE 2 (HCC): ICD-10-CM

## 2019-08-02 DIAGNOSIS — I89.0 LYMPHEDEMA OF BOTH LOWER EXTREMITIES: ICD-10-CM

## 2019-08-02 DIAGNOSIS — I87.2 VENOUS INSUFFICIENCY OF BOTH LOWER EXTREMITIES: ICD-10-CM

## 2019-08-02 DIAGNOSIS — L97.929 VENOUS ULCER OF LEFT LEG (HCC): ICD-10-CM

## 2019-08-02 DIAGNOSIS — Z22.39 COLONIZATION WITH DRUG-RESISTANT BACTERIA: ICD-10-CM

## 2019-08-02 DIAGNOSIS — L97.919 VENOUS ULCER OF RIGHT LEG (HCC): ICD-10-CM

## 2019-08-02 DIAGNOSIS — I83.029 VENOUS ULCER OF LEFT LEG (HCC): ICD-10-CM

## 2019-08-02 DIAGNOSIS — I83.019 VENOUS ULCER OF RIGHT LEG (HCC): ICD-10-CM

## 2019-08-02 LAB
GLUCOSE BLD-MCNC: 110 MG/DL (ref 70–108)
GLUCOSE BLD-MCNC: 151 MG/DL (ref 70–108)

## 2019-08-02 PROCEDURE — 99211 OFF/OP EST MAY X REQ PHY/QHP: CPT | Performed by: NURSE PRACTITIONER

## 2019-08-02 PROCEDURE — 99213 OFFICE O/P EST LOW 20 MIN: CPT | Performed by: NURSE PRACTITIONER

## 2019-08-02 PROCEDURE — G0277 HBOT, FULL BODY CHAMBER, 30M: HCPCS

## 2019-08-02 PROCEDURE — 99183 HYPERBARIC OXYGEN THERAPY: CPT | Performed by: NURSE PRACTITIONER

## 2019-08-02 PROCEDURE — 82948 REAGENT STRIP/BLOOD GLUCOSE: CPT

## 2019-08-02 ASSESSMENT — PAIN SCALES - GENERAL
PAINLEVEL_OUTOF10: 0
PAINLEVEL_OUTOF10: 0

## 2019-08-05 ENCOUNTER — HOSPITAL ENCOUNTER (OUTPATIENT)
Dept: HYPERBARIC MEDICINE | Age: 79
Setting detail: THERAPIES SERIES
Discharge: HOME | End: 2019-08-05
Payer: MEDICARE

## 2019-08-05 VITALS
DIASTOLIC BLOOD PRESSURE: 70 MMHG | SYSTOLIC BLOOD PRESSURE: 161 MMHG | HEART RATE: 67 BPM | OXYGEN SATURATION: 100 % | RESPIRATION RATE: 18 BRPM | TEMPERATURE: 96.9 F

## 2019-08-05 LAB
GLUCOSE BLD-MCNC: 112 MG/DL (ref 70–108)
GLUCOSE BLD-MCNC: 138 MG/DL (ref 70–108)

## 2019-08-05 PROCEDURE — G0277 HBOT, FULL BODY CHAMBER, 30M: HCPCS

## 2019-08-05 PROCEDURE — 82948 REAGENT STRIP/BLOOD GLUCOSE: CPT

## 2019-08-05 PROCEDURE — 99183 HYPERBARIC OXYGEN THERAPY: CPT | Performed by: NURSE PRACTITIONER

## 2019-08-05 ASSESSMENT — PAIN DESCRIPTION - DESCRIPTORS: DESCRIPTORS: ACHING

## 2019-08-05 ASSESSMENT — PAIN DESCRIPTION - LOCATION: LOCATION: GENERALIZED

## 2019-08-05 ASSESSMENT — PAIN SCALES - GENERAL
PAINLEVEL_OUTOF10: 3
PAINLEVEL_OUTOF10: 0

## 2019-08-05 ASSESSMENT — PAIN DESCRIPTION - PAIN TYPE: TYPE: CHRONIC PAIN

## 2019-08-06 ENCOUNTER — HOSPITAL ENCOUNTER (OUTPATIENT)
Dept: HYPERBARIC MEDICINE | Age: 79
Setting detail: THERAPIES SERIES
Discharge: HOME | End: 2019-08-06
Payer: MEDICARE

## 2019-08-06 VITALS
TEMPERATURE: 96.2 F | SYSTOLIC BLOOD PRESSURE: 164 MMHG | HEART RATE: 62 BPM | RESPIRATION RATE: 18 BRPM | DIASTOLIC BLOOD PRESSURE: 76 MMHG | OXYGEN SATURATION: 100 %

## 2019-08-06 LAB
GLUCOSE BLD-MCNC: 128 MG/DL (ref 70–108)
GLUCOSE BLD-MCNC: 156 MG/DL (ref 70–108)

## 2019-08-06 PROCEDURE — 82948 REAGENT STRIP/BLOOD GLUCOSE: CPT

## 2019-08-06 PROCEDURE — G0277 HBOT, FULL BODY CHAMBER, 30M: HCPCS

## 2019-08-06 ASSESSMENT — PAIN SCALES - GENERAL
PAINLEVEL_OUTOF10: 0
PAINLEVEL_OUTOF10: 0

## 2019-08-06 NOTE — PROGRESS NOTES
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 3XXA    Facial cellulitis L03. 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    Cataract of both eyes H26.9    History of ventral hernia repair Z98.890, Z87.19    Other cervical disc degeneration, mid-cervical region M50.320    Spondylolisthesis of cervical region M43.12    Spondylolisthesis of thoracic region M43.14          Recent Labs     08/06/19  1033 08/06/19  1235   POCGLU 156* 128*       Pre treatment Vital Signs       Temp: 98.2 °F (36.8 °C)     Pulse: 78     Resp: 18     BP: 126/71       Post treatment Vital Signs  Temp: 98.2 °F (36.8 °C)  Pulse: 78  Resp: 18  BP: 126/71      Assessment        Physical Exam:  General Appearance:  alert and oriented to person, place and time, well-developed and well-nourished, in no acute distress    Pre Tympanic Membrane Assessment:  tympanic membranes intact bilaterally    Post Tympanic Membrane Assessment:          Pulmonary/Chest:  clear to auscultation bilaterally- no wheezes, rales or rhonchi, normal air movement, no respiratory distress    Cardiovascular:  normal       Treatment Start Time: 1040     Pressure Reached Time: 1050  ELSY : 2  Treatment Status: No Air break                    Adverse Event: no      I was present on these premises and immediately available to furnish assistance & direction throughout the procedure.      Rolando Kohler is a 69 y.o. female  successfully completed today's hyperbaric oxygen

## 2019-08-07 ENCOUNTER — HOSPITAL ENCOUNTER (OUTPATIENT)
Dept: HYPERBARIC MEDICINE | Age: 79
Setting detail: THERAPIES SERIES
Discharge: HOME | End: 2019-08-07
Payer: MEDICARE

## 2019-08-07 VITALS
RESPIRATION RATE: 16 BRPM | SYSTOLIC BLOOD PRESSURE: 163 MMHG | HEART RATE: 67 BPM | OXYGEN SATURATION: 100 % | DIASTOLIC BLOOD PRESSURE: 76 MMHG | TEMPERATURE: 96.3 F

## 2019-08-07 LAB
GLUCOSE BLD-MCNC: 125 MG/DL (ref 70–108)
GLUCOSE BLD-MCNC: 152 MG/DL (ref 70–108)

## 2019-08-07 PROCEDURE — G0277 HBOT, FULL BODY CHAMBER, 30M: HCPCS

## 2019-08-07 PROCEDURE — 82948 REAGENT STRIP/BLOOD GLUCOSE: CPT

## 2019-08-07 ASSESSMENT — PAIN SCALES - GENERAL
PAINLEVEL_OUTOF10: 4
PAINLEVEL_OUTOF10: 0

## 2019-08-07 ASSESSMENT — PAIN DESCRIPTION - DESCRIPTORS: DESCRIPTORS: ACHING;CONSTANT

## 2019-08-07 ASSESSMENT — PAIN DESCRIPTION - FREQUENCY: FREQUENCY: CONTINUOUS

## 2019-08-07 ASSESSMENT — PAIN DESCRIPTION - ORIENTATION: ORIENTATION: RIGHT

## 2019-08-07 ASSESSMENT — PAIN DESCRIPTION - LOCATION: LOCATION: SHOULDER;ARM

## 2019-08-07 ASSESSMENT — PAIN DESCRIPTION - PAIN TYPE: TYPE: CHRONIC PAIN

## 2019-08-07 NOTE — PROGRESS NOTES
not been using her Velcro compression wraps consistently.    She is currently tolerating hyperbaric oxygen therapy well. She states that she is doing her sinus rinses twice daily as needed. Wound/Ulcer Pain Timing/Severity: none  Quality of pain: N/A  Severity:  0 / 10   Modifying Factors: None  Associated Signs/Symptoms: sinus drainage    Interval History:   Patient presents today for follow up on wound/ulcer's progression. The patient is currently not on antibiotics. Current dressing care includes sinus rinses 2-3 times per day as ordered by Lilly Jimenez. 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    Cataract of both eyes     DDD (degenerative disc disease), lumbar     Dysphagia, pharyngoesophageal 09/26/2016    Eczema 03/2018    Fibrocystic breast     H/O ventral hernia repair     Headache 02/09/2017    Hypercholesteremia     Hyperlipidemia     Hypertension     Iron deficiency anemia     LPRD (laryngopharyngeal reflux disease) 09/26/2016    Neuropathy     peripheral    Obesity     Orbital abscess     Orbital cellulitis 01/22/2014    SWAPNA (obstructive sleep apnea)     Osteoarthritis     Osteoporosis     Persistent proteinuria associated with type 2 diabetes mellitus ([facility] 01/2017    urine micral of 50.       Sinusitis     Type II or unspecified type diabetes mellitus without mention of complication, not stated as uncontrolled     diagnoses approx 2000    Velopharyngeal incompetence 09/26/2016       PAST SURGICAL HISTORY    Past Surgical History:   Procedure Laterality Date    ABDOMEN SURGERY      APPENDECTOMY      CARPAL TUNNEL RELEASE Bilateral 2008, 2009    CATARACT REMOVAL  2011    bilat    CHOLECYSTECTOMY  03/1988    COLONOSCOPY  2016    COLONOSCOPY  10/11/2018    COLONOSCOPY POLYPECTOMY SNARE/COLD BIOPSY performed by Gaurav Abdalla MD at [facility]    DILATATION, ESOPHAGUS      ENDOSCOPY, COLON, DIAGNOSTIC      EYE treatments. Patient to continue sinus rinses as ordered per her surgeon at . [address]lee Workman [address]: No    Follow up: As needed after completion of hyperbaric oxygen therapy    Please see attached Discharge Instructions    Written patient dismissal instructions given to patient and signed by patient or POA. Discharge Instructions       Visit Discharge/Physician Orders:  -Continue HBO (until finished)  -Continue following with [facility]  -Continue sinus flushes as previously ordered by [facility]      Follow up visit:  As needed     Keep next scheduled appointment. Please give 24 hour notice if unable to keep appointment. [phone]    If you experience any of the following, please call the Wound Care Service during business hours: Monday through Friday 8:00 am - 4:30 pm  ([phone]). *Increase in pain   *Temperature over 101   *Increase in drainage from your wound or a foul odor   *Uncontrolled swelling   *Need for compression bandage changes due to slippage, breakthrough drainage    If you need medical attention outside of business hours, please contact your Primary Care Doctor or go to the nearest emergency room.      Electronically signed by AMANDA Messina CNP on 8/7/19 at 6:42 PM                  Electronically signed by AMANDA Messina CNP on 8/7/2019 at 6:42 PM

## 2019-08-08 ENCOUNTER — HOSPITAL ENCOUNTER (OUTPATIENT)
Dept: HYPERBARIC MEDICINE | Age: 79
Setting detail: THERAPIES SERIES
Discharge: HOME | End: 2019-08-08
Payer: MEDICARE

## 2019-08-08 VITALS
OXYGEN SATURATION: 99 % | DIASTOLIC BLOOD PRESSURE: 85 MMHG | HEART RATE: 66 BPM | SYSTOLIC BLOOD PRESSURE: 167 MMHG | TEMPERATURE: 96.8 F | RESPIRATION RATE: 18 BRPM

## 2019-08-08 LAB
GLUCOSE BLD-MCNC: 121 MG/DL (ref 70–108)
GLUCOSE BLD-MCNC: 131 MG/DL (ref 70–108)
GLUCOSE BLD-MCNC: 133 MG/DL (ref 70–108)

## 2019-08-08 PROCEDURE — 99183 HYPERBARIC OXYGEN THERAPY: CPT | Performed by: NURSE PRACTITIONER

## 2019-08-08 PROCEDURE — 82948 REAGENT STRIP/BLOOD GLUCOSE: CPT

## 2019-08-08 PROCEDURE — G0277 HBOT, FULL BODY CHAMBER, 30M: HCPCS

## 2019-08-08 ASSESSMENT — PAIN DESCRIPTION - LOCATION: LOCATION: SHOULDER

## 2019-08-08 ASSESSMENT — PAIN DESCRIPTION - PAIN TYPE: TYPE: CHRONIC PAIN

## 2019-08-08 ASSESSMENT — PAIN SCALES - GENERAL
PAINLEVEL_OUTOF10: 0
PAINLEVEL_OUTOF10: 4

## 2019-08-08 NOTE — PROGRESS NOTES
Plan          Enio Lowery is a 69 y.o. female  successfully completed today's hyperbaric oxygen treatment at [address] [facility]. In my clinical judgement, ongoing HBO therapy is necessary at this time, given a threat to patient function, limb or life from the current condition. Supervision and attendance of Hyperbaric Oxygen Therapy provided. Continue HBO treatment as outlined in the treatment plan. Hyperbaric Oxygen:  Pt tolerated Treatment Number: 36 well today without complications.      Electronically signed by AMANDA Gonzalez CNP on 8/8/2019 at 1:46 PM

## 2019-08-12 ENCOUNTER — CARE COORDINATION (OUTPATIENT)
Dept: CARE COORDINATION | Age: 79
End: 2019-08-12

## 2019-08-14 NOTE — CARE COORDINATION
triamcinolone (KENALOG) 0.1 % ointment Apply to effected areas. 12/18/18  Yes Jen Jimenez MD   mupirocin [facility]) 2 % nasal ointment by Nasal route 2 times daily Take by Nasal route 2 times daily. Yes Historical Provider, MD   cycloSPORINE (RESTASIS) 0.05 % ophthalmic emulsion Place 1 drop into both eyes 2 times daily Patient uses 2-4 times per day. Yes Historical Provider, MD   sodium chloride (OCEAN, BABY AYR) 0.65 % nasal spray 1 spray by Nasal route as needed for Congestion Patient states she uses 4-6x per day. Yes Historical Provider, MD   fluticasone (FLONASE) 50 MCG/ACT nasal spray 2 sprays by Nasal route 2 times daily 6/15/17  Yes Shalonda Cooper MD   Handicap Placard MIS by Does not apply route. Request parking placard due to medical conditions. Duration of 5 years. 9/4/14  Yes Carlos Hobson MD   tetrahydrozoline 0.05 % ophthalmic solution Place 1 drop into both eyes 3 times daily    Yes Historical Provider, MD       Future Appointments   Date Time Provider David Adler   7/23/2020  3:20 PM AMANDA Christianson - CNP [facility]     ,   Diabetes Assessment    Medic Alert ID:  No  Meal Planning:  Avoidance of concentrated sweets   How often do you test your blood sugar?:  Daily   Do you have barriers with adherence to non-pharmacologic self-management interventions?  (Nutrition/Exercise/Self-Monitoring):  No   Have you ever had to go to the ED for symptoms of low blood sugar?:  No       No patient-reported symptoms   Do you have hyperglycemia symptoms?:  No   Do you have hypoglycemia symptoms?:  No   Last Blood Sugar Value:  120   Blood Sugar Monitoring Regimen:  Morning Fasting   Blood Sugar Trends:  No Change       and   Congestive Heart Failure Assessment    Do you understand a low sodium diet?:  Yes  Do you understand how to read food labels?:  Yes  How many restaurant meals do you eat per week?:  0  Do you salt your food before tasting it?:  No     No

## 2019-10-18 DIAGNOSIS — I87.2 VENOUS INSUFFICIENCY OF BOTH LOWER EXTREMITIES: Primary | ICD-10-CM

## 2019-10-18 DIAGNOSIS — E11.42 DIABETIC PERIPHERAL NEUROPATHY (HCC): ICD-10-CM

## 2019-10-18 DIAGNOSIS — M81.0 OSTEOPOROSIS, UNSPECIFIED OSTEOPOROSIS TYPE, UNSPECIFIED PATHOLOGICAL FRACTURE PRESENCE: ICD-10-CM

## 2019-10-18 RX ORDER — PREGABALIN 150 MG/1
CAPSULE ORAL
Qty: 120 CAPSULE | Refills: 5 | Status: SHIPPED | OUTPATIENT
Start: 2019-10-18 | End: 2020-04-15 | Stop reason: SDUPTHER

## 2020-01-13 RX ORDER — POTASSIUM CHLORIDE 20 MEQ/1
20 TABLET, EXTENDED RELEASE ORAL DAILY
Qty: 90 TABLET | Refills: 1 | Status: SHIPPED | OUTPATIENT
Start: 2020-01-13 | End: 2020-02-26 | Stop reason: SDUPTHER

## 2020-01-13 NOTE — TELEPHONE ENCOUNTER
Pt called and states she is only taking potassium once per day and needs refill sent to [facility].

## 2020-02-18 RX ORDER — BUMETANIDE 2 MG/1
1 TABLET ORAL 2 TIMES DAILY
Qty: 10 TABLET | Refills: 0 | Status: SHIPPED | OUTPATIENT
Start: 2020-02-18 | End: 2020-02-26 | Stop reason: SDUPTHER

## 2020-02-18 NOTE — TELEPHONE ENCOUNTER
Adithya Newton needs refill of   Requested Prescriptions     Pending Prescriptions Disp Refills    bumetanide (BUMEX) 2 MG tablet 90 tablet 3     Sig: Take 0.5 tablets by mouth 2 times daily       Last Filled on:  2/15/19    Last Visit Date:  3/4/2019-FARHAD  2/15/19-annual    Next Visit Date:    2/26/2020      Pt needs just enough bumex to get her to her appointment next week.

## 2020-02-26 ENCOUNTER — OFFICE VISIT (OUTPATIENT)
Dept: FAMILY MEDICINE CLINIC | Age: 80
End: 2020-02-26
Payer: MEDICARE

## 2020-02-26 ENCOUNTER — TELEPHONE (OUTPATIENT)
Dept: FAMILY MEDICINE CLINIC | Age: 80
End: 2020-02-26

## 2020-02-26 VITALS
BODY MASS INDEX: 39.84 KG/M2 | RESPIRATION RATE: 14 BRPM | WEIGHT: 204 LBS | DIASTOLIC BLOOD PRESSURE: 78 MMHG | SYSTOLIC BLOOD PRESSURE: 138 MMHG | HEART RATE: 84 BPM

## 2020-02-26 PROBLEM — C31.1: Status: ACTIVE | Noted: 2017-08-24

## 2020-02-26 PROBLEM — E66.9 OBESITY, CLASS II, BMI 35-39.9: Status: ACTIVE | Noted: 2020-02-11

## 2020-02-26 PROBLEM — S42.293A CLOSED FRACTURE OF HEAD OF HUMERUS: Status: ACTIVE | Noted: 2020-02-26

## 2020-02-26 PROBLEM — J31.0 CHRONIC RHINITIS: Status: ACTIVE | Noted: 2020-02-26

## 2020-02-26 LAB
ALBUMIN SERPL-MCNC: 3.6 G/DL (ref 3.5–5.1)
ALP BLD-CCNC: 97 U/L (ref 38–126)
ALT SERPL-CCNC: 8 U/L (ref 11–66)
ANION GAP SERPL CALCULATED.3IONS-SCNC: 15 MEQ/L (ref 8–16)
AST SERPL-CCNC: 13 U/L (ref 5–40)
AVERAGE GLUCOSE: 135 MG/DL (ref 70–126)
BASOPHILS # BLD: 0.5 %
BASOPHILS ABSOLUTE: 0 THOU/MM3 (ref 0–0.1)
BILIRUB SERPL-MCNC: 0.3 MG/DL (ref 0.3–1.2)
BUN BLDV-MCNC: 20 MG/DL (ref 7–22)
CALCIUM SERPL-MCNC: 9.7 MG/DL (ref 8.5–10.5)
CHLORIDE BLD-SCNC: 101 MEQ/L (ref 98–111)
CHOLESTEROL, TOTAL: 140 MG/DL (ref 100–199)
CO2: 25 MEQ/L (ref 23–33)
CREAT SERPL-MCNC: 0.9 MG/DL (ref 0.4–1.2)
EOSINOPHIL # BLD: 2.7 %
EOSINOPHILS ABSOLUTE: 0.2 THOU/MM3 (ref 0–0.4)
ERYTHROCYTE [DISTWIDTH] IN BLOOD BY AUTOMATED COUNT: 20 % (ref 11.5–14.5)
ERYTHROCYTE [DISTWIDTH] IN BLOOD BY AUTOMATED COUNT: 57.2 FL (ref 35–45)
FERRITIN: 69 NG/ML (ref 10–291)
GFR SERPL CREATININE-BSD FRML MDRD: 61 ML/MIN/1.73M2
GLUCOSE BLD-MCNC: 118 MG/DL (ref 70–108)
HBA1C MFR BLD: 6.5 % (ref 4.4–6.4)
HCT VFR BLD CALC: 32.4 % (ref 37–47)
HDLC SERPL-MCNC: 41 MG/DL
HEMOGLOBIN: 9.4 GM/DL (ref 12–16)
IMMATURE GRANS (ABS): 0.02 THOU/MM3 (ref 0–0.07)
IMMATURE GRANULOCYTES: 0.3 %
LDL CHOLESTEROL CALCULATED: 74 MG/DL
LYMPHOCYTES # BLD: 19.3 %
LYMPHOCYTES ABSOLUTE: 1.2 THOU/MM3 (ref 1–4.8)
MCH RBC QN AUTO: 23 PG (ref 26–33)
MCHC RBC AUTO-ENTMCNC: 29 GM/DL (ref 32.2–35.5)
MCV RBC AUTO: 79.4 FL (ref 81–99)
MICROALB/CREAT RATIO POC: ABNORMAL MG/G
MICROALBUMIN/CREAT UR-RTO: 30 MG/L
MONOCYTES # BLD: 7.4 %
MONOCYTES ABSOLUTE: 0.5 THOU/MM3 (ref 0.4–1.3)
NUCLEATED RED BLOOD CELLS: 0 /100 WBC
PLATELET # BLD: 226 THOU/MM3 (ref 130–400)
PMV BLD AUTO: 10.1 FL (ref 9.4–12.4)
POC CREATININE: 50 MG/DL
POTASSIUM SERPL-SCNC: 4 MEQ/L (ref 3.5–5.2)
RBC # BLD: 4.08 MILL/MM3 (ref 4.2–5.4)
SEG NEUTROPHILS: 69.8 %
SEGMENTED NEUTROPHILS ABSOLUTE COUNT: 4.3 THOU/MM3 (ref 1.8–7.7)
SODIUM BLD-SCNC: 141 MEQ/L (ref 135–145)
T3 FREE: 3.01 PG/ML (ref 2.02–4.43)
T4 FREE: 1.04 NG/DL (ref 0.93–1.76)
TOTAL PROTEIN: 7.7 G/DL (ref 6.1–8)
TRIGL SERPL-MCNC: 127 MG/DL (ref 0–199)
TSH SERPL DL<=0.05 MIU/L-ACNC: 2.44 UIU/ML (ref 0.4–4.2)
WBC # BLD: 6.2 THOU/MM3 (ref 4.8–10.8)

## 2020-02-26 PROCEDURE — 90653 IIV ADJUVANT VACCINE IM: CPT | Performed by: FAMILY MEDICINE

## 2020-02-26 PROCEDURE — 82044 UR ALBUMIN SEMIQUANTITATIVE: CPT | Performed by: FAMILY MEDICINE

## 2020-02-26 PROCEDURE — 4040F PNEUMOC VAC/ADMIN/RCVD: CPT | Performed by: FAMILY MEDICINE

## 2020-02-26 PROCEDURE — G8400 PT W/DXA NO RESULTS DOC: HCPCS | Performed by: FAMILY MEDICINE

## 2020-02-26 PROCEDURE — G0008 ADMIN INFLUENZA VIRUS VAC: HCPCS | Performed by: FAMILY MEDICINE

## 2020-02-26 PROCEDURE — G8417 CALC BMI ABV UP PARAM F/U: HCPCS | Performed by: FAMILY MEDICINE

## 2020-02-26 PROCEDURE — 1090F PRES/ABSN URINE INCON ASSESS: CPT | Performed by: FAMILY MEDICINE

## 2020-02-26 PROCEDURE — G8427 DOCREV CUR MEDS BY ELIG CLIN: HCPCS | Performed by: FAMILY MEDICINE

## 2020-02-26 PROCEDURE — 99214 OFFICE O/P EST MOD 30 MIN: CPT | Performed by: FAMILY MEDICINE

## 2020-02-26 PROCEDURE — 1123F ACP DISCUSS/DSCN MKR DOCD: CPT | Performed by: FAMILY MEDICINE

## 2020-02-26 PROCEDURE — 1036F TOBACCO NON-USER: CPT | Performed by: FAMILY MEDICINE

## 2020-02-26 PROCEDURE — G8482 FLU IMMUNIZE ORDER/ADMIN: HCPCS | Performed by: FAMILY MEDICINE

## 2020-02-26 RX ORDER — POTASSIUM CHLORIDE 20 MEQ/1
20 TABLET, EXTENDED RELEASE ORAL DAILY
Qty: 90 TABLET | Refills: 3 | Status: SHIPPED | OUTPATIENT
Start: 2020-02-26 | End: 2021-04-23 | Stop reason: SDUPTHER

## 2020-02-26 RX ORDER — BUMETANIDE 2 MG/1
1 TABLET ORAL 2 TIMES DAILY
Qty: 90 TABLET | Refills: 3 | Status: SHIPPED | OUTPATIENT
Start: 2020-02-26 | End: 2020-05-27 | Stop reason: DRUGHIGH

## 2020-02-26 RX ORDER — OMEPRAZOLE 20 MG/1
CAPSULE, DELAYED RELEASE ORAL
Qty: 90 CAPSULE | Refills: 3 | Status: SHIPPED | OUTPATIENT
Start: 2020-02-26 | End: 2021-04-23 | Stop reason: SDUPTHER

## 2020-02-26 ASSESSMENT — ENCOUNTER SYMPTOMS
SHORTNESS OF BREATH: 0
COUGH: 0
SORE THROAT: 0
CONSTIPATION: 0
BLOOD IN STOOL: 0
DIARRHEA: 0
RHINORRHEA: 0
EYE PAIN: 0
ROS SKIN COMMENTS: DRY SKIN
VOMITING: 0
NAUSEA: 0
CHEST TIGHTNESS: 0
WHEEZING: 0
ABDOMINAL PAIN: 0
BACK PAIN: 0

## 2020-02-26 ASSESSMENT — PATIENT HEALTH QUESTIONNAIRE - PHQ9
1. LITTLE INTEREST OR PLEASURE IN DOING THINGS: 0
SUM OF ALL RESPONSES TO PHQ QUESTIONS 1-9: 0
SUM OF ALL RESPONSES TO PHQ9 QUESTIONS 1 & 2: 0
2. FEELING DOWN, DEPRESSED OR HOPELESS: 0
SUM OF ALL RESPONSES TO PHQ QUESTIONS 1-9: 0

## 2020-02-26 NOTE — PROGRESS NOTES
Blood work drawn today in the office, venous puncture, left arm, pt tolerated well. Immunizations Administered     Name Date Dose Route    Influenza, Triv, inactivated, subunit, adjuvanted, IM (Fluad 65 yrs and older) 2/26/2020 0.5 mL Intramuscular    Site: Deltoid- Left    Lot: [id number]    NDC: 83704-725-80      . After obtaining consent, and per orders of Dr. Annabel Esquivel, injection of Influenza Vaccine 0.5mL given IM left deltoid by Naila Polanco. Patient tolerated well.

## 2020-02-26 NOTE — PATIENT INSTRUCTIONS
Smoking can make GERD worse. If you need help quitting, talk to your doctor about stop-smoking programs and medicines. These can increase your chances of quitting for good. · If you have GERD symptoms at night, raise the head of your bed 6 to 8 inches by putting the frame on blocks or placing a foam wedge under the head of your mattress. (Adding extra pillows does not work.)  · Do not wear tight clothing around your middle. · Lose weight if you need to. Losing just 5 to 10 pounds can help. When should you call for help? Call your doctor now or seek immediate medical care if:    · You have new or different belly pain.     · Your stools are black and tarlike or have streaks of blood.    Watch closely for changes in your health, and be sure to contact your doctor if:    · Your symptoms have not improved after 2 days.     · Food seems to catch in your throat or chest.   Where can you learn more? Go to https://Webydo..Cloudary. org and sign in to your [employer] account. Enter H052 in the Mercatus box to learn more about \"Gastroesophageal Reflux Disease (GERD): Care Instructions. \"     If you do not have an account, please click on the \"Sign Up Now\" link. Current as of: August 11, 2019  Content Version: 12.3  © 3483-7506 Healthwise, Incorporated. Care instructions adapted under license by [facility] ([facility]). If you have questions about a medical condition or this instruction, always ask your healthcare professional. [address] [address] any warranty or liability for your use of this information. Patient Education        Heart Failure: Care Instructions  Your Care Instructions    Heart failure occurs when your heart does not pump as much blood as the body needs. Failure does not mean that the heart has stopped pumping but rather that it is not pumping as well as it should. Over time, this causes fluid buildup in your lungs and other parts of your body.  Fluid buildup can cause shortness of breath, fatigue, swollen ankles, and other problems. By taking medicines regularly, reducing sodium (salt) in your diet, checking your weight every day, and making lifestyle changes, you can feel better and live longer. Follow-up care is a key part of your treatment and safety. Be sure to make and go to all appointments, and call your doctor if you are having problems. It's also a good idea to know your test results and keep a list of the medicines you take. How can you care for yourself at home? Medicines    · Be safe with medicines. Take your medicines exactly as prescribed. Call your doctor if you think you are having a problem with your medicine.     · Do not take any vitamins, over-the-counter medicine, or herbal products without talking to your doctor first. Carlos Carey not take ibuprofen (Advil or Motrin) and naproxen (Aleve) without talking to your doctor first. They could make your heart failure worse.     · You may take some of the following medicine. ? Angiotensin-converting enzyme inhibitors (ACEIs) or angiotensin II receptor blockers (ARBs) reduce the heart's workload, lower blood pressure, and reduce swelling. Taking an ACEI or ARB may lower your chance of needing to be hospitalized. ? Beta-blockers can slow heart rate, decrease blood pressure, and improve your condition. Taking a beta-blocker may lower your chance of needing to be hospitalized. ? Diuretics, also called water pills, reduce swelling.    You will get more details on the specific medicines your doctor prescribes. Diet    · Your doctor may suggest that you limit sodium. Your doctor can tell you how much sodium is right for you. An example is less than 3,000 mg a day. This includes all the salt you eat in cooking or in packaged foods. People get most of their sodium from processed foods. Fast food and restaurant meals also tend to be very high in sodium.     · Ask your doctor how much liquid you can drink each day.  You may have have symptoms of sudden heart failure such as:    · You have severe trouble breathing.     · You cough up pink, foamy mucus.     · You have a new irregular or rapid heartbeat.    Call your doctor now or seek immediate medical care if:    · You have new or increased shortness of breath.     · You are dizzy or lightheaded, or you feel like you may faint.     · You have sudden weight gain, such as more than 2 to 3 pounds in a day or 5 pounds in a week. (Your doctor may suggest a different range of weight gain.)     · You have increased swelling in your legs, ankles, or feet.     · You are suddenly so tired or weak that you cannot do your usual activities.    Watch closely for changes in your health, and be sure to contact your doctor if you develop new symptoms. Where can you learn more? Go to https://LittleLivespeNanjing [employer].Stackpop. org and sign in to your Style[employer]en account. Enter U622 in the VeraLight box to learn more about \"Heart Failure: Care Instructions. \"     If you do not have an account, please click on the \"Sign Up Now\" link. Current as of: April 9, 2019  Content Version: 12.3  © 5308-7544 Healthwise, [employer]Generations. Care instructions adapted under license by [facility] ([facility]). If you have questions about a medical condition or this instruction, always ask your healthcare professional. [address] any warranty or liability for your use of this information.

## 2020-02-26 NOTE — PROGRESS NOTES
regular rhythm. Heart sounds: Normal heart sounds. Pulmonary:      Breath sounds: Normal breath sounds. No wheezing or rales. Abdominal:      General: Bowel sounds are normal.      Palpations: Abdomen is soft. Tenderness: There is no abdominal tenderness. There is no guarding or rebound. Musculoskeletal: Normal range of motion. Right lower leg: Edema present. Left lower leg: Edema present. Comments: Bilateral 2-3+ pitting edema, erythema, right leg draining, getting wound clinic appt. Lymphadenopathy:      Cervical: No cervical adenopathy. Skin:     General: Skin is warm and dry. Findings: No rash. Neurological:      Mental Status: She is alert and oriented to person, place, and time. Cranial Nerves: No cranial nerve deficit. Deep Tendon Reflexes: Reflexes are normal and symmetric.        Health Maintenance   Topic Date Due    Hepatitis B vaccine (1 of 3 - Risk 3-dose series) 10[birth year]26/1960    Shingles Vaccine (1 of 2) 10/26/1991    Annual Wellness Visit (AWV)  05/29/2019    Flu vaccine (1) 09/01/2019    Potassium monitoring  07/23/2020    Creatinine monitoring  07/23/2020    DTaP/Tdap/Td vaccine (3 - Td) 10/03/2027    Pneumococcal 65+ years Vaccine  Completed    DEXA (modify frequency per FRAX score)  Addressed    Hepatitis A vaccine  Aged Out    Hib vaccine  Aged Out    Meningococcal (ACWY) vaccine  Aged Out     Lab Results   Component Value Date    CHOL 105 04/23/2018    CHOL 112 01/10/2017    CHOL 112 01/09/2017     Lab Results   Component Value Date    TRIG 107 04/23/2018    TRIG 148 01/10/2017    TRIG 148 01/09/2017     Lab Results   Component Value Date    HDL 36 04/23/2018    HDL 41 01/10/2017    HDL 41 01/09/2017     Lab Results   Component Value Date    LDLCALC 48 04/23/2018    LDLCALC 41 01/10/2017    LDLCALC 41 01/09/2017     Lab Results   Component Value Date    LABVLDL 30 (H) 01/09/2017    LABVLDL 49 (H) 02/03/2015    VLDL 30 01/10/2017 LABA1C 6.0 03/04/2019     No results found for: Rizwan Mchugh MD

## 2020-02-26 NOTE — TELEPHONE ENCOUNTER
Pt's daughter left a voicemail stating [address] would need a new referral her lymphedema in her legs? It has been over 6 months since she has been seen. She mentioned that [facility]AR looked @ her mother's legs today so didn't think it would be a problem.

## 2020-02-27 ENCOUNTER — TELEPHONE (OUTPATIENT)
Dept: FAMILY MEDICINE CLINIC | Age: 80
End: 2020-02-27

## 2020-02-27 NOTE — TELEPHONE ENCOUNTER
Pt's daughter notified of results -- since iron is low, does RAR recommend starting iron? Repeat a1c in 6 months. Order mailed to pt. Pt seen Dr Stacy Self in the past for iron infusions but as of now, she does not take any iron.

## 2020-02-27 NOTE — TELEPHONE ENCOUNTER
----- Message from Martine Eisenberg MD sent at 2/26/2020  4:37 PM EST -----  a1c is good at 6.5, recheck a1c in 6 months. cmp is good, glucose at 118. Cholesterol at 140 with normal CRR. Kidney function is stable. Thyroid levels are ok. Hgb at 9.4 ( stable to higher). Overall not bad. Anemia is the biggest thing.

## 2020-03-11 ENCOUNTER — HOSPITAL ENCOUNTER (OUTPATIENT)
Dept: WOUND CARE | Age: 80
Discharge: HOME | End: 2020-03-11
Payer: MEDICARE

## 2020-03-11 VITALS
HEIGHT: 60 IN | HEART RATE: 73 BPM | WEIGHT: 204.6 LBS | SYSTOLIC BLOOD PRESSURE: 154 MMHG | OXYGEN SATURATION: 96 % | DIASTOLIC BLOOD PRESSURE: 77 MMHG | BODY MASS INDEX: 40.17 KG/M2 | TEMPERATURE: 98.8 F | RESPIRATION RATE: 18 BRPM

## 2020-03-11 PROCEDURE — 2709999900 HC NON-CHARGEABLE SUPPLY

## 2020-03-11 PROCEDURE — 29580 STRAPPING UNNA BOOT: CPT

## 2020-03-11 PROCEDURE — 99213 OFFICE O/P EST LOW 20 MIN: CPT

## 2020-03-11 PROCEDURE — 6370000000 HC RX 637 (ALT 250 FOR IP): Performed by: INTERNAL MEDICINE

## 2020-03-11 RX ORDER — FERROUS SULFATE 325(65) MG
325 TABLET ORAL
COMMUNITY

## 2020-03-11 RX ORDER — AMOXICILLIN AND CLAVULANATE POTASSIUM 875; 125 MG/1; MG/1
1 TABLET, FILM COATED ORAL 2 TIMES DAILY
COMMUNITY
End: 2020-05-06 | Stop reason: ALTCHOICE

## 2020-03-11 RX ADMIN — FLUOCINONIDE: 0.5 CREAM TOPICAL at 09:54

## 2020-03-11 ASSESSMENT — PAIN SCALES - GENERAL: PAINLEVEL_OUTOF10: 10

## 2020-03-11 ASSESSMENT — PAIN DESCRIPTION - LOCATION: LOCATION: FOOT

## 2020-03-11 ASSESSMENT — PAIN DESCRIPTION - PAIN TYPE: TYPE: CHRONIC PAIN

## 2020-03-11 ASSESSMENT — PAIN DESCRIPTION - ORIENTATION: ORIENTATION: RIGHT

## 2020-03-11 NOTE — PROGRESS NOTES
Sinusitis     Type II or unspecified type diabetes mellitus without mention of complication, not stated as uncontrolled     diagnoses approx 2000    Velopharyngeal incompetence 09/26/2016     PAST SURGICAL HISTORY     PAST SURGICAL HISTORY    Past Surgical History:   Procedure Laterality Date    ABDOMEN SURGERY      APPENDECTOMY      CARPAL TUNNEL RELEASE Bilateral 2008, 2009    CATARACT REMOVAL  2011    bilat    CHOLECYSTECTOMY  03/1988    COLONOSCOPY  2016    COLONOSCOPY  10/11/2018    COLONOSCOPY POLYPECTOMY SNARE/COLD BIOPSY performed by Gordon Huang MD at [address] ESOPHAGUS      ENDOSCOPY, COLON, DIAGNOSTIC      EYE SURGERY Left 01/30/2015    EYE SURGERY      CATARACT REMOVAL- BILATERAL    HEMORRHOID SURGERY  1980s    HERNIA REPAIR      HYSTERECTOMY, TOTAL ABDOMINAL  1980    JOINT REPLACEMENT  bilat 2005/ 2007    knees    HI COLSC FLX WITH DIRECTED SUBMUCOSAL NJX ANY SBST  10/11/2018    COLONOSCOPY SUBMUCOSAL/BOTOX INJECTION performed by Gordon Huang MD at [address]  last 2009    removed a bone (2)--2 times no construction     SINUS SURGERY  02/01/2016    SINUS SURGERY  2016 x 2    SINUS SURGERY  09/18/2017    [facility] - Dr Jacy Casillas S[facility]US SURGERY  08/09/2017 [facility]2017 [facility]    TONSILLECTOMY      TOTAL KNEE ARTHROPLASTY  08/2003    Left TKR    VENTRAL HERNIA REPAIR  1992     FAMILY HISTORY         Family History   Problem Relation Age of Onset    Diabetes Mother     Heart Disease Father         whooping cough as child    Obesity Sister     Other Brother         tumor on back    Obesity Sister     Cancer Sister         Skin     Cancer Brother         Bone cancer from metal    Other Brother         passed as a child    Heart Disease Brother     Other Brother         tremor    Heart Attack Brother     No Known Problems Brother     Heart Disease Brother     No Known Problems Brother     No Known Problems Brother        SOCIAL and swelling with fluid retention over the periorbital skin. Chest: Bilateral air entry  Cardiovascular:  RRR ,S1S2, no murmur or gallop. Abdomen:  Soft, non tender to palpation. Extremities: Both legs are red and swollen oozing of fluid nonpitting edema  Skin:  Warm and dry. CNS: Oriented to person place and time. LABS    LABS     Lab Results   Component Value Date    BC No growth-preliminary  No growth   02/24/2019       Assessment:    -Chronic leg swelling with lymphoedema: will need compression wrap with unaboot  -Right periorbital swelling with edema: likely related to her sinus surgery.  She is on oral antibiotic.  -Will arrange home health     Patient Active Problem List   Diagnosis Code    Hypercholesterolemia E78.00    Osteoarthritis M19.90    Osteoporosis M81.0    Type 2 diabetes mellitus without complication, without long-term current use of insulin ([facility] [address] E11.9    DDD (degenerative disc disease), lumbar M51.36    Benign essential HTN I10    SWAPNA on CPAP G47.33, Z99.89    Diabetic peripheral neuropathy (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) I50.23    Bilateral cellulitis of lower leg L03.116, L03.115    Venous ulcers of both lower extremities ([facility]) I87.2, L97.919, L97.929    Bilateral lower leg cellulitis L03.116, L03.115    CKD (chronic kidney disease) stage 3, GFR 30-59 ml/min (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 3XXA    Facial cellulitis L03. 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 Discharge Instructions       Visit Discharge/Physician Orders:  -Use Lymphedema pump twice daily for 1 hour each time. Home Care: Referral to [address]    Wound Location: Right lower leg, Left lower leg    Dressing orders for Home Health:     1) Gather wound care supplies and arrange on clean table. 2) Wash your hands with soap and water or use alcohol based hand  for 20 seconds (sing \"Happy Birthday\" twice). 3) Remove old dressings. Cleanse legs/feet with warm soapy water, rinse, pat dry. Cleanse wounds with normal saline or wound cleanser and gauze. Pat dry with clean gauze.     4) Right and Left lower legs/feet- Apply steroid (fluocinide) cream to skin irritation on legs/feet. Apply alginate to draining wounds. Wrap with unna boot, kerlix, then coban from base of toes to 1-2 inches below bend of knees. Home Health to change twice weekly. *If unna boot becomes too tight- raise/elevate legs above the level of the heart for 15-20 minutes if swelling does not go down then carefully cut off unna boot and call clinic or go to local ER or family physician. If unna boot becomes wet or starts to roll down then carefully remove unna boot and call clinic. Keep all dressings clean & dry. Do not shower, take baths or get wound wet, unless otherwise instructed by your Wound Care doctor. Follow up visit:   3 Weeks on Wednesday     Keep next scheduled appointment. Please give 24 hour notice if unable to keep appointment. [phone]    If you experience any of the following, please call the Wound Care Service during business hours: Monday through Friday 8:00 am - 4:30 pm  ([phone]). *Increase in pain   *Temperature over 101   *Increase in drainage from your wound or a foul odor   *Uncontrolled swelling   *Need for compression bandage changes due to slippage, breakthrough drainage    If you need medical attention outside of business hours, please contact your Primary Care Doctor or go to the nearest emergency room.                    Electronically signed by Phil Mixon MD on 3/11/2020 at 9:44 AM

## 2020-03-13 ENCOUNTER — HOSPITAL ENCOUNTER (EMERGENCY)
Age: 80
Discharge: HOME | End: 2020-03-13
Attending: FAMILY MEDICINE
Payer: MEDICARE

## 2020-03-13 VITALS
RESPIRATION RATE: 17 BRPM | HEART RATE: 68 BPM | SYSTOLIC BLOOD PRESSURE: 170 MMHG | DIASTOLIC BLOOD PRESSURE: 77 MMHG | HEIGHT: 60 IN | TEMPERATURE: 97.8 F | OXYGEN SATURATION: 97 % | WEIGHT: 201 LBS | BODY MASS INDEX: 39.46 KG/M2

## 2020-03-13 LAB
ALBUMIN SERPL-MCNC: 3.4 G/DL (ref 3.5–5.1)
ALP BLD-CCNC: 95 U/L (ref 38–126)
ALT SERPL-CCNC: 7 U/L (ref 11–66)
ANION GAP SERPL CALCULATED.3IONS-SCNC: 12 MEQ/L (ref 8–16)
AST SERPL-CCNC: 11 U/L (ref 5–40)
BASOPHILIA: ABNORMAL
BASOPHILS # BLD: 0.5 %
BASOPHILS ABSOLUTE: 0 THOU/MM3 (ref 0–0.1)
BILIRUB SERPL-MCNC: < 0.2 MG/DL (ref 0.3–1.2)
BUN BLDV-MCNC: 13 MG/DL (ref 7–22)
CALCIUM SERPL-MCNC: 9 MG/DL (ref 8.5–10.5)
CHLORIDE BLD-SCNC: 102 MEQ/L (ref 98–111)
CO2: 27 MEQ/L (ref 23–33)
CREAT SERPL-MCNC: 0.7 MG/DL (ref 0.4–1.2)
EOSINOPHIL # BLD: 3.3 %
EOSINOPHILS ABSOLUTE: 0.2 THOU/MM3 (ref 0–0.4)
ERYTHROCYTE [DISTWIDTH] IN BLOOD BY AUTOMATED COUNT: 20.1 % (ref 11.5–14.5)
ERYTHROCYTE [DISTWIDTH] IN BLOOD BY AUTOMATED COUNT: 56.1 FL (ref 35–45)
GFR SERPL CREATININE-BSD FRML MDRD: 81 ML/MIN/1.73M2
GLUCOSE BLD-MCNC: 112 MG/DL (ref 70–108)
HCT VFR BLD CALC: 32 % (ref 37–47)
HEMOGLOBIN: 9.1 GM/DL (ref 12–16)
HYPOCHROMIA: PRESENT
IMMATURE GRANS (ABS): 0.03 THOU/MM3 (ref 0–0.07)
IMMATURE GRANULOCYTES: 0.5 %
LYMPHOCYTES # BLD: 24.3 %
LYMPHOCYTES ABSOLUTE: 1.4 THOU/MM3 (ref 1–4.8)
MCH RBC QN AUTO: 22.2 PG (ref 26–33)
MCHC RBC AUTO-ENTMCNC: 28.4 GM/DL (ref 32.2–35.5)
MCV RBC AUTO: 78.2 FL (ref 81–99)
MONOCYTES # BLD: 9.4 %
MONOCYTES ABSOLUTE: 0.5 THOU/MM3 (ref 0.4–1.3)
NUCLEATED RED BLOOD CELLS: 0 /100 WBC
OSMOLALITY CALCULATION: 282.1 MOSMOL/KG (ref 275–300)
PLATELET # BLD: 203 THOU/MM3 (ref 130–400)
PLATELET ESTIMATE: ADEQUATE
PMV BLD AUTO: 9.2 FL (ref 9.4–12.4)
POIKILOCYTES: ABNORMAL
POTASSIUM REFLEX MAGNESIUM: 4.1 MEQ/L (ref 3.5–5.2)
RBC # BLD: 4.09 MILL/MM3 (ref 4.2–5.4)
SCAN OF BLOOD SMEAR: NORMAL
SEG NEUTROPHILS: 62 %
SEGMENTED NEUTROPHILS ABSOLUTE COUNT: 3.6 THOU/MM3 (ref 1.8–7.7)
SODIUM BLD-SCNC: 141 MEQ/L (ref 135–145)
TOTAL PROTEIN: 7.1 G/DL (ref 6.1–8)
WBC # BLD: 5.8 THOU/MM3 (ref 4.8–10.8)

## 2020-03-13 PROCEDURE — 6360000002 HC RX W HCPCS: Performed by: FAMILY MEDICINE

## 2020-03-13 PROCEDURE — 99283 EMERGENCY DEPT VISIT LOW MDM: CPT

## 2020-03-13 PROCEDURE — 96365 THER/PROPH/DIAG IV INF INIT: CPT

## 2020-03-13 PROCEDURE — 80053 COMPREHEN METABOLIC PANEL: CPT

## 2020-03-13 PROCEDURE — 96375 TX/PRO/DX INJ NEW DRUG ADDON: CPT

## 2020-03-13 PROCEDURE — 36415 COLL VENOUS BLD VENIPUNCTURE: CPT

## 2020-03-13 PROCEDURE — 2580000003 HC RX 258: Performed by: FAMILY MEDICINE

## 2020-03-13 PROCEDURE — 85025 COMPLETE CBC W/AUTO DIFF WBC: CPT

## 2020-03-13 RX ORDER — DIPHENHYDRAMINE HYDROCHLORIDE 50 MG/ML
25 INJECTION INTRAMUSCULAR; INTRAVENOUS ONCE
Status: COMPLETED | OUTPATIENT
Start: 2020-03-13 | End: 2020-03-13

## 2020-03-13 RX ORDER — HYDROXYZINE PAMOATE 25 MG/1
25 CAPSULE ORAL 3 TIMES DAILY PRN
Qty: 21 CAPSULE | Refills: 0 | Status: SHIPPED | OUTPATIENT
Start: 2020-03-13 | End: 2020-03-27

## 2020-03-13 RX ORDER — METHYLPREDNISOLONE SODIUM SUCCINATE 125 MG/2ML
60 INJECTION, POWDER, LYOPHILIZED, FOR SOLUTION INTRAMUSCULAR; INTRAVENOUS ONCE
Status: COMPLETED | OUTPATIENT
Start: 2020-03-13 | End: 2020-03-13

## 2020-03-13 RX ORDER — CEPHALEXIN 500 MG/1
500 CAPSULE ORAL 3 TIMES DAILY
Qty: 21 CAPSULE | Refills: 0 | Status: SHIPPED | OUTPATIENT
Start: 2020-03-13 | End: 2020-03-20

## 2020-03-13 RX ADMIN — CEFTRIAXONE SODIUM 1 G: 1 INJECTION, POWDER, FOR SOLUTION INTRAMUSCULAR; INTRAVENOUS at 18:54

## 2020-03-13 RX ADMIN — DIPHENHYDRAMINE HYDROCHLORIDE 25 MG: 50 INJECTION, SOLUTION INTRAMUSCULAR; INTRAVENOUS at 17:39

## 2020-03-13 RX ADMIN — METHYLPREDNISOLONE SODIUM SUCCINATE 60 MG: 125 INJECTION, POWDER, FOR SOLUTION INTRAMUSCULAR; INTRAVENOUS at 17:40

## 2020-03-13 ASSESSMENT — PAIN DESCRIPTION - LOCATION: LOCATION: EYE;HEAD

## 2020-03-13 ASSESSMENT — PAIN SCALES - GENERAL: PAINLEVEL_OUTOF10: 8

## 2020-03-13 ASSESSMENT — PAIN DESCRIPTION - PAIN TYPE: TYPE: ACUTE PAIN

## 2020-03-13 ASSESSMENT — PAIN DESCRIPTION - ORIENTATION: ORIENTATION: RIGHT

## 2020-03-13 NOTE — ED TRIAGE NOTES
Presents to ER with complaints of worsening right eye swelling. Pt states she had nose surgery to remove cancer 2 weeks ago and her eye has gradually started to swell. PT states she still produces tears. States she has right eye pain rating it a 8 out of 10 in severity. Respirations unlabored. Dr. Drake Hagan at bedside for patient evaluation.

## 2020-03-13 NOTE — ED NOTES
Pt resting in bed watchig television, respirations easy and unlabored. Call light in reach. Medication started per [facility] at this time. Discharge pending completion of medication.      Tia Flower RN  03/13/20 2772

## 2020-03-13 NOTE — ED NOTES
ED nurse-to-nurse bedside report    Chief Complaint   Patient presents with    Facial Swelling      LOC: alert and orientated to name, place, date  Vital signs   Vitals:    03/13/20 1724 03/13/20 1743 03/13/20 1854   BP: (!) 195/95 (!) 177/72 (!) 170/77   Pulse: 71 84 68   Resp: 16  17   Temp: 97.8 °F (36.6 °C)     TempSrc: Oral     SpO2: 98%  97%   Weight: 201 lb (91.2 kg)     Height: 5' (1.524 m)        Pain:  5  Pain Interventions: medications  Pain Goal: 2  Oxygen: NA    Current needs required none   Telemetry: Yes  LDAs:   Peripheral IV 03/13/20 Right Antecubital (Active)   Site Assessment Clean; Intact;Dry 3/13/2020  5:31 PM   Line Status Normal saline locked 3/13/2020  5:31 PM   Dressing Status Clean;Dry; Intact 3/13/2020  5:31 PM     Continuous Infusions:   Mobility: Requires assistance * 1  Javed Fall Risk Score: Fall Risk 8/14/2019 7/29/2019 7/11/2019 6/10/2019 5/28/2019 9/12/2018 3/14/2018   2 or more falls in past year?  yes yes yes yes yes yes no   Fall with injury in past year? no no no no no no yes     Fall Interventions: call light in reach, side rails up x2  Report given to: Erika Edwards RN  03/13/20 [id number]

## 2020-03-13 NOTE — ED PROVIDER NOTES
[address] COMPLAINT   Chief Complaint   Patient presents with    Facial Swelling        KUSHAL Martins is a 67 y.o. female who presents with sudden onset redness and erythema around right eye, onset was just prior to arrival. The context is that the patient did not understand what happened but that between yesterday and today the swelling got worse. She denies any insect sting or allergic contact. She denies tearing or vision changes. Closing eye or touching the affected area worsened the discomfort. There are no alleviating factors. The duration has been constant since the onset     REVIEW OF SYSTEMS   Cardiac: No Chest Pain, No syncope  Respiratory: denies sob  ENT: No tongue or throat swelling  Eye: +swelling around right eye, denies vision loss  GI: Denies Vomiting or abdominal pain  Skin: redness around eye  General: No Fever   Review of systems otherwise negative. PAST MEDICAL & SURGICAL HISTORY   Past Medical History:   Diagnosis Date    Cancer [facility])     adenocarcinoma of her sinuses    Cataract of both eyes     DDD (degenerative disc disease), lumbar     Dysphagia, pharyngoesophageal 09/26/2016    Eczema 03/2018    Fibrocystic breast     H/O ventral hernia repair     Headache 02/09/2017    Hypercholesteremia     Hyperlipidemia     Hypertension     Iron deficiency anemia     LPRD (laryngopharyngeal reflux disease) 09/26/2016    Neuropathy     peripheral    Obesity     Orbital abscess     Orbital cellulitis 01/22/2014    SWAPNA (obstructive sleep apnea)     Osteoarthritis     Osteoporosis     Persistent proteinuria associated with type 2 diabetes mellitus ([facility] [address] 01/2017    urine micral of 50.       Sinusitis     Type II or unspecified type diabetes mellitus without mention of complication, not stated as uncontrolled     diagnoses approx 2000    Velopharyngeal incompetence 09/26/2016     Past Surgical History:   Procedure Laterality Date    ABDOMEN

## 2020-03-13 NOTE — ED NOTES
Upon first contact with patient this RN receives bedside shift report from [facility]. Pt resting comfortably at this time. Finishing up provided meal. Pt denies other needs.        Jayme Son RN  03/13/20 1915

## 2020-03-19 RX ORDER — HYDRALAZINE HYDROCHLORIDE 50 MG/1
50 TABLET, FILM COATED ORAL 2 TIMES DAILY
Qty: 180 TABLET | Refills: 2 | Status: ON HOLD | OUTPATIENT
Start: 2020-03-19 | End: 2021-03-31 | Stop reason: HOSPADM

## 2020-03-19 NOTE — TELEPHONE ENCOUNTER
Lyndon Nation needs refill of   Requested Prescriptions     Pending Prescriptions Disp Refills    hydrALAZINE (APRESOLINE) 50 MG tablet       Sig: Take 1 tablet by mouth 2 times daily       Last Filled on:  2/25/19? Last Visit Date: 2/26/2020 DM    Next Visit Date:   Visit date not found    Daughter Julee Casillas reports that [facility] pharmacist told them they never received this Rx. Pt will run out after 1 more week. Pls call daughter at [phone] only if probs.

## 2020-03-26 ENCOUNTER — TELEPHONE (OUTPATIENT)
Dept: FAMILY MEDICINE CLINIC | Age: 80
End: 2020-03-26

## 2020-03-26 ENCOUNTER — HOSPITAL ENCOUNTER (EMERGENCY)
Age: 80
Discharge: HOME | End: 2020-03-26
Payer: MEDICARE

## 2020-03-26 VITALS
DIASTOLIC BLOOD PRESSURE: 84 MMHG | OXYGEN SATURATION: 95 % | BODY MASS INDEX: 39.26 KG/M2 | HEART RATE: 63 BPM | RESPIRATION RATE: 18 BRPM | SYSTOLIC BLOOD PRESSURE: 181 MMHG | TEMPERATURE: 97.4 F | WEIGHT: 201 LBS

## 2020-03-26 PROCEDURE — 99283 EMERGENCY DEPT VISIT LOW MDM: CPT

## 2020-03-26 RX ORDER — DOXYCYCLINE 100 MG/1
100 TABLET ORAL 2 TIMES DAILY
Qty: 20 TABLET | Refills: 0 | Status: SHIPPED | OUTPATIENT
Start: 2020-03-26 | End: 2020-04-05

## 2020-03-26 ASSESSMENT — ENCOUNTER SYMPTOMS
NAUSEA: 0
ABDOMINAL DISTENTION: 0
RHINORRHEA: 0
WHEEZING: 0
ABDOMINAL PAIN: 0
DIARRHEA: 0
VOICE CHANGE: 0
EYE DISCHARGE: 1
VOMITING: 0
BLOOD IN STOOL: 0
CHEST TIGHTNESS: 0
COUGH: 0
COLOR CHANGE: 0
FACIAL SWELLING: 1
EYE PAIN: 1
SORE THROAT: 1
CONSTIPATION: 0
EYE REDNESS: 1
BACK PAIN: 0
PHOTOPHOBIA: 0
SINUS PRESSURE: 0
SHORTNESS OF BREATH: 0

## 2020-03-26 ASSESSMENT — PAIN DESCRIPTION - ORIENTATION: ORIENTATION: RIGHT

## 2020-03-26 ASSESSMENT — PAIN DESCRIPTION - LOCATION: LOCATION: EYE

## 2020-03-26 ASSESSMENT — PAIN DESCRIPTION - PAIN TYPE: TYPE: ACUTE PAIN

## 2020-03-26 ASSESSMENT — PAIN DESCRIPTION - FREQUENCY: FREQUENCY: CONTINUOUS

## 2020-03-26 ASSESSMENT — PAIN DESCRIPTION - ONSET: ONSET: GRADUAL

## 2020-03-26 ASSESSMENT — PAIN SCALES - GENERAL: PAINLEVEL_OUTOF10: 9

## 2020-03-26 ASSESSMENT — PAIN DESCRIPTION - PROGRESSION: CLINICAL_PROGRESSION: GRADUALLY WORSENING

## 2020-03-26 ASSESSMENT — PAIN DESCRIPTION - DESCRIPTORS: DESCRIPTORS: CONSTANT;DULL

## 2020-03-26 NOTE — ED PROVIDER NOTES
Endoscopy, colon, diagnostic; eye surgery; hernia repair; Tonsillectomy; sinus surgery (09/18/2017); sinus surgery (08/09/2017); Abdomen surgery; Dilatation, esophagus; Appendectomy; pr colsc flx with directed submucosal njx any sbst (10/11/2018); and Colonoscopy (10/11/2018). CURRENT MEDICATIONS       Discharge Medication List as of 3/26/2020 12:58 PM      CONTINUE these medications which have NOT CHANGED    Details   hydrALAZINE (APRESOLINE) 50 MG tablet Take 1 tablet by mouth 2 times daily, Disp-180 tablet, R-2Normal      hydrOXYzine (VISTARIL) 25 MG capsule Take 1 capsule by mouth 3 times daily as needed for Itching, Disp-21 capsule, R-0Print      amoxicillin-clavulanate (AUGMENTIN) 875-125 MG per tablet Take 1 tablet by mouth 2 times dailyHistorical Med      ferrous sulfate (IRON 325) 325 (65 Fe) MG tablet Take 325 mg by mouth daily (with breakfast)Historical Med      omeprazole (PRILOSEC) 20 MG delayed release capsule TAKE ONE CAPSULE BY MOUTH ONCE DAILY, Disp-90 capsule, R-3Normal      SITagliptin (JANUVIA) 100 MG tablet Take 1 tablet by mouth daily, Disp-90 tablet, R-3Normal      metFORMIN (GLUCOPHAGE) 1000 MG tablet TAKE ONE TABLET IN THE EVENING, Disp-90 tablet, R-3Normal      bumetanide (BUMEX) 2 MG tablet Take 0.5 tablets by mouth 2 times daily, Disp-90 tablet, R-3Normal      potassium chloride (KLOR-CON M) 20 MEQ extended release tablet Take 1 tablet by mouth daily, Disp-90 tablet, R-3Normal      Handic[facility] Starting Fri 10/18/2019, Disp-2 each, R-0, PrintDx:I187.2,M81.0. Duration: 5 years.       pregabalin (LYRICA) 150 MG capsule TAKE 2 CAPSULES BY MOUTH IN THE MORNING AND 2 CAPS AT BEDTIME, E11.42, Disp-120 capsule, R-5Normal      docusate sodium (COLACE) 100 MG capsule Take 1 capsule by mouth 2 times daily as needed for Constipation, Disp-28 capsule, R-2Normal      doxepin (SINEQUAN) 10 MG capsule Take 10 mg by mouth nightlyHistorical Med      mupirocin (BACTROBAN NASAL) 2 % nasal surrounding the eye, there was no injection of the eye, no proptosis, no scleral injection, pupils were equal and reactive to light, no pain with movement of the eye. Her mouth showed no abnormality. I discussed the case with Dr. Siobhan Anne ER attending he suggested follow-up with the patient's ENT. I discussed the case with the patient's ENT Dr. Radha Larios from the [facility]. He suggested placing the patient on doxycycline, if no improvement in 2 days return to the emergency department or go to the [facility] emergency department for follow-up and evaluation. I discussed my findings my plan of care the patient she is amenable with this plan of care. While here in the emergency department she maintained stable course and appropriate for discharge. Medications - No data to display    Patient was seenindependently by myself. The patient's final impression and disposition and plan was determined by myself. CRITICAL CARE:   None    CONSULTS:  None    PROCEDURES:  None    FINAL IMPRESSION     1. Periorbital cellulitis of right eye          DISPOSITION/PLAN   Patient discharged in stable condition    PATIENT REFERREDTO:  [facility] [address] [address] EMERGENCY DEPT  [facility] [address] [place] [address],[address]  Go in 2 days  Or go to [address] [place] [address] in 2 days if symptoms fail to improve      DISCHARGE MEDICATIONS:  Discharge Medication List as of 3/26/2020 12:58 PM      START taking these medications    Details   doxycycline monohydrate (ADOXA) 100 MG tablet Take 1 tablet by mouth 2 times daily for 10 days, Disp-20 tablet, R-0Print             (Please note that portions of this note were completed with a voice recognition program.  Efforts were made to edit the dictations but occasionally words are mis-transcribed.)    Scribe:  Nata Aldrich 3/26/20 11:16 AM EDT Scribing for and in the presence of Rakesh Delgado CNP.     Signed by: Viv Shah, 03/26/20 4:45 PM    Provider:  I personally performed the services described in the documentation,reviewed and edited the documentation which was dictated to the scribe in my presence, and it accurately records my words and actions.     Rakesh Delgado, JUAN MANUEL 03/26/20 4:45 PM    Edward Delgado, APRN - CNP          Floop, APRN - CNP  03/26/20 2725

## 2020-03-27 ENCOUNTER — CARE COORDINATION (OUTPATIENT)
Dept: CARE COORDINATION | Age: 80
End: 2020-03-27

## 2020-03-27 NOTE — CARE COORDINATION
Attempted to reach [facility] for ED f/u COVID-19 outreach. No answer. Message left to return call.

## 2020-03-30 ENCOUNTER — CARE COORDINATION (OUTPATIENT)
Dept: CARE COORDINATION | Age: 80
End: 2020-03-30

## 2020-03-30 NOTE — CARE COORDINATION
Attempted to reach \[facility]\"" for ED f/u [employer]. No answer. Message left to return call.

## 2020-03-30 NOTE — CARE COORDINATION
Spoke with daughter Cinthia Sutton today. Patient contacted regarding recent discharge and COVID-19 risk   Care Transition Nurse/ Ambulatory Care Manager contacted the caregiver by telephone to perform post discharge assessment. Verified name and  with caregiver as identifiers. Patient has following risk factors of: DM and heart failure. CTN/ACM reviewed discharge instructions, medical action plan and red flags related to discharge diagnosis. Reviewed and educated them on any new and changed medications related to discharge diagnosis. Advised obtaining a 90-day supply of all daily and as-needed medications. Education provided regarding infection prevention, and signs and symptoms of COVID-19 and when to seek medical attention with caregiver who verbalized understanding. Discussed exposure protocols and quarantine from [address] you at higher risk for severe illness  and given an opportunity for questions and concerns. The caregiver agrees to contact the COVID-19 hotline [phone] or PCP office for questions related to their healthcare. CTN/ACM provided contact information for future reference. From CDC: Are you at higher risk for severe illness?  Wash your hands often.  Avoid close contact (6 feet, which is about two arm lengths) with people who are sick.  Put distance between yourself and other people if COVID-19 is spreading in your community.  Clean and disinfect frequently touched surfaces.  Avoid all cruise travel and non-essential air travel.  Call your healthcare professional if you have concerns about COVID-19 and your underlying condition or if you are sick.     For more information on steps you can take to protect yourself, see CDC's How to Protect Yourself

## 2020-03-31 ENCOUNTER — TELEPHONE (OUTPATIENT)
Dept: WOUND CARE | Age: 80
End: 2020-03-31

## 2020-04-03 ENCOUNTER — TELEPHONE (OUTPATIENT)
Dept: WOUND CARE | Age: 80
End: 2020-04-03

## 2020-04-03 NOTE — PROGRESS NOTES
Nehemiah Muñoz from [facility] called stating pt. Is healed and ready for discharge. Called and discussed with patients daughter who agrees. Will keep appointment on 4/15 for now and cancel at a later date if not needed. Nehemiah Muñoz [facility] to fax over discharge orders.

## 2020-04-10 ENCOUNTER — CARE COORDINATION (OUTPATIENT)
Dept: CARE COORDINATION | Age: 80
End: 2020-04-10

## 2020-04-14 ENCOUNTER — TELEPHONE (OUTPATIENT)
Dept: WOUND CARE | Age: 80
End: 2020-04-14

## 2020-04-15 RX ORDER — PREGABALIN 150 MG/1
CAPSULE ORAL
Qty: 120 CAPSULE | Refills: 5 | Status: SHIPPED | OUTPATIENT
Start: 2020-04-15 | End: 2020-10-14

## 2020-04-20 ENCOUNTER — VIRTUAL VISIT (OUTPATIENT)
Dept: FAMILY MEDICINE CLINIC | Age: 80
End: 2020-04-20
Payer: MEDICARE

## 2020-04-20 VITALS
WEIGHT: 203 LBS | DIASTOLIC BLOOD PRESSURE: 89 MMHG | HEART RATE: 81 BPM | SYSTOLIC BLOOD PRESSURE: 128 MMHG | BODY MASS INDEX: 39.65 KG/M2

## 2020-04-20 PROCEDURE — 1123F ACP DISCUSS/DSCN MKR DOCD: CPT | Performed by: FAMILY MEDICINE

## 2020-04-20 PROCEDURE — G0438 PPPS, INITIAL VISIT: HCPCS | Performed by: FAMILY MEDICINE

## 2020-04-20 PROCEDURE — 4040F PNEUMOC VAC/ADMIN/RCVD: CPT | Performed by: FAMILY MEDICINE

## 2020-04-20 ASSESSMENT — PATIENT HEALTH QUESTIONNAIRE - PHQ9
SUM OF ALL RESPONSES TO PHQ QUESTIONS 1-9: 0
SUM OF ALL RESPONSES TO PHQ QUESTIONS 1-9: 0

## 2020-04-20 ASSESSMENT — LIFESTYLE VARIABLES: HOW OFTEN DO YOU HAVE A DRINK CONTAINING ALCOHOL: 0

## 2020-04-20 NOTE — PROGRESS NOTES
Vitals:    04/20/20 0933   BP: 128/89   Pulse: 81   Weight: 203 lb (92.1 kg)     Body mass index is 39.65 kg/m². Based upon direct observation of the patient, evaluation of cognition reveals recent and remote memory intact. Patient's complete Health Risk Assessment and screening values have been reviewed and are found in Flowsheets. The following problems were reviewed today and where indicated follow up appointments were made and/or referrals ordered. Positive Risk Factor Screenings with Interventions:     Fall Risk:  Timed Up and Go Test > 12 seconds? (Complete if either Fall Risk answers are Yes): (!) yes  2 or more falls in past year?: (!) yes  Fall with injury in past year?: no  Fall Risk Interventions:    · Home safety tips provided    General Health:     General Health Risk Interventions:  · Pain issues: home exercises provided  · Fatigue: patient declines any further evaluation/treatment for this issue  · Loneliness: due to coronavirus restrictions  · Social isolation: due to coronovirus restrictions    Health Habits/Nutrition:     Body mass index is 39.65 kg/m². Health Habits/Nutrition Interventions:  · Nutritional issues:  educational materials for healthy, well-balanced diet provided    Hearing/Vision:  No exam data present     Hearing/Vision Interventions:  · Hearing concerns:  patient declines any further evaluation/treatment for hearing issues    ADL:     ADL Interventions:  · gets help from family    No living will, info given.     Personalized Preventive Plan   Current Health Maintenance Status  Immunization History   Administered Date(s) Administered    DTaP 03/07/1997    Hib, unspecified 09/21/2017    Influenza 10/12/2011, 11/02/2012    Influenza Vaccine, unspecified formulation 02/02/2016, 11/03/2016    Influenza Virus Vaccine 10/04/2014    Influenza Whole 10/08/1999, 11/28/2006, 10/01/2007, 10/16/2008, 08/25/2009, 12/03/2010    Influenza, High Dose (Fluzone 65 yrs and necessary medical care. The patient (and/or legal guardian) has also been advised to contact this office for worsening conditions or problems, and seek emergency medical treatment and/or call 911 if deemed necessary. Services were provided through a video synchronous discussion virtually to substitute for in-person clinic visit. Patient and provider were located at their individual homes. --Vicki Patel MD on 4/20/2020 at 9:49 AM    An electronic signature was used to authenticate this note.

## 2020-04-20 NOTE — PATIENT INSTRUCTIONS
Personalized Preventive Plan for Maggie Salvador - 4/20/2020  Medicare offers a range of preventive health benefits. Some of the tests and screenings are paid in full while other may be subject to a deductible, co-insurance, and/or copay. Some of these benefits include a comprehensive review of your medical history including lifestyle, illnesses that may run in your family, and various assessments and screenings as appropriate. After reviewing your medical record and screening and assessments performed today your provider may have ordered immunizations, labs, imaging, and/or referrals for you. A list of these orders (if applicable) as well as your Preventive Care list are included within your After Visit Summary for your review. Other Preventive Recommendations:    · A preventive eye exam performed by an eye specialist is recommended every 1-2 years to screen for glaucoma; cataracts, macular degeneration, and other eye disorders. · A preventive dental visit is recommended every 6 months. · Try to get at least 150 minutes of exercise per week or 10,000 steps per day on a pedometer . · Order or download the FREE \"Exercise & Physical Activity: Your Everyday Guide\" from [employer]. Call 2-358.334.6010 or search [employer] online. · You need 7894-1737 mg of calcium and 1323-3240 IU of vitamin D per day. It is possible to meet your calcium requirement with diet alone, but a vitamin D supplement is usually necessary to meet this goal.  · When exposed to the sun, use a sunscreen that protects against both UVA and UVB radiation with an SPF of 30 or greater. Reapply every 2 to 3 hours or after sweating, drying off with a towel, or swimming. · Always wear a seat belt when traveling in a car. Always wear a helmet when riding a bicycle or motorcycle.   Patient Education        Well Visit, Over 72: Care Instructions  Your Care Instructions    Physical exams can help you stay healthy. Your doctor has checked your overall health and may have suggested ways to take good care of yourself. He or she also may have recommended tests. At home, you can help prevent illness with healthy eating, regular exercise, and other steps. Follow-up care is a key part of your treatment and safety. Be sure to make and go to all appointments, and call your doctor if you are having problems. It's also a good idea to know your test results and keep a list of the medicines you take. How can you care for yourself at home? Reach and stay at a healthy weight. This will lower your risk for many problems, such as obesity, diabetes, heart disease, and high blood pressure. Get at least 30 minutes of exercise on most days of the week. Walking is a good choice. You also may want to do other activities, such as running, swimming, cycling, or playing tennis or team sports. Do not smoke. Smoking can make health problems worse. If you need help quitting, talk to your doctor about stop-smoking programs and medicines. These can increase your chances of quitting for good. Protect your skin from too much sun. When you're outdoors from 10 a.m. to 4 p.m., stay in the shade or cover up with clothing and a hat with a wide brim. Wear sunglasses that block UV rays. Even when it's cloudy, put broad-spectrum sunscreen (SPF 30 or higher) on any exposed skin. See a dentist one or two times a year for checkups and to have your teeth cleaned. Wear a seat belt in the car. Follow your doctor's advice about when to have certain tests. These tests can spot problems early. For men and women  Cholesterol. Your doctor will tell you how often to have this done based on your overall health and other things that can increase your risk for heart attack and stroke. Blood pressure. Have your blood pressure checked during a routine doctor visit.  Your doctor will tell you how often to check your blood pressure based on your age, your blood [employer]. Care instructions adapted under license by [facility] ([facility]). If you have questions about a medical condition or this instruction, always ask your healthcare professional. [address] any warranty or liability for your use of this information. Patient Education        Learning About How to Make a Home Safe  Making your home safe: Overview  You can help protect the person in your care by making the home safe. Here are some general tips for how to lower the chance of getting injured in the home. Pad sharp corners on furniture and counter tops. Keep objects that are used often within easy reach. Install handrails around the toilet and in the shower. Use a tub mat to prevent slipping. Use a shower chair or bath bench when the person bathes. Provide good lighting inside and outside the home. Put night-lights in bedrooms, hallways, and bathrooms. Have light at the top and bottom of stairways. Have a first aid kit. It is also important to be aware of safe temperatures in the home. When helping someone bathe, use the back of your hand to test the water to make sure it's not too hot. Lower the temperature setting in the hot water heater to 120°F or lower to avoid burns. And make sure other liquids (such as coffee, tea, or soup) are not too hot. How can you protect from fire and carbon monoxide? Home safety alarms are very important. A smoke alarm can detect small amounts of smoke. This can allow time to escape from a fire. And a carbon monoxide alarm can let people know about this deadly gas before it starts to make them sick. Put smoke alarms: On each level of the home, in the hallway outside sleeping areas, and inside each bedroom. In the center of a ceiling, or on a wall 6 to 12 inches from the ceiling. This is where smoke goes first. Avoid places near doors, windows, or air ducts.   Put a carbon monoxide alarm in the hallway outside of the bedrooms in each sleeping area of the house. The alarm should be placed high on the wall. Make sure that the alarm can't be covered up by furniture or drapes. Test alarms regularly by pressing the test button. Replace non-lithium batteries in alarms twice a year. Have a plan for getting out of the home if there is a fire. Practice by having a fire drill. Keep a fire extinguisher in the kitchen. What can you do to prevent falls? You can help prevent falls by keeping rooms uncluttered, with clear walkways around furniture. Keep electrical cords off the floor, and remove throw rugs to prevent tripping. If there are steps in the home, make sure they all have handrails, and always use the handrails. Don't leave items on the steps, and be sure to fix any that are loose, broken, or uneven. How can you increase safety for people with dementia? If you are caring for someone who has dementia, you may need to make some extra changes to create a safe home. People with dementia have a loss of mental skills, such as memory, problem solving, and learning. So things that might not have been a danger to them before can cause safety problems now. Here are some things to consider:  Don't move furniture around. The person may become confused. Use locks on doors and cupboards. Lock up knives, scissors, medicines, cleaning supplies, and other dangerous items. Use hidden switches or controls for the stove, thermostat, water heater, and other appliances. If your loved one is still cooking, think about whether that is safe. It may be okay with some help, depending on your loved one's condition. But for people who have memory or thinking problems, it's best to avoid any activities that might not be safe. If the person tends to wander or to try to leave the home, install motion-sensor lights on all doors and windows. Have emergency numbers in a central area near a phone. Include  911 and numbers for the doctor and family members.   Get medical alert ciara for the person so you can be contacted if he or she wanders away. If possible, provide a safe place for wandering, such as an enclosed yard or garden. Where can you learn more? Go to https://chparker.healthTaodangpu. org and sign in to your [employer] account. Enter U269 in the [employer] box to learn more about \"Learning About How to Make a Home Safe. \"     If you do not have an account, please click on the \"Sign Up Now\" link. Current as of: December 8, 2019Content Version: 12.4  © 4279-7304 Healthwise, [employer]. Care instructions adapted under license by [facility] [place] ([facility]). If you have questions about a medical condition or this instruction, always ask your healthcare professional. [address] any warranty or liability for your use of this information. Patient Education        Learning About Physical Activity  What is physical activity? Physical activity is any kind of activity that gets your body moving. The types of physical activity that can help you get fit and stay healthy include:  Aerobic or \"cardio\" activities that make your heart beat faster and make you breathe harder, such as brisk walking, riding a bike, or running. Aerobic activities strengthen your heart and lungs and build up your endurance. Strength training activities that make your muscles work against, or \"resist,\" something, such as lifting weights or doing push-ups. These activities help tone and strengthen your muscles. Stretches that allow you to move your joints and muscles through their full range of motion. Stretching helps you be more flexible and avoid injury. What are the benefits of physical activity? Being active is one of the best things you can do to get fit and stay healthy. It helps you to:  Feel stronger and have more energy to do all the things you like to do. Focus better at school or work and perform better in sports. Feel, think, and sleep better.   Reach and stay at a

## 2020-04-28 ENCOUNTER — TELEPHONE (OUTPATIENT)
Dept: WOUND CARE | Age: 80
End: 2020-04-28

## 2020-04-28 NOTE — TELEPHONE ENCOUNTER
Rescheduled patient to next week due to covid 23 and daughter states they are also currently in [address] [place] [address] ER for high BP.

## 2020-05-05 ENCOUNTER — TELEPHONE (OUTPATIENT)
Dept: WOUND CARE | Age: 80
End: 2020-05-05

## 2020-05-06 ENCOUNTER — HOSPITAL ENCOUNTER (OUTPATIENT)
Dept: WOUND CARE | Age: 80
Discharge: HOME | End: 2020-05-06
Payer: MEDICARE

## 2020-05-06 VITALS
DIASTOLIC BLOOD PRESSURE: 93 MMHG | OXYGEN SATURATION: 99 % | HEART RATE: 80 BPM | TEMPERATURE: 97.7 F | SYSTOLIC BLOOD PRESSURE: 146 MMHG | RESPIRATION RATE: 20 BRPM

## 2020-05-06 PROCEDURE — 6370000000 HC RX 637 (ALT 250 FOR IP): Performed by: INTERNAL MEDICINE

## 2020-05-06 PROCEDURE — 99212 OFFICE O/P EST SF 10 MIN: CPT

## 2020-05-06 RX ADMIN — FLUOCINONIDE: 0.5 CREAM TOPICAL at 09:28

## 2020-05-06 ASSESSMENT — PAIN DESCRIPTION - LOCATION: LOCATION: ARM

## 2020-05-06 ASSESSMENT — PAIN DESCRIPTION - ORIENTATION: ORIENTATION: LEFT

## 2020-05-06 ASSESSMENT — PAIN SCALES - GENERAL: PAINLEVEL_OUTOF10: 8

## 2020-05-06 NOTE — PROGRESS NOTES
PAST SURGICAL HISTORY     Past Surgical History:   Procedure Laterality Date    ABDOMEN SURGERY      APPENDECTOMY      CARPAL TUNNEL RELEASE Bilateral 2008, 2009    CATARACT REMOVAL  2011    bilat    CHOLECYSTECTOMY  03/1988    COLONOSCOPY  2016    COLONOSCOPY  10/11/2018    COLONOSCOPY POLYPECTOMY SNARE/COLD BIOPSY performed by Steven Hoffman MD at [address] ESOPHAGUS      ENDOSCOPY, COLON, DIAGNOSTIC      EYE SURGERY Left 01/30/2015    EYE SURGERY      CATARACT REMOVAL- BILATERAL    HEMORRHOID SURGERY  1980s    HERNIA REPAIR      HYSTERECTOMY, TOTAL ABDOMINAL  1980    JOINT REPLACEMENT  bilat 2005/ 2007    knees    FL COLSC FLX WITH DIRECTED SUBMUCOSAL Simone Oskar [address] ANY SBST  10/11/2018    COLONOSCOPY SUBMUCOSAL/BOTOX INJECTION performed by Steven Hoffman MD at [address]  last 2009    removed a bone (2)--2 times no construction     SINUS SURGERY  02/01/2016    SINUS SURGERY  2016 x 2    SINUS SURGERY  09/18/2017    [facility] - Dr Man Praesels SINUS SURGERY  08/09/2017 [facility]17[facility]2017 [facility]    TONSILLECTOMY      TOTAL KNEE ARTHROPLASTY  08/2003    Left TKR    VENTRAL HERNIA REPAIR  1992     FAMILY HISTORY         Family History   Problem Relation Age of Onset    Diabetes Mother     Heart Disease Father         whooping cough as child    Obesity Sister     Other Brother         tumor on back    Obesity Sister     Cancer Sister         Skin     Cancer Brother         Bone cancer from metal    Other Brother         passed as a child    Heart Disease Brother     Other Brother         tremor    Heart Attack Brother     No Known Problems Brother     Heart Disease Brother     No Known Problems Brother     No Known Problems Brother      SOCIAL HISTORY       Social History     Tobacco Use    Smoking status: Never Smoker    Smokeless tobacco: Never Used   Substance Use Topics    Alcohol use: No    Drug use: No     ALLERGIES         Allergies   Allergen Reactions    Bactrim [Sulfamethoxazole-Trimethoprim] Rash    Morphine Other (See Comments)     headaches    Hydrocodone      headaches    Lisinopril Swelling    Percocet [Oxycodone-Acetaminophen] Other (See Comments)     Headaches    Tylenol [Acetaminophen] Other (See Comments)     TYLENOL 3 causes hallucinations    Tape [Adhesive Tape] Rash     MEDICATIONS         Current Outpatient Medications on File Prior to Encounter   Medication Sig Dispense Refill    pregabalin (LYRICA) 150 MG capsule TAKE 2 CAPSULES BY MOUTH IN THE MORNING AND 2 CAPS AT BEDTIME, E11.42 120 capsule 5    hydrALAZINE (APRESOLINE) 50 MG tablet Take 1 tablet by mouth 2 times daily 180 tablet 2    ferrous sulfate (IRON 325) 325 (65 Fe) MG tablet Take 325 mg by mouth daily (with breakfast)      omeprazole (PRILOSEC) 20 MG delayed release capsule TAKE ONE CAPSULE BY MOUTH ONCE DAILY 90 capsule 3    SITagliptin (JANUVIA) 100 MG tablet Take 1 tablet by mouth daily 90 tablet 3    metFORMIN (GLUCOPHAGE) 1000 MG tablet TAKE ONE TABLET IN THE EVENING 90 tablet 3    bumetanide (BUMEX) 2 MG tablet Take 0.5 tablets by mouth 2 times daily 90 tablet 3    potassium chloride (KLOR-CON M) 20 MEQ extended release tablet Take 1 tablet by mouth daily 90 tablet 3    Handicap Placard MISC by Does not apply route Dx:I187.2,M81.0. Duration: 5 years. 2 each 0    docusate sodium (COLACE) 100 MG capsule Take 1 capsule by mouth 2 times daily as needed for Constipation 28 capsule 2    doxepin (SINEQUAN) 10 MG capsule Take 10 mg by mouth nightly      mupirocin (BACTROBAN NASAL) 2 % nasal ointment by Nasal route 2 times daily Take by Nasal route 2 times daily.  cycloSPORINE (RESTASIS) 0.05 % ophthalmic emulsion Place 1 drop into both eyes 2 times daily Patient uses 2-4 times per day.  sodium chloride (OCEAN, BABY AYR) 0.65 % nasal spray 1 spray by Nasal route as needed for Congestion Patient states she uses 4-6x per day.       fluticasone (FLONASE) 50 MCG/ACT nasal spray 2 sprays by Nasal route 2 times daily 1 Bottle 3     No current facility-administered medications on file prior to encounter. REVIEW OF SYSTEMS:     Constitutional: no fever, no night sweats, no fatigue. Head: no head ache , no head injury, no migranes. Eye: no blurring of vision, no double vision. 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   lungs: no cough, no shortness of breath, no wheeze  CVS: no palpitation, no chest pain, no shortness of breath  GI: no abdominal pain, no nausea , no vomiting, no constipation  ABA: no dysuria, frequency and urgency, no hematuria, no kidney stones  Musculoskeletal: + Joint pain, swelling , stiffness,  Endocrine: no polyuria, polydipsia, no cold or heat intolerance  Hematology: no anemia, no easy brusing or bleeding, no hx of clotting disorder  Dermatology: no skin rash, no eczema, no pruritis,  Psychiatry: no depression, no anxiety,no panic attacks, no suicide ideation        PHYSICAL EXAM      oral temperature is 97.7 °F (36.5 °C). Her blood pressure is 146/93 (abnormal) and her pulse is 80. Her respiration is 20 and oxygen saturation is 99%. Wt Readings from Last 3 Encounters:   04/20/20 203 lb (92.1 kg)   03/26/20 201 lb (91.2 kg)   03/13/20 201 lb (91.2 kg)     General Appearance  Awake, alert, oriented,  not  In acute distress  HEENT - normocephalic, atraumatic, pink conjunctiva,  anicteric sclera  Lungs -  Bilateral  air entry, no rhonchi, no wheeze  Cardiovascular - Heart sounds are normal.  Regular rate and rhythm without murmur, gallop or rub. Abdomen - soft, not distended, nontender, no organomegally,  Neurologic -oriented  Skin - No bruising or bleeding  Extremities -legs are swollen both are red has multiple scratch marks.   She has also erythematous maculopapular rash on her left forearm scratch marks were noted         LABS      Lab Results too tight- raise/elevate legs above the level of the heart for 15-20 minutes if swelling does not go down then carefully cut off unna boot and call clinic or go to local ER or family physician. If unna boot becomes wet or starts to roll down then carefully remove unna boot and call clinic.      Keep all dressings clean & dry.     Do not shower, take baths or get wound wet, unless otherwise instructed by your Wound Care doctor.      Follow up visit:   3 Weeks on Wednesday      Keep next scheduled appointment. Please give 24 hour notice if unable to keep appointment. [phone]     If you experience any of the following, please call the Wound Care Service during business hours: Monday through Friday 8:00 am - 4:30 pm  ([phone]).               *Increase in pain              *Temperature over 101              *Increase in drainage from your wound or a foul odor              *Uncontrolled swelling              *Need for compression bandage changes due to slippage, breakthrough drainage     If you need medical attention outside of business hours, please contact your Primary Care Doctor or go to the nearest emergency room.            Electronically signed by Larisa Egan MD on 5/6/2020 at 9:20 AM

## 2020-05-19 ENCOUNTER — TELEPHONE (OUTPATIENT)
Dept: WOUND CARE | Age: 80
End: 2020-05-19

## 2020-05-27 ENCOUNTER — TELEPHONE (OUTPATIENT)
Dept: FAMILY MEDICINE CLINIC | Age: 80
End: 2020-05-27

## 2020-05-27 RX ORDER — NIFEDIPINE 60 MG/1
60 TABLET, FILM COATED, EXTENDED RELEASE ORAL DAILY
Qty: 30 TABLET | Refills: 0 | Status: SHIPPED | OUTPATIENT
Start: 2020-05-27 | End: 2020-06-04 | Stop reason: SDUPTHER

## 2020-05-27 RX ORDER — BUMETANIDE 1 MG/1
1 TABLET ORAL 2 TIMES DAILY
COMMUNITY
Start: 2020-04-29 | End: 2020-05-27 | Stop reason: SDUPTHER

## 2020-05-27 RX ORDER — NIFEDIPINE 60 MG/1
60 TABLET, FILM COATED, EXTENDED RELEASE ORAL DAILY
COMMUNITY
Start: 2020-04-29 | End: 2020-05-27 | Stop reason: SDUPTHER

## 2020-05-27 RX ORDER — BUMETANIDE 1 MG/1
1 TABLET ORAL 2 TIMES DAILY
Qty: 60 TABLET | Refills: 0 | Status: SHIPPED | OUTPATIENT
Start: 2020-05-27 | End: 2020-06-04 | Stop reason: SDUPTHER

## 2020-06-03 ENCOUNTER — HOSPITAL ENCOUNTER (OUTPATIENT)
Dept: WOUND CARE | Age: 80
Discharge: HOME | End: 2020-06-03
Payer: MEDICARE

## 2020-06-03 VITALS
HEART RATE: 84 BPM | BODY MASS INDEX: 40.05 KG/M2 | HEIGHT: 60 IN | SYSTOLIC BLOOD PRESSURE: 136 MMHG | OXYGEN SATURATION: 97 % | DIASTOLIC BLOOD PRESSURE: 77 MMHG | RESPIRATION RATE: 18 BRPM | TEMPERATURE: 97.3 F | WEIGHT: 204 LBS

## 2020-06-03 PROCEDURE — 99212 OFFICE O/P EST SF 10 MIN: CPT

## 2020-06-03 ASSESSMENT — PAIN DESCRIPTION - PAIN TYPE: TYPE: ACUTE PAIN

## 2020-06-03 ASSESSMENT — PAIN SCALES - GENERAL: PAINLEVEL_OUTOF10: 6

## 2020-06-03 ASSESSMENT — PAIN DESCRIPTION - DESCRIPTORS: DESCRIPTORS: ACHING

## 2020-06-03 ASSESSMENT — PAIN DESCRIPTION - LOCATION: LOCATION: HEAD

## 2020-06-03 NOTE — PROGRESS NOTES
SNARE/COLD BIOPSY performed by Romayne Ancona, MD at [address] ESOPHAGUS      ENDOSCOPY, COLON, DIAGNOSTIC      EYE SURGERY Left 01/30/2015    EYE SURGERY      CATARACT REMOVAL- BILATERAL    HEMORRHOID SURGERY  1980s    HERNIA REPAIR      HYSTERECTOMY, TOTAL ABDOMINAL  1980    JOINT REPLACEMENT  bilat 2005/ 2007    knees    [place] COLSC FLX WITH DIRECTED SUBMUCOSAL Simone Oskar Jacey 85 ANY SBST  10/11/2018    COLONOSCOPY SUBMUCOSAL/BOTOX INJECTION performed by Romayne Ancona, MD at [facility]   [address] [facility]  last 2009    removed a bone (2)--2 times no construction     SINUS SURGERY  02/01/2016    SINUS SURGERY  2016 x 2    SINUS SURGERY  09/18/2017    [facility] - Dr Fuchs Friday S[facility]US SURGERY  08[facility]09[facility]2017 [facility]    TONSILLECTOMY      TOTAL KNEE ARTHROPLASTY  08/2003    Left TKR    VENTRAL HERNIA REPAIR  1992     FAMILY HISTORY         Family History   Problem Relation Age of Onset    Diabetes Mother     Heart Disease Father         whooping cough as child    Obesity Sister     Other Brother         tumor on back    Obesity Sister     Cancer Sister         Skin     Cancer Brother         Bone cancer from metal    Other Brother         passed as a child    Heart Disease Brother     Other Brother         tremor    Heart Attack Brother     No Known Problems Brother     Heart Disease Brother     No Known Problems Brother     No Known Problems Brother      SOCIAL HISTORY       Social History     Tobacco Use    Smoking status: Never Smoker    Smokeless tobacco: Never Used   Substance Use Topics    Alcohol use: No    Drug use: No     ALLERGIES         Allergies   Allergen Reactions    Bactrim [Sulfamethoxazole-Trimethoprim] Rash    Morphine Other (See Comments)     headaches    Hydrocodone      headaches    Lisinopril Swelling    Percocet [Oxycodone-Acetaminophen] Other (See Comments)     Headaches    Tylenol [Acetaminophen] Other (See Comments)     TYLENOL 3 causes hallucinations    Tape Ortez Benes Tape] Rash     MEDICATIONS         Current Outpatient Medications on File Prior to Encounter   Medication Sig Dispense Refill    NIFEdipine (ADALAT CC) 60 MG extended release tablet Take 1 tablet by mouth daily 30 tablet 0    bumetanide (BUMEX) 1 MG tablet Take 1 tablet by mouth 2 times daily 60 tablet 0    pregabalin (LYRICA) 150 MG capsule TAKE 2 CAPSULES BY MOUTH IN THE MORNING AND 2 CAPS AT BEDTIME, E11.42 120 capsule 5    hydrALAZINE (APRESOLINE) 50 MG tablet Take 1 tablet by mouth 2 times daily 180 tablet 2    ferrous sulfate (IRON 325) 325 (65 Fe) MG tablet Take 325 mg by mouth daily (with breakfast)      omeprazole (PRILOSEC) 20 MG delayed release capsule TAKE ONE CAPSULE BY MOUTH ONCE DAILY 90 capsule 3    SITagliptin (JANUVIA) 100 MG tablet Take 1 tablet by mouth daily 90 tablet 3    metFORMIN (GLUCOPHAGE) 1000 MG tablet TAKE ONE TABLET IN THE EVENING 90 tablet 3    potassium chloride (KLOR-CON M) 20 MEQ extended release tablet Take 1 tablet by mouth daily 90 tablet 3    Handicap Placard MISC by Does not apply route Dx:I187.2,M81.0. Duration: 5 years. 2 each 0    docusate sodium (COLACE) 100 MG capsule Take 1 capsule by mouth 2 times daily as needed for Constipation 28 capsule 2    doxepin (SINEQUAN) 10 MG capsule Take 10 mg by mouth nightly      mupirocin (BACTROBAN NASAL) 2 % nasal ointment by Nasal route 2 times daily Take by Nasal route 2 times daily.  cycloSPORINE (RESTASIS) 0.05 % ophthalmic emulsion Place 1 drop into both eyes 2 times daily Patient uses 2-4 times per day.  sodium chloride (OCEAN, BABY AYR) 0.65 % nasal spray 1 spray by Nasal route as needed for Congestion Patient states she uses 4-6x per day.  fluticasone (FLONASE) 50 MCG/ACT nasal spray 2 sprays by Nasal route 2 times daily 1 Bottle 3     No current facility-administered medications on file prior to encounter.           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    Skin plaque L98.8    Hyponatremia E87.1    Hypervolemia E87.70    Cellulitis of lower extremity L03.119    Hypoglycemia E16.2    Acute on chronic systolic CHF (congestive heart failure) ([facility]) I50.23    Bilateral cellulitis of lower leg L03.116, L03.115    Venous ulcers of both lower extremities ([facility]) I87.2, L97.919, L97.929    Bilateral lower leg cellulitis L03.116, L03.115    CKD (chronic kidney disease) stage 3, GFR 30-59 ml/min ([facility]) N18.3    Iron deficiency anemia D50.9    Hypomagnesemia E83.42    SBO (small bowel obstruction) ([facility]) K56.609    Venous ulcer of left leg ([facility]) I83.029, L97.929    Venous ulcer of right leg ([facility]) I83.019, L97.919    Acute eczema L30.9    Venous insufficiency of both lower extremities I87.2    Lymphedema of both lower extremities I89.0    Pressure injury of left buttock, stage 2 ([facility]) N60.207    Eczematous dermatitis L30.9    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 3XXA    Facial cellulitis L03. 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

## 2020-06-04 ENCOUNTER — OFFICE VISIT (OUTPATIENT)
Dept: FAMILY MEDICINE CLINIC | Age: 80
End: 2020-06-04
Payer: MEDICARE

## 2020-06-04 VITALS
SYSTOLIC BLOOD PRESSURE: 104 MMHG | TEMPERATURE: 97.3 F | BODY MASS INDEX: 40.11 KG/M2 | DIASTOLIC BLOOD PRESSURE: 72 MMHG | WEIGHT: 205.4 LBS | RESPIRATION RATE: 16 BRPM | HEART RATE: 80 BPM

## 2020-06-04 PROCEDURE — 1111F DSCHRG MED/CURRENT MED MERGE: CPT | Performed by: FAMILY MEDICINE

## 2020-06-04 PROCEDURE — G8427 DOCREV CUR MEDS BY ELIG CLIN: HCPCS | Performed by: FAMILY MEDICINE

## 2020-06-04 PROCEDURE — 1090F PRES/ABSN URINE INCON ASSESS: CPT | Performed by: FAMILY MEDICINE

## 2020-06-04 PROCEDURE — 4040F PNEUMOC VAC/ADMIN/RCVD: CPT | Performed by: FAMILY MEDICINE

## 2020-06-04 PROCEDURE — G8417 CALC BMI ABV UP PARAM F/U: HCPCS | Performed by: FAMILY MEDICINE

## 2020-06-04 PROCEDURE — G8400 PT W/DXA NO RESULTS DOC: HCPCS | Performed by: FAMILY MEDICINE

## 2020-06-04 PROCEDURE — 1123F ACP DISCUSS/DSCN MKR DOCD: CPT | Performed by: FAMILY MEDICINE

## 2020-06-04 PROCEDURE — 1036F TOBACCO NON-USER: CPT | Performed by: FAMILY MEDICINE

## 2020-06-04 PROCEDURE — 99214 OFFICE O/P EST MOD 30 MIN: CPT | Performed by: FAMILY MEDICINE

## 2020-06-04 RX ORDER — NIFEDIPINE 60 MG/1
60 TABLET, FILM COATED, EXTENDED RELEASE ORAL DAILY
Qty: 90 TABLET | Refills: 2 | Status: SHIPPED | OUTPATIENT
Start: 2020-06-04 | End: 2021-04-23 | Stop reason: SDUPTHER

## 2020-06-04 RX ORDER — BUMETANIDE 1 MG/1
1 TABLET ORAL 2 TIMES DAILY
Qty: 180 TABLET | Refills: 2 | Status: SHIPPED | OUTPATIENT
Start: 2020-06-04 | End: 2021-04-23 | Stop reason: SDUPTHER

## 2020-06-04 ASSESSMENT — ENCOUNTER SYMPTOMS
VOMITING: 0
CONSTIPATION: 0
BACK PAIN: 0
WHEEZING: 0
SHORTNESS OF BREATH: 0
DIARRHEA: 0
COUGH: 0
SORE THROAT: 0
BLOOD IN STOOL: 0
CHEST TIGHTNESS: 0
RHINORRHEA: 0
ABDOMINAL PAIN: 0
NAUSEA: 0
EYE PAIN: 0

## 2020-06-04 NOTE — PROGRESS NOTES
Dystrophic nail    Angio-edema    Facial cellulitis    Osteoradionecrosis of skull/sinus    Late effect of radiation    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HCC)    Cataract of both eyes    History of ventral hernia repair    Other cervical disc degeneration, mid-cervical region    Spondylolisthesis of cervical region    Spondylolisthesis of thoracic region    Chronic rhinitis    Closed fracture of head of humerus    Dysphagia    Laryngopharyngeal reflux    Malignant neoplasm of ethmoidal sinus (HCC)    Obesity, Class II, BMI 35-39.9       Allergies   Allergen Reactions    Lisinopril Swelling and Anaphylaxis    Sulfamethoxazole-Trimethoprim Rash    Morphine Other (See Comments)     headaches    Hydrocodone      headaches    Percocet [Oxycodone-Acetaminophen] Other (See Comments)     Headaches    Tylenol [Acetaminophen] Other (See Comments)     TYLENOL 3 causes hallucinations    Tape [Adhesive Tape] Rash       Medications listed as ordered at the time of discharge from hospital       Medications marked \"taking\" at this time  Outpatient Medications Marked as Taking for the 6/4/20 encounter (Office Visit) with Hien Javed MD   Medication Sig Dispense Refill    mupirocin (BACTROBAN) 2 % ointment Use 3-4 times daily in sinus rinses, as instructed in clinic.   Approximately 3-5 gm per day      NIFEdipine (ADALAT CC) 60 MG extended release tablet Take 1 tablet by mouth daily 30 tablet 0    bumetanide (BUMEX) 1 MG tablet Take 1 tablet by mouth 2 times daily 60 tablet 0    pregabalin (LYRICA) 150 MG capsule TAKE 2 CAPSULES BY MOUTH IN THE MORNING AND 2 CAPS AT BEDTIME, E11.42 120 capsule 5    hydrALAZINE (APRESOLINE) 50 MG tablet Take 1 tablet by mouth 2 times daily 180 tablet 2    ferrous sulfate (IRON 325) 325 (65 Fe) MG tablet Take 325 mg by mouth daily (with breakfast)      omeprazole (PRILOSEC) 20 MG delayed release capsule TAKE ONE CAPSULE BY MOUTH ONCE DAILY 90 capsule 3    SITagliptin 06/04/20 104/72   06/03/20 136/77   05/06/20 (!) 146/93       Review of Systems   Constitutional: Negative for chills, fatigue, fever and unexpected weight change. HENT: Negative for congestion, ear pain, rhinorrhea and sore throat. Eyes: Negative for pain and visual disturbance. Respiratory: Negative for cough, chest tightness, shortness of breath and wheezing. Cardiovascular: Negative for chest pain and palpitations. Gastrointestinal: Negative for abdominal pain, blood in stool, constipation, diarrhea, nausea and vomiting. Genitourinary: Negative for difficulty urinating, frequency, hematuria and urgency. Musculoskeletal: Positive for neck pain. Negative for back pain, joint swelling and myalgias. Skin: Negative for rash. Neurological: Positive for headaches. Negative for dizziness. Hematological: Negative for adenopathy. Does not bruise/bleed easily. Psychiatric/Behavioral: Negative for behavioral problems and sleep disturbance. The patient is not nervous/anxious. Physical Exam  Vitals signs and nursing note reviewed. Constitutional:       Appearance: She is well-developed. HENT:      Head: Normocephalic and atraumatic. Right Ear: External ear normal.      Left Ear: External ear normal.      Nose: Nose normal.      Mouth/Throat:      Mouth: Mucous membranes are moist.   Eyes:      Pupils: Pupils are equal, round, and reactive to light. Neck:      Musculoskeletal: Neck supple. Muscular tenderness present. Thyroid: No thyromegaly. Vascular: No carotid bruit. Cardiovascular:      Rate and Rhythm: Normal rate and regular rhythm. Heart sounds: Normal heart sounds. Pulmonary:      Breath sounds: Normal breath sounds. No wheezing or rales. Abdominal:      General: Bowel sounds are normal.      Palpations: Abdomen is soft. Tenderness: There is no abdominal tenderness. There is no guarding or rebound. Musculoskeletal: Normal range of motion. Lymphadenopathy:      Cervical: No cervical adenopathy. Skin:     General: Skin is warm and dry. Findings: No rash. Neurological:      Mental Status: She is alert and oriented to person, place, and time. Cranial Nerves: No cranial nerve deficit. Deep Tendon Reflexes: Reflexes are normal and symmetric. Health Maintenance   Topic Date Due    Shingles Vaccine (1 of 2) 10/26/1991    Potassium monitoring  03/13/2021    Creatinine monitoring  03/13/2021    Annual Wellness Visit (AWV)  04/22/2021    DTaP/Tdap/Td vaccine (3 - Td) 10/03/2027    Flu vaccine  Completed    Pneumococcal 65+ years Vaccine  Completed    DEXA (modify frequency per FRAX score)  Addressed    Hepatitis A vaccine  Aged Out    Hib vaccine  Aged Out    Meningococcal (ACWY) vaccine  Aged Out               Assessment/Plan:  1. Hypertensive crisis  -stable BP, continue meds  -Refill nifedipine    2. Cervical pain (neck)  -set up PT-[facility]    3.  Sinus malignant neoplasm (HCC)  -Follows with [facility]        Medical Decision Making: moderate complexity

## 2020-06-05 ENCOUNTER — HOSPITAL ENCOUNTER (OUTPATIENT)
Dept: PHYSICAL THERAPY | Age: 80
Setting detail: THERAPIES SERIES
Discharge: HOME | End: 2020-06-05
Payer: MEDICARE

## 2020-06-05 PROCEDURE — 97162 PT EVAL MOD COMPLEX 30 MIN: CPT

## 2020-06-05 ASSESSMENT — PAIN SCALES - GENERAL: PAINLEVEL_OUTOF10: 10

## 2020-06-05 ASSESSMENT — PAIN DESCRIPTION - PAIN TYPE: TYPE: ACUTE PAIN

## 2020-06-05 ASSESSMENT — PAIN DESCRIPTION - LOCATION: LOCATION: NECK

## 2020-06-05 ASSESSMENT — PAIN DESCRIPTION - ORIENTATION: ORIENTATION: RIGHT;LEFT

## 2020-06-05 NOTE — PROGRESS NOTES
medications. Subjective:  Chart Reviewed: Yes  Family / Caregiver Present: No  Comments: Follow up with Dr. Jesse Bojorquez as needed. Subjective: Patient reports of problems with neck since 1/2017. Patient reports of severe headache and neck pain today. Notes even driving over bumps in the car bothers her. Patient reports of right shoulder pain from a fall 2 years ago she fractured the shoulder. Denies of numbness and tingling down her arms. Pain is located lower, mid and upper neck, points also to suboccipital region, and headache that travels from back of skull to foreahead sometimes. Notes patient does have problems moving her neck. Her neck and head lean to right. Patient does report of frequent falls in the past. She states that she has not fallen in the last month. Patient walks with quad cane for community distances . She also uses a rollator walker for ambulation on days when she feels bad and weaker. Patient has not had any Xrays of neck  recently. Pain:  Patient Currently in Pain: Yes  Pain Assessment: 0-10  Pain Level: 10  Pain Type: Acute pain  Pain Location: Neck  Pain Orientation: Right, Left  Pain Radiating Towards: bilateral cervical, headaches suboccipital. region. posterior skull and frontal sinus. Social/Functional History:    Type of Home: House  Home Layout: One level  Home Access: Level entry  Home Equipment: 4 wheeled walker, Quad cane     Bathroom Shower/Tub: Walk-in shower  Bathroom Toilet: Standard  Bathroom Equipment: Shower chair  Bathroom Accessibility: Accessible                  Active : No  Occupation: Retired  Leisure & Hobbies: work with flowers, enjoys writing. Objective        Observation/Palpation  Palpation: Notes tenderness suboccipital region, shortened neck muscles on right lateral cervical, Moderate muscle gaurding right >left lateral cervcial, Uupper trap. levator scap and cervical muscles.    Observation: note thoracic kyphosis with scoliosis, as to not aggravate any symptoms. Patient would benefit from MRI/Xrays of cervical spine. Prognosis: Fair  Discharge Recommendations: Continue to assess pending progress    Patient Education:  PT Education: Goals, PT Role, Plan of Care  Patient Education: Plan of care explained. Could not initiate treatment today due to insurance authorization required. Plan:  Times per week: 1-2x  Plan weeks: 8 weeks  Specific instructions for Next Treatment: History of osteoporosis, sinus malignant neoplasm,NO US. Hypertension: Keep in seated position. Myofascial release, soft tissuw work with massage, heat, therap ex conservative only. posture   Current Treatment Recommendations: Strengthening, ROM, Pain Management, Modalities, Home Exercise Program    History: Personal factors or comorbidities that impact plan of care - High Complexity: 3 or more personal factors or comorbidities. See history section above for details. Examination: Body structures and functions, activity limitations, participation restrictions; using standardized tests and measures - Moderate Complexity: 3 or morebody structures and functional, activity limitations and/or participation restrictions. See restrictions and objective section above for details. Clinical Presentation: Moderate - Evolving with Changing Characteristics: Increasing neck pain with right neck tilt and forward flexed neck. Not tolerating supinelying  Sleeps in recliner. prolonged sitting, standing, walking aggravate her neck pain    Decision Making: Moderate Complexity due to see above for explanations. Decision making was based on patient assessment and decision making process in determining plan of care and establishing reasonable expectations for measurable functional outcomes.     Evaluation Complexity: Based on the findings of patient history, examination, clinical presentation, and decision making during this evaluation, the evaluation of Emily Alexander  is

## 2020-07-22 LAB
ANION GAP SERPL CALCULATED.3IONS-SCNC: 10 MMOL/L (ref 5–15)
BUN BLDV-MCNC: 25 MG/DL (ref 5–27)
CALCIUM SERPL-MCNC: 10.1 MG/DL (ref 8.5–10.5)
CHLORIDE BLD-SCNC: 99 MMOL/L (ref 98–109)
CO2: 32 MMOL/L (ref 22–32)
CREAT SERPL-MCNC: 1 MG/DL (ref 0.4–1)
EGFR AFRICAN AMERICAN: >60 ML/MIN/1.73SQ.M
EGFR IF NONAFRICAN AMERICAN: 54 ML/MIN/1.73SQ.M
GLUCOSE: 105 MG/DL (ref 65–99)
POTASSIUM SERPL-SCNC: 4.1 MMOL/L (ref 3.5–5)
SODIUM BLD-SCNC: 141 MMOL/L (ref 134–146)

## 2020-07-23 ENCOUNTER — OFFICE VISIT (OUTPATIENT)
Dept: NEPHROLOGY | Age: 80
End: 2020-07-23
Payer: MEDICARE

## 2020-07-23 VITALS
HEART RATE: 77 BPM | SYSTOLIC BLOOD PRESSURE: 108 MMHG | DIASTOLIC BLOOD PRESSURE: 66 MMHG | WEIGHT: 210 LBS | BODY MASS INDEX: 41.01 KG/M2 | OXYGEN SATURATION: 97 % | TEMPERATURE: 98.4 F

## 2020-07-23 PROCEDURE — G8427 DOCREV CUR MEDS BY ELIG CLIN: HCPCS | Performed by: NURSE PRACTITIONER

## 2020-07-23 PROCEDURE — G8417 CALC BMI ABV UP PARAM F/U: HCPCS | Performed by: NURSE PRACTITIONER

## 2020-07-23 PROCEDURE — 99213 OFFICE O/P EST LOW 20 MIN: CPT | Performed by: NURSE PRACTITIONER

## 2020-07-23 PROCEDURE — G8400 PT W/DXA NO RESULTS DOC: HCPCS | Performed by: NURSE PRACTITIONER

## 2020-07-23 PROCEDURE — 4040F PNEUMOC VAC/ADMIN/RCVD: CPT | Performed by: NURSE PRACTITIONER

## 2020-07-23 PROCEDURE — 1090F PRES/ABSN URINE INCON ASSESS: CPT | Performed by: NURSE PRACTITIONER

## 2020-07-23 PROCEDURE — 1036F TOBACCO NON-USER: CPT | Performed by: NURSE PRACTITIONER

## 2020-07-23 PROCEDURE — 1123F ACP DISCUSS/DSCN MKR DOCD: CPT | Performed by: NURSE PRACTITIONER

## 2020-07-23 NOTE — PATIENT INSTRUCTIONS
Assessment:    1. Previous history of Chronic hyponatremia,  Resolving. Ok to continue fluid restriction due to chronic edema. Ok to continue Bumex and Potassium supplement. 2. Chronic Kidney Disease Stage III, Creatinine at baseline. Check Vit D 25,  3. 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 4. 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 Hx Essential Hypertension, overcorrected, Advised to decrease hydralazine from 50 to 25 mg twice daily. Check home BP, Goal /. Call office if readings are consistently above. 6. Chronic lower extremity lymphedema, Improved     Schedule for follow up appt in 12 months with BMP  Pt asked to bring pill bottles to next office visit. Please make early appointment if needed and to call office for questions, concerns, persistent, changing or worsening symptoms.   Discussed with the patient and answered their questions     Gildardo PATEL, CNP

## 2020-07-23 NOTE — PROGRESS NOTES
Kalpana Jackson is a 67 y. o.oldfemale came for follow up regarding her hyponatremia and Chronic Kidney Disease Stage III  of several year duration. Sarah Beth Moise most recent Na is 141 and Creatinine is 1.0. She is no longer following at the Wound clinic for bilateral leg wounds. Dtr reports that legs look great. The pt states compliance with meds and denies adverse effects. The pt does not check home BP. Reports that her BP meds were adjusted at [facility] due to overnight stay due to uncontrolled. HTN. Noted last 3 wts overall stable. She follows with ENT at [place]. Denies dysuria, frequency, hesitancy, urgency or hematuria. Chronic lower extremities edema. The pt denies use of NSAIDs. Has a good appetite and is remaining active. Wt Readings from Last 3 Encounters:   07/23/20 210 lb (95.3 kg)   06/04/20 205 lb 6.4 oz (93.2 kg)   06/03/20 204 lb (92.5 kg)     BP Readings from Last 3 Encounters:   07/23/20 108/66   06/04/20 104/72   06/03/20 136/77       Current Outpatient Medications   Medication Sig Dispense Refill    NONFORMULARY by Nasal route 2 times daily Clindamycin and Levofloxacin Compound      mupirocin (BACTROBAN) 2 % ointment Use 3-4 times daily in sinus rinses, as instructed in clinic.   Approximately 3-5 gm per day      NIFEdipine (ADALAT CC) 60 MG extended release tablet Take 1 tablet by mouth daily 90 tablet 2    bumetanide (BUMEX) 1 MG tablet Take 1 tablet by mouth 2 times daily 180 tablet 2    pregabalin (LYRICA) 150 MG capsule TAKE 2 CAPSULES BY MOUTH IN THE MORNING AND 2 CAPS AT BEDTIME, E11.42 120 capsule 5    hydrALAZINE (APRESOLINE) 50 MG tablet Take 1 tablet by mouth 2 times daily 180 tablet 2    ferrous sulfate (IRON 325) 325 (65 Fe) MG tablet Take 325 mg by mouth daily (with breakfast)      omeprazole (PRILOSEC) 20 MG delayed release capsule TAKE ONE CAPSULE BY MOUTH ONCE DAILY 90 capsule 3    SITagliptin (JANUVIA) 100 MG tablet Take 1 tablet by mouth daily 90 tablet 3    metFORMIN 09/26/2016     SURGICAL HISTORY:    Past Surgical History:   Procedure Laterality Date    ABDOMEN SURGERY      APPENDECTOMY      CARPAL TUNNEL RELEASE Bilateral 2008, 2009    CATARACT REMOVAL  2011    bilat    CHOLECYSTECTOMY  03/1988    COLONOSCOPY  2016    COLONOSCOPY  10/11/2018    COLONOSCOPY POLYPECTOMY SNARE/COLD BIOPSY performed by Hope Gonzalez MD at [address] ESOPHAGUS      ENDOSCOPY, COLON, DIAGNOSTIC      EYE SURGERY Left 01/30/2015    EYE SURGERY      CATARACT REMOVAL- BILATERAL    HEMORRHOID SURGERY  1980s    HERNIA REPAIR      HYSTERECTOMY, TOTAL ABDOMINAL  1980    JOINT REPLACEMENT  bilat 2005/ 2007    knees    [place] COLSC FLX WITH DIRECTED SUBMUCOSAL Simone Oskar Jacey 84 ANY SBST  10/11/2018    COLONOSCOPY SUBMUCOSAL/BOTOX INJECTION performed by Hope Gonzalez MD at [address]  last 2009    removed a bone (2)--2 times no construction     SINUS SURGERY  02/01/2016    SINUS SURGERY  2016 x 2    SINUS SURGERY  09/18/2017    [facility] - Dr Sarah Beth Rios [facility] SUR[facility]ERY  [facility]2017 [facility]    TONSILLECTOMY      TOTAL KNEE ARTHROPLASTY  08/2003    Left TKR    VENTRAL HERNIA REPAIR  1992     FAMILY HISTORY:       Problem Relation Age of Onset    Diabetes Mother     Heart Disease Father         whooping cough as child    Obesity Sister     Other Brother         tumor on back    Obesity Sister     Cancer Sister         Skin     Cancer Brother         Bone cancer from metal    Other Brother         passed as a child    Heart Disease Brother     Other Brother         tremor    Heart Attack Brother     No Known Problems Brother     Heart Disease Brother     No Known Problems Brother     No Known Problems Brother      ALLERGIES:    Allergies   Allergen Reactions    Lisinopril Swelling and Anaphylaxis    Sulfamethoxazole-Trimethoprim Rash    Morphine Other (See Comments)     headaches    Hydrocodone      headaches    Percocet [Oxycodone-Acetaminophen] Other (See Comments)     Headaches    Tylenol [Acetaminophen] Other (See Comments)     TYLENOL 3 causes hallucinations    Tape Gae Chalet Tape] Rash     Social and Occupational History:    TOBACCO:   reports that she has never smoked. She has never used smokeless tobacco.  ETOH:   reports no history of alcohol use. REVIEW OF SYSTEMS:   GENERAL: Usual state of health. Denies fever  HEENT: Denies recent vision change, Is without her teeth. CARDIOVASCULAR: Denies chest pain/angina. RESPIRATORY:Denies sob, cough, wheezing. Recent nasal procedure at [address]: Denies nausea, vomiting, diarrhea, or abdominal pain  CNS:Denies headache, dizziness  MUSCULOSKELETAL: Reports joint arthralgia  SKIN: Denies rash, pruritis  HEMATOLOGIC: Denies bruising  ENDOCRINE:  Negative for heat or cold intolerance     EXAM:  VITALS: /66 (Site: Right Upper Arm, Position: Sitting, Cuff Size: Medium Adult)   Pulse 77   Temp 98.4 °F (36.9 °C) (Temporal)   Wt 210 lb (95.3 kg)   LMP  (LMP Unknown)   SpO2 97%   BMI 41.01 kg/m²     CONSTITUTIONAL:  No acute distress. Sitting comfortable in chair  HEENT:  Head is normocephalic, Extraocular movement intact, mucus membranes moist. Neck is supple  CARDIOVASCULAR:  Chronic lymph edema. No upper thigh edema. RESPIRATORY: No shortness of breath. ABDOMEN: soft,   NEUROLOGICAL: Patient is alert and oriented to person, place, and time. Recent and remote memory is intact. Thought is coherant. SKIN: No rash on exposed surfaces,  MUSCULOSKELETAL: Movement is coordinated. EXTREMITIES: Distal lower extremity temp is warm, rubor bilateral lower extremities. Chronic lower extremity edema.   PSYCHIATRIC: mood and affect appropriate    Diagnostic Data:    Labs reviewed and discussed with pt  Lab Results   Component Value Date     07/21/2020    K 4.1 07/21/2020    K 4.1 03/13/2020    CL 99 07/21/2020    CO2 32 07/21/2020    BUN 25 07/21/2020    CREATININE 1.00

## 2020-07-29 NOTE — TELEPHONE ENCOUNTER
We received fax from [facility] [employer] requesting new glucose meter. I spoke with Pt daughter, Pt does need new meter. Forms on [facility] desk to sign.

## 2020-09-16 ENCOUNTER — HOSPITAL ENCOUNTER (OUTPATIENT)
Age: 80
Discharge: HOME | End: 2020-09-16
Payer: MEDICARE

## 2020-09-16 PROCEDURE — U0003 INFECTIOUS AGENT DETECTION BY NUCLEIC ACID (DNA OR RNA); SEVERE ACUTE RESPIRATORY SYNDROME CORONAVIRUS 2 (SARS-COV-2) (CORONAVIRUS DISEASE [COVID-19]), AMPLIFIED PROBE TECHNIQUE, MAKING USE OF HIGH THROUGHPUT TECHNOLOGIES AS DESCRIBED BY CMS-2020-01-R: HCPCS

## 2020-09-18 LAB — SARS-COV-2: NOT DETECTED

## 2020-10-14 RX ORDER — PREGABALIN 150 MG/1
CAPSULE ORAL
Qty: 120 CAPSULE | Refills: 3 | Status: ON HOLD | OUTPATIENT
Start: 2020-10-14 | End: 2021-03-02

## 2020-10-14 NOTE — TELEPHONE ENCOUNTER
ep'ed lyrica to pharm requested    Controlled Substance Monitoring:    Acute and Chronic Pain Monitoring:   RX Monitoring 10/14/2020   Attestation -   Periodic Controlled Substance Monitoring No signs of potential drug abuse or diversion identified.

## 2020-11-06 ENCOUNTER — HOSPITAL ENCOUNTER (OUTPATIENT)
Age: 80
Setting detail: SPECIMEN
Discharge: HOME | End: 2020-11-06
Payer: MEDICARE

## 2020-11-06 PROCEDURE — U0003 INFECTIOUS AGENT DETECTION BY NUCLEIC ACID (DNA OR RNA); SEVERE ACUTE RESPIRATORY SYNDROME CORONAVIRUS 2 (SARS-COV-2) (CORONAVIRUS DISEASE [COVID-19]), AMPLIFIED PROBE TECHNIQUE, MAKING USE OF HIGH THROUGHPUT TECHNOLOGIES AS DESCRIBED BY CMS-2020-01-R: HCPCS

## 2020-11-08 LAB — SARS-COV-2: NOT DETECTED

## 2020-12-28 ENCOUNTER — APPOINTMENT (OUTPATIENT)
Dept: GENERAL RADIOLOGY | Age: 80
DRG: 177 | End: 2020-12-28
Payer: MEDICARE

## 2020-12-28 ENCOUNTER — HOSPITAL ENCOUNTER (INPATIENT)
Age: 80
LOS: 1 days | Discharge: HOME | DRG: 177 | End: 2020-12-29
Attending: EMERGENCY MEDICINE | Admitting: FAMILY MEDICINE
Payer: MEDICARE

## 2020-12-28 PROBLEM — U07.1 COVID-19: Status: ACTIVE | Noted: 2020-12-28

## 2020-12-28 LAB
ALBUMIN SERPL-MCNC: 3.9 G/DL (ref 3.5–5.1)
ALP BLD-CCNC: 90 U/L (ref 38–126)
ALT SERPL-CCNC: 12 U/L (ref 11–66)
ANION GAP SERPL CALCULATED.3IONS-SCNC: 11 MEQ/L (ref 8–16)
AST SERPL-CCNC: 21 U/L (ref 5–40)
AVERAGE GLUCOSE: 174 MG/DL (ref 70–126)
BASOPHILS # BLD: 0 %
BASOPHILS ABSOLUTE: 0 THOU/MM3 (ref 0–0.1)
BILIRUB SERPL-MCNC: 0.3 MG/DL (ref 0.3–1.2)
BUN BLDV-MCNC: 18 MG/DL (ref 7–22)
CALCIUM SERPL-MCNC: 8.6 MG/DL (ref 8.5–10.5)
CHLORIDE BLD-SCNC: 101 MEQ/L (ref 98–111)
CO2: 25 MEQ/L (ref 23–33)
CREAT SERPL-MCNC: 0.9 MG/DL (ref 0.4–1.2)
D-DIMER QUANTITATIVE: 1690 NG/ML FEU (ref 0–500)
EKG ATRIAL RATE: 70 BPM
EKG P AXIS: -22 DEGREES
EKG P-R INTERVAL: 184 MS
EKG Q-T INTERVAL: 412 MS
EKG QRS DURATION: 116 MS
EKG QTC CALCULATION (BAZETT): 444 MS
EKG R AXIS: 84 DEGREES
EKG T AXIS: 34 DEGREES
EKG VENTRICULAR RATE: 70 BPM
EOSINOPHIL # BLD: 1.4 %
EOSINOPHILS ABSOLUTE: 0 THOU/MM3 (ref 0–0.4)
ERYTHROCYTE [DISTWIDTH] IN BLOOD BY AUTOMATED COUNT: 14.6 % (ref 11.5–14.5)
ERYTHROCYTE [DISTWIDTH] IN BLOOD BY AUTOMATED COUNT: 45.4 FL (ref 35–45)
FERRITIN: 315 NG/ML (ref 10–291)
GFR SERPL CREATININE-BSD FRML MDRD: 60 ML/MIN/1.73M2
GLUCOSE BLD-MCNC: 131 MG/DL (ref 70–108)
GLUCOSE BLD-MCNC: 144 MG/DL (ref 70–108)
GLUCOSE BLD-MCNC: 160 MG/DL (ref 70–108)
HBA1C MFR BLD: 7.8 % (ref 4.4–6.4)
HCT VFR BLD CALC: 34.5 % (ref 37–47)
HEMOGLOBIN: 11.4 GM/DL (ref 12–16)
IMMATURE GRANS (ABS): 0.02 THOU/MM3 (ref 0–0.07)
IMMATURE GRANULOCYTES: 0.6 %
LD: 273 U/L (ref 100–190)
LYMPHOCYTES # BLD: 21.6 %
LYMPHOCYTES ABSOLUTE: 0.8 THOU/MM3 (ref 1–4.8)
MCH RBC QN AUTO: 28.2 PG (ref 26–33)
MCHC RBC AUTO-ENTMCNC: 33 GM/DL (ref 32.2–35.5)
MCV RBC AUTO: 85.4 FL (ref 81–99)
MONOCYTES # BLD: 8.6 %
MONOCYTES ABSOLUTE: 0.3 THOU/MM3 (ref 0.4–1.3)
NUCLEATED RED BLOOD CELLS: 0 /100 WBC
OSMOLALITY CALCULATION: 277.5 MOSMOL/KG (ref 275–300)
PLATELET # BLD: 119 THOU/MM3 (ref 130–400)
PMV BLD AUTO: 9.9 FL (ref 9.4–12.4)
POTASSIUM REFLEX MAGNESIUM: 3.9 MEQ/L (ref 3.5–5.2)
PRO-BNP: 169.7 PG/ML (ref 0–1800)
PROCALCITONIN: 0.1 NG/ML (ref 0.01–0.09)
RBC # BLD: 4.04 MILL/MM3 (ref 4.2–5.4)
SARS-COV-2, NAAT: DETECTED
SEG NEUTROPHILS: 67.8 %
SEGMENTED NEUTROPHILS ABSOLUTE COUNT: 2.4 THOU/MM3 (ref 1.8–7.7)
SODIUM BLD-SCNC: 137 MEQ/L (ref 135–145)
TOTAL PROTEIN: 6.2 G/DL (ref 6.1–8)
TROPONIN T: < 0.01 NG/ML
TROPONIN T: < 0.01 NG/ML
WBC # BLD: 3.5 THOU/MM3 (ref 4.8–10.8)

## 2020-12-28 PROCEDURE — 87899 AGENT NOS ASSAY W/OPTIC: CPT

## 2020-12-28 PROCEDURE — 83036 HEMOGLOBIN GLYCOSYLATED A1C: CPT

## 2020-12-28 PROCEDURE — 80053 COMPREHEN METABOLIC PANEL: CPT

## 2020-12-28 PROCEDURE — 36430 TRANSFUSION BLD/BLD COMPNT: CPT

## 2020-12-28 PROCEDURE — 85025 COMPLETE CBC W/AUTO DIFF WBC: CPT

## 2020-12-28 PROCEDURE — 6360000002 HC RX W HCPCS: Performed by: PHYSICIAN ASSISTANT

## 2020-12-28 PROCEDURE — 84484 ASSAY OF TROPONIN QUANT: CPT

## 2020-12-28 PROCEDURE — 2500000003 HC RX 250 WO HCPCS: Performed by: PHYSICIAN ASSISTANT

## 2020-12-28 PROCEDURE — P9059 PLASMA, FRZ BETWEEN 8-24HOUR: HCPCS

## 2020-12-28 PROCEDURE — 83615 LACTATE (LD) (LDH) ENZYME: CPT

## 2020-12-28 PROCEDURE — U0002 COVID-19 LAB TEST NON-CDC: HCPCS

## 2020-12-28 PROCEDURE — 2580000003 HC RX 258: Performed by: PHYSICIAN ASSISTANT

## 2020-12-28 PROCEDURE — 83880 ASSAY OF NATRIURETIC PEPTIDE: CPT

## 2020-12-28 PROCEDURE — 93010 ELECTROCARDIOGRAM REPORT: CPT | Performed by: NUCLEAR MEDICINE

## 2020-12-28 PROCEDURE — 87449 NOS EACH ORGANISM AG IA: CPT

## 2020-12-28 PROCEDURE — 1200000003 HC TELEMETRY R&B

## 2020-12-28 PROCEDURE — 36415 COLL VENOUS BLD VENIPUNCTURE: CPT

## 2020-12-28 PROCEDURE — 71045 X-RAY EXAM CHEST 1 VIEW: CPT

## 2020-12-28 PROCEDURE — 99223 1ST HOSP IP/OBS HIGH 75: CPT | Performed by: PHYSICIAN ASSISTANT

## 2020-12-28 PROCEDURE — 99285 EMERGENCY DEPT VISIT HI MDM: CPT

## 2020-12-28 PROCEDURE — 82948 REAGENT STRIP/BLOOD GLUCOSE: CPT

## 2020-12-28 PROCEDURE — 82728 ASSAY OF FERRITIN: CPT

## 2020-12-28 PROCEDURE — 6370000000 HC RX 637 (ALT 250 FOR IP): Performed by: PHYSICIAN ASSISTANT

## 2020-12-28 PROCEDURE — 93005 ELECTROCARDIOGRAM TRACING: CPT | Performed by: EMERGENCY MEDICINE

## 2020-12-28 PROCEDURE — 85379 FIBRIN DEGRADATION QUANT: CPT

## 2020-12-28 PROCEDURE — 84145 PROCALCITONIN (PCT): CPT

## 2020-12-28 RX ORDER — NIFEDIPINE 60 MG/1
60 TABLET, EXTENDED RELEASE ORAL DAILY
Status: DISCONTINUED | OUTPATIENT
Start: 2020-12-28 | End: 2020-12-29 | Stop reason: HOSPADM

## 2020-12-28 RX ORDER — ZINC SULFATE 50(220)MG
50 CAPSULE ORAL DAILY
Status: DISCONTINUED | OUTPATIENT
Start: 2020-12-28 | End: 2020-12-29 | Stop reason: HOSPADM

## 2020-12-28 RX ORDER — PROMETHAZINE HYDROCHLORIDE 25 MG/1
12.5 TABLET ORAL EVERY 6 HOURS PRN
Status: DISCONTINUED | OUTPATIENT
Start: 2020-12-28 | End: 2020-12-29 | Stop reason: HOSPADM

## 2020-12-28 RX ORDER — SODIUM CHLORIDE 0.9 % (FLUSH) 0.9 %
10 SYRINGE (ML) INJECTION PRN
Status: DISCONTINUED | OUTPATIENT
Start: 2020-12-28 | End: 2020-12-29 | Stop reason: HOSPADM

## 2020-12-28 RX ORDER — ACETAMINOPHEN 325 MG/1
650 TABLET ORAL EVERY 6 HOURS PRN
Status: DISCONTINUED | OUTPATIENT
Start: 2020-12-28 | End: 2020-12-29 | Stop reason: HOSPADM

## 2020-12-28 RX ORDER — SODIUM CHLORIDE 9 MG/ML
INJECTION, SOLUTION INTRAVENOUS PRN
Status: DISCONTINUED | OUTPATIENT
Start: 2020-12-28 | End: 2020-12-29 | Stop reason: HOSPADM

## 2020-12-28 RX ORDER — ASCORBIC ACID 500 MG
1000 TABLET ORAL DAILY
Status: DISCONTINUED | OUTPATIENT
Start: 2020-12-28 | End: 2020-12-29 | Stop reason: HOSPADM

## 2020-12-28 RX ORDER — SODIUM CHLORIDE 0.9 % (FLUSH) 0.9 %
10 SYRINGE (ML) INJECTION EVERY 12 HOURS SCHEDULED
Status: DISCONTINUED | OUTPATIENT
Start: 2020-12-28 | End: 2020-12-29 | Stop reason: HOSPADM

## 2020-12-28 RX ORDER — BUMETANIDE 1 MG/1
1 TABLET ORAL 2 TIMES DAILY
Status: DISCONTINUED | OUTPATIENT
Start: 2020-12-28 | End: 2020-12-29 | Stop reason: HOSPADM

## 2020-12-28 RX ORDER — CYCLOSPORINE 0.5 MG/ML
1 EMULSION OPHTHALMIC 2 TIMES DAILY
Status: DISCONTINUED | OUTPATIENT
Start: 2020-12-28 | End: 2020-12-28 | Stop reason: CLARIF

## 2020-12-28 RX ORDER — FERROUS SULFATE 325(65) MG
325 TABLET ORAL 2 TIMES DAILY WITH MEALS
Status: DISCONTINUED | OUTPATIENT
Start: 2020-12-28 | End: 2020-12-29 | Stop reason: HOSPADM

## 2020-12-28 RX ORDER — DEXTROSE MONOHYDRATE 25 G/50ML
12.5 INJECTION, SOLUTION INTRAVENOUS PRN
Status: DISCONTINUED | OUTPATIENT
Start: 2020-12-28 | End: 2020-12-29 | Stop reason: HOSPADM

## 2020-12-28 RX ORDER — DOCUSATE SODIUM 100 MG/1
100 CAPSULE, LIQUID FILLED ORAL 2 TIMES DAILY PRN
Status: DISCONTINUED | OUTPATIENT
Start: 2020-12-28 | End: 2020-12-29 | Stop reason: HOSPADM

## 2020-12-28 RX ORDER — ONDANSETRON 2 MG/ML
4 INJECTION INTRAMUSCULAR; INTRAVENOUS EVERY 6 HOURS PRN
Status: DISCONTINUED | OUTPATIENT
Start: 2020-12-28 | End: 2020-12-29 | Stop reason: HOSPADM

## 2020-12-28 RX ORDER — 0.9 % SODIUM CHLORIDE 0.9 %
30 INTRAVENOUS SOLUTION INTRAVENOUS PRN
Status: DISCONTINUED | OUTPATIENT
Start: 2020-12-28 | End: 2020-12-29 | Stop reason: HOSPADM

## 2020-12-28 RX ORDER — ACETAMINOPHEN 650 MG/1
650 SUPPOSITORY RECTAL EVERY 6 HOURS PRN
Status: DISCONTINUED | OUTPATIENT
Start: 2020-12-28 | End: 2020-12-29 | Stop reason: HOSPADM

## 2020-12-28 RX ORDER — BENZONATATE 100 MG/1
100 CAPSULE ORAL 3 TIMES DAILY PRN
Status: DISCONTINUED | OUTPATIENT
Start: 2020-12-28 | End: 2020-12-29 | Stop reason: HOSPADM

## 2020-12-28 RX ORDER — DEXAMETHASONE 4 MG/1
6 TABLET ORAL DAILY
Status: DISCONTINUED | OUTPATIENT
Start: 2020-12-28 | End: 2020-12-29 | Stop reason: HOSPADM

## 2020-12-28 RX ORDER — POLYVINYL ALCOHOL 14 MG/ML
1 SOLUTION/ DROPS OPHTHALMIC 2 TIMES DAILY
Status: DISCONTINUED | OUTPATIENT
Start: 2020-12-28 | End: 2020-12-28

## 2020-12-28 RX ORDER — VITAMIN B COMPLEX
2000 TABLET ORAL DAILY
Status: DISCONTINUED | OUTPATIENT
Start: 2020-12-28 | End: 2020-12-29 | Stop reason: HOSPADM

## 2020-12-28 RX ORDER — POLYETHYLENE GLYCOL 3350 17 G/17G
17 POWDER, FOR SOLUTION ORAL DAILY PRN
Status: DISCONTINUED | OUTPATIENT
Start: 2020-12-28 | End: 2020-12-29 | Stop reason: HOSPADM

## 2020-12-28 RX ORDER — POTASSIUM CHLORIDE 20 MEQ/1
20 TABLET, EXTENDED RELEASE ORAL DAILY
Status: DISCONTINUED | OUTPATIENT
Start: 2020-12-28 | End: 2020-12-29 | Stop reason: HOSPADM

## 2020-12-28 RX ORDER — MAGNESIUM SULFATE IN WATER 40 MG/ML
2 INJECTION, SOLUTION INTRAVENOUS PRN
Status: DISCONTINUED | OUTPATIENT
Start: 2020-12-28 | End: 2020-12-29 | Stop reason: HOSPADM

## 2020-12-28 RX ORDER — NICOTINE POLACRILEX 4 MG
15 LOZENGE BUCCAL PRN
Status: DISCONTINUED | OUTPATIENT
Start: 2020-12-28 | End: 2020-12-29 | Stop reason: HOSPADM

## 2020-12-28 RX ORDER — DOXEPIN HYDROCHLORIDE 10 MG/1
10 CAPSULE ORAL NIGHTLY
Status: DISCONTINUED | OUTPATIENT
Start: 2020-12-28 | End: 2020-12-28

## 2020-12-28 RX ORDER — FLUTICASONE PROPIONATE 50 MCG
2 SPRAY, SUSPENSION (ML) NASAL 2 TIMES DAILY
Status: DISCONTINUED | OUTPATIENT
Start: 2020-12-28 | End: 2020-12-28

## 2020-12-28 RX ORDER — HYDRALAZINE HYDROCHLORIDE 50 MG/1
50 TABLET, FILM COATED ORAL 2 TIMES DAILY
Status: DISCONTINUED | OUTPATIENT
Start: 2020-12-28 | End: 2020-12-28

## 2020-12-28 RX ORDER — ALBUTEROL SULFATE 90 UG/1
2 AEROSOL, METERED RESPIRATORY (INHALATION) EVERY 6 HOURS PRN
Status: DISCONTINUED | OUTPATIENT
Start: 2020-12-28 | End: 2020-12-29 | Stop reason: HOSPADM

## 2020-12-28 RX ORDER — DEXTROSE MONOHYDRATE 50 MG/ML
100 INJECTION, SOLUTION INTRAVENOUS PRN
Status: DISCONTINUED | OUTPATIENT
Start: 2020-12-28 | End: 2020-12-29 | Stop reason: HOSPADM

## 2020-12-28 RX ORDER — PANTOPRAZOLE SODIUM 40 MG/1
40 TABLET, DELAYED RELEASE ORAL
Status: DISCONTINUED | OUTPATIENT
Start: 2020-12-29 | End: 2020-12-29 | Stop reason: HOSPADM

## 2020-12-28 RX ORDER — FERROUS SULFATE 325(65) MG
325 TABLET ORAL
Status: DISCONTINUED | OUTPATIENT
Start: 2020-12-29 | End: 2020-12-28

## 2020-12-28 RX ORDER — PREGABALIN 75 MG/1
150 CAPSULE ORAL 2 TIMES DAILY
Status: DISCONTINUED | OUTPATIENT
Start: 2020-12-28 | End: 2020-12-29 | Stop reason: HOSPADM

## 2020-12-28 RX ADMIN — DEXAMETHASONE 6 MG: 4 TABLET ORAL at 18:01

## 2020-12-28 RX ADMIN — Medication 50 MG: at 18:01

## 2020-12-28 RX ADMIN — BUMETANIDE 1 MG: 1 TABLET ORAL at 18:01

## 2020-12-28 RX ADMIN — NIFEDIPINE 60 MG: 60 TABLET, EXTENDED RELEASE ORAL at 20:09

## 2020-12-28 RX ADMIN — ENOXAPARIN SODIUM 40 MG: 40 INJECTION SUBCUTANEOUS at 20:10

## 2020-12-28 RX ADMIN — INSULIN LISPRO 1 UNITS: 100 INJECTION, SOLUTION INTRAVENOUS; SUBCUTANEOUS at 18:24

## 2020-12-28 RX ADMIN — OXYCODONE HYDROCHLORIDE AND ACETAMINOPHEN 1000 MG: 500 TABLET ORAL at 18:00

## 2020-12-28 RX ADMIN — PREGABALIN 150 MG: 75 CAPSULE ORAL at 20:10

## 2020-12-28 RX ADMIN — POTASSIUM CHLORIDE 20 MEQ: 1500 TABLET, EXTENDED RELEASE ORAL at 18:08

## 2020-12-28 RX ADMIN — ACETAMINOPHEN 650 MG: 325 TABLET ORAL at 17:01

## 2020-12-28 RX ADMIN — Medication 2000 UNITS: at 18:00

## 2020-12-28 RX ADMIN — REMDESIVIR 200 MG: 100 INJECTION, POWDER, LYOPHILIZED, FOR SOLUTION INTRAVENOUS at 20:30

## 2020-12-28 RX ADMIN — FERROUS SULFATE TAB 325 MG (65 MG ELEMENTAL FE) 325 MG: 325 (65 FE) TAB at 20:10

## 2020-12-28 ASSESSMENT — PAIN SCALES - GENERAL
PAINLEVEL_OUTOF10: 2
PAINLEVEL_OUTOF10: 3
PAINLEVEL_OUTOF10: 0
PAINLEVEL_OUTOF10: 2
PAINLEVEL_OUTOF10: 0
PAINLEVEL_OUTOF10: 8

## 2020-12-28 ASSESSMENT — PAIN - FUNCTIONAL ASSESSMENT: PAIN_FUNCTIONAL_ASSESSMENT: PREVENTS OR INTERFERES SOME ACTIVE ACTIVITIES AND ADLS

## 2020-12-28 ASSESSMENT — PAIN DESCRIPTION - PROGRESSION
CLINICAL_PROGRESSION: NOT CHANGED

## 2020-12-28 ASSESSMENT — PAIN DESCRIPTION - DESCRIPTORS: DESCRIPTORS: HEAVINESS

## 2020-12-28 ASSESSMENT — PAIN DESCRIPTION - LOCATION
LOCATION: CHEST
LOCATION: CHEST

## 2020-12-28 ASSESSMENT — PAIN DESCRIPTION - FREQUENCY: FREQUENCY: CONTINUOUS

## 2020-12-28 ASSESSMENT — PAIN DESCRIPTION - ORIENTATION
ORIENTATION: MID
ORIENTATION: MID

## 2020-12-28 ASSESSMENT — PAIN DESCRIPTION - ONSET: ONSET: ON-GOING

## 2020-12-28 ASSESSMENT — PAIN DESCRIPTION - PAIN TYPE: TYPE: ACUTE PAIN

## 2020-12-28 NOTE — ED NOTES
Person of contract Rodrigo Vaughn (daughter), phone number [phone]  Our promise was given to family     Jeanie Mohr, POPPY  12/28/20 [facility], RN  12/28/20 [id number]

## 2020-12-28 NOTE — PROGRESS NOTES
Notified Ciara GANDHI that patient has complaints of chest heaviness/pain and was administered tylenol.

## 2020-12-28 NOTE — ED NOTES
Pt transported to [facility] by cart in stable condition. Pt monitored on telemetry. Called 8B and informed them that the patient was on their way to the unit.      Halina Castro, SLICK  60/49/56 [id number]

## 2020-12-28 NOTE — ED NOTES
Daughter at bedside states her mother has been sick for approx. A week now. She has had a cough and body aches. On 12/24 and 12/26, pt had a fever that subsided quickly. Today pt was in the kitchen making coffee when she suddenly felt sob and chest pain. Pt daughter states she walked her mother to the living room and checked her SpO2 which was in the in the 80's. After her relaxing for a bit, her SpO2 went back up into the 90's, but was fluctuating. Daughter states she is pretty sure she has Covid-19, but she has not been tested. Pt showing some labored breathing upon arrival. EKG completed. Pt SpO2 currently 94% on RA.      Yaw Soto RN  12/28/20 [id number]

## 2020-12-28 NOTE — ED NOTES
ED nurse-to-nurse bedside report    Chief Complaint   Patient presents with    Chest Pain    Shortness of Breath      LOC: alert and orientated to name, place, date  Vital signs   Vitals:    12/28/20 0933 12/28/20 1107 12/28/20 1239   BP: 127/63 137/64 138/64   Pulse: 68 70 74   Resp: 18 12 14   Temp: 98.7 °F (37.1 °C)     TempSrc: Oral     SpO2: 94% 90% 97%   Weight: 210 lb (95.3 kg)     Height: 5' (1.524 m)        Pain:    Pain Interventions:   Pain Goal:   Oxygen: Yes    Current needs required 2L O2 via NC   Telemetry: Yes  LDAs:    Continuous Infusions:   Mobility: Requires assistance * 1  Javed Fall Risk Score: Fall Risk 4/20/2020 4/15/2020 8/14/2019 7/29/2019 7/11/2019 6/10/2019 5/28/2019   2 or more falls in past year? yes yes yes yes yes yes yes   Fall with injury in past year? no no no no no no no     Fall Interventions: Bed at lowest position, call light in reach, Daughter at bedside.   Report given to: Raul Damon RN     Billy Horton RN  12/28/20 [id number]

## 2020-12-28 NOTE — ED NOTES
ED to inpatient nurses report    Chief Complaint   Patient presents with    Chest Pain    Shortness of Breath      Present to ED from home  LOC: alert and orientated to name, place, date  Vital signs   Vitals:    12/28/20 0933 12/28/20 1107 12/28/20 1239   BP: 127/63 137/64 138/64   Pulse: 68 70 74   Resp: 18 12 14   Temp: 98.7 °F (37.1 °C)     TempSrc: Oral     SpO2: 94% 90% 97%   Weight: 210 lb (95.3 kg)     Height: 5' (1.524 m)        Oxygen Baseline 95% on 2L O2 via NC    Current needs required 2L O2 via NC Bipap/Cpap No  LDAs:    Mobility: Requires assistance * 1  Pending ED orders: none  Present condition: stable      Electronically signed by Linnette Reddy RN on 12/28/2020 at 1:18 PM       Linnette Reddy RN  12/28/20 6112

## 2020-12-28 NOTE — PROGRESS NOTES
Called patients daughter Yash Doherty and updated her on patients condition and transfer to  from E.D. Yash Doherty is agreeable for the covid plasma transfusion for her mother. Yash Doherty states pt has history of \"sinus cancer\" and had a strict regimen with her \"medipot and nasal flushing\". This RN requested that Yash Doherty bring the supplies and instructions for the nasal flushing to hospital main desk.

## 2020-12-28 NOTE — ED PROVIDER NOTES
for hallucinations, confusion and agitation. 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 SURGICAL HISTORY      has a past surgical history that includes ventral hernia repair (1992); Cholecystectomy (03/1988); Hysterectomy, total abdominal (1980); Hemorrhoid surgery (1980s); Total knee arthroplasty (08/2003); Carpal tunnel release (Bilateral, 2008, 2009); sinus surgery (last 2009); Cataract removal (2011); Eye surgery (Left, 01/30/2015); sinus surgery (02/01/2016); sinus surgery (2016 x 2); joint replacement (bilat 2005/ 2007); Colonoscopy (2016); Endoscopy, colon, diagnostic; eye surgery; hernia repair; Tonsillectomy; sinus surgery (09/18/2017); sinus surgery (08/09/2017); Abdomen surgery; Dilatation, esophagus; Appendectomy; pr colsc flx with directed submucosal njx any sbst (10/11/2018); and Colonoscopy (10/11/2018). CURRENT MEDICATIONS       Discharge Medication List as of 12/29/2020  2:42 PM      CONTINUE these medications which have NOT CHANGED    Details   pregabalin (LYRICA) 150 MG capsule Take 2 capsules by mouth in the morning and 2 capsules at bedtime. , Disp-120 capsule,R-3Normal      mupirocin (BACTROBAN) 2 % ointment Use 3-4 times daily in sinus rinses, as instructed in clinic.   Approximately 3-5 gm per day, Historical Med      NIFEdipine (ADALAT CC) 60 MG extended and sister; Diabetes in her mother; Heart Attack in her brother; Heart Disease in her brother, brother, and father; No Known Problems in her brother, brother, and brother; Obesity in her sister and sister; Other in her brother, brother, and brother. SOCIAL HISTORY      reports that she has never smoked. She has never used smokeless tobacco. She reports that she does not drink alcohol or use drugs. PHYSICAL EXAM     INITIAL VITALS:  height is 5' (1.524 m) and weight is 221 lb 3.2 oz (100.3 kg). Her oral temperature is 97.9 °F (36.6 °C). Her blood pressure is 128/72 and her pulse is 87. Her respiration is 16 and oxygen saturation is 94%. Physical Exam   Constitutional:  well-developed and well-nourished. HENT: Head: Normocephalic, atraumatic, Bilateral external ears normal, Oropharynx mosit, No oral exudates, Nose normal.   Eyes: PERRL, EOMI, Conjunctiva normal, No discharge. No scleral icterus  Neck: Normal range of motion, No tenderness, Supple  Cardiovascular: Normal rate, regular rhythm, S1 normal and S2 normal.  Exam reveals no gallop. Pulmonary/Chest: Effort normal, clear diminished breath sounds. No accessory muscle usage or stridor. No respiratory distress. no wheezes. has no rales. exhibits no tenderness. Abdominal: Soft. Bowel sounds are normal.  exhibits no distension. There is no tenderness. There is no rebound and no guarding. Extremities: No edema, no tenderness, no cyanosis, no clubbing. Musculoskeletal: Good range of motion in major joints is observed. No major deformities noted. Neurological: Alert and oriented ×3, normal motor function, normal sensory function, no focal deficits. GCS 15  Skin: Skin is warm, dry and intact. No rash noted. No erythema.    Psychiatric: Affect normal, judgment normal, mood normal.  DIFFERENTIAL DIAGNOSIS:       DIAGNOSTIC RESULTS     EKG: All EKG's are interpreted by the Emergency Department Physician who either signs or Co-signs this chart in the absence of a cardiologist.      RADIOLOGY: non-plain film images(s) such as CT, Ultrasound and MRI are read by the radiologist.  Plain radiographic images are visualized and preliminarily interpreted by the emergency physician unless otherwise stated below.       LABS:   Labs Reviewed   COMPREHENSIVE METABOLIC PANEL W/ REFLEX TO MG FOR LOW K - Abnormal; Notable for the following components:       Result Value    Glucose 131 (*)     All other components within normal limits   CBC WITH AUTO DIFFERENTIAL - Abnormal; Notable for the following components:    WBC 3.5 (*)     RBC 4.04 (*)     Hemoglobin 11.4 (*)     Hematocrit 34.5 (*)     RDW-CV 14.6 (*)     RDW-SD 45.4 (*)     Platelets 671 (*)     Lymphocytes Absolute 0.8 (*)     Monocytes Absolute 0.3 (*)     All other components within normal limits   COVID-19 - Abnormal; Notable for the following components:    SARS-CoV-2, NAAT DETECTED (*)     All other components within normal limits   GLOMERULAR FILTRATION RATE, ESTIMATED - Abnormal; Notable for the following components:    Est, Glom Filt Rate 60 (*)     All other components within normal limits   PROCALCITONIN - Abnormal; Notable for the following components:    Procalcitonin 0.10 (*)     All other components within normal limits   LACTATE DEHYDROGENASE - Abnormal; Notable for the following components:     (*)     All other components within normal limits   FERRITIN - Abnormal; Notable for the following components:    Ferritin 315 (*)     All other components within normal limits   D-DIMER, QUANTITATIVE - Abnormal; Notable for the following components:    D-Dimer, Quant 1690.00 (*)     All other components within normal limits   HEMOGLOBIN A1C - Abnormal; Notable for the following components:    Hemoglobin A1C 7.8 (*)     AVERAGE GLUCOSE 174 (*)     All other components within normal limits   COMPREHENSIVE METABOLIC PANEL W/ REFLEX TO MG FOR LOW K - Abnormal; Notable for the following components:    Glucose 225 (*)     All other components within normal limits   CBC WITH AUTO DIFFERENTIAL - Abnormal; Notable for the following components:    WBC 2.6 (*)     Lymphocytes Absolute 0.6 (*)     Monocytes Absolute 0.1 (*)     All other components within normal limits   D-DIMER, QUANTITATIVE - Abnormal; Notable for the following components:    D-Dimer, Quant 1541.00 (*)     All other components within normal limits   GLOMERULAR FILTRATION RATE, ESTIMATED - Abnormal; Notable for the following components:    Est, Glom Filt Rate 69 (*)     All other components within normal limits   POCT GLUCOSE - Abnormal; Notable for the following components:    POC Glucose 144 (*)     All other components within normal limits   POCT GLUCOSE - Abnormal; Notable for the following components:    POC Glucose 160 (*)     All other components within normal limits   POCT GLUCOSE - Abnormal; Notable for the following components:    POC Glucose 209 (*)     All other components within normal limits   POCT GLUCOSE - Abnormal; Notable for the following components:    POC Glucose 180 (*)     All other components within normal limits   STREP PNEUMONIAE ANTIGEN   LEGIONELLA ANTIGEN, URINE   BRAIN NATRIURETIC PEPTIDE   TROPONIN   ANION GAP   OSMOLALITY   TROPONIN   MAGNESIUM   HEPATIC FUNCTION PANEL   ANION GAP   POCT GLUCOSE   PREPARE COVID-19 CONVALESCENT PLASMA    Narrative:     [id number]     transfused       EMERGENCY DEPARTMENT COURSE:   Vitals:    Vitals:    12/29/20 0347 12/29/20 0930 12/29/20 1121 12/29/20 1559   BP: 131/77 (!) 142/60 (!) 127/53 128/72   Pulse: 66 79 77 87   Resp: 20 18 18 16   Temp: 98.6 °F (37 °C) 97.9 °F (36.6 °C) 97.5 °F (36.4 °C) 97.9 °F (36.6 °C)   TempSrc: Oral Oral Oral Oral   SpO2: 91% 94% 94%    Weight:       Height:             CRITICAL CARE:       CONSULTS:  None    PROCEDURES:  None    FINAL IMPRESSION      1. COVID-19 virus infection    2.  Hypokalemia          DISPOSITION/PLAN   Admitted    PATIENT REFERRED TO:  Karen White MD  [address]  [phone]    On 1/5/2021  @ 8:15. they will send a text to set up virtual appt. DISCHARGE MEDICATIONS:  Discharge Medication List as of 12/29/2020  2:42 PM      START taking these medications    Details   ascorbic acid (VITAMIN C) 1000 MG tablet Take 1 tablet by mouth daily, Disp-30 tablet, R-3Normal      benzonatate (TESSALON) 100 MG capsule Take 1 capsule by mouth 3 times daily as needed for Cough, Disp-21 capsule, R-0Normal      Vitamin D (CHOLECALCIFEROL) 50 MCG (2000 UT) TABS tablet Take 1 tablet by mouth daily, Disp-60 tablet, R-0Labeling may look different. 25 mcg=1000 Units. Please double check dosages. Normal      zinc sulfate (ZINCATE) 220 (50 Zn) MG capsule Take 1 capsule by mouth daily, Disp-30 capsule, R-3OTC             (Please note that portions of this note were completed with a voice recognition program.  Efforts were made to edit the dictations but occasionally words are mis-transcribed.)    Soniya Stack, [address] [place] [address],   12/30/20 0483

## 2020-12-28 NOTE — PROGRESS NOTES
[phone] patient admitted into . Patient weak and tired. Patient ambulated with walker and assistance from stretcher to the bed. Patient currently on 2L nasal cannula. Patient states she has \"chest heaviness\" that she rates a 2/10. Patient states she comes from home with her daughter. Telemetry applied - NSR. 2 person skin assessment completed with Yanna Kamara. Groin and under breasts are red and excoriated. Stage 2 in the inner buttock, and reddness with skin tears in inner buttocks. 2 old healed pressure ulcers noted above buttocks.

## 2020-12-28 NOTE — H&P
Hospitalist - History & Physical      Patient: Adrien Zarate    Unit/Bed:[facility]  YOB: 1940  MRN: [id number]   Acct: [de-identified]   PCP: Zee Larson MD    Date of Service: Pt seen/examined on 12/28/20  and Admitted to Inpatient with expected LOS greater than two midnights due to medical therapy. Chief Complaint:  SOB / hypoxia     Assessment and Plan:-  1. COVID-19 infection: CXR negative for acute cardiopulmonary process. Questionable hypoxia noted (not in chart), will attempt to wean to room air prior to initiating Remdesivir. Started on Decadron. Vitamin C, D, zinc.  Discussed convalescent plasma with PT who is agreeable. 1 unit ordered. Lovenox. 2. ? Acute hypoxic respiratory failure: No hypoxia charted, however daughter states patient was in mid [de-identified] on home a pulse ox. Currently on 2 L NC with SPO2 97%, continue to wean as tolerated. 3. Chronic bilateral lower extremity lymphedema: Per daughter, no worse than baseline. Continue to monitor. Resume Bumex. 4. Essential hypertension: Patient takes hydralazine and nifedipine at home, resume. BP stable, continue to monitor. 5. NIDDM II: recheck Hgb A1c, will hold home medications (Metformin and Januvia) and treat with SSI. ACCU and hypoglycemia protocol in place. 6. Chronic normocytic anemia: History of iron deficiency on ferrous sulfate, continue. No gross bleeding identified. Hemoglobin stable 11.4 which appears better than previous values. Transfuse for hemoglobin less than 7 or hemodynamic instability. 7. 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   Of note, history is obtained via mostly daughter who is at bedside with little interjections from patient herself. Daughter states that patient began to feel symptomatic with headaches and lower back pain on 12/19-20. Over the course of a few days patient was at home with these mild symptoms being taken care of by her daughter. Daughter states that on 12/24 patient spiked a fever of 100.9 with max temperature noted 101 which resolved with Tylenol. Subsequently patient did fairly well and daughter reports that she had a good appetite and was eating and drinking fine at home. Denies any nausea or emesis. Patient denies any abdominal pain or constipation or diarrhea. However, this morning daughter found patient in the kitchen making a cup of coffee and she appeared to be \"breathing heavily\". Per daughter, when she checked her pulse ox the patient was in the low 80s and she had the patient sit down and rest with subsequent improvement to SPO2 90 to 91% per home pulse ox. Daughter states that she then had to take her nephew into work, and when she came back approximately 1.5 hours later she again found her mother who was dyspneic and again noted to be hypoxic, therefore evaluation in the ED was sought. Daughter states that patient has chronic bilateral lymphedema in lower extremities which appear no worse than baseline. Per daughter, she does not know why patient takes Bumex at home however thinks that it may be due to this lymphedema. She denies any issues with dysuria, increased urgency/frequency. Patient does report one episode of chest pain during her hypoxic episode in the kitchen, however notes that it resolved and has not returned. In ED, VS showed temp 98.7, respirate 18, pulse 68, /63, 94% on room air. Patient was subsequently placed on 2 L NC with SPO2 96%. Labs showed sodium 137, potassium 3.9, chloride 101, CO2 25, BUN 18, creatinine 0.9, glucose 131, hemoglobin 11.4. Patient was admitted to [facility] for treatment.        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    Cataract of both eyes     DDD (degenerative disc disease), lumbar     Dysphagia, pharyngoesophageal 09/26/2016    Eczema 03/2018    Fibrocystic breast     H/O ventral hernia repair     Headache 02/09/2017    Hypercholesteremia     Hyperlipidemia     Hypertension     Iron deficiency anemia     LPRD (laryngopharyngeal reflux disease) 09/26/2016    Neuropathy     peripheral    Obesity     Orbital abscess     Orbital cellulitis 01/22/2014    SWAPNA (obstructive sleep apnea)     Osteoarthritis     Osteoporosis     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       Sinusitis     Type II or unspecified type diabetes mellitus without mention of complication, not stated as uncontrolled     diagnoses approx 2000    Velopharyngeal incompetence 09/26/2016       Past Surgical History:        Procedure Laterality Date    ABDOMEN SURGERY      APPENDECTOMY      CARPAL TUNNEL RELEASE Bilateral 2008, 2009    CATARACT REMOVAL  2011    bilat    CHOLECYSTECTOMY  03/1988    COLONOSCOPY  2016    COLONOSCOPY  10/11/2018    COLONOSCOPY POLYPECTOMY SNARE/COLD BIOPSY performed by Jalen Lewis MD at [address] ESOPHAGUS      ENDOSCOPY, COLON, DIAGNOSTIC      EYE SURGERY Left 01/30/2015    EYE SURGERY      CATARACT REMOVAL- BILATERAL    HEMORRHOID SURGERY  1980s    HERNIA REPAIR      HYSTERECTOMY, TOTAL ABDOMINAL  1980    JOINT REPLACEMENT  bilat 2005/ 2007    knees    MN COLSC FLX WITH DIRECTED SUBMUCOSAL Simone Oskar [address] ANY SBST  10/11/2018    COLONOSCOPY SUBMUCOSAL/BOTOX INJECTION performed by Jalen Lewis MD at [address]  last 2009    removed a bone (2)--2 times no construction     SINUS SURGERY  02/01/2016    SINUS SURGERY  2016 x 2    SINUS SURGERY  09/18/2017    [facility] - Dr Owen Sim SINUS SURGERY  08/09/2017 [facility]17[facility]2017 [facility]    TONSILLECTOMY      TOTAL KNEE ARTHROPLASTY  08/2003    Left TKR    VENTRAL HERNIA REPAIR  1992       Home Medications:   No current facility-administered medications on file prior to encounter. Current Outpatient Medications on File Prior to Encounter   Medication Sig Dispense Refill    pregabalin (LYRICA) 150 MG capsule Take 2 capsules by mouth in the morning and 2 capsules at bedtime. 120 capsule 3    NONFORMULARY by Nasal route 2 times daily Clindamycin and Levofloxacin Compound      mupirocin (BACTROBAN) 2 % ointment Use 3-4 times daily in sinus rinses, as instructed in clinic. Approximately 3-5 gm per day      NIFEdipine (ADALAT CC) 60 MG extended release tablet Take 1 tablet by mouth daily 90 tablet 2    bumetanide (BUMEX) 1 MG tablet Take 1 tablet by mouth 2 times daily 180 tablet 2    hydrALAZINE (APRESOLINE) 50 MG tablet Take 1 tablet by mouth 2 times daily 180 tablet 2    ferrous sulfate (IRON 325) 325 (65 Fe) MG tablet Take 325 mg by mouth daily (with breakfast)      omeprazole (PRILOSEC) 20 MG delayed release capsule TAKE ONE CAPSULE BY MOUTH ONCE DAILY 90 capsule 3    SITagliptin (JANUVIA) 100 MG tablet Take 1 tablet by mouth daily 90 tablet 3    metFORMIN (GLUCOPHAGE) 1000 MG tablet TAKE ONE TABLET IN THE EVENING 90 tablet 3    potassium chloride (KLOR-CON M) 20 MEQ extended release tablet Take 1 tablet by mouth daily 90 tablet 3    Handicap Placard MISC by Does not apply route Dx:I187.2,M81.0. Duration: 5 years. 2 each 0    docusate sodium (COLACE) 100 MG capsule Take 1 capsule by mouth 2 times daily as needed for Constipation 28 capsule 2    doxepin (SINEQUAN) 10 MG capsule Take 10 mg by mouth nightly      mupirocin (BACTROBAN NASAL) 2 % nasal ointment by Nasal route 2 times daily Take by Nasal route 2 times daily.  cycloSPORINE (RESTASIS) 0.05 % ophthalmic emulsion Place 1 drop into both eyes 2 times daily Patient uses 2-4 times per day.       sodium chloride (OCEAN, BABY AYR) 0.65 % nasal non-distended with normal bowel sounds. Musculoskeletal:  No clubbing, cyanosis, trace edema bilaterally in LE. Skin: Skin color, texture, turgor normal.  No rashes or lesions. Neurologic:  Neurovascularly intact without any focal sensory/motor deficits. Cranial nerves: II-XII intact, grossly non-focal.  Psychiatric: Alert and oriented x4, thought content appropriate, normal insight  Capillary Refill: Brisk,< 3 seconds   Peripheral Pulses: +2 palpable, equal bilaterally     Labs:   Recent Labs     12/28/20  0941   WBC 3.5*   HGB 11.4*   HCT 34.5*   *     Recent Labs     12/28/20  0941      K 3.9      CO2 25   BUN 18   CREATININE 0.9   CALCIUM 8.6     Recent Labs     12/28/20  0941   AST 21   ALT 12   BILITOT 0.3   ALKPHOS 90     No results for input(s): INR in the last 72 hours. No results for input(s): Adan James in the last 72 hours. Urinalysis:    Lab Results   Component Value Date    NITRU NEGATIVE 10/04/2018    WBCUA 2-4 10/04/2018    BACTERIA NONE 10/04/2018    RBCUA 3-5 10/04/2018    BLOODU NEGATIVE 10/04/2018    SPECGRAV 1.011 09/28/2018    GLUCOSEU NEGATIVE 10/04/2018       Radiology:   XR CHEST PORTABLE   Final Result   Cardiomegaly. Poor inspiration. No acute cardiopulmonary process            **This report has been created using voice recognition software. It may contain minor errors which are inherent in voice recognition technology. **      Final report electronically signed by Dr. Sofie Edwards on 12/28/2020 10:01 AM        Xr Chest Portable    Result Date: 12/28/2020  PROCEDURE: XR CHEST PORTABLE CLINICAL INFORMATION: tachycardia . COMPARISON: 4/15/2019 TECHNIQUE: Portable supine FINDINGS: Patient's mandible obscures the upper apices. Heart size enlarged. Pulmonary vessels are not congested. No infiltrates or effusions are seen. There is poor inspiration. EKG leads overlie the chest. Remote right humeral head neck fracture. Cardiomegaly. Poor inspiration.  No acute cardiopulmonary process **This report has been created using voice recognition software. It may contain minor errors which are inherent in voice recognition technology. ** Final report electronically signed by Dr. Ariel Otero on 12/28/2020 10:01 AM        EKG: NSR, possible inferior infarct, age undetermined    Electronically signed by Leon Michel PA-C on 12/28/2020 at 3:39 PM

## 2020-12-29 ENCOUNTER — TELEPHONE (OUTPATIENT)
Dept: FAMILY MEDICINE CLINIC | Age: 80
End: 2020-12-29

## 2020-12-29 VITALS
BODY MASS INDEX: 43.43 KG/M2 | SYSTOLIC BLOOD PRESSURE: 128 MMHG | HEIGHT: 60 IN | OXYGEN SATURATION: 94 % | WEIGHT: 221.2 LBS | RESPIRATION RATE: 16 BRPM | DIASTOLIC BLOOD PRESSURE: 72 MMHG | HEART RATE: 87 BPM | TEMPERATURE: 97.9 F

## 2020-12-29 LAB
ALBUMIN SERPL-MCNC: 3.9 G/DL (ref 3.5–5.1)
ALP BLD-CCNC: 102 U/L (ref 38–126)
ALT SERPL-CCNC: 15 U/L (ref 11–66)
ANION GAP SERPL CALCULATED.3IONS-SCNC: 14 MEQ/L (ref 8–16)
AST SERPL-CCNC: 24 U/L (ref 5–40)
BASOPHILS # BLD: 0.4 %
BASOPHILS ABSOLUTE: 0 THOU/MM3 (ref 0–0.1)
BILIRUB SERPL-MCNC: 0.4 MG/DL (ref 0.3–1.2)
BILIRUBIN DIRECT: < 0.2 MG/DL (ref 0–0.3)
BUN BLDV-MCNC: 13 MG/DL (ref 7–22)
CALCIUM SERPL-MCNC: 9.4 MG/DL (ref 8.5–10.5)
CHLORIDE BLD-SCNC: 101 MEQ/L (ref 98–111)
CO2: 25 MEQ/L (ref 23–33)
CREAT SERPL-MCNC: 0.8 MG/DL (ref 0.4–1.2)
D-DIMER QUANTITATIVE: 1541 NG/ML FEU (ref 0–500)
EKG ATRIAL RATE: 86 BPM
EKG P AXIS: 13 DEGREES
EKG P-R INTERVAL: 166 MS
EKG Q-T INTERVAL: 392 MS
EKG QRS DURATION: 98 MS
EKG QTC CALCULATION (BAZETT): 469 MS
EKG R AXIS: 105 DEGREES
EKG T AXIS: 51 DEGREES
EKG VENTRICULAR RATE: 86 BPM
EOSINOPHIL # BLD: 0 %
EOSINOPHILS ABSOLUTE: 0 THOU/MM3 (ref 0–0.4)
ERYTHROCYTE [DISTWIDTH] IN BLOOD BY AUTOMATED COUNT: 14.5 % (ref 11.5–14.5)
ERYTHROCYTE [DISTWIDTH] IN BLOOD BY AUTOMATED COUNT: 44.6 FL (ref 35–45)
GFR SERPL CREATININE-BSD FRML MDRD: 69 ML/MIN/1.73M2
GLUCOSE BLD-MCNC: 180 MG/DL (ref 70–108)
GLUCOSE BLD-MCNC: 209 MG/DL (ref 70–108)
GLUCOSE BLD-MCNC: 225 MG/DL (ref 70–108)
HCT VFR BLD CALC: 41.2 % (ref 37–47)
HEMOGLOBIN: 13.7 GM/DL (ref 12–16)
IMMATURE GRANS (ABS): 0.04 THOU/MM3 (ref 0–0.07)
IMMATURE GRANULOCYTES: 1.5 %
LEGIONELLA PNEUMOPHILIA AG, URINE: NORMAL
LYMPHOCYTES # BLD: 22.1 %
LYMPHOCYTES ABSOLUTE: 0.6 THOU/MM3 (ref 1–4.8)
MAGNESIUM: 1.6 MG/DL (ref 1.6–2.4)
MCH RBC QN AUTO: 28.4 PG (ref 26–33)
MCHC RBC AUTO-ENTMCNC: 33.3 GM/DL (ref 32.2–35.5)
MCV RBC AUTO: 85.5 FL (ref 81–99)
MONOCYTES # BLD: 4.9 %
MONOCYTES ABSOLUTE: 0.1 THOU/MM3 (ref 0.4–1.3)
NUCLEATED RED BLOOD CELLS: 0 /100 WBC
PLATELET # BLD: 147 THOU/MM3 (ref 130–400)
PMV BLD AUTO: 10.2 FL (ref 9.4–12.4)
POTASSIUM REFLEX MAGNESIUM: 3.9 MEQ/L (ref 3.5–5.2)
RBC # BLD: 4.82 MILL/MM3 (ref 4.2–5.4)
SEG NEUTROPHILS: 71.1 %
SEGMENTED NEUTROPHILS ABSOLUTE COUNT: 1.8 THOU/MM3 (ref 1.8–7.7)
SODIUM BLD-SCNC: 140 MEQ/L (ref 135–145)
STREP PNEUMO AG, UR: NORMAL
TOTAL PROTEIN: 7.5 G/DL (ref 6.1–8)
WBC # BLD: 2.6 THOU/MM3 (ref 4.8–10.8)

## 2020-12-29 PROCEDURE — 93005 ELECTROCARDIOGRAM TRACING: CPT | Performed by: PHYSICIAN ASSISTANT

## 2020-12-29 PROCEDURE — 93010 ELECTROCARDIOGRAM REPORT: CPT | Performed by: NUCLEAR MEDICINE

## 2020-12-29 PROCEDURE — 83735 ASSAY OF MAGNESIUM: CPT

## 2020-12-29 PROCEDURE — 36415 COLL VENOUS BLD VENIPUNCTURE: CPT

## 2020-12-29 PROCEDURE — 85025 COMPLETE CBC W/AUTO DIFF WBC: CPT

## 2020-12-29 PROCEDURE — 2580000003 HC RX 258: Performed by: PHYSICIAN ASSISTANT

## 2020-12-29 PROCEDURE — 99238 HOSP IP/OBS DSCHRG MGMT 30/<: CPT | Performed by: PHYSICIAN ASSISTANT

## 2020-12-29 PROCEDURE — 85379 FIBRIN DEGRADATION QUANT: CPT

## 2020-12-29 PROCEDURE — 6360000002 HC RX W HCPCS: Performed by: PHYSICIAN ASSISTANT

## 2020-12-29 PROCEDURE — 80053 COMPREHEN METABOLIC PANEL: CPT

## 2020-12-29 PROCEDURE — 6370000000 HC RX 637 (ALT 250 FOR IP): Performed by: PHYSICIAN ASSISTANT

## 2020-12-29 PROCEDURE — 82948 REAGENT STRIP/BLOOD GLUCOSE: CPT

## 2020-12-29 RX ORDER — CHOLECALCIFEROL (VITAMIN D3) 50 MCG
2000 TABLET ORAL DAILY
Qty: 60 TABLET | Refills: 0 | Status: SHIPPED | OUTPATIENT
Start: 2020-12-30

## 2020-12-29 RX ORDER — BENZONATATE 100 MG/1
100 CAPSULE ORAL 3 TIMES DAILY PRN
Qty: 21 CAPSULE | Refills: 0 | Status: SHIPPED | OUTPATIENT
Start: 2020-12-29 | End: 2021-01-05

## 2020-12-29 RX ORDER — ZINC SULFATE 50(220)MG
50 CAPSULE ORAL DAILY
Qty: 30 CAPSULE | Refills: 3 | COMMUNITY
Start: 2020-12-30 | End: 2021-05-19 | Stop reason: ALTCHOICE

## 2020-12-29 RX ADMIN — POTASSIUM CHLORIDE 20 MEQ: 1500 TABLET, EXTENDED RELEASE ORAL at 08:58

## 2020-12-29 RX ADMIN — INSULIN LISPRO 2 UNITS: 100 INJECTION, SOLUTION INTRAVENOUS; SUBCUTANEOUS at 09:11

## 2020-12-29 RX ADMIN — Medication 10 ML: at 08:58

## 2020-12-29 RX ADMIN — MAGNESIUM SULFATE HEPTAHYDRATE 2 G: 40 INJECTION, SOLUTION INTRAVENOUS at 08:59

## 2020-12-29 RX ADMIN — BUMETANIDE 1 MG: 1 TABLET ORAL at 08:59

## 2020-12-29 RX ADMIN — ACETAMINOPHEN 650 MG: 325 TABLET ORAL at 00:11

## 2020-12-29 RX ADMIN — Medication 50 MG: at 09:00

## 2020-12-29 RX ADMIN — FERROUS SULFATE TAB 325 MG (65 MG ELEMENTAL FE) 325 MG: 325 (65 FE) TAB at 16:04

## 2020-12-29 RX ADMIN — ENOXAPARIN SODIUM 40 MG: 40 INJECTION SUBCUTANEOUS at 08:58

## 2020-12-29 RX ADMIN — BUMETANIDE 1 MG: 1 TABLET ORAL at 16:04

## 2020-12-29 RX ADMIN — INSULIN LISPRO 1 UNITS: 100 INJECTION, SOLUTION INTRAVENOUS; SUBCUTANEOUS at 11:53

## 2020-12-29 RX ADMIN — Medication 2000 UNITS: at 09:00

## 2020-12-29 RX ADMIN — DEXAMETHASONE 6 MG: 4 TABLET ORAL at 09:01

## 2020-12-29 RX ADMIN — OXYCODONE HYDROCHLORIDE AND ACETAMINOPHEN 1000 MG: 500 TABLET ORAL at 08:59

## 2020-12-29 RX ADMIN — PREGABALIN 150 MG: 75 CAPSULE ORAL at 08:58

## 2020-12-29 RX ADMIN — ACETAMINOPHEN 650 MG: 325 TABLET ORAL at 09:37

## 2020-12-29 RX ADMIN — BENZONATATE 100 MG: 100 CAPSULE ORAL at 05:41

## 2020-12-29 RX ADMIN — FERROUS SULFATE TAB 325 MG (65 MG ELEMENTAL FE) 325 MG: 325 (65 FE) TAB at 09:00

## 2020-12-29 RX ADMIN — PANTOPRAZOLE SODIUM 40 MG: 40 TABLET, DELAYED RELEASE ORAL at 05:40

## 2020-12-29 ASSESSMENT — PAIN SCALES - GENERAL
PAINLEVEL_OUTOF10: 1
PAINLEVEL_OUTOF10: 0
PAINLEVEL_OUTOF10: 0
PAINLEVEL_OUTOF10: 6
PAINLEVEL_OUTOF10: 0
PAINLEVEL_OUTOF10: 6

## 2020-12-29 ASSESSMENT — PAIN DESCRIPTION - PAIN TYPE: TYPE: ACUTE PAIN

## 2020-12-29 ASSESSMENT — PAIN DESCRIPTION - LOCATION: LOCATION: HEAD

## 2020-12-29 ASSESSMENT — PAIN DESCRIPTION - ONSET: ONSET: SUDDEN

## 2020-12-29 ASSESSMENT — PAIN DESCRIPTION - DESCRIPTORS: DESCRIPTORS: ACHING

## 2020-12-29 NOTE — FLOWSHEET NOTE
[place] [address] PROGRESS NOTE      Patient: Marques Jimenez  Room #: 6U-30/383-J            YOB: 1940  Age: 79 y.o. Gender: female            Admit Date & Time: 12/28/2020  9:15 AM    Assessment:  Consult from  Nursing for AD. Spoke with patient in her room. She was unsure if she had any ACP documents \"they sound familiar\". She suggested I speak with her daughter Olya Jones and ask her. Patient is pleasant, but tired and a little confused. Daughter Olya Jones was extremely pleasant considering all the stress she is under with her mom in hospital, her mom;s best friend, and several of her own family members testing positive for the virus. She related the difficult situation surrounding her father's illnesses and death. She cared for him and knew his wishes- but nothing was in writing. She has asked her mother several times to complete the POA, but patient keeps putting it off. She did, however, sign a document at one agency allowing sam access and decision making. Ronan Alvarado hopes that when patient is able to think a little more clearly, she will be open to a good ACP conversation and completion of documents. Interventions:  Met with patient and attempted AD education and conversation, but patient was not up to it tonight. Contacted family member of COVID-23 patient, barbara Alvarado. Explained availability of support from chaplains during patient's stay. Provided  phone number and hours in hospital. Explained Zoom call availability and how to initiate a Zoom. Outcomes:  Daughter was excited to hear of Zoom availability. Will arrange with family and then contact chaplains. Olya Jones appreciates all of the staff caring for her mother. Plan:    1. Follow up with family and patient as needed.     Electronically signed by Niurka Rowe, on 12/28/2020 at 10:11 PM.  [address] [place] [address] [place]  [phone]             12/28/20 2030   Encounter Summary   Services provided to: Patient   Referral/Consult From: Nurse   Support System Children   Continue Visiting Yes  (12/28 p- in person; f- phone)   Complexity of Encounter Moderate   Length of Encounter 1 hour   Advance Care Planning Yes   Spiritual/Jewish   Type Spiritual support   Assessment Approachable   Intervention Active listening;Explored feelings, thoughts, concerns;Explored coping resources;Nurtured hope;Discussed illness/injury and it's impact   Outcome Expressed gratitude;Comfort; Connection/belonging;Engaged in conversation;Expressed feelings/needs/concerns;Receptive   Advance Directives (For Healthcare)   Healthcare Directive No, patient does not have an advance directive for healthcare treatment   Advance Directives Documents explained;Documents given        12/28/20 2030   Encounter Summary   Services provided to: Patient   Referral/Consult From: Nurse   Support System Children   Continue Visiting Yes  (12/28 p- in person; f- phone)   Complexity of Encounter Moderate   Length of Encounter 1 hour   Advance Care Planning Yes   Spiritual/Jewish   Type Spiritual support   Assessment Approachable   Intervention Active listening;Explored feelings, thoughts, concerns;Explored coping resources;Nurtured hope;Discussed illness/injury and it's impact   Outcome Expressed gratitude;Comfort; Connection/belonging;Engaged in conversation;Expressed feelings/needs/concerns;Receptive   Advance Directives (For Healthcare)   Healthcare Directive No, patient does not have an advance directive for healthcare treatment   Advance Directives Documents explained;Documents given

## 2020-12-29 NOTE — CARE COORDINATION
DISASTER CHARTING    12/29/20, 7:30 AM EST    DISCHARGE ONGOING EVALUATION:     [facility] day: 1  Location: [facility]/034-A Reason for admit: COVID-19 [U07.1]   Barriers to Discharge: Covid (+), Tmax 100.1, 91% on room air. Vitamin C,D,zinc, Decadron, diabetes management, Electrolyte replacement protocol, IV Remdesivir, Convalescent plasma, telemetry. PCP: Aimee Conrad MD  Patient Goals/Plan/Treatment Preferences: Met with Emily Almonte. She currently lives at home with her daughter. Plan is to return home at discharge. She denies need for DME and declines HH. Will follow for potential needs.

## 2020-12-29 NOTE — CARE COORDINATION
12/29/20, 1:59 PM EST    Patient goals/plan/ treatment preferences discussed by  and . Patient goals/plan/ treatment preferences reviewed with patient/ family. Patient/ family verbalize understanding of discharge plan and are in agreement with goal/plan/treatment preferences. Understanding was demonstrated using the teach back method. AVS provided by RN at time of discharge, which includes all necessary medical information pertaining to the patients current course of illness, treatment, post-discharge goals of care, and treatment preferences. IMM Letter  IMM Letter given to Patient/Family/Significant other/Guardian/POA/by[de-identified] staff  IMM Letter date given[de-identified] 12/28/20     Pt to be discharged to home with daughter. Post hospital follow up appointment made with PCP. [facility] Department notified. She denies needs or services.

## 2020-12-29 NOTE — DISCHARGE SUMMARY
Hospitalist Discharge Summary        Patient: Rubi Tran  YOB: 1940  MRN: [id number]   Acct: [de-identified]    Primary Care Physician: Torsten Pham MD    Admit date  12/28/2020    Discharge date:  12/29/2020 1:45 PM    Chief Complaint on presentation :-  SOB / hypoxia     Discharge Assessment and Plan:-   1. COVID-19 infection: CXR negative for acute cardiopulmonary process. Questionable hypoxia noted (not in chart), will attempt to wean to room air prior to initiating Remdesivir. Started on Decadron. Vitamin C, D, zinc.  Discussed convalescent plasma with PT who is agreeable. 1 unit ordered. Lovenox. 2. ? Acute hypoxic respiratory failure: No hypoxia charted, however daughter states patient was in mid [de-identified] on home a pulse ox. Currently on 2 L NC with SPO2 97%, continue to wean as tolerated. 3. Chronic bilateral lower extremity lymphedema: Per daughter, no worse than baseline. Continue to monitor. Resume Bumex. 4. Essential hypertension: Patient takes hydralazine and nifedipine at home, resume. BP stable, continue to monitor. 5. NIDDM II: recheck Hgb A1c, will hold home medications (Metformin and Januvia) and treat with SSI. ACCU and hypoglycemia protocol in place. 6. Chronic normocytic anemia: History of iron deficiency on ferrous sulfate, continue. No gross bleeding identified. Hemoglobin stable 11.4 which appears better than previous values. Transfuse for hemoglobin less than 7 or hemodynamic instability. 7. 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   Of note, history is obtained via mostly daughter who is at bedside with little interjections from patient herself.      Daughter states that patient began to feel symptomatic with headaches and lower back pain on 12/19-20. Over the course of a few days patient was at home with these mild symptoms being taken care of by her daughter. Daughter states that on 12/24 patient spiked a fever of 100.9 with max temperature noted 101 which resolved with Tylenol. Subsequently patient did fairly well and daughter reports that she had a good appetite and was eating and drinking fine at home. Denies any nausea or emesis. Patient denies any abdominal pain or constipation or diarrhea. However, this morning daughter found patient in the kitchen making a cup of coffee and she appeared to be \"breathing heavily\". Per daughter, when she checked her pulse ox the patient was in the low 80s and she had the patient sit down and rest with subsequent improvement to SPO2 90 to 91% per home pulse ox. Daughter states that she then had to take her nephew into work, and when she came back approximately 1.5 hours later she again found her mother who was dyspneic and again noted to be hypoxic, therefore evaluation in the ED was sought. Daughter states that patient has chronic bilateral lymphedema in lower extremities which appear no worse than baseline. Per daughter, she does not know why patient takes Bumex at home however thinks that it may be due to this lymphedema. She denies any issues with dysuria, increased urgency/frequency. Patient does report one episode of chest pain during her hypoxic episode in the kitchen, however notes that it resolved and has not returned.     In ED, VS showed temp 98.7, respirate 18, pulse 68, /63, 94% on room air. Patient was subsequently placed on 2 L NC with SPO2 96%. Labs showed sodium 137, potassium 3.9, chloride 101, CO2 25, BUN 18, creatinine 0.9, glucose 131, hemoglobin 11.4. Patient was admitted to [facility] for treatment.      12/29-> pt doing well today, sitting up in her chair.  States that she feels much better and is ready to go home. Pt denies feeling SOB, even when she gets up to go to the bathroom and exerts herself. Pt notes that she was walking in the hallway and did not feel SOB. No CP. No fever/chills, reports a minimal cough that she states has \"mostly cleared up\". Patient had a rather unremarkable admission. She was admitted 12/28 with shortness of breath and \"hypoxia \"that was noted at home on pulse in the mid 80s. Patient was brought in for evaluation and was noted to be under 94% on room air and therefore was placed on supplemental O2. Patient was admitted to [facility] and treated with vitamin C, D, zinc, which were prescribed at discharge along with Tessalon Perles. Patient did receive 1 dose of remdesivir and 2 doses of Decadron while admitted. She was agreeable to convalescent plasma and had 1 unit ordered and transfused. At time of discharge patient was 94% on room air and was ambulating to both the bathroom and throughout the halls without difficulty or feeling short of breath. Therefore Decadron was not continued. Patient was instructed to continue incentive spirometer at time of discharge. And to follow-up with her PCP.     Physical Exam:-  Vitals:   Patient Vitals for the past 24 hrs:   BP Temp Temp src Pulse Resp SpO2 Height Weight   12/29/20 1121 (!) 127/53 97.5 °F (36.4 °C) Oral 77 18 94 % -- --   12/29/20 0930 (!) 142/60 97.9 °F (36.6 °C) Oral 79 18 94 % -- --   12/29/20 0347 131/77 98.6 °F (37 °C) Oral 66 20 91 % -- --   12/28/20 2319 139/72 99.4 °F (37.4 °C) Oral 69 20 92 % -- --   12/28/20 2022 (!) 145/74 99.1 °F (37.3 °C) Oral 64 20 91 % -- --   12/28/20 1943 (!) 165/71 98.7 °F (37.1 °C) Oral 78 20 95 % -- --   12/28/20 1858 (!) 140/77 99.4 °F (37.4 °C) Oral 86 20 93 % -- --   12/28/20 1834 130/73 98.8 °F (37.1 °C) -- 74 20 -- -- --   12/28/20 1759 130/73 98.8 °F (37.1 °C) Oral 74 22 93 % -- --   12/28/20 1701 -- -- -- -- -- 92 % -- --   12/28/20 1645 -- -- -- -- -- 90 % -- -- 12/28/20 1545 (!) 141/70 100.1 °F (37.8 °C) Oral 84 20 97 % 5' (1.524 m) 221 lb 3.2 oz (100.3 kg)     Weight:   Weight: 221 lb 3.2 oz (100.3 kg)   24 hour intake/output:     Intake/Output Summary (Last 24 hours) at 12/29/2020 1345  Last data filed at 12/29/2020 1155  Gross per 24 hour   Intake 1241.81 ml   Output 1400 ml   Net -158.19 ml     1. General appearance: No apparent distress, appears stated age and cooperative. 2. HEENT: Normal cephalic, atraumatic without obvious deformity. Pupils equal, round, and reactive to light. Extra ocular muscles intact. Conjunctivae/corneas clear. 3. Neck: Supple, with full range of motion. No jugular venous distention. Trachea midline. 4. Respiratory:  Normal respiratory effort. Faint crackles heard in bilateral bases. 5. Cardiovascular: Regular rate and rhythm with normal S1/S2 without murmurs, rubs or gallops. 6. Abdomen: Soft, non-tender, non-distended with normal bowel sounds. 7. Musculoskeletal:  No clubbing, cyanosis or edema bilaterally. 8. Skin: Skin color, texture, turgor normal.  No rashes or lesions. 9. Neurologic:  Neurovascularly intact without any focal sensory/motor deficits. Cranial nerves: II-XII intact, grossly non-focal.  10. Psychiatric: Alert and oriented, thought content appropriate, normal insight  11. Capillary Refill: Brisk,< 3 seconds   12.  Peripheral Pulses: +2 palpable, equal bilaterally         Discharge Medications:-      Medication List      START taking these medications    ascorbic acid 1000 MG tablet  Commonly known as: VITAMIN C  Take 1 tablet by mouth daily  Start taking on: December 30, 2020     benzonatate 100 MG capsule  Commonly known as: TESSALON  Take 1 capsule by mouth 3 times daily as needed for Cough     vitamin D 50 MCG (2000 UT) Tabs tablet  Commonly known as: CHOLECALCIFEROL  Take 1 tablet by mouth daily  Start taking on: December 30, 2020     zinc sulfate 220 (50 Zn) MG capsule  Commonly known as: ZINCATE  Take 1 capsule by mouth daily  Start taking on: December 30, 2020        CHANGE how you take these medications    NIFEdipine 60 MG extended release tablet  Commonly known as: ADALAT CC  Take 1 tablet by mouth daily  What changed: when to take this        CONTINUE taking these medications    Bactroban Nasal 2 % nasal ointment  Generic drug: mupirocin     bumetanide 1 MG tablet  Commonly known as: BUMEX  Take 1 tablet by mouth 2 times daily     docusate sodium 100 MG capsule  Commonly known as: Colace  Take 1 capsule by mouth 2 times daily as needed for Constipation     ferrous sulfate 325 (65 Fe) MG tablet  Commonly known as: IRON 325     Handicap Placard Misc  by Does not apply route Dx:I187.2,M81.0. Duration: 5 years. hydrALAZINE 50 MG tablet  Commonly known as: APRESOLINE  Take 1 tablet by mouth 2 times daily     metFORMIN 1000 MG tablet  Commonly known as: GLUCOPHAGE  TAKE ONE TABLET IN THE EVENING     mupirocin 2 % ointment  Commonly known as: BACTROBAN     NONFORMULARY     omeprazole 20 MG delayed release capsule  Commonly known as: PRILOSEC  TAKE ONE CAPSULE BY MOUTH ONCE DAILY     potassium chloride 20 MEQ extended release tablet  Commonly known as: KLOR-CON M  Take 1 tablet by mouth daily     pregabalin 150 MG capsule  Commonly known as: LYRICA  Take 2 capsules by mouth in the morning and 2 capsules at bedtime.      SITagliptin 100 MG tablet  Commonly known as: JANUVIA  Take 1 tablet by mouth daily     sodium chloride 0.65 % nasal spray  Commonly known as: OCEAN, BABY AYR           Where to Get Your Medications      These medications were sent to [address] [place] [address] [address]  [place], [place] - [address] - P [phone] - F [phone] [address] [place] [address]    Phone: [phone]   · ascorbic acid 1000 MG tablet  · benzonatate 100 MG capsule  · vitamin D 50 MCG (2000 UT) Tabs tablet     You can get these medications from any pharmacy    You don't need a prescription for these medications  · zinc sulfate 220 (50 Zn) MG capsule          Labs :-  Recent Results (from the past 72 hour(s))   EKG 12 Lead    Collection Time: 12/28/20  9:27 AM   Result Value Ref Range    Ventricular Rate 70 BPM    Atrial Rate 70 BPM    P-R Interval 184 ms    QRS Duration 116 ms    Q-T Interval 412 ms    QTc Calculation (Bazett) 444 ms    P Axis -22 degrees    R Axis 84 degrees    T Axis 34 degrees   Comprehensive Metabolic Panel w/ Reflex to MG    Collection Time: 12/28/20  9:41 AM   Result Value Ref Range    Glucose 131 (H) 70 - 108 mg/dL    CREATININE 0.9 0.4 - 1.2 mg/dL    BUN 18 7 - 22 mg/dL    Sodium 137 135 - 145 meq/L    Potassium reflex Magnesium 3.9 3.5 - 5.2 meq/L    Chloride 101 98 - 111 meq/L    CO2 25 23 - 33 meq/L    Calcium 8.6 8.5 - 10.5 mg/dL    AST 21 5 - 40 U/L    Alkaline Phosphatase 90 38 - 126 U/L    Total Protein 6.2 6.1 - 8.0 g/dL    Alb 3.9 3.5 - 5.1 g/dL    Total Bilirubin 0.3 0.3 - 1.2 mg/dL    ALT 12 11 - 66 U/L   Brain Natriuretic Peptide    Collection Time: 12/28/20  9:41 AM   Result Value Ref Range    Pro-.7 0.0 - 1800.0 pg/mL   Troponin    Collection Time: 12/28/20  9:41 AM   Result Value Ref Range    Troponin T < 0.010 ng/ml   CBC Auto Differential    Collection Time: 12/28/20  9:41 AM   Result Value Ref Range    WBC 3.5 (L) 4.8 - 10.8 thou/mm3    RBC 4.04 (L) 4.20 - 5.40 mill/mm3    Hemoglobin 11.4 (L) 12.0 - 16.0 gm/dl    Hematocrit 34.5 (L) 37.0 - 47.0 %    MCV 85.4 81.0 - 99.0 fL    MCH 28.2 26.0 - 33.0 pg    MCHC 33.0 32.2 - 35.5 gm/dl    RDW-CV 14.6 (H) 11.5 - 14.5 %    RDW-SD 45.4 (H) 35.0 - 45.0 fL    Platelets 964 (L) 629 - 400 thou/mm3    MPV 9.9 9.4 - 12.4 fL    Seg Neutrophils 67.8 %    Lymphocytes 21.6 %    Monocytes 8.6 %    Eosinophils 1.4 %    Basophils 0.0 %    Immature Granulocytes 0.6 %    Segs Absolute 2.4 1.8 - 7.7 thou/mm3    Lymphocytes Absolute 0.8 (L) 1.0 - 4.8 thou/mm3    Monocytes Absolute 0.3 (L) 0.4 - 1.3 thou/mm3    Eosinophils Absolute 0.0 0.0 - 0.4 thou/mm3    Basophils mg/dL    AST 24 5 - 40 U/L    Alkaline Phosphatase 102 38 - 126 U/L    Total Protein 7.5 6.1 - 8.0 g/dL    Alb 3.9 3.5 - 5.1 g/dL    Total Bilirubin 0.4 0.3 - 1.2 mg/dL    ALT 15 11 - 66 U/L   CBC auto differential    Collection Time: 12/29/20  5:39 AM   Result Value Ref Range    WBC 2.6 (L) 4.8 - 10.8 thou/mm3    RBC 4.82 4.20 - 5.40 mill/mm3    Hemoglobin 13.7 12.0 - 16.0 gm/dl    Hematocrit 41.2 37.0 - 47.0 %    MCV 85.5 81.0 - 99.0 fL    MCH 28.4 26.0 - 33.0 pg    MCHC 33.3 32.2 - 35.5 gm/dl    RDW-CV 14.5 11.5 - 14.5 %    RDW-SD 44.6 35.0 - 45.0 fL    Platelets 686 558 - 118 thou/mm3    MPV 10.2 9.4 - 12.4 fL    Seg Neutrophils 71.1 %    Lymphocytes 22.1 %    Monocytes 4.9 %    Eosinophils 0.0 %    Basophils 0.4 %    Immature Granulocytes 1.5 %    Segs Absolute 1.8 1.8 - 7.7 thou/mm3    Lymphocytes Absolute 0.6 (L) 1.0 - 4.8 thou/mm3    Monocytes Absolute 0.1 (L) 0.4 - 1.3 thou/mm3    Eosinophils Absolute 0.0 0.0 - 0.4 thou/mm3    Basophils Absolute 0.0 0.0 - 0.1 thou/mm3    Immature Grans (Abs) 0.04 0.00 - 0.07 thou/mm3    nRBC 0 /100 wbc   D-Dimer, Quantitative    Collection Time: 12/29/20  5:39 AM   Result Value Ref Range    D-Dimer, Quant 1541.00 (H) 0.00 - 500.00 ng/ml FEU   Magnesium    Collection Time: 12/29/20  5:39 AM   Result Value Ref Range    Magnesium 1.6 1.6 - 2.4 mg/dL   Hepatic Function Panel    Collection Time: 12/29/20  5:39 AM   Result Value Ref Range    Bilirubin, Direct <0.2 0.0 - 0.3 mg/dL   Anion Gap    Collection Time: 12/29/20  5:39 AM   Result Value Ref Range    Anion Gap 14.0 8.0 - 16.0 meq/L   Glomerular Filtration Rate, Estimated    Collection Time: 12/29/20  5:39 AM   Result Value Ref Range    Est, Glom Filt Rate 69 (A) ml/min/1.73m2   POCT glucose    Collection Time: 12/29/20  6:06 AM   Result Value Ref Range    POC Glucose 209 (H) 70 - 108 mg/dl   EKG 12 Lead    Collection Time: 12/29/20  6:27 AM   Result Value Ref Range    Ventricular Rate 86 BPM    Atrial Rate 86 BPM    P-R Interval 166 ms    QRS Duration 98 ms    Q-T Interval 392 ms    QTc Calculation (Bazett) 469 ms    P Axis 13 degrees    R Axis 105 degrees    T Axis 51 degrees   POCT glucose    Collection Time: 12/29/20 11:23 AM   Result Value Ref Range    POC Glucose 180 (H) 70 - 108 mg/dl        Microbiology:    Blood culture #1:   Lab Results   Component Value Date    BC No growth-preliminary  No growth   02/24/2019       Blood culture #2:No results found for: BLOODCULT2    Organism:    Lab Results   Component Value Date    LABGRAM  01/24/2019     Rare segmented neutrophils observed. Rare epithelial cells observed. Rare gram positive cocci occurring singly and in pairs. MRSA culture only:No results found for: [facility]    Urine culture: No results found for: Toney Alexander  Lab Results   Component Value Date    ORG Culture screen is positive for MRSA colonization 02/24/2019        Respiratory culture: No results found for: CULTRESP    Aerobic and Anaerobic :  Lab Results   Component Value Date    LABAERO  01/24/2019     light growth  This is a MRSA (Methicillin Resistant Staphylococcus  aureus)isolate. Isolates of MRSA (ORSA) Methicillin (Oxacillin) Resistant  Staphylococcus aureus (coagulase positive) require patient  be placed in CONTACT isolation. Methicillin(Oxacillin)resistant strains of staphylococci  (MRSA)or(MRSE)should be considered resistant to all classes  of cephalosporins, penems and beta-lactams. In the treatment of gram positive infections, GENTAMICIN  should be CONSIDERED a SYNERGYSTIC agent ONLY.       LABAERO very light growth 01/24/2019     No results found for: LABANAE    Urinalysis:      Lab Results   Component Value Date    NITRU NEGATIVE 10/04/2018    WBCUA 2-4 10/04/2018    BACTERIA NONE 10/04/2018    RBCUA 3-5 10/04/2018    BLOODU NEGATIVE 10/04/2018    SPECGRAV 1.011 09/28/2018    GLUCOSEU NEGATIVE 10/04/2018       Radiology:-  Xr Chest Portable    Result Date: 12/28/2020  PROCEDURE: XR CHEST PORTABLE CLINICAL INFORMATION: tachycardia . COMPARISON: 4/15/2019 TECHNIQUE: Portable supine FINDINGS: Patient's mandible obscures the upper apices. Heart size enlarged. Pulmonary vessels are not congested. No infiltrates or effusions are seen. There is poor inspiration. EKG leads overlie the chest. Remote right humeral head neck fracture. Cardiomegaly. Poor inspiration. No acute cardiopulmonary process **This report has been created using voice recognition software. It may contain minor errors which are inherent in voice recognition technology. ** Final report electronically signed by Dr. Justus Bishop on 12/28/2020 10:01 AM       Follow-up scheduled after discharge :-    in the next few days with Clarice River MD    Consultations during this hospital stay:-  [x] NONE [] Cardiology  [] Nephrology  [] Hemo onco   [] GI   [] ID  [] Endocrine  [] Pulm    [] Neuro    [] Psych   [] Urology  [] ENT   [] G SURGERY   []Ortho    []CV surg    [] Palliative  [] Hospice [] Pain management   []    []TCU   [] PT/OT  OTHERS:-    Disposition: home  Condition at Discharge: Stable    Time Spent:- 25 minutes    Electronically signed by Bobby Alarcon PA-C on 12/29/2020 at 1:45 PM  Discharging Hospitalist

## 2020-12-30 ENCOUNTER — CARE COORDINATION (OUTPATIENT)
Dept: CASE MANAGEMENT | Age: 80
End: 2020-12-30

## 2020-12-31 ENCOUNTER — TELEPHONE (OUTPATIENT)
Dept: FAMILY MEDICINE CLINIC | Age: 80
End: 2020-12-31

## 2020-12-31 ENCOUNTER — CARE COORDINATION (OUTPATIENT)
Dept: CASE MANAGEMENT | Age: 80
End: 2020-12-31

## 2020-12-31 NOTE — CARE COORDINATION
[facility]ie [facility] Transitions Initial Follow Up Call    Call within 2 business days of discharge: Yes    Patient: Nikki Thao Patient : 1940   MRN: [id number]  Reason for Admission: covid  Discharge Date: 20 RARS: Readmission Risk Score: 20      Last Discharge [facility]       Complaint Diagnosis Description Type Department Provider    20 Chest Pain; Shortness of Breath COVID-19 virus infection . .. ED to Hosp-Admission (Discharged) (ADMITTED) ALEKSANDRA Lemon PA-C; Chloe Kennedy,... Second attempt for covid call.  Message stated [employer] customer is not available  Follow Up  Future Appointments   Date Time Provider David Adler   2021  8:15 AM MD Ray Solis [facility]   2021  2:00 PM AMANDA Rojas - CNP [facility] - Molly Hodgkin, RN

## 2021-01-05 ENCOUNTER — TELEPHONE (OUTPATIENT)
Dept: FAMILY MEDICINE CLINIC | Age: 81
End: 2021-01-05

## 2021-01-05 ENCOUNTER — VIRTUAL VISIT (OUTPATIENT)
Dept: FAMILY MEDICINE CLINIC | Age: 81
End: 2021-01-05
Payer: MEDICARE

## 2021-01-05 DIAGNOSIS — M54.2 CERVICAL PAIN (NECK): ICD-10-CM

## 2021-01-05 DIAGNOSIS — U07.1 COVID-19 VIRUS INFECTION: Primary | ICD-10-CM

## 2021-01-05 DIAGNOSIS — I50.23 ACUTE ON CHRONIC SYSTOLIC CHF (CONGESTIVE HEART FAILURE) (HCC): ICD-10-CM

## 2021-01-05 PROCEDURE — 99495 TRANSJ CARE MGMT MOD F2F 14D: CPT | Performed by: FAMILY MEDICINE

## 2021-01-05 PROCEDURE — 1111F DSCHRG MED/CURRENT MED MERGE: CPT | Performed by: FAMILY MEDICINE

## 2021-01-05 ASSESSMENT — ENCOUNTER SYMPTOMS
BACK PAIN: 1
COUGH: 0
SHORTNESS OF BREATH: 0
EYE PAIN: 0
RHINORRHEA: 0
DIARRHEA: 0
BLOOD IN STOOL: 0
ABDOMINAL PAIN: 0
VOMITING: 0
WHEEZING: 0
NAUSEA: 0
CONSTIPATION: 0
SORE THROAT: 0
CHEST TIGHTNESS: 0

## 2021-01-05 NOTE — PROGRESS NOTES
Post-Discharge Transitional Care Management Services or Hospital Follow Up      Kyrie Cortez   YOB: 1940    Date of Office Visit:  1/5/2021  Date of Hospital Admission: 12/28/20  Date of Hospital Discharge: 12/29/20  Readmission Risk Score(high >=14%.  Medium >=10%):Readmission Risk Score: 20      Care management risk score Rising risk (score 2-5) and Complex Care (Scores >=6): 15     Non face to face  following discharge, date last encounter closed (first attempt may have been earlier): 12/31/2020  9:55 AM 12/31/2020  9:55 AM    Call initiated 2 business days of discharge: Yes     Patient Active Problem List   Diagnosis    Hypercholesterolemia    Osteoarthritis    Osteoporosis    Type 2 diabetes mellitus without complication, without long-term current use of insulin ([facility] [address]    DDD (degenerative disc disease), lumbar    Benign essential HTN    SWAPNA on CPAP    Diabetic peripheral neuropathy (HCC)    Acute recurrent pansinusitis    Primary thunderclap headache    Rash of entire body    Infection of skin due to methicillin resistant Staphylococcus aureus (MRSA)    Drug allergy, antibiotic  likely bactrim    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[facility])    Skin plaque    Hyponatremia    Hypervolemia    Cellulitis of lower extremity    Hypoglycemia    Acute on chronic systolic CHF (congestive heart failure) ([facility])    Bilateral cellulitis of lower leg    Venous ulcers of both lower extremities ([facility])    Bilateral lower leg cellulitis    CKD (chronic kidney disease) stage 3, GFR 30-59 ml/min ([facility])    Iron deficiency anemia    Hypomagnesemia    SBO (small bowel obstruction) ([facility])    Venous ulcer of left leg (HCC)    Venous ulcer of right leg (HCC)    Acute eczema    Venous insufficiency of both lower extremities    Lymphedema of both lower extremities    Pressure injury of left buttock, stage 2 ([facility])    Eczematous dermatitis    Colonization with drug-resistant bacteria    Dystrophic nail    Angio-edema    Facial cellulitis    Osteoradionecrosis of skull/sinus    Late effect of radiation    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HCC)    Cataract of both eyes    History of ventral hernia repair    Other cervical disc degeneration, mid-cervical region    Spondylolisthesis of cervical region    Spondylolisthesis of thoracic region    Chronic rhinitis    Closed fracture of head of humerus    Dysphagia    Laryngopharyngeal reflux    Malignant neoplasm of ethmoidal sinus (HCC)    Obesity, Class II, BMI 35-39.9    COVID-19       Allergies   Allergen Reactions    Lisinopril Swelling and Anaphylaxis    Sulfamethoxazole-Trimethoprim Rash    Morphine Other (See Comments)     headaches    Hydrocodone      headaches    Percocet [Oxycodone-Acetaminophen] Other (See Comments)     Headaches    Tylenol [Acetaminophen] Other (See Comments)     TYLENOL 3 causes hallucinations    Tape [Adhesive Tape] Rash       Medications listed as ordered at the time of discharge from [facility] Medication Instructions FITZ:    Printed on:01/05/21 6836   Medication Information                      ascorbic acid (VITAMIN C) 1000 MG tablet  Take 1 tablet by mouth daily             benzonatate (TESSALON) 100 MG capsule  Take 1 capsule by mouth 3 times daily as needed for Cough             bumetanide (BUMEX) 1 MG tablet  Take 1 tablet by mouth 2 times daily             docusate sodium (COLACE) 100 MG capsule  Take 1 capsule by mouth 2 times daily as needed for Constipation             Elastic Bandages & Supports (CERVICAL COLLAR) MISC  Wear while awake for neck support. M54.2             ferrous sulfate (IRON 325) 325 (65 Fe) MG tablet  Take 325 mg by mouth 2 times daily              Handicap Placard MISC  by Does not apply route Dx:I187.2,M81.0. Duration: 5 years.              hydrALAZINE (APRESOLINE) 50 MG tablet  Take 1 tablet by mouth 2 times daily             metFORMIN (GLUCOPHAGE) 1000 MG tablet  TAKE ONE TABLET IN THE EVENING             Misc. Devices (WALKER) MISC  1 each by Does not apply route daily Requests stand up walker due to heart failure and generalized OA. I50.23.             mupirocin (BACTROBAN NASAL) 2 % nasal ointment  by Nasal route 2 times daily Take by Nasal route 2 times daily. mupirocin (BACTROBAN) 2 % ointment  Use 3-4 times daily in sinus rinses, as instructed in clinic. Approximately 3-5 gm per day             NIFEdipine (ADALAT CC) 60 MG extended release tablet  Take 1 tablet by mouth daily             NONFORMULARY  by Nasal route 2 times daily Clindamycin and Levofloxacin Compound             omeprazole (PRILOSEC) 20 MG delayed release capsule  TAKE ONE CAPSULE BY MOUTH ONCE DAILY             potassium chloride (KLOR-CON M) 20 MEQ extended release tablet  Take 1 tablet by mouth daily             pregabalin (LYRICA) 150 MG capsule  Take 2 capsules by mouth in the morning and 2 capsules at bedtime. SITagliptin (JANUVIA) 100 MG tablet  Take 1 tablet by mouth daily             sodium chloride (OCEAN, BABY AYR) 0.65 % nasal spray  1 spray by Nasal route as needed for Congestion Patient states she uses 4-6x per day. Vitamin D (CHOLECALCIFEROL) 50 MCG (2000 UT) TABS tablet  Take 1 tablet by mouth daily             zinc sulfate (ZINCATE) 220 (50 Zn) MG capsule  Take 1 capsule by mouth daily                   Medications marked \"taking\" at this time  Outpatient Medications Marked as Taking for the 1/5/21 encounter (Virtual Visit) with Pearl Ingram MD   Medication Sig Dispense Refill    Misc. Devices (WALKER) MISC 1 each by Does not apply route daily Requests stand up walker due to heart failure and generalized OA. I50.23. 1 each 0    Elastic Bandages & Supports (CERVICAL COLLAR) MISC Wear while awake for neck support.   M54.2 1 each 0        Medications patient taking as of now reconciled against medications ordered at time of hospital discharge: Yes    Chief Complaint   Patient presents with    Other     FARHAD       HPI  Presented to [facility] with SOB    Inpatient course: Discharge summary reviewed- see chart. Interval history/Current status: Diagnosed with covid infection. Was weaned off O2. Received usual covid treatments including convalescent plasma. Is doing well. Having some low back pain, alternating tylenol/ibuprofen and heat. Had constipation, but that has resolved. Is requesting a stand up walker and cervical collar to be faxed to [facility] medical supply. Seen with daughter. , non smoker, pmh reviewed. Review of Systems   Constitutional: Negative for chills, fatigue, fever and unexpected weight change. HENT: Negative for congestion, ear pain, rhinorrhea and sore throat. Eyes: Negative for pain and visual disturbance. Respiratory: Negative for cough, chest tightness, shortness of breath and wheezing. Cardiovascular: Negative for chest pain and palpitations. Gastrointestinal: Negative for abdominal pain, blood in stool, constipation, diarrhea, nausea and vomiting. Genitourinary: Negative for difficulty urinating, frequency, hematuria and urgency. Musculoskeletal: Positive for back pain. Negative for joint swelling, myalgias and neck pain. Skin: Negative for rash. Neurological: Negative for dizziness and headaches. Hematological: Negative for adenopathy. Does not bruise/bleed easily. Psychiatric/Behavioral: Negative for behavioral problems and sleep disturbance. The patient is not nervous/anxious. There were no vitals filed for this visit. There is no height or weight on file to calculate BMI.    Wt Readings from Last 3 Encounters:   12/28/20 221 lb 3.2 oz (100.3 kg)   07/23/20 210 lb (95.3 kg)   06/04/20 205 lb 6.4 oz (93.2 kg)     BP Readings from Last 3 Encounters:   12/29/20 128/72   07/23/20 108/66   06/04/20 104/72       Physical Exam  Constitutional:       General: She is not emergency declaration under the [facility], [address] [place] [address] authority and the [employer] Act, this Virtual Visit was conducted with patient's (and/or legal guardian's) consent, to reduce the patient's risk of exposure to COVID-19 and provide necessary medical care. The patient (and/or legal guardian) has also been advised to contact this office for worsening conditions or problems, and seek emergency medical treatment and/or call 911 if deemed necessary. Patient identification was verified at the start of the visit: Yes    Total time spent for this encounter: Not billed by time    Services were provided through a video synchronous discussion virtually to substitute for in-person clinic visit. Patient and provider were located at their individual homes. --Kimmy Bermeo MD on 1/5/2021 at 8:42 AM    An electronic signature was used to authenticate this note.

## 2021-01-05 NOTE — PATIENT INSTRUCTIONS
Patient Education        Learning About Relief for Back Pain  What is back strain? Back strain is an injury that happens when you overstretch, or pull, a muscle in your back. You may hurt your back in an accident or when you exercise or lift something. Most back pain gets better with rest and time. You can take care of yourself at home to help your back heal.  What can you do first to relieve back pain? When you first feel back pain, try these steps:  · Walk. Take a short walk (10 to 20 minutes) on a level surface (no slopes, hills, or stairs) every 2 to 3 hours. Walk only distances you can manage without pain, especially leg pain. · Relax. Find a comfortable position for rest. Some people are comfortable on the floor or a medium-firm bed with a small pillow under their head and another under their knees. Some people prefer to lie on their side with a pillow between their knees. Don't stay in one position for too long. · Try heat or ice. Try using a heating pad on a low or medium setting, or take a warm shower, for 15 to 20 minutes every 2 to 3 hours. Or you can buy single-use heat wraps that last up to 8 hours. You can also try an ice pack for 10 to 15 minutes every 2 to 3 hours. You can use an ice pack or a bag of frozen vegetables wrapped in a thin towel. There is not strong evidence that either heat or ice will help, but you can try them to see if they help. You may also want to try switching between heat and cold. · Take pain medicine exactly as directed. ? If the doctor gave you a prescription medicine for pain, take it as prescribed. ? If you are not taking a prescription pain medicine, ask your doctor if you can take an over-the-counter medicine. What else can you do? · Stretch and exercise. Exercises that increase flexibility may relieve your pain and make it easier for your muscles to keep your spine in a good, neutral position. And don't forget to keep walking. · Do self-massage. You can use self-massage to unwind after work or school or to energize yourself in the morning. You can easily massage your feet, hands, or neck. Self-massage works best if you are in comfortable clothes and are sitting or lying in a comfortable position. Use oil or lotion to massage bare skin. · Reduce stress. Back pain can lead to a vicious [place]: Distress about the pain tenses the muscles in your back, which in turn causes more pain. Learn how to relax your mind and your muscles to lower your stress. Where can you learn more? Go to https://[employer] Solutionspepiceweb.[email or web address]. org and sign in to your [employer] account. Enter D710 in the Compufirst box to learn more about \"Learning About Relief for Back Pain. \"     If you do not have an account, please click on the \"Sign Up Now\" link. Current as of: March 2, 2020               Content Version: 12.6  © 6827-8663 [employer]. Care instructions adapted under license by [facility] ([facility]). If you have questions about a medical condition or this instruction, always ask your healthcare professional. [place] [address] any warranty or liability for your use of this information. Patient Education        Learning About Coronavirus [phone])  Coronavirus [phone]): Overview  What is coronavirus ([id number]-53)? The coronavirus disease (COVID-19) is caused by a virus. It is an illness that was first found in December 2019. It has since spread worldwide. The virus can cause fever, cough, and trouble breathing. In severe cases, it can cause pneumonia and make it hard to breathe without help. It can cause death. This virus spreads person-to-person through droplets from coughing and sneezing. It can also spread when you are close to someone who is infected. And it can spread when you touch something that has the virus on it, such as a doorknob or a tabletop. Coronaviruses are a large group of viruses. They cause the common cold. They also cause more serious illnesses like Middle East respiratory syndrome (MERS) and severe acute respiratory syndrome (SARS). COVID-19 is caused by a novel coronavirus. That means it's a new type that has not been seen in people before. How is COVID-19 treated? Mild illness can be treated at home, but more serious illness needs to be treated in the hospital. Treatment may include medicines to reduce symptoms, plus breathing support such as oxygen therapy or a ventilator. Other treatments, such as antiviral medicines, may help people who have COVID-19. What can you do to protect yourself from COVID-19? The best way to protect yourself from getting sick is to:  · Avoid areas where there is an outbreak. · Avoid contact with people who may be infected. · Avoid crowds and try to stay at least 6 feet away from other people. · Wash your hands often, especially after you cough or sneeze. Use soap and water, and scrub for at least 20 seconds. If soap and water aren't available, use an alcohol-based hand . · Avoid touching your mouth, nose, and eyes. What can you do to avoid spreading the virus to others? To help avoid spreading the virus to others:  · Wash your hands often with soap or alcohol-based hand sanitizers. · Cover your mouth with a tissue when you cough or sneeze. Then throw the tissue in the trash. · Use a disinfectant to clean things that you touch often. These include doorknobs, remote controls, phones, and handles on your refrigerator and microwave. And don't forget countertops, tabletops, bathrooms, and computer keyboards. · Wear a cloth face cover if you have to go to public areas. If you know or suspect that you have COVID-19:  · Stay home. Don't go to school, work, or public areas. And don't use public transportation, ride-shares, or taxis unless you have no choice. · Leave your home only if you need to get medical care or testing. But call the doctor's office first so they know you're coming. And wear a face cover. · Limit contact with people in your home. If possible, stay in a separate bedroom and use a separate bathroom. · Wear a face cover whenever you're around other people. It can help stop the spread of the virus when you cough or sneeze. · Clean and disinfect your home every day. Use household  and disinfectant wipes or sprays. Take special care to clean things that you grab with your hands. · Self-isolate until it's safe to be around others again. ? If you have symptoms, it's safe when you haven't had a fever for 3 days and your symptoms have improved and it's been at least 10 days since your symptoms started. ? If you were exposed to the virus but don't have symptoms, it's safe to be around others 14 days after exposure. ? Talk to your doctor about whether you also need testing, especially if you have a weakened immune system. When to call for help  Call 911 anytime you think you may need emergency care. For example, call if:  · You have severe trouble breathing. (You can't talk at all.)  · You have constant chest pain or pressure. · You are severely dizzy or lightheaded. · You are confused or can't think clearly. · Your face and lips have a blue color. · You passed out (lost consciousness) or are very hard to wake up. Call your doctor now if you develop symptoms such as:  · Shortness of breath. · Fever. · Cough. If you need to get care, call ahead to the doctor's office for instructions before you go. Make sure you wear a face cover to prevent exposing other people to the virus. Where can you get the latest information? The following health organizations are tracking and studying this virus. Their websites contain the most up-to-date information. Debra Nicole also learn what to do if you think you may have been exposed to the virus. · U.S. Centers for Disease Control and Prevention (CDC): The CDC provides updated news about the disease and travel advice. The website also tells you how to prevent the spread of infection. www.cdc.gov  · World Health Organization [facility]): WHO offers information about the virus outbreaks. WHO also has travel advice. www.who.int  Current as of: July 10, 2020               Content Version: 12.6  © 2006-2020 [employer]. Care instructions adapted under license by [facility] [facility] ([facility]). If you have questions about a medical condition or this instruction, always ask your healthcare professional. [place] [address] any warranty or liability for your use of this information. Patient Education        Neck Pain: Care Instructions  Your Care Instructions     You can have neck pain anywhere from the bottom of your head to the top of your shoulders. It can spread to the upper back or arms. Injuries, painting a ceiling, sleeping with your neck twisted, staying in one position for too long, and many other activities can cause neck pain. Most neck pain gets better with home care. Your doctor may recommend medicine to relieve pain or relax your muscles. He or she may suggest exercise and physical therapy to increase flexibility and relieve stress. You may need to wear a special (cervical) collar to support your neck for a day or two. Follow-up care is a key part of your treatment and safety. Be sure to make and go to all appointments, and call your doctor if you are having problems. It's also a good idea to know your test results and keep a list of the medicines you take. How can you care for yourself at home? · Try using a heating pad on a low or medium setting for 15 to 20 minutes every 2 or 3 hours. Try a warm shower in place of one session with the heating pad. · You can also try an ice pack for 10 to 15 minutes every 2 to 3 hours. Put a thin cloth between the ice and your skin. · Take pain medicines exactly as directed. ? If the doctor gave you a prescription medicine for pain, take it as prescribed. ? If you are not taking a prescription pain medicine, ask your doctor if you can take an over-the-counter medicine. · If your doctor recommends a cervical collar, wear it exactly as directed. When should you call for help? Call your doctor now or seek immediate medical care if:    · You have new or worsening numbness in your arms, buttocks or legs.     · You have new or worsening weakness in your arms or legs. (This could make it hard to stand up.)     · You lose control of your bladder or bowels. Watch closely for changes in your health, and be sure to contact your doctor if:    · Your neck pain is getting worse.     · You are not getting better after 1 week.     · You do not get better as expected. Where can you learn more? Go to https://RetentionGridpepiceweb.[employer]. org and sign in to your [employer] account. Enter [id number] in the SEDLine box to learn more about \"Neck Pain: Care Instructions. \"     If you do not have an account, please click on the \"Sign Up Now\" link. Current as of: March 2, 2020               Content Version: 12.6  © 4831-0532 [employer]. Care instructions adapted under license by [facility] ([facility]). If you have questions about a medical condition or this instruction, always ask your healthcare professional. [place] [address] any warranty or liability for your use of this information.

## 2021-01-25 ENCOUNTER — HOSPITAL ENCOUNTER (INPATIENT)
Age: 81
LOS: 9 days | Discharge: NURSING, REHAB OR LONG-TERM CARE | DRG: 152 | End: 2021-02-03
Attending: FAMILY MEDICINE | Admitting: INTERNAL MEDICINE
Payer: MEDICARE

## 2021-01-25 ENCOUNTER — APPOINTMENT (OUTPATIENT)
Dept: GENERAL RADIOLOGY | Age: 81
DRG: 152 | End: 2021-01-25
Payer: MEDICARE

## 2021-01-25 ENCOUNTER — APPOINTMENT (OUTPATIENT)
Dept: CT IMAGING | Age: 81
DRG: 152 | End: 2021-01-25
Payer: MEDICARE

## 2021-01-25 ENCOUNTER — APPOINTMENT (OUTPATIENT)
Dept: MRI IMAGING | Age: 81
DRG: 152 | End: 2021-01-25
Payer: MEDICARE
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
## 2021-01-25 DIAGNOSIS — R50.9 FEVER, UNSPECIFIED FEVER CAUSE: ICD-10-CM

## 2021-01-25 DIAGNOSIS — J18.9 PNEUMONIA DUE TO ORGANISM: Primary | ICD-10-CM

## 2021-01-25 DIAGNOSIS — R53.1 GENERALIZED WEAKNESS: ICD-10-CM

## 2021-01-25 DIAGNOSIS — A41.9 SEPTICEMIA (HCC): ICD-10-CM

## 2021-01-25 LAB
ALBUMIN SERPL-MCNC: 3.3 G/DL (ref 3.5–5.1)
ALP BLD-CCNC: 84 U/L (ref 38–126)
ALT SERPL-CCNC: 6 U/L (ref 11–66)
ANION GAP SERPL CALCULATED.3IONS-SCNC: 13 MEQ/L (ref 8–16)
AST SERPL-CCNC: 10 U/L (ref 5–40)
BACTERIA: ABNORMAL
BASOPHILS # BLD: 0.3 %
BASOPHILS ABSOLUTE: 0 THOU/MM3 (ref 0–0.1)
BILIRUB SERPL-MCNC: 0.5 MG/DL (ref 0.3–1.2)
BILIRUBIN DIRECT: < 0.2 MG/DL (ref 0–0.3)
BILIRUBIN URINE: NEGATIVE
BLOOD, URINE: NEGATIVE
BUN BLDV-MCNC: 16 MG/DL (ref 7–22)
CALCIUM SERPL-MCNC: 9.2 MG/DL (ref 8.5–10.5)
CASTS: ABNORMAL /LPF
CASTS: ABNORMAL /LPF
CHARACTER, URINE: CLEAR
CHLORIDE BLD-SCNC: 103 MEQ/L (ref 98–111)
CO2: 23 MEQ/L (ref 23–33)
COLOR: YELLOW
CREAT SERPL-MCNC: 0.8 MG/DL (ref 0.4–1.2)
CRYSTALS: ABNORMAL
EKG ATRIAL RATE: 103 BPM
EKG P AXIS: 15 DEGREES
EKG P-R INTERVAL: 164 MS
EKG Q-T INTERVAL: 346 MS
EKG QRS DURATION: 92 MS
EKG QTC CALCULATION (BAZETT): 453 MS
EKG R AXIS: 109 DEGREES
EKG T AXIS: 60 DEGREES
EKG VENTRICULAR RATE: 103 BPM
EOSINOPHIL # BLD: 0.2 %
EOSINOPHILS ABSOLUTE: 0 THOU/MM3 (ref 0–0.4)
EPITHELIAL CELLS, UA: ABNORMAL /HPF
ERYTHROCYTE [DISTWIDTH] IN BLOOD BY AUTOMATED COUNT: 16.3 % (ref 11.5–14.5)
ERYTHROCYTE [DISTWIDTH] IN BLOOD BY AUTOMATED COUNT: 56.1 FL (ref 35–45)
GFR SERPL CREATININE-BSD FRML MDRD: 69 ML/MIN/1.73M2
GLUCOSE BLD-MCNC: 173 MG/DL (ref 70–108)
GLUCOSE BLD-MCNC: 198 MG/DL (ref 70–108)
GLUCOSE, URINE: 250 MG/DL
HCT VFR BLD CALC: 38.2 % (ref 37–47)
HEMOGLOBIN: 11.6 GM/DL (ref 12–16)
IMMATURE GRANS (ABS): 0.07 THOU/MM3 (ref 0–0.07)
IMMATURE GRANULOCYTES: 0.6 %
KETONES, URINE: NEGATIVE
LACTIC ACID: 2 MMOL/L (ref 0.5–2.2)
LEUKOCYTE EST, POC: NEGATIVE
LIPASE: 23.7 U/L (ref 5.6–51.3)
LYMPHOCYTES # BLD: 8.3 %
LYMPHOCYTES ABSOLUTE: 0.9 THOU/MM3 (ref 1–4.8)
MCH RBC QN AUTO: 28.3 PG (ref 26–33)
MCHC RBC AUTO-ENTMCNC: 30.4 GM/DL (ref 32.2–35.5)
MCV RBC AUTO: 93.2 FL (ref 81–99)
MISCELLANEOUS LAB TEST RESULT: ABNORMAL
MONOCYTES # BLD: 7 %
MONOCYTES ABSOLUTE: 0.8 THOU/MM3 (ref 0.4–1.3)
NITRITE, URINE: NEGATIVE
NUCLEATED RED BLOOD CELLS: 0 /100 WBC
OSMOLALITY CALCULATION: 284.3 MOSMOL/KG (ref 275–300)
PH UA: 7 (ref 5–9)
PLATELET # BLD: 166 THOU/MM3 (ref 130–400)
PMV BLD AUTO: 10.1 FL (ref 9.4–12.4)
POTASSIUM SERPL-SCNC: 3.5 MEQ/L (ref 3.5–5.2)
PRO-BNP: 536.6 PG/ML (ref 0–1800)
PROCALCITONIN: 0.13 NG/ML (ref 0.01–0.09)
PROTEIN UA: 100 MG/DL
RBC # BLD: 4.1 MILL/MM3 (ref 4.2–5.4)
RBC URINE: ABNORMAL /HPF
RENAL EPITHELIAL, UA: ABNORMAL
SARS-COV-2, NAAT: NOT DETECTED
SEG NEUTROPHILS: 83.6 %
SEGMENTED NEUTROPHILS ABSOLUTE COUNT: 9 THOU/MM3 (ref 1.8–7.7)
SODIUM BLD-SCNC: 139 MEQ/L (ref 135–145)
SPECIFIC GRAVITY UA: 1.01 (ref 1–1.03)
TOTAL PROTEIN: 7.2 G/DL (ref 6.1–8)
TROPONIN T: < 0.01 NG/ML
UROBILINOGEN, URINE: 0.2 EU/DL (ref 0–1)
WBC # BLD: 10.8 THOU/MM3 (ref 4.8–10.8)
WBC UA: ABNORMAL /HPF
YEAST: ABNORMAL

## 2021-01-25 PROCEDURE — 83605 ASSAY OF LACTIC ACID: CPT

## 2021-01-25 PROCEDURE — 80053 COMPREHEN METABOLIC PANEL: CPT

## 2021-01-25 PROCEDURE — 71045 X-RAY EXAM CHEST 1 VIEW: CPT

## 2021-01-25 PROCEDURE — 96365 THER/PROPH/DIAG IV INF INIT: CPT

## 2021-01-25 PROCEDURE — 84484 ASSAY OF TROPONIN QUANT: CPT

## 2021-01-25 PROCEDURE — 84145 PROCALCITONIN (PCT): CPT

## 2021-01-25 PROCEDURE — 93005 ELECTROCARDIOGRAM TRACING: CPT | Performed by: FAMILY MEDICINE

## 2021-01-25 PROCEDURE — 36415 COLL VENOUS BLD VENIPUNCTURE: CPT

## 2021-01-25 PROCEDURE — 6370000000 HC RX 637 (ALT 250 FOR IP): Performed by: INTERNAL MEDICINE

## 2021-01-25 PROCEDURE — 81001 URINALYSIS AUTO W/SCOPE: CPT

## 2021-01-25 PROCEDURE — 99285 EMERGENCY DEPT VISIT HI MDM: CPT

## 2021-01-25 PROCEDURE — 6360000002 HC RX W HCPCS: Performed by: FAMILY MEDICINE

## 2021-01-25 PROCEDURE — 96361 HYDRATE IV INFUSION ADD-ON: CPT

## 2021-01-25 PROCEDURE — 87040 BLOOD CULTURE FOR BACTERIA: CPT

## 2021-01-25 PROCEDURE — 83880 ASSAY OF NATRIURETIC PEPTIDE: CPT

## 2021-01-25 PROCEDURE — 2060000000 HC ICU INTERMEDIATE R&B

## 2021-01-25 PROCEDURE — 82248 BILIRUBIN DIRECT: CPT

## 2021-01-25 PROCEDURE — 70450 CT HEAD/BRAIN W/O DYE: CPT

## 2021-01-25 PROCEDURE — 6360000002 HC RX W HCPCS: Performed by: INTERNAL MEDICINE

## 2021-01-25 PROCEDURE — 70551 MRI BRAIN STEM W/O DYE: CPT

## 2021-01-25 PROCEDURE — 87086 URINE CULTURE/COLONY COUNT: CPT

## 2021-01-25 PROCEDURE — 85025 COMPLETE CBC W/AUTO DIFF WBC: CPT

## 2021-01-25 PROCEDURE — 2580000003 HC RX 258: Performed by: FAMILY MEDICINE

## 2021-01-25 PROCEDURE — U0002 COVID-19 LAB TEST NON-CDC: HCPCS

## 2021-01-25 PROCEDURE — 83690 ASSAY OF LIPASE: CPT

## 2021-01-25 PROCEDURE — 82948 REAGENT STRIP/BLOOD GLUCOSE: CPT

## 2021-01-25 PROCEDURE — 96375 TX/PRO/DX INJ NEW DRUG ADDON: CPT

## 2021-01-25 PROCEDURE — 2580000003 HC RX 258: Performed by: INTERNAL MEDICINE

## 2021-01-25 RX ORDER — VITAMIN B COMPLEX
2000 TABLET ORAL DAILY
Status: DISCONTINUED | OUTPATIENT
Start: 2021-01-25 | End: 2021-02-03 | Stop reason: HOSPADM

## 2021-01-25 RX ORDER — ALOGLIPTIN 12.5 MG/1
12.5 TABLET, FILM COATED ORAL DAILY
Status: DISCONTINUED | OUTPATIENT
Start: 2021-01-25 | End: 2021-01-25 | Stop reason: CLARIF

## 2021-01-25 RX ORDER — KETOROLAC TROMETHAMINE 30 MG/ML
15 INJECTION, SOLUTION INTRAMUSCULAR; INTRAVENOUS ONCE
Status: COMPLETED | OUTPATIENT
Start: 2021-01-25 | End: 2021-01-25

## 2021-01-25 RX ORDER — POTASSIUM CHLORIDE 20 MEQ/1
20 TABLET, EXTENDED RELEASE ORAL DAILY
Status: DISCONTINUED | OUTPATIENT
Start: 2021-01-25 | End: 2021-02-03 | Stop reason: HOSPADM

## 2021-01-25 RX ORDER — SODIUM CHLORIDE 9 MG/ML
INJECTION, SOLUTION INTRAVENOUS CONTINUOUS
Status: ACTIVE | OUTPATIENT
Start: 2021-01-25 | End: 2021-01-26

## 2021-01-25 RX ORDER — BUMETANIDE 1 MG/1
1 TABLET ORAL 2 TIMES DAILY
Status: DISCONTINUED | OUTPATIENT
Start: 2021-01-25 | End: 2021-02-03 | Stop reason: HOSPADM

## 2021-01-25 RX ORDER — ACETAMINOPHEN 325 MG/1
650 TABLET ORAL EVERY 4 HOURS PRN
Status: DISCONTINUED | OUTPATIENT
Start: 2021-01-25 | End: 2021-02-03 | Stop reason: HOSPADM

## 2021-01-25 RX ORDER — PREGABALIN 75 MG/1
150 CAPSULE ORAL 2 TIMES DAILY
Status: DISCONTINUED | OUTPATIENT
Start: 2021-01-25 | End: 2021-01-25 | Stop reason: CLARIF

## 2021-01-25 RX ORDER — PANTOPRAZOLE SODIUM 40 MG/1
40 TABLET, DELAYED RELEASE ORAL
Status: DISCONTINUED | OUTPATIENT
Start: 2021-01-26 | End: 2021-02-03 | Stop reason: HOSPADM

## 2021-01-25 RX ORDER — HEPARIN SODIUM 5000 [USP'U]/ML
5000 INJECTION, SOLUTION INTRAVENOUS; SUBCUTANEOUS EVERY 8 HOURS SCHEDULED
Status: DISCONTINUED | OUTPATIENT
Start: 2021-01-25 | End: 2021-02-03 | Stop reason: HOSPADM

## 2021-01-25 RX ORDER — TRAMADOL HYDROCHLORIDE 50 MG/1
100 TABLET ORAL EVERY 6 HOURS PRN
Status: DISCONTINUED | OUTPATIENT
Start: 2021-01-25 | End: 2021-02-03 | Stop reason: HOSPADM

## 2021-01-25 RX ORDER — TRAMADOL HYDROCHLORIDE 50 MG/1
50 TABLET ORAL EVERY 6 HOURS PRN
Status: DISCONTINUED | OUTPATIENT
Start: 2021-01-25 | End: 2021-02-03 | Stop reason: HOSPADM

## 2021-01-25 RX ORDER — NIFEDIPINE 60 MG/1
60 TABLET, EXTENDED RELEASE ORAL NIGHTLY
Status: DISCONTINUED | OUTPATIENT
Start: 2021-01-25 | End: 2021-02-03 | Stop reason: HOSPADM

## 2021-01-25 RX ORDER — ZINC SULFATE 50(220)MG
50 CAPSULE ORAL DAILY
Status: DISCONTINUED | OUTPATIENT
Start: 2021-01-25 | End: 2021-02-03 | Stop reason: HOSPADM

## 2021-01-25 RX ORDER — 0.9 % SODIUM CHLORIDE 0.9 %
1000 INTRAVENOUS SOLUTION INTRAVENOUS ONCE
Status: COMPLETED | OUTPATIENT
Start: 2021-01-25 | End: 2021-01-25

## 2021-01-25 RX ORDER — PREGABALIN 75 MG/1
300 CAPSULE ORAL 2 TIMES DAILY
Status: DISCONTINUED | OUTPATIENT
Start: 2021-01-25 | End: 2021-02-03 | Stop reason: HOSPADM

## 2021-01-25 RX ORDER — HYDRALAZINE HYDROCHLORIDE 50 MG/1
50 TABLET, FILM COATED ORAL 2 TIMES DAILY
Status: DISCONTINUED | OUTPATIENT
Start: 2021-01-25 | End: 2021-02-03 | Stop reason: HOSPADM

## 2021-01-25 RX ORDER — ONDANSETRON 2 MG/ML
4 INJECTION INTRAMUSCULAR; INTRAVENOUS EVERY 6 HOURS PRN
Status: DISCONTINUED | OUTPATIENT
Start: 2021-01-25 | End: 2021-02-03 | Stop reason: HOSPADM

## 2021-01-25 RX ORDER — ALOGLIPTIN 25 MG/1
25 TABLET, FILM COATED ORAL DAILY
Status: DISCONTINUED | OUTPATIENT
Start: 2021-01-25 | End: 2021-02-03 | Stop reason: HOSPADM

## 2021-01-25 RX ORDER — DOCUSATE SODIUM 100 MG/1
100 CAPSULE, LIQUID FILLED ORAL 2 TIMES DAILY PRN
Status: DISCONTINUED | OUTPATIENT
Start: 2021-01-25 | End: 2021-01-29

## 2021-01-25 RX ORDER — FERROUS SULFATE 325(65) MG
325 TABLET ORAL 2 TIMES DAILY
Status: DISCONTINUED | OUTPATIENT
Start: 2021-01-25 | End: 2021-02-03 | Stop reason: HOSPADM

## 2021-01-25 RX ADMIN — Medication 50 MG: at 21:54

## 2021-01-25 RX ADMIN — ACETAMINOPHEN 650 MG: 325 TABLET ORAL at 22:37

## 2021-01-25 RX ADMIN — HEPARIN SODIUM 5000 UNITS: 5000 INJECTION INTRAVENOUS; SUBCUTANEOUS at 21:57

## 2021-01-25 RX ADMIN — CEFTRIAXONE SODIUM 1000 MG: 1 INJECTION, POWDER, FOR SOLUTION INTRAMUSCULAR; INTRAVENOUS at 12:21

## 2021-01-25 RX ADMIN — TRAMADOL HYDROCHLORIDE 100 MG: 50 TABLET, FILM COATED ORAL at 20:40

## 2021-01-25 RX ADMIN — PIPERACILLIN AND TAZOBACTAM 3375 MG: 3; .375 INJECTION, POWDER, LYOPHILIZED, FOR SOLUTION INTRAVENOUS at 22:39

## 2021-01-25 RX ADMIN — VANCOMYCIN HYDROCHLORIDE 1000 MG: 1 INJECTION, POWDER, LYOPHILIZED, FOR SOLUTION INTRAVENOUS at 22:34

## 2021-01-25 RX ADMIN — SODIUM CHLORIDE 1000 ML: 9 INJECTION, SOLUTION INTRAVENOUS at 12:21

## 2021-01-25 RX ADMIN — HYDRALAZINE HYDROCHLORIDE 50 MG: 50 TABLET, FILM COATED ORAL at 21:57

## 2021-01-25 RX ADMIN — ALOGLIPTIN 25 MG: 25 TABLET, FILM COATED ORAL at 21:55

## 2021-01-25 RX ADMIN — PREGABALIN 300 MG: 75 CAPSULE ORAL at 21:53

## 2021-01-25 RX ADMIN — CEFTRIAXONE SODIUM 1000 MG: 1 INJECTION, POWDER, FOR SOLUTION INTRAMUSCULAR; INTRAVENOUS at 22:37

## 2021-01-25 RX ADMIN — Medication 2000 UNITS: at 21:55

## 2021-01-25 RX ADMIN — ONDANSETRON 4 MG: 2 INJECTION INTRAMUSCULAR; INTRAVENOUS at 22:37

## 2021-01-25 RX ADMIN — NIFEDIPINE 60 MG: 60 TABLET, FILM COATED, EXTENDED RELEASE ORAL at 21:55

## 2021-01-25 RX ADMIN — MUPIROCIN: 20 OINTMENT TOPICAL at 21:57

## 2021-01-25 RX ADMIN — BUMETANIDE 1 MG: 1 TABLET ORAL at 21:56

## 2021-01-25 RX ADMIN — KETOROLAC TROMETHAMINE 15 MG: 30 INJECTION, SOLUTION INTRAMUSCULAR at 12:54

## 2021-01-25 RX ADMIN — FERROUS SULFATE TAB 325 MG (65 MG ELEMENTAL FE) 325 MG: 325 (65 FE) TAB at 21:57

## 2021-01-25 RX ADMIN — SODIUM CHLORIDE: 9 INJECTION, SOLUTION INTRAVENOUS at 22:37

## 2021-01-25 RX ADMIN — POTASSIUM CHLORIDE 20 MEQ: 1500 TABLET, EXTENDED RELEASE ORAL at 22:10

## 2021-01-25 ASSESSMENT — PAIN DESCRIPTION - PROGRESSION: CLINICAL_PROGRESSION: GRADUALLY WORSENING

## 2021-01-25 ASSESSMENT — PAIN DESCRIPTION - ORIENTATION: ORIENTATION: RIGHT

## 2021-01-25 ASSESSMENT — PAIN SCALES - GENERAL
PAINLEVEL_OUTOF10: 5
PAINLEVEL_OUTOF10: 6

## 2021-01-25 ASSESSMENT — PAIN DESCRIPTION - LOCATION
LOCATION: SHOULDER
LOCATION: SHOULDER

## 2021-01-25 ASSESSMENT — PAIN - FUNCTIONAL ASSESSMENT: PAIN_FUNCTIONAL_ASSESSMENT: PREVENTS OR INTERFERES SOME ACTIVE ACTIVITIES AND ADLS

## 2021-01-25 ASSESSMENT — PAIN DESCRIPTION - FREQUENCY: FREQUENCY: INTERMITTENT

## 2021-01-25 ASSESSMENT — PAIN DESCRIPTION - DESCRIPTORS: DESCRIPTORS: ACHING;DISCOMFORT

## 2021-01-25 ASSESSMENT — PAIN DESCRIPTION - ONSET: ONSET: ON-GOING

## 2021-01-25 NOTE — ED TRIAGE NOTES
Pt presents to the ED via EMS from home with c/o weakness, fever, and fall. Pt reports she lives with daughter and has been feeling weak for a few days. Pt reports she got up to go to the bathroom when she became weak and tripped. Pt denies blood thinners or hitting her head. Pt is a poor historian when it comes to medications - pt reports her daughter does her meds for her. Pt is alert and oriented x4. Pts oral temperature on arrival is 103.4. Pt reports she was positive for Covid at the beginning of December.

## 2021-01-25 NOTE — ED PROVIDER NOTES
EMERGENCY DEPARTMENT ENCOUNTER      CHIEF COMPLAINT    No chief complaint on file. HPI    Miko Browne is a 79 y.o. female from home with multiple medical issues who presents with fever and generalized weakness since the onset last few days, culminating in her feeling dizzy and passed out(?). She came in to the ED with fever. The duration has been constant since the onset. The generalized weakness may be associated with an underlying infection. Pt denies any pain, just not feeling good. In her words, \"she's been doing pretty good lately\". No aggravating or alleviating factors. She also recently had sinus cleanout surgery done in [place] last Thursday which apparently went well. She has not had any bleeding at this time. 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 REVIEW OF SYSTEMS    Cardiac: +syncope; No chest pain or palpitations  Respiratory: No shortness of breath or new cough  General: +fevers, +generalized weakness   : No dysuria or hematuria  GI: No vomiting or diarrhea  See HPI for further details. All other systems reviewed and are negative. 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    Cataract of both eyes     COVID-19 12/2020    DDD (degenerative disc disease), lumbar     Dysphagia, pharyngoesophageal 09/26/2016    Eczema 03/2018    Fibrocystic breast     H/O ventral hernia repair     Headache 02/09/2017    Hypercholesteremia     Hyperlipidemia     Hypertension     Iron deficiency anemia     LPRD (laryngopharyngeal reflux disease) 09/26/2016    Neuropathy     peripheral    Obesity     Orbital abscess     Orbital cellulitis 01/22/2014    SWAPNA (obstructive sleep apnea)     Osteoarthritis     Osteoporosis     Persistent proteinuria associated with type 2 diabetes mellitus ([facility] [address] 01/2017    urine micral of 50.       Sinusitis     Type II or unspecified type diabetes mellitus without Sister     Cancer Sister         Skin     Cancer Brother         Bone cancer from metal    Other Brother         passed as a child    Heart Disease Brother     Other Brother         tremor    Heart Attack Brother     No Known Problems Brother     Heart Disease Brother     No Known Problems Brother     No Known Problems Brother      Social History     Socioeconomic History    Marital status:      Spouse name: Not on file    Number of children: Not on file    Years of education: Not on file    Highest education level: Not on file   Occupational History    Not on file   Social Needs    Financial resource strain: Not on file    Food insecurity     Worry: Not on file     Inability: Not on file   [employer] needs     Medical: Not on file     Non-medical: Not on file   Tobacco Use    Smoking status: Never Smoker    Smokeless tobacco: Never Used   Substance and Sexual Activity    Alcohol use: No    Drug use: No    Sexual activity: Not Currently   Lifestyle    Physical activity     Days per week: Not on file     Minutes per session: Not on file    Stress: Not on file   Relationships    Social connections     Talks on phone: Not on file     Gets together: Not on file     Attends Spiritism service: Not on file     Active member of club or organization: Not on file     Attends meetings of clubs or organizations: Not on file     Relationship status: Not on file    Intimate partner violence     Fear of current or ex partner: Not on file     Emotionally abused: Not on file     Physically abused: Not on file     Forced sexual activity: Not on file   Other Topics Concern    Not on file   Social History Narrative    Not on file       PHYSICAL EXAM    VITAL SIGNS: BP (!) 176/81   Pulse 76   Temp 101.7 °F (38.7 °C) (Rectal)   Resp 18   Ht 5' 5\" (1.651 m)   Wt 220 lb (99.8 kg)   LMP  (LMP Unknown)   SpO2 96%   BMI 36.61 kg/m²   Constitutional:  Well developed, well nourished, no acute distress  Eyes:  Pupils equally round and reactive to light, sclera nonicteric  HENT:  atraumatic, moist mucous membranes  NECK: Normal range of motion, no JVD  Respiratory:  No respiratory distress, normal breath sounds, no wheezing   Cardiovascular:  Mild tachycardic rate, normal rhythm, no murmurs   GI:  Soft, nondistended, normal bowel sounds, nontender  Musculoskeletal:  No edema, no acute deformities   Integument:  Skin is warm and dry, no obvious rash    Vascular: Radial and DP pulses 2+ equal bilaterally  Neurologic: awake, alert, oriented x3, handgrip is 5/5 bilaterally, normal finger to nose test bilaterally  Psychiatric:  normal affect, does not appear anxious    Labs Reviewed   CBC WITH AUTO DIFFERENTIAL - Abnormal; Notable for the following components:       Result Value    RBC 4.10 (*)     Hemoglobin 11.6 (*)     MCHC 30.4 (*)     RDW-CV 16.3 (*)     RDW-SD 56.1 (*)     Segs Absolute 9.0 (*)     Lymphocytes Absolute 0.9 (*)     All other components within normal limits   BASIC METABOLIC PANEL - Abnormal; Notable for the following components:    Glucose 198 (*)     All other components within normal limits   HEPATIC FUNCTION PANEL - Abnormal; Notable for the following components:    Alb 3.3 (*)     ALT 6 (*)     All other components within normal limits   PROCALCITONIN - Abnormal; Notable for the following components:    Procalcitonin 0.13 (*)     All other components within normal limits   GLOMERULAR FILTRATION RATE, ESTIMATED - Abnormal; Notable for the following components:    Est, Glom Filt Rate 69 (*)     All other components within normal limits   MICROSCOPIC URINALYSIS - Abnormal; Notable for the following components:    Glucose, Urine 250 (*)     Protein,  (*)     All other components within normal limits   CULTURE, BLOOD 1   CULTURE, BLOOD 2   CULTURE, URINE   LIPASE   BRAIN NATRIURETIC PEPTIDE   LACTIC ACID, PLASMA   ANION GAP   OSMOLALITY   TROPONIN   COVID-19   CBC WITH AUTO DIFFERENTIAL   LIPID PANEL   COMPREHENSIVE METABOLIC PANEL   POCT GLUCOSE   POCT GLUCOSE   POCT GLUCOSE       EKG  (Interpreted by me)  EKG Interpretation. EKG Interpretation    Interpreted by emergency department physician on 1/25/21    Rhythm: normal sinus   Rate: 76 bpm  Axis: normal  Ectopy: none  Conduction: normal  ST Segments: no acute change  T Waves: no acute change  Q Waves: none    Clinical Impression: non-specific EKG      RADIOLOGY/PROCEDURES    CT Head WO Contrast   Final Result       1. Encephalomalacia in the right frontal gyrus rectus new since previous study dated 1 October 2018,  consistent with an infarct in the distribution of the right anterior cerebral artery. MRI scan would be helpful for better evaluation. 2. 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 3. Calcification in the cavernous segments of both internal carotid arteries. 4. Mild dilatation of the lateral ventricles. 5. Postoperative changes along the medial walls of both maxillary sinuses and along the anterior aspect of the sphenoid sinus. Opacification of the ethmoid and maxillary sinuses and sphenoid sinuses consistent with inflammatory changes. 6. Fluid in the left mastoid air cells and opacification of the right mastoid air cells consistent with inflammatory changes. .               **This report has been created using voice recognition software. It may contain minor errors which are inherent in voice recognition technology. **      Final report electronically signed by DR Debbie Stephenson on 1/25/2021 1:58 PM      XR CHEST PORTABLE   Final Result   Reticular markings throughout the lungs. Correlate for edema versus atypical infection. **This report has been created using voice recognition software. It may contain minor errors which are inherent in voice recognition technology. **      Final report electronically signed by Dr. Aries Harrington on 1/25/2021 12:20 PM      MRI BRAIN WO CONTRAST    (Results Pending)       ED COURSE & MEDICAL DECISION MAKING    Pertinent Labs & Imaging studies reviewed and interpreted. (See chart for details)  See chart for details of medications given during the ED stay. Vitals:    01/25/21 1425 01/25/21 1519 01/25/21 1607 01/25/21 1810   BP:  (!) 170/69 (!) 168/68 (!) 176/81   Pulse:  91 89 76   Resp:  16 17 18   Temp: 100.3 °F (37.9 °C)   101.7 °F (38.7 °C)   TempSrc:    Rectal   SpO2:  94% 94% 96%   Weight:       Height:           Differential diagnosis: Dehydration, potentially/metabolic problem, anemia, infection, hypotension, Stroke, Structural CNS mass lesion, other    FINAL IMPRESSION    1. Pneumonia due to organism    2. Fever, unspecified fever cause    3. Fall from bed, initial encounter    4. Generalized weakness    5. Septicemia ([facility] [address]        PLAN  Admit     MDM - pt certainly could have sepsis, pneumonia. She actually had COVID in December, so likely that's not what she has, but probably should re-check if needed. Pt has multiple issues, the main one being her possible pneumonia leading to mild hypoxemia. Pt also had a fever to 103.4F, but COVID was negative. She may have sepsis with pneumonia, so we admitted pt to Dr. Belkys Candelaria service. I did relay her new finding of a right encephalomalacia which is not showing up in any focal neuro weakness such as left-sided  strength or facial droop or slurred speech. Pt will be admitted with no ED requested consults at this time.     Saima Bautista MD  01/25/21 2008

## 2021-01-26 ENCOUNTER — APPOINTMENT (OUTPATIENT)
Dept: MRI IMAGING | Age: 81
DRG: 152 | End: 2021-01-26
Payer: MEDICARE

## 2021-01-26 PROBLEM — G93.89 BRAIN MASS: Status: ACTIVE | Noted: 2021-01-26

## 2021-01-26 LAB
ALBUMIN SERPL-MCNC: 3.3 G/DL (ref 3.5–5.1)
ALP BLD-CCNC: 79 U/L (ref 38–126)
ALT SERPL-CCNC: 9 U/L (ref 11–66)
ANION GAP SERPL CALCULATED.3IONS-SCNC: 12 MEQ/L (ref 8–16)
AST SERPL-CCNC: 14 U/L (ref 5–40)
BASOPHILS # BLD: 0.5 %
BASOPHILS ABSOLUTE: 0.1 THOU/MM3 (ref 0–0.1)
BILIRUB SERPL-MCNC: 0.5 MG/DL (ref 0.3–1.2)
BUN BLDV-MCNC: 10 MG/DL (ref 7–22)
CALCIUM SERPL-MCNC: 8.9 MG/DL (ref 8.5–10.5)
CHLORIDE BLD-SCNC: 100 MEQ/L (ref 98–111)
CHOLESTEROL, TOTAL: 144 MG/DL (ref 100–199)
CO2: 26 MEQ/L (ref 23–33)
CREAT SERPL-MCNC: 0.6 MG/DL (ref 0.4–1.2)
EOSINOPHIL # BLD: 1.7 %
EOSINOPHILS ABSOLUTE: 0.2 THOU/MM3 (ref 0–0.4)
ERYTHROCYTE [DISTWIDTH] IN BLOOD BY AUTOMATED COUNT: 15.7 % (ref 11.5–14.5)
ERYTHROCYTE [DISTWIDTH] IN BLOOD BY AUTOMATED COUNT: 51.2 FL (ref 35–45)
GFR SERPL CREATININE-BSD FRML MDRD: > 90 ML/MIN/1.73M2
GLUCOSE BLD-MCNC: 136 MG/DL (ref 70–108)
GLUCOSE BLD-MCNC: 140 MG/DL (ref 70–108)
GLUCOSE BLD-MCNC: 169 MG/DL (ref 70–108)
GLUCOSE BLD-MCNC: 217 MG/DL (ref 70–108)
GLUCOSE BLD-MCNC: 255 MG/DL (ref 70–108)
HCT VFR BLD CALC: 37.6 % (ref 37–47)
HDLC SERPL-MCNC: 40 MG/DL
HEMOGLOBIN: 12 GM/DL (ref 12–16)
IMMATURE GRANS (ABS): 0.06 THOU/MM3 (ref 0–0.07)
IMMATURE GRANULOCYTES: 0.6 %
LDL CHOLESTEROL CALCULATED: 78 MG/DL
LYMPHOCYTES # BLD: 14.8 %
LYMPHOCYTES ABSOLUTE: 1.6 THOU/MM3 (ref 1–4.8)
MCH RBC QN AUTO: 28.5 PG (ref 26–33)
MCHC RBC AUTO-ENTMCNC: 31.9 GM/DL (ref 32.2–35.5)
MCV RBC AUTO: 89.3 FL (ref 81–99)
MONOCYTES # BLD: 10.5 %
MONOCYTES ABSOLUTE: 1.1 THOU/MM3 (ref 0.4–1.3)
NUCLEATED RED BLOOD CELLS: 0 /100 WBC
PLATELET # BLD: 167 THOU/MM3 (ref 130–400)
PMV BLD AUTO: 9.9 FL (ref 9.4–12.4)
POTASSIUM SERPL-SCNC: 3.5 MEQ/L (ref 3.5–5.2)
RBC # BLD: 4.21 MILL/MM3 (ref 4.2–5.4)
SEG NEUTROPHILS: 71.9 %
SEGMENTED NEUTROPHILS ABSOLUTE COUNT: 7.8 THOU/MM3 (ref 1.8–7.7)
SODIUM BLD-SCNC: 138 MEQ/L (ref 135–145)
TOTAL PROTEIN: 7.2 G/DL (ref 6.1–8)
TRIGL SERPL-MCNC: 128 MG/DL (ref 0–199)
WBC # BLD: 10.8 THOU/MM3 (ref 4.8–10.8)

## 2021-01-26 PROCEDURE — 6370000000 HC RX 637 (ALT 250 FOR IP): Performed by: INTERNAL MEDICINE

## 2021-01-26 PROCEDURE — 6360000004 HC RX CONTRAST MEDICATION: Performed by: PSYCHIATRY & NEUROLOGY

## 2021-01-26 PROCEDURE — 85025 COMPLETE CBC W/AUTO DIFF WBC: CPT

## 2021-01-26 PROCEDURE — 99231 SBSQ HOSP IP/OBS SF/LOW 25: CPT | Performed by: NURSE PRACTITIONER

## 2021-01-26 PROCEDURE — 80053 COMPREHEN METABOLIC PANEL: CPT

## 2021-01-26 PROCEDURE — A9579 GAD-BASE MR CONTRAST NOS,1ML: HCPCS | Performed by: PSYCHIATRY & NEUROLOGY

## 2021-01-26 PROCEDURE — 2060000000 HC ICU INTERMEDIATE R&B

## 2021-01-26 PROCEDURE — 36415 COLL VENOUS BLD VENIPUNCTURE: CPT

## 2021-01-26 PROCEDURE — 70552 MRI BRAIN STEM W/DYE: CPT

## 2021-01-26 PROCEDURE — 80061 LIPID PANEL: CPT

## 2021-01-26 PROCEDURE — 99223 1ST HOSP IP/OBS HIGH 75: CPT | Performed by: PSYCHIATRY & NEUROLOGY

## 2021-01-26 PROCEDURE — 2580000003 HC RX 258: Performed by: INTERNAL MEDICINE

## 2021-01-26 PROCEDURE — 6360000002 HC RX W HCPCS: Performed by: INTERNAL MEDICINE

## 2021-01-26 PROCEDURE — 82948 REAGENT STRIP/BLOOD GLUCOSE: CPT

## 2021-01-26 RX ORDER — AMOXICILLIN AND CLAVULANATE POTASSIUM 875; 125 MG/1; MG/1
1 TABLET, FILM COATED ORAL 2 TIMES DAILY
Status: ON HOLD | COMMUNITY
End: 2021-02-03 | Stop reason: HOSPADM

## 2021-01-26 RX ADMIN — ACETAMINOPHEN 650 MG: 325 TABLET ORAL at 20:42

## 2021-01-26 RX ADMIN — MUPIROCIN: 20 OINTMENT TOPICAL at 09:30

## 2021-01-26 RX ADMIN — PREGABALIN 300 MG: 75 CAPSULE ORAL at 20:43

## 2021-01-26 RX ADMIN — FERROUS SULFATE TAB 325 MG (65 MG ELEMENTAL FE) 325 MG: 325 (65 FE) TAB at 09:29

## 2021-01-26 RX ADMIN — HYDRALAZINE HYDROCHLORIDE 50 MG: 50 TABLET, FILM COATED ORAL at 09:29

## 2021-01-26 RX ADMIN — INSULIN LISPRO 3 UNITS: 100 INJECTION, SOLUTION INTRAVENOUS; SUBCUTANEOUS at 11:49

## 2021-01-26 RX ADMIN — GADOTERIDOL 20 ML: 279.3 INJECTION, SOLUTION INTRAVENOUS at 16:13

## 2021-01-26 RX ADMIN — ALOGLIPTIN 25 MG: 25 TABLET, FILM COATED ORAL at 09:30

## 2021-01-26 RX ADMIN — HEPARIN SODIUM 5000 UNITS: 5000 INJECTION INTRAVENOUS; SUBCUTANEOUS at 22:31

## 2021-01-26 RX ADMIN — INSULIN LISPRO 1 UNITS: 100 INJECTION, SOLUTION INTRAVENOUS; SUBCUTANEOUS at 16:40

## 2021-01-26 RX ADMIN — POTASSIUM CHLORIDE 20 MEQ: 1500 TABLET, EXTENDED RELEASE ORAL at 09:29

## 2021-01-26 RX ADMIN — PREGABALIN 300 MG: 75 CAPSULE ORAL at 09:29

## 2021-01-26 RX ADMIN — HEPARIN SODIUM 5000 UNITS: 5000 INJECTION INTRAVENOUS; SUBCUTANEOUS at 15:22

## 2021-01-26 RX ADMIN — PANTOPRAZOLE SODIUM 40 MG: 40 TABLET, DELAYED RELEASE ORAL at 05:56

## 2021-01-26 RX ADMIN — BUMETANIDE 1 MG: 1 TABLET ORAL at 20:42

## 2021-01-26 RX ADMIN — MUPIROCIN: 20 OINTMENT TOPICAL at 20:42

## 2021-01-26 RX ADMIN — BUMETANIDE 1 MG: 1 TABLET ORAL at 09:29

## 2021-01-26 RX ADMIN — PIPERACILLIN AND TAZOBACTAM 3375 MG: 3; .375 INJECTION, POWDER, LYOPHILIZED, FOR SOLUTION INTRAVENOUS at 16:40

## 2021-01-26 RX ADMIN — FERROUS SULFATE TAB 325 MG (65 MG ELEMENTAL FE) 325 MG: 325 (65 FE) TAB at 20:42

## 2021-01-26 RX ADMIN — PIPERACILLIN AND TAZOBACTAM 3375 MG: 3; .375 INJECTION, POWDER, LYOPHILIZED, FOR SOLUTION INTRAVENOUS at 05:56

## 2021-01-26 RX ADMIN — NIFEDIPINE 60 MG: 60 TABLET, FILM COATED, EXTENDED RELEASE ORAL at 20:42

## 2021-01-26 RX ADMIN — Medication 50 MG: at 09:29

## 2021-01-26 RX ADMIN — PIPERACILLIN AND TAZOBACTAM 3375 MG: 3; .375 INJECTION, POWDER, LYOPHILIZED, FOR SOLUTION INTRAVENOUS at 22:32

## 2021-01-26 RX ADMIN — HYDRALAZINE HYDROCHLORIDE 50 MG: 50 TABLET, FILM COATED ORAL at 20:42

## 2021-01-26 RX ADMIN — Medication 2000 UNITS: at 09:29

## 2021-01-26 RX ADMIN — HEPARIN SODIUM 5000 UNITS: 5000 INJECTION INTRAVENOUS; SUBCUTANEOUS at 05:56

## 2021-01-26 ASSESSMENT — PAIN SCALES - GENERAL
PAINLEVEL_OUTOF10: 0

## 2021-01-26 NOTE — PROGRESS NOTES
2023 - patient returned from MRI. 2040 - bedside swallow eval completed at bedside. Passed. No choking or coughing noted. Patient alert and oriented x4.

## 2021-01-26 NOTE — PROGRESS NOTES
Call received from Mr. Jaz Danielle (With Dr. Sam Saeed)  ENT. He explained with patients past Cancer of Sinus with surgery and Radiation, that she may have a risk for Meningitis. He Wanted primary physician notified. They suggest an LP if ok with the Primary. Perfect serve message sent to Dr. Amish Carpio stating. Tio Hernandez MD   Patient: Naga Rosario   0'\"   1/25/21 8:03 PM   [phone] From: Lorenzo Jackson RN [facility] [address] RE: Shalini Chris 2X06. Contacted the ENT regarding this patient. Mr. Vernell Cheung (with Dr. Sam Saeed) Called back and said this lady may be at risk for meningitis. Due to her past surgery sinus cancer/radiation. She continues to have fever. They suggest a LP.

## 2021-01-26 NOTE — CONSULTS
Department of Otolaryngology  Consult Note    Reason for Consult:  History of sinonasal adenocarcinoma  Requesting Physician:  Dr Penni Claude:  Fever, weakness    History Obtained From:  patient, electronic medical record    HISTORY OF PRESENT ILLNESS:                The patient is a 79 y.o. female who was admitted to [address] [place] [address] for weakness, headache and fever. 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. She had follow up appointment on 1/21/2021 and placed on Augmentin 875mg BID x21 days with plan for return appointment in 6 weeks for debridement. At home, patient was reportedly very weak and fell; question of whether this was syncopal.  Found to have fever of 103 and 10.8 on admission. Given IV Rocephin and 1 dose IV Vancomycin in ER. Currently on IV Zosyn. Temperature max today was 100.2. WBC 10.8 today. Patient reports her right frontal/temporal headache has resolved. She denies any facial pain. No discolored nasal drainage. Denies any neck stiffness or soreness. Dr Andi Cunningham consulted - no signs of meningitis, continue IV Zosyn.       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    Cataract of both eyes     COVID-19 12/2020    DDD (degenerative disc disease), lumbar     Dysphagia, pharyngoesophageal 09/26/2016    Eczema 03/2018    Fibrocystic breast     H/O ventral hernia repair     Headache 02/09/2017    Hypercholesteremia     Hyperlipidemia     Hypertension     Iron deficiency anemia     LPRD (laryngopharyngeal reflux disease) 09/26/2016    Neuropathy     peripheral    Obesity     Orbital abscess     Orbital cellulitis 01/22/2014    SWAPNA (obstructive sleep apnea)     Osteoarthritis     Osteoporosis     Persistent proteinuria associated with type 2 diabetes mellitus ([facility] [address] 01/2017    urine micral of 50.       Sinusitis     Type II or unspecified type diabetes mellitus without mention of complication, not stated as uncontrolled     diagnoses approx 2000    Velopharyngeal incompetence 09/26/2016       Past Surgical History:        Procedure Laterality Date    ABDOMEN SURGERY      APPENDECTOMY      CARPAL TUNNEL RELEASE Bilateral 2008, 2009    CATARACT REMOVAL  2011    bilat    CHOLECYSTECTOMY  03/1988    COLONOSCOPY  2016    COLONOSCOPY  10/11/2018    COLONOSCOPY POLYPECTOMY SNARE/COLD BIOPSY performed by Milan Mera MD at [address] ESOPHAGUS      ENDOSCOPY, COLON, DIAGNOSTIC      EYE SURGERY Left 01/30/2015    EYE SURGERY      CATARACT REMOVAL- BILATERAL    HEMORRHOID SURGERY  1980s    HERNIA REPAIR      HYSTERECTOMY, TOTAL ABDOMINAL  1980    JOINT REPLACEMENT  bilat 2005/ 2007    knees    DE COLSC FLX WITH DIRECTED SUBMUCOSAL NJX ANY SBST  10/11/2018    COLONOSCOPY SUBMUCOSAL/BOTOX INJECTION performed by Milan [facility]era [facility] at [facility]   [address]  last 2009    removed a bone (2)--2 times no construction     SINUS SURGERY  02/01/2016    SINUS SURGERY  2016 x 2    SINUS SURGERY  09/18/2017    [facility] - Dr James Damian SINUS SURGERY  08/09/2017 [facility]17/2017 [facility]    TONSILLECTOMY      TOTAL KNEE ARTHROPLASTY  08/2003    Left TKR    VENTRAL HERNIA REPAIR  1992       Current Medications:   Current Facility-Administered Medications: bumetanide (BUMEX) tablet 1 mg, 1 mg, Oral, BID  docusate sodium (COLACE) capsule 100 mg, 100 mg, Oral, BID PRN  ferrous sulfate (IRON 325) tablet 325 mg, 325 mg, Oral, BID  hydrALAZINE (APRESOLINE) tablet 50 mg, 50 mg, Oral, BID  mupirocin (BACTROBAN) 2 % ointment, , Nasal, BID  NIFEdipine (PROCARDIA XL) extended release tablet 60 mg, 60 mg, Oral, Nightly  pantoprazole (PROTONIX) tablet 40 mg, 40 mg, Oral, QAM AC  potassium chloride (KLOR-CON M) extended release tablet 20 mEq, 20 mEq, Oral, Daily  sodium chloride (OCEAN, BABY AYR) 0.65 % nasal spray 1 spray, 1 spray, Nasal, PRN  vitamin D (CHOLECALCIFEROL) tablet 2,000 Units, 2,000 Units, Oral, Daily  zinc sulfate (ZINCATE) capsule 50 mg, 50 mg, Oral, Daily  piperacillin-tazobactam (ZOSYN) 3,375 mg in dextrose 5 % 50 mL IVPB extended infusion (mini-bag), 3,375 mg, Intravenous, Q8H  heparin (porcine) injection 5,000 Units, 5,000 Units, Subcutaneous, 3 times per day  acetaminophen (TYLENOL) tablet 650 mg, 650 mg, Oral, Q4H PRN  traMADol (ULTRAM) tablet 50 mg, 50 mg, Oral, Q6H PRN **OR** traMADol (ULTRAM) tablet 100 mg, 100 mg, Oral, Q6H PRN  insulin lispro (HUMALOG) injection vial 0-6 Units, 0-6 Units, Subcutaneous, TID WC  insulin lispro (HUMALOG) injection vial 0-3 Units, 0-3 Units, Subcutaneous, Nightly  pregabalin (LYRICA) capsule 300 mg, 300 mg, Oral, BID  alogliptin (NESINA) tablet 25 mg, 25 mg, Oral, Daily  cefTRIAXone (ROCEPHIN) 2000 mg IVPB in D5W 50ml minibag, 2,000 mg, Intravenous, Q24H  ondansetron (ZOFRAN) injection 4 mg, 4 mg, Intravenous, Q6H PRN    Allergies:  Review of patient's allergies indicates no known allergies. Social History:    TOBACCO:   reports that she has never smoked. She has never used smokeless tobacco.  ETOH:   reports no history of alcohol use. DRUGS:   reports no history of drug use.     Family History:       Problem Relation Age of Onset    Diabetes Mother     Heart Disease Father         whooping cough as child    Obesity Sister     Other Brother         tumor on back    Obesity Sister     Cancer Sister         Skin     Cancer Brother         Bone cancer from metal    Other Brother         passed as a child    Heart Disease Brother     Other Brother         tremor    Heart Attack Brother     No Known Problems Brother     Heart Disease Brother     No Known Problems Brother     No Known Problems Brother        REVIEW OF SYSTEMS:    A complete multi-organ review of systems was performed using a new patient questionnaire, and reviewed by me. ENT:  negative except as noted in HPI  CONSTITUTIONAL:  negative except as noted in HPI  EYES:  negative except as noted in HPI  RESPIRATORY:  negative except as noted in HPI  CARDIOVASCULAR:  negative except as noted in HPI  GASTROINTESTINAL:  negative except as noted in HPI  GENITOURINARY:  negative except as noted in HPI  MUSCULOSKELETAL:  negative except as noted in HPI  SKIN:  negative except as noted in HPI  ENDOCRINE/METABOLIC: negative except as noted in HPI  HEMATOLOGIC/LYMPHATIC:  negative except as noted in HPI  ALLERGY/IMMUN: negative except as noted in HPI  NEUROLOGICAL:  negative except as noted in HPI  BEHAVIOR/PSYCH:  negative except as noted in HPI    PHYSICAL EXAM:    VITALS:  /63   Pulse 80   Temp 100.2 °F (37.9 °C) (Oral)   Resp 20   Ht 5' 5\" (1.651 m)   Wt 220 lb (99.8 kg)   LMP  (LMP Unknown)   SpO2 94%   BMI 36.61 kg/m²   CONSTITUTIONAL:  awake, alert, cooperative, no apparent distress, and chronically ill-appearing  EYES:  Lids and lashes normal, pupils equal, round and reactive to light, extra ocular muscles intact, sclera clear, conjunctiva normal  ENT:  No facial swelling or erythema. External nose normal.  Nasal septum is absent. Thick crusting throughout nasal cavity. Hyponasal speech. NECK:  supple, symmetrical, trachea midline  LUNGS:  no increased work of breathing  CARDIOVASCULAR:  regular rate and rhythm  NEUROLOGIC:  Awake, alert, oriented to name, place and time. Cranial nerves II-XII are grossly intact. No neck rigidity.       DATA:    CBC with Differential:    Lab Results   Component Value Date    WBC 10.8 01/26/2021    RBC 4.21 01/26/2021    HGB 12.0 01/26/2021    HCT 37.6 01/26/2021     01/26/2021    MCV 89.3 01/26/2021    MCH 28.5 01/26/2021    MCHC 31.9 01/26/2021    RDW 16.7 04/23/2018    NRBC 0 01/26/2021    SEGSPCT 71.9 01/26/2021    LYMPHOPCT 34.9 10/12/2011    MONOPCT 10.5 01/26/2021    MONOPCT 5.1 10/12/2011    EOSPCT 2.3 10/12/2011    BASOPCT 1.2 10/12/2011    MONOSABS 1.1 01/26/2021    LYMPHSABS 1.6 01/26/2021    EOSABS 0.2 01/26/2021    BASOSABS 0.1 01/26/2021     BMP:    Lab Results   Component Value Date     01/26/2021    K 3.5 01/26/2021    K 3.9 12/29/2020     01/26/2021    CO2 26 01/26/2021    BUN 10 01/26/2021    LABALBU 3.3 01/26/2021    CREATININE 0.6 01/26/2021    CALCIUM 8.9 01/26/2021    LABGLOM >90 01/26/2021    GLUCOSE 140 01/26/2021    GLUCOSE 105 07/21/2020     Radiology Review:  CT Head WO Contrast 1/25/2021 1340  Narrative   PROCEDURE: CT HEAD WO CONTRAST       CLINICAL INFORMATION: fell.       COMPARISON: CT scan of the brain dated 1 October 2018.       TECHNIQUE: Noncontrast 5 mm axial images were obtained through the brain. Sagittal and coronal reconstructions were obtained.       All CT scans at this facility use dose modulation, iterative reconstruction, and/or weight-based dosing when appropriate to reduce radiation dose to as low as reasonably achievable.       FINDINGS:       There is mild atrophy. There is encephalomalacia in the right frontal gyrus rectus new since previous study dated 1 October 2018. This could represent an infarct in the distribution of the right anterior cerebral artery. MRI scan may be helpful for    better evaluation. There is mild atrophy. There are lacunar infarcts in the right basal ganglia and right cerebellar hemisphere. This calcification the cavernous segments of both internal carotid arteries       There is no hemorrhage. There are no intra-or extra-axial collections.  There is mild dilatation of the lateral ventricles. The third and fourth ventricles are of more normal size. There is no  midline shift or mass effect.  The gray-white matter    differentiation is preserved.        There are postoperative changes along the medial walls of both maxillary sinuses and involving the anterior aspect of the sphenoid sinus.  There are inflammatory changes in the ethmoid and maxillary sinuses bilaterally and in the sphenoid sinus.  There is    fluid in the left mastoid air cells and opacification of the right mastoid air cells consistent with inflammatory changes. Kathryn Scar is no suspicious calvarial abnormality.         Impression       1. Encephalomalacia in the right frontal gyrus rectus new since previous study dated 1 October 2018,  consistent with an infarct in the distribution of the right anterior cerebral artery. MRI scan would be helpful for better evaluation. 2. 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 3. Calcification in the cavernous segments of both internal carotid arteries. 4. Mild dilatation of the lateral ventricles. 5. Postoperative changes along the medial walls of both maxillary sinuses and along the anterior aspect of the sphenoid sinus. Opacification of the ethmoid and maxillary sinuses and sphenoid sinuses consistent with inflammatory changes. 6. Fluid in the left mastoid air cells and opacification of the right mastoid air cells consistent with inflammatory changes. .      **This report has been created using voice recognition software. It may contain minor errors which are inherent in voice recognition technology. 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 No discolored nasal drainage.   The thick crusting throughout the nasal cavity is chronic and

## 2021-01-26 NOTE — CONSULTS
CONSULTATION NOTE :ID       Patient - Vira Yin,  Age - 79 y.o.    - 1940      Room Number - 4A-15/015-A   MRN -  [id number]   Acct # - [de-identified]  Date of Admission -  2021 11:11 AM  Patient's PCP: Melanie Spears MD     Requesting Physician: Renard Opitz, MD    REASON FOR CONSULTATION   Rule out meningites  CHIEF COMPLAINT   fall     HISTORY OF PRESENT ILLNESS       This is a very pleasant 79 y.o. female who was admitted to the hospital with a chief complaints of fall. She is well-known to me from the past.  She was treated for late effect of medication with hyperbaric oxygen treatment. 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 She requires frequent debridement of her sinues. She recently had cleaning of her sinuses and was started on Augmentin. She follows at [facility]. She had hx of cancer of the sinues 4 yrs ago and had recurrence. She was treated with radiation and surgery. Her CT scan shows old stroke with encephalomalacia. At the time of my evaluation , she is almost back to her base line. Has pain on her sinues.  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    Cataract of both eyes     COVID-19 2020    DDD (degenerative disc disease), lumbar     Dysphagia, pharyngoesophageal 2016    Eczema 2018    Fibrocystic breast     H/O ventral hernia repair     Headache 2017    Hypercholesteremia     Hyperlipidemia     Hypertension     Iron deficiency anemia     LPRD (laryngopharyngeal reflux disease) 2016    Neuropathy     peripheral    Obesity     Orbital abscess     Orbital cellulitis 2014    SWAPNA (obstructive sleep apnea)     Osteoarthritis     Osteoporosis     Persistent proteinuria associated with type 2 diabetes mellitus ([facility] [address] 2017    urine micral of 50.       Sinusitis     Type II or unspecified type diabetes mellitus without mention of complication, not stated as uncontrolled     diagnoses approx 2000    Velopharyngeal incompetence 09/26/2016       PAST SURGICAL HISTORY     Past Surgical History:   Procedure Laterality Date    ABDOMEN SURGERY      APPENDECTOMY      CARPAL TUNNEL RELEASE Bilateral 2008, 2009    CATARACT REMOVAL  2011    bilat    CHOLECYSTECTOMY  03/1988    COLONOSCOPY  2016    COLONOSCOPY  10/11/2018    COLONOSCOPY POLYPECTOMY SNARE/COLD BIOPSY performed by Larissa Ybarra MD at [facility]    DILATATION, ESOPHAGUS      ENDOSCOPY, COLON, DIAGNOSTIC      EYE SURGERY Left 01/30/2015    EYE SURGERY      CATARACT REMOVAL- BILATERAL    HEMORRHOID SURGERY  1980s    HERNIA REPAIR      HYSTERECTOMY, TOTAL ABDOMINAL  1980    JOINT REPLACEMENT  bilat 2005/ 2007    knees    WA COLSC FLX WITH DIRECTED SUBMUCOSAL NJX ANY SBST  10/11/2018    COLONOSCOPY SUBMUCOSAL/BOTOX INJECTION performed by Larissa Ybarra MD at [facility]   [address]  last 2009    removed a bone (2)--2 times no construction     SINUS SURGERY  02/01/2016    SINUS SURGERY  2016 x 2    SINUS SURGERY  09/18/2017    [facility] - Dr Doris Villarreal SINUS SURGERY  08/09/2017 [facility]17/2017 [facility]    TONSILLECTOMY      TOTAL KNEE ARTHROPLASTY  08/2003    Left TKR    VENTRAL HERNIA REPAIR  1992         MEDICATIONS:       Scheduled Meds:   bumetanide  1 mg Oral BID    ferrous sulfate  325 mg Oral BID    hydrALAZINE  50 mg Oral BID    mupirocin   Nasal BID    NIFEdipine  60 mg Oral Nightly    pantoprazole  40 mg Oral QAM AC    potassium chloride  20 mEq Oral Daily    Vitamin D  2,000 Units Oral Daily    zinc sulfate  50 mg Oral Daily    piperacillin-tazobactam  3,375 mg Intravenous Q8H    heparin (porcine)  5,000 Units Subcutaneous 3 times per day    insulin lispro  0-6 Units Subcutaneous TID WC    insulin lispro  0-3 Units Subcutaneous Nightly    pregabalin  300 mg Oral BID    alogliptin  25 mg Oral Daily    cefTRIAXone (ROCEPHIN) IV  2,000 mg Intravenous Q24H     Continuous Infusions:  PRN Meds:docusate sodium, sodium chloride, acetaminophen, traMADol **OR** traMADol, ondansetron  Allergies:   ALLERGIES:    Lisinopril, Sulfamethoxazole-trimethoprim, Morphine, Codeine, Hydrocodone, Percocet [oxycodone-acetaminophen], Tylenol [acetaminophen], and Tape [adhesive tape]        SOCIAL HISTORY:     TOBACCO:   reports that she has never smoked. She has never used smokeless tobacco.     ETOH:   reports no history of alcohol use. Patient currently lives with family        FAMILY HISTORY:         Problem Relation Age of Onset    Diabetes Mother     Heart Disease Father         whooping cough as child    Obesity Sister     Other Brother         tumor on back    Obesity Sister     Cancer Sister         Skin     Cancer Brother         Bone cancer from metal    Other Brother         passed as a child    Heart Disease Brother     Other Brother         tremor    Heart Attack Brother     No Known Problems Brother     Heart Disease Brother     No Known Problems Brother     No Known Problems Brother        REVIEW OF SYSTEMS:     Constitutional: + fever, no night sweats, + fatigue, no weight loss. Head: no head ache , no head injury, no migranes. Eye: no eye discharge, blurring of vision, no double vision,no eye pain. Ears: + hearing difficulty, no tinnitus, the rest as noted in HPI  Mouth/throat: no ulceration,    Respiratory: no cough no chest pain,no shortness of breath,no wheezing  CVS: no palpitation, no chest pain,   GI: no abdominal pain, no nausea , no vomiting, no constipation,no diarrhea.   ABA: no dysuria, frequency and urgency, no hematuria, no kidney stones  Musculoskeletal: no joint pain, swelling , stiffness,  Endocrine: no polyuria, polydipsia, no cold or heat intolerance  Hematology: no anemia, no easy brusing or bleeding, no hx of clotting BLOODU NEGATIVE   RBCUA 5-10   WBCUA NONE SEEN   BACTERIA NONE SEEN   NITRU NEGATIVE   BILIRUBINUR NEGATIVE   UROBILINOGEN 0.2   KETUA NEGATIVE   LABCAST NONE SEEN  NONE SEEN         IMAGING:    Micro:   Lab Results   Component Value Date    BC No growth-preliminary  01/25/2021    BC No growth-preliminary  01/25/2021       Problem list of patient      Patient Active Problem List   Diagnosis Code    Hypercholesterolemia E78.00    Osteoarthritis M19.90    Osteoporosis M81.0    Type 2 diabetes mellitus without complication, without long-term current use of insulin ([facility] [address] E11.9    DDD (degenerative disc disease), lumbar M51.36    Benign essential HTN I10    SWAPNA on CPAP G47.33, Z99.89    Diabetic peripheral neuropathy ([facility]) E11.42    Acute recurrent pansinusitis J01.41    Primary thunderclap headache G44.53    Rash of entire body R21    Infection of skin due to methicillin resistant Staphylococcus aureus (MRSA) A49.02    Drug allergy, antibiotic  likely bactrim Z88.1    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    Skin plaque L98.8    Hyponatremia E87.1    Hypervolemia E87.70    Cellulitis of lower extremity L03.119    Hypoglycemia E16.2    Acute on chronic systolic CHF (congestive heart failure) ([facility]) I50.23    Bilateral cellulitis of lower leg L03.116, L03.115    Venous ulcers of both lower extremities ([facility]) I83.019, L97.919, I83.029, L97.929    Bilateral lower leg cellulitis L03.116, L03.115    CKD (chronic kidney disease) stage 3, GFR 30-59 ml/min ([facility]) N18.30    Iron deficiency anemia D50.9    Hypomagnesemia E83.42    SBO (small bowel obstruction) ([facility]) K56.609    Venous ulcer of left leg ([facility]) I83.029, L97.929    Venous ulcer of right leg ([facility]) I83.019, L97.919    Acute eczema L30.9    Venous insufficiency of both lower extremities I87.2    Lymphedema of both lower extremities I89.0    Pressure injury of left buttock, stage 2 ([facility]) U36.322    Eczematous dermatitis L30.9    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 3XXA    Facial cellulitis L03. 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    Cataract of both eyes H26.9    History of ventral hernia repair Z98.890, Z87.19    Other cervical disc degeneration, mid-cervical region M50.320    Spondylolisthesis of cervical region M43.12    Spondylolisthesis of thoracic region M43.14    Chronic rhinitis J31.0    Closed fracture of head of humerus S42.293A    Dysphagia R13.10    Laryngopharyngeal reflux K21.9    Malignant neoplasm of ethmoidal sinus (HCC) C31.1    Obesity, Class II, BMI 35-39.9 E66.9    COVID-19 U07.1    Sepsis ([facility]) A41.9           Impression and Recommendation:   Fever likely related to her chronic sinusites. She will continue iv zosyn. at this time she doesn't appear she  Has meningites. 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 if she continues to spike will continue iv vancomycin. Will stop rocephin, continue iv zosyn     Thank you Qian Calhoun MD for allowing me to participate in this patient's care.     Ariadne Jaimes MD,FACP 1/26/2021 9:35 AM

## 2021-01-26 NOTE — H&P
[address] [place] [address]                              HISTORY AND PHYSICAL    PATIENT NAME: Black Banks                       :        1940  MED REC NO:   [id number]                           ROOM:       0015  ACCOUNT NO:   [de-identified]                           ADMIT DATE: 2021  PROVIDER:     Williams Stuart M.D.    CHIEF COMPLAINT:  Lin [place]. HISTORY OF PRESENT ILLNESS:  This is a 81-year-old woman with past  medical history of adenocarcinoma of the ethmoidal sinuses who underwent  bilateral nasal endoscopy with debridement of the sinonasal cavity on  2021 at [address] [place] [address] and the patient was sent home with Augmentin to be  taken for the next few days; apparently was noted by the daughter to be  very weak this morning and she had a fall. The patient had difficulty  even with two assists to get up immediately, but they were eventually  able to sit her up in the chair. EMS was called and she was brought to  the hospital.  Workup in the ER, her temperature was 103. She did have  chest x-ray and CT of the head as the patient mentioned she might have  blacked out, but later, she mentioned that she thought she heard  somebody yelling and she quickly turned and lost her balance. She  denied having any chest pain or difficulty breathing. She is lying in  bed, complaining of a headache. There was no nausea, vomiting, or  diarrhea. Occasional cough, but nonproductive. 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 She denies any numbness, tingling, or weakness in her lower  extremities.     Workup in the ER, her CT of the head showed encephalomalacia, old  infarct, calcification of the cavernous segment of both internal  carotids, postoperative changes along the medial wall of the maxillary  sinus and along the sphenoid sinus, opacification of the ethmoid and  maxillary sinus, and fluid in the left mastoid. Her lactic acid was  normal.  WBC was normal.  Chest x-ray showed reticular markings  throughout the lungs _____ edema versus atypical infection. She has had  COVID in 11/2020. Her COVID test today has been negative. She denies  any diarrhea. No nausea or vomiting. PAST MEDICAL HISTORY:  Significant for adenocarcinoma of the ethmoid  sinus, status post surgery and radiation about four years back followed  by recurrence in 2020 requiring local resection. History of  hypertension; hyperlipidemia; diabetes; diabetic neuropathy; sleep  apnea, but could not comply with CPAP machine; osteoarthritis. No  history of coronary artery disease, congestive heart failure that she  was diagnosed with at her family [de-identified] office quite a few times. When she was admitted in the hospital, she mentioned that she might have  CHF. Her echo showed normal ejection fraction. PAST SURGICAL HISTORY:  Includes multiple ventral hernia repairs, total  abdominal hysterectomy, cholecystectomy, hemorrhoid surgery, bilateral  knee surgeries, carpal tunnel repair, sinus surgery for adenocarcinoma,  cataract surgery, colonoscopy, tonsillectomy, cholecystectomy,  esophageal stricture dilatation, appendectomy. ALLERGIES:  Listed were LISINOPRIL, BACTRIM, MORPHINE, HYDROCODONE,  PERCOCET, TYLENOL, and TAPE. HOME MEDICATIONS:  List includes vitamin C, cholecalciferol, zinc,  Lyrica, Bactroban, nifedipine, Bumex, hydralazine, iron, Januvia,  Glucophage, Prilosec, Klor-Con, and nasal saline spray. FAMILY HISTORY:  Significant for diabetes, heart disease, skin cancer,  bone cancer. SOCIAL HISTORY:  She is , four children. No smoking, alcohol,  or illicit drug use. REVIEW OF SYSTEMS:  Headaches. No blurred vision.   Positive for sinus  drainage. Positive for occasional cough. No trouble with breathing. No chest pain. No palpitations. She was not sure if she passed out  when she fell, but no numbness, tingling, or weakness. She does have  increased frequency to urinate, but no dysuria. PHYSICAL EXAMINATION:  VITAL SIGNS:  Blood pressure on admission was 163/89, pulse was 106 and  now improved to 89, respirations 19. T-max was 103.4. Now, temperature  is 100.3. HEENT:  Pupils are reactive. Tongue is dry. Buccal mucosa is moist.   Positive for frontal and maxillary sinus tenderness. NECK:  Supple. HEART:  S1 and S2 appreciated. LUNGS:  Air exchange is noted bilaterally. ABDOMEN:  Obese. Bowel sounds heard. No guarding. No tenderness. Bruise noted on her anterior abdomen. Previous surgical scar noted. EXTREMITIES:  Nonpitting pedal edema bilaterally. NEUROLOGIC:  She is oriented to person, place, and time. Her handgrip  and dorsi and plantar flexion are 5/5. LABORATORY DATA:  Sodium was 139, potassium of 3.5, chloride of 103, CO2  was 23, BUN of 16, creatinine of 0.8. Lactic acid was 2. Procalcitonin  was 0.13. ProBNP was 536. Troponin was 0.010. ALT was 6. AST was 10. WBC was 10.8, hemoglobin 11.6, hematocrit of 38.2, and platelets of 011. IMPRESSION:  1. Fever _____ possibly sinusitis or pneumonia. 2.  History of adenocarcinoma of the ethmoid sinus, status post nasal  endoscopy with debridement of the sinonasal cavity. 3.  Diabetes. 4.  Hypertension. 5.  Hyperlipidemia. 6.  Neuropathy with degenerative disk disease. 7.  Chronic lymphedema. 8.  History of sleep apnea, but could not tolerate CPAP machine. 9.  Obesity. 10.  Prior history of COVID. 6.  Fall with questionable mentioning of blacking out. RECOMMENDATIONS:  1. Continue broad-spectrum antibiotics with Zosyn. We will consult  ENT. If no clinical improvement, we will have Infectious Disease input  also.   2.  Resume home medications that are appropriate. 3.  Monitor her blood sugars, cover with sliding scale. 4.  DVT prophylaxis with subcu heparin. 5.  PT/OT. 6.  Check orthostatic blood pressure. 7.  With the fall and questionable, the patient mentioning blackout, we  will obtain MRI of the brain. 8.  Discussed code status, agrees to full resuscitation.         Niki Chang M.D.    D: 01/25/2021 17:04:08       T: 01/25/2021 18:33:27     CHRIS/VIRGIE_ANIA  Job#: [id number]     Doc#: [id number]    CC:

## 2021-01-26 NOTE — CARE COORDINATION
01/26/21 1459   Readmission Assessment   Number of Days since last admission? 8-30 days   Previous Disposition Home with Family   Who is being Jorge Luis Fuentes   What was the patient's/caregiver's perception as to why they think they needed to return back to the hospital? Other (Comment)  (Pt took a fall.)   Did you visit your Primary Care Physician after you left the hospital, before you returned this time? Yes   Did you see a specialist, such as Cardiac, Pulmonary, Orthopedic Physician, etc. after you left the hospital? Yes   Who advised the patient to return to the hospital? Caregiver   Does the patient report anything that got in the way of taking their medications? No   In our efforts to provide the best possible care to you and others like you, can you think of anything that we could have done to help you after you left the hospital the first time, so that you might not have needed to return so soon?  Other (Comment)  (Daughter states she feels they were discharge with everything they needed.)

## 2021-01-26 NOTE — CONSULTS
NEUROLOGY CONSULT NOTE      Requesting Physician: Sebas Tenorio MD    Reason for Consult:  Evaluate for fall    History of Present Illness: Chaparro Reynoso is a 79 y.o. female admitted to [facility] on 1/25/2021. She presented to [address] [place] [address] following a fall. She is unsure whether she passed out or just lost her balance and fell. She denies any headache. She has history of adenocarcinoma of the ethmoidal sinuses who underwent bilateral nasal endoscopy with debridement of the sinonasal cavity on 01/21/2021 at [facility] and the patient was sent home with Augmentin to be taken for the next few days. The patient was diagnosed initially with adenocarcinoma of her sinuses about four years back for which she had treatment with resection and adjuvant radiation followed by recurrence in 2020 and underwent endoscopic resection in 02/2020. She underwent MRI brain without contrast yesterday that showed area of increased T2 and FLAIR signal involving the medial right frontal lobe that may represent an infarct of undetermined terminate versus edema from underlying lesion. Diffusion sequence and postcontrast sequence recommended for further evaluation. Reports no chest pain. No shortness of breath with exertion. Reports no neck pain. No vision changes. No dysphagia. No fever. No rash. No weight loss. History provided by patient and review of medical record.     Past Medical History:        Diagnosis Date    Cancer [facility])     adenocarcinoma of her sinuses    Cataract of both eyes     COVID-19 12/2020    DDD (degenerative disc disease), lumbar     Dysphagia, pharyngoesophageal 09/26/2016    Eczema 03/2018    Fibrocystic breast     H/O ventral hernia repair     Headache 02/09/2017    Hypercholesteremia     Hyperlipidemia     Hypertension     Iron deficiency anemia     LPRD (laryngopharyngeal reflux disease) 09/26/2016    Neuropathy     peripheral    Obesity     Orbital abscess     Orbital cellulitis 01/22/2014    SWAPNA (obstructive sleep apnea)     Osteoarthritis     Osteoporosis     Persistent proteinuria associated with type 2 diabetes mellitus ([facility] [address] 01/2017    urine micral of 50.  Sinusitis     Type II or unspecified type diabetes mellitus without mention of complication, not stated as uncontrolled     diagnoses approx 2000    Velopharyngeal incompetence 09/26/2016           Procedure Laterality Date    ABDOMEN SURGERY      APPENDECTOMY      CARPAL TUNNEL RELEASE Bilateral 2008, 2009    CATARACT REMOVAL  2011    bilat    CHOLECYSTECTOMY  03/1988    COLONOSCOPY  2016    COLONOSCOPY  10/11/2018    COLONOSCOPY POLYPECTOMY SNARE/COLD BIOPSY performed by Sebastian Tirado MD at [address] ESOPHAGUS      ENDOSCOPY, COLON, DIAGNOSTIC      EYE SURGERY Left 01/30/2015    EYE SURGERY      CATARACT REMOVAL- BILATERAL    HEMORRHOID SURGERY  1980s    HERNIA REPAIR      HYSTERECTOMY, TOTAL ABDOMINAL  1980    JOINT REPLACEMENT  bilat 2005/ 2007    knees    MT COLSC FLX WITH DIRECTED SUBMUCOSAL Simone [address] ANY SBST  10/11/2018    COLONOSCOPY SUBMUCOSAL/BOTOX INJECTION performed by Sebastian Tirado MD at [address]  last 2009    removed a bone (2)--2 times no construction     SINUS SURGERY  02/01/2016    SINUS SURGERY  2016 x 2    SINUS SURGERY  09/18/2017    [facility] - Dr Zhou Mon SINUS SURGERY  08/09/2017 8/17/2017 - [facility]    TONSILLECTOMY      TOTAL KNEE ARTHROPLASTY  08/2003    Left TKR    VENTRAL HERNIA REPAIR  1992       Allergies:     Allergies   Allergen Reactions    Lisinopril Swelling and Anaphylaxis    Sulfamethoxazole-Trimethoprim Rash    Morphine Other (See Comments)     headaches    Codeine      Other reaction(s): AOF    Hydrocodone      headaches    Percocet [Oxycodone-Acetaminophen] Other (See Comments)     Headaches    Tylenol [Acetaminophen] Other (See Comments)     TYLENOL 3 causes hallucinations    Tape [Adhesive Tape] Rash Current Medications:       bumetanide (BUMEX) tablet 1 mg, BID      docusate sodium (COLACE) capsule 100 mg, BID PRN      ferrous sulfate (IRON 325) tablet 325 mg, BID      hydrALAZINE (APRESOLINE) tablet 50 mg, BID      mupirocin (BACTROBAN) 2 % ointment, BID      NIFEdipine (PROCARDIA XL) extended release tablet 60 mg, Nightly      pantoprazole (PROTONIX) tablet 40 mg, QAM AC      potassium chloride (KLOR-CON M) extended release tablet 20 mEq, Daily      sodium chloride (OCEAN, BABY AYR) 0.65 % nasal spray 1 spray, PRN      vitamin D (CHOLECALCIFEROL) tablet 2,000 Units, Daily      zinc sulfate (ZINCATE) capsule 50 mg, Daily      piperacillin-tazobactam (ZOSYN) 3,375 mg in dextrose 5 % 50 mL IVPB extended infusion (mini-bag), Q8H      heparin (porcine) injection 5,000 Units, 3 times per day      acetaminophen (TYLENOL) tablet 650 mg, Q4H PRN      traMADol (ULTRAM) tablet 50 mg, Q6H PRN    Or      traMADol (ULTRAM) tablet 100 mg, Q6H PRN      insulin lispro (HUMALOG) injection vial 0-6 Units, TID WC      insulin lispro (HUMALOG) injection vial 0-3 Units, Nightly      pregabalin (LYRICA) capsule 300 mg, BID      alogliptin (NESINA) tablet 25 mg, Daily      ondansetron (ZOFRAN) injection 4 mg, Q6H PRN         Social History:  Social History     Tobacco Use   Smoking Status Never Smoker   Smokeless Tobacco Never Used     Social History     Substance and Sexual Activity   Alcohol Use No     Social History     Substance and Sexual Activity   Drug Use No         Family History:       Problem Relation Age of Onset    Diabetes Mother     Heart Disease Father         whooping cough as child    Obesity Sister     Other Brother         tumor on back    Obesity Sister     Cancer Sister         Skin     Cancer Brother         Bone cancer from metal    Other Brother         passed as a child    Heart Disease Brother     Other Brother         tremor    Heart Attack Brother     No Known Problems Brother     Heart Disease Brother     No Known Problems Brother     No Known Problems Brother        Review of Systems:  All systems reviewed and are all negative except what is mentioned in history of present illness. I reviewed the patient PMH,medications, family history, social history. Physical Exam:  /69   Pulse 85   Temp 99.7 °F (37.6 °C) (Oral)   Resp 19   Ht 5' 5\" (1.651 m)   Wt 220 lb (99.8 kg)   LMP  (LMP Unknown)   SpO2 94%   BMI 36.61 kg/m²  I Body mass index is 36.61 kg/m². I   Wt Readings from Last 1 Encounters:   01/25/21 220 lb (99.8 kg)         eneral appearance - alert, in no distress, oriented to person, place, and time and over weight, she was evaluated in the MRI suite, she follows commands, she is wearing nasal cannula oxygen  Mental status -Level of Alertness: awake  Orientation: person, place, time  Memory: normal  Fund of Knowledge: normal  Attention/Concentration: normal  Language: normal. Mood is normal  Neck - supple, no neck adenopathy, carotids upstroke normal bilaterally, no carotid bruits. There is no LAP in the neck. There is no thyroid enlargement. Neurological -   Cranial [id number]-JM-[id number]:   Cranial nerve II: Normal. There is full visual fields  Cranial nerve III: Pupils: equal, round, reactive to light   Cranial nerves III, IV, VI: Extraocular Movements: intact   Cranial nerve V: Facial sensation: intact   Cranial nerve VII:Facial strength: intact   Cranial nerve VIII: Hearing: intact   Cranial nerve IX: Palate Elevation intact bilaterally  Cranial nerve XI: Shoulder shrug intact bilaterally  Cranial nerve XII: Tongue midline   neck supple without rigidity  DTR's are decreased distal and symmetric  Babinski sign is negative on bilaterally. Motor exam is 5/5 in the left upper and lateral lower extremities except 4/5 in right upper extremity (patient reports this is due to shoulder injury). Normal muscle tone. There is no muscle atrophy.  There is no muscle fasciculation    Sensory is intact forlight touch. Coordination: finger to nose intact  Gait and station not tested  Abnormal movement none. Long tracts are  deferred. Skin - no rashes or lesions, warm and dry to touch  Superficial temporal artery pulses are normal.   Musculoskeletal: Has no hand arthritis, no limitation of ROM in any of the four extremities, except right upper extremity shoulder. There is joint tenderness of right shoulder, no deformity or swelling. There is +1 leg edema. The Heart was regular in rate and rhythm. No heart murmur  Chest- decreased air entry,  good effort. Abdomen: soft, intact bowel sounds. Labs:    CBC:   Recent Labs     01/25/21  1135 01/26/21  0553   WBC 10.8 10.8   HGB 11.6* 12.0    167   MCV 93.2 89.3   MCH 28.3 28.5   MCHC 30.4* 31.9*     CMP:  Recent Labs     01/25/21  1135 01/26/21  0553    138   K 3.5 3.5    100   CO2 23 26   BUN 16 10   CREATININE 0.8 0.6   LABGLOM 69* >90   GLUCOSE 198* 140*   CALCIUM 9.2 8.9     Liver:   Recent Labs     01/26/21  0553   AST 14   ALT 9*   ALKPHOS 79   PROT 7.2   LABALBU 3.3*   BILITOT 0.5     Reviewed   Results for orders placed during the hospital encounter of 06/12/17   MRA Head WO Contrast    Narrative PROCEDURE: MRI BRAIN WO CONTRAST, MRA HEAD WO CONTRAST    CLINICAL INFORMATION HEADACHE, ACUTE, SEVERE, THUNDERCLAP, WORST HA OF LIFE, . Additional history obtained from electronic medical record indicates the patient has had headaches for 4 days and past medical history of hypertension. COMPARISON: MRI of the brain dated July 26, 2014. Correlation is also made to CT of the head without contrast dated June 13, 2017. TECHNIQUE: Multiplanar and multiple spin echo MRI images were obtained of the brain without contrast. MR angiogram was also performed utilizing 3-D time-of-flight images obtained through the brain. Multiplanar reconstructions were also obtained.       FINDINGS:  The images are marred by motion artifacts. MR head: There is again stable, mild prominence of the sulcal spaces and ventricular system secondary to generalized, age-related parenchymal volume loss. Scattered patchy and punctate areas of hyperintense T2 signal are noted throughout the periventricular and   deep cerebral white matter. These are similar compared to the prior exam. No intracranial hemorrhage is present. No mass, mass effect or extra-axial fluid collection is identified. The ventricles are stable in size and morphology. The basal cisterns and   visualized vascular flow voids are patent. The pituitary, brain stem and cervical medullary junction are grossly unremarkable. Mucosal thickening is seen throughout the paranasal sinuses, opacifying the bilateral maxillary, sphenoid and ethmoid sinuses. There are areas of hypointense T2 signal noted within the ethmoid air cells and suggestion of mild expansion of some of the   ethmoid air cells as well. The visualized orbits demonstrate absence of the native lenses. There is partial fluid opacification of the bilateral mastoid air cells. MR angiogram:  The petrous, cavernous and clinoid internal carotid arteries are within normal limits. The ACAs and MCAs are patent with normal arborization of the distal branches. There is a patent anterior communicating artery. A posterior communicating artery is   noted on the left. The bilateral posterior cerebral and superior cerebellar arteries are patent. Bilateral patent and gutters are also noted. No major branch occlusion, flow-limiting stenosis or aneurysm is identified. Impression    1. No acute intracranial abnormality is identified. There are nonspecific foci of hyperintense T2 signal throughout the deep cerebral and periventricular white matter which are stable from the prior exam and most likely represents sequela of chronic   microvascular ischemic change.     2. No major branch occlusion, flow-limiting stenosis or intracranial aneurysm is identified. 3. Diffuse and extensive mucosal sinus disease in the paranasal sinuses with complete opacification of the bilateral maxillary, sphenoid and ethmoid sinuses. There are areas of hypointense T2 signal within the bilateral ethmoid air cells with suggestion   of mild expansion of the air cells. This may represent inspissated secretions, fungal infection or mucoceles. However, soft tissue lesion cannot be completely excluded. Clinical correlation would be beneficial and follow-up sinus CT after treatment may   be considered for further evaluation. **This report has been created using voice recognition software. It may contain minor errors which are inherent in voice recognition technology. **    Final report electronically signed by Dr. Nan Doe on 6/13/2017 1:55 PM       Results for orders placed during the hospital encounter of 09/30/18   CTA HEAD W WO CONTRAST (CODE STROKE NIHSS 4 or Above)    Narrative PROCEDURE: CTA HEAD W WO CONTRAST, CTA NECK W WO CONTRAST    CLINICAL INFORMATION: STROKE. Additional history obtained from the electronic medical record indicates the patient has facial numbness and facial droop. COMPARISON: None available. TECHNIQUE: 1 mm axial images were obtained through the head and neck after the fast bolus administration of contrast. A noncontrast localizer was obtained. 3-D reconstructions were performed on a dedicated 3-D workstation. These include multiplanar MPR   images and multiplanar MIP images. Centerline reconstructions were obtained of the carotid systems. 80 mL Isovue-370 intravenous contrast was given. All CT scans at this facility use dose modulation, iterative reconstruction, and/or weight-based dosing when appropriate to reduce radiation dose to as low as reasonably achievable. FINDINGS:      CTA NECK:    Aortic arch and branches: Atherosclerotic calcification is present within the aortic arch. bilateral paranasal sinuses and there are fluid levels in the bilateral maxillary sinuses with opacification of the bilateral frontal sinuses. Multilevel degenerative changes are present within the cervical   spine. This results in mild to moderate spinal canal narrowing at C3-C4. No suspicious osseous lesion is identified. There is partial visualization of an age indeterminant fracture involving the right proximal humerus. No suspicious finding is seen   within the visualized paraspinal soft tissues. The visualized lung apices are clear. Impression    1. Atherosclerotic calcification is present within the bilateral cavernous and clinoid internal carotid arteries resulting in mild luminal narrowing. No major branch occlusion, flow-limiting stenosis or aneurysm is otherwise identified intracranially. 2. No occlusion, flow-limiting stenosis, dissection or aneurysm is identified of the major cervical arterial structures. Soft atherosclerotic plaque is present at the left carotid bifurcation resulting in less than 50% luminal narrowing by NASCET   criteria. 3. Age indeterminant fracture of the right proximal humerus. **This report has been created using voice recognition software. It may contain minor errors which are inherent in voice recognition technology. **    Final report electronically signed by Dr. Lit Fowler on 10/1/2018 1:24 PM     Results for orders placed during the hospital encounter of 09/30/18   CTA [address] SERVIN (CODE STROKE NIHSS 4 or Above)    Narrative PROCEDURE: CTA HEAD W WO CONTRAST, CTA NECK W WO CONTRAST    CLINICAL INFORMATION: STROKE. Additional history obtained from the electronic medical record indicates the patient has facial numbness and facial droop. COMPARISON: None available. TECHNIQUE: 1 mm axial images were obtained through the head and neck after the fast bolus administration of contrast. A noncontrast localizer was obtained.  3-D reconstructions were performed on a dedicated 3-D workstation. These include multiplanar MPR   images and multiplanar MIP images. Centerline reconstructions were obtained of the carotid systems. 80 mL Isovue-370 intravenous contrast was given. All CT scans at this facility use dose modulation, iterative reconstruction, and/or weight-based dosing when appropriate to reduce radiation dose to as low as reasonably achievable. FINDINGS:      CTA NECK:    Aortic arch and branches: Atherosclerotic calcification is present within the aortic arch. There is a common origin of the innominate and left common carotid arteries off the aortic arch. The origins of the great vessels off the arch are otherwise patent   without flow-limiting stenosis. Right common carotid artery/ICA: The right common carotid artery is tortuous and takes a medial, retropharyngeal course. The right carotid bifurcation and right cervical internal carotid artery are within normal limits. Left common carotid artery/ICA: Soft atherosclerotic plaque is present at the left carotid bifurcation resulting in less than 50% luminal narrowing by NASCET criteria. The proximal left cervical internal carotid artery takes a medial retropharyngeal   course. Vertebral arteries: The vertebral arteries are codominant. The vertebral arteries are otherwise patent. The proximal left vertebral artery is noted to be tortuous. CTA HEAD:      Internal carotid arteries: Atherosclerotic calcification is present involving the bilateral cavernous and clinoid internal carotid arteries resulting in mild luminal narrowing. Middle cerebral arteries: Patent with normal arborization of the distal branches. Anterior cerebral arteries: Patent with normal arborization of the distal branches. There is a patent anterior communicating artery. Vertebral arteries: Unremarkable. Basilar artery: Unremarkable. Superior cerebellar arteries: Unremarkable.     Posterior cerebral October 2018. TECHNIQUE: Noncontrast 5 mm axial images were obtained through the brain. Sagittal and coronal reconstructions were obtained. All CT scans at this facility use dose modulation, iterative reconstruction, and/or weight-based dosing when appropriate to reduce radiation dose to as low as reasonably achievable. FINDINGS:    There is mild atrophy. There is encephalomalacia in the right frontal gyrus rectus new since previous study dated 1 October 2018. This could represent an infarct in the distribution of the right anterior cerebral artery. MRI scan may be helpful for   better evaluation. There is mild atrophy. There are lacunar infarcts in the right basal ganglia and right cerebellar hemisphere. This calcification the cavernous segments of both internal carotid arteries    There is no hemorrhage. There are no intra-or extra-axial collections. There is mild dilatation of the lateral ventricles. The third and fourth ventricles are of more normal size. There is no  midline shift or mass effect. The gray-white matter   differentiation is preserved. There are postoperative changes along the medial walls of both maxillary sinuses and involving the anterior aspect of the sphenoid sinus. There are inflammatory changes in the ethmoid and maxillary sinuses bilaterally and in the sphenoid sinus. There is   fluid in the left mastoid air cells and opacification of the right mastoid air cells consistent with inflammatory changes. There is no suspicious calvarial abnormality. Impression    1. Encephalomalacia in the right frontal gyrus rectus new since previous study dated 1 October 2018,  consistent with an infarct in the distribution of the right anterior cerebral artery. MRI scan would be helpful for better evaluation. 2. 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 3. Calcification in the cavernous segments of both internal carotid arteries.   4. Mild dilatation of the lateral ventricles. 5. Postoperative changes along the medial walls of both maxillary sinuses and along the anterior aspect of the sphenoid sinus. Opacification of the ethmoid and maxillary sinuses and sphenoid sinuses consistent with inflammatory changes. 6. Fluid in the left mastoid air cells and opacification of the right mastoid air cells consistent with inflammatory changes. .          **This report has been created using voice recognition software. It may contain minor errors which are inherent in voice recognition technology. **    Final report electronically signed by DR Akilah Gentile on 1/25/2021 1:58 PM         We reviewed the patient records and available information in the EHR       Impression:    Active Problems:    Sepsis ([address]    Brain mass  Resolved Problems:    * No resolved hospital problems. *  This is a 80-year-old female who presents status post fall. She is being evaluated in the MRI suite. She is unsure whether she blacked out or lost her balance and fell. At the time of my evaluation, she denies any headache. Her exam is nonfocal, other than right arm weakness that she attributes to shoulder injury. She underwent an MRI of the brain without contrast yesterday  that showed area of increased T2 and FLAIR signal involving the medial right frontal lobe that may represent an infarct of undetermined terminate versus edema from underlying lesion. Of note, she has history of adenocarcinoma of the ethmoidal sinuses who underwent bilateral nasal endoscopy with debridement of the sinonasal cavity on 01/21/2021 at [facility]. We arranged for her to undergo an MRI of the brain with contrast to complement the MRI brain done yesterday without contrast, this was concerning again for right frontal mass, rather than stroke. We will range for her to formally see neurosurgery regarding the MRI brain results. We will also obtain an EEG to screen for seizure tendency.   After detailed discussion with the patient we agreed on the following plan. Recommendations:                                              1. MRI brain without contrast, reviewed and discussed with neuroradiology, the patient underwent MRI brain with contrast to complement study. 2. Repeat MRI brain with contrast was reviewed showing right frontal mass, with edema. 3. Neurosurgery consult regarding right frontal brain mass  4. EEG  5. DVT prophylaxis  6. Discussed with primary service. 7. Please call if any questions.             Electronically signed by Kavita Hebert MD on 1/26/2021 at 4:35 PM

## 2021-01-26 NOTE — CARE COORDINATION
DISASTER CHARTING    1/26/21, 1:47 PM EST    DISCHARGE ONGOING EVALUATION:     [facility] day: 1  Location: 4A-15/015-A Reason for admit: Sepsis [facility]) [A41.9]   Barriers to Discharge: To ER with weakness and fever and fall. Lives with daughter. Temp in .4. Reports had Covid in Dec. Imaging reveals possible pneumonia. ID consulted. Neuro consulted. Telemetry. Ortho VS. ENT consult. PCP: Kenya Deluna MD  Readmission Risk Score: 20%  Patient Goals/Plan/Treatment Preferences: Met with pt today. Pt appears soundly asleep but daughter who pt resides with is at pt side. She reports pt is active at home and tends to most of her needs. Daughter assists with med  and set up. Pt has a PCP and basic needs are met. CM to continue to follow for discharge. Needs.

## 2021-01-26 NOTE — PROGRESS NOTES
IM Progress Note  Dr. Theo Henderson  1/26/2021 11:19 AM      Patient name Ame Simms  [id number]  PCP: Kenya Deluna MD  Admit Date: 1/25/2021  Acct No. [de-identified]    Subjective: Interval History:   Lying in bed  headache improved      Diet: DIET CARB CONTROL;    I/O last 3 completed shifts: In: 728 [P.O.:220; I.V.:508]  Out: 750 [Urine:750]  No intake/output data recorded. Admission weight: 220 lb (99.8 kg) as of 1/25/2021 11:11 AM  Wt Readings from Last 3 Encounters:   01/25/21 220 lb (99.8 kg)   12/28/20 221 lb 3.2 oz (100.3 kg)   07/23/20 210 lb (95.3 kg)     Body mass index is 36.61 kg/m². ROS   CVS;  no cp or palpitation  Resp: no SOB or cough  Neuro:  No numbness or weakness or dizziness  Abd: no nausea or vomiting or abd pain      Medications:   Scheduled Meds:   bumetanide  1 mg Oral BID    ferrous sulfate  325 mg Oral BID    hydrALAZINE  50 mg Oral BID    mupirocin   Nasal BID    NIFEdipine  60 mg Oral Nightly    pantoprazole  40 mg Oral QAM AC    potassium chloride  20 mEq Oral Daily    Vitamin D  2,000 Units Oral Daily    zinc sulfate  50 mg Oral Daily    piperacillin-tazobactam  3,375 mg Intravenous Q8H    heparin (porcine)  5,000 Units Subcutaneous 3 times per day    insulin lispro  0-6 Units Subcutaneous TID WC    insulin lispro  0-3 Units Subcutaneous Nightly    pregabalin  300 mg Oral BID    alogliptin  25 mg Oral Daily    cefTRIAXone (ROCEPHIN) IV  2,000 mg Intravenous Q24H     Continuous Infusions:    Labs :     CBC:   Recent Labs     01/25/21  1135 01/26/21  0553   WBC 10.8 10.8   HGB 11.6* 12.0    167     BMP:    Recent Labs     01/25/21  1135 01/26/21  0553    138   K 3.5 3.5    100   CO2 23 26   BUN 16 10   CREATININE 0.8 0.6   GLUCOSE 198* 140*     Hepatic:   Recent Labs     01/25/21  1135 01/26/21  0553   AST 10 14   ALT 6* 9*   BILITOT 0.5 0.5   ALKPHOS 84 79     Troponin: No results for input(s): TROPONINI in the last 72 hours.   BNP: No results for input(s): BNP in the last 72 hours. Lipids:   Recent Labs     01/26/21  0553   CHOL 144   HDL 40     INR: No results for input(s): INR in the last 72 hours. Radiology    Objective:   Vitals: /63   Pulse 80   Temp 100.2 °F (37.9 °C) (Oral)   Resp 20   Ht 5' 5\" (1.651 m)   Wt 220 lb (99.8 kg)   LMP  (LMP Unknown)   SpO2 94%   BMI 36.61 kg/m²   HEENT: Head:pupils react  Neck: supple not rigid forward flexion per daughter not new   Lungs: clear to auscultation  Heart: regular rate and rhythm   Abdomen: soft BS heard   Extremities: warm  No edema  Neurologic:  Alert, oriented X3 moves all extremities    Impression:   :   1. Fever  possibly sinusitis or pneumonia. 2.  History of adenocarcinoma of the ethmoid sinus, status post nasal  endoscopy with debridement of the sinonasal cavity. 3.  Diabetes. 4.  Hypertension. 5.  Hyperlipidemia. 6.  Neuropathy with degenerative disk disease. 7.  Chronic lymphedema. 8.  History of sleep apnea, but could not tolerate CPAP machine. 9.  Obesity. 10.  Prior history of COVID. 6.  Fall with questionable mentioning of blacking out.     Plan:    Cont antibiotics per ID  Monitor chems   PT OT  Noted MRI results   Will have neuro consulted for further recommendation    Suen Kelley MD

## 2021-01-26 NOTE — PROGRESS NOTES
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 Patient then had recurrence and underwent endoscopic resection on 2/1/20 (both with Dr. Timbo Ocampo, [facility]NT). Reviewed CT personally and with Dr. Jeff Camacho. No signs of ENT source for patient's current symptoms. CT not consistent with acute mastoiditis at this time as well. I discussed with patient's nurse, Stella Romero, that after reviewing the CT with Dr. Jeff Camacho and the patient's history of invasive sinus surgery and radiation, she could be at increased risk for meningitis. Unfortunately, the patient was already off the floor for a stat MRI. She informed me that the patient was mentioning some neck stiffness/soreness prior to leaving the floor. I discussed with the nurse how to perform Kernig's and Barbnski's evaluations for meningitis and what a positive test would show. The nurse stated that she would be contacting Dr Theo Henderson to notify of concern for possible meningitis. ENT to perform full consult tomorrow. Addendum 1/25/21 at 2021: I explained to the nurse that I was not recommending an LP at this time, but was recommending assessment for meningitis and if concerned may need an LP. The nurse informed me Dr. Theo Henderson had assessed and was not overly concerned for meningitis at this time but will give a dose of Vancomycin, increase Rocephin, and consult Dr. Freya Kerr.

## 2021-01-27 LAB
GLUCOSE BLD-MCNC: 171 MG/DL (ref 70–108)
GLUCOSE BLD-MCNC: 196 MG/DL (ref 70–108)
GLUCOSE BLD-MCNC: 210 MG/DL (ref 70–108)
GLUCOSE BLD-MCNC: 225 MG/DL (ref 70–108)
URINE CULTURE, ROUTINE: NORMAL

## 2021-01-27 PROCEDURE — 82948 REAGENT STRIP/BLOOD GLUCOSE: CPT

## 2021-01-27 PROCEDURE — 6370000000 HC RX 637 (ALT 250 FOR IP): Performed by: INTERNAL MEDICINE

## 2021-01-27 PROCEDURE — 6360000002 HC RX W HCPCS: Performed by: INTERNAL MEDICINE

## 2021-01-27 PROCEDURE — 2580000003 HC RX 258: Performed by: INTERNAL MEDICINE

## 2021-01-27 PROCEDURE — 2060000000 HC ICU INTERMEDIATE R&B

## 2021-01-27 PROCEDURE — 95819 EEG AWAKE AND ASLEEP: CPT

## 2021-01-27 PROCEDURE — 95819 EEG AWAKE AND ASLEEP: CPT | Performed by: PSYCHIATRY & NEUROLOGY

## 2021-01-27 RX ADMIN — PIPERACILLIN AND TAZOBACTAM 3375 MG: 3; .375 INJECTION, POWDER, LYOPHILIZED, FOR SOLUTION INTRAVENOUS at 14:17

## 2021-01-27 RX ADMIN — PANTOPRAZOLE SODIUM 40 MG: 40 TABLET, DELAYED RELEASE ORAL at 10:20

## 2021-01-27 RX ADMIN — MUPIROCIN: 20 OINTMENT TOPICAL at 21:33

## 2021-01-27 RX ADMIN — ALOGLIPTIN 25 MG: 25 TABLET, FILM COATED ORAL at 10:18

## 2021-01-27 RX ADMIN — Medication 2000 UNITS: at 10:20

## 2021-01-27 RX ADMIN — FERROUS SULFATE TAB 325 MG (65 MG ELEMENTAL FE) 325 MG: 325 (65 FE) TAB at 21:33

## 2021-01-27 RX ADMIN — HEPARIN SODIUM 5000 UNITS: 5000 INJECTION INTRAVENOUS; SUBCUTANEOUS at 21:33

## 2021-01-27 RX ADMIN — HEPARIN SODIUM 5000 UNITS: 5000 INJECTION INTRAVENOUS; SUBCUTANEOUS at 06:16

## 2021-01-27 RX ADMIN — HEPARIN SODIUM 5000 UNITS: 5000 INJECTION INTRAVENOUS; SUBCUTANEOUS at 14:17

## 2021-01-27 RX ADMIN — TRAMADOL HYDROCHLORIDE 100 MG: 50 TABLET, FILM COATED ORAL at 01:00

## 2021-01-27 RX ADMIN — BUMETANIDE 1 MG: 1 TABLET ORAL at 21:33

## 2021-01-27 RX ADMIN — Medication 50 MG: at 10:19

## 2021-01-27 RX ADMIN — PIPERACILLIN AND TAZOBACTAM 3375 MG: 3; .375 INJECTION, POWDER, LYOPHILIZED, FOR SOLUTION INTRAVENOUS at 22:50

## 2021-01-27 RX ADMIN — PREGABALIN 300 MG: 75 CAPSULE ORAL at 10:19

## 2021-01-27 RX ADMIN — INSULIN LISPRO 2 UNITS: 100 INJECTION, SOLUTION INTRAVENOUS; SUBCUTANEOUS at 08:07

## 2021-01-27 RX ADMIN — BUMETANIDE 1 MG: 1 TABLET ORAL at 10:18

## 2021-01-27 RX ADMIN — HYDRALAZINE HYDROCHLORIDE 50 MG: 50 TABLET, FILM COATED ORAL at 10:19

## 2021-01-27 RX ADMIN — POTASSIUM CHLORIDE 20 MEQ: 1500 TABLET, EXTENDED RELEASE ORAL at 10:20

## 2021-01-27 RX ADMIN — HYDRALAZINE HYDROCHLORIDE 50 MG: 50 TABLET, FILM COATED ORAL at 21:33

## 2021-01-27 RX ADMIN — ACETAMINOPHEN 650 MG: 325 TABLET ORAL at 14:23

## 2021-01-27 RX ADMIN — FERROUS SULFATE TAB 325 MG (65 MG ELEMENTAL FE) 325 MG: 325 (65 FE) TAB at 10:18

## 2021-01-27 RX ADMIN — TRAMADOL HYDROCHLORIDE 100 MG: 50 TABLET, FILM COATED ORAL at 21:33

## 2021-01-27 RX ADMIN — MUPIROCIN: 20 OINTMENT TOPICAL at 10:20

## 2021-01-27 RX ADMIN — PREGABALIN 300 MG: 75 CAPSULE ORAL at 21:33

## 2021-01-27 RX ADMIN — INSULIN LISPRO 2 UNITS: 100 INJECTION, SOLUTION INTRAVENOUS; SUBCUTANEOUS at 11:58

## 2021-01-27 RX ADMIN — PIPERACILLIN AND TAZOBACTAM 3375 MG: 3; .375 INJECTION, POWDER, LYOPHILIZED, FOR SOLUTION INTRAVENOUS at 06:16

## 2021-01-27 RX ADMIN — INSULIN LISPRO 1 UNITS: 100 INJECTION, SOLUTION INTRAVENOUS; SUBCUTANEOUS at 16:25

## 2021-01-27 RX ADMIN — NIFEDIPINE 60 MG: 60 TABLET, FILM COATED, EXTENDED RELEASE ORAL at 21:33

## 2021-01-27 ASSESSMENT — PAIN DESCRIPTION - ORIENTATION
ORIENTATION: LEFT
ORIENTATION: RIGHT
ORIENTATION_2: LEFT

## 2021-01-27 ASSESSMENT — PAIN SCALES - GENERAL
PAINLEVEL_OUTOF10: 0
PAINLEVEL_OUTOF10: 3

## 2021-01-27 ASSESSMENT — PAIN DESCRIPTION - PAIN TYPE: TYPE: ACUTE PAIN

## 2021-01-27 ASSESSMENT — PAIN DESCRIPTION - LOCATION: LOCATION: LEG

## 2021-01-27 NOTE — PROGRESS NOTES
IM Progress Note  Dr. Wilda Martins  1/27/2021 8:53 AM      Patient name Ora Brewer  [id number]  PCP: Mica Irahetar, MD  Admit Date: 1/25/2021  Acct No. [de-identified]    Subjective: Interval History:   Feels somewhat better  D/w neuro yesterday MRI sugg of tumor   NS consulted   Pt has received treatment for tumor at [facility] in past       Diet: DIET CARB CONTROL;    I/O last 3 completed shifts: In: 877.8 [P.O.:340; I.V.:537.8]  Out: 2050 [Urine:2050]  No intake/output data recorded. Admission weight: 220 lb (99.8 kg) as of 1/25/2021 11:11 AM  Wt Readings from Last 3 Encounters:   01/27/21 227 lb 8 oz (103.2 kg)   12/28/20 221 lb 3.2 oz (100.3 kg)   07/23/20 210 lb (95.3 kg)     Body mass index is 37.86 kg/m².     ROS   CVS;  no cp or palpitation  Resp: no SOB or cough  Neuro:  No numbness or weakness or dizziness  Abd: no nausea or vomiting or abd pain      Medications:   Scheduled Meds:   bumetanide  1 mg Oral BID    ferrous sulfate  325 mg Oral BID    hydrALAZINE  50 mg Oral BID    mupirocin   Nasal BID    NIFEdipine  60 mg Oral Nightly    pantoprazole  40 mg Oral QAM AC    potassium chloride  20 mEq Oral Daily    Vitamin D  2,000 Units Oral Daily    zinc sulfate  50 mg Oral Daily    piperacillin-tazobactam  3,375 mg Intravenous Q8H    heparin (porcine)  5,000 Units Subcutaneous 3 times per day    insulin lispro  0-6 Units Subcutaneous TID     insulin lispro  0-3 Units Subcutaneous Nightly    pregabalin  300 mg Oral BID    alogliptin  25 mg Oral Daily     Continuous Infusions:    Labs :     CBC:   Recent Labs     01/25/21 1135 01/26/21  0553   WBC 10.8 10.8   HGB 11.6* 12.0    167     BMP:    Recent Labs     01/25/21 1135 01/26/21  0553    138   K 3.5 3.5    100   CO2 23 26   BUN 16 10   CREATININE 0.8 0.6   GLUCOSE 198* 140*     Hepatic:   Recent Labs     01/25/21 1135 01/26/21  0553   AST 10 14   ALT 6* 9*   BILITOT 0.5 0.5   ALKPHOS 84 79     Troponin: No results for input(s): TROPONINI in the last 72 hours. BNP: No results for input(s): BNP in the last 72 hours. Lipids:   Recent Labs     01/26/21  0553   CHOL 144   HDL 40     INR: No results for input(s): INR in the last 72 hours. Radiology    Objective:   Vitals: /84   Pulse 89   Temp 99 °F (37.2 °C) (Oral)   Resp 18   Ht 5' 5\" (1.651 m)   Wt 227 lb 8 oz (103.2 kg)   LMP  (LMP Unknown)   SpO2 92%   BMI 37.86 kg/m²   HEENT: Head:pupils react  Neck: supple not rigid forward flexion per daughter not new   Lungs: clear to auscultation  Heart: regular rate and rhythm   Abdomen: soft BS heard   Extremities: warm  No edema  Neurologic:  Awake to name ,follows instructions    Impression:   :   1. Fever  possibly sinusitis or pneumonia. 2.  History of adenocarcinoma of the ethmoid sinus, status post nasal  endoscopy with debridement of the sinonasal cavity. 3.  Diabetes. 4.  Hypertension. 5.  Hyperlipidemia. 6.  Neuropathy with degenerative disk disease. 7.  Chronic lymphedema. 8.  History of sleep apnea, but could not tolerate CPAP machine. 9.  Obesity. 10.  Prior history of COVID.   6.  Fall with questionable mentioning of blacking out MRI sugg of tumor    Plan:    Cont zosyn   Await NS input   Will d/w daughter when available  Pt has seen ENT surgeons at [facility] for her tumor   Sinus rinse to cont as at home     Zhen Rivera MD

## 2021-01-27 NOTE — PLAN OF CARE
Problem: Nutrition  Goal: Optimal nutrition therapy  Outcome: Ongoing   Nutrition Problem #1: Increased nutrient needs  Intervention: Food and/or Nutrient Delivery: Continue Current Diet, Start Oral Nutrition Supplement  Nutritional Goals: Patient will consume 75% or more of meals during LOS to assist with wound healing efforts.

## 2021-01-27 NOTE — PROGRESS NOTES
Progress note: Infectious diseases    Patient - Reece Soria,  Age - 79 y.o.    - 1940      Room Number - 4A-15/015-A   MRN -  [id number]   Acct # - [de-identified]  Date of Admission -  2021 11:11 AM    SUBJECTIVE:   She feels better. I spoke with her daughter who was at bedside. OBJECTIVE   VITALS    height is 5' 5\" (1.651 m) and weight is 227 lb 8 oz (103.2 kg). Her oral temperature is 98.9 °F (37.2 °C). Her blood pressure is 130/83 and her pulse is 88. Her respiration is 20 and oxygen saturation is 89% (abnormal). Wt Readings from Last 3 Encounters:   21 227 lb 8 oz (103.2 kg)   20 221 lb 3.2 oz (100.3 kg)   20 210 lb (95.3 kg)       I/O (24 Hours)    Intake/Output Summary (Last 24 hours) at 2021 1312  Last data filed at 2021 0340  Gross per 24 hour   Intake 877.77 ml   Output 2050 ml   Net -1172.23 ml       General Appearance  Awake, alert, oriented, chronically sick looking.   HEENT - normocephalic, atraumatic, slightly pale conjunctiva,  anicteric sclera, the sinuses are nontender  Neck - Supple, no mass  Lungs -  Bilateral air entry, diminished breath sounds bilaterally   cardiovascular - Heart sounds are normal.    Abdomen - soft, not distended, nontender,   Neurologic -oriented to person place and time  Skin - No bruising or bleeding  Extremities - No edema, no cyanosis, clubbing     MEDICATIONS:      bumetanide  1 mg Oral BID    ferrous sulfate  325 mg Oral BID    hydrALAZINE  50 mg Oral BID    mupirocin   Nasal BID    NIFEdipine  60 mg Oral Nightly    pantoprazole  40 mg Oral QAM AC    potassium chloride  20 mEq Oral Daily    Vitamin D  2,000 Units Oral Daily    zinc sulfate  50 mg Oral Daily    piperacillin-tazobactam  3,375 mg Intravenous Q8H    heparin (porcine)  5,000 Units Subcutaneous 3 times per day    insulin lispro  0-6 Units Subcutaneous TID WC    insulin lispro  0-3 Units Subcutaneous Nightly    pregabalin  300 mg Oral BID    alogliptin  25 mg Oral Daily       docusate sodium, sodium chloride, acetaminophen, traMADol **OR** traMADol, ondansetron      LABS:     CBC:   Recent Labs     01/25/21  1135 01/26/21  0553   WBC 10.8 10.8   HGB 11.6* 12.0    167     BMP:    Recent Labs     01/25/21  1135 01/26/21  0553    138   K 3.5 3.5    100   CO2 23 26   BUN 16 10   CREATININE 0.8 0.6   GLUCOSE 198* 140*     Calcium:  Recent Labs     01/26/21  0553   CALCIUM 8.9      Recent Labs     01/26/21  1948 01/27/21  0633 01/27/21  1041   POCGLU 217* 210* 225*     HgbA1C: No results for input(s): LABA1C in the last 72 hours. INR: No results for input(s): INR in the last 72 hours. Hepatic:   Recent Labs     01/25/21  1135 01/26/21  0553   ALKPHOS 84 79   ALT 6* 9*   AST 10 14   PROT 7.2 7.2   BILITOT 0.5 0.5   BILIDIR <0.2  --    LABALBU 3.3* 3.3*     Amylase and Lipase:  Recent Labs     01/25/21  1135   LACTA 2.0     Lactic Acid:   Recent Labs     01/25/21  1135   LACTA 2.0     Troponin: No results for input(s): CKTOTAL, CKMB, TROPONINI in the last 72 hours. BNP: No results for input(s): BNP in the last 72 hours.     CULTURES:   UA:   Recent Labs     01/25/21  1415   SPECGRAV 1.014   PHUR 7.0   COLORU YELLOW   PROTEINU 100*   BLOODU NEGATIVE   RBCUA 5-10   WBCUA NONE SEEN   BACTERIA NONE SEEN   NITRU NEGATIVE   BILIRUBINUR NEGATIVE   UROBILINOGEN 0.2   KETUA NEGATIVE   LABCAST NONE SEEN  NONE SEEN     Micro:   Lab Results   Component Value Date    BC No growth-preliminary  01/25/2021    BC No growth-preliminary  01/25/2021         IMAGING:         Problem list of patient:     Patient Active Problem List   Diagnosis Code    Hypercholesterolemia E78.00    Osteoarthritis M19.90    Osteoporosis M81.0    Type 2 diabetes mellitus without complication, without long-term current use of insulin ([facility] E11.9    DDD (degenerative disc disease), lumbar M51.36  Benign essential HTN I10    SWAPNA on CPAP G47.33, Z99.89    Diabetic peripheral neuropathy ([facility]) E11.42    Acute recurrent pansinusitis J01.41    Primary thunderclap headache G44.53    Rash of entire body R21    Infection of skin due to methicillin resistant Staphylococcus aureus (MRSA) A49.02    Drug allergy, antibiotic  likely bactrim Z88.1    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    Skin plaque L98.8    Hyponatremia E87.1    Hypervolemia E87.70    Cellulitis of lower extremity L03.119    Hypoglycemia E16.2    Acute on chronic systolic CHF (congestive heart failure) ([facility]) I50.23    Bilateral cellulitis of lower leg L03.116, L03.115    Venous ulcers of both lower extremities ([facility]) I83.019, L97.919, I83.029, L97.929    Bilateral lower leg cellulitis L03.116, L03.115    CKD (chronic kidney disease) stage 3, GFR 30-59 ml/min ([facility]) N18.30    Iron deficiency anemia D50.9    Hypomagnesemia E83.42    SBO (small bowel obstruction) ([facility]) K56.609    Venous ulcer of left leg ([facility]) I83.029, L97.929    Venous ulcer of right leg ([facility]) I83.019, L97.919    Acute eczema L30.9    Venous insufficiency of both lower extremities I87.2    Lymphedema of both lower extremities I89.0    Pressure injury of left buttock, stage 2 ([facility]) F77.677    Eczematous dermatitis L30.9    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 3XXA    Facial cellulitis L03. 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[facility]) C30.0    Cataract of both eyes H26.9    History of ventral hernia repair Z98.890, Z87.19    Other cervical disc degeneration, mid-cervical region M50.320    Spondylolisthesis of cervical region M43.12    Spondylolisthesis of thoracic region M43.14    Chronic rhinitis J31.0    Closed fracture of head of humerus S42.293A    Dysphagia R13.10    Laryngopharyngeal

## 2021-01-27 NOTE — CARE COORDINATION
DISASTER CHARTING    1/27/21, 2:28 PM EST    DISCHARGE ONGOING EVALUATION:     [facility] day: 2  Location: -15/015-A Reason for admit: Sepsis [facility]) [A41.9]   Barriers to Discharge: PT/OT, diabetes management, IV antibiotics, ENT signed off, ID following. PCP: Jacey Parisi MD  Readmission Risk Score: 20%  Patient Goals/Plan/Treatment Preferences: Plan is to return home with daughter. Will follow.

## 2021-01-27 NOTE — PROGRESS NOTES
Comprehensive Nutrition Assessment    Type and Reason for Visit:  Initial, Positive Nutrition Screen, Wound    Nutrition Recommendations/Plan:   ONS initiated: Keenan BID. Consider MVI to assist in wound-healing efforts. Continue current diet. Nutrition Assessment:    Pt. nutritionally compromised AEB wounds. At risk for further nutrition compromise r/t admit with sepsis, increased nutrient needs for wound healing, underlying medical condition (hx sinonasal adenocarcinoma treated with radiation and surgery, hx DM, COVID+ Dec 2020). Nutrition recommendations/interventions as per above. Malnutrition Assessment:  Malnutrition Status:  No malnutrition    Context:  Acute Illness     Findings of the 6 clinical characteristics of malnutrition:  Energy Intake:  No significant decrease in energy intake(per patient report)  Weight Loss:  No significant weight loss     Body Fat Loss:  No significant body fat loss     Muscle Mass Loss:  No significant muscle mass loss    Fluid Accumulation:  1 - Mild     Strength:  Not Performed    Estimated Daily Nutrient Needs:  Energy (kcal):  8838-4740 kcals (15-18); Weight Used for Energy Requirements:  (103kgm on 1/27)     Protein (g):   grams (1.2-2); Weight Used for Protein Requirements:  Ideal(57kgm)        Nutrition Related Findings:  Patient seen earlier today, reports good appetite prior to admission and at present, note %x 2 meals recorded; denies difficulty chewing or swallowing; bumex, iron, humalog, zosyn, vitamin D, zinc; glucose 210, BUN 10, Creatinine 0.6; A1C (12/28/20) 7.8%; BM x 1 (1/26)      Wounds:  Stage I, Stage II(buttocks plus fold excoriation)       Current Nutrition Therapies:    DIET CARB CONTROL;   Dietary Nutrition Supplements: Wound Healing Oral Supplement    Anthropometric Measures:  · Height: 5' 5\" (165.1 cm)  · Current Body Weight: 227 lb 8 oz (103.2 kg)   · Admission Body Weight: 227 lb 8 oz (103.2 kg)(1/27; +1 pitting BLE edema)    · Usual Body Weight: (per EMR: 2/26/20 204#, 6/4/20 205# 6.4oz, 12/28/20 (bedscale) 221# 3.2oz)     · Ideal Body Weight: 125 lbs;     · BMI: 37.9  · BMI Categories: Obese Class 2 (BMI 35.0 -39.9)       Nutrition Diagnosis:   · Increased nutrient needs related to increase demand for energy/nutrients as evidenced by wounds      Nutrition Interventions:   Food and/or Nutrient Delivery:  Continue Current Diet, Start Oral Nutrition Supplement  Nutrition Education/Counseling:  Education not appropriate (difficult to keep awake during visit)  Coordination of Nutrition Care:  Continue to monitor while inpatient    Goals:  Patient will consume 75% or more of meals during LOS to assist with wound healing efforts. Nutrition Monitoring and Evaluation:   Behavioral-Environmental Outcomes:  None Identified   Food/Nutrient Intake Outcomes:  Food and Nutrient Intake, Supplement Intake  Physical Signs/Symptoms Outcomes:  Biochemical Data, Nutrition Focused Physical Findings, Skin, Weight, Fluid Status or Edema     Discharge Planning:     Too soon to determine     Electronically signed by Jimi Balderrama RD, LD on 1/27/21 at 2:42 PM EST    Contact: [phone]

## 2021-01-27 NOTE — PROGRESS NOTES
[facility] Laboratory Technician Worksheet      EEG Date: 2021    Name: Josesito Herman   : 1940   Age: 79 y.o. SEX: female    ROOM: [place] [address] MRN: [id number]           CSN: [id number]      Ordering Provider: Sang Orr  EEG Number: [id number] Time of Test:  9774    Hand: Right   Sedation: no    H.V. Done: No NOT DONE Photic: Yes    Sleep: Yes  Drowsy: Yes   Sleep Deprived: No    Seizures observed: no    Mentality: alert  BUT SLEEPY MRI   1. Abnormal signal intensity in the right frontal gyrus rectus with no associated abnormal enhancement. 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             Clinical History:FALL,     Past Medical History:       Diagnosis Date    Cancer [facility])     adenocarcinoma of her sinuses    Cataract of both eyes     COVID-19 2020    DDD (degenerative disc disease), lumbar     Dysphagia, pharyngoesophageal 2016    Eczema 2018    Fibrocystic breast     H/O ventral hernia repair     Headache 2017    Hypercholesteremia     Hyperlipidemia     Hypertension     Iron deficiency anemia     LPRD (laryngopharyngeal reflux disease) 2016    Neuropathy     peripheral    Obesity     Orbital abscess     Orbital cellulitis 2014    SWAPNA (obstructive sleep apnea)     Osteoarthritis     Osteoporosis     Persistent proteinuria associated with type 2 diabetes mellitus ([facility] 2017    urine micral of 50.       Sinusitis     Type II or unspecified type diabetes mellitus without mention of complication, not stated as uncontrolled     diagnoses approx 2000    Velopharyngeal incompetence 2016       Scheduled Meds:   bumetanide  1 mg Oral BID    ferrous sulfate  325 mg Oral BID    hydrALAZINE  50 mg Oral BID    mupirocin   Nasal BID    NIFEdipine  60 mg Oral Nightly    pantoprazole  40 mg Oral QAM AC    potassium chloride  20 mEq Oral Daily    Vitamin D  2,000 Units Oral Daily    zinc sulfate  50 mg Oral Daily    piperacillin-tazobactam  3,375 mg Intravenous Q8H    heparin (porcine)  5,000 Units Subcutaneous 3 times per day    insulin lispro  0-6 Units Subcutaneous TID WC    insulin lispro  0-3 Units Subcutaneous Nightly    pregabalin  300 mg Oral BID    alogliptin  25 mg Oral Daily     Continuous Infusions:  PRN Meds:.docusate sodium, sodium chloride, acetaminophen, traMADol **OR** traMADol, ondansetron    Technician: [facility] 1/27/2021

## 2021-01-28 LAB
GLUCOSE BLD-MCNC: 147 MG/DL (ref 70–108)
GLUCOSE BLD-MCNC: 166 MG/DL (ref 70–108)
GLUCOSE BLD-MCNC: 210 MG/DL (ref 70–108)
GLUCOSE BLD-MCNC: 293 MG/DL (ref 70–108)

## 2021-01-28 PROCEDURE — 97162 PT EVAL MOD COMPLEX 30 MIN: CPT

## 2021-01-28 PROCEDURE — 97535 SELF CARE MNGMENT TRAINING: CPT

## 2021-01-28 PROCEDURE — 2580000003 HC RX 258: Performed by: INTERNAL MEDICINE

## 2021-01-28 PROCEDURE — 2060000000 HC ICU INTERMEDIATE R&B

## 2021-01-28 PROCEDURE — 82948 REAGENT STRIP/BLOOD GLUCOSE: CPT

## 2021-01-28 PROCEDURE — 6370000000 HC RX 637 (ALT 250 FOR IP): Performed by: INTERNAL MEDICINE

## 2021-01-28 PROCEDURE — 6360000002 HC RX W HCPCS: Performed by: INTERNAL MEDICINE

## 2021-01-28 PROCEDURE — 97166 OT EVAL MOD COMPLEX 45 MIN: CPT

## 2021-01-28 PROCEDURE — 97530 THERAPEUTIC ACTIVITIES: CPT

## 2021-01-28 PROCEDURE — 97110 THERAPEUTIC EXERCISES: CPT

## 2021-01-28 RX ADMIN — BUMETANIDE 1 MG: 1 TABLET ORAL at 19:43

## 2021-01-28 RX ADMIN — BUMETANIDE 1 MG: 1 TABLET ORAL at 08:48

## 2021-01-28 RX ADMIN — ACETAMINOPHEN 650 MG: 325 TABLET ORAL at 02:52

## 2021-01-28 RX ADMIN — ACETAMINOPHEN 650 MG: 325 TABLET ORAL at 16:57

## 2021-01-28 RX ADMIN — POTASSIUM CHLORIDE 20 MEQ: 1500 TABLET, EXTENDED RELEASE ORAL at 08:48

## 2021-01-28 RX ADMIN — HYDRALAZINE HYDROCHLORIDE 50 MG: 50 TABLET, FILM COATED ORAL at 08:48

## 2021-01-28 RX ADMIN — PIPERACILLIN AND TAZOBACTAM 3375 MG: 3; .375 INJECTION, POWDER, LYOPHILIZED, FOR SOLUTION INTRAVENOUS at 06:17

## 2021-01-28 RX ADMIN — NIFEDIPINE 60 MG: 60 TABLET, FILM COATED, EXTENDED RELEASE ORAL at 19:44

## 2021-01-28 RX ADMIN — HEPARIN SODIUM 5000 UNITS: 5000 INJECTION INTRAVENOUS; SUBCUTANEOUS at 15:18

## 2021-01-28 RX ADMIN — INSULIN LISPRO 1 UNITS: 100 INJECTION, SOLUTION INTRAVENOUS; SUBCUTANEOUS at 08:48

## 2021-01-28 RX ADMIN — MUPIROCIN: 20 OINTMENT TOPICAL at 19:44

## 2021-01-28 RX ADMIN — PANTOPRAZOLE SODIUM 40 MG: 40 TABLET, DELAYED RELEASE ORAL at 06:17

## 2021-01-28 RX ADMIN — HEPARIN SODIUM 5000 UNITS: 5000 INJECTION INTRAVENOUS; SUBCUTANEOUS at 06:17

## 2021-01-28 RX ADMIN — Medication 2000 UNITS: at 08:48

## 2021-01-28 RX ADMIN — INSULIN LISPRO 3 UNITS: 100 INJECTION, SOLUTION INTRAVENOUS; SUBCUTANEOUS at 12:24

## 2021-01-28 RX ADMIN — FERROUS SULFATE TAB 325 MG (65 MG ELEMENTAL FE) 325 MG: 325 (65 FE) TAB at 19:43

## 2021-01-28 RX ADMIN — Medication 50 MG: at 08:48

## 2021-01-28 RX ADMIN — PREGABALIN 300 MG: 75 CAPSULE ORAL at 21:32

## 2021-01-28 RX ADMIN — HEPARIN SODIUM 5000 UNITS: 5000 INJECTION INTRAVENOUS; SUBCUTANEOUS at 21:40

## 2021-01-28 RX ADMIN — PREGABALIN 300 MG: 75 CAPSULE ORAL at 08:47

## 2021-01-28 RX ADMIN — PIPERACILLIN AND TAZOBACTAM 3375 MG: 3; .375 INJECTION, POWDER, LYOPHILIZED, FOR SOLUTION INTRAVENOUS at 15:17

## 2021-01-28 RX ADMIN — ALOGLIPTIN 25 MG: 25 TABLET, FILM COATED ORAL at 08:48

## 2021-01-28 RX ADMIN — PIPERACILLIN AND TAZOBACTAM 3375 MG: 3; .375 INJECTION, POWDER, LYOPHILIZED, FOR SOLUTION INTRAVENOUS at 21:32

## 2021-01-28 RX ADMIN — FERROUS SULFATE TAB 325 MG (65 MG ELEMENTAL FE) 325 MG: 325 (65 FE) TAB at 08:48

## 2021-01-28 RX ADMIN — INSULIN LISPRO 1 UNITS: 100 INJECTION, SOLUTION INTRAVENOUS; SUBCUTANEOUS at 16:56

## 2021-01-28 RX ADMIN — HYDRALAZINE HYDROCHLORIDE 50 MG: 50 TABLET, FILM COATED ORAL at 19:44

## 2021-01-28 RX ADMIN — MUPIROCIN: 20 OINTMENT TOPICAL at 08:48

## 2021-01-28 ASSESSMENT — PAIN DESCRIPTION - DESCRIPTORS: DESCRIPTORS: ACHING

## 2021-01-28 ASSESSMENT — PAIN SCALES - GENERAL
PAINLEVEL_OUTOF10: 6
PAINLEVEL_OUTOF10: 9
PAINLEVEL_OUTOF10: 0
PAINLEVEL_OUTOF10: 0

## 2021-01-28 ASSESSMENT — PAIN DESCRIPTION - PAIN TYPE: TYPE: ACUTE PAIN

## 2021-01-28 ASSESSMENT — PAIN DESCRIPTION - LOCATION
LOCATION: HEAD
LOCATION: HEAD

## 2021-01-28 NOTE — CARE COORDINATION
DISASTER CHARTING    1/28/21, 12:26 PM EST    DISCHARGE ONGOING EVALUATION:     [id number] day: 3  Location: -15/015-A Reason for admit: Sepsis [facility]) [A41.9]   Barriers to Discharge: To follow up with Neurosurgery at [facility]. ID following, IV antibiotics, PT/OT, Neurology following, diabetes management. PCP: Paris Calhoun MD  Readmission Risk Score: 20%  Patient Goals/Plan/Treatment Preferences: Plan is to return home with daughter. Will follow for potential needs.

## 2021-01-28 NOTE — CONSULTS
[address] [place] [address]                                  CONSULTATION    PATIENT NAME: Ryan Tran                       :        1940  MED REC NO:   [id number]                           ROOM:       0015  ACCOUNT NO:   [de-identified]                           ADMIT DATE: 2021  PROVIDER:     Gisselle Richter. Raul Yee M.D.    Luiz Garzadi:  2021    TIME OF CONSULTATION:  11:15 a.m. REQUESTING PROVIDER:  Reed Evans M.D. HISTORY OF PRESENT ILLNESS:  The patient is a 50-year-old female. She  was admitted to [facility] on 2021. She presented  after a fall and was unsure if she had lost her balance or passed out. 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 She has had adenocarcinoma of her sinuses diagnosed  about four years ago and has had resection as well as radiation therapy. New MRI of her brain shows signal characteristics that may represent  infarct versus edema versus radiation changes, but it is not clear cut  that it is tumor and this is the right frontal region of the gyrus  rectus. There is no enhancement, so it is not clear cut that this is  definitely tumor. I discussed this with the patient and I have relayed  the information with suggestion to Dr. Lorenzo Olmstead that since the  patient's cancer treatment has been at [facility] thus far, that I would  suggest follow up with Neurosurgery as an outpatient at [facility] as well. No neurosurgical intervention at this point needs  to be done. However, would anticipate obtaining followup MRIs in the  future, and I will leave that up to the [facility] staff. The patient  can follow with me on an as needed basis.     PHYSICAL EXAMINATION:  On examination, the patient is awake and alert  and oriented to person, place, day, date, and situation. She moves all  four extremities with antigravity strength. Pupils are equal, round,  and reactive to light. She does complain of some mild headache. ASSESSMENT:  The patient with the abnormal MRI with edema in the region  of the inferior right frontal lobe. Differential diagnoses discussed  above. PLAN:  I would suggest outpatient followup with Neurosurgery Service at  [facility] where she has her cancer care thus far. Follow up  with me on an as needed basis. I appreciate the opportunity to participate in this patient's care.         Leah Garcia M.D.    D: 01/28/2021 0:33:35       T: 01/28/2021 2:48:01     [email or web address]  Job#: [id number]     Doc#: [id number]    CC:                                            ADDENDUM TO THE ABOVE H&P/CONSULTATION     Jerri Dunlap   YOB: 1940  Account Number: [de-identified]       HISTORY OF PRESENT ILLNESS:  Jerri Dunlap is a 79 y.o. female, admitted on :1/25/2021 11:11 AM      PROBLEM LIST:  Patient Active Problem List   Diagnosis    Hypercholesterolemia    Osteoarthritis    Osteoporosis    Type 2 diabetes mellitus without complication, without long-term current use of insulin ([facility] [address]    DDD (degenerative disc disease), lumbar    Benign essential HTN    SWAPNA on CPAP    Diabetic peripheral neuropathy ([facility] [address]    Acute recurrent pansinusitis    Primary thunderclap headache    Rash of entire body    Infection of skin due to methicillin resistant Staphylococcus aureus (MRSA)    Drug allergy, antibiotic  likely bactrim    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HCC)    Skin plaque    Hyponatremia    Hypervolemia    Cellulitis of lower extremity    Hypoglycemia    Acute on chronic systolic CHF (congestive heart failure) (HCC)    Bilateral cellulitis of lower leg    Venous ulcers of both lower extremities (HCC)    Bilateral lower leg cellulitis    CKD (chronic kidney disease) stage 3, GFR 30-59 ml/min (HCC)    Iron deficiency anemia    Hypomagnesemia    SBO (small bowel obstruction) (HCC)    Venous ulcer of left leg (HCC)    Venous ulcer of right leg (HCC)    Acute eczema    Venous insufficiency of both lower extremities    Lymphedema of both lower extremities    Pressure injury of left buttock, stage 2 (HCC)    Eczematous dermatitis    Colonization with drug-resistant bacteria    Dystrophic nail    Angio-edema    Facial cellulitis    Osteoradionecrosis of skull/sinus    Late effect of radiation    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HCC)    Cataract of both eyes    History of ventral hernia repair    Other cervical disc degeneration, mid-cervical region    Spondylolisthesis of cervical region    Spondylolisthesis of thoracic region    Chronic rhinitis    Closed fracture of head of humerus    Dysphagia    Laryngopharyngeal reflux    Malignant neoplasm of ethmoidal sinus (HCC)    Obesity, Class II, BMI 35-39.9    COVID-19    Sepsis ([facility])    Brain mass       MEDICATIONS: []None []Unknown  Prior to Admission medications    Medication Sig Start Date End Date Taking? Authorizing Provider   Sodium Chloride-Sodium Bicarb (NETI POT SINUS [address] NA) by Nasal route 2 times daily   Yes Historical Provider, MD   amoxicillin-clavulanate (AUGMENTIN) 875-125 MG per tablet Take 1 tablet by mouth 2 times daily   Yes Historical Provider, MD   ascorbic acid (VITAMIN C) 1000 MG tablet Take 1 tablet by mouth daily 12/30/20  Yes Brittaney Randolph PA-C   Vitamin D (CHOLECALCIFEROL) 50 MCG (2000 UT) TABS tablet Take 1 tablet by mouth daily 12/30/20  Yes Brittaney Randolph PA-C   zinc sulfate (ZINCATE) 220 (50 Zn) MG capsule Take 1 capsule by mouth daily 12/30/20  Yes Brittaney Randolph PA-C   pregabalin (LYRICA) 150 MG capsule Take 2 capsules by mouth in the morning and 2 capsules at bedtime.  10/14/20 2/14/21 Yes Char Nicholson MD   mupirocin (BACTROBAN) 2 % ointment Use 3-4 times daily in sinus rinses, as instructed in clinic. Approximately 3-5 gm per day 3/19/20  Yes Historical Provider, MD   NIFEdipine (ADALAT CC) 60 MG extended release tablet Take 1 tablet by mouth daily 6/4/20  Yes Payton Chapman MD   bumetanide (BUMEX) 1 MG tablet Take 1 tablet by mouth 2 times daily 6/4/20  Yes Payton Chapman MD   hydrALAZINE (APRESOLINE) 50 MG tablet Take 1 tablet by mouth 2 times daily 3/19/20  Yes Payton Chapman MD   ferrous sulfate (IRON 325) 325 (65 Fe) MG tablet Take 325 mg by mouth 2 times daily    Yes Historical Provider, MD   omeprazole (PRILOSEC) 20 MG delayed release capsule TAKE ONE CAPSULE BY MOUTH ONCE DAILY 2/26/20  Yes Payton Chapman MD   SITagliptin (JANUVIA) 100 MG tablet Take 1 tablet by mouth daily 2/26/20  Yes Payton Chapman MD   metFORMIN (GLUCOPHAGE) 1000 MG tablet TAKE ONE TABLET IN THE EVENING 2/26/20  Yes Payton Chapman MD   potassium chloride (KLOR-CON M) 20 MEQ extended release tablet Take 1 tablet by mouth daily 2/26/20  Yes Payton Chapman MD   docusate sodium (COLACE) 100 MG capsule Take 1 capsule by mouth 2 times daily as needed for Constipation 6/5/19  Yes AMANDA Ruiz CNP   sodium chloride (OCEAN, BABY AYR) 0.65 % nasal spray 1 spray by Nasal route as needed for Congestion    Yes Historical Provider, MD   Misc. Devices (WALKER) MISC 1 each by Does not apply route daily Requests stand up walker due to heart failure and generalized OA. I50.23. 1/5/21   Payton Chapman MD   Elastic Bandages & Supports (CERVICAL COLLAR) MISC Wear while awake for neck support. M54.2 1/5/21   Payton Chapman MD   Handicap Placard MISC by Does not apply route Dx:I187.2,M81.0. Duration: 5 years.  10/18/19   AMANDA Ruiz CNP       Current Facility-Administered Medications   Medication Dose Route Frequency Provider Last Rate Last Admin    docusate sodium (COLACE) capsule 100 mg  100 mg Oral BID Seun Kelley MD   100 mg at 01/29/21 1947    polyethylene glycol (GLYCOLAX) packet 17 g  17 g Oral Daily Kris Avalos MD   17 g at 01/29/21 1625    bumetanide (BUMEX) tablet 1 mg  1 mg Oral BID Kris Avalos MD   1 mg at 01/29/21 1942    ferrous sulfate (IRON 325) tablet 325 mg  325 mg Oral BID Kris Avalos MD   325 mg at 01/29/21 1942    hydrALAZINE (APRESOLINE) tablet 50 mg  50 mg Oral BID Kris Avalos MD   50 mg at 01/29/21 1942    mupirocin (BACTROBAN) 2 % ointment   Nasal BID Kris Avalos MD   Given at 01/29/21 1942    NIFEdipine (PROCARDIA XL) extended release tablet 60 mg  60 mg Oral Nightly Kris Avalos MD   60 mg at 01/29/21 1942    pantoprazole (PROTONIX) tablet 40 mg  40 mg Oral QAM AC Kris Avalos MD   40 mg at 01/30/21 5100    potassium chloride (KLOR-CON M) extended release tablet 20 mEq  20 mEq Oral Daily Kris Avalos MD   20 mEq at 01/29/21 0908    sodium chloride (OCEAN, BABY AYR) 0.65 % nasal spray 1 spray  1 spray Nasal PRN Kris Avalos MD        vitamin D (CHOLECALCIFEROL) tablet 2,000 Units  2,000 Units Oral Daily Kris Avalos MD   2,000 Units at 01/29/21 0907    zinc sulfate (ZINCATE) capsule 50 mg  50 mg Oral Daily Kris Avalos MD   50 mg at 01/29/21 0909    piperacillin-tazobactam (ZOSYN) 3,375 mg in dextrose 5 % 50 mL IVPB extended infusion (mini-bag)  3,375 mg Intravenous Q8H Kris Avalos MD 12.5 mL/hr at 01/30/21 0611 3,375 mg at 01/30/21 0611    heparin (porcine) injection 5,000 Units  5,000 Units Subcutaneous 3 times per day Kris Avalos MD   5,000 Units at 01/30/21 0611    acetaminophen (TYLENOL) tablet 650 mg  650 mg Oral Q4H PRN Kris Avalos MD   650 mg at 01/30/21 0049    traMADol (ULTRAM) tablet 50 mg  50 mg Oral Q6H PRN Kris Avalos MD        Or    traMADol Juarez Kubas) tablet 100 mg  100 mg Oral Q6H PRN Kris Avalos MD   100 mg at 01/29/21 2015    insulin lispro (HUMALOG) injection vial 0-6 Units  0-6 Units Subcutaneous TID WC Lowell Pittman MD   2 Units at 01/29/21 1626    insulin lispro (HUMALOG) injection vial 0-3 Units  0-3 Units Subcutaneous Nightly Lowell Pittman MD   1 Units at 01/29/21 2009    pregabalin (LYRICA) capsule 300 mg  300 mg Oral BID Lowell Pittman MD   300 mg at 01/29/21 1947    alogliptin (NESINA) tablet 25 mg  25 mg Oral Daily Lowell Pittman MD   25 mg at 01/29/21 0910    ondansetron (ZOFRAN) injection 4 mg  4 mg Intravenous Q6H PRN Lowell Pittman MD   4 mg at 01/25/21 2237            sodium chloride, 1 spray, PRN      acetaminophen, 650 mg, Q4H PRN      traMADol, 50 mg, Q6H PRN    Or      traMADol, 100 mg, Q6H PRN      ondansetron, 4 mg, Q6H PRN              ALLERGIES: []None []Unknown   Lisinopril, Sulfamethoxazole-trimethoprim, Morphine, Codeine, Hydrocodone, Percocet [oxycodone-acetaminophen], Tylenol [acetaminophen], and Tape [adhesive tape]    PAST MEDICAL  HISTORY: []None []Unknown    has a past medical history of Cancer ([facility] [address] Cataract of both eyes, COVID-19, DDD (degenerative disc disease), lumbar, Dysphagia, pharyngoesophageal, Eczema, Fibrocystic breast, H/O ventral hernia repair, Headache, Hypercholesteremia, Hyperlipidemia, Hypertension, Iron deficiency anemia, LPRD (laryngopharyngeal reflux disease), Neuropathy, Obesity, Orbital abscess, Orbital cellulitis, SWAPNA (obstructive sleep apnea), Osteoarthritis, Osteoporosis, Persistent proteinuria associated with type 2 diabetes mellitus ([address] Sinusitis, Type II or unspecified type diabetes mellitus without mention of complication, not stated as uncontrolled, and Velopharyngeal incompetence. PAST SURGICAL  HISTORY: []None []Unknown   has a past surgical history that includes ventral hernia repair (1992); Cholecystectomy (03/1988); Hysterectomy, total abdominal (1980); Hemorrhoid surgery (1980s); Total knee arthroplasty (08/2003);  Carpal tunnel release (Bilateral, 2008, 2009); sinus surgery (last 2009); Cataract removal (2011); Eye surgery (Left, 01/30/2015); sinus surgery (02/01/2016); sinus surgery (2016 x 2); joint replacement (bilat 2005/ 2007); Colonoscopy (2016); Endoscopy, colon, diagnostic; eye surgery; hernia repair; Tonsillectomy; sinus surgery (09/18/2017); sinus surgery (08/09/2017); Abdomen surgery; Dilatation, esophagus; Appendectomy; pr colsc flx with directed submucosal njx any sbst (10/11/2018); and Colonoscopy (10/11/2018).     SOCIAL HISTORY:   Social History     Tobacco Use    Smoking status: Never Smoker    Smokeless tobacco: Never Used   Substance Use Topics    Alcohol use: No       FAMILY HISTORY:  Family History   Problem Relation Age of Onset    Diabetes Mother     Heart Disease Father         whooping cough as child    Obesity Sister     Other Brother         tumor on back    Obesity Sister     Cancer Sister         Skin     Cancer Brother         Bone cancer from metal    Other Brother         passed as a child    Heart Disease Brother     Other Brother         tremor    Heart Attack Brother     No Known Problems Brother     Heart Disease Brother     No Known Problems Brother     No Known Problems Brother        LABS  CBC:   Lab Results   Component Value Date    WBC 10.8 01/26/2021    RBC 4.21 01/26/2021    HGB 12.0 01/26/2021    HCT 37.6 01/26/2021    MCV 89.3 01/26/2021    MCH 28.5 01/26/2021    MCHC 31.9 01/26/2021    RDW 16.7 04/23/2018     01/26/2021    MPV 9.9 01/26/2021     BMP:    Lab Results   Component Value Date     01/26/2021    K 3.5 01/26/2021    K 3.9 12/29/2020     01/26/2021    CO2 26 01/26/2021    BUN 10 01/26/2021    LABALBU 3.3 01/26/2021    CREATININE 0.6 01/26/2021    CALCIUM 8.9 01/26/2021    LABGLOM >90 01/26/2021    GLUCOSE 140 01/26/2021    GLUCOSE 105 07/21/2020     PT/INR:    Lab Results   Component Value Date    INR 1.03 08/25/2016     PTT:    Lab Results   Component Value Date    APTT 34.1

## 2021-01-28 NOTE — PROGRESS NOTES
Progress note: Infectious diseases    Patient - Rocky Sales,  Age - 79 y.o.    - 1940      Room Number - 4A-15/015-A   MRN -  [id number]   Acct # - [de-identified]  Date of Admission -  2021 11:11 AM    SUBJECTIVE:   She feels better, no fever. She is back to her base line  OBJECTIVE   VITALS    height is 5' 5\" (1.651 m) and weight is 227 lb 8 oz (103.2 kg). Her oral temperature is 98.2 °F (36.8 °C). Her blood pressure is 119/59 (abnormal) and her pulse is 80. Her respiration is 18 and oxygen saturation is 93%. Wt Readings from Last 3 Encounters:   21 227 lb 8 oz (103.2 kg)   20 221 lb 3.2 oz (100.3 kg)   20 210 lb (95.3 kg)       I/O (24 Hours)    Intake/Output Summary (Last 24 hours) at 2021 1052  Last data filed at 2021 0425  Gross per 24 hour   Intake 835.68 ml   Output 1800 ml   Net -964.32 ml       General Appearance  Awake, alert, oriented, chronically sick looking.   HEENT - normocephalic, atraumatic, slightly pale conjunctiva,  anicteric sclera, the sinuses are nontender  Neck - Supple, no mass  Lungs -  Bilateral air entry, diminished breath sounds bilaterally   cardiovascular - Heart sounds are normal.    Abdomen - soft, not distended, nontender,   Neurologic -oriented to person place and time  Skin - No bruising or bleeding  Extremities - No edema, no cyanosis, clubbing     MEDICATIONS:      bumetanide  1 mg Oral BID    ferrous sulfate  325 mg Oral BID    hydrALAZINE  50 mg Oral BID    mupirocin   Nasal BID    NIFEdipine  60 mg Oral Nightly    pantoprazole  40 mg Oral QAM AC    potassium chloride  20 mEq Oral Daily    Vitamin D  2,000 Units Oral Daily    zinc sulfate  50 mg Oral Daily    piperacillin-tazobactam  3,375 mg Intravenous Q8H    heparin (porcine)  5,000 Units Subcutaneous 3 times per day    insulin lispro  0-6 Units Subcutaneous TID     insulin lispro  0-3 Units Subcutaneous Nightly    pregabalin  300 mg Oral BID    alogliptin  25 mg Oral Daily       docusate sodium, sodium chloride, acetaminophen, traMADol **OR** traMADol, ondansetron      LABS:     CBC:   Recent Labs     01/25/21  1135 01/26/21  0553   WBC 10.8 10.8   HGB 11.6* 12.0    167     BMP:    Recent Labs     01/25/21  1135 01/26/21  0553    138   K 3.5 3.5    100   CO2 23 26   BUN 16 10   CREATININE 0.8 0.6   GLUCOSE 198* 140*     Calcium:  Recent Labs     01/26/21  0553   CALCIUM 8.9      Recent Labs     01/27/21  1544 01/27/21  1958 01/28/21  0640   POCGLU 196* 171* 147*     HgbA1C: No results for input(s): LABA1C in the last 72 hours. INR: No results for input(s): INR in the last 72 hours.   Hepatic:   Recent Labs     01/25/21  1135 01/26/21  0553   ALKPHOS 84 79   ALT 6* 9*   AST 10 14   PROT 7.2 7.2   BILITOT 0.5 0.5   BILIDIR <0.2  --    LABALBU 3.3* 3.3*     Amylase and Lipase:  Recent Labs     01/25/21  1135   LACTA 2.0     Lactic Acid:   Recent Labs     01/25/21  1135   LACTA 2.0        CULTURES:   UA:   Recent Labs     01/25/21  1415   SPECGRAV 1.014   PHUR 7.0   COLORU YELLOW   PROTEINU 100*   BLOODU NEGATIVE   RBCUA 5-10   WBCUA NONE SEEN   BACTERIA NONE SEEN   NITRU NEGATIVE   BILIRUBINUR NEGATIVE   UROBILINOGEN 0.2   KETUA NEGATIVE   LABCAST NONE SEEN  NONE SEEN     Micro:   Lab Results   Component Value Date    BC No growth-preliminary  01/25/2021    BC No growth-preliminary  01/25/2021          Problem list of patient:     Patient Active Problem List   Diagnosis Code    Hypercholesterolemia E78.00    Osteoarthritis M19.90    Osteoporosis M81.0    Type 2 diabetes mellitus without complication, without long-term current use of insulin ([facility] [address] E11.9    DDD (degenerative disc disease), lumbar M51.36    Benign essential HTN I10    SWAPNA on CPAP G47.33, Z99.89    Diabetic peripheral neuropathy (HCC) E11.42    Acute recurrent pansinusitis J01.41    Primary thunderclap headache G44.53    Rash of entire body R21    Infection of skin due to methicillin resistant Staphylococcus aureus (MRSA) A49.02    Drug allergy, antibiotic  likely bactrim Z88.1    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[facility]) C31.0    Skin plaque L98.8    Hyponatremia E87.1    Hypervolemia E87.70    Cellulitis of lower extremity L03.119    Hypoglycemia E16.2    Acute on chronic systolic CHF (congestive heart failure) ([facility]) I50.23    Bilateral cellulitis of lower leg L03.116, L03.115    Venous ulcers of both lower extremities ([facility]) I83.019, L97.919, I83.029, L97.929    Bilateral lower leg cellulitis L03.116, L03.115    CKD (chronic kidney disease) stage 3, GFR 30-59 ml/min ([facility]) N18.30    Iron deficiency anemia D50.9    Hypomagnesemia E83.42    SBO (small bowel obstruction) ([facility]) K56.609    Venous ulcer of left leg ([facility]) I83.029, L97.929    Venous ulcer of right leg ([facility]) I83.019, L97.919    Acute eczema L30.9    Venous insufficiency of both lower extremities I87.2    Lymphedema of both lower extremities I89.0    Pressure injury of left buttock, stage 2 ([facility]) B69.901    Eczematous dermatitis L30.9    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 3XXA    Facial cellulitis L03. 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[facility]) C30.0    Cataract of both eyes H26.9    History of ventral hernia repair Z98.890, Z87.19    Other cervical disc degeneration, mid-cervical region M50.320    Spondylolisthesis of cervical region M43.12    Spondylolisthesis of thoracic region M43.14    Chronic rhinitis J31.0    Closed fracture of head of humerus S42.293A    Dysphagia R13.10    Laryngopharyngeal reflux K21.9    Malignant neoplasm of ethmoidal sinus ([facility]) C31.1    Obesity, Class II, BMI 35-39.9 E66.9    COVID-19 U07.1    Sepsis ([facility]) A41.9    Brain mass G93.89         ASSESSMENT/PLAN   Fall  Chronic sinusitis  History of carcinoma of the nasal sinuses status post surgery and radiation treatment with effect of radiation. Will plan discharge home in 1-2 days. She will continue her oral augmentin prescribed by her surgeon.   Lesli Vasquez MD, FACP 1/28/2021 10:52 AM she feels better

## 2021-01-28 NOTE — PROGRESS NOTES
IM Progress Note  Dr. Susan Pickard  1/28/2021 10:16 AM      Patient name Radha العلي  [id number]  PCP: Magalys Stack MD  Admit Date: 1/25/2021  Acct No. [de-identified]    Subjective: Interval History:   NS input noted  Informed pt weak too ambulate  No dizziness  No headache      Diet: DIET CARB CONTROL; Dietary Nutrition Supplements: Wound Healing Oral Supplement    I/O last 3 completed shifts: In: 835.7 [P.O.:580; I.V.:255.7]  Out: 1800 [Urine:1800]  No intake/output data recorded. Admission weight: 220 lb (99.8 kg) as of 1/25/2021 11:11 AM  Wt Readings from Last 3 Encounters:   01/27/21 227 lb 8 oz (103.2 kg)   12/28/20 221 lb 3.2 oz (100.3 kg)   07/23/20 210 lb (95.3 kg)     Body mass index is 37.86 kg/m².     ROS   CVS;  no cp or palpitation  Resp: no SOB or cough  Neuro:  No numbness or weakness or dizziness  Abd: no nausea or vomiting or abd pain      Medications:   Scheduled Meds:   bumetanide  1 mg Oral BID    ferrous sulfate  325 mg Oral BID    hydrALAZINE  50 mg Oral BID    mupirocin   Nasal BID    NIFEdipine  60 mg Oral Nightly    pantoprazole  40 mg Oral QAM AC    potassium chloride  20 mEq Oral Daily    Vitamin D  2,000 Units Oral Daily    zinc sulfate  50 mg Oral Daily    piperacillin-tazobactam  3,375 mg Intravenous Q8H    heparin (porcine)  5,000 Units Subcutaneous 3 times per day    insulin lispro  0-6 Units Subcutaneous TID WC    insulin lispro  0-3 Units Subcutaneous Nightly    pregabalin  300 mg Oral BID    alogliptin  25 mg Oral Daily     Continuous Infusions:    Labs :     CBC:   Recent Labs     01/25/21  1135 01/26/21  0553   WBC 10.8 10.8   HGB 11.6* 12.0    167     BMP:    Recent Labs     01/25/21 1135 01/26/21  0553    138   K 3.5 3.5    100   CO2 23 26   BUN 16 10   CREATININE 0.8 0.6   GLUCOSE 198* 140*     Hepatic:   Recent Labs     01/25/21  1135 01/26/21  0553   AST 10 14   ALT 6* 9*   BILITOT 0.5 0.5   ALKPHOS 84 79     Troponin: No results for input(s): TROPONINI in the last 72 hours. BNP: No results for input(s): BNP in the last 72 hours. Lipids:   Recent Labs     01/26/21  0553   CHOL 144   HDL 40     INR: No results for input(s): INR in the last 72 hours. Radiology    Objective:   Vitals: BP (!) 119/59   Pulse 80   Temp 98.2 °F (36.8 °C) (Oral)   Resp 18   Ht 5' 5\" (1.651 m)   Wt 227 lb 8 oz (103.2 kg)   LMP  (LMP Unknown)   SpO2 93%   BMI 37.86 kg/m²   HEENT: Head:pupils react  Neck: supple not rigid forward flexion per daughter not new   Lungs: clear to auscultation  Heart: regular rate and rhythm   Abdomen: soft BS heard   Extremities: warm  No edema  Neurologic:  Awake and oriented,forward neck flexion not new    Impression:   :   1. Fever  possibly sinusitis / pneumonia. 2.  History of adenocarcinoma of the ethmoid sinus, status post nasal  endoscopy with debridement of the sinonasal cavity. 3.  Diabetes. 4.  Hypertension. 5.  Hyperlipidemia. 6.  Neuropathy with degenerative disk disease. 7.  Chronic lymphedema. 8.  History of sleep apnea, but could not tolerate CPAP machine. 9.  Obesity. 10.  Prior history of COVID.   6.  Fall with questionable mentioning of blacking out MRI sugg of tumor    Plan:    Cont zosyn per ID  Will have PT evaluate pts mobility and discharge dependidng on her needs  outpt referral to Johanny Denny MD

## 2021-01-28 NOTE — PROGRESS NOTES
reported that she needed to urinate before returning to bed. Pt sat until [facility] brought closer, then got up again for moving to [facility] and then to bed. General:  Overall Orientation Status: Within Functional Limits    Vision: Within Functional Limits    Hearing: Exceptions to [facility]  Hearing Exceptions: Hard of hearing/hearing concerns         Pain: denies      Social/Functional History:    Lives With: Daughter  Type of Home: House  Home Layout: One level  Home Access: Stairs to enter without rails(uses both sides of door frame to pull self in)  Entrance Stairs - Number of Steps: 1 very small step  Home Equipment: Wheelchair-manual, 4 wheeled walker, Standard walker, Quad cane(rarely uses RW)     Bathroom Shower/Tub: Tub/Shower unit, Shower chair with back  Bathroom Toilet: Standard  Bathroom Accessibility: Accessible    Receives Help From: Family  ADL Assistance: Independent  Homemaking Assistance: Needs assistance(microwaving)  Homemaking Responsibilities: No  Ambulation Assistance: Independent  Transfer Assistance: Independent          Additional Comments: Pt's daughter reports that pt was ambulating short distances with walker within and around her home in PLOF.     OBJECTIVE:  Range of Motion:  Bilateral Lower Extremity: WFL    Strength:  Bilateral Lower Extremity: grossly 4-/5    Balance:  Static Sitting Balance:  Stand By Assistance  Dynamic Sitting Balance: Stand By Assistance  Static Standing Balance: Contact Guard Assistance  Dynamic Standing Balance: Contact Guard Assistance, Minimal Assistance    Bed Mobility:  Rolling to Left: Contact Guard Assistance, Minimal Assistance   Rolling to Right: Contact Guard Assistance, Minimal Assistance   Sit to Supine: Contact Guard Assistance and extra time to complete    Transfers:  Sit to Stand: Minimal Assistance, with verbal cues  Stand to Sit:Contact [employer] Assistance    Ambulation:  Contact Guard Assistance, Minimal Assistance  Distance: 3 ft and 4 ft  Surface: Level Tile  Device:Rolling Walker  Gait Deviations: Forward Flexed Posture, Slow Jen, Decreased Step Length Bilaterally, Decreased Gait Speed and Decreased Heel Strike Bilaterally    Exercise:  Patient was guided in 1 set(s) 5-10 reps of exercise to both lower extremities. Ankle pumps, Glut sets, Quad sets, Heelslides and Hip abduction/adduction. Exercises were completed for increased independence with functional mobility. Functional Outcome Measures: Completed  AM-PAC Inpatient Mobility Raw Score : 16  AM-PAC Inpatient T-Scale Score : 40.78    ASSESSMENT:  Activity Tolerance:  Patient tolerance of  treatment: good. Treatment Initiated: Treatment and education initiated within context of evaluation. Evaluation time included review of current medical information, gathering information related to past medical, social and functional history, completion of standardized testing, formal and informal observation of tasks, assessment of data and development of plan of care and goals. Treatment time included skilled education and facilitation of tasks to increase safety and independence with functional mobility for improved independence and quality of life. Assessment: Body structures, Functions, Activity limitations: Decreased functional mobility , Decreased endurance, Decreased balance, Decreased strength  Assessment: Pt is a pleasant 78 yo female that is deconditioned and fatigued from sitting up in chair for a few hours. Pt participated well and started to fall asleep quickly after completing exercises in the bed. Pt is requiring Min A to [address] for safety with transfers and gait. Pt was at SBA to Supervision in OF. Pt would benefit from continued skilled PT to address strengthening, functional mobility, and balance.   Prognosis: Good    REQUIRES PT FOLLOW UP: Yes    Discharge Recommendations:  Discharge Recommendations: Continue to assess pending progress    Patient Education:  PT Education: Goals, PT Role, Plan of Care, Home Exercise Program    Equipment Recommendations:  Equipment Needed: No    Plan:  Times per week: 4-5x GM  Current Treatment Recommendations: Strengthening, Balance Training, Functional Mobility Training, Endurance Training, Transfer Training, Safety Education & Training, Home Exercise Program, Patient/Caregiver Education & Training, Gait Training    Goals:  Patient goals : go home  Short term goals  Time Frame for Short term goals: at discharge  Short term goal 1: Pt to be SBA for supine <> sit to get in/out of bed  Short term goal 2: Pt to be SBA for sit <> stand to get up to ambulate  Short term goal 3: Pt to ambulate > 10 ft with RW with CGA to get to bathroom  Long term goals  Time Frame for Long term goals : not set due to short ELOS    Following session, patient left in safe position with all fall risk precautions in place. Cammie Herbert.  Umer Combs [place] [address]

## 2021-01-28 NOTE — CONSULTS
A: abnormal MRI brain with edema in right frontal gyrus rectus, tumor vs small infarct vs radiation changes  Hx sinus adenocarcinoma    P: suggest outpatient follow up at [address] [place] [address] neurosurgery since patient's cancer care has been there. May see me on PRN basis. Will sign off inpatient care.     Consultation note to follow

## 2021-01-29 LAB
GLUCOSE BLD-MCNC: 152 MG/DL (ref 70–108)
GLUCOSE BLD-MCNC: 178 MG/DL (ref 70–108)
GLUCOSE BLD-MCNC: 210 MG/DL (ref 70–108)
GLUCOSE BLD-MCNC: 212 MG/DL (ref 70–108)

## 2021-01-29 PROCEDURE — 97535 SELF CARE MNGMENT TRAINING: CPT

## 2021-01-29 PROCEDURE — 97530 THERAPEUTIC ACTIVITIES: CPT

## 2021-01-29 PROCEDURE — 6360000002 HC RX W HCPCS: Performed by: INTERNAL MEDICINE

## 2021-01-29 PROCEDURE — 2580000003 HC RX 258: Performed by: INTERNAL MEDICINE

## 2021-01-29 PROCEDURE — 6370000000 HC RX 637 (ALT 250 FOR IP): Performed by: INTERNAL MEDICINE

## 2021-01-29 PROCEDURE — 97110 THERAPEUTIC EXERCISES: CPT

## 2021-01-29 PROCEDURE — 2060000000 HC ICU INTERMEDIATE R&B

## 2021-01-29 PROCEDURE — 82948 REAGENT STRIP/BLOOD GLUCOSE: CPT

## 2021-01-29 RX ORDER — DOCUSATE SODIUM 100 MG/1
100 CAPSULE, LIQUID FILLED ORAL 2 TIMES DAILY
Status: DISCONTINUED | OUTPATIENT
Start: 2021-01-29 | End: 2021-02-03 | Stop reason: HOSPADM

## 2021-01-29 RX ORDER — POLYETHYLENE GLYCOL 3350 17 G/17G
17 POWDER, FOR SOLUTION ORAL DAILY
Status: DISCONTINUED | OUTPATIENT
Start: 2021-01-29 | End: 2021-02-03 | Stop reason: HOSPADM

## 2021-01-29 RX ADMIN — NIFEDIPINE 60 MG: 60 TABLET, FILM COATED, EXTENDED RELEASE ORAL at 19:42

## 2021-01-29 RX ADMIN — HEPARIN SODIUM 5000 UNITS: 5000 INJECTION INTRAVENOUS; SUBCUTANEOUS at 14:16

## 2021-01-29 RX ADMIN — PIPERACILLIN AND TAZOBACTAM 3375 MG: 3; .375 INJECTION, POWDER, LYOPHILIZED, FOR SOLUTION INTRAVENOUS at 14:14

## 2021-01-29 RX ADMIN — DOCUSATE SODIUM 100 MG: 100 CAPSULE, LIQUID FILLED ORAL at 19:47

## 2021-01-29 RX ADMIN — HYDRALAZINE HYDROCHLORIDE 50 MG: 50 TABLET, FILM COATED ORAL at 19:42

## 2021-01-29 RX ADMIN — PANTOPRAZOLE SODIUM 40 MG: 40 TABLET, DELAYED RELEASE ORAL at 05:54

## 2021-01-29 RX ADMIN — HEPARIN SODIUM 5000 UNITS: 5000 INJECTION INTRAVENOUS; SUBCUTANEOUS at 20:09

## 2021-01-29 RX ADMIN — ALOGLIPTIN 25 MG: 25 TABLET, FILM COATED ORAL at 09:10

## 2021-01-29 RX ADMIN — INSULIN LISPRO 2 UNITS: 100 INJECTION, SOLUTION INTRAVENOUS; SUBCUTANEOUS at 16:26

## 2021-01-29 RX ADMIN — FERROUS SULFATE TAB 325 MG (65 MG ELEMENTAL FE) 325 MG: 325 (65 FE) TAB at 09:07

## 2021-01-29 RX ADMIN — FERROUS SULFATE TAB 325 MG (65 MG ELEMENTAL FE) 325 MG: 325 (65 FE) TAB at 19:42

## 2021-01-29 RX ADMIN — INSULIN LISPRO 1 UNITS: 100 INJECTION, SOLUTION INTRAVENOUS; SUBCUTANEOUS at 08:00

## 2021-01-29 RX ADMIN — BUMETANIDE 1 MG: 1 TABLET ORAL at 19:42

## 2021-01-29 RX ADMIN — Medication 2000 UNITS: at 09:07

## 2021-01-29 RX ADMIN — BUMETANIDE 1 MG: 1 TABLET ORAL at 09:07

## 2021-01-29 RX ADMIN — POTASSIUM CHLORIDE 20 MEQ: 1500 TABLET, EXTENDED RELEASE ORAL at 09:08

## 2021-01-29 RX ADMIN — PREGABALIN 300 MG: 75 CAPSULE ORAL at 09:07

## 2021-01-29 RX ADMIN — MUPIROCIN: 20 OINTMENT TOPICAL at 09:09

## 2021-01-29 RX ADMIN — ACETAMINOPHEN 650 MG: 325 TABLET ORAL at 06:03

## 2021-01-29 RX ADMIN — POLYETHYLENE GLYCOL 3350 17 G: 17 POWDER, FOR SOLUTION ORAL at 16:25

## 2021-01-29 RX ADMIN — MUPIROCIN: 20 OINTMENT TOPICAL at 19:42

## 2021-01-29 RX ADMIN — Medication 50 MG: at 09:09

## 2021-01-29 RX ADMIN — DOCUSATE SODIUM 100 MG: 100 CAPSULE, LIQUID FILLED ORAL at 16:25

## 2021-01-29 RX ADMIN — HYDRALAZINE HYDROCHLORIDE 50 MG: 50 TABLET, FILM COATED ORAL at 09:07

## 2021-01-29 RX ADMIN — PIPERACILLIN AND TAZOBACTAM 3375 MG: 3; .375 INJECTION, POWDER, LYOPHILIZED, FOR SOLUTION INTRAVENOUS at 23:04

## 2021-01-29 RX ADMIN — PIPERACILLIN AND TAZOBACTAM 3375 MG: 3; .375 INJECTION, POWDER, LYOPHILIZED, FOR SOLUTION INTRAVENOUS at 05:54

## 2021-01-29 RX ADMIN — TRAMADOL HYDROCHLORIDE 100 MG: 50 TABLET, FILM COATED ORAL at 06:03

## 2021-01-29 RX ADMIN — PREGABALIN 300 MG: 75 CAPSULE ORAL at 19:47

## 2021-01-29 RX ADMIN — TRAMADOL HYDROCHLORIDE 100 MG: 50 TABLET, FILM COATED ORAL at 20:15

## 2021-01-29 ASSESSMENT — PAIN DESCRIPTION - DESCRIPTORS: DESCRIPTORS: HEADACHE

## 2021-01-29 ASSESSMENT — PAIN DESCRIPTION - FREQUENCY: FREQUENCY: CONTINUOUS

## 2021-01-29 ASSESSMENT — PAIN SCALES - GENERAL
PAINLEVEL_OUTOF10: 9
PAINLEVEL_OUTOF10: 0
PAINLEVEL_OUTOF10: 0

## 2021-01-29 ASSESSMENT — PAIN DESCRIPTION - PAIN TYPE: TYPE: ACUTE PAIN

## 2021-01-29 NOTE — CARE COORDINATION
DISCHARGE/PLANNING EVALUATION  1/29/21, 3:15 PM EST    Reason for Referral: ECF for rehab  Mental Status: Unable to assess, daughter answered all of the questions for [facility]. She fell asleep while SW was speaking with her daughter. Decision Making: Daughter helping with decision making. Family/Social/Home Environment:   Current Services including food security, transportation and housekeeping: Lives at home with daughter Jos Piña. Jos Piña has been unemployed since November, but is interviewing for other jobs. Her mother was at home alone at times and was independent. Family provided transportation. Current Equipment:wheelchair, rollator walker, standard walker, quad cane, shower chair with back. Payment Source:[employer] Medicare and Medicaid  Concerns or Barriers to Discharge: None  Post acute provider list with quality measures, geographic area and applicable managed care information provided. Questions regarding selection process answered:ECF list and in network ECF list given. Teach Back Method used with [facility] and her daughter Jos Piña regarding care plan and discharge planning. Patient's daughter Jos Piña verbalized understanding of the plan of care and contribute to goal setting. Patient goals, treatment preferences and discharge plan: Kenzie Salinas are agreeable to [facility] going to a nursing home at discharge. SW gave them lists. They will pick 4 or 5 facilities over the weekend. SW will talk to them Monday and made referrals. [facility] will be a precert. Electronically signed by DINORAH Palafox on 1/29/2021 at 3:15 PM

## 2021-01-29 NOTE — PROGRESS NOTES
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 The patient had difficultyeven with two assists to get up immediately, but they were eventuallyable to sit her up in the chair. EMS was called and she was brought [facility].  Workup in the ER, her temperature was 103. She did havechest x-ray and CT of the head as the patient mentioned she might haveblacked out, but later, she mentioned that she thought she heardsomebody yelling and she quickly turned and lost her balance. Shedenied having any chest pain or difficulty breathing. She is lying inbed, complaining of a headache. There was no nausea, vomiting, ordiarrhea. Occasional cough, but nonproductive. The patient wasdiagnosed initially with adenocarcinoma of her sinuses about four yearsback for which she had treatment with resection and adjuvant radiationfollowed by recurrence in  and underwent endoscopic resection in2020. She denies any numbness, tingling, or weakness in her lowerextremities.      Prior Level of Function:  Lives With: Daughter  Type of Home: House  Home Layout: One level  Home Access: Stairs to enter without rails(uses both sides of door frame to pull self in)  Entrance Stairs - Number of Steps: 1 very small step  [facility], 4 wheeled walker, Standard walker, Quad cane(rarely uses RW)   Bathroom Shower/Tub: Tub/Shower unit, Shower chair with back  Bathroom Toilet: Standard  Bathroom Accessibility: Accessible    Receives Help From: Family  ADL Assistance: Independent  Homemaking Assistance: Needs assistance(microwaving)  Homemaking Responsibilities: No  Ambulation Assistance: Independent  Transfer Assistance: Independent  Additional Comments: Pt's daughter reports that pt was ambulating short distances with walker within and around her home in OF. Restrictions/Precautions:  Restrictions/Precautions: General Precautions, Fall Risk     SUBJECTIVE: RN approved session. Pt resting in bed upon arrival, pleasant and agreeable to therapy. PAIN: 0/10: Denies pain    OBJECTIVE:  Bed Mobility:  Rolling to Left: Minimal Assistance   Supine to Sit: Minimal Assistance, with increased time for completion    Transfers:  Sit to Stand: Minimal Assistance, cues for hand placement  Stand to Sit:Minimal Assistance, Cues to reach back and control decent of transfer    Ambulation:  Minimal Assistance  Distance: 3 feet x1   Surface: Level Tile  Device:Rolling Walker  Gait Deviations: Forward Flexed Posture, Slow Jen, Decreased Step Length Bilaterally, Decreased Weight Shift Bilaterally, Decreased Gait Speed and Unsteady Gait    Balance:  Static Sitting Balance:  Stand By Assistance  Static Standing Balance: Contact Guard Assistance, Cuing for posture. Exercise:  Patient was guided in 1 set(s) 10 reps of exercise to both lower extremities. Seated marches, Seated hamstring curls, Seated heel/toe raises, Long arc quads and Seated isometric hip adduction. Exercises were completed for increased independence with functional mobility.     Functional Outcome Measures: Completed  AM-PAC Inpatient Mobility Raw Score : 15  AM-PAC Inpatient T-Scale Score : 39.45    ASSESSMENT:  Assessment: Patient progressing toward established goals. and Pt tolerated session fairly well. Limited by decreased strength, decreased endurance, decreased mobility. Would benefit from continued therapy before returning home. Activity Tolerance:  Patient tolerance of  treatment: good. Equipment Recommendations:Equipment Needed: No  Discharge Recommendations: Pt would benefit from continued therapy before returning home. Continue to assess pending progress    Plan: Times per week: 4-5x GM  Current Treatment Recommendations: Strengthening, Balance Training, Functional Mobility Training, Endurance Training, Transfer Training, Safety Education & Training, Home Exercise Program, Patient/Caregiver Education & Training, Gait Training    Patient Education  Patient Education: Plan of Care, [employer] Mobility, Transfers, Gait    Goals:  Patient goals : go home  Short term goals  Time Frame for Short term goals: at discharge  Short term goal 1: Pt to be SBA for supine <> sit to get in/out of bed  Short term goal 2: Pt to be SBA for sit <> stand to get up to ambulate  Short term goal 3: Pt to ambulate > 10 ft with RW with CGA to get to bathroom  Long term goals  Time Frame for Long term goals : not set due to short ELOS    Following session, patient left in safe position with all fall risk precautions in place.

## 2021-01-29 NOTE — PROGRESS NOTES
Processing, Decreased Insight and Decreased Safety Awareness    ADL:   Grooming: Stand By Assistance and with set-up. combed hair seated in chair  Toileting: Minimal Assistance, with set-up, with verbal cues  and with increased time for completion  Toilet Transfer: Minimal Assistance, with verbal cues  and with increased time for completion. Paco Felix BALANCE:  Standing Balance: Contact Guard Assistance, with cues for safety, with verbal cues . BED MOBILITY:  Not Tested    TRANSFERS:  Sit to Stand:  Contact Guard Assistance, Minimal Assistance. to initiate stand varied with continued attempts  Stand to Sit: [employer], with increased time for completion, cues for hand placement. FUNCTIONAL MOBILITY:  Assistive Device: Rolling Walker  Assist Level:  Contact Guard Assistance and with verbal cues . Distance: To/From recliner and C      ADDITIONAL ACTIVITIES:  Pt. Completed 3 trials of static standing while completing unilateral reaching over CANDY and in all planes with BUEs. Pt. Demo some occassionally unsteadiness and tolerated 2 mins each trial with fatigue noted. ASSESSMENT:     Activity Tolerance:  Patient tolerance of  treatment: fair. Discharge Recommendations: Continue to assess pending progress, Patient would benefit from continued therapy after discharge(Pt currently not safe to return home)   Equipment Recommendations: Equipment Needed: No  Plan: Times per week: 5x  Times per day: Daily  Current Treatment Recommendations: Strengthening, Safety Education & Training, Balance Training, Self-Care / ADL, Functional Mobility Training, Endurance Training    Patient Education  Patient Education: Plan of Care, ADL's, Importance of Increasing Activity and Assistive Device Safety and safety with functional transfers and mobility.     Goals  Short term goals  Time Frame for Short term goals: by d/c  Short term goal 1: Pt will complete functional mobility to/from BR with CGA and min safety cues to increase indep with ADL routine  Short term goal 2: Pt will tolerate dynamic standing x2 min with CGA and min SOB to increase indep wtih grooming  Short term goal 3: Pt will complete various t/fs with CGA and 0-2 VC for safety to increase indep with toileting  Short term goal 4: Pt will complete BADL with CGA and min safety cues to increase indep with bathing  Long term goals  Time Frame for Long term goals : no LTG d/t short ELOS    Following session, patient left in safe position with all fall risk precautions in place.

## 2021-01-29 NOTE — CARE COORDINATION
DISASTER CHARTING    1/29/21, 2:03 PM EST    DISCHARGE ONGOING EVALUATION:     [id number] day: 4  Location: -15/015-A Reason for admit: Sepsis [facility]) [A41.9]   Barriers to Discharge: PT/OT, ID following, diabetes management, IV antibiotics, pain control. PCP: Grazyna Carter MD  Readmission Risk Score: 20%  Patient Goals/Plan/Treatment Preferences: From home with daughter. Had declined needs. Post therapy today patient is agreeable to SNF for rehab. SW to see.

## 2021-01-29 NOTE — PROGRESS NOTES
results for input(s): CHOL, HDL in the last 72 hours. Invalid input(s): LDLCALCU  INR: No results for input(s): INR in the last 72 hours. Radiology    Objective:   Vitals: /63   Pulse 75   Temp 98.3 °F (36.8 °C) (Oral)   Resp 18   Ht 5' 5\" (1.651 m)   Wt 226 lb 11.2 oz (102.8 kg)   LMP  (LMP Unknown)   SpO2 95%   BMI 37.72 kg/m²   HEENT: Head:pupils react  Neck: supple not rigid forward flexion per daughter not new   Lungs: clear to auscultation  Heart: regular rate and rhythm   Abdomen: soft BS heard   Extremities: warm  No edema  Neurologic:  Awake and oriented,forward neck flexion not new    Impression:   :   1. Fever  possibly sinusitis / pneumonia. 2.  History of adenocarcinoma of the ethmoid sinus, status post nasal  endoscopy with debridement of the sinonasal cavity. 3.  Diabetes. 4.  Hypertension. 5.  Hyperlipidemia. 6.  Neuropathy with degenerative disk disease. 7.  Chronic lymphedema. 8.  History of sleep apnea, but could not tolerate CPAP machine. 9.  Obesity. 10.  Prior history of COVID.   6.  Fall with questionable mentioning of blacking out MRI sugg of tumor    Plan:    D/w daughter pt requires help with transfer   Await message from PT   Informed daughter if pt requires assistance may need skilled for rehab    Christine Bullard MD

## 2021-01-29 NOTE — PROGRESS NOTES
Per Dr. Maral Castro, patient is able to be discharged if PT feels she is strong enough to go home or if she needs to go to a nursing home for rehab we need to make arrangements. Attempted to talk to Idalia with physical therapy who she was seen by this morning. Left a message for her to return my call.

## 2021-01-29 NOTE — PROGRESS NOTES
lispro  0-6 Units Subcutaneous TID     insulin lispro  0-3 Units Subcutaneous Nightly    pregabalin  300 mg Oral BID    alogliptin  25 mg Oral Daily       docusate sodium, sodium chloride, acetaminophen, traMADol **OR** traMADol, ondansetron      LABS:     CBC:   No results for input(s): WBC, HGB, PLT in the last 72 hours. BMP:    No results for input(s): NA, K, CL, CO2, BUN, CREATININE, GLUCOSE in the last 72 hours. Calcium:  No results for input(s): CALCIUM in the last 72 hours. Recent Labs     01/28/21  1530 01/28/21 2016 01/29/21  0647   POCGLU 166* 210* 178*       CULTURES:   UA:   No results for input(s): SPECGRAV, PHUR, COLORU, CLARITYU, MUCUS, PROTEINU, BLOODU, RBCUA, WBCUA, BACTERIA, NITRU, GLUCOSEU, BILIRUBINUR, UROBILINOGEN, KETUA, LABCAST, LABCASTTY, AMORPHOS in the last 72 hours.     Invalid input(s): CRYSTALS  Micro:   Lab Results   Component Value Date    BC No growth-preliminary  01/25/2021    BC No growth-preliminary  01/25/2021          Problem list of patient:     Patient Active Problem List   Diagnosis Code    Hypercholesterolemia E78.00    Osteoarthritis M19.90    Osteoporosis M81.0    Type 2 diabetes mellitus without complication, without long-term current use of insulin ([address] E11.9    DDD (degenerative disc disease), lumbar M51.36    Benign essential HTN I10    SWAPNA on CPAP G47.33, Z99.89    Diabetic peripheral neuropathy ([address] E11.42    Acute recurrent pansinusitis J01.41    Primary thunderclap headache G44.53    Rash of entire body R21    Infection of skin due to methicillin resistant Staphylococcus aureus (MRSA) A49.02    Drug allergy, antibiotic  likely bactrim Z88.1    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    Skin plaque L98.8    Hyponatremia E87.1    Hypervolemia E87.70    Cellulitis of lower extremity L03.119    Hypoglycemia E16.2    Acute on chronic systolic CHF (congestive heart failure) ([facility]) I50.23    Bilateral cellulitis of lower leg L03.116, L03.115    Venous ulcers of both lower extremities ([facility]) I83.019, L97.919, I83.029, L97.929    Bilateral lower leg cellulitis L03.116, L03.115    CKD (chronic kidney disease) stage 3, GFR 30-59 ml/min ([facility]) N18.30    Iron deficiency anemia D50.9    Hypomagnesemia E83.42    SBO (small bowel obstruction) ([facility] K56.609    Venous ulcer of left leg (HCC) I83.029, L97.929    Venous ulcer of right leg (HCC) I83.019, L97.919    Acute eczema L30.9    Venous insufficiency of both lower extremities I87.2    Lymphedema of both lower extremities I89.0    Pressure injury of left buttock, stage 2 ([facility]) J00.130    Eczematous dermatitis L30.9    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 3XXA    Facial cellulitis L03. 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[facility]) C30.0    Cataract of both eyes H26.9    History of ventral hernia repair Z98.890, Z87.19    Other cervical disc degeneration, mid-cervical region M50.320    Spondylolisthesis of cervical region M43.12    Spondylolisthesis of thoracic region M43.14    Chronic rhinitis J31.0    Closed fracture of head of humerus S42.293A    Dysphagia R13.10    Laryngopharyngeal reflux K21.9    Malignant neoplasm of ethmoidal sinus ([facility]) C31.1    Obesity, Class II, BMI 35-39.9 E66.9    COVID-19 U07.1    Sepsis ([facility]) A41.9    Brain mass G93.89         ASSESSMENT/PLAN   Fall: No injuries  Chronic sinusitis: She had recent history of cleaning of her sinuses. History of carcinoma of the nasal sinuses status post surgery and radiation treatment with effect of radiation. Okay with discharge plan.   Ariadne Jaimes MD, [address] 1/29/2021 9:58 AM she feels better

## 2021-01-30 LAB
GLUCOSE BLD-MCNC: 145 MG/DL (ref 70–108)
GLUCOSE BLD-MCNC: 149 MG/DL (ref 70–108)
GLUCOSE BLD-MCNC: 184 MG/DL (ref 70–108)
GLUCOSE BLD-MCNC: 188 MG/DL (ref 70–108)

## 2021-01-30 PROCEDURE — 2060000000 HC ICU INTERMEDIATE R&B

## 2021-01-30 PROCEDURE — 6370000000 HC RX 637 (ALT 250 FOR IP): Performed by: INTERNAL MEDICINE

## 2021-01-30 PROCEDURE — 82948 REAGENT STRIP/BLOOD GLUCOSE: CPT

## 2021-01-30 PROCEDURE — 6360000002 HC RX W HCPCS: Performed by: INTERNAL MEDICINE

## 2021-01-30 PROCEDURE — 2580000003 HC RX 258: Performed by: INTERNAL MEDICINE

## 2021-01-30 RX ADMIN — NIFEDIPINE 60 MG: 60 TABLET, FILM COATED, EXTENDED RELEASE ORAL at 20:01

## 2021-01-30 RX ADMIN — PREGABALIN 300 MG: 75 CAPSULE ORAL at 10:23

## 2021-01-30 RX ADMIN — HEPARIN SODIUM 5000 UNITS: 5000 INJECTION INTRAVENOUS; SUBCUTANEOUS at 14:59

## 2021-01-30 RX ADMIN — ACETAMINOPHEN 650 MG: 325 TABLET ORAL at 00:49

## 2021-01-30 RX ADMIN — DOCUSATE SODIUM 100 MG: 100 CAPSULE, LIQUID FILLED ORAL at 10:22

## 2021-01-30 RX ADMIN — PANTOPRAZOLE SODIUM 40 MG: 40 TABLET, DELAYED RELEASE ORAL at 06:11

## 2021-01-30 RX ADMIN — MUPIROCIN: 20 OINTMENT TOPICAL at 10:33

## 2021-01-30 RX ADMIN — BUMETANIDE 1 MG: 1 TABLET ORAL at 20:01

## 2021-01-30 RX ADMIN — MUPIROCIN: 20 OINTMENT TOPICAL at 20:11

## 2021-01-30 RX ADMIN — HEPARIN SODIUM 5000 UNITS: 5000 INJECTION INTRAVENOUS; SUBCUTANEOUS at 22:05

## 2021-01-30 RX ADMIN — DOCUSATE SODIUM 100 MG: 100 CAPSULE, LIQUID FILLED ORAL at 20:00

## 2021-01-30 RX ADMIN — FERROUS SULFATE TAB 325 MG (65 MG ELEMENTAL FE) 325 MG: 325 (65 FE) TAB at 10:32

## 2021-01-30 RX ADMIN — BUMETANIDE 1 MG: 1 TABLET ORAL at 10:30

## 2021-01-30 RX ADMIN — PIPERACILLIN AND TAZOBACTAM 3375 MG: 3; .375 INJECTION, POWDER, LYOPHILIZED, FOR SOLUTION INTRAVENOUS at 23:18

## 2021-01-30 RX ADMIN — FERROUS SULFATE TAB 325 MG (65 MG ELEMENTAL FE) 325 MG: 325 (65 FE) TAB at 20:01

## 2021-01-30 RX ADMIN — PREGABALIN 300 MG: 75 CAPSULE ORAL at 19:59

## 2021-01-30 RX ADMIN — HYDRALAZINE HYDROCHLORIDE 50 MG: 50 TABLET, FILM COATED ORAL at 10:32

## 2021-01-30 RX ADMIN — ALOGLIPTIN 25 MG: 25 TABLET, FILM COATED ORAL at 12:45

## 2021-01-30 RX ADMIN — HEPARIN SODIUM 5000 UNITS: 5000 INJECTION INTRAVENOUS; SUBCUTANEOUS at 06:11

## 2021-01-30 RX ADMIN — HYDRALAZINE HYDROCHLORIDE 50 MG: 50 TABLET, FILM COATED ORAL at 20:00

## 2021-01-30 RX ADMIN — Medication 50 MG: at 10:30

## 2021-01-30 RX ADMIN — POLYETHYLENE GLYCOL 3350 17 G: 17 POWDER, FOR SOLUTION ORAL at 10:31

## 2021-01-30 RX ADMIN — POTASSIUM CHLORIDE 20 MEQ: 1500 TABLET, EXTENDED RELEASE ORAL at 10:22

## 2021-01-30 RX ADMIN — PIPERACILLIN AND TAZOBACTAM 3375 MG: 3; .375 INJECTION, POWDER, LYOPHILIZED, FOR SOLUTION INTRAVENOUS at 14:59

## 2021-01-30 RX ADMIN — Medication 2000 UNITS: at 10:29

## 2021-01-30 RX ADMIN — ACETAMINOPHEN 650 MG: 325 TABLET ORAL at 22:04

## 2021-01-30 RX ADMIN — INSULIN LISPRO 1 UNITS: 100 INJECTION, SOLUTION INTRAVENOUS; SUBCUTANEOUS at 11:28

## 2021-01-30 RX ADMIN — ACETAMINOPHEN 650 MG: 325 TABLET ORAL at 16:04

## 2021-01-30 RX ADMIN — INSULIN LISPRO 1 UNITS: 100 INJECTION, SOLUTION INTRAVENOUS; SUBCUTANEOUS at 16:03

## 2021-01-30 RX ADMIN — PIPERACILLIN AND TAZOBACTAM 3375 MG: 3; .375 INJECTION, POWDER, LYOPHILIZED, FOR SOLUTION INTRAVENOUS at 06:11

## 2021-01-30 ASSESSMENT — PAIN SCALES - GENERAL
PAINLEVEL_OUTOF10: 0
PAINLEVEL_OUTOF10: 5
PAINLEVEL_OUTOF10: 0
PAINLEVEL_OUTOF10: 4
PAINLEVEL_OUTOF10: 0
PAINLEVEL_OUTOF10: 3
PAINLEVEL_OUTOF10: 5

## 2021-01-30 ASSESSMENT — PAIN DESCRIPTION - LOCATION: LOCATION: HEAD

## 2021-01-30 ASSESSMENT — PAIN DESCRIPTION - ONSET: ONSET: GRADUAL

## 2021-01-30 ASSESSMENT — PAIN SCALES - WONG BAKER
WONGBAKER_NUMERICALRESPONSE: 0

## 2021-01-30 ASSESSMENT — PAIN DESCRIPTION - PAIN TYPE
TYPE: ACUTE PAIN
TYPE: ACUTE PAIN

## 2021-01-30 ASSESSMENT — PAIN DESCRIPTION - PROGRESSION: CLINICAL_PROGRESSION: GRADUALLY IMPROVING

## 2021-01-30 ASSESSMENT — PAIN DESCRIPTION - ORIENTATION: ORIENTATION: RIGHT

## 2021-01-30 NOTE — PROGRESS NOTES
ALKPHOS in the last 72 hours. Troponin: No results for input(s): TROPONINI in the last 72 hours. BNP: No results for input(s): BNP in the last 72 hours. Lipids:   No results for input(s): CHOL, HDL in the last 72 hours. Invalid input(s): LDLCALCU  INR: No results for input(s): INR in the last 72 hours. Radiology    Objective:   Vitals: /61   Pulse 77   Temp 98.2 °F (36.8 °C) (Oral)   Resp 16   Ht 5' 5\" (1.651 m)   Wt 220 lb 4.8 oz (99.9 kg)   LMP  (LMP Unknown)   SpO2 92%   BMI 36.66 kg/m²   HEENT: Head:pupils react  Neck: supple not rigid forward flexion per daughter not new   Lungs: clear to auscultation  Heart: regular rate and rhythm   Abdomen: soft BS heard   Extremities: warm  No edema  Neurologic:  Asleep and did wake up to name    Impression:   :   1. Fever  possibly sinusitis / pneumonia. 2.  History of adenocarcinoma of the ethmoid sinus, status post nasal  endoscopy with debridement of the sinonasal cavity. 3.  Diabetes. 4.  Hypertension. 5.  Hyperlipidemia. 6.  Neuropathy with degenerative disk disease. 7.  Chronic lymphedema. 8.  History of sleep apnea, but could not tolerate CPAP machine. 9.  Obesity. 10.  Prior history of COVID.   6.  Fall with questionable mentioning of blacking out MRI sugg of tumor    Plan:    Awake discharge to [facility]  outpt referral to NS at [address] MD

## 2021-01-31 LAB
BLOOD CULTURE, ROUTINE: NORMAL
BLOOD CULTURE, ROUTINE: NORMAL
GLUCOSE BLD-MCNC: 144 MG/DL (ref 70–108)
GLUCOSE BLD-MCNC: 150 MG/DL (ref 70–108)
GLUCOSE BLD-MCNC: 184 MG/DL (ref 70–108)
GLUCOSE BLD-MCNC: 245 MG/DL (ref 70–108)

## 2021-01-31 PROCEDURE — 2060000000 HC ICU INTERMEDIATE R&B

## 2021-01-31 PROCEDURE — 2580000003 HC RX 258: Performed by: INTERNAL MEDICINE

## 2021-01-31 PROCEDURE — 82948 REAGENT STRIP/BLOOD GLUCOSE: CPT

## 2021-01-31 PROCEDURE — 6360000002 HC RX W HCPCS: Performed by: INTERNAL MEDICINE

## 2021-01-31 PROCEDURE — 6370000000 HC RX 637 (ALT 250 FOR IP): Performed by: INTERNAL MEDICINE

## 2021-01-31 PROCEDURE — 97535 SELF CARE MNGMENT TRAINING: CPT

## 2021-01-31 PROCEDURE — 97530 THERAPEUTIC ACTIVITIES: CPT

## 2021-01-31 RX ADMIN — HYDRALAZINE HYDROCHLORIDE 50 MG: 50 TABLET, FILM COATED ORAL at 19:55

## 2021-01-31 RX ADMIN — DOCUSATE SODIUM 100 MG: 100 CAPSULE, LIQUID FILLED ORAL at 19:59

## 2021-01-31 RX ADMIN — POTASSIUM CHLORIDE 20 MEQ: 1500 TABLET, EXTENDED RELEASE ORAL at 07:44

## 2021-01-31 RX ADMIN — HEPARIN SODIUM 5000 UNITS: 5000 INJECTION INTRAVENOUS; SUBCUTANEOUS at 14:54

## 2021-01-31 RX ADMIN — TRAMADOL HYDROCHLORIDE 50 MG: 50 TABLET, FILM COATED ORAL at 19:59

## 2021-01-31 RX ADMIN — HYDRALAZINE HYDROCHLORIDE 50 MG: 50 TABLET, FILM COATED ORAL at 07:42

## 2021-01-31 RX ADMIN — MUPIROCIN: 20 OINTMENT TOPICAL at 07:46

## 2021-01-31 RX ADMIN — POLYETHYLENE GLYCOL 3350 17 G: 17 POWDER, FOR SOLUTION ORAL at 07:45

## 2021-01-31 RX ADMIN — Medication 2000 UNITS: at 07:46

## 2021-01-31 RX ADMIN — PREGABALIN 300 MG: 75 CAPSULE ORAL at 07:41

## 2021-01-31 RX ADMIN — PREGABALIN 300 MG: 75 CAPSULE ORAL at 19:56

## 2021-01-31 RX ADMIN — PIPERACILLIN AND TAZOBACTAM 3375 MG: 3; .375 INJECTION, POWDER, LYOPHILIZED, FOR SOLUTION INTRAVENOUS at 22:24

## 2021-01-31 RX ADMIN — HEPARIN SODIUM 5000 UNITS: 5000 INJECTION INTRAVENOUS; SUBCUTANEOUS at 22:23

## 2021-01-31 RX ADMIN — Medication 50 MG: at 07:44

## 2021-01-31 RX ADMIN — ALOGLIPTIN 25 MG: 25 TABLET, FILM COATED ORAL at 07:43

## 2021-01-31 RX ADMIN — BUMETANIDE 1 MG: 1 TABLET ORAL at 07:43

## 2021-01-31 RX ADMIN — DOCUSATE SODIUM 100 MG: 100 CAPSULE, LIQUID FILLED ORAL at 07:45

## 2021-01-31 RX ADMIN — PIPERACILLIN AND TAZOBACTAM 3375 MG: 3; .375 INJECTION, POWDER, LYOPHILIZED, FOR SOLUTION INTRAVENOUS at 14:54

## 2021-01-31 RX ADMIN — PANTOPRAZOLE SODIUM 40 MG: 40 TABLET, DELAYED RELEASE ORAL at 06:00

## 2021-01-31 RX ADMIN — MUPIROCIN: 20 OINTMENT TOPICAL at 20:00

## 2021-01-31 RX ADMIN — FERROUS SULFATE TAB 325 MG (65 MG ELEMENTAL FE) 325 MG: 325 (65 FE) TAB at 19:55

## 2021-01-31 RX ADMIN — NIFEDIPINE 60 MG: 60 TABLET, FILM COATED, EXTENDED RELEASE ORAL at 19:55

## 2021-01-31 RX ADMIN — PIPERACILLIN AND TAZOBACTAM 3375 MG: 3; .375 INJECTION, POWDER, LYOPHILIZED, FOR SOLUTION INTRAVENOUS at 06:00

## 2021-01-31 RX ADMIN — BUMETANIDE 1 MG: 1 TABLET ORAL at 19:56

## 2021-01-31 RX ADMIN — INSULIN LISPRO 1 UNITS: 100 INJECTION, SOLUTION INTRAVENOUS; SUBCUTANEOUS at 07:47

## 2021-01-31 RX ADMIN — HEPARIN SODIUM 5000 UNITS: 5000 INJECTION INTRAVENOUS; SUBCUTANEOUS at 06:00

## 2021-01-31 RX ADMIN — ACETAMINOPHEN 650 MG: 325 TABLET ORAL at 12:14

## 2021-01-31 RX ADMIN — INSULIN LISPRO 2 UNITS: 100 INJECTION, SOLUTION INTRAVENOUS; SUBCUTANEOUS at 11:40

## 2021-01-31 RX ADMIN — ACETAMINOPHEN 650 MG: 325 TABLET ORAL at 06:37

## 2021-01-31 RX ADMIN — FERROUS SULFATE TAB 325 MG (65 MG ELEMENTAL FE) 325 MG: 325 (65 FE) TAB at 07:42

## 2021-01-31 RX ADMIN — INSULIN LISPRO 1 UNITS: 100 INJECTION, SOLUTION INTRAVENOUS; SUBCUTANEOUS at 16:46

## 2021-01-31 ASSESSMENT — PAIN DESCRIPTION - LOCATION
LOCATION: HEAD

## 2021-01-31 ASSESSMENT — PAIN DESCRIPTION - PAIN TYPE
TYPE: ACUTE PAIN

## 2021-01-31 ASSESSMENT — PAIN - FUNCTIONAL ASSESSMENT
PAIN_FUNCTIONAL_ASSESSMENT: PREVENTS OR INTERFERES SOME ACTIVE ACTIVITIES AND ADLS

## 2021-01-31 ASSESSMENT — PAIN DESCRIPTION - PROGRESSION
CLINICAL_PROGRESSION: GRADUALLY IMPROVING
CLINICAL_PROGRESSION: GRADUALLY WORSENING
CLINICAL_PROGRESSION: GRADUALLY IMPROVING

## 2021-01-31 ASSESSMENT — PAIN SCALES - GENERAL
PAINLEVEL_OUTOF10: 0
PAINLEVEL_OUTOF10: 1
PAINLEVEL_OUTOF10: 6
PAINLEVEL_OUTOF10: 6
PAINLEVEL_OUTOF10: 8

## 2021-01-31 ASSESSMENT — PAIN DESCRIPTION - ONSET
ONSET: GRADUAL
ONSET: GRADUAL

## 2021-01-31 ASSESSMENT — PAIN DESCRIPTION - ORIENTATION
ORIENTATION: RIGHT

## 2021-01-31 ASSESSMENT — PAIN DESCRIPTION - FREQUENCY
FREQUENCY: INTERMITTENT
FREQUENCY: INTERMITTENT

## 2021-01-31 ASSESSMENT — PAIN DESCRIPTION - DESCRIPTORS
DESCRIPTORS: HEADACHE
DESCRIPTORS: HEADACHE

## 2021-01-31 NOTE — PLAN OF CARE
Problem: Falls - Risk of:  Goal: Will remain free from falls  Description: Will remain free from falls  Note: Call light in reach, bed in lowest position, bed alarm activated. Educated on use of call light before ambulation and when in need of assistance. Patient expressed understanding. Hourly visual checks performed and charted. Toileting offered to patient. No falls during shift, at this time. Arm band & falling star in place. Pt up with standby assist with walker and tolerating well at this time. Continuing to monitor       Problem: Skin Integrity:  Goal: Will show no infection signs and symptoms  Description: Will show no infection signs and symptoms  Note: No signs of new skin breakdown. Patient has Stage II on buttocks. Skin warm, dry, and intact. Mucous membranes pink and moist.  Assistance with turns/ambulation provided PRN. Will continue to monitor. Problem: Pain:  Goal: Pain level will decrease  Description: Pain level will decrease  Note: Patient able to use 0-10 pain scale. Patient complains of headache. Agreeable to take PRN pain medications. PRN Tylenol was given. Problem: Pain:  Goal: Control of acute pain  Description: Control of acute pain  Note: Patient able to use 0-10 pain scale. Patient complains of headache. Agreeable to take PRN pain medications. PRN Tylenol was given. Problem: Discharge Planning:  Goal: Discharged to appropriate level of care  Description: Discharged to appropriate level of care  Outcome: Ongoing  Note: Discharge planning in process and discussed with patient/family. Social work consulted for any additional needs. Care manager aware of discharge needs. Patient from home with daughter and discussing possible rehab. Problem: Serum Glucose Level - Abnormal:  Goal: Ability to maintain appropriate glucose levels will improve  Description: Ability to maintain appropriate glucose levels will improve  Outcome: Ongoing  Note: Patient is diabetic.  Chem checks done nightly and with meals Insulin coverage available as needed. Care plan reviewed with patient. Patient verbalizes understanding of the plan of care and contributed to goal setting.

## 2021-01-31 NOTE — PROGRESS NOTES
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 The patient had difficultyeven with two assists to get up immediately, but they were eventuallyable to sit her up in the chair. EMS was called and she was brought [facility].  Workup in the ER, her temperature was 103. She did havechest x-ray and CT of the head as the patient mentioned she might haveblacked out, but later, she mentioned that she thought she heardsomebody yelling and she quickly turned and lost her balance. Shedenied having any chest pain or difficulty breathing. She is lying inbed, complaining of a headache. There was no nausea, vomiting, ordiarrhea. Occasional cough, but nonproductive. The patient wasdiagnosed initially with adenocarcinoma of her sinuses about four yearsback for which she had treatment with resection and adjuvant radiationfollowed by recurrence in  and underwent endoscopic resection in2020. She denies any numbness, tingling, or weakness in her lowerextremities. Restrictions/Precautions:  Restrictions/Precautions: General Precautions, Fall Risk     SUBJECTIVE: Pt seated in bedside chair upon arrival with daughter present, pleasant and agreeable to OT session. PAIN: Denies.     COGNITION: Slow Processing, Decreased Problem Solving and Decreased Safety Awareness    ADL:   Grooming: with set-up. Using wet wipe washing hands after void. Toileting: Contact Guard Assistance. CGA for balance for corinne care after void. Toilet Transfer: Minimal Assistance. STS. BALANCE:  Sitting Balance:  Stand By Assistance. Standing Balance: Contact Guard Assistance. x1 minute in prep for mobility; x2 minutes within toileting task. BED MOBILITY:  Not Tested    TRANSFERS:  Sit to Stand:  Minimal Assistance. Pt required Peterson x2 trials to come to complete stand from bedside chair. Stand to Sit: Minimal Assistance. To control decent to various surfaces. FUNCTIONAL MOBILITY:  Assistive Device: Rolling Walker  Assist Level:  Contact Guard Assistance and Minimal Assistance. Distance: Completed functional mobility to/from BR at very slow pace, no LOB noted with min unsteadiness noted. Pt requires min cues for walker safety to maneuver within tight spaces- lenghty seated rest break after trial of mobility, mod fatigue noted. ASSESSMENT:     Activity Tolerance:  Patient tolerance of  treatment: fair. Discharge Recommendations: Continue to assess pending progress, Patient would benefit from continued therapy after discharge(Pt currently not safe to return home)   Equipment Recommendations: Equipment Needed: No  Plan: Times per week: 5x  Times per day: Daily  Current Treatment Recommendations: Strengthening, Safety Education & Training, Balance Training, Self-Care / ADL, Functional Mobility Training, Endurance Training    Patient Education  Patient Education: ADL's, Assistive Device Safety and Standing balance, and Safety with transfers and mobility.     Goals  Short term goals  Time Frame for Short term goals: by d/c  Short term goal 1: Pt will complete functional mobility to/from BR with CGA and min safety cues to increase indep with ADL routine  Short term goal 2: Pt will tolerate dynamic standing x2 min with CGA and min SOB to increase indep wtih grooming  Short term goal 3: Pt will complete various t/fs with CGA and 0-2 VC for safety to increase indep with toileting  Short term goal 4: Pt will complete BADL with CGA and min safety cues to increase indep with bathing  Long term goals  Time Frame for Long term goals : no LTG d/t short ELOS    Following session, patient left in safe position with all fall risk precautions in place.

## 2021-01-31 NOTE — PROGRESS NOTES
IM Progress Note  Dr. Roxy Che  1/31/2021 8:31 AM      Patient name Raghu Cary  [id number]  PCP: Erasmo Valdez MD  Admit Date: 1/25/2021  Acct No. [de-identified]    Subjective: Interval History:   Headache improving   No dizziness      Diet: DIET CARB CONTROL; Dietary Nutrition Supplements: Wound Healing Oral Supplement    I/O last 3 completed shifts: In: 2381 [P.O.:1100; I.V.:156]  Out: 3050 [Urine:3050]  No intake/output data recorded. Admission weight: 220 lb (99.8 kg) as of 1/25/2021 11:11 AM  Wt Readings from Last 3 Encounters:   01/30/21 220 lb 6.4 oz (100 kg)   12/28/20 221 lb 3.2 oz (100.3 kg)   07/23/20 210 lb (95.3 kg)     Body mass index is 36.68 kg/m². Medications:   Scheduled Meds:   docusate sodium  100 mg Oral BID    polyethylene glycol  17 g Oral Daily    bumetanide  1 mg Oral BID    ferrous sulfate  325 mg Oral BID    hydrALAZINE  50 mg Oral BID    mupirocin   Nasal BID    NIFEdipine  60 mg Oral Nightly    pantoprazole  40 mg Oral QAM AC    potassium chloride  20 mEq Oral Daily    Vitamin D  2,000 Units Oral Daily    zinc sulfate  50 mg Oral Daily    piperacillin-tazobactam  3,375 mg Intravenous Q8H    heparin (porcine)  5,000 Units Subcutaneous 3 times per day    insulin lispro  0-6 Units Subcutaneous TID WC    insulin lispro  0-3 Units Subcutaneous Nightly    pregabalin  300 mg Oral BID    alogliptin  25 mg Oral Daily     Continuous Infusions:    Labs :     CBC:   No results for input(s): WBC, HGB, PLT in the last 72 hours. BMP:    No results for input(s): NA, K, CL, CO2, BUN, CREATININE, GLUCOSE in the last 72 hours. Hepatic:   No results for input(s): AST, ALT, ALB, BILITOT, ALKPHOS in the last 72 hours. Troponin: No results for input(s): TROPONINI in the last 72 hours. BNP: No results for input(s): BNP in the last 72 hours. Lipids:   No results for input(s): CHOL, HDL in the last 72 hours.     Invalid input(s): LDLCALCU  INR: No results for

## 2021-02-01 LAB
GLUCOSE BLD-MCNC: 153 MG/DL (ref 70–108)
GLUCOSE BLD-MCNC: 160 MG/DL (ref 70–108)
GLUCOSE BLD-MCNC: 188 MG/DL (ref 70–108)
GLUCOSE BLD-MCNC: 195 MG/DL (ref 70–108)

## 2021-02-01 PROCEDURE — 6360000002 HC RX W HCPCS: Performed by: INTERNAL MEDICINE

## 2021-02-01 PROCEDURE — 97535 SELF CARE MNGMENT TRAINING: CPT

## 2021-02-01 PROCEDURE — 82948 REAGENT STRIP/BLOOD GLUCOSE: CPT

## 2021-02-01 PROCEDURE — 6370000000 HC RX 637 (ALT 250 FOR IP): Performed by: INTERNAL MEDICINE

## 2021-02-01 PROCEDURE — 97530 THERAPEUTIC ACTIVITIES: CPT

## 2021-02-01 PROCEDURE — 97110 THERAPEUTIC EXERCISES: CPT

## 2021-02-01 PROCEDURE — 2060000000 HC ICU INTERMEDIATE R&B

## 2021-02-01 PROCEDURE — 2580000003 HC RX 258: Performed by: INTERNAL MEDICINE

## 2021-02-01 RX ORDER — AMOXICILLIN AND CLAVULANATE POTASSIUM 875; 125 MG/1; MG/1
1 TABLET, FILM COATED ORAL EVERY 12 HOURS SCHEDULED
Status: DISCONTINUED | OUTPATIENT
Start: 2021-02-01 | End: 2021-02-03 | Stop reason: HOSPADM

## 2021-02-01 RX ADMIN — MUPIROCIN: 20 OINTMENT TOPICAL at 09:39

## 2021-02-01 RX ADMIN — AMOXICILLIN AND CLAVULANATE POTASSIUM 1 TABLET: 875; 125 TABLET, FILM COATED ORAL at 17:53

## 2021-02-01 RX ADMIN — DOCUSATE SODIUM 100 MG: 100 CAPSULE, LIQUID FILLED ORAL at 20:48

## 2021-02-01 RX ADMIN — HYDRALAZINE HYDROCHLORIDE 50 MG: 50 TABLET, FILM COATED ORAL at 20:48

## 2021-02-01 RX ADMIN — DOCUSATE SODIUM 100 MG: 100 CAPSULE, LIQUID FILLED ORAL at 09:39

## 2021-02-01 RX ADMIN — ACETAMINOPHEN 650 MG: 325 TABLET ORAL at 13:04

## 2021-02-01 RX ADMIN — BUMETANIDE 1 MG: 1 TABLET ORAL at 20:48

## 2021-02-01 RX ADMIN — PREGABALIN 300 MG: 75 CAPSULE ORAL at 09:39

## 2021-02-01 RX ADMIN — HEPARIN SODIUM 5000 UNITS: 5000 INJECTION INTRAVENOUS; SUBCUTANEOUS at 20:53

## 2021-02-01 RX ADMIN — FERROUS SULFATE TAB 325 MG (65 MG ELEMENTAL FE) 325 MG: 325 (65 FE) TAB at 09:38

## 2021-02-01 RX ADMIN — MUPIROCIN: 20 OINTMENT TOPICAL at 20:48

## 2021-02-01 RX ADMIN — HEPARIN SODIUM 5000 UNITS: 5000 INJECTION INTRAVENOUS; SUBCUTANEOUS at 06:02

## 2021-02-01 RX ADMIN — INSULIN LISPRO 1 UNITS: 100 INJECTION, SOLUTION INTRAVENOUS; SUBCUTANEOUS at 17:53

## 2021-02-01 RX ADMIN — INSULIN LISPRO 1 UNITS: 100 INJECTION, SOLUTION INTRAVENOUS; SUBCUTANEOUS at 13:04

## 2021-02-01 RX ADMIN — Medication 50 MG: at 09:38

## 2021-02-01 RX ADMIN — ACETAMINOPHEN 650 MG: 325 TABLET ORAL at 09:38

## 2021-02-01 RX ADMIN — FERROUS SULFATE TAB 325 MG (65 MG ELEMENTAL FE) 325 MG: 325 (65 FE) TAB at 20:48

## 2021-02-01 RX ADMIN — INSULIN LISPRO 1 UNITS: 100 INJECTION, SOLUTION INTRAVENOUS; SUBCUTANEOUS at 09:41

## 2021-02-01 RX ADMIN — Medication 2000 UNITS: at 09:38

## 2021-02-01 RX ADMIN — PANTOPRAZOLE SODIUM 40 MG: 40 TABLET, DELAYED RELEASE ORAL at 06:02

## 2021-02-01 RX ADMIN — NIFEDIPINE 60 MG: 60 TABLET, FILM COATED, EXTENDED RELEASE ORAL at 20:48

## 2021-02-01 RX ADMIN — POLYETHYLENE GLYCOL 3350 17 G: 17 POWDER, FOR SOLUTION ORAL at 09:39

## 2021-02-01 RX ADMIN — ALOGLIPTIN 25 MG: 25 TABLET, FILM COATED ORAL at 09:38

## 2021-02-01 RX ADMIN — PIPERACILLIN AND TAZOBACTAM 3375 MG: 3; .375 INJECTION, POWDER, LYOPHILIZED, FOR SOLUTION INTRAVENOUS at 06:00

## 2021-02-01 RX ADMIN — HEPARIN SODIUM 5000 UNITS: 5000 INJECTION INTRAVENOUS; SUBCUTANEOUS at 13:04

## 2021-02-01 RX ADMIN — PREGABALIN 300 MG: 75 CAPSULE ORAL at 20:48

## 2021-02-01 RX ADMIN — BUMETANIDE 1 MG: 1 TABLET ORAL at 09:38

## 2021-02-01 RX ADMIN — POTASSIUM CHLORIDE 20 MEQ: 1500 TABLET, EXTENDED RELEASE ORAL at 09:39

## 2021-02-01 RX ADMIN — HYDRALAZINE HYDROCHLORIDE 50 MG: 50 TABLET, FILM COATED ORAL at 09:38

## 2021-02-01 ASSESSMENT — PAIN SCALES - GENERAL
PAINLEVEL_OUTOF10: 0
PAINLEVEL_OUTOF10: 2

## 2021-02-01 ASSESSMENT — PAIN DESCRIPTION - LOCATION: LOCATION: HEAD

## 2021-02-01 ASSESSMENT — PAIN DESCRIPTION - PAIN TYPE: TYPE: ACUTE PAIN

## 2021-02-01 NOTE — CARE COORDINATION
2/1/21, 12:32 PM EST    DISCHARGE PLANNING EVALUATION  Spoke with barbara Fraser. They would like a referral made to [facility]. FABIOLA made referral to ABI Castro in admissions at the facility. Their fax machine is not working. E-mailed all information to Carole. Waiting to hear back regarding acceptance. She will be a precert. Update 4:08pm: Spoke with [facility]. They will not have an answer regarding their ability to accept until tomorrow. FABIOLA called and updated daughter Nathalialoriashley Fraser.

## 2021-02-01 NOTE — PROGRESS NOTES
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 The patient had difficultyeven with two assists to get up immediately, but they were eventuallyable to sit her up in the chair. EMS was called and she was brought [facility].  Workup in the ER, her temperature was 103. She did havechest x-ray and CT of the head as the patient mentioned she might haveblacked out, but later, she mentioned that she thought she heardsomebody yelling and she quickly turned and lost her balance. Shedenied having any chest pain or difficulty breathing. She is lying inbed, complaining of a headache. There was no nausea, vomiting, ordiarrhea. Occasional cough, but nonproductive. The patient wasdiagnosed initially with adenocarcinoma of her sinuses about four yearsback for which she had treatment with resection and adjuvant radiationfollowed by recurrence in  and underwent endoscopic resection in2020. She denies any numbness, tingling, or weakness in her lowerextremities.      Prior Level of Function:  Lives With: Daughter  Type of Home: House  Home Layout: One level  Home Access: Stairs to enter without rails(uses both sides of door frame to pull self in)  Entrance Stairs - Number of Steps: 1 very small step  Home Equipment: [employer], 4 wheeled walker, Standard walker, Quad cane(rarely uses RW)   Bathroom Shower/Tub: Tub/Shower unit, Shower chair with back  Bathroom Toilet: Standard  Bathroom Accessibility: Accessible    Receives Help From: Family  ADL Assistance: Independent  Homemaking Assistance: Needs assistance(microwaving)  Homemaking Responsibilities: No  Ambulation Assistance: Independent  Transfer Assistance: Independent  Additional Comments: Pt's daughter reports that pt was ambulating short distances with walker within and around her home in PLOF. Restrictions/Precautions:  Restrictions/Precautions: General Precautions, Fall Risk     SUBJECTIVE: RN approved session. Pt resting in bed upon arrival visiting with her daughter. Pleasant and agreeable to therapy. PAIN: 5/10: Chronic Back Pain    OBJECTIVE:  Bed Mobility:  Rolling to Left: Minimal Assistance   Supine to Sit: Minimal Assistance, At trunk    Transfers:  Sit to Stand: Minimal Assistance, with increased time for completion  Stand to [place] [address], cues for hand placement    Ambulation:  Contact Guard Assistance  Distance: 3 feet x 1   Surface: Level Tile  Device:Rolling Walker  Gait Deviations:  Small shuffled steps. Decreased B step lengths and decreased B step clearance. Forward flexed posture. Balance:  Static Sitting Balance:  Stand By Assistance  Static Standing Balance: Contact Guard Assistance    Exercise:  Patient was guided in 1 set(s) 15 reps of exercise to both lower extremities. Ankle pumps, Quad sets, Heelslides, Hip abduction/adduction, Seated marches, Seated hamstring curls, Seated heel/toe raises, Long arc quads and Seated isometric hip adduction. Exercises were completed for increased independence with functional mobility.     Functional Outcome Measures: Completed  AM-PAC Inpatient Mobility Raw Score : 16  AM-PAC Inpatient T-Scale Score : 40.78    ASSESSMENT:  Assessment: Patient progressing toward established goals. and Pt tolerated session well. Limited by decreased strength, decreased endurance, decreased mobility. Would benefit from conitnued therapy at discharge. Activity Tolerance:  Patient tolerance of  treatment: good. Equipment Recommendations:Equipment Needed: No  Discharge Recommendations:  SNF with PT. Continue to assess pending progress    Plan: Times per week: 4-5x GM  Current Treatment Recommendations: Strengthening, Balance Training, Functional Mobility Training, Endurance Training, Transfer Training, Safety Education & Training, Home Exercise Program, Patient/Caregiver Education & Training, Gait Training    Patient Education  Patient Education: Plan of Care, [employer] Mobility, Transfers, Gait    Goals:  Patient goals : go home  Short term goals  Time Frame for Short term goals: at discharge  Short term goal 1: Pt to be SBA for supine <> sit to get in/out of bed  Short term goal 2: Pt to be SBA for sit <> stand to get up to ambulate  Short term goal 3: Pt to ambulate > 10 ft with RW with CGA to get to bathroom  Long term goals  Time Frame for Long term goals : not set due to short ELOS    Following session, patient left in safe position with all fall risk precautions in place.

## 2021-02-01 NOTE — PROGRESS NOTES
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 The patient had difficultyeven with two assists to get up immediately, but they were eventuallyable to sit her up in the chair. EMS was called and she was brought [facility].  Workup in the ER, her temperature was 103. She did havechest x-ray and CT of the head as the patient mentioned she might haveblacked out, but later, she mentioned that she thought she heardsomebody yelling and she quickly turned and lost her balance. Shedenied having any chest pain or difficulty breathing. She is lying inbed, complaining of a headache. There was no nausea, vomiting, ordiarrhea. Occasional cough, but nonproductive. The patient wasdiagnosed initially with adenocarcinoma of her sinuses about four yearsback for which she had treatment with resection and adjuvant radiationfollowed by recurrence in  and underwent endoscopic resection in2020. She denies any numbness, tingling, or weakness in her lowerextremities. Restrictions/Precautions:  Restrictions/Precautions: General Precautions, Fall Risk, Isolation    SUBJECTIVE: Pleasant and cooperative. Pt had just finished eating supper. She agreed to do some of her self care but asked to return to the chair. to/from BR with CGA and min safety cues to increase indep with ADL routine  Short term goal 2: Pt will tolerate dynamic standing x2 min with CGA and min SOB to increase indep wtih grooming  Short term goal 3: Pt will complete various t/fs with CGA and 0-2 VC for safety to increase indep with toileting  Short term goal 4: Pt will complete BADL with CGA and min safety cues to increase indep with bathing  Long term goals  Time Frame for Long term goals : no LTG d/t short ELOS    Following session, patient left in safe position with all fall risk precautions in place.

## 2021-02-01 NOTE — PROGRESS NOTES
IM Progress Note  Dr. Roxy Che  2/1/2021 10:38 AM      Patient name Raghu Cary  [id number]  PCP: Erasmo Valdez MD  Admit Date: 1/25/2021  Acct No. [de-identified]    Subjective: Interval History:   Pt lying in bed  Did not use tramadol    Diet: DIET CARB CONTROL; Dietary Nutrition Supplements: Wound Healing Oral Supplement    I/O last 3 completed shifts: In: 2061.4 [P.O.:1900; I.V.:161.4]  Out: 1550 [Urine:1550]  No intake/output data recorded. Admission weight: 220 lb (99.8 kg) as of 1/25/2021 11:11 AM  Wt Readings from Last 3 Encounters:   02/01/21 216 lb (98 kg)   12/28/20 221 lb 3.2 oz (100.3 kg)   07/23/20 210 lb (95.3 kg)     Body mass index is 35.94 kg/m². Medications:   Scheduled Meds:   amoxicillin-clavulanate  1 tablet Oral 2 times per day    docusate sodium  100 mg Oral BID    polyethylene glycol  17 g Oral Daily    bumetanide  1 mg Oral BID    ferrous sulfate  325 mg Oral BID    hydrALAZINE  50 mg Oral BID    mupirocin   Nasal BID    NIFEdipine  60 mg Oral Nightly    pantoprazole  40 mg Oral QAM AC    potassium chloride  20 mEq Oral Daily    Vitamin D  2,000 Units Oral Daily    zinc sulfate  50 mg Oral Daily    heparin (porcine)  5,000 Units Subcutaneous 3 times per day    insulin lispro  0-6 Units Subcutaneous TID WC    insulin lispro  0-3 Units Subcutaneous Nightly    pregabalin  300 mg Oral BID    alogliptin  25 mg Oral Daily     Continuous Infusions:    Labs :     CBC:   No results for input(s): WBC, HGB, PLT in the last 72 hours. BMP:    No results for input(s): NA, K, CL, CO2, BUN, CREATININE, GLUCOSE in the last 72 hours. Hepatic:   No results for input(s): AST, ALT, ALB, BILITOT, ALKPHOS in the last 72 hours. Troponin: No results for input(s): TROPONINI in the last 72 hours. BNP: No results for input(s): BNP in the last 72 hours. Lipids:   No results for input(s): CHOL, HDL in the last 72 hours.     Invalid input(s): LDLCALCU  INR: No results for input(s): INR in the last 72 hours. Radiology    Objective:   Vitals: /69   Pulse 88   Temp 98.3 °F (36.8 °C) (Oral)   Resp 16   Ht 5' 5\" (1.651 m)   Wt 216 lb (98 kg)   LMP  (LMP Unknown)   SpO2 93%   BMI 35.94 kg/m²   HEENT: Head:pupils react  Neck: supple not rigid forward flexion per daughter not new   Lungs: clear to auscultation  Heart: regular rate and rhythm   Abdomen: soft BS heard   Extremities: warm  No edema  Neurologic:  Asleep and did wake up to name    Impression:   :   1. Fever  possibly sinusitis / pneumonia. 2.  History of adenocarcinoma of the ethmoid sinus, status post nasal  endoscopy with debridement of the sinonasal cavity. 3.  Diabetes. 4.  Hypertension. 5.  Hyperlipidemia. 6.  Neuropathy with degenerative disk disease. 7.  Chronic lymphedema. 8.  History of sleep apnea, but could not tolerate CPAP machine. 9.  Obesity. 10.  Prior history of COVID.   6.  Fall with questionable mentioning of blacking out MRI sugg of tumor    Plan:    Await placement   IV antibiotics being stopped by MADELYN Be MD

## 2021-02-01 NOTE — PROGRESS NOTES
per day    insulin lispro  0-6 Units Subcutaneous TID     insulin lispro  0-3 Units Subcutaneous Nightly    pregabalin  300 mg Oral BID    alogliptin  25 mg Oral Daily       sodium chloride, acetaminophen, traMADol **OR** traMADol, ondansetron      LABS:      Recent Labs     01/31/21  1546 01/31/21  1957 02/01/21  0630   POCGLU 184* 150* 153*       CULTURES:   UA:   No results for input(s): SPECGRAV, PHUR, COLORU, CLARITYU, MUCUS, PROTEINU, BLOODU, RBCUA, WBCUA, BACTERIA, NITRU, GLUCOSEU, BILIRUBINUR, UROBILINOGEN, KETUA, LABCAST, LABCASTTY, AMORPHOS in the last 72 hours.     Invalid input(s): CRYSTALS  Micro:   Lab Results   Component Value Date    BC No growth-preliminary No growth  01/25/2021    BC No growth-preliminary No growth  01/25/2021          Problem list of patient:     Patient Active Problem List   Diagnosis Code    Hypercholesterolemia E78.00    Osteoarthritis M19.90    Osteoporosis M81.0    Type 2 diabetes mellitus without complication, without long-term current use of insulin ([facility] [address] E11.9    DDD (degenerative disc disease), lumbar M51.36    Benign essential HTN I10    SWAPNA on CPAP G47.33, Z99.89    Diabetic peripheral neuropathy (HCC) E11.42    Acute recurrent pansinusitis J01.41    Primary thunderclap headache G44.53    Rash of entire body R21    Infection of skin due to methicillin resistant Staphylococcus aureus (MRSA) A49.02    Drug allergy, antibiotic  likely bactrim Z88.1    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    Skin plaque L98.8    Hyponatremia E87.1    Hypervolemia E87.70    Cellulitis of lower extremity L03.119    Hypoglycemia E16.2    Acute on chronic systolic CHF (congestive heart failure) ([facility]) I50.23    Bilateral cellulitis of lower leg L03.116, L03.115    Venous ulcers of both lower extremities ([facility]) I83.019, L97.919, I83.029, L97.929    Bilateral lower leg cellulitis L03.116, L03.115    CKD (chronic kidney disease) stage 3, GFR 30-59 ml/min ([facility] [address] N18.30    Iron deficiency anemia D50.9    Hypomagnesemia E83.42    SBO (small bowel obstruction) ([facility] [address] K56.609    Venous ulcer of left leg ([facility]) I83.029, L97.929    Venous ulcer of right leg ([facility]) I83.019, L97.919    Acute eczema L30.9    Venous insufficiency of both lower extremities I87.2    Lymphedema of both lower extremities I89.0    Pressure injury of left buttock, stage 2 ([facility]) I59.149    Eczematous dermatitis L30.9    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 3XXA    Facial cellulitis L03. 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[facility]) C30.0    Cataract of both eyes H26.9    History of ventral hernia repair Z98.890, Z87.19    Other cervical disc degeneration, mid-cervical region M50.320    Spondylolisthesis of cervical region M43.12    Spondylolisthesis of thoracic region M43.14    Chronic rhinitis J31.0    Closed fracture of head of humerus S42.293A    Dysphagia R13.10    Laryngopharyngeal reflux K21.9    Malignant neoplasm of ethmoidal sinus ([facility]) C31.1    Obesity, Class II, BMI 35-39.9 E66.9    COVID-19 U07.1    Sepsis ([facility]) A41.9    Brain mass G93.89         ASSESSMENT/PLAN   Fall:    Chronic sinusitis: She had recent history of cleaning of her sinuses. History of carcinoma of the nasal sinuses status post surgery and radiation treatment with effect of radiation. Will stop iv antibiotic. Transition to augmentin  Plan for ECF.   Kristen Danielson MD, [address] 2/1/2021 7:27 AM she feels better

## 2021-02-01 NOTE — CARE COORDINATION
DISASTER CHARTING    2/1/21, 1:41 PM EST    DISCHARGE ONGOING EVALUATION:     [address] day: 7  Location: -15/015-A Reason for admit: Sepsis [facility]) [A41.9]   Barriers to Discharge: PT/OT. ID following, plan is to transition to oral antibiotics, diabetes management. PCP: Therese Barger MD  Readmission Risk Score: 22%  Patient Goals/Plan/Treatment Preferences: From home with daughter. Plan is SNF for rehab at discharge. SW made referral to [facility]. Refer to FABIOLA note.

## 2021-02-01 NOTE — PROGRESS NOTES
Problem: Falls - Risk of:  Goal: Will remain free from falls  Description: Will remain free from falls  Note: Call light in reach, bed in lowest position, bed alarm activated. Educated on use of call light before ambulation and when in need of assistance. Patient expressed understanding. Hourly visual checks performed and charted. Toileting offered to patient. No falls during shift, at this time. Arm band & falling star in place. Pt up with standby assist with walker and tolerating well at this time. Continuing to monitor        Problem: Skin Integrity:  Goal: Will show no infection signs and symptoms  Description: Will show no infection signs and symptoms  Note: No signs of new skin breakdown. Patient has Stage II on buttocks. Skin warm, dry, and intact. Mucous membranes pink and moist.  Assistance with turns/ambulation provided PRN. Will continue to monitor.        Problem: Pain:  Goal: Pain level will decrease  Description: Pain level will decrease  Note: Patient able to use 0-10 pain scale. Patient complains of headache. Agreeable to take PRN pain medications. PRN Tylenol was given.         Problem: Pain:  Goal: Control of acute pain  Description: Control of acute pain  Note: Patient able to use 0-10 pain scale. Patient complains of headache. Agreeable to take PRN pain medications. PRN Tylenol was given. Problem: Discharge Planning:  Goal: Discharged to appropriate level of care  Description: Discharged to appropriate level of care  Outcome: Ongoing  Note: Discharge planning in process and discussed with patient/family. Social work consulted for any additional needs. Care manager aware of discharge needs. Patient from home with daughter and discussing possible rehab.         Problem: Serum Glucose Level - Abnormal:  Goal: Ability to maintain appropriate glucose levels will improve  Description: Ability to maintain appropriate glucose levels will improve  Outcome: Ongoing  Note: Patient is diabetic.  Chem

## 2021-02-02 LAB
GLUCOSE BLD-MCNC: 160 MG/DL (ref 70–108)
GLUCOSE BLD-MCNC: 164 MG/DL (ref 70–108)
GLUCOSE BLD-MCNC: 173 MG/DL (ref 70–108)
GLUCOSE BLD-MCNC: 187 MG/DL (ref 70–108)

## 2021-02-02 PROCEDURE — 82948 REAGENT STRIP/BLOOD GLUCOSE: CPT

## 2021-02-02 PROCEDURE — 6370000000 HC RX 637 (ALT 250 FOR IP): Performed by: INTERNAL MEDICINE

## 2021-02-02 PROCEDURE — 6360000002 HC RX W HCPCS: Performed by: INTERNAL MEDICINE

## 2021-02-02 PROCEDURE — 2060000000 HC ICU INTERMEDIATE R&B

## 2021-02-02 PROCEDURE — 97110 THERAPEUTIC EXERCISES: CPT

## 2021-02-02 PROCEDURE — 97535 SELF CARE MNGMENT TRAINING: CPT

## 2021-02-02 RX ORDER — DEXTROSE MONOHYDRATE 50 MG/ML
100 INJECTION, SOLUTION INTRAVENOUS PRN
Status: DISCONTINUED | OUTPATIENT
Start: 2021-02-02 | End: 2021-02-03 | Stop reason: HOSPADM

## 2021-02-02 RX ORDER — DEXTROSE MONOHYDRATE 25 G/50ML
12.5 INJECTION, SOLUTION INTRAVENOUS PRN
Status: DISCONTINUED | OUTPATIENT
Start: 2021-02-02 | End: 2021-02-03 | Stop reason: HOSPADM

## 2021-02-02 RX ORDER — NICOTINE POLACRILEX 4 MG
15 LOZENGE BUCCAL PRN
Status: DISCONTINUED | OUTPATIENT
Start: 2021-02-02 | End: 2021-02-03 | Stop reason: HOSPADM

## 2021-02-02 RX ADMIN — ACETAMINOPHEN 650 MG: 325 TABLET ORAL at 16:46

## 2021-02-02 RX ADMIN — PANTOPRAZOLE SODIUM 40 MG: 40 TABLET, DELAYED RELEASE ORAL at 06:37

## 2021-02-02 RX ADMIN — PREGABALIN 300 MG: 75 CAPSULE ORAL at 09:03

## 2021-02-02 RX ADMIN — BUMETANIDE 1 MG: 1 TABLET ORAL at 20:51

## 2021-02-02 RX ADMIN — INSULIN LISPRO 1 UNITS: 100 INJECTION, SOLUTION INTRAVENOUS; SUBCUTANEOUS at 07:18

## 2021-02-02 RX ADMIN — ALOGLIPTIN 25 MG: 25 TABLET, FILM COATED ORAL at 09:03

## 2021-02-02 RX ADMIN — Medication 2000 UNITS: at 09:02

## 2021-02-02 RX ADMIN — ACETAMINOPHEN 650 MG: 325 TABLET ORAL at 10:14

## 2021-02-02 RX ADMIN — INSULIN LISPRO 1 UNITS: 100 INJECTION, SOLUTION INTRAVENOUS; SUBCUTANEOUS at 12:51

## 2021-02-02 RX ADMIN — PREGABALIN 300 MG: 75 CAPSULE ORAL at 20:51

## 2021-02-02 RX ADMIN — DOCUSATE SODIUM 100 MG: 100 CAPSULE, LIQUID FILLED ORAL at 09:02

## 2021-02-02 RX ADMIN — HYDRALAZINE HYDROCHLORIDE 50 MG: 50 TABLET, FILM COATED ORAL at 09:03

## 2021-02-02 RX ADMIN — AMOXICILLIN AND CLAVULANATE POTASSIUM 1 TABLET: 875; 125 TABLET, FILM COATED ORAL at 20:51

## 2021-02-02 RX ADMIN — HEPARIN SODIUM 5000 UNITS: 5000 INJECTION INTRAVENOUS; SUBCUTANEOUS at 21:16

## 2021-02-02 RX ADMIN — MUPIROCIN: 20 OINTMENT TOPICAL at 09:03

## 2021-02-02 RX ADMIN — MUPIROCIN: 20 OINTMENT TOPICAL at 20:51

## 2021-02-02 RX ADMIN — Medication 50 MG: at 09:02

## 2021-02-02 RX ADMIN — INSULIN LISPRO 1 UNITS: 100 INJECTION, SOLUTION INTRAVENOUS; SUBCUTANEOUS at 16:36

## 2021-02-02 RX ADMIN — POTASSIUM CHLORIDE 20 MEQ: 1500 TABLET, EXTENDED RELEASE ORAL at 09:03

## 2021-02-02 RX ADMIN — AMOXICILLIN AND CLAVULANATE POTASSIUM 1 TABLET: 875; 125 TABLET, FILM COATED ORAL at 09:03

## 2021-02-02 RX ADMIN — HEPARIN SODIUM 5000 UNITS: 5000 INJECTION INTRAVENOUS; SUBCUTANEOUS at 06:37

## 2021-02-02 RX ADMIN — FERROUS SULFATE TAB 325 MG (65 MG ELEMENTAL FE) 325 MG: 325 (65 FE) TAB at 09:03

## 2021-02-02 RX ADMIN — HEPARIN SODIUM 5000 UNITS: 5000 INJECTION INTRAVENOUS; SUBCUTANEOUS at 16:37

## 2021-02-02 RX ADMIN — HYDRALAZINE HYDROCHLORIDE 50 MG: 50 TABLET, FILM COATED ORAL at 20:51

## 2021-02-02 RX ADMIN — POLYETHYLENE GLYCOL 3350 17 G: 17 POWDER, FOR SOLUTION ORAL at 09:02

## 2021-02-02 RX ADMIN — NIFEDIPINE 60 MG: 60 TABLET, FILM COATED, EXTENDED RELEASE ORAL at 20:51

## 2021-02-02 RX ADMIN — BUMETANIDE 1 MG: 1 TABLET ORAL at 09:03

## 2021-02-02 RX ADMIN — FERROUS SULFATE TAB 325 MG (65 MG ELEMENTAL FE) 325 MG: 325 (65 FE) TAB at 20:51

## 2021-02-02 ASSESSMENT — PAIN DESCRIPTION - FREQUENCY: FREQUENCY: INTERMITTENT

## 2021-02-02 ASSESSMENT — PAIN SCALES - GENERAL: PAINLEVEL_OUTOF10: 0

## 2021-02-02 ASSESSMENT — PAIN DESCRIPTION - LOCATION
LOCATION: HEAD
LOCATION: HEAD

## 2021-02-02 ASSESSMENT — PAIN DESCRIPTION - PAIN TYPE: TYPE: ACUTE PAIN

## 2021-02-02 ASSESSMENT — PAIN DESCRIPTION - DESCRIPTORS: DESCRIPTORS: HEADACHE

## 2021-02-02 NOTE — PROGRESS NOTES
insulin lispro  0-6 Units Subcutaneous TID     insulin lispro  0-3 Units Subcutaneous Nightly    pregabalin  300 mg Oral BID    alogliptin  25 mg Oral Daily       sodium chloride, acetaminophen, traMADol **OR** traMADol, ondansetron      LABS:      Recent Labs     02/01/21  1630 02/01/21  2044 02/02/21  0623   POCGLU 160* 195* 173*       CULTURES:   UA:   No results for input(s): SPECGRAV, PHUR, COLORU, CLARITYU, MUCUS, PROTEINU, BLOODU, RBCUA, WBCUA, BACTERIA, NITRU, GLUCOSEU, BILIRUBINUR, UROBILINOGEN, KETUA, LABCAST, LABCASTTY, AMORPHOS in the last 72 hours.     Invalid input(s): CRYSTALS  Micro:   Lab Results   Component Value Date    BC No growth-preliminary No growth  01/25/2021    BC No growth-preliminary No growth  01/25/2021          Problem list of patient:     Patient Active Problem List   Diagnosis Code    Hypercholesterolemia E78.00    Osteoarthritis M19.90    Osteoporosis M81.0    Type 2 diabetes mellitus without complication, without long-term current use of insulin ([facility] [address] E11.9    DDD (degenerative disc disease), lumbar M51.36    Benign essential HTN I10    SWAPNA on CPAP G47.33, Z99.89    Diabetic peripheral neuropathy ([facility]) E11.42    Acute recurrent pansinusitis J01.41    Primary thunderclap headache G44.53    Rash of entire body R21    Infection of skin due to methicillin resistant Staphylococcus aureus (MRSA) A49.02    Drug allergy, antibiotic  likely bactrim Z88.1    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    Skin plaque L98.8    Hyponatremia E87.1    Hypervolemia E87.70    Cellulitis of lower extremity L03.119    Hypoglycemia E16.2    Acute on chronic systolic CHF (congestive heart failure) ([facility]) I50.23    Bilateral cellulitis of lower leg L03.116, L03.115    Venous ulcers of both lower extremities ([facility]) I83.019, L97.919, I83.029, L97.929    Bilateral lower leg cellulitis L03.116, L03.115    CKD (chronic kidney disease) stage 3, GFR 30-59 ml/min ([facility]) N18.30    Iron deficiency anemia D50.9    Hypomagnesemia E83.42    SBO (small bowel obstruction) ([facility]) K56.609    Venous ulcer of left leg ([facility]) I83.029, L97.929    Venous ulcer of right leg ([facility]) I83.019, L97.919    Acute eczema L30.9    Venous insufficiency of both lower extremities I87.2    Lymphedema of both lower extremities I89.0    Pressure injury of left buttock, stage 2 ([facility]) K47.644    Eczematous dermatitis L30.9    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 3XXA    Facial cellulitis L03. 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[facility]) C30.0    Cataract of both eyes H26.9    History of ventral hernia repair Z98.890, Z87.19    Other cervical disc degeneration, mid-cervical region M50.320    Spondylolisthesis of cervical region M43.12    Spondylolisthesis of thoracic region M43.14    Chronic rhinitis J31.0    Closed fracture of head of humerus S42.293A    Dysphagia R13.10    Laryngopharyngeal reflux K21.9    Malignant neoplasm of ethmoidal sinus ([facility]) C31.1    Obesity, Class II, BMI 35-39.9 E66.9    COVID-19 U07.1    Sepsis ([facility]) A41.9    Brain mass G93.89         ASSESSMENT/PLAN   Fall:    Chronic sinusitis: She had recent history of cleaning of her sinuses. 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 Continue current treatment. Plan for ECF.   Scot Hartley MD, FACP 2/2/2021 10:20 AM she feels better

## 2021-02-02 NOTE — PROGRESS NOTES
IM Progress Note  Dr. Theo Henderson  2/2/2021 11:24 AM      Patient name Ame Simms  [id number]  PCP: Kenya Deluna MD  Admit Date: 1/25/2021  Acct No. [de-identified]    Subjective: Interval History:   Pt up in chair  No headache    Diet: DIET CARB CONTROL; Dietary Nutrition Supplements: Wound Healing Oral Supplement    I/O last 3 completed shifts:  In: -   Out: 1000 [Urine:1000]  No intake/output data recorded. Admission weight: 220 lb (99.8 kg) as of 1/25/2021 11:11 AM  Wt Readings from Last 3 Encounters:   02/01/21 216 lb (98 kg)   12/28/20 221 lb 3.2 oz (100.3 kg)   07/23/20 210 lb (95.3 kg)     Body mass index is 35.94 kg/m². Medications:   Scheduled Meds:   amoxicillin-clavulanate  1 tablet Oral 2 times per day    docusate sodium  100 mg Oral BID    polyethylene glycol  17 g Oral Daily    bumetanide  1 mg Oral BID    ferrous sulfate  325 mg Oral BID    hydrALAZINE  50 mg Oral BID    mupirocin   Nasal BID    NIFEdipine  60 mg Oral Nightly    pantoprazole  40 mg Oral QAM AC    potassium chloride  20 mEq Oral Daily    Vitamin D  2,000 Units Oral Daily    zinc sulfate  50 mg Oral Daily    heparin (porcine)  5,000 Units Subcutaneous 3 times per day    insulin lispro  0-6 Units Subcutaneous TID WC    insulin lispro  0-3 Units Subcutaneous Nightly    pregabalin  300 mg Oral BID    alogliptin  25 mg Oral Daily     Continuous Infusions:    Labs :     CBC:   No results for input(s): WBC, HGB, PLT in the last 72 hours. BMP:    No results for input(s): NA, K, CL, CO2, BUN, CREATININE, GLUCOSE in the last 72 hours. Hepatic:   No results for input(s): AST, ALT, ALB, BILITOT, ALKPHOS in the last 72 hours. Troponin: No results for input(s): TROPONINI in the last 72 hours. BNP: No results for input(s): BNP in the last 72 hours. Lipids:   No results for input(s): CHOL, HDL in the last 72 hours.     Invalid input(s): LDLCALCU  INR: No results for input(s): INR in the last 72 hours. Radiology    Objective:   Vitals: /71   Pulse 78   Temp 97.9 °F (36.6 °C) (Oral)   Resp 16   Ht 5' 5\" (1.651 m)   Wt 216 lb (98 kg)   LMP  (LMP Unknown)   SpO2 93%   BMI 35.94 kg/m²   HEENT: Head:pupils react  Neck: supple not rigid forward flexion per daughter not new   Lungs: clear to auscultation  Heart: regular rate and rhythm   Abdomen: soft BS heard   Extremities: warm  No edema  Neurologic:  awake    Impression:   :   1. Fever  possibly sinusitis / pneumonia. 2.  History of adenocarcinoma of the ethmoid sinus, status post nasal  endoscopy with debridement of the sinonasal cavity. 3.  Diabetes. 4.  Hypertension. 5.  Hyperlipidemia. 6.  Neuropathy with degenerative disk disease. 7.  Chronic lymphedema. 8.  History of sleep apnea, but could not tolerate CPAP machine. 9.  Obesity. 10.  Prior history of COVID.   6.  Fall with questionable mentioning of blacking out MRI sugg of tumor    Plan:    Pt to see NS at [address] daughter regarding same  Marko Clinton MD

## 2021-02-02 NOTE — PLAN OF CARE
Problem: Falls - Risk of:  Goal: Will remain free from falls  Description: Will remain free from falls  Outcome: Ongoing  Goal: Absence of physical injury  Description: Absence of physical injury  Outcome: Ongoing     Problem: Skin Integrity:  Goal: Will show no infection signs and symptoms  Description: Will show no infection signs and symptoms  Outcome: Ongoing  Goal: Absence of new skin breakdown  Description: Absence of new skin breakdown  Outcome: Ongoing     Problem: Pain:  Goal: Pain level will decrease  Description: Pain level will decrease  Outcome: Ongoing  Goal: Control of acute pain  Description: Control of acute pain  Outcome: Ongoing  Goal: Control of chronic pain  Description: Control of chronic pain  Outcome: Ongoing     Problem: Nutrition  Goal: Optimal nutrition therapy  Outcome: Ongoing     Problem: DISCHARGE BARRIERS  Goal: Patient's continuum of care needs are met  Outcome: Ongoing     Problem: Serum Glucose Level - Abnormal:  Goal: Ability to maintain appropriate glucose levels will improve  Description: Ability to maintain appropriate glucose levels will improve  Outcome: Ongoing     Problem: Discharge Planning:  Goal: Discharged to appropriate level of care  Description: Discharged to appropriate level of care  Outcome: Ongoing

## 2021-02-02 NOTE — PROGRESS NOTES
[address]  Occupational Therapy  Daily Note  Time:   Time In: 4441  Time Out: 1004  Timed Code Treatment Minutes: 26 Minutes  Minutes: 26          Date: 2021  Patient Name: Alfreda Salinas,   Gender: female      Room: -15/015-A  MRN: [id number]  : 1940  (79 y.o.)  Referring Practitioner: Roseline Pfeiffer MD  Diagnosis: Sepsis  Additional Pertinent Hx: This is a 71-year-old woman with pastmedical history of adenocarcinoma of the ethmoidal sinuses who underwentbilateral nasal endoscopy with debridement of the sinonasal cavity on2021 at [facility] and the patient was sent home with Augmentin to [facility] for the next few days; apparently was noted by the daughter to calixto nicky this morning and she had a fall. The patient had difficultyeven with two assists to get up immediately, but they were eventuallyable to sit her up in the chair. EMS was called and she was brought [facility].  Workup in the ER, her temperature was 103. She did havechest x-ray and CT of the head as the patient mentioned she might haveblacked out, but later, she mentioned that she thought she heardsomebody yelling and she quickly turned and lost her balance. Shedenied having any chest pain or difficulty breathing. She is lying inbed, complaining of a headache. There was no nausea, vomiting, ordiarrhea. Occasional cough, but nonproductive. The patient wasdiagnosed initially with adenocarcinoma of her sinuses about four yearsback for which she had treatment with resection and adjuvant radiationfollowed by recurrence in  and underwent endoscopic resection in2020. She denies any numbness, tingling, or weakness in her lowerextremities. Restrictions/Precautions:  Restrictions/Precautions: General Precautions, Fall Risk     SUBJECTIVE: Pt seated in bedside chair upon arrival, agreeable to OT session. PAIN: No c/o.     COGNITION: Slow Processing, Decreased Problem Solving and Decreased Safety Awareness    ADL:   Grooming: Contact Guard Assistance. Standing sink side washing hands. Toileting: Contact Guard Assistance. CGA for hygiene after void. Toilet Transfer: Contact Guard Assistance. STS- use of grab bar. BALANCE:  Sitting Balance:  Supervision. Bedside chair  Standing Balance: Contact Guard Assistance. x2 minutes within ADL routine. BED MOBILITY:  Not Tested    TRANSFERS:  Sit to Stand:  Minimal Assistance. Required x2 attempts to come to full stand from bedside chair. Stand to Sit: Contact Guard Assistance. FUNCTIONAL MOBILITY:  Assistive Device: Rolling Walker  Assist Level:  Contact Guard Assistance. Distance:   Completed functional mobility to/from BR at slow pace, no LOB noted. Pt requires min cues for walker safety to maneuver within tight spaces- seated rest break after trial of mobility, mod fatigue noted. ADDITIONAL ACTIVITIES:  Patient identified a personal goal to increase UB strength and improve overall endurance so they can complete their toilet & shower transfers; skilled edu on UE strengthening. Completed BUE exercises x5-10 reps x1 set in all joints/planes to increase strength and endurance required for ADLs. Pt required min rest break between each exercise and mod v/c for proper technique. ASSESSMENT:     Activity Tolerance:  Patient tolerance of  treatment: fair.        Discharge Recommendations: Continue to assess pending progress, Patient would benefit from continued therapy after discharge(Pt currently not safe to return home)   Equipment Recommendations: Equipment Needed: No  Plan: Times per week: 5x  Times per day: Daily  Current Treatment Recommendations: Strengthening, Safety Education & Training, Balance Training, Self-Care / ADL, Functional Mobility Training, Endurance Training    Patient Education  Patient Education: ADL's, Home Exercise Program, Importance of Increasing Activity, Assistive Device Safety and Safety with transfers and mobility. Goals  Short term goals  Time Frame for Short term goals: by d/c  Short term goal 1: Pt will complete functional mobility to/from BR with CGA and min safety cues to increase indep with ADL routine  Short term goal 2: Pt will tolerate dynamic standing x2 min with CGA and min SOB to increase indep wtih grooming  Short term goal 3: Pt will complete various t/fs with CGA and 0-2 VC for safety to increase indep with toileting  Short term goal 4: Pt will complete BADL with CGA and min safety cues to increase indep with bathing  Long term goals  Time Frame for Long term goals : no LTG d/t short ELOS    Following session, patient left in safe position with all fall risk precautions in place.

## 2021-02-02 NOTE — CARE COORDINATION
2/2/21, 11:04 AM EST    DISCHARGE PLANNING EVALUATION  Spoke with Rick Saleh at [facility]. They are looking over patients clinicals and will call SW back regarding their ability to accept at discharge. Update 2:44pm: Spoke with Carole at [facility]. They have accepted Cong Warren and started the precert today. Called and made daughter Rhiannon Moralez aware.

## 2021-02-03 VITALS
WEIGHT: 213.1 LBS | SYSTOLIC BLOOD PRESSURE: 144 MMHG | HEIGHT: 65 IN | BODY MASS INDEX: 35.5 KG/M2 | HEART RATE: 73 BPM | RESPIRATION RATE: 18 BRPM | TEMPERATURE: 98.3 F | DIASTOLIC BLOOD PRESSURE: 83 MMHG | OXYGEN SATURATION: 95 %

## 2021-02-03 LAB
GLUCOSE BLD-MCNC: 144 MG/DL (ref 70–108)
GLUCOSE BLD-MCNC: 167 MG/DL (ref 70–108)
GLUCOSE BLD-MCNC: 178 MG/DL (ref 70–108)

## 2021-02-03 PROCEDURE — 6370000000 HC RX 637 (ALT 250 FOR IP): Performed by: INTERNAL MEDICINE

## 2021-02-03 PROCEDURE — 82948 REAGENT STRIP/BLOOD GLUCOSE: CPT

## 2021-02-03 PROCEDURE — 97110 THERAPEUTIC EXERCISES: CPT

## 2021-02-03 PROCEDURE — 97116 GAIT TRAINING THERAPY: CPT

## 2021-02-03 PROCEDURE — 6360000002 HC RX W HCPCS: Performed by: INTERNAL MEDICINE

## 2021-02-03 RX ORDER — POLYETHYLENE GLYCOL 3350 17 G/17G
17 POWDER, FOR SOLUTION ORAL DAILY
Qty: 527 G | Refills: 1 | Status: ON HOLD | OUTPATIENT
Start: 2021-02-03 | End: 2021-03-02

## 2021-02-03 RX ORDER — AMOXICILLIN AND CLAVULANATE POTASSIUM 875; 125 MG/1; MG/1
1 TABLET, FILM COATED ORAL EVERY 12 HOURS SCHEDULED
Qty: 20 TABLET | Refills: 0 | DISCHARGE
Start: 2021-02-01 | End: 2021-02-06

## 2021-02-03 RX ADMIN — POTASSIUM CHLORIDE 20 MEQ: 1500 TABLET, EXTENDED RELEASE ORAL at 08:12

## 2021-02-03 RX ADMIN — AMOXICILLIN AND CLAVULANATE POTASSIUM 1 TABLET: 875; 125 TABLET, FILM COATED ORAL at 10:21

## 2021-02-03 RX ADMIN — ACETAMINOPHEN 650 MG: 325 TABLET ORAL at 17:01

## 2021-02-03 RX ADMIN — HYDRALAZINE HYDROCHLORIDE 50 MG: 50 TABLET, FILM COATED ORAL at 10:21

## 2021-02-03 RX ADMIN — INSULIN LISPRO 1 UNITS: 100 INJECTION, SOLUTION INTRAVENOUS; SUBCUTANEOUS at 11:41

## 2021-02-03 RX ADMIN — Medication 50 MG: at 10:20

## 2021-02-03 RX ADMIN — ALOGLIPTIN 25 MG: 25 TABLET, FILM COATED ORAL at 10:21

## 2021-02-03 RX ADMIN — INSULIN LISPRO 1 UNITS: 100 INJECTION, SOLUTION INTRAVENOUS; SUBCUTANEOUS at 16:56

## 2021-02-03 RX ADMIN — HEPARIN SODIUM 5000 UNITS: 5000 INJECTION INTRAVENOUS; SUBCUTANEOUS at 05:29

## 2021-02-03 RX ADMIN — MUPIROCIN: 20 OINTMENT TOPICAL at 08:13

## 2021-02-03 RX ADMIN — Medication 2000 UNITS: at 10:20

## 2021-02-03 RX ADMIN — POLYETHYLENE GLYCOL 3350 17 G: 17 POWDER, FOR SOLUTION ORAL at 08:11

## 2021-02-03 RX ADMIN — PREGABALIN 300 MG: 75 CAPSULE ORAL at 08:11

## 2021-02-03 RX ADMIN — PANTOPRAZOLE SODIUM 40 MG: 40 TABLET, DELAYED RELEASE ORAL at 05:29

## 2021-02-03 RX ADMIN — DOCUSATE SODIUM 100 MG: 100 CAPSULE, LIQUID FILLED ORAL at 08:12

## 2021-02-03 RX ADMIN — FERROUS SULFATE TAB 325 MG (65 MG ELEMENTAL FE) 325 MG: 325 (65 FE) TAB at 10:20

## 2021-02-03 RX ADMIN — BUMETANIDE 1 MG: 1 TABLET ORAL at 10:21

## 2021-02-03 RX ADMIN — ACETAMINOPHEN 650 MG: 325 TABLET ORAL at 05:29

## 2021-02-03 RX ADMIN — INSULIN LISPRO 1 UNITS: 100 INJECTION, SOLUTION INTRAVENOUS; SUBCUTANEOUS at 08:01

## 2021-02-03 ASSESSMENT — PAIN SCALES - GENERAL: PAINLEVEL_OUTOF10: 0

## 2021-02-03 ASSESSMENT — PAIN DESCRIPTION - DESCRIPTORS: DESCRIPTORS: HEADACHE

## 2021-02-03 NOTE — PROGRESS NOTES
Progress note: Infectious diseases    Patient - Su Massey,  Age - 79 y.o.    - 1940      Room Number - 4A-15/015-A   MRN -  [id number]   Acct # - [de-identified]  Date of Admission -  2021 11:11 AM    SUBJECTIVE:   No new issues  OBJECTIVE   VITALS    height is 5' 5\" (1.651 m) and weight is 213 lb 1.6 oz (96.7 kg). Her oral temperature is 97.8 °F (36.6 °C). Her blood pressure is 117/70 and her pulse is 81. Her respiration is 18 and oxygen saturation is 94%. Wt Readings from Last 3 Encounters:   21 213 lb 1.6 oz (96.7 kg)   20 221 lb 3.2 oz (100.3 kg)   20 210 lb (95.3 kg)       I/O (24 Hours)    Intake/Output Summary (Last 24 hours) at 2/3/2021 1037  Last data filed at 2/3/2021 0429  Gross per 24 hour   Intake 920 ml   Output 2650 ml   Net -1730 ml       General Appearance  Awake, alert, oriented, chronically sick looking. Not in distress. HEENT - normocephalic, atraumatic, slightly pale conjunctiva,  anicteric sclera, the sinuses are nontender  Neck - Supple, no mass  Lungs -  Bilateral air entry, diminished breath sounds bilaterally   cardiovascular - Heart sounds are normal.    Abdomen - soft, not distended, nontender,   Neurologic -oriented to person place and time  Skin - No bruising or bleeding  Extremities -chronic nonpitting edema with stasis changes.   MEDICATIONS:      amoxicillin-clavulanate  1 tablet Oral 2 times per day    docusate sodium  100 mg Oral BID    polyethylene glycol  17 g Oral Daily    bumetanide  1 mg Oral BID    ferrous sulfate  325 mg Oral BID    hydrALAZINE  50 mg Oral BID    mupirocin   Nasal BID    NIFEdipine  60 mg Oral Nightly    pantoprazole  40 mg Oral QAM AC    potassium chloride  20 mEq Oral Daily    Vitamin D  2,000 Units Oral Daily    zinc sulfate  50 mg Oral Daily    heparin (porcine)  5,000 Units Subcutaneous 3 times per day    insulin lispro  0-6 Units Subcutaneous TID     insulin lispro  0-3 Units Subcutaneous Nightly    pregabalin  300 mg Oral BID    alogliptin  25 mg Oral Daily      dextrose       glucose, dextrose, glucagon (rDNA), dextrose, sodium chloride, acetaminophen, traMADol **OR** traMADol, ondansetron      LABS:      Recent Labs     02/02/21  1516 02/02/21 2024 02/03/21  0619   POCGLU 187* 164* 144*       CULTURES:   UA:   No results for input(s): Erby Trev, COLORU, CLARITYU, MUCUS, PROTEINU, BLOODU, RBCUA, WBCUA, BACTERIA, NITRU, GLUCOSEU, BILIRUBINUR, UROBILINOGEN, KETUA, LABCAST, LABCASTTY, AMORPHOS in the last 72 hours.     Invalid input(s): CRYSTALS  Micro:   Lab Results   Component Value Date    BC No growth-preliminary No growth  01/25/2021    BC No growth-preliminary No growth  01/25/2021          Problem list of patient:     Patient Active Problem List   Diagnosis Code    Hypercholesterolemia E78.00    Osteoarthritis M19.90    Osteoporosis M81.0    Type 2 diabetes mellitus without complication, without long-term current use of insulin ([facility] [address] E11.9    DDD (degenerative disc disease), lumbar M51.36    Benign essential HTN I10    SWAPNA on CPAP G47.33, Z99.89    Diabetic peripheral neuropathy ([facility]) E11.42    Acute recurrent pansinusitis J01.41    Primary thunderclap headache G44.53    Rash of entire body R21    Infection of skin due to methicillin resistant Staphylococcus aureus (MRSA) A49.02    Drug allergy, antibiotic  likely bactrim Z88.1    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    Skin plaque L98.8    Hyponatremia E87.1    Hypervolemia E87.70    Cellulitis of lower extremity L03.119    Hypoglycemia E16.2    Acute on chronic systolic CHF (congestive heart failure) ([facility]) I50.23    Bilateral cellulitis of lower leg L03.116, L03.115    Venous ulcers of both lower extremities ([facility]) I83.019, L97.919, I83.029, L97.929    Bilateral lower leg cellulitis L03.116, L03.115    CKD (chronic kidney disease) stage 3, GFR 30-59 ml/min ([facility]) N18.30    Iron deficiency anemia D50.9    Hypomagnesemia E83.42    SBO (small bowel obstruction) ([facility] [address] K56.609    Venous ulcer of left leg ([facility]) I83.029, L97.929    Venous ulcer of right leg ([facility]) I83.019, L97.919    Acute eczema L30.9    Venous insufficiency of both lower extremities I87.2    Lymphedema of both lower extremities I89.0    Pressure injury of left buttock, stage 2 ([facility]) O80.092    Eczematous dermatitis L30.9    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 3XXA    Facial cellulitis L03. 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[facility]) C30.0    Cataract of both eyes H26.9    History of ventral hernia repair Z98.890, Z87.19    Other cervical disc degeneration, mid-cervical region M50.320    Spondylolisthesis of cervical region M43.12    Spondylolisthesis of thoracic region M43.14    Chronic rhinitis J31.0    Closed fracture of head of humerus S42.293A    Dysphagia R13.10    Laryngopharyngeal reflux K21.9    Malignant neoplasm of ethmoidal sinus ([facility]) C31.1    Obesity, Class II, BMI 35-39.9 E66.9    COVID-19 U07.1    Sepsis ([facility]) A41.9    Brain mass G93.89         ASSESSMENT/PLAN   Fall:    Chronic sinusitis: She had recent history of cleaning of her sinuses. 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 Continue current treatment.   Plan for ECF when approved  Tim Keita MD, FACP 2/3/2021 10:37 AM she feels better

## 2021-02-03 NOTE — PROGRESS NOTES
IM Progress Note  Dr. Naida Hernandez  2/3/2021 12:16 PM      Patient name Miko Browne  [id number]  PCP: Char Nicholson MD  Admit Date: 1/25/2021  Acct No. [de-identified]    Subjective: Interval History:   Pt up in chair  No headache    Diet: DIET CARB CONTROL; Dietary Nutrition Supplements: Wound Healing Oral Supplement    I/O last 3 completed shifts: In: 920 [P.O.:920]  Out: 2650 [Urine:2650]  No intake/output data recorded. Admission weight: 220 lb (99.8 kg) as of 1/25/2021 11:11 AM  Wt Readings from Last 3 Encounters:   02/03/21 213 lb 1.6 oz (96.7 kg)   12/28/20 221 lb 3.2 oz (100.3 kg)   07/23/20 210 lb (95.3 kg)     Body mass index is 35.46 kg/m². Medications:   Scheduled Meds:   amoxicillin-clavulanate  1 tablet Oral 2 times per day    docusate sodium  100 mg Oral BID    polyethylene glycol  17 g Oral Daily    bumetanide  1 mg Oral BID    ferrous sulfate  325 mg Oral BID    hydrALAZINE  50 mg Oral BID    mupirocin   Nasal BID    NIFEdipine  60 mg Oral Nightly    pantoprazole  40 mg Oral QAM AC    potassium chloride  20 mEq Oral Daily    Vitamin D  2,000 Units Oral Daily    zinc sulfate  50 mg Oral Daily    heparin (porcine)  5,000 Units Subcutaneous 3 times per day    insulin lispro  0-6 Units Subcutaneous TID WC    insulin lispro  0-3 Units Subcutaneous Nightly    pregabalin  300 mg Oral BID    alogliptin  25 mg Oral Daily     Continuous Infusions:   dextrose         Labs :     CBC:   No results for input(s): WBC, HGB, PLT in the last 72 hours. BMP:    No results for input(s): NA, K, CL, CO2, BUN, CREATININE, GLUCOSE in the last 72 hours. Hepatic:   No results for input(s): AST, ALT, ALB, BILITOT, ALKPHOS in the last 72 hours. Troponin: No results for input(s): TROPONINI in the last 72 hours. BNP: No results for input(s): BNP in the last 72 hours. Lipids:   No results for input(s): CHOL, HDL in the last 72 hours.     Invalid input(s): LDLCALCU  INR: No results for input(s): INR in the last 72 hours. Radiology    Objective:   Vitals: /74   Pulse 73   Temp 98.2 °F (36.8 °C) (Oral)   Resp 20   Ht 5' 5\" (1.651 m)   Wt 213 lb 1.6 oz (96.7 kg)   LMP  (LMP Unknown)   SpO2 96%   BMI 35.46 kg/m²   HEENT: Head:pupils react  Neck: supple not rigid forward flexion per daughter not new   Lungs: clear to auscultation  Heart: regular rate and rhythm   Abdomen: soft BS heard   Extremities: warm  No edema  Neurologic:  Awake oriented    Impression:   :   1. Fever  possibly sinusitis / pneumonia. 2.  History of adenocarcinoma of the ethmoid sinus, status post nasal  endoscopy with debridement of the sinonasal cavity. 3.  Diabetes. 4.  Hypertension. 5.  Hyperlipidemia. 6.  Neuropathy with degenerative disk disease. 7.  Chronic lymphedema. 8.  History of sleep apnea, but could not tolerate CPAP machine. 9.  Obesity. 10.  Prior history of COVID.   6.  Fall with questionable mentioning of blacking out MRI sugg of tumor    Plan:    Pt  To be discharged to [facility] today  No headache   Complete antibiotics  Cont PT OT    Bobbi Chacon MD

## 2021-02-03 NOTE — DISCHARGE INSTR - COC
Continuity of Care Form    Patient Name: Ambrosio Mcardle   :  1940  MRN:  [id number]    Admit date:  2021  Discharge date:  2/3/2021    Code Status Order: Full Code   Advance Directives:   [facility] Documentation       Date/Time Healthcare Directive Type of Healthcare Directive Copy in [facility] [address] Agent's Name Healthcare Agent's Phone Number    21 1802  No, patient does not have an advance directive for healthcare treatment -- -- -- -- --            Admitting Physician:  Jil Márquez MD  PCP: Saurabh Saravia MD    Discharging Nurse: Nataliia Bolden Unit/Room#: 4A-15/015-A  Discharging Unit Phone Number: [phone]    Emergency Contact:   Extended Emergency Contact Information  Primary Emergency Contact: [address] [place] [address] Phone: [phone]  Relation: Child  Secondary Emergency Contact: [address] Phone: [phone]  Relation: Child    Past Surgical History:  Past Surgical History:   Procedure Laterality Date    ABDOMEN SURGERY      APPENDECTOMY      CARPAL TUNNEL RELEASE Bilateral ,     CATARACT REMOVAL  2011    bilat    CHOLECYSTECTOMY  1988    COLONOSCOPY  2016    COLONOSCOPY  10/11/2018    COLONOSCOPY POLYPECTOMY SNARE/COLD BIOPSY performed by Poornima Perez MD at [address] ESOPHAGUS      ENDOSCOPY, COLON, DIAGNOSTIC      EYE SURGERY Left 2015    EYE SURGERY      CATARACT REMOVAL- BILATERAL    HEMORRHOID SURGERY  1980s    HERNIA REPAIR      HYSTERECTOMY, TOTAL ABDOMINAL  1980    JOINT REPLACEMENT  bilat 2007    knees    UT COLSC FLX WITH DIRECTED SUBMUCOSAL Simone Oskar [address] ANY SBST  10/11/2018    COLONOSCOPY SUBMUCOSAL/BOTOX INJECTION performed by Poornima Perez MD at [address]  last     removed a bone (2)--2 times no construction     SINUS SURGERY  2016    SINUS SURGERY 2016 x 2    SINUS SURGERY  09/18/2017    [facility] - Dr Rickey Ruiz SINUS SURGERY  08/09/2017 [facility]    TONSILLECTOMY      TOTAL KNEE ARTHROPLASTY  08/2003    Left TKR    VENTRAL HERNIA REPAIR  1992       Immunization History:   Immunization History   Administered Date(s) Administered    DTaP 03/07/1997    DTaP (Infanrix) 03/07/1997    Hib vaccine 09/21/2017    Hib, unspecified 09/21/2017    Influenza 10/12/2011, 11/02/2012    Influenza Vaccine, unspecified formulation 02/02/2016, 11/03/2016    Influenza Virus Vaccine 10/04/2014    Influenza Whole 10/08/1999, 11/28/2006, 10/01/2007, 10/16/2008, 08/25/2009, 12/03/2010    Influenza, High Dose (Fluzone 65 yrs and older) 09/21/2017    Influenza, Espinal Fleeting, 6 mo and older, IM, PF (Flulaval, Fluarix) 10/01/2018    Influenza, Triv, inactivated, subunit, adjuvanted, IM (Fluad 65 yrs and older) 02/26/2020    Meningococcal MCV4O (Menveo) 09/21/2017    Meningococcal MCV4P (Menactra) 09/21/2017    PPD Test 10/01/2018    Pneumococcal Conjugate 13-valent ([id number]) 11/03/2016    Pneumococcal Polysaccharide ([id number]) 10/08/1995, 09/21/2017    Tdap (Boostrix, Adacel) 10/03/2017    Tetanus 03/07/1997, 09/15/2008       Active Problems:  Patient Active Problem List   Diagnosis Code    Hypercholesterolemia E78.00    Osteoarthritis M19.90    Osteoporosis M81.0    Type 2 diabetes mellitus without complication, without long-term current use of insulin (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    Skin plaque L98.8    Hyponatremia E87.1    Hypervolemia E87.70    Cellulitis of lower extremity L03.119    Hypoglycemia E16.2    Acute on chronic systolic CHF (congestive heart failure) ([facility]) I50.23    Bilateral cellulitis of lower leg L03.116, L03.115    Venous ulcers of both lower extremities ([facility]) I83.019, L97.919, I83.029, L97.929    Bilateral lower leg cellulitis L03.116, L03.115    CKD (chronic kidney disease) stage 3, GFR 30-59 ml/min ([facility]) N18.30    Iron deficiency anemia D50.9    Hypomagnesemia E83.42    SBO (small bowel obstruction) ([facility] [address] K56.609    Venous ulcer of left leg ([facility]) I83.029, L97.929    Venous ulcer of right leg ([facility]) I83.019, L97.919    Acute eczema L30.9    Venous insufficiency of both lower extremities I87.2    Lymphedema of both lower extremities I89.0    Pressure injury of left buttock, stage 2 ([facility]) Q50.877    Eczematous dermatitis L30.9    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 3XXA    Facial cellulitis L03. 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[facility]) C30.0    Cataract of both eyes H26.9    History of ventral hernia repair Z98.890, Z87.19    Other cervical disc degeneration, mid-cervical region M50.320    Spondylolisthesis of cervical region M43.12    Spondylolisthesis of thoracic region M43.14    Chronic rhinitis J31.0    Closed fracture of head of humerus S42.293A    Dysphagia R13.10    Laryngopharyngeal reflux K21.9    Malignant neoplasm of ethmoidal sinus ([facility]) C31.1    Obesity, Class II, BMI 35-39.9 E66.9    COVID-19 U07.1    Sepsis ([facility]) A41.9    Brain mass G93.89       Isolation/Infection:   Isolation            Contact          Patient Infection Status       Infection Onset Added Last Indicated Last Indicated By Review Planned Expiration Resolved Resolved By    MRSA  03/08/18 02/24/19 MRSA Screening Culture Only        Leg 3/2018, 2018,2018, 2019  Nares 2019  Rectal 2019    Resolved    COVID-19 Rule Out 21 COVID-19 (Ordered)   21 Rule-Out Test Resulted    COVID-19 20 COVID-19   21     +    COVID-19 Rule Out 20 COVID-19 (Ordered)   20 Rule-Out Test Resulted    COVID-19 Rule Out 20 Covid-19 Ambulatory (Ordered)   20 Rule-Out Test Resulted    COVID-19 Rule Out 20 Covid-19 Ambulatory (Ordered)   20 Rule-Out Test Resulted            Nurse Assessment:  Last Vital Signs: /74   Pulse 73   Temp 98.2 °F (36.8 °C) (Oral)   Resp 20   Ht 5' 5\" (1.651 m)   Wt 213 lb 1.6 oz (96.7 kg)   LMP  (LMP Unknown)   SpO2 96%   BMI 35.46 kg/m²     Last documented pain score (0-10 scale): Pain Level: 0  Last Weight:   Wt Readings from Last 1 Encounters:   21 213 lb 1.6 oz (96.7 kg)     Mental Status:  oriented    IV Access:  - None    Nursing Mobility/ADLs:  Walking   Assisted  Transfer  Assisted  Bathing  Assisted  Dressing  Assisted  Toileting  Assisted  Feeding  Independent  Med Admin  Assisted  Med Delivery   none    Wound Care Documentation and Therapy:  Wound 20 Buttocks Inner;Left stage 2 (Active)   Wound Cleansed Soap and water 21 0845   Dressing/Treatment Triad hydro/zinc oxide-based hydrophilic paste  4168   Wound Assessment Non-blanchable erythema 21   Drainage Amount None 21   Odor None 21   [place]lin-wound Assessment Blanchable erythema 21   Number of days: 36       Wound 20 Buttocks Inner non blanchable reddness with skin tears (Active)   Wound Cleansed Soap and water 21 0845   Dressing/Treatment Triad hydro/zinc oxide-based hydrophilic paste 43/42/72 2048   Wound Assessment Pink/red 21   Drainage Amount None 21 2018   Odor None 21   Marlin-wound Assessment Blanchable erythema 02/01/21 2018   Number of days: 36       Wound Breast Left;Right (Active)   Dressing/Treatment Interdry Ag/wicking fabric with Ag 02/03/21 0745   Wound Assessment Erythema 02/03/21 0745   Number of days:         Elimination:  Continence:   · Bowel: Yes  · Bladder: No  Urinary Catheter: None   Colostomy/Ileostomy/Ileal Conduit: No       Date of Last BM: 2/1/2021    Intake/Output Summary (Last 24 hours) at 2/3/2021 1212  Last data filed at 2/3/2021 0429  Gross per 24 hour   Intake 920 ml   Output 2650 ml   Net -1730 ml     I/O last 3 completed shifts: In: 920 [P.O.:920]  Out: 2650 [Urine:2650]    Safety Concerns:     History of Falls (last 30 days) and At Risk for Falls    Impairments/Disabilities:      Vision and Hearing    Nutrition Therapy:  Current Nutrition Therapy:   - Oral Diet:  Carb Control 4 carbs/meal (1800kcals/day)  - Oral Nutrition Supplement:  Wound Healing  twice a day    Routes of Feeding: Oral  Liquids: Thin Liquids  Daily Fluid Restriction: no  Last Modified Barium Swallow with Video (Video Swallowing Test): not done    Treatments at the Time of Hospital Discharge:   Respiratory Treatments: none  Oxygen Therapy:  is not on home oxygen therapy.   Ventilator:    - No ventilator support    Rehab Therapies: Physical Therapy, Occupational Therapy and Speech/Language Therapy  Weight Bearing Status/Restrictions: No weight bearing restirctions  Other Medical Equipment (for information only, NOT a DME order):  walker, bath bench, bedside commode and hospital bed  Other Treatments: ***    Patient's personal belongings (please select all that are sent with patient):  None    RN SIGNATURE:  Electronically signed by Adamaris Davidson on 2/3/21 at 1:40 PM EST    CASE MANAGEMENT/SOCIAL WORK SECTION    Inpatient Status Date: 1/25/2021    Readmission Risk Assessment Score:  Readmission Risk              Risk of Unplanned Readmission:        23           Discharging to Facility/ Agency   · Name: [facility]  · [address], [address]  · [address]  · SUL:[phone]    Dialysis Facility (if applicable)   · Name:  · Address:  · Dialysis Schedule:  · Phone:  · Fax:    / signature: {Esignature:[id number]}    PHYSICIAN SECTION    Prognosis: Fair    Condition at Discharge: Stable    Rehab Potential (if transferring to Rehab): Fair    Recommended Labs or Other Treatments After Discharge: ***    Physician Certification: I certify the above information and transfer of Jerri Salts  is necessary for the continuing treatment of the diagnosis listed and that she requires [place] for less 30 days.      Update Admission H&P: No change in H&P    PHYSICIAN SIGNATURE:  Electronically signed by Leo Escobar MD on 2/3/2021 at 12:18 PM

## 2021-02-03 NOTE — PROGRESS NOTES
[address]elle [address] 61  OCCUPATIONAL THERAPY MISSED TREATMENT NOTE  [facility]      Date: 2/3/2021  Patient Name: Claire Joseph        CSN: [id number]   : 1940  (79 y.o.)  Gender: female   Referring Practitioner: Ruthie Forman MD  Diagnosis: Sepsis         REASON FOR MISSED TREATMENT: Pt is discharged and awaiting transport to SNF this PM. Will attempt tommorrow if POC changes.

## 2021-02-03 NOTE — PROGRESS NOTES
Report called and given to Shelli Mejía. This RN spoke with [facility]. All questions answered. Patient made aware of discharge. AVS reviewed with patient. Copy of AVS printed and placed in packet .

## 2021-02-03 NOTE — PROGRESS NOTES
[address] 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 The patient had difficultyeven with two assists to get up immediately, but they were eventuallyable to sit her up in the chair. EMS was called and she was brought [facility].  Workup in the ER, her temperature was 103. She did havechest x-ray and CT of the head as the patient mentioned she might haveblacked out, but later, she mentioned that she thought she heardsomebody yelling and she quickly turned and lost her balance. Shedenied having any chest pain or difficulty breathing. She is lying inbed, complaining of a headache. There was no nausea, vomiting, ordiarrhea. Occasional cough, but nonproductive. The patient wasdiagnosed initially with adenocarcinoma of her sinuses about four yearsback for which she had treatment with resection and adjuvant radiationfollowed by recurrence in  and underwent endoscopic resection in2020. She denies any numbness, tingling, or weakness in her lowerextremities.      Prior Level of Function:  Lives With: Daughter  Type of Home: House  Home Layout: One level  Home Access: Stairs to enter without rails(uses both sides of door frame to pull self in)  Entrance Stairs - Number of Steps: 1 very small step  Home Equipment: [employer], 4 wheeled walker, Standard walker, Quad cane(rarely uses RW)   Bathroom Shower/Tub: Tub/Shower unit, Shower chair with back  Bathroom Toilet: Standard  Bathroom Accessibility: Accessible    Receives Help From: Family  ADL Assistance: Independent  Homemaking Assistance: Needs assistance(microwaving)  Homemaking Responsibilities: No  Ambulation Assistance: Independent  Transfer Assistance: Independent  Additional Comments: Pt's daughter reports that pt was ambulating short distances with walker within and around her home in PLOF. Restrictions/Precautions:  Restrictions/Precautions: General Precautions, Fall Risk  Position Activity Restriction  Other position/activity restrictions: Per pt: Hx R shoulder fx ~4 years ago, non-op. limited ROM in RUE. SUBJECTIVE: RN approved session. Pt in bed upon arrival and agrees to therapy. Pt [place]. PAIN: no c/o pain    OBJECTIVE:  Bed Mobility:  Rolling to Left: Minimal Assistance, with verbal cues , with increased time for completion   Supine to Sit: Minimal Assistance, with head of bed raised, with verbal cues , with increased time for completion  Scooting: Minimal Assistance, with verbal cues     Transfers:  Sit to Stand: [employer], Minimal Assistance, with increased time for completion, cues for hand placement, with verbal cues  Stand to [place] [address], with increased time for completion, cues for hand placement, with verbal cues, cues to align with surface    Ambulation:  Contact Guard Assistance, with increased time for completion  Distance: 8ft  Surface: Level Tile  Device:Rolling Walker  Gait Deviations: Forward Flexed Posture, Slow Jen, Decreased Step Length Bilaterally, Decreased Gait Speed, Decreased Heel Strike Bilaterally, Wide Base of Support, Unsteady Gait and Decreased Terminal Knee Extension    Exercise:  Patient was guided in 1 set(s) 10 reps of exercise to both lower extremities.   Ankle pumps, Glut sets, Quad sets, Heelslides, Hip abduction/adduction and Straight leg raises. Exercises were completed for increased independence with functional mobility. Functional Outcome Measures: Completed  AM-PAC Inpatient Mobility Raw Score : 16  AM-PAC Inpatient T-Scale Score : 40.78    ASSESSMENT:  Assessment: Patient progressing toward established goals. Activity Tolerance:  Patient tolerance of  treatment: good. Pt demos decreased endurance, strength and independence with mobility. Pt will benefit from cont PT at this time     Equipment Recommendations:Equipment Needed: No  Discharge Recommendations:  Continue to assess pending progress, would benefit from cont PT, subacute skilled nursing facility    Plan: Times per week: 4-5x GM  Current Treatment Recommendations: Strengthening, Balance Training, Functional Mobility Training, Endurance Training, Transfer Training, Safety Education & Training, Home Exercise Program, Patient/Caregiver Education & Training, Gait Training    Patient Education  Patient Education: Plan of Care, [employer] Mobility, Transfers, Gait, Verbal Exercise Instruction    Goals:  Patient goals : go home  Short term goals  Time Frame for Short term goals: at discharge  Short term goal 1: Pt to be SBA for supine <> sit to get in/out of bed  Short term goal 2: Pt to be SBA for sit <> stand to get up to ambulate  Short term goal 3: Pt to ambulate > 10 ft with RW with CGA to get to bathroom  Long term goals  Time Frame for Long term goals : not set due to short ELOS    Following session, patient left in safe position with all fall risk precautions in place.

## 2021-02-03 NOTE — CARE COORDINATION
2/3/21, 12:17 PM EST    Patient goals/plan/ treatment preferences discussed by  and . Patient goals/plan/ treatment preferences reviewed with patient/ family. Patient/ family verbalize understanding of discharge plan and are in agreement with goal/plan/treatment preferences. Understanding was demonstrated using the teach back method. AVS provided by RN at time of discharge, which includes all necessary medical information pertaining to the patients current course of illness, treatment, post-discharge goals of care, and treatment preferences. Services After Discharge  Services At/After Discharge: Nursing Services, OT, PT, In ambulance, Aide Services([place]/ Ambulance)   IMM Letter  IMM Letter given to Patient/Family/Significant other/Guardian/POA/by[de-identified] CM  IMM Letter date given[de-identified] 01/29/21  IMM Letter time given[de-identified] 0400  Received a call from ABI Castro at [facility]. She reports that the patients precert has been approved. She will be discharged today to [facility] under her [facility] plan. She will be transported by ambulance. Discharge instructions and transport forms are attached to Cate's blue discharge packet. Isaias Johnson RN is agreeable to SW requesting a 4:00pm transport time. Called and spoke with daughter Kathern Spatz to update her on discharge. Spoke with Cherrie Meng at [facility] to make her aware of requested transport time.

## 2021-02-03 NOTE — DISCHARGE SUMMARY
[address] [place] [address]                               DISCHARGE SUMMARY    PATIENT NAME: Elo Rojo                       :        1940  MED REC NO:   [id number]                           ROOM:       0015  ACCOUNT NO:   [de-identified]                           ADMIT DATE: 2021  PROVIDER:     VITA Galindo Gabriela: 2021    CLINICAL SUMMARY    HOSPITAL COURSE:  This is a 80-year-old woman with known history of  adenocarcinoma of the ethmoidal sinuses who underwent bilateral nasal  endoscopy with debridement of the sinonasal cavity on 2021. The  patient was on Augmentin, but apparently the patient had fever and she  also had a fall, which they were not sure if she had a blackout. The  patient was admitted, did receive broad-spectrum antibiotics with Zosyn. ENT was also consulted. ID did not feel the patient had any signs of  meningitis and hence, the patient continued to stay on Zosyn. 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 Postoperative changes along the medial wall of both maxillary  sinuses were noted. 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 MRI was recommended for better evaluation of the right frontal gyrus and  was ordered, which showed abnormal signal intensity of the right frontal  gyrus sheath. Initially was informed as infarct, but later we did  receive an addendum, and the patient's diffusion images were reviewed  and was informed that since there was no restricted diffusion in the  right frontal lobe, it was felt as more suspicious for tumor than  infarct, hence Neurosurgery was consulted. The patient was alert and  oriented. There was no confusion noted.   Neurology recommended  outpatient followup with [facility] and did not recommend any  hospital transfer at this time. I did discuss plan of care with the  patient's daughter. We also attempted to reach ENT at [facility] and awaiting  the reply. The patient completed the antibiotics with IV Zosyn and has  been transitioned to oral Augmentin to be completed on 02/08/2021. The  patient was receiving PT/OT and was significantly deconditioned and  hence rehab at a skilled facility was recommended. Family and the  patient were agreeable. The patient's pre-cert was approved and has  been discharged to skilled for rehab today. FINAL DIAGNOSES:  1. Fever secondary to sinusitis with possible pneumonia. 2.  Adenocarcinoma of the ethmoid sinus, status post debridement with  questionable lesion noted on the frontal gyrus. Neurosurgery here are  recommending followup with [address] [place] [address]. 3.  Diabetes. 4.  Hypertension. 5.  Hyperlipidemia. 6.  Neuropathy with history of degenerative disk disease. 7.  Chronic lymphedema. 8.  Prior history of sleep apnea, but could not tolerate CPAP machine. 9.  Obesity. 10.  Prior history of COVID. 6.  Fall. MEDICATIONS:  To continue as addressed in the discharge sheet. DISPOSITION:  To skilled nursing. DIET:  As tolerated. ACTIVITY:  Continue PT/OT. I spent more than 30 minutes that involved examining the patient,  reviewing the chart, and reconciling her med rec sheet.         Samuel Abrams M.D.    D: 02/03/2021 12:28:59       T: 02/03/2021 15:51:01     [email or web address]  Job#: [id number]     Doc#: [id number]    CC:

## 2021-02-04 ENCOUNTER — CARE COORDINATION (OUTPATIENT)
Dept: CASE MANAGEMENT | Age: 81
End: 2021-02-04

## 2021-02-04 NOTE — CARE COORDINATION
Patient discharged to [facility] on 2/3/2021.     Denny Godoy LPN    [phone]  Andreina Woodsor / Sandie [address] Coordinator

## 2021-02-12 ENCOUNTER — TELEPHONE (OUTPATIENT)
Dept: FAMILY MEDICINE CLINIC | Age: 81
End: 2021-02-12

## 2021-02-12 NOTE — TELEPHONE ENCOUNTER
Patient's nurse from [employer] called. Patient is asking for Tylenol but they have it on her allergy list.  Patient's daughter says that it is only Tylenol 3 and that she takes it at home all of the time. Is she only allergic to the codeine in the Tylenol 3 or the acetaminophen? Please call them back at [phone] and ask for [facility] or [facility].

## 2021-02-28 ENCOUNTER — APPOINTMENT (OUTPATIENT)
Dept: CT IMAGING | Age: 81
DRG: 603 | End: 2021-02-28
Payer: MEDICARE

## 2021-02-28 ENCOUNTER — HOSPITAL ENCOUNTER (INPATIENT)
Age: 81
LOS: 4 days | Discharge: HOME | DRG: 603 | End: 2021-03-04
Attending: EMERGENCY MEDICINE | Admitting: INTERNAL MEDICINE
Payer: MEDICARE

## 2021-02-28 ENCOUNTER — APPOINTMENT (OUTPATIENT)
Dept: GENERAL RADIOLOGY | Age: 81
DRG: 603 | End: 2021-02-28
Payer: MEDICARE

## 2021-02-28 DIAGNOSIS — R73.9 HYPERGLYCEMIA: ICD-10-CM

## 2021-02-28 DIAGNOSIS — L73.9 FOLLICULITIS: Primary | ICD-10-CM

## 2021-02-28 DIAGNOSIS — L03.211 CELLULITIS OF FACE: ICD-10-CM

## 2021-02-28 PROBLEM — L03.90 CELLULITIS: Status: ACTIVE | Noted: 2021-02-28

## 2021-02-28 LAB
ALBUMIN SERPL-MCNC: 3.6 G/DL (ref 3.5–5.1)
ALP BLD-CCNC: 105 U/L (ref 38–126)
ALT SERPL-CCNC: 12 U/L (ref 11–66)
ANION GAP SERPL CALCULATED.3IONS-SCNC: 18 MEQ/L (ref 8–16)
APTT: 26.5 SECONDS (ref 22–38)
AST SERPL-CCNC: 8 U/L (ref 5–40)
BASOPHILS # BLD: 0.1 %
BASOPHILS ABSOLUTE: 0 THOU/MM3 (ref 0–0.1)
BILIRUB SERPL-MCNC: 0.6 MG/DL (ref 0.3–1.2)
BILIRUBIN URINE: NEGATIVE
BLOOD, URINE: NEGATIVE
BUN BLDV-MCNC: 33 MG/DL (ref 7–22)
CALCIUM SERPL-MCNC: 8.8 MG/DL (ref 8.5–10.5)
CHARACTER, URINE: CLEAR
CHLORIDE BLD-SCNC: 93 MEQ/L (ref 98–111)
CO2: 22 MEQ/L (ref 23–33)
COLOR: YELLOW
CREAT SERPL-MCNC: 1 MG/DL (ref 0.4–1.2)
EKG ATRIAL RATE: 77 BPM
EKG P AXIS: -4 DEGREES
EKG P-R INTERVAL: 158 MS
EKG Q-T INTERVAL: 398 MS
EKG QRS DURATION: 100 MS
EKG QTC CALCULATION (BAZETT): 450 MS
EKG R AXIS: 14 DEGREES
EKG T AXIS: 63 DEGREES
EKG VENTRICULAR RATE: 77 BPM
EOSINOPHIL # BLD: 0 %
EOSINOPHILS ABSOLUTE: 0 THOU/MM3 (ref 0–0.4)
ERYTHROCYTE [DISTWIDTH] IN BLOOD BY AUTOMATED COUNT: 16.8 % (ref 11.5–14.5)
ERYTHROCYTE [DISTWIDTH] IN BLOOD BY AUTOMATED COUNT: 53.9 FL (ref 35–45)
GFR SERPL CREATININE-BSD FRML MDRD: 53 ML/MIN/1.73M2
GLUCOSE BLD-MCNC: 216 MG/DL (ref 70–108)
GLUCOSE BLD-MCNC: 385 MG/DL (ref 70–108)
GLUCOSE BLD-MCNC: 453 MG/DL (ref 70–108)
GLUCOSE BLD-MCNC: 593 MG/DL (ref 70–108)
GLUCOSE URINE: >= 1000 MG/DL
HCT VFR BLD CALC: 37.4 % (ref 37–47)
HEMOGLOBIN: 12.1 GM/DL (ref 12–16)
IMMATURE GRANS (ABS): 0.22 THOU/MM3 (ref 0–0.07)
IMMATURE GRANULOCYTES: 1.4 %
INR BLD: 0.94 (ref 0.85–1.13)
KETONES, URINE: NEGATIVE
LACTIC ACID, SEPSIS: 4.2 MMOL/L (ref 0.5–1.9)
LACTIC ACID, SEPSIS: 4.5 MMOL/L (ref 0.5–1.9)
LEUKOCYTE ESTERASE, URINE: NEGATIVE
LYMPHOCYTES # BLD: 3.2 %
LYMPHOCYTES ABSOLUTE: 0.5 THOU/MM3 (ref 1–4.8)
MCH RBC QN AUTO: 28.5 PG (ref 26–33)
MCHC RBC AUTO-ENTMCNC: 32.4 GM/DL (ref 32.2–35.5)
MCV RBC AUTO: 88.2 FL (ref 81–99)
MONOCYTES # BLD: 4.1 %
MONOCYTES ABSOLUTE: 0.6 THOU/MM3 (ref 0.4–1.3)
NITRITE, URINE: NEGATIVE
NUCLEATED RED BLOOD CELLS: 0 /100 WBC
OSMOLALITY CALCULATION: 301.1 MOSMOL/KG (ref 275–300)
PH UA: 6.5 (ref 5–9)
PLATELET # BLD: 114 THOU/MM3 (ref 130–400)
PMV BLD AUTO: 11.5 FL (ref 9.4–12.4)
POTASSIUM REFLEX MAGNESIUM: 3.9 MEQ/L (ref 3.5–5.2)
PRO-BNP: 1293 PG/ML (ref 0–1800)
PROTEIN UA: NEGATIVE
RBC # BLD: 4.24 MILL/MM3 (ref 4.2–5.4)
SEG NEUTROPHILS: 91.2 %
SEGMENTED NEUTROPHILS ABSOLUTE COUNT: 14.4 THOU/MM3 (ref 1.8–7.7)
SODIUM BLD-SCNC: 133 MEQ/L (ref 135–145)
SPECIFIC GRAVITY, URINE: 1.02 (ref 1–1.03)
TOTAL PROTEIN: 6.4 G/DL (ref 6.1–8)
TROPONIN T: < 0.01 NG/ML
UROBILINOGEN, URINE: 0.2 EU/DL (ref 0–1)
WBC # BLD: 15.8 THOU/MM3 (ref 4.8–10.8)

## 2021-02-28 PROCEDURE — 6370000000 HC RX 637 (ALT 250 FOR IP): Performed by: STUDENT IN AN ORGANIZED HEALTH CARE EDUCATION/TRAINING PROGRAM

## 2021-02-28 PROCEDURE — 85610 PROTHROMBIN TIME: CPT

## 2021-02-28 PROCEDURE — 83605 ASSAY OF LACTIC ACID: CPT

## 2021-02-28 PROCEDURE — 2580000003 HC RX 258: Performed by: STUDENT IN AN ORGANIZED HEALTH CARE EDUCATION/TRAINING PROGRAM

## 2021-02-28 PROCEDURE — 84484 ASSAY OF TROPONIN QUANT: CPT

## 2021-02-28 PROCEDURE — 96365 THER/PROPH/DIAG IV INF INIT: CPT

## 2021-02-28 PROCEDURE — 81003 URINALYSIS AUTO W/O SCOPE: CPT

## 2021-02-28 PROCEDURE — 6360000002 HC RX W HCPCS: Performed by: INTERNAL MEDICINE

## 2021-02-28 PROCEDURE — 93010 ELECTROCARDIOGRAM REPORT: CPT | Performed by: NUCLEAR MEDICINE

## 2021-02-28 PROCEDURE — 80053 COMPREHEN METABOLIC PANEL: CPT

## 2021-02-28 PROCEDURE — 6360000002 HC RX W HCPCS: Performed by: STUDENT IN AN ORGANIZED HEALTH CARE EDUCATION/TRAINING PROGRAM

## 2021-02-28 PROCEDURE — 96372 THER/PROPH/DIAG INJ SC/IM: CPT

## 2021-02-28 PROCEDURE — 85730 THROMBOPLASTIN TIME PARTIAL: CPT

## 2021-02-28 PROCEDURE — 99284 EMERGENCY DEPT VISIT MOD MDM: CPT

## 2021-02-28 PROCEDURE — 96375 TX/PRO/DX INJ NEW DRUG ADDON: CPT

## 2021-02-28 PROCEDURE — 2580000003 HC RX 258: Performed by: INTERNAL MEDICINE

## 2021-02-28 PROCEDURE — 36415 COLL VENOUS BLD VENIPUNCTURE: CPT

## 2021-02-28 PROCEDURE — 85025 COMPLETE CBC W/AUTO DIFF WBC: CPT

## 2021-02-28 PROCEDURE — 1200000003 HC TELEMETRY R&B

## 2021-02-28 PROCEDURE — 83880 ASSAY OF NATRIURETIC PEPTIDE: CPT

## 2021-02-28 PROCEDURE — 71045 X-RAY EXAM CHEST 1 VIEW: CPT

## 2021-02-28 PROCEDURE — 70487 CT MAXILLOFACIAL W/DYE: CPT

## 2021-02-28 PROCEDURE — 6360000004 HC RX CONTRAST MEDICATION: Performed by: STUDENT IN AN ORGANIZED HEALTH CARE EDUCATION/TRAINING PROGRAM

## 2021-02-28 PROCEDURE — 82948 REAGENT STRIP/BLOOD GLUCOSE: CPT

## 2021-02-28 PROCEDURE — 87040 BLOOD CULTURE FOR BACTERIA: CPT

## 2021-02-28 PROCEDURE — 70450 CT HEAD/BRAIN W/O DYE: CPT

## 2021-02-28 PROCEDURE — 6370000000 HC RX 637 (ALT 250 FOR IP): Performed by: INTERNAL MEDICINE

## 2021-02-28 PROCEDURE — 70491 CT SOFT TISSUE NECK W/DYE: CPT

## 2021-02-28 PROCEDURE — 93005 ELECTROCARDIOGRAM TRACING: CPT | Performed by: STUDENT IN AN ORGANIZED HEALTH CARE EDUCATION/TRAINING PROGRAM

## 2021-02-28 RX ORDER — MORPHINE SULFATE 4 MG/ML
4 INJECTION, SOLUTION INTRAMUSCULAR; INTRAVENOUS ONCE
Status: COMPLETED | OUTPATIENT
Start: 2021-02-28 | End: 2021-02-28

## 2021-02-28 RX ORDER — POLYETHYLENE GLYCOL 3350 17 G/17G
17 POWDER, FOR SOLUTION ORAL DAILY
Status: DISCONTINUED | OUTPATIENT
Start: 2021-03-01 | End: 2021-03-04 | Stop reason: HOSPADM

## 2021-02-28 RX ORDER — ASCORBIC ACID 500 MG
1000 TABLET ORAL DAILY
Status: DISCONTINUED | OUTPATIENT
Start: 2021-03-01 | End: 2021-03-04 | Stop reason: HOSPADM

## 2021-02-28 RX ORDER — ZINC SULFATE 50(220)MG
50 CAPSULE ORAL DAILY
Status: DISCONTINUED | OUTPATIENT
Start: 2021-03-01 | End: 2021-03-04 | Stop reason: HOSPADM

## 2021-02-28 RX ORDER — 0.9 % SODIUM CHLORIDE 0.9 %
30 INTRAVENOUS SOLUTION INTRAVENOUS ONCE
Status: COMPLETED | OUTPATIENT
Start: 2021-02-28 | End: 2021-02-28

## 2021-02-28 RX ORDER — VITAMIN B COMPLEX
2000 TABLET ORAL DAILY
Status: DISCONTINUED | OUTPATIENT
Start: 2021-03-01 | End: 2021-03-04 | Stop reason: HOSPADM

## 2021-02-28 RX ORDER — POTASSIUM CHLORIDE 20 MEQ/1
20 TABLET, EXTENDED RELEASE ORAL DAILY
Status: DISCONTINUED | OUTPATIENT
Start: 2021-03-01 | End: 2021-03-04 | Stop reason: HOSPADM

## 2021-02-28 RX ORDER — NIFEDIPINE 60 MG/1
60 TABLET, EXTENDED RELEASE ORAL DAILY
Status: DISCONTINUED | OUTPATIENT
Start: 2021-03-01 | End: 2021-03-04 | Stop reason: HOSPADM

## 2021-02-28 RX ORDER — 0.9 % SODIUM CHLORIDE 0.9 %
1000 INTRAVENOUS SOLUTION INTRAVENOUS ONCE
Status: COMPLETED | OUTPATIENT
Start: 2021-02-28 | End: 2021-02-28

## 2021-02-28 RX ORDER — SODIUM CHLORIDE 9 MG/ML
INJECTION, SOLUTION INTRAVENOUS CONTINUOUS
Status: DISCONTINUED | OUTPATIENT
Start: 2021-02-28 | End: 2021-03-02

## 2021-02-28 RX ORDER — HYDRALAZINE HYDROCHLORIDE 50 MG/1
50 TABLET, FILM COATED ORAL 2 TIMES DAILY
Status: DISCONTINUED | OUTPATIENT
Start: 2021-02-28 | End: 2021-03-04 | Stop reason: HOSPADM

## 2021-02-28 RX ORDER — PANTOPRAZOLE SODIUM 40 MG/1
40 TABLET, DELAYED RELEASE ORAL
Status: DISCONTINUED | OUTPATIENT
Start: 2021-03-01 | End: 2021-03-04 | Stop reason: HOSPADM

## 2021-02-28 RX ORDER — ACETAMINOPHEN 325 MG/1
650 TABLET ORAL EVERY 8 HOURS PRN
Status: DISCONTINUED | OUTPATIENT
Start: 2021-02-28 | End: 2021-03-04 | Stop reason: HOSPADM

## 2021-02-28 RX ORDER — FERROUS SULFATE 325(65) MG
325 TABLET ORAL 2 TIMES DAILY WITH MEALS
Status: DISCONTINUED | OUTPATIENT
Start: 2021-03-01 | End: 2021-03-04 | Stop reason: HOSPADM

## 2021-02-28 RX ORDER — DOCUSATE SODIUM 100 MG/1
100 CAPSULE, LIQUID FILLED ORAL 2 TIMES DAILY PRN
Status: DISCONTINUED | OUTPATIENT
Start: 2021-02-28 | End: 2021-03-04 | Stop reason: HOSPADM

## 2021-02-28 RX ORDER — DEXAMETHASONE 2 MG/1
2 TABLET ORAL 2 TIMES DAILY WITH MEALS
Status: ON HOLD | COMMUNITY
Start: 2021-02-26 | End: 2021-03-31 | Stop reason: HOSPADM

## 2021-02-28 RX ORDER — PREGABALIN 75 MG/1
150 CAPSULE ORAL DAILY
Status: DISCONTINUED | OUTPATIENT
Start: 2021-03-01 | End: 2021-03-04 | Stop reason: HOSPADM

## 2021-02-28 RX ORDER — BUMETANIDE 1 MG/1
1 TABLET ORAL 2 TIMES DAILY
Status: DISCONTINUED | OUTPATIENT
Start: 2021-02-28 | End: 2021-03-04 | Stop reason: HOSPADM

## 2021-02-28 RX ADMIN — SODIUM CHLORIDE 1000 ML: 9 INJECTION, SOLUTION INTRAVENOUS at 17:06

## 2021-02-28 RX ADMIN — VANCOMYCIN HYDROCHLORIDE 2500 MG: 5 INJECTION, POWDER, LYOPHILIZED, FOR SOLUTION INTRAVENOUS at 16:01

## 2021-02-28 RX ADMIN — ENOXAPARIN SODIUM 40 MG: 40 INJECTION, SOLUTION INTRAVENOUS; SUBCUTANEOUS at 20:48

## 2021-02-28 RX ADMIN — MORPHINE SULFATE 4 MG: 4 INJECTION, SOLUTION INTRAMUSCULAR; INTRAVENOUS at 15:37

## 2021-02-28 RX ADMIN — IOPAMIDOL 80 ML: 755 INJECTION, SOLUTION INTRAVENOUS at 15:27

## 2021-02-28 RX ADMIN — PIPERACILLIN AND TAZOBACTAM 3375 MG: 3; .375 INJECTION, POWDER, LYOPHILIZED, FOR SOLUTION INTRAVENOUS at 21:30

## 2021-02-28 RX ADMIN — HYDRALAZINE HYDROCHLORIDE 50 MG: 50 TABLET, FILM COATED ORAL at 20:48

## 2021-02-28 RX ADMIN — SODIUM CHLORIDE: 9 INJECTION, SOLUTION INTRAVENOUS at 21:30

## 2021-02-28 RX ADMIN — INSULIN LISPRO 3 UNITS: 100 INJECTION, SOLUTION INTRAVENOUS; SUBCUTANEOUS at 22:55

## 2021-02-28 RX ADMIN — MORPHINE SULFATE 4 MG: 4 INJECTION, SOLUTION INTRAMUSCULAR; INTRAVENOUS at 13:36

## 2021-02-28 RX ADMIN — BUMETANIDE 1 MG: 1 TABLET ORAL at 20:48

## 2021-02-28 RX ADMIN — INSULIN HUMAN 10 UNITS: 100 INJECTION, SOLUTION PARENTERAL at 14:37

## 2021-02-28 RX ADMIN — ACETAMINOPHEN 650 MG: 325 TABLET ORAL at 21:38

## 2021-02-28 RX ADMIN — SODIUM CHLORIDE 1710 ML: 9 INJECTION, SOLUTION INTRAVENOUS at 14:41

## 2021-02-28 RX ADMIN — CEFEPIME HYDROCHLORIDE 2000 MG: 2 INJECTION, POWDER, FOR SOLUTION INTRAVENOUS at 15:39

## 2021-02-28 ASSESSMENT — PAIN DESCRIPTION - LOCATION: LOCATION: FACE

## 2021-02-28 ASSESSMENT — ENCOUNTER SYMPTOMS
NAUSEA: 0
CONSTIPATION: 0
EYE PAIN: 0
COUGH: 0
FACIAL SWELLING: 1
BACK PAIN: 0
DIARRHEA: 0
SHORTNESS OF BREATH: 0
VOICE CHANGE: 0
VOMITING: 0
ABDOMINAL PAIN: 0
TROUBLE SWALLOWING: 0

## 2021-02-28 ASSESSMENT — PAIN SCALES - GENERAL
PAINLEVEL_OUTOF10: 8
PAINLEVEL_OUTOF10: 10
PAINLEVEL_OUTOF10: 10
PAINLEVEL_OUTOF10: 6
PAINLEVEL_OUTOF10: 8

## 2021-02-28 ASSESSMENT — PAIN DESCRIPTION - PROGRESSION: CLINICAL_PROGRESSION: GRADUALLY WORSENING

## 2021-02-28 ASSESSMENT — PAIN DESCRIPTION - PAIN TYPE: TYPE: ACUTE PAIN

## 2021-02-28 NOTE — ACP (ADVANCE CARE PLANNING)
Advance Care Planning   Advance Care Planning Clinical Specialist  Conversation Note      Date of ACP Conversation: 2/28/2021    Conversation Conducted with:   Patient with [address]: Next of Kin by law (only applies in absence of above) Dora Workman, daughter     ACP Clinical Specialist: Rajat Pro D: self      If no Decision Maker listed above or available through scanned documents, then:    [address]   Who do you trust to make healthcare decisions for you? Name:   Dora Workman daughter  Phone  Number: [phone]  Can this person be reached and be able to respond quickly, such as within a few minutes or hours? Yes        Hospitalization  If your health were to worsen and it became clear that your chance of recovery was unlikely, what would your preferences be regarding hospitalization?:    Choice:  [x]  The patient would want hospitalization  []  The patient would prefer comfort-focused treatment without hospitalization. Ventilation  If you were in your present state of health and suddenly became very ill and were unable to breathe on your own, what would your preference be about the use of a ventilator (breathing machine) if it were available to you? If patient would desire the use of a ventilator (breathing machine), answer \"yes\", if not \"no\":yes    If your health were to worsen and it became clear that your chance of recovery was unlikely, would that change your answer? No    Resuscitation  CPR works best to restart the heart when there is a sudden event, like a heart attack, in someone who is otherwise healthy. Unfortunately, CPR does not typically restart the heart for people who have serious health conditions or who are very sick.     In the event your heart stopped, would you want attempts to restart your heart (answer \"yes\") or would you prefer a natural death

## 2021-02-28 NOTE — ED NOTES
ED to inpatient nurses report    Chief Complaint   Patient presents with    Cyst    Wound Infection      Present to ED from home  LOC: alert and orientated to name, place, date  Vital signs   Vitals:    02/28/21 1437 02/28/21 1538 02/28/21 1553 02/28/21 1708   BP: 124/65 134/65 132/62 131/72   Pulse: 88 80 79 72   Resp: 13 15 10 12   Temp:       TempSrc:       SpO2: 95% 96% 95% 95%   Weight:       Height:          Oxygen Baseline room air    Current needs required room air   LDAs:   Peripheral IV 02/28/21 Right Forearm (Active)     Mobility: Independent  Pending ED orders: none  Present condition: stable    Electronically signed by Zora Aase, RN on 2/28/2021 at 5:58 PM       Zora Aase, RN  02/28/21 4247

## 2021-02-28 NOTE — ED PROVIDER NOTES
Positive for facial swelling. Negative for trouble swallowing and voice change. Eyes: Negative for pain. Respiratory: Negative for cough and shortness of breath. Cardiovascular: Negative for chest pain, palpitations and leg swelling. Gastrointestinal: Negative for abdominal pain, constipation, diarrhea, nausea and vomiting. Genitourinary: Negative for dysuria. Musculoskeletal: Negative for back pain and neck pain. Skin: Positive for wound. Neurological: Negative for headaches. Psychiatric/Behavioral: Negative for confusion. 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    Cataract of both eyes     COVID-19 12/2020    DDD (degenerative disc disease), lumbar     Dysphagia, pharyngoesophageal 09/26/2016    Eczema 03/2018    Fibrocystic breast     H/O ventral hernia repair     Headache 02/09/2017    Hypercholesteremia     Hyperlipidemia     Hypertension     Iron deficiency anemia     LPRD (laryngopharyngeal reflux disease) 09/26/2016    Neuropathy     peripheral    Obesity     Orbital abscess     Orbital cellulitis 01/22/2014    SWAPNA (obstructive sleep apnea)     Osteoarthritis     Osteoporosis     Persistent proteinuria associated with type 2 diabetes mellitus ([facility] [address] 01/2017    urine micral of 50.       Sinusitis     Type II or unspecified type diabetes mellitus without mention of complication, not stated as uncontrolled     diagnoses approx 2000    Velopharyngeal incompetence 09/26/2016     Past Surgical History:   Procedure Laterality Date    ABDOMEN SURGERY      APPENDECTOMY      CARPAL TUNNEL RELEASE Bilateral 2008, 2009    CATARACT REMOVAL  2011    bilat    CHOLECYSTECTOMY  03/1988    COLONOSCOPY  2016    COLONOSCOPY  10/11/2018    COLONOSCOPY POLYPECTOMY SNARE/COLD BIOPSY performed by Lucio Zavala MD at [address] ESOPHAGUS      ENDOSCOPY, COLON, DIAGNOSTIC      EYE SURGERY Left 01/30/2015    EYE SURGERY      CATARACT REMOVAL- BILATERAL    HEMORRHOID SURGERY  1980s    HERNIA REPAIR      HYSTERECTOMY, TOTAL ABDOMINAL  1980    JOINT REPLACEMENT  bilat 2005/ 2007    knees    CT COLSC FLX WITH DIRECTED SUBMUCOSAL NJX ANY SBST  10/11/2018    COLONOSCOPY SUBMUCOSAL/BOTOX INJECTION performed by Poornima Perez MD at [address]  last 2009    removed a bone (2)--2 times no construction     SINUS SURGERY  02/01/2016    SINUS SURGERY  2016 x 2    SINUS SURGERY  09/18/2017    [facility] - Dr Demarco Campbell SINUS SURGERY  08/09/2017 [facility]17[facility]2017 [facility]    TONSILLECTOMY      TOTAL KNEE ARTHROPLASTY  08/2003    Left TKR    VENTRAL HERNIA REPAIR  1992         MEDICATIONS     Current Facility-Administered Medications:     vancomycin (VANCOCIN) 2,500 mg in dextrose 5 % 500 mL IVPB, 25 mg/kg, Intravenous, Once, Demarco Campbell MD, Last Rate: 166.7 mL/hr at 02/28/21 1601, 2,500 mg at 02/28/21 1601    0.9 % sodium chloride bolus, 1,000 mL, Intravenous, Once, Demarco Campbell MD    Current Outpatient Medications:     polyethylene glycol (GLYCOLAX) 17 g packet, Take 17 g by mouth daily, Disp: 527 g, Rfl: 1    Sodium Chloride-Sodium Bicarb (NETI POT SINUS [address] NA), by Nasal route 2 times daily, Disp: , Rfl:     Misc. Devices (WALKER) MISC, 1 each by Does not apply route daily Requests stand up walker due to heart failure and generalized OA. I50.23., Disp: 1 each, Rfl: 0    Elastic Bandages & Supports (CERVICAL COLLAR) MISC, Wear while awake for neck support.   M54.2, Disp: 1 each, Rfl: 0    ascorbic acid (VITAMIN C) 1000 MG tablet, Take 1 tablet by mouth daily, Disp: 30 tablet, Rfl: 3    Vitamin D (CHOLECALCIFEROL) 50 MCG (2000 UT) TABS tablet, Take 1 tablet by mouth daily, Disp: 60 tablet, Rfl: 0    zinc sulfate (ZINCATE) 220 (50 Zn) MG capsule, Take 1 capsule by mouth daily, Disp: 30 capsule, Rfl: 3    pregabalin (LYRICA) 150 MG capsule, Take 2 capsules by mouth in the morning and 2 capsules at bedtime. , Disp: 120 capsule, Rfl: 3    mupirocin (BACTROBAN) 2 % ointment, Use 3-4 times daily in sinus rinses, as instructed in clinic. Approximately 3-5 gm per day, Disp: , Rfl:     NIFEdipine (ADALAT CC) 60 MG extended release tablet, Take 1 tablet by mouth daily, Disp: 90 tablet, Rfl: 2    bumetanide (BUMEX) 1 MG tablet, Take 1 tablet by mouth 2 times daily, Disp: 180 tablet, Rfl: 2    hydrALAZINE (APRESOLINE) 50 MG tablet, Take 1 tablet by mouth 2 times daily, Disp: 180 tablet, Rfl: 2    ferrous sulfate (IRON 325) 325 (65 Fe) MG tablet, Take 325 mg by mouth 2 times daily , Disp: , Rfl:     omeprazole (PRILOSEC) 20 MG delayed release capsule, TAKE ONE CAPSULE BY MOUTH ONCE DAILY, Disp: 90 capsule, Rfl: 3    SITagliptin (JANUVIA) 100 MG tablet, Take 1 tablet by mouth daily, Disp: 90 tablet, Rfl: 3    metFORMIN (GLUCOPHAGE) 1000 MG tablet, TAKE ONE TABLET IN THE EVENING, Disp: 90 tablet, Rfl: 3    potassium chloride (KLOR-CON M) 20 MEQ extended release tablet, Take 1 tablet by mouth daily, Disp: 90 tablet, Rfl: 3    Handicap Placard MISC, by Does not apply route Dx:I187.2,M81.0. Duration: 5 years. , Disp: 2 each, Rfl: 0    docusate sodium (COLACE) 100 MG capsule, Take 1 capsule by mouth 2 times daily as needed for Constipation, Disp: 28 capsule, Rfl: 2    sodium chloride (OCEAN, BABY AYR) 0.65 % nasal spray, 1 spray by Nasal route as needed for Congestion , Disp: , Rfl:       SOCIAL HISTORY     Social History     Social History Narrative    Not on file     Social History     Tobacco Use    Smoking status: Never Smoker    Smokeless tobacco: Never Used   Substance Use Topics    Alcohol use: No    Drug use: No         ALLERGIES     Allergies   Allergen Reactions    Lisinopril Swelling and Anaphylaxis    Sulfamethoxazole-Trimethoprim Rash    Morphine Other (See Comments)     headaches    Codeine      Other reaction(s): AOF    Hydrocodone headaches    Percocet [Oxycodone-Acetaminophen] Other (See Comments)     Headaches    Tylenol [Acetaminophen] Other (See Comments)     TYLENOL 3 causes hallucinations    Tape [Adhesive Tape] Rash         FAMILY HISTORY     Family History   Problem Relation Age of Onset    Diabetes Mother     Heart Disease Father         whooping cough as child    Obesity Sister     Other Brother         tumor on back    Obesity Sister     Cancer Sister         Skin     Cancer Brother         Bone cancer from metal    Other Brother         passed as a child    Heart Disease Brother     Other Brother         tremor    Heart Attack Brother     No Known Problems Brother     Heart Disease Brother     No Known Problems Brother     No Known Problems Brother          PREVIOUS RECORDS   Previous records reviewed: Seen most recently on February 9, 2021 at [facility] for sinonasal adenocarcinoma with local recurrence. PHYSICAL EXAM     ED Triage Vitals   BP Temp Temp src Pulse Resp SpO2 Height Weight   -- -- -- -- -- -- -- --     Initial vital signs and nursing assessment reviewed and vitals are/show: normal. Pulsoximetry is normal per my interpretation. Additional Vital Signs:  Vitals:    02/28/21 1538   BP: 134/65   Pulse: 80   Resp: 15   Temp:    SpO2: 96%       Physical Exam  Constitutional:       General: She is not in acute distress. Appearance: Normal appearance. She is obese. She is not ill-appearing, toxic-appearing or diaphoretic. HENT:      Head: Normocephalic and atraumatic. Comments: No tenderness to palpation along mastoid bone     Right Ear: External ear normal.      Left Ear: External ear normal.   Eyes:      General: No scleral icterus. Right eye: No discharge. Left eye: No discharge. Neck:      Musculoskeletal: Normal range of motion and neck supple. Cardiovascular:      Rate and Rhythm: Normal rate and regular rhythm. Pulses: Normal pulses.       Heart sounds: Normal heart sounds. No murmur. No friction rub. No gallop. Pulmonary:      Effort: Pulmonary effort is normal. No respiratory distress. Breath sounds: Normal breath sounds. No wheezing or rales. Chest:      Chest wall: No tenderness. Abdominal:      General: Abdomen is flat. There is no distension. Palpations: Abdomen is soft. Tenderness: There is no abdominal tenderness. There is no guarding or rebound. Musculoskeletal: Normal range of motion. Right lower leg: No edema. Left lower leg: No edema. Skin:     General: Skin is warm and dry. Findings: Erythema and lesion present. Comments: Multiple pustules to anterior chin with surrounding erythema, swelling, tenderness to palpation  Right-sided cheek swelling without erythema or open wounds    No visible lesions in oropharynx, no erythema edema or open wounds. Neurological:      Mental Status: She is alert and oriented to person, place, and time. Mental status is at baseline. Psychiatric:         Mood and Affect: Mood normal.         Behavior: Behavior normal.         Thought Content: Thought content normal.         Judgment: Judgment normal.             MEDICAL DECISION MAKING   Initial Assessment:   Cellulitis versus dermatitis versus folliculitis versus abscess versus felicia's angina        Plan:     Labs. Imaging: CT, chest x-ray  EKG  Analgesia    [facility] ENT consult. Recommended admission at [facility] or discharge with oral + topical  Antibiotics & follow up unless patient's imaging shows concerning findings. Elevated lactic acid and WBC, IV fluid bolus plus IV antibiotics ordered. Patient's glucose 593, 10 units insulin bolus ordered in addition to close BG monitoring due to concern for sepsis. Patient's imaging shows right maxillary facial swelling but no signs of focal fluid collection. With patient's lab results and concern for infection, patient to be admitted here for further management.   Additional IV fluids ordered. Admission.           ED RESULTS   Laboratory results:  Labs Reviewed   CBC WITH AUTO DIFFERENTIAL - Abnormal; Notable for the following components:       Result Value    WBC 15.8 (*)     RDW-CV 16.8 (*)     RDW-SD 53.9 (*)     Platelets 983 (*)     Segs Absolute 14.4 (*)     Lymphocytes Absolute 0.5 (*)     Immature Grans (Abs) 0.22 (*)     All other components within normal limits   COMPREHENSIVE METABOLIC PANEL W/ REFLEX TO MG FOR LOW K - Abnormal; Notable for the following components:    Glucose 593 (*)     BUN 33 (*)     Sodium 133 (*)     Chloride 93 (*)     CO2 22 (*)     All other components within normal limits   URINE RT REFLEX TO CULTURE - Abnormal; Notable for the following components:    Glucose, Ur >= 1000 (*)     All other components within normal limits   LACTATE, SEPSIS - Abnormal; Notable for the following components:    Lactic Acid, Sepsis 4.5 (*)     All other components within normal limits   LACTATE, SEPSIS - Abnormal; Notable for the following components:    Lactic Acid, Sepsis 4.2 (*)     All other components within normal limits   ANION GAP - Abnormal; Notable for the following components:    Anion Gap 18.0 (*)     All other components within normal limits   GLOMERULAR FILTRATION RATE, ESTIMATED - Abnormal; Notable for the following components:    Est, Glom Filt Rate 53 (*)     All other components within normal limits   OSMOLALITY - Abnormal; Notable for the following components:    Osmolality Calc 301.1 (*)     All other components within normal limits   POCT GLUCOSE - Abnormal; Notable for the following components:    POC Glucose 453 (*)     All other components within normal limits   POCT GLUCOSE - Abnormal; Notable for the following components:    POC Glucose 385 (*)     All other components within normal limits   CULTURE, BLOOD 1   CULTURE, BLOOD 2   TROPONIN   BRAIN NATRIURETIC PEPTIDE   PROTIME-INR   APTT   POCT GLUCOSE   POCT GLUCOSE   POCT GLUCOSE   POCT GLUCOSE POCT GLUCOSE   POCT GLUCOSE   POCT GLUCOSE   POCT GLUCOSE   POCT GLUCOSE   POCT GLUCOSE   POCT GLUCOSE   POCT GLUCOSE       Radiologic studies results:  CT MAXILLOFACIAL W CONTRAST   Final Result   1. Bilateral maxillary sinus opacification. 2. Extensive osseous changes in the ethmoid and maxillary sinuses, likely post therapeutic. 3. Right-sided facial swelling. Final report electronically signed by Dr. Lisa Reyes on 2/28/2021 3:55 PM      CT Head WO Contrast   Final Result   1. No mass effect or acute hemorrhage. 2. Chronic periventricular small vessel ischemic changes and cerebral atrophy. 3. Opacification of the mastoid air cells, likely chronic. **This report has been created using voice recognition software. It may contain minor errors which are inherent in voice recognition technology. **      Final report electronically signed by Dr. Lisa Reyes on 2/28/2021 3:47 PM      CT SOFT TISSUE NECK W CONTRAST   Final Result      Right-sided facial soft tissue swelling      Final report electronically signed by Dr. Lisa Reyes on 2/28/2021 4:04 PM      XR CHEST PORTABLE   Final Result   1. No acute intrathoracic process. 2. Stable cardiomegaly. **This report has been created using voice recognition software. It may contain minor errors which are inherent in voice recognition technology. **      Final report electronically signed by Dr. Lisa Reyes on 2/28/2021 1:42 PM          ED Medications administered this visit:   Medications   vancomycin (VANCOCIN) 2,500 mg in dextrose 5 % 500 mL IVPB (2,500 mg Intravenous New Bag 2/28/21 1601)   0.9 % sodium chloride bolus (has no administration in time range)   morphine injection 4 mg (4 mg Intravenous Given 2/28/21 1336)   0.9 % sodium chloride IV bolus 1,710 mL (0 mLs Intravenous Stopped 2/28/21 1602)   cefepime (MAXIPIME) 2000 mg IVPB minibag (0 mg Intravenous Stopped 2/28/21 1602)   insulin regular (HUMULIN R;NOVOLIN R) injection 10 Units (10 Units Subcutaneous Given 2/28/21 1437)   morphine injection 4 mg (4 mg Intravenous Given 2/28/21 1537)   iopamidol (ISOVUE-370) 76 % injection 80 mL (80 mLs Intravenous Given 2/28/21 1527)         ED COURSE     ED Course as of Feb 28 1704   Sun Feb 28, 2021   1323 Paged Dr Jacquelyn Narayan clinic, office is closed. [CR]   2048 Paged [facility] consult line, will call back with ENT consultant on call.    [CR]   [phone] with [facility] ENT consult, Dr Rahat Richmond. Recommended augmentin and topical bacitracin and follow up with Dr Jacquelyn Narayan office, stated could call back if lab and imaging results are concerning.    [CR]      ED Course User Index  [CR] Carlos Powell MD           MEDICATION CHANGES     New Prescriptions    No medications on file         FINAL DISPOSITION     Final diagnoses: Folliculitis   Cellulitis of face   Hyperglycemia     Condition: condition: stable  Dispo: Admit to med/surg floor      This transcription was electronically signed. Parts of this transcriptions may have been dictated by use of voice recognition software and electronically transcribed, and parts may have been transcribed with the assistance of an ED scribe. The transcription may contain errors not detected in proofreading. Please refer to my supervising physician's documentation if my documentation differs.     Electronically Signed: Carlos Powell, 02/28/21, 5:04 PM       Carlos Powell MD  Resident  02/28/21 4166

## 2021-03-01 LAB
ANION GAP SERPL CALCULATED.3IONS-SCNC: 9 MEQ/L (ref 8–16)
BUN BLDV-MCNC: 19 MG/DL (ref 7–22)
CALCIUM SERPL-MCNC: 8.3 MG/DL (ref 8.5–10.5)
CHLORIDE BLD-SCNC: 101 MEQ/L (ref 98–111)
CO2: 29 MEQ/L (ref 23–33)
CREAT SERPL-MCNC: 0.7 MG/DL (ref 0.4–1.2)
ERYTHROCYTE [DISTWIDTH] IN BLOOD BY AUTOMATED COUNT: 16.6 % (ref 11.5–14.5)
ERYTHROCYTE [DISTWIDTH] IN BLOOD BY AUTOMATED COUNT: 53.9 FL (ref 35–45)
GFR SERPL CREATININE-BSD FRML MDRD: 81 ML/MIN/1.73M2
GLUCOSE BLD-MCNC: 172 MG/DL (ref 70–108)
GLUCOSE BLD-MCNC: 196 MG/DL (ref 70–108)
GLUCOSE BLD-MCNC: 207 MG/DL (ref 70–108)
GLUCOSE BLD-MCNC: 283 MG/DL (ref 70–108)
GLUCOSE BLD-MCNC: 306 MG/DL (ref 70–108)
HCT VFR BLD CALC: 37.8 % (ref 37–47)
HEMOGLOBIN: 12.1 GM/DL (ref 12–16)
LACTIC ACID: 1.5 MMOL/L (ref 0.5–2.2)
MCH RBC QN AUTO: 28.4 PG (ref 26–33)
MCHC RBC AUTO-ENTMCNC: 32 GM/DL (ref 32.2–35.5)
MCV RBC AUTO: 88.7 FL (ref 81–99)
PLATELET # BLD: 101 THOU/MM3 (ref 130–400)
PMV BLD AUTO: 11.2 FL (ref 9.4–12.4)
POTASSIUM SERPL-SCNC: 3.4 MEQ/L (ref 3.5–5.2)
RBC # BLD: 4.26 MILL/MM3 (ref 4.2–5.4)
SODIUM BLD-SCNC: 139 MEQ/L (ref 135–145)
WBC # BLD: 11.7 THOU/MM3 (ref 4.8–10.8)

## 2021-03-01 PROCEDURE — 6370000000 HC RX 637 (ALT 250 FOR IP): Performed by: INTERNAL MEDICINE

## 2021-03-01 PROCEDURE — 82948 REAGENT STRIP/BLOOD GLUCOSE: CPT

## 2021-03-01 PROCEDURE — 85027 COMPLETE CBC AUTOMATED: CPT

## 2021-03-01 PROCEDURE — 87186 SC STD MICRODIL/AGAR DIL: CPT

## 2021-03-01 PROCEDURE — 97162 PT EVAL MOD COMPLEX 30 MIN: CPT

## 2021-03-01 PROCEDURE — 1200000003 HC TELEMETRY R&B

## 2021-03-01 PROCEDURE — 6370000000 HC RX 637 (ALT 250 FOR IP): Performed by: PHYSICIAN ASSISTANT

## 2021-03-01 PROCEDURE — 80048 BASIC METABOLIC PNL TOTAL CA: CPT

## 2021-03-01 PROCEDURE — 36415 COLL VENOUS BLD VENIPUNCTURE: CPT

## 2021-03-01 PROCEDURE — 97110 THERAPEUTIC EXERCISES: CPT

## 2021-03-01 PROCEDURE — 87077 CULTURE AEROBIC IDENTIFY: CPT

## 2021-03-01 PROCEDURE — 99221 1ST HOSP IP/OBS SF/LOW 40: CPT | Performed by: PHYSICIAN ASSISTANT

## 2021-03-01 PROCEDURE — 6360000002 HC RX W HCPCS: Performed by: INTERNAL MEDICINE

## 2021-03-01 PROCEDURE — 87147 CULTURE TYPE IMMUNOLOGIC: CPT

## 2021-03-01 PROCEDURE — 2580000003 HC RX 258: Performed by: INTERNAL MEDICINE

## 2021-03-01 PROCEDURE — 87070 CULTURE OTHR SPECIMN AEROBIC: CPT

## 2021-03-01 PROCEDURE — 87205 SMEAR GRAM STAIN: CPT

## 2021-03-01 PROCEDURE — 83605 ASSAY OF LACTIC ACID: CPT

## 2021-03-01 RX ORDER — NICOTINE POLACRILEX 4 MG
15 LOZENGE BUCCAL PRN
Status: DISCONTINUED | OUTPATIENT
Start: 2021-03-01 | End: 2021-03-04 | Stop reason: HOSPADM

## 2021-03-01 RX ORDER — ALOGLIPTIN 25 MG/1
25 TABLET, FILM COATED ORAL DAILY
Status: DISCONTINUED | OUTPATIENT
Start: 2021-03-01 | End: 2021-03-04 | Stop reason: HOSPADM

## 2021-03-01 RX ORDER — DEXTROSE MONOHYDRATE 25 G/50ML
12.5 INJECTION, SOLUTION INTRAVENOUS PRN
Status: DISCONTINUED | OUTPATIENT
Start: 2021-03-01 | End: 2021-03-04 | Stop reason: HOSPADM

## 2021-03-01 RX ORDER — POTASSIUM CHLORIDE 7.45 MG/ML
10 INJECTION INTRAVENOUS PRN
Status: DISCONTINUED | OUTPATIENT
Start: 2021-03-01 | End: 2021-03-04 | Stop reason: HOSPADM

## 2021-03-01 RX ORDER — POTASSIUM CHLORIDE 20 MEQ/1
40 TABLET, EXTENDED RELEASE ORAL PRN
Status: DISCONTINUED | OUTPATIENT
Start: 2021-03-01 | End: 2021-03-04 | Stop reason: HOSPADM

## 2021-03-01 RX ORDER — DEXAMETHASONE 4 MG/1
2 TABLET ORAL 2 TIMES DAILY WITH MEALS
Status: DISCONTINUED | OUTPATIENT
Start: 2021-03-01 | End: 2021-03-04 | Stop reason: HOSPADM

## 2021-03-01 RX ORDER — DEXTROSE MONOHYDRATE 50 MG/ML
100 INJECTION, SOLUTION INTRAVENOUS PRN
Status: DISCONTINUED | OUTPATIENT
Start: 2021-03-01 | End: 2021-03-04 | Stop reason: HOSPADM

## 2021-03-01 RX ADMIN — OXYCODONE HYDROCHLORIDE AND ACETAMINOPHEN 1000 MG: 500 TABLET ORAL at 08:24

## 2021-03-01 RX ADMIN — PIPERACILLIN AND TAZOBACTAM 3375 MG: 3; .375 INJECTION, POWDER, LYOPHILIZED, FOR SOLUTION INTRAVENOUS at 19:50

## 2021-03-01 RX ADMIN — ACETAMINOPHEN 650 MG: 325 TABLET ORAL at 19:42

## 2021-03-01 RX ADMIN — ALOGLIPTIN 25 MG: 25 TABLET, FILM COATED ORAL at 13:36

## 2021-03-01 RX ADMIN — MUPIROCIN 1 EACH: 20 OINTMENT TOPICAL at 21:55

## 2021-03-01 RX ADMIN — HYDRALAZINE HYDROCHLORIDE 50 MG: 50 TABLET, FILM COATED ORAL at 08:24

## 2021-03-01 RX ADMIN — HYDRALAZINE HYDROCHLORIDE 50 MG: 50 TABLET, FILM COATED ORAL at 19:42

## 2021-03-01 RX ADMIN — FERROUS SULFATE TAB 325 MG (65 MG ELEMENTAL FE) 325 MG: 325 (65 FE) TAB at 16:49

## 2021-03-01 RX ADMIN — Medication 2000 UNITS: at 08:24

## 2021-03-01 RX ADMIN — PIPERACILLIN AND TAZOBACTAM 3375 MG: 3; .375 INJECTION, POWDER, LYOPHILIZED, FOR SOLUTION INTRAVENOUS at 04:48

## 2021-03-01 RX ADMIN — INSULIN LISPRO 5 UNITS: 100 INJECTION, SOLUTION INTRAVENOUS; SUBCUTANEOUS at 19:50

## 2021-03-01 RX ADMIN — NIFEDIPINE 60 MG: 60 TABLET, FILM COATED, EXTENDED RELEASE ORAL at 08:24

## 2021-03-01 RX ADMIN — SALINE NASAL SPRAY 2 SPRAY: 1.5 SOLUTION NASAL at 19:45

## 2021-03-01 RX ADMIN — SODIUM CHLORIDE: 9 INJECTION, SOLUTION INTRAVENOUS at 18:17

## 2021-03-01 RX ADMIN — FERROUS SULFATE TAB 325 MG (65 MG ELEMENTAL FE) 325 MG: 325 (65 FE) TAB at 08:24

## 2021-03-01 RX ADMIN — POTASSIUM CHLORIDE 20 MEQ: 1500 TABLET, EXTENDED RELEASE ORAL at 08:25

## 2021-03-01 RX ADMIN — ENOXAPARIN SODIUM 40 MG: 40 INJECTION, SOLUTION INTRAVENOUS; SUBCUTANEOUS at 19:42

## 2021-03-01 RX ADMIN — DEXAMETHASONE 2 MG: 4 TABLET ORAL at 13:36

## 2021-03-01 RX ADMIN — BUMETANIDE 1 MG: 1 TABLET ORAL at 19:42

## 2021-03-01 RX ADMIN — PANTOPRAZOLE SODIUM 40 MG: 40 TABLET, DELAYED RELEASE ORAL at 05:40

## 2021-03-01 RX ADMIN — SODIUM CHLORIDE: 9 INJECTION, SOLUTION INTRAVENOUS at 07:13

## 2021-03-01 RX ADMIN — BUMETANIDE 1 MG: 1 TABLET ORAL at 08:24

## 2021-03-01 RX ADMIN — POTASSIUM CHLORIDE 40 MEQ: 1500 TABLET, EXTENDED RELEASE ORAL at 20:42

## 2021-03-01 RX ADMIN — Medication 50 MG: at 08:24

## 2021-03-01 RX ADMIN — PREGABALIN 150 MG: 75 CAPSULE ORAL at 08:24

## 2021-03-01 RX ADMIN — ACETAMINOPHEN 650 MG: 325 TABLET ORAL at 05:40

## 2021-03-01 RX ADMIN — DEXAMETHASONE 2 MG: 4 TABLET ORAL at 16:50

## 2021-03-01 RX ADMIN — PIPERACILLIN AND TAZOBACTAM 3375 MG: 3; .375 INJECTION, POWDER, LYOPHILIZED, FOR SOLUTION INTRAVENOUS at 12:38

## 2021-03-01 ASSESSMENT — PAIN DESCRIPTION - ONSET
ONSET: ON-GOING

## 2021-03-01 ASSESSMENT — PAIN DESCRIPTION - PROGRESSION
CLINICAL_PROGRESSION: GRADUALLY WORSENING

## 2021-03-01 ASSESSMENT — PAIN SCALES - GENERAL
PAINLEVEL_OUTOF10: 5
PAINLEVEL_OUTOF10: 8
PAINLEVEL_OUTOF10: 10
PAINLEVEL_OUTOF10: 0
PAINLEVEL_OUTOF10: 10
PAINLEVEL_OUTOF10: 0

## 2021-03-01 ASSESSMENT — PAIN DESCRIPTION - LOCATION
LOCATION: FACE
LOCATION: FACE
LOCATION: SHOULDER

## 2021-03-01 ASSESSMENT — PAIN DESCRIPTION - PAIN TYPE
TYPE: ACUTE PAIN

## 2021-03-01 ASSESSMENT — PAIN DESCRIPTION - DESCRIPTORS: DESCRIPTORS: PATIENT UNABLE TO DESCRIBE

## 2021-03-01 ASSESSMENT — PAIN DESCRIPTION - FREQUENCY
FREQUENCY: CONTINUOUS

## 2021-03-01 ASSESSMENT — PAIN DESCRIPTION - ORIENTATION
ORIENTATION: RIGHT
ORIENTATION: LOWER
ORIENTATION: LOWER

## 2021-03-01 NOTE — PROGRESS NOTES
therapy. Post session, pt reclined in bedside chair with chair alarm on and all needs within reach. General:  Overall Orientation Status: Within Functional Limits  Follows Commands: Within Functional Limits    Vision: Impaired  Vision Exceptions: Wears glasses for reading    Hearing: Exceptions to [facility]  Hearing Exceptions: Hard of hearing/hearing concerns         Pain: 10/10: R chin/cheek    Social/Functional History:    Lives With: Daughter  Type of Home: House  Home Layout: One level  Home Access: Level entry  Home Equipment: 4 wheeled walker             ADL Assistance: [address]: Needs assistance  Ambulation Assistance: Independent  Transfer Assistance: Independent          Additional Comments: reports using 4WW PRN    OBJECTIVE:  Range of Motion:  Bilateral Lower Extremity: WFL    Strength:  Bilateral Lower Extremity: WFl, formal MMT not assessed, at least 3/5    Balance:  Static Standing Balance: Contact Guard Assistance, with [id number]  Dynamic Standing Balance: Contact Guard Assistance, with 4WW    Bed Mobility:  Rolling to Left: Stand By Assistance, with increased time for completion   Supine to Sit: Stand By Assistance, with increased time for completion    Transfers:  Sit to Stand: Stand By Assistance, to/from chair with arms  Stand to Sit:Stand By Assistance, cues for hand placement, cues to apply 4WW brakes    Ambulation:  Stand By Assistance, [employer] Assistance  Distance: 15ft  Surface: Level Tile  Device:4 Wheeled Walker  Gait Deviations: Forward Flexed Posture, Slow Jen, Decreased Step Length Bilaterally, Decreased Gait Speed, Decreased Heel Strike Bilaterally, Wide Base of Support, Mild Path Deviations and good stability    Exercise:  Patient was guided in 1 set(s) 10 reps of exercise to both lower extremities. Glut sets, Hip abduction/adduction, Seated marches, Seated heel/toe raises and Long arc quads.   Exercises were completed for increased independence with functional mobility. Functional Outcome Measures: Completed  AM-PAC Inpatient Mobility Raw Score : 16  AM-PAC Inpatient T-Scale Score : 40.78    ASSESSMENT:  Activity Tolerance:  Patient tolerance of  treatment: good. Treatment Initiated: Treatment and education initiated within context of evaluation. Evaluation time included review of current medical information, gathering information related to past medical, social and functional history, completion of standardized testing, formal and informal observation of tasks, assessment of data and development of plan of care and goals. Treatment time included skilled education and facilitation of tasks to increase safety and independence with functional mobility for improved independence and quality of life. *initiated therex as noted above, education provided on proper use of brakes of 4WW for safety    Assessment: Body structures, Functions, Activity limitations: Decreased functional mobility , Decreased strength, Decreased balance, Increased pain, Decreased posture, Decreased endurance  Assessment: Pt presents with cellulitis of R chin and cheek. Demonstrates a decrease from baseline functional mobility of bed mobility, transfers, and ambulation. Also demonstrates decreased posture with R cervical flexion and L cervical rotation due to increased pain on R chin and cheek. Pt to benefit from skilled PT services to promote increased indep with functional mobility activities for [facility] to [facility].   Prognosis: Good    REQUIRES PT FOLLOW UP: Yes    Discharge Recommendations:  Discharge Recommendations: Continue to assess pending progress, Home with Home health PT    Patient Education:  PT Education: Goals, PT Role, Plan of Care, Home Exercise Program, General Safety, Gait Training, Transfer Training, Functional Mobility Training    Equipment Recommendations:  Equipment Needed: No(has 8ME)    Plan:  Times per week: 3-5x GM  Current Treatment Recommendations: Strengthening, ROM, Balance Training, Functional Mobility Training, Transfer Training, Gait Training, Endurance Training, Safety Education & Training, Patient/Caregiver Education & Training    Goals:  Patient goals : not stated  Short term goals  Time Frame for Short term goals: by discharge  Short term goal 1: Pt to perform supine<>sit with mod I to promote ease of getting in and out of bed. Short term goal 2: Pt to perform sit<>stand with mod I to promote ease of transfers. Short term goal 3: Pt to ambulate >100ft with 4WW at mod I to improve ability to navigate within the home. Long term goals  Time Frame for Long term goals : n/a due to short ELOS    Following session, patient left in safe position with all fall risk precautions in place.

## 2021-03-01 NOTE — PLAN OF CARE
Problem: Falls - Risk of:  Goal: Will remain free from falls  Description: Will remain free from falls  Outcome: Ongoing  Note: Falling star program. Bed alarm on. Call light within reach. Side rails up x2. Fall Band. Encouraged to use call system. Pathway clear. Belongings in reach. Problem: Falls - Risk of:  Goal: Absence of physical injury  Description: Absence of physical injury  Outcome: Ongoing  Note: No harm to patient this shift. Problem: Skin Integrity:  Goal: Will show no infection signs and symptoms  Description: Will show no infection signs and symptoms  Outcome: Ongoing  Note: Pt afebrile. On IV antibiotics. Labs being monitored. Will continue to reassess. Problem: Skin Integrity:  Goal: Absence of new skin breakdown  Description: Absence of new skin breakdown  Outcome: Ongoing  Note: See Skin Assessment. Problem: Pain:  Goal: Pain level will decrease  Description: Pain level will decrease  Outcome: Ongoing  Note:  Pain meds given prn per MAR. Pain rated on 0-10 pain rating scale. Will continue to reassess. Problem: Pain:  Goal: Control of acute pain  Description: Control of acute pain  Outcome: Ongoing     Problem: Pain:  Goal: Control of chronic pain  Description: Control of chronic pain  Outcome: Ongoing     Problem: Discharge Planning:  Goal: Participates in care planning  Description: Participates in care planning  Outcome: Ongoing  Note: Continue to assess discharge needs as they arise. No concerns stated. Problem: Discharge Planning:  Goal: Discharged to appropriate level of care  Description: Discharged to appropriate level of care  Outcome: Ongoing  Note: Patient plans to return home with her daughter upon discharge.       Problem: Cardiac Output - Decreased:  Goal: Hemodynamic stability will improve  Description: Hemodynamic stability will improve  Outcome: Ongoing  Note:   Vitals:    02/28/21 2302   BP: 130/71   Pulse: 60   Resp: 16   Temp: 98.1 °F (36.7 °C)   SpO2: 94%    Vitals every 4 hours & PRN. Problem: Fluid Volume - Imbalance:  Goal: Absence of imbalanced fluid volume signs and symptoms  Description: Absence of imbalanced fluid volume signs and symptoms  Outcome: Ongoing  Note: Monitoring intake and output. Patient receiving IV fluids as ordered. Will continue to monitor. Problem: Serum Glucose Level - Abnormal:  Goal: Ability to maintain appropriate glucose levels will improve to within specified parameters  Description: Ability to maintain appropriate glucose levels will improve to within specified parameters  Outcome: Ongoing  Note: Blood sugars checked ACHS. Insulin also ordered per sliding scale. Care plan reviewed with patient. Will review and discuss care plan with family when available.

## 2021-03-01 NOTE — PLAN OF CARE
Problem: DISCHARGE BARRIERS  Goal: Patient's continuum of care needs are met  Description: Patient return home with daughter and current with . See  notes 3/1/21. Outcome: Ongoing  Note: Patient return home and current with [facility]. See  notes 3/1/21.

## 2021-03-01 NOTE — CARE COORDINATION
3/1/21, 8:23 AM EST  DISCHARGE PLANNING EVALUATION:    Raleigh Cogan       Admitted: 2/28/2021/ [facility] H[facility] day: 1   Location: [facility]22/022-A Reason for admit: Cellulitis [L03.90]   PMH:  has a past medical history of Cancer ([facility] [address] Cataract of both eyes, COVID-19, DDD (degenerative disc disease), lumbar, Dysphagia, pharyngoesophageal, Eczema, Fibrocystic breast, H/O ventral hernia repair, Headache, Hypercholesteremia, Hyperlipidemia, Hypertension, Iron deficiency anemia, LPRD (laryngopharyngeal reflux disease), Neuropathy, Obesity, Orbital abscess, Orbital cellulitis, SWAPNA (obstructive sleep apnea), Osteoarthritis, Osteoporosis, Persistent proteinuria associated with type 2 diabetes mellitus ([facility] [address] Sinusitis, Type II or unspecified type diabetes mellitus without mention of complication, not stated as uncontrolled, and Velopharyngeal incompetence. Procedure: na  Barriers to Discharge: to ED for evaluation of right-sided facial swelling, ID consulted, IVF, Zosyn IV, Vit C, D, Zinc, dressing changes to control drainage, Bumex,  I/O, Apresoline, B/P 170/78, Tmax 98.8, WBC 15.8, Na 133, diabetic management, up in room with help only. PCP: Moises Mendez MD  Readmission Risk Score: 27%    Patient Goals/Plan/Treatment Preferences:   From home with daughter Aminata Maloney; she uses 4WW, has shower chair, BSC, and is current with [facility]. The daughters April Sweet and Aminata Maloney transports patient to her appointments. SW consulted to follow. Transportation/Food Security/Housekeeping Addressed:  No issues identified.

## 2021-03-01 NOTE — CONSULTS
CONSULTATION NOTE :ID       Patient - Leidy Malloy,  Age - 79 y.o.    - 1940      Room Number - 6K-21/200-A   MRN -  [id number]   Acct # - [de-identified]  Date of Admission -  2021 12:57 PM  Patient's PCP: Anuja Boone MD     Requesting Physician: Jodi Albright MD    REASON FOR CONSULTATION   Draining wound from chin wound  CHIEF COMPLAINT   Pain and swelling over her chin. HISTORY OF PRESENT ILLNESS       This is a very pleasant 79 y.o. female who was admitted to the hospital with a chief complaints of redness swelling over her chin. It started as a boil however it became bigger and bigger associated with throbbing pain there was an open wound draining purulent material for which reason she came to the hospital.  She is well-known to me from her past hospitalization. 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 She had recurrent sinusitis and undergoes regular debridement in [place] she had recurrence of her cancer. She had history of stroke. Had history of COVID-19 infection degenerative back disease and history of orbital cellulitis. She is started on broad-spectrum antibiotics.     PAST MEDICAL  HISTORY       Past Medical History:   Diagnosis Date    Cancer 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    Cataract of both eyes     COVID-19 2020    DDD (degenerative disc disease), lumbar     Dysphagia, pharyngoesophageal 2016    Eczema 2018    Fibrocystic breast     H/O ventral hernia repair     Headache 2017    Hypercholesteremia     Hyperlipidemia     Hypertension     Iron deficiency anemia     LPRD (laryngopharyngeal reflux disease) 2016    Neuropathy     peripheral    Obesity     Orbital abscess     Orbital cellulitis 2014    SWAPNA (obstructive sleep apnea)     Osteoarthritis     Osteoporosis     Persistent proteinuria associated with type 2 diabetes mellitus ([facility] [address] 01/2017    urine micral of 50.       Sinusitis     Type II or unspecified type diabetes mellitus without mention of complication, not stated as uncontrolled     diagnoses approx 2000    Velopharyngeal incompetence 09/26/2016       PAST SURGICAL HISTORY     Past Surgical History:   Procedure Laterality Date    ABDOMEN SURGERY      APPENDECTOMY      CARPAL TUNNEL RELEASE Bilateral 2008, 2009    CATARACT REMOVAL  2011    bilat    CHOLECYSTECTOMY  03/1988    COLONOSCOPY  2016    COLONOSCOPY  10/11/2018    COLONOSCOPY POLYPECTOMY SNARE/COLD BIOPSY performed by Citlali Kaiser MD at [address] ESOPHAGUS      ENDOSCOPY, COLON, DIAGNOSTIC      EYE SURGERY Left 01/30/2015    EYE SURGERY      CATARACT REMOVAL- BILATERAL    HEMORRHOID SURGERY  1980s    HERNIA REPAIR      HYSTERECTOMY, TOTAL ABDOMINAL  1980    JOINT REPLACEMENT  bilat 2005/ 2007    knees    [place] COLSC FLX WITH DIRECTED SUBMUCOSAL Simone Oskar [address] ANY SBST  10/11/2018    COLONOSCOPY SUBMUCOSAL/BOTOX INJECTION performed by Citlali Kaiser MD at [facility]   [address]  last 2009    removed a bone (2)--2 times no construction     SINUS SURGERY  02/01/2016    SINUS SURGERY  2016 x 2    SINUS SURGERY  09/18/2017    [facility] - Dr Rothman Done SINUS SURGERY  08/09/2017 8[facility]17/2017 - [facility]    TONSILLECTOMY      TOTAL KNEE ARTHROPLASTY  08/2003    Left TKR    VENTRAL HERNIA REPAIR  1992         MEDICATIONS:       Scheduled Meds:   dexamethasone  2 mg Oral BID WC    mupirocin   Topical 4x Daily    alogliptin  25 mg Oral Daily    Sodium Chloride-Sodium Bicarb  1 Bottle Nasal BID    pantoprazole  40 mg Oral QAM AC    potassium chloride  20 mEq Oral Daily    ferrous sulfate  325 mg Oral BID WC    hydrALAZINE  50 mg Oral BID    NIFEdipine  60 mg Oral Daily    bumetanide  1 mg Oral BID    pregabalin  150 mg Oral Daily    ascorbic acid  1,000 mg Oral Daily    Vitamin D  2,000 Units Oral Daily    zinc sulfate  50 mg Oral Daily    polyethylene glycol  17 g Oral Daily    piperacillin-tazobactam  3,375 mg Intravenous Q8H    enoxaparin  40 mg Subcutaneous Q24H    insulin lispro  0-18 Units Subcutaneous TID     insulin lispro  0-9 Units Subcutaneous Nightly     Continuous Infusions:   dextrose      sodium chloride 100 mL/hr at 03/01/21 0713     PRN Meds:glucose, dextrose, glucagon (rDNA), dextrose, sodium chloride, docusate sodium, acetaminophen  Allergies:   ALLERGIES:    Lisinopril, Sulfamethoxazole-trimethoprim, Morphine, Codeine, Hydrocodone, Percocet [oxycodone-acetaminophen], Tylenol [acetaminophen], and Tape [adhesive tape]        SOCIAL HISTORY:     TOBACCO:   reports that she has never smoked. She has never used smokeless tobacco.     ETOH:   reports no history of alcohol use. Patient currently lives with family. FAMILY HISTORY:         Problem Relation Age of Onset    Diabetes Mother     Heart Disease Father         whooping cough as child    Obesity Sister     Other Brother         tumor on back    Obesity Sister     Cancer Sister         Skin     Cancer Brother         Bone cancer from metal    Other Brother         passed as a child    Heart Disease Brother     Other Brother         tremor    Heart Attack Brother     No Known Problems Brother     Heart Disease Brother     No Known Problems Brother     No Known Problems Brother        REVIEW OF SYSTEMS:     Constitutional: + Fever, no night sweats, no fatigue, no weight loss. Head: no head ache , no head injury, no migranes. Eye: no eye discharge, blurring of vision, no double vision,no eye pain. Ears: no hearing difficulty, no tinnitus  Mouth/throat: As noted in HPI.     Respiratory: no cough no chest pain,no shortness of breath,no wheezing  CVS: no palpitation, no chest pain,   GI: no abdominal pain, no nausea , no vomiting, no constipation,no diarrhea. ABA: no dysuria, frequency and urgency, no hematuria, no kidney stones  Musculoskeletal: Chronic back pain. Endocrine: no polyuria, polydipsia, no cold or heat intolerance  Hematology: no anemia, no easy brusing or bleeding, no hx of clotting disorder  Dermatology: no skin rash, no skin lesions, no pruritis,  Neurological:no headaches,no dizziness, no seizure, no numbness. Psychiatry: no depression, no anxiety,no panic attacks, no suicide ideation    PHYSICAL EXAM:     BP (!) 171/85   Pulse 65   Temp 99.1 °F (37.3 °C) (Oral)   Resp 18   Ht 5' 5\" (1.651 m)   Wt 222 lb (100.7 kg)   LMP  (LMP Unknown)   SpO2 95%   BMI 36.94 kg/m²   General apperance:  Awake, alert, chronically sick looking. HEENT: Slightly pale conjunctiva, unicteric sclera, there is purulent drainage from her right mental area the area is indurated there was yellowish purulent drainage and it was very tender to touch  Chest: Bilateral air entry, diminished breath sounds  Cardiovascular:  RRR ,S1S2, no murmur or gallop. Abdomen:  Soft, non tender to palpation. Extremities: No edema. Skin:  Warm and dry. CNS: Oriented to person place and time. LABS:     CBC:   Recent Labs     02/28/21  1328 03/01/21  1057   WBC 15.8* 11.7*   HGB 12.1 12.1   * 101*     BMP:    Recent Labs     02/28/21  1328 03/01/21  1057   * 139   K 3.9 3.4*   CL 93* 101   CO2 22* 29   BUN 33* 19   CREATININE 1.0 0.7   GLUCOSE 593* 207*     Calcium:  Recent Labs     03/01/21  1057   CALCIUM 8.3*   Glucose:  Recent Labs     02/28/21  2141 03/01/21  0749 03/01/21  1143   POCGLU 216* 196* 172*     HgbA1C: No results for input(s): LABA1C in the last 72 hours.   INR:   Recent Labs     02/28/21  1328   INR 0.94     Hepatic:   Recent Labs     02/28/21  1328   ALKPHOS 105   ALT 12   AST 8   PROT 6.4   BILITOT 0.6   LABALBU 3.6     Amylase and Lipase:  Recent Labs     03/01/21  1057   LACTA 1.5     Lactic Acid:   Recent Labs     03/01/21  119 [facility] 1.5       UA:   Recent Labs     02/28/21  1615   PHUR 6.5   COLORU YELLOW   PROTEINU NEGATIVE   BLOODU NEGATIVE   NITRU NEGATIVE   GLUCOSEU >= 1000*   BILIRUBINUR NEGATIVE   UROBILINOGEN 0.2   KETUA NEGATIVE         IMAGING:    Micro:   Lab Results   Component Value Date    BC No growth-preliminary  02/28/2021    BC No growth-preliminary  02/28/2021       Problem list of patient      Patient Active Problem List   Diagnosis Code    Hypercholesterolemia E78.00    Osteoarthritis M19.90    Osteoporosis M81.0    Type 2 diabetes mellitus without complication, without long-term current use of insulin ([facility] [address] E11.9    DDD (degenerative disc disease), lumbar M51.36    Benign essential HTN I10    SWAPNA on CPAP G47.33, Z99.89    Diabetic peripheral neuropathy ([facility]) E11.42    Acute recurrent pansinusitis J01.41    Primary thunderclap headache G44.53    Rash of entire body R21    Infection of skin due to methicillin resistant Staphylococcus aureus (MRSA) A49.02    Drug allergy, antibiotic  likely bactrim Z88.1    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    Skin plaque L98.8    Hyponatremia E87.1    Hypervolemia E87.70    Cellulitis of lower extremity L03.119    Hypoglycemia E16.2    Acute on chronic systolic CHF (congestive heart failure) ([facility]) I50.23    Bilateral cellulitis of lower leg L03.116, L03.115    Venous ulcers of both lower extremities ([facility]) I83.019, L97.919, I83.029, L97.929    Bilateral lower leg cellulitis L03.116, L03.115    CKD (chronic kidney disease) stage 3, GFR 30-59 ml/min ([facility]) N18.30    Iron deficiency anemia D50.9    Hypomagnesemia E83.42    SBO (small bowel obstruction) ([facility]) K56.609    Venous ulcer of left leg ([facility]) I83.029, L97.929    Venous ulcer of right leg ([facility]) I83.019, L97.919    Acute eczema L30.9    Venous insufficiency of both lower extremities I87.2    Lymphedema of both lower extremities I89.0    Pressure injury of left buttock, stage 2 ([facility] [address] Q99.008  Eczematous dermatitis L30.9    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 3XXA    Facial cellulitis L03. 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    Cataract of both eyes H26.9    History of ventral hernia repair Z98.890, Z87.19    Other cervical disc degeneration, mid-cervical region M50.320    Spondylolisthesis of cervical region M43.12    Spondylolisthesis of thoracic region M43.14    Chronic rhinitis J31.0    Closed fracture of head of humerus S42.293A    Dysphagia R13.10    Laryngopharyngeal reflux K21.9    Malignant neoplasm of ethmoidal sinus (HCC) C31.1    Obesity, Class II, BMI 35-39.9 E66.9    COVID-19 U07.1    Sepsis (HCC) A41.9    Brain mass G93.89    Cellulitis L03.90           Impression and Recommendation:   Cellulitis with abscess over her mental area. It is very indurated tender. It requires incision and drainage. Will call ENT to evaluate patient  History of ethmoid sinus cancer status post surgery and radiation treatment with osteoradionecrosis and late effect of radiation  History of stroke  Specimen was taken for Gram stain and culture. Continue IV antibiotic. Thank you Seun Kelley MD for allowing me to participate in this patient's care.     Rakesh De La Rosa MD,FACP 3/1/2021 1:53 PM

## 2021-03-01 NOTE — CONSULTS
Department of Otolaryngology  Consult Note    Reason for Consult:  Facial abscess  Requesting Physician:  Dr Geoff Summers: Right facial pain and swelling    History Obtained From:  patient, electronic medical record    HISTORY OF PRESENT ILLNESS:                The patient is a 79 y.o. female who presented to [facility] ER on 2/28/2021 for evaluation of right-sided facial pain and swelling. Patient reports that approximately 3 to 4 days ago she began to notice swelling inferior to her mouth and right of the chin. Patient reports that it originally seemed to start with a \"zit or boil,\" but it progressively worsened. Patient denies a history of facial abscesses. Patient reports that last night after she was admitted the area began to spontaneously drain yellow, purulent discharge. She reports that her facial pain and swelling have greatly improved since yesterday evening. She denies any fevers, chills, shortness of breath, oral/neck swelling, otorrhea, changes in hearing. She does report mild right-sided otalgia that is relatively new since patient with swelling. She reports she previously had tubes due to radiation causing ear problems. She does report mild pain with jaw movement. Patient had all of her teeth removed after her radiation in 2017. She denies any jaw pain at this time. [facility] was reportedly consulted, per ER note, who recommended admission at [facility] or discharge with oral plus topical antibiotics and follow-up with [facility]. Infectious disease collected culture of purulent discharge earlier today. She is currently receiving IV Zosyn for antibiotic therapy. The patient does have a significant history for sinonasal adenocarcinoma for which she follows with Dr. Marquita Carreon at [facility]. Patient was noted to have cancer originally in 2017. She underwent surgery with adjuvant radiation in 2017. 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   Patient unfortunately had recurrence and underwent endoscopic resection with Dr. Caridad Jara in 2/1/2020. She has been following with him intermittently since then. Her most recent appointment was 2/9/2021 for which she underwent bilateral nasal endoscopy with debridement. Patient is supposed to follow-up with his office in approximately 3 to 4 weeks from now. Patient had tubes placed previously at [place] reportedly due to radiation-induced eustachian tube dysfunction. 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    Cataract of both eyes     COVID-19 12/2020    DDD (degenerative disc disease), lumbar     Dysphagia, pharyngoesophageal 09/26/2016    Eczema 03/2018    Fibrocystic breast     H/O ventral hernia repair     Headache 02/09/2017    Hypercholesteremia     Hyperlipidemia     Hypertension     Iron deficiency anemia     LPRD (laryngopharyngeal reflux disease) 09/26/2016    Neuropathy     peripheral    Obesity     Orbital abscess     Orbital cellulitis 01/22/2014    SWAPNA (obstructive sleep apnea)     Osteoarthritis     Osteoporosis     Persistent proteinuria associated with type 2 diabetes mellitus ([facility] [address] 01/2017    urine micral of 50.       Sinusitis     Type II or unspecified type diabetes mellitus without mention of complication, not stated as uncontrolled     diagnoses approx 2000    Velopharyngeal incompetence 09/26/2016       Past Surgical History:        Procedure Laterality Date    ABDOMEN SURGERY      APPENDECTOMY      CARPAL TUNNEL RELEASE Bilateral 2008, 2009    CATARACT REMOVAL  2011    bilat    CHOLECYSTECTOMY  03/1988    COLONOSCOPY  2016    COLONOSCOPY  10/11/2018    COLONOSCOPY POLYPECTOMY SNARE/COLD BIOPSY performed by Shakir Carreon MD at [address] ESOPHAGUS      ENDOSCOPY, COLON, DIAGNOSTIC      EYE SURGERY Left 01/30/2015    EYE SURGERY      CATARACT REMOVAL- BILATERAL    [address] [facility] HERNIA REPAIR      HYSTERECTOMY, TOTAL ABDOMINAL  1980    JOINT REPLACEMENT  bilat 2005/ 2007    knees    MA COLSC FLX WITH DIRECTED SUBMUCOSAL NJX ANY SBST  10/11/2018    COLONOSCOPY SUBMUCOSAL/BOTOX INJECTION performed by Karma Fernando MD at [address] [place] [address] Dr  last 2009    removed a bone (2)--2 times no construction     SINUS SURGERY  02/01/2016    SINUS SURGERY  2016 x 2    SINUS SURGERY  09/18/2017    [facility] - Dr Souleymane Baldwin SINUS SURGERY  08/09/2017 [facility]17/2017 [facility]    TONSILLECTOMY      TOTAL KNEE ARTHROPLASTY  08/2003    Left TKR    VENTRAL HERNIA REPAIR  1992       Current Medications:   Current Facility-Administered Medications: dexamethasone (DECADRON) tablet 2 mg, 2 mg, Oral, BID WC  glucose (GLUTOSE) 40 % oral gel 15 g, 15 g, Oral, PRN  dextrose 50 % IV solution, 12.5 g, Intravenous, PRN  glucagon (rDNA) injection 1 mg, 1 mg, Intramuscular, PRN  dextrose 5 % solution, 100 mL/hr, Intravenous, PRN  alogliptin (NESINA) tablet 25 mg, 25 mg, Oral, Daily  Sodium Chloride-Sodium Bicarb 2300-700 MG KIT 1 packet, 1 packet, Nasal, BID  mupirocin (BACTROBAN) 2 % ointment 1 each, 1 each, Nasal, BID  sodium chloride (OCEAN, BABY AYR) 0.65 % nasal spray 1 spray, 1 spray, Nasal, PRN  docusate sodium (COLACE) capsule 100 mg, 100 mg, Oral, BID PRN  pantoprazole (PROTONIX) tablet 40 mg, 40 mg, Oral, QAM AC  potassium chloride (KLOR-CON M) extended release tablet 20 mEq, 20 mEq, Oral, Daily  ferrous sulfate (IRON 325) tablet 325 mg, 325 mg, Oral, BID WC  hydrALAZINE (APRESOLINE) tablet 50 mg, 50 mg, Oral, BID  NIFEdipine (PROCARDIA XL) extended release tablet 60 mg, 60 mg, Oral, Daily  bumetanide (BUMEX) tablet 1 mg, 1 mg, Oral, BID  pregabalin (LYRICA) capsule 150 mg, 150 mg, Oral, Daily  ascorbic acid (VITAMIN C) tablet 1,000 mg, 1,000 mg, Oral, Daily  vitamin D (CHOLECALCIFEROL) tablet 2,000 Units, 2,000 Units, Oral, Daily  zinc sulfate (ZINCATE) capsule 50 mg, 50 mg, Oral, Daily  polyethylene glycol (GLYCOLAX) packet 17 g, 17 g, Oral, Daily  piperacillin-tazobactam (ZOSYN) 3,375 mg in dextrose 5 % 50 mL IVPB extended infusion (mini-bag), 3,375 mg, Intravenous, Q8H  enoxaparin (LOVENOX) injection 40 mg, 40 mg, Subcutaneous, Q24H  insulin lispro (HUMALOG) injection vial 0-18 Units, 0-18 Units, Subcutaneous, TID WC  insulin lispro (HUMALOG) injection vial 0-9 Units, 0-9 Units, Subcutaneous, Nightly  0.9 % sodium chloride infusion, , Intravenous, Continuous  acetaminophen (TYLENOL) tablet 650 mg, 650 mg, Oral, Q8H PRN    Allergies: Allergies   Allergen Reactions    Lisinopril Swelling and Anaphylaxis    Sulfamethoxazole-Trimethoprim Rash    Morphine Other (See Comments)     headaches    Codeine      Other reaction(s): AOF    Hydrocodone      headaches    Percocet [Oxycodone-Acetaminophen] Other (See Comments)     Headaches    Tylenol [Acetaminophen] Other (See Comments)     TYLENOL 3 causes hallucinations    Tape [Adhesive Tape] Rash        Social History:    TOBACCO:   reports that she has never smoked. She has never used smokeless tobacco.  ETOH:   reports no history of alcohol use. DRUGS:   reports no history of drug use. Family History:       Problem Relation Age of Onset    Diabetes Mother     Heart Disease Father         whooping cough as child    Obesity Sister     Other Brother         tumor on back    Obesity Sister     Cancer Sister         Skin     Cancer Brother         Bone cancer from metal    Other Brother         passed as a child    Heart Disease Brother     Other Brother         tremor    Heart Attack Brother     No Known Problems Brother     Heart Disease Brother     No Known Problems Brother     No Known Problems Brother        REVIEW OF SYSTEMS:    A complete multi-organ review of systems was performed.   ENT:  negative except as noted in HPI  CONSTITUTIONAL:  negative except as noted in HPI  EYES:  negative except as noted in HPI  RESPIRATORY:  negative K 3.9 02/28/2021     03/01/2021    CO2 29 03/01/2021    BUN 19 03/01/2021    LABALBU 3.6 02/28/2021    CREATININE 0.7 03/01/2021    CALCIUM 8.3 03/01/2021    LABGLOM 81 03/01/2021    GLUCOSE 207 03/01/2021    GLUCOSE 105 07/21/2020     Radiology Review:  CT Maxillofacial W Contrast 2/28/21  FINDINGS: There is complete opacification of the right maxillary sinus. The left maxillary sinus is partially opacified. Surgical changes and mucosal thickening are present throughout the ethmoid sinuses. Osseous irregularity in the ethmoid and maxillary    sinuses may be post therapeutic. There is chronic opacification of the bilateral mastoid air cells. Orbits and extraocular structures are intact. There is asymmetric soft tissue swelling adjacent to the right maxillary sinus without evidence of a focal    fluid collection.           Impression   1. Bilateral maxillary sinus opacification. 2. Extensive osseous changes in the ethmoid and maxillary sinuses, likely post therapeutic. 3. Right-sided facial swelling. CT soft tissue neck W contrast 2/28/21  FINDINGS: Changes in the paranasal sinuses are detailed on same day CT of the facial bones. There is soft tissue swelling adjacent to the right maxillary sinus without evidence of a focal fluid collection. The submandibular and parotid glands are    preserved. No cervical lymphadenopathy is identified. Vessels are patent. The thyroid gland is unremarkable. Scarring is noted at the bilateral lung apices.           Impression       Right-sided facial soft tissue swelling     IMPRESSION/RECOMMENDATIONS:      Facial cellulitis with abscess, history of sinonasal adenocarcinoma    -Area is diffusely erythematous, edematous and tender to palpation. However, patient symptoms have reportedly significantly improved since it started to spontaneously drain  -Discussed plan with Dr. Sean Cuevas. Plan to continue to monitor patient for improving symptoms.   If symptoms do not continue to improve or worsen, will likely consider I&D  -Culture was collected by Dr. Tatianna Park today. Follow his recommendations for antibiotic therapy  -Change dressing over submental wound at least twice daily and as needed for saturation. Clean area of drainage twice daily with Q-tip moisturized with hydrogen peroxide. Nursing communication placed  -Patient has significant nasal crusting. Ideally, patient would receive nasal saline irrigations that she supposed to be doing at the [facility]. Nasal saline spray added to be Q6 hours while awake scheduled in case she is not able to complete irrigations while inpatient. Continue PRN order for nasal saline as well  -Apply heat to small pustule superior to left lip several times a day, as tolerated by patient.     Electronically signed by HIRAM Garcia on 3/1/2021 at 3:51 PM

## 2021-03-01 NOTE — H&P
[address] [place] [address]                              HISTORY AND PHYSICAL    PATIENT NAME: Ellen Garcia                       :        1940  MED REC NO:   [id number]                           ROOM:       0022  ACCOUNT NO:   [de-identified]                           ADMIT DATE: 2021  PROVIDER:     Federico Bain M.D.    CHIEF COMPLAINT:  Swelling in the face. HISTORY OF PRESENT ILLNESS:  This is a 41-year-old woman with known  history of adenocarcinoma of the ethmoid sinuses, status post  debridement that the patient requires every six weeks. Apparently had a  pustular lesion in her chin, which has now been draining and has  surrounding swelling. She has been admitted for possible cellulitis. She denies having any fever. She did have some pain, which has  improved. There was no loss of consciousness. No nausea or vomiting. 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 The patient's sugars  have been running high because of her steroids. She denies any polyuria  or polydipsia. PAST MEDICAL HISTORY:  Significant for adenocarcinoma of the ethmoid  sinus, status post surgery, radiation and had recurrence in  that  required local resection. The patient also has history of diabetes;  hypertension; hyperlipidemia; diabetic neuropathy; sleep apnea, but not  being able to comply with CPAP machine; osteoarthritis. Does mention  about CHF. The patient denies any heart disease. No strokes. PAST SURGICAL HISTORY:  Includes multiple ventral hernia repair,  abdominal hysterectomy, cholecystectomy, hemorrhoid surgery, bilateral  knee surgery, carpal tunnel, sinus surgery for cancer, cataract surgery,  colonoscopy, tonsillectomy, cholecystectomy, esophageal stricture  dilatation, and appendectomy.     ALLERGIES:  Listed were TYLENOL, PERCOCET, HYDROCODONE, MORPHINE,  BACTRIM, LISINOPRIL. FAMILY HISTORY:  Positive for heart disease, diabetes, skin cancer and  bone cancer. SOCIAL HISTORY:  She is , with four children. She denies any  smoking, alcohol, or illicit drug use. She was discharged to a  rehab/skilled facility after her last admission. Now, she is with home  health at home. HOME MEDICATIONS:  List include Decadron, Tylenol, GlycoLax, neti pot,  vitamin C, cholecalciferol, zinc, Lyrica, Bactroban, nifedipine, Bumex,  hydralazine, iron, Prilosec, Januvia, Glucophage, Klor-Con, Colace. REVIEW OF OTHER SYSTEMS:  Positive for pain in her face. No headache,  no blurred vision, no chest pain, no palpitations, no difficulty  breathing. No abdominal pain, no nausea or vomiting. No diarrhea,  constipation, hematuria, hematochezia. PHYSICAL EXAMINATION:  VITAL SIGNS:  Blood pressure was 170/78, pulse was 62, respirations 18,  temperature 98.8. HEENT:  Pupils are reactive. Tongue is moist.  Buccal mucosa is moist.   There is a pustular lesion noted on her chin with surrounding erythema  and significant edema. NECK:  Supple. No bruit. HEART:  S1 and S2 appreciated. Not tachycardic. LUNGS:  Air exchange is noted bilaterally. ABDOMEN:  Soft. Bowel sounds heard. No guarding, no tenderness. EXTREMITIES:  Warm. NEUROLOGIC:  She is oriented to person, place, and time; and answers  questions appropriately. LABORATORY/DIAGNOSTIC DATA:  CT of the maxofaciallary showed bilateral  maxillary sinus opacification, extensive osseous changes in the ethmoid  and maxillary sinus and right sided fascial swelling. CT of the head  without contrast showed no mass effect, chronic periventricular small  vessel ischemic changes and opacification of the mastoid air cells  noted. WBC was 15.8, hemoglobin of 12.1, hematocrit of 37.4 and  platelets of 903.   Sodium was 133, potassium 3.9, chloride of 93, CO2 is  22, BUN of 33,

## 2021-03-01 NOTE — CARE COORDINATION
DISCHARGE/PLANNING EVALUATION  3/1/21, 11:16 AM EST    Reason for Referral: current with [facility] for RN and therapy  Mental Status: Patient is alert and oriented  Decision Making: Patient is making own decisions. Family/Social/Home Environment: Assessment completed with Patient and daughter, Argentina Callahan. Patient resides at home with daughter Willam Coughlin. Patient was overall independent at home with some assistance from family when needed. Current Services including food security, transportation and housekeeping:  See above  Current Equipment: walker, wheelchair, shower chair, quad cane  Payment Source: [facility], medicaid  Concerns or Barriers to Discharge: not at this time  Post acute provider list with quality measures, geographic area and applicable managed care information provided. Questions regarding selection process answered: offered and current with [facility]. Teach Back Method used with Patient regarding care plan and discharge plan. Patient and family verbalize understanding of the plan of care and contribute to goal setting. Patient goals, treatment preferences and discharge plan: Patient return to home with daughter and current with [facility].      Electronically signed by Fernand [place], MSW, DINORAH on 3/1/2021 at 11:16 AM

## 2021-03-01 NOTE — PLAN OF CARE
Problem: Falls - Risk of:  Goal: Will remain free from falls  Description: Will remain free from falls  3/1/2021 1413 by Neelima Mix RN  Outcome: Ongoing  Note: No falls this shift, call light in reach. Bed alarm on. Up with 1 assist. PT/OT working with patient  3/1/2021 0144 by Virginia Felder RN  Outcome: Ongoing  Note: Falling star program. Bed alarm on. Call light within reach. Side rails up x2. Fall Band. Encouraged to use call system. Pathway clear. Belongings in reach. Goal: Absence of physical injury  Description: Absence of physical injury  3/1/2021 0144 by Virginia Felder RN  Outcome: Ongoing  Note: No harm to patient this shift. Problem: Skin Integrity:  Goal: Will show no infection signs and symptoms  Description: Will show no infection signs and symptoms  3/1/2021 0144 by Virginia Felder RN  Outcome: Ongoing  Note: Pt afebrile. On IV antibiotics. Labs being monitored. Will continue to reassess. Goal: Absence of new skin breakdown  Description: Absence of new skin breakdown  3/1/2021 1413 by Neelima Mix RN  Outcome: Ongoing  Note: Pt turns and repositions self frequently. Redness and swelling to chin. 3/1/2021 0144 by Virginia Felder RN  Outcome: Ongoing  Note: See Skin Assessment. Problem: Pain:  Goal: Pain level will decrease  Description: Pain level will decrease  3/1/2021 0144 by Virginia Felder RN  Outcome: Ongoing  Note:  Pain meds given prn per [facility]. Pain rated on 0-10 pain rating scale. Will continue to reassess.    Goal: Control of acute pain  Description: Control of acute pain  3/1/2021 1413 by Neelima Mix RN  Outcome: Ongoing  Note: Denies any pain this shift  3/1/2021 0144 by Virginia Felder RN  Outcome: Ongoing  Goal: Control of chronic pain  Description: Control of chronic pain  3/1/2021 0144 by Virginia Felder RN  Outcome: Ongoing     Problem: Discharge Planning:  Goal: Participates in care planning  Description: Participates in care planning  3/1/2021 1413 by Dejan Talamantes RN  Outcome: Ongoing  Note: Pt kept up to date on plan of care  3/1/2021 0144 by Rocío Fernando RN  Outcome: Ongoing  Note: Continue to assess discharge needs as they arise. No concerns stated. Goal: Discharged to appropriate level of care  Description: Discharged to appropriate level of care  3/1/2021 1413 by Dejan Talamantes RN  Outcome: Ongoing  Note: Plans on returning home with family and home health  3/1/2021 0144 by Rocío Fernando RN  Outcome: Ongoing  Note: Patient plans to return home with her daughter upon discharge. Problem: Cardiac Output - Decreased:  Goal: Hemodynamic stability will improve  Description: Hemodynamic stability will improve  3/1/2021 1413 by Dejan Talamantes RN  Outcome: Ongoing  Note: Vital signs stable, telemetry monitor on   3/1/2021 0144 by Rocío Fernando RN  Outcome: Ongoing  Note:   Vitals:    02/28/21 2302   BP: 130/71   Pulse: 60   Resp: 16   Temp: 98.1 °F (36.7 °C)   SpO2: 94%    Vitals every 4 hours & PRN. Problem: Fluid Volume - Imbalance:  Goal: Absence of imbalanced fluid volume signs and symptoms  Description: Absence of imbalanced fluid volume signs and symptoms  3/1/2021 1413 by Dejan Talamantes RN  Outcome: Ongoing  Note: Accurate I/O in progress  3/1/2021 0144 by Rocío Fernando RN  Outcome: Ongoing  Note: Monitoring intake and output. Patient receiving IV fluids as ordered. Will continue to monitor. Problem: Serum Glucose Level - Abnormal:  Goal: Ability to maintain appropriate glucose levels will improve to within specified parameters  Description: Ability to maintain appropriate glucose levels will improve to within specified parameters  3/1/2021 1413 by Dejan Talamantes RN  Outcome: Ongoing  Note: Monitoring blood sugar level before meals and at bedtime  3/1/2021 0144 by Rocío Fernando RN  Outcome: Ongoing  Note: Blood sugars checked ACHS.  Insulin also ordered per sliding scale. Problem: DISCHARGE BARRIERS  Goal: Patient's continuum of care needs are met  Description: Patient return home with daughter and current with . See SW notes 3/1/21.  3/1/2021 1129 by ROSALIA Ordaz, DINORAH  Outcome: Ongoing  Note: Patient return home and current with [facility]. See  notes 3/1/21. Care plan reviewed with patient. Patient verbalize understanding of the plan of care and contribute to goal setting.

## 2021-03-01 NOTE — CARE COORDINATION
03/01/21 1004   Readmission Assessment   Number of Days since last admission? 8-30 days   Previous Disposition Home with Home Health   Who is being Interviewed Patient   What was the patient's/caregiver's perception as to why they think they needed to return back to the hospital? Other (Comment)  (I had pimple that burst and infection started up right side of my face)   Did you visit your Primary Care Physician after you left the hospital, before you returned this time? No   Why weren't you able to visit your PCP? Other (Comment)  (went to [facility] last admit  Evan Larson) then onto home)   Did you see a specialist, such as Cardiac, Pulmonary, Orthopedic Physician, etc. after you left the hospital? No   Who advised the patient to return to the hospital? Self-referral   Does the patient report anything that got in the way of taking their medications? No   In our efforts to provide the best possible care to you and others like you, can you think of anything that we could have done to help you after you left the hospital the first time, so that you might not have needed to return so soon?  Other (Comment)  (no  none)

## 2021-03-02 LAB
BASOPHILS # BLD: 0.2 %
BASOPHILS ABSOLUTE: 0 THOU/MM3 (ref 0–0.1)
EOSINOPHIL # BLD: 0.1 %
EOSINOPHILS ABSOLUTE: 0 THOU/MM3 (ref 0–0.4)
ERYTHROCYTE [DISTWIDTH] IN BLOOD BY AUTOMATED COUNT: 16.3 % (ref 11.5–14.5)
ERYTHROCYTE [DISTWIDTH] IN BLOOD BY AUTOMATED COUNT: 52.5 FL (ref 35–45)
GLUCOSE BLD-MCNC: 201 MG/DL (ref 70–108)
GLUCOSE BLD-MCNC: 256 MG/DL (ref 70–108)
GLUCOSE BLD-MCNC: 258 MG/DL (ref 70–108)
GLUCOSE BLD-MCNC: 298 MG/DL (ref 70–108)
HCT VFR BLD CALC: 39.3 % (ref 37–47)
HEMOGLOBIN: 12.8 GM/DL (ref 12–16)
IMMATURE GRANS (ABS): 0.14 THOU/MM3 (ref 0–0.07)
IMMATURE GRANULOCYTES: 1.5 %
LYMPHOCYTES # BLD: 7.7 %
LYMPHOCYTES ABSOLUTE: 0.7 THOU/MM3 (ref 1–4.8)
MCH RBC QN AUTO: 28.7 PG (ref 26–33)
MCHC RBC AUTO-ENTMCNC: 32.6 GM/DL (ref 32.2–35.5)
MCV RBC AUTO: 88.1 FL (ref 81–99)
MONOCYTES # BLD: 4.1 %
MONOCYTES ABSOLUTE: 0.4 THOU/MM3 (ref 0.4–1.3)
NUCLEATED RED BLOOD CELLS: 0 /100 WBC
PLATELET # BLD: 113 THOU/MM3 (ref 130–400)
PMV BLD AUTO: 11.6 FL (ref 9.4–12.4)
POTASSIUM SERPL-SCNC: 4 MEQ/L (ref 3.5–5.2)
RBC # BLD: 4.46 MILL/MM3 (ref 4.2–5.4)
SEG NEUTROPHILS: 86.4 %
SEGMENTED NEUTROPHILS ABSOLUTE COUNT: 8.1 THOU/MM3 (ref 1.8–7.7)
WBC # BLD: 9.4 THOU/MM3 (ref 4.8–10.8)

## 2021-03-02 PROCEDURE — 85025 COMPLETE CBC W/AUTO DIFF WBC: CPT

## 2021-03-02 PROCEDURE — 82948 REAGENT STRIP/BLOOD GLUCOSE: CPT

## 2021-03-02 PROCEDURE — 6360000002 HC RX W HCPCS: Performed by: INTERNAL MEDICINE

## 2021-03-02 PROCEDURE — 36415 COLL VENOUS BLD VENIPUNCTURE: CPT

## 2021-03-02 PROCEDURE — 84132 ASSAY OF SERUM POTASSIUM: CPT

## 2021-03-02 PROCEDURE — 1200000003 HC TELEMETRY R&B

## 2021-03-02 PROCEDURE — APPNB30 APP NON BILLABLE TIME 0-30 MINS: Performed by: NURSE PRACTITIONER

## 2021-03-02 PROCEDURE — 2580000003 HC RX 258: Performed by: INTERNAL MEDICINE

## 2021-03-02 PROCEDURE — 0H91XZZ DRAINAGE OF FACE SKIN, EXTERNAL APPROACH: ICD-10-PCS | Performed by: INTERNAL MEDICINE

## 2021-03-02 PROCEDURE — 6370000000 HC RX 637 (ALT 250 FOR IP): Performed by: INTERNAL MEDICINE

## 2021-03-02 PROCEDURE — 2500000003 HC RX 250 WO HCPCS: Performed by: INTERNAL MEDICINE

## 2021-03-02 RX ORDER — MORPHINE SULFATE 2 MG/ML
2 INJECTION, SOLUTION INTRAMUSCULAR; INTRAVENOUS ONCE
Status: DISCONTINUED | OUTPATIENT
Start: 2021-03-02 | End: 2021-03-04 | Stop reason: HOSPADM

## 2021-03-02 RX ORDER — POLYETHYLENE GLYCOL 3350 17 G/17G
17 POWDER, FOR SOLUTION ORAL DAILY PRN
COMMUNITY
End: 2021-04-23 | Stop reason: ALTCHOICE

## 2021-03-02 RX ORDER — PREGABALIN 150 MG/1
300 CAPSULE ORAL 2 TIMES DAILY
Status: ON HOLD | COMMUNITY
End: 2021-03-31 | Stop reason: SDUPTHER

## 2021-03-02 RX ORDER — INSULIN GLARGINE 100 [IU]/ML
10 INJECTION, SOLUTION SUBCUTANEOUS DAILY
Status: DISCONTINUED | OUTPATIENT
Start: 2021-03-02 | End: 2021-03-04 | Stop reason: HOSPADM

## 2021-03-02 RX ORDER — LIDOCAINE HYDROCHLORIDE 10 MG/ML
2 INJECTION, SOLUTION EPIDURAL; INFILTRATION; INTRACAUDAL; PERINEURAL ONCE
Status: COMPLETED | OUTPATIENT
Start: 2021-03-02 | End: 2021-03-02

## 2021-03-02 RX ADMIN — NIFEDIPINE 60 MG: 60 TABLET, FILM COATED, EXTENDED RELEASE ORAL at 09:03

## 2021-03-02 RX ADMIN — ACETAMINOPHEN 650 MG: 325 TABLET ORAL at 12:12

## 2021-03-02 RX ADMIN — SALINE NASAL SPRAY 2 SPRAY: 1.5 SOLUTION NASAL at 20:57

## 2021-03-02 RX ADMIN — PANTOPRAZOLE SODIUM 40 MG: 40 TABLET, DELAYED RELEASE ORAL at 05:45

## 2021-03-02 RX ADMIN — SALINE NASAL SPRAY 2 SPRAY: 1.5 SOLUTION NASAL at 09:22

## 2021-03-02 RX ADMIN — ENOXAPARIN SODIUM 40 MG: 40 INJECTION, SOLUTION INTRAVENOUS; SUBCUTANEOUS at 20:58

## 2021-03-02 RX ADMIN — FERROUS SULFATE TAB 325 MG (65 MG ELEMENTAL FE) 325 MG: 325 (65 FE) TAB at 16:50

## 2021-03-02 RX ADMIN — OXYCODONE HYDROCHLORIDE AND ACETAMINOPHEN 1000 MG: 500 TABLET ORAL at 09:03

## 2021-03-02 RX ADMIN — INSULIN GLARGINE 10 UNITS: 100 INJECTION, SOLUTION SUBCUTANEOUS at 14:39

## 2021-03-02 RX ADMIN — BUMETANIDE 1 MG: 1 TABLET ORAL at 20:55

## 2021-03-02 RX ADMIN — SALINE NASAL SPRAY 2 SPRAY: 1.5 SOLUTION NASAL at 14:20

## 2021-03-02 RX ADMIN — FERROUS SULFATE TAB 325 MG (65 MG ELEMENTAL FE) 325 MG: 325 (65 FE) TAB at 09:03

## 2021-03-02 RX ADMIN — HYDRALAZINE HYDROCHLORIDE 50 MG: 50 TABLET, FILM COATED ORAL at 09:03

## 2021-03-02 RX ADMIN — Medication 50 MG: at 09:03

## 2021-03-02 RX ADMIN — DEXAMETHASONE 2 MG: 4 TABLET ORAL at 16:49

## 2021-03-02 RX ADMIN — Medication 2000 UNITS: at 09:03

## 2021-03-02 RX ADMIN — SODIUM CHLORIDE: 9 INJECTION, SOLUTION INTRAVENOUS at 05:45

## 2021-03-02 RX ADMIN — INSULIN LISPRO 5 UNITS: 100 INJECTION, SOLUTION INTRAVENOUS; SUBCUTANEOUS at 20:56

## 2021-03-02 RX ADMIN — HYDRALAZINE HYDROCHLORIDE 50 MG: 50 TABLET, FILM COATED ORAL at 20:55

## 2021-03-02 RX ADMIN — MUPIROCIN 1 EACH: 20 OINTMENT TOPICAL at 20:59

## 2021-03-02 RX ADMIN — PIPERACILLIN AND TAZOBACTAM 3375 MG: 3; .375 INJECTION, POWDER, LYOPHILIZED, FOR SOLUTION INTRAVENOUS at 04:10

## 2021-03-02 RX ADMIN — POTASSIUM CHLORIDE 20 MEQ: 1500 TABLET, EXTENDED RELEASE ORAL at 09:10

## 2021-03-02 RX ADMIN — VANCOMYCIN HYDROCHLORIDE 1500 MG: 5 INJECTION, POWDER, LYOPHILIZED, FOR SOLUTION INTRAVENOUS at 12:19

## 2021-03-02 RX ADMIN — ACETAMINOPHEN 650 MG: 325 TABLET ORAL at 20:56

## 2021-03-02 RX ADMIN — ALOGLIPTIN 25 MG: 25 TABLET, FILM COATED ORAL at 09:03

## 2021-03-02 RX ADMIN — DEXAMETHASONE 2 MG: 4 TABLET ORAL at 10:54

## 2021-03-02 RX ADMIN — LIDOCAINE HYDROCHLORIDE 2 ML: 10 INJECTION, SOLUTION EPIDURAL; INFILTRATION; INTRACAUDAL; PERINEURAL at 11:19

## 2021-03-02 RX ADMIN — PREGABALIN 150 MG: 75 CAPSULE ORAL at 09:10

## 2021-03-02 RX ADMIN — BUMETANIDE 1 MG: 1 TABLET ORAL at 09:03

## 2021-03-02 ASSESSMENT — PAIN SCALES - GENERAL
PAINLEVEL_OUTOF10: 0
PAINLEVEL_OUTOF10: 3
PAINLEVEL_OUTOF10: 0
PAINLEVEL_OUTOF10: 0
PAINLEVEL_OUTOF10: 3

## 2021-03-02 ASSESSMENT — PAIN DESCRIPTION - PAIN TYPE: TYPE: ACUTE PAIN

## 2021-03-02 ASSESSMENT — PAIN DESCRIPTION - LOCATION: LOCATION: SHOULDER

## 2021-03-02 NOTE — PROGRESS NOTES
Pharmacy Medication History Note      List of current medications patient is taking is complete. Source of information: daughter, outside dispense hx    Changes made to medication list:  Medications removed (include reason, ex.  therapy complete or physician discontinued):  Miralax daily   Lyrica-      Medications added/doses adjusted:  Miralax daily PRN  Mupirocin BID to nasal wash   Lyrica 150 mg- take 2 capsules BID     Electronically signed by Staci Raymond [facility] on 3/2/2021 at 9:54 AM

## 2021-03-02 NOTE — PROGRESS NOTES
Department of Otolaryngology  Progress Note    Chief Complaint:  Facial swelling    SUBJECTIVE:  Afebrile. WBC down to 9.4. Preliminary wound culture gram stain gram positive cocci singly and in pairs. Continues on IV Zosyn. Upon rounding on patient, nursing informs that I&D completed just prior to my arrival by Dr Varun Hansen. Initial consult HPI 3/1/2021:  The patient is a 79 y.o. female who presented to [facility] ER on 2/28/2021 for evaluation of right-sided facial pain and swelling. Patient reports that approximately 3 to 4 days ago she began to notice swelling inferior to her mouth and right of the chin. Patient reports that it originally seemed to start with a \"zit or boil,\" but it progressively worsened. Patient denies a history of facial abscesses. Patient reports that last night after she was admitted the area began to spontaneously drain yellow, purulent discharge. She reports that her facial pain and swelling have greatly improved since yesterday evening. She denies any fevers, chills, shortness of breath, oral/neck swelling, otorrhea, changes in hearing. She does report mild right-sided otalgia that is relatively new since patient with swelling. She reports she previously had tubes due to radiation causing ear problems. She does report mild pain with jaw movement. Patient had all of her teeth removed after her radiation in 2017. She denies any jaw pain at this time. [facility] was reportedly consulted, per ER note, who recommended admission at [facility] or discharge with oral plus topical antibiotics and follow-up with [facility]. Infectious disease collected culture of purulent discharge earlier today. She is currently receiving IV Zosyn for antibiotic therapy.     The patient does have a significant history for sinonasal adenocarcinoma for which she follows with Dr. Robbin Gonzales at [facility]. Patient was noted to have cancer originally in 2017.   She underwent surgery with adjuvant radiation in 2017. 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 Patient unfortunately had recurrence and underwent endoscopic resection with Dr. Ayers Been in 2/1/2020. She has been following with him intermittently since then. Her most recent appointment was 2/9/2021 for which she underwent bilateral nasal endoscopy with debridement. Patient is supposed to follow-up with his office in approximately 3 to 4 weeks from now. Patient had tubes placed previously at [facility] reportedly due to radiation-induced eustachian tube dysfunction. A complete multi-organ review of systems was performed using a new patient questionnaire, and reviewed by me.   ENT:  negative except as noted in HPI  CONSTITUTIONAL:  negative except as noted in HPI  EYES:  negative except as noted in HPI  RESPIRATORY:  negative except as noted in HPI  CARDIOVASCULAR:  negative except as noted in HPI  GASTROINTESTINAL:  negative except as noted in HPI  GENITOURINARY:  negative except as noted in HPI  MUSCULOSKELETAL:  negative except as noted in HPI  SKIN:  negative except as noted in HPI  ENDOCRINE/METABOLIC: negative except as noted in HPI  HEMATOLOGIC/LYMPHATIC:  negative except as noted in HPI  ALLERGY/IMMUN: negative except as noted in HPI  NEUROLOGICAL:  negative except as noted in HPI  BEHAVIOR/PSYCH:  negative except as noted in HPI    OBJECTIVE      Physical  VITALS:  BP (!) 163/75   Pulse 84   Temp 97.9 °F (36.6 °C) (Oral)   Resp 18   Ht 5' 5\" (1.651 m)   Wt 222 lb (100.7 kg)   LMP  (LMP Unknown)   SpO2 96%   BMI 36.94 kg/m²     Deferred today    3/1/2021          Data  CBC with Differential:    Lab Results   Component Value Date    WBC 9.4 03/02/2021    RBC 4.46 03/02/2021    HGB 12.8 03/02/2021    HCT 39.3 03/02/2021     03/02/2021    MCV 88.1 03/02/2021    MCH 28.7 03/02/2021    MCHC 32.6 03/02/2021    RDW 16.7 04/23/2018    NRBC 0 03/02/2021    SEGSPCT 86.4 03/02/2021    LYMPHOPCT 34.9 10/12/2011    MONOPCT 4.1 03/02/2021    MONOPCT 5.1 10/12/2011    EOSPCT 2.3 10/12/2011    BASOPCT 1.2 10/12/2011    MONOSABS 0.4 03/02/2021    LYMPHSABS 0.7 03/02/2021    EOSABS 0.0 03/02/2021    BASOSABS 0.0 03/02/2021     Wound Culture:  3/1/2021 1320 *preliminary  Gram Stain Result Few segmented neutrophils observed. Rare epithelial cells observed. Rare gram positive cocci occurring singly and in pairs.           Radiology Review:  CT Soft Tissue Neck W Contrast 2/28/20201 1540  Impression       Right-sided facial soft tissue swelling       Final report electronically signed by Dr. Lisa Reyes on 2/28/2021 4:04 PM       Inpatient Medications  Current Facility-Administered Medications: dexamethasone (DECADRON) tablet 2 mg, 2 mg, Oral, BID WC  glucose (GLUTOSE) 40 % oral gel 15 g, 15 g, Oral, PRN  dextrose 50 % IV solution, 12.5 g, Intravenous, PRN  glucagon (rDNA) injection 1 mg, 1 mg, Intramuscular, PRN  dextrose 5 % solution, 100 mL/hr, Intravenous, PRN  alogliptin (NESINA) tablet 25 mg, 25 mg, Oral, Daily  Sodium Chloride-Sodium Bicarb 2300-700 MG KIT 1 packet, 1 packet, Nasal, BID  mupirocin (BACTROBAN) 2 % ointment 1 each, 1 each, Nasal, BID  sodium chloride (OCEAN, BABY AYR) 0.65 % nasal spray 2 spray, 2 spray, Nasal, Q6H WA  potassium chloride (KLOR-CON M) extended release tablet 40 mEq, 40 mEq, Oral, PRN **OR** potassium bicarb-citric acid (EFFER-K) effervescent tablet 40 mEq, 40 mEq, Oral, PRN **OR** potassium chloride 10 mEq/100 mL IVPB (Peripheral Line), 10 mEq, Intravenous, PRN  sodium chloride (OCEAN, BABY AYR) 0.65 % nasal spray 1 spray, 1 spray, Nasal, PRN  docusate sodium (COLACE) capsule 100 mg, 100 mg, Oral, BID PRN  pantoprazole (PROTONIX) tablet 40 mg, 40 mg, Oral, QAM AC  potassium chloride (KLOR-CON M) extended release tablet 20 mEq, 20 mEq, Oral, Daily  ferrous sulfate (IRON 325) tablet 325 mg, 325 mg, Oral, BID WC  hydrALAZINE (APRESOLINE) tablet 50 mg, 50 mg, Oral, BID  NIFEdipine (PROCARDIA XL) extended release tablet 60 mg, 60 mg, Oral, Daily  bumetanide (BUMEX) tablet 1 mg, 1 mg, Oral, BID  pregabalin (LYRICA) capsule 150 mg, 150 mg, Oral, Daily  ascorbic acid (VITAMIN C) tablet 1,000 mg, 1,000 mg, Oral, Daily  vitamin D (CHOLECALCIFEROL) tablet 2,000 Units, 2,000 Units, Oral, Daily  zinc sulfate (ZINCATE) capsule 50 mg, 50 mg, Oral, Daily  polyethylene glycol (GLYCOLAX) packet 17 g, 17 g, Oral, Daily  piperacillin-tazobactam (ZOSYN) 3,375 mg in dextrose 5 % 50 mL IVPB extended infusion (mini-bag), 3,375 mg, Intravenous, Q8H  enoxaparin (LOVENOX) injection 40 mg, 40 mg, Subcutaneous, Q24H  insulin lispro (HUMALOG) injection vial 0-18 Units, 0-18 Units, Subcutaneous, TID WC  insulin lispro (HUMALOG) injection vial 0-9 Units, 0-9 Units, Subcutaneous, Nightly  0.9 % sodium chloride infusion, , Intravenous, Continuous  acetaminophen (TYLENOL) tablet 650 mg, 650 mg, Oral, Q8H PRN    ASSESSMENT AND PLAN    Facial abscess with cellulitis, staph aureus (likely MRSA)  ENT will sign off. We were consulted to evaluate need for I&D, which has been completed by Dr Jamison Mcgill this am.  Antibiotic therapy being handled by him as well.         Electronically signed by AMANDA Castelan CNP on 3/2/2021 at 9:06 AM

## 2021-03-02 NOTE — PLAN OF CARE
Problem: Falls - Risk of:  Goal: Will remain free from falls  Description: Will remain free from falls  3/2/2021 0018 by Cody Pacheco RN  Outcome: Ongoing  Note: Falling star program. Bed alarm on. Call light within reach. Side rails up x2. Fall Band. Chair Alarm & Gait Belt in Room. Encouraged to use call system. Pathway clear. Belongings in reach. Problem: Falls - Risk of:  Goal: Absence of physical injury  Description: Absence of physical injury  Outcome: Ongoing  Note: No harm to patient this shift thus far. Problem: Skin Integrity:  Goal: Will show no infection signs and symptoms  Description: Will show no infection signs and symptoms  Outcome: Ongoing  Note: Pt afebrile. On IV antibiotics. Labs being monitored. Will continue to reassess. Problem: Skin Integrity:  Goal: Absence of new skin breakdown  Description: Absence of new skin breakdown  3/2/2021 0018 by Cody Pacheco RN  Outcome: Ongoing  Note: See Skin Assessment. Repositions self throughout the night. Problem: Pain:  Goal: Pain level will decrease  Description: Pain level will decrease  Outcome: Ongoing  Note:  Pain meds given prn per MAR. Pain rated on 0-10 pain rating scale. Will continue to reassess. Problem: Pain:  Goal: Control of acute pain  Description: Control of acute pain  3/2/2021 0018 by Coyd Pacheco RN  Outcome: Ongoing     Problem: Pain:  Goal: Control of chronic pain  Description: Control of chronic pain  Outcome: Ongoing  Note: Chronic Right Shoulder Pain. Problem: Discharge Planning:  Goal: Participates in care planning  Description: Participates in care planning  3/2/2021 0018 by Cody Pacheco RN  Outcome: Ongoing  Note: Continue to assess discharge needs as they arise. No concerns stated.       Problem: Discharge Planning:  Goal: Discharged to appropriate level of care  Description: Discharged to appropriate level of care  3/2/2021 0018 by Cody Pacheco RN  Outcome: Ongoing  Note: Patient plans on returning home with her daughter at discharge. Problem: Cardiac Output - Decreased:  Goal: Hemodynamic stability will improve  Description: Hemodynamic stability will improve  3/2/2021 0018 by Jabier Patel RN  Outcome: Ongoing  Note:   Vitals:    03/01/21 2305   BP: (!) 142/85   Pulse: 76   Resp: 18   Temp: 99.3 °F (37.4 °C)   SpO2: 93%     Vitals every 4 hours & PRN. Problem: Fluid Volume - Imbalance:  Goal: Absence of imbalanced fluid volume signs and symptoms  Description: Absence of imbalanced fluid volume signs and symptoms  3/2/2021 0018 by Jabier Patel RN  Outcome: Ongoing  Note: Monitoring intake and output. Patient receiving IV fluids as ordered. Will continue to monitor. Problem: Serum Glucose Level - Abnormal:  Goal: Ability to maintain appropriate glucose levels will improve to within specified parameters  Description: Ability to maintain appropriate glucose levels will improve to within specified parameters  3/2/2021 0018 by Jabier Patel RN  Outcome: Ongoing  Note: Blood sugars checked ACHS. Insulin also ordered per sliding scale. Care plan reviewed with patient.  Will review and discuss care plan with family when available

## 2021-03-02 NOTE — PROGRESS NOTES
IM Progress Note  Dr. Theo Henderson  3/2/2021 11:07 AM      Patient name Ame Simms  [id number]  PCP: Kenya Deluna MD  Admit Date: 2/28/2021  Acct No. [de-identified]    Subjective: Interval History:   Still has pain in chin  D/w Dr Freya Kerr   needs I&D      Diet: DIET CARB CONTROL;    I/O last 3 completed shifts: In: 2548.9 [P.O.:580; I.V.:1968.9]  Out: 6600 [Urine:6600]  I/O this shift:  In: 240 [P.O.:240]  Out: -         Admission weight: 222 lb (100.7 kg) as of 2/28/2021 12:57 PM  Wt Readings from Last 3 Encounters:   02/28/21 222 lb (100.7 kg)   02/03/21 213 lb 1.6 oz (96.7 kg)   12/28/20 221 lb 3.2 oz (100.3 kg)     Body mass index is 36.94 kg/m².     ROS   CVS;  no cp or palpitation  Resp: no SOB or cough  Neuro:  No numbness or weakness or dizziness  Abd: no nausea or vomiting or abd pain      Medications:   Scheduled Meds:   vancomycin (VANCOCIN) intermittent dosing (placeholder)   Other RX Placeholder    lidocaine PF  2 mL Intradermal Once    vancomycin  1,500 mg Intravenous Q24H    dexamethasone  2 mg Oral BID WC    alogliptin  25 mg Oral Daily    Sodium Chloride-Sodium Bicarb  1 packet Nasal BID    mupirocin  1 each Nasal BID    sodium chloride  2 spray Nasal Q6H WA    pantoprazole  40 mg Oral QAM AC    potassium chloride  20 mEq Oral Daily    ferrous sulfate  325 mg Oral BID WC    hydrALAZINE  50 mg Oral BID    NIFEdipine  60 mg Oral Daily    bumetanide  1 mg Oral BID    pregabalin  150 mg Oral Daily    ascorbic acid  1,000 mg Oral Daily    Vitamin D  2,000 Units Oral Daily    zinc sulfate  50 mg Oral Daily    polyethylene glycol  17 g Oral Daily    enoxaparin  40 mg Subcutaneous Q24H    insulin lispro  0-18 Units Subcutaneous TID WC    insulin lispro  0-9 Units Subcutaneous Nightly     Continuous Infusions:   dextrose      sodium chloride 100 mL/hr at 03/02/21 0545       Labs :     CBC:   Recent Labs     02/28/21  1328 03/01/21  1057 03/02/21  0444   WBC 15.8* 11.7* 9.4 HGB 12.1 12.1 12.8   * 101* 113*     BMP:    Recent Labs     02/28/21  1328 03/01/21  1057 03/02/21  0615   * 139  --    K 3.9 3.4* 4.0   CL 93* 101  --    CO2 22* 29  --    BUN 33* 19  --    CREATININE 1.0 0.7  --    GLUCOSE 593* 207*  --      Hepatic:   Recent Labs     02/28/21  1328   AST 8   ALT 12   BILITOT 0.6   ALKPHOS 105     Troponin: No results for input(s): TROPONINI in the last 72 hours. BNP: No results for input(s): BNP in the last 72 hours. Lipids: No results for input(s): CHOL, HDL in the last 72 hours. Invalid input(s): LDLCALCU  INR:   Recent Labs     02/28/21  1328   INR 0.94       Radiology    Objective:   Vitals: BP (!) 163/75   Pulse 84   Temp 97.9 °F (36.6 °C) (Oral)   Resp 18   Ht 5' 5\" (1.651 m)   Wt 222 lb (100.7 kg)   LMP  (LMP Unknown)   SpO2 96%   BMI 36.94 kg/m²   HEENT: Head:pupils react significant edema that is firm in chi with erythema and tenderness  Neck: supple  Lungs: clear to auscultation  Heart: regular rate and rhythm   Abdomen: soft BS heard   Extremities: warm  edema  Neurologic:  Alert, oriented X3    Impression:   :   1. Abscess chin  2. Adenocarcinoma of the ethmoid sinus, status post surgery, radiation  and requiring periodic debridement. 3.  Radionecrosis of the frontal, on steroids taper dose, per [address]. 4.  Diabetes, uncontrolled with the steroids. 5.  Hypertension. 6.  Hyperlipidemia. Avelina Profit 7.  Chronic lymphedema. 8.  Sleep apnea. 9.  Prior history of COVID. 8.  Fall. 11.  Obesity  12  Neuropathy with history of degenerative disk disease.     Plan:    Antibiotics changed  To vanco  For I& D by ID today  Cont hero dose ofsteroids  Resume metformin after 48 hrs of contras given for CT  Add lantus for now   Luli Gaines MD

## 2021-03-02 NOTE — PLAN OF CARE
Problem: Nutrition  Goal: Optimal nutrition therapy  Outcome: Ongoing   Nutrition Problem #1: Increased nutrient needs  Intervention: Food and/or Nutrient Delivery: Continue Current Diet, Start Oral Nutrition Supplement  Nutritional Goals: Patient will consume 75% or more of meals to aid in healing during LOS.

## 2021-03-02 NOTE — PROGRESS NOTES
Consent obtained. Lidocaine 1% injected, using #15 scalpel, the area was opened over the wound. prulent material drained. The pocket explored and packed with penrose and gauze applied. she tolerated the procedure.

## 2021-03-02 NOTE — PLAN OF CARE
Problem: Falls - Risk of:  Goal: Will remain free from falls  Description: Will remain free from falls  Outcome: Ongoing  Note: No falls this shift, call light in reach. Bed alarm on. Up with 1 assist and walker. PT/OT working with patient. Problem: Skin Integrity:  Goal: Absence of new skin breakdown  Description: Absence of new skin breakdown  Outcome: Ongoing  Note: No new skin breakdown noted. Dr Trever Nation did a bedside I&D of chin cellulitis. Problem: Pain:  Goal: Control of acute pain  Description: Control of acute pain  Outcome: Ongoing  Note: Denies any pain this shift     Problem: Discharge Planning:  Goal: Participates in care planning  Description: Participates in care planning  Outcome: Ongoing  Note: Pt kept up to date on plan of care  Goal: Discharged to appropriate level of care  Description: Discharged to appropriate level of care  Outcome: Ongoing  Note: Plans on returning home with family and home health at discharge     Problem: Cardiac Output - Decreased:  Goal: Hemodynamic stability will improve  Description: Hemodynamic stability will improve  Outcome: Ongoing  Note: Vital signs stable, telemetry monitor on      Problem: Fluid Volume - Imbalance:  Goal: Absence of imbalanced fluid volume signs and symptoms  Description: Absence of imbalanced fluid volume signs and symptoms  Outcome: Ongoing  Note: Accurate I/O in progress     Problem: Serum Glucose Level - Abnormal:  Goal: Ability to maintain appropriate glucose levels will improve to within specified parameters  Description: Ability to maintain appropriate glucose levels will improve to within specified parameters  Outcome: Ongoing  Note: Monitoring blood sugar level before meals and at bedtime     Problem: Nutrition  Goal: Optimal nutrition therapy  3/2/2021 1459 by Francisco Robertson, RD, LD  Outcome: Ongoing    Care plan reviewed with patient. Patient verbalize understanding of the plan of care and contribute to goal setting.

## 2021-03-02 NOTE — CARE COORDINATION
3/2/21, 2:18 PM EST    DISCHARGE ON GOING EVALUATION    [facility] day: 2  Location: 622/022-A Reason for admit: Cellulitis [L03.90]   Procedure: 3/2 bedside I&D of chin abscess  Barriers to Discharge: Abscess cx collected. IV Vanc daily, pain control. ID following. ENT signed off. PT/OT. PCP: Dominguez Cruz MD  Readmission Risk Score: 31%  Patient Goals/Plan/Treatment Preferences:  Home with daughter and has [facility].

## 2021-03-02 NOTE — PROGRESS NOTES
pitting edema)    · Usual Body Weight: (per EMR: 6/4/20 205# 6.4oz, 12/28/20 (bedscale) 221# 3.2oz, 1/27/21 227# 8oz with +1 pitting edema)     · Ideal Body Weight: 125 lbs;     · BMI: 36.9  · BMI Categories: Obese Class 2 (BMI 35.0 -39.9)       Nutrition Diagnosis:   · Increased nutrient needs related to increase demand for energy/nutrients as evidenced by wounds      Nutrition Interventions:   Food and/or Nutrient Delivery:  Continue Current Diet, Start Oral Nutrition Supplement  Nutrition Education/Counseling:  Education initiated(encouraged intake at best effort, use of ONS)   Coordination of Nutrition Care:  Continue to monitor while inpatient    Goals:  Patient will consume 75% or more of meals to aid in healing during LOS. Nutrition Monitoring and Evaluation:   Food/Nutrient Intake Outcomes:  Food and Nutrient Intake, Supplement Intake  Physical Signs/Symptoms Outcomes:  Biochemical Data, Nutrition Focused Physical Findings, Skin, Weight, Chewing or Swallowing, Fluid Status or Edema     Discharge Planning:     Too soon to determine     Electronically signed by Bhumika Bal RD, LD on 3/2/21 at 3:00 PM EST    Contact: [phone]

## 2021-03-02 NOTE — PROGRESS NOTES
Progress note: Infectious diseases    Patient - Pilar Courtney,  Age - 79 y.o.    - 1940      Room Number - 6K-21/200-A   MRN -  [id number]   Acct # - [de-identified]  Date of Admission -  2021 12:57 PM    SUBJECTIVE:   She has pain. The wound is growing staph aureus. sensitivity pending. She has pain  OBJECTIVE   VITALS    height is 5' 5\" (1.651 m) and weight is 222 lb (100.7 kg). Her oral temperature is 97.9 °F (36.6 °C). Her blood pressure is 163/75 (abnormal) and her pulse is 84. Her respiration is 18 and oxygen saturation is 96%. Wt Readings from Last 3 Encounters:   21 222 lb (100.7 kg)   21 213 lb 1.6 oz (96.7 kg)   20 221 lb 3.2 oz (100.3 kg)       I/O (24 Hours)    Intake/Output Summary (Last 24 hours) at 3/2/2021 1043  Last data filed at 3/2/2021 0830  Gross per 24 hour   Intake 2788.88 ml   Output 5650 ml   Net -2861.12 ml       General Appearance  Awake, alert, oriented,  not  In acute distress  HEENT - normocephalic, atraumatic, slighlty pale conjunctiva,  anicteric sclera. There is redness and swelling on chin , there is prulent drainage. The open wound is very tiny. The pus appears to be thick  Neck - Supple, no mass  Lungs -  Bilateral  air entry, no rhonchi, no wheeze  Cardiovascular - Heart sounds are normal.    Abdomen - soft, not distended, nontender,   Neurologic -oriented.   Skin - No bruising or bleeding  Extremities - No edema, no cyanosis, clubbing     MEDICATIONS:      vancomycin (VANCOCIN) intermittent dosing (placeholder)   Other RX Placeholder    dexamethasone  2 mg Oral BID WC    alogliptin  25 mg Oral Daily    Sodium Chloride-Sodium Bicarb  1 packet Nasal BID    mupirocin  1 each Nasal BID    sodium chloride  2 spray Nasal Q6H WA    pantoprazole  40 mg Oral QAM AC    potassium chloride  20 mEq Oral Daily    ferrous sulfate  325 mg Oral BID WC    hydrALAZINE  50 mg Oral BID    NIFEdipine  60 mg Oral Daily    bumetanide  1 mg Oral BID    pregabalin  150 mg Oral Daily    ascorbic acid  1,000 mg Oral Daily    Vitamin D  2,000 Units Oral Daily    zinc sulfate  50 mg Oral Daily    polyethylene glycol  17 g Oral Daily    enoxaparin  40 mg Subcutaneous Q24H    insulin lispro  0-18 Units Subcutaneous TID WC    insulin lispro  0-9 Units Subcutaneous Nightly      dextrose      sodium chloride 100 mL/hr at 03/02/21 0545     glucose, dextrose, glucagon (rDNA), dextrose, potassium chloride **OR** potassium alternative oral replacement **OR** potassium chloride, sodium chloride, docusate sodium, acetaminophen      LABS:     CBC:   Recent Labs     02/28/21  1328 03/01/21  1057 03/02/21  0444   WBC 15.8* 11.7* 9.4   HGB 12.1 12.1 12.8   * 101* 113*     BMP:    Recent Labs     02/28/21  1328 03/01/21  1057 03/02/21  0615   * 139  --    K 3.9 3.4* 4.0   CL 93* 101  --    CO2 22* 29  --    BUN 33* 19  --    CREATININE 1.0 0.7  --    GLUCOSE 593* 207*  --      Calcium:  Recent Labs     03/01/21  1057   CALCIUM 8.3*      Recent Labs     03/01/21  1624 03/01/21  1947 03/02/21  0630   POCGLU 306* 283* 258*     HgbA1C: No results for input(s): LABA1C in the last 72 hours. INR:   Recent Labs     02/28/21  1328   INR 0.94     Hepatic:   Recent Labs     02/28/21  1328   ALKPHOS 105   ALT 12   AST 8   PROT 6.4   BILITOT 0.6   LABALBU 3.6     Amylase and Lipase:  Recent Labs     03/01/21  1057   LACTA 1.5     Lactic Acid:   Recent Labs     03/01/21  1057   LACTA 1.5     Troponin: No results for input(s): CKTOTAL, CKMB, TROPONINI in the last 72 hours. BNP: No results for input(s): BNP in the last 72 hours.     CULTURES:   UA:   Recent Labs     02/28/21  1615   PHUR 6.5   COLORU YELLOW   PROTEINU NEGATIVE   BLOODU NEGATIVE   NITRU NEGATIVE   GLUCOSEU >= 1000*   BILIRUBINUR NEGATIVE   UROBILINOGEN 0.2   KETUA NEGATIVE     Micro:   Lab Results   Component Value Date    BC No growth-preliminary  02/28/2021    BC No growth-preliminary  02/28/2021          Problem list of patient:     Patient Active Problem List   Diagnosis Code    Hypercholesterolemia E78.00    Osteoarthritis M19.90    Osteoporosis M81.0    Type 2 diabetes mellitus without complication, without long-term current use of insulin ([facility] E11.9    DDD (degenerative disc disease), lumbar M51.36    Benign essential HTN I10    SWAPNA on CPAP G47.33, Z99.89    Diabetic peripheral neuropathy ([facility]) E11.42    Acute recurrent pansinusitis J01.41    Primary thunderclap headache G44.53    Rash of entire body R21    Infection of skin due to methicillin resistant Staphylococcus aureus (MRSA) A49.02    Drug allergy, antibiotic  likely bactrim Z88.1    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    Skin plaque L98.8    Hyponatremia E87.1    Hypervolemia E87.70    Cellulitis of lower extremity L03.119    Hypoglycemia E16.2    Acute on chronic systolic CHF (congestive heart failure) ([facility]) I50.23    Bilateral cellulitis of lower leg L03.116, L03.115    Venous ulcers of both lower extremities ([facility]) I83.019, L97.919, I83.029, L97.929    Bilateral lower leg cellulitis L03.116, L03.115    CKD (chronic kidney disease) stage 3, GFR 30-59 ml/min ([facility]) N18.30    Iron deficiency anemia D50.9    Hypomagnesemia E83.42    SBO (small bowel obstruction) ([facility]) K56.609    Venous ulcer of left leg ([facility]) I83.029, L97.929    Venous ulcer of right leg ([facility]) I83.019, L97.919    Acute eczema L30.9    Venous insufficiency of both lower extremities I87.2    Lymphedema of both lower extremities I89.0    Pressure injury of left buttock, stage 2 ([facility]) N54.164    Eczematous dermatitis L30.9    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 3XXA    Facial cellulitis L03. 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    Cataract of both eyes H26.9    History of ventral hernia repair Z98.890, Z87.19    Other cervical disc degeneration, mid-cervical region M50.320    Spondylolisthesis of cervical region M43.12    Spondylolisthesis of thoracic region M43.14    Chronic rhinitis J31.0    Closed fracture of head of humerus S42.293A    Dysphagia R13.10    Laryngopharyngeal reflux K21.9    Malignant neoplasm of ethmoidal sinus (HCC) C31.1    Obesity, Class II, BMI 35-39.9 E66.9    COVID-19 U07.1    Sepsis (HCC) A41.9    Brain mass G93.89    Cellulitis B70.15    Folliculitis C32.3         ASSESSMENT/PLAN   Abscess of the face (mandible area): due to staph auures, due to her hx of MRSA will resume vancomycin and stop iv zosyn  The area needs I and D: will get consent and do at bed side.   Will follow cx report      Quintin Moreno MD, [address] 3/2/2021 10:43 AM

## 2021-03-02 NOTE — PROGRESS NOTES
Pharmacy Note  Vancomycin Consult    Pilar Courtney is a 79 y.o. female started on Vancomycin for Cellulitis with abscess of chin; consult received from Dr. Andi Cunningham to manage therapy. Also receiving the following antibiotics: Zosyn.     Patient Active Problem List   Diagnosis    Hypercholesterolemia    Osteoarthritis    Osteoporosis    Type 2 diabetes mellitus without complication, without long-term current use of insulin ([facility])    DDD (degenerative disc disease), lumbar    Benign essential HTN    SWAPNA on CPAP    Diabetic peripheral neuropathy ([facility])    Acute recurrent pansinusitis    Primary thunderclap headache    Rash of entire body    Infection of skin due to methicillin resistant Staphylococcus aureus (MRSA)    Drug allergy, antibiotic  likely bactrim    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[facility])    Skin plaque    Hyponatremia    Hypervolemia    Cellulitis of lower extremity    Hypoglycemia    Acute on chronic systolic CHF (congestive heart failure) ([facility])    Bilateral cellulitis of lower leg    Venous ulcers of both lower extremities ([facility])    Bilateral lower leg cellulitis    CKD (chronic kidney disease) stage 3, GFR 30-59 ml/min ([facility])    Iron deficiency anemia    Hypomagnesemia    SBO (small bowel obstruction) ([address]    Venous ulcer of left leg ([address]    Venous ulcer of right leg ([facility])    Acute eczema    Venous insufficiency of both lower extremities    Lymphedema of both lower extremities    Pressure injury of left buttock, stage 2 ([address]    Eczematous dermatitis    Colonization with drug-resistant bacteria    Dystrophic nail    Angio-edema    Facial cellulitis    Osteoradionecrosis of skull/sinus    Late effect of radiation    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[facility])    Cataract of both eyes    History of ventral hernia repair    Other cervical disc degeneration, mid-cervical region    Spondylolisthesis of cervical region    Spondylolisthesis of thoracic region    Chronic rhinitis    Closed fracture of head of humerus Dysphagia    Laryngopharyngeal reflux    Malignant neoplasm of ethmoidal sinus (HCC)    Obesity, Class II, BMI 35-39.9    COVID-19    Sepsis (HCC)    Brain mass    Cellulitis    Folliculitis     Allergies:  Lisinopril, Sulfamethoxazole-trimethoprim, Morphine, Codeine, Hydrocodone, Percocet [oxycodone-acetaminophen], Tylenol [acetaminophen], and Tape [adhesive tape]     Temp max: 98.8    Recent Labs     02/28/21  1328 03/01/21  1057 03/02/21  0444   BUN 33* 19  --    CREATININE 1.0 0.7  --    WBC 15.8* 11.7* 9.4       Intake/Output Summary (Last 24 hours) at 3/2/2021 1041  Last data filed at 3/2/2021 0830  Gross per 24 hour   Intake 2788.88 ml   Output 5650 ml   Net -2861.12 ml     Cultures/Sensitivities:  Date Source Result   3/1/2021 Chin swab Staph coag positive   2/28/2021 BC X2 NGTD          Ht Readings from Last 1 Encounters:   02/28/21 5' 5\" (1.651 m)        Wt Readings from Last 1 Encounters:   02/28/21 222 lb (100.7 kg)       Body mass index is 36.94 kg/m². Estimated Creatinine Clearance: 77 mL/min (based on SCr of 0.7 mg/dL). Calc Crcl = 58 ml/minute using IBW = 57 kg     Goal Trough Level: 10-15 mcg/mL    Assessment/Plan:  Received Vancomycin 2500 mg 2/28/2021 1601. Will initiate Vancomycin 1500 mg IV every 24 hours. Timing of trough level will be determined based on culture results, renal function, and clinical response. Thank you for the consult. Will continue to follow.     Casey Fernandez PharmD 3/2/2021 10:48 AM

## 2021-03-02 NOTE — FLOWSHEET NOTE
Assessment-patient is a 79year old female laying in bed. Patient is a believer in God. She brought much harsha to herself talking about grand and great-grandkids. Patient is very hopeful and positive at this time. Intervention- provided words of encouragement, hope and comfort.  provided a ministerial and listening presences hearing stories of family.  offered patient to join in on the Lord's prayer. Outcome-patient expressed gratitude and engaged in more conversation. Plan-Chaplains will continue to round on unit and provide spiritual care and emotional support.

## 2021-03-03 LAB
AEROBIC CULTURE: ABNORMAL
GLUCOSE BLD-MCNC: 157 MG/DL (ref 70–108)
GLUCOSE BLD-MCNC: 234 MG/DL (ref 70–108)
GLUCOSE BLD-MCNC: 299 MG/DL (ref 70–108)
GLUCOSE BLD-MCNC: 456 MG/DL (ref 70–108)
GLUCOSE BLD-MCNC: 593 MG/DL (ref 70–108)
GRAM STAIN RESULT: ABNORMAL
ORGANISM: ABNORMAL

## 2021-03-03 PROCEDURE — 6370000000 HC RX 637 (ALT 250 FOR IP): Performed by: INTERNAL MEDICINE

## 2021-03-03 PROCEDURE — 1200000003 HC TELEMETRY R&B

## 2021-03-03 PROCEDURE — 6360000002 HC RX W HCPCS: Performed by: INTERNAL MEDICINE

## 2021-03-03 PROCEDURE — 97166 OT EVAL MOD COMPLEX 45 MIN: CPT

## 2021-03-03 PROCEDURE — 97530 THERAPEUTIC ACTIVITIES: CPT

## 2021-03-03 PROCEDURE — 82948 REAGENT STRIP/BLOOD GLUCOSE: CPT

## 2021-03-03 PROCEDURE — 2580000003 HC RX 258: Performed by: INTERNAL MEDICINE

## 2021-03-03 RX ORDER — FENTANYL CITRATE 50 UG/ML
25 INJECTION, SOLUTION INTRAMUSCULAR; INTRAVENOUS ONCE
Status: COMPLETED | OUTPATIENT
Start: 2021-03-03 | End: 2021-03-03

## 2021-03-03 RX ADMIN — SALINE NASAL SPRAY 2 SPRAY: 1.5 SOLUTION NASAL at 14:58

## 2021-03-03 RX ADMIN — HYDRALAZINE HYDROCHLORIDE 50 MG: 50 TABLET, FILM COATED ORAL at 08:13

## 2021-03-03 RX ADMIN — Medication 50 MG: at 08:13

## 2021-03-03 RX ADMIN — ALOGLIPTIN 25 MG: 25 TABLET, FILM COATED ORAL at 08:13

## 2021-03-03 RX ADMIN — OXYCODONE HYDROCHLORIDE AND ACETAMINOPHEN 1000 MG: 500 TABLET ORAL at 08:13

## 2021-03-03 RX ADMIN — FERROUS SULFATE TAB 325 MG (65 MG ELEMENTAL FE) 325 MG: 325 (65 FE) TAB at 17:13

## 2021-03-03 RX ADMIN — INSULIN GLARGINE 10 UNITS: 100 INJECTION, SOLUTION SUBCUTANEOUS at 09:00

## 2021-03-03 RX ADMIN — ENOXAPARIN SODIUM 40 MG: 40 INJECTION, SOLUTION INTRAVENOUS; SUBCUTANEOUS at 21:15

## 2021-03-03 RX ADMIN — FERROUS SULFATE TAB 325 MG (65 MG ELEMENTAL FE) 325 MG: 325 (65 FE) TAB at 08:13

## 2021-03-03 RX ADMIN — MUPIROCIN 1 EACH: 20 OINTMENT TOPICAL at 21:15

## 2021-03-03 RX ADMIN — VANCOMYCIN HYDROCHLORIDE 1500 MG: 5 INJECTION, POWDER, LYOPHILIZED, FOR SOLUTION INTRAVENOUS at 15:59

## 2021-03-03 RX ADMIN — POLYETHYLENE GLYCOL (3350) 17 G: 17 POWDER, FOR SOLUTION ORAL at 09:00

## 2021-03-03 RX ADMIN — NIFEDIPINE 60 MG: 60 TABLET, FILM COATED, EXTENDED RELEASE ORAL at 08:13

## 2021-03-03 RX ADMIN — SALINE NASAL SPRAY 2 SPRAY: 1.5 SOLUTION NASAL at 21:11

## 2021-03-03 RX ADMIN — HYDRALAZINE HYDROCHLORIDE 50 MG: 50 TABLET, FILM COATED ORAL at 21:09

## 2021-03-03 RX ADMIN — Medication 2000 UNITS: at 08:13

## 2021-03-03 RX ADMIN — PANTOPRAZOLE SODIUM 40 MG: 40 TABLET, DELAYED RELEASE ORAL at 06:14

## 2021-03-03 RX ADMIN — BUMETANIDE 1 MG: 1 TABLET ORAL at 21:11

## 2021-03-03 RX ADMIN — INSULIN LISPRO 5 UNITS: 100 INJECTION, SOLUTION INTRAVENOUS; SUBCUTANEOUS at 21:15

## 2021-03-03 RX ADMIN — PREGABALIN 150 MG: 75 CAPSULE ORAL at 09:00

## 2021-03-03 RX ADMIN — POTASSIUM CHLORIDE 20 MEQ: 1500 TABLET, EXTENDED RELEASE ORAL at 09:00

## 2021-03-03 RX ADMIN — SALINE NASAL SPRAY 2 SPRAY: 1.5 SOLUTION NASAL at 09:00

## 2021-03-03 RX ADMIN — MUPIROCIN 1 EACH: 20 OINTMENT TOPICAL at 09:00

## 2021-03-03 RX ADMIN — FENTANYL CITRATE 25 MCG: 50 INJECTION, SOLUTION INTRAMUSCULAR; INTRAVENOUS at 02:33

## 2021-03-03 RX ADMIN — BUMETANIDE 1 MG: 1 TABLET ORAL at 08:14

## 2021-03-03 RX ADMIN — DEXAMETHASONE 2 MG: 4 TABLET ORAL at 08:13

## 2021-03-03 ASSESSMENT — PAIN SCALES - GENERAL
PAINLEVEL_OUTOF10: 0

## 2021-03-03 ASSESSMENT — PAIN DESCRIPTION - PAIN TYPE: TYPE: ACUTE PAIN

## 2021-03-03 ASSESSMENT — PAIN SCALES - WONG BAKER: WONGBAKER_NUMERICALRESPONSE: 0

## 2021-03-03 ASSESSMENT — PAIN DESCRIPTION - LOCATION: LOCATION: HEAD

## 2021-03-03 NOTE — PROGRESS NOTES
IM Progress Note  Dr. Stephane Arroyo  3/3/2021 1:53 PM      Patient name Cassy Schmid  [id number]  PCP: Dimas Lowery MD  Admit Date: 2/28/2021  Acct No. [de-identified]    Subjective: Interval History:   Pain better  D/w staff      Diet: DIET CARB CONTROL; Dietary Nutrition Supplements: Diabetic Oral Supplement    I/O last 3 completed shifts: In: 2664.7 [P.O.:840; I.V.:1824.7]  Out: 151 [Urine:150; Stool:1]  No intake/output data recorded. Admission weight: 222 lb (100.7 kg) as of 2/28/2021 12:57 PM  Wt Readings from Last 3 Encounters:   03/03/21 203 lb 3.2 oz (92.2 kg)   02/03/21 213 lb 1.6 oz (96.7 kg)   12/28/20 221 lb 3.2 oz (100.3 kg)     Body mass index is 33.81 kg/m².         Medications:   Scheduled Meds:   vancomycin (VANCOCIN) intermittent dosing (placeholder)   Other RX Placeholder    vancomycin  1,500 mg Intravenous Q24H    insulin glargine  10 Units Subcutaneous Daily    morphine  2 mg Intravenous Once    dexamethasone  2 mg Oral BID WC    alogliptin  25 mg Oral Daily    Sodium Chloride-Sodium Bicarb  1 packet Nasal BID    mupirocin  1 each Nasal BID    sodium chloride  2 spray Nasal Q6H WA    pantoprazole  40 mg Oral QAM AC    potassium chloride  20 mEq Oral Daily    ferrous sulfate  325 mg Oral BID WC    hydrALAZINE  50 mg Oral BID    NIFEdipine  60 mg Oral Daily    bumetanide  1 mg Oral BID    pregabalin  150 mg Oral Daily    ascorbic acid  1,000 mg Oral Daily    Vitamin D  2,000 Units Oral Daily    zinc sulfate  50 mg Oral Daily    polyethylene glycol  17 g Oral Daily    enoxaparin  40 mg Subcutaneous Q24H    insulin lispro  0-18 Units Subcutaneous TID WC    insulin lispro  0-9 Units Subcutaneous Nightly     Continuous Infusions:   dextrose         Labs :     CBC:   Recent Labs     03/01/21  1057 03/02/21  0444   WBC 11.7* 9.4   HGB 12.1 12.8   * 113*     BMP:    Recent Labs     03/01/21  1057 03/02/21  0615     --    K 3.4* 4.0     --    CO2 29 --    BUN 19  --    CREATININE 0.7  --    GLUCOSE 207*  --      Hepatic:   No results for input(s): AST, ALT, ALB, BILITOT, ALKPHOS in the last 72 hours. Troponin: No results for input(s): TROPONINI in the last 72 hours. BNP: No results for input(s): BNP in the last 72 hours. Lipids: No results for input(s): CHOL, HDL in the last 72 hours. Invalid input(s): LDLCALCU  INR:   No results for input(s): INR in the last 72 hours. Radiology    Objective:   Vitals: /66   Pulse 83   Temp 98 °F (36.7 °C) (Oral)   Resp 18   Ht 5' 5\" (1.651 m)   Wt 203 lb 3.2 oz (92.2 kg)   LMP  (LMP Unknown)   SpO2 96%   BMI 33.81 kg/m²   HEENT: Head:pupils react significant edema that is firm in chi with erythema and tenderness  Neck: supple  Lungs: clear to auscultation  Heart: regular rate and rhythm   Abdomen: soft BS heard   Extremities: warm  edema  Neurologic:  Alert, oriented X3    Impression:   :   1. Abscess chin s/pp I&D  2. Adenocarcinoma of the ethmoid sinus, status post surgery, radiation  and requiring periodic debridement. 3.  Radionecrosis of the frontal, on steroids taper dose, per [address] 4.  Diabetes, uncontrolled with the steroids. 5.  Hypertension. 6.  Hyperlipidemia. [place]ry 7.  Chronic lymphedema. 8.  Sleep apnea. 9.  Prior history of COVID. 8.  Fall. 11.  Obesity  12  Neuropathy with history of degenerative disk disease.     Plan:    Await final cult results   Cont antibiotics per ID   chems pawan Landaverde MD

## 2021-03-03 NOTE — PROGRESS NOTES
Progress note: Infectious diseases    Patient - Reece Soria,  Age - 79 y.o.    - 1940      Room Number - 6K-21/200-A   MRN -  [id number]   Acct # - [de-identified]  Date of Admission -  2021 12:57 PM    SUBJECTIVE:   She feels much better. She has no pain   OBJECTIVE   VITALS    height is 5' 5\" (1.651 m) and weight is 203 lb 3.2 oz (92.2 kg). Her oral temperature is 98.3 °F (36.8 °C). Her blood pressure is 108/60 and her pulse is 81. Her respiration is 18 and oxygen saturation is 96%. Wt Readings from Last 3 Encounters:   21 203 lb 3.2 oz (92.2 kg)   21 213 lb 1.6 oz (96.7 kg)   20 221 lb 3.2 oz (100.3 kg)       I/O (24 Hours)    Intake/Output Summary (Last 24 hours) at 3/3/2021 1853  Last data filed at 3/3/2021 0351  Gross per 24 hour   Intake 922 ml   Output 151 ml   Net 771 ml       General Appearance  Awake, alert, oriented,  not  In acute distress  HEENT - normocephalic, atraumatic, slighlty pale conjunctiva,  anicteric sclera. the redness and swelling over her mental area is much better   neck - Supple, no mass  Lungs -  Bilateral  air entry, no rhonchi, no wheeze  Cardiovascular - Heart sounds are normal.    Abdomen - soft, not distended, nontender,   Neurologic -oriented.   Skin - No bruising or bleeding  Extremities - No edema, no cyanosis, clubbing     MEDICATIONS:      vancomycin (VANCOCIN) intermittent dosing (placeholder)   Other RX Placeholder    vancomycin  1,500 mg Intravenous Q24H    insulin glargine  10 Units Subcutaneous Daily    morphine  2 mg Intravenous Once    dexamethasone  2 mg Oral BID WC    alogliptin  25 mg Oral Daily    Sodium Chloride-Sodium Bicarb  1 packet Nasal BID    mupirocin  1 each Nasal BID    sodium chloride  2 spray Nasal Q6H WA    pantoprazole  40 mg Oral QAM AC    potassium chloride  20 mEq Oral Daily    ferrous sulfate  325 mg Oral BID WC  hydrALAZINE  50 mg Oral BID    NIFEdipine  60 mg Oral Daily    bumetanide  1 mg Oral BID    pregabalin  150 mg Oral Daily    ascorbic acid  1,000 mg Oral Daily    Vitamin D  2,000 Units Oral Daily    zinc sulfate  50 mg Oral Daily    polyethylene glycol  17 g Oral Daily    enoxaparin  40 mg Subcutaneous Q24H    insulin lispro  0-18 Units Subcutaneous TID WC    insulin lispro  0-9 Units Subcutaneous Nightly      dextrose       glucose, dextrose, glucagon (rDNA), dextrose, potassium chloride **OR** potassium alternative oral replacement **OR** potassium chloride, sodium chloride, docusate sodium, acetaminophen      LABS:     CBC:   Recent Labs     03/01/21  1057 03/02/21  0444   WBC 11.7* 9.4   HGB 12.1 12.8   * 113*     BMP:    Recent Labs     03/01/21  1057 03/02/21  0615     --    K 3.4* 4.0     --    CO2 29  --    BUN 19  --    CREATININE 0.7  --    GLUCOSE 207*  --      Calcium:  Recent Labs     03/01/21  1057   CALCIUM 8.3*      Recent Labs     03/03/21  1052 03/03/21  1612 03/03/21  1757   POCGLU 157* 593* 456*     HgbA1C: No results for input(s): LABA1C in the last 72 hours. INR:   No results for input(s): INR in the last 72 hours. Hepatic:   No results for input(s): ALKPHOS, ALT, AST, PROT, BILITOT, BILIDIR, LABALBU in the last 72 hours. Amylase and Lipase:  Recent Labs     03/01/21  1057   LACTA 1.5     Lactic Acid:   Recent Labs     03/01/21  1057   LACTA 1.5     Troponin: No results for input(s): CKTOTAL, CKMB, TROPONINI in the last 72 hours. BNP: No results for input(s): BNP in the last 72 hours. CULTURES:   UA:   No results for input(s): SPECGRAV, PHUR, COLORU, CLARITYU, MUCUS, PROTEINU, BLOODU, RBCUA, WBCUA, BACTERIA, NITRU, GLUCOSEU, BILIRUBINUR, UROBILINOGEN, KETUA, LABCAST, LABCASTTY, AMORPHOS in the last 72 hours.     Invalid input(s): CRYSTALS  Micro:   Lab Results   Component Value Date    BC No growth-preliminary  02/28/2021    BC No growth-preliminary 02/28/2021          Problem list of patient:     Patient Active Problem List   Diagnosis Code    Hypercholesterolemia E78.00    Osteoarthritis M19.90    Osteoporosis M81.0    Type 2 diabetes mellitus without complication, without long-term current use of insulin [facility] E11.9    DDD (degenerative disc disease), lumbar M51.36    Benign essential HTN I10    SWAPNA on CPAP G47.33, Z99.89    Diabetic peripheral neuropathy ([facility]) E11.42    Acute recurrent pansinusitis J01.41    Primary thunderclap headache G44.53    Rash of entire body R21    Infection of skin due to methicillin resistant Staphylococcus aureus (MRSA) A49.02    Drug allergy, antibiotic  likely bactrim Z88.1    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    Skin plaque L98.8    Hyponatremia E87.1    Hypervolemia E87.70    Cellulitis of lower extremity L03.119    Hypoglycemia E16.2    Acute on chronic systolic CHF (congestive heart failure) ([facility]) I50.23    Bilateral cellulitis of lower leg L03.116, L03.115    Venous ulcers of both lower extremities ([facility]) I83.019, L97.919, I83.029, L97.929    Bilateral lower leg cellulitis L03.116, L03.115    CKD (chronic kidney disease) stage 3, GFR 30-59 ml/min ([facility]) N18.30    Iron deficiency anemia D50.9    Hypomagnesemia E83.42    SBO (small bowel obstruction) ([facility]) K56.609    Venous ulcer of left leg ([facility]) I83.029, L97.929    Venous ulcer of right leg ([facility]) I83.019, L97.919    Acute eczema L30.9    Venous insufficiency of both lower extremities I87.2    Lymphedema of both lower extremities I89.0    Pressure injury of left buttock, stage 2 ([facility]) S95.027    Eczematous dermatitis L30.9    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 3XXA    Facial cellulitis L03. 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[facility]) C30.0    Cataract of both eyes H26.9    History of ventral hernia repair Z98.890, Z87.19    Other cervical disc degeneration, mid-cervical region M50.320    Spondylolisthesis of cervical region M43.12    Spondylolisthesis of thoracic region M43.14    Chronic rhinitis J31.0    Closed fracture of head of humerus S42.293A    Dysphagia R13.10    Laryngopharyngeal reflux K21.9    Malignant neoplasm of ethmoidal sinus (HCC) C31.1    Obesity, Class II, BMI 35-39.9 E66.9    COVID-19 U07.1    Sepsis (HCC) A41.9    Brain mass G93.89    Cellulitis S46.51    Folliculitis N72.5         ASSESSMENT/PLAN   Abscess of the face (mandible area): due to MRSA. We will plan discharge with oral doxycycline for 10 days tomorrow   Continue packing of the wound until the drainage slows down.   Flynn Muñoz MD, [address] 3/3/2021 6:53 PM

## 2021-03-03 NOTE — PROGRESS NOTES
Physician Progress Note      PATIENT:               Licha Mancuso  CSN #:                  [id number]  :                       1940  ADMIT DATE:       2021 12:57 PM  [address] [place] DATE:  RESPONDING  PROVIDER #:        Anyi Mead MD          QUERY TEXT:    Patient admitted with Early sepsis from Cutaneous abscess with MRSA . Per   Progress  note dated 3/2/2021 documentation of Incision and Drain    To accurately reflect the procedure performed please further specify the depth   of tissue incised and drained: The medical record reflects the following:  Risk Factors: History of MRSA , cellulitis ,Sepsis with  cutaneous abscess to   face  Clinical Indicators: Abscess requiring drainage  Treatment: Incision and Drain on 3/2/2021    Thank you,  Daisy Desouza RN, BSN, [facility]  Clinical   P: [phone]  F: [phone]  Options provided:  -- Skin only  -- Subcutaneous tissue  -- Fascia  -- Muscle  -- Other - I will add my own diagnosis  -- Disagree - Not applicable / Not valid  -- Disagree - Clinically unable to determine / Unknown  -- Refer to Clinical Documentation Reviewer    PROVIDER RESPONSE TEXT:    Addendum to 3/2/2021 procedure note: This depth of the drainage to Cutaneous   facial abscess was skin only.     Query created by: Sendy Malone on 3/2/2021 6:16 PM      Electronically signed by:  Anyi Mead MD 3/3/2021 1:47 PM

## 2021-03-03 NOTE — PROGRESS NOTES
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 Apparently had apustular lesion in her chin, which has now been draining and hassurrounding swelling. She has been admitted for possible cellulitis. She denies having any fever. She did have some pain, which hasimproved. There was no loss of consciousness. No nausea or vomiting. 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 The patient's sugarshave been running high because of her steroids. She denies any polyuriaor polydipsia. Restrictions/Precautions:  Restrictions/Precautions: General Precautions, Fall Risk    Vitals: Vitals not assessed per clinical judgement, see nursing flowsheet    Subjective  Chart Reviewed: Yes, Orders, Progress Notes  Patient assessed for rehabilitation services?: Yes  Family / Caregiver Present: No    Subjective: RN okayed session. Pt was supine in bed with RN present stating she had just been up to bathroom. Pt was pleasant and agreeable to OT. Pt very talkative throughout session, requiring min VC for redirection.     Pain:  Pain Assessment  Patient Currently in Pain: Denies    Social/Functional History:  Lives With: Daughter  Type of Home: House  Home Layout: One level  Home Access: Level entry  Home Equipment: 4 wheeled walker   Bathroom Shower/Tub: Tub/Shower unit, Shower chair with back  Bathroom Toilet: Standard       ADL Assistance: Independent  Homemaking Assistance: Needs assistance  Ambulation Assistance: Independent  Transfer Assistance: Independent          Additional Comments: reports using 4WW PRN    VISION:Corrected    HEARING:  slightly [place]    COGNITION: Decreased Insight, Decreased Problem Solving and Decreased Safety Awareness    RANGE OF MOTION:  Right Upper Extremity: WFL  Left Upper Extremity:  Impaired - d/t past injury    STRENGTH:  Bilateral Upper Extremity:  grossly deconditioned    SENSATION:   WFL    ADL:   Upper Extremity Dressing: Minimal Assistance. to don gown. BALANCE:  Sitting Balance:  Stand By Assistance. Standing Balance: Contact Guard Assistance. x1 min in prep fro mobility    BED MOBILITY:  Supine to Sit: Stand By Assistance    Scooting: Stand By Assistance      TRANSFERS:  Sit to Stand:  [employer]. from EOB with min safety cues  Stand to Sit: Contact Guard Assistance. to EOB and recliner    FUNCTIONAL MOBILITY:  Assistive Device: 4 Wheeled Walker  Assist Level:  Contact Guard Assistance. Distance: short [facility] distances within unit  Pt completed at slow pace, no LOB, min SOB noted requiring one short standing rest break       Activity Tolerance:  Patient tolerance of  treatment: good. Assessment:  Assessment: Pt presented with the listed deficits s/p cellulitis. Pt with decreased strength and endurance reporting she has not been getting up much lately. Pt would benefit fromc ontinued OT to further increase strength, endurance, and activity tolerance to increase indep wtih ADLs and IADLs. Performance deficits / Impairments: Decreased functional mobility , Decreased safe awareness, Decreased balance, Decreased ADL status, Decreased posture, Decreased high-level IADLs, Decreased endurance, Decreased strength  Prognosis: Good  REQUIRES OT FOLLOW UP: Yes  Decision Making: Medium Complexity  Safety Devices in place: Yes    Treatment Initiated: Treatment and education initiated within context of evaluation. Evaluation time included review of current medical information, gathering information related to past medical, social and functional history, completion of standardized testing, formal and informal observation of tasks, assessment of data and development of plan of care and goals. Treatment time included skilled education and facilitation of tasks to increase safety and independence with ADL's for improved functional independence and quality of life. Discharge Recommendations:  Continue to assess pending progress, Patient would benefit from continued therapy after discharge    Patient Education:  OT Education: OT Role, Plan of Care, Home Exercise Program, ADL Adaptive Strategies, Transfer Training, Energy Conservation, IADL Safety    Equipment Recommendations:  Equipment Needed: No    Plan:  Times per week: 3-5x  Times per day: Daily  Current Treatment Recommendations: Strengthening, Safety Education & Training, Balance Training, Self-Care / ADL, Functional Mobility Training, Endurance Training, Home Management Training. See long-term goal time frame for expected duration of plan of care. If no long-term goals established, a short length of stay is anticipated.     Goals:  Patient goals : return home with family  Short term goals  Time Frame for Short term goals: by discharge  Short term goal 1: Pt will navigate [place] distances with SBA and no safety cues or SOB to increase indep with ADL routine  Short term goal 2: Pt will tolerate dynamic standing x8 min with SBA and B hand release with no SOB to increase indep with groomgin  Short term goal 3: Pt will complete BADL routine with SBA and no safety cues to increase ease with bathing  Short term goal 4: Pt will complete various t/fs with Sup and no safety cues to increase ease with toileting  Long term goals  Time Frame for Long term goals : no LTG d/t short ELOS         Following session, patient left in safe position with all fall risk precautions in place.

## 2021-03-03 NOTE — PROGRESS NOTES
Upon entering patient's room, she had taken the guaze dressing off of her chin and placed it on the bedside table. The sterile glove piece that was in the incision was attached to the dressing. When I asked the patient why she removed it, she stated that it was coming off so she took it off. I informed her that next time this happens to let us know so we could re-dress it right away and that the glove has now been removed from the wound since it was stuck to the dressing she removed. Pt acknowledged understanding and will call for assistance next time. New dressing applied.

## 2021-03-03 NOTE — CARE COORDINATION
3/3/21, 2:19 PM EST    DISCHARGE ON GOING EVALUATION    [facility] day: 3  Location: 6K22/022-A Reason for admit: Cellulitis [L03.90]   Procedure: 3/3 I&D of chin wound  Barriers to Discharge: IV vancomycin. ID following. PCP: Grazyna Carter MD  Readmission Risk Score: 32%  Patient Goals/Plan/Treatment Preferences: Return home with daughter and [facility].

## 2021-03-04 VITALS
DIASTOLIC BLOOD PRESSURE: 80 MMHG | SYSTOLIC BLOOD PRESSURE: 154 MMHG | TEMPERATURE: 98.3 F | HEART RATE: 100 BPM | OXYGEN SATURATION: 95 % | HEIGHT: 65 IN | WEIGHT: 195.6 LBS | RESPIRATION RATE: 18 BRPM | BODY MASS INDEX: 32.59 KG/M2

## 2021-03-04 LAB
CREAT SERPL-MCNC: 0.6 MG/DL (ref 0.4–1.2)
GFR SERPL CREATININE-BSD FRML MDRD: > 90 ML/MIN/1.73M2
GLUCOSE BLD-MCNC: 219 MG/DL (ref 70–108)
GLUCOSE BLD-MCNC: 274 MG/DL (ref 70–108)

## 2021-03-04 PROCEDURE — 36415 COLL VENOUS BLD VENIPUNCTURE: CPT

## 2021-03-04 PROCEDURE — 6360000002 HC RX W HCPCS: Performed by: INTERNAL MEDICINE

## 2021-03-04 PROCEDURE — 6370000000 HC RX 637 (ALT 250 FOR IP): Performed by: INTERNAL MEDICINE

## 2021-03-04 PROCEDURE — 82948 REAGENT STRIP/BLOOD GLUCOSE: CPT

## 2021-03-04 PROCEDURE — 2580000003 HC RX 258: Performed by: INTERNAL MEDICINE

## 2021-03-04 PROCEDURE — 82565 ASSAY OF CREATININE: CPT

## 2021-03-04 RX ORDER — INSULIN GLARGINE 100 [IU]/ML
10 INJECTION, SOLUTION SUBCUTANEOUS EVERY MORNING
Qty: 5 PEN | Refills: 0 | Status: ON HOLD | OUTPATIENT
Start: 2021-03-04 | End: 2021-03-31 | Stop reason: HOSPADM

## 2021-03-04 RX ORDER — PEN NEEDLE, DIABETIC 31 G X1/4"
1 NEEDLE, DISPOSABLE MISCELLANEOUS DAILY
Qty: 50 EACH | Refills: 0 | Status: SHIPPED | OUTPATIENT
Start: 2021-03-04 | End: 2022-05-03 | Stop reason: ALTCHOICE

## 2021-03-04 RX ORDER — DOXYCYCLINE HYCLATE 100 MG
100 TABLET ORAL 2 TIMES DAILY
Qty: 20 TABLET | Refills: 0 | Status: SHIPPED | OUTPATIENT
Start: 2021-03-04 | End: 2021-03-14

## 2021-03-04 RX ADMIN — INSULIN GLARGINE 10 UNITS: 100 INJECTION, SOLUTION SUBCUTANEOUS at 08:14

## 2021-03-04 RX ADMIN — NIFEDIPINE 60 MG: 60 TABLET, FILM COATED, EXTENDED RELEASE ORAL at 08:11

## 2021-03-04 RX ADMIN — MUPIROCIN 1 EACH: 20 OINTMENT TOPICAL at 08:12

## 2021-03-04 RX ADMIN — FERROUS SULFATE TAB 325 MG (65 MG ELEMENTAL FE) 325 MG: 325 (65 FE) TAB at 08:11

## 2021-03-04 RX ADMIN — HYDRALAZINE HYDROCHLORIDE 50 MG: 50 TABLET, FILM COATED ORAL at 08:11

## 2021-03-04 RX ADMIN — ALOGLIPTIN 25 MG: 25 TABLET, FILM COATED ORAL at 08:11

## 2021-03-04 RX ADMIN — DEXAMETHASONE 2 MG: 4 TABLET ORAL at 08:12

## 2021-03-04 RX ADMIN — Medication 50 MG: at 08:11

## 2021-03-04 RX ADMIN — SALINE NASAL SPRAY 2 SPRAY: 1.5 SOLUTION NASAL at 08:11

## 2021-03-04 RX ADMIN — ACETAMINOPHEN 650 MG: 325 TABLET ORAL at 09:51

## 2021-03-04 RX ADMIN — PANTOPRAZOLE SODIUM 40 MG: 40 TABLET, DELAYED RELEASE ORAL at 05:34

## 2021-03-04 RX ADMIN — PREGABALIN 150 MG: 75 CAPSULE ORAL at 08:11

## 2021-03-04 RX ADMIN — POTASSIUM CHLORIDE 20 MEQ: 1500 TABLET, EXTENDED RELEASE ORAL at 08:11

## 2021-03-04 RX ADMIN — SALINE NASAL SPRAY 2 SPRAY: 1.5 SOLUTION NASAL at 14:19

## 2021-03-04 RX ADMIN — VANCOMYCIN HYDROCHLORIDE 1500 MG: 5 INJECTION, POWDER, LYOPHILIZED, FOR SOLUTION INTRAVENOUS at 11:14

## 2021-03-04 RX ADMIN — Medication 2000 UNITS: at 08:11

## 2021-03-04 RX ADMIN — BUMETANIDE 1 MG: 1 TABLET ORAL at 08:11

## 2021-03-04 RX ADMIN — OXYCODONE HYDROCHLORIDE AND ACETAMINOPHEN 1000 MG: 500 TABLET ORAL at 08:11

## 2021-03-04 RX ADMIN — POLYETHYLENE GLYCOL (3350) 17 G: 17 POWDER, FOR SOLUTION ORAL at 08:10

## 2021-03-04 ASSESSMENT — PAIN SCALES - GENERAL: PAINLEVEL_OUTOF10: 0

## 2021-03-04 NOTE — PROGRESS NOTES
IM Progress Note  Dr. Matteo Griffith  3/4/2021 2:16 PM      Patient name Leticia Cramer  [id number]  PCP: Amos Jansen MD  Admit Date: 2/28/2021  Acct No. [de-identified]    Subjective: Interval History:   Sleeping in chair  D/w staff   Per ID ok for discharge  chems better than yesterday    Diet: DIET CARB CONTROL; Dietary Nutrition Supplements: Diabetic Oral Supplement    I/O last 3 completed shifts: In: 574.8 [P.O.:540; I.V.:34.8]  Out: 0   No intake/output data recorded. Admission weight: 222 lb (100.7 kg) as of 2/28/2021 12:57 PM  Wt Readings from Last 3 Encounters:   03/04/21 195 lb 9.6 oz (88.7 kg)   02/03/21 213 lb 1.6 oz (96.7 kg)   12/28/20 221 lb 3.2 oz (100.3 kg)     Body mass index is 32.55 kg/m².         Medications:   Scheduled Meds:   vancomycin (VANCOCIN) intermittent dosing (placeholder)   Other RX Placeholder    vancomycin  1,500 mg Intravenous Q24H    insulin glargine  10 Units Subcutaneous Daily    morphine  2 mg Intravenous Once    dexamethasone  2 mg Oral BID WC    alogliptin  25 mg Oral Daily    Sodium Chloride-Sodium Bicarb  1 packet Nasal BID    mupirocin  1 each Nasal BID    sodium chloride  2 spray Nasal Q6H WA    pantoprazole  40 mg Oral QAM AC    potassium chloride  20 mEq Oral Daily    ferrous sulfate  325 mg Oral BID WC    hydrALAZINE  50 mg Oral BID    NIFEdipine  60 mg Oral Daily    bumetanide  1 mg Oral BID    pregabalin  150 mg Oral Daily    ascorbic acid  1,000 mg Oral Daily    Vitamin D  2,000 Units Oral Daily    zinc sulfate  50 mg Oral Daily    polyethylene glycol  17 g Oral Daily    enoxaparin  40 mg Subcutaneous Q24H    insulin lispro  0-18 Units Subcutaneous TID WC    insulin lispro  0-9 Units Subcutaneous Nightly     Continuous Infusions:   dextrose         Labs :     CBC:   Recent Labs     03/02/21  0444   WBC 9.4   HGB 12.8   *     BMP:    Recent Labs     03/02/21  0615 03/04/21  0451   K 4.0  --    CREATININE  --  0.6     Hepatic:

## 2021-03-04 NOTE — PROGRESS NOTES
Discharge teaching and instructions for diagnosis/procedure of cellulitis completed with patient using teachback method. AVS reviewed. Printed prescriptions given to patient. Patient voiced understanding regarding prescriptions, follow up appointments, and care of self at home. Discharged in a wheelchair to  home with support per family. Patient supplied with gauze pads to redress chin cellulitis and dressing change prior to d/c.

## 2021-03-04 NOTE — CARE COORDINATION
3/4/21, 9:50 AM EST    DISCHARGE ON GOING EVALUATION    [facility] day: 4  [facility] Reason for admit: Cellulitis [L03.90]   Procedure: 3/3 I&D of chin wound  Barriers to Discharge: Continue IV vanc today, transition to oral doxy tomorrow. Continue wound packing. PT/OT. PCP: Anuja Boone MD  Readmission Risk Score: 32%  Patient Goals/Plan/Treatment Preferences:  Home with daughter and [facility].

## 2021-03-04 NOTE — PROGRESS NOTES
Progress note: Infectious diseases    Patient - Ambrosio Mcardle,  Age - 79 y.o.    - 1940      Room Number - 6K-21/200-A   MRN -  [id number]   Acct # - [de-identified]  Date of Admission -  2021 12:57 PM    SUBJECTIVE:   No new complaints  OBJECTIVE   VITALS    height is 5' 5\" (1.651 m) and weight is 195 lb 9.6 oz (88.7 kg). Her oral temperature is 98.3 °F (36.8 °C). Her blood pressure is 151/87 (abnormal) and her pulse is 107. Her respiration is 16 and oxygen saturation is 97%. Wt Readings from Last 3 Encounters:   21 195 lb 9.6 oz (88.7 kg)   21 213 lb 1.6 oz (96.7 kg)   20 221 lb 3.2 oz (100.3 kg)       I/O (24 Hours)    Intake/Output Summary (Last 24 hours) at 3/4/2021 1114  Last data filed at 3/4/2021 0329  Gross per 24 hour   Intake 574.8 ml   Output 0 ml   Net 574.8 ml       General Appearance  Awake, alert, oriented,  not  In acute distress  HEENT - normocephalic, atraumatic, slighlty pale conjunctiva,  anicteric sclera. the redness and swelling over her mental area is improved, minimal discharge noted   neck - Supple, no mass  Lungs -  Bilateral  air entry, no rhonchi, no wheeze  Cardiovascular - Heart sounds are normal.    Abdomen - soft, not distended, nontender,   Neurologic -oriented.   Skin - No bruising or bleeding  Extremities - No edema, no cyanosis, clubbing     MEDICATIONS:      vancomycin (VANCOCIN) intermittent dosing (placeholder)   Other RX Placeholder    vancomycin  1,500 mg Intravenous Q24H    insulin glargine  10 Units Subcutaneous Daily    morphine  2 mg Intravenous Once    dexamethasone  2 mg Oral BID WC    alogliptin  25 mg Oral Daily    Sodium Chloride-Sodium Bicarb  1 packet Nasal BID    mupirocin  1 each Nasal BID    sodium chloride  2 spray Nasal Q6H WA    pantoprazole  40 mg Oral QAM AC    potassium chloride  20 mEq Oral Daily    ferrous sulfate  325 mg Oral BID     hydrALAZINE  50 mg Oral BID    NIFEdipine  60 mg Oral Daily    bumetanide  1 mg Oral BID    pregabalin  150 mg Oral Daily    ascorbic acid  1,000 mg Oral Daily    Vitamin D  2,000 Units Oral Daily    zinc sulfate  50 mg Oral Daily    polyethylene glycol  17 g Oral Daily    enoxaparin  40 mg Subcutaneous Q24H    insulin lispro  0-18 Units Subcutaneous TID     insulin lispro  0-9 Units Subcutaneous Nightly      dextrose       glucose, dextrose, glucagon (rDNA), dextrose, potassium chloride **OR** potassium alternative oral replacement **OR** potassium chloride, sodium chloride, docusate sodium, acetaminophen      LABS:     CBC:   Recent Labs     03/02/21  0444   WBC 9.4   HGB 12.8   *     BMP:    Recent Labs     03/02/21  0615 03/04/21  0451   K 4.0  --    CREATININE  --  0.6     Calcium:  No results for input(s): CALCIUM in the last 72 hours.    Recent Labs     03/03/21 2019 03/04/21  0621 03/04/21  1055   POCGLU 299* 219* 274*          Problem list of patient:     Patient Active Problem List   Diagnosis Code    Hypercholesterolemia E78.00    Osteoarthritis M19.90    Osteoporosis M81.0    Type 2 diabetes mellitus without complication, without long-term current use of insulin ([facility] [address] E11.9    DDD (degenerative disc disease), lumbar M51.36    Benign essential HTN I10    SWAPNA on CPAP G47.33, Z99.89    Diabetic peripheral neuropathy (HCC) E11.42    Acute recurrent pansinusitis J01.41    Primary thunderclap headache G44.53    Rash of entire body R21    Infection of skin due to methicillin resistant Staphylococcus aureus (MRSA) A49.02    Drug allergy, antibiotic  likely bactrim Z88.1    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    Skin plaque L98.8    Hyponatremia E87.1    Hypervolemia E87.70    Cellulitis of lower extremity L03.119    Hypoglycemia E16.2    Acute on chronic systolic CHF (congestive heart failure) ([facility]) I50.23    Bilateral cellulitis of lower leg L03.116, L03.115    Venous ulcers of both lower extremities ([facility]) I83.019, L97.919, I83.029, L97.929    Bilateral lower leg cellulitis L03.116, L03.115    CKD (chronic kidney disease) stage 3, GFR 30-59 ml/min ([facility]) N18.30    Iron deficiency anemia D50.9    Hypomagnesemia E83.42    SBO (small bowel obstruction) ([facility] [address] K56.609    Venous ulcer of left leg ([facility]) I83.029, L97.929    Venous ulcer of right leg ([facility]) I83.019, L97.919    Acute eczema L30.9    Venous insufficiency of both lower extremities I87.2    Lymphedema of both lower extremities I89.0    Pressure injury of left buttock, stage 2 ([facility]) H72.991    Eczematous dermatitis L30.9    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 3XXA    Facial cellulitis L03. 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[facility]) C30.0    Cataract of both eyes H26.9    History of ventral hernia repair Z98.890, Z87.19    Other cervical disc degeneration, mid-cervical region M50.320    Spondylolisthesis of cervical region M43.12    Spondylolisthesis of thoracic region M43.14    Chronic rhinitis J31.0    Closed fracture of head of humerus S42.293A    Dysphagia R13.10    Laryngopharyngeal reflux K21.9    Malignant neoplasm of ethmoidal sinus ([facility]) C31.1    Obesity, Class II, BMI 35-39.9 E66.9    COVID-19 U07.1    Sepsis ([facility]) A41.9    Brain mass G93.89    Cellulitis R14.54    Folliculitis Y19.5         ASSESSMENT/PLAN   Abscess of the face (mandible area): due to MRSA. We will plan discharge with oral doxycycline for 10 days    Continue mupirocin ointment. The packing was removed, unable to pack.   Tee Lester MD, FACP 3/4/2021 11:14 AM

## 2021-03-04 NOTE — CARE COORDINATION
3/4/21, 2:27 PM EST    Patient goals/plan/ treatment preferences discussed by  and . Patient goals/plan/ treatment preferences reviewed with patient/ family. Patient/ family verbalize understanding of discharge plan and are in agreement with goal/plan/treatment preferences. Understanding was demonstrated using the teach back method. AVS provided by RN at time of discharge, which includes all necessary medical information pertaining to the patients current course of illness, treatment, post-discharge goals of care, and treatment preferences. Services After Discharge  Services At/After Discharge: Nursing Services, OT, PT([facility])   IMM Letter  IMM Letter given to Patient/Family/Significant other/Guardian/POA/by[de-identified] pt access  IMM Letter date given[de-identified] 02/28/21  IMM Letter time given[de-identified] 26     Pt is being discharged home today with her daughter. Pt is current with [facility], FABIOLA Bravo. They have access to epic, no need to fax. RN Keshia Narvaez is aware.

## 2021-03-05 ENCOUNTER — CARE COORDINATION (OUTPATIENT)
Dept: CASE MANAGEMENT | Age: 81
End: 2021-03-05

## 2021-03-05 DIAGNOSIS — L03.211 FACIAL CELLULITIS: Primary | ICD-10-CM

## 2021-03-05 PROCEDURE — 1111F DSCHRG MED/CURRENT MED MERGE: CPT | Performed by: FAMILY MEDICINE

## 2021-03-05 NOTE — DISCHARGE SUMMARY
[address] [place] [address]                               DISCHARGE SUMMARY    PATIENT NAME: Bob Turner                       :        1940  MED REC NO:   [id number]                           ROOM:       0022  ACCOUNT NO:   [de-identified]                           ADMIT DATE: 2021  PROVIDER:     VITA Jessica Fester: 2021    CLINICAL SUMMARY    HOSPITAL COURSE:  This is a 80-year-old woman who presented with abscess  in her chin. ID and ENT were consulted. The patient did undergo I and  D of her chin abscess by Infectious Disease. She was growing staph that  was sensitive to doxycycline. Her antibiotics were transitioned from  vancomycin to oral doxycycline. The patient's blood sugars have been  elevated since she has been on steroids for her recent radionecrosis in  her frontal lobes prescribed by Neurology in [place]. The patient had  to be started on Lantus as her metformin was on hold secondary to a  recent contrast.  Blood sugars were significantly elevated. Hence, it  was felt that she will be better off continuing the Lantus even though  metformin is being resumed 48 hours after her contrast administration. The patient has been explained of same. Family will be notified when  available. The patient already has home health which will be continued. The patient to complete her antibiotics with doxycycline for another 10  days. Condition was stable at discharge. FINAL DIAGNOSES:  1. Abscess of the chin, status post I and D.  2.  Adenocarcinoma of ethmoid sinus, status post surgery and radiation. 3.  Radionecrosis of the frontal lobe, on steroids. 4.  Diabetes, sugars elevated with steroids. 5.  Hypertension. 6.  Hyperlipidemia. 7.  Chronic lymphedema. 8.  Sleep apnea. 9.  Obesity. 10.  Neuropathy. 11.  Prior history of COVID.     DISCHARGE MEDICATIONS:  To continue as addressed in the discharge sheet. DISPOSITION:  Home. DIET:  ADA diet. ACTIVITY:  As tolerated and continue home health. CONDITION ON DISCHARGE:  Stable. I spent more than 30 minutes that involved examining the patient,  reviewing the chart, and reconciling her med rec sheet.         Fabiola Adams M.D.    D: 03/04/2021 14:26:53       T: 03/04/2021 16:18:40     [email or web address]  Job#: [id number]     Doc#: [id number]    CC:

## 2021-03-05 NOTE — PROGRESS NOTES
Physician Progress Note      PATIENT:               Urszula Greene  CSN #:                  [id number]  :                       1940  ADMIT DATE:       2021 12:57 PM  [address] [place] DATE:        3/4/2021 3:31 PM  RESPONDING  PROVIDER #:        Yumiko Escobedo MD          QUERY TEXT:    Patient admitted with  Facial cellulitis with early sepsis. Documentation   reflects Sepsis in H&P dates . If possible, please make selection below,   document in the progress notes and discharge summary if Sepsis was: The medical record reflects the following:  Risk Factors: facial cellulitis with skin abscess , history of cancer and MRSA  Clinical Indicators: WBC 15.8, 11.7, Hgb 12.1, lactic 1.5, lactic acid for   sepsis 4.2, localized infection  Treatment: admission, labs, imaging , ID consult, Incision and drain, IV   antibiotics    Thank you,  Yelena Haney  RN, BSN, [facility]  Clinical   P: [phone]  F: [phone]  Options provided:  -- Sepsis treated and resolved  -- Sepsis confirmed after study  -- Sepsis ruled out after study  -- Other - I will add my own diagnosis  -- Disagree - Not applicable / Not valid  -- Disagree - Clinically unable to determine / Unknown  -- Refer to Clinical Documentation Reviewer    PROVIDER RESPONSE TEXT:    Sepsis ruled out after study.     Query created by: Vero Mcgill on 3/4/2021 12:21 PM      Electronically signed by:  Yumiko Escobedo MD 3/5/2021 1:19 PM

## 2021-03-05 NOTE — CARE COORDINATION
[facility] Transitions Initial Follow Up Call    Call within 2 business days of discharge: Yes    Patient: Leidy Malloy Patient : 1940   MRN: [id number]  Reason for Admission: Cellulitis   Procedure: 3/2 bedside I&D of chin abscess  Discharge Date: 3/4/21 RARS: Readmission Risk Score: 29      Last Discharge [facility]       Complaint Diagnosis Description Type Department Provider    21 Cyst; Wound Infection Folliculitis . .. ED to Hosp-Admission (Discharged) (ADMITTED) Penny Gaines MD; David Phan. .. Challenges to be reviewed by the provider   Additional needs identified to be addressed with provider No  none             Method of communication with provider : none    Discussed COVID-19 related testing which was not done at this time. Test results were not done. Was this a readmission? Yes  Patient stated reason for admission: right cheek & chin swollen & painful  Patients top risk factors for readmission: medical condition and polypharmacy    Care Transition Nurse (CTN) contacted the family by telephone to perform post hospital discharge assessment. Verified name and  with family as identifiers. Provided introduction to self, and explanation of the CTN role. CTN reviewed discharge instructions, medical action plan and red flags with family who verbalized understanding. Family given an opportunity to ask questions and does not have any further questions or concerns at this time. Were discharge instructions available to patient? Yes. Reviewed appropriate site of care based on symptoms and resources available to patient including: PCP and Home health. The family agrees to contact the PCP office for questions related to their healthcare. Medication reconciliation was performed with family, who verbalizes understanding of administration of home medications. Advised obtaining a 90-day supply of all daily and as-needed medications.  The pharmacy did not have the antibiotic last night, will  this AM    Discussed follow-up appointments. Is follow up appointment scheduled within 7 days of discharge? Yes    Plan for follow-up call in 5-7 days based on severity of symptoms and risk factors. Plan for next call: incision stil draining? CTN provided contact information for future needs. Non-face-to-face services provided:  Obtained and reviewed discharge summary and/or continuity of care documents  Establishment or re-establishment of referrals-[address] 365 Transitions 24 Hour Call    Do you have any ongoing symptoms?: Yes  Patient-reported symptoms:  (Comment: incision open)  Do you have a copy of your discharge instructions?: Yes  Do you have all of your prescriptions and are they filled?: No  Have you been contacted by a [address]?: No  Have you scheduled your follow up appointment?: Yes  How are you going to get to your appointment?: Car - family or friend to transport  Were you discharged with any Home Care or Post Acute Services: Yes  Post Acute Services: Home Health (Comment: [address] [place] [address])  Do you have support at home?: Child  Do you feel like you have everything you need to keep you well at home?: Yes  Care Transitions Interventions  No Identified Needs     Called pt for the [place] initial F/U call, spoke with daughter Noah Menendez, pt does not hear well on the phone. Noah Menendez stated she changed the dressing this AM, there was a \"slight\" amount of reddish colored drainage, with a scant amount of yellow color drainage. Noah Menendez stated the discolored facial area is \"much better, was to her eye, now by the chin only\"  Pt does not C/O fever or chills & is eating without difficulty. Noah Menendez reported the pt had breakfast less than 2 hr ago. They did not check a FBS, sugar now 417. Noah Menendez reported she understands the symptoms of both low & high sugars, declined to review. Pt denied any needs or concerns. CTN will continue to follow.     Follow Up  Future

## 2021-03-06 LAB
BLOOD CULTURE, ROUTINE: NORMAL
BLOOD CULTURE, ROUTINE: NORMAL

## 2021-03-08 ENCOUNTER — OFFICE VISIT (OUTPATIENT)
Dept: FAMILY MEDICINE CLINIC | Age: 81
End: 2021-03-08
Payer: MEDICARE

## 2021-03-08 VITALS
SYSTOLIC BLOOD PRESSURE: 132 MMHG | DIASTOLIC BLOOD PRESSURE: 68 MMHG | BODY MASS INDEX: 35.11 KG/M2 | WEIGHT: 211 LBS | HEART RATE: 78 BPM | RESPIRATION RATE: 18 BRPM | TEMPERATURE: 97 F

## 2021-03-08 DIAGNOSIS — E11.9 TYPE 2 DIABETES MELLITUS WITHOUT COMPLICATION, WITHOUT LONG-TERM CURRENT USE OF INSULIN (HCC): ICD-10-CM

## 2021-03-08 DIAGNOSIS — L02.01 ABSCESS OF CHIN: Primary | ICD-10-CM

## 2021-03-08 DIAGNOSIS — A49.01 STAPH AUREUS INFECTION: ICD-10-CM

## 2021-03-08 DIAGNOSIS — C30.0 CARCINOMA OF NASAL CAVITY (HCC): ICD-10-CM

## 2021-03-08 PROCEDURE — 1111F DSCHRG MED/CURRENT MED MERGE: CPT | Performed by: FAMILY MEDICINE

## 2021-03-08 PROCEDURE — 99495 TRANSJ CARE MGMT MOD F2F 14D: CPT | Performed by: FAMILY MEDICINE

## 2021-03-08 ASSESSMENT — ENCOUNTER SYMPTOMS
EYE PAIN: 0
COUGH: 0
ABDOMINAL PAIN: 0
BACK PAIN: 0
SORE THROAT: 0
VOMITING: 0
CONSTIPATION: 0
BLOOD IN STOOL: 0
SHORTNESS OF BREATH: 0
NAUSEA: 0
DIARRHEA: 0
WHEEZING: 0
RHINORRHEA: 0
CHEST TIGHTNESS: 0

## 2021-03-08 ASSESSMENT — PATIENT HEALTH QUESTIONNAIRE - PHQ9
1. LITTLE INTEREST OR PLEASURE IN DOING THINGS: 0
SUM OF ALL RESPONSES TO PHQ QUESTIONS 1-9: 0
SUM OF ALL RESPONSES TO PHQ QUESTIONS 1-9: 0

## 2021-03-08 NOTE — PATIENT INSTRUCTIONS
think you are having a problem with your medicine. You will get more details on the specific medicines your doctor prescribes. · Check your blood sugar as often as your doctor recommends. It is important to keep track of any symptoms you have, such as low blood sugar. Also tell your doctor if you have any changes in your activities, diet, or insulin use. · Talk to your doctor before you start taking aspirin every day. Aspirin can help certain people lower their risk of a heart attack or stroke. But taking aspirin isn't right for everyone, because it can cause serious bleeding. · Do not smoke. If you need help quitting, talk to your doctor about stop-smoking programs and medicines. These can increase your chances of quitting for good. · Keep your cholesterol and blood pressure at normal levels. You may need to take one or more medicines to reach your goals. Take them exactly as directed. Do not stop or change a medicine without talking to your doctor first.  When should you call for help? Call 911 anytime you think you may need emergency care. For example, call if:    · You passed out (lost consciousness), or you suddenly become very sleepy or confused. (You may have very low blood sugar.)   Call your doctor now or seek immediate medical care if:    · Your blood sugar is 300 mg/dL or is higher than the level your doctor has set for you.     · You have symptoms of low blood sugar, such as:  ? Sweating. ? Feeling nervous, shaky, and weak. ? Extreme hunger and slight nausea. ? Dizziness and headache.  ? Blurred vision. ? Confusion. Watch closely for changes in your health, and be sure to contact your doctor if:    · You often have problems controlling your blood sugar.     · You have symptoms of long-term diabetes problems, such as:  ? New vision changes. ? New pain, numbness, or tingling in your hands or feet. ? Skin problems. Where can you learn more? Go to https://[facility]duardo[facility]. org and sign in to your [employer] account. Enter C553 in the [facility] box to learn more about \"Type 2 Diabetes: Care Instructions. \"     If you do not have an account, please click on the \"Sign Up Now\" link. Current as of: December 20, 2019               Content Version: 12.6  © 6324-0710 [employer]. Care instructions adapted under license by [facility] ([facility]). If you have questions about a medical condition or this instruction, always ask your healthcare professional. [address] any warranty or liability for your use of this information. Patient Education        Skin Abscess: Care Instructions  Your Care Instructions     A skin abscess is a bacterial infection that forms a pocket of pus. A boil is a kind of skin abscess. The doctor may have cut an opening in the abscess so that the pus can drain out. You may have gauze in the cut so that the abscess will stay open and keep draining. You may need antibiotics. You will need to follow up with your doctor to make sure the infection has gone away. The doctor has checked you carefully, but problems can develop later. If you notice any problems or new symptoms, get medical treatment right away. Follow-up care is a key part of your treatment and safety. Be sure to make and go to all appointments, and call your doctor if you are having problems. It's also a good idea to know your test results and keep a list of the medicines you take. How can you care for yourself at home? · Apply warm and dry compresses, a heating pad set on low, or a hot water bottle 3 or 4 times a day for pain. Keep a cloth between the heat source and your skin. · If your doctor prescribed antibiotics, take them as directed. Do not stop taking them just because you feel better. You need to take the full course of antibiotics. · Take pain medicines exactly as directed. ? If the doctor gave you a prescription medicine for pain, take it as prescribed.

## 2021-03-08 NOTE — PROGRESS NOTES
Post-Discharge Transitional Care Management Services or Hospital Follow Up      Radha العلي   YOB: 1940    Date of Office Visit:  3/8/2021  Date of Hospital Admission: 2/28/21  Date of Hospital Discharge: 3/4/21  Readmission Risk Score(high >=14%.  Medium >=10%):Readmission Risk Score: 29      Care management risk score Rising risk (score 2-5) and Complex Care (Scores >=6): 15     Non face to face  following discharge, date last encounter closed (first attempt may have been earlier): 3/5/2021 10:23 AM 3/5/2021 10:23 AM    Call initiated 2 business days of discharge: Yes     Patient Active Problem List   Diagnosis    Hypercholesterolemia    Osteoarthritis    Osteoporosis    Type 2 diabetes mellitus without complication, without long-term current use of insulin ([facility] [address]    DDD (degenerative disc disease), lumbar    Benign essential HTN    SWAPNA on CPAP    Diabetic peripheral neuropathy (HCC)    Acute recurrent pansinusitis    Primary thunderclap headache    Rash of entire body    Infection of skin due to methicillin resistant Staphylococcus aureus (MRSA)    Drug allergy, antibiotic  likely bactrim    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[facility])    Skin plaque    Hyponatremia    Hypervolemia    Cellulitis of lower extremity    Hypoglycemia    Acute on chronic systolic CHF (congestive heart failure) ([facility])    Bilateral cellulitis of lower leg    Venous ulcers of both lower extremities ([facility])    Bilateral lower leg cellulitis    CKD (chronic kidney disease) stage 3, GFR 30-59 ml/min ([facility])    Iron deficiency anemia    Hypomagnesemia    SBO (small bowel obstruction) ([facility])    Venous ulcer of left leg (HCC)    Venous ulcer of right leg ([facility])    Acute eczema    Venous insufficiency of both lower extremities    Lymphedema of both lower extremities    Pressure injury of left buttock, stage 2 ([facility])    Eczematous dermatitis    Colonization with drug-resistant bacteria    Dystrophic nail    Angio-edema    Facial cellulitis    Osteoradionecrosis of skull/sinus    Late effect of radiation    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HCC)    Cataract of both eyes    History of ventral hernia repair    Other cervical disc degeneration, mid-cervical region    Spondylolisthesis of cervical region    Spondylolisthesis of thoracic region    Chronic rhinitis    Closed fracture of head of humerus    Dysphagia    Laryngopharyngeal reflux    Malignant neoplasm of ethmoidal sinus (HCC)    Obesity, Class II, BMI 35-39.9    COVID-19    Sepsis (HCC)    Brain mass    Cellulitis    Folliculitis       Allergies   Allergen Reactions    Lisinopril Swelling and Anaphylaxis    Sulfamethoxazole-Trimethoprim Rash    Morphine Other (See Comments)     headaches    Codeine      Other reaction(s): AOF    Hydrocodone      headaches    Percocet [Oxycodone-Acetaminophen] Other (See Comments)     Headaches    Tylenol With Codeine #3 [Acetaminophen-Codeine]      Pt is okay with regular tylenol, CANNOT tolerate tylenol 3     Tape [Adhesive Tape] Rash       Medications listed as ordered at the time of discharge from hospital   [facility]   [place] Medication Instructions FITZ:    Printed on:03/08/21 1007   Medication Information                      Acetaminophen 500 MG CAPS  Take 1,000 mg by mouth every 6 hours as needed for Pain             ascorbic acid (VITAMIN C) 1000 MG tablet  Take 1 tablet by mouth daily             bumetanide (BUMEX) 1 MG tablet  Take 1 tablet by mouth 2 times daily             dexamethasone (DECADRON) 2 MG tablet  Take 2 mg by mouth 2 times daily (with meals) For 14 days starting 2/26/21             docusate sodium (COLACE) 100 MG capsule  Take 1 capsule by mouth 2 times daily as needed for Constipation             doxycycline hyclate (VIBRA-TABS) 100 MG tablet  Take 1 tablet by mouth 2 times daily for 10 days             Elastic Bandages & Supports (CERVICAL COLLAR) MISC  Wear while awake for neck support. M54.2             ferrous sulfate (IRON 325) 325 (65 Fe) MG tablet  Take 325 mg by mouth 2 times daily              Handicap Placard MISC  by Does not apply route Dx:I187.2,M81.0. Duration: 5 years. hydrALAZINE (APRESOLINE) 50 MG tablet  Take 1 tablet by mouth 2 times daily             insulin glargine (LANTUS SOLOSTAR) 100 UNIT/ML injection pen  Inject 10 Units into the skin every morning             Insulin Pen Needle (PEN NEEDLES) 31G X 6 MM MISC  1 each by Does not apply route daily             metFORMIN (GLUCOPHAGE) 1000 MG tablet  TAKE ONE TABLET IN THE EVENING             Misc. Devices (WALKER) MISC  1 each by Does not apply route daily Requests stand up walker due to heart failure and generalized OA. I50.23.             mupirocin (BACTROBAN) 2 % ointment  2 times daily Toothpaste ribbon added to nasal rinse             NIFEdipine (ADALAT CC) 60 MG extended release tablet  Take 1 tablet by mouth daily             omeprazole (PRILOSEC) 20 MG delayed release capsule  TAKE ONE CAPSULE BY MOUTH ONCE DAILY             polyethylene glycol (GLYCOLAX) 17 g packet  Take 17 g by mouth daily as needed for Constipation             potassium chloride (KLOR-CON M) 20 MEQ extended release tablet  Take 1 tablet by mouth daily             pregabalin (LYRICA) 150 MG capsule  Take 300 mg by mouth 2 times daily. SITagliptin (JANUVIA) 100 MG tablet  Take 1 tablet by mouth daily             sodium chloride (OCEAN, BABY AYR) 0.65 % nasal spray  1 spray by Nasal route as needed for Congestion              Sodium Chloride-Sodium Bicarb (NETI POT SINUS WASH NA)  by Nasal route 2 times daily Use baby shampoo in initial rinse, then mupirocin in second rinse.  [facility] wants 3-4 times a day             Vitamin D (CHOLECALCIFEROL) 50 MCG (2000 UT) TABS tablet  Take 1 tablet by mouth daily             zinc sulfate (ZINCATE) 220 (50 Zn) MG capsule  Take 1 capsule by mouth daily tablet by mouth 2 times daily 180 tablet 2    ferrous sulfate (IRON 325) 325 (65 Fe) MG tablet Take 325 mg by mouth 2 times daily       omeprazole (PRILOSEC) 20 MG delayed release capsule TAKE ONE CAPSULE BY MOUTH ONCE DAILY 90 capsule 3    SITagliptin (JANUVIA) 100 MG tablet Take 1 tablet by mouth daily 90 tablet 3    metFORMIN (GLUCOPHAGE) 1000 MG tablet TAKE ONE TABLET IN THE EVENING 90 tablet 3    potassium chloride (KLOR-CON M) 20 MEQ extended release tablet Take 1 tablet by mouth daily 90 tablet 3    Handicap Placard MISC by Does not apply route Dx:I187.2,M81.0. Duration: 5 years. 2 each 0    docusate sodium (COLACE) 100 MG capsule Take 1 capsule by mouth 2 times daily as needed for Constipation 28 capsule 2    sodium chloride (OCEAN, BABY AYR) 0.65 % nasal spray 1 spray by Nasal route as needed for Congestion           Medications patient taking as of now reconciled against medications ordered at time of hospital discharge: Yes    Chief Complaint   Patient presents with   [address] from Hospital       HPI  Presented to [facility] with abscess on her chin. Inpatient course: Discharge summary reviewed- see chart. Interval history/Current status: Was I&D, grew Staph. On doxycycline . Lantus was started due to increased BS. Still on steroid from [facility]. Follow up with them on Thursday. Overall is feeling well. , non smoker, pmh reviewed. Review of Systems   Constitutional: Negative for chills, fatigue, fever and unexpected weight change. HENT: Negative for congestion, ear pain, rhinorrhea and sore throat. Eyes: Negative for pain and visual disturbance. Respiratory: Negative for cough, chest tightness, shortness of breath and wheezing. Cardiovascular: Negative for chest pain and palpitations. Gastrointestinal: Negative for abdominal pain, blood in stool, constipation, diarrhea, nausea and vomiting.    Genitourinary: Negative for difficulty urinating, frequency, hematuria and urgency. Musculoskeletal: Negative for back pain, joint swelling, myalgias and neck pain. Skin: Positive for wound. Negative for rash. Neurological: Negative for dizziness and headaches. Hematological: Negative for adenopathy. Does not bruise/bleed easily. Psychiatric/Behavioral: Negative for behavioral problems and sleep disturbance. The patient is not nervous/anxious. Vitals:    03/08/21 0952   BP: 132/68   Pulse: 78   Resp: 18   Temp: 97 °F (36.1 °C)   Weight: 211 lb (95.7 kg)     Body mass index is 35.11 kg/m². Wt Readings from Last 3 Encounters:   03/08/21 211 lb (95.7 kg)   03/04/21 195 lb 9.6 oz (88.7 kg)   02/03/21 213 lb 1.6 oz (96.7 kg)     BP Readings from Last 3 Encounters:   03/08/21 132/68   03/04/21 (!) 154/80   02/03/21 (!) 144/83       Physical Exam  Vitals signs and nursing note reviewed. Constitutional:       Appearance: She is well-developed. HENT:      Head: Normocephalic and atraumatic. Right Ear: External ear normal.      Left Ear: External ear normal.      Nose: Nose normal.      Mouth/Throat:      Mouth: Mucous membranes are moist.   Eyes:      Pupils: Pupils are equal, round, and reactive to light. Neck:      Musculoskeletal: Neck supple. Thyroid: No thyromegaly. Cardiovascular:      Rate and Rhythm: Normal rate and regular rhythm. Heart sounds: Normal heart sounds. Pulmonary:      Breath sounds: Normal breath sounds. No wheezing or rales. Abdominal:      General: Bowel sounds are normal.      Palpations: Abdomen is soft. Tenderness: There is no abdominal tenderness. There is no guarding or rebound. Musculoskeletal: Normal range of motion. Lymphadenopathy:      Cervical: No cervical adenopathy. Skin:     General: Skin is warm and dry. Findings: No rash. Neurological:      Mental Status: She is alert and oriented to person, place, and time. Cranial Nerves: No cranial nerve deficit.       Deep Tendon Reflexes: Reflexes are normal and symmetric. Assessment/Plan:  1. Abscess of chin  -improving, finish doxcycline    2. Staph aureus infection  -improving finish doxycycline    3. 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[address]  -follow up with [facility].     4.  DM 2--uncontrolled, increase lantus to 10 units BID--monitor sugars, especially when off the steroid.        Medical Decision Making: moderate complexity

## 2021-03-10 ENCOUNTER — CARE COORDINATION (OUTPATIENT)
Dept: CASE MANAGEMENT | Age: 81
End: 2021-03-10

## 2021-03-10 NOTE — CARE COORDINATION
[facility] Transitions Follow Up Call    3/10/2021    Patient: Glenny Davis  Patient : 1940   MRN: <[id number]>  Reason for Admission: cellulitis  Discharge Date: 3/4/21 RARS: Readmission Risk Score: 34         Spoke with: Called to speak with patient for update with transition of care. Left HIPPA compliant voice message with contact information [phone] for a call  Back with an update. Care Transitions Subsequent and Final Call    Subsequent and Final Calls  Care Transitions Interventions  Other Interventions:            Follow Up  Future Appointments   Date Time Provider David Adler   8/3/2021  8:40 AM Cee Connelly [facility]N - [facility] [address]       [place], [place]

## 2021-03-17 ENCOUNTER — CARE COORDINATION (OUTPATIENT)
Dept: CASE MANAGEMENT | Age: 81
End: 2021-03-17

## 2021-03-17 NOTE — CARE COORDINATION
[facility] Transitions Follow Up Call    3/17/2021    Patient: Claire Joseph  Patient : 1940   MRN: <[id number]>  Reason for Admission: Cellulitis   Discharge Date: 3/4/21 RARS: Readmission Risk Score: 34    Spoke with: Daughter Donna(JUDIE) who states mom is doing good she is back to her baseline as far as pain goes . Last day of OT was today PT goes through next week. Completed her steroid. Blood sugars have been in the low 200's high 100's she is moving around well and eating well,has 1 small sore left on bottom from stay in hospital daughter states it looks good and is not bleeding. She is having normal bowel and bladder elimination no concerns at this time. Needs to be reviewed by the provider   Additional needs identified to be addressed with provider No  none           Method of communication with provider : none      Care Transition Nurse (CTN) contacted the family by telephone to follow up after admission on 21. Verified name and  with family as identifiers. Addressed changes since last contact: symptom management-pain swelling  Discharged needs reviewed: none  Follow up appointment completed? Yes    Advance Care Planning:   Does patient have an Advance Directive:  reviewed and current. CTN reviewed discharge instructions, medical action plan and red flags with family and discussed any barriers to care and/or understanding of plan of care after discharge. Discussed appropriate site of care based on symptoms and resources available to patient including: When to call 911. The family agrees to contact the PCP office for questions related to their healthcare. Patients top risk factors for readmission: medical condition  Interventions to address risk factors: Obtained and reviewed discharge summary and/or continuity of care documents      Plan for follow-up call in 7-10 days based on severity of symptoms and risk factors.   Plan for next call: symptom management-pain bleeding  CTN provided contact information for future needs. Care Transitions Subsequent and Final Call    Subsequent and Final Calls  Do you have any ongoing symptoms?: No  Have your medications changed?: No  Do you have any questions related to your medications?: No  Do you currently have any active services?: No  Are you currently active with any services?: Home Health  Do you have any needs or concerns that I can assist you with?: No  Identified Barriers: Lack of Education  Care Transitions Interventions  Other Interventions:            Follow Up  Future Appointments   Date Time Provider David Adler   8/3/2021  8:40 AM AMANDA Lowery - CNP [facility] [address]       [place], [place]

## 2021-03-19 RX ORDER — OMEPRAZOLE 20 MG/1
CAPSULE, DELAYED RELEASE ORAL
Qty: 90 CAPSULE | Refills: 3 | OUTPATIENT
Start: 2021-03-19

## 2021-03-19 RX ORDER — PREGABALIN 150 MG/1
CAPSULE ORAL
Qty: 120 CAPSULE | Refills: 0 | OUTPATIENT
Start: 2021-03-19

## 2021-03-19 NOTE — TELEPHONE ENCOUNTER
Called and spoke with patient's daughter Carla Macedo. Patient is due for an annual visit. They have a week supply of medication and will contact office next week to schedule an appt.

## 2021-03-19 NOTE — TELEPHONE ENCOUNTER
Called and spoke with patient's daughter Noah Menendez. Patient is due for an annual visit. They have a week supply of medication and will contact office next week to schedule an appt.

## 2021-03-23 ENCOUNTER — APPOINTMENT (OUTPATIENT)
Dept: GENERAL RADIOLOGY | Age: 81
DRG: 871 | End: 2021-03-23
Payer: MEDICARE

## 2021-03-23 ENCOUNTER — CARE COORDINATION (OUTPATIENT)
Dept: CASE MANAGEMENT | Age: 81
End: 2021-03-23

## 2021-03-23 ENCOUNTER — HOSPITAL ENCOUNTER (INPATIENT)
Age: 81
LOS: 7 days | Discharge: NURSING, REHAB OR LONG-TERM CARE | DRG: 871 | End: 2021-03-31
Attending: EMERGENCY MEDICINE | Admitting: INTERNAL MEDICINE
Payer: MEDICARE

## 2021-03-23 DIAGNOSIS — M43.12 SPONDYLOLISTHESIS OF CERVICAL REGION: ICD-10-CM

## 2021-03-23 DIAGNOSIS — J18.9 PNEUMONIA DUE TO ORGANISM: Primary | ICD-10-CM

## 2021-03-23 LAB
ALBUMIN SERPL-MCNC: 3.2 G/DL (ref 3.5–5.1)
ALLEN TEST: NEGATIVE
ALP BLD-CCNC: 109 U/L (ref 38–126)
ALT SERPL-CCNC: 16 U/L (ref 11–66)
ANION GAP SERPL CALCULATED.3IONS-SCNC: 15 MEQ/L (ref 8–16)
AST SERPL-CCNC: 18 U/L (ref 5–40)
BACTERIA: ABNORMAL /HPF
BASE EXCESS (CALCULATED): 1.4 MMOL/L (ref -2.5–2.5)
BASOPHILS # BLD: 0.4 %
BASOPHILS ABSOLUTE: 0 THOU/MM3 (ref 0–0.1)
BILIRUB SERPL-MCNC: 0.5 MG/DL (ref 0.3–1.2)
BILIRUBIN URINE: NEGATIVE
BLOOD, URINE: NEGATIVE
BUN BLDV-MCNC: 24 MG/DL (ref 7–22)
CALCIUM SERPL-MCNC: 8.7 MG/DL (ref 8.5–10.5)
CASTS 2: ABNORMAL /LPF
CASTS UA: ABNORMAL /LPF
CHARACTER, URINE: CLEAR
CHLORIDE BLD-SCNC: 97 MEQ/L (ref 98–111)
CO2: 22 MEQ/L (ref 23–33)
COLLECTED BY:: ABNORMAL
COLOR: YELLOW
CREAT SERPL-MCNC: 1.4 MG/DL (ref 0.4–1.2)
CRYSTALS, UA: ABNORMAL
DEVICE: ABNORMAL
EKG ATRIAL RATE: 131 BPM
EKG P AXIS: -18 DEGREES
EKG P-R INTERVAL: 152 MS
EKG Q-T INTERVAL: 288 MS
EKG QRS DURATION: 86 MS
EKG QTC CALCULATION (BAZETT): 425 MS
EKG R AXIS: 148 DEGREES
EKG T AXIS: 0 DEGREES
EKG VENTRICULAR RATE: 131 BPM
EOSINOPHIL # BLD: 1.6 %
EOSINOPHILS ABSOLUTE: 0.1 THOU/MM3 (ref 0–0.4)
EPITHELIAL CELLS, UA: ABNORMAL /HPF
ERYTHROCYTE [DISTWIDTH] IN BLOOD BY AUTOMATED COUNT: 17.1 % (ref 11.5–14.5)
ERYTHROCYTE [DISTWIDTH] IN BLOOD BY AUTOMATED COUNT: 54.8 FL (ref 35–45)
FLU A ANTIGEN: NEGATIVE
FLU B ANTIGEN: NEGATIVE
GFR SERPL CREATININE-BSD FRML MDRD: 36 ML/MIN/1.73M2
GLUCOSE BLD-MCNC: 226 MG/DL (ref 70–108)
GLUCOSE BLD-MCNC: 249 MG/DL (ref 70–108)
GLUCOSE URINE: NEGATIVE MG/DL
HCO3: 23 MMOL/L (ref 23–28)
HCT VFR BLD CALC: 35.3 % (ref 37–47)
HEMOGLOBIN: 11.6 GM/DL (ref 12–16)
IFIO2: 100
IMMATURE GRANS (ABS): 0.11 THOU/MM3 (ref 0–0.07)
IMMATURE GRANULOCYTES: 1.4 %
KETONES, URINE: ABNORMAL
LACTIC ACID, SEPSIS: 3.1 MMOL/L (ref 0.5–1.9)
LEUKOCYTE ESTERASE, URINE: NEGATIVE
LIPASE: 26.2 U/L (ref 5.6–51.3)
LYMPHOCYTES # BLD: 12 %
LYMPHOCYTES ABSOLUTE: 1 THOU/MM3 (ref 1–4.8)
MCH RBC QN AUTO: 28.9 PG (ref 26–33)
MCHC RBC AUTO-ENTMCNC: 32.9 GM/DL (ref 32.2–35.5)
MCV RBC AUTO: 87.8 FL (ref 81–99)
MISCELLANEOUS 2: ABNORMAL
MODE: ABNORMAL
MONOCYTES # BLD: 6.9 %
MONOCYTES ABSOLUTE: 0.6 THOU/MM3 (ref 0.4–1.3)
NITRITE, URINE: NEGATIVE
NUCLEATED RED BLOOD CELLS: 0 /100 WBC
O2 SATURATION: 100 %
OSMOLALITY CALCULATION: 279.4 MOSMOL/KG (ref 275–300)
PCO2: 26 MMHG (ref 35–45)
PH BLOOD GAS: 7.55 (ref 7.35–7.45)
PH UA: 5 (ref 5–9)
PLATELET # BLD: 195 THOU/MM3 (ref 130–400)
PMV BLD AUTO: 10.5 FL (ref 9.4–12.4)
PO2: 507 MMHG (ref 71–104)
POTASSIUM REFLEX MAGNESIUM: 4 MEQ/L (ref 3.5–5.2)
PRO-BNP: 425.2 PG/ML (ref 0–1800)
PROTEIN UA: 100
RBC # BLD: 4.02 MILL/MM3 (ref 4.2–5.4)
RBC URINE: ABNORMAL /HPF
RENAL EPITHELIAL, UA: ABNORMAL
SARS-COV-2, NAAT: NOT DETECTED
SEG NEUTROPHILS: 77.7 %
SEGMENTED NEUTROPHILS ABSOLUTE COUNT: 6.3 THOU/MM3 (ref 1.8–7.7)
SET PEEP: 6 MMHG
SET RESPIRATORY RATE: 20 BPM
SODIUM BLD-SCNC: 134 MEQ/L (ref 135–145)
SPECIFIC GRAVITY, URINE: 1.02 (ref 1–1.03)
TOTAL PROTEIN: 6.5 G/DL (ref 6.1–8)
TROPONIN T: < 0.01 NG/ML
UROBILINOGEN, URINE: 0.2 EU/DL (ref 0–1)
WBC # BLD: 8.1 THOU/MM3 (ref 4.8–10.8)
WBC UA: ABNORMAL /HPF
YEAST: ABNORMAL

## 2021-03-23 PROCEDURE — 82803 BLOOD GASES ANY COMBINATION: CPT

## 2021-03-23 PROCEDURE — 99285 EMERGENCY DEPT VISIT HI MDM: CPT

## 2021-03-23 PROCEDURE — 93005 ELECTROCARDIOGRAM TRACING: CPT | Performed by: EMERGENCY MEDICINE

## 2021-03-23 PROCEDURE — 87040 BLOOD CULTURE FOR BACTERIA: CPT

## 2021-03-23 PROCEDURE — 85025 COMPLETE CBC W/AUTO DIFF WBC: CPT

## 2021-03-23 PROCEDURE — 94660 CPAP INITIATION&MGMT: CPT

## 2021-03-23 PROCEDURE — 71045 X-RAY EXAM CHEST 1 VIEW: CPT

## 2021-03-23 PROCEDURE — 87635 SARS-COV-2 COVID-19 AMP PRB: CPT

## 2021-03-23 PROCEDURE — 96360 HYDRATION IV INFUSION INIT: CPT

## 2021-03-23 PROCEDURE — 2580000003 HC RX 258: Performed by: EMERGENCY MEDICINE

## 2021-03-23 PROCEDURE — 6370000000 HC RX 637 (ALT 250 FOR IP): Performed by: EMERGENCY MEDICINE

## 2021-03-23 PROCEDURE — 36415 COLL VENOUS BLD VENIPUNCTURE: CPT

## 2021-03-23 PROCEDURE — 84484 ASSAY OF TROPONIN QUANT: CPT

## 2021-03-23 PROCEDURE — 83690 ASSAY OF LIPASE: CPT

## 2021-03-23 PROCEDURE — 81001 URINALYSIS AUTO W/SCOPE: CPT

## 2021-03-23 PROCEDURE — 87804 INFLUENZA ASSAY W/OPTIC: CPT

## 2021-03-23 PROCEDURE — 36600 WITHDRAWAL OF ARTERIAL BLOOD: CPT

## 2021-03-23 PROCEDURE — 83880 ASSAY OF NATRIURETIC PEPTIDE: CPT

## 2021-03-23 PROCEDURE — 80053 COMPREHEN METABOLIC PANEL: CPT

## 2021-03-23 PROCEDURE — 82948 REAGENT STRIP/BLOOD GLUCOSE: CPT

## 2021-03-23 PROCEDURE — 83605 ASSAY OF LACTIC ACID: CPT

## 2021-03-23 RX ORDER — ACETAMINOPHEN 650 MG/1
650 SUPPOSITORY RECTAL EVERY 6 HOURS PRN
Status: DISCONTINUED | OUTPATIENT
Start: 2021-03-23 | End: 2021-03-24 | Stop reason: SDUPTHER

## 2021-03-23 RX ORDER — ACETAMINOPHEN 325 MG/1
325 TABLET ORAL ONCE
Status: COMPLETED | OUTPATIENT
Start: 2021-03-24 | End: 2021-03-23

## 2021-03-23 RX ORDER — 0.9 % SODIUM CHLORIDE 0.9 %
500 INTRAVENOUS SOLUTION INTRAVENOUS ONCE
Status: COMPLETED | OUTPATIENT
Start: 2021-03-23 | End: 2021-03-23

## 2021-03-23 RX ORDER — IBUPROFEN 200 MG
400 TABLET ORAL ONCE
Status: COMPLETED | OUTPATIENT
Start: 2021-03-24 | End: 2021-03-23

## 2021-03-23 RX ORDER — ACETAMINOPHEN 325 MG/1
650 TABLET ORAL EVERY 6 HOURS PRN
Status: DISCONTINUED | OUTPATIENT
Start: 2021-03-23 | End: 2021-03-24 | Stop reason: SDUPTHER

## 2021-03-23 RX ORDER — 0.9 % SODIUM CHLORIDE 0.9 %
500 INTRAVENOUS SOLUTION INTRAVENOUS ONCE
Status: COMPLETED | OUTPATIENT
Start: 2021-03-24 | End: 2021-03-24

## 2021-03-23 RX ADMIN — ACETAMINOPHEN 650 MG: 325 TABLET ORAL at 22:11

## 2021-03-23 RX ADMIN — IBUPROFEN 400 MG: 200 TABLET, FILM COATED ORAL at 23:48

## 2021-03-23 RX ADMIN — ACETAMINOPHEN 325 MG: 325 TABLET ORAL at 23:48

## 2021-03-23 RX ADMIN — SODIUM CHLORIDE 500 ML: 9 INJECTION, SOLUTION INTRAVENOUS at 22:23

## 2021-03-23 ASSESSMENT — PAIN DESCRIPTION - PAIN TYPE: TYPE: ACUTE PAIN

## 2021-03-23 ASSESSMENT — PAIN DESCRIPTION - DESCRIPTORS: DESCRIPTORS: ACHING

## 2021-03-23 ASSESSMENT — PAIN DESCRIPTION - FREQUENCY: FREQUENCY: CONTINUOUS

## 2021-03-23 ASSESSMENT — PAIN SCALES - GENERAL: PAINLEVEL_OUTOF10: 4

## 2021-03-23 ASSESSMENT — PAIN DESCRIPTION - LOCATION: LOCATION: ABDOMEN

## 2021-03-23 NOTE — CARE COORDINATION
[facility] Transitions Follow Up Call    3/23/2021    Patient: Renae Bruner  Patient : 1940   MRN: [id number]  Reason for Admission: Cellulitis  Discharge Date: 3/4/21 RARS: Readmission Risk Score: 34         Spoke with: Daughter Lita Cabrera:    Pool Christiansen:    Patient's daughter states patient is doing well today. Cellulitis of face Is healed at this time. Patient developed swelling in lower legs. No open areas. Daughter wrapped legs and legs are elevated as much as possible. Patient also has hemorrhoids at this time. Daughter putting on Preporation H with some relief. Blood sugar 193 today. Patient has no symptoms of hyperglycemia or hypoglycemia. Taking all Diabetic medications as directed. Patient's daughter confirms patient has all medications and is taking medications as directed. Patient's daughter has no questions concerning medications at this time. Patient's daughter is aware of when to contact MD with any new or worsening symptoms. Will continue to follow. Ganesh Khan LPN    [phone]  [facility] / [address] [facility] Transition Nurse (CTN) contacted the family by telephone to follow up after admission on 3/5/2021. Verified name and  with family as identifiers. Addressed changes since last contact: symptom management-Swelling  Discharged needs reviewed: none  Follow up appointment completed? Yes    Advance Care Planning:   Does patient have an Advance Directive:  reviewed and current. CTN reviewed discharge instructions, medical action plan and red flags with family and discussed any barriers to care and/or understanding of plan of care after discharge. Discussed appropriate site of care based on symptoms and resources available to patient including: PCP, Specialist and When to call 911. The family agrees to contact the PCP office for questions related to their healthcare. Patients top risk factors for readmission: functional physical ability and lack of knowledge about disease  Interventions to address risk factors: Obtained and reviewed discharge summary and/or continuity of care documents    Discussed follow-up appointments. If no appointment was previously scheduled, appointment scheduling offered: No   Is follow up appointment scheduled within 7 days of discharge? Yes  Non-[facility] follow up appointment(s): 3/8/2021      Plan for follow-up call in 5-7 days based on severity of symptoms and risk factors. Plan for next call: symptom management-Swelling legs  CTN provided contact information for future needs. Bean Shen LPN    [phone]  [facility] [address] [place] [address] Transitions Subsequent and Final Call    Subsequent and Final Calls  Do you have any ongoing symptoms?: Yes  Patient-reported symptoms: Other  Have your medications changed?: No  Do you have any questions related to your medications?: No  Do you currently have any active services?: No  Are you currently active with any services?: Home Health  Do you have any needs or concerns that I can assist you with?: No  Identified Barriers: None  Care Transitions Interventions  Other Interventions:            Follow Up  Future Appointments   Date Time Provider David Adler   8/3/2021  8:40 AM [facility] [address]       [place], [place]

## 2021-03-24 PROBLEM — J18.9 PNEUMONIA: Status: ACTIVE | Noted: 2021-03-24

## 2021-03-24 LAB
ALBUMIN SERPL-MCNC: 2.3 G/DL (ref 3.5–5.1)
ALLEN TEST: POSITIVE
ALP BLD-CCNC: 68 U/L (ref 38–126)
ALT SERPL-CCNC: 10 U/L (ref 11–66)
ANION GAP SERPL CALCULATED.3IONS-SCNC: 10 MEQ/L (ref 8–16)
APTT: 28.2 SECONDS (ref 22–38)
AST SERPL-CCNC: 12 U/L (ref 5–40)
BASE EXCESS (CALCULATED): 0.2 MMOL/L (ref -2.5–2.5)
BASOPHILS # BLD: 0.4 %
BASOPHILS # BLD: 0.5 %
BASOPHILS ABSOLUTE: 0 THOU/MM3 (ref 0–0.1)
BASOPHILS ABSOLUTE: 0 THOU/MM3 (ref 0–0.1)
BILIRUB SERPL-MCNC: 0.4 MG/DL (ref 0.3–1.2)
BUN BLDV-MCNC: 21 MG/DL (ref 7–22)
C-REACTIVE PROTEIN: 16.22 MG/DL (ref 0–1)
CALCIUM IONIZED SERUM: 1.12 MMOL/L (ref 1.12–1.32)
CALCIUM SERPL-MCNC: 7.5 MG/DL (ref 8.5–10.5)
CHLORIDE BLD-SCNC: 103 MEQ/L (ref 98–111)
CHLORIDE, WHOLE BLOOD: 102 MEQ/L (ref 98–109)
CO2: 23 MEQ/L (ref 23–33)
COLLECTED BY:: NORMAL
CREAT SERPL-MCNC: 1.1 MG/DL (ref 0.4–1.2)
EOSINOPHIL # BLD: 2.9 %
EOSINOPHIL # BLD: 3.7 %
EOSINOPHILS ABSOLUTE: 0.1 THOU/MM3 (ref 0–0.4)
EOSINOPHILS ABSOLUTE: 0.2 THOU/MM3 (ref 0–0.4)
ERYTHROCYTE [DISTWIDTH] IN BLOOD BY AUTOMATED COUNT: 17 % (ref 11.5–14.5)
ERYTHROCYTE [DISTWIDTH] IN BLOOD BY AUTOMATED COUNT: 17.2 % (ref 11.5–14.5)
ERYTHROCYTE [DISTWIDTH] IN BLOOD BY AUTOMATED COUNT: 54.7 FL (ref 35–45)
ERYTHROCYTE [DISTWIDTH] IN BLOOD BY AUTOMATED COUNT: 59.5 FL (ref 35–45)
GFR SERPL CREATININE-BSD FRML MDRD: 48 ML/MIN/1.73M2
GLUCOSE BLD-MCNC: 150 MG/DL (ref 70–108)
GLUCOSE BLD-MCNC: 168 MG/DL (ref 70–108)
GLUCOSE BLD-MCNC: 171 MG/DL (ref 70–108)
GLUCOSE BLD-MCNC: 183 MG/DL (ref 70–108)
GLUCOSE BLD-MCNC: 204 MG/DL (ref 70–108)
GLUCOSE BLD-MCNC: 223 MG/DL (ref 70–108)
GLUCOSE, WHOLE BLOOD: 209 MG/DL (ref 70–108)
HCO3: 25 MMOL/L (ref 23–28)
HCT VFR BLD CALC: 27.8 % (ref 37–47)
HCT VFR BLD CALC: 32.4 % (ref 37–47)
HEMOGLOBIN: 8.7 GM/DL (ref 12–16)
HEMOGLOBIN: 9.9 GM/DL (ref 12–16)
IFIO2: 30
IMMATURE GRANS (ABS): 0.06 THOU/MM3 (ref 0–0.07)
IMMATURE GRANS (ABS): 0.07 THOU/MM3 (ref 0–0.07)
IMMATURE GRANULOCYTES: 1.1 %
IMMATURE GRANULOCYTES: 1.2 %
INR BLD: 1.39 (ref 0.85–1.13)
LACTIC ACID, SEPSIS: 1.1 MMOL/L (ref 0.5–1.9)
LACTIC ACID: 1.1 MMOL/L (ref 0.5–2.2)
LYMPHOCYTES # BLD: 18.9 %
LYMPHOCYTES # BLD: 19.4 %
LYMPHOCYTES ABSOLUTE: 0.9 THOU/MM3 (ref 1–4.8)
LYMPHOCYTES ABSOLUTE: 1.3 THOU/MM3 (ref 1–4.8)
MAGNESIUM: 1.5 MG/DL (ref 1.6–2.4)
MAGNESIUM: 1.6 MG/DL (ref 1.6–2.4)
MCH RBC QN AUTO: 27.8 PG (ref 26–33)
MCH RBC QN AUTO: 28.9 PG (ref 26–33)
MCHC RBC AUTO-ENTMCNC: 30.6 GM/DL (ref 32.2–35.5)
MCHC RBC AUTO-ENTMCNC: 31.3 GM/DL (ref 32.2–35.5)
MCV RBC AUTO: 88.8 FL (ref 81–99)
MCV RBC AUTO: 94.5 FL (ref 81–99)
MODE: NORMAL
MONOCYTES # BLD: 7.1 %
MONOCYTES # BLD: 8.4 %
MONOCYTES ABSOLUTE: 0.4 THOU/MM3 (ref 0.4–1.3)
MONOCYTES ABSOLUTE: 0.5 THOU/MM3 (ref 0.4–1.3)
MRSA SCREEN RT-PCR: POSITIVE
NUCLEATED RED BLOOD CELLS: 0 /100 WBC
NUCLEATED RED BLOOD CELLS: 0 /100 WBC
O2 SATURATION: 96 %
OSMOLALITY CALCULATION: 281.8 MOSMOL/KG (ref 275–300)
PCO2: 41 MMHG (ref 35–45)
PH BLOOD GAS: 7.4 (ref 7.35–7.45)
PLATELET # BLD: 139 THOU/MM3 (ref 130–400)
PLATELET # BLD: 156 THOU/MM3 (ref 130–400)
PMV BLD AUTO: 10.3 FL (ref 9.4–12.4)
PMV BLD AUTO: 10.5 FL (ref 9.4–12.4)
PO2: 81 MMHG (ref 71–104)
POC CREATININE WHOLE BLOOD: 1.2 MG/DL (ref 0.5–1.2)
POC LACTIC ACID: 0.9 MMOL/L (ref 0.5–1.9)
POTASSIUM REFLEX MAGNESIUM: 3.3 MEQ/L (ref 3.5–5.2)
POTASSIUM, WHOLE BLOOD: 3.2 MEQ/L (ref 3.5–4.9)
PROCALCITONIN: 0.36 NG/ML (ref 0.01–0.09)
RBC # BLD: 3.13 MILL/MM3 (ref 4.2–5.4)
RBC # BLD: 3.43 MILL/MM3 (ref 4.2–5.4)
SEG NEUTROPHILS: 68.2 %
SEG NEUTROPHILS: 68.2 %
SEGMENTED NEUTROPHILS ABSOLUTE COUNT: 3.3 THOU/MM3 (ref 1.8–7.7)
SEGMENTED NEUTROPHILS ABSOLUTE COUNT: 4.4 THOU/MM3 (ref 1.8–7.7)
SET PEEP: 6 MMHG
SODIUM BLD-SCNC: 136 MEQ/L (ref 135–145)
SODIUM, WHOLE BLOOD: 135 MEQ/L (ref 138–146)
SOURCE, BLOOD GAS: NORMAL
TOTAL PROTEIN: 4.7 G/DL (ref 6.1–8)
WBC # BLD: 4.9 THOU/MM3 (ref 4.8–10.8)
WBC # BLD: 6.5 THOU/MM3 (ref 4.8–10.8)

## 2021-03-24 PROCEDURE — 83605 ASSAY OF LACTIC ACID: CPT

## 2021-03-24 PROCEDURE — 36415 COLL VENOUS BLD VENIPUNCTURE: CPT

## 2021-03-24 PROCEDURE — 84145 PROCALCITONIN (PCT): CPT

## 2021-03-24 PROCEDURE — 2580000003 HC RX 258: Performed by: EMERGENCY MEDICINE

## 2021-03-24 PROCEDURE — 6370000000 HC RX 637 (ALT 250 FOR IP): Performed by: PHYSICIAN ASSISTANT

## 2021-03-24 PROCEDURE — 94761 N-INVAS EAR/PLS OXIMETRY MLT: CPT

## 2021-03-24 PROCEDURE — 87040 BLOOD CULTURE FOR BACTERIA: CPT

## 2021-03-24 PROCEDURE — 82435 ASSAY OF BLOOD CHLORIDE: CPT

## 2021-03-24 PROCEDURE — 85730 THROMBOPLASTIN TIME PARTIAL: CPT

## 2021-03-24 PROCEDURE — 84132 ASSAY OF SERUM POTASSIUM: CPT

## 2021-03-24 PROCEDURE — 2580000003 HC RX 258: Performed by: PHYSICIAN ASSISTANT

## 2021-03-24 PROCEDURE — 87641 MR-STAPH DNA AMP PROBE: CPT

## 2021-03-24 PROCEDURE — 6360000002 HC RX W HCPCS: Performed by: EMERGENCY MEDICINE

## 2021-03-24 PROCEDURE — 82565 ASSAY OF CREATININE: CPT

## 2021-03-24 PROCEDURE — 85025 COMPLETE CBC W/AUTO DIFF WBC: CPT

## 2021-03-24 PROCEDURE — 83735 ASSAY OF MAGNESIUM: CPT

## 2021-03-24 PROCEDURE — 6360000002 HC RX W HCPCS: Performed by: PHYSICIAN ASSISTANT

## 2021-03-24 PROCEDURE — 2500000003 HC RX 250 WO HCPCS: Performed by: HOSPITALIST

## 2021-03-24 PROCEDURE — 86140 C-REACTIVE PROTEIN: CPT

## 2021-03-24 PROCEDURE — 82803 BLOOD GASES ANY COMBINATION: CPT

## 2021-03-24 PROCEDURE — 82947 ASSAY GLUCOSE BLOOD QUANT: CPT

## 2021-03-24 PROCEDURE — 82330 ASSAY OF CALCIUM: CPT

## 2021-03-24 PROCEDURE — 99222 1ST HOSP IP/OBS MODERATE 55: CPT | Performed by: PHYSICIAN ASSISTANT

## 2021-03-24 PROCEDURE — 94660 CPAP INITIATION&MGMT: CPT

## 2021-03-24 PROCEDURE — 82948 REAGENT STRIP/BLOOD GLUCOSE: CPT

## 2021-03-24 PROCEDURE — 2140000000 HC CCU INTERMEDIATE R&B

## 2021-03-24 PROCEDURE — 6360000002 HC RX W HCPCS: Performed by: HOSPITALIST

## 2021-03-24 PROCEDURE — 2580000003 HC RX 258: Performed by: HOSPITALIST

## 2021-03-24 PROCEDURE — 85610 PROTHROMBIN TIME: CPT

## 2021-03-24 PROCEDURE — 80053 COMPREHEN METABOLIC PANEL: CPT

## 2021-03-24 PROCEDURE — 84295 ASSAY OF SERUM SODIUM: CPT

## 2021-03-24 PROCEDURE — 2700000000 HC OXYGEN THERAPY PER DAY

## 2021-03-24 RX ORDER — SODIUM CHLORIDE 9 MG/ML
INJECTION, SOLUTION INTRAVENOUS CONTINUOUS
Status: DISCONTINUED | OUTPATIENT
Start: 2021-03-24 | End: 2021-03-25

## 2021-03-24 RX ORDER — POTASSIUM CHLORIDE 7.45 MG/ML
10 INJECTION INTRAVENOUS PRN
Status: DISCONTINUED | OUTPATIENT
Start: 2021-03-24 | End: 2021-03-31 | Stop reason: HOSPADM

## 2021-03-24 RX ORDER — SODIUM CHLORIDE 0.9 % (FLUSH) 0.9 %
10 SYRINGE (ML) INJECTION PRN
Status: DISCONTINUED | OUTPATIENT
Start: 2021-03-24 | End: 2021-03-31 | Stop reason: HOSPADM

## 2021-03-24 RX ORDER — ACETAMINOPHEN 650 MG/1
650 SUPPOSITORY RECTAL EVERY 6 HOURS PRN
Status: DISCONTINUED | OUTPATIENT
Start: 2021-03-24 | End: 2021-03-31 | Stop reason: HOSPADM

## 2021-03-24 RX ORDER — ONDANSETRON 2 MG/ML
4 INJECTION INTRAMUSCULAR; INTRAVENOUS EVERY 6 HOURS PRN
Status: DISCONTINUED | OUTPATIENT
Start: 2021-03-24 | End: 2021-03-31 | Stop reason: HOSPADM

## 2021-03-24 RX ORDER — INSULIN GLARGINE 100 [IU]/ML
10 INJECTION, SOLUTION SUBCUTANEOUS EVERY MORNING
Status: DISCONTINUED | OUTPATIENT
Start: 2021-03-24 | End: 2021-03-31

## 2021-03-24 RX ORDER — MAGNESIUM SULFATE IN WATER 40 MG/ML
2000 INJECTION, SOLUTION INTRAVENOUS ONCE
Status: COMPLETED | OUTPATIENT
Start: 2021-03-24 | End: 2021-03-24

## 2021-03-24 RX ORDER — IBUPROFEN 400 MG/1
400 TABLET ORAL ONCE
Status: COMPLETED | OUTPATIENT
Start: 2021-03-24 | End: 2021-03-24

## 2021-03-24 RX ORDER — POLYETHYLENE GLYCOL 3350 17 G/17G
17 POWDER, FOR SOLUTION ORAL DAILY PRN
Status: DISCONTINUED | OUTPATIENT
Start: 2021-03-24 | End: 2021-03-31 | Stop reason: HOSPADM

## 2021-03-24 RX ORDER — PANTOPRAZOLE SODIUM 40 MG/1
40 TABLET, DELAYED RELEASE ORAL
Status: DISCONTINUED | OUTPATIENT
Start: 2021-03-24 | End: 2021-03-31 | Stop reason: HOSPADM

## 2021-03-24 RX ORDER — DOCUSATE SODIUM 100 MG/1
100 CAPSULE, LIQUID FILLED ORAL 2 TIMES DAILY PRN
Status: DISCONTINUED | OUTPATIENT
Start: 2021-03-24 | End: 2021-03-31 | Stop reason: HOSPADM

## 2021-03-24 RX ORDER — BUMETANIDE 1 MG/1
1 TABLET ORAL 2 TIMES DAILY
Status: DISCONTINUED | OUTPATIENT
Start: 2021-03-24 | End: 2021-03-26

## 2021-03-24 RX ORDER — NIFEDIPINE 60 MG/1
60 TABLET, EXTENDED RELEASE ORAL DAILY
Status: DISCONTINUED | OUTPATIENT
Start: 2021-03-24 | End: 2021-03-31 | Stop reason: HOSPADM

## 2021-03-24 RX ORDER — ACETAMINOPHEN 325 MG/1
650 TABLET ORAL EVERY 6 HOURS PRN
Status: DISCONTINUED | OUTPATIENT
Start: 2021-03-24 | End: 2021-03-31 | Stop reason: HOSPADM

## 2021-03-24 RX ORDER — HYDRALAZINE HYDROCHLORIDE 50 MG/1
50 TABLET, FILM COATED ORAL 2 TIMES DAILY
Status: DISCONTINUED | OUTPATIENT
Start: 2021-03-24 | End: 2021-03-31 | Stop reason: HOSPADM

## 2021-03-24 RX ORDER — DEXTROSE MONOHYDRATE 25 G/50ML
12.5 INJECTION, SOLUTION INTRAVENOUS PRN
Status: DISCONTINUED | OUTPATIENT
Start: 2021-03-24 | End: 2021-03-31 | Stop reason: HOSPADM

## 2021-03-24 RX ORDER — PROMETHAZINE HYDROCHLORIDE 25 MG/1
12.5 TABLET ORAL EVERY 6 HOURS PRN
Status: DISCONTINUED | OUTPATIENT
Start: 2021-03-24 | End: 2021-03-31 | Stop reason: HOSPADM

## 2021-03-24 RX ORDER — POTASSIUM CHLORIDE 20 MEQ/1
40 TABLET, EXTENDED RELEASE ORAL PRN
Status: DISCONTINUED | OUTPATIENT
Start: 2021-03-24 | End: 2021-03-31 | Stop reason: HOSPADM

## 2021-03-24 RX ORDER — 0.9 % SODIUM CHLORIDE 0.9 %
750 INTRAVENOUS SOLUTION INTRAVENOUS ONCE
Status: COMPLETED | OUTPATIENT
Start: 2021-03-24 | End: 2021-03-24

## 2021-03-24 RX ORDER — PREGABALIN 75 MG/1
300 CAPSULE ORAL 2 TIMES DAILY
Status: DISCONTINUED | OUTPATIENT
Start: 2021-03-24 | End: 2021-03-31 | Stop reason: HOSPADM

## 2021-03-24 RX ORDER — SODIUM CHLORIDE 0.9 % (FLUSH) 0.9 %
10 SYRINGE (ML) INJECTION EVERY 12 HOURS SCHEDULED
Status: DISCONTINUED | OUTPATIENT
Start: 2021-03-24 | End: 2021-03-31 | Stop reason: HOSPADM

## 2021-03-24 RX ORDER — NICOTINE POLACRILEX 4 MG
15 LOZENGE BUCCAL PRN
Status: DISCONTINUED | OUTPATIENT
Start: 2021-03-24 | End: 2021-03-31 | Stop reason: HOSPADM

## 2021-03-24 RX ORDER — POTASSIUM CHLORIDE 20 MEQ/1
20 TABLET, EXTENDED RELEASE ORAL DAILY
Status: DISCONTINUED | OUTPATIENT
Start: 2021-03-24 | End: 2021-03-31 | Stop reason: HOSPADM

## 2021-03-24 RX ORDER — DEXTROSE MONOHYDRATE 50 MG/ML
100 INJECTION, SOLUTION INTRAVENOUS PRN
Status: DISCONTINUED | OUTPATIENT
Start: 2021-03-24 | End: 2021-03-31 | Stop reason: HOSPADM

## 2021-03-24 RX ADMIN — POTASSIUM CHLORIDE 10 MEQ: 7.46 INJECTION, SOLUTION INTRAVENOUS at 06:15

## 2021-03-24 RX ADMIN — CEFTRIAXONE SODIUM 1000 MG: 1 INJECTION, POWDER, FOR SOLUTION INTRAMUSCULAR; INTRAVENOUS at 09:53

## 2021-03-24 RX ADMIN — IBUPROFEN 400 MG: 400 TABLET, FILM COATED ORAL at 20:25

## 2021-03-24 RX ADMIN — SODIUM CHLORIDE, PRESERVATIVE FREE 10 ML: 5 INJECTION INTRAVENOUS at 20:32

## 2021-03-24 RX ADMIN — POTASSIUM CHLORIDE 20 MEQ: 1500 TABLET, EXTENDED RELEASE ORAL at 08:29

## 2021-03-24 RX ADMIN — MAGNESIUM SULFATE HEPTAHYDRATE 2000 MG: 40 INJECTION, SOLUTION INTRAVENOUS at 18:38

## 2021-03-24 RX ADMIN — INSULIN LISPRO 2 UNITS: 100 INJECTION, SOLUTION INTRAVENOUS; SUBCUTANEOUS at 13:03

## 2021-03-24 RX ADMIN — ENOXAPARIN SODIUM 40 MG: 40 INJECTION SUBCUTANEOUS at 09:52

## 2021-03-24 RX ADMIN — VANCOMYCIN HYDROCHLORIDE 1500 MG: 5 INJECTION, POWDER, LYOPHILIZED, FOR SOLUTION INTRAVENOUS at 01:55

## 2021-03-24 RX ADMIN — ENOXAPARIN SODIUM 40 MG: 40 INJECTION SUBCUTANEOUS at 20:25

## 2021-03-24 RX ADMIN — POTASSIUM CHLORIDE 10 MEQ: 7.46 INJECTION, SOLUTION INTRAVENOUS at 08:38

## 2021-03-24 RX ADMIN — PREGABALIN 300 MG: 75 CAPSULE ORAL at 10:12

## 2021-03-24 RX ADMIN — POTASSIUM CHLORIDE 10 MEQ: 7.46 INJECTION, SOLUTION INTRAVENOUS at 07:44

## 2021-03-24 RX ADMIN — DOXYCYCLINE 100 MG: 100 INJECTION, POWDER, LYOPHILIZED, FOR SOLUTION INTRAVENOUS at 23:22

## 2021-03-24 RX ADMIN — SODIUM CHLORIDE 750 ML: 9 INJECTION, SOLUTION INTRAVENOUS at 00:22

## 2021-03-24 RX ADMIN — POTASSIUM CHLORIDE 10 MEQ: 7.46 INJECTION, SOLUTION INTRAVENOUS at 09:52

## 2021-03-24 RX ADMIN — PANTOPRAZOLE SODIUM 40 MG: 40 TABLET, DELAYED RELEASE ORAL at 08:29

## 2021-03-24 RX ADMIN — PIPERACILLIN AND TAZOBACTAM 3375 MG: 3; .375 INJECTION, POWDER, LYOPHILIZED, FOR SOLUTION INTRAVENOUS at 00:15

## 2021-03-24 RX ADMIN — INSULIN LISPRO 2 UNITS: 100 INJECTION, SOLUTION INTRAVENOUS; SUBCUTANEOUS at 18:04

## 2021-03-24 RX ADMIN — SODIUM CHLORIDE 500 ML: 9 INJECTION, SOLUTION INTRAVENOUS at 00:16

## 2021-03-24 RX ADMIN — SODIUM CHLORIDE: 9 INJECTION, SOLUTION INTRAVENOUS at 05:06

## 2021-03-24 RX ADMIN — ACETAMINOPHEN 650 MG: 325 TABLET ORAL at 17:47

## 2021-03-24 RX ADMIN — PREGABALIN 300 MG: 75 CAPSULE ORAL at 20:25

## 2021-03-24 RX ADMIN — SODIUM CHLORIDE, PRESERVATIVE FREE 10 ML: 5 INJECTION INTRAVENOUS at 10:13

## 2021-03-24 RX ADMIN — INSULIN GLARGINE 10 UNITS: 100 INJECTION, SOLUTION SUBCUTANEOUS at 08:30

## 2021-03-24 RX ADMIN — MAGNESIUM SULFATE HEPTAHYDRATE 2000 MG: 40 INJECTION, SOLUTION INTRAVENOUS at 10:02

## 2021-03-24 ASSESSMENT — PAIN SCALES - WONG BAKER
WONGBAKER_NUMERICALRESPONSE: 0

## 2021-03-24 ASSESSMENT — PAIN SCALES - GENERAL
PAINLEVEL_OUTOF10: 0

## 2021-03-24 NOTE — ED NOTES
Pt tolerating BIPAP well with now unlabored respirations at this time. Pt remains alert and oriented. Pt. Updated about plan of care and treatment. Family at bedside. Pt. Has no further concerns, questions or needs at this time. Call light within reach.         Alycia Quarles RN  03/24/21 9077

## 2021-03-24 NOTE — FLOWSHEET NOTE
03/24/21 1750   Provider Notification   Reason for Communication Critical Value (comment)   Provider Name Dr. Wayne Carr   Provider Notification Physician   Method of Communication Secure Message   Notification Time 1750     Rectal temp of 104.8F. Recheck 104.6. I gave Tylenol and am icing her. Do you want to add motrin for later-just in case? They had to give her some last night to bring her temp down. New orders for repeat blood cultures. 1845: oral temp now 100.2F.

## 2021-03-24 NOTE — ED NOTES
Pt arrives to the ED for shortness of breath. Pt arrived on CPAP and is alert able to understand questions asked however is not able to answer triage questions verbally at this time. Pt when squad arrived to the home was 88% on room air lethargic and unable to speak with Medic. Pt does not normally wear oxygen at home. Pt per family has not been exposed to [address]. Per family pt was considerably weaker and required help to walk to the bathroom and around the house yesterday. Daughter states today the pt was not feeing any better and started running a fever today. Her daughter states she was unable to return to her room from the bathroom with help and family called the squad. Pt on arrival has labored respirations on 14 L CPAP. Pt is febrile and tachycardic at this time. Pt rates her pain a 4/10 in her abdomen at this time. Pt hooked up to monitor. EKG completed.  Dr. Eligio Rodney at bedside          [facility]  03/23/21 1310 [facility], RN  03/24/21 9284

## 2021-03-24 NOTE — PLAN OF CARE
Problem: Falls - Risk of:  Goal: Will remain free from falls  Description: Will remain free from falls  Outcome: Ongoing  Note: No falls this shift. Bed alarm on, Falling star program in place. Call light within reach. Problem: Pain:  Goal: Pain level will decrease  Description: Pain level will decrease  Outcome: Ongoing  Note: Patient denied pain this shift. Pain goal: no pain. Problem: Discharge Planning:  Goal: Discharged to appropriate level of care  Description: Discharged to appropriate level of care  Outcome: Ongoing  Note: Patient prefers to discharge home. [facility] and home PT/OT prior to admission. Problem: Airway Clearance - Ineffective:  Goal: Clear lung sounds  Description: Clear lung sounds  Outcome: Ongoing  Note: Clear and diminished lung sounds. Patient encouraged to use IS when this RN in room and hourly. Problem: Gas Exchange - Impaired:  Goal: Levels of oxygenation will improve  Description: Levels of oxygenation will improve  Outcome: Ongoing  Note: Patient changed from continuous Bipap to 4L NC. Problem: Hyperthermia:  Goal: Ability to maintain a body temperature in the normal range will improve  Description: Ability to maintain a body temperature in the normal range will improve  Outcome: Ongoing  Note: Patient afebrile up to this point in the shift. Will continue to monitor. Problem: Physical Regulation:  Goal: Prevent transmision of infection  Description: Prevent transmision of infection  Outcome: Ongoing  Note: Contact precautions in place. 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 Problem: Skin Integrity:  Goal: Absence of new skin breakdown  Description: Absence of new skin breakdown  Outcome: Ongoing  Note: No new skin breakdown this shift. Q2 hour turns completed. Wicking pads change often to keep bottom dry. Care plan reviewed with patient and daughter.   Patient and daughter verbalize understanding of the plan of care and contribute to goal setting.

## 2021-03-24 NOTE — ED NOTES
Pt. Resting in bed with  unlabored respirations on BIPAP and is tolerating well. Pt. States pain is a 3/10. Pt remains febrile. Dr. Ava Ryan notified. Pt rolled and repositioned in bed at this time. Pt. Updated about plan for admission. Family at bedside. Pt. Has no further concerns, questions or needs at this time. Call light within reach.         Adis Tejada RN  03/24/21 1623

## 2021-03-24 NOTE — ED NOTES
ED to inpatient nurses report    Chief Complaint   Patient presents with    Shortness of Breath      Present to ED from home  LOC: alert and orientated to name, place, date  Vital signs   Vitals:    03/23/21 2204 03/23/21 2327 03/24/21 0038 03/24/21 0041   BP:  (!) 111/59 (!) 102/55    Pulse:  102 89    Resp: 21 17 19 16   Temp:  102.4 °F (39.1 °C) 100.9 °F (38.3 °C)    TempSrc:  Bladder Bladder    SpO2: 100% 99% 98% 97%   Weight:       Height:          Oxygen Baseline room air    Current needs required BIPAP Bipap/Cpap Yes  LDAs:   Peripheral IV 03/23/21 Right Hand (Active)   Site Assessment Clean;Dry; Intact 03/24/21 0038   Line Status Normal saline locked 03/24/21 0038   Dressing Status Clean;Dry; Intact 03/24/21 0038   Dressing Intervention New 03/23/21 2150       Peripheral IV 03/23/21 Right Antecubital (Active)   Site Assessment Dry; Intact; Clean 03/24/21 0038   Line Status Infusing 03/24/21 0038   Dressing Status Clean;Dry; Intact 03/24/21 0038   Dressing Intervention New 03/23/21 2151       Peripheral IV 03/23/21 Left Forearm (Active)   Site Assessment Clean;Dry; Intact 03/24/21 0038   Line Status Infusing 03/24/21 0038   Dressing Status Clean;Dry; Intact 03/24/21 0038   Dressing Intervention New 03/23/21 2204     Mobility: Requires assistance * 2  Pending ED orders: vancomycin   Present condition: Pt resting in bed with even and unlabored respirations. Pt vitals are stable. Pt just finished zosyn in ED. Pt infusing fluids. Pt daughter at bedside. Pt is tolerating BIPAP well.    Person of contract, phone number   Our promise was given to patient    Electronically signed by Dwayne Wilkinson RN on 3/24/2021 at 12:51 AM       Dwayne Wilkinson RN  03/24/21 9113

## 2021-03-24 NOTE — FLOWSHEET NOTE
0200  Daughter, Mary Rider, left for home. She has patient's hippa code and all questions answered. Did discuss code status with patient as Mary Rider states patient does not like to talk about it. Patient said Yes to compressions & emergency meds, but when discussed further intubation if Bipap doesn't work, patient then hesitated stating, \"I don't know if I can do that\". This RN stated will keep full code status for now, but will re-visit this decision and patient agreeable.

## 2021-03-24 NOTE — ED PROVIDER NOTES
[facility] [address] COMPLAINT       Chief Complaint   Patient presents with    Shortness of Breath       Nurses Notes reviewed and I agree except as noted in the HPI. HISTORY OF PRESENT ILLNESS    Dana Chang is a 79 y.o. female who presents emergency department for reported difficulty breathing and hypoxia. EMS states that they were called and patient was satting 88% on room air and states that she seemed somewhat slow to respond. They placed patient on CPAP and transferred her to the emergency department. They report patient is much more alert now. Patient is able to provide some history while being placed onto bipap, denies chest or abdominal pain. She states she was having some difficulty breathing describes a minor cough. She was unaware of any fever at home. She states her legs are slightly more \"puffy\" than normal.  No other initial complaints or concerns. 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 SURGICAL HISTORY      has a past surgical history that includes ventral hernia repair (1992); Cholecystectomy (03/1988); Hysterectomy, total abdominal (1980); Hemorrhoid surgery (1980s);  Total knee arthroplasty (08/2003); Carpal tunnel release (Bilateral, 2008, 2009); sinus surgery (last 2009); Cataract removal (2011); Eye surgery (Left, 01/30/2015); sinus surgery (02/01/2016); sinus surgery (2016 x 2); joint replacement (bilat 2005/ 2007); Colonoscopy (2016); Endoscopy, colon, diagnostic; eye surgery; hernia repair; Tonsillectomy; sinus surgery (09/18/2017); sinus surgery (08/09/2017); Abdomen surgery; Dilatation, esophagus; Appendectomy; pr colsc flx with directed submucosal njx any sbst (10/11/2018); and Colonoscopy (10/11/2018). CURRENT MEDICATIONS       Current Discharge Medication List      CONTINUE these medications which have NOT CHANGED    Details   insulin glargine (LANTUS SOLOSTAR) 100 UNIT/ML injection pen Inject 10 Units into the skin every morning  Qty: 5 pen, Refills: 0      Insulin Pen Needle (PEN NEEDLES) 31G X 6 MM MISC 1 each by Does not apply route daily  Qty: 50 each, Refills: 0      polyethylene glycol (GLYCOLAX) 17 g packet Take 17 g by mouth daily as needed for Constipation      pregabalin (LYRICA) 150 MG capsule Take 300 mg by mouth 2 times daily. dexamethasone (DECADRON) 2 MG tablet Take 2 mg by mouth 2 times daily (with meals) For 14 days starting 2/26/21      Acetaminophen 500 MG CAPS Take 1,000 mg by mouth every 6 hours as needed for Pain      Sodium Chloride-Sodium Bicarb (NETI POT SINUS WASH NA) by Nasal route 2 times daily Use baby shampoo in initial rinse, then mupirocin in second rinse. [address] [place] [address] wants 3-4 times a day      Misc. Devices (WALKER) MISC 1 each by Does not apply route daily Requests stand up walker due to heart failure and generalized OA. I50.23. Qty: 1 each, Refills: 0    Associated Diagnoses: Acute on chronic systolic CHF (congestive heart failure) (HCC)      Elastic Bandages & Supports (CERVICAL COLLAR) MISC Wear while awake for neck support.   M54.2  Qty: 1 each, Refills: 0    Associated Diagnoses: Cervical pain (neck)      ascorbic acid (VITAMIN C) 1000 MG tablet Take 1 tablet by mouth daily  Qty: 30 tablet, Refills: 3      Vitamin D (CHOLECALCIFEROL) 50 MCG (2000 UT) TABS tablet Take 1 tablet by mouth daily  Qty: 60 tablet, Refills: 0    Comments: Labeling may look different. 25 mcg=1000 Units. Please double check dosages. zinc sulfate (ZINCATE) 220 (50 Zn) MG capsule Take 1 capsule by mouth daily  Qty: 30 capsule, Refills: 3      mupirocin (BACTROBAN) 2 % ointment 2 times daily Toothpaste ribbon added to nasal rinse      NIFEdipine (ADALAT CC) 60 MG extended release tablet Take 1 tablet by mouth daily  Qty: 90 tablet, Refills: 2    Associated Diagnoses: Hypertensive crisis      bumetanide (BUMEX) 1 MG tablet Take 1 tablet by mouth 2 times daily  Qty: 180 tablet, Refills: 2    Associated Diagnoses: Hypertensive crisis      hydrALAZINE (APRESOLINE) 50 MG tablet Take 1 tablet by mouth 2 times daily  Qty: 180 tablet, Refills: 2    Associated Diagnoses: Acute on chronic systolic CHF (congestive heart failure) (HCC)      ferrous sulfate (IRON 325) 325 (65 Fe) MG tablet Take 325 mg by mouth 2 times daily       omeprazole (PRILOSEC) 20 MG delayed release capsule TAKE ONE CAPSULE BY MOUTH ONCE DAILY  Qty: 90 capsule, Refills: 3    Associated Diagnoses: Gastroesophageal reflux disease, esophagitis presence not specified      SITagliptin (JANUVIA) 100 MG tablet Take 1 tablet by mouth daily  Qty: 90 tablet, Refills: 3    Associated Diagnoses: Type 2 diabetes mellitus with microalbuminuria, without long-term current use of insulin ([facility])      metFORMIN (GLUCOPHAGE) 1000 MG tablet TAKE ONE TABLET IN THE EVENING  Qty: 90 tablet, Refills: 3    Associated Diagnoses: Type 2 diabetes mellitus with diabetic neuropathy, without long-term current use of insulin ([facility])      potassium chloride (KLOR-CON M) 20 MEQ extended release tablet Take 1 tablet by mouth daily  Qty: 90 tablet, Refills: 3    Associated Diagnoses: Hypokalemia      Handicap Placard MISC by Does not apply route Dx:I187.2,M81.0. Duration: 5 years. Qty: 2 each, Refills: 0    Associated Diagnoses: Venous insufficiency of both lower extremities; Osteoporosis, unspecified osteoporosis type, unspecified pathological fracture presence      docusate sodium (COLACE) 100 MG capsule Take 1 capsule by mouth 2 times daily as needed for Constipation  Qty: 28 capsule, Refills: 2    Associated Diagnoses: Constipation, unspecified constipation type      sodium chloride (OCEAN, BABY AYR) 0.65 % nasal spray 1 spray by Nasal route as needed for Congestion              ALLERGIES     is allergic to lisinopril; sulfamethoxazole-trimethoprim; morphine; codeine; hydrocodone; percocet [oxycodone-acetaminophen]; tylenol with codeine #3 [acetaminophen-codeine]; and tape [adhesive tape]. FAMILY HISTORY     She indicated that her mother is . She indicated that her father is . She indicated that two of her three sisters are alive. She indicated that eight of her ten brothers are alive. family history includes Cancer in her brother and sister; Diabetes in her mother; Heart Attack in her brother; Heart Disease in her brother, brother, and father; No Known Problems in her brother, brother, and brother; Obesity in her sister and sister; Other in her brother, brother, and brother. SOCIAL HISTORY      reports that she has never smoked. She has never used smokeless tobacco. She reports that she does not drink alcohol or use drugs. PHYSICAL EXAM     INITIAL VITALS:  height is 5' 5\" (1.651 m) and weight is 211 lb (95.7 kg). Her bladder temperature is 100.9 °F (38.3 °C). Her blood pressure is 102/55 (abnormal) and her pulse is 89. Her respiration is 16 and oxygen saturation is 97%.       CONSTITUTIONAL: [Awake, alert, non toxic, well developed, well nourished, no acute distress]  HEAD: [Normocephalic, atraumatic]  EYES: [Pupils equal, round & reactive to light, extraocular movements intact, no nystagmus, clear conjunctiva, non-icteric sclera]  ENT: [External ear canal clear without evidence of cerumen impaction or foreign body, TM's clear without erythema or bulging. Nares patent without drainage, septum appears midline. Moist mucus membranes, oropharynx clear without exudate, erythema, or mass. Uvula midline]  NECK: [Nontender and supple. No meningismus, no appreciated lymphadenopathy. Intact full range of motion. C-spine midline without vertebral tenderness. Trachea midline.]  CHEST: [Inspection normal, no lesions, equal rise. No crepitus or tenderness upon palpation.]  CARDIOVASCULAR: [Regular rate, rhythm, normal S1 and S2. No appreciated murmurs, rubs, or gallops. No pulse deficits appreciated. Intact distal perfusion. JVD not appreciated.]  PULMONARY: [Respiratory distress absent. Respiratory effort normal. Breath sounds clear to auscultation without rhonchi, rales, or wheezing. No accessory muscle use. No stridor]  ABDOMEN: [Inspection normal, without surgical scars. Soft, non-tender, non-distended, with normoactive bowel sounds. No palpable masses, rebound, or guarding]  BACK: [Intact ROM. No midline vertebral tenderness, step off, or crepitus. No CVA tenderness.]  MUSCULOSKELETAL: [Extremities nontender to palpation. No gross deformity or evidence of external trauma. Intact range of motion. Sensation intact. No clubbing, cyanosis. Diffuse edema 3-4+ to knees bilaterally with diffuse erythema, areas of skin breakdown and weeping present, +warmth, no induration or foul odor.]  SKIN: [Warm, dry. No jaundice, rash, urticaria, or petechiae]  NEUROLOGIC: [Alert and oriented x 3, GCS 15, normal mentation for age. Moves all four extremities. No gross sensory deficit.  Cerebellar function grossly normal.]  PSYCHIATRIC: [Normal mood and affect, thought process is clear and linear]     DIFFERENTIAL DIAGNOSIS:   Pneumonia, UTI, ?cellulitis, less likely covid, intraabdominal process, and others    DIAGNOSTIC RESULTS     EKG: All EKG's are interpreted by the Emergency Department Physician who either signs or Co-signsthis chart in the absence of a cardiologist.  EKG shows sinus tachycardia at a rate of 131 bpm, RI interval 152 ms, QRS duration 86 ms,  ms. No ST elevation or depression, my interpretation. RADIOLOGY: non-plain film images(s) such as CT,Ultrasound and MRI are read by the radiologist.    XR CHEST PORTABLE   Final Result   1. Cardiomegaly. 2.  Multifocal bilateral infiltrates are present.       This document has been electronically signed by: Cedric Chao M.D. on    03/23/2021 10:47 PM        [] Visualized and interpreted by me   [x] Radiologist's Wet Read Report Reviewed   [] Discussed withRadiologist.    LABS:   Labs Reviewed   CBC WITH AUTO DIFFERENTIAL - Abnormal; Notable for the following components:       Result Value    RBC 4.02 (*)     Hemoglobin 11.6 (*)     Hematocrit 35.3 (*)     RDW-CV 17.1 (*)     RDW-SD 54.8 (*)     Immature Grans (Abs) 0.11 (*)     All other components within normal limits   COMPREHENSIVE METABOLIC PANEL W/ REFLEX TO MG FOR LOW K - Abnormal; Notable for the following components:    Glucose 226 (*)     CREATININE 1.4 (*)     BUN 24 (*)     Sodium 134 (*)     Chloride 97 (*)     CO2 22 (*)     Albumin 3.2 (*)     All other components within normal limits   LACTATE, SEPSIS - Abnormal; Notable for the following components:    Lactic Acid, Sepsis 3.1 (*)     All other components within normal limits   BLOOD GAS, ARTERIAL - Abnormal; Notable for the following components:    pH, Blood Gas 7.55 (*)     PCO2 26 (*)     PO2 507 (*)     All other components within normal limits   URINE WITH REFLEXED MICRO - Abnormal; Notable for the following components:    Ketones, Urine TRACE (*)     Protein,  (*)     All other components within normal limits   GLOMERULAR FILTRATION RATE, ESTIMATED - Abnormal; Notable for the following components:    Est, Glom Filt Rate 36 (*)     All other components within normal limits   POCT GLUCOSE - Abnormal; Notable for the following components:    POC Glucose 249 (*)     All other components within normal limits   COVID-19, RAPID   RAPID INFLUENZA A/B ANTIGENS   CULTURE, BLOOD 1   CULTURE, BLOOD 2   LIPASE   TROPONIN   BRAIN NATRIURETIC PEPTIDE   LACTATE, SEPSIS   OSMOLALITY   ANION GAP   CBC WITH AUTO DIFFERENTIAL   COMPREHENSIVE METABOLIC PANEL W/ REFLEX TO MG FOR LOW K   APTT   PROTIME-INR   C-REACTIVE PROTEIN       EMERGENCY DEPARTMENT COURSE:   Vitals:    Vitals:    03/23/21 2204 03/23/21 2327 03/24/21 0038 03/24/21 0041   BP:  (!) 111/59 (!) 102/55    Pulse:  102 89    Resp: 21 17 19 16   Temp:  102.4 °F (39.1 °C) 100.9 °F (38.3 °C)    TempSrc:  Bladder Bladder    SpO2: 100% 99% 98% 97%   Weight:       Height:         Admitted to hospitalist, improved on bipap.     CRITICAL CARE:   Critical Care  Performed by: Katrin Garcia MD  Authorized by: Shannen Posada MD     Critical care provider statement:     Critical care time (minutes):  40    Critical care time was exclusive of:  Separately billable procedures and treating other patients and teaching time    Critical care was necessary to treat or prevent imminent or life-threatening deterioration of the following conditions:  Respiratory failure    Critical care was time spent personally by me on the following activities:  Development of treatment plan with patient or surrogate, blood draw for specimens, discussions with consultants, evaluation of patient's response to treatment, examination of patient, obtaining history from patient or surrogate, ordering and performing treatments and interventions, ordering and review of laboratory studies, ordering and review of radiographic studies, pulse oximetry, re-evaluation of patient's condition, review of old charts and ventilator management    I assumed direction of critical care for this patient from another provider in my specialty: no    Comments:      Patient brought in by EMS, on scene hypoxic and obtunded, arrived tachycardic and tachypnic, patient switched to bipap with adjustment in settings and improvement in her RR and HR, multifocal PNA on cxr, concern for impending respiratory failure        CONSULTS:  hospitalist for admit    PROCEDURES:  None    FINAL IMPRESSION      1. Pneumonia due to organism          DISPOSITION/PLAN   admit    PATIENT REFERRED TO:  No follow-up provider specified. DISCHARGE MEDICATIONS:  Current Discharge Medication List          (Please note that portions of this note were completed with a voice recognition program.  Efforts were made to edit the dictations but occasionally words are mis-transcribed.)    Provider:  I personally performed the services described in the documentation, reviewed and edited the documentation which was dictated, and it accurately records my words and actions.     Arelis Carver MD 3/23/21 1:27 AM                Arelis Carver MD  03/24/21 0127

## 2021-03-24 NOTE — ED NOTES
Bed: 001A  Expected date:   Expected time:   Means of arrival: [facility]  Comments:     Yvon Nicholson RN  03/23/21 [id number]

## 2021-03-24 NOTE — CARE COORDINATION
3/24/21, 7:12 AM EDT  DISCHARGE PLANNING EVALUATION:    Tameka Ortega       Admitted: 3/23/2021/ 2141   Hospital day: 0   Location: -22/022-A Reason for admit: Pneumonia [J18.9]   PMH:  has a past medical history of Cancer ([facility] [address] Cataract of both eyes, COVID-19, DDD (degenerative disc disease), lumbar, Dysphagia, pharyngoesophageal, Eczema, Fibrocystic breast, H/O ventral hernia repair, Headache, Hypercholesteremia, Hyperlipidemia, Hypertension, Iron deficiency anemia, LPRD (laryngopharyngeal reflux disease), Neuropathy, Obesity, Orbital abscess, Orbital cellulitis, SWAPNA (obstructive sleep apnea), Osteoarthritis, Osteoporosis, Persistent proteinuria associated with type 2 diabetes mellitus ([facility] [address] Sinusitis, Type II or unspecified type diabetes mellitus without mention of complication, not stated as uncontrolled, and Velopharyngeal incompetence. Procedure: 3/23 CXR - Cardiomegaly, multifocal bilateral infiltrates. Barriers to Discharge:  Admitted through ED with worsening weakness, low O2 sats (in the 80's). Started BiPAP upon arrival. CXR showed bilateral infiltrates. IVF. Lovenox. Diabetes management. Electrolyte replacements. Given Zosyn and Vanc x1 dose. Potassium 3.3. Calcium 7.5. Total protein 4.7. Albumin 2.3. Hgb 8.7. Anderson. Palliative Care consulted. PCP: Ferdinand Valerio MD  Readmission Risk Score: 39%    Patient Goals/Plan/Treatment Preferences: Spoke with pt and 2 daughters. Pt lives at home with one of her daughters (not present). Pt does not drive, family takes pt to all appts. She has a walker, 2 canes and a shower chair at home. She is current with [facility]. SW consulted. Transportation/Food Security/Housekeeping Addressed:  No issues identified.

## 2021-03-24 NOTE — H&P
Hospitalist - History & Physical      Patient: Soledad Lamar    Unit/Bed:01/001A  YOB: 1940  MRN: [id number]   Acct: [de-identified]   PCP: Kendrick Mishra MD      Assessment and Plan:        1. Pneumonia with sepsis: On arrival patient weak, febrile 102.6, tachypneic at 25, tachycardic at 131. CXR shows multifocal bilateral infiltrates. Initial WBC within normal limits. Started on BiPAP on arrival due to respiratory alkalosis secondary to tachypnea. Started on Zosyn and vancomycin empirically. Patient was recently admitted to the hospital approximately a month ago. Blood cultures x2. Volume resuscitated 30 mL/kg. Respiratory culture and pneumonia panel pending. Lactic acid initially 3.1, improved to 1.1 discontinuing repeat levels. 2. DM Type II: Blood glucose 223. Start home Lantus. Sliding scale insulin ordered  3. HTN: Hypotensive on arrival.  Hold hydralazine and Procardia until blood pressure stabilizes  4. Acute kidney injury: Creatinine 1.4 on arrival.  Improved to 1.1 following aggressive hydration per sepsis protocol. Continue IV hydration and repeat labs  5. Hypokalemia: Replacement protocol ordered    CC: Fatigue  HPI: Mrs. Casi Villeda is a 80 Y/O female with past medical history of diabetes, hypertension, presents to EMS for worsening weakness over the last day. Per EMS report patient was having difficulty breathing and hypoxia  with O2 saturation 80% on room air. Patient placed on CPAP and brought to [facility] ED. Patient started on BiPAP in ED with improved vital signs. Patient daughter at bedside states patient has not seemed ill over the past week, though today seemed much weaker than normal and complained of being cold. Patient daughter denies she reporting any cough, nausea, vomiting, abdominal pain, shortness of breath, chest pain, or fever.      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    Cataract of both eyes     COVID-19 12/2020    DDD (degenerative disc disease), lumbar     Dysphagia, pharyngoesophageal 09/26/2016    Eczema 03/2018    Fibrocystic breast     H/O ventral hernia repair     Headache 02/09/2017    Hypercholesteremia     Hyperlipidemia     Hypertension     Iron deficiency anemia     LPRD (laryngopharyngeal reflux disease) 09/26/2016    Neuropathy     peripheral    Obesity     Orbital abscess     Orbital cellulitis 01/22/2014    SWAPNA (obstructive sleep apnea)     Osteoarthritis     Osteoporosis     Persistent proteinuria associated with type 2 diabetes mellitus ([facility] [address] 01/2017    urine micral of 50.       Sinusitis     Type II or unspecified type diabetes mellitus without mention of complication, not stated as uncontrolled     diagnoses approx 2000    Velopharyngeal incompetence 09/26/2016     SHX:    Social History     Socioeconomic History    Marital status:      Spouse name: Not on file    Number of children: Not on file    Years of education: Not on file    Highest education level: Not on file   Occupational History    Not on file   Social Needs    Financial resource strain: Not on file    Food insecurity     Worry: Not on file     Inability: Not on file   [employer] needs     Medical: Not on file     Non-medical: Not on file   Tobacco Use    Smoking status: Never Smoker    Smokeless tobacco: Never Used   Substance and Sexual Activity    Alcohol use: No    Drug use: No    Sexual activity: Not Currently   Lifestyle    Physical activity     Days per week: Not on file     Minutes per session: Not on file    Stress: Not on file   Relationships    Social connections     Talks on phone: Not on file     Gets together: Not on file     Attends Catholic service: Not on file     Active member of club or organization: Not on file     Attends meetings of clubs or organizations: Not on file Relationship status: Not on file    Intimate partner violence     Fear of current or ex partner: Not on file     Emotionally abused: Not on file     Physically abused: Not on file     Forced sexual activity: Not on file   Other Topics Concern    Not on file   Social History Narrative    Not on file     FHX:   Family History   Problem Relation Age of Onset    Diabetes Mother     Heart Disease Father         whooping cough as child    Obesity Sister     Other Brother         tumor on back    Obesity Sister     Cancer Sister         Skin     Cancer Brother         Bone cancer from metal    Other Brother         passed as a child    Heart Disease Brother     Other Brother         tremor    Heart Attack Brother     No Known Problems Brother     Heart Disease Brother     No Known Problems Brother     No Known Problems Brother      Allergies:    Allergies   Allergen Reactions    Lisinopril Swelling and Anaphylaxis    Sulfamethoxazole-Trimethoprim Rash    Morphine Other (See Comments)     headaches    Codeine      Other reaction(s): AOF    Hydrocodone      headaches    Percocet [Oxycodone-Acetaminophen] Other (See Comments)     Headaches    Tylenol With Codeine #3 [Acetaminophen-Codeine]      Pt is okay with regular tylenol, CANNOT tolerate tylenol 3     Tape [Adhesive Tape] Rash     Medications:       sodium chloride  750 mL Intravenous Once    vancomycin  1,500 mg Intravenous Once    piperacillin-tazobactam  3,375 mg Intravenous Once    sodium chloride  500 mL Intravenous Once     acetaminophen, 650 mg, Q6H PRN    Or  acetaminophen, 650 mg, Q6H PRN        Labs:   Recent Results (from the past 24 hour(s))   EKG 12 lead    Collection Time: 03/23/21  9:43 PM   Result Value Ref Range    Ventricular Rate 131 BPM    Atrial Rate 131 BPM    P-R Interval 152 ms    QRS Duration 86 ms    Q-T Interval 288 ms    QTc Calculation (Bazett) 425 ms    P Axis -18 degrees    R Axis 148 degrees    T Axis 0 degrees POCT Glucose    Collection Time: 03/23/21  9:44 PM   Result Value Ref Range    POC Glucose 249 (H) 70 - 108 mg/dl   CBC Auto Differential    Collection Time: 03/23/21  9:50 PM   Result Value Ref Range    WBC 8.1 4.8 - 10.8 thou/mm3    RBC 4.02 (L) 4.20 - 5.40 mill/mm3    Hemoglobin 11.6 (L) 12.0 - 16.0 gm/dl    Hematocrit 35.3 (L) 37.0 - 47.0 %    MCV 87.8 81.0 - 99.0 fL    MCH 28.9 26.0 - 33.0 pg    MCHC 32.9 32.2 - 35.5 gm/dl    RDW-CV 17.1 (H) 11.5 - 14.5 %    RDW-SD 54.8 (H) 35.0 - 45.0 fL    Platelets 729 507 - 404 thou/mm3    MPV 10.5 9.4 - 12.4 fL    Seg Neutrophils 77.7 %    Lymphocytes 12.0 %    Monocytes 6.9 %    Eosinophils 1.6 %    Basophils 0.4 %    Immature Granulocytes 1.4 %    Segs Absolute 6.3 1 - 7 thou/mm3    Lymphocytes Absolute 1.0 1.0 - 4.8 thou/mm3    Monocytes Absolute 0.6 0.4 - 1.3 thou/mm3    Eosinophils Absolute 0.1 0.0 - 0.4 thou/mm3    Basophils Absolute 0.0 0.0 - 0.1 thou/mm3    Immature Grans (Abs) 0.11 (H) 0.00 - 0.07 thou/mm3    nRBC 0 /100 wbc   Comprehensive Metabolic Panel w/ Reflex to MG    Collection Time: 03/23/21  9:50 PM   Result Value Ref Range    Glucose 226 (H) 70 - 108 mg/dL    CREATININE 1.4 (H) 0.4 - 1.2 mg/dL    BUN 24 (H) 7 - 22 mg/dL    Sodium 134 (L) 135 - 145 meq/L    Potassium reflex Magnesium 4.0 3.5 - 5.2 meq/L    Chloride 97 (L) 98 - 111 meq/L    CO2 22 (L) 23 - 33 meq/L    Calcium 8.7 8.5 - 10.5 mg/dL    AST 18 5 - 40 U/L    Alkaline Phosphatase 109 38 - 126 U/L    Total Protein 6.5 6.1 - 8.0 g/dL    Albumin 3.2 (L) 3.5 - 5.1 g/dL    Total Bilirubin 0.5 0.3 - 1.2 mg/dL    ALT 16 11 - 66 U/L   Lipase    Collection Time: 03/23/21  9:50 PM   Result Value Ref Range    Lipase 26.2 5.6 - 51.3 U/L   Troponin    Collection Time: 03/23/21  9:50 PM   Result Value Ref Range    Troponin T < 0.010 ng/ml   Brain Natriuretic Peptide    Collection Time: 03/23/21  9:50 PM   Result Value Ref Range    Pro-.2 0.0 - 1800.0 pg/mL   Lactate, Sepsis    Collection Time: 03/23/21  9:50 PM   Result Value Ref Range    Lactic Acid, Sepsis 3.1 (H) 0.5 - 1.9 mmol/L   Glomerular Filtration Rate, Estimated    Collection Time: 03/23/21  9:50 PM   Result Value Ref Range    Est, Glom Filt Rate 36 (A) ml/min/1.73m2   Osmolality    Collection Time: 03/23/21  9:50 PM   Result Value Ref Range    Osmolality Calc 279.4 275.0 - 300.0 mOsmol/kg   Anion Gap    Collection Time: 03/23/21  9:50 PM   Result Value Ref Range    Anion Gap 15.0 8.0 - 16.0 meq/L   Blood Gas, Arterial    Collection Time: 03/23/21 10:00 PM   Result Value Ref Range    pH, Blood Gas 7.55 (H) 7.35 - 7.45    PCO2 26 (L) 35 - 45 mmhg    PO2 507 (H) 71 - 104 mmhg    HCO3 23 23 - 28 mmol/l    Base Excess (Calculated) 1.4 -2.5 - 2.5 mmol/l    O2 Sat 100 %    IFIO2 100     DEVICE BiPAP     Mode BiLevel     SET RESPIRATORY RATE 20 bpm    SET PEEP 6.0 mmhg    Angel Test Negative     COLLECTED BY: [id number]    Urine with Reflexed Micro    Collection Time: 03/23/21 10:08 PM   Result Value Ref Range    Glucose, Ur NEGATIVE NEGATIVE mg/dl    Bilirubin Urine NEGATIVE NEGATIVE    Ketones, Urine TRACE (A) NEGATIVE    Specific Gravity, Urine 1.019 1.002 - 1.030    Blood, Urine NEGATIVE NEGATIVE    pH, UA 5.0 5.0 - 9.0    Protein,  (A) NEGATIVE    Urobilinogen, Urine 0.2 0.0 - 1.0 eu/dl    Nitrite, Urine NEGATIVE NEGATIVE    Leukocyte Esterase, Urine NEGATIVE NEGATIVE    Color, UA YELLOW STRAW-YELLOW    Character, Urine CLEAR CLEAR-SL CLOUD    RBC, UA 0-2 0-2/hpf /hpf    WBC, UA 0-2 0-4/hpf /hpf    Epithelial Cells, UA 0-2 3-5/hpf /hpf    Bacteria, UA NONE SEEN FEW/NONE SEEN /hpf    Casts UA 8-15 HYALINE NONE SEEN /lpf    Crystals, UA NONE SEEN NONE SEEN    Renal Epithelial, UA NONE SEEN NONE SEEN    Yeast, UA NONE SEEN NONE SEEN    CASTS 2 0-4 C. GRAN NONE SEEN /lpf    MISCELLANEOUS 2 NONE SEEN    SARS-CoV-2 NAAT (Rapid)    Collection Time: 03/23/21 10:22 PM    Specimen: Nasopharyngeal Swab   Result Value Ref Range    SARS-CoV-2, NAAT NOT DETECTED NOT DETECTED   Rapid influenza A/B antigens    Collection Time: 03/23/21 10:22 PM    Specimen: Nasopharyngeal   Result Value Ref Range    Flu A Antigen Negative NEGATIVE    Flu B Antigen Negative NEGATIVE         Vital Signs: BP: (!) 111/59, Temp: 102.4 °F (39.1 °C), Pulse: 102, Resp: 17, SpO2: 99 %    GENERAL: Presents upright and asleep in bed with BiPAP in place ; In no acute distress and well nourished. SKIN: Appropriate for ethnicity, warm and dry. EYES: No apparent trauma, discharge, or hematoma bilaterally. PERRL at 3mm  CARDIO: No visible chest wall deformity. No heaves or lifts. Strong/regular S1/S2 rate and rhythm. No rubs murmurs, or gallops. 2+ radial and dorsalis pedal pulses. Capillary refill <2 sec. No extremity edema noted. PULMONARY:  Trachea midline, no audible wheezing, increased respiratory effort, accessory muscle use, or asymmetrical chest wall movement. Lung sounds are clear to ascultation in superior and inferior fields. No wheezing, crackles, or rhonchi. ABDOMEN: Abdomen is non distended. Bowel sounds present in all four quadrants. Soft to palpation  MSK: Extremities intact and present. No new bruising, swelling, deformity, discoloration or bleeding. Data: (All radiographs, tracings, PFTs, and imaging are personally viewed and interpreted unless otherwise noted).       EKG: rhythm: Sinus tachycardia with occasional PVC, possible right ventricular hypertrophy, possible inferior infarct, possible anterior lateral infarct, rlfc=531 bpm, dn=967 ms, qrs=86 ms, bf=154 ms, axis=-18 degrees                                            Electronically signed by Nori Moreno PA-C on 3/24/2021 at 12:37 AM

## 2021-03-24 NOTE — CARE COORDINATION
DISCHARGE/PLANNING EVALUATION  3/24/21, 11:06 AM EDT    Reason for Referral: Current with [facility]. Mental Status: Had a bi-pap on. SW did not speak with her. Decision Making: Daughter Blanca De Leon helps with decision making. Family/Social/Home Environment: Lives at home with her daughter Blanca De Leon. Blanca De Leon works at [employer]. When she is not home family take turns dropping in to check on Krish Bahena. Current Services including food security, transportation and housekeeping: Current with [facility] for RN and PT. Family makes sure all of her needs are met. Current Equipment:wheelchair, rollator walker, standard walker, quad cane, shower chair with back. Payment Source:[employer] Medicare and Medicaid. Concerns or Barriers to Discharge: No concerns at this time. Post acute provider list with quality measures, geographic area and applicable managed care information provided. Questions regarding selection process answered:Current with [facility] . Teach Back Method used with Cate's daughter Blanca De Leon regarding care plan and discharge planning. Patient verbalized understanding of the plan of care and contribute to goal setting. Patient goals, treatment preferences and discharge plan: Cate plans to return home at discharge with daughter and help from family. She would resume [facility] for RN and PT. If she would need rehab at discharge family is agreeable to [facility]. Electronically signed by DINORAH Copeland on 3/24/2021 at 11:06 AM

## 2021-03-24 NOTE — ED NOTES
Respiratory at bedside to transfer pt to the BIPAP. Pt legs are swollen with pitting edema bilaterally. Pt states she has pain in her middle abdomen rating a 4/10 at this time.           Shereen Munguia RN  03/23/21 0535

## 2021-03-24 NOTE — FLOWSHEET NOTE
03/24/21 1955   Provider Notification   Reason for Communication Evaluate   Provider Name Oskar Ramirez   Provider Notification Advance Practice Clinician (CNS, NP, CNM, CRNA, PA)   Method of Communication Secure Message   Response Waiting for response     Patient continues to have a temperature; rectal recheck is 102.0. At [phone] her rectal temperature was 104.8, they gave Tylenol, placed ice packs and checked blood cultures again. She still has ice packs in place, she doesn't have Motrin ordered, is there anything else you would like done? Thank you.

## 2021-03-25 LAB
ALBUMIN SERPL-MCNC: 2.6 G/DL (ref 3.5–5.1)
ALP BLD-CCNC: 77 U/L (ref 38–126)
ALT SERPL-CCNC: 11 U/L (ref 11–66)
ANION GAP SERPL CALCULATED.3IONS-SCNC: 9 MEQ/L (ref 8–16)
AST SERPL-CCNC: 15 U/L (ref 5–40)
BASOPHILS # BLD: 0.3 %
BASOPHILS ABSOLUTE: 0 THOU/MM3 (ref 0–0.1)
BILIRUB SERPL-MCNC: 0.3 MG/DL (ref 0.3–1.2)
BUN BLDV-MCNC: 12 MG/DL (ref 7–22)
C-REACTIVE PROTEIN: 17.67 MG/DL (ref 0–1)
CALCIUM SERPL-MCNC: 8.5 MG/DL (ref 8.5–10.5)
CHLORIDE BLD-SCNC: 105 MEQ/L (ref 98–111)
CO2: 24 MEQ/L (ref 23–33)
CREAT SERPL-MCNC: 0.8 MG/DL (ref 0.4–1.2)
EOSINOPHIL # BLD: 3.7 %
EOSINOPHILS ABSOLUTE: 0.2 THOU/MM3 (ref 0–0.4)
ERYTHROCYTE [DISTWIDTH] IN BLOOD BY AUTOMATED COUNT: 17.2 % (ref 11.5–14.5)
ERYTHROCYTE [DISTWIDTH] IN BLOOD BY AUTOMATED COUNT: 57.1 FL (ref 35–45)
GFR SERPL CREATININE-BSD FRML MDRD: 69 ML/MIN/1.73M2
GLUCOSE BLD-MCNC: 129 MG/DL (ref 70–108)
GLUCOSE BLD-MCNC: 131 MG/DL (ref 70–108)
GLUCOSE BLD-MCNC: 169 MG/DL (ref 70–108)
GLUCOSE BLD-MCNC: 223 MG/DL (ref 70–108)
GLUCOSE BLD-MCNC: 94 MG/DL (ref 70–108)
HCT VFR BLD CALC: 32 % (ref 37–47)
HEMOGLOBIN: 9.9 GM/DL (ref 12–16)
IMMATURE GRANS (ABS): 0.09 THOU/MM3 (ref 0–0.07)
IMMATURE GRANULOCYTES: 1.5 %
LYMPHOCYTES # BLD: 20 %
LYMPHOCYTES ABSOLUTE: 1.2 THOU/MM3 (ref 1–4.8)
MAGNESIUM: 1.9 MG/DL (ref 1.6–2.4)
MCH RBC QN AUTO: 28 PG (ref 26–33)
MCHC RBC AUTO-ENTMCNC: 30.9 GM/DL (ref 32.2–35.5)
MCV RBC AUTO: 90.7 FL (ref 81–99)
MONOCYTES # BLD: 7.2 %
MONOCYTES ABSOLUTE: 0.4 THOU/MM3 (ref 0.4–1.3)
NUCLEATED RED BLOOD CELLS: 0 /100 WBC
PLATELET # BLD: 170 THOU/MM3 (ref 130–400)
PMV BLD AUTO: 10.1 FL (ref 9.4–12.4)
POTASSIUM REFLEX MAGNESIUM: 3.4 MEQ/L (ref 3.5–5.2)
RBC # BLD: 3.53 MILL/MM3 (ref 4.2–5.4)
SEG NEUTROPHILS: 67.3 %
SEGMENTED NEUTROPHILS ABSOLUTE COUNT: 4.1 THOU/MM3 (ref 1.8–7.7)
SODIUM BLD-SCNC: 138 MEQ/L (ref 135–145)
TOTAL PROTEIN: 5.2 G/DL (ref 6.1–8)
WBC # BLD: 6.1 THOU/MM3 (ref 4.8–10.8)

## 2021-03-25 PROCEDURE — 97163 PT EVAL HIGH COMPLEX 45 MIN: CPT

## 2021-03-25 PROCEDURE — 86140 C-REACTIVE PROTEIN: CPT

## 2021-03-25 PROCEDURE — 6370000000 HC RX 637 (ALT 250 FOR IP): Performed by: PHYSICIAN ASSISTANT

## 2021-03-25 PROCEDURE — 36415 COLL VENOUS BLD VENIPUNCTURE: CPT

## 2021-03-25 PROCEDURE — 83735 ASSAY OF MAGNESIUM: CPT

## 2021-03-25 PROCEDURE — 94640 AIRWAY INHALATION TREATMENT: CPT

## 2021-03-25 PROCEDURE — 6360000002 HC RX W HCPCS: Performed by: HOSPITALIST

## 2021-03-25 PROCEDURE — 82948 REAGENT STRIP/BLOOD GLUCOSE: CPT

## 2021-03-25 PROCEDURE — 2500000003 HC RX 250 WO HCPCS: Performed by: HOSPITALIST

## 2021-03-25 PROCEDURE — 85025 COMPLETE CBC W/AUTO DIFF WBC: CPT

## 2021-03-25 PROCEDURE — 6360000002 HC RX W HCPCS: Performed by: PHYSICIAN ASSISTANT

## 2021-03-25 PROCEDURE — 6360000002 HC RX W HCPCS: Performed by: STUDENT IN AN ORGANIZED HEALTH CARE EDUCATION/TRAINING PROGRAM

## 2021-03-25 PROCEDURE — 99233 SBSQ HOSP IP/OBS HIGH 50: CPT | Performed by: HOSPITALIST

## 2021-03-25 PROCEDURE — 2580000003 HC RX 258: Performed by: PHYSICIAN ASSISTANT

## 2021-03-25 PROCEDURE — 2580000003 HC RX 258: Performed by: STUDENT IN AN ORGANIZED HEALTH CARE EDUCATION/TRAINING PROGRAM

## 2021-03-25 PROCEDURE — 2140000000 HC CCU INTERMEDIATE R&B

## 2021-03-25 PROCEDURE — 2580000003 HC RX 258: Performed by: HOSPITALIST

## 2021-03-25 PROCEDURE — 97530 THERAPEUTIC ACTIVITIES: CPT

## 2021-03-25 PROCEDURE — 80053 COMPREHEN METABOLIC PANEL: CPT

## 2021-03-25 RX ORDER — ALBUTEROL SULFATE 2.5 MG/3ML
2.5 SOLUTION RESPIRATORY (INHALATION) EVERY 6 HOURS PRN
Status: DISCONTINUED | OUTPATIENT
Start: 2021-03-25 | End: 2021-03-31 | Stop reason: HOSPADM

## 2021-03-25 RX ADMIN — POTASSIUM CHLORIDE 20 MEQ: 1500 TABLET, EXTENDED RELEASE ORAL at 08:45

## 2021-03-25 RX ADMIN — POTASSIUM CHLORIDE 40 MEQ: 1500 TABLET, EXTENDED RELEASE ORAL at 05:35

## 2021-03-25 RX ADMIN — SODIUM CHLORIDE, PRESERVATIVE FREE 10 ML: 5 INJECTION INTRAVENOUS at 22:10

## 2021-03-25 RX ADMIN — INSULIN LISPRO 4 UNITS: 100 INJECTION, SOLUTION INTRAVENOUS; SUBCUTANEOUS at 12:37

## 2021-03-25 RX ADMIN — SODIUM CHLORIDE, PRESERVATIVE FREE 10 ML: 5 INJECTION INTRAVENOUS at 22:11

## 2021-03-25 RX ADMIN — DOXYCYCLINE 100 MG: 100 INJECTION, POWDER, LYOPHILIZED, FOR SOLUTION INTRAVENOUS at 23:01

## 2021-03-25 RX ADMIN — CEFEPIME HYDROCHLORIDE 2000 MG: 2 INJECTION, POWDER, FOR SOLUTION INTRAVENOUS at 21:37

## 2021-03-25 RX ADMIN — SODIUM CHLORIDE, PRESERVATIVE FREE 10 ML: 5 INJECTION INTRAVENOUS at 09:36

## 2021-03-25 RX ADMIN — ACETAMINOPHEN 650 MG: 325 TABLET ORAL at 21:38

## 2021-03-25 RX ADMIN — ACETAMINOPHEN 650 MG: 325 TABLET ORAL at 06:34

## 2021-03-25 RX ADMIN — INSULIN GLARGINE 10 UNITS: 100 INJECTION, SOLUTION SUBCUTANEOUS at 08:46

## 2021-03-25 RX ADMIN — ENOXAPARIN SODIUM 40 MG: 40 INJECTION SUBCUTANEOUS at 21:39

## 2021-03-25 RX ADMIN — ALBUTEROL SULFATE 2.5 MG: 2.5 SOLUTION RESPIRATORY (INHALATION) at 16:49

## 2021-03-25 RX ADMIN — PREGABALIN 300 MG: 75 CAPSULE ORAL at 08:45

## 2021-03-25 RX ADMIN — PREGABALIN 300 MG: 75 CAPSULE ORAL at 21:42

## 2021-03-25 RX ADMIN — ENOXAPARIN SODIUM 40 MG: 40 INJECTION SUBCUTANEOUS at 08:45

## 2021-03-25 RX ADMIN — CEFEPIME HYDROCHLORIDE 2000 MG: 2 INJECTION, POWDER, FOR SOLUTION INTRAVENOUS at 09:30

## 2021-03-25 RX ADMIN — PANTOPRAZOLE SODIUM 40 MG: 40 TABLET, DELAYED RELEASE ORAL at 05:35

## 2021-03-25 RX ADMIN — DOXYCYCLINE 100 MG: 100 INJECTION, POWDER, LYOPHILIZED, FOR SOLUTION INTRAVENOUS at 11:02

## 2021-03-25 RX ADMIN — ACETAMINOPHEN 650 MG: 325 TABLET ORAL at 15:38

## 2021-03-25 ASSESSMENT — PAIN DESCRIPTION - PROGRESSION
CLINICAL_PROGRESSION: NOT CHANGED
CLINICAL_PROGRESSION: NOT CHANGED

## 2021-03-25 ASSESSMENT — PAIN DESCRIPTION - ONSET
ONSET: ON-GOING
ONSET: ON-GOING

## 2021-03-25 ASSESSMENT — PAIN DESCRIPTION - FREQUENCY
FREQUENCY: CONTINUOUS
FREQUENCY: INTERMITTENT

## 2021-03-25 ASSESSMENT — PAIN DESCRIPTION - PAIN TYPE: TYPE: ACUTE PAIN

## 2021-03-25 ASSESSMENT — PAIN DESCRIPTION - DESCRIPTORS
DESCRIPTORS: ACHING
DESCRIPTORS: ACHING

## 2021-03-25 ASSESSMENT — PAIN - FUNCTIONAL ASSESSMENT
PAIN_FUNCTIONAL_ASSESSMENT: PREVENTS OR INTERFERES SOME ACTIVE ACTIVITIES AND ADLS
PAIN_FUNCTIONAL_ASSESSMENT: ACTIVITIES ARE NOT PREVENTED

## 2021-03-25 ASSESSMENT — PAIN DESCRIPTION - ORIENTATION
ORIENTATION: ANTERIOR
ORIENTATION: ANTERIOR

## 2021-03-25 ASSESSMENT — PAIN DESCRIPTION - LOCATION: LOCATION: HEAD;LEG

## 2021-03-25 NOTE — PROGRESS NOTES
Physician Progress Note      PATIENT:               Niru Lewis  CSN #:                  [id number]  :                       1940  ADMIT DATE:       3/23/2021 9:41 PM  [address] [place] DATE:  RESPONDING  PROVIDER #:        Sameera De La Vega MD          QUERY TEXT:    Patient admitted with BMI 43.81. If possible, please document below and in   progress notes and discharge summary if you are evaluating and /or treating   any of the following: The medical record reflects the following:  Risk Factors: Diabetes  Clinical Indicators:BMI 43.81  Treatment: Daily weight and Dietician consult. Thank you. Please call if you have any questions. (p) [phone]. Signed   by Geoffrey Kebede RN Clinical , CRCR  Options provided:  -- Morbid obesity  -- Obesity  -- Overweight  -- BMI not clinically significant  -- Other - I will add my own diagnosis  -- Disagree - Not applicable / Not valid  -- Disagree - Clinically unable to determine / Unknown  -- Refer to Clinical Documentation Reviewer    PROVIDER RESPONSE TEXT:    This patient has morbid obesity.     Query created by: Ty Kitchen on 3/24/2021 11:47 AM      Electronically signed by:  Sameera De La Vega MD 3/25/2021 10:09 AM

## 2021-03-25 NOTE — PLAN OF CARE
care planning. Problem: Airway Clearance - Ineffective:  Goal: Clear lung sounds  Description: Clear lung sounds  3/24/2021 2152 by Wei Curry RN  Outcome: Ongoing  Note: Patient has clear, diminished lung sounds. Will continue to assess. Problem: Airway Clearance - Ineffective:  Goal: Ability to maintain a clear airway will improve  Description: Ability to maintain a clear airway will improve  3/24/2021 2152 by Wei Curry RN  Outcome: Ongoing  Note: Patient is able to maintain a clear airway, will continue to assess. Problem: Fluid Volume - Deficit:  Goal: Achieves intake and output within specified parameters  Description: Achieves intake and output within specified parameters  3/24/2021 2152 by Wei Curry RN  Outcome: Ongoing  Note: I/O last 3 completed shifts: In: 3583.6 [P.O.:120; I.V.:3463.6]  Out: 1100 [Urine:1100]  I/O this shift: In: 5 [P.O.:500; I.V.:226]  Out: 2000 [Urine:2000]     Will continue to assess and record intake and output. Problem: Gas Exchange - Impaired:  Goal: Levels of oxygenation will improve  Description: Levels of oxygenation will improve  3/24/2021 2152 by Wei Curry RN  Outcome: Ongoing  Note: Patient is on 4L nasal cannula, oxygen saturations above 90%. Problem: Hyperthermia:  Goal: Ability to maintain a body temperature in the normal range will improve  Description: Ability to maintain a body temperature in the normal range will improve  3/24/2021 2152 by Wei Curry RN  Outcome: Ongoing  Note: Patient has a fever off 100.2; receiving Tylenol and Motrin and using ice packs to bring temperatures down. Problem: Physical Regulation:  Goal: Prevent transmision of infection  Description: Prevent transmision of infection  3/24/2021 2152 by Wei Curry RN  Outcome: Ongoing  Note: Persons entering room are donning appropriately.      Problem: Skin Integrity:  Goal: Risk for impaired skin integrity will decrease  Description: Risk for impaired skin integrity will decrease  3/24/2021 2152 by Wei Curry RN  Note: Patient is being turned and repositioned every two hours and as needed, assessing skin for redness and breakdown. Problem: Skin Integrity:  Goal: Will show no infection signs and symptoms  Description: Will show no infection signs and symptoms  3/24/2021 2152 by Wei Curry RN  Note: No signs or symptoms of infection noted. Problem: Skin Integrity:  Goal: Absence of new skin breakdown  Description: Absence of new skin breakdown  3/24/2021 2152 by Wei Curry RN  Note: Patient is being turned and repositioned every two hours and as needed, assessing skin for redness and breakdown. Care plan reviewed with patient and daughter. Patient and daughter verbalize understanding of the plan of care and contribute to goal setting.

## 2021-03-25 NOTE — PROGRESS NOTES
PROGRESS NOTE      Patient: Clara Gonzales      Unit/Bed:3B-22/022-A    YOB: 1940    MRN: [id number]       Acct: [de-identified]     PCP: Ibis Hernandez MD    Date of Admission: 3/23/2021    Chief Complaint:  Fatigue    Assessment/Plan:    Anticipated Discharge in :  Ongoing medical treatment. 1. Pneumonia with sepsis   Febrile, tachypneic, tachycardic. Normal WBC. CXR shows multifocal bilateral infiltrates. CRP 17.67, procal 0.36.   Initially on BiPAP due to respiratory alkalosis secondary to tachypnea.  Started on vancomycin and Zosyn. Transitioned to Rocephin and doxy. Switch Rocephin to Cefepime for Pseudomonas coverage due to high fevers since admission.  Blood cultures pending. Can de-escalate antibiotics as indicated.  Afebrile since 3/24 1945. Requiring 3LNC to maintain SPO2 92%, VSS otherwise. 2. IDDM2   Continue home Lantus.  SSI. POCT BG. Hypoglycemia protocol. 3. Hypertension   Hypertensive on arrival, hold hydralazine and Procardia.  May be able to restart home meds in a.m. if BP remains elevated overnight. 4. IDALIA on CKD 3, resolved   Creatinine 1.4 on arrival, now 0.8  5. Hypokalemia   3.4 today. Replacement protocol ordered. 6. SWAPNA   On CPAP at home      Active Hospital Problems    Diagnosis Date Noted    Pneumonia [J18.9] 03/24/2021       Hospital Course:   Per HPI:  \"Mrs. Nam Brock is a 80 Y/O female with past medical history of diabetes, hypertension, presents to EMS for worsening weakness over the last day. Per EMS report patient was having difficulty breathing and hypoxia  with O2 saturation 80% on room air. Patient placed on CPAP and brought to [facility] ED. Patient started on BiPAP in ED with improved vital signs. Patient daughter at bedside states patient has not seemed ill over the past week, though today seemed much weaker than normal and complained of being cold.   Patient daughter denies she reporting any cough, nausea, vomiting, equal, round, and reactive to light. Conjunctivae/corneas clear. Neck: Supple, with full range of motion. No jugular venous distention. Trachea midline. Respiratory:  Normal respiratory effort. Minimal coarse breath sounds heard anteriorly, without Rales/Wheezes/Rhonchi. Diminished at bases. Cardiovascular: Regular rate and rhythm with normal S1/S2 without murmurs, rubs or gallops. Abdomen: Soft, non-tender, non-distended with normal bowel sounds. Musculoskeletal: passive and active ROM x 4 extremities. Skin: Skin color, texture, turgor normal.  Some trace lower extremity edema bilaterally with erythema. Erythema appears to be more venous stasis as opposed to cellulitic presentation. Neurologic:  Neurovascularly intact without any focal sensory/motor deficits. Cranial nerves: II-XII intact, grossly non-focal.  Psychiatric: Alert and oriented, thought content appropriate, normal insight  Capillary Refill: Brisk,< 3 seconds   Peripheral Pulses: +2 palpable, equal bilaterally       Labs:   Recent Labs     03/24/21 0350 03/24/21  0927 03/25/21 0442   WBC 4.9 6.5 6.1   HGB 8.7* 9.9* 9.9*   HCT 27.8* 32.4* 32.0*    156 170     Recent Labs     03/23/21 2150 03/24/21  0246 03/24/21  0350 03/25/21  0442   * 135* 136 138   K 4.0 3.2* 3.3* 3.4*   CL 97*  --  103 105   CO2 22*  --  23 24   BUN 24*  --  21 12   CREATININE 1.4*  --  1.1 0.8   CALCIUM 8.7  --  7.5* 8.5     Recent Labs     03/23/21 2150 03/24/21  0350 03/25/21  0442   AST 18 12 15   ALT 16 10* 11   BILITOT 0.5 0.4 0.3   ALKPHOS 109 68 77     Recent Labs     03/24/21 0350   INR 1.39*     No results for input(s): Stefanie Madrid in the last 72 hours.     Urinalysis:      Lab Results   Component Value Date    NITRU NEGATIVE 03/23/2021    WBCUA 0-2 03/23/2021    BACTERIA NONE SEEN 03/23/2021    RBCUA 0-2 03/23/2021    BLOODU NEGATIVE 03/23/2021    SPECGRAV 1.014 01/25/2021    GLUCOSEU NEGATIVE 03/23/2021       Radiology:  XR CHEST PORTABLE Final Result   1. Cardiomegaly. 2.  Multifocal bilateral infiltrates are present. This document has been electronically signed by: Arturo Angulo M.D. on    03/23/2021 10:47 PM          Diet: DIET CARB CONTROL;    DVT prophylaxis: [x] Lovenox                                 [] SCDs                                 [] SQ Heparin                                 [] Encourage ambulation           [] Already on Anticoagulation     Disposition:    [x] Home       [] TCU       [] Rehab       [] Psych       [] SNF       [] [facility]       [x] Other - Required home health on discharge 3/4/21. May need again. Appreciate PT/OT recommendations. Code Status: Full Code    PT/OT Eval Status: Consulted.       Electronically signed by Anh Díaz DO on 3/25/2021 at 10:41 AM

## 2021-03-25 NOTE — PROGRESS NOTES
Patient moved to chair. Needed moderated assistance sitting up in bed and standing. Walker/gait belt used to assist into chair. Desaturated to 82% on 3L. Increased to 4L and patient's O2 recovered into the 90s after sitting.

## 2021-03-25 NOTE — FLOWSHEET NOTE
03/25/21 9907   Provider Notification   Reason for Communication Evaluate   Provider Name Tania Armstrong   Provider Notification Advance Practice Clinician (CNS, NP, CNM, CRNA, PA)   Method of Communication Secure Message   Response Waiting for response     Patient is very labored with breathing, states she is not short of breath, oxygen saturation is 94-95% on 4L nasal cannula, listening to her lungs she has new expiratory wheezes throughout; she does not have anything respiratory ordered for wheezing. I gave her K+ this morning and she states the last one she just took \"exploded\" in her mouth, she now has a cough but states she doesn't feel like it went down the wrong way, she has swallowed her other pills and states they went down fine.

## 2021-03-25 NOTE — PLAN OF CARE
Problem: Falls - Risk of:  Goal: Will remain free from falls  Description: Will remain free from falls  3/25/2021 1123 by Blessing Washington RN  Outcome: Ongoing  Note: No falls up to this point in the shift. Bed alarm on, Falling star program in place. Call light within reach. Problem: Pain:  Goal: Pain level will decrease  Description: Pain level will decrease  3/25/2021 1123 by Blessing Washington RN  Outcome: Ongoing  Note: Headache pain resolved this morning after dose of Tylenol provided by night RN. Pain gaol: no pain. Problem: Discharge Planning:  Goal: Discharged to appropriate level of care  Description: Discharged to appropriate level of care  3/25/2021 1123 by Blessing Washington RN  Outcome: Ongoing  Note: Patient prefers to discharge home with help of [facility] and [facility]. [facility] is a secondary option. Problem: Airway Clearance - Ineffective:  Goal: Clear lung sounds  Description: Clear lung sounds  3/25/2021 1123 by Blessing Washington RN  Outcome: Ongoing  Note: Patient has crackles bilaterally in the bases and expiratory wheezes throughout. IS use encouraged. Patient up to chair today. PRN albuterol nebulizer available and given as needed. Problem: Gas Exchange - Impaired:  Goal: Levels of oxygenation will improve  Description: Levels of oxygenation will improve  3/25/2021 1123 by Blessing Washington RN  Outcome: Ongoing  Note: Patient weaned to 3L NC, but continues to desaturate with exhertion and turning. Will continue to monitor and wean as tolerated. Problem: Hyperthermia:  Goal: Ability to maintain a body temperature in the normal range will improve  Description: Ability to maintain a body temperature in the normal range will improve  3/25/2021 1123 by Blessing Washington RN  Outcome: Ongoing  Note: Patient demonstrated low-grade fevers that responded to PO Tylenol.       Problem: Physical Regulation:  Goal: Prevent transmision of infection  Description: Prevent transmision of infection 3/25/2021 1123 by Verdis Essex, RN  Outcome: Ongoing  Note: Contact precautions in place for active MRSA (nares). Problem: Skin Integrity:  Goal: Absence of new skin breakdown  Description: Absence of new skin breakdown  3/25/2021 1123 by Verdis Essex, RN  Outcome: Ongoing  Note: Patient has stable stage 2 wounds to bilateral buttocks and posterior side of L thigh. Chucks changed as needed to wick moisture away from wounds. Small amount of drainage. Wounds cleansed with soap and water as part of bed bath today. Q2 hour and PRN turns while in bed. Problem: Metabolic:  Goal: Ability to maintain appropriate glucose levels will improve  Description: Ability to maintain appropriate glucose levels will improve  Outcome: Ongoing  Note: SSI used to cover blood glucose at lunch. Lantus given this morning. Care plan reviewed with patient and family. Patient and family verbalize understanding of the plan of care and contribute to goal setting.

## 2021-03-25 NOTE — PROGRESS NOTES
[facility]  INPATIENT PHYSICAL THERAPY  EVALUATION  [facility] 3B-22/022-A    Time In: 9  Time Out: 2447  Timed Code Treatment Minutes: 15 Minutes  Minutes: 23          Date: 3/25/2021  Patient Name: Gibran Garcia,  Gender:  female        MRN: [id number]  : 1940  (79 y.o.)      Referring Practitioner: Remington Ayala DO  Diagnosis: Pneumonia with sepsis  Additional Pertinent Hx: Pt admitted 3-23 with peumonia and sepsis     Restrictions/Precautions:  Restrictions/Precautions: General Precautions, Fall Risk  Position Activity Restriction  Other position/activity restrictions: 3L O2 NC,O2sats drop with little activity    Subjective:  Chart Reviewed: Yes  Patient assessed for rehabilitation services?: Yes  Family / Caregiver Present: No  Subjective: Pt in bed on 3 L O2, agreeable to PT but warns she can't do much due to gets SOB. RN agreed to therapy  . General:  Overall Orientation Status: Within Functional Limits  Follows Commands: Within Functional Limits    Vision: Within Functional Limits    Hearing: Within functional limits         Pain: 0/10:      Vitals: Oxygen: 3L NC, 92 % at rest,86% after getting up to chair     Social/Functional History:    Lives With: Daughter  Type of Home: House  Home Layout: One level  Home Equipment: 4 wheeled walker                   Ambulation Assistance: Independent  Transfer Assistance: Independent          Additional Comments[facility] OBJECTIVE:  Range of Motion:  Bilateral Lower Extremity: WFL    Strength:  Bilateral Lower Extremity: grossly 4-/5  Balance:  Static Sitting Balance:  Stand By Assistance  Static Standing Balance: Contact Guard Assistance  Dynamic Standing Balance: Minimal Assistance    Bed Mobility:  Rolling to Right: Minimal Assistance   Supine to Sit: Moderate Assistance, X 1  Scooting:  Moderate Assistance    Transfers:  Sit to Stand: Contact Guard Assistance, Minimal Assistance, X 1, with increased time for completion  Stand to [facility] 68, Minimal Assistance    Ambulation:  Contact Guard Assistance, Minimal Assistance  Distance: 4 feet  Surface: Level Tile  Device:Rolling Walker  Gait Deviations:  Slow Jen, Decreased Step Length Bilaterally, Decreased Gait Speed, Decreased Heel Strike Bilaterally and Mild Path Deviations    Exercise:  Pt was SOB getting to chair ,so deferred ex today  Functional Outcome Measures: Completed  AM-PAC Inpatient Mobility Raw Score : 13  AM-PAC Inpatient T-Scale Score : 36.74    ASSESSMENT:  Activity Tolerance:  Patient tolerance of  treatment: fair. Treatment Initiated: Treatment and education initiated within context of evaluation. Evaluation time included review of current medical information, gathering information related to past medical, social and functional history, completion of standardized testing, formal and informal observation of tasks, assessment of data and development of plan of care and goals. Treatment time included skilled education and facilitation of tasks to increase safety and independence with functional mobility for improved independence and quality of life. Assessment: Body structures, Functions, Activity limitations: Decreased functional mobility , Decreased endurance, Decreased strength  Assessment: Pt is very limited in endurance and O2 sats drop with min activity on 3 L O2. After 5 mins she recovers. Pt needs skilled therapy to imporve endurance, functional mobility and strength.   Prognosis: Good    REQUIRES PT FOLLOW UP: Yes    Discharge Recommendations:  Discharge Recommendations: [address]    Patient Education:  PT Education: Goals, PT Role, Plan of Care, Transfer Training, Gait Training    Equipment Recommendations:  Equipment Needed: No    Plan:  Times per week: 3-5 X GM  Current Treatment Recommendations: Strengthening, Functional Mobility Training, Transfer Training, Balance Training, Endurance Training, Gait Training    Goals:  Patient goals : Get stronger  Short term goals  Time Frame for Short term goals: by discharge  Short term goal 1: supine to sit and return with CGA to get in and out of bed  Short term goal 2: sit to stand with CGA to get on and off various surfaces  Short term goal 3: Pt will ambulate with RW 20 feet with CGA with stable O2 sats  Long term goals  Time Frame for Long term goals : NA due to short ELOS    Following session, patient left in safe position with all fall risk precautions in place.

## 2021-03-25 NOTE — CARE COORDINATION
3/25/21, 11:05 AM EDT    DISCHARGE ON GOING [facility] 35 day: 1  Location: -22/022-A Reason for admit: Pneumonia [J18.9]   Procedure: 3/23 CXR - Cardiomegaly, multifocal bilateral infiltrates. Barriers to Discharge: Developed fever last night, Tmax 104.8F. Tylenol and ice bath given. O2 currently at 3L/nc, sats 92-98%. Nursing reports wheezing throughout and crackles bilateral bases. Potassium 3.4, CRP 17.67, albumin 2.6, Hgb 9.9. Cefepime iv q12hr, Vibramycin iv q12hr, Lovenox. IS. PT/OT. Palliative Care. Blood cultures drawn, pending. PCP: Isaak Mireles MD  Readmission Risk Score: 37%  Patient Goals/Plan/Treatment Preferences: Pt lives at home with daughter. Current with [facility]. SW consulted.

## 2021-03-26 ENCOUNTER — APPOINTMENT (OUTPATIENT)
Dept: CT IMAGING | Age: 81
DRG: 871 | End: 2021-03-26
Payer: MEDICARE

## 2021-03-26 LAB
ALBUMIN SERPL-MCNC: 2.3 G/DL (ref 3.5–5.1)
ALP BLD-CCNC: 78 U/L (ref 38–126)
ALT SERPL-CCNC: 11 U/L (ref 11–66)
ANION GAP SERPL CALCULATED.3IONS-SCNC: 10 MEQ/L (ref 8–16)
AST SERPL-CCNC: 18 U/L (ref 5–40)
BASOPHILS # BLD: 0.3 %
BASOPHILS ABSOLUTE: 0 THOU/MM3 (ref 0–0.1)
BILIRUB SERPL-MCNC: 0.3 MG/DL (ref 0.3–1.2)
BUN BLDV-MCNC: 10 MG/DL (ref 7–22)
C-REACTIVE PROTEIN: 18.7 MG/DL (ref 0–1)
CALCIUM SERPL-MCNC: 8.6 MG/DL (ref 8.5–10.5)
CHLORIDE BLD-SCNC: 105 MEQ/L (ref 98–111)
CO2: 23 MEQ/L (ref 23–33)
CREAT SERPL-MCNC: 0.6 MG/DL (ref 0.4–1.2)
EOSINOPHIL # BLD: 4.3 %
EOSINOPHILS ABSOLUTE: 0.2 THOU/MM3 (ref 0–0.4)
ERYTHROCYTE [DISTWIDTH] IN BLOOD BY AUTOMATED COUNT: 16.9 % (ref 11.5–14.5)
ERYTHROCYTE [DISTWIDTH] IN BLOOD BY AUTOMATED COUNT: 55.7 FL (ref 35–45)
GFR SERPL CREATININE-BSD FRML MDRD: > 90 ML/MIN/1.73M2
GLUCOSE BLD-MCNC: 106 MG/DL (ref 70–108)
GLUCOSE BLD-MCNC: 127 MG/DL (ref 70–108)
GLUCOSE BLD-MCNC: 170 MG/DL (ref 70–108)
GLUCOSE BLD-MCNC: 213 MG/DL (ref 70–108)
GLUCOSE BLD-MCNC: 233 MG/DL (ref 70–108)
HCT VFR BLD CALC: 30.2 % (ref 37–47)
HEMOGLOBIN: 9.5 GM/DL (ref 12–16)
IMMATURE GRANS (ABS): 0.09 THOU/MM3 (ref 0–0.07)
IMMATURE GRANULOCYTES: 1.5 %
LYMPHOCYTES # BLD: 18.1 %
LYMPHOCYTES ABSOLUTE: 1 THOU/MM3 (ref 1–4.8)
MCH RBC QN AUTO: 28.4 PG (ref 26–33)
MCHC RBC AUTO-ENTMCNC: 31.5 GM/DL (ref 32.2–35.5)
MCV RBC AUTO: 90.1 FL (ref 81–99)
MONOCYTES # BLD: 7.1 %
MONOCYTES ABSOLUTE: 0.4 THOU/MM3 (ref 0.4–1.3)
NUCLEATED RED BLOOD CELLS: 0 /100 WBC
PLATELET # BLD: 184 THOU/MM3 (ref 130–400)
PMV BLD AUTO: 10 FL (ref 9.4–12.4)
POTASSIUM REFLEX MAGNESIUM: 3.8 MEQ/L (ref 3.5–5.2)
RBC # BLD: 3.35 MILL/MM3 (ref 4.2–5.4)
SEG NEUTROPHILS: 68.7 %
SEGMENTED NEUTROPHILS ABSOLUTE COUNT: 4 THOU/MM3 (ref 1.8–7.7)
SODIUM BLD-SCNC: 138 MEQ/L (ref 135–145)
TOTAL PROTEIN: 5.2 G/DL (ref 6.1–8)
WBC # BLD: 5.8 THOU/MM3 (ref 4.8–10.8)

## 2021-03-26 PROCEDURE — 6370000000 HC RX 637 (ALT 250 FOR IP): Performed by: PHYSICIAN ASSISTANT

## 2021-03-26 PROCEDURE — 2140000000 HC CCU INTERMEDIATE R&B

## 2021-03-26 PROCEDURE — 94640 AIRWAY INHALATION TREATMENT: CPT

## 2021-03-26 PROCEDURE — 6360000002 HC RX W HCPCS: Performed by: STUDENT IN AN ORGANIZED HEALTH CARE EDUCATION/TRAINING PROGRAM

## 2021-03-26 PROCEDURE — 97530 THERAPEUTIC ACTIVITIES: CPT

## 2021-03-26 PROCEDURE — 71275 CT ANGIOGRAPHY CHEST: CPT

## 2021-03-26 PROCEDURE — 6360000002 HC RX W HCPCS: Performed by: PHYSICIAN ASSISTANT

## 2021-03-26 PROCEDURE — 2580000003 HC RX 258: Performed by: STUDENT IN AN ORGANIZED HEALTH CARE EDUCATION/TRAINING PROGRAM

## 2021-03-26 PROCEDURE — 6360000002 HC RX W HCPCS: Performed by: HOSPITALIST

## 2021-03-26 PROCEDURE — 82948 REAGENT STRIP/BLOOD GLUCOSE: CPT

## 2021-03-26 PROCEDURE — 6360000004 HC RX CONTRAST MEDICATION: Performed by: HOSPITALIST

## 2021-03-26 PROCEDURE — 94761 N-INVAS EAR/PLS OXIMETRY MLT: CPT

## 2021-03-26 PROCEDURE — 2580000003 HC RX 258: Performed by: PHYSICIAN ASSISTANT

## 2021-03-26 PROCEDURE — 99233 SBSQ HOSP IP/OBS HIGH 50: CPT | Performed by: HOSPITALIST

## 2021-03-26 PROCEDURE — 2580000003 HC RX 258: Performed by: HOSPITALIST

## 2021-03-26 PROCEDURE — 86140 C-REACTIVE PROTEIN: CPT

## 2021-03-26 PROCEDURE — 80053 COMPREHEN METABOLIC PANEL: CPT

## 2021-03-26 PROCEDURE — 36415 COLL VENOUS BLD VENIPUNCTURE: CPT

## 2021-03-26 PROCEDURE — 85025 COMPLETE CBC W/AUTO DIFF WBC: CPT

## 2021-03-26 PROCEDURE — 74177 CT ABD & PELVIS W/CONTRAST: CPT

## 2021-03-26 PROCEDURE — 2500000003 HC RX 250 WO HCPCS: Performed by: HOSPITALIST

## 2021-03-26 PROCEDURE — 97165 OT EVAL LOW COMPLEX 30 MIN: CPT

## 2021-03-26 RX ORDER — BUMETANIDE 1 MG/1
1 TABLET ORAL 2 TIMES DAILY
Status: DISCONTINUED | OUTPATIENT
Start: 2021-03-26 | End: 2021-03-28

## 2021-03-26 RX ADMIN — SODIUM CHLORIDE, PRESERVATIVE FREE 10 ML: 5 INJECTION INTRAVENOUS at 10:18

## 2021-03-26 RX ADMIN — PREGABALIN 300 MG: 75 CAPSULE ORAL at 20:59

## 2021-03-26 RX ADMIN — PANTOPRAZOLE SODIUM 40 MG: 40 TABLET, DELAYED RELEASE ORAL at 04:49

## 2021-03-26 RX ADMIN — INSULIN GLARGINE 10 UNITS: 100 INJECTION, SOLUTION SUBCUTANEOUS at 10:19

## 2021-03-26 RX ADMIN — POTASSIUM CHLORIDE 20 MEQ: 1500 TABLET, EXTENDED RELEASE ORAL at 10:12

## 2021-03-26 RX ADMIN — ENOXAPARIN SODIUM 40 MG: 40 INJECTION SUBCUTANEOUS at 10:13

## 2021-03-26 RX ADMIN — PREGABALIN 300 MG: 75 CAPSULE ORAL at 10:12

## 2021-03-26 RX ADMIN — CEFEPIME HYDROCHLORIDE 2000 MG: 2 INJECTION, POWDER, FOR SOLUTION INTRAVENOUS at 20:59

## 2021-03-26 RX ADMIN — DOXYCYCLINE 100 MG: 100 INJECTION, POWDER, LYOPHILIZED, FOR SOLUTION INTRAVENOUS at 11:45

## 2021-03-26 RX ADMIN — ALBUTEROL SULFATE 2.5 MG: 2.5 SOLUTION RESPIRATORY (INHALATION) at 04:51

## 2021-03-26 RX ADMIN — NIFEDIPINE 60 MG: 60 TABLET, EXTENDED RELEASE ORAL at 10:24

## 2021-03-26 RX ADMIN — ACETAMINOPHEN 650 MG: 325 TABLET ORAL at 04:42

## 2021-03-26 RX ADMIN — INSULIN LISPRO 4 UNITS: 100 INJECTION, SOLUTION INTRAVENOUS; SUBCUTANEOUS at 17:37

## 2021-03-26 RX ADMIN — SODIUM CHLORIDE, PRESERVATIVE FREE 10 ML: 5 INJECTION INTRAVENOUS at 21:00

## 2021-03-26 RX ADMIN — IOPAMIDOL 80 ML: 755 INJECTION, SOLUTION INTRAVENOUS at 12:46

## 2021-03-26 RX ADMIN — BUMETANIDE 1 MG: 1 TABLET ORAL at 22:43

## 2021-03-26 RX ADMIN — ACETAMINOPHEN 650 MG: 325 TABLET ORAL at 15:40

## 2021-03-26 RX ADMIN — DOXYCYCLINE 100 MG: 100 INJECTION, POWDER, LYOPHILIZED, FOR SOLUTION INTRAVENOUS at 22:43

## 2021-03-26 RX ADMIN — CEFEPIME HYDROCHLORIDE 2000 MG: 2 INJECTION, POWDER, FOR SOLUTION INTRAVENOUS at 10:19

## 2021-03-26 RX ADMIN — ENOXAPARIN SODIUM 40 MG: 40 INJECTION SUBCUTANEOUS at 21:00

## 2021-03-26 ASSESSMENT — PAIN DESCRIPTION - PAIN TYPE: TYPE: CHRONIC PAIN;ACUTE PAIN

## 2021-03-26 ASSESSMENT — PAIN SCALES - GENERAL
PAINLEVEL_OUTOF10: 10
PAINLEVEL_OUTOF10: 10

## 2021-03-26 ASSESSMENT — PAIN DESCRIPTION - ONSET: ONSET: ON-GOING

## 2021-03-26 ASSESSMENT — PAIN DESCRIPTION - FREQUENCY
FREQUENCY: INTERMITTENT
FREQUENCY: INTERMITTENT

## 2021-03-26 ASSESSMENT — PAIN DESCRIPTION - ORIENTATION: ORIENTATION: ANTERIOR

## 2021-03-26 ASSESSMENT — PAIN DESCRIPTION - PROGRESSION: CLINICAL_PROGRESSION: GRADUALLY IMPROVING

## 2021-03-26 ASSESSMENT — PAIN DESCRIPTION - DESCRIPTORS: DESCRIPTORS: ACHING;SORE

## 2021-03-26 ASSESSMENT — PAIN SCALES - WONG BAKER: WONGBAKER_NUMERICALRESPONSE: 6

## 2021-03-26 NOTE — PROGRESS NOTES
PROGRESS NOTE      Patient: Soledad Lamar      Unit/Bed:3B-22/022-A    YOB: 1940    MRN: [id number]       Acct: [de-identified]     PCP: Kendrick Mishra MD    Date of Admission: 3/23/2021    Chief Complaint:  Fatigue    Assessment/Plan:    Anticipated Discharge in :  Ongoing medical treatment. 1. Pneumonia with sepsis   Febrile, tachypneic, tachycardic. Normal WBC. CXR shows multifocal bilateral infiltrates. CRP 17.67, procal 0.36.   Initially on BiPAP due to respiratory alkalosis secondary to tachypnea.  Started on vancomycin and Zosyn. Transitioned to Rocephin and doxy. Switch Rocephin to Cefepime for Pseudomonas coverage due to high fevers since admission.  Blood cultures NGTD. noted Tmax 101.3 yesterday afternoon. No other identifiable localized source of infection. CT chest/A/P to look for other possible sources. Continue w/ current Abx regimen    Requiring 3LNC to maintain SPO2 92%, VSS otherwise. 2. IDDM2   Continue home Lantus.  SSI. POCT BG. Hypoglycemia protocol. 3. Hypertension   Hypotensive on arrival, hence BP meds held; resumed nifedipine for now; will reassess response  4. IDALIA on CKD 3, resolved   Creatinine 1.4 on arrival, now 0.6  5. Hypokalemia   Resolved 3.8 today. Replacement protocol ordered. 6. SWAPNA   On CPAP at home  Morbid Obesity w/ BMI  44.2  HypoAlbuminemia: likely 2/2 acute infection; dietician to see  Stage II decubitus ulcers: no visible bone; monitor  PT/OT/SW following. Planned ECF placement           Active Hospital Problems    Diagnosis Date Noted    Pneumonia due to organism [J18.9] 03/24/2021       Hospital Course:   Per HPI:  \"Mrs. Casi Villeda is a 78 Y/O female with past medical history of diabetes, hypertension, presents to EMS for worsening weakness over the last day. Per EMS report patient was having difficulty breathing and hypoxia  with O2 saturation 80% on room air. Patient placed on CPAP and brought to [facility] ED.   Patient started on BiPAP in ED with improved vital signs. Patient daughter at bedside states patient has not seemed ill over the past week, though today seemed much weaker than normal and complained of being cold. Patient daughter denies she reporting any cough, nausea, vomiting, abdominal pain, shortness of breath, chest pain, or fever. \"     Subjective: No new issues overnight. Patient feels improved. Denies CP/SOB/palpitation/fever/chills overnight      Medications:  Reviewed    Infusion Medications    dextrose       Scheduled Medications    cefepime  2,000 mg Intravenous Q12H    sodium chloride flush  10 mL Intravenous 2 times per day    [Held by provider] bumetanide  1 mg Oral BID    [Held by provider] hydrALAZINE  50 mg Oral BID    insulin glargine  10 Units Subcutaneous QAM    [Held by provider] NIFEdipine  60 mg Oral Daily    pantoprazole  40 mg Oral QAM AC    potassium chloride  20 mEq Oral Daily    pregabalin  300 mg Oral BID    insulin lispro  0-12 Units Subcutaneous TID WC    insulin lispro  0-6 Units Subcutaneous Nightly    doxycycline (VIBRAMYCIN) IV  100 mg Intravenous Q12H    enoxaparin  40 mg Subcutaneous BID     PRN Meds: albuterol, sodium chloride flush, promethazine **OR** ondansetron, polyethylene glycol, acetaminophen **OR** acetaminophen, potassium chloride **OR** potassium alternative oral replacement **OR** potassium chloride, potassium chloride, docusate sodium, glucose, dextrose, glucagon (rDNA), dextrose, sodium chloride      Intake/Output Summary (Last 24 hours) at 3/26/2021 0830  Last data filed at 3/26/2021 2389  Gross per 24 hour   Intake 1892.8 ml   Output 6025 ml   Net -4132.2 ml       Diet:  DIET CARB CONTROL;     Exam:  BP (!) 158/81   Pulse 90   Temp 98.4 °F (36.9 °C) (Oral)   Resp 20   Ht 5' (1.524 m)   Wt 226 lb 9.6 oz (102.8 kg)   LMP  (LMP Unknown)   SpO2 98%   BMI 44.25 kg/m²     General appearance: Obese, No apparent distress, appears stated age and cooperative. HEENT: Pupils equal, round, and reactive to light. Conjunctivae/corneas clear. Neck: Supple, with full range of motion. No jugular venous distention. Trachea midline. Respiratory:  Diminished A/E at bases. Cardiovascular: Regular rate and rhythm with normal S1/S2 without murmurs, rubs or gallops. Abdomen: Soft, non-tender, non-distended with normal bowel sounds. Musculoskeletal: passive and active ROM x 4 extremities. Skin: Skin color, texture, turgor normal.  Some trace lower extremity edema bilaterally with erythema. bilat venous stasis dermatitis. Neurologic:  Neurovascularly intact without any focal sensory/motor deficits. Cranial nerves: II-XII intact, grossly non-focal.  Psychiatric: Alert and oriented, thought content appropriate, normal insight  Capillary Refill: Brisk,< 3 seconds   Peripheral Pulses: +2 palpable, equal bilaterally       Labs:   Recent Labs     03/24/21  0927 03/25/21  0442 03/26/21  0406   WBC 6.5 6.1 5.8   HGB 9.9* 9.9* 9.5*   HCT 32.4* 32.0* 30.2*    170 184     Recent Labs     03/24/21  0350 03/25/21  0442 03/26/21  0406    138 138   K 3.3* 3.4* 3.8    105 105   CO2 23 24 23   BUN 21 12 10   CREATININE 1.1 0.8 0.6   CALCIUM 7.5* 8.5 8.6     Recent Labs     03/24/21  0350 03/25/21  0442 03/26/21  0406   AST 12 15 18   ALT 10* 11 11   BILITOT 0.4 0.3 0.3   ALKPHOS 68 77 78     Recent Labs     03/24/21  0350   INR 1.39*     No results for input(s): CKTOTAL, TROPONINI in the last 72 hours. Urinalysis:      Lab Results   Component Value Date    NITRU NEGATIVE 03/23/2021    WBCUA 0-2 03/23/2021    BACTERIA NONE SEEN 03/23/2021    RBCUA 0-2 03/23/2021    BLOODU NEGATIVE 03/23/2021    SPECGRAV 1.014 01/25/2021    GLUCOSEU NEGATIVE 03/23/2021       Radiology:  XR CHEST PORTABLE   Final Result   1. Cardiomegaly. 2.  Multifocal bilateral infiltrates are present.       This document has been electronically signed by: Natividad Snyder M.D. on    03/23/2021 10:47 PM          Diet: DIET CARB CONTROL;    DVT prophylaxis: [x] Lovenox                                 [] SCDs                                 [] SQ Heparin                                 [] Encourage ambulation           [] Already on Anticoagulation     Disposition:    [] Home       [] TCU       [] Rehab       [] Psych       [x] SNF       [] [place]       [] Other - TBD  Code Status: Full Code    PT/OT Eval Status: Consulted. With RN in room, patient was updated about and agreed upon the treatment plan, all the questions and concerns were addressed.     Electronically signed by Sylvia Simmons MD on 3/26/2021 at 8:30 AM

## 2021-03-26 NOTE — CARE COORDINATION
3/26/21, 10:55 AM EDT    DISCHARGE PLANNING EVALUATION  Made [facility] aware therapy is recommending ECF for rehab at discharge. Spoke with [facility] about this and she is reluctantly agreeable. When FABIOLA spoke with her daughter her first choice was [facility] [place] [address] [place] [address]  [facility] is agreeable to FABIOLA making a referral to [facility]. Referral made to Jacey Acuña in admissions at the facility. Called daughter Radha Parikh and made her aware referral has been made. She is agreeable to plan. Update 2:10pm: Spoke with Barbra at [facility]. She reports that [facility] is out of network. Spoke with daughter and verified their next choice is [facility]. Called and made referral to [place] at [facility]. Josue Castaneda

## 2021-03-26 NOTE — PROGRESS NOTES
and fatigue. Decreased balance, strength, endurance, and mobility. Cues throughout for deep breathing. Pt would benefit from continued PT for improved endurance and functional mobility for increased independence. Equipment Recommendations:Equipment Needed: No  Discharge Recommendations:  Subacute/Skilled Nursing Facility  With continued therapy  Plan: Times per week: 3-5 X GM  Current Treatment Recommendations: Strengthening, Functional Mobility Training, Transfer Training, Balance Training, Endurance Training, Gait Training    Patient Education  Patient Education: Plan of Care, Bed Mobility, Transfers, Gait, Use of Gait Belt deep breathing    Goals:  Patient goals : Get stronger  Short term goals  Time Frame for Short term goals: by discharge  Short term goal 1: supine to sit and return with CGA to get in and out of bed  Short term goal 2: sit to stand with CGA to get on and off various surfaces  Short term goal 3: Pt will ambulate with RW 20 feet with CGA with stable O2 sats  Long term goals  Time Frame for Long term goals : NA due to short ELOS    Following session, patient left in safe position with all fall risk precautions in place.

## 2021-03-26 NOTE — CARE COORDINATION
3/26/21, 1:36 PM EDT    DISCHARGE ON GOING EVALUATION    [facility] day: 2  Location: -22/022-A Reason for admit: Pneumonia [J18.9]   Procedure:   3/23 CXR - Cardiomegaly, multifocal bilateral infiltrates.   Barriers to Discharge: Tmax 101.3F, O2 on at 3L/nc. Sats 91-93%. CRP 18.70. Albumin 2.3. Total protein 5.2. Hgb 9.5. To have CTA of chest and abd/pelvis today. PT/OT. [place]. Cefepime iv q12hr, Vibramycin iv q12hr. Lovenox bid. Diabetes management. Spiritual care consulted for Advanced Directives. Palliative Care. Dietitian. Cultures pending. PCP: Shalonda Qureshi MD  Readmission Risk Score: 37%  Patient Goals/Plan/Treatment Preferences: Pt lives at home with daughter. Current with [facility]. Therapy recommending SNF. SW following.

## 2021-03-26 NOTE — FLOWSHEET NOTE
03/26/21 1430   Encounter Summary   Services provided to: Patient and family together   Referral/Consult From: [facility] Family members   Continue Visiting Yes  (3/26 AD info)   Complexity of Encounter Moderate   Length of Encounter 15 minutes   Routine   Type Initial   Spiritual/Sabianism   Type Spiritual support   Advance Directives (For Healthcare)   Healthcare Directive No, patient does not have an advance directive for healthcare treatment   Information on Healthcare Directives Requested No   Patient Requests Assistance Yes, referral made to    Advance Directives Documents given;Pt. not interested at this time   Advance Directive Consult: Advance Directive materials were provided to patient and explained. Patient is not ready to complete at this time, gave information for Spiritual Care to be contacted if further assistance is needed. We had prayer together with her niece.

## 2021-03-26 NOTE — PROGRESS NOTES
Estelle Catal[facility] 61  INPATIENT OCCUPATIONAL THERAPY  [facility] CCU-STEPDOWN 3B  EVALUATION    Time:   Time In: 1150  Time Out: 0848  Timed Code Treatment Minutes: 23 Minutes  Minutes: 38          Date: 3/26/2021  Patient Name: Alonso Cullen,   Gender: female      MRN: [id number]  : 1940  (79 y.o.)  Referring Practitioner: Dr. America Prader, DO  Diagnosis: Pneumonia  Additional Pertinent Hx: Pt presents to EMS for worsening weakness over the last day. Per EMS report patient was having difficulty breathing and hypoxia  with O2 saturation 80% on room air. Patient placed on CPAP and brought to [facility]. Patient started on BiPAP in ED with improved vital signs. Patient daughter at bedside states patient has not seemed ill over the past week, though today seemed much weaker than normal and complained of being cold. Patient daughter denies she reporting any cough, nausea, vomiting, abdominal pain, shortness of breath, chest pain, or fever    Restrictions/Precautions:  Restrictions/Precautions: General Precautions, Fall Risk  Position Activity Restriction  Other position/activity restrictions: 3L O2 NC, 4L of O2 on 3/26- monitor O2sats    Subjective  Chart Reviewed: Yes, Orders, History and Physical, Other (comment)(PT evaluation)  Patient assessed for rehabilitation services?: Yes    Subjective: Pleasant and cooperative  Comments: RN approved session. Pt reports soreness in her legs    Pain:  Pain Assessment  Patient Currently in Pain: Yes  Pain Assessment: Faces  Burgos-Baker Pain Rating: Hurts even more  Pain Type: Chronic pain  Pain Location: Leg  Pain Orientation: Left;Right  Pain Descriptors: Aching; Sore  Pain Frequency: Intermittent  Clinical Progression: Gradually improving  Patient's Stated Pain Goal: No pain  Response to Pain Intervention: Patient Satisfied  Multiple Pain Sites: No    Vitals: Oxygen: 4L of O2 and showing 92% O2 saturation at rest; desaturation to 86% with activity noted but Assessment:Hand Dominance: Right  LUE AROM : WFL  Left Hand General AROM: CMC jt and MCP jt OA noted. Pt able to make a fist but extends and abducts thumb partailly  RUE General AROM: Shoulder flexion 0-70 degrees secondary to old injury  Right Hand AROM: WFL    LUE Strength  L Hand General: 4-/5  LUE Strength Comment: 3+/5 deltoid; 3+/5 pectoral; 4/5 biceps/triceps  RUE Strength  R Hand General: 4/5  RUE Strength Comment: 3-/5 deltoid; 3+/5 pectoral; 4/5 biceps/triceps    Sensation  Overall Sensation Status: [facility]  Fine Motor Skills  Fine Motor Comment: Pt has no difficulty with doing self care tasks with B hands- opened the mouth wash and uses her call light. Activity Tolerance: Patient limited by fatigue, Patient limited by pain  Pt was using 4L of O2. She shows 92% while at rest and desaturates to 88% when she is up and moving. She needs 1-2 minutes to rest before resuming activity. Assessment:  Assessment: Patient would benefit from continued skilled OT services to address above deficits. She presents with pneumonia. She was not using supplemental O2 at home. She was doing her self care independently and used a 4 wheeled walker whenever needed at home. 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 She demonstrated doing basic self care- grooming with setup A. She ambulated to the chair with a rolling walker with SBA. She C/O pain in her legs- with some itchiness. She has edema and redness in her legs. Pt was using 4 L of O2 at this time. She tolerates activity but needs frequent rest breaks. Performance deficits / Impairments: Decreased functional mobility , Decreased ADL status, Decreased endurance, Decreased posture  Prognosis: Good  REQUIRES OT FOLLOW UP: Yes  Decision Making: Low Complexity    Treatment Initiated: Treatment and education initiated within context of evaluation.   Evaluation time Short term goal 3: Pt will complete toileting routine including tranfers to/from the standard toilet seat with SBA to increase her independence with self care. Short term goal 4: Pt will complete simple ADLs while standing for over 2 minute duration with min cues for steady breathing to increase her endurance for ease of doing her grooming or dressing. See long-term goal time frame for expected duration of plan of care. If no long-term goals established, a short length of stay is anticipated. Following session, patient left in safe position with all fall risk precautions in place.

## 2021-03-26 NOTE — PROGRESS NOTES
Comprehensive Nutrition Assessment    Type and Reason for Visit:  Initial, Positive Nutrition Screen, Consult(Oral Nutrition Supplements; poor po)    Nutrition Recommendations/Plan:   Will send Glucerna BID. Recommend MVI. Encouraged po, good nutrition at best efforts. Nutrition Assessment:    Pt. nutritionally compromised AEB wounds. At risk for further nutrition compromise r/t increased nutrient needs for wound healing, underlying medical condition (hx sinonasal adenocarcinoma, DM, COVID+ 12/20). Nutrition recommendations/interventions as per above. Malnutrition Assessment:  Malnutrition Status:  No malnutrition    Context:  Acute Illness     Findings of the 6 clinical characteristics of malnutrition:  Energy Intake:  No significant decrease in energy intake  Weight Loss:  No significant weight loss     Body Fat Loss:  No significant body fat loss     Muscle Mass Loss:  No significant muscle mass loss    Fluid Accumulation:  Unable to assess     Strength:  Not Performed    Estimated Daily Nutrient Needs:  Energy (kcal):  5490-8057 kcals (12-15); Weight Used for Energy Requirements:  (103 kgm 3/26)     Protein (g):  63+ grams (1.4+); Weight Used for Protein Requirements:  Ideal(45 kgm)        Nutrition Related Findings:   Pt.seen while eating lunch; sleepy at times; denies poor appetite; denies any trouble with chewing/swallowing; Rx includes ATB, Insulin, Colace, Glycolax, Bumex, Zofran; 3/26:Glucose 106; plan ECF at discharge for rehab; received Glucerna last admission    Wounds:  Stage II(thigh, left and right buttocks)       Current Nutrition Therapies:    DIET CARB CONTROL;   Dietary Nutrition Supplements: Diabetic Oral Supplement    Anthropometric Measures:  · Height: 5' (152.4 cm)  · Current Body Weight: 226 lb 9.6 oz (102.8 kg)(3/26 +2 edemea)   · Admission Body Weight: 224 lb 9.6 oz (101.9 kg)(3/24 +2 edema)    · Usual Body Weight: (pt unsure; denies weight loss; per EMR: 6/4/20: 205# 6.4 oz, 1/25/21: 213# 1.6 oz, 2/28/21: 195# 9.6 oz)     · Ideal Body Weight: 100 lbs;   · BMI: 44.3  · BMI Categories: Obese Class 3 (BMI 40.0 or greater)       Nutrition Diagnosis:   · Increased nutrient needs related to increase demand for energy/nutrients as evidenced by wounds      Nutrition Interventions:   Food and/or Nutrient Delivery:  Continue Current Diet, Start Oral Nutrition Supplement  Nutrition Education/Counseling:  Education initiated(3/26 Encouraged good nutrition at best efforts for wound healing.)   Coordination of Nutrition Care:  Continue to monitor while inpatient    Goals:  Pt. will tolerate and consume 75% or more of meals to promote wound healing during LOS. Nutrition Monitoring and Evaluation:   Behavioral-Environmental Outcomes:  None Identified   Food/Nutrient Intake Outcomes:  Diet Advancement/Tolerance, Food and Nutrient Intake, Supplement Intake  Physical Signs/Symptoms Outcomes:  Biochemical Data, Chewing or Swallowing, GI Status, Fluid Status or Edema, Nutrition Focused Physical Findings, Skin, Weight     Discharge Planning:     Too soon to determine     Electronically signed by Johnny Castleman, RD, LD on 3/26/21 at 2:34 PM EDT    Contact: [phone]

## 2021-03-26 NOTE — PLAN OF CARE
Problem: Falls - Risk of:  Goal: Will remain free from falls  Description: Will remain free from falls  Outcome: Met This Shift  Goal: Absence of physical injury  Description: Absence of physical injury  Outcome: Met This Shift     Problem: Pain:  Goal: Pain level will decrease  Description: Pain level will decrease  Outcome: Met This Shift  Goal: Control of acute pain  Description: Control of acute pain  Outcome: Met This Shift  Goal: Control of chronic pain  Description: Control of chronic pain  Outcome: Met This Shift     Problem: Hyperthermia:  Goal: Ability to maintain a body temperature in the normal range will improve  Description: Ability to maintain a body temperature in the normal range will improve  Outcome: Met This Shift     Problem: Physical Regulation:  Goal: Prevent transmision of infection  Description: Prevent transmision of infection  Outcome: Met This Shift     Problem: Skin Integrity:  Goal: Risk for impaired skin integrity will decrease  Description: Risk for impaired skin integrity will decrease  Outcome: Met This Shift  Goal: Absence of new skin breakdown  Description: Absence of new skin breakdown  Outcome: Met This Shift     Problem: Discharge Planning:  Goal: Discharged to appropriate level of care  Description: Discharged to appropriate level of care  Outcome: Ongoing  Goal: Participates in care planning  Description: Participates in care planning  Outcome: Ongoing     Problem: Airway Clearance - Ineffective:  Goal: Ability to maintain a clear airway will improve  Description: Ability to maintain a clear airway will improve  Outcome: Ongoing     Problem: Fluid Volume - Deficit:  Goal: Achieves intake and output within specified parameters  Description: Achieves intake and output within specified parameters  Outcome: Ongoing     Problem: Skin Integrity:  Goal: Will show no infection signs and symptoms  Description: Will show no infection signs and symptoms  Outcome: Ongoing     Problem: Metabolic:  Goal: Ability to maintain appropriate glucose levels will improve  Description: Ability to maintain appropriate glucose levels will improve  Outcome: Ongoing     Problem: Nutritional:  Goal: Maintenance of adequate nutrition will improve  Description: Maintenance of adequate nutrition will improve  Outcome: Ongoing  Goal: Progress toward achieving an optimal weight will improve  Description: Progress toward achieving an optimal weight will improve  Outcome: Ongoing     Problem: Nutrition  Goal: Optimal nutrition therapy  3/26/2021 1637 by Mariela Shoemaker RN  Outcome: Ongoing  3/26/2021 1434 by Johnny Castleman, RD, LD  Outcome: Ongoing     Problem: Airway Clearance - Ineffective:  Goal: Clear lung sounds  Description: Clear lung sounds  Outcome: Not Met This Shift     Problem: Gas Exchange - Impaired:  Goal: Levels of oxygenation will improve  Description: Levels of oxygenation will improve  Outcome: Not Met This Shift

## 2021-03-27 LAB
ALBUMIN SERPL-MCNC: 2 G/DL (ref 3.5–5.1)
ALP BLD-CCNC: 84 U/L (ref 38–126)
ALT SERPL-CCNC: 12 U/L (ref 11–66)
ANION GAP SERPL CALCULATED.3IONS-SCNC: 8 MEQ/L (ref 8–16)
AST SERPL-CCNC: 16 U/L (ref 5–40)
BASOPHILS # BLD: 0.3 %
BASOPHILS ABSOLUTE: 0 THOU/MM3 (ref 0–0.1)
BILIRUB SERPL-MCNC: 0.3 MG/DL (ref 0.3–1.2)
BUN BLDV-MCNC: 6 MG/DL (ref 7–22)
CALCIUM SERPL-MCNC: 8.8 MG/DL (ref 8.5–10.5)
CHLORIDE BLD-SCNC: 101 MEQ/L (ref 98–111)
CO2: 27 MEQ/L (ref 23–33)
CREAT SERPL-MCNC: 0.5 MG/DL (ref 0.4–1.2)
EOSINOPHIL # BLD: 5.2 %
EOSINOPHILS ABSOLUTE: 0.3 THOU/MM3 (ref 0–0.4)
ERYTHROCYTE [DISTWIDTH] IN BLOOD BY AUTOMATED COUNT: 16.7 % (ref 11.5–14.5)
ERYTHROCYTE [DISTWIDTH] IN BLOOD BY AUTOMATED COUNT: 55.4 FL (ref 35–45)
GFR SERPL CREATININE-BSD FRML MDRD: > 90 ML/MIN/1.73M2
GLUCOSE BLD-MCNC: 130 MG/DL (ref 70–108)
GLUCOSE BLD-MCNC: 133 MG/DL (ref 70–108)
GLUCOSE BLD-MCNC: 155 MG/DL (ref 70–108)
GLUCOSE BLD-MCNC: 187 MG/DL (ref 70–108)
GLUCOSE BLD-MCNC: 202 MG/DL (ref 70–108)
HCT VFR BLD CALC: 28.9 % (ref 37–47)
HEMOGLOBIN: 9 GM/DL (ref 12–16)
IMMATURE GRANS (ABS): 0.12 THOU/MM3 (ref 0–0.07)
IMMATURE GRANULOCYTES: 2.1 %
LYMPHOCYTES # BLD: 16.8 %
LYMPHOCYTES ABSOLUTE: 1 THOU/MM3 (ref 1–4.8)
MCH RBC QN AUTO: 28.2 PG (ref 26–33)
MCHC RBC AUTO-ENTMCNC: 31.1 GM/DL (ref 32.2–35.5)
MCV RBC AUTO: 90.6 FL (ref 81–99)
MONOCYTES # BLD: 8.6 %
MONOCYTES ABSOLUTE: 0.5 THOU/MM3 (ref 0.4–1.3)
NUCLEATED RED BLOOD CELLS: 0 /100 WBC
PLATELET # BLD: 233 THOU/MM3 (ref 130–400)
PMV BLD AUTO: 10 FL (ref 9.4–12.4)
POTASSIUM REFLEX MAGNESIUM: 3.6 MEQ/L (ref 3.5–5.2)
RBC # BLD: 3.19 MILL/MM3 (ref 4.2–5.4)
SEG NEUTROPHILS: 67 %
SEGMENTED NEUTROPHILS ABSOLUTE COUNT: 3.8 THOU/MM3 (ref 1.8–7.7)
SODIUM BLD-SCNC: 136 MEQ/L (ref 135–145)
TOTAL PROTEIN: 5.3 G/DL (ref 6.1–8)
WBC # BLD: 5.7 THOU/MM3 (ref 4.8–10.8)

## 2021-03-27 PROCEDURE — 2140000000 HC CCU INTERMEDIATE R&B

## 2021-03-27 PROCEDURE — 80053 COMPREHEN METABOLIC PANEL: CPT

## 2021-03-27 PROCEDURE — 6360000002 HC RX W HCPCS: Performed by: STUDENT IN AN ORGANIZED HEALTH CARE EDUCATION/TRAINING PROGRAM

## 2021-03-27 PROCEDURE — 36415 COLL VENOUS BLD VENIPUNCTURE: CPT

## 2021-03-27 PROCEDURE — 82948 REAGENT STRIP/BLOOD GLUCOSE: CPT

## 2021-03-27 PROCEDURE — 85025 COMPLETE CBC W/AUTO DIFF WBC: CPT

## 2021-03-27 PROCEDURE — 2580000003 HC RX 258: Performed by: HOSPITALIST

## 2021-03-27 PROCEDURE — 2580000003 HC RX 258: Performed by: STUDENT IN AN ORGANIZED HEALTH CARE EDUCATION/TRAINING PROGRAM

## 2021-03-27 PROCEDURE — 99233 SBSQ HOSP IP/OBS HIGH 50: CPT | Performed by: HOSPITALIST

## 2021-03-27 PROCEDURE — 6370000000 HC RX 637 (ALT 250 FOR IP): Performed by: PHYSICIAN ASSISTANT

## 2021-03-27 PROCEDURE — 2500000003 HC RX 250 WO HCPCS: Performed by: HOSPITALIST

## 2021-03-27 PROCEDURE — 2580000003 HC RX 258: Performed by: PHYSICIAN ASSISTANT

## 2021-03-27 PROCEDURE — 6360000002 HC RX W HCPCS: Performed by: HOSPITALIST

## 2021-03-27 RX ADMIN — INSULIN LISPRO 2 UNITS: 100 INJECTION, SOLUTION INTRAVENOUS; SUBCUTANEOUS at 08:23

## 2021-03-27 RX ADMIN — INSULIN GLARGINE 10 UNITS: 100 INJECTION, SOLUTION SUBCUTANEOUS at 08:22

## 2021-03-27 RX ADMIN — CEFEPIME HYDROCHLORIDE 2000 MG: 2 INJECTION, POWDER, FOR SOLUTION INTRAVENOUS at 08:24

## 2021-03-27 RX ADMIN — BUMETANIDE 1 MG: 1 TABLET ORAL at 17:22

## 2021-03-27 RX ADMIN — PANTOPRAZOLE SODIUM 40 MG: 40 TABLET, DELAYED RELEASE ORAL at 06:15

## 2021-03-27 RX ADMIN — ENOXAPARIN SODIUM 40 MG: 40 INJECTION SUBCUTANEOUS at 08:22

## 2021-03-27 RX ADMIN — SODIUM CHLORIDE, PRESERVATIVE FREE 10 ML: 5 INJECTION INTRAVENOUS at 20:25

## 2021-03-27 RX ADMIN — CEFEPIME HYDROCHLORIDE 2000 MG: 2 INJECTION, POWDER, FOR SOLUTION INTRAVENOUS at 23:36

## 2021-03-27 RX ADMIN — ENOXAPARIN SODIUM 40 MG: 40 INJECTION SUBCUTANEOUS at 20:24

## 2021-03-27 RX ADMIN — BUMETANIDE 1 MG: 1 TABLET ORAL at 08:22

## 2021-03-27 RX ADMIN — PREGABALIN 300 MG: 75 CAPSULE ORAL at 08:23

## 2021-03-27 RX ADMIN — PREGABALIN 300 MG: 75 CAPSULE ORAL at 20:25

## 2021-03-27 RX ADMIN — SODIUM CHLORIDE, PRESERVATIVE FREE 10 ML: 5 INJECTION INTRAVENOUS at 08:24

## 2021-03-27 RX ADMIN — INSULIN LISPRO 2 UNITS: 100 INJECTION, SOLUTION INTRAVENOUS; SUBCUTANEOUS at 12:13

## 2021-03-27 RX ADMIN — ACETAMINOPHEN 650 MG: 325 TABLET ORAL at 20:25

## 2021-03-27 RX ADMIN — DOXYCYCLINE 100 MG: 100 INJECTION, POWDER, LYOPHILIZED, FOR SOLUTION INTRAVENOUS at 12:11

## 2021-03-27 RX ADMIN — POTASSIUM CHLORIDE 20 MEQ: 1500 TABLET, EXTENDED RELEASE ORAL at 08:23

## 2021-03-27 RX ADMIN — NIFEDIPINE 60 MG: 60 TABLET, EXTENDED RELEASE ORAL at 08:23

## 2021-03-27 ASSESSMENT — PAIN DESCRIPTION - PAIN TYPE
TYPE: CHRONIC PAIN
TYPE: CHRONIC PAIN

## 2021-03-27 ASSESSMENT — PAIN SCALES - GENERAL
PAINLEVEL_OUTOF10: 6
PAINLEVEL_OUTOF10: 4
PAINLEVEL_OUTOF10: 0
PAINLEVEL_OUTOF10: 0

## 2021-03-27 ASSESSMENT — PAIN DESCRIPTION - ORIENTATION: ORIENTATION: RIGHT

## 2021-03-27 ASSESSMENT — PAIN DESCRIPTION - ONSET: ONSET: GRADUAL

## 2021-03-27 ASSESSMENT — PAIN DESCRIPTION - FREQUENCY: FREQUENCY: CONTINUOUS

## 2021-03-27 ASSESSMENT — PAIN DESCRIPTION - DESCRIPTORS: DESCRIPTORS: ACHING;CONSTANT

## 2021-03-27 NOTE — PROGRESS NOTES
hypertension, presents to EMS for worsening weakness over the last day. Per EMS report patient was having difficulty breathing and hypoxia  with O2 saturation 80% on room air. Patient placed on CPAP and brought to [facility]. Patient started on BiPAP in ED with improved vital signs. Patient daughter at bedside states patient has not seemed ill over the past week, though today seemed much weaker than normal and complained of being cold. Patient daughter denies she reporting any cough, nausea, vomiting, abdominal pain, shortness of breath, chest pain, or fever. \"     Subjective: No new issues overnight. Patient feels improved. Denies CP/SOB/palpitation/fever/chills overnight      Medications:  Reviewed    Infusion Medications    dextrose       Scheduled Medications    bumetanide  1 mg Oral BID    cefepime  2,000 mg Intravenous Q12H    sodium chloride flush  10 mL Intravenous 2 times per day    [Held by provider] hydrALAZINE  50 mg Oral BID    insulin glargine  10 Units Subcutaneous QAM    NIFEdipine  60 mg Oral Daily    pantoprazole  40 mg Oral QAM AC    potassium chloride  20 mEq Oral Daily    pregabalin  300 mg Oral BID    insulin lispro  0-12 Units Subcutaneous TID WC    insulin lispro  0-6 Units Subcutaneous Nightly    doxycycline (VIBRAMYCIN) IV  100 mg Intravenous Q12H    enoxaparin  40 mg Subcutaneous BID     PRN Meds: albuterol, sodium chloride flush, promethazine **OR** ondansetron, polyethylene glycol, acetaminophen **OR** acetaminophen, potassium chloride **OR** potassium alternative oral replacement **OR** potassium chloride, potassium chloride, docusate sodium, glucose, dextrose, glucagon (rDNA), dextrose, sodium chloride      Intake/Output Summary (Last 24 hours) at 3/27/2021 0742  Last data filed at 3/27/2021 0329  Gross per 24 hour   Intake 350.42 ml   Output 6625 ml   Net -6274.58 ml       Diet:  DIET CARB CONTROL;   Dietary Nutrition Supplements: Diabetic Oral Supplement    Exam:  /66   Pulse 94   Temp 99.1 °F (37.3 °C) (Oral)   Resp 20   Ht 5' (1.524 m)   Wt 229 lb (103.9 kg)   LMP  (LMP Unknown)   SpO2 96%   BMI 44.72 kg/m²     General appearance: Obese, No apparent distress, appears stated age and cooperative. HEENT: Pupils equal, round, and reactive to light. Conjunctivae/corneas clear. Neck: Supple, with full range of motion. No jugular venous distention. Trachea midline. Respiratory:  Diminished A/E at bases. Cardiovascular: Regular rate and rhythm with normal S1/S2 without murmurs, rubs or gallops. Abdomen: Soft, non-tender, non-distended with normal bowel sounds. Musculoskeletal: passive and active ROM x 4 extremities. Skin: Skin color, texture, turgor normal.  Some trace lower extremity edema bilaterally with erythema. bilat venous stasis dermatitis. Neurologic:  Neurovascularly intact without any focal sensory/motor deficits. Cranial nerves: II-XII intact, grossly non-focal.  Psychiatric: Alert and oriented, thought content appropriate, normal insight  Capillary Refill: Brisk,< 3 seconds   Peripheral Pulses: +2 palpable, equal bilaterally       Labs:   Recent Labs     03/25/21 0442 03/26/21  0406 03/27/21  0431   WBC 6.1 5.8 5.7   HGB 9.9* 9.5* 9.0*   HCT 32.0* 30.2* 28.9*    184 233     Recent Labs     03/25/21 0442 03/26/21  0406 03/27/21  0431    138 136   K 3.4* 3.8 3.6    105 101   CO2 24 23 27   BUN 12 10 6*   CREATININE 0.8 0.6 0.5   CALCIUM 8.5 8.6 8.8     Recent Labs     03/25/21 0442 03/26/21  0406 03/27/21  0431   AST 15 18 16   ALT 11 11 12   BILITOT 0.3 0.3 0.3   ALKPHOS 77 78 84     No results for input(s): INR in the last 72 hours. No results for input(s): Rosmery Lemme in the last 72 hours.     Urinalysis:      Lab Results   Component Value Date    NITRU NEGATIVE 03/23/2021    WBCUA 0-2 03/23/2021    BACTERIA NONE SEEN 03/23/2021    RBCUA 0-2 03/23/2021    BLOODU NEGATIVE 03/23/2021    SPECGRAV 1.014 01/25/2021    GLUCOSEU NEGATIVE 03/23/2021       Radiology:  CTA CHEST W WO CONTRAST   Final Result   Extensive bilateral airspace infiltrates with the findings suggest possible combination of pneumonia and pulmonary edema. No acute abnormalities in the abdomen or pelvis. **This report has been created using voice recognition software. It may contain minor errors which are inherent in voice recognition technology. **      Final report electronically signed by Dr. Efrain Michael on 3/26/2021 2:53 PM      CT ABDOMEN PELVIS W IV CONTRAST Additional Contrast? None   Final Result   Extensive bilateral airspace infiltrates with the findings suggest possible combination of pneumonia and pulmonary edema. No acute abnormalities in the abdomen or pelvis. **This report has been created using voice recognition software. It may contain minor errors which are inherent in voice recognition technology. **      Final report electronically signed by Dr. Efrain Michael on 3/26/2021 2:53 PM      XR CHEST PORTABLE   Final Result   1. Cardiomegaly. 2.  Multifocal bilateral infiltrates are present. This document has been electronically signed by: Delmy Conley M.D. on    03/23/2021 10:47 PM          Diet: DIET CARB CONTROL; Dietary Nutrition Supplements: Diabetic Oral Supplement    DVT prophylaxis: [x] Lovenox                                 [] SCDs                                 [] SQ Heparin                                 [] Encourage ambulation           [] Already on Anticoagulation     Disposition:    [] Home       [] TCU       [] Rehab       [] Psych       [x] SNF       [] [place]       [] Other - TBD  Code Status: Full Code    PT/OT Eval Status: Consulted. With RN in room, patient was updated about and agreed upon the treatment plan, all the questions and concerns were addressed.     Electronically signed by Amada Lewis MD on 3/27/2021 at 7:42 AM

## 2021-03-27 NOTE — PLAN OF CARE
No falls occurred during shift. Call light within reach, bed alarm on, hourly rounding complete. Problem: Falls - Risk of:  Goal: Will remain free from falls  Description: Will remain free from falls  3/27/2021 0030 by Shu Sesay RN  Outcome: Met This Shift  3/26/2021 1637 by Casi Mead RN  Outcome: Met This Shift  Goal: Absence of physical injury  Description: Absence of physical injury  3/27/2021 0030 by Shu Sesay RN  Outcome: Met This Shift  3/26/2021 1637 by Casi Mead RN  Outcome: Met This Shift     No complaints of pain during shift. Problem: Pain:  Goal: Pain level will decrease  Description: Pain level will decrease  3/27/2021 0030 by Shu Sesay RN  Outcome: Met This Shift  3/26/2021 1637 by Casi Mead RN  Outcome: Met This Shift  Goal: Control of acute pain  Description: Control of acute pain  3/27/2021 0030 by Shu Sesay RN  Outcome: Met This Shift  3/26/2021 1637 by Casi Mead RN  Outcome: Met This Shift  Goal: Control of chronic pain  Description: Control of chronic pain  3/27/2021 0030 by Shu Sesay RN  Outcome: Met This Shift  3/26/2021 1637 by Casi Mead RN  Outcome: Met This Shift    Patient lung sounds not clear during shift, medication obtained to help with removal of fluid.  Patient instructed to cough and deep breathe  Problem: Airway Clearance - Ineffective:  Goal: Clear lung sounds  Description: Clear lung sounds  3/27/2021 0030 by Shu Sesay RN  Outcome: Ongoing  3/26/2021 1637 by Casi Mead RN  Outcome: Not Met This Shift  Goal: Ability to maintain a clear airway will improve  Description: Ability to maintain a clear airway will improve  3/27/2021 0030 by Shu Sesay RN  Outcome: Ongoing  3/26/2021 1637 by Casi Mead RN  Outcome: Ongoing

## 2021-03-28 LAB
ALBUMIN SERPL-MCNC: 2.3 G/DL (ref 3.5–5.1)
ALP BLD-CCNC: 78 U/L (ref 38–126)
ALT SERPL-CCNC: 12 U/L (ref 11–66)
ANION GAP SERPL CALCULATED.3IONS-SCNC: 8 MEQ/L (ref 8–16)
AST SERPL-CCNC: 14 U/L (ref 5–40)
BASOPHILS # BLD: 0.6 %
BASOPHILS ABSOLUTE: 0 THOU/MM3 (ref 0–0.1)
BILIRUB SERPL-MCNC: 0.3 MG/DL (ref 0.3–1.2)
BUN BLDV-MCNC: 7 MG/DL (ref 7–22)
CALCIUM SERPL-MCNC: 8.7 MG/DL (ref 8.5–10.5)
CHLORIDE BLD-SCNC: 98 MEQ/L (ref 98–111)
CO2: 30 MEQ/L (ref 23–33)
CREAT SERPL-MCNC: 0.7 MG/DL (ref 0.4–1.2)
EOSINOPHIL # BLD: 5.6 %
EOSINOPHILS ABSOLUTE: 0.3 THOU/MM3 (ref 0–0.4)
ERYTHROCYTE [DISTWIDTH] IN BLOOD BY AUTOMATED COUNT: 16.3 % (ref 11.5–14.5)
ERYTHROCYTE [DISTWIDTH] IN BLOOD BY AUTOMATED COUNT: 54.5 FL (ref 35–45)
GFR SERPL CREATININE-BSD FRML MDRD: 81 ML/MIN/1.73M2
GLUCOSE BLD-MCNC: 105 MG/DL (ref 70–108)
GLUCOSE BLD-MCNC: 120 MG/DL (ref 70–108)
GLUCOSE BLD-MCNC: 144 MG/DL (ref 70–108)
GLUCOSE BLD-MCNC: 155 MG/DL (ref 70–108)
GLUCOSE BLD-MCNC: 165 MG/DL (ref 70–108)
HCT VFR BLD CALC: 31.3 % (ref 37–47)
HEMOGLOBIN: 9.5 GM/DL (ref 12–16)
IMMATURE GRANS (ABS): 0.13 THOU/MM3 (ref 0–0.07)
IMMATURE GRANULOCYTES: 2.8 %
LYMPHOCYTES # BLD: 23.9 %
LYMPHOCYTES ABSOLUTE: 1.1 THOU/MM3 (ref 1–4.8)
MAGNESIUM: 1.4 MG/DL (ref 1.6–2.4)
MCH RBC QN AUTO: 27.7 PG (ref 26–33)
MCHC RBC AUTO-ENTMCNC: 30.4 GM/DL (ref 32.2–35.5)
MCV RBC AUTO: 91.3 FL (ref 81–99)
MONOCYTES # BLD: 11.4 %
MONOCYTES ABSOLUTE: 0.5 THOU/MM3 (ref 0.4–1.3)
NUCLEATED RED BLOOD CELLS: 0 /100 WBC
PLATELET # BLD: 266 THOU/MM3 (ref 130–400)
PMV BLD AUTO: 10.2 FL (ref 9.4–12.4)
POTASSIUM REFLEX MAGNESIUM: 3.5 MEQ/L (ref 3.5–5.2)
PRO-BNP: 844.1 PG/ML (ref 0–1800)
RBC # BLD: 3.43 MILL/MM3 (ref 4.2–5.4)
SEG NEUTROPHILS: 55.7 %
SEGMENTED NEUTROPHILS ABSOLUTE COUNT: 2.6 THOU/MM3 (ref 1.8–7.7)
SODIUM BLD-SCNC: 136 MEQ/L (ref 135–145)
TOTAL PROTEIN: 5.4 G/DL (ref 6.1–8)
WBC # BLD: 4.7 THOU/MM3 (ref 4.8–10.8)

## 2021-03-28 PROCEDURE — 97530 THERAPEUTIC ACTIVITIES: CPT

## 2021-03-28 PROCEDURE — 2580000003 HC RX 258: Performed by: STUDENT IN AN ORGANIZED HEALTH CARE EDUCATION/TRAINING PROGRAM

## 2021-03-28 PROCEDURE — 2700000000 HC OXYGEN THERAPY PER DAY

## 2021-03-28 PROCEDURE — 2500000003 HC RX 250 WO HCPCS: Performed by: HOSPITALIST

## 2021-03-28 PROCEDURE — 82948 REAGENT STRIP/BLOOD GLUCOSE: CPT

## 2021-03-28 PROCEDURE — 94760 N-INVAS EAR/PLS OXIMETRY 1: CPT

## 2021-03-28 PROCEDURE — 2580000003 HC RX 258: Performed by: PHYSICIAN ASSISTANT

## 2021-03-28 PROCEDURE — 99233 SBSQ HOSP IP/OBS HIGH 50: CPT | Performed by: HOSPITALIST

## 2021-03-28 PROCEDURE — 94640 AIRWAY INHALATION TREATMENT: CPT

## 2021-03-28 PROCEDURE — 83880 ASSAY OF NATRIURETIC PEPTIDE: CPT

## 2021-03-28 PROCEDURE — 2140000000 HC CCU INTERMEDIATE R&B

## 2021-03-28 PROCEDURE — 6360000002 HC RX W HCPCS: Performed by: HOSPITALIST

## 2021-03-28 PROCEDURE — 6370000000 HC RX 637 (ALT 250 FOR IP): Performed by: STUDENT IN AN ORGANIZED HEALTH CARE EDUCATION/TRAINING PROGRAM

## 2021-03-28 PROCEDURE — 80053 COMPREHEN METABOLIC PANEL: CPT

## 2021-03-28 PROCEDURE — 83735 ASSAY OF MAGNESIUM: CPT

## 2021-03-28 PROCEDURE — 6360000002 HC RX W HCPCS: Performed by: STUDENT IN AN ORGANIZED HEALTH CARE EDUCATION/TRAINING PROGRAM

## 2021-03-28 PROCEDURE — 85025 COMPLETE CBC W/AUTO DIFF WBC: CPT

## 2021-03-28 PROCEDURE — 6370000000 HC RX 637 (ALT 250 FOR IP): Performed by: PHYSICIAN ASSISTANT

## 2021-03-28 PROCEDURE — 6360000002 HC RX W HCPCS: Performed by: PHYSICIAN ASSISTANT

## 2021-03-28 PROCEDURE — 36415 COLL VENOUS BLD VENIPUNCTURE: CPT

## 2021-03-28 PROCEDURE — 2500000003 HC RX 250 WO HCPCS: Performed by: STUDENT IN AN ORGANIZED HEALTH CARE EDUCATION/TRAINING PROGRAM

## 2021-03-28 PROCEDURE — 2580000003 HC RX 258: Performed by: HOSPITALIST

## 2021-03-28 RX ORDER — GUAIFENESIN 600 MG/1
600 TABLET, EXTENDED RELEASE ORAL 2 TIMES DAILY
Status: DISCONTINUED | OUTPATIENT
Start: 2021-03-28 | End: 2021-03-31 | Stop reason: HOSPADM

## 2021-03-28 RX ORDER — BUMETANIDE 1 MG/1
1 TABLET ORAL DAILY
Status: DISCONTINUED | OUTPATIENT
Start: 2021-03-29 | End: 2021-03-29

## 2021-03-28 RX ORDER — MAGNESIUM SULFATE IN WATER 40 MG/ML
2000 INJECTION, SOLUTION INTRAVENOUS PRN
Status: DISCONTINUED | OUTPATIENT
Start: 2021-03-28 | End: 2021-03-31 | Stop reason: HOSPADM

## 2021-03-28 RX ADMIN — BUMETANIDE 1 MG: 1 TABLET ORAL at 08:11

## 2021-03-28 RX ADMIN — POTASSIUM CHLORIDE 40 MEQ: 1500 TABLET, EXTENDED RELEASE ORAL at 11:51

## 2021-03-28 RX ADMIN — GUAIFENESIN 600 MG: 600 TABLET, EXTENDED RELEASE ORAL at 11:51

## 2021-03-28 RX ADMIN — DOXYCYCLINE 100 MG: 100 INJECTION, POWDER, LYOPHILIZED, FOR SOLUTION INTRAVENOUS at 12:46

## 2021-03-28 RX ADMIN — CEFEPIME HYDROCHLORIDE 2000 MG: 2 INJECTION, POWDER, FOR SOLUTION INTRAVENOUS at 21:00

## 2021-03-28 RX ADMIN — PANTOPRAZOLE SODIUM 40 MG: 40 TABLET, DELAYED RELEASE ORAL at 06:03

## 2021-03-28 RX ADMIN — ACETAMINOPHEN 650 MG: 325 TABLET ORAL at 22:11

## 2021-03-28 RX ADMIN — SODIUM CHLORIDE, PRESERVATIVE FREE 10 ML: 5 INJECTION INTRAVENOUS at 20:58

## 2021-03-28 RX ADMIN — PREGABALIN 300 MG: 75 CAPSULE ORAL at 08:12

## 2021-03-28 RX ADMIN — ALBUTEROL SULFATE 2.5 MG: 2.5 SOLUTION RESPIRATORY (INHALATION) at 10:55

## 2021-03-28 RX ADMIN — ENOXAPARIN SODIUM 40 MG: 40 INJECTION SUBCUTANEOUS at 20:58

## 2021-03-28 RX ADMIN — CEFEPIME HYDROCHLORIDE 2000 MG: 2 INJECTION, POWDER, FOR SOLUTION INTRAVENOUS at 08:22

## 2021-03-28 RX ADMIN — PANTOPRAZOLE SODIUM 40 MG: 40 TABLET, DELAYED RELEASE ORAL at 11:51

## 2021-03-28 RX ADMIN — MAGNESIUM SULFATE HEPTAHYDRATE 2000 MG: 40 INJECTION, SOLUTION INTRAVENOUS at 17:34

## 2021-03-28 RX ADMIN — INSULIN GLARGINE 10 UNITS: 100 INJECTION, SOLUTION SUBCUTANEOUS at 08:11

## 2021-03-28 RX ADMIN — ENOXAPARIN SODIUM 40 MG: 40 INJECTION SUBCUTANEOUS at 08:12

## 2021-03-28 RX ADMIN — GUAIFENESIN 600 MG: 600 TABLET, EXTENDED RELEASE ORAL at 20:58

## 2021-03-28 RX ADMIN — DOXYCYCLINE 100 MG: 100 INJECTION, POWDER, LYOPHILIZED, FOR SOLUTION INTRAVENOUS at 23:23

## 2021-03-28 RX ADMIN — SODIUM CHLORIDE, PRESERVATIVE FREE 10 ML: 5 INJECTION INTRAVENOUS at 08:16

## 2021-03-28 RX ADMIN — PREGABALIN 300 MG: 75 CAPSULE ORAL at 20:58

## 2021-03-28 RX ADMIN — INSULIN LISPRO 2 UNITS: 100 INJECTION, SOLUTION INTRAVENOUS; SUBCUTANEOUS at 17:34

## 2021-03-28 RX ADMIN — NIFEDIPINE 60 MG: 60 TABLET, EXTENDED RELEASE ORAL at 08:12

## 2021-03-28 RX ADMIN — DOXYCYCLINE 100 MG: 100 INJECTION, POWDER, LYOPHILIZED, FOR SOLUTION INTRAVENOUS at 00:12

## 2021-03-28 RX ADMIN — INSULIN LISPRO 2 UNITS: 100 INJECTION, SOLUTION INTRAVENOUS; SUBCUTANEOUS at 08:11

## 2021-03-28 RX ADMIN — POTASSIUM CHLORIDE 20 MEQ: 1500 TABLET, EXTENDED RELEASE ORAL at 08:12

## 2021-03-28 RX ADMIN — INSULIN LISPRO 2 UNITS: 100 INJECTION, SOLUTION INTRAVENOUS; SUBCUTANEOUS at 12:08

## 2021-03-28 ASSESSMENT — PAIN SCALES - GENERAL
PAINLEVEL_OUTOF10: 0
PAINLEVEL_OUTOF10: 8
PAINLEVEL_OUTOF10: 0

## 2021-03-28 NOTE — PLAN OF CARE
Problem: Falls - Risk of:  Goal: Will remain free from falls  Description: Will remain free from falls  3/28/2021 1340 by Justice Kelly RN  Outcome: Ongoing  Note: Bed in locked and low position with side rails up x 2 and call light within reach. Bed and chair alarms in use.   Patient up to chair with walker and assist of 2 staff - is a bit unsteady and gets SOB with exertion  3/28/2021 0025 by Joycelyn Espinosa RN  Outcome: Met This Shift     Problem: Falls - Risk of:  Goal: Absence of physical injury  Description: Absence of physical injury  3/28/2021 1340 by Justice Kelly RN  Outcome: Met This Shift  3/28/2021 0025 by Joycelyn Espinosa RN  Outcome: Met This Shift     Problem: Pain:  Goal: Pain level will decrease  Description: Pain level will decrease  3/28/2021 1340 by Justice Kelly RN  Outcome: Ongoing  Note: Denies pain or discomfort  3/28/2021 0025 by Joycelyn Espinosa RN  Outcome: Met This Shift     Problem: Pain:  Goal: Control of acute pain  Description: Control of acute pain  3/28/2021 1340 by Justice Kelly RN  Outcome: Met This Shift  3/28/2021 0025 by Joycelyn Espinosa RN  Outcome: Met This Shift     Problem: Pain:  Goal: Control of chronic pain  Description: Control of chronic pain  3/28/2021 1340 by Justice Kelly RN  Outcome: Met This Shift  3/28/2021 0025 by Joycelyn Espinosa RN  Outcome: Met This Shift     Problem: Discharge Planning:  Goal: Discharged to appropriate level of care  Description: Discharged to appropriate level of care  3/28/2021 1340 by Justice Kelly RN  Outcome: Ongoing  Note: Plan is for patient to go to [facility] or [facility]   3/28/2021 0025 by Joycelyn Espinosa RN  Outcome: Ongoing     Problem: Discharge Planning:  Goal: Participates in care planning  Description: Participates in care planning  3/28/2021 1340 by Justice Kelly RN  Outcome: Ongoing  3/28/2021 0025 by Joycelyn Espinosa RN  Outcome: Ongoing     Problem: Airway Clearance - Ineffective:  Goal: Clear lung sounds  Description: Clear lung sounds  3/28/2021 1340 by Isidro Cruz RN  Outcome: Ongoing  Note: Lungs have intermittent wheezes otherwise are diminished throughout  3/28/2021 0025 by Carmen Hammond RN  Outcome: Ongoing     Problem: Airway Clearance - Ineffective:  Goal: Ability to maintain a clear airway will improve  Description: Ability to maintain a clear airway will improve  3/28/2021 1340 by Isidro Cruz RN  Outcome: Ongoing  3/28/2021 0025 by Carmen Hammond RN  Outcome: Ongoing     Problem: Fluid Volume - Deficit:  Goal: Achieves intake and output within specified parameters  Description: Achieves intake and output within specified parameters  3/28/2021 1340 by Isidro Cruz RN  Outcome: Ongoing  Note: Has good output per caicedo catheter and fairly good intake  3/28/2021 0025 by Carmen Hammond RN  Outcome: Ongoing     Problem: Gas Exchange - Impaired:  Goal: Levels of oxygenation will improve  Description: Levels of oxygenation will improve  3/28/2021 1340 by Isidro Cruz RN  Outcome: Ongoing  Note: On 5 lpm only runs 92 - 93% have not tried to wean her O2 today.   Gets SOB with activity  3/28/2021 0025 by Carmen Hammond RN  Outcome: Ongoing     Problem: Hyperthermia:  Goal: Ability to maintain a body temperature in the normal range will improve  Description: Ability to maintain a body temperature in the normal range will improve  3/28/2021 1340 by Isidro Cruz RN  Outcome: Ongoing  Note: Temp started at 99.2 but then went to 97.3  3/28/2021 0025 by Carmen Hammond RN  Outcome: Met This Shift     Problem: Physical Regulation:  Goal: Prevent transmision of infection  Description: Prevent transmision of infection  3/28/2021 1340 by Isidro Cruz RN  Outcome: Ongoing  Note: Remains in isolation for MRSA  3/28/2021 0025 by Carmen Hammond RN  Outcome: Ongoing Problem: Skin Integrity:  Goal: Risk for impaired skin integrity will decrease  Description: Risk for impaired skin integrity will decrease  3/28/2021 1340 by Celestine Holland RN  Outcome: Ongoing  3/28/2021 0025 by Raissa Gray RN  Outcome: Ongoing     Problem: Skin Integrity:  Goal: Will show no infection signs and symptoms  Description: Will show no infection signs and symptoms  3/28/2021 1340 by Celestine Holland RN  Outcome: Met This Shift  3/28/2021 0025 by Raissa Gray RN  Outcome: Ongoing     Problem: Skin Integrity:  Goal: Absence of new skin breakdown  Description: Absence of new skin breakdown  3/28/2021 1340 by Celestine Holland RN  Outcome: Met This Shift  3/28/2021 0025 by Raissa Gray RN  Outcome: Met This Shift     Problem: Metabolic:  Goal: Ability to maintain appropriate glucose levels will improve  Description: Ability to maintain appropriate glucose levels will improve  3/28/2021 1340 by Celestine Holland RN  Outcome: Ongoing  Note: Checking dhems ac and hs and giving coverage as appropriate  3/28/2021 0025 by Raissa Gray RN  Outcome: Ongoing     Problem: Nutritional:  Goal: Maintenance of adequate nutrition will improve  Description: Maintenance of adequate nutrition will improve  3/28/2021 1340 by Celestine Holland RN  Outcome: Ongoing  Note: Appetite is fair today  3/28/2021 0025 by Raissa Gray RN  Outcome: Ongoing     Problem: Nutritional:  Goal: Progress toward achieving an optimal weight will improve  Description: Progress toward achieving an optimal weight will improve  3/28/2021 0025 by Raissa Gray RN  Outcome: Ongoing     Problem: Nutrition  Goal: Optimal nutrition therapy  3/28/2021 0025 by Raissa Gray RN  Outcome: Ongoing

## 2021-03-28 NOTE — PROGRESS NOTES
requiring pt to roll side to side    TRANSFERS:  ROSANA    FUNCTIONAL MOBILITY:  ROSANA      ASSESSMENT:     Activity Tolerance:  Patient tolerance of  treatment: fair. Discharge Recommendations: Continue to assess pending progress, Patient would benefit from continued therapy after discharge   Equipment Recommendations: Equipment Needed: Yes  Other: sock aid and/or reacher  Plan: Times per week: 3-5x  Specific instructions for Next Treatment: Functional mobility; ADLs and adaptations if needed; endurance training  Current Treatment Recommendations: Functional Mobility Training, Endurance Training, Self-Care / ADL, Strengthening, Equipment Evaluation, Education, & procurement  Plan Comment: Pt would benefit from continued skilled OT services when medically stable and discharged from Acute. OT recommended while at SNF. Patient Education  Patient Education: importance of therapy. Goals  Short term goals  Time Frame for Short term goals: By discharge  Short term goal 1: Pt will demonstrate functional mobility walking to/from the bathroom with a rolling walker with SBA to prepare for doing sinkside grooming. Short term goal 2: Pt will complete lower body dressing or bathing with Setup A while using any adaptations needed for ease of doing her self care. Short term goal 3: Pt will complete toileting routine including tranfers to/from the standard toilet seat with SBA to increase her independence with self care. Short term goal 4: Pt will complete simple ADLs while standing for over 2 minute duration with min cues for steady breathing to increase her endurance for ease of doing her grooming or dressing. Following session, patient left in safe position with all fall risk precautions in place.

## 2021-03-28 NOTE — PLAN OF CARE
Patient remained free from falls during shift. Call light within reach, bed alarm on, Door open. Problem: Falls - Risk of:  Goal: Will remain free from falls  Description: Will remain free from falls  Outcome: Met This Shift  Goal: Absence of physical injury  Description: Absence of physical injury  Outcome: Met This Shift     Patients Pain level decreased when medicated. Non pharmacological interventions used, turning, repositioning. Problem: Pain:  Goal: Pain level will decrease  Description: Pain level will decrease  Outcome: Met This Shift  Goal: Control of acute pain  Description: Control of acute pain  Outcome: Met This Shift  Goal: Control of chronic pain  Description: Control of chronic pain  Outcome: Met This Shift      Patient had an absence of elevated temperature during shift. Problem: Hyperthermia:  Goal: Ability to maintain a body temperature in the normal range will improve  Description: Ability to maintain a body temperature in the normal range will improve  Outcome: Met This Shift     Patient turned and repositioned every two hours.    Problem: Skin Integrity:  Goal: Risk for impaired skin integrity will decrease  Description: Risk for impaired skin integrity will decrease  Outcome: Ongoing  Goal: Will show no infection signs and symptoms  Description: Will show no infection signs and symptoms  Outcome: Ongoing  Goal: Absence of new skin breakdown  Description: Absence of new skin breakdown  Outcome: Met This Shift

## 2021-03-28 NOTE — PROGRESS NOTES
PROGRESS NOTE      Patient: Marlo Barksdale      Unit/Bed:3B-22/022-A    YOB: 1940    MRN: [id number]       Acct: [de-identified]     PCP: Paz Soares MD    Date of Admission: 3/23/2021    Chief Complaint:  Fatigue    Assessment/Plan:    Anticipated Discharge in :  Ongoing medical treatment. 1. Pneumonia with sepsis   Febrile, tachypneic, tachycardic. Normal WBC. CXR shows multifocal bilateral infiltrates. CRP 17.67, procal 0.36.   Initially on BiPAP due to respiratory alkalosis secondary to tachypnea.  Started on vancomycin and Zosyn 3/24. Transitioned to Rocephin and doxy. Switch Rocephin to Cefepime for Pseudomonas coverage due to high fevers since admission.  Blood cultures negative.  Afebrile since 3/25 1530. Requiring 5LNC to maintain SPO2 94%, VSS otherwise. Wean as tolerated, goal SpO2 > 89%.  Doxy 5/5 days, DC'd after last dose tonight. Continue cefepime, day 4. Pending CXR and respiratory status tomorrow, could transition to ciprofloxacin 3/29.  2. ?CHF Exacerbation  · Will obtain BNP, echo, and CXR in a.m. 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 Trace mitral regurgitation. 3. IDDM2   Continue home Lantus.  SSI. POCT BG. Hypoglycemia protocol. 4. Hypertension   Hypotensive on arrival, held hydralazine and Procardia initially.  Continue nifedipine. 5. Contraction Alkalosis  · Bicarb trending up. · Change to Bumex once daily. · Continue to monitor. 6. Hypokalemia   Likely secondary to diuresis   3.5 today.  Replacement protocol ordered. 7. SWAPNA  · On CPAP at home  8. Morbid obesity with BMI 44.2 kg/m²  9. Hypoalbuminemia, likely secondary to acute infection  · Dietitian consulted  10. Stage II decubitus ulcers  · No visible bone. Continue to monitor. 11. IDALIA on CKD 3, resolved   Creatinine 1.4 on arrival, now 0.7.       LAXMI/Patricio Duque 2170 Problems    Diagnosis Date Noted    Pneumonia due to organism [J18.9] 03/24/2021       Hospital Course:   Per HPI:  \"Mrs. Leonie Schmitz is a 78 Y/O female with past medical history of diabetes, hypertension, presents to EMS for worsening weakness over the last day. Per EMS report patient was having difficulty breathing and hypoxia  with O2 saturation 80% on room air. Patient placed on CPAP and brought to [facility] ED. Patient started on BiPAP in ED with improved vital signs. Patient daughter at bedside states patient has not seemed ill over the past week, though today seemed much weaker than normal and complained of being cold. Patient daughter denies she reporting any cough, nausea, vomiting, abdominal pain, shortness of breath, chest pain, or fever. \"     3/25/2021: Patient switched to Rocephin and doxycycline, but continued to have high fevers. We will transition Rocephin to cefepime. Trend cultures. Respiratory status overall improved. 3/26/2021: Patient doing well, feeling improved. Continue antibiotics. 3/27/2021: Having some hypertension, restart home nifedipine. 3/28/2021: CTA chest shows extensive bilateral airspace infiltrates, suggestive of combination pneumonia pulmonary edema. CTA also noted lymphadenopathy throughout mediastinum, paratracheal, pretracheal, and subcarinal regions. CT abdomen and pelvis showed no acute abnormalities. CXR and echo in a.m. 3/29. Change Bumex to 1 mg daily secondary to contraction alkalosis. Subjective: No acute events overnight. She feels more tired today, states she has not slept well in the hospital.      CT from yesterday shows worsening bilateral pleural effusions. Will get chest x-ray in the morning to assess pleural effusion status. Last echo in 2018, we will repeat to assess diastolic and systolic function. Decreased Bumex to once daily as patient is being to have contraction alkalosis. Pending echo, this may need to be increased again.       Patient will complete doxy 5/5 days this evening, DC after. Continue cefepime today, can potentially transition to ciprofloxacin in the a.m. if patient continues to improve. Medications:  Reviewed    Infusion Medications    dextrose       Scheduled Medications    guaiFENesin  600 mg Oral BID    [START ON 3/29/2021] bumetanide  1 mg Oral Daily    cefepime  2,000 mg Intravenous Q12H    sodium chloride flush  10 mL Intravenous 2 times per day    [Held by provider] hydrALAZINE  50 mg Oral BID    insulin glargine  10 Units Subcutaneous QAM    NIFEdipine  60 mg Oral Daily    pantoprazole  40 mg Oral QAM AC    potassium chloride  20 mEq Oral Daily    pregabalin  300 mg Oral BID    insulin lispro  0-12 Units Subcutaneous TID WC    insulin lispro  0-6 Units Subcutaneous Nightly    doxycycline (VIBRAMYCIN) IV  100 mg Intravenous Q12H    enoxaparin  40 mg Subcutaneous BID     PRN Meds: magnesium sulfate, albuterol, sodium chloride flush, promethazine **OR** ondansetron, polyethylene glycol, acetaminophen **OR** acetaminophen, potassium chloride **OR** potassium alternative oral replacement **OR** potassium chloride, potassium chloride, docusate sodium, glucose, dextrose, glucagon (rDNA), dextrose, sodium chloride      Intake/Output Summary (Last 24 hours) at 3/28/2021 1222  Last data filed at 3/28/2021 0345  Gross per 24 hour   Intake 1414.39 ml   Output 4850 ml   Net -3435.61 ml       Diet:  DIET CARB CONTROL; Dietary Nutrition Supplements: Diabetic Oral Supplement    Exam:  BP (!) 117/55   Pulse 89   Temp 97.3 °F (36.3 °C) (Oral)   Resp 18   Ht 5' (1.524 m)   Wt 217 lb 2 oz (98.5 kg)   LMP  (LMP Unknown)   SpO2 92%   BMI 42.40 kg/m²     General appearance: No apparent distress, appears stated age and cooperative. HEENT: Pupils equal, round, and reactive to light. Conjunctivae/corneas clear. Neck: Supple, with full range of motion. No jugular venous distention. Trachea midline.   Respiratory:  Normal respiratory effort. Minimal coarse breath sounds heard anteriorly, without Rales/Wheezes/Rhonchi. Diminished at bases. Cardiovascular: Regular rate and rhythm with normal S1/S2 without murmurs, rubs or gallops. Abdomen: Soft, non-tender, non-distended with normal bowel sounds. Musculoskeletal: passive and active ROM x 4 extremities. Skin: Skin color, texture, turgor normal.  Some trace lower extremity edema bilaterally with erythema. Erythema appears to be more venous stasis as opposed to cellulitic presentation. Neurologic:  Neurovascularly intact without any focal sensory/motor deficits. Cranial nerves: II-XII intact, grossly non-focal.  Psychiatric: Alert and oriented, thought content appropriate, normal insight  Capillary Refill: Brisk,< 3 seconds   Peripheral Pulses: +2 palpable, equal bilaterally       Labs:   Recent Labs     03/26/21 0406 03/27/21 0431 03/28/21  0349   WBC 5.8 5.7 4.7*   HGB 9.5* 9.0* 9.5*   HCT 30.2* 28.9* 31.3*    233 266     Recent Labs     03/26/21 0406 03/27/21 0431 03/28/21  0349    136 136   K 3.8 3.6 3.5    101 98   CO2 23 27 30   BUN 10 6* 7   CREATININE 0.6 0.5 0.7   CALCIUM 8.6 8.8 8.7     Recent Labs     03/26/21 0406 03/27/21 0431 03/28/21  0349   AST 18 16 14   ALT 11 12 12   BILITOT 0.3 0.3 0.3   ALKPHOS 78 84 78     No results for input(s): INR in the last 72 hours. No results for input(s): Lona Carolina in the last 72 hours. Urinalysis:      Lab Results   Component Value Date    NITRU NEGATIVE 03/23/2021    WBCUA 0-2 03/23/2021    BACTERIA NONE SEEN 03/23/2021    RBCUA 0-2 03/23/2021    BLOODU NEGATIVE 03/23/2021    SPECGRAV 1.014 01/25/2021    GLUCOSEU NEGATIVE 03/23/2021       Radiology:  CTA CHEST W WO CONTRAST   Final Result   Extensive bilateral airspace infiltrates with the findings suggest possible combination of pneumonia and pulmonary edema. No acute abnormalities in the abdomen or pelvis.             **This report has been created using voice recognition software. It may contain minor errors which are inherent in voice recognition technology. **      Final report electronically signed by Dr. Tenisha Courtney on 3/26/2021 2:53 PM      CT ABDOMEN PELVIS W IV CONTRAST Additional Contrast? None   Final Result   Extensive bilateral airspace infiltrates with the findings suggest possible combination of pneumonia and pulmonary edema. No acute abnormalities in the abdomen or pelvis. **This report has been created using voice recognition software. It may contain minor errors which are inherent in voice recognition technology. **      Final report electronically signed by Dr. Tenisha Courtney on 3/26/2021 2:53 PM      XR CHEST PORTABLE   Final Result   1. Cardiomegaly. 2.  Multifocal bilateral infiltrates are present. This document has been electronically signed by: Santos Liao M.D. on    03/23/2021 10:47 PM      XR CHEST PORTABLE    (Results Pending)       Diet: DIET CARB CONTROL; Dietary Nutrition Supplements: Diabetic Oral Supplement    DVT prophylaxis: [x] Lovenox                                 [] SCDs                                 [] SQ Heparin                                 [] Encourage ambulation           [] Already on Anticoagulation     Disposition:    [] Home       [] TCU       [] Rehab       [] Psych       [x] SNF       [] [place]       [] Other         Code Status: Full Code    PT/OT Eval Status: Consulted.       Electronically signed by Ramona Robertson DO on 3/28/2021 at 12:22 PM

## 2021-03-29 ENCOUNTER — APPOINTMENT (OUTPATIENT)
Dept: GENERAL RADIOLOGY | Age: 81
DRG: 871 | End: 2021-03-29
Payer: MEDICARE

## 2021-03-29 LAB
ALBUMIN SERPL-MCNC: 2.3 G/DL (ref 3.5–5.1)
ALP BLD-CCNC: 71 U/L (ref 38–126)
ALT SERPL-CCNC: 11 U/L (ref 11–66)
ANION GAP SERPL CALCULATED.3IONS-SCNC: 9 MEQ/L (ref 8–16)
AST SERPL-CCNC: 17 U/L (ref 5–40)
BASOPHILIA: ABNORMAL
BASOPHILS # BLD: 0.9 %
BASOPHILS ABSOLUTE: 0 THOU/MM3 (ref 0–0.1)
BILIRUB SERPL-MCNC: 0.2 MG/DL (ref 0.3–1.2)
BLOOD CULTURE, ROUTINE: NORMAL
BLOOD CULTURE, ROUTINE: NORMAL
BUN BLDV-MCNC: 7 MG/DL (ref 7–22)
CALCIUM SERPL-MCNC: 9.2 MG/DL (ref 8.5–10.5)
CHLORIDE BLD-SCNC: 104 MEQ/L (ref 98–111)
CO2: 30 MEQ/L (ref 23–33)
CREAT SERPL-MCNC: 0.6 MG/DL (ref 0.4–1.2)
EOSINOPHIL # BLD: 6.7 %
EOSINOPHILS ABSOLUTE: 0.3 THOU/MM3 (ref 0–0.4)
ERYTHROCYTE [DISTWIDTH] IN BLOOD BY AUTOMATED COUNT: 16.5 % (ref 11.5–14.5)
ERYTHROCYTE [DISTWIDTH] IN BLOOD BY AUTOMATED COUNT: 55.5 FL (ref 35–45)
GFR SERPL CREATININE-BSD FRML MDRD: > 90 ML/MIN/1.73M2
GLUCOSE BLD-MCNC: 104 MG/DL (ref 70–108)
GLUCOSE BLD-MCNC: 123 MG/DL (ref 70–108)
GLUCOSE BLD-MCNC: 138 MG/DL (ref 70–108)
GLUCOSE BLD-MCNC: 164 MG/DL (ref 70–108)
GLUCOSE BLD-MCNC: 264 MG/DL (ref 70–108)
GLUCOSE BLD-MCNC: 72 MG/DL (ref 70–108)
HCT VFR BLD CALC: 30.9 % (ref 37–47)
HEMOGLOBIN: 9.5 GM/DL (ref 12–16)
IMMATURE GRANS (ABS): 0.23 THOU/MM3 (ref 0–0.07)
IMMATURE GRANULOCYTES: 5.1 %
LV EF: 55 %
LVEF MODALITY: NORMAL
LYMPHOCYTES # BLD: 27.3 %
LYMPHOCYTES ABSOLUTE: 1.2 THOU/MM3 (ref 1–4.8)
MCH RBC QN AUTO: 28.2 PG (ref 26–33)
MCHC RBC AUTO-ENTMCNC: 30.7 GM/DL (ref 32.2–35.5)
MCV RBC AUTO: 91.7 FL (ref 81–99)
MONOCYTES # BLD: 14.4 %
MONOCYTES ABSOLUTE: 0.6 THOU/MM3 (ref 0.4–1.3)
NUCLEATED RED BLOOD CELLS: 0 /100 WBC
PLATELET # BLD: 284 THOU/MM3 (ref 130–400)
PLATELET ESTIMATE: ADEQUATE
PMV BLD AUTO: 10.2 FL (ref 9.4–12.4)
POTASSIUM REFLEX MAGNESIUM: 4 MEQ/L (ref 3.5–5.2)
PROCALCITONIN: 0.13 NG/ML (ref 0.01–0.09)
RBC # BLD: 3.37 MILL/MM3 (ref 4.2–5.4)
SCAN OF BLOOD SMEAR: NORMAL
SEG NEUTROPHILS: 45.6 %
SEGMENTED NEUTROPHILS ABSOLUTE COUNT: 2.1 THOU/MM3 (ref 1.8–7.7)
SODIUM BLD-SCNC: 143 MEQ/L (ref 135–145)
TOTAL PROTEIN: 5.6 G/DL (ref 6.1–8)
WBC # BLD: 4.5 THOU/MM3 (ref 4.8–10.8)

## 2021-03-29 PROCEDURE — 82948 REAGENT STRIP/BLOOD GLUCOSE: CPT

## 2021-03-29 PROCEDURE — 71045 X-RAY EXAM CHEST 1 VIEW: CPT

## 2021-03-29 PROCEDURE — 2140000000 HC CCU INTERMEDIATE R&B

## 2021-03-29 PROCEDURE — 84145 PROCALCITONIN (PCT): CPT

## 2021-03-29 PROCEDURE — 36415 COLL VENOUS BLD VENIPUNCTURE: CPT

## 2021-03-29 PROCEDURE — 2700000000 HC OXYGEN THERAPY PER DAY

## 2021-03-29 PROCEDURE — 94760 N-INVAS EAR/PLS OXIMETRY 1: CPT

## 2021-03-29 PROCEDURE — 6370000000 HC RX 637 (ALT 250 FOR IP): Performed by: STUDENT IN AN ORGANIZED HEALTH CARE EDUCATION/TRAINING PROGRAM

## 2021-03-29 PROCEDURE — 93306 TTE W/DOPPLER COMPLETE: CPT

## 2021-03-29 PROCEDURE — 99233 SBSQ HOSP IP/OBS HIGH 50: CPT | Performed by: HOSPITALIST

## 2021-03-29 PROCEDURE — 2580000003 HC RX 258: Performed by: STUDENT IN AN ORGANIZED HEALTH CARE EDUCATION/TRAINING PROGRAM

## 2021-03-29 PROCEDURE — 6370000000 HC RX 637 (ALT 250 FOR IP): Performed by: PHYSICIAN ASSISTANT

## 2021-03-29 PROCEDURE — 97110 THERAPEUTIC EXERCISES: CPT

## 2021-03-29 PROCEDURE — 6360000002 HC RX W HCPCS: Performed by: STUDENT IN AN ORGANIZED HEALTH CARE EDUCATION/TRAINING PROGRAM

## 2021-03-29 PROCEDURE — 6360000002 HC RX W HCPCS: Performed by: HOSPITALIST

## 2021-03-29 PROCEDURE — 97116 GAIT TRAINING THERAPY: CPT

## 2021-03-29 PROCEDURE — 80053 COMPREHEN METABOLIC PANEL: CPT

## 2021-03-29 PROCEDURE — 2580000003 HC RX 258: Performed by: PHYSICIAN ASSISTANT

## 2021-03-29 PROCEDURE — 85025 COMPLETE CBC W/AUTO DIFF WBC: CPT

## 2021-03-29 PROCEDURE — 97535 SELF CARE MNGMENT TRAINING: CPT

## 2021-03-29 RX ORDER — BUMETANIDE 1 MG/1
1 TABLET ORAL 2 TIMES DAILY
Status: DISCONTINUED | OUTPATIENT
Start: 2021-03-29 | End: 2021-03-31 | Stop reason: HOSPADM

## 2021-03-29 RX ADMIN — ENOXAPARIN SODIUM 40 MG: 40 INJECTION SUBCUTANEOUS at 21:15

## 2021-03-29 RX ADMIN — ACETAMINOPHEN 650 MG: 325 TABLET ORAL at 21:14

## 2021-03-29 RX ADMIN — POTASSIUM CHLORIDE 20 MEQ: 1500 TABLET, EXTENDED RELEASE ORAL at 09:57

## 2021-03-29 RX ADMIN — SODIUM CHLORIDE, PRESERVATIVE FREE 10 ML: 5 INJECTION INTRAVENOUS at 21:15

## 2021-03-29 RX ADMIN — BUMETANIDE 1 MG: 1 TABLET ORAL at 21:14

## 2021-03-29 RX ADMIN — BUMETANIDE 1 MG: 1 TABLET ORAL at 09:57

## 2021-03-29 RX ADMIN — INSULIN LISPRO 6 UNITS: 100 INJECTION, SOLUTION INTRAVENOUS; SUBCUTANEOUS at 14:35

## 2021-03-29 RX ADMIN — NIFEDIPINE 60 MG: 60 TABLET, EXTENDED RELEASE ORAL at 09:57

## 2021-03-29 RX ADMIN — GUAIFENESIN 600 MG: 600 TABLET, EXTENDED RELEASE ORAL at 09:57

## 2021-03-29 RX ADMIN — PANTOPRAZOLE SODIUM 40 MG: 40 TABLET, DELAYED RELEASE ORAL at 06:16

## 2021-03-29 RX ADMIN — GUAIFENESIN 600 MG: 600 TABLET, EXTENDED RELEASE ORAL at 21:14

## 2021-03-29 RX ADMIN — PREGABALIN 300 MG: 75 CAPSULE ORAL at 21:14

## 2021-03-29 RX ADMIN — ENOXAPARIN SODIUM 40 MG: 40 INJECTION SUBCUTANEOUS at 09:57

## 2021-03-29 RX ADMIN — SODIUM CHLORIDE, PRESERVATIVE FREE 10 ML: 5 INJECTION INTRAVENOUS at 09:58

## 2021-03-29 RX ADMIN — CEFEPIME HYDROCHLORIDE 2000 MG: 2 INJECTION, POWDER, FOR SOLUTION INTRAVENOUS at 09:57

## 2021-03-29 RX ADMIN — CEFEPIME HYDROCHLORIDE 2000 MG: 2 INJECTION, POWDER, FOR SOLUTION INTRAVENOUS at 21:19

## 2021-03-29 RX ADMIN — INSULIN GLARGINE 10 UNITS: 100 INJECTION, SOLUTION SUBCUTANEOUS at 09:58

## 2021-03-29 RX ADMIN — PREGABALIN 300 MG: 75 CAPSULE ORAL at 09:57

## 2021-03-29 ASSESSMENT — PAIN SCALES - GENERAL: PAINLEVEL_OUTOF10: 4

## 2021-03-29 NOTE — PROGRESS NOTES
Noted    Pneumonia due to organism [J18.9] 03/24/2021       Hospital Course:   Per HPI:  \"Mrs. Shalonda Coyle is a 80 Y/O female with past medical history of diabetes, hypertension, presents to EMS for worsening weakness over the last day. Per EMS report patient was having difficulty breathing and hypoxia  with O2 saturation 80% on room air. Patient placed on CPAP and brought to [facility] ED. Patient started on BiPAP in ED with improved vital signs. Patient daughter at bedside states patient has not seemed ill over the past week, though today seemed much weaker than normal and complained of being cold. Patient daughter denies she reporting any cough, nausea, vomiting, abdominal pain, shortness of breath, chest pain, or fever. \"     3/25/2021: Patient switched to Rocephin and doxycycline, but continued to have high fevers. We will transition Rocephin to cefepime. Trend cultures. Respiratory status overall improved. 3/26/2021: Patient doing well, feeling improved. Continue antibiotics. 3/27/2021: Having some hypertension, restart home nifedipine. 3/28/2021: CTA chest shows extensive bilateral airspace infiltrates, suggestive of combination pneumonia pulmonary edema. CTA also noted lymphadenopathy throughout mediastinum, paratracheal, pretracheal, and subcarinal regions. CT abdomen and pelvis showed no acute abnormalities. CXR and echo in a.m. 3/29. Change Bumex to 1 mg daily secondary to contraction alkalosis. 3/29: BNP 13,000. CXR worse. PT/OT following. Requiring 5 L oxygen. Continue antibiotics    Subjective:   Patient notes she feels \"miserable\". She notes shortness of breath is about the same. Denies chest pain, denies nausea. She complains of right shoulder and arm pain.      Medications:  Reviewed    Infusion Medications    dextrose       Scheduled Medications    guaiFENesin  600 mg Oral BID    bumetanide  1 mg Oral Daily    cefepime  2,000 mg Intravenous Q12H    sodium chloride flush  10 mL sensory/motor deficits. Cranial nerves: II-XII intact, grossly non-focal.  Psychiatric: Alert and oriented, thought content appropriate, normal insight  Capillary Refill: Brisk,< 3 seconds   Peripheral Pulses: +2 palpable, equal bilaterally       Labs:   Recent Labs     03/27/21 0431 03/28/21 0349 03/29/21  0404   WBC 5.7 4.7* 4.5*   HGB 9.0* 9.5* 9.5*   HCT 28.9* 31.3* 30.9*    266 284     Recent Labs     03/27/21 0431 03/28/21  0349 03/29/21  0404    136 143   K 3.6 3.5 4.0    98 104   CO2 27 30 30   BUN 6* 7 7   CREATININE 0.5 0.7 0.6   CALCIUM 8.8 8.7 9.2     Recent Labs     03/27/21 0431 03/28/21  0349 03/29/21  0404   AST 16 14 17   ALT 12 12 11   BILITOT 0.3 0.3 0.2*   ALKPHOS 84 78 71     No results for input(s): INR in the last 72 hours. No results for input(s): Charley Loth in the last 72 hours. Urinalysis:      Lab Results   Component Value Date    NITRU NEGATIVE 03/23/2021    WBCUA 0-2 03/23/2021    BACTERIA NONE SEEN 03/23/2021    RBCUA 0-2 03/23/2021    BLOODU NEGATIVE 03/23/2021    SPECGRAV 1.014 01/25/2021    GLUCOSEU NEGATIVE 03/23/2021       Radiology:  XR CHEST PORTABLE   Final Result   Increased diffuse bilateral airspace disease. Possible left pleural effusion. This document has been electronically signed by: Colene Roach MD on    03/29/2021 02:49 AM      CTA CHEST W [address]   Final Result   Extensive bilateral airspace infiltrates with the findings suggest possible combination of pneumonia and pulmonary edema. No acute abnormalities in the abdomen or pelvis. **This report has been created using voice recognition software. It may contain minor errors which are inherent in voice recognition technology. **      Final report electronically signed by Dr. Shalom Mcelroy on 3/26/2021 2:53 PM      CT ABDOMEN PELVIS W IV CONTRAST Additional Contrast? None   Final Result   Extensive bilateral airspace infiltrates with the findings suggest possible

## 2021-03-29 NOTE — PROGRESS NOTES
[address] [place] [address]  Occupational Therapy  Daily Note  Time:    Time In: 5182  Time Out: 2999  Timed Code Treatment Minutes: 23 Minutes  Minutes: 23          Date: 3/29/2021  Patient Name: Damion Edmonds,   Gender: female      Room: [facility]022-A  MRN: [id number]  : 1940  (79 y.o.)  Referring Practitioner: Dr. Adria Wynn DO  Diagnosis: Pneumonia  Additional Pertinent Hx: Pt presents to EMS for worsening weakness over the last day. Per EMS report patient was having difficulty breathing and hypoxia  with O2 saturation 80% on room air. Patient placed on CPAP and brought to [facility] ED. Patient started on BiPAP in ED with improved vital signs. Patient daughter at bedside states patient has not seemed ill over the past week, though today seemed much weaker than normal and complained of being cold. Patient daughter denies she reporting any cough, nausea, vomiting, abdominal pain, shortness of breath, chest pain, or fever    Restrictions/Precautions:  Restrictions/Precautions: General Precautions, Fall Risk  Position Activity Restriction  Other position/activity restrictions: 3L O2 NC, 4L of O2 on 3/26- monitor O2sats      SUBJECTIVE: Pt sleeping in bed requiring encouragement to participate. Pt yelling out upon sitting EOB. Pt hard of hearing throughout     PAIN: did not state /10: pt yelling out and crying upon coming into sitting EOB. Pt stating her back hurts bad when she moves. Pt stating she always has back problems but feels its getting worse since being in hospital     Vitals: Vitals not assessed per clinical judgement, see nursing flowsheet    COGNITION: Slow Processing, Decreased Problem Solving, Decreased Safety Awareness and hard of hearing requiring repitition of commands/directions/questions throughout    ADL:   Grooming: Stand By Assistance. seated to wash hands  Lower Extremity Dressing: Maximum Assistance. donning socks.     BALANCE:  Sitting Balance:  Contact Guard Assistance. progressing to SBA requiring increased time at EOB d/t back pain and allowing for some symptoms to subside  Standing Balance: Contact Guard Assistance. with bilateral UE support on walker    BED MOBILITY:  Supine to Sit: Minimal Assistance with cues for tech    TRANSFERS:  Sit to Stand:  Minimal Assistance. from EOB with education on hand placement   Stand to Sit: Minimal Assistance. into chair d/t pt wanting to sit prior to being turned all the way around requiring education on safety with transfers. FUNCTIONAL MOBILITY:  Assistive Device: Rolling Walker  Assist Level:  Minimal Assistance. Distance: approx 4 feet to chair  Pt with increased tremors/shakiness throughout session requiring cues for safety. Pt with increased fatigue and noted SOB while on 5L of O2 after completing rquiring cues for breathing tech. ASSESSMENT:     Activity Tolerance:  Patient tolerance of  treatment: fair. Increased back pain with bed mobility requiring incresed time at EOB. Pt with noted increased SOB  And fatigue with mobility as well requiring cues for breathing tech. Discharge Recommendations: Continue to assess pending progress, Patient would benefit from continued therapy after discharge    Equipment Recommendations: Equipment Needed: Yes  Other: sock aid and/or reacher  Plan: Times per week: 3-5x  Specific instructions for Next Treatment: Functional mobility; ADLs and adaptations if needed; endurance training  Current Treatment Recommendations: Functional Mobility Training, Endurance Training, Self-Care / ADL, Strengthening, Equipment Evaluation, Education, & procurement  Plan Comment: Pt would benefit from continued skilled OT services when medically stable and discharged from Acute. OT recommended while at SNF.     Patient Education  Patient Education: safety with transfers and mobility    Goals  Short term goals  Time Frame for Short term goals: By discharge  Short term goal 1: Pt will demonstrate functional mobility walking to/from the bathroom with a rolling walker with SBA to prepare for doing sinkside grooming. Short term goal 2: Pt will complete lower body dressing or bathing with Setup A while using any adaptations needed for ease of doing her self care. Short term goal 3: Pt will complete toileting routine including tranfers to/from the standard toilet seat with SBA to increase her independence with self care. Short term goal 4: Pt will complete simple ADLs while standing for over 2 minute duration with min cues for steady breathing to increase her endurance for ease of doing her grooming or dressing. Following session, patient left in safe position with all fall risk precautions in place.

## 2021-03-29 NOTE — PROGRESS NOTES
[facility]  INPATIENT PHYSICAL THERAPY  DAILY NOTE  [facility] CCU-STEPDOWN 3B - 3B-22/022-A    Time In: 1011  Time Out: 1035  Timed Code Treatment Minutes: 24 Minutes  Minutes: 24          Date: 3/29/2021  Patient Name: Sinan Storm,  Gender:  female        MRN: [id number]  : 1940  (79 y.o.)     Referring Practitioner: Mihaela Way DO  Diagnosis: Pneumonia with sepsis  Additional Pertinent Hx: Pt admitted 3-23 with peumonia and sepsis     Prior Level of Function:  Lives With: Daughter  Type of Home: House  Home Layout: One level  Home Equipment: 4 wheeled walker   Bathroom Shower/Tub: Tub/Shower unit, Shower chair with back  Bathroom Toilet: Standard  Bathroom Accessibility: Accessible    Receives Help From: Family  ADL Assistance: Independent  Homemaking Assistance: Needs assistance  Homemaking Responsibilities: No  Ambulation Assistance: Independent  Transfer Assistance: Independent  Active : No  Additional Comments: Pt reports walking on own at [facility] with rollator. She reported having to use the shower chair while taking the shower. She donned her own socks with some difficulty but able to complete independently. Restrictions/Precautions:  Restrictions/Precautions: General Precautions, Fall Risk  Position Activity Restriction  Other position/activity restrictions: 3L O2 NC, 4L of O2 on 3/26- monitor O2sats     SUBJECTIVE: pt up and in chair with daughter and family present. Pt agreeable to therapy. PAIN: denies pain, but states she has a headache. Vitals: Oxygen: 5L NC, dropped to 89% after ambulation. Ranged from 89-95% throughout. OBJECTIVE:  Bed Mobility:  Sit to Supine: Moderate Assistance- assist at trunk and B LEs. HOB elevated. Transfers:  Sit to Stand: Contact Guard Assistance  Stand to Sit: [employer] Assistance  To different surface (chair and EOB). Required verbal cues for hand placement.      Ambulation:  Contact Guard Assistance  Distance: 12 feet x1  Surface: Level Tile  Device:Rolling Walker  Gait Deviations: Forward Flexed Posture, Slow Jen, Decreased Step Length Bilaterally, Decreased Gait Speed and Decreased Heel Strike Bilaterally. Verbal cues for upright posture and to stay closer to walker. Pt fatigued and went back to bed instead of chair. Pt also c/o dizziness. Therapist managed O2 and IV lines. Pt instructed in deep breathing exercise to recover O2 sats. Balance:  Static Sitting Balance:  Stand By Assistance- in prep for gait. Static Standing Balance: CGA- line management by therapist.    Exercise:  Patient was guided in 1 set(s) 10 reps of exercise to both lower extremities. Ankle pumps, Glut sets, Quad sets, Heelslides and Hip abduction/adduction. Required verbal and tactile cues for correct exercise technique. Exercises were completed for increased independence with functional mobility. Functional Outcome Measures: Completed  AM-PAC Inpatient Mobility Raw Score : 14  AM-PAC Inpatient T-Scale Score : 38.1    ASSESSMENT:  Assessment: Patient progressing toward established goals. Activity Tolerance:  Patient tolerance of  treatment: fair. Pt able to tolerate increased gait distance and LE exercises, however, fatigues easily after short distance and SOB. O2 sats dropped to 89% on 5L after walking 12 feet. Equipment Recommendations:Equipment Needed: No  Discharge Recommendations:  Subacute/Skilled Nursing Facility    Plan: Times per week: 3-5 X GM  Current Treatment Recommendations: Strengthening, Functional Mobility Training, Transfer Training, Balance Training, Endurance Training, Gait Training    Patient Education  Patient Education: Plan of Care, Transfers, - Patient Requires Continued Education: pt educated on hand placement for safety during transfers and deep breathing exercise to recover O2 sats.     Goals:  Patient goals : Get stronger  Short term goals  Time Frame for Short term goals: by discharge  Short term goal 1: supine to sit

## 2021-03-29 NOTE — CARE COORDINATION
3/29/21, 10:42 AM EDT    DISCHARGE ON GOING EVALUATION    [address] day: 5  Location: 3B-22/022-A Reason for admit: Pneumonia [J18.9]   Procedure:   3/26 CTA chest - Extensive bilateral airspace infiltrates with the findings suggest possible combination of pneumonia and pulmonary edema. 3/29 Echo done, results pending. 3/29 CXR - Increased diffuse bilateral airspace disease. Possible left pleural effusion. Barriers to Discharge: Tmax 99.2F. O2 on at 5L/nc, sats 93-95%. Anderson. Procal 0.13, Total protein 5.6. Hgb 9.5. Cefepime iv q12hr, Bumex po daily, Lovenox bid, Mucinex bid. Echo done this am. PT/OT. Palliative Care. Dietitian following. PCP: Asif Israel MD  Readmission Risk Score: 36%  Patient Goals/Plan/Treatment Preferences: Pt lives at home with daughter. Current with [facility]. Therapy recommending SNF.  SW following.

## 2021-03-29 NOTE — PLAN OF CARE
Problem: Falls - Risk of:  Goal: Will remain free from falls  Description: Will remain free from falls  Outcome: Ongoing  Goal: Absence of physical injury  Description: Absence of physical injury  Outcome: Ongoing     Problem: Pain:  Goal: Pain level will decrease  Description: Pain level will decrease  Outcome: Ongoing  Goal: Control of acute pain  Description: Control of acute pain  Outcome: Ongoing  Goal: Control of chronic pain  Description: Control of chronic pain  Outcome: Ongoing     Problem: Discharge Planning:  Goal: Discharged to appropriate level of care  Description: Discharged to appropriate level of care  Outcome: Ongoing  Goal: Participates in care planning  Description: Participates in care planning  Outcome: Ongoing     Problem: Skin Integrity:  Goal: Risk for impaired skin integrity will decrease  Description: Risk for impaired skin integrity will decrease  Outcome: Ongoing  Goal: Will show no infection signs and symptoms  Description: Will show no infection signs and symptoms  Outcome: Ongoing  Goal: Absence of new skin breakdown  Description: Absence of new skin breakdown  Outcome: Ongoing   . Shubham Brittle Care plan reviewed with patient. Patient verbalize understanding of the plan of care and contribute to goal setting.

## 2021-03-29 NOTE — FLOWSHEET NOTE
03/29/21 1014   Encounter Summary   Services provided to: Patient and family together   Referral/Consult From: [address] [facility] Family members   Continue Visiting Yes  (3/29)   Complexity of Encounter Moderate   Length of Encounter 15 minutes   Routine   Type Follow up   Spiritual/Roman Catholic   Type Spiritual support   Assessment: In my encounter with the 79 yr old Palliative Care patient the pts family was supportively present. While rounding the unit 3B,  I provided spiritual care to patient and their family through conversation, I also came to assess their spiritual needs present. The pt was admitted due to pneumonia,     Interventions:  I provided, prayer, emotional support and words of comfort.  provided a listening presence and encouraged pt to share their beliefs and how they support him during their hospitalization. Outcomes: The patient was encouraged and didnt share any further spiritual needs at this time. The pt remains optimistic and hopeful. The pt shared that they were appreciative for the support. Plan:  1. Chaplains will follow-up at a later time for assessment of any spiritual care needs present.         2.    The Chaplains will be available to provide further emotional support per request.

## 2021-03-29 NOTE — CARE COORDINATION
3/29/21, 12:34 PM EDT    DISCHARGE PLANNING EVALUATION  Spoke with Hayes at [facility]. They are able to accept Rosette Rubio at discharge.

## 2021-03-30 LAB
ANION GAP SERPL CALCULATED.3IONS-SCNC: 4 MEQ/L (ref 8–16)
BLOOD CULTURE, ROUTINE: NORMAL
BUN BLDV-MCNC: 10 MG/DL (ref 7–22)
CALCIUM SERPL-MCNC: 9.5 MG/DL (ref 8.5–10.5)
CHLORIDE BLD-SCNC: 103 MEQ/L (ref 98–111)
CO2: 35 MEQ/L (ref 23–33)
CREAT SERPL-MCNC: 0.7 MG/DL (ref 0.4–1.2)
ERYTHROCYTE [DISTWIDTH] IN BLOOD BY AUTOMATED COUNT: 16.4 % (ref 11.5–14.5)
ERYTHROCYTE [DISTWIDTH] IN BLOOD BY AUTOMATED COUNT: 54.7 FL (ref 35–45)
GFR SERPL CREATININE-BSD FRML MDRD: 81 ML/MIN/1.73M2
GLUCOSE BLD-MCNC: 111 MG/DL (ref 70–108)
GLUCOSE BLD-MCNC: 150 MG/DL (ref 70–108)
GLUCOSE BLD-MCNC: 207 MG/DL (ref 70–108)
GLUCOSE BLD-MCNC: 233 MG/DL (ref 70–108)
GLUCOSE BLD-MCNC: 285 MG/DL (ref 70–108)
HCT VFR BLD CALC: 32 % (ref 37–47)
HEMOGLOBIN: 9.7 GM/DL (ref 12–16)
MCH RBC QN AUTO: 27.4 PG (ref 26–33)
MCHC RBC AUTO-ENTMCNC: 30.3 GM/DL (ref 32.2–35.5)
MCV RBC AUTO: 90.4 FL (ref 81–99)
PLATELET # BLD: 300 THOU/MM3 (ref 130–400)
PMV BLD AUTO: 10 FL (ref 9.4–12.4)
POTASSIUM SERPL-SCNC: 4 MEQ/L (ref 3.5–5.2)
RBC # BLD: 3.54 MILL/MM3 (ref 4.2–5.4)
SODIUM BLD-SCNC: 142 MEQ/L (ref 135–145)
WBC # BLD: 4.4 THOU/MM3 (ref 4.8–10.8)

## 2021-03-30 PROCEDURE — 6370000000 HC RX 637 (ALT 250 FOR IP): Performed by: PHYSICIAN ASSISTANT

## 2021-03-30 PROCEDURE — 85027 COMPLETE CBC AUTOMATED: CPT

## 2021-03-30 PROCEDURE — 99232 SBSQ HOSP IP/OBS MODERATE 35: CPT | Performed by: PHYSICIAN ASSISTANT

## 2021-03-30 PROCEDURE — 6360000002 HC RX W HCPCS: Performed by: STUDENT IN AN ORGANIZED HEALTH CARE EDUCATION/TRAINING PROGRAM

## 2021-03-30 PROCEDURE — 80048 BASIC METABOLIC PNL TOTAL CA: CPT

## 2021-03-30 PROCEDURE — 2700000000 HC OXYGEN THERAPY PER DAY

## 2021-03-30 PROCEDURE — 6370000000 HC RX 637 (ALT 250 FOR IP): Performed by: STUDENT IN AN ORGANIZED HEALTH CARE EDUCATION/TRAINING PROGRAM

## 2021-03-30 PROCEDURE — 94761 N-INVAS EAR/PLS OXIMETRY MLT: CPT

## 2021-03-30 PROCEDURE — 2580000003 HC RX 258: Performed by: PHYSICIAN ASSISTANT

## 2021-03-30 PROCEDURE — 2580000003 HC RX 258: Performed by: STUDENT IN AN ORGANIZED HEALTH CARE EDUCATION/TRAINING PROGRAM

## 2021-03-30 PROCEDURE — 6360000002 HC RX W HCPCS: Performed by: HOSPITALIST

## 2021-03-30 PROCEDURE — 36415 COLL VENOUS BLD VENIPUNCTURE: CPT

## 2021-03-30 PROCEDURE — 97530 THERAPEUTIC ACTIVITIES: CPT

## 2021-03-30 PROCEDURE — 97110 THERAPEUTIC EXERCISES: CPT

## 2021-03-30 PROCEDURE — 2140000000 HC CCU INTERMEDIATE R&B

## 2021-03-30 PROCEDURE — 82948 REAGENT STRIP/BLOOD GLUCOSE: CPT

## 2021-03-30 PROCEDURE — 97535 SELF CARE MNGMENT TRAINING: CPT

## 2021-03-30 RX ADMIN — INSULIN LISPRO 4 UNITS: 100 INJECTION, SOLUTION INTRAVENOUS; SUBCUTANEOUS at 12:47

## 2021-03-30 RX ADMIN — SODIUM CHLORIDE, PRESERVATIVE FREE 10 ML: 5 INJECTION INTRAVENOUS at 09:33

## 2021-03-30 RX ADMIN — INSULIN LISPRO 2 UNITS: 100 INJECTION, SOLUTION INTRAVENOUS; SUBCUTANEOUS at 09:35

## 2021-03-30 RX ADMIN — PANTOPRAZOLE SODIUM 40 MG: 40 TABLET, DELAYED RELEASE ORAL at 06:18

## 2021-03-30 RX ADMIN — POTASSIUM CHLORIDE 20 MEQ: 1500 TABLET, EXTENDED RELEASE ORAL at 09:31

## 2021-03-30 RX ADMIN — NIFEDIPINE 60 MG: 60 TABLET, EXTENDED RELEASE ORAL at 09:32

## 2021-03-30 RX ADMIN — ACETAMINOPHEN 650 MG: 325 TABLET ORAL at 21:47

## 2021-03-30 RX ADMIN — CEFEPIME HYDROCHLORIDE 2000 MG: 2 INJECTION, POWDER, FOR SOLUTION INTRAVENOUS at 21:47

## 2021-03-30 RX ADMIN — GUAIFENESIN 600 MG: 600 TABLET, EXTENDED RELEASE ORAL at 20:34

## 2021-03-30 RX ADMIN — CEFEPIME HYDROCHLORIDE 2000 MG: 2 INJECTION, POWDER, FOR SOLUTION INTRAVENOUS at 09:32

## 2021-03-30 RX ADMIN — SODIUM CHLORIDE, PRESERVATIVE FREE 10 ML: 5 INJECTION INTRAVENOUS at 21:48

## 2021-03-30 RX ADMIN — ENOXAPARIN SODIUM 40 MG: 40 INJECTION SUBCUTANEOUS at 20:34

## 2021-03-30 RX ADMIN — PREGABALIN 300 MG: 75 CAPSULE ORAL at 20:34

## 2021-03-30 RX ADMIN — BUMETANIDE 1 MG: 1 TABLET ORAL at 20:34

## 2021-03-30 RX ADMIN — PREGABALIN 300 MG: 75 CAPSULE ORAL at 09:31

## 2021-03-30 RX ADMIN — BUMETANIDE 1 MG: 1 TABLET ORAL at 09:31

## 2021-03-30 RX ADMIN — INSULIN GLARGINE 10 UNITS: 100 INJECTION, SOLUTION SUBCUTANEOUS at 09:32

## 2021-03-30 RX ADMIN — ENOXAPARIN SODIUM 40 MG: 40 INJECTION SUBCUTANEOUS at 09:34

## 2021-03-30 RX ADMIN — GUAIFENESIN 600 MG: 600 TABLET, EXTENDED RELEASE ORAL at 09:31

## 2021-03-30 ASSESSMENT — PAIN DESCRIPTION - LOCATION: LOCATION: LEG

## 2021-03-30 ASSESSMENT — PAIN DESCRIPTION - PROGRESSION: CLINICAL_PROGRESSION: GRADUALLY WORSENING

## 2021-03-30 ASSESSMENT — PAIN SCALES - WONG BAKER
WONGBAKER_NUMERICALRESPONSE: 0

## 2021-03-30 ASSESSMENT — PAIN DESCRIPTION - ORIENTATION: ORIENTATION: LEFT;RIGHT

## 2021-03-30 ASSESSMENT — PAIN DESCRIPTION - PAIN TYPE: TYPE: ACUTE PAIN

## 2021-03-30 ASSESSMENT — PAIN - FUNCTIONAL ASSESSMENT: PAIN_FUNCTIONAL_ASSESSMENT: ACTIVITIES ARE NOT PREVENTED

## 2021-03-30 ASSESSMENT — PAIN DESCRIPTION - DESCRIPTORS: DESCRIPTORS: OTHER (COMMENT)

## 2021-03-30 ASSESSMENT — PAIN SCALES - GENERAL: PAINLEVEL_OUTOF10: 3

## 2021-03-30 ASSESSMENT — PAIN DESCRIPTION - ONSET: ONSET: GRADUAL

## 2021-03-30 NOTE — PROGRESS NOTES
Comprehensive Nutrition Assessment    Type and Reason for Visit:  Reassess    Nutrition Recommendations/Plan:   Recommend MVI. Continue current diet and ONS (Glucerna BID) as ordered. Nutrition Assessment:    Pt. Improving from nutritional standpoint AEB intake mostly % of meals and fair acceptance of Glucerna shakes. At risk for further nutrition compromise r/t admit with pneumonia, CHF, increased nutrient needs for wound healing, underlying medical condition (hx sinonasal adenocarcinoma, DM, COVID+ 12/20). Nutrition recommendations/interventions as per above. Malnutrition Assessment:  Malnutrition Status:  No malnutrition    Context:  Acute Illness     Findings of the 6 clinical characteristics of malnutrition:  Energy Intake:  No significant decrease in energy intake  Weight Loss:  No significant weight loss     Body Fat Loss:  No significant body fat loss     Muscle Mass Loss:  No significant muscle mass loss    Fluid Accumulation:  Unable to assess     Strength:  Not Performed    Estimated Daily Nutrient Needs:  Energy (kcal):  2201-5099 kcals (12-15); Weight Used for Energy Requirements:  (103 kgm 3/26)     Protein (g):  63+ grams (1.4+); Weight Used for Protein Requirements:  Ideal(45 kgm)          Nutrition Related Findings:  Patient admit with pneumonia, CHF; intake mostly % meals, occasionally less than 50% meals, today's intake % meals and 51-75% intake of Glucerna shake; BM x 1 (3/26); bumex, antibiotic, insulin; glucose 150, BUN 10, Creatinine 0.7; patient seen sleeping soundly      Wounds:  Stage II(thigh, left and right buttocks)       Current Nutrition Therapies:    DIET CARB CONTROL;   Dietary Nutrition Supplements: Diabetic Oral Supplement    Anthropometric Measures:  · Height: 5' (152.4 cm)  · Current Body Weight: 208 lb (94.3 kg)(3/30; +2 BLE edema, on diuretic)   · Admission Body Weight: 224 lb 9.6 oz (101.9 kg)(3/24 +2 edema)    · Usual Body Weight: (pt unsure; denies weight loss; per EMR: 6/4/20: 205# 6.4 oz, 1/25/21: 213# 1.6 oz, 2/28/21: 195# 9.6 oz)     · Ideal Body Weight: 100 lbs;   · BMI: 40.6  · BMI Categories: Obese Class 3 (BMI 40.0 or greater)       Nutrition Diagnosis:   · Increased nutrient needs related to increase demand for energy/nutrients as evidenced by wounds      Nutrition Interventions:   Food and/or Nutrient Delivery:  Continue Current Diet, Continue Oral Nutrition Supplement  Nutrition Education/Counseling:  Education initiated(3/26 Encouraged good nutrition at best efforts for wound healing.)   Coordination of Nutrition Care:  Continue to monitor while inpatient    Goals:  Pt. will tolerate and consume 75% or more of meals to promote wound healing during LOS. Nutrition Monitoring and Evaluation:   Behavioral-Environmental Outcomes:  None Identified   Food/Nutrient Intake Outcomes:  Diet Advancement/Tolerance, Food and Nutrient Intake, Supplement Intake  Physical Signs/Symptoms Outcomes:  Biochemical Data, Chewing or Swallowing, GI Status, Fluid Status or Edema, Nutrition Focused Physical Findings, Skin, Weight     Discharge Planning:     Too soon to determine     Electronically signed by Stephanie Schmid RD, LD on 3/30/21 at 2:31 PM EDT    Contact: [phone]

## 2021-03-30 NOTE — PROGRESS NOTES
Hospitalist Progress Note      Patient: Dana Chang    Unit/Bed:3B-22/022-A  YOB: 1940  MRN: [id number]   Acct: [de-identified]   PCP: Shikha Merchant MD  Date of Admission: 3/23/2021    Assessment/Plan:    Sepsis secondary to pneumonia, improving: Initially, febrile, tachypneic, tachycardic. Normal WBC. CXR- multifocal bilateral infiltrates. CRP 17.67, procal 0.36. Initially on BiPAP due to respiratory alkalosis secondary to tachypnea. Started on vancomycin and Zosyn 3/24. Transitioned to Rocephin and doxy. Switch Rocephin to Cefepime for Pseudomonas coverage due to high fevers since admission. Completed Doxycycline 5/5 Days. Blood cultures negative   · Afebrile since 3/25   · Noted hypoxia in 70's on RA today (presumably pulled off) - now on 5LNC with SPO2 98%, VSS otherwise. Wean as tolerated, goal SpO2 > 89%. · Repeat procal was 0.13, continue cefepime (day 6/10), switch to PO when improved, see below  ? 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 Trace mitral regurgitation. · Worsening pulmonary congestion - no edema appreciated on exam in b/l LE  · Increased Bumex to 1 mg BID 3/29     IDDM2  · Continue home Lantus. · SSI. POCT BG. Hypoglycemia protocol.     Essential Hypertension - stable  · Hypotensive on arrival, held hydralazine and Procardia initially.   · Continue nifedipine.     Contraction Alkalosis, improved  · Will continue Bumex  · Continue to monitor.     Hypokalemia  · Likely secondary to diuresis  · Replacement protocol ordered.     SWAPNA  · On CPAP at home     Morbid obesity with BMI 44.2 kg/m²     Hypoalbuminemia, likely secondary to acute infection  · Dietitian consulted     Stage II decubitus ulcers  · No visible bone. Continue to monitor.     IDALIA on CKD 3, resolved  · Creatinine 1.4 on arrival    Chief Complaint: Fatigue    Initial H and P:-    \"Mrs. Rojelio Casas is a 80 Y/O female with past medical history of diabetes, hypertension, presents to EMS for worsening weakness over the last day. Per EMS report patient was having difficulty breathing and hypoxia  with O2 saturation 80% on room air. Patient placed on CPAP and brought to [facility] ED. Patient started on BiPAP in ED with improved vital signs. Patient daughter at bedside states patient has not seemed ill over the past week, though today seemed much weaker than normal and complained of being cold. Patient daughter denies she reporting any cough, nausea, vomiting, abdominal pain, shortness of breath, chest pain, or fever. \"    3/25/2021: Patient switched to Rocephin and doxycycline, but continued to have high fevers. We will transition Rocephin to cefepime. Trend cultures. Respiratory status overall improved. 3/26/2021: Patient doing well, feeling improved. Continue antibiotics. 3/27/2021: Having some hypertension, restart home nifedipine. 3/28/2021: CTA chest shows extensive bilateral airspace infiltrates, suggestive of combination pneumonia pulmonary edema. CTA also noted lymphadenopathy throughout mediastinum, paratracheal, pretracheal, and subcarinal regions. CT abdomen and pelvis showed no acute abnormalities. CXR and echo in a.m. 3/29. Change Bumex to 1 mg daily secondary to contraction alkalosis. 3/29: BNP 13,000. CXR worse. PT/OT following. Requiring 5 L oxygen. Continue antibiotics     Subjective (past 24 hours):   3/30-> pt sitting up in her chair, no new complaints. Per RN this am she was found with her O2 off (presumably pulled it out) and was noted in the 70's for SpO2 which improved with supplemental O2 via NC. Pt admits to feeling SOB at that time, denies current SOB or CP. No fever/chills.  No abd pain, n/v. Pre-cert started today. Continue with IV abx for today, with likely transition within the next day or two. Past medical history, family history, social history and allergies reviewed again and is unchanged since admission. ROS (12 point review of systems completed. Pertinent positives noted. Otherwise ROS is negative)     Medications:  Reviewed    Infusion Medications    dextrose       Scheduled Medications    bumetanide  1 mg Oral BID    guaiFENesin  600 mg Oral BID    cefepime  2,000 mg Intravenous Q12H    sodium chloride flush  10 mL Intravenous 2 times per day    [Held by provider] hydrALAZINE  50 mg Oral BID    insulin glargine  10 Units Subcutaneous QAM    NIFEdipine  60 mg Oral Daily    pantoprazole  40 mg Oral QAM AC    potassium chloride  20 mEq Oral Daily    pregabalin  300 mg Oral BID    insulin lispro  0-12 Units Subcutaneous TID WC    insulin lispro  0-6 Units Subcutaneous Nightly    enoxaparin  40 mg Subcutaneous BID     PRN Meds: magnesium sulfate, albuterol, sodium chloride flush, promethazine **OR** ondansetron, polyethylene glycol, acetaminophen **OR** acetaminophen, potassium chloride **OR** potassium alternative oral replacement **OR** potassium chloride, potassium chloride, docusate sodium, glucose, dextrose, glucagon (rDNA), dextrose, sodium chloride      Intake/Output Summary (Last 24 hours) at 3/30/2021 0913  Last data filed at 3/30/2021 0343  Gross per 24 hour   Intake 1386.71 ml   Output 1925 ml   Net -538.29 ml       Diet:  DIET CARB CONTROL; Dietary Nutrition Supplements: Diabetic Oral Supplement    Exam:  /80   Pulse 88   Temp 98.6 °F (37 °C) (Oral)   Resp 16   Ht 5' (1.524 m)   Wt 208 lb (94.3 kg)   LMP  (LMP Unknown)   SpO2 98%   BMI 40.62 kg/m²   General appearance: Elderly, no apparent distress, appears stated age and cooperative. HEENT: Pupils equal, round, and reactive to light. Conjunctivae/corneas clear.   Neck: Supple, with full range of motion. No jugular venous distention. Trachea midline. Respiratory:  Normal respiratory effort. Diminished to auscultation, bilaterally without Rales/Wheezes/Rhonchi. Cardiovascular: Regular rate and rhythm with normal S1/S2 without murmurs, rubs or gallops. Abdomen: Soft, non-tender, non-distended with normal bowel sounds. Musculoskeletal: passive and active ROM x 4 extremities. +1 edema, non-pitting in b/l LE. Skin: Skin color, texture, turgor normal.  No rashes or lesions. Neurologic:  Neurovascularly intact without any focal sensory/motor deficits. Cranial nerves: II-XII intact, grossly non-focal.  Psychiatric: Alert and oriented, thought content appropriate, normal insight  Capillary Refill: Brisk,< 3 seconds   Peripheral Pulses: +2 palpable, equal bilaterally     Labs:   Recent Labs     03/28/21  0349 03/29/21  0404 03/30/21  0349   WBC 4.7* 4.5* 4.4*   HGB 9.5* 9.5* 9.7*   HCT 31.3* 30.9* 32.0*    284 300     Recent Labs     03/28/21  0349 03/29/21  0404 03/30/21  0349    143 142   K 3.5 4.0 4.0   CL 98 104 103   CO2 30 30 35*   BUN 7 7 10   CREATININE 0.7 0.6 0.7   CALCIUM 8.7 9.2 9.5     Recent Labs     03/28/21  0349 03/29/21  0404   AST 14 17   ALT 12 11   BILITOT 0.3 0.2*   ALKPHOS 78 71     No results for input(s): INR in the last 72 hours. No results for input(s): Chargloria Shawh in the last 72 hours. Microbiology:    Blood culture #1:   Lab Results   Component Value Date    BC No growth-preliminary No growth  03/24/2021       Blood culture #2:No results found for: Randy Ruvalcaba    Organism:  Lab Results   Component Value Date    ORG Staphylococcus aureus 03/01/2021         Lab Results   Component Value Date    LABGRAM  03/01/2021     Few segmented neutrophils observed. Rare epithelial cells observed. Rare gram positive cocci occurring singly and in pairs.         MRSA culture only:No results found for: [facility]    Urine culture:   Lab Results   Component Value Date    LABURIN No growth-preliminary No growth  01/25/2021       Respiratory culture: No results found for: CULTRESP    Aerobic and Anaerobic :  Lab Results   Component Value Date    LABAERO  03/01/2021     moderate growth This is a MRSA (Methicillin Resistant Staphylococcus aureus)isolate. Isolates of MRSA (ORSA) Methicillin (Oxacillin) ResistantStaphylococcus aureus (coagulase positive) require patientbe placed in CONTACT isolation. Methicillin(Oxacillin)resistant strains of staphylococci(MRSA)or(MRSE)should be considered resistant to all classesof cephalosporins, penems and beta-lactams. In the treatment of gram positive infections, GENTAMICINshould be CONSIDERED a SYNERGYSTIC agent ONLY. No results found for: LABANAE    Urinalysis:      Lab Results   Component Value Date    NITRU NEGATIVE 03/23/2021    WBCUA 0-2 03/23/2021    BACTERIA NONE SEEN 03/23/2021    RBCUA 0-2 03/23/2021    BLOODU NEGATIVE 03/23/2021    SPECGRAV 1.014 01/25/2021    GLUCOSEU NEGATIVE 03/23/2021       Radiology:  XR CHEST PORTABLE   Final Result   Increased diffuse bilateral airspace disease. Possible left pleural effusion. This document has been electronically signed by: Veronika Aldana MD on    03/29/2021 02:49 AM      CTA CHEST [address]   Final Result   Extensive bilateral airspace infiltrates with the findings suggest possible combination of pneumonia and pulmonary edema. No acute abnormalities in the abdomen or pelvis. **This report has been created using voice recognition software. It may contain minor errors which are inherent in voice recognition technology. **      Final report electronically signed by Dr. Coral Lujan on 3/26/2021 2:53 PM      CT ABDOMEN PELVIS W IV CONTRAST Additional Contrast? None   Final Result   Extensive bilateral airspace infiltrates with the findings suggest possible combination of pneumonia and pulmonary edema. No acute abnormalities in the abdomen or pelvis.             **This report has been

## 2021-03-30 NOTE — CARE COORDINATION
3/30/21, 3:02 PM EDT    DISCHARGE ON GOING EVALUATION    [facility] day: 6  Location: -22/022-A Reason for admit: Pneumonia [J18.9]   Procedure:   3/26 CTA chest - Extensive bilateral airspace infiltrates with the findings suggest possible combination of pneumonia and pulmonary edema. 3/29 Echo - EF 55%. Barriers to Discharge: Afebrile. O2 had been increased to 6L/nc overnight per nursing, down to 4L/nc now with sat of 96%. Anderson cath. CO2 35, Hgb 9.7. Bumex po bid, Cefepime iv q12hr. Lovenox. Mucinex bid. Diabetes management. PT/OT. Dietitian following. Palliative Care following. PCP: Miguel Pringle MD  Readmission Risk Score: 36%  Patient Goals/Plan/Treatment Preferences: Pt lives at home with daughter.  Current with [facility]. Therapy recommending SNF, accepted at [facility] Kiana HICKS following.

## 2021-03-30 NOTE — DISCHARGE INSTR - COC
Continuity of Care Form    Patient Name: Brenden Cruz   :  1940  MRN:  [id number]    Admit date:  3/23/2021  Discharge date:  2021    Code Status Order: Full Code   Advance Directives:   Advance Care Flowsheet Documentation       Date/Time Healthcare Directive Type of Healthcare Directive Copy in [facility] [address] Agent's Name Healthcare Agent's Phone Number    21 1430  No, patient does not have an advance directive for healthcare treatment -- -- -- -- --    21 0830  No, patient does not have an advance directive for healthcare treatment -- -- -- -- --            Admitting Physician:  Aquiles Cui MD  PCP: Rachelle Rodriges MD    Discharging Nurse: Nataliia Winkler Unit/Room#: 3B-22/022-A  Discharging Unit Phone Number: [phone]    Emergency Contact:   Extended Emergency Contact Information  Primary Emergency Contact: [address] [place] [address] Phone: [phone]  Relation: Child  Secondary Emergency Contact: [address] Phone: [phone]  Relation: Child    Past Surgical History:  Past Surgical History:   Procedure Laterality Date    ABDOMEN SURGERY      APPENDECTOMY      CARPAL TUNNEL RELEASE Bilateral ,     CATARACT REMOVAL  2011    bilat    CHOLECYSTECTOMY  1988    COLONOSCOPY  2016    COLONOSCOPY  10/11/2018    COLONOSCOPY POLYPECTOMY SNARE/COLD BIOPSY performed by Abi Lujan MD at [address] ESOPHAGUS      ENDOSCOPY, COLON, DIAGNOSTIC      EYE SURGERY Left 2015    EYE SURGERY      CATARACT REMOVAL- BILATERAL    HEMORRHOID SURGERY  1980s    HERNIA REPAIR      HYSTERECTOMY, TOTAL ABDOMINAL  1980    JOINT REPLACEMENT  bilat 2007    knees    NE COLSC FLX WITH DIRECTED SUBMUCOSAL Simone Oskar Jacey [address] ANY SBST  10/11/2018    COLONOSCOPY SUBMUCOSAL/BOTOX INJECTION performed by Abi Lujan MD at [address]  last 2009    removed a bone (2)--2 times no construction     SINUS SURGERY  02/01/2016    SINUS SURGERY  2016 x 2    SINUS SURGERY  09/18/2017    [facility] - Dr Jenifer Cope SINUS SURGERY  08/09/2017 [facility]    TONSILLECTOMY      TOTAL KNEE ARTHROPLASTY  08/2003    Left TKR    VENTRAL HERNIA REPAIR  1992       Immunization History:   Immunization History   Administered Date(s) Administered    DTaP 03/07/1997    DTaP (Infanrix) 03/07/1997    Hib vaccine 09/21/2017    Hib, unspecified 09/21/2017    Influenza 10/12/2011, 11/02/2012    Influenza Vaccine, unspecified formulation 02/02/2016, 11/03/2016    Influenza Virus Vaccine 10/04/2014    Influenza Whole 10/08/1999, 11/28/2006, 10/01/2007, 10/16/2008, 08/25/2009, 12/03/2010    Influenza, High Dose (Fluzone 65 yrs and older) 09/21/2017    Influenza, Keith Peppers, 6 mo and older, IM, PF (Flulaval, Fluarix) 10/01/2018    Influenza, Triv, inactivated, subunit, adjuvanted, IM (Fluad 65 yrs and older) 02/26/2020    Meningococcal MCV4O (Menveo) 09/21/2017    Meningococcal MCV4P (Menactra) 09/21/2017    PPD Test 10/01/2018    Pneumococcal Conjugate 13-valent ([id number]) 11/03/2016    Pneumococcal Polysaccharide ([id number]) 10/08/1995, 09/21/2017    Tdap (Boostrix, Adacel) 10/03/2017    Tetanus 03/07/1997, 09/15/2008       Active Problems:  Patient Active Problem List   Diagnosis Code    Hypercholesterolemia E78.00    Osteoarthritis M19.90    Osteoporosis M81.0    Type 2 diabetes mellitus without complication, without long-term current use of insulin (HCC) E11.9    DDD (degenerative disc disease), lumbar M51.36    Benign essential HTN I10    SWAPNA on CPAP G47.33, Z99.89    Diabetic peripheral neuropathy (HCC) E11.42    Acute recurrent pansinusitis J01.41    Primary thunderclap headache G44.53    Rash of entire body R21    Infection of skin due to methicillin resistant Staphylococcus aureus (MRSA) A49.02    Drug allergy, antibiotic  likely bactrim Z88.1    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    Skin plaque L98.8    Hyponatremia E87.1    Hypervolemia E87.70    Cellulitis of lower extremity L03.119    Hypoglycemia E16.2    Acute on chronic systolic CHF (congestive heart failure) ([facility]) I50.23    Bilateral cellulitis of lower leg L03.116, L03.115    Venous ulcers of both lower extremities ([facility]) I83.019, L97.919, I83.029, L97.929    Bilateral lower leg cellulitis L03.116, L03.115    CKD (chronic kidney disease) stage 3, GFR 30-59 ml/min ([facility]) N18.30    Iron deficiency anemia D50.9    Hypomagnesemia E83.42    SBO (small bowel obstruction) ([facility]) K56.609    Venous ulcer of left leg ([facility]) I83.029, L97.929    Venous ulcer of right leg ([facility]) I83.019, L97.919    Acute eczema L30.9    Venous insufficiency of both lower extremities I87.2    Lymphedema of both lower extremities I89.0    Pressure injury of left buttock, stage 2 ([facility]) R49.622    Eczematous dermatitis L30.9    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 3XXA    Facial cellulitis L03. 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    Obesity, Class II, BMI 35-39.9 E66.9    COVID-19 U07.1    Sepsis ([facility]) A41.9    Brain mass G93.89    Cellulitis Z95.68    Folliculitis Z22.9    Pneumonia due to organism J18.9       Isolation/Infection:   Isolation            Contact          Patient Infection Status 12/28/20 Buttocks Inner non blanchable reddness with skin tears (Active)   Number of days: 91       Wound Breast Left;Right (Active)   Wound Etiology Other 03/30/21 0415   Dressing Status Other (Comment) 03/26/21 1615   Dressing/Treatment Open to air 03/30/21 0415   Drainage Amount None 03/30/21 0415   Number of days:        Wound 02/28/21 Buttocks Inner;Right Small dime sized open area (Active)   Wound Etiology Pressure Stage  2 03/30/21 0415   Dressing Status Other (Comment) 03/24/21 1534   Wound Cleansed Soap and water 03/27/21 0310   Dressing/Treatment Open to air 03/30/21 0415   Wound Assessment Subcutaneous 03/26/21 1615   Drainage Amount None 03/30/21 0415   Odor None 03/27/21 0310   Marlin-wound Assessment Blanchable erythema 03/26/21 1615   Number of days: 29       Wound 03/24/21 Other (Comment) Posterior L posterior & medial thigh (Active)   Wound Etiology Pressure Stage  2 03/30/21 0415   Wound Cleansed Soap and water 03/27/21 0310   Dressing/Treatment Open to air 03/30/21 0415   Wound Assessment Superficial 03/27/21 1737   Drainage Amount None 03/30/21 0415   Marlin-wound Assessment Blanchable erythema 03/26/21 1615   Number of days: 5        Elimination:  Continence:   · Bowel: No  · Bladder: Yes  Urinary Catheter: Insertion Date: 3/23/2021   Colostomy/Ileostomy/Ileal Conduit: No       Date of Last BM: 03/31/2021      Intake/Output Summary (Last 24 hours) at 3/30/2021 0750  Last data filed at 3/30/2021 0343  Gross per 24 hour   Intake 1386.71 ml   Output 1925 ml   Net -538.29 ml     I/O last 3 completed shifts: In: 1386.7 [P.O.:1315;  I.V.:71.7]  Out: 1925 [Urine:1925]    Safety Concerns:     Sundowners [place]    Impairments/Disabilities:      Hearing    Nutrition Therapy:  Current Nutrition Therapy:   - Oral Diet:  General    Routes of Feeding: Oral  Liquids: No Restrictions  Daily Fluid Restriction: no  Last Modified Barium Swallow with Video (Video Swallowing Test): not done    Treatments at the Time of Hospital Discharge:   Respiratory Treatments: see MAR  Oxygen Therapy:  is on oxygen at 2 L/min per nasal cannula. Ventilator:    - No ventilator support    Rehab Therapies: Physical Therapy and Occupational Therapy  Weight Bearing Status/Restrictions: No weight bearing restirctions  Other Medical Equipment (for information only, NOT a DME order):  walker  Other Treatments: n/a    Patient's personal belongings (please select all that are sent with patient):  cell phone    RN SIGNATURE:  Electronically signed by Nandini Solomon RN on 3/31/21 at 12:26 PM EDT    CASE MANAGEMENT/SOCIAL WORK SECTION    Inpatient Status Date: 3/24/2021    Readmission Risk Assessment Score:  Readmission Risk              Risk of Unplanned Readmission:        36           Discharging to Facility/ Agency   · [facility]: [facility]   · [facility] [address]  · Phone:[phone]  · Fax:[phone]    Dialysis Facility (if applicable)   · Name:  · Address:  · Dialysis Schedule:  · Phone:  · Fax:    / signature: Electronically signed by DINORAH Sanford on 3/30/21 at 7:51 AM EDT    PHYSICIAN SECTION    Prognosis: Good    Condition at Discharge: Stable    Rehab Potential (if transferring to Rehab): Good    Recommended Labs or Other Treatments After Discharge:     Physician Certification: I certify the above information and transfer of Alonso Cullen  is necessary for the continuing treatment of the diagnosis listed and that she requires [place] for less 30 days.      Update Admission H&P: No change in H&P    PHYSICIAN SIGNATURE:  Electronically signed by Bubba Tabares MD on 3/31/21 at 11:50 AM EDT

## 2021-03-30 NOTE — PROGRESS NOTES
Ambulation:  Contact Guard Assistance  Distance: 3 feet (EOB to chair)  Surface: Level Tile  Device:Rolling Walker  Gait Deviations: Forward Flexed Posture, Slow Jen, Decreased Step Length Bilaterally, Decreased Weight Shift Bilaterally, Decreased Gait Speed and Decreased Heel Strike Bilaterally. Pt has increased difficulty trying to look up and have upright posture d/t daughter stating she had cervical sx that didn't heal correctly. Balance:  Static Sitting Balance:  Stand By Assistance- EOB in prep for gait  Dynamic Sitting Balance: Stand By Assistance- Pt completed seated LE exercises and was able to assist in putting B UEs through gown. Static Standing Balance: Contact Guard Assistance- pt stood at walker with B UE support for ~1 minute while therapist changed linen on chair. Exercise:  Patient was guided in 1 set(s) 10 reps of exercise to both lower extremities. Seated heel/toe raises, Long arc quads and Seated abduction/adduction. Exercises were completed for increased independence with functional mobility. Functional Outcome Measures: Completed  -PAC Inpatient Mobility Raw Score : 14  -PAC Inpatient T-Scale Score : 38.1    ASSESSMENT:  Assessment: Patient progressing toward established goals. Activity Tolerance:  Patient tolerance of  treatment: fair. Pt able to tolerate activity on 5L O2 and maintain O2 sats >90%. Equipment Recommendations:Equipment Needed: No  Discharge Recommendations:  Subacute/Skilled Nursing Facility    Plan: Times per week: 3-5 X GM  Current Treatment Recommendations: Strengthening, Functional Mobility Training, Transfer Training, Balance Training, Endurance Training, Gait Training    Patient Education  Patient Education: Plan of Care, Transfers: pt educated on hand placement for safety.      Goals:  Patient goals : Get stronger  Short term goals  Time Frame for Short term goals: by discharge  Short term goal 1: supine to sit and return with CGA to get in and out of bed  Short term goal 2: sit to stand with CGA to get on and off various surfaces  Short term goal 3: Pt will ambulate with RW 20 feet with CGA with stable O2 sats  Long term goals  Time Frame for Long term goals : NA due to short ELOS    Following session, patient left in safe position with all fall risk precautions in place. Treatment completed by SUKHI Hubbard under the supervision and guidance of Rafia Queen.

## 2021-03-30 NOTE — CARE COORDINATION
3/30/21, 12:22 PM EDT    DISCHARGE PLANNING EVALUATION  Called [facility] at [facility]. They will start the precert today. Spoke with Shiraz Mortensen this morning and she is aware she has been accepted to [facility] at discharge. Called and left a message for Satya Gross to make her aware precert has been started.

## 2021-03-30 NOTE — PROGRESS NOTES
[address] [place] [address]  Occupational Therapy  Daily Note  Time:   Time In: 8570  Time Out: 2976  Timed Code Treatment Minutes: 25 Minutes  Minutes: 25          Date: 3/30/2021  Patient Name: Sinan Storm,   Gender: female      Room: -/022-A  MRN: [id number]  : 1940  (79 y.o.)  Referring Practitioner: Dr. Jerrod Blue DO  Diagnosis: Pneumonia  Additional Pertinent Hx: Pt presents to EMS for worsening weakness over the last day. Per EMS report patient was having difficulty breathing and hypoxia  with O2 saturation 80% on room air. Patient placed on CPAP and brought to [facility] ED. Patient started on BiPAP in ED with improved vital signs. Patient daughter at bedside states patient has not seemed ill over the past week, though today seemed much weaker than normal and complained of being cold. Patient daughter denies she reporting any cough, nausea, vomiting, abdominal pain, shortness of breath, chest pain, or fever    Restrictions/Precautions:  Restrictions/Precautions: General Precautions, Fall Risk  Position Activity Restriction  Other position/activity restrictions: 3L O2 NC, 4L of O2 on 3/26- monitor O2sats; 5L at rest; 6L wtih activity 3/30     SUBJECTIVE: Nurse ok'd session. Patient completing functional mobility to bathroom with nurse tech upon arrival. Checked O2 sats on 5L with reading of 83%. Nurse increased to 6L- after completing deep breathing in through nose and out through mouth patient able to recover to 94%. PAIN: 10/10: complains of neck pain. Nurse tech notified nurse. Vitals: Oxygen: Patient on 5 L O2 via Nasal Canula  upon arrival to room. Patient O2 sats at 83 % during mobility to bathroom. Nurse increased to 6L with activity. Upon activity patient fluctuating between 88-96% on 6L. Pt requiring rest break(s) to recover.  Encouraged deep breathing throughout    COGNITION: Slow Processing, Decreased Problem Solving, Decreased Safety Awareness and Very hard hearing    ADL:   Grooming: Minimal Assistance. For hair care seated in chair  Toileting: Minimal Assistance. For thoroughness after bowel movement. Increased time for use of bathroom this AM   Toilet Transfer: [employer]. To/from STS using grab bar with increased time. BALANCE:  Sitting Balance:  Stand By Assistance. Standing Balance: Contact Guard Assistance. BED MOBILITY:  Not Tested    TRANSFERS:  Sit to Stand:  Contact Guard Assistance. From STS  Stand to Sit: Contact Guard Assistance. To STS and bedside chair  *Minimal verbal cues for safe/proper technique    FUNCTIONAL MOBILITY:  Assistive Device: Rolling Walker  Assist Level:  Contact Guard Assistance. Distance: From bathroom  Slow pace requiring cueing to keep close proximity to walker and to keep feet within walker frame. Required seated rest break after task. After mobility from bathroom O2 sats on 6L read- 88%, increased time to recover to 94%. ADDITIONAL ACTIVITIES:  Patient complete BUE A/AROM exercises x10 reps x1 set in all joints/planes seated in chair. Limited ROM noted in R shoulder. Required rest breaks after each exercise. Completed to increase activity tolerance and maintain joint integrity required for ADLs    ASSESSMENT:     Activity Tolerance:  Patient tolerance of  treatment: fair.        Discharge Recommendations: Continue to assess pending progress, Patient would benefit from continued therapy after discharge   Equipment Recommendations: Equipment Needed: Yes  Other: sock aid and/or reacher  Plan: Times per week: 3-5x  Specific instructions for Next Treatment: Functional mobility; ADLs and adaptations if needed; endurance training  Current Treatment Recommendations: Functional Mobility Training, Endurance Training, Self-Care / ADL, Strengthening, Equipment Evaluation, Education, & procurement  Plan Comment: Pt would benefit from continued skilled OT services when medically stable and discharged from Acute.  OT recommended while at SNF. Patient Education  Patient Education: safety with transfers and mobility, ADL strategies, deep breathing,  A/AROM    Goals  Short term goals  Time Frame for Short term goals: By discharge  Short term goal 1: Pt will demonstrate functional mobility walking to/from the bathroom with a rolling walker with SBA to prepare for doing sinkside grooming. Short term goal 2: Pt will complete lower body dressing or bathing with Setup A while using any adaptations needed for ease of doing her self care. Short term goal 3: Pt will complete toileting routine including tranfers to/from the standard toilet seat with SBA to increase her independence with self care. Short term goal 4: Pt will complete simple ADLs while standing for over 2 minute duration with min cues for steady breathing to increase her endurance for ease of doing her grooming or dressing. Following session, patient left in safe position with all fall risk precautions in place.

## 2021-03-30 NOTE — PROGRESS NOTES
Patient alert and oriented x2, impaired hearing. PEERLA constricted 3mm to 2mm. Upper extremities were warm, dry, and pink, no numbness or tingling present. Radial pulse was +1 bilaterally. Capillary refill <3 seconds. Heart sounds heard strong over apical pulse. Anterior lung sounds clear and equal bilaterally. Crackles heard over posterior lower lobes. Bowel sounds active <15 seconds x4. Lower extremities were warm, pink, and dry, no numbness or tingling present. Lower extremities red over lower calfs bilaterally. Dorsal pedis pulses +1 bilaterally. Wound on sacral area.

## 2021-03-31 VITALS
BODY MASS INDEX: 39.85 KG/M2 | TEMPERATURE: 98 F | HEIGHT: 60 IN | OXYGEN SATURATION: 93 % | RESPIRATION RATE: 18 BRPM | DIASTOLIC BLOOD PRESSURE: 64 MMHG | SYSTOLIC BLOOD PRESSURE: 132 MMHG | HEART RATE: 80 BPM | WEIGHT: 203 LBS

## 2021-03-31 LAB
ANION GAP SERPL CALCULATED.3IONS-SCNC: 10 MEQ/L (ref 8–16)
BUN BLDV-MCNC: 13 MG/DL (ref 7–22)
CALCIUM SERPL-MCNC: 9.5 MG/DL (ref 8.5–10.5)
CHLORIDE BLD-SCNC: 101 MEQ/L (ref 98–111)
CO2: 31 MEQ/L (ref 23–33)
CREAT SERPL-MCNC: 0.6 MG/DL (ref 0.4–1.2)
ERYTHROCYTE [DISTWIDTH] IN BLOOD BY AUTOMATED COUNT: 16.4 % (ref 11.5–14.5)
ERYTHROCYTE [DISTWIDTH] IN BLOOD BY AUTOMATED COUNT: 55.6 FL (ref 35–45)
GFR SERPL CREATININE-BSD FRML MDRD: > 90 ML/MIN/1.73M2
GLUCOSE BLD-MCNC: 107 MG/DL (ref 70–108)
GLUCOSE BLD-MCNC: 135 MG/DL (ref 70–108)
GLUCOSE BLD-MCNC: 245 MG/DL (ref 70–108)
HCT VFR BLD CALC: 33 % (ref 37–47)
HEMOGLOBIN: 9.8 GM/DL (ref 12–16)
MCH RBC QN AUTO: 27.4 PG (ref 26–33)
MCHC RBC AUTO-ENTMCNC: 29.7 GM/DL (ref 32.2–35.5)
MCV RBC AUTO: 92.2 FL (ref 81–99)
PLATELET # BLD: 284 THOU/MM3 (ref 130–400)
PMV BLD AUTO: 9.7 FL (ref 9.4–12.4)
POTASSIUM SERPL-SCNC: 3.8 MEQ/L (ref 3.5–5.2)
RBC # BLD: 3.58 MILL/MM3 (ref 4.2–5.4)
SODIUM BLD-SCNC: 142 MEQ/L (ref 135–145)
WBC # BLD: 5.2 THOU/MM3 (ref 4.8–10.8)

## 2021-03-31 PROCEDURE — 2580000003 HC RX 258: Performed by: PHYSICIAN ASSISTANT

## 2021-03-31 PROCEDURE — 97530 THERAPEUTIC ACTIVITIES: CPT

## 2021-03-31 PROCEDURE — 99239 HOSP IP/OBS DSCHRG MGMT >30: CPT | Performed by: FAMILY MEDICINE

## 2021-03-31 PROCEDURE — 80048 BASIC METABOLIC PNL TOTAL CA: CPT

## 2021-03-31 PROCEDURE — 6370000000 HC RX 637 (ALT 250 FOR IP): Performed by: STUDENT IN AN ORGANIZED HEALTH CARE EDUCATION/TRAINING PROGRAM

## 2021-03-31 PROCEDURE — 97110 THERAPEUTIC EXERCISES: CPT

## 2021-03-31 PROCEDURE — 6370000000 HC RX 637 (ALT 250 FOR IP): Performed by: PHYSICIAN ASSISTANT

## 2021-03-31 PROCEDURE — 6360000002 HC RX W HCPCS: Performed by: HOSPITALIST

## 2021-03-31 PROCEDURE — 85027 COMPLETE CBC AUTOMATED: CPT

## 2021-03-31 PROCEDURE — 36415 COLL VENOUS BLD VENIPUNCTURE: CPT

## 2021-03-31 PROCEDURE — 6370000000 HC RX 637 (ALT 250 FOR IP): Performed by: FAMILY MEDICINE

## 2021-03-31 PROCEDURE — 2580000003 HC RX 258: Performed by: STUDENT IN AN ORGANIZED HEALTH CARE EDUCATION/TRAINING PROGRAM

## 2021-03-31 PROCEDURE — 82948 REAGENT STRIP/BLOOD GLUCOSE: CPT

## 2021-03-31 PROCEDURE — 6360000002 HC RX W HCPCS: Performed by: STUDENT IN AN ORGANIZED HEALTH CARE EDUCATION/TRAINING PROGRAM

## 2021-03-31 RX ORDER — INSULIN GLARGINE 100 [IU]/ML
12 INJECTION, SOLUTION SUBCUTANEOUS EVERY MORNING
Status: DISCONTINUED | OUTPATIENT
Start: 2021-03-31 | End: 2021-03-31 | Stop reason: HOSPADM

## 2021-03-31 RX ORDER — INSULIN GLARGINE 100 [IU]/ML
12 INJECTION, SOLUTION SUBCUTANEOUS EVERY MORNING
Qty: 1 VIAL | Refills: 3 | Status: ON HOLD | DISCHARGE
Start: 2021-03-31 | End: 2022-01-08 | Stop reason: SDUPTHER

## 2021-03-31 RX ORDER — PREGABALIN 150 MG/1
300 CAPSULE ORAL 2 TIMES DAILY
Qty: 12 CAPSULE | Refills: 0 | Status: SHIPPED | OUTPATIENT
Start: 2021-03-31 | End: 2021-04-14 | Stop reason: SDUPTHER

## 2021-03-31 RX ADMIN — ENOXAPARIN SODIUM 40 MG: 40 INJECTION SUBCUTANEOUS at 09:09

## 2021-03-31 RX ADMIN — PANTOPRAZOLE SODIUM 40 MG: 40 TABLET, DELAYED RELEASE ORAL at 06:16

## 2021-03-31 RX ADMIN — SODIUM CHLORIDE, PRESERVATIVE FREE 10 ML: 5 INJECTION INTRAVENOUS at 09:10

## 2021-03-31 RX ADMIN — INSULIN GLARGINE 12 UNITS: 100 INJECTION, SOLUTION SUBCUTANEOUS at 12:31

## 2021-03-31 RX ADMIN — GUAIFENESIN 600 MG: 600 TABLET, EXTENDED RELEASE ORAL at 09:09

## 2021-03-31 RX ADMIN — BUMETANIDE 1 MG: 1 TABLET ORAL at 09:08

## 2021-03-31 RX ADMIN — INSULIN LISPRO 4 UNITS: 100 INJECTION, SOLUTION INTRAVENOUS; SUBCUTANEOUS at 12:31

## 2021-03-31 RX ADMIN — INSULIN GLARGINE 10 UNITS: 100 INJECTION, SOLUTION SUBCUTANEOUS at 09:10

## 2021-03-31 RX ADMIN — NIFEDIPINE 60 MG: 60 TABLET, EXTENDED RELEASE ORAL at 09:09

## 2021-03-31 RX ADMIN — POTASSIUM CHLORIDE 20 MEQ: 1500 TABLET, EXTENDED RELEASE ORAL at 09:10

## 2021-03-31 RX ADMIN — PREGABALIN 300 MG: 75 CAPSULE ORAL at 09:10

## 2021-03-31 RX ADMIN — CEFEPIME HYDROCHLORIDE 2000 MG: 2 INJECTION, POWDER, FOR SOLUTION INTRAVENOUS at 09:12

## 2021-03-31 ASSESSMENT — PAIN SCALES - WONG BAKER
WONGBAKER_NUMERICALRESPONSE: 0

## 2021-03-31 ASSESSMENT — PAIN SCALES - GENERAL
PAINLEVEL_OUTOF10: 0

## 2021-03-31 NOTE — PROGRESS NOTES
Patient pulled out her IV accidentally. Per Dr. Cherelle Manning there is no need to restart the IV at this time. She is scheduled to leave this afternoon.

## 2021-03-31 NOTE — PROGRESS NOTES
Pt is comfortably lying in bed awake. Heels are elevated off the bed. Pt has a visitor. Belongings are all in bag and sitting on chair. Pt discharge planned for 1500 via EMS to [facility]. Call light is in reach. SBARR given to primary nurse Stacy Polanco.  Christian RICHARD/C

## 2021-03-31 NOTE — PROGRESS NOTES
[address] [place] [address]  Occupational Therapy  Daily Note  Time:   Time In: 0748  Time Out: 1103  Timed Code Treatment Minutes: 23 Minutes  Minutes: 23          Date: 3/31/2021  Patient Name: Gabriel Young,   Gender: female      Room: [facility]/022-A  MRN: [id number]  : 1940  (79 y.o.)  Referring Practitioner: Dr. Nuvia Dave DO  Diagnosis: Pneumonia  Additional Pertinent Hx: Pt presents to EMS for worsening weakness over the last day. Per EMS report patient was having difficulty breathing and hypoxia  with O2 saturation 80% on room air. Patient placed on CPAP and brought to [facility] ED. Patient started on BiPAP in ED with improved vital signs. Patient daughter at bedside states patient has not seemed ill over the past week, though today seemed much weaker than normal and complained of being cold. Patient daughter denies she reporting any cough, nausea, vomiting, abdominal pain, shortness of breath, chest pain, or fever    Restrictions/Precautions:  Restrictions/Precautions: General Precautions, Fall Risk  Position Activity Restriction  Other position/activity restrictions: 3L O2 NC, 4L of O2 on 3/26- monitor O2sats; 5L at rest; 6L wtih activity 3/30     SUBJECTIVE: Nurse ok'd session. Patient seated in bedside chair upon arrival- reporting she just had an accident and she is fatigued. Agreeable to OT session     PAIN: Neck pain, did not rate    Vitals: Oxygen:   Patient on 4 L O2 via Nasal Canula  upon arrival to room. Patient O2 sats at 98 %. Patient on 4L half of session with O2 sats fluctuating between 95-98%. Nurse decreased to 2L. Upon activity patient fluctuating between 91-93%. Pt requiring rest break(s) to recover. Encouraged deep breathing throughout      COGNITION: Decreased Insight and Decreased Safety Awareness. Patient very hard of hearing     ADL:   No ADL's completed this session. Adalid Fraire BALANCE:  Sitting Balance:  Stand By Assistance.     Standing Balance: Contact Guard Assistance. With BUE support on walker. Approx 30 seconds x2 trials    BED MOBILITY:  Sit to Supine: Moderate Assistance - bringing BLE into bed    TRANSFERS:  Sit to Stand:  Minimal Assistance. From bedside chair x1 trial; CGA from bedside chair x1 trial. Increased time and vcs for safe technique  Stand to Sit: Contact Guard Assistance. to bedside chair x2 trials with cueing for hand placement  Stand pivot: Minimal Assistance from bedside chair to EOB towards L side using RW at slow pace and vcs for technique       ADDITIONAL ACTIVITIES:  Patient complete BUE A/AROM exercises x10 reps x1 set in all joints/planes seated in chair. Limited ROM noted in R shoulder. Required rest breaks after each exercise. Completed to increase activity tolerance and maintain joint integrity required for ADLs    ASSESSMENT:     Activity Tolerance:  Patient tolerance of  treatment: fair. Discharge Recommendations: Continue to assess pending progress, Patient would benefit from continued therapy after discharge, Subacute/Skilled Nursing Facility   Equipment Recommendations: Equipment Needed: Yes  Other: sock aid and/or reacher  Plan: Times per week: 3-5x  Specific instructions for Next Treatment: Functional mobility; ADLs and adaptations if needed; endurance training  Current Treatment Recommendations: Functional Mobility Training, Endurance Training, Self-Care / ADL, Strengthening, Equipment Evaluation, Education, & procurement  Plan Comment: Pt would benefit from continued skilled OT services when medically stable and discharged from Acute. OT recommended while at SNF. Patient Education  Patient Education: safety with transfers, deep breathing, A/AROM    Goals  Short term goals  Time Frame for Short term goals: By discharge  Short term goal 1: Pt will demonstrate functional mobility walking to/from the bathroom with a rolling walker with SBA to prepare for doing sinkside grooming.   Short term goal 2: Pt will complete lower body dressing or bathing with Setup A while using any adaptations needed for ease of doing her self care. Short term goal 3: Pt will complete toileting routine including tranfers to/from the standard toilet seat with SBA to increase her independence with self care. Short term goal 4: Pt will complete simple ADLs while standing for over 2 minute duration with min cues for steady breathing to increase her endurance for ease of doing her grooming or dressing. Following session, patient left in safe position with all fall risk precautions in place.

## 2021-03-31 NOTE — DISCHARGE SUMMARY
Hospital Medicine Discharge Summary      Patient Identification:   Clara Gonzales   : 1940  MRN: [id number]   Account: [de-identified]      Patient's PCP: Ibis Hernandez MD    Admit Date: 3/23/2021     Discharge Date: 3/31/2021 3/31/2021    Admitting Physician: Char Valdes MD     Discharge Physician: Tracy Vinson MD     Discharge Diagnoses:    Sepsis secondary to pneumonia  · CXR- multifocal bilateral infiltrates.    · CRP 17.67, procal 0.36. Initially on BiPAP due to respiratory alkalosis secondary to tachypnea.   · Started on vancomycin and Zosyn 3/24. Transitioned to Rocephin and doxy.  Switch Rocephin to Cefepime for Pseudomonas coverage due to persitent fevers since admission. 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  Trace mitral regurgitation. · Increased Bumex to 1 mg BID 3/29  · Follow up with cardiology     IDDM2  · Continue home Lantus. · Hypoglycemia protocol.     Essential Hypertension   · BP on the low side, held hydralazine and Procardia   · Continue nifedipine.     Contraction Alkalosis, improved  · continue Bumex     Hypokalemia  · Likely secondary to diuresis  · Replacement protocol ordered.     SWAPNA  · On CPAP at home     Morbid obesity with BMI 44.2 kg/m²     Hypoalbuminemia, likely secondary to acute infection  · Dietitian consulted     Stage II decubitus ulcers  · No visible bone.  Continue to monitor.     IDALIA on CKD 3, resolved  · Creatinine 1.4 on arrival    The patient was seen and examined on day of discharge and this discharge summary is in conjunction with any daily progress note from day of discharge.     Hospital Course:   Per admission H&P:    Mrs. Bi Elizalde a 81 Y/O female with past medical history of diabetes, hypertension, presents to EMS for worsening weakness over the last day.  Per EMS report patient was having difficulty breathing and hypoxia  with O2 saturation 80% on room air.  Patient placed on CPAP and brought to [address] [place] [address] started on BiPAP in ED with improved vital signs.  Patient daughter at bedside states patient has not seemed ill over the past week, though today seemed much weaker than normal and complained of being cold.  Patient daughter denies she reporting any cough, nausea, vomiting, abdominal pain, shortness of breath, chest pain, or fever. \"     3/25/2021: Patient switched to Rocephin and doxycycline, but continued to have high fevers.  We will transition Rocephin to cefepime.  Trend cultures.  Respiratory status overall improved. 3/26/2021: Patient doing well, feeling improved.  Continue antibiotics. 3/27/2021: Having some hypertension, restart home nifedipine. 3/28/2021: CTA chest shows extensive bilateral airspace infiltrates, suggestive of combination pneumonia pulmonary edema.  CTA also noted lymphadenopathy throughout mediastinum, paratracheal, pretracheal, and subcarinal regions.  CT abdomen and pelvis showed no acute abnormalities.  CXR and echo in a.m. 3/29.  Change Bumex to 1 mg daily secondary to contraction alkalosis. 3/29: . PT/OT following. Requiring 5 L oxygen. Continue antibiotics and duretics  3/31: Cefepime discontinued. Patient imprved.  O2 weaned down to 2 lpm, saturating well.      Exam:     Vitals:  Vitals:    03/31/21 0830 03/31/21 0908 03/31/21 1100 03/31/21 1215   BP: 124/70 122/66  132/64   Pulse: 78   80   Resp: 18   18   Temp: 98.3 °F (36.8 °C)   98 °F (36.7 °C)   TempSrc: Oral   Oral   SpO2: 96%  92% 93%   Weight:       Height:         Weight: Weight: 203 lb (92.1 kg)     24 hour intake/output:    Intake/Output Summary (Last 24 hours) at 3/31/2021 2302  Last data filed at 3/31/2021 1357  Gross per 24 hour   Intake airspace infiltrates with the findings suggest possible combination of pneumonia and pulmonary edema. No acute abnormalities in the abdomen or pelvis. **This report has been created using voice recognition software. It may contain minor errors which are inherent in voice recognition technology. **      Final report electronically signed by Dr. Lisandro Fuller on 3/26/2021 2:53 PM      CT ABDOMEN PELVIS W IV CONTRAST Additional Contrast? None   Final Result   Extensive bilateral airspace infiltrates with the findings suggest possible combination of pneumonia and pulmonary edema. No acute abnormalities in the abdomen or pelvis. **This report has been created using voice recognition software. It may contain minor errors which are inherent in voice recognition technology. **      Final report electronically signed by Dr. Lisandro Fuller on 3/26/2021 2:53 PM      XR CHEST PORTABLE   Final Result   1. Cardiomegaly. 2.  Multifocal bilateral infiltrates are present. This document has been electronically signed by: Natividad Snyder M.D. on    03/23/2021 10:47 PM             Consults:     IP CONSULT TO SOCIAL WORK  PALLIATIVE CARE EVAL  IP CONSULT TO DIETITIAN  IP CONSULT TO SPIRITUAL SERVICES    Disposition:    [] Home       [] TCU       [] Rehab       [] Psych       [x] SNF       [] [facility]       [] Other-    Condition at Discharge: Stable    Code Status:  Prior     Patient Instructions:    Discharge lab work:    Activity: activity as tolerated  Diet: No diet orders on file      Follow-up visits:   NAVNEET Harman  [address] [place] [address]  [phone]             Discharge Medications:      Mary Gray   Home Medication Instructions [id number]    Printed on:03/31/21 0918   Medication Information                      Acetaminophen 500 MG CAPS  Take 1,000 mg by mouth every 6 hours as needed for Pain             ascorbic acid (VITAMIN C) 1000 MG tablet  Take 1 tablet by Vitamin D (CHOLECALCIFEROL) 50 MCG (2000 UT) TABS tablet  Take 1 tablet by mouth daily             zinc sulfate (ZINCATE) 220 (50 Zn) MG capsule  Take 1 capsule by mouth daily                 Time Spent on discharge is more than 45 minutes in the examination, evaluation, counseling and review of medications and discharge plan. Signed: Thank you Yuli Hensley MD for the opportunity to be involved in this patient's care.     Electronically signed by Kalpana Kunz MD on 3/31/2021 at 11:02 PM

## 2021-03-31 NOTE — PROGRESS NOTES
[address] [address]  INPATIENT PHYSICAL THERAPY  DAILY NOTE  [facility] CCU-STEPDOWN 3B - 3B-22/022-A    Time In: 9347  Time Out: 1964  Timed Code Treatment Minutes: 34 Minutes  Minutes: 29          Date: 3/31/2021  Patient Name: Kimmy Rosario,  Gender:  female        MRN: [id number]  : 1940  (79 y.o.)     Referring Practitioner: Franci Carbajal DO  Diagnosis: Pneumonia with sepsis  Additional Pertinent Hx: Pt admitted 3-23 with peumonia and sepsis     Prior Level of Function:  Lives With: Daughter  Type of Home: House  Home Layout: One level  Home Equipment: 4 wheeled walker   Bathroom Shower/Tub: Tub/Shower unit, Shower chair with back  Bathroom Toilet: Standard  Bathroom Accessibility: Accessible    Receives Help From: Family  ADL Assistance: Independent  Homemaking Assistance: Needs assistance  Homemaking Responsibilities: No  Ambulation Assistance: Independent  Transfer Assistance: Independent  Active : No  Additional Comments: Pt reports walking on own at [facility] with rollator. She reported having to use the shower chair while taking the shower. She donned her own socks with some difficulty but able to complete independently. Restrictions/Precautions:  Restrictions/Precautions: General Precautions, Fall Risk  Position Activity Restriction  Other position/activity restrictions: 3L O2 NC, 4L of O2 on 3/26- monitor O2sats; 5L at rest; 6L wtih activity 3/30     SUBJECTIVE: pt in bed on arrival and agreeable to therapy.      PAIN: 10/10: R shoulder, not rated: in back     Vitals: Oxygen: 4L NC, maintained >92%    OBJECTIVE:  Bed Mobility:  Rolling to Right: Stand By Assistance- use of bed rail, increased time for completion  Supine to Sit: Stand By Assistance- increased time for completion  Scooting: Stand By Assistance- to EOB, feet on floor  HOB elevated, min verbal cues for sequencing     Transfers:  Sit to Stand: Contact Guard Assistance- EOB to walker  Stand to Sit: [employer]- walker to term goal 2: sit to stand with CGA to get on and off various surfaces  Short term goal 3: Pt will ambulate with RW 20 feet with CGA with stable O2 sats  Long term goals  Time Frame for Long term goals : NA due to short ELOS    Following session, patient left in safe position with all fall risk precautions in place. Treatment completed by SUKHI Woody under the supervision and guidance of Frederic Matos [address] [place] [address].

## 2021-03-31 NOTE — PROGRESS NOTES
Pt is sitting comfortably in chair. Heels are elevated off chair. Pt denies being in any pain. Call light and bedside table within reach.  Alfreda RICHARD/LISETTEC

## 2021-03-31 NOTE — PROGRESS NOTES
CLINICAL PHARMACY: DISCHARGE MED RECONCILIATION/REVIEW    [facility]) Select Patient?: Yes  Total # of Interventions Recommended: 1 -   - New Order #: 1   -   Total # Interventions Accepted: 1  Intervention Severity:   - Level 1 Intervention Present?: No   - Level 2 #: 0   - Level 3 #: 1   Time Spent (min): 15    Additional Documentation:  Patient being discharged to ECF will need 3 day controlled script for lyrica - spoke with Dr. Chloe Abrams - he will put in script.

## 2021-03-31 NOTE — PROGRESS NOTES
Pt is alert and oriented x4. No numbness or tingling in upper or lower extremities. +1 pitting edema in lower extremities bilaterally. Regular heart sounds S1 and S2. Lung sounds diminished bilaterally. Abdomen soft and round. Bowel sounds active x4. Bedside table and call light in reach.  Scot Lady SN/RSC

## 2021-04-01 ENCOUNTER — OUTSIDE SERVICES (OUTPATIENT)
Dept: FAMILY MEDICINE CLINIC | Age: 81
End: 2021-04-01
Payer: MEDICARE

## 2021-04-01 ENCOUNTER — CARE COORDINATION (OUTPATIENT)
Dept: CASE MANAGEMENT | Age: 81
End: 2021-04-01

## 2021-04-01 VITALS
SYSTOLIC BLOOD PRESSURE: 110 MMHG | HEART RATE: 86 BPM | HEIGHT: 60 IN | TEMPERATURE: 98.9 F | DIASTOLIC BLOOD PRESSURE: 65 MMHG | RESPIRATION RATE: 16 BRPM | WEIGHT: 202 LBS | BODY MASS INDEX: 39.66 KG/M2
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
## 2021-04-01 DIAGNOSIS — D64.9 NORMOCYTIC ANEMIA: ICD-10-CM

## 2021-04-01 DIAGNOSIS — I50.33 ACUTE ON CHRONIC DIASTOLIC CONGESTIVE HEART FAILURE (HCC): ICD-10-CM

## 2021-04-01 DIAGNOSIS — E55.9 VITAMIN D DEFICIENCY: ICD-10-CM

## 2021-04-01 DIAGNOSIS — C31.1: ICD-10-CM

## 2021-04-01 DIAGNOSIS — N18.31 STAGE 3A CHRONIC KIDNEY DISEASE (HCC): ICD-10-CM

## 2021-04-01 DIAGNOSIS — I10 HYPERTENSION, ESSENTIAL: ICD-10-CM

## 2021-04-01 DIAGNOSIS — R53.81 PHYSICAL DECONDITIONING: ICD-10-CM

## 2021-04-01 DIAGNOSIS — L59.8 RADIONECROSIS: ICD-10-CM

## 2021-04-01 DIAGNOSIS — M51.36 DEGENERATIVE DISC DISEASE, LUMBAR: ICD-10-CM

## 2021-04-01 DIAGNOSIS — J96.01 ACUTE RESPIRATORY FAILURE WITH HYPOXIA (HCC): ICD-10-CM

## 2021-04-01 DIAGNOSIS — Z79.4 TYPE 2 DIABETES MELLITUS WITH STAGE 3A CHRONIC KIDNEY DISEASE, WITH LONG-TERM CURRENT USE OF INSULIN (HCC): ICD-10-CM

## 2021-04-01 DIAGNOSIS — E11.22 TYPE 2 DIABETES MELLITUS WITH STAGE 3A CHRONIC KIDNEY DISEASE, WITH LONG-TERM CURRENT USE OF INSULIN (HCC): ICD-10-CM

## 2021-04-01 DIAGNOSIS — N17.9 ACUTE KIDNEY INJURY SUPERIMPOSED ON CKD (HCC): ICD-10-CM

## 2021-04-01 DIAGNOSIS — N18.9 ACUTE KIDNEY INJURY SUPERIMPOSED ON CKD (HCC): ICD-10-CM

## 2021-04-01 DIAGNOSIS — J15.9 BACTERIAL PNEUMONIA: Primary | ICD-10-CM

## 2021-04-01 DIAGNOSIS — K21.9 GASTROESOPHAGEAL REFLUX DISEASE, UNSPECIFIED WHETHER ESOPHAGITIS PRESENT: ICD-10-CM

## 2021-04-01 DIAGNOSIS — N18.31 TYPE 2 DIABETES MELLITUS WITH STAGE 3A CHRONIC KIDNEY DISEASE, WITH LONG-TERM CURRENT USE OF INSULIN (HCC): ICD-10-CM

## 2021-04-01 DIAGNOSIS — L89.152 PRESSURE INJURY OF SACRAL REGION, STAGE 2 (HCC): ICD-10-CM

## 2021-04-01 PROBLEM — Z22.39 COLONIZATION WITH DRUG-RESISTANT BACTERIA: Status: RESOLVED | Noted: 2019-02-07 | Resolved: 2021-04-01

## 2021-04-01 PROBLEM — L97.919 VENOUS ULCER OF RIGHT LEG (HCC): Status: RESOLVED | Noted: 2018-11-30 | Resolved: 2021-04-01

## 2021-04-01 PROBLEM — L03.115 BILATERAL CELLULITIS OF LOWER LEG: Status: RESOLVED | Noted: 2018-09-27 | Resolved: 2021-04-01

## 2021-04-01 PROBLEM — L97.929 VENOUS ULCER OF LEFT LEG (HCC): Status: RESOLVED | Noted: 2018-11-30 | Resolved: 2021-04-01

## 2021-04-01 PROBLEM — E83.42 HYPOMAGNESEMIA: Status: RESOLVED | Noted: 2018-09-30 | Resolved: 2021-04-01

## 2021-04-01 PROBLEM — J01.41 ACUTE RECURRENT PANSINUSITIS: Status: RESOLVED | Noted: 2017-06-15 | Resolved: 2021-04-01

## 2021-04-01 PROBLEM — B95.62 INFECTION OF SKIN DUE TO METHICILLIN RESISTANT STAPHYLOCOCCUS AUREUS (MRSA): Status: RESOLVED | Noted: 2018-03-06 | Resolved: 2021-04-01

## 2021-04-01 PROBLEM — A41.9 SEPSIS (HCC): Status: RESOLVED | Noted: 2021-01-25 | Resolved: 2021-04-01

## 2021-04-01 PROBLEM — I83.029 VENOUS ULCER OF LEFT LEG (HCC): Status: RESOLVED | Noted: 2018-11-30 | Resolved: 2021-04-01

## 2021-04-01 PROBLEM — L03.116 BILATERAL CELLULITIS OF LOWER LEG: Status: RESOLVED | Noted: 2018-09-27 | Resolved: 2021-04-01

## 2021-04-01 PROBLEM — Z88.1 DRUG ALLERGY, ANTIBIOTIC: Status: RESOLVED | Noted: 2018-03-06 | Resolved: 2021-04-01

## 2021-04-01 PROBLEM — L03.90 CELLULITIS: Status: RESOLVED | Noted: 2021-02-28 | Resolved: 2021-04-01

## 2021-04-01 PROBLEM — I83.019 VENOUS ULCER OF RIGHT LEG (HCC): Status: RESOLVED | Noted: 2018-11-30 | Resolved: 2021-04-01

## 2021-04-01 PROBLEM — I50.23 ACUTE ON CHRONIC SYSTOLIC CHF (CONGESTIVE HEART FAILURE) (HCC): Status: RESOLVED | Noted: 2018-06-14 | Resolved: 2021-04-01

## 2021-04-01 PROBLEM — L08.9 INFECTION OF SKIN DUE TO METHICILLIN RESISTANT STAPHYLOCOCCUS AUREUS (MRSA): Status: RESOLVED | Noted: 2018-03-06 | Resolved: 2021-04-01

## 2021-04-01 PROBLEM — R21 RASH OF ENTIRE BODY: Status: RESOLVED | Noted: 2018-03-06 | Resolved: 2021-04-01

## 2021-04-01 PROBLEM — E16.2 HYPOGLYCEMIA: Status: RESOLVED | Noted: 2018-03-29 | Resolved: 2021-04-01

## 2021-04-01 PROBLEM — K56.609 SBO (SMALL BOWEL OBSTRUCTION) (HCC): Status: RESOLVED | Noted: 2018-10-04 | Resolved: 2021-04-01

## 2021-04-01 PROCEDURE — 99305 1ST NF CARE MODERATE MDM 35: CPT | Performed by: FAMILY MEDICINE

## 2021-04-01 NOTE — PROGRESS NOTES
H&P (Admission H&P at [facility])      NAME: Alonso Cullen  DATE: 21  ROOM #: 29-1  CODE STATUS: FULL CODE  REASON FOR ADMISSION: Weakness after hospitalization for pneumonia/sepsis. : 1940  ADMISSION DATE: 2021  SKILLED PATIENT: Yes    History obtained from chart review, the patient and nursing staff. SUBJECTIVE:  HPI: Alonso Cullen is a 79 y.o. female. Pt seen and examined at bedside. Patient admitted to [facility] from 3/23 to 3/31 for issues as noted below. D/c summary: \"Discharge Diagnoses:     Sepsis secondary to pneumonia  · CXR- multifocal bilateral infiltrates.    · CRP 17.67, procal 0.36. Initially on BiPAP due to respiratory alkalosis secondary to tachypnea.   · Started on vancomycin and Zosyn 3/24. Transitioned to Rocephin and doxy.  Switch Rocephin to Cefepime for Pseudomonas coverage due to persitent fevers since admission. 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  Trace mitral regurgitation. · Increased Bumex to 1 mg BID 3/29  · Follow up with cardiology     IDDM2  · Continue home Lantus.   · Hypoglycemia protocol.     Essential Hypertension   · BP on the low side, held hydralazine and Procardia   · Continue nifedipine.     Contraction Alkalosis, improved  · continue Bumex     Hypokalemia  · Likely secondary to diuresis  · Replacement protocol ordered.     SWAPNA  · On CPAP at home     Morbid obesity with BMI 44.2 kg/m²     Hypoalbuminemia, likely secondary to acute infection  · Dietitian consulted     Stage II decubitus ulcers  · No visible bone.  Continue to monitor.     IDALIA on CKD 3, resolved  · Creatinine 1.4 on arrival     The patient was seen and examined on day of discharge and this discharge summary is in conjunction with any daily progress note from day of discharge.     Hospital Course:   Per admission H&P:     Mrs. Guaman is a 79 Y/O female with past medical history of diabetes, hypertension, presents to EMS for worsening weakness over the last day.  Per EMS report patient was having difficulty breathing and hypoxia  with O2 saturation 80% on room air.  Patient placed on CPAP and brought to [address] [place] [address] started on BiPAP in ED with improved vital signs.  Patient daughter at bedside states patient has not seemed ill over the past week, though today seemed much weaker than normal and complained of being cold.  Patient daughter denies she reporting any cough, nausea, vomiting, abdominal pain, shortness of breath, chest pain, or fever. \"     3/25/2021: Patient switched to Rocephin and doxycycline, but continued to have high fevers.  We will transition Rocephin to cefepime.  Trend cultures.  Respiratory status overall improved. 3/26/2021: Patient doing well, feeling improved.  Continue antibiotics. 3/27/2021: Having some hypertension, restart home nifedipine. 3/28/2021: CTA chest shows extensive bilateral airspace infiltrates, suggestive of combination pneumonia pulmonary edema.  CTA also noted lymphadenopathy throughout mediastinum, paratracheal, pretracheal, and subcarinal regions.  CT abdomen and pelvis showed no acute abnormalities.  CXR and echo in a.m. 3/29.  Change Bumex to 1 mg daily secondary to contraction alkalosis. 3/29: . PT/OT following. Requiring 5 L oxygen. Continue antibiotics and duretics  3/31: Cefepime discontinued. Patient imprved. O2 weaned down to 2 lpm, saturating well. \"     Since admission:  Doing good thus far  Breathing at baseline  On O2 yet  No cough, wheezing or SOB  No bleeding, melena or hematochezia. Is on iron. Wound team following her sacral wound that she admitted with. Labs were stable prior to discharge.    Appetite good  Will be starting with therapy soon      -DM2: on lantus 12 units qhs, metformin and Januvia. Just admitted last night, so no sugar readings available yet. -Ethmoid sinus carcinoma/radionecrosis of frontal lobe: following with [facility]. 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 Now with local recurrence, post endoscopic resection on 2/1/2020. MRI 2/9/21 suggests radionecrosis of frontal lobe. \" She was being setup with neuro-oncology, Dr. Jemal Paris for some-time in April. She has f/u with [facility] next with ENT and has MRI brain setup as well. Transport being arranged. Getting routine nasal rinses. -HTN: on nifedipine ER 60mg daily. BP's since admission have been <140/90. Denies CP/sOB    -GERD: on omeprazole. sxs controlled. No bowel habit changes. -Vit d def: on supplementation. Tolerating well.     -Chronic low back pain: on lyrica. Works well for her. sxs controlled. Allergies and Medications were reviewed through the [facility] EMR. All medications reviewed and reconciled, including OTC and herbal medications.        Patient Active Problem List    Diagnosis Date Noted    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[address] 03/06/2018     Priority: High     Class: Acute    Acute on chronic diastolic congestive heart failure (HCC)     Acute respiratory failure with hypoxia ([address]     Pneumonia due to organism 87/50/0989    Folliculitis     Brain mass 01/26/2021    COVID-19 12/28/2020    Chronic rhinitis 02/26/2020    Closed fracture of head of humerus 02/26/2020    Obesity, Class II, BMI 35-39.9 02/11/2020    Cataract of both eyes 06/05/2019    History of ventral hernia repair 06/05/2019    Other cervical disc degeneration, mid-cervical region 06/05/2019    Spondylolisthesis of cervical region 06/05/2019    Spondylolisthesis of thoracic region 06/05/2019    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/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    Late effect of radiation 04/15/2019    Angio-edema 02/24/2019    Dystrophic nail 02/07/2019    Eczematous dermatitis 01/24/2019    Acute eczema 11/30/2018    Venous insufficiency of both lower extremities 11/30/2018    Lymphedema of both lower extremities 11/30/2018    Pressure injury of left buttock, stage 2 ([address] 11/30/2018    Bilateral lower leg cellulitis 09/30/2018    CKD (chronic kidney disease) stage 3, GFR 30-59 ml/min ([facility]) 09/30/2018    Iron deficiency anemia 09/30/2018    Venous ulcers of both lower extremities ([facility]) 09/27/2018    Skin plaque     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[address] 08/24/2017     Overview:   Added automatically from request for surgery [id number]      Malignant neoplasm of ethmoidal sinus ([address] 08/24/2017    Primary thunderclap headache 06/15/2017    Diabetic peripheral neuropathy ([address] 01/09/2017    SWAPNA on CPAP 01/03/2017    Dysphagia 09/26/2016    Laryngopharyngeal reflux 09/26/2016    Benign essential HTN 02/05/2016    DDD (degenerative disc disease), lumbar     Type 2 diabetes mellitus without complication, without long-term current use of insulin ([address] 07/25/2014    Hypercholesterolemia 07/20/2011    Osteoarthritis 07/20/2011    Osteoporosis 07/20/2011       Past Medical History:   Diagnosis Date    Cancer [facility])     adenocarcinoma of her sinuses    Cataract of both eyes     DDD (degenerative disc disease), lumbar     DM2 (diabetes mellitus, type 2) ([address]     diagnoses approx 2000    Dysphagia, pharyngoesophageal 09/26/2016    Eczema 03/2018    Fibrocystic breast     H/O ventral hernia repair     Headache 02/09/2017    History of COVID-19 12/2020    Hyperlipidemia     Hypertension     Iron deficiency anemia     LPRD (laryngopharyngeal reflux disease) 09/26/2016    Neuropathy     peripheral    Obesity     Orbital abscess     Orbital cellulitis 01/22/2014    SWAPNA (obstructive sleep apnea)     Osteoarthritis     Osteoporosis     Persistent proteinuria associated Sister     Cancer Sister         Skin     Cancer Brother         Bone cancer from metal    Other Brother         passed as a child    Heart Disease Brother     Other Brother         tremor    Heart Attack Brother     No Known Problems Brother     Heart Disease Brother     No Known Problems Brother     No Known Problems Brother          I have reviewed the patient's past medical history, past surgical history, allergies, medications, social and family history and I have made updates where appropriate. Review of Systems  Positive responses are highlighted in bold    Constitutional:  Fever, Chills, Night Sweats, Fatigue, Unexpected changes in weight  Eyes:  Eye discharge, Eye pain, Eye redness, Visual disturbances   HENT:  Ear pain, Tinnitus, Nosebleeds, Trouble swallowing, Hearing loss, Sore throat  Cardiovascular:  Chest Pain, Palpitations, Orthopnea, Paroxysmal Nocturnal Dyspnea  Respiratory:  Cough, Wheezing, Shortness of breath, Chest tightness, Apnea  Gastrointestinal:  Nausea, Vomiting, Diarrhea, Constipation, Heartburn, Blood in stool  Genitourinary:  Difficulty or painful urination, Flank pain, Change in frequency, Urgency  Skin:  Color change, Rash, Itching, Wound  Psychiatric:  Hallucinations, Anxiety, Depression, Suicidal ideation  Hematological:  Enlarged glands, Easy bleeding, Easily bruising  Musculoskeletal:  Joint pain, Back pain, Gait problems, Joint swelling, Myalgias  Neurological:  Dizziness, Headaches, Presyncope, Numbness, Seizures, Tremors  Allergy:  Environmental allergies, Food allergies  Endocrine:  Heat Intolerance, Cold Intolerance, Polydipsia, Polyphagia, Polyuria      PHYSICAL EXAM:  Vitals:    04/01/21 1315   BP: 110/65   Pulse: 86   Resp: 16   Temp: 98.9 °F (37.2 °C)   Weight: 202 lb (91.6 kg)   Height: 5' (1.524 m)     Body mass index is 39.45 kg/m². Pain Score:    3(back/joints, chronic)    VS Reviewed  General Appearance: well developed and well- nourished, in no acute distress  Head: normocephalic and atraumatic  Eyes: pupils equal, round, and reactive to light, conjunctivae and eye lids without erythema  ENT: external ear and ear canal normal bilaterally, nose without deformity, nasal mucosa and turbinates normal without polyps, oropharynx normal, dentition is normal for age, no lip or gum lesions noted  Neck: supple and non-tender without mass, no thyromegaly or thyroid nodules, no cervical lymphadenopathy  Pulmonary/Chest: clear to auscultation bilaterally- no wheezes, rales or rhonchi, normal air movement, no respiratory distress or retractions  Cardiovascular: normal rate, regular rhythm, normal S1 and S2, no murmurs, rubs, clicks, or gallops, distal pulses intact  Abdomen: soft, non-tender, non-distended, bowel sounds physiologic,  no rebound or guarding, no masses or hernias noted. Liver and spleen without enlargement. Extremities: no cyanosis, clubbing or edema of the lower extremities  Musculoskeletal: No joint swelling or gross deformity   Neuro:  Alert, 4/5 strength globally and symmetrically, 2+ patellar reflexes b/l,  normal speech, no focal findings or movement disorder noted  Psych:  Normal affect without evidence of depression or anxiety, insight and judgement are appropriate, memory appears intact  Skin: warm and dry, no rash or erythema. Sacral wound is dressed today.    Lymph:  No cervical, auricular or supraclavicular lymph nodes palpated      LABS/IMAGING    Recent Labs     03/31/21  0355 03/30/21  0349 03/29/21  0404   WBC 5.2 4.4* 4.5*   HGB 9.8* 9.7* 9.5*   HCT 33.0* 32.0* 30.9*   MCV 92.2 90.4 91.7    300 284       Lab Results   Component Value Date     03/31/2021    K 3.8 03/31/2021    K 4.0 03/29/2021     03/31/2021    CO2 31 03/31/2021    BUN 13 03/31/2021    CREATININE 0.6 03/31/2021    GLUCOSE 107 03/31/2021    GLUCOSE 105 07/21/2020    CALCIUM 9.5 03/31/2021      Lab Results   Component Value Date    LABA1C 7.8 12/28/2020 LABA1C 6.5 02/26/2020    LABA1C 6.0 03/04/2019    LABA1C 6.8 09/27/2018    LABA1C 6.1 04/22/2018    LABA1C 5.5 03/06/2018       Narrative   Chest X-ray, 1 View       COMPARISON:  CR,SR  - XR CHEST PORTABLE  - 03/23/2021 10:05 PM EDT    CR,SR  - XR CHEST PORTABLE  - 01/25/2021 11:47 AM EST       FINDINGS:   Increased diffuse bilateral airspace disease. Low lung volumes. Possible left pleural effusion. No pneumothorax. Stable cardiomegaly. No acute fracture. Similar-appearing chronic right humeral neck fracture.           Impression   Increased diffuse bilateral airspace disease. Possible left pleural effusion.       This document has been electronically signed by: Veronika Aldana MD on    03/29/2021 02:49 AM       Narrative   PROCEDURE: CTA CHEST W WO CONTRAST, CT ABDOMEN PELVIS W IV CONTRAST       CLINICAL INFORMATION: FUO assess for source .       COMPARISON: 4/20/2018 CTA chest, 10/7/2018 CT abdomen pelvis enterography       TECHNIQUE: 2-D multiplanar postcontrast images of the chest with 3-D MIPS. [id number]   All CT scans at this facility use dose modulation, iterative reconstruction, and/or weight-based dosing when appropriate to reduce radiation dose to as low as reasonably achievable.       FINDINGS: There is no pulmonary embolism. There is no aneurysm or dissection. There is innumerable nonenlarged mediastinal lymph nodes present. These are more obvious since the prior exam. There is a left paratracheal enlarged lymph node measuring 13 mm    short axis there is a partially calcified pretracheal enlarged node. These have developed since the prior exam. There is subcarinal lymphadenopathy. There is marked motion artifact on lung parenchymal windows limiting the evaluation but there is    extensive bilateral upper lobe airspace infiltrates and bilateral pleural effusions with left lower lobe consolidation. Findings may represent a combination of pulmonary edema and pneumonic infiltrates.  More confluent infiltrate is seen in the left upper    lobe apex.       Abdomen pelvis   Spleen is mildly enlarged. Liver is within normal limits. The pancreas is atrophic. There is a duodenal diverticulum at the third portion of the duodenum. Prior cholecystectomy. Adrenals are normal.   Large left renal cyst stable 1.8 cm Bosniak 1 cyst right kidney stable kidneys enhance symmetrically and appear otherwise unremarkable.       No central mesenteric or retroperitoneal adenopathy is identified. Increased size calcified cystic lesion contiguous with the right kidney now measuring 2.2 x 4.1 cm previously 2.4 x 1.0 cm Bosniak 2 cyst. No abnormal fluid collections. Pelvis   Anderson catheter in the bladder air fills the bladder. This is presumably iatrogenic. Stents of colonic diverticulosis. No evidence of diverticulitis. Hernia mesh from prior ventral hernia repair.       Scoliosis and degenerative changes no suspicious bone lesions.           Impression   Extensive bilateral airspace infiltrates with the findings suggest possible combination of pneumonia and pulmonary edema. No acute abnormalities in the abdomen or pelvis.               **This report has been created using voice recognition software.  It may contain minor errors which are inherent in voice recognition technology. **       Final report electronically signed by Dr. Jennifer Bob on 3/26/2021 2:53 PM       Narrative   PROCEDURE: CTA CHEST W [address], CT ABDOMEN PELVIS W IV CONTRAST       CLINICAL INFORMATION: FUO assess for source .       COMPARISON: 4/20/2018 CTA chest, 10/7/2018 CT abdomen pelvis enterography       TECHNIQUE: 2-D multiplanar postcontrast images of the chest with 3-D MIPS. [id number]   All CT scans at this facility use dose modulation, iterative reconstruction, and/or weight-based dosing when appropriate to reduce radiation dose to as low as reasonably achievable.       FINDINGS: There is no pulmonary embolism.  There is no aneurysm or dissection. There is innumerable nonenlarged mediastinal lymph nodes present. These are more obvious since the prior exam. There is a left paratracheal enlarged lymph node measuring 13 mm    short axis there is a partially calcified pretracheal enlarged node. These have developed since the prior exam. There is subcarinal lymphadenopathy. There is marked motion artifact on lung parenchymal windows limiting the evaluation but there is    extensive bilateral upper lobe airspace infiltrates and bilateral pleural effusions with left lower lobe consolidation. Findings may represent a combination of pulmonary edema and pneumonic infiltrates. More confluent infiltrate is seen in the left upper    lobe apex.       Abdomen pelvis   Spleen is mildly enlarged. Liver is within normal limits. The pancreas is atrophic. There is a duodenal diverticulum at the third portion of the duodenum. Prior cholecystectomy. Adrenals are normal.   Large left renal cyst stable 1.8 cm Bosniak 1 cyst right kidney stable kidneys enhance symmetrically and appear otherwise unremarkable.       No central mesenteric or retroperitoneal adenopathy is identified. Increased size calcified cystic lesion contiguous with the right kidney now measuring 2.2 x 4.1 cm previously 2.4 x 1.0 cm Bosniak 2 cyst. No abnormal fluid collections. Pelvis   Anderson catheter in the bladder air fills the bladder. This is presumably iatrogenic. Stents of colonic diverticulosis. No evidence of diverticulitis. Hernia mesh from prior ventral hernia repair.       Scoliosis and degenerative changes no suspicious bone lesions.           Impression   Extensive bilateral airspace infiltrates with the findings suggest possible combination of pneumonia and pulmonary edema.    No acute abnormalities in the abdomen or pelvis.               **This report has been created using voice recognition software.  It may contain minor errors which are inherent in voice recognition technology. **       Final report electronically signed by Dr. Shalom Mcelroy on 3/26/2021 2:53 PM       ECHO 29 MARCH 2021   Summary   Technically difficult examination. Ejection fraction is visually estimated at 55%. Overall left ventricular function is normal.      ASSESSMENT & PLAN  1. Bacterial pneumonia    Improving clinically  Off ATB  Will get f/u CT chest in 8 wks re: multifocal infiltrates and the mediastinal LA  Wean O2 as able    2. Acute respiratory failure with hypoxia ([address]    As per # 1    3. Acute on chronic diastolic congestive heart failure ([address]    Appears at baseline  con't bumex  Low salt diet  Daily wts  F/u labs ordered    4. Normocytic anemia    Stable  Appears close to baseline  con't iron supplements  Labs Monday     5. Pressure injury of sacral region, stage 2 ([address]    Wound team following  con't routine wound care for this    6. Acute kidney injury superimposed on CKD ([facility]    Improved at d/c  Monitor I/O  Labs Monday     7. Physical deconditioning    Con't PT/OT    8. Type 2 diabetes mellitus with stage 3a chronic kidney disease, with long-term current use of insulin (HCC)    Stable thus far  con't lantus at 12 units, metformin and januvia  Assess sugars again this weekend    9. Primary adenocarcinoma of ethmoidal sinus (HCC)    Local recurrence per records with radionecrosis of frontal lobe  Con't nasal rinses  Has f/u with ENT at [address] [place] [address]  Has MRI brain setup at [address] [place] [address]  Will be seeing neuro-oncology soon as well  Will monitor    10. Radionecrosis of frontal lobe    As per # 9    11. Hypertension, essential    Stable, at goal thus far  con't Procardia at present dose, 60mg daily    12. Stage 3a chronic kidney disease    As per # 11  Labs Monday     13. Gastroesophageal reflux disease, unspecified whether esophagitis present    Stable  con't omeprazole. 14. Vitamin D deficiency    Stable  Con't supplementation    15.  Degenerative disc disease, lumbar    Stable  con't lyrica and prn tylenol/ Disposition: FULL CODE. Con't PT/OT. Reassess 30 days, sooner prn.        Future Appointments   Date Time Provider David Adler   8/3/2021  8:40 AM [facility] [phone] [facility]       Electronically signed by Rodolfo Woods DO on 4/1/2021 at 1:38 PM

## 2021-04-14 DIAGNOSIS — M43.12 SPONDYLOLISTHESIS OF CERVICAL REGION: ICD-10-CM

## 2021-04-14 RX ORDER — PREGABALIN 150 MG/1
300 CAPSULE ORAL 2 TIMES DAILY
Qty: 120 CAPSULE | Refills: 0 | Status: SHIPPED | OUTPATIENT
Start: 2021-04-14 | End: 2021-04-23 | Stop reason: SDUPTHER

## 2021-04-14 NOTE — TELEPHONE ENCOUNTER
ASSESSMENT & PLAN   Diagnosis Orders   1. Spondylolisthesis of cervical region  pregabalin (LYRICA) 150 MG capsule       OAARS reviewed and appropriate. Controlled Substances Monitoring: Periodic Controlled Substance Monitoring: Possible medication side effects, risk of tolerance/dependence & alternative treatments discussed., No signs of potential drug abuse or diversion identified.  Rut Ma DO)      Future Appointments   Date Time Provider David Adler   8/3/2021  8:40 AM AMANDA Lee - CNP [facility]. [facility]

## 2021-04-23 ENCOUNTER — OFFICE VISIT (OUTPATIENT)
Dept: FAMILY MEDICINE CLINIC | Age: 81
End: 2021-04-23
Payer: MEDICARE

## 2021-04-23 VITALS
OXYGEN SATURATION: 98 % | HEART RATE: 94 BPM | DIASTOLIC BLOOD PRESSURE: 72 MMHG | TEMPERATURE: 97.4 F | RESPIRATION RATE: 20 BRPM | BODY MASS INDEX: 41.48 KG/M2 | WEIGHT: 212.4 LBS | SYSTOLIC BLOOD PRESSURE: 122 MMHG

## 2021-04-23 DIAGNOSIS — I50.33 ACUTE ON CHRONIC DIASTOLIC CHF (CONGESTIVE HEART FAILURE) (HCC): ICD-10-CM

## 2021-04-23 DIAGNOSIS — J18.9 PNEUMONIA OF BOTH LUNGS DUE TO INFECTIOUS ORGANISM, UNSPECIFIED PART OF LUNG: Primary | ICD-10-CM

## 2021-04-23 DIAGNOSIS — M43.12 SPONDYLOLISTHESIS OF CERVICAL REGION: ICD-10-CM

## 2021-04-23 DIAGNOSIS — K62.5 RECTAL BLEEDING: ICD-10-CM

## 2021-04-23 DIAGNOSIS — E11.29 TYPE 2 DIABETES MELLITUS WITH MICROALBUMINURIA, WITHOUT LONG-TERM CURRENT USE OF INSULIN (HCC): ICD-10-CM

## 2021-04-23 DIAGNOSIS — K21.9 GASTROESOPHAGEAL REFLUX DISEASE, UNSPECIFIED WHETHER ESOPHAGITIS PRESENT: ICD-10-CM

## 2021-04-23 DIAGNOSIS — I16.9 HYPERTENSIVE CRISIS: ICD-10-CM

## 2021-04-23 DIAGNOSIS — M81.0 OSTEOPOROSIS, UNSPECIFIED OSTEOPOROSIS TYPE, UNSPECIFIED PATHOLOGICAL FRACTURE PRESENCE: ICD-10-CM

## 2021-04-23 DIAGNOSIS — E87.6 HYPOKALEMIA: ICD-10-CM

## 2021-04-23 DIAGNOSIS — R80.9 TYPE 2 DIABETES MELLITUS WITH MICROALBUMINURIA, WITHOUT LONG-TERM CURRENT USE OF INSULIN (HCC): ICD-10-CM

## 2021-04-23 DIAGNOSIS — I87.2 VENOUS INSUFFICIENCY OF BOTH LOWER EXTREMITIES: ICD-10-CM

## 2021-04-23 DIAGNOSIS — E11.40 TYPE 2 DIABETES MELLITUS WITH DIABETIC NEUROPATHY, WITHOUT LONG-TERM CURRENT USE OF INSULIN (HCC): ICD-10-CM

## 2021-04-23 PROBLEM — I83.029 VENOUS ULCERS OF BOTH LOWER EXTREMITIES (HCC): Status: RESOLVED | Noted: 2018-09-27 | Resolved: 2021-04-23

## 2021-04-23 PROBLEM — L97.929 VENOUS ULCERS OF BOTH LOWER EXTREMITIES (HCC): Status: RESOLVED | Noted: 2018-09-27 | Resolved: 2021-04-23

## 2021-04-23 PROBLEM — I83.019 VENOUS ULCERS OF BOTH LOWER EXTREMITIES (HCC): Status: RESOLVED | Noted: 2018-09-27 | Resolved: 2021-04-23

## 2021-04-23 PROBLEM — L97.919 VENOUS ULCERS OF BOTH LOWER EXTREMITIES (HCC): Status: RESOLVED | Noted: 2018-09-27 | Resolved: 2021-04-23

## 2021-04-23 PROCEDURE — 4040F PNEUMOC VAC/ADMIN/RCVD: CPT | Performed by: FAMILY MEDICINE

## 2021-04-23 PROCEDURE — G8417 CALC BMI ABV UP PARAM F/U: HCPCS | Performed by: FAMILY MEDICINE

## 2021-04-23 PROCEDURE — 1111F DSCHRG MED/CURRENT MED MERGE: CPT | Performed by: FAMILY MEDICINE

## 2021-04-23 PROCEDURE — 1123F ACP DISCUSS/DSCN MKR DOCD: CPT | Performed by: FAMILY MEDICINE

## 2021-04-23 PROCEDURE — 1090F PRES/ABSN URINE INCON ASSESS: CPT | Performed by: FAMILY MEDICINE

## 2021-04-23 PROCEDURE — G8427 DOCREV CUR MEDS BY ELIG CLIN: HCPCS | Performed by: FAMILY MEDICINE

## 2021-04-23 PROCEDURE — 1036F TOBACCO NON-USER: CPT | Performed by: FAMILY MEDICINE

## 2021-04-23 PROCEDURE — 99214 OFFICE O/P EST MOD 30 MIN: CPT | Performed by: FAMILY MEDICINE

## 2021-04-23 PROCEDURE — G8400 PT W/DXA NO RESULTS DOC: HCPCS | Performed by: FAMILY MEDICINE

## 2021-04-23 RX ORDER — POTASSIUM CHLORIDE 20 MEQ/1
20 TABLET, EXTENDED RELEASE ORAL DAILY
Qty: 90 TABLET | Refills: 3 | Status: ON HOLD | OUTPATIENT
Start: 2021-04-23 | End: 2022-01-08 | Stop reason: SDUPTHER

## 2021-04-23 RX ORDER — PREGABALIN 150 MG/1
300 CAPSULE ORAL 2 TIMES DAILY
Qty: 360 CAPSULE | Refills: 3 | Status: ON HOLD | OUTPATIENT
Start: 2021-04-23 | End: 2022-01-08 | Stop reason: HOSPADM

## 2021-04-23 RX ORDER — NIFEDIPINE 60 MG/1
60 TABLET, FILM COATED, EXTENDED RELEASE ORAL DAILY
Qty: 90 TABLET | Refills: 3 | Status: SHIPPED | OUTPATIENT
Start: 2021-04-23 | End: 2022-05-03 | Stop reason: ALTCHOICE

## 2021-04-23 RX ORDER — BUMETANIDE 1 MG/1
1 TABLET ORAL 2 TIMES DAILY
Qty: 180 TABLET | Refills: 3 | Status: ON HOLD | OUTPATIENT
Start: 2021-04-23 | End: 2022-01-08 | Stop reason: SDUPTHER

## 2021-04-23 RX ORDER — OMEPRAZOLE 20 MG/1
CAPSULE, DELAYED RELEASE ORAL
Qty: 90 CAPSULE | Refills: 3 | Status: SHIPPED | OUTPATIENT
Start: 2021-04-23 | End: 2022-05-03 | Stop reason: SDUPTHER

## 2021-04-23 ASSESSMENT — ENCOUNTER SYMPTOMS
EYE PAIN: 0
VOMITING: 0
DIARRHEA: 0
BACK PAIN: 0
NAUSEA: 0
RHINORRHEA: 0
SHORTNESS OF BREATH: 0
BLOOD IN STOOL: 0
WHEEZING: 0
CONSTIPATION: 0
COUGH: 0
CHEST TIGHTNESS: 0
SORE THROAT: 0
ABDOMINAL PAIN: 0

## 2021-04-23 NOTE — PROGRESS NOTES
Post-Discharge Transitional Care Management Services or Hospital Follow Up      Yasmin Odell   YOB: 1940    Date of Office Visit:  4/23/2021  Date of Hospital Admission: 3/23/21  Date of Hospital Discharge: 3/31/21  03/31/2021 through 04/18/2021 [facility] rehab  Readmission Risk Score(high >=14%.  Medium >=10%):Readmission Risk Score: 34      Care management risk score Rising risk (score 2-5) and Complex Care (Scores >=6): 11     Non face to face  following discharge, date last encounter closed (first attempt may have been earlier): *No documented post hospital discharge outreach found in the last 14 days *No documented post hospital discharge outreach found in the last 14 days    Call initiated 2 business days of discharge: *No response recorded in the last 14 days     Patient Active Problem List   Diagnosis    Hypercholesterolemia    Osteoarthritis    Osteoporosis    Type 2 diabetes mellitus without complication, without long-term current use of insulin ([address]    DDD (degenerative disc disease), lumbar    Benign essential HTN    SWAPNA on CPAP    Diabetic peripheral neuropathy ([address]    Primary thunderclap headache    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HCC)    Skin plaque    Bilateral lower leg cellulitis    CKD (chronic kidney disease) stage 3, GFR 30-59 ml/min (HCC)    Iron deficiency anemia    Acute eczema    Venous insufficiency of both lower extremities    Lymphedema of both lower extremities    Pressure injury of left buttock, stage 2 (HCC)    Eczematous dermatitis    Dystrophic nail    Angio-edema    Osteoradionecrosis of skull/sinus    Late effect of radiation    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HCC)    Cataract of both eyes    History of ventral hernia repair    Other cervical disc degeneration, mid-cervical region    Spondylolisthesis of cervical region    Spondylolisthesis of thoracic region    Chronic rhinitis    Closed fracture of head of humerus    Dysphagia    Laryngopharyngeal reflux    Malignant neoplasm of ethmoidal sinus (HCC)    Obesity, Class II, BMI 35-39.9    COVID-19    Brain mass    Folliculitis    Pneumonia due to organism    Acute on chronic diastolic congestive heart failure (HCC)    Acute respiratory failure with hypoxia (HCC)       Allergies   Allergen Reactions    Lisinopril Swelling and Anaphylaxis    Sulfamethoxazole-Trimethoprim Rash    Morphine Other (See Comments)     headaches    Codeine      Other reaction(s): AOF    Hydrocodone      headaches    Percocet [Oxycodone-Acetaminophen] Other (See Comments)     Headaches    Tape [Adhesive Tape] Rash       Medications listed as ordered at the time of discharge from [facility]   Tenisha Abdijude   Dolph Medication Instructions FITZ:    Printed on:04/23/21 9607   Medication Information                      Acetaminophen 500 MG CAPS  Take 1,000 mg by mouth every 6 hours as needed for Pain             ascorbic acid (VITAMIN C) 1000 MG tablet  Take 1 tablet by mouth daily             bumetanide (BUMEX) 1 MG tablet  Take 1 tablet by mouth 2 times daily             Elastic Bandages & Supports (CERVICAL COLLAR) MISC  Wear while awake for neck support. M54.2             ferrous sulfate (IRON 325) 325 (65 Fe) MG tablet  Take 325 mg by mouth 2 times daily              Handicap Placard MISC  by Does not apply route Dx:I187.2,M81.0. Duration: 5 years. insulin glargine (LANTUS) 100 UNIT/ML injection vial  Inject 12 Units into the skin every morning             Insulin Pen Needle (PEN NEEDLES) 31G X 6 MM MISC  1 each by Does not apply route daily             metFORMIN (GLUCOPHAGE) 1000 MG tablet  TAKE ONE TABLET IN THE EVENING             Misc. Devices (WALKER) MISC  1 each by Does not apply route daily Requests stand up walker due to heart failure and generalized OA.   I50.23.             mupirocin (BACTROBAN) 2 % ointment  2 times daily Toothpaste ribbon added to nasal rinse NIFEdipine (ADALAT CC) 60 MG extended release tablet  Take 1 tablet by mouth daily             omeprazole (PRILOSEC) 20 MG delayed release capsule  TAKE ONE CAPSULE BY MOUTH ONCE DAILY             potassium chloride (KLOR-CON M) 20 MEQ extended release tablet  Take 1 tablet by mouth daily             pregabalin (LYRICA) 150 MG capsule  Take 2 capsules by mouth 2 times daily for 30 days. SITagliptin (JANUVIA) 100 MG tablet  Take 1 tablet by mouth daily             sodium chloride (OCEAN, BABY AYR) 0.65 % nasal spray  1 spray by Nasal route as needed for Congestion              Sodium Chloride-Sodium Bicarb (NETI POT SINUS WASH NA)  by Nasal route 2 times daily Use baby shampoo in initial rinse, then mupirocin in second rinse. [facility] wants 3-4 times a day             Vitamin D (CHOLECALCIFEROL) 50 MCG (2000 UT) TABS tablet  Take 1 tablet by mouth daily             zinc sulfate (ZINCATE) 220 (50 Zn) MG capsule  Take 1 capsule by mouth daily                   Medications marked \"taking\" at this time  Outpatient Medications Marked as Taking for the 4/23/21 encounter (Office Visit) with Shama Villalobos MD   Medication Sig Dispense Refill    Handicap Placard MISC by Does not apply route Dx:I187.2,M81.0. Duration: 5 years. 2 each 0    potassium chloride (KLOR-CON M) 20 MEQ extended release tablet Take 1 tablet by mouth daily 90 tablet 3    metFORMIN (GLUCOPHAGE) 1000 MG tablet TAKE ONE TABLET IN THE EVENING 90 tablet 3    SITagliptin (JANUVIA) 100 MG tablet Take 1 tablet by mouth daily 90 tablet 3    omeprazole (PRILOSEC) 20 MG delayed release capsule TAKE ONE CAPSULE BY MOUTH ONCE DAILY 90 capsule 3    bumetanide (BUMEX) 1 MG tablet Take 1 tablet by mouth 2 times daily 180 tablet 3    NIFEdipine (ADALAT CC) 60 MG extended release tablet Take 1 tablet by mouth daily 90 tablet 3    pregabalin (LYRICA) 150 MG capsule Take 2 capsules by mouth 2 times daily for 30 days.  360 capsule 3    insulin glargine (LANTUS) 100 UNIT/ML injection vial Inject 12 Units into the skin every morning 1 vial 3    Insulin Pen Needle (PEN NEEDLES) 31G X 6 MM MISC 1 each by Does not apply route daily 50 each 0    Acetaminophen 500 MG CAPS Take 1,000 mg by mouth every 6 hours as needed for Pain      Sodium Chloride-Sodium Bicarb (NETI POT SINUS WASH NA) by Nasal route 2 times daily Use baby shampoo in initial rinse, then mupirocin in second rinse. [facility] wants 3-4 times a day      Misc. Devices (WALKER) MISC 1 each by Does not apply route daily Requests stand up walker due to heart failure and generalized OA. I50.23. 1 each 0    Elastic Bandages & Supports (CERVICAL COLLAR) MISC Wear while awake for neck support. M54.2 1 each 0    ascorbic acid (VITAMIN C) 1000 MG tablet Take 1 tablet by mouth daily 30 tablet 3    Vitamin D (CHOLECALCIFEROL) 50 MCG (2000 UT) TABS tablet Take 1 tablet by mouth daily 60 tablet 0    zinc sulfate (ZINCATE) 220 (50 Zn) MG capsule Take 1 capsule by mouth daily 30 capsule 3    mupirocin (BACTROBAN) 2 % ointment 2 times daily Toothpaste ribbon added to nasal rinse      ferrous sulfate (IRON 325) 325 (65 Fe) MG tablet Take 325 mg by mouth 2 times daily       sodium chloride (OCEAN, BABY AYR) 0.65 % nasal spray 1 spray by Nasal route as needed for Congestion           Medications patient taking as of now reconciled against medications ordered at time of hospital discharge: Yes    Chief Complaint   Patient presents with    Follow-Up from [address] [place] [address] scheduled, D/c 4/18/21, pneumona       HPI  Pt presented to [facility] for weakness and fever    Inpatient course: Discharge summary reviewed- see chart. Interval history/Current status: Diagnosed with sepsis due to pneumonia, acute on chronic diastolic chf, rectal bleeding. On 2 L O2. Ambulates with walker. Getting back to baseline, actually better than before. , non smoker, pmh reviewed.        Review of Systems   Constitutional: Negative for chills, fatigue, fever and unexpected weight change. HENT: Negative for congestion, ear pain, rhinorrhea and sore throat. Eyes: Negative for pain and visual disturbance. Respiratory: Negative for cough, chest tightness, shortness of breath and wheezing. Cardiovascular: Negative for chest pain and palpitations. Gastrointestinal: Negative for abdominal pain, blood in stool, constipation, diarrhea, nausea and vomiting. Genitourinary: Negative for difficulty urinating, frequency, hematuria and urgency. Musculoskeletal: Negative for back pain, joint swelling, myalgias and neck pain. Skin: Negative for rash. Neurological: Negative for dizziness and headaches. Hematological: Negative for adenopathy. Does not bruise/bleed easily. Psychiatric/Behavioral: Negative for behavioral problems and sleep disturbance. The patient is not nervous/anxious. Vitals:    04/23/21 1126   BP: 122/72   Site: Left Upper Arm   Position: Sitting   Cuff Size: Medium Adult   Pulse: 94   Resp: 20   Temp: 97.4 °F (36.3 °C)   TempSrc: Infrared   SpO2: 98%   Weight: 212 lb 6.4 oz (96.3 kg)     Body mass index is 41.48 kg/m². Wt Readings from Last 3 Encounters:   04/23/21 212 lb 6.4 oz (96.3 kg)   03/31/21 203 lb (92.1 kg)   03/08/21 211 lb (95.7 kg)     BP Readings from Last 3 Encounters:   04/23/21 122/72   03/31/21 132/64   03/08/21 132/68       Physical Exam  Vitals signs and nursing note reviewed. Constitutional:       Appearance: She is well-developed. HENT:      Head: Normocephalic and atraumatic. Right Ear: External ear normal.      Left Ear: External ear normal.      Nose: Nose normal.      Mouth/Throat:      Mouth: Mucous membranes are moist.   Eyes:      Pupils: Pupils are equal, round, and reactive to light. Neck:      Musculoskeletal: Neck supple. Thyroid: No thyromegaly. Cardiovascular:      Rate and Rhythm: Normal rate and regular rhythm.       Heart sounds: Normal heart sounds. Pulmonary:      Breath sounds: Normal breath sounds. No wheezing or rales. Abdominal:      General: Bowel sounds are normal.      Palpations: Abdomen is soft. Tenderness: There is no abdominal tenderness. There is no guarding or rebound. Musculoskeletal: Normal range of motion. Right lower leg: Edema present. Left lower leg: Edema present. Lymphadenopathy:      Cervical: No cervical adenopathy. Skin:     General: Skin is warm and dry. Findings: No rash. Neurological:      Mental Status: She is alert and oriented to person, place, and time. Cranial Nerves: No cranial nerve deficit. Deep Tendon Reflexes: Reflexes are normal and symmetric. Assessment/Plan:  1. Pneumonia of both lungs due to infectious organism, unspecified part of lung  -improved, antibioitcs completed    2. Venous insufficiency of both lower extremities    - [facility]; by Does not apply route Dx:I187.2,M81.0. Duration: 5 years. Dispense: 2 each; Refill: 0    3. Osteoporosis, unspecified osteoporosis type, unspecified pathological fracture presence    - Handicap Placard [facility]; by Does not apply route Dx:I187.2,M81.0. Duration: 5 years. Dispense: 2 each; Refill: 0    4. Acute on chronic diastolic CHF (congestive heart failure) (HCC)  -stable on bumex    5. Rectal bleeding  -likely hemorrhoidal    6. Hypokalemia    - potassium chloride (KLOR-CON M) 20 MEQ extended release tablet; Take 1 tablet by mouth daily  Dispense: 90 tablet; Refill: 3    7. Type 2 diabetes mellitus with diabetic neuropathy, without long-term current use of insulin ([facility])    - metFORMIN (GLUCOPHAGE) 1000 MG tablet; TAKE ONE TABLET IN THE EVENING  Dispense: 90 tablet; Refill: 3    8. Type 2 diabetes mellitus with microalbuminuria, without long-term current use of insulin ([facility])    - SITagliptin (JANUVIA) 100 MG tablet; Take 1 tablet by mouth daily  Dispense: 90 tablet; Refill: 3    9.  Gastroesophageal reflux disease, unspecified whether esophagitis present    - omeprazole (PRILOSEC) 20 MG delayed release capsule; TAKE ONE CAPSULE BY MOUTH ONCE DAILY  Dispense: 90 capsule; Refill: 3    10. Hypertensive crisis    - bumetanide (BUMEX) 1 MG tablet; Take 1 tablet by mouth 2 times daily  Dispense: 180 tablet; Refill: 3  - NIFEdipine (ADALAT CC) 60 MG extended release tablet; Take 1 tablet by mouth daily  Dispense: 90 tablet; Refill: 3    11. Spondylolisthesis of cervical region    - pregabalin (LYRICA) 150 MG capsule; Take 2 capsules by mouth 2 times daily for 30 days. Dispense: 360 capsule; Refill: 3    -  Controlled Substance Monitoring:    Acute and Chronic Pain Monitoring:   RX Monitoring 4/23/2021   Attestation -   Periodic Controlled Substance Monitoring No signs of potential drug abuse or diversion identified. ;Possible medication side effects, risk of tolerance/dependence & alternative treatments discussed. ;Obtaining appropriate analgesic effect of treatment.            Medical Decision Making: moderate complexity

## 2021-04-23 NOTE — PATIENT INSTRUCTIONS
Patient Education        Pneumonia: Care Instructions  Overview     Pneumonia is an infection of the lungs. Most cases are caused by infections from bacteria or viruses. Pneumonia may be mild or very severe. If it is caused by bacteria, you will be treated with antibiotics. It may take a few weeks to a few months to recover fully from pneumonia, depending on how sick you were and whether your overall health is good. Follow-up care is a key part of your treatment and safety. Be sure to make and go to all appointments, and call your doctor if you are having problems. It's also a good idea to know your test results and keep a list of the medicines you take. How can you care for yourself at home? · Take your antibiotics exactly as directed. Do not stop taking the medicine just because you are feeling better. You need to take the full course of antibiotics. · Take your medicines exactly as prescribed. Call your doctor if you think you are having a problem with your medicine. · Get plenty of rest and sleep. You may feel weak and tired for a while, but your energy level will improve with time. · To prevent dehydration, drink plenty of fluids, enough so that your urine is light yellow or clear like water. Choose water and other caffeine-free clear liquids until you feel better. If you have kidney, heart, or liver disease and have to limit fluids, talk with your doctor before you increase the amount of fluids you drink. · Take care of your cough so you can rest. A cough that brings up mucus from your lungs is common with pneumonia. It is one way your body gets rid of the infection. But if coughing keeps you from resting or causes severe fatigue and chest-wall pain, talk to your doctor. Your doctor may suggest that you take a medicine to reduce the cough. · Use a vaporizer or humidifier to add moisture to your bedroom. Follow the directions for cleaning the machine.   · Do not smoke or allow others to smoke around you. Smoke will make your cough last longer. If you need help quitting, talk to your doctor about stop-smoking programs and medicines. These can increase your chances of quitting for good. · Take an over-the-counter pain medicine, such as acetaminophen (Tylenol), ibuprofen (Advil, Motrin), or naproxen (Aleve). Read and follow all instructions on the label. · Do not take two or more pain medicines at the same time unless the doctor told you to. Many pain medicines have acetaminophen, which is Tylenol. Too much acetaminophen (Tylenol) can be harmful. · If you were given a spirometer to measure how well your lungs are working, use it as instructed. This can help your doctor tell how your recovery is going. · To prevent pneumonia in the future, talk to your doctor about getting a flu vaccine (once a year) and a pneumococcal vaccine (one time only for most people). When should you call for help? Call 911 anytime you think you may need emergency care. For example, call if:    · You have severe trouble breathing. Call your doctor now or seek immediate medical care if:    · You cough up dark brown or bloody mucus (sputum).     · You have new or worse trouble breathing.     · You are dizzy or lightheaded, or you feel like you may faint. Watch closely for changes in your health, and be sure to contact your doctor if:    · You have a new or higher fever.     · You are coughing more deeply or more often.     · You are not getting better after 2 days (48 hours).     · You do not get better as expected. Where can you learn more? Go to [email or web address] and sign in to your [employer] account. Enter D336 in the [facility] box to learn more about \"Pneumonia: Care Instructions. \"     If you do not have an account, please click on the \"Sign Up Now\" link. Current as of: October 26, 2020               Content Version: 12.8  © 6965-0567 [employer].    Care instructions adapted Taking a beta-blocker may lower your chance of needing to be hospitalized. ? Diuretics, also called water pills, reduce swelling. You will get more details on the specific medicines your doctor prescribes. Diet    · Your doctor may suggest that you limit sodium. Your doctor can tell you how much sodium is right for you. An example is less than 3,000 mg a day. This includes all the salt you eat in cooking or in packaged foods. People get most of their sodium from processed foods. Fast food and restaurant meals also tend to be very high in sodium.     · Ask your doctor how much liquid you can drink each day. You may have to limit liquids. Weight    · Weigh yourself without clothing at the same time each day. Record your weight. Call your doctor if you have a sudden weight gain, such as more than 2 to 3 pounds in a day or 5 pounds in a week. (Your doctor may suggest a different range of weight gain.) A sudden weight gain may mean that your heart failure is getting worse. Activity level    · Start light exercise (if your doctor says it is okay). Even if you can only do a small amount, exercise will help you get stronger, have more energy, and manage your weight and your stress. Walking is an easy way to get exercise. Start out by walking a little more than you did before. Bit by bit, increase the amount you walk.     · When you exercise, watch for signs that your heart is working too hard. You are pushing yourself too hard if you cannot talk while you are exercising. If you become short of breath or dizzy or have chest pain, stop, sit down, and rest.     · If you feel \"wiped out\" the day after you exercise, walk slower or for a shorter distance until you can work up to a better pace.     · Get enough rest at night. Sleeping with 1 or 2 pillows under your upper body and head may help you breathe easier. Lifestyle changes    · Do not smoke. Smoking can make a heart condition worse.  If you need help quitting, professional. [address] any warranty or liability for your use of this information.

## 2021-05-18 ENCOUNTER — HOSPITAL ENCOUNTER (EMERGENCY)
Age: 81
Discharge: HOME | End: 2021-05-18
Payer: MEDICARE

## 2021-05-18 VITALS
BODY MASS INDEX: 41.62 KG/M2 | DIASTOLIC BLOOD PRESSURE: 75 MMHG | RESPIRATION RATE: 24 BRPM | SYSTOLIC BLOOD PRESSURE: 159 MMHG | WEIGHT: 212 LBS | TEMPERATURE: 97.6 F | HEART RATE: 92 BPM | OXYGEN SATURATION: 93 % | HEIGHT: 60 IN

## 2021-05-18 DIAGNOSIS — Z01.818 PRE-OP TESTING: ICD-10-CM

## 2021-05-18 DIAGNOSIS — Z20.822 ENCOUNTER FOR LABORATORY TESTING FOR COVID-19 VIRUS: Primary | ICD-10-CM

## 2021-05-18 PROCEDURE — 99213 OFFICE O/P EST LOW 20 MIN: CPT | Performed by: NURSE PRACTITIONER

## 2021-05-18 PROCEDURE — 99213 OFFICE O/P EST LOW 20 MIN: CPT

## 2021-05-18 PROCEDURE — U0003 INFECTIOUS AGENT DETECTION BY NUCLEIC ACID (DNA OR RNA); SEVERE ACUTE RESPIRATORY SYNDROME CORONAVIRUS 2 (SARS-COV-2) (CORONAVIRUS DISEASE [COVID-19]), AMPLIFIED PROBE TECHNIQUE, MAKING USE OF HIGH THROUGHPUT TECHNOLOGIES AS DESCRIBED BY CMS-2020-01-R: HCPCS

## 2021-05-18 PROCEDURE — U0005 INFEC AGEN DETEC AMPLI PROBE: HCPCS

## 2021-05-18 ASSESSMENT — ENCOUNTER SYMPTOMS
COUGH: 0
DIARRHEA: 0
SORE THROAT: 0
VOMITING: 0
NAUSEA: 0
SHORTNESS OF BREATH: 0

## 2021-05-18 ASSESSMENT — PAIN DESCRIPTION - LOCATION: LOCATION: SHOULDER

## 2021-05-18 ASSESSMENT — PAIN DESCRIPTION - ORIENTATION: ORIENTATION: RIGHT

## 2021-05-18 NOTE — ED TRIAGE NOTES
To room 1 with wheeled walker with daughter request for covid tesitng.  Daughter states the testing needs done today  Debridement of nose pulls out the large chunks of the big open cavity due to previous cancer

## 2021-05-18 NOTE — ED PROVIDER NOTES
[address]       Chief Complaint   Patient presents with    Covid Testing     pre procedure request       Nurses Notes reviewed and I agree except as noted in the HPI. HISTORY OF PRESENT ILLNESS   Hamilton Davison is a 79 y.o. female who presents for a COVID-19 test as part of her pre-op testing, with procedure on Thursday of this week, however outpatient order,  is not present with patient at this time and it cannot be found in the computer by clerical so patient is being seen in the urgent care with the understanding test results will take 3-5 days. Patient is accompanied by her daughter who is the primary historian. . No symptoms of illness. Hx COVID-19 in November 2020. REVIEW OF SYSTEMS     Review of Systems   Constitutional: Negative for chills, fatigue and fever. HENT: Negative for congestion and sore throat. Respiratory: Negative for cough and shortness of breath. Cardiovascular: Negative for chest pain. Gastrointestinal: Negative for diarrhea, nausea and vomiting. Skin: Negative for rash. Allergic/Immunologic: Negative for environmental allergies and food allergies. Neurological: Negative for dizziness and headaches. Hematological: Negative for adenopathy.        PAST MEDICAL HISTORY         Diagnosis Date    Cancer 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    Cataract of both eyes     COVID-19     DDD (degenerative disc disease), lumbar     DM2 (diabetes mellitus, type 2) ([facility] [address]     diagnoses approx 2000    Dysphagia, pharyngoesophageal 09/26/2016    Eczema 03/2018    Fibrocystic breast     H/O ventral hernia repair     Headache 02/09/2017    History of COVID-19 12/2020    Hyperlipidemia     Hypertension     Iron deficiency anemia     LPRD (laryngopharyngeal reflux disease) 09/26/2016    Neuropathy     peripheral    Obesity     Orbital abscess     Orbital cellulitis 01/22/2014    SWAPNA (obstructive sleep apnea)     Osteoarthritis     Osteoporosis     Persistent proteinuria associated with type 2 diabetes mellitus ([facility] [address] 01/2017    urine micral of 50.  Sinusitis     Velopharyngeal incompetence 09/26/2016       SURGICAL HISTORY     Patient  has a past surgical history that includes ventral hernia repair (1992); Cholecystectomy (03/1988); Hysterectomy, total abdominal (1980); Hemorrhoid surgery (1980s); Total knee arthroplasty (08/2003); Carpal tunnel release (Bilateral, 2008, 2009); sinus surgery (last 2009); Cataract removal (2011); Eye surgery (Left, 01/30/2015); sinus surgery (02/01/2016); sinus surgery (2016 x 2); joint replacement (bilat 2005/ 2007); Colonoscopy (2016); Endoscopy, colon, diagnostic; eye surgery; hernia repair; Tonsillectomy; sinus surgery (09/18/2017); sinus surgery (08/09/2017); Abdomen surgery; Dilatation, esophagus; Appendectomy; pr colsc flx with directed submucosal njx any sbst (10/11/2018); and Colonoscopy (10/11/2018). CURRENT MEDICATIONS       Previous Medications    ACETAMINOPHEN 500 MG CAPS    Take 1,000 mg by mouth every 6 hours as needed for Pain    ASCORBIC ACID (VITAMIN C) 1000 MG TABLET    Take 1 tablet by mouth daily    BUMETANIDE (BUMEX) 1 MG TABLET    Take 1 tablet by mouth 2 times daily    ELASTIC BANDAGES & SUPPORTS (CERVICAL COLLAR) MISC    Wear while awake for neck support. M54.2    FERROUS SULFATE (IRON 325) 325 (65 FE) MG TABLET    Take 325 mg by mouth 2 times daily     HANDICAP PLACARD MISC    by Does not apply route Dx:I187.2,M81.0. Duration: 5 years. INSULIN GLARGINE (LANTUS) 100 UNIT/ML INJECTION VIAL    Inject 12 Units into the skin every morning    INSULIN PEN NEEDLE (PEN NEEDLES) 31G X 6 MM MISC    1 each by Does not apply route daily    METFORMIN (GLUCOPHAGE) 1000 MG TABLET    TAKE ONE TABLET IN THE EVENING    MISC. DEVICES (WALKER) MISC    1 each by Does not apply route daily Requests stand up walker due to heart failure and generalized OA. I50. 23. MUPIROCIN (BACTROBAN) 2 % OINTMENT    2 times daily Toothpaste ribbon added to nasal rinse    NIFEDIPINE (ADALAT CC) 60 MG EXTENDED RELEASE TABLET    Take 1 tablet by mouth daily    OMEPRAZOLE (PRILOSEC) 20 MG DELAYED RELEASE CAPSULE    TAKE ONE CAPSULE BY MOUTH ONCE DAILY    OXYGEN    Inhale into the lungs    POTASSIUM CHLORIDE (KLOR-CON M) 20 MEQ EXTENDED RELEASE TABLET    Take 1 tablet by mouth daily    PREGABALIN (LYRICA) 150 MG CAPSULE    Take 2 capsules by mouth 2 times daily for 30 days. SITAGLIPTIN (JANUVIA) 100 MG TABLET    Take 1 tablet by mouth daily    SODIUM CHLORIDE (OCEAN, BABY AYR) 0.65 % NASAL SPRAY    1 spray by Nasal route as needed for Congestion     SODIUM CHLORIDE-SODIUM BICARB (NETI POT SINUS WASH NA)    by Nasal route 2 times daily Use baby shampoo in initial rinse, then mupirocin in second rinse. [facility] wants 3-4 times a day    VITAMIN D (CHOLECALCIFEROL) 50 MCG (2000 UT) TABS TABLET    Take 1 tablet by mouth daily    ZINC SULFATE (ZINCATE) 220 (50 ZN) MG CAPSULE    Take 1 capsule by mouth daily       ALLERGIES     Patient is is allergic to lisinopril, sulfamethoxazole-trimethoprim, morphine, codeine, hydrocodone, percocet [oxycodone-acetaminophen], and tape [adhesive tape]. FAMILY HISTORY     Patient'sfamily history includes Cancer in her brother and sister; Diabetes in her mother; Heart Attack in her brother; Heart Disease in her brother, brother, and father; No Known Problems in her brother, brother, and brother; Obesity in her sister and sister; Other in her brother, brother, and brother. SOCIAL HISTORY     Patient  reports that she has never smoked. She has never used smokeless tobacco. She reports that she does not drink alcohol and does not use drugs. PHYSICAL EXAM     ED TRIAGE VITALS  BP: (!) 159/75, Temp: 97.6 °F (36.4 °C), Pulse: 92, Resp: 24, SpO2: 93 %  Physical Exam  Vitals and nursing note reviewed.    Constitutional:       General: She is not in acute distress. Appearance: Normal appearance. She is well-developed and well-groomed. HENT:      Head: Normocephalic and atraumatic. Right Ear: External ear normal.      Left Ear: External ear normal.      Mouth/Throat:      Lips: Pink. Mouth: Mucous membranes are moist.   Eyes:      Conjunctiva/sclera: Conjunctivae normal.      Right eye: Right conjunctiva is not injected. Left eye: Left conjunctiva is not injected. Pupils: Pupils are equal.   Cardiovascular:      Rate and Rhythm: Normal rate. Heart sounds: Normal heart sounds. Pulmonary:      Effort: Pulmonary effort is normal. No respiratory distress. Breath sounds: Examination of the right-lower field reveals decreased breath sounds. Examination of the left-lower field reveals decreased breath sounds. Decreased breath sounds present. Skin:     General: Skin is warm and dry. Findings: No rash (on exposed surfaces). Neurological:      Mental Status: She is alert and oriented to person, place, and time. Psychiatric:         Mood and Affect: Mood normal.         Speech: Speech normal.         Behavior: Behavior is cooperative. DIAGNOSTIC RESULTS   Labs:  Abnormal Labs Reviewed - No abnormal labs to display     IMAGING:  No orders to display     URGENT CARE COURSE:     Vitals:    05/18/21 1908   BP: (!) 159/75   Pulse: 92   Resp: 24   Temp: 97.6 °F (36.4 °C)   SpO2: 93%   Weight: 212 lb (96.2 kg)   Height: 5' (1.524 m)       Medications - No data to display  PROCEDURES:  FINALIMPRESSION      1. Encounter for laboratory testing for COVID-19 virus    2. Pre-op testing        DISPOSITION/PLAN   DISPOSITION Decision To Discharge 05/18/2021 07:15:36 PM  Patient is to self isolate until COVID-19 test result is known. If applicable, work/school note has been provided to be off until COVID-19 test results. Follow with family provider for further work/school note needs.     Physical assessment findings, diagnostic testing(s) if applicable, and vital signs reviewed with patient/patient representative. Questions answered. If applicable, patient/patient representative will be contacted upon receipt of final culture and sensitivity or other testing results when available. Any additions or changes to medications or changes the plan of care will be made at that time. Medications as directed, including OTC medications for supportive care. Education provided on medications. Differential diagnosis(s) discussed with patient/patient representative. Home care/self care instructions reviewed with patient/patient representative. Patient is to follow-up with family care provider in 2-3 days if no improvement. Patient is to go to the emergency department if symptoms worsen. Patient/patient representative is aware of care plan, questions answered, verbalizes understanding and is in agreement. Teach back method used for patient/patient representative teaching(s) and printed instructions attached to after visit summary. Problem List Items Addressed This Visit     None      Visit Diagnoses     Encounter for laboratory testing for COVID-19 virus    -  Primary    Pre-op testing              PATIENT REFERRED TO:  Mali Mcmillan MD  [address] [place] [address]  [phone]    Schedule an appointment as soon as possible for a visit in 3 days  For further evaluation. , If symptoms change/worsen, go to the [facility]  [address] [facility]  [phone]    as needed, If symptoms change/worsen, go to the [address] [facility], [address] Afsaneh Tobar, AMANDA - CNP  05/18/21 4563

## 2021-05-18 NOTE — ED NOTES
Contacted on call nurse requested order be faxed/ sent.  Nurse states \" Unable to fax at this late hour\"     Nena Perera RN  05/18/21 6901

## 2021-05-19 ENCOUNTER — CARE COORDINATION (OUTPATIENT)
Dept: CARE COORDINATION | Age: 81
End: 2021-05-19

## 2021-05-19 NOTE — CARE COORDINATION
Ambulatory Care Coordination Note  5/19/2021  CM Risk Score: 11  Charlson 10 Year Mortality Risk Score: 100%     ACC: Yareli Carballo, RN    Summary Note: José Miguel Child was referred to care coordination by [facility] report. Was seen at [facility] yesterday to get a routine COVID test prior to procedure at ENT office in [facility]. José Miguel Child has h/o CHF, ethmoid sinus cancer, DM, GERD  Spoke with daughter Neris Winslow ([employer]) today. Introduced self and role. Agreeable to f/u calls. Pt was hospitalized back in March and was d/c to SNF for rehab. Discharged to home with daughter last mth. Currently receiving HH services through [facility]. Receiving nsg services only. Daughter reports PT/OT signed off. Ethmoid sinus cancer-pt follows up with ENT in [facility]. Pt is seen every 4-7 wks for debridement. Has an appt tomorrow. Per daughter pt will be able to be seen even if COVID testing has not come back yet. DM: most recent A1C 7.8. pt monitors BS's daily. Daughter reports that recently pt's BS's have been running within normal range (low 100's). Pt no longer taking Lantus. Reports that she informed Dr. Sivan Murillo of this. Only taking Januvia and Metformin. No episodes of hypoglycemia. Daughter reports that if she sees an upwards trend in her BS's she will call. CHF: pt has scales but daughter reports that they are too small for pt to safely stand on d/t her unsteadiness. Monitors for s/s of edema, increased SOB, bloating. Reports that her legs have been slightly more swollen than usual over last few days. Wrapping them with ace wraps. Encouraged elevation. Advised to call if s/s do not improve. On Bumex. Takes routinely. Denies increased SOB. On home oxygen at 1.5 liters. Follows low sodium diet. Daughter uses salt substitute when cooking and buys low sodium or no sodium options whenever able. Pt has not had COVID vaccine. Had COVID back in Nov 2020. Plans on getting in near future.    Plan of Care:  Discussed ACP-declines at this time. Weekly f/u calls  Continue working with [facility]. Discussed PASSPORT-declines at this time. Continue monitoring BS's daily  Continue monitoring oxygen sats with pulse oximeter. Continue wearing oxygen at this time. Continue close f/u with ENT  Call with any new concerns. Diabetes Assessment    Medic Alert ID: No  Meal Planning: Avoidance of concentrated sweets   How often do you test your blood sugar?: Daily   Do you have barriers with adherence to non-pharmacologic self-management interventions? (Nutrition/Exercise/Self-Monitoring): No   Have you ever had to go to the ED for symptoms of low blood sugar?: No       Do you have hyperglycemia symptoms?: No   Do you have hypoglycemia symptoms?: No   Last Blood Sugar Value: 119   Blood Sugar Monitoring Regimen: Once a Day   Blood Sugar Trends: No Change       and   Congestive Heart Failure Assessment    Do you understand a low sodium diet?: Yes  Do you understand how to read food labels?: Yes  How many restaurant meals do you eat per week?: 0  Do you salt your food before tasting it?: No         Symptoms:  CHF associated angina: Neg, CHF associated dyspnea on exertion: Pos, CHF associated fatigue: Neg, CHF associated leg swelling: Pos, CHF associated orthostatic hypotension: Neg, CHF associated PND: Neg, CHF associated shortness of breath: Neg, CHF associated weakness: Neg      Symptom course: stable  Salt intake watch compared to last visit: stable               Ambulatory Care Coordination Assessment    Care Coordination Protocol  Week 1 - Initial Assessment     Do you have all of your prescriptions and are they filled?: No  Barriers to medication adherence: Forgets to take  How often do you have trouble taking your medications the way you have been told to take them?: Sometimes I take them as prescribed.      Do you have Home O2 Therapy?: No      Ability to seek help/take action for Emergent Urgent situations i.e. fire, crime, inclement weather or health crisis. : Independent  Ability to ambulate to restroom: Independent  Ability handle personal hygeine needs (bathing/dressing/grooming): Independent  Ability to manage Medications: Independent  Ability to prepare Food Preparation: Independent  Ability to maintain home (clean home, laundry): Independent  Ability to drive and/or has transportation: Independent  Ability to do shopping: Independent  Ability to manage finances: Independent  Is patient able to live independently?: Yes     Current Housing: Private Residence           Frequent urination at night?: Yes  Do you use rails/bars?: No  Do you have a non-slip tub mat?: No     Are you experiencing loss of meaning?: No  Are you experiencing loss of hope and peace?: No     Suggested Interventions and Community Resources                  Prior to Admission medications    Medication Sig Start Date End Date Taking? Authorizing Provider   OXYGEN Inhale into the lungs   Yes Historical Provider, MD   potassium chloride (KLOR-CON M) 20 MEQ extended release tablet Take 1 tablet by mouth daily 4/23/21  Yes Timi Calvin MD   metFORMIN (GLUCOPHAGE) 1000 MG tablet TAKE ONE TABLET IN THE EVENING 4/23/21  Yes Timi Calvin MD   SITagliptin (JANUVIA) 100 MG tablet Take 1 tablet by mouth daily 4/23/21  Yes Timi Calvin MD   omeprazole (PRILOSEC) 20 MG delayed release capsule TAKE ONE CAPSULE BY MOUTH ONCE DAILY 4/23/21  Yes Timi Calvin MD   bumetanide (BUMEX) 1 MG tablet Take 1 tablet by mouth 2 times daily 4/23/21  Yes Timi Calvin MD   NIFEdipine (ADALAT CC) 60 MG extended release tablet Take 1 tablet by mouth daily 4/23/21  Yes Timi Calvin MD   pregabalin (LYRICA) 150 MG capsule Take 2 capsules by mouth 2 times daily for 30 days. 4/23/21 5/23/21 Yes Timi Calvin MD   Sodium Chloride-Sodium Bicarb (NETI POT SINUS [address] NA) by Nasal route 2 times daily Use baby shampoo in initial rinse, then mupirocin in second rinse.  [facility] wants 3-4 times a day   Yes Historical Provider, MD   ascorbic acid (VITAMIN C) 1000 MG tablet Take 1 tablet by mouth daily 12/30/20  Yes Greg Jimenez PA-C   Vitamin D (CHOLECALCIFEROL) 50 MCG (2000 UT) TABS tablet Take 1 tablet by mouth daily 12/30/20  Yes Greg Jimenez PA-C   ferrous sulfate (IRON 325) 325 (65 Fe) MG tablet Take 325 mg by mouth 2 times daily    Yes Historical Provider, MD   sodium chloride (OCEAN, BABY AYR) 0.65 % nasal spray 1 spray by Nasal route as needed for Congestion    Yes Historical Provider, MD   Handicap Placard [address] by Does not apply route Dx:I187.2,M81.0. Duration: 5 years. 4/23/21   Jean Renteria MD   insulin glargine (LANTUS) 100 UNIT/ML injection vial Inject 12 Units into the skin every morning  Patient not taking: Reported on 5/19/2021 3/31/21   Sejal Kaplan MD   Insulin Pen Needle (PEN NEEDLES) 31G X 6 MM MISC 1 each by Does not apply route daily 3/4/21   Blaze Mack MD   Acetaminophen 500 MG CAPS Take 1,000 mg by mouth every 6 hours as needed for Pain    Historical Provider, MD   Misc. Devices (WALKER) MISC 1 each by Does not apply route daily Requests stand up walker due to heart failure and generalized OA. I50.23. 1/5/21   Jean Renteria MD   Elastic Bandages & Supports (CERVICAL COLLAR) MISC Wear while awake for neck support. M54.2 1/5/21   Jean Renteria MD   mupirocin (BACTROBAN) 2 % ointment 2 times daily Toothpaste ribbon added to nasal rinse 3/19/20   Historical Provider, MD   Handicap Placard MISC by Does not apply route Dx:I187.2,M81.0. Duration: 5 years. 10/18/19 4/23/21  AMANDA South CNP       Future Appointments   Date Time Provider David Adler   8/3/2021  8:40 AM AMANDA Camarillo CNP [facility]                                   Educated patient about risk for severe COVID-19 due to risk factors according to [facility] guidelines.  ACM reviewed discharge instructions, medical action plan and red flag symptoms daughter Mckenna Spears who verbalized understanding. Discussed COVID vaccination status Yes-pt has not had vaccine at this time. Plans on getting vaccination in near future. Education provided on COVID-19 vaccination as appropriate. Discussed exposure protocols and quarantine with CDC Guidelines. Caregiver was given an opportunity to verbalize any questions and concerns and agrees to contact ACM or health care provider for questions related to their healthcare. Pt was seen for routine COVID testing that ENT office required prior to her appt tomorrow. Pt not having any s/s at this time.

## 2021-05-20 ENCOUNTER — CARE COORDINATION (OUTPATIENT)
Dept: CARE COORDINATION | Age: 81
End: 2021-05-20

## 2021-05-20 LAB
SARS-COV-2: NOT DETECTED
SOURCE: NORMAL

## 2021-05-21 ENCOUNTER — VIRTUAL VISIT (OUTPATIENT)
Dept: FAMILY MEDICINE CLINIC | Age: 81
End: 2021-05-21
Payer: MEDICARE

## 2021-05-21 DIAGNOSIS — I50.33 ACUTE ON CHRONIC DIASTOLIC CHF (CONGESTIVE HEART FAILURE) (HCC): Primary | ICD-10-CM

## 2021-05-21 PROCEDURE — 1090F PRES/ABSN URINE INCON ASSESS: CPT | Performed by: FAMILY MEDICINE

## 2021-05-21 PROCEDURE — 4040F PNEUMOC VAC/ADMIN/RCVD: CPT | Performed by: FAMILY MEDICINE

## 2021-05-21 PROCEDURE — G8427 DOCREV CUR MEDS BY ELIG CLIN: HCPCS | Performed by: FAMILY MEDICINE

## 2021-05-21 PROCEDURE — 1123F ACP DISCUSS/DSCN MKR DOCD: CPT | Performed by: FAMILY MEDICINE

## 2021-05-21 PROCEDURE — 99214 OFFICE O/P EST MOD 30 MIN: CPT | Performed by: FAMILY MEDICINE

## 2021-05-21 PROCEDURE — G8400 PT W/DXA NO RESULTS DOC: HCPCS | Performed by: FAMILY MEDICINE

## 2021-05-21 ASSESSMENT — ENCOUNTER SYMPTOMS
SORE THROAT: 0
EYE PAIN: 0
SHORTNESS OF BREATH: 0
NAUSEA: 0
RHINORRHEA: 0
VOMITING: 0
BACK PAIN: 0
WHEEZING: 0
CHEST TIGHTNESS: 0
CONSTIPATION: 0
BLOOD IN STOOL: 0
COUGH: 0
DIARRHEA: 0
ABDOMINAL PAIN: 0

## 2021-05-21 ASSESSMENT — PATIENT HEALTH QUESTIONNAIRE - PHQ9
1. LITTLE INTEREST OR PLEASURE IN DOING THINGS: 0
2. FEELING DOWN, DEPRESSED OR HOPELESS: 0

## 2021-05-21 NOTE — PROGRESS NOTES
for behavioral problems and sleep disturbance. The patient is not nervous/anxious. No flowsheet data found. Physical Exam  Constitutional:       General: She is not in acute distress. HENT:      Head: Normocephalic and atraumatic. Right Ear: External ear normal.      Left Ear: External ear normal.   Eyes:      General: No scleral icterus. Right eye: No discharge. Left eye: No discharge. Pulmonary:      Effort: Pulmonary effort is normal. No respiratory distress. Musculoskeletal:      Cervical back: Normal range of motion. Right lower leg: 3+ Edema present. Left lower leg: 3+ Edema present. Skin:     Findings: No rash. Neurological:      Mental Status: She is alert and oriented to person, place, and time. Psychiatric:         Mood and Affect: Mood normal.         Behavior: Behavior normal.                   Sinan Storm, was evaluated through a synchronous (real-time) audio-video encounter. The patient (or guardian if applicable) is aware that this is a billable service. Verbal consent to proceed has been obtained within the past 12 months. The visit was conducted pursuant to the emergency declaration under the [address] [place] [address] authority and the [employer] General Act. Patient identification was verified, and a caregiver was present when appropriate. The patient was located in a state where the provider was credentialed to provide care. An electronic signature was used to authenticate this note.     --Yuli Hensley MD

## 2021-05-21 NOTE — PATIENT INSTRUCTIONS
Patient Education        Heart Failure: Care Instructions  Your Care Instructions     Heart failure occurs when your heart does not pump as much blood as the body needs. Failure does not mean that the heart has stopped pumping but rather that it is not pumping as well as it should. Over time, this causes fluid buildup in your lungs and other parts of your body. Fluid buildup can cause shortness of breath, fatigue, swollen ankles, and other problems. By taking medicines regularly, reducing sodium (salt) in your diet, checking your weight every day, and making lifestyle changes, you can feel better and live longer. Follow-up care is a key part of your treatment and safety. Be sure to make and go to all appointments, and call your doctor if you are having problems. It's also a good idea to know your test results and keep a list of the medicines you take. How can you care for yourself at home? Medicines    · Be safe with medicines. Take your medicines exactly as prescribed. Call your doctor if you think you are having a problem with your medicine.     · Do not take any vitamins, over-the-counter medicine, or herbal products without talking to your doctor first. Dl Hanna not take ibuprofen (Advil or Motrin) and naproxen (Aleve) without talking to your doctor first. They could make your heart failure worse.     · You may take some of the following medicine. ? Angiotensin-converting enzyme inhibitors (ACEIs) or angiotensin II receptor blockers (ARBs) reduce the heart's workload, lower blood pressure, and reduce swelling. Taking an ACEI or ARB may lower your chance of needing to be hospitalized. ? Beta-blockers can slow heart rate, decrease blood pressure, and improve your condition. Taking a beta-blocker may lower your chance of needing to be hospitalized. ? Diuretics, also called water pills, reduce swelling. You will get more details on the specific medicines your doctor prescribes.   Diet    · Your doctor may suggest that you limit sodium. Your doctor can tell you how much sodium is right for you. An example is less than 3,000 mg a day. This includes all the salt you eat in cooking or in packaged foods. People get most of their sodium from processed foods. Fast food and restaurant meals also tend to be very high in sodium.     · Ask your doctor how much liquid you can drink each day. You may have to limit liquids. Weight    · Weigh yourself without clothing at the same time each day. Record your weight. Call your doctor if you have a sudden weight gain, such as more than 2 to 3 pounds in a day or 5 pounds in a week. (Your doctor may suggest a different range of weight gain.) A sudden weight gain may mean that your heart failure is getting worse. Activity level    · Start light exercise (if your doctor says it is okay). Even if you can only do a small amount, exercise will help you get stronger, have more energy, and manage your weight and your stress. Walking is an easy way to get exercise. Start out by walking a little more than you did before. Bit by bit, increase the amount you walk.     · When you exercise, watch for signs that your heart is working too hard. You are pushing yourself too hard if you cannot talk while you are exercising. If you become short of breath or dizzy or have chest pain, stop, sit down, and rest.     · If you feel \"wiped out\" the day after you exercise, walk slower or for a shorter distance until you can work up to a better pace.     · Get enough rest at night. Sleeping with 1 or 2 pillows under your upper body and head may help you breathe easier. Lifestyle changes    · Do not smoke. Smoking can make a heart condition worse. If you need help quitting, talk to your doctor about stop-smoking programs and medicines. These can increase your chances of quitting for good.  Quitting smoking may be the most important step you can take to protect your heart.     · Limit alcohol to 2 drinks a day for men and 1 drink a day for women. Too much alcohol can cause health problems.     · Avoid getting sick from colds and the flu. Get a pneumococcal vaccine shot. If you have had one before, ask your doctor whether you need another dose. Get a flu shot each year. If you must be around people with colds or the flu, wash your hands often. When should you call for help? Call 911 if you have symptoms of sudden heart failure such as:    · You have severe trouble breathing.     · You cough up pink, foamy mucus.     · You have a new irregular or rapid heartbeat. Call your doctor now or seek immediate medical care if:    · You have new or increased shortness of breath.     · You are dizzy or lightheaded, or you feel like you may faint.     · You have sudden weight gain, such as more than 2 to 3 pounds in a day or 5 pounds in a week. (Your doctor may suggest a different range of weight gain.)     · You have increased swelling in your legs, ankles, or feet.     · You are suddenly so tired or weak that you cannot do your usual activities. Watch closely for changes in your health, and be sure to contact your doctor if you develop new symptoms. Where can you learn more? Go to [email or web address]://[email or web address] org and sign in to your [employer] account. Enter C383 in the Sequence box to learn more about \"Heart Failure: Care Instructions. \"     If you do not have an account, please click on the \"Sign Up Now\" link. Current as of: August 31, 2020               Content Version: 12.8  © 2006-2021 [employer]wise, Incorporated. Care instructions adapted under license by [facility] [place] ([facility]). If you have questions about a medical condition or this instruction, always ask your healthcare professional. [place] [address] any warranty or liability for your use of this information.

## 2021-05-26 ENCOUNTER — CARE COORDINATION (OUTPATIENT)
Dept: CARE COORDINATION | Age: 81
End: 2021-05-26

## 2021-05-26 DIAGNOSIS — I89.0 LYMPHEDEMA OF BOTH LOWER EXTREMITIES: Primary | ICD-10-CM

## 2021-05-26 DIAGNOSIS — I87.2 VENOUS INSUFFICIENCY OF BOTH LOWER EXTREMITIES: ICD-10-CM

## 2021-05-26 NOTE — CARE COORDINATION
Spoke with daughter Maria Guadalupe Jim today for f/u. She reports that the swelling in Cate's legs did not improve much with the increased dose of Bumex. Legs continue to seep moderate amt of fluid. Pt has followed with wound clinic in past for same issue. If wanting them to evaluate her again wound clinic will need new referral placed. Please advise.

## 2021-05-26 NOTE — CARE COORDINATION
Ambulatory Care Coordination Note  5/26/2021  CM Risk Score: 11  Charlson 10 Year Mortality Risk Score: 100%     ACC: Maurice Jon RN    Summary Note: Grace Lin was being followed by care coordination for education and assistance in managing her chronic conditions. Spoke with daughter Irene Karimi today. 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 Will be resuming atb nebulizer tx's. Daughter reports that are just waiting for them to be delivered. Has f/u with neurology in [place] tomorrow  Was seen on 5/21-same day visit by PCP for leg swelling. Bumex was increased x 3 days. Daughter reports that swelling improved slightly but legs are still seeping quite a bit. Pt has followed with wound clinic in past for this. Message left with wound clinic. Will notify PCP if new referral needs sent. Encouraged elevation t/o the day. Daughter is wrapping legs daily. DM: monitoring BS's daily. BS slightly elevated today but pt ate strawberry shortcake yesterday. Previous BS's have been under 150. Denies increased SOB or cough. Wears oxygen PRN t/o the day. Doing deep breathing exercises. Daughter reports that oxygen sats have been as high as 99% on RA at times. Plan of care:  Message left with wound clinic. Elevate ble  Monitor legs for increased drainage or wounds to ble. Continues to receive HH-daughter reports that they missed nurse last wk. Has not heard from them yet this wk. F/u with neurology tomorrow as scheduled  Start atb nebulizer tx's once they arrive  Continue wearing oxygen as recommended. Monitor sats. Limit sodium to 2gms or less per day. Continue close f/u with ENT   Call with new questions or concerns  Diabetes Assessment    Medic Alert ID: No  Meal Planning: Avoidance of concentrated sweets   How often do you test your blood sugar?: Daily   Do you have barriers with adherence to non-pharmacologic self-management interventions?  (Nutrition/Exercise/Self-Monitoring): No   Have you ever had to go to the ED for symptoms of low blood sugar?: No           and   Congestive Heart Failure Assessment    Do you understand a low sodium diet?: Yes  Do you understand how to read food labels?: Yes  How many restaurant meals do you eat per week?: 0  Do you salt your food before tasting it?: No         Symptoms:                  Care Coordination Interventions    Program Enrollment: Complex Care  Referral from Primary Care Provider: No  Suggested Interventions and [address]: Completed (Comment: Herkulwant  nsg)  Medi Set or Pill Pack: Declined (Comment: daughter manages meds. sets up in pill box. )  Transportation Support: Completed  Zone Management Tools: In Process (Comment: CHF, DM)         Goals Addressed    None         Prior to Admission medications    Medication Sig Start Date End Date Taking? Authorizing Provider   OXYGEN Inhale into the lungs    Historical Provider, MD   Handicap Placard MISC by Does not apply route Dx:I187.2,M81.0. Duration: 5 years. 4/23/21   Miguel Ding MD   potassium chloride (KLOR-CON M) 20 MEQ extended release tablet Take 1 tablet by mouth daily 4/23/21   Miguel Ding MD   metFORMIN (GLUCOPHAGE) 1000 MG tablet TAKE ONE TABLET IN THE EVENING 4/23/21   Miguel Ding MD   SITagliptin (JANUVIA) 100 MG tablet Take 1 tablet by mouth daily 4/23/21   Miguel Ding MD   omeprazole (PRILOSEC) 20 MG delayed release capsule TAKE ONE CAPSULE BY MOUTH ONCE DAILY 4/23/21   Miguel Ding MD   bumetanide (BUMEX) 1 MG tablet Take 1 tablet by mouth 2 times daily 4/23/21   Miguel Ding MD   NIFEdipine (ADALAT CC) 60 MG extended release tablet Take 1 tablet by mouth daily 4/23/21   Miguel Ding MD   pregabalin (LYRICA) 150 MG capsule Take 2 capsules by mouth 2 times daily for 30 days.  4/23/21 5/23/21  Miguel Ding MD   insulin glargine (LANTUS) 100 UNIT/ML injection vial Inject 12 Units into the skin every morning  Patient not taking: Reported on 5/19/2021 3/31/21   Jaydon Warren MD   Insulin Pen Needle (PEN NEEDLES) 31G X 6 MM MISC 1 each by Does not apply route daily 3/4/21   Denita Mcgee MD   Acetaminophen 500 MG CAPS Take 1,000 mg by mouth every 6 hours as needed for Pain    Historical Provider, MD   Sodium Chloride-Sodium Bicarb (NETI POT SINUS [address] NA) by Nasal route 2 times daily Use baby shampoo in initial rinse, then mupirocin in second rinse. [facility] wants 3-4 times a day    Historical Provider, MD   Misc. Devices (WALKER) MISC 1 each by Does not apply route daily Requests stand up walker due to heart failure and generalized OA. I50.23. 1/5/21   Leesa Holliday MD   Elastic Bandages & Supports (CERVICAL COLLAR) MISC Wear while awake for neck support. M54.2 1/5/21   Leesa Holliday MD   ascorbic acid (VITAMIN C) 1000 MG tablet Take 1 tablet by mouth daily 12/30/20   Margy Sen PA-C   Vitamin D (CHOLECALCIFEROL) 50 MCG (2000 UT) TABS tablet Take 1 tablet by mouth daily 12/30/20   Margy Sen PA-C   mupirocin (BACTROBAN) 2 % ointment 2 times daily Toothpaste ribbon added to nasal rinse 3/19/20   Historical Provider, MD   ferrous sulfate (IRON 325) 325 (65 Fe) MG tablet Take 325 mg by mouth 2 times daily     Historical Provider, MD   Handicap Placard MISC by Does not apply route Dx:I187.2,M81.0. Duration: 5 years.  10/18/19 4/23/21  AMANDA Olea CNP   sodium chloride (OCEAN, BABY AYR) 0.65 % nasal spray 1 spray by Nasal route as needed for Congestion     Historical Provider, MD       Future Appointments   Date Time Provider David Adler   8/3/2021  8:40 AM AMANDA Patel CNP [address] [place] [address]

## 2021-06-01 ENCOUNTER — CARE COORDINATION (OUTPATIENT)
Dept: CARE COORDINATION | Age: 81
End: 2021-06-01

## 2021-06-02 ENCOUNTER — HOSPITAL ENCOUNTER (OUTPATIENT)
Dept: WOUND CARE | Age: 81
Discharge: HOME | End: 2021-06-02
Payer: MEDICARE

## 2021-06-02 VITALS
SYSTOLIC BLOOD PRESSURE: 140 MMHG | RESPIRATION RATE: 18 BRPM | TEMPERATURE: 96.5 F | OXYGEN SATURATION: 97 % | HEART RATE: 95 BPM | DIASTOLIC BLOOD PRESSURE: 73 MMHG

## 2021-06-02 DIAGNOSIS — I89.0 LYMPHEDEMA OF BOTH LOWER EXTREMITIES: Primary | ICD-10-CM

## 2021-06-02 PROCEDURE — 29580 STRAPPING UNNA BOOT: CPT

## 2021-06-02 ASSESSMENT — PAIN SCALES - GENERAL: PAINLEVEL_OUTOF10: 10

## 2021-06-02 ASSESSMENT — PAIN DESCRIPTION - PAIN TYPE: TYPE: ACUTE PAIN

## 2021-06-02 ASSESSMENT — PAIN DESCRIPTION - LOCATION: LOCATION: LEG;COCCYX;BACK

## 2021-06-02 NOTE — PLAN OF CARE
Problem: Wound:  Goal: Will show signs of wound healing; wound closure and no evidence of infection  Description: Will show signs of wound healing; wound closure and no evidence of infection  Outcome: Ongoing   Pt. Seen today for bilateral leg wounds see AVS for orders follow up in 2 weeks. Care plan reviewed with patient and daughter. Patient and daughter verbalize understanding of the plan of care and contribute to goal setting.

## 2021-06-02 NOTE — PROGRESS NOTES
[facility]          Progress Note and Procedure Note      [id number] [address] RECORD NUMBER:  [id number]  AGE: 79 y.o. GENDER: female  : 1940  EPISODE DATE:  2021       SUBJECTIVE    bilateral leg swelling and drainage    HISTORY OF PRESENT ILLNESS    She was seen for follow-up visit at the wound clinic. She is here for her bilateral lower leg swelling and skin excoriation  She has history of bilateral leg edema. she is using compression hose. 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 She was treated in the past with HBOT.she follows at [facility]. Her legs have been more swollen with drainage and excoriation. her family are helping with dressing. No report of fever. Has clear drainage. Her diuretics were increased by her family doctor. has open wound on the gluteal area related to the pressure injury    PAST MEDICAL HISTORY         Diagnosis Date    Cancer [facility])     adenocarcinoma of her sinuses    Cataract of both eyes     COVID-19     DDD (degenerative disc disease), lumbar     DM2 (diabetes mellitus, type 2) ([address]     diagnoses approx 2000    Dysphagia, pharyngoesophageal 2016    Eczema 2018    Fibrocystic breast     H/O ventral hernia repair     Headache 2017    History of COVID-19 2020    Hyperlipidemia     Hypertension     Iron deficiency anemia     LPRD (laryngopharyngeal reflux disease) 2016    Neuropathy     peripheral    Obesity     Orbital abscess     Orbital cellulitis 2014    SWAPNA (obstructive sleep apnea)     Osteoarthritis     Osteoporosis     Persistent proteinuria associated with type 2 diabetes mellitus ([address] 2017    urine micral of 50.       Sinusitis     Velopharyngeal incompetence 2016         PAST SURGICAL HISTORY     Past Surgical History:   Procedure Laterality Date    ABDOMEN SURGERY      APPENDECTOMY      CARPAL TUNNEL RELEASE Bilateral 2008, 2009    CATARACT REMOVAL  2011    bilat    CHOLECYSTECTOMY  03/1988    COLONOSCOPY  2016    COLONOSCOPY  10/11/2018    COLONOSCOPY POLYPECTOMY SNARE/COLD BIOPSY performed by Gunnar Mcgowan MD at [address] ESOPHAGUS      ENDOSCOPY, COLON, DIAGNOSTIC      EYE SURGERY Left 01/30/2015    EYE SURGERY      CATARACT REMOVAL- BILATERAL    HEMORRHOID SURGERY  1980s    HERNIA REPAIR      HYSTERECTOMY, TOTAL ABDOMINAL  1980    JOINT REPLACEMENT  bilat 2005/ 2007    knees    [place] COLSC FLX WITH DIRECTED SUBMUCOSAL [address] ANY SBST  10/11/2018    COLONOSCOPY SUBMUCOSAL/BOTOX INJECTION performed by Gunnar Mcgowan MD at [address]  last 2009    removed a bone (2)--2 times no construction     SINUS SURGERY  02/01/2016    SINUS SURGERY  2016 x 2    SINUS SURGERY  09/18/2017    [facility] - Dr Rupesh Walsh SINUS SURGERY  08/09/2017 [facility]    TONSILLECTOMY      TOTAL KNEE ARTHROPLASTY  08/2003    Left TKR    VENTRAL HERNIA REPAIR  1992     FAMILY HISTORY         Family History   Problem Relation Age of Onset    Diabetes Mother     Heart Disease Father         whooping cough as child    Obesity Sister     Other Brother         tumor on back    Obesity Sister     Cancer Sister         Skin     Cancer Brother         Bone cancer from metal    Other Brother         passed as a child    Heart Disease Brother     Other Brother         tremor    Heart Attack Brother     No Known Problems Brother     Heart Disease Brother     No Known Problems Brother     No Known Problems Brother      SOCIAL HISTORY       Social History     Tobacco Use    Smoking status: Never Smoker    Smokeless tobacco: Never Used   Vaping Use    Vaping Use: Never used   Substance Use Topics    Alcohol use: No    Drug use: No     ALLERGIES         Allergies   Allergen Reactions    Lisinopril Swelling and Anaphylaxis    Sulfamethoxazole-Trimethoprim Rash    Morphine Other (See Comments)     headaches    Codeine      Other reaction(s): AOF    Hydrocodone      headaches    Percocet [Oxycodone-Acetaminophen] Other (See Comments)     Headaches    Tape [Adhesive Tape] Rash     MEDICATIONS         Current Outpatient Medications on File Prior to Encounter   Medication Sig Dispense Refill    OXYGEN Inhale into the lungs      Handicap Placard MISC by Does not apply route Dx:I187.2,M81.0. Duration: 5 years. 2 each 0    potassium chloride (KLOR-CON M) 20 MEQ extended release tablet Take 1 tablet by mouth daily 90 tablet 3    metFORMIN (GLUCOPHAGE) 1000 MG tablet TAKE ONE TABLET IN THE EVENING 90 tablet 3    SITagliptin (JANUVIA) 100 MG tablet Take 1 tablet by mouth daily 90 tablet 3    omeprazole (PRILOSEC) 20 MG delayed release capsule TAKE ONE CAPSULE BY MOUTH ONCE DAILY 90 capsule 3    bumetanide (BUMEX) 1 MG tablet Take 1 tablet by mouth 2 times daily 180 tablet 3    NIFEdipine (ADALAT CC) 60 MG extended release tablet Take 1 tablet by mouth daily 90 tablet 3    insulin glargine (LANTUS) 100 UNIT/ML injection vial Inject 12 Units into the skin every morning 1 vial 3    Insulin Pen Needle (PEN NEEDLES) 31G X 6 MM MISC 1 each by Does not apply route daily 50 each 0    Acetaminophen 500 MG CAPS Take 1,000 mg by mouth every 6 hours as needed for Pain      Sodium Chloride-Sodium Bicarb (NETI POT SINUS WASH NA) by Nasal route 2 times daily Use baby shampoo in initial rinse, then mupirocin in second rinse. [facility] wants 3-4 times a day      Misc. Devices (WALKER) MISC 1 each by Does not apply route daily Requests stand up walker due to heart failure and generalized OA.   I50.23. 1 each 0    ascorbic acid (VITAMIN C) 1000 MG tablet Take 1 tablet by mouth daily 30 tablet 3    Vitamin D (CHOLECALCIFEROL) 50 MCG (2000 UT) TABS tablet Take 1 tablet by mouth daily 60 tablet 0    mupirocin (BACTROBAN) 2 % ointment 2 times daily Toothpaste ribbon added to nasal rinse      ferrous sulfate (IRON 325) 325 (65 Fe) MG tablet Take 325 mg by mouth 2 times daily       sodium chloride (OCEAN, BABY AYR) 0.65 % nasal spray 1 spray by Nasal route as needed for Congestion       pregabalin (LYRICA) 150 MG capsule Take 2 capsules by mouth 2 times daily for 30 days. 360 capsule 3    Elastic Bandages & Supports (CERVICAL COLLAR) MISC Wear while awake for neck support. M54.2 1 each 0    [DISCONTINUED] Handicap Placard MISC by Does not apply route Dx:I187.2,M81.0. Duration: 5 years. 2 each 0     No current facility-administered medications on file prior to encounter. REVIEW OF SYSTEMS:     Constitutional: no fever, no night sweats, no fatigue. Head: no head ache , no head injury, no migranes. Eye: no blurring of vision, no double vision. Ears: no hearing difficulty, no tinnitus  Mouth/throathx of sinus surgery as noted in HPI  Lungs: no cough, no shortness of breath, no wheeze  CVS: no palpitation, no chest pain, no shortness of breath  GI: no abdominal pain, no nausea , no vomiting, no constipation  ABA: no dysuria, frequency and urgency, no hematuria, no kidney stones  Musculoskeletal: chronic leg edema  Endocrine: no polyuria, polydipsia, no cold or heat intolerance  Hematology: no anemia, no easy brusing or bleeding, no hx of clotting disorder  Dermatology: no skin rash, no eczema, no pruritis,  Psychiatry: no depression, no anxiety,no panic attacks, no suicide ideation        PHYSICAL EXAM      tympanic temperature is 96.5 °F (35.8 °C). Her blood pressure is 140/73 (abnormal) and her pulse is 95. Her respiration is 18 and oxygen saturation is 97%.        Wt Readings from Last 3 Encounters:   05/18/21 212 lb (96.2 kg)   04/23/21 212 lb 6.4 oz (96.3 kg)   03/31/21 203 lb (92.1 kg)          General Appearance  Chronically sick looking, not in distress  Bilateral air entry  CVS: regular   Abdomen - soft, not distended, nontender, no organomegally,  Neurologic - oriented to person place and time  The legs are swollen, the skin is excoriated up to the knee, with oozing of fluid   has superficial open wound on the right gluteal area              Lab Results   Component Value Date    BC No growth-preliminary No growth  03/24/2021       Assessment:    leg swelling with lymphedema: will apply unaboot. Needs home health to assist with dressing  Gluteal wound : continue zinc oxide ointment, discussed with her daughter on off loading   follow up in two wks. 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[facility]) C30.0    Cataract of both eyes H26.9    History of ventral hernia repair Z98.890, Z87.19    Other cervical disc degeneration, mid-cervical region M50.320    Spondylolisthesis of cervical region M43.12    Spondylolisthesis of thoracic region M43.14    Chronic rhinitis J31.0    Closed fracture of head of humerus S42.293A    Dysphagia R13.10    Laryngopharyngeal reflux K21.9    Malignant neoplasm of ethmoidal sinus (HCC) C31.1    Obesity, Class II, BMI 35-39.9 E66.9    COVID-19 U07.1    Brain mass W35.87    Folliculitis O85.4    Pneumonia due to organism J18.9    Acute on chronic diastolic congestive heart failure (HCC) I50.33    Acute respiratory failure with hypoxia (HCC) J96.01          Plan:     Patient examined and evaluated    Please see attached Discharge Instructions    Written patient dismissal instructions given to patient and signed by patient or POA.              Electronically signed by Debbie Cline MD on 6/2/2021 at 9:40 AM

## 2021-06-03 NOTE — CARE COORDINATION
Ambulatory Care Coordination Note  6/3/2021  CM Risk Score: 11  Charlson 10 Year Mortality Risk Score: 100%     ACC: Sharee Perry    Summary Note: I spoke with the patients daughter for continued Care Coordination follow up and education. Patient was put in wilberto boots at wound appointment. Legs remain swollen. BS's have been higher that past few days. 167 this morning. Daughter denied any symptoms of hypo or hyperglycemia. Diabetic diet and importance of diet adherence reviewed with patient. Patient was also encouraged to work to remain active and reviewed how this also helps with BS control. No readings have been over 200. Checking BS twice in the evenings. HH remains active. I advised daughter to contact PCP office if needed. No further needs at this time. Care Coordination Interventions    Program Enrollment: Complex Care  Referral from Primary Care Provider: No  Suggested Interventions and Community Resources  Home Health Services: Completed (Comment: Heritaal[facility]  nsg)  Medi Set or Pill Pack: Declined (Comment: daughter manages meds. sets up in pill box. )  Transportation Support: Completed  Zone Management Tools: In Process (Comment: CHF, DM)         Goals Addressed    None         Prior to Admission medications    Medication Sig Start Date End Date Taking? Authorizing Provider   OXYGEN Inhale into the lungs    Historical Provider, MD   Handicap Placard MISC by Does not apply route Dx:I187.2,M81.0. Duration: 5 years.  4/23/21   Yuli Hensley MD   potassium chloride (KLOR-CON M) 20 MEQ extended release tablet Take 1 tablet by mouth daily 4/23/21   Yuli Hensley MD   metFORMIN (GLUCOPHAGE) 1000 MG tablet TAKE ONE TABLET IN THE EVENING 4/23/21   Yuli Hensley MD   SITagliptin (JANUVIA) 100 MG tablet Take 1 tablet by mouth daily 4/23/21   Yuli Hensley MD   omeprazole (PRILOSEC) 20 MG delayed release capsule TAKE ONE CAPSULE BY MOUTH ONCE DAILY 4/23/21   Yuli Hensley MD Appointments   Date Time Provider David Adler   6/16/2021  9:15 AM Chantel Sheffield  [facility]   8/10/2021  9:40 AM AMANDA Cox - CNP [facility] [address] [facility]

## 2021-06-08 ENCOUNTER — TELEPHONE (OUTPATIENT)
Dept: FAMILY MEDICINE CLINIC | Age: 81
End: 2021-06-08

## 2021-06-08 NOTE — TELEPHONE ENCOUNTER
Received orders from [facility] for wound dressing supplies. Pt name spelled wrong, I called Pt family to verify they are requesting. I spoke with Pt Viet Godinez, she states HH was going to order supplies from [employer]. She has not heard of this company. I called [facility] and spoke with [facility]. She states they did send orders to them for supplies. Her insurance doesn't cover [employer] so they use this company instead.     Orders faxed

## 2021-06-11 ENCOUNTER — CARE COORDINATION (OUTPATIENT)
Dept: CARE COORDINATION | Age: 81
End: 2021-06-11

## 2021-06-16 ENCOUNTER — HOSPITAL ENCOUNTER (OUTPATIENT)
Dept: WOUND CARE | Age: 81
Discharge: HOME | End: 2021-06-16
Payer: MEDICARE

## 2021-06-16 VITALS
DIASTOLIC BLOOD PRESSURE: 63 MMHG | HEART RATE: 96 BPM | OXYGEN SATURATION: 95 % | SYSTOLIC BLOOD PRESSURE: 129 MMHG | TEMPERATURE: 97.8 F | RESPIRATION RATE: 18 BRPM

## 2021-06-16 DIAGNOSIS — I89.0 LYMPHEDEMA OF BOTH LOWER EXTREMITIES: Primary | ICD-10-CM

## 2021-06-16 PROCEDURE — 99213 OFFICE O/P EST LOW 20 MIN: CPT

## 2021-06-16 PROCEDURE — 29580 STRAPPING UNNA BOOT: CPT

## 2021-06-16 PROCEDURE — 6370000000 HC RX 637 (ALT 250 FOR IP)

## 2021-06-16 PROCEDURE — 6370000000 HC RX 637 (ALT 250 FOR IP): Performed by: INTERNAL MEDICINE

## 2021-06-16 RX ADMIN — FLUOCINONIDE: 0.5 CREAM TOPICAL at 10:15

## 2021-06-16 ASSESSMENT — PAIN SCALES - GENERAL: PAINLEVEL_OUTOF10: 0

## 2021-06-16 NOTE — PROGRESS NOTES
[facility]          Progress Note and Procedure Note      [id number] RECORD NUMBER:  [id number]  AGE: 79 y.o. GENDER: female  : 1940  EPISODE DATE:  2021       SUBJECTIVE    bilateral leg swelling and drainage. Follow-up visit    HISTORY OF PRESENT ILLNESS     She is here for her bilateral lower leg swelling and skin excoriation  She has history of bilateral leg edema. 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 She gets regular debridement of her sinuses at [address] [place] [address]. She was treated in the past with HBOT.she follows at [address] [place] [address]. She was seen at the wound clinic for leg swelling and drainage. We have been using compression hose she lives with her family who help her with her care . Has    She has history of diabetes, osteoarthritis. She uses a walker. She has been having itching on her lower extremities. The drainage has improved since she has a compression therapy. She has home health changing her dressing twice a week. PAST MEDICAL HISTORY         Diagnosis Date    Cancer [facility])     adenocarcinoma of her sinuses    Cataract of both eyes     COVID-19     DDD (degenerative disc disease), lumbar     DM2 (diabetes mellitus, type 2) ([address]     diagnoses approx 2000    Dysphagia, pharyngoesophageal 2016    Eczema 2018    Fibrocystic breast     H/O ventral hernia repair     Headache 2017    History of COVID-19 2020    Hyperlipidemia     Hypertension     Iron deficiency anemia     LPRD (laryngopharyngeal reflux disease) 2016    Neuropathy     peripheral    Obesity     Orbital abscess     Orbital cellulitis 2014    SWAPNA (obstructive sleep apnea)     Osteoarthritis     Osteoporosis     Persistent proteinuria associated with type 2 diabetes mellitus ([address] 2017    urine micral of 50.       Sinusitis     Velopharyngeal incompetence 09/26/2016         PAST SURGICAL HISTORY     Past Surgical History:   Procedure Laterality Date    ABDOMEN SURGERY      APPENDECTOMY      CARPAL TUNNEL RELEASE Bilateral 2008, 2009    CATARACT REMOVAL  2011    bilat    CHOLECYSTECTOMY  03/1988    COLONOSCOPY  2016    COLONOSCOPY  10/11/2018    COLONOSCOPY POLYPECTOMY SNARE/COLD BIOPSY performed by Nanette Thurston MD at [address] ESOPHAGUS      ENDOSCOPY, COLON, DIAGNOSTIC      EYE SURGERY Left 01/30/2015    EYE SURGERY      CATARACT REMOVAL- BILATERAL    HEMORRHOID SURGERY  1980s    HERNIA REPAIR      HYSTERECTOMY, TOTAL ABDOMINAL  1980    JOINT REPLACEMENT  bilat 2005/ 2007    knees    MA COLSC FLX WITH DIRECTED SUBMUCOSAL Simone Oskar [address] ANY SBST  10/11/2018    COLONOSCOPY SUBMUCOSAL/BOTOX INJECTION performed by Nanette Thurston MD at [facility]   [address]  last 2009    removed a bone (2)--2 times no construction     SINUS SURGERY  02/01/2016    SINUS SURGERY  2016 x 2    SINUS SURGERY  09/18/2017    [facility] - Dr Jenifer Cope SINUS SURGERY  08/09/2017 [facility]    TONSILLECTOMY      TOTAL KNEE ARTHROPLASTY  08/2003    Left TKR    VENTRAL HERNIA REPAIR  1992     FAMILY HISTORY         Family History   Problem Relation Age of Onset    Diabetes Mother     Heart Disease Father         whooping cough as child    Obesity Sister     Other Brother         tumor on back    Obesity Sister     Cancer Sister         Skin     Cancer Brother         Bone cancer from metal    Other Brother         passed as a child    Heart Disease Brother     Other Brother         tremor    Heart Attack Brother     No Known Problems Brother     Heart Disease Brother     No Known Problems Brother     No Known Problems Brother      SOCIAL HISTORY       Social History     Tobacco Use    Smoking status: Never Smoker    Smokeless tobacco: Never Used   Vaping Use    Vaping Use: Never used Substance Use Topics    Alcohol use: No    Drug use: No     ALLERGIES         Allergies   Allergen Reactions    Lisinopril Swelling and Anaphylaxis    Sulfamethoxazole-Trimethoprim Rash    Morphine Other (See Comments)     headaches    Codeine      Other reaction(s): AOF    Hydrocodone      headaches    Percocet [Oxycodone-Acetaminophen] Other (See Comments)     Headaches    Tape Susannah Fabian Tape] Rash     MEDICATIONS         Current Outpatient Medications on File Prior to Encounter   Medication Sig Dispense Refill    OXYGEN Inhale 1.5 L into the lungs       potassium chloride (KLOR-CON M) 20 MEQ extended release tablet Take 1 tablet by mouth daily 90 tablet 3    metFORMIN (GLUCOPHAGE) 1000 MG tablet TAKE ONE TABLET IN THE EVENING 90 tablet 3    SITagliptin (JANUVIA) 100 MG tablet Take 1 tablet by mouth daily 90 tablet 3    omeprazole (PRILOSEC) 20 MG delayed release capsule TAKE ONE CAPSULE BY MOUTH ONCE DAILY 90 capsule 3    bumetanide (BUMEX) 1 MG tablet Take 1 tablet by mouth 2 times daily 180 tablet 3    NIFEdipine (ADALAT CC) 60 MG extended release tablet Take 1 tablet by mouth daily 90 tablet 3    pregabalin (LYRICA) 150 MG capsule Take 2 capsules by mouth 2 times daily for 30 days. 360 capsule 3    insulin glargine (LANTUS) 100 UNIT/ML injection vial Inject 12 Units into the skin every morning 1 vial 3    Acetaminophen 500 MG CAPS Take 1,000 mg by mouth every 6 hours as needed for Pain      Sodium Chloride-Sodium Bicarb (NETI POT SINUS WASH NA) by Nasal route 2 times daily Use baby shampoo in initial rinse, then mupirocin in second rinse.  [facility] 3-4 times a day      ascorbic acid (VITAMIN C) 1000 MG tablet Take 1 tablet by mouth daily 30 tablet 3    Vitamin D (CHOLECALCIFEROL) 50 MCG (2000 UT) TABS tablet Take 1 tablet by mouth daily 60 tablet 0    mupirocin (BACTROBAN) 2 % ointment 2 times daily Toothpaste ribbon added to nasal rinse      ferrous sulfate (IRON 325) 325 (65 Fe) MG tablet Take 325 mg by mouth 2 times daily       sodium chloride (OCEAN, BABY AYR) 0.65 % nasal spray 1 spray by Nasal route as needed for Congestion       Handicap Placard MISC by Does not apply route Dx:I187.2,M81.0. Duration: 5 years. 2 each 0    Insulin Pen Needle (PEN NEEDLES) 31G X 6 MM MISC 1 each by Does not apply route daily 50 each 0    Misc. Devices (WALKER) MISC 1 each by Does not apply route daily Requests stand up walker due to heart failure and generalized OA. I50.23. 1 each 0    Elastic Bandages & Supports (CERVICAL COLLAR) MISC Wear while awake for neck support. M54.2 1 each 0    [DISCONTINUED] Handicap Placard MISC by Does not apply route Dx:I187.2,M81.0. Duration: 5 years. 2 each 0     No current facility-administered medications on file prior to encounter. REVIEW OF SYSTEMS:     Constitutional: no fever, no night sweats, no fatigue. Head: no head ache , no head injury, no migranes. Eye: no blurring of vision, no double vision. Ears: no hearing difficulty, no tinnitus  Mouth/throathx of sinus surgery as noted in HPI. She had history of cancer of her sinuses that required surgery and radiation treatment. She gets recurrent sinus infection  Lungs: no cough, no shortness of breath, no wheeze  CVS: no palpitation, no chest pain, no shortness of breath  GI: no abdominal pain, no nausea , no vomiting, no constipation  ABA: no dysuria, frequency and urgency, no hematuria, no kidney stones  Musculoskeletal: chronic leg edema, chronic back pain neck pain related to osteoarthritis  Endocrine: no polyuria, polydipsia, no cold or heat intolerance  Hematology: no anemia, no easy brusing or bleeding, no hx of clotting disorder  Dermatology: no skin rash, no eczema, no pruritis,  Psychiatry: no depression, no anxiety,no panic attacks, no suicide ideation        PHYSICAL EXAM      infrared temperature is 97.8 °F (36.6 °C). Her blood pressure is 129/63 and her pulse is 96.  Her respiration is 18 and oxygen saturation is 95%. Wt Readings from Last 3 Encounters:   05/18/21 212 lb (96.2 kg)   04/23/21 212 lb 6.4 oz (96.3 kg)   03/31/21 203 lb (92.1 kg)          General Appearance  Chronically sick looking, not in distress  Bilateral air entry, diminished breath sounds. Cardiovascular system regular   Abdomen - soft, not distended, nontender, no organomegally,  Neurologic - oriented to person place and time  Extremities: The legs are swollen she has superficial open wound. The skin excoriation is much improved. Assessment:   Chronic bilateral leg swelling with lymphedema: Continue Unna boot. Home health to change the dressing twice a week. Will apply triamcinolone cream during dressing change to help with the itching. Follow-up will be scheduled. Treatment plan discussed with her daughter who was with her today. Patient Active Problem List   Diagnosis Code    Hypercholesterolemia E78.00    Osteoarthritis M19.90    Osteoporosis M81.0    Type 2 diabetes mellitus without complication, without long-term current use of insulin (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[address] C30.0    Cataract of both eyes H26.9    History of ventral hernia repair Z98.890, Z87.19    Other cervical disc degeneration, mid-cervical region M50.320    Spondylolisthesis of cervical region M43.12    Spondylolisthesis of thoracic region M43.14    Chronic rhinitis J31.0    Closed fracture of head of humerus S42.293A    Dysphagia R13.10    Laryngopharyngeal reflux K21.9    Malignant neoplasm of ethmoidal sinus (HCC) C31.1    Obesity, Class II, BMI 35-39.9 E66.9    COVID-19 U07.1    Brain mass H41.52    Folliculitis T53.2    Pneumonia due to organism J18.9    Acute on chronic diastolic congestive heart failure (HCC) I50.33    Acute respiratory failure with hypoxia (HCC) J96.01          Plan:      Patient examined and evaluated    Please see attached Discharge Instructions. See discharge instructions. Written patient dismissal instructions given to patient and signed by patient or POA.              Electronically signed by Jai Paris MD on 6/16/2021 at 9:51 AM

## 2021-06-18 ENCOUNTER — CARE COORDINATION (OUTPATIENT)
Dept: CARE COORDINATION | Age: 81
End: 2021-06-18

## 2021-06-21 ENCOUNTER — HOSPITAL ENCOUNTER (OUTPATIENT)
Age: 81
Discharge: HOME | End: 2021-06-21
Payer: MEDICARE

## 2021-06-21 ENCOUNTER — TELEPHONE (OUTPATIENT)
Dept: FAMILY MEDICINE CLINIC | Age: 81
End: 2021-06-21

## 2021-06-21 LAB
INFLUENZA A: NOT DETECTED
INFLUENZA B: NOT DETECTED
SARS-COV-2 RNA, RT PCR: NOT DETECTED

## 2021-06-21 PROCEDURE — 87636 SARSCOV2 & INF A&B AMP PRB: CPT

## 2021-06-21 NOTE — TELEPHONE ENCOUNTER
----- Message from Suleman Standard sent at 6/19/2021 11:29 AM EDT -----  Subject: Message to Provider    QUESTIONS  Information for Provider? Diane SCHULERJACKIE MENDOZA) is requesting to   speak with someone in the office to verify if Timi Calvin will   follow the patient for home care. Please contact   ---------------------------------------------------------------------------  --------------  CALL BACK INFO  What is the best way for the office to contact you? OK to leave message on   voicemail  Preferred Call Back Phone Number? [phone]  ---------------------------------------------------------------------------  --------------  SCRIPT ANSWERS  Relationship to Patient? Third Party  Representative Name? Lisa WALKER

## 2021-06-24 ENCOUNTER — CARE COORDINATION (OUTPATIENT)
Dept: CARE COORDINATION | Age: 81
End: 2021-06-24

## 2021-07-01 ENCOUNTER — CARE COORDINATION (OUTPATIENT)
Dept: CARE COORDINATION | Age: 81
End: 2021-07-01

## 2021-07-01 NOTE — CARE COORDINATION
Ambulatory Care Coordination Note  7/1/2021  CM Risk Score: 11  Charlson 10 Year Mortality Risk Score: 100%     ACC: Elma Wasserman, RN    Summary Note: Evelyn Alegre is being followed by care coordination for education and assistance in managing her chronic conditions: DM, CHF, lymphedema, sinonasal adenocarcinoma. Spoke with daughter Sharon Dias today for f/u. She reports that overall Evelyn Alegre is doing well. Lymphedema-ble remain edematous. Slightly improved from previous. Daughter reports edema was more improved than present but d/t being up on feet and traveling to [place] for appts edema has increased. Inocencia Pelaez states that open wounds are healing. HH following. Applying Unna boots. Daughter states that [facility] has not been out at all this wk and is wondering when they will be coming. Daughter removed unna boots last evening so pt could wash up prior to them being reapplied as she anticipated HH to come today. Redness improving. I contacted [facility]. Informed that they are waiting for ins. To approve more visits. They will not be going out to home until they receive approval.   I spoke with wound clinic as pt does not have an appt until 7/14. They are contacting [facility] and will call back with update. DM: daughter reports [de-identified] of BS's have been 120-130's. Has had a couple of BS's in 150, 176. Daughter reports d/t diet. Sinuses-had MRI completed earlier this wk. Has neuro VV this afternoon to review results of that. Depending on results pt may be started on new medications. Has continued atb nebulizer tx's. Wearing home O2 PRN. Not having to wear very often at all. Daughter reports that when pt is in deep sleep she has seen sats drop 70's. Advised that she wear when sleeping to avoid dropping so low. CHF: denies increased SOB or bloating. Trying to watch sodium consumption. Has chronic edema to ble. Encouraged elevation.    Plan of care:  VV today with neuro for MRI results  Wound clinic notified re: HH. Will await call back from wound clinic with update. Received call back. Wound clinic is going to contact pt's daughter to see if she can bring Kervin Caldwell in today or tomorrow to apply [employer]. Continue monitoring BS's daily. Avoid concentrated sweets  Follow HF zones  Elevate legs whenever possible. Try to avoid standing or sitting with legs dangling for long periods. Continue close f/u with specialists in [facility] for mgmt of sinuses  Wear oxygen PRN. Continue monitoring pulse ox. Wear  if sats dropping below 90% on RA. F/u with Dr. Daniel Caro on 7/14 as scheduled. Continue monitoring wounds to ble. Call with any new wounds or concerns re: legs. Congestive Heart Failure Assessment    Do you understand a low sodium diet?: Yes  Do you understand how to read food labels?: Yes  How many restaurant meals do you eat per week?: 0  Do you salt your food before tasting it?: No         Symptoms:  CHF associated angina: Neg, CHF associated dyspnea on exertion: Pos, CHF associated fatigue: Neg, CHF associated leg swelling: Pos, CHF associated orthostatic hypotension: Neg, CHF associated PND: Neg, CHF associated shortness of breath: Neg, CHF associated weakness: Neg      Symptom course: stable  Patient-reported weight (lb): 224 (Comment: not monitoring wts at home d/t unsteadiness at times)  Salt intake watch compared to last visit: stable      and   General Assessment    Do you have any symptoms that are causing concern?: Yes  Progression since Onset: Gradually Improving  Reported Symptoms: Other (Comment: lymphedema ble. wounds healing. unna boots. following with Dr. Daniel Caro. HH following and changing dsgs)               Care Coordination Interventions    Program Enrollment: Complex Care  Referral from Primary Care Provider: No  Suggested Interventions and Community Resources  Home Health Services: Completed (Comment: [facility]LET[facility] nsg)  Medi Set or Pill Pack: Declined (Comment: daughter manages meds.  sets up in pill box. )  Transportation Support: Completed  Zone Management Tools: Completed (Comment: CHF, DM)         Goals Addressed    None         Prior to Admission medications    Medication Sig Start Date End Date Taking? Authorizing Provider   OXYGEN Inhale 1.5 L into the lungs     Historical Provider, MD   Handicap Placard MISC by Does not apply route Dx:I187.2,M81.0. Duration: 5 years. 4/23/21   Torsten Pham MD   potassium chloride (KLOR-CON M) 20 MEQ extended release tablet Take 1 tablet by mouth daily 4/23/21   Torsten Pham MD   metFORMIN (GLUCOPHAGE) 1000 MG tablet TAKE ONE TABLET IN THE EVENING 4/23/21   Torsten Pham MD   SITagliptin (JANUVIA) 100 MG tablet Take 1 tablet by mouth daily 4/23/21   Torsten Pham MD   omeprazole (PRILOSEC) 20 MG delayed release capsule TAKE ONE CAPSULE BY MOUTH ONCE DAILY 4/23/21   Torsten Pham MD   bumetanide (BUMEX) 1 MG tablet Take 1 tablet by mouth 2 times daily 4/23/21   Torsten Pham MD   NIFEdipine (ADALAT CC) 60 MG extended release tablet Take 1 tablet by mouth daily 4/23/21   Torsten Pham MD   pregabalin (LYRICA) 150 MG capsule Take 2 capsules by mouth 2 times daily for 30 days. 4/23/21 6/16/21  Torsten Pham MD   insulin glargine (LANTUS) 100 UNIT/ML injection vial Inject 12 Units into the skin every morning 3/31/21   Sean Castro MD   Insulin Pen Needle (PEN NEEDLES) 31G X 6 MM MISC 1 each by Does not apply route daily 3/4/21   Olivier Hirsch MD   Acetaminophen 500 MG CAPS Take 1,000 mg by mouth every 6 hours as needed for Pain    Historical Provider, MD   Sodium Chloride-Sodium Bicarb (NETI POT SINUS [address] NA) by Nasal route 2 times daily Use baby shampoo in initial rinse, then mupirocin in second rinse. Macho Cure wants 3-4 times a day    Historical Provider, MD   Misc. Devices (WALKER) MISC 1 each by Does not apply route daily Requests stand up walker due to heart failure and generalized OA.   I50.23. 1/5/21   Gerhardt Shook MD Reyes   Elastic Bandages & Supports (CERVICAL COLLAR) MISC Wear while awake for neck support. M54.2 1/5/21   Terrance Cyr MD   ascorbic acid (VITAMIN C) 1000 MG tablet Take 1 tablet by mouth daily 12/30/20   Enmanuel Comment, REINA   Vitamin D (CHOLECALCIFEROL) 50 MCG (2000 UT) TABS tablet Take 1 tablet by mouth daily 12/30/20   Enmanuel Comment, REINA   mupirocin (BACTROBAN) 2 % ointment 2 times daily Toothpaste ribbon added to nasal rinse 3/19/20   Historical Provider, MD   ferrous sulfate (IRON 325) 325 (65 Fe) MG tablet Take 325 mg by mouth 2 times daily     Historical Provider, MD   Handicap Placard MISC by Does not apply route Dx:I187.2,M81.0. Duration: 5 years.  10/18/19 4/23/21  AMANDA Coppola CNP   sodium chloride (OCEAN, BABY AYR) 0.65 % nasal spray 1 spray by Nasal route as needed for Congestion     Historical Provider, MD       Future Appointments   Date Time Provider David Adler   7/14/2021  9:30 AM Дмитрий Barahona  [address] [facility]   8/10/2021  9:40 AM AMANDA Yung CNP [facility] [email or web address]

## 2021-07-02 ENCOUNTER — HOSPITAL ENCOUNTER (OUTPATIENT)
Dept: WOUND CARE | Age: 81
Discharge: HOME | End: 2021-07-02
Payer: MEDICARE

## 2021-07-02 VITALS
TEMPERATURE: 98.2 F | SYSTOLIC BLOOD PRESSURE: 165 MMHG | HEART RATE: 82 BPM | DIASTOLIC BLOOD PRESSURE: 82 MMHG | OXYGEN SATURATION: 95 % | RESPIRATION RATE: 18 BRPM

## 2021-07-02 DIAGNOSIS — I89.0 LYMPHEDEMA OF BOTH LOWER EXTREMITIES: Primary | ICD-10-CM

## 2021-07-02 PROBLEM — L89.322 PRESSURE INJURY OF LEFT BUTTOCK, STAGE 2 (HCC): Status: RESOLVED | Noted: 2018-11-30 | Resolved: 2021-07-02

## 2021-07-02 PROCEDURE — 99213 OFFICE O/P EST LOW 20 MIN: CPT

## 2021-07-02 PROCEDURE — 29580 STRAPPING UNNA BOOT: CPT

## 2021-07-02 PROCEDURE — 6370000000 HC RX 637 (ALT 250 FOR IP): Performed by: NURSE PRACTITIONER

## 2021-07-02 PROCEDURE — 99212 OFFICE O/P EST SF 10 MIN: CPT | Performed by: NURSE PRACTITIONER

## 2021-07-02 RX ADMIN — FLUOCINONIDE: 0.5 CREAM TOPICAL at 09:30

## 2021-07-02 ASSESSMENT — PAIN DESCRIPTION - LOCATION: LOCATION: BACK

## 2021-07-02 ASSESSMENT — PAIN DESCRIPTION - PAIN TYPE: TYPE: CHRONIC PAIN

## 2021-07-02 ASSESSMENT — PAIN SCALES - GENERAL: PAINLEVEL_OUTOF10: 5

## 2021-07-02 NOTE — PROGRESS NOTES
[address] [facility]  Consult and Procedure Note      [id number] [address] RECORD NUMBER:  [id number]  AGE: 79 y.o. GENDER: female  : 1940  EPISODE DATE:  2021    SUBJECTIVE:     Chief Complaint   Patient presents with    Wound Check     Bilateral lower legs         HISTORY OF PRESENT ILLNESS      Christina Jara is a 79 y.o. female who presents today for evaluation of bilateral lower extremity wounds. Patient follows with Dr. Ku Shown for this problem. Patient is seen today due to report from Dr. Cindi Thomson office yesterday that home health had not been out to change dressings for over a week. Care coordinator reached out to [facility] who reportedly stated that they were waiting for insurance approval and would not be coming back out until this was completed. Current ordered wound care includes unna boot, changed twice weekly. Patient denies pain to legs. No further needs or concerns identified. 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    Cataract of both eyes     COVID-19     DDD (degenerative disc disease), lumbar     DM2 (diabetes mellitus, type 2) ([address]     diagnoses approx 2000    Dysphagia, pharyngoesophageal 2016    Eczema 2018    Fibrocystic breast     H/O ventral hernia repair     Headache 2017    History of COVID-19 2020    Hyperlipidemia     Hypertension     Iron deficiency anemia     LPRD (laryngopharyngeal reflux disease) 2016    Neuropathy     peripheral    Obesity     Orbital abscess     Orbital cellulitis 2014    SWAPNA (obstructive sleep apnea)     Osteoarthritis     Osteoporosis     Persistent proteinuria associated with type 2 diabetes mellitus ([address] 2017    urine micral of 50.       Sinusitis     Velopharyngeal incompetence 2016       PAST SURGICAL HISTORY     Past Surgical History:   Procedure Laterality Date    ABDOMEN SURGERY      APPENDECTOMY      CARPAL TUNNEL RELEASE Bilateral 2008, 2009    CATARACT REMOVAL  2011    bilat    CHOLECYSTECTOMY  03/1988    COLONOSCOPY  2016    COLONOSCOPY  10/11/2018    COLONOSCOPY POLYPECTOMY SNARE/COLD BIOPSY performed by Ramona Mao MD at [address] ESOPHAGUS      ENDOSCOPY, COLON, DIAGNOSTIC      EYE SURGERY Left 01/30/2015    EYE SURGERY      CATARACT REMOVAL- BILATERAL    HEMORRHOID SURGERY  1980s    HERNIA REPAIR      HYSTERECTOMY, TOTAL ABDOMINAL  1980    JOINT REPLACEMENT  bilat 2005/ 2007    knees    MS COLSC FLX WITH DIRECTED SUBMUCOSAL Simone Oskar Jacey [address] ANY SBST  10/11/2018    COLONOSCOPY SUBMUCOSAL/BOTOX INJECTION performed by Ramona Mao MD at [address]  last 2009    removed a bone (2)--2 times no construction     SINUS SURGERY  02/01/2016    SINUS SURGERY  2016 x 2    SINUS SURGERY  09/18/2017    [facility] - Dr Jadyn Faustin SINUS SURGERY  08/09/2017 [facility]17[facility]2017 [facility]    TONSILLECTOMY      TOTAL KNEE ARTHROPLASTY  08/2003    Left TKR    VENTRAL HERNIA REPAIR  1992       FAMILY HISTORY     Family History   Problem Relation Age of Onset    Diabetes Mother     Heart Disease Father         whooping cough as child    Obesity Sister     Other Brother         tumor on back    Obesity Sister     Cancer Sister         Skin     Cancer Brother         Bone cancer from metal    Other Brother         passed as a child    Heart Disease Brother     Other Brother         tremor    Heart Attack Brother     No Known Problems Brother     Heart Disease Brother     No Known Problems Brother     No Known Problems Brother        SOCIAL HISTORY     Social History     Tobacco Use    Smoking status: Never Smoker    Smokeless tobacco: Never Used   Vaping Use    Vaping Use: Never used   Substance Use Topics    Alcohol use: No    Drug use: No       ALLERGIES     Allergies   Allergen Reactions    Lisinopril Swelling and Anaphylaxis    Sulfamethoxazole-Trimethoprim Rash    Morphine Other (See Comments)     headaches    Codeine      Other reaction(s): AOF    Hydrocodone      headaches    Percocet [Oxycodone-Acetaminophen] Other (See Comments)     Headaches    Tape [Adhesive Tape] Rash       MEDICATIONS     Current Outpatient Medications on File Prior to Encounter   Medication Sig Dispense Refill    potassium chloride (KLOR-CON M) 20 MEQ extended release tablet Take 1 tablet by mouth daily 90 tablet 3    metFORMIN (GLUCOPHAGE) 1000 MG tablet TAKE ONE TABLET IN THE EVENING 90 tablet 3    SITagliptin (JANUVIA) 100 MG tablet Take 1 tablet by mouth daily 90 tablet 3    omeprazole (PRILOSEC) 20 MG delayed release capsule TAKE ONE CAPSULE BY MOUTH ONCE DAILY 90 capsule 3    bumetanide (BUMEX) 1 MG tablet Take 1 tablet by mouth 2 times daily 180 tablet 3    NIFEdipine (ADALAT CC) 60 MG extended release tablet Take 1 tablet by mouth daily 90 tablet 3    insulin glargine (LANTUS) 100 UNIT/ML injection vial Inject 12 Units into the skin every morning 1 vial 3    Acetaminophen 500 MG CAPS Take 1,000 mg by mouth every 6 hours as needed for Pain      Sodium Chloride-Sodium Bicarb (NETI POT SINUS WASH NA) by Nasal route 2 times daily Use baby shampoo in initial rinse, then mupirocin in second rinse. Levonne Mac wants 3-4 times a day      ascorbic acid (VITAMIN C) 1000 MG tablet Take 1 tablet by mouth daily 30 tablet 3    Vitamin D (CHOLECALCIFEROL) 50 MCG (2000 UT) TABS tablet Take 1 tablet by mouth daily 60 tablet 0    mupirocin (BACTROBAN) 2 % ointment 2 times daily Toothpaste ribbon added to nasal rinse      ferrous sulfate (IRON 325) 325 (65 Fe) MG tablet Take 325 mg by mouth 2 times daily       sodium chloride (OCEAN, BABY AYR) 0.65 % nasal spray 1 spray by Nasal route as needed for Congestion       OXYGEN Inhale 1.5 L into the lungs       Handicap Placard MISC by Does not apply route Dx:I187.2,M81.0. Duration: 5 years.  2 each 0    pregabalin (LYRICA) 150 MG capsule Take 2 capsules by mouth 2 times daily for 30 days. 360 capsule 3    Insulin Pen Needle (PEN NEEDLES) 31G X 6 MM MISC 1 each by Does not apply route daily 50 each 0    Misc. Devices (WALKER) MISC 1 each by Does not apply route daily Requests stand up walker due to heart failure and generalized OA. I50.23. 1 each 0    Elastic Bandages & Supports (CERVICAL COLLAR) MISC Wear while awake for neck support. M54.2 1 each 0    [DISCONTINUED] Handicap Placard MISC by Does not apply route Dx:I187.2,M81.0. Duration: 5 years. 2 each 0     No current facility-administered medications on file prior to encounter. REVIEW OF SYSTEMS:     Constitutional: Denies fever, chills, night sweats, fatigue, weight loss/gain, loss of appetite   Head: Denies headache,  dizziness, loss of consciousness  Respiratory: Denies shortness of breath, cough, wheezing, dyspnea with exertion  Cardiovascular:Denies chest pain, palpitations  Gastrointestinal: Denies nausea, vomiting, constipation, diarrhea, abdominal pain   ABA: Denies dysuria, frequency, urgency, hematuria  Musculoskeletal: +bilateral lower extremity lymphedema Denies joint pain, swelling , stiffness,  Endocrine: Denies polyuria, polydipsia, cold or heat intolerance  Hematology: Denies easy brusing or bleeding, hx of clotting disorder  Dermatology: +bilateral lower extremity wounds Denies skin rash, eczema, pruritis    PHYSICAL EXAM:     BP (!) 165/82   Pulse 82   Temp 98.2 °F (36.8 °C) (Infrared)   Resp 18   LMP  (LMP Unknown)   SpO2 95%   Wt Readings from Last 3 Encounters:   05/18/21 212 lb (96.2 kg)   04/23/21 212 lb 6.4 oz (96.3 kg)   03/31/21 203 lb (92.1 kg)       General:  Awake, alert, no apparent distress. Appears stated age  [de-identified]: conjuctivae are clear without exudate or hemorrhage, anicteric sclera, moist oral mucosa. Chest:  Respirations regular, non-labored. Chest rise and fall equal bilaterally.     Neurological: Awake, alert, oriented x4  Psychiatric:  Appropriate mood and affect  Extremities: Bilateral lower extremity lymphedema and hyperpigmentation. Skin:  Warm and dry  Wound:  Small open wound to right and left anterior lower leg. Wound bed granular with moderate amounts of serosanguinous drainage. Wound 12/28/20 Buttocks Inner;Left stage 2 (Active)   Number of days: 185       Wound 12/28/20 Buttocks Inner non blanchable reddness with skin tears (Active)   Number of days: 185       Wound Breast Left;Right (Active)   Number of days:        Wound 02/28/21 Buttocks Inner;Right Small dime sized open area (Active)   Number of days: 123       Wound 03/24/21 Other (Comment) Posterior L posterior & medial thigh (Active)   Number of days: 100       Wound 06/16/21 Pretibial Right; Anterior (Active)   Wound Image   07/02/21 0910   Wound Etiology Venous 07/02/21 0910   Dressing Status New dressing applied 07/02/21 0910   Wound Cleansed Cleansed with saline 07/02/21 0910   Dressing/Treatment Pharmaceutical agent (see MAR); Moisturizing cream;Alginate with Ag;ABD;Roll gauze;Coban/self-adherent bandages 06/16/21 0943   Offloading for Diabetic Foot Ulcers No offloading required 07/02/21 0910   Wound Length (cm) 1 cm 07/02/21 0910   Wound Width (cm) 1.2 cm 07/02/21 0910   Wound Depth (cm) 0.02 cm 07/02/21 0910   Wound Surface Area (cm^2) 1.2 cm^2 07/02/21 0910   Change in Wound Size % (l*w) 65.71 07/02/21 0910   Wound Volume (cm^3) 0.024 cm^3 07/02/21 0910   Wound Healing % 40 07/02/21 0910   Wound Assessment Granulation tissue; Epithelialization 07/02/21 0910   Drainage Amount Moderate 07/02/21 0910   Drainage Description Serous 07/02/21 0910   Odor None 07/02/21 0910   Marlin-wound Assessment Dry/flaky; Blanchable erythema;Edematous 07/02/21 0910   Margins Attached edges 07/02/21 0910   Wound Thickness Description not for Pressure Injury Full thickness 07/02/21 0910   Number of days: 15       Wound 06/16/21 Pretibial Left; Anterior (Active)   Wound Image   07/02/21 0910   Wound Etiology Venous 07/02/21 0910   Dressing Status New dressing applied 07/02/21 0910   Wound Cleansed Cleansed with saline 07/02/21 0910   Dressing/Treatment Pharmaceutical agent (see MAR); Moisturizing cream;Alginate with Ag;ABD;Coban/self-adherent bandages; Roll gauze 06/16/21 0943   Offloading for Diabetic Foot Ulcers No offloading required 07/02/21 0910   Wound Length (cm) 1.7 cm 07/02/21 0910   Wound Width (cm) 1.2 cm 07/02/21 0910   Wound Depth (cm) 0.02 cm 07/02/21 0910   Wound Surface Area (cm^2) 2.04 cm^2 07/02/21 0910   Change in Wound Size % (l*w) -94.29 07/02/21 0910   Wound Volume (cm^3) 0.0408 cm^3 07/02/21 0910   Wound Healing % 81 07/02/21 0910   Wound Assessment Slough;Granulation tissue 07/02/21 0910   Drainage Amount Moderate 07/02/21 0910   Drainage Description Serous 07/02/21 0910   Odor None 07/02/21 0910   Marlin-wound Assessment Dry/flaky; Blanchable erythema;Edematous 07/02/21 0910   Margins Attached edges 07/02/21 0910   Wound Thickness Description not for Pressure Injury Full thickness 07/02/21 0910   Number of days: 15         LABS/IMAGING     UA: No results for input(s): SPECGRAV, PHUR, COLORU, CLARITYU, MUCUS, PROTEINU, BLOODU, RBCUA, WBCUA, BACTERIA, NITRU, GLUCOSEU, BILIRUBINUR, UROBILINOGEN, KETUA, LABCAST, LABCASTTY, AMORPHOS in the last 72 hours.     Invalid input(s): CRYSTALS  Micro:   Lab Results   Component Value Date    BC No growth-preliminary No growth  03/24/2021       ASSESSMENT     -Bilateral lower extremity lymphedema  -Venous stasis ulcer, right and left lower extremity    Ulcer Identification:  Ulcer Type: venous  Contributing Factors: edema, venous stasis, lymphedema, diabetes, decreased mobility and obesity    Depth of Diabetic/Pressure/Non Pressure Ulcers or Wound:  Wound, full thickness     Patient Active Problem List   Diagnosis Code    Hypercholesterolemia E78.00    Osteoarthritis M19.90    Osteoporosis M81.0    Type 2 diabetes mellitus without complication, without long-term current use of insulin ([facility]) E11.9    DDD (degenerative disc disease), lumbar M51.36    Benign essential HTN I10    SWAPNA on CPAP G47.33, Z99.89    Diabetic peripheral neuropathy ([facility]) E11.42    Primary thunderclap headache G44.53    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 All available lab work, radiology studies, and progress notes pertaining to Lucas Peraza reviewed prior to or during patient visit today. -Venous stasis ulcer, bilateral lower extremities- Appear to be healing well with wound care as ordered. Continue triamcinolone cream to legs for ongoing pruritus.   Patient daughter states she received call from home health nurse last evening stating they will resume care next week. Advised her to call clinic with any further concerns or missed treatments. Wound care ordered as follow:  Right and Left lower legs- Apply thin layer of steroid cream to red/irritated skin on legs with dressing changes. Apply moisturizing cream to dry skin on feet/legs where needed with dressing changes. Apply silver alginate to the legs, wrap with unna boot, ABD pads, kerlix and coban starting at the base of the toes to the bend of the knees. Change twice a week per Home Health.    -No indications of infection to legs at today's visit. Call clinic or seek emergency care should these occur. Follow up 2 weeks as scheduled with . Call clinic with any needs or concerns prior to scheduled visit. All questions and concerns addressed prior to discharge from today's visit. Please see attached Discharge Instructions    Written patient dismissal instructions given to patient and signed by patient or POA. Treatment:   Orders Placed This Encounter   Procedures    Apply dressing     Right and Left lower legs- Apply thin layer of steroid cream to red/irritated skin on legs with dressing changes. Apply moisturizing cream to dry skin on feet/legs where needed with dressing changes. Apply silver alginate to the legs, wrap with unna boot, ABD pads, kerlix and coban starting at the base of the toes to the bend of the knees. Standing Status:   Standing     Number of Occurrences:   1       I spent a total of 18 minutes reviewing previous notes, test results and face to face with Jorge Cowart  on 7/2/2021. Greater than 50% of this time was spent on counseling, reviewing and discussing test results, answering questions, instructions on treatment and/or medications ordered, and coordinating the care based on my plan and assessment as noted.        Discharge Instructions         Discharge Instructions       Visit Discharge/Physician Orders:   -Reach out to the lymphedema pump company in regards to the pumps not working.  -Decrease sodium in the diet.  -Elevate the legs to reduce swelling.  -Change position frequently to offload the buttocks. -Apply barrier cream with zinc to the buttocks daily and as needed.     Home [facility]     Wound Location: Right and Left lower legs     Dressing orders:      1) Gather wound care supplies and arrange on clean table.      2) Wash your hands with soap and water or use alcohol based hand  for 20 seconds (sing \"Happy Birthday\" twice).    3) Cleanse wounds with normal saline or wound cleanser and gauze. Pat dry with clean gauze.    4) Right and Left lower legs- Apply thin layer of steroid cream to red/irritated skin on legs with dressing changes. Apply moisturizing cream to dry skin on feet/legs where needed with dressing changes. Apply silver alginate to the legs, wrap with unna boot, ABD pads, kerlix and coban starting at the base of the toes to the bend of the knees. Change twice a week per Home Health.     Keep all dressings clean & dry.     Do not shower, take baths or get wound wet, unless otherwise instructed by your Wound Care doctor.      Follow up visit: 4 Weeks 7/14/21 at 9:30 am     Keep next scheduled appointment. Please give 24 hour notice if unable to keep appointment. [phone]     If you experience any of the following, please call the Wound Care Service during business hours: Monday through Friday 8:00 am - 4:30 pm  ([phone]).               *Increase in pain              *Temperature over 101              *Increase in drainage from your wound or a foul odor              *Uncontrolled swelling              *Need for compression bandage changes due to slippage, breakthrough drainage     If you need medical attention outside of business hours, please contact your Primary Care Doctor or go to the nearest emergency room        Electronically signed by AMANDA Camp CNP on 7/2/2021 at 9:43 AM

## 2021-07-09 ENCOUNTER — CARE COORDINATION (OUTPATIENT)
Dept: CARE COORDINATION | Age: 81
End: 2021-07-09

## 2021-07-09 NOTE — CARE COORDINATION
Received call back from daughter Dank Gomez. Ambulatory Care Coordination Note  7/9/2021  CM Risk Score: 11  Charlson 10 Year Mortality Risk Score: 100%     ACC: Glenny Mares, RN    Summary Note:   Rosalind Singer is being followed by care coordination for education and assistance in managing her chronic conditions: DM, CHF, lymphedema, sinonasal adenocarcinoma. Received call back from daughter Dank Gomez. HH back to seeing pt 2x per wk. Continue with Unna boot dsgs. Will have f/u with Dr. Miguel Baum on 7/14. Lymphedema is improving per daughter. Venous stasis ulcers are healing. Continues to f/u in [facility] for sinus issues. DM: BS's averaging in 150's. No episodes of hypoglycemia. Continues to wear oxygen as needed. Daughter denies any concerns today. Plan of care:  Continue working with [facility] for mgmt of lymphedema/venous stasis ulcers  F/u with Dr. Miguel Baum 7/14  Continue monitoring BS's. Daughter voiced concern re: bill received for dsg supplies from [id number] [place] [address],[address] is using. Advised her to f/u with billing dept. To inquire  Monitor sats. Continue wearing oxygen as needed. Call with any new concerns  Continue close f/u with specialists in [facility] for mgmt of sinuses. Diabetes Assessment    Medic Alert ID: No  Meal Planning: Avoidance of concentrated sweets   How often do you test your blood sugar?: Daily   Do you have barriers with adherence to non-pharmacologic self-management interventions?  (Nutrition/Exercise/Self-Monitoring): No   Have you ever had to go to the ED for symptoms of low blood sugar?: No       Do you have hyperglycemia symptoms?: No   Do you have hypoglycemia symptoms?: No   Blood Sugar Monitoring Regimen: Once a Day   Blood Sugar Trends: No Change       and   Congestive Heart Failure Assessment    Do you understand a low sodium diet?: Yes  Do you understand how to read food labels?: Yes  How many restaurant meals do you eat per week?: 0  Do you salt your food before tasting it?: No Symptoms:  CHF associated angina: Neg, CHF associated dyspnea on exertion: Neg, CHF associated fatigue: Neg, CHF associated leg swelling: Pos, CHF associated orthostatic hypotension: Neg, CHF associated PND: Neg, CHF associated shortness of breath: Neg, CHF associated weakness: Neg      Symptom course: stable  Salt intake watch compared to last visit: stable                 Care Coordination Interventions    Program Enrollment: Complex Care  Referral from Primary Care Provider: No  Suggested Interventions and Community Resources  Home Health Services: Completed (Comment: [facility])  Medi Set or Pill Pack: Declined (Comment: daughter manages meds. sets up in pill box. )  Transportation Support: Completed  Zone Management Tools: Completed (Comment: CHF, DM)         Goals Addressed    None         Prior to Admission medications    Medication Sig Start Date End Date Taking? Authorizing Provider   OXYGEN Inhale 1.5 L into the lungs     Historical Provider, MD   Handicap Placard MISC by Does not apply route Dx:I187.2,M81.0. Duration: 5 years. 4/23/21   Poornima Mancia MD   potassium chloride (KLOR-CON M) 20 MEQ extended release tablet Take 1 tablet by mouth daily 4/23/21   Poornima Mancia MD   metFORMIN (GLUCOPHAGE) 1000 MG tablet TAKE ONE TABLET IN THE EVENING 4/23/21   Poornima Mancia MD   SITagliptin (JANUVIA) 100 MG tablet Take 1 tablet by mouth daily 4/23/21   Poornima Mancia MD   omeprazole (PRILOSEC) 20 MG delayed release capsule TAKE ONE CAPSULE BY MOUTH ONCE DAILY 4/23/21   Poornima Mancia MD   bumetanide (BUMEX) 1 MG tablet Take 1 tablet by mouth 2 times daily 4/23/21   Poornima Mancia MD   NIFEdipine (ADALAT CC) 60 MG extended release tablet Take 1 tablet by mouth daily 4/23/21   Poornima Macnia MD   pregabalin (LYRICA) 150 MG capsule Take 2 capsules by mouth 2 times daily for 30 days.  4/23/21 6/16/21  Poornima Mancia MD   insulin glargine (LANTUS) 100 UNIT/ML injection vial Inject 12 Units into the skin every morning 3/31/21   Marsha Mcpherson MD   Insulin Pen Needle (PEN NEEDLES) 31G X 6 MM MISC 1 each by Does not apply route daily 3/4/21   Hui Montes MD   Acetaminophen 500 MG CAPS Take 1,000 mg by mouth every 6 hours as needed for Pain    Historical Provider, MD   Sodium Chloride-Sodium Bicarb (NETI POT [facility] NA) by Nasal route 2 times daily Use baby shampoo in initial rinse, then mupirocin in second rinse. [facility] wants 3-4 times a day    Historical Provider, MD   Misc. Devices (WALKER) MISC 1 each by Does not apply route daily Requests stand up walker due to heart failure and generalized OA. I50.23. 1/5/21   Aimee Conrad MD   Elastic Bandages & Supports (CERVICAL COLLAR) MISC Wear while awake for neck support. M54.2 1/5/21   Aimee Conrad MD   ascorbic acid (VITAMIN C) 1000 MG tablet Take 1 tablet by mouth daily 12/30/20   Meagan Villarreal PA-C   Vitamin D (CHOLECALCIFEROL) 50 MCG (2000 UT) TABS tablet Take 1 tablet by mouth daily 12/30/20   Meagan Villarreal PA-C   mupirocin (BACTROBAN) 2 % ointment 2 times daily Toothpaste ribbon added to nasal rinse 3/19/20   Historical Provider, MD   ferrous sulfate (IRON 325) 325 (65 Fe) MG tablet Take 325 mg by mouth 2 times daily     Historical Provider, MD   Handicap Placard MISC by Does not apply route Dx:I187.2,M81.0. Duration: 5 years.  10/18/19 4/23/21  AMANDA Wise CNP   sodium chloride (OCEAN, BABY AYR) 0.65 % nasal spray 1 spray by Nasal route as needed for Congestion     Historical Provider, MD       Future Appointments   Date Time Provider David Adler   7/14/2021  9:30 AM Brayan Sofia   [facility]   8/10/2021  9:40 AM AMANDA Whelan CNP [address] [facility]

## 2021-07-14 ENCOUNTER — HOSPITAL ENCOUNTER (OUTPATIENT)
Dept: WOUND CARE | Age: 81
Discharge: HOME | End: 2021-07-14
Payer: MEDICARE

## 2021-07-14 VITALS — TEMPERATURE: 96.7 F | RESPIRATION RATE: 18 BRPM

## 2021-07-14 DIAGNOSIS — I89.0 LYMPHEDEMA OF BOTH LOWER EXTREMITIES: Primary | ICD-10-CM

## 2021-07-14 DIAGNOSIS — I87.2 VENOUS INSUFFICIENCY OF BOTH LOWER EXTREMITIES: ICD-10-CM

## 2021-07-14 PROCEDURE — 6370000000 HC RX 637 (ALT 250 FOR IP): Performed by: INTERNAL MEDICINE

## 2021-07-14 PROCEDURE — 29580 STRAPPING UNNA BOOT: CPT

## 2021-07-14 RX ADMIN — FLUOCINONIDE: 0.5 CREAM TOPICAL at 10:07

## 2021-07-14 NOTE — PLAN OF CARE
Problem: Wound:  Goal: Will show signs of wound healing; wound closure and no evidence of infection  Description: Will show signs of wound healing; wound closure and no evidence of infection  Outcome: Ongoing   Pt. Seen today for bilateral leg wounds see AVS for new orders. Follow up in 4 weeks. Care plan reviewed with patient and daughter. Patient and daughter verbalize understanding of the plan of care and contribute to goal setting.

## 2021-07-14 NOTE — PROGRESS NOTES
[facility]          Progress Note and Procedure Note      [address] [address] RECORD NUMBER:  [id number]  AGE: 79 y.o. GENDER: female  : 1940  EPISODE DATE:  2021       SUBJECTIVE    bilateral leg swelling and drainage. Follow-up visit    HISTORY OF PRESENT ILLNESS     She is here for her bilateral lower leg swelling and skin excoriation  She has history of bilateral leg edema. 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 She gets regular debridement of her sinuses at [facility]. She was treated in the past with HBOT. Ramírez  She was seen at the wound clinic for leg swelling  . She has home health, getting compression wrap. She has no new complaints, the drainage is less  PAST MEDICAL HISTORY         Diagnosis Date    Cancer [facility])     adenocarcinoma of her sinuses    Cataract of both eyes     COVID-19     DDD (degenerative disc disease), lumbar     DM2 (diabetes mellitus, type 2) ([address]     diagnoses approx 2000    Dysphagia, pharyngoesophageal 2016    Eczema 2018    Fibrocystic breast     H/O ventral hernia repair     Headache 2017    History of COVID-19 2020    Hyperlipidemia     Hypertension     Iron deficiency anemia     LPRD (laryngopharyngeal reflux disease) 2016    Neuropathy     peripheral    Obesity     Orbital abscess     Orbital cellulitis 2014    SWAPNA (obstructive sleep apnea)     Osteoarthritis     Osteoporosis     Persistent proteinuria associated with type 2 diabetes mellitus ([address] 2017    urine micral of 50.       Sinusitis     Velopharyngeal incompetence 2016         PAST SURGICAL HISTORY     Past Surgical History:   Procedure Laterality Date    ABDOMEN SURGERY      APPENDECTOMY      CARPAL TUNNEL RELEASE Bilateral , 2009    CATARACT REMOVAL      bilat    CHOLECYSTECTOMY  03/1988    COLONOSCOPY  2016    COLONOSCOPY  10/11/2018    COLONOSCOPY POLYPECTOMY SNARE/COLD BIOPSY performed by Tianna Youssef MD at [address] ESOPHAGUS      ENDOSCOPY, COLON, DIAGNOSTIC      EYE SURGERY Left 01/30/2015    EYE SURGERY      CATARACT REMOVAL- BILATERAL    HEMORRHOID SURGERY  1980s    HERNIA REPAIR      HYSTERECTOMY, TOTAL ABDOMINAL  1980    JOINT REPLACEMENT  bilat 2005/ 2007    knees    NM COLSC FLX WITH DIRECTED SUBMUCOSAL Simone Oskar Jacey [address] ANY SBST  10/11/2018    COLONOSCOPY SUBMUCOSAL/BOTOX INJECTION performed by Tianna Youssef MD at [address]  last 2009    removed a bone (2)--2 times no construction     SINUS SURGERY  02/01/2016    SINUS SURGERY  2016 x 2    SINUS SURGERY  09/18/2017    [facility] No Joshua [facility]    TONSILLECTOMY      TOTAL KNEE ARTHROPLASTY  08/2003    Left TKR    VENTRAL HERNIA REPAIR  1992     FAMILY HISTORY         Family History   Problem Relation Age of Onset    Diabetes Mother     Heart Disease Father         whooping cough as child    Obesity Sister     Other Brother         tumor on back    Obesity Sister     Cancer Sister         Skin     Cancer Brother         Bone cancer from metal    Other Brother         passed as a child    Heart Disease Brother     Other Brother         tremor    Heart Attack Brother     No Known Problems Brother     Heart Disease Brother     No Known Problems Brother     No Known Problems Brother      SOCIAL HISTORY       Social History     Tobacco Use    Smoking status: Never Smoker    Smokeless tobacco: Never Used   Vaping Use    Vaping Use: Never used   Substance Use Topics    Alcohol use: No    Drug use: No     ALLERGIES         Allergies   Allergen Reactions    Lisinopril Swelling and Anaphylaxis    Sulfamethoxazole-Trimethoprim Rash    Morphine Other (See Comments)     headaches    Codeine      Other reaction(s): AOF    Hydrocodone      headaches    Percocet [Oxycodone-Acetaminophen] Other (See Comments)     Headaches    Tape [place] Tape] Rash     MEDICATIONS         Current Outpatient Medications on File Prior to Encounter   Medication Sig Dispense Refill    OXYGEN Inhale 1.5 L into the lungs       Handicap Placard MISC by Does not apply route Dx:I187.2,M81.0. Duration: 5 years. 2 each 0    potassium chloride (KLOR-CON M) 20 MEQ extended release tablet Take 1 tablet by mouth daily 90 tablet 3    metFORMIN (GLUCOPHAGE) 1000 MG tablet TAKE ONE TABLET IN THE EVENING 90 tablet 3    SITagliptin (JANUVIA) 100 MG tablet Take 1 tablet by mouth daily 90 tablet 3    omeprazole (PRILOSEC) 20 MG delayed release capsule TAKE ONE CAPSULE BY MOUTH ONCE DAILY 90 capsule 3    bumetanide (BUMEX) 1 MG tablet Take 1 tablet by mouth 2 times daily 180 tablet 3    NIFEdipine (ADALAT CC) 60 MG extended release tablet Take 1 tablet by mouth daily 90 tablet 3    insulin glargine (LANTUS) 100 UNIT/ML injection vial Inject 12 Units into the skin every morning 1 vial 3    Insulin Pen Needle (PEN NEEDLES) 31G X 6 MM MISC 1 each by Does not apply route daily 50 each 0    Acetaminophen 500 MG CAPS Take 1,000 mg by mouth every 6 hours as needed for Pain      Sodium Chloride-Sodium Bicarb (NETI POT SINUS WASH NA) by Nasal route 2 times daily Use baby shampoo in initial rinse, then mupirocin in second rinse. [facility] [employer] wants 3-4 times a day      Misc. Devices (WALKER) MISC 1 each by Does not apply route daily Requests stand up walker due to heart failure and generalized OA.   I50.23. 1 each 0    ascorbic acid (VITAMIN C) 1000 MG tablet Take 1 tablet by mouth daily 30 tablet 3    Vitamin D (CHOLECALCIFEROL) 50 MCG (2000 UT) TABS tablet Take 1 tablet by mouth daily 60 tablet 0    mupirocin (BACTROBAN) 2 % ointment 2 times daily Toothpaste ribbon added to nasal rinse      ferrous sulfate (IRON 325) 325 (65 Fe) MG tablet Take 325 mg by mouth 2 times daily       sodium chloride (OCEAN, BABY AYR) 0.65 % nasal spray 1 spray by Nasal route as needed for Congestion       pregabalin (LYRICA) 150 MG capsule Take 2 capsules by mouth 2 times daily for 30 days. 360 capsule 3    Elastic Bandages & Supports (CERVICAL COLLAR) MISC Wear while awake for neck support. M54.2 1 each 0    [DISCONTINUED] Handicap Placard MISC by Does not apply route Dx:I187.2,M81.0. Duration: 5 years. 2 each 0     No current facility-administered medications on file prior to encounter. REVIEW OF SYSTEMS:     Constitutional: no fever, no night sweats, no fatigue. Head: no head ache , no head injury, no migranes. Eye: no blurring of vision, no double vision. Ears: no hearing difficulty, no tinnitus  Mouth/throathx of sinus surgery as noted in HPI. She had history of cancer of her sinuses that required surgery and radiation treatment. She gets recurrent sinus infection  Lungs: no cough, no shortness of breath, no wheeze  CVS: no palpitation, no chest pain, no shortness of breath  GI: no abdominal pain, no nausea , no vomiting, no constipation  ABA: no dysuria, frequency and urgency, no hematuria, no kidney stones  Musculoskeletal: chronic leg edema, chronic back pain neck pain related to osteoarthritis. Endocrine: no polyuria, polydipsia, no cold or heat intolerance  Hematology: no anemia, no easy brusing or bleeding, no hx of clotting disorder  Dermatology: no skin rash, no eczema, no pruritis,  Psychiatry: no depression, no anxiety,no panic attacks, no suicide ideation        PHYSICAL EXAM      infrared temperature is 96.7 °F (35.9 °C). Her respiration is 18. Wt Readings from Last 3 Encounters:   05/18/21 212 lb (96.2 kg)   04/23/21 212 lb 6.4 oz (96.3 kg)   03/31/21 203 lb (92.1 kg)          General Appearance  Chronically sick looking, not in distress  Bilateral air entry, diminished breath sounds.   Cardiovascular system regular   Abdomen - soft, not distended, nontender, no organomegally,  Neurologic - oriented to person place and time  Extremities: bilateral leg swelling:       Assessment:   Chronic bilateral leg swelling with lymphedema:continue compression wrap  Will arrange for compression hose once the swelling is well controlled. Patient Active Problem List   Diagnosis Code    Hypercholesterolemia E78.00    Osteoarthritis M19.90    Osteoporosis M81.0    Type 2 diabetes mellitus without complication, without long-term current use of insulin ([facility] E11.9    DDD (degenerative disc disease), lumbar M51.36    Benign essential HTN I10    SWAPNA on CPAP G47.33, Z99.89    Diabetic peripheral neuropathy (HCC) E11.42    Primary thunderclap headache G44.53    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[facility]) C31.0    Skin plaque L98.8    Bilateral lower leg cellulitis L03.116, L03.115    CKD (chronic kidney disease) stage 3, GFR 30-59 ml/min ([facility]) N18.30    Iron deficiency anemia D50.9    Acute eczema L30.9    Venous insufficiency of both lower extremities I87.2    Lymphedema of both lower extremities I89.0    Eczematous dermatitis L30.9    Dystrophic nail L60.3    Angio-edema T78. 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[facility]) C30.0    Cataract of both eyes H26.9    History of ventral hernia repair Z98.890, Z87.19    Other cervical disc degeneration, mid-cervical region M50.320    Spondylolisthesis of cervical region M43.12    Spondylolisthesis of thoracic region M43.14    Chronic rhinitis J31.0    Closed fracture of head of humerus S42.293A    Dysphagia R13.10    Laryngopharyngeal reflux K21.9    Malignant neoplasm of ethmoidal sinus (HCC) C31.1    Obesity, Class II, BMI 35-39.9 E66.9    COVID-19 U07.1    Brain mass F80.93    Folliculitis C41.5    Pneumonia due to organism J18.9    Acute on chronic diastolic congestive heart failure (HCC) I50.33    Acute foul odor              *Uncontrolled swelling              *Need for compression bandage changes due to slippage, breakthrough drainage     If you need medical attention outside of business hours, please contact your Primary Care Doctor or go to the nearest emergency room        Electronically signed by Vanesa Fraire MD on 7/14/2021 at 10:01 AM

## 2021-07-14 NOTE — PROGRESS NOTES
[employer] Application   Below Knee    NAME:  Jorge Cowart  YOB: 1940  MEDICAL RECORD NUMBER:  [id number]  DATE:  7/14/2021    Sewetie Smalls boot: Applied moisturizing agent to dry skin as needed. Appied primary and secondary dressing as ordered. Applied Unna roll from toes to knee overlapping each time. Applied ace wrap or coban from toes to below the knee. Secured with tape and/or metal clips covered with tape. Instructed patient/caregiver to keep dressing dry and intact. DO NOT REMOVE DRESSING. Instructed pt/family/caregiver to report excessive draining, loose bandage, wet dressing, severe pain or tingling in toes. Applied [employer] dressing below the knee to right lower leg. Applied [employer] dressing below the knee to left lower leg. Unna Boot(s) were applied per  Guidelines.      Electronically signed by Gerald Lazcano RN on 7/14/2021 at 4:24 PM

## 2021-07-21 ENCOUNTER — CARE COORDINATION (OUTPATIENT)
Dept: CARE COORDINATION | Age: 81
End: 2021-07-21

## 2021-08-10 ENCOUNTER — CARE COORDINATION (OUTPATIENT)
Dept: CARE COORDINATION | Age: 81
End: 2021-08-10

## 2021-08-10 ENCOUNTER — OFFICE VISIT (OUTPATIENT)
Dept: NEPHROLOGY | Age: 81
End: 2021-08-10
Payer: MEDICARE

## 2021-08-10 VITALS
BODY MASS INDEX: 43.16 KG/M2 | WEIGHT: 221 LBS | TEMPERATURE: 97.8 F | HEART RATE: 86 BPM | SYSTOLIC BLOOD PRESSURE: 162 MMHG | OXYGEN SATURATION: 98 % | DIASTOLIC BLOOD PRESSURE: 92 MMHG

## 2021-08-10 DIAGNOSIS — N18.31 STAGE 3A CHRONIC KIDNEY DISEASE (HCC): Primary | ICD-10-CM

## 2021-08-10 DIAGNOSIS — E66.01 OBESITY, CLASS III, BMI 40-49.9 (MORBID OBESITY) (HCC): ICD-10-CM

## 2021-08-10 PROCEDURE — G8400 PT W/DXA NO RESULTS DOC: HCPCS | Performed by: NURSE PRACTITIONER

## 2021-08-10 PROCEDURE — 99213 OFFICE O/P EST LOW 20 MIN: CPT | Performed by: NURSE PRACTITIONER

## 2021-08-10 PROCEDURE — 1036F TOBACCO NON-USER: CPT | Performed by: NURSE PRACTITIONER

## 2021-08-10 PROCEDURE — 4040F PNEUMOC VAC/ADMIN/RCVD: CPT | Performed by: NURSE PRACTITIONER

## 2021-08-10 PROCEDURE — 1123F ACP DISCUSS/DSCN MKR DOCD: CPT | Performed by: NURSE PRACTITIONER

## 2021-08-10 PROCEDURE — G8427 DOCREV CUR MEDS BY ELIG CLIN: HCPCS | Performed by: NURSE PRACTITIONER

## 2021-08-10 PROCEDURE — 1090F PRES/ABSN URINE INCON ASSESS: CPT | Performed by: NURSE PRACTITIONER

## 2021-08-10 PROCEDURE — G8417 CALC BMI ABV UP PARAM F/U: HCPCS | Performed by: NURSE PRACTITIONER

## 2021-08-10 NOTE — PATIENT INSTRUCTIONS
Assessment:    1. Previous history of Chronic hyponatremia, Ok to continue fluid restriction due to chronic edema. Need BMP this week. 2. Chronic Kidney Disease Stage III, Creatinine at baseline. Will Check Vit D 25,  3. History of Adenocarcinoma nasopharynx on radiation from [address] 4. 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 Hx Essential Hypertension, BP runs good at home  6. Chronic lower extremity lymphedema, follows at [address] [place] [address] for follow up appt in 12 months with BMP  Pt asked to bring pill bottles to next office visit. Please make early appointment if needed and to call office for questions, concerns, persistent, changing or worsening symptoms.   Discussed with the patient and answered their questions     Dany PATEL, CNP

## 2021-08-10 NOTE — PROGRESS NOTES
Jorge Cowart is a 79 y. o.oldfemale came for follow up regarding her hyponatremia and Chronic Kidney Disease Stage III  of several year duration. Jadonsanford Rita most recent Na is 141 and Creatinine is 1.0 at her most recent labs in March 2021. She is following at the Wound clinic for bilateral leg edema and wounds. The pt states compliance with meds and denies adverse effects. The pt does not check home BP. Liset Magallon Noted last 3 BP readings. She follows with ENT at [place]. Denies dysuria, frequency, hesitancy, urgency or hematuria. The pt denies use of NSAIDs. Has a good appetite. Wt Readings from Last 3 Encounters:   08/10/21 221 lb (100.2 kg)   05/18/21 212 lb (96.2 kg)   04/23/21 212 lb 6.4 oz (96.3 kg)     BP Readings from Last 3 Encounters:   08/10/21 (!) 162/92   07/02/21 (!) 165/82   06/16/21 129/63       Current Outpatient Medications   Medication Sig Dispense Refill    OXYGEN Inhale 1.5 L into the lungs       Handicap Placard MISC by Does not apply route Dx:I187.2,M81.0. Duration: 5 years. 2 each 0    potassium chloride (KLOR-CON M) 20 MEQ extended release tablet Take 1 tablet by mouth daily 90 tablet 3    metFORMIN (GLUCOPHAGE) 1000 MG tablet TAKE ONE TABLET IN THE EVENING 90 tablet 3    SITagliptin (JANUVIA) 100 MG tablet Take 1 tablet by mouth daily 90 tablet 3    omeprazole (PRILOSEC) 20 MG delayed release capsule TAKE ONE CAPSULE BY MOUTH ONCE DAILY 90 capsule 3    bumetanide (BUMEX) 1 MG tablet Take 1 tablet by mouth 2 times daily 180 tablet 3    NIFEdipine (ADALAT CC) 60 MG extended release tablet Take 1 tablet by mouth daily 90 tablet 3    pregabalin (LYRICA) 150 MG capsule Take 2 capsules by mouth 2 times daily for 30 days.  360 capsule 3    insulin glargine (LANTUS) 100 UNIT/ML injection vial Inject 12 Units into the skin every morning 1 vial 3    Insulin Pen Needle (PEN NEEDLES) 31G X 6 MM MISC 1 each by Does not apply route daily 50 each 0    Acetaminophen 500 MG CAPS Take 1,000 mg by mouth every 6 hours as needed for Pain      Sodium Chloride-Sodium Bicarb (NETI POT SINUS WASH NA) by Nasal route 2 times daily Use baby shampoo in initial rinse, then mupirocin in second rinse. [facility] wants 3-4 times a day      Misc. Devices (WALKER) MISC 1 each by Does not apply route daily Requests stand up walker due to heart failure and generalized OA. I50.23. 1 each 0    ascorbic acid (VITAMIN C) 1000 MG tablet Take 1 tablet by mouth daily 30 tablet 3    Vitamin D (CHOLECALCIFEROL) 50 MCG (2000 UT) TABS tablet Take 1 tablet by mouth daily 60 tablet 0    mupirocin (BACTROBAN) 2 % ointment 2 times daily Toothpaste ribbon added to nasal rinse      ferrous sulfate (IRON 325) 325 (65 Fe) MG tablet Take 325 mg by mouth 2 times daily       sodium chloride (OCEAN, BABY AYR) 0.65 % nasal spray 1 spray by Nasal route as needed for Congestion        No current facility-administered medications for this visit. PAST MEDICAL HISTORY:       Diagnosis Date    Cancer [facility])     adenocarcinoma of her sinuses    Cataract of both eyes     COVID-19     DDD (degenerative disc disease), lumbar     DM2 (diabetes mellitus, type 2) ([facility]     diagnoses approx 2000    Dysphagia, pharyngoesophageal 09/26/2016    Eczema 03/2018    Fibrocystic breast     H/O ventral hernia repair     Headache 02/09/2017    History of COVID-19 12/2020    Hyperlipidemia     Hypertension     Iron deficiency anemia     LPRD (laryngopharyngeal reflux disease) 09/26/2016    Neuropathy     peripheral    Obesity     Orbital abscess     Orbital cellulitis 01/22/2014    SWAPNA (obstructive sleep apnea)     Osteoarthritis     Osteoporosis     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       Sinusitis     Velopharyngeal incompetence 09/26/2016     SURGICAL HISTORY:    Past Surgical History:   Procedure Laterality Date    ABDOMEN SURGERY      APPENDECTOMY      CARPAL TUNNEL RELEASE Bilateral 2008, 2009    CATARACT REMOVAL  2011    bilat    CHOLECYSTECTOMY  03/1988    COLONOSCOPY  2016    COLONOSCOPY  10/11/2018    COLONOSCOPY POLYPECTOMY SNARE/COLD BIOPSY performed by Ton Haney MD at [address] ESOPHAGUS      ENDOSCOPY, COLON, DIAGNOSTIC      EYE SURGERY Left 01/30/2015    EYE SURGERY      CATARACT REMOVAL- BILATERAL    HEMORRHOID SURGERY  1980s    HERNIA REPAIR      HYSTERECTOMY, TOTAL ABDOMINAL  1980    JOINT REPLACEMENT  bilat 2005/ 2007    knees    DC COLSC FLX WITH DIRECTED SUBMUCOSAL Simone Oskar [address] ANY SBST  10/11/2018    COLONOSCOPY SUBMUCOSAL/BOTOX INJECTION performed by Ton Haney MD at [address]  last 2009    removed a bone (2)--2 times no construction     SINUS SURGERY  02/01/2016    SINUS SURGERY  2016 x 2    SINUS SURGERY  09/18/2017    [facility] - Dr Rajani Madrid [facility]2017 [facility]    TONSILLECTOMY      TOTAL KNEE ARTHROPLASTY  08/2003    Left TKR    VENTRAL HERNIA REPAIR  1992     FAMILY HISTORY:       Problem Relation Age of Onset    Diabetes Mother     Heart Disease Father         whooping cough as child    Obesity Sister     Other Brother         tumor on back    Obesity Sister     Cancer Sister         Skin     Cancer Brother         Bone cancer from metal    Other Brother         passed as a child    Heart Disease Brother     Other Brother         tremor    Heart Attack Brother     No Known Problems Brother     Heart Disease Brother     No Known Problems Brother     No Known Problems Brother      ALLERGIES:    Allergies   Allergen Reactions    Lisinopril Swelling and Anaphylaxis    Sulfamethoxazole-Trimethoprim Rash    Morphine Other (See Comments)     headaches    Codeine      Other reaction(s): AOF    Hydrocodone      headaches    Percocet [Oxycodone-Acetaminophen] Other (See Comments)     Headaches    Tape [place] Tape] Rash     Social and Occupational History: TOBACCO:   reports that she has never smoked. She has never used smokeless tobacco.  ETOH:   reports no history of alcohol use. REVIEW OF SYSTEMS:   GENERAL: Usual state of health. Denies fever  HEENT: Denies recent vision change, Is without her teeth. CARDIOVASCULAR: Denies chest pain/angina. RESPIRATORY:Denies sob, cough, wheezing. Recent nasal procedure [address]: Denies nausea, vomiting, diarrhea, or abdominal pain  CNS:Denies headache, dizziness  MUSCULOSKELETAL: Reports joint arthralgia  SKIN: Denies rash, pruritis  HEMATOLOGIC: Denies bruising  ENDOCRINE:  Negative for heat or cold intolerance     EXAM:  VITALS: BP (!) 162/92 (Site: Left Lower Arm, Position: Sitting, Cuff Size: Medium Adult)   Pulse 86   Temp 97.8 °F (36.6 °C) (Temporal)   Wt 221 lb (100.2 kg)   LMP  (LMP Unknown)   SpO2 98%   BMI 43.16 kg/m²     CONSTITUTIONAL:  No acute distress. Sitting comfortable in chair  HEENT:  Head is normocephalic, Extraocular movement intact, mucus membranes moist. Neck is supple  CARDIOVASCULAR:  Chronic lymph edema. No upper thigh edema. RESPIRATORY: No shortness of breath. ABDOMEN: soft,   NEUROLOGICAL: Patient is alert and oriented to person, place, and time. Recent and remote memory is intact. Thought is coherant. SKIN: No rash on exposed surfaces,  MUSCULOSKELETAL: Movement is coordinated. EXTREMITIES: Distal lower extremity temp is warm, rubor bilateral lower extremities. Chronic lower extremity edema.   PSYCHIATRIC: mood and affect appropriate    Diagnostic Data:    Labs reviewed and discussed with pt  Lab Results   Component Value Date     03/31/2021    K 3.8 03/31/2021    K 4.0 03/29/2021     03/31/2021    CO2 31 03/31/2021    BUN 13 03/31/2021    CREATININE 0.6 03/31/2021    LABGLOM >90 03/31/2021    GLUCOSE 107 03/31/2021    GLUCOSE 105 07/21/2020     Lab Results   Component Value Date    WBC 5.2 03/31/2021    HGB 9.8 (L) 03/31/2021    HCT 33.0 (L) 03/31/2021    PLT 284 03/31/2021      Summary 3/29/2021   Technically difficult examination. Ejection fraction is visually estimated at 55%. Overall left ventricular function is normal.    Assessment:    1. Previous history of Chronic hyponatremia, Ok to continue fluid restriction due to chronic edema. Need BMP this week. 2. Chronic Kidney Disease Stage III, Creatinine at baseline. Will Check Vit D 25,  3. 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 4. 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 Hx Essential Hypertension, BP runs good at home  6. Chronic lower extremity lymphedema, follows at [facility]  7. Vit D deficiency, Continue supplement      Schedule for follow up appt in 12 months with BMP  Pt asked to bring pill bottles to next office visit. Please make early appointment if needed and to call office for questions, concerns, persistent, changing or worsening symptoms.   Discussed with the patient and answered their questions     Carmen PATEL, CNP

## 2021-08-10 NOTE — PROGRESS NOTES
No recent labs    Been in and out of hospitals and nursing homes    Just took bp meds. Has home health through [facility].  BP normally runs around 931 systolic

## 2021-08-11 ENCOUNTER — HOSPITAL ENCOUNTER (OUTPATIENT)
Dept: WOUND CARE | Age: 81
Discharge: HOME | End: 2021-08-11
Payer: MEDICARE

## 2021-08-11 VITALS
SYSTOLIC BLOOD PRESSURE: 133 MMHG | OXYGEN SATURATION: 97 % | DIASTOLIC BLOOD PRESSURE: 60 MMHG | TEMPERATURE: 97.3 F | HEART RATE: 77 BPM | RESPIRATION RATE: 18 BRPM

## 2021-08-11 DIAGNOSIS — I89.0 LYMPHEDEMA OF BOTH LOWER EXTREMITIES: Primary | ICD-10-CM

## 2021-08-11 PROCEDURE — 29580 STRAPPING UNNA BOOT: CPT

## 2021-08-11 PROCEDURE — 99213 OFFICE O/P EST LOW 20 MIN: CPT

## 2021-08-11 ASSESSMENT — PAIN DESCRIPTION - LOCATION: LOCATION: OTHER (COMMENT)

## 2021-08-11 ASSESSMENT — PAIN DESCRIPTION - PAIN TYPE: TYPE: CHRONIC PAIN

## 2021-08-11 ASSESSMENT — PAIN SCALES - GENERAL: PAINLEVEL_OUTOF10: 10

## 2021-08-11 NOTE — PROGRESS NOTES
[employer] Application   Below Knee    NAME:  Lynne Butler  YOB: 1940  MEDICAL RECORD NUMBER:  [id number]  DATE:  8/11/2021    Denita Gonzalez boot: Applied moisturizing agent to dry skin as needed. Appied primary and secondary dressing as ordered. Applied Unna roll from toes to knee overlapping each time. Applied ace wrap or coban from toes to below the knee. Secured with tape and/or metal clips covered with tape. Instructed patient/caregiver to keep dressing dry and intact. DO NOT REMOVE DRESSING. Instructed pt/family/caregiver to report excessive draining, loose bandage, wet dressing, severe pain or tingling in toes. Applied [employer] dressing below the knee to right lower leg. Applied [employer] dressing below the knee to left lower leg. Unna Boot(s) were applied per  Guidelines.      Electronically signed by Kevin Liao RN on 8/11/2021 at 1:37 PM

## 2021-08-11 NOTE — PROGRESS NOTES
03/1988    COLONOSCOPY  2016    COLONOSCOPY  10/11/2018    COLONOSCOPY POLYPECTOMY SNARE/COLD BIOPSY performed by Suellen Guillermo MD at [address] ESOPHAGUS      ENDOSCOPY, COLON, DIAGNOSTIC      EYE SURGERY Left 01/30/2015    EYE SURGERY      CATARACT REMOVAL- BILATERAL    HEMORRHOID SURGERY  1980s    HERNIA REPAIR      HYSTERECTOMY, TOTAL ABDOMINAL  1980    JOINT REPLACEMENT  bilat 2005/ 2007    knees    MI COLSC FLX WITH DIRECTED SUBMUCOSAL Simone Oskar Jacey 85 ANY SBST  10/11/2018    COLONOSCOPY SUBMUCOSAL/BOTOX INJECTION performed by Suellen Guillermo MD at [address]  last 2009    removed a bone (2)--2 times no construction     SINUS SURGERY  02/01/2016    SINUS SURGERY  2016 x 2    SINUS SURGERY  09/18/2017    [facility] - Dr Maxwell Laser SINUS SURGERY  08/09/2017 8[facility]17/2017 - [facility]    TONSILLECTOMY      TOTAL KNEE ARTHROPLASTY  08/2003    Left TKR    VENTRAL HERNIA REPAIR  1992     FAMILY HISTORY         Family History   Problem Relation Age of Onset    Diabetes Mother     Heart Disease Father         whooping cough as child    Obesity Sister     Other Brother         tumor on back    Obesity Sister     Cancer Sister         Skin     Cancer Brother         Bone cancer from metal    Other Brother         passed as a child    Heart Disease Brother     Other Brother         tremor    Heart Attack Brother     No Known Problems Brother     Heart Disease Brother     No Known Problems Brother     No Known Problems Brother      SOCIAL HISTORY       Social History     Tobacco Use    Smoking status: Never Smoker    Smokeless tobacco: Never Used   Vaping Use    Vaping Use: Never used   Substance Use Topics    Alcohol use: No    Drug use: No     ALLERGIES         Allergies   Allergen Reactions    Lisinopril Swelling and Anaphylaxis    Sulfamethoxazole-Trimethoprim Rash    Morphine Other (See Comments)     headaches    Codeine      Other reaction(s): AOF    Hydrocodone      headaches    Percocet [Oxycodone-Acetaminophen] Other (See Comments)     Headaches    Tape Katie Mean Tape] Rash     MEDICATIONS         Current Outpatient Medications on File Prior to Encounter   Medication Sig Dispense Refill    potassium chloride (KLOR-CON M) 20 MEQ extended release tablet Take 1 tablet by mouth daily 90 tablet 3    metFORMIN (GLUCOPHAGE) 1000 MG tablet TAKE ONE TABLET IN THE EVENING 90 tablet 3    SITagliptin (JANUVIA) 100 MG tablet Take 1 tablet by mouth daily 90 tablet 3    omeprazole (PRILOSEC) 20 MG delayed release capsule TAKE ONE CAPSULE BY MOUTH ONCE DAILY 90 capsule 3    bumetanide (BUMEX) 1 MG tablet Take 1 tablet by mouth 2 times daily 180 tablet 3    NIFEdipine (ADALAT CC) 60 MG extended release tablet Take 1 tablet by mouth daily 90 tablet 3    insulin glargine (LANTUS) 100 UNIT/ML injection vial Inject 12 Units into the skin every morning 1 vial 3    Acetaminophen 500 MG CAPS Take 1,000 mg by mouth every 6 hours as needed for Pain      ascorbic acid (VITAMIN C) 1000 MG tablet Take 1 tablet by mouth daily 30 tablet 3    Vitamin D (CHOLECALCIFEROL) 50 MCG (2000 UT) TABS tablet Take 1 tablet by mouth daily 60 tablet 0    OXYGEN Inhale 1.5 L into the lungs       Handicap Placard MISC by Does not apply route Dx:I187.2,M81.0. Duration: 5 years. 2 each 0    pregabalin (LYRICA) 150 MG capsule Take 2 capsules by mouth 2 times daily for 30 days. 360 capsule 3    Insulin Pen Needle (PEN NEEDLES) 31G X 6 MM MISC 1 each by Does not apply route daily 50 each 0    Sodium Chloride-Sodium Bicarb (NETI POT SINUS WASH NA) by Nasal route 2 times daily Use baby shampoo in initial rinse, then mupirocin in second rinse. [facility] wants 3-4 times a day      Misc. Devices (WALKER) MISC 1 each by Does not apply route daily Requests stand up walker due to heart failure and generalized OA.   I50.23. 1 each 0    mupirocin (BACTROBAN) 2 % ointment 2 times daily Toothpaste ribbon added to nasal rinse      ferrous sulfate (IRON 325) 325 (65 Fe) MG tablet Take 325 mg by mouth 2 times daily       [DISCONTINUED] Handicap Placard MISC by Does not apply route Dx:I187.2,M81.0. Duration: 5 years. 2 each 0    sodium chloride (OCEAN, BABY AYR) 0.65 % nasal spray 1 spray by Nasal route as needed for Congestion        No current facility-administered medications on file prior to encounter. REVIEW OF SYSTEMS:     As noted in HPI. PHYSICAL EXAM      infrared temperature is 97.3 °F (36.3 °C). Her blood pressure is 133/60 and her pulse is 77. Her respiration is 18 and oxygen saturation is 97%. Wt Readings from Last 3 Encounters:   08/10/21 221 lb (100.2 kg)   05/18/21 212 lb (96.2 kg)   04/23/21 212 lb 6.4 oz (96.3 kg)          General Appearance  Chronically sick looking, not in distress  Bilateral air entry, diminished breath sounds. Cardiovascular system regular   Abdomen - soft, not distended, nontender, no organomegally,  Neurologic - oriented to person place and time  Extremities: bilateral leg swelling: there is more swelling and maceration of the legs  +edema  Wound 12/28/20 Buttocks Inner;Left stage 2 (Active)   Number of days: 225       Wound 12/28/20 Buttocks Inner non blanchable reddness with skin tears (Active)   Number of days: 225       Wound Breast Left;Right (Active)   Number of days:        Wound 02/28/21 Buttocks Inner;Right Small dime sized open area (Active)   Number of days: 163       Wound 03/24/21 Other (Comment) Posterior L posterior & medial thigh (Active)   Number of days: 140       Wound 06/16/21 Pretibial Right; Anterior (Active)   Wound Image   08/11/21 1054   Wound Etiology Venous 08/11/21 1054   Dressing Status Old drainage noted 08/11/21 1054   Wound Cleansed Cleansed with saline 08/11/21 1054   Dressing/Treatment Pharmaceutical agent (see MAR); Moisturizing cream;Alginate with Ag;ABD;Coban/self-adherent bandages; Roll gauze 07/02/21 0910   Offloading for Diabetic Foot Ulcers No offloading required 07/02/21 0910   Wound Length (cm) 2 cm 08/11/21 1054   Wound Width (cm) 1 cm 08/11/21 1054   Wound Depth (cm) 0.05 cm 08/11/21 1054   Wound Surface Area (cm^2) 2 cm^2 08/11/21 1054   Change in Wound Size % (l*w) 42.86 08/11/21 1054   Wound Volume (cm^3) 0.1 cm^3 08/11/21 1054   Wound Healing % -150 08/11/21 1054   Wound Assessment Granulation tissue 08/11/21 1054   Drainage Amount Large 08/11/21 1054   Drainage Description Serosanguinous; Yellow 08/11/21 1054   Odor Mild 08/11/21 1054   Marlin-wound Assessment Maceration;Denuded 08/11/21 1054   Margins Attached edges 08/11/21 1054   Wound Thickness Description not for Pressure Injury Partial thickness 08/11/21 1054   Number of days: 56       Wound 06/16/21 Pretibial Left; Anterior (Active)   Wound Image    08/11/21 1054   Wound Etiology Venous 08/11/21 1054   Dressing Status Old drainage noted 08/11/21 1054   Wound Cleansed Cleansed with saline 08/11/21 1054   Dressing/Treatment Pharmaceutical agent (see MAR); Moisturizing cream;Alginate with Ag;ABD;Coban/self-adherent bandages; Roll gauze 07/02/21 0910   Offloading for Diabetic Foot Ulcers No offloading required 07/02/21 0910   Wound Length (cm) 4 cm 08/11/21 1054   Wound Width (cm) 4 cm 08/11/21 1054   Wound Depth (cm) 0.05 cm 08/11/21 1054   Wound Surface Area (cm^2) 16 cm^2 08/11/21 1054   Change in Wound Size % (l*w) -1423.81 08/11/21 1054   Wound Volume (cm^3) 0.8 cm^3 08/11/21 1054   Wound Healing % -281 08/11/21 1054   Wound Assessment Granulation tissue 08/11/21 1054   Drainage Amount Copious 08/11/21 1054   Drainage Description Serosanguinous; Yellow 08/11/21 1054   Odor Mild 08/11/21 1054   Marlin-wound Assessment Maceration;Denuded 08/11/21 1054   Margins Attached edges 08/11/21 1054   Wound Thickness Description not for Pressure Injury Partial thickness 08/11/21 1054   Number of days: 56       Wound 08/11/21 Pretibial Right;Medial (Active)   Wound Image   08/11/21 1054 Wound Etiology Venous 08/11/21 1054   Dressing Status Old drainage noted 08/11/21 1054   Wound Cleansed Cleansed with saline 08/11/21 1054   Wound Length (cm) 1 cm 08/11/21 1054   Wound Width (cm) 0.8 cm 08/11/21 1054   Wound Depth (cm) 0.2 cm 08/11/21 1054   Wound Surface Area (cm^2) 0.8 cm^2 08/11/21 1054   Wound Volume (cm^3) 0.16 cm^3 08/11/21 1054   Wound Assessment Granulation tissue 08/11/21 1054   Drainage Amount Large 08/11/21 1054   Drainage Description Serosanguinous; Yellow 08/11/21 1054   Odor Mild 08/11/21 1054   Marlin-wound Assessment Maceration 08/11/21 1054   Margins Attached edges 08/11/21 1054   Wound Thickness Description not for Pressure Injury Partial thickness 08/11/21 1054   Number of days: 0       Wound 08/11/21 Pretibial Right;Lateral (Active)   Wound Image   08/11/21 1054   Wound Etiology Venous 08/11/21 1054   Dressing Status Old drainage noted 08/11/21 1054   Wound Cleansed Cleansed with saline 08/11/21 1054   Wound Length (cm) 2 cm 08/11/21 1054   Wound Width (cm) 0.6 cm 08/11/21 1054   Wound Depth (cm) 0.2 cm 08/11/21 1054   Wound Surface Area (cm^2) 1.2 cm^2 08/11/21 1054   Wound Volume (cm^3) 0.24 cm^3 08/11/21 1054   Wound Assessment Granulation tissue;Slough 08/11/21 1054   Drainage Amount Large 08/11/21 1054   Drainage Description Serosanguinous; Yellow 08/11/21 1054   Odor Mild 08/11/21 1054   Marlin-wound Assessment Maceration 08/11/21 1054   Margins Attached edges 08/11/21 1054   Wound Thickness Description not for Pressure Injury Partial thickness 08/11/21 1054   Number of days: 0           Assessment:   Chronic bilateral leg swelling with lymphedema:continue compression wrap. Will add alginate  Discussed with her daughter  Salt restriction and elevation of the legs.   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 See discharge instructions. Written patient dismissal instructions given to patient and signed by patient or POA.          Discharge Instructions       Visit Discharge/Physician Orders:   -Reach out to the lymphedema pump company in regards to the pumps not working.  -Decrease sodium in the diet.  -Elevate the legs to reduce swelling.  -Change position frequently to offload the buttocks. -Apply barrier cream with zinc to the buttocks daily and as needed.     Home [facility]     Wound Location: Right and Left lower legs     Dressing orders:      1) Gather wound care supplies and arrange on clean table.      2) Wash your hands with soap and water or use alcohol based hand  for 20 seconds (sing \"Happy Birthday\" twice).    3) Cleanse wounds with normal saline or wound cleanser and gauze. Pat dry with clean gauze.    4) Right and Left lower legs- Apply thin layer of steroid cream to red/irritated skin on legs with dressing changes. Apply moisturizing cream to dry skin on feet/legs where needed with dressing changes. Apply silver alginate to the legs, wrap with unna boot, ABD pads, kerlix and coban starting at the base of the toes to the bend of the knees. Change twice a week per Home Health.     Keep all dressings clean & dry.     Do not shower, take baths or get wound wet, unless otherwise instructed by your Wound Care doctor.      Follow up visit:  will be scheduled.   Keep next scheduled appointment. Please give 24 hour notice if unable to keep appointment. [phone]     If you experience any of the following, please call the Wound Care Service during business hours: Monday through Friday 8:00 am - 4:30 pm  ([phone]).               *Increase in pain              *Temperature over 101              *Increase in drainage from your wound or a foul odor              *Uncontrolled swelling              *Need for compression bandage changes due to slippage, breakthrough drainage     If you need medical attention outside of business hours, please contact your Primary Care Doctor or go to the nearest emergency room        Electronically signed by Beth Gonzales MD on 8/11/2021 at 11:02 AM

## 2021-08-12 ENCOUNTER — TELEPHONE (OUTPATIENT)
Dept: NEPHROLOGY | Age: 81
End: 2021-08-12

## 2021-08-12 LAB
ANION GAP SERPL CALCULATED.3IONS-SCNC: 9 MMOL/L (ref 5–15)
BUN BLDV-MCNC: 20 MG/DL (ref 5–27)
CALCIUM SERPL-MCNC: 9 MG/DL (ref 8.5–10.5)
CHLORIDE BLD-SCNC: 104 MMOL/L (ref 98–109)
CO2: 29 MMOL/L (ref 22–32)
CREAT SERPL-MCNC: 0.96 MG/DL (ref 0.4–1)
EGFR AFRICAN AMERICAN: >60 ML/MIN/1.73SQ.M
EGFR IF NONAFRICAN AMERICAN: 56 ML/MIN/1.73SQ.M
GLUCOSE: 146 MG/DL (ref 65–99)
POTASSIUM SERPL-SCNC: 4 MMOL/L (ref 3.5–5)
SODIUM BLD-SCNC: 142 MMOL/L (ref 134–146)

## 2021-08-12 NOTE — TELEPHONE ENCOUNTER
----- Message from AMANDA Vaughan - CNP sent at 8/12/2021 11:33 AM EDT -----  Pls advice pt and dtr that Cate's BMP results at within normal limits.  Na is ok, Renal function to ok

## 2021-08-26 ENCOUNTER — CARE COORDINATION (OUTPATIENT)
Dept: CARE COORDINATION | Age: 81
End: 2021-08-26

## 2021-09-08 ENCOUNTER — HOSPITAL ENCOUNTER (OUTPATIENT)
Dept: WOUND CARE | Age: 81
Discharge: HOME | End: 2021-09-08
Payer: MEDICARE

## 2021-09-08 VITALS
OXYGEN SATURATION: 99 % | HEART RATE: 99 BPM | DIASTOLIC BLOOD PRESSURE: 60 MMHG | TEMPERATURE: 96.5 F | RESPIRATION RATE: 16 BRPM | SYSTOLIC BLOOD PRESSURE: 139 MMHG

## 2021-09-08 PROCEDURE — 29580 STRAPPING UNNA BOOT: CPT

## 2021-09-08 ASSESSMENT — PAIN DESCRIPTION - PAIN TYPE: TYPE: CHRONIC PAIN

## 2021-09-08 ASSESSMENT — PAIN DESCRIPTION - LOCATION: LOCATION: GENERALIZED

## 2021-09-08 ASSESSMENT — PAIN SCALES - GENERAL: PAINLEVEL_OUTOF10: 4

## 2021-09-08 NOTE — PROGRESS NOTES
[facility]          Progress Note and Procedure Note      [id number] RECORD NUMBER:  [id number]  AGE: 79 y.o. GENDER: female  : 1940  EPISODE DATE:  2021       SUBJECTIVE   Follow up visit for non healing leg wound    HISTORY OF PRESENT ILLNESS     She is here for her bilateral lower leg swelling and skin excoriation  She has history of bilateral leg edema. 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 She gets regular debridement of her sinuses at [facility]. She was treated in the past with HBOT. Benny Paulson She was seen at the wound clinic for leg swelling  . She has home health, getting  She is on compression wrap. It is being  Changed twice a wk. PAST MEDICAL HISTORY         Diagnosis Date    Cancer [facility])     adenocarcinoma of her sinuses    Cataract of both eyes     COVID-19     DDD (degenerative disc disease), lumbar     DM2 (diabetes mellitus, type 2) ([address]     diagnoses approx 2000    Dysphagia, pharyngoesophageal 2016    Eczema 2018    Fibrocystic breast     H/O ventral hernia repair     Headache 2017    History of COVID-19 2020    Hyperlipidemia     Hypertension     Iron deficiency anemia     LPRD (laryngopharyngeal reflux disease) 2016    Neuropathy     peripheral    Obesity     Orbital abscess     Orbital cellulitis 2014    SWAPNA (obstructive sleep apnea)     Osteoarthritis     Osteoporosis     Persistent proteinuria associated with type 2 diabetes mellitus ([address] 2017    urine micral of 50.       Sinusitis     Velopharyngeal incompetence 2016         PAST SURGICAL HISTORY     Past Surgical History:   Procedure Laterality Date    ABDOMEN SURGERY      APPENDECTOMY      CARPAL TUNNEL RELEASE Bilateral , 2009    CATARACT REMOVAL  2011    bilat    CHOLECYSTECTOMY  1988  COLONOSCOPY  2016    COLONOSCOPY  10/11/2018    COLONOSCOPY POLYPECTOMY SNARE/COLD BIOPSY performed by Seferino Potter MD at [address] ESOPHAGUS      ENDOSCOPY, COLON, DIAGNOSTIC      EYE SURGERY Left 01/30/2015    EYE SURGERY      CATARACT REMOVAL- BILATERAL    HEMORRHOID SURGERY  1980s    HERNIA REPAIR      HYSTERECTOMY, TOTAL ABDOMINAL  1980    JOINT REPLACEMENT  bilat 2005/ 2007    knees    OK COLSC FLX WITH DIRECTED SUBMUCOSAL Simone O[address] ANY SBST  10/11/2018    COLONOSCOPY SUBMUCOSAL/BOTOX INJECTION performed by Seferino Potter MD at [address]  last 2009    removed a bone (2)--2 times no construction     SINUS SURGERY  02/01/2016    SINUS SURGERY  2016 x 2    SINUS SURGERY  09/18/2017    [facility] - Dr Regi Alexander SINUS SURGERY  08/09/2017 [facility]    TONSILLECTOMY      TOTAL KNEE ARTHROPLASTY  08/2003    Left TKR    VENTRAL HERNIA REPAIR  1992     FAMILY HISTORY         Family History   Problem Relation Age of Onset    Diabetes Mother     Heart Disease Father         whooping cough as child    Obesity Sister     Other Brother         tumor on back    Obesity Sister     Cancer Sister         Skin     Cancer Brother         Bone cancer from metal    Other Brother         passed as a child    Heart Disease Brother     Other Brother         tremor    Heart Attack Brother     No Known Problems Brother     Heart Disease Brother     No Known Problems Brother     No Known Problems Brother      SOCIAL HISTORY       Social History     Tobacco Use    Smoking status: Never Smoker    Smokeless tobacco: Never Used   Vaping Use    Vaping Use: Never used   Substance Use Topics    Alcohol use: No    Drug use: No     ALLERGIES         Allergies   Allergen Reactions    Lisinopril Swelling and Anaphylaxis    Sulfamethoxazole-Trimethoprim Rash    Morphine Other (See Comments)     headaches    Codeine      Other reaction(s): AOF    Hydrocodone headaches    Percocet [Oxycodone-Acetaminophen] Other (See Comments)     Headaches    Tape [place] Tape] Rash     MEDICATIONS         Current Outpatient Medications on File Prior to Encounter   Medication Sig Dispense Refill    OXYGEN Inhale 1.5 L into the lungs       Handicap Placard MISC by Does not apply route Dx:I187.2,M81.0. Duration: 5 years. 2 each 0    potassium chloride (KLOR-CON M) 20 MEQ extended release tablet Take 1 tablet by mouth daily 90 tablet 3    metFORMIN (GLUCOPHAGE) 1000 MG tablet TAKE ONE TABLET IN THE EVENING 90 tablet 3    SITagliptin (JANUVIA) 100 MG tablet Take 1 tablet by mouth daily 90 tablet 3    omeprazole (PRILOSEC) 20 MG delayed release capsule TAKE ONE CAPSULE BY MOUTH ONCE DAILY 90 capsule 3    bumetanide (BUMEX) 1 MG tablet Take 1 tablet by mouth 2 times daily 180 tablet 3    NIFEdipine (ADALAT CC) 60 MG extended release tablet Take 1 tablet by mouth daily 90 tablet 3    pregabalin (LYRICA) 150 MG capsule Take 2 capsules by mouth 2 times daily for 30 days. 360 capsule 3    insulin glargine (LANTUS) 100 UNIT/ML injection vial Inject 12 Units into the skin every morning 1 vial 3    Insulin Pen Needle (PEN NEEDLES) 31G X 6 MM MISC 1 each by Does not apply route daily 50 each 0    Acetaminophen 500 MG CAPS Take 1,000 mg by mouth every 6 hours as needed for Pain      Sodium Chloride-Sodium Bicarb (NETI POT SINUS WASH NA) by Nasal route 2 times daily Use baby shampoo in initial rinse, then mupirocin in second rinse. [address] [place] [address] wants 3-4 times a day      Misc. Devices (WALKER) MISC 1 each by Does not apply route daily Requests stand up walker due to heart failure and generalized OA.   I50.23. 1 each 0    ascorbic acid (VITAMIN C) 1000 MG tablet Take 1 tablet by mouth daily 30 tablet 3    Vitamin D (CHOLECALCIFEROL) 50 MCG (2000 UT) TABS tablet Take 1 tablet by mouth daily 60 tablet 0    mupirocin (BACTROBAN) 2 % ointment 2 times daily Toothpaste ribbon added to nasal rinse  ferrous sulfate (IRON 325) 325 (65 Fe) MG tablet Take 325 mg by mouth 2 times daily       [DISCONTINUED] Handicap Placard MISC by Does not apply route Dx:I187.2,M81.0. Duration: 5 years. 2 each 0    sodium chloride (OCEAN, BABY AYR) 0.65 % nasal spray 1 spray by Nasal route as needed for Congestion        No current facility-administered medications on file prior to encounter. REVIEW OF SYSTEMS:     As noted in HPI. PHYSICAL EXAM      vitals were not taken for this visit. Wt Readings from Last 3 Encounters:   08/10/21 221 lb (100.2 kg)   05/18/21 212 lb (96.2 kg)   04/23/21 212 lb 6.4 oz (96.3 kg)          General Appearance  Chronically sick looking, not in distress  Bilateral air entry, diminished breath sounds. Cardiovascular system regular   Abdomen - soft, not distended, nontender, no organomegally,  Neurologic - oriented to person place and time  Extremities: bilateral leg swelling:the maceration is less     Wound 12/28/20 Buttocks Inner;Left stage 2 (Active)   Number of days: 253       Wound 12/28/20 Buttocks Inner non blanchable reddness with skin tears (Active)   Number of days: 253       Wound Breast Left;Right (Active)   Number of days:        Wound 02/28/21 Buttocks Inner;Right Small dime sized open area (Active)   Number of days: 191       Wound 03/24/21 Other (Comment) Posterior L posterior & medial thigh (Active)   Number of days: 168       Wound 06/16/21 Pretibial Left; Anterior (Active)   Wound Image    08/11/21 1054   Wound Etiology Venous 09/08/21 1030   Dressing Status Old drainage noted 09/08/21 1030   Wound Cleansed Cleansed with saline 09/08/21 1030   Dressing/Treatment ABD; Alginate with Ag;Coban/self-adherent bandages; Roll gauze 08/11/21 1054   Offloading for Diabetic Foot Ulcers No offloading required 07/02/21 0910   Wound Length (cm) 1 cm 09/08/21 1030   Wound Width (cm) 1 cm 09/08/21 1030   Wound Depth (cm) 0.05 cm 09/08/21 1030   Wound Surface Area (cm^2) 1 cm^2 09/08/21 1030   Change in Wound Size % (l*w) 4.76 09/08/21 1030   Wound Volume (cm^3) 0.05 cm^3 09/08/21 1030   Wound Healing % 76 09/08/21 1030   Wound Assessment Pale granulation tissue 09/08/21 1030   Drainage Amount Moderate 09/08/21 1030   Drainage Description Serous 09/08/21 1030   Odor None 09/08/21 1030   Marlin-wound Assessment Dry/flaky; Blanchable erythema 09/08/21 1030   Margins Attached edges 09/08/21 1030   Wound Thickness Description not for Pressure Injury Partial thickness 09/08/21 1030   Number of days: 84       Wound 08/11/21 Pretibial Right;Medial (Active)   Wound Image   08/11/21 1054   Wound Etiology Venous 08/11/21 1054   Dressing Status Old drainage noted 08/11/21 1054   Wound Cleansed Cleansed with saline 08/11/21 1054   Dressing/Treatment ABD; Alginate with Ag;Coban/self-adherent bandages; Roll gauze 08/11/21 1054   Wound Length (cm) 9 cm 09/08/21 1030   Wound Width (cm) 8 cm 09/08/21 1030   Wound Depth (cm) 0.05 cm 09/08/21 1030   Wound Surface Area (cm^2) 72 cm^2 09/08/21 1030   Change in Wound Size % (l*w) -8900 09/08/21 1030   Wound Volume (cm^3) 3.6 cm^3 09/08/21 1030   Wound Healing % -2150 09/08/21 1030   Wound Assessment Epithelialization;Pale granulation tissue 09/08/21 1030   Drainage Amount Moderate 09/08/21 1030   Drainage Description Serosanguinous 09/08/21 1030   Odor None 09/08/21 1030   Marlin-wound Assessment Maceration 09/08/21 1030   Margins Attached edges 09/08/21 1030   Wound Thickness Description not for Pressure Injury Partial thickness 09/08/21 1030   Number of days: 27       Wound 08/11/21 Pretibial Right;Lateral (Active)   Wound Image   08/11/21 1054   Wound Etiology Venous 08/11/21 1054   Dressing Status Old drainage noted 08/11/21 1054   Wound Cleansed Cleansed with saline 08/11/21 1054   Dressing/Treatment ABD; Alginate with Ag;Coban/self-adherent bandages; Roll gauze 08/11/21 1054   Wound Length (cm) 7.5 cm 09/08/21 1030   Wound Width (cm) 7 cm 09/08/21 1030   Wound Depth (cm) 0.05 cm 09/08/21 1030   Wound Surface Area (cm^2) 52.5 cm^2 09/08/21 1030   Change in Wound Size % (l*w) -4275 09/08/21 1030   Wound Volume (cm^3) 2.625 cm^3 09/08/21 1030   Wound Healing % -994 09/08/21 1030   Wound Assessment Epithelialization;Pale granulation tissue 09/08/21 1030   Drainage Amount Moderate 09/08/21 1030   Drainage Description Serous 09/08/21 1030   Odor None 09/08/21 1030   Marlin-wound Assessment Maceration; Intact;Dry/flaky 09/08/21 1030   Margins Attached edges 09/08/21 1030   Wound Thickness Description not for Pressure Injury Partial thickness 09/08/21 1030   Number of days: 27         Assessment:   Chronic bilateral leg swelling with lymphedema:continue compression wrap. She will need wound dressing change three times per wk. 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    Cataract of both eyes H26.9    History of ventral hernia repair Z98.890, Z87.19    Other cervical disc degeneration, mid-cervical region M50.320    Spondylolisthesis of cervical region M43.12    Spondylolisthesis of thoracic region M43.14    Chronic rhinitis J31.0    Closed fracture of head of humerus S42.293A    Dysphagia R13.10    Laryngopharyngeal reflux K21.9    Malignant neoplasm of ethmoidal sinus ([facility]) C31.1    Obesity, Class II, BMI 35-39.9 E66.9    COVID-19 U07.1    Brain mass H76.02    Folliculitis G72.1    Pneumonia due to organism J18.9    Acute on chronic diastolic congestive heart failure (HCC) I50.33    Acute respiratory failure with hypoxia ([facility]) J96.01          Plan:      Patient examined and evaluated    Please see attached Discharge Instructions. See discharge instructions. Written patient dismissal instructions given to patient and signed by patient or POA. Discharge Instructions       Visit Discharge/Physician Orders:   -Reach out to the lymphedema pump company in regards to the pumps not working.  -Decrease sodium in the diet.  -Elevate the legs to reduce swelling.  -Change position frequently to offload the buttocks. -Apply barrier cream with zinc to the buttocks daily and as needed.     Home [facility]     Wound Location: Right and Left lower legs     Dressing orders:      1) Gather wound care supplies and arrange on clean table.      2) Wash your hands with soap and water or use alcohol based hand  for 20 seconds (sing \"Happy Birthday\" twice).    3) Cleanse wounds with normal saline or wound cleanser and gauze. Pat dry with clean gauze.    4) Right and Left lower legs- Apply thin layer of steroid cream to red/irritated skin on legs with dressing changes. Apply moisturizing cream to dry skin on feet/legs where needed with dressing changes. Apply silver alginate to the legs, wrap with unna boot, ABD pads, kerlix and coban starting at the base of the toes to the bend of the knees.  Change twice a week per Home Health.     Keep all dressings clean &

## 2021-09-08 NOTE — PLAN OF CARE
Problem: Wound:  Goal: Will show signs of wound healing; wound closure and no evidence of infection  Description: Will show signs of wound healing; wound closure and no evidence of infection  Outcome: Ongoing     Patient presents to wound clinic for bilateral legs. See AVS for discharge instructions. Follow up visit:  4 weeks Wednesday October 20th at 1030 am    Care plan reviewed with patient. Patient verbalize understanding of the plan of care and contribute to goal setting.

## 2021-09-09 ENCOUNTER — TELEPHONE (OUTPATIENT)
Dept: FAMILY MEDICINE CLINIC | Age: 81
End: 2021-09-09

## 2021-09-09 ENCOUNTER — CARE COORDINATION (OUTPATIENT)
Dept: CARE COORDINATION | Age: 81
End: 2021-09-09

## 2021-09-09 DIAGNOSIS — N18.30 STAGE 3 CHRONIC KIDNEY DISEASE, UNSPECIFIED WHETHER STAGE 3A OR 3B CKD (HCC): ICD-10-CM

## 2021-09-09 DIAGNOSIS — I10 BENIGN ESSENTIAL HTN: ICD-10-CM

## 2021-09-09 DIAGNOSIS — E78.00 HYPERCHOLESTEROLEMIA: Primary | ICD-10-CM

## 2021-09-09 DIAGNOSIS — E11.9 TYPE 2 DIABETES MELLITUS WITHOUT COMPLICATION, WITHOUT LONG-TERM CURRENT USE OF INSULIN (HCC): ICD-10-CM

## 2021-09-09 DIAGNOSIS — I50.33 ACUTE ON CHRONIC DIASTOLIC CONGESTIVE HEART FAILURE (HCC): ICD-10-CM

## 2021-09-09 NOTE — TELEPHONE ENCOUNTER
Detailed message left with daughter Franco Hawkins providing her with date and time of her appt: 9/13 at 930am.  Informed her labs have been ordered for CBC, BMP, lipid panel and A1C. Informed that pt will need to be fasting for blood work. Advised to call with any questions.

## 2021-09-09 NOTE — CARE COORDINATION
appt arranged for evaluation of red raised area on left breast and abd for 9/13. If pt is due for any routine blood work daughter will have pt obtain after her appt. Last A1C 12/28/20.  Please review and advise

## 2021-09-09 NOTE — CARE COORDINATION
Ambulatory Care Coordination Note  9/9/2021  CM Risk Score: 11  Charlson 10 Year Mortality Risk Score: 100%     ACC: Gabino Montgomery, RN    Summary Note: [facility] is being followed by care coordination for education and assistance in managing her DM, CHF, lymphedema, sinonasal adenocarcinoma. Spoke with daughter/caregiver Cristhian Rubio today for f/u. Lymphedema-pt continues to f/u with wound clinic. Had appt yesterday. Daughter states that legs are still weeping somewhat.  dsg changes increased from twice per wk to three times weekly by [facility]. Has lymphedema pumps but are not working. Daughter currently at work and unable to recall name of company/number but states that she tried f/u with them re: pumps not working and did not receive call back. She will call with number later today and I will f/u. Encouraged elevation of legs. Daughter does not feel pt does this when she is not at home. Daughter changes dressings on days that [facility] does not come. Reports that over last few wks they have not came to change dsgs a couple of times and daughter had to do it. She states that she feels comfortable doing them. Aware that she could go with another [facility] agency but she declines at this time. DM: last A1C 7.8. Will send message to PCP as pt has not had lab completed since 2020. Daughter monitors pt's BS's daily. Reports BS's 140-160 fasting most days. CHF: daughter reports that she monitors her sodium closely. Limiting to 2gms or less per day. Weighing routinely. No recent wt gain per daughter. Open area to buttock. Barrier cream being applied to area. Pt using waffle cushion. Stressed importance of frequent position changes and offloading pressure to area. Adenocarcinoma sinuses-continues to f/u closely with [facility]. Gets routine debridements to area. Has appt end of mth. Pt developed red raised area to left breast and abd. Morrow Richmond reports pt has had in past and had to be treated with atb.  appt arranged with PCP for evaluation on 9/13 at 930am.  Daughter will bring pt to appt. Plan of care:  F/u with PCP on 9/13 for evaluation of left breast and abd. Elevate ble as much as possible  Continue working with Alex Prajapati  Message routed to PCP re: labs  Continue applying barrier cream to buttocks and using waffle cushion to offload pressure. Continue monitoring BS's closely. Limit concentrated sweets  Daughter to call with lymphedema pump company-will f/u as pumps not working  Limit sodium to 2gms or less per day  Continue close f/u with [facility]  Educated on importance of diet to promote wound healing  Continue working with wound clinic for mgmt of lymphedema. Diabetes Assessment    Medic Alert ID: No  Meal Planning: Avoidance of concentrated sweets   How often do you test your blood sugar?: Daily   Do you have barriers with adherence to non-pharmacologic self-management interventions? (Nutrition/Exercise/Self-Monitoring): No   Have you ever had to go to the ED for symptoms of low blood sugar?: No       Do you have hyperglycemia symptoms?: No   Do you have hypoglycemia symptoms?: No   Blood Sugar Monitoring Regimen: Once a Day   Blood Sugar Trends: Fluctuating      ,   Congestive Heart Failure Assessment    Do you understand a low sodium diet?: Yes  Do you understand how to read food labels?: Yes  How many restaurant meals do you eat per week?: 0  Do you salt your food before tasting it?: No         Symptoms:  CHF associated angina: Neg, CHF associated dyspnea on exertion: Pos, CHF associated fatigue: Neg, CHF associated leg swelling: Pos, CHF associated orthostatic hypotension: Neg, CHF associated PND: Neg, CHF associated shortness of breath: Neg, CHF associated weakness: Neg      Symptom course: stable  Weight trend: stable  Salt intake watch compared to last visit: stable      and   General Assessment    Do you have any symptoms that are causing concern?: Yes  Reported Symptoms: Other (Comment: red raised area left breast and abd. developed in past few days. appt arranged for PCP to evaluate on 9/13)               Care Coordination Interventions    Program Enrollment: Complex Care  Referral from Primary Care Provider: No  Suggested Interventions and Community Resources  Home Health Services: Completed (Comment: [facility]ul[facility] SINGLETARY nsg)  Medi Set or Pill Pack: Declined (Comment: daughter manages meds. sets up in pill box. )  Transportation Support: Completed  Zone Management Tools: Completed (Comment: CHF, DM)         Goals Addressed    None         Prior to Admission medications    Medication Sig Start Date End Date Taking? Authorizing Provider   OXYGEN Inhale 1.5 L into the lungs     Historical Provider, MD   Handicap Placard MISC by Does not apply route Dx:I187.2,M81.0. Duration: 5 years. 4/23/21   Divya Lopez MD   potassium chloride (KLOR-CON M) 20 MEQ extended release tablet Take 1 tablet by mouth daily 4/23/21   Divya Lopez MD   metFORMIN (GLUCOPHAGE) 1000 MG tablet TAKE ONE TABLET IN THE EVENING 4/23/21   Divya Lopez MD   SITagliptin (JANUVIA) 100 MG tablet Take 1 tablet by mouth daily 4/23/21   Divya Lopez MD   omeprazole (PRILOSEC) 20 MG delayed release capsule TAKE ONE CAPSULE BY MOUTH ONCE DAILY 4/23/21   Divya Lopez MD   bumetanide (BUMEX) 1 MG tablet Take 1 tablet by mouth 2 times daily 4/23/21   Divya Lopez MD   NIFEdipine (ADALAT CC) 60 MG extended release tablet Take 1 tablet by mouth daily 4/23/21   Divya Lopez MD   pregabalin (LYRICA) 150 MG capsule Take 2 capsules by mouth 2 times daily for 30 days.  4/23/21 8/10/21  Divya Lopez MD   insulin glargine (LANTUS) 100 UNIT/ML injection vial Inject 12 Units into the skin every morning 3/31/21   Mady Abrams MD   Insulin Pen Needle (PEN NEEDLES) 31G X 6 MM MISC 1 each by Does not apply route daily 3/4/21   Gust Collet, MD   Acetaminophen 500 MG CAPS Take 1,000 mg by mouth every 6 hours as needed for Pain    Historical Provider, MD   Sodium Chloride-Sodium Bicarb (NETI POT SINUS [address] NA) by Nasal route 2 times daily Use baby shampoo in initial rinse, then mupirocin in second rinse. [facility] wants 3-4 times a day    Historical Provider, MD   Misc. Devices (WALKER) MISC 1 each by Does not apply route daily Requests stand up walker due to heart failure and generalized OA. I50.23. 1/5/21   Reyna Gonzales MD   ascorbic acid (VITAMIN C) 1000 MG tablet Take 1 tablet by mouth daily 12/30/20   Ciara Cavanaugh PA-C   Vitamin D (CHOLECALCIFEROL) 50 MCG (2000 UT) TABS tablet Take 1 tablet by mouth daily 12/30/20   Ciara Cavanaugh PA-C   mupirocin (BACTROBAN) 2 % ointment 2 times daily Toothpaste ribbon added to nasal rinse 3/19/20   Historical Provider, MD   ferrous sulfate (IRON 325) 325 (65 Fe) MG tablet Take 325 mg by mouth 2 times daily     Historical Provider, MD   Handicap Placard MISC by Does not apply route Dx:I187.2,M81.0. Duration: 5 years.  10/18/19 4/23/21  Bula Manifold, APRN - CNP   sodium chloride (OCEAN, BABY AYR) 0.65 % nasal spray 1 spray by Nasal route as needed for Congestion     Historical Provider, MD       Future Appointments   Date Time Provider David Adler   9/13/2021  9:30 AM Reyna Gonzales MD [facility]   10/20/2021 10:30 AM Sancho Palomares MD [address] [place] [address]   8/8/2022 10:00 AM Jose Lugo MD [facility], [facility]. [facility] - [address] [facility]

## 2021-09-13 ENCOUNTER — OFFICE VISIT (OUTPATIENT)
Dept: FAMILY MEDICINE CLINIC | Age: 81
End: 2021-09-13

## 2021-09-13 VITALS
SYSTOLIC BLOOD PRESSURE: 126 MMHG | DIASTOLIC BLOOD PRESSURE: 74 MMHG | HEART RATE: 95 BPM | OXYGEN SATURATION: 96 % | RESPIRATION RATE: 20 BRPM | BODY MASS INDEX: 42.69 KG/M2 | WEIGHT: 218.6 LBS | TEMPERATURE: 97.1 F

## 2021-09-13 DIAGNOSIS — E78.00 HYPERCHOLESTEROLEMIA: ICD-10-CM

## 2021-09-13 DIAGNOSIS — I50.33 ACUTE ON CHRONIC DIASTOLIC CONGESTIVE HEART FAILURE (HCC): ICD-10-CM

## 2021-09-13 DIAGNOSIS — E11.9 TYPE 2 DIABETES MELLITUS WITHOUT COMPLICATION, WITHOUT LONG-TERM CURRENT USE OF INSULIN (HCC): ICD-10-CM

## 2021-09-13 DIAGNOSIS — L02.93 CARBUNCLES: Primary | ICD-10-CM

## 2021-09-13 DIAGNOSIS — N18.30 STAGE 3 CHRONIC KIDNEY DISEASE, UNSPECIFIED WHETHER STAGE 3A OR 3B CKD (HCC): ICD-10-CM

## 2021-09-13 DIAGNOSIS — I10 BENIGN ESSENTIAL HTN: ICD-10-CM

## 2021-09-13 PROBLEM — J32.0 CHRONIC MAXILLARY SINUSITIS: Status: ACTIVE | Noted: 2017-08-11

## 2021-09-13 PROBLEM — J32.3 CHRONIC SPHENOIDAL SINUSITIS: Status: ACTIVE | Noted: 2017-08-11

## 2021-09-13 PROBLEM — J32.1 CHRONIC FRONTAL SINUSITIS: Status: ACTIVE | Noted: 2017-08-11

## 2021-09-13 PROBLEM — J32.2 CHRONIC ETHMOIDAL SINUSITIS: Status: ACTIVE | Noted: 2017-07-17

## 2021-09-13 LAB
ALBUMIN SERPL-MCNC: 4 G/DL (ref 3.5–5.1)
ALP BLD-CCNC: 100 U/L (ref 38–126)
ALT SERPL-CCNC: 6 U/L (ref 11–66)
ANION GAP SERPL CALCULATED.3IONS-SCNC: 13 MEQ/L (ref 8–16)
AST SERPL-CCNC: 13 U/L (ref 5–40)
AVERAGE GLUCOSE: 156 MG/DL (ref 70–126)
BASOPHILS # BLD: 0.6 %
BASOPHILS ABSOLUTE: 0.1 THOU/MM3 (ref 0–0.1)
BILIRUB SERPL-MCNC: 0.3 MG/DL (ref 0.3–1.2)
BUN BLDV-MCNC: 15 MG/DL (ref 7–22)
CALCIUM SERPL-MCNC: 9.7 MG/DL (ref 8.5–10.5)
CHLORIDE BLD-SCNC: 101 MEQ/L (ref 98–111)
CHOLESTEROL, TOTAL: 144 MG/DL (ref 100–199)
CO2: 28 MEQ/L (ref 23–33)
CREAT SERPL-MCNC: 0.9 MG/DL (ref 0.4–1.2)
EOSINOPHIL # BLD: 2.9 %
EOSINOPHILS ABSOLUTE: 0.3 THOU/MM3 (ref 0–0.4)
ERYTHROCYTE [DISTWIDTH] IN BLOOD BY AUTOMATED COUNT: 17 % (ref 11.5–14.5)
ERYTHROCYTE [DISTWIDTH] IN BLOOD BY AUTOMATED COUNT: 54.5 FL (ref 35–45)
GFR SERPL CREATININE-BSD FRML MDRD: 60 ML/MIN/1.73M2
GLUCOSE BLD-MCNC: 140 MG/DL (ref 70–108)
HBA1C MFR BLD: 7.2 % (ref 4.4–6.4)
HCT VFR BLD CALC: 36.4 % (ref 37–47)
HDLC SERPL-MCNC: 44 MG/DL
HEMOGLOBIN: 10.9 GM/DL (ref 12–16)
IMMATURE GRANS (ABS): 0.08 THOU/MM3 (ref 0–0.07)
IMMATURE GRANULOCYTES: 0.8 %
LDL CHOLESTEROL CALCULATED: 75 MG/DL
LYMPHOCYTES # BLD: 12.3 %
LYMPHOCYTES ABSOLUTE: 1.2 THOU/MM3 (ref 1–4.8)
MCH RBC QN AUTO: 26.3 PG (ref 26–33)
MCHC RBC AUTO-ENTMCNC: 29.9 GM/DL (ref 32.2–35.5)
MCV RBC AUTO: 87.7 FL (ref 81–99)
MONOCYTES # BLD: 7.7 %
MONOCYTES ABSOLUTE: 0.8 THOU/MM3 (ref 0.4–1.3)
NUCLEATED RED BLOOD CELLS: 0 /100 WBC
PLATELET # BLD: 249 THOU/MM3 (ref 130–400)
PMV BLD AUTO: 10.6 FL (ref 9.4–12.4)
POTASSIUM SERPL-SCNC: 4 MEQ/L (ref 3.5–5.2)
RBC # BLD: 4.15 MILL/MM3 (ref 4.2–5.4)
SEG NEUTROPHILS: 75.7 %
SEGMENTED NEUTROPHILS ABSOLUTE COUNT: 7.4 THOU/MM3 (ref 1.8–7.7)
SODIUM BLD-SCNC: 142 MEQ/L (ref 135–145)
TOTAL PROTEIN: 8.2 G/DL (ref 6.1–8)
TRIGL SERPL-MCNC: 123 MG/DL (ref 0–199)
WBC # BLD: 9.8 THOU/MM3 (ref 4.8–10.8)

## 2021-09-13 PROCEDURE — G8427 DOCREV CUR MEDS BY ELIG CLIN: HCPCS | Performed by: FAMILY MEDICINE

## 2021-09-13 PROCEDURE — 4040F PNEUMOC VAC/ADMIN/RCVD: CPT | Performed by: FAMILY MEDICINE

## 2021-09-13 PROCEDURE — G8417 CALC BMI ABV UP PARAM F/U: HCPCS | Performed by: FAMILY MEDICINE

## 2021-09-13 PROCEDURE — 1123F ACP DISCUSS/DSCN MKR DOCD: CPT | Performed by: FAMILY MEDICINE

## 2021-09-13 PROCEDURE — 1090F PRES/ABSN URINE INCON ASSESS: CPT | Performed by: FAMILY MEDICINE

## 2021-09-13 PROCEDURE — 1036F TOBACCO NON-USER: CPT | Performed by: FAMILY MEDICINE

## 2021-09-13 PROCEDURE — G8400 PT W/DXA NO RESULTS DOC: HCPCS | Performed by: FAMILY MEDICINE

## 2021-09-13 RX ORDER — CLINDAMYCIN HYDROCHLORIDE 300 MG/1
300 CAPSULE ORAL 4 TIMES DAILY
Qty: 40 CAPSULE | Refills: 0 | Status: SHIPPED | OUTPATIENT
Start: 2021-09-13 | End: 2021-09-23

## 2021-09-13 SDOH — ECONOMIC STABILITY: FOOD INSECURITY: WITHIN THE PAST 12 MONTHS, THE FOOD YOU BOUGHT JUST DIDN'T LAST AND YOU DIDN'T HAVE MONEY TO GET MORE.: NEVER TRUE

## 2021-09-13 SDOH — ECONOMIC STABILITY: FOOD INSECURITY: WITHIN THE PAST 12 MONTHS, YOU WORRIED THAT YOUR FOOD WOULD RUN OUT BEFORE YOU GOT MONEY TO BUY MORE.: NEVER TRUE

## 2021-09-13 ASSESSMENT — ENCOUNTER SYMPTOMS
NAUSEA: 0
CHEST TIGHTNESS: 0
CONSTIPATION: 0
WHEEZING: 0
VOMITING: 0
COUGH: 0
ABDOMINAL PAIN: 0
EYE PAIN: 0
RHINORRHEA: 0
SORE THROAT: 0
BACK PAIN: 0
SHORTNESS OF BREATH: 0
BLOOD IN STOOL: 0
DIARRHEA: 0

## 2021-09-13 ASSESSMENT — SOCIAL DETERMINANTS OF HEALTH (SDOH): HOW HARD IS IT FOR YOU TO PAY FOR THE VERY BASICS LIKE FOOD, HOUSING, MEDICAL CARE, AND HEATING?: NOT VERY HARD

## 2021-09-13 NOTE — PROGRESS NOTES
Nikki Thao (:  1940) is a 79 y.o. female,Established patient, here for evaluation of the following chief complaint(s):  Skin Lesion (red, raised lesions on breast and abdomin)         ASSESSMENT/PLAN:  1. Carbuncles  -     clindamycin (CLEOCIN) 300 MG capsule; Take 1 capsule by mouth 4 times daily for 10 days, Disp-40 capsule, R-0Normal  2. Hypercholesterolemia  -     Comprehensive Metabolic Panel  -     Hemoglobin A1C  -     Lipid Panel  -     CBC With Auto Differential  Stable,   Lab Results   Component Value Date    CHOL 144 2021    CHOL 140 2020    CHOL 105 2018     Lab Results   Component Value Date    TRIG 128 2021    TRIG 127 2020    TRIG 107 2018     Lab Results   Component Value Date    HDL 40 2021    HDL 41 2020    HDL 36 2018     Lab Results   Component Value Date    LDLCALC 78 2021    LDLCALC 74 2020    LDLCALC 48 2018     Lab Results   Component Value Date    LABVLDL 30 (H) 2017    LABVLDL 49 (H) 2015    VLDL 30 01/10/2017    VLDL 49 2015     Lab Results   Component Value Date    CHOLHDLRATIO 2.7 01/10/2017    CHOLHDLRATIO 2.7 2017    CHOLHDLRATIO 4.1 2015    CHOLHDLRATIO 4.1 2015       3. Type 2 diabetes mellitus without complication, without long-term current use of insulin ([facility])  -     Comprehensive Metabolic Panel  -     Hemoglobin A1C  -     Lipid Panel  -     CBC With Auto Differential  -stable on metformin and lantus, januvia  Lab Results   Component Value Date    LABA1C 7.8 (H) 2020     No results found for: EAG    4.  Stage 3 chronic kidney disease, unspecified whether stage 3a or 3b CKD ([facility])  -     Comprehensive Metabolic Panel  -     Hemoglobin A1C  -     Lipid Panel  -     CBC With Auto Differential  -stable,   Lab Results   Component Value Date     2021    K 4.0 2021     2021    CO2 29 2021    BUN 20 2021    CREATININE 0.96 08/11/2021    GLUCOSE 146 (H) 08/11/2021    CALCIUM 9.0 08/11/2021    PROT 5.6 (L) 03/29/2021    LABALBU 2.3 (L) 03/29/2021    BILITOT 0.2 (L) 03/29/2021    ALKPHOS 71 03/29/2021    AST 17 03/29/2021    ALT 11 03/29/2021    LABGLOM >90 03/31/2021         5. Acute on chronic diastolic congestive heart failure (HCC)  -     Comprehensive Metabolic Panel  -     Hemoglobin A1C  -     Lipid Panel  -     CBC With Auto Differential  -stable on bumex  6. Benign essential HTN  -     Comprehensive Metabolic Panel  -     Hemoglobin A1C  -     Lipid Panel  -     CBC With Auto Differential  -stable on nifedipine  -monitor sxs, call if not improving      No follow-ups on file. Subjective   SUBJECTIVE/OBJECTIVE:  HPI  Patient here today for a check up. Reviewed BMI of 43. Encouraged diet, exercise and weight loss. 10 days of lesions on left breast and abdomen. Individual lesions. Start as a pimple, then break open. History of MRSA. , non smoker, pmh reviewed. Review of Systems   Constitutional: Negative for chills, fatigue, fever and unexpected weight change. HENT: Negative for congestion, ear pain, rhinorrhea and sore throat. Eyes: Negative for pain and visual disturbance. Respiratory: Negative for cough, chest tightness, shortness of breath and wheezing. Cardiovascular: Negative for chest pain and palpitations. Gastrointestinal: Negative for abdominal pain, blood in stool, constipation, diarrhea, nausea and vomiting. Genitourinary: Negative for difficulty urinating, frequency, hematuria and urgency. Musculoskeletal: Negative for back pain, joint swelling, myalgias and neck pain. Skin: Positive for wound. Negative for rash. Neurological: Negative for dizziness and headaches. Hematological: Negative for adenopathy. Does not bruise/bleed easily. Psychiatric/Behavioral: Negative for behavioral problems and sleep disturbance. The patient is not nervous/anxious.            Objective Physical Exam  Vitals and nursing note reviewed. Constitutional:       Appearance: She is well-developed. HENT:      Head: Normocephalic and atraumatic. Right Ear: External ear normal.      Left Ear: External ear normal.      Nose: Nose normal.      Mouth/Throat:      Mouth: Mucous membranes are moist.   Eyes:      Pupils: Pupils are equal, round, and reactive to light. Neck:      Thyroid: No thyromegaly. Cardiovascular:      Rate and Rhythm: Normal rate and regular rhythm. Heart sounds: Normal heart sounds. Pulmonary:      Breath sounds: Normal breath sounds. No wheezing or rales. Abdominal:      General: Bowel sounds are normal.      Palpations: Abdomen is soft. Tenderness: There is no abdominal tenderness. There is no guarding or rebound. Musculoskeletal:         General: Normal range of motion. Cervical back: Neck supple. Lymphadenopathy:      Cervical: No cervical adenopathy. Skin:     General: Skin is warm and dry. Findings: No rash. Neurological:      Mental Status: She is alert and oriented to person, place, and time. Cranial Nerves: No cranial nerve deficit. Deep Tendon Reflexes: Reflexes are normal and symmetric. An electronic signature was used to authenticate this note.     --Zee Larson MD

## 2021-09-14 ENCOUNTER — TELEPHONE (OUTPATIENT)
Dept: FAMILY MEDICINE CLINIC | Age: 81
End: 2021-09-14

## 2021-09-14 DIAGNOSIS — E11.9 TYPE 2 DIABETES MELLITUS WITHOUT COMPLICATION, WITHOUT LONG-TERM CURRENT USE OF INSULIN (HCC): Primary | ICD-10-CM

## 2021-09-17 ENCOUNTER — HOSPITAL ENCOUNTER (OUTPATIENT)
Age: 81
Discharge: HOME | End: 2021-09-17
Payer: MEDICARE

## 2021-09-17 LAB
INFLUENZA A: NOT DETECTED
INFLUENZA B: NOT DETECTED
SARS-COV-2 RNA, RT PCR: NOT DETECTED

## 2021-09-17 PROCEDURE — 87636 SARSCOV2 & INF A&B AMP PRB: CPT

## 2021-09-21 ENCOUNTER — CARE COORDINATION (OUTPATIENT)
Dept: CARE COORDINATION | Age: 81
End: 2021-09-21

## 2021-10-05 ENCOUNTER — CARE COORDINATION (OUTPATIENT)
Dept: CARE COORDINATION | Age: 81
End: 2021-10-05

## 2021-10-13 ENCOUNTER — CARE COORDINATION (OUTPATIENT)
Dept: CARE COORDINATION | Age: 81
End: 2021-10-13

## 2021-10-22 ENCOUNTER — CARE COORDINATION (OUTPATIENT)
Dept: CARE COORDINATION | Age: 81
End: 2021-10-22

## 2021-10-22 ENCOUNTER — HOSPITAL ENCOUNTER (OUTPATIENT)
Age: 81
Discharge: HOME | End: 2021-10-22
Payer: MEDICARE

## 2021-10-22 LAB — SARS-COV-2, PCR: NOT DETECTED

## 2021-10-22 PROCEDURE — 87635 SARS-COV-2 COVID-19 AMP PRB: CPT

## 2021-11-17 ENCOUNTER — HOSPITAL ENCOUNTER (OUTPATIENT)
Dept: WOUND CARE | Age: 81
Discharge: HOME | End: 2021-11-17
Payer: MEDICARE

## 2021-11-17 VITALS
TEMPERATURE: 98.1 F | HEIGHT: 60 IN | WEIGHT: 204 LBS | DIASTOLIC BLOOD PRESSURE: 75 MMHG | OXYGEN SATURATION: 94 % | BODY MASS INDEX: 40.05 KG/M2 | HEART RATE: 71 BPM | RESPIRATION RATE: 18 BRPM | SYSTOLIC BLOOD PRESSURE: 138 MMHG

## 2021-11-17 PROCEDURE — 99213 OFFICE O/P EST LOW 20 MIN: CPT

## 2021-11-17 ASSESSMENT — PAIN SCALES - GENERAL: PAINLEVEL_OUTOF10: 0

## 2021-11-17 NOTE — PROGRESS NOTES
Benito JAMISON has reviewed and agrees with Darshana KRUGER's shift  Assessment.     Andrea Galvan RN  11/17/2021

## 2021-11-17 NOTE — PLAN OF CARE
Problem: Wound:  Goal: Will show signs of wound healing; wound closure and no evidence of infection  Description: Will show signs of wound healing; wound closure and no evidence of infection  Outcome: Ongoing   Patient seen for bilateral leg wound. Wound shows signs of proper closure and healing. No s/s of infection noted. Follow up in 12 weeks. See AVS for order changes. Care plan reviewed with patient and daughter. Patient and daughter verbalize understanding of the plan of care and contribute to goal setting.

## 2021-12-04 ENCOUNTER — HOSPITAL ENCOUNTER (EMERGENCY)
Age: 81
Discharge: HOME | End: 2021-12-04
Payer: MEDICARE

## 2021-12-04 VITALS
TEMPERATURE: 98.6 F | SYSTOLIC BLOOD PRESSURE: 114 MMHG | HEART RATE: 88 BPM | OXYGEN SATURATION: 96 % | WEIGHT: 211 LBS | RESPIRATION RATE: 16 BRPM | BODY MASS INDEX: 41.21 KG/M2 | DIASTOLIC BLOOD PRESSURE: 64 MMHG

## 2021-12-04 DIAGNOSIS — Z20.822 ENCOUNTER FOR PREPROCEDURE SCREENING LABORATORY TESTING FOR COVID-19: Primary | ICD-10-CM

## 2021-12-04 DIAGNOSIS — Z01.812 ENCOUNTER FOR PREPROCEDURE SCREENING LABORATORY TESTING FOR COVID-19: Primary | ICD-10-CM

## 2021-12-04 LAB
INFLUENZA A: NOT DETECTED
INFLUENZA B: NOT DETECTED
SARS-COV-2 RNA, RT PCR: NOT DETECTED

## 2021-12-04 PROCEDURE — 87636 SARSCOV2 & INF A&B AMP PRB: CPT

## 2021-12-04 PROCEDURE — 99213 OFFICE O/P EST LOW 20 MIN: CPT | Performed by: NURSE PRACTITIONER

## 2021-12-04 PROCEDURE — 99213 OFFICE O/P EST LOW 20 MIN: CPT

## 2021-12-05 NOTE — ED PROVIDER NOTES
[address] [facility] Encounter      [facility]       Chief Complaint   Patient presents with    Covid Testing       Nurses Notes reviewed and I agree except as noted in the HPI. HISTORY OF PRESENT ILLNESS   Brandee Severino is a [de-identified] y.o. female who presents for evaluation by her daughter. She is requesting COVID-19 testing as she is having a procedure at [facility]. Patient and daughter deny any signs or symptoms of illness. The patient/patient representative has no other acute complaints at this time. 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    Cataract of both eyes     COVID-19     DDD (degenerative disc disease), lumbar     DM2 (diabetes mellitus, type 2) ([facility]     diagnoses approx 2000    Dysphagia, pharyngoesophageal 09/26/2016    Eczema 03/2018    Fibrocystic breast     H/O ventral hernia repair     Headache 02/09/2017    History of COVID-19 12/2020    Hyperlipidemia     Hypertension     Iron deficiency anemia     LPRD (laryngopharyngeal reflux disease) 09/26/2016    Neuropathy     peripheral    Obesity     Orbital abscess     Orbital cellulitis 01/22/2014    SWAPNA (obstructive sleep apnea)     Osteoarthritis     Osteoporosis     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  Sinusitis     Velopharyngeal incompetence 09/26/2016       SURGICAL HISTORY     Patient  has a past surgical history that includes ventral hernia repair (1992); Cholecystectomy (03/1988); Hysterectomy, total abdominal (1980); Hemorrhoid surgery (1980s); Total knee arthroplasty (08/2003); Carpal tunnel release (Bilateral, 2008, 2009); sinus surgery (last 2009); Cataract removal (2011); Eye surgery (Left, 01/30/2015); sinus surgery (02/01/2016); sinus surgery (2016 x 2); joint replacement (bilat 2005/ 2007);  Colonoscopy (2016); Endoscopy, colon, diagnostic; eye surgery; hernia repair; Tonsillectomy; sinus surgery (09/18/2017); sinus surgery (08/09/2017); Abdomen surgery; Dilatation, esophagus; Appendectomy; pr colsc flx with directed submucosal njx any sbst (10/11/2018); and Colonoscopy (10/11/2018). CURRENT MEDICATIONS       Previous Medications    ACETAMINOPHEN 500 MG CAPS    Take 1,000 mg by mouth every 6 hours as needed for Pain    ASCORBIC ACID (VITAMIN C) 1000 MG TABLET    Take 1 tablet by mouth daily    BUMETANIDE (BUMEX) 1 MG TABLET    Take 1 tablet by mouth 2 times daily    FERROUS SULFATE (IRON 325) 325 (65 FE) MG TABLET    Take 325 mg by mouth 2 times daily     HANDICAP PLACARD MISC    by Does not apply route Dx:I187.2,M81.0. Duration: 5 years. INSULIN GLARGINE (LANTUS) 100 UNIT/ML INJECTION VIAL    Inject 12 Units into the skin every morning    INSULIN PEN NEEDLE (PEN NEEDLES) 31G X 6 MM MISC    1 each by Does not apply route daily    METFORMIN (GLUCOPHAGE) 1000 MG TABLET    TAKE ONE TABLET IN THE EVENING    MISC. DEVICES (WALKER) MISC    1 each by Does not apply route daily Requests stand up walker due to heart failure and generalized OA. I50.23. MUPIROCIN (BACTROBAN) 2 % OINTMENT    2 times daily Toothpaste ribbon added to nasal rinse    NIFEDIPINE (ADALAT CC) 60 MG EXTENDED RELEASE TABLET    Take 1 tablet by mouth daily    OMEPRAZOLE (PRILOSEC) 20 MG DELAYED RELEASE CAPSULE    TAKE ONE CAPSULE BY MOUTH ONCE DAILY    OXYGEN    Inhale 1.5 L into the lungs     POTASSIUM CHLORIDE (KLOR-CON M) 20 MEQ EXTENDED RELEASE TABLET    Take 1 tablet by mouth daily    PREGABALIN (LYRICA) 150 MG CAPSULE    Take 2 capsules by mouth 2 times daily for 30 days.     SITAGLIPTIN (JANUVIA) 100 MG TABLET    Take 1 tablet by mouth daily    SODIUM CHLORIDE (OCEAN, BABY AYR) 0.65 % NASAL SPRAY    1 spray by Nasal route as needed for Congestion     SODIUM CHLORIDE-SODIUM BICARB (NETI POT SINUS WASH NA)    by Nasal route 2 times daily Use baby shampoo in initial rinse, then mupirocin in second rinse. [address] wants 3-4 times a day    VITAMIN D (CHOLECALCIFEROL) 50 MCG (2000 UT) TABS TABLET    Take 1 tablet by mouth daily       ALLERGIES     Patient is is allergic to lisinopril, sulfamethoxazole-trimethoprim, morphine, codeine, hydrocodone, percocet [oxycodone-acetaminophen], and tape [adhesive tape]. FAMILY HISTORY     Patient'sfamily history includes Cancer in her brother and sister; Diabetes in her mother; Heart Attack in her brother; Heart Disease in her brother, brother, and father; No Known Problems in her brother, brother, and brother; Obesity in her sister and sister; Other in her brother, brother, and brother. SOCIAL HISTORY     Patient  reports that she has never smoked. She has never used smokeless tobacco. She reports that she does not drink alcohol and does not use drugs. PHYSICAL EXAM     ED TRIAGE VITALS  BP: 114/64, Temp: 98.6 °F (37 °C), Pulse: 88, Resp: 16, SpO2: 96 %  Physical Exam  Vitals and nursing note reviewed. Constitutional:       General: She is not in acute distress. Appearance: Normal appearance. She is well-developed and well-groomed. HENT:      Head: Normocephalic and atraumatic. Right Ear: External ear normal.      Left Ear: External ear normal.   Eyes:      Conjunctiva/sclera: Conjunctivae normal.      Right eye: Right conjunctiva is not injected. Left eye: Left conjunctiva is not injected. Pupils: Pupils are equal.   Cardiovascular:      Rate and Rhythm: Normal rate. Heart sounds: Normal heart sounds. Pulmonary:      Effort: Pulmonary effort is normal. No respiratory distress. Breath sounds: Normal breath sounds. Musculoskeletal:      Cervical back: Normal range of motion. Skin:     General: Skin is warm and dry. Findings: No rash (on exposed surfaces). Neurological:      Mental Status: She is alert and oriented to person, place, and time.    Psychiatric:         Mood and Affect: Mood normal.         Speech: Speech normal.         Behavior: Behavior normal.         DIAGNOSTIC RESULTS   Labs:  Abnormal Labs Reviewed - No abnormal labs to display     IMAGING:  No orders to display     URGENT CARE COURSE:     Vitals:    12/04/21 1911   BP: 114/64   Pulse: 88   Resp: 16   Temp: 98.6 °F (37 °C)   TempSrc: Temporal   SpO2: 96%   Weight: 211 lb (95.7 kg)       Medications - No data to display  PROCEDURES:  FINALIMPRESSION      1. Encounter for preprocedure screening laboratory testing for COVID-19        DISPOSITION/PLAN   DISPOSITION    Discharge   Physical assessment findings, diagnostic testing(s) if applicable, and vital signs reviewed with patient/patient representative. If applicable, patient/patient representative will be contacted upon receipt of final culture and sensitivity or other testing results when available. Any additions or changes to medications or changes the plan of care will be made at that time. Differential diagnosis(s) discussed with patient/patient representative. Patient is to follow-up with family care provider in 2-3 days if no improvement. Patient is to go to the emergency department if symptoms change/worsen. Patient/patient representative is aware of care plan, questions answered, verbalizes understanding and is in agreement. Printed instructions attached to after visit summary. Problem List Items Addressed This Visit     None      Visit Diagnoses     Encounter for preprocedure screening laboratory testing for COVID-19    -  Primary          PATIENT REFERRED TO:  Jann Arana MD  [address] [place] [address]  [phone]    Schedule an appointment as soon as possible for a visit in 3 days  For further evaluation. , If symptoms change/worsen, go to the [address] [facility], APRN - CNP    Please note that some or all of this chart was generated using [employer] voice recognition software.  Although every effort was made to ensure the accuracy of this automated transcription, some errors in transcription may have occurred.         AMANDA Mcpherson - JUAN MANUEL  12/04/21 1916

## 2022-01-05 ENCOUNTER — HOSPITAL ENCOUNTER (INPATIENT)
Age: 82
LOS: 3 days | Discharge: NURSING, REHAB OR LONG-TERM CARE | DRG: 094 | End: 2022-01-08
Attending: EMERGENCY MEDICINE | Admitting: HOSPITALIST
Payer: MEDICARE

## 2022-01-05 ENCOUNTER — APPOINTMENT (OUTPATIENT)
Dept: CT IMAGING | Age: 82
DRG: 094 | End: 2022-01-05
Payer: MEDICARE

## 2022-01-05 DIAGNOSIS — E87.6 HYPOKALEMIA: ICD-10-CM

## 2022-01-05 DIAGNOSIS — E11.29 TYPE 2 DIABETES MELLITUS WITH MICROALBUMINURIA, WITHOUT LONG-TERM CURRENT USE OF INSULIN (HCC): ICD-10-CM

## 2022-01-05 DIAGNOSIS — G93.89 BRAIN MASS: Primary | ICD-10-CM

## 2022-01-05 DIAGNOSIS — I16.9 HYPERTENSIVE CRISIS: ICD-10-CM

## 2022-01-05 DIAGNOSIS — E11.40 TYPE 2 DIABETES MELLITUS WITH DIABETIC NEUROPATHY, WITHOUT LONG-TERM CURRENT USE OF INSULIN (HCC): ICD-10-CM

## 2022-01-05 DIAGNOSIS — R80.9 TYPE 2 DIABETES MELLITUS WITH MICROALBUMINURIA, WITHOUT LONG-TERM CURRENT USE OF INSULIN (HCC): ICD-10-CM

## 2022-01-05 LAB
ALBUMIN SERPL-MCNC: 4.8 G/DL (ref 3.5–5.1)
ALP BLD-CCNC: 112 U/L (ref 38–126)
ALT SERPL-CCNC: 9 U/L (ref 11–66)
ANION GAP SERPL CALCULATED.3IONS-SCNC: 17 MEQ/L (ref 8–16)
AST SERPL-CCNC: 13 U/L (ref 5–40)
BACTERIA: ABNORMAL /HPF
BASOPHILS # BLD: 0.5 %
BASOPHILS ABSOLUTE: 0.1 THOU/MM3 (ref 0–0.1)
BILIRUB SERPL-MCNC: 0.5 MG/DL (ref 0.3–1.2)
BILIRUBIN URINE: NEGATIVE
BLOOD, URINE: ABNORMAL
BUN BLDV-MCNC: 25 MG/DL (ref 7–22)
CALCIUM SERPL-MCNC: 10.2 MG/DL (ref 8.5–10.5)
CASTS 2: ABNORMAL /LPF
CASTS UA: ABNORMAL /LPF
CHARACTER, URINE: CLEAR
CHLORIDE BLD-SCNC: 101 MEQ/L (ref 98–111)
CO2: 23 MEQ/L (ref 23–33)
COLOR: YELLOW
CREAT SERPL-MCNC: 1.2 MG/DL (ref 0.4–1.2)
CRYSTALS, UA: ABNORMAL
EOSINOPHIL # BLD: 1.8 %
EOSINOPHILS ABSOLUTE: 0.2 THOU/MM3 (ref 0–0.4)
EPITHELIAL CELLS, UA: ABNORMAL /HPF
ERYTHROCYTE [DISTWIDTH] IN BLOOD BY AUTOMATED COUNT: 15.4 % (ref 11.5–14.5)
ERYTHROCYTE [DISTWIDTH] IN BLOOD BY AUTOMATED COUNT: 48.7 FL (ref 35–45)
GFR SERPL CREATININE-BSD FRML MDRD: 43 ML/MIN/1.73M2
GLUCOSE BLD-MCNC: 211 MG/DL (ref 70–108)
GLUCOSE BLD-MCNC: 219 MG/DL (ref 70–108)
GLUCOSE URINE: NEGATIVE MG/DL
HCT VFR BLD CALC: 44.1 % (ref 37–47)
HEMOGLOBIN: 14.6 GM/DL (ref 12–16)
IMMATURE GRANS (ABS): 0.05 THOU/MM3 (ref 0–0.07)
IMMATURE GRANULOCYTES: 0.5 %
KETONES, URINE: NEGATIVE
LEUKOCYTE ESTERASE, URINE: ABNORMAL
LYMPHOCYTES # BLD: 19.6 %
LYMPHOCYTES ABSOLUTE: 2 THOU/MM3 (ref 1–4.8)
MAGNESIUM: 1.6 MG/DL (ref 1.6–2.4)
MCH RBC QN AUTO: 28.8 PG (ref 26–33)
MCHC RBC AUTO-ENTMCNC: 33.1 GM/DL (ref 32.2–35.5)
MCV RBC AUTO: 87 FL (ref 81–99)
MISCELLANEOUS 2: ABNORMAL
MONOCYTES # BLD: 8.7 %
MONOCYTES ABSOLUTE: 0.9 THOU/MM3 (ref 0.4–1.3)
NITRITE, URINE: NEGATIVE
NUCLEATED RED BLOOD CELLS: 0 /100 WBC
OSMOLALITY CALCULATION: 292.4 MOSMOL/KG (ref 275–300)
PH UA: 5.5 (ref 5–9)
PLATELET # BLD: 194 THOU/MM3 (ref 130–400)
PMV BLD AUTO: 9.8 FL (ref 9.4–12.4)
POTASSIUM REFLEX MAGNESIUM: 3.3 MEQ/L (ref 3.5–5.2)
PROTEIN UA: 100
RBC # BLD: 5.07 MILL/MM3 (ref 4.2–5.4)
RBC URINE: ABNORMAL /HPF
RENAL EPITHELIAL, UA: ABNORMAL
SEG NEUTROPHILS: 68.9 %
SEGMENTED NEUTROPHILS ABSOLUTE COUNT: 7.1 THOU/MM3 (ref 1.8–7.7)
SODIUM BLD-SCNC: 141 MEQ/L (ref 135–145)
SPECIFIC GRAVITY, URINE: 1.01 (ref 1–1.03)
TOTAL PROTEIN: 8.4 G/DL (ref 6.1–8)
TROPONIN T: < 0.01 NG/ML
UROBILINOGEN, URINE: 0.2 EU/DL (ref 0–1)
WBC # BLD: 10.3 THOU/MM3 (ref 4.8–10.8)
WBC UA: ABNORMAL /HPF
YEAST: ABNORMAL

## 2022-01-05 PROCEDURE — 2580000003 HC RX 258: Performed by: HOSPITALIST

## 2022-01-05 PROCEDURE — 72125 CT NECK SPINE W/O DYE: CPT

## 2022-01-05 PROCEDURE — 83735 ASSAY OF MAGNESIUM: CPT

## 2022-01-05 PROCEDURE — 87077 CULTURE AEROBIC IDENTIFY: CPT

## 2022-01-05 PROCEDURE — 87086 URINE CULTURE/COLONY COUNT: CPT

## 2022-01-05 PROCEDURE — 99223 1ST HOSP IP/OBS HIGH 75: CPT | Performed by: HOSPITALIST

## 2022-01-05 PROCEDURE — 70450 CT HEAD/BRAIN W/O DYE: CPT

## 2022-01-05 PROCEDURE — 84484 ASSAY OF TROPONIN QUANT: CPT

## 2022-01-05 PROCEDURE — 81001 URINALYSIS AUTO W/SCOPE: CPT

## 2022-01-05 PROCEDURE — 1200000000 HC SEMI PRIVATE

## 2022-01-05 PROCEDURE — 80053 COMPREHEN METABOLIC PANEL: CPT

## 2022-01-05 PROCEDURE — 85025 COMPLETE CBC W/AUTO DIFF WBC: CPT

## 2022-01-05 PROCEDURE — 87186 SC STD MICRODIL/AGAR DIL: CPT

## 2022-01-05 PROCEDURE — 99283 EMERGENCY DEPT VISIT LOW MDM: CPT

## 2022-01-05 PROCEDURE — 82948 REAGENT STRIP/BLOOD GLUCOSE: CPT

## 2022-01-05 RX ORDER — ACETAMINOPHEN 325 MG/1
650 TABLET ORAL EVERY 6 HOURS PRN
Status: DISCONTINUED | OUTPATIENT
Start: 2022-01-05 | End: 2022-01-08 | Stop reason: HOSPADM

## 2022-01-05 RX ORDER — SODIUM CHLORIDE 9 MG/ML
25 INJECTION, SOLUTION INTRAVENOUS PRN
Status: DISCONTINUED | OUTPATIENT
Start: 2022-01-05 | End: 2022-01-08 | Stop reason: HOSPADM

## 2022-01-05 RX ORDER — POLYETHYLENE GLYCOL 3350 17 G/17G
17 POWDER, FOR SOLUTION ORAL DAILY PRN
Status: DISCONTINUED | OUTPATIENT
Start: 2022-01-05 | End: 2022-01-08 | Stop reason: HOSPADM

## 2022-01-05 RX ORDER — ACETAMINOPHEN 650 MG/1
650 SUPPOSITORY RECTAL EVERY 6 HOURS PRN
Status: DISCONTINUED | OUTPATIENT
Start: 2022-01-05 | End: 2022-01-08 | Stop reason: HOSPADM

## 2022-01-05 RX ORDER — SODIUM CHLORIDE 0.9 % (FLUSH) 0.9 %
5-40 SYRINGE (ML) INJECTION PRN
Status: DISCONTINUED | OUTPATIENT
Start: 2022-01-05 | End: 2022-01-08 | Stop reason: HOSPADM

## 2022-01-05 RX ORDER — SODIUM CHLORIDE 0.9 % (FLUSH) 0.9 %
5-40 SYRINGE (ML) INJECTION EVERY 12 HOURS SCHEDULED
Status: DISCONTINUED | OUTPATIENT
Start: 2022-01-05 | End: 2022-01-08 | Stop reason: HOSPADM

## 2022-01-05 RX ORDER — ZINC SULFATE 50(220)MG
50 CAPSULE ORAL DAILY
COMMUNITY

## 2022-01-05 RX ADMIN — SODIUM CHLORIDE, PRESERVATIVE FREE 10 ML: 5 INJECTION INTRAVENOUS at 23:32

## 2022-01-05 ASSESSMENT — ENCOUNTER SYMPTOMS
ABDOMINAL PAIN: 0
BACK PAIN: 0
EYE REDNESS: 0
NAUSEA: 0
VOMITING: 0
COUGH: 0
SHORTNESS OF BREATH: 0
TROUBLE SWALLOWING: 0

## 2022-01-06 ENCOUNTER — APPOINTMENT (OUTPATIENT)
Dept: MRI IMAGING | Age: 82
DRG: 094 | End: 2022-01-06
Payer: MEDICARE

## 2022-01-06 PROBLEM — G06.0 BRAIN ABSCESS: Status: ACTIVE | Noted: 2021-01-26

## 2022-01-06 PROBLEM — G93.41 METABOLIC ENCEPHALOPATHY: Status: ACTIVE | Noted: 2022-01-06

## 2022-01-06 PROBLEM — G93.5 BRAIN COMPRESSION (HCC): Status: ACTIVE | Noted: 2022-01-06

## 2022-01-06 LAB
ANION GAP SERPL CALCULATED.3IONS-SCNC: 15 MEQ/L (ref 8–16)
BUN BLDV-MCNC: 22 MG/DL (ref 7–22)
CALCIUM SERPL-MCNC: 9.4 MG/DL (ref 8.5–10.5)
CHLORIDE BLD-SCNC: 105 MEQ/L (ref 98–111)
CO2: 24 MEQ/L (ref 23–33)
CREAT SERPL-MCNC: 0.9 MG/DL (ref 0.4–1.2)
ERYTHROCYTE [DISTWIDTH] IN BLOOD BY AUTOMATED COUNT: 15.4 % (ref 11.5–14.5)
ERYTHROCYTE [DISTWIDTH] IN BLOOD BY AUTOMATED COUNT: 49.2 FL (ref 35–45)
GFR SERPL CREATININE-BSD FRML MDRD: 60 ML/MIN/1.73M2
GLUCOSE BLD-MCNC: 164 MG/DL (ref 70–108)
GLUCOSE BLD-MCNC: 166 MG/DL (ref 70–108)
GLUCOSE BLD-MCNC: 170 MG/DL (ref 70–108)
GLUCOSE BLD-MCNC: 231 MG/DL (ref 70–108)
HCT VFR BLD CALC: 37.6 % (ref 37–47)
HEMOGLOBIN: 12.4 GM/DL (ref 12–16)
MAGNESIUM: 1.5 MG/DL (ref 1.6–2.4)
MCH RBC QN AUTO: 28.8 PG (ref 26–33)
MCHC RBC AUTO-ENTMCNC: 33 GM/DL (ref 32.2–35.5)
MCV RBC AUTO: 87.4 FL (ref 81–99)
OSMOLALITY CALCULATION: 293.8 MOSMOL/KG (ref 275–300)
PLATELET # BLD: 167 THOU/MM3 (ref 130–400)
PMV BLD AUTO: 10.1 FL (ref 9.4–12.4)
POTASSIUM REFLEX MAGNESIUM: 3.4 MEQ/L (ref 3.5–5.2)
RBC # BLD: 4.3 MILL/MM3 (ref 4.2–5.4)
SODIUM BLD-SCNC: 144 MEQ/L (ref 135–145)
WBC # BLD: 8.4 THOU/MM3 (ref 4.8–10.8)

## 2022-01-06 PROCEDURE — 85027 COMPLETE CBC AUTOMATED: CPT

## 2022-01-06 PROCEDURE — 70553 MRI BRAIN STEM W/O & W/DYE: CPT

## 2022-01-06 PROCEDURE — 83735 ASSAY OF MAGNESIUM: CPT

## 2022-01-06 PROCEDURE — 6360000004 HC RX CONTRAST MEDICATION: Performed by: HOSPITALIST

## 2022-01-06 PROCEDURE — 2500000003 HC RX 250 WO HCPCS: Performed by: HOSPITALIST

## 2022-01-06 PROCEDURE — 36415 COLL VENOUS BLD VENIPUNCTURE: CPT

## 2022-01-06 PROCEDURE — 80048 BASIC METABOLIC PNL TOTAL CA: CPT

## 2022-01-06 PROCEDURE — 6370000000 HC RX 637 (ALT 250 FOR IP): Performed by: HOSPITALIST

## 2022-01-06 PROCEDURE — 1200000000 HC SEMI PRIVATE

## 2022-01-06 PROCEDURE — 2580000003 HC RX 258: Performed by: HOSPITALIST

## 2022-01-06 PROCEDURE — 99233 SBSQ HOSP IP/OBS HIGH 50: CPT | Performed by: FAMILY MEDICINE

## 2022-01-06 PROCEDURE — 82948 REAGENT STRIP/BLOOD GLUCOSE: CPT

## 2022-01-06 PROCEDURE — A9579 GAD-BASE MR CONTRAST NOS,1ML: HCPCS | Performed by: HOSPITALIST

## 2022-01-06 RX ORDER — POTASSIUM CHLORIDE 7.45 MG/ML
10 INJECTION INTRAVENOUS PRN
Status: DISCONTINUED | OUTPATIENT
Start: 2022-01-06 | End: 2022-01-08 | Stop reason: HOSPADM

## 2022-01-06 RX ORDER — MAGNESIUM SULFATE HEPTAHYDRATE 40 MG/ML
2000 INJECTION, SOLUTION INTRAVENOUS PRN
Status: DISCONTINUED | OUTPATIENT
Start: 2022-01-06 | End: 2022-01-08 | Stop reason: HOSPADM

## 2022-01-06 RX ORDER — DEXTROSE MONOHYDRATE 50 MG/ML
100 INJECTION, SOLUTION INTRAVENOUS PRN
Status: DISCONTINUED | OUTPATIENT
Start: 2022-01-06 | End: 2022-01-08 | Stop reason: HOSPADM

## 2022-01-06 RX ORDER — POTASSIUM CHLORIDE 20 MEQ/1
40 TABLET, EXTENDED RELEASE ORAL PRN
Status: DISCONTINUED | OUTPATIENT
Start: 2022-01-06 | End: 2022-01-08 | Stop reason: HOSPADM

## 2022-01-06 RX ORDER — NICOTINE POLACRILEX 4 MG
15 LOZENGE BUCCAL PRN
Status: DISCONTINUED | OUTPATIENT
Start: 2022-01-06 | End: 2022-01-08 | Stop reason: HOSPADM

## 2022-01-06 RX ORDER — DEXTROSE MONOHYDRATE 25 G/50ML
12.5 INJECTION, SOLUTION INTRAVENOUS PRN
Status: DISCONTINUED | OUTPATIENT
Start: 2022-01-06 | End: 2022-01-08 | Stop reason: HOSPADM

## 2022-01-06 RX ADMIN — MICONAZOLE NITRATE: 20 POWDER TOPICAL at 09:38

## 2022-01-06 RX ADMIN — ACETAMINOPHEN 650 MG: 325 TABLET ORAL at 11:01

## 2022-01-06 RX ADMIN — ACETAMINOPHEN 650 MG: 325 TABLET ORAL at 17:53

## 2022-01-06 RX ADMIN — SODIUM CHLORIDE, PRESERVATIVE FREE 10 ML: 5 INJECTION INTRAVENOUS at 09:37

## 2022-01-06 RX ADMIN — MICONAZOLE NITRATE: 20 POWDER TOPICAL at 21:15

## 2022-01-06 RX ADMIN — GADOTERIDOL 20 ML: 279.3 INJECTION, SOLUTION INTRAVENOUS at 08:47

## 2022-01-06 RX ADMIN — SODIUM CHLORIDE, PRESERVATIVE FREE 10 ML: 5 INJECTION INTRAVENOUS at 21:14

## 2022-01-06 RX ADMIN — INSULIN LISPRO 2 UNITS: 100 INJECTION, SOLUTION INTRAVENOUS; SUBCUTANEOUS at 21:13

## 2022-01-06 ASSESSMENT — PAIN SCALES - GENERAL
PAINLEVEL_OUTOF10: 8
PAINLEVEL_OUTOF10: 3
PAINLEVEL_OUTOF10: 10

## 2022-01-06 ASSESSMENT — PAIN DESCRIPTION - DESCRIPTORS: DESCRIPTORS: SHARP

## 2022-01-06 ASSESSMENT — PAIN DESCRIPTION - PAIN TYPE: TYPE: CHRONIC PAIN

## 2022-01-06 NOTE — ED NOTES
Placed rebel. Pt updated with plan of care. Denies needs at this time. Call light in reach.       Nora Cam RN  01/05/22 1942

## 2022-01-06 NOTE — PROGRESS NOTES
Palliative care eval received to discuss code status and assist with goals of care. Pt admitted, after fall in shower, due to new onset confusion. Pt has history of sinus adenocarcinoma. Pt follows at [facility] for treatment. Per chart review, pt was originally diagnosed in 2017, had treatment in 2017-20018 and then had recurrence in 2019. CT of head showed brain mass, MRI was done for follow up. Dr Sammie Osler is neurosurgeon on case. Writer in to see pt. Pt is resting quietly in bedside chair, awake and alert, oriented to direct questions but slightly \"off\" in conversation. Pt will make a comment not at all related to discussion. Pt states no pain or discomfort. Pt's daughter, Cristin Adams is here. Palliative care introduced, pt condition reviewed. Cristin Adams states that Dr Sammie Osler was in and recommends surgery for pt, Jim Brayantiffanie states that they would like input from pt's oncologist at [facility], Dr Isela Ott ,at [phone] before they make final decision whether to pursue surgery or not. Advance care planning discussed, including AD completion. Pt declines completing LW or DPOA, pt states that she wants her daughter, Cristin Adams or son, Ed to make decisions for her if she is unable. Full code versus DNR-CCA option also explained, pt and Cristin Adams state to continue with Full code status. Writer spoke with david in medical records to see if MRI film is able to be uploaded to [facility], per [facility], they have not been able to send records to [facility] for the last several days, due to issue on [facility]'s end. Pt info given to Jazmyne Olsen and she will contact Dr José Miguel Daigle office to see if they want report faxed. Writer discussed case with Dr Kat Nunes, she is \"okay\" with this nurse contacting Dr Sammie Osler to request that he contact Dr Jules Alvarado by phone to discuss. Secure text sent to Dr Sammie Osler, explaining request to contact DR Jules Alvarado with his contact info also sent. Dr Sammie Osler responded \"thanks\" to the text.    Pt and Donna updated that Dr Sammie Osler has been contacted to call Dr Naif Johnson. They state no further questions or needs. Emotional support given. Pt's nurse,POPPY Bah and Dr Cotton Needs updated on all above. Palliative care will continue to follow.

## 2022-01-06 NOTE — CARE COORDINATION
1/6/22, 11:32 AM EST  DISCHARGE PLANNING EVALUATION:    Lisbet Quiñonez       Admitted: 1/5/2022/ [address] day: 1   Location: [address] [place] [address] Reason for admit: Brain mass [G93.89]   PMH:  has a past medical history of Cancer ([address] Cataract of both eyes, COVID-19, DDD (degenerative disc disease), lumbar, DM2 (diabetes mellitus, type 2) ([address] Dysphagia, pharyngoesophageal, Eczema, Fibrocystic breast, H/O ventral hernia repair, Headache, History of COVID-19, Hyperlipidemia, Hypertension, Iron deficiency anemia, LPRD (laryngopharyngeal reflux disease), Neuropathy, Obesity, Orbital abscess, Orbital cellulitis, SWAPNA (obstructive sleep apnea), Osteoarthritis, Osteoporosis, Persistent proteinuria associated with type 2 diabetes mellitus ([address] Sinusitis, and Velopharyngeal incompetence. Procedure: N/A  MRI of brain:  1. Prominent 3.3 cm diffusion restricting lesion in the right frontal lobe with prominent thickened rim enhancement with extension into the cribriform plate on the right with apparent contiguity with inflammatory tissue at the cribriform plate/residual    ethmoid air cells. There is extensive edema adjacent to the lesion causing mass effect on the frontal horns of the ventricles and local leftward midline shift at the level of the falx. 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 A metastatic neoplastic process from recurrent sinus disease is less likely. 2. Redemonstration of exenteration of the nasal airway, septum and sphenoid sinuses with prominent mucosal inflammation of the residual maxillary sinuses and ethmoid air cells, similar to prior MRI. 3. Large mastoid effusions. Barriers to Discharge:  Patient presents after falling in the shower. 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 She follows at [facility].  Neurosurgery consult, ISS, prn medications, NPO, Palliative Care, PT/OT, SCD's, strict bedrest.   PCP: Leeann Rosario MD  Readmission Risk Score: 17.1 ( )%    Patient Goals/Plan/Treatment Preferences: Met with Nellie Salamanca and her daughter, Jose Manuel Jang present at bedside. Nellie Salamanca lives with Jose Manuel Jang whom works outside of the home. Jose Manuel Jang is at home by herself at times. Jose Manuel Jang verifies her insurance and PCP. They are able to afford her medications and have the DME needed at home. Nellie Salamanca was receiving services through [facility] for her lymphedema. She had opened wounds that have healed and they no longer come out. Zacariasna [place] states at this time she feels her mother is going to need placement in a SNF. They are waiting to hear from Neurosurgery on the plan. Nellie Salamanca has four children that all live locally. Jose Manuel Jang has access to My Chart. She is aware of the MRI results and again, waiting to hear from Neurosurgery on their recommendations. Transportation/Food Security/Housekeeping Addressed:  No issues identified.

## 2022-01-06 NOTE — PROGRESS NOTES
Physician Progress Note      PATIENT:               Tayler Solis  CSN #:                  [id number]  :                       1940  ADMIT DATE:       2022 7:18 PM  [address] [place] DATE:  RESPONDING  PROVIDER #:        YANNA NICOLAS MD          QUERY TEXT:    Pt admitted with intracranial mass. H&P states, \"Encephalopathy: Likely   secondary to findings of intracranial mass. \"  Pt noted to have confusion,   lethargy, seeing people that are not there, and confused about the ages of her   grandchildren. If possible, please respond below and document in the   progress notes and discharge summary further specificity regarding the type of   encephalopathy:    The medical record reflects the following:  Risk Factors: intracranial mass, adenocarcinoma of the ethmoid sinus, DM,   advanced age  Clinical Indicators: H&P states, \"Encephalopathy: Likely secondary to findings   of intracranial mass. \"  Pt noted to have confusion, lethargy, seeing people   that are not there, and confused about the ages of her grandchildren  Treatment: increased nursing monitoring, fall precautions, bed alarm, labs,   imaging, Neurosurgery consult    Thank you! Peyton Tillman, RN, BSN, RHIT, CCDS  RN Clinical   [phone]  Options provided:  -- Metabolic encephalopathy  -- Toxic encephalopathy  -- Toxic metabolic encephalopathy  -- Other - I will add my own diagnosis  -- Disagree - Not applicable / Not valid  -- Disagree - Clinically unable to determine / Unknown  -- Refer to Clinical Documentation Reviewer    PROVIDER RESPONSE TEXT:    This patient has metabolic encephalopathy. Query created by: Gertrudis Shaw on 2022 11:30 AM      QUERY TEXT:    Pt admitted with intracranial mass. MRI and CT head noted extensive edema. If possible, please respond below and document in the progress notes and   discharge summary if you are evaluating/treating any of the following:     The medical record reflects the following:  Risk Factors: intracranial mass, advanced age  Clinical Indicators: MRI: There is extensive edema adjacent to the lesion   causing mass effect on the frontal horns of the ventricles and local leftward   midline shift at the level of the falx; encephalopathy, falls  Treatment: Neurosurgery consult, labs, imaging    Thank you! Hugh Roman, RN, BSN, RHIT, CCDS  RN Clinical   [phone]  Options provided:  -- Cerebral edema  -- Brain compression  -- Cerebral edema and Brain compression  -- Other - I will add my own diagnosis  -- Disagree - Not applicable / Not valid  -- Disagree - Clinically unable to determine / Unknown  -- Refer to Clinical Documentation Reviewer    PROVIDER RESPONSE TEXT:    This patient has cerebral edema and brain compression.     Query created by: Katie Martinez on 1/6/2022 11:33 AM      Electronically signed by:  Freda Gallardo MD 1/6/2022 12:55 PM

## 2022-01-06 NOTE — PLAN OF CARE
Problem: Infection:  Goal: Will remain free from infection  Description: Will remain free from infection  Outcome: Ongoing     Problem: Safety:  Goal: Free from accidental physical injury  Description: Free from accidental physical injury  Outcome: Ongoing  Goal: Free from intentional harm  Description: Free from intentional harm  Outcome: Ongoing     Problem: Daily Care:  Goal: Daily care needs are met  Description: Daily care needs are met  Outcome: Ongoing     Problem: Falls - Risk of:  Goal: Will remain free from falls  Description: Will remain free from falls  Outcome: Ongoing  Goal: Absence of physical injury  Description: Absence of physical injury  Outcome: Ongoing

## 2022-01-06 NOTE — H&P
History & Physical        Patient: Parker Patel  YOB: 1940    MRN: [id number]     Acct: [de-identified]    PCP: Noris Denis MD    Date of Admission: 1/5/2022    Date of Service: Pt seen/examined on 01/05/22  and Admitted to inpatient with expected LOS greater than two midnights due to medical therapy. Chief Complaint: brain mass    History Of Present Illness: Parker Patel is a [de-identified] y.o. female with PMHx of primary adenocarcinoma of the ethmoid sinus and chronic maxillary sinusitis who presented to [facility] with increasing lethargy and confusion since the weekend. Patient is a very poor historian, and is accompanied by her daughter who was able to provide majority history. Patient's daughter noted that patient has been having increasing lethargy and confusion, and this morning noted people at home who were not there. Patient was therefore brought to the hospital for further evaluation. In the ED, patient was afebrile. Patient had no leukocytosis. Urinalysis demonstrated trace leukocyte esterase but negative for nitrites. Bacteria was seen, but there was no urinary WBC. CT of the head was performed, and demonstrated new masslike density in the right frontal lobe exerting mass-effect on adjacent brain parenchyma and resulting in 1.6 cm right to left midline shift. Dr. Krista Vides was consulted by ED physician, and who reviewed the patient's imaging studies. He was not convinced that there was midline shift as described on report. Given patient's above history, differential diagnosis may include infectious cause versus malignancy.   Dr. Krista Vides recommended admission to our service, and obtaining a MRI of the brain with and without contrast.      Past Medical History:          Diagnosis Date    Cancer [facility])     adenocarcinoma of her sinuses    Cataract of both eyes     COVID-19     DDD (degenerative disc disease), lumbar     DM2 (diabetes mellitus, type 2) ([facility]     diagnoses approx 2000    Dysphagia, pharyngoesophageal 09/26/2016    Eczema 03/2018    Fibrocystic breast     H/O ventral hernia repair     Headache 02/09/2017    History of COVID-19 12/2020    Hyperlipidemia     Hypertension     Iron deficiency anemia     LPRD (laryngopharyngeal reflux disease) 09/26/2016    Neuropathy     peripheral    Obesity     Orbital abscess     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  Sinusitis     Velopharyngeal incompetence 09/26/2016       Past Surgical History:          Procedure Laterality Date    ABDOMEN SURGERY      APPENDECTOMY      CARPAL TUNNEL RELEASE Bilateral 2008, 2009    CATARACT REMOVAL  2011    bilat    CHOLECYSTECTOMY  03/1988    COLONOSCOPY  2016    COLONOSCOPY  10/11/2018    COLONOSCOPY POLYPECTOMY SNARE/COLD BIOPSY performed by Asha Baez MD at [address] ESOPHAGUS      ENDOSCOPY, COLON, DIAGNOSTIC      EYE SURGERY Left 01/30/2015    EYE SURGERY      CATARACT REMOVAL- BILATERAL    HEMORRHOID SURGERY  1980s    HERNIA REPAIR      HYSTERECTOMY, TOTAL ABDOMINAL  1980    JOINT REPLACEMENT  bilat 2005/ 2007    knees    OH COLSC FLX WITH DIRECTED SUBMUCOSAL Simone O[address] ANY SBST  10/11/2018    COLONOSCOPY SUBMUCOSAL/BOTOX INJECTION performed by Asha Baez MD at [address]  last 2009    removed a bone (2)--2 times no construction     SINUS SURGERY  02/01/2016    SINUS SURGERY  2016 x 2    SINUS SURGERY  09/18/2017    [facility] - Dr Guerline Jones SINUS SURGERY  08/09/2017 [facility]17/2017 [facility]    TONSILLECTOMY      TOTAL KNEE ARTHROPLASTY  08/2003    Left TKR    VENTRAL HERNIA REPAIR  1992       Medications Prior to Admission:      Prior to Admission medications    Medication Sig Start Date End Date Taking?  Authorizing Provider   zinc sulfate (ZINCATE) 220 (50 Zn) MG capsule Take 50 mg by mouth daily   Yes Historical Provider, MD   potassium chloride (KLOR-CON M) 20 MEQ extended release tablet Take 1 tablet by mouth daily 4/23/21  Yes Parish Dunham MD   metFORMIN (GLUCOPHAGE) 1000 MG tablet TAKE ONE TABLET IN THE EVENING 4/23/21  Yes Parish Dunham MD   SITagliptin (JANUVIA) 100 MG tablet Take 1 tablet by mouth daily 4/23/21  Yes Parish Dunham MD   omeprazole (PRILOSEC) 20 MG delayed release capsule TAKE ONE CAPSULE BY MOUTH ONCE DAILY 4/23/21  Yes Parish Dunham MD   bumetanide (BUMEX) 1 MG tablet Take 1 tablet by mouth 2 times daily 4/23/21  Yes Parish Dunham MD   NIFEdipine (ADALAT CC) 60 MG extended release tablet Take 1 tablet by mouth daily 4/23/21  Yes Parish Dunham MD   pregabalin (LYRICA) 150 MG capsule Take 2 capsules by mouth 2 times daily for 30 days. 4/23/21 1/5/22 Yes Parish Dunham MD   Acetaminophen 500 MG CAPS Take 1,000 mg by mouth every 6 hours as needed for Pain   Yes Historical Provider, MD   Sodium Chloride-Sodium Bicarb (NETI POT SINUS [address] NA) by Nasal route 2 times daily Use baby shampoo in initial rinse, then mupirocin in second rinse.  [facility] wants 3-4 times a day   Yes Historical Provider, MD   ascorbic acid (VITAMIN C) 1000 MG tablet Take 1 tablet by mouth daily 12/30/20  Yes Kateri Aschoff, PA-C   Vitamin D (CHOLECALCIFEROL) 50 MCG (2000 UT) TABS tablet Take 1 tablet by mouth daily 12/30/20  Yes Kateri Aschoff, PA-C   mupirocin (BACTROBAN) 2 % ointment 2 times daily Toothpaste ribbon added to nasal rinse 3/19/20  Yes Historical Provider, MD   ferrous sulfate (IRON 325) 325 (65 Fe) MG tablet Take 325 mg by mouth 2 times daily    Yes Historical Provider, MD   sodium chloride (OCEAN, BABY AYR) 0.65 % nasal spray 1 spray by Nasal route as needed for Congestion    Yes Historical Provider, MD   OXYGEN Inhale 1.5 L into the lungs     Historical Provider, MD   Handicap Placard MIS by Does not apply route Dx:I187.2,M81.0. Duration: 5 years. 21   Mat Kincaid MD   insulin glargine (LANTUS) 100 UNIT/ML injection vial Inject 12 Units into the skin every morning 3/31/21   Radhika Haynes MD   Insulin Pen Needle (PEN NEEDLES) 31G X 6 MM MISC 1 each by Does not apply route daily 3/4/21   Greg Newby MD   Misc. Devices (WALKER) MISC 1 each by Does not apply route daily Requests stand up walker due to heart failure and generalized OA. I50.23. 21   Mat Kincaid MD   Handicap Placard MISC by Does not apply route Dx:I187.2,M81.0. Duration: 5 years. 10/18/19 4/23/21  AMANDA Bearden - CNP       Allergies:  Lisinopril, Sulfamethoxazole-trimethoprim, Morphine, Codeine, Hydrocodone, Percocet [oxycodone-acetaminophen], and Tape [adhesive tape]    Social History:      The patient currently lives with daughter. TOBACCO:   reports that she has never smoked. She has never used smokeless tobacco.  ETOH:   reports no history of alcohol use. DRUG USE HISTORY: Denies. EMPLOYMENT HISTORY: Retired. Family History: Mother: Father  from old age. Father: Father  from MI. Siblings: Patient is 1 of 14 siblings. 2  from cancer. 1 who had breast cancer, another with bone cancer. Children: Patient has 4 children otherwise healthy. REVIEW OF SYSTEMS:   12 points of systems reviewed and otherwise negative except for that which already was noted above. PHYSICAL EXAM:    /64   Pulse 88   Temp 97.4 °F (36.3 °C) (Oral)   Resp 18   Ht 5' (1.524 m)   Wt 211 lb (95.7 kg)   LMP  (LMP Unknown)   SpO2 93%   BMI 41.21 kg/m²     General appearance: Alert and oriented x3, lethargic appearing but no apparent distress, well developed, appears stated age. Eyes:  Pupils equal, no anisocoria. Round, and reactive to light. Conjunctivae/corneas clear. HENT: Head normal in appearance. External nares normal.  Oral mucosa moist without lesions.   Hearing grossly intact. Neck: Supple, with full range of motion. Trachea midline. No gross JVD appreciated. Respiratory:  Normal respiratory effort. Clear to auscultation, bilaterally without rales or wheezes or rhonchi. Cardiovascular: Normal rate, regular rhythm with normal S1/S2 without murmurs. No lower extremity edema. Abdomen: Soft, non-tender, non-distended with normal bowel sounds. Musculoskeletal: There is no joint swelling or tenderness. Normal tone. No abnormal movements. Skin: Warm and dry. No rashes or lesions. Neurologic:  No focal sensory/motor deficits in the upper and lower extremities. Cranial nerves:  grossly non-focal 2-12. Psychiatric: Affect is somewhat flattened, mood is otherwise appropriate, poor insight and though content. Capillary Refill: Brisk,< 3 seconds. Peripheral Pulses: +2 palpable, equal bilaterally. Labs:     Recent Labs     01/05/22 1930   WBC 10.3   HGB 14.6   HCT 44.1        Recent Labs     01/05/22 1930      K 3.3*      CO2 23   BUN 25*   CREATININE 1.2   CALCIUM 10.2     Recent Labs     01/05/22 1930   AST 13   ALT 9*   BILITOT 0.5   ALKPHOS 112     No results for input(s): INR in the last 72 hours. No results for input(s): [place] in the last 72 hours. Urinalysis:      Lab Results   Component Value Date    NITRU NEGATIVE 01/05/2022    WBCUA 10-15 01/05/2022    BACTERIA MANY 01/05/2022    RBCUA 0-2 01/05/2022    BLOODU TRACE 01/05/2022    SPECGRAV 1.014 01/25/2021    GLUCOSEU NEGATIVE 01/05/2022       Intake & Output:  No intake/output data recorded. No intake/output data recorded. CT Head WO Contrast   Final Result   1. New masslike density in the right frontal lobe exerting mass effect on adjacent brain parenchyma and resulting in 1.6 cm right-to-left midline shift. This finding is suspicious for an intraparenchymal lesion. Brain MRI is recommended for further    evaluation.    2. 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 3. Chronic periventricular small vessel ischemic changes and cerebral atrophy. **This report has been created using voice recognition software. It may contain minor errors which are inherent in voice recognition technology. **      Final report electronically signed by Dr. Lashaun Manrique on 1/5/2022 8:30 PM      CT Cervical Spine WO Contrast   Final Result   1. No fracture or spondylolisthesis of the cervical spine. 2. Multilevel degenerative disc disease, neural foraminal narrowing and central canal stenosis as detailed above. Final report electronically signed by Dr. Lashaun Manrique on 1/5/2022 8:35 PM      [address]    (Results Pending)            Assessment And Plan:    1. Intracranial mass: As noted above, ED physician did consult with Dr. Cathryn Green. At this time, he did not recommend steroids. MRI of the brain pending. Will defer further surgical intervention to neurosurgery. We will otherwise continue monitoring patient with neurochecks every 4 hours. 2.  Adenocarcinoma of the ethmoid sinus: Per patient's daughter, patient was diagnosed with ethmoid sinus adenocarcinoma in 2017 and underwent surgery. She received radiation treatment from 2017 until 2018. She had a second ethmoid sinus adenocarcinoma surgery in 2019. [facility] is [facility]. Her ENT oncologist is Dr. Yash Pruett, her radiation oncologist is Dr. Luke Carver, and her neurological oncologist is Dr. Homero Matthews. We will defer outpatient follow-up to [facility].    3.  Encephalopathy: Likely secondary to findings of intracranial mass. We will continue neurochecks every 4 hours. 4.  Generalized weakness: Per daughter, patient has been having increasing weakness, and some falls. Will have patient be seen by physical therapy. 5.  Diabetes mellitus type 2, insulin-dependent: We will cover with insulin sliding scale.     6.  DVT prophylaxis: Gven patient's intracranial mass, we will defer pharmacologic DVT prophylactic agents risk of bleeding. We will order sequential compression devices. Code Status: Full Code      Thank you Jalil Hoang MD for the opportunity to be involved in this patient's care.     Electronically signed by Edward Butler MD on 1/5/2022 at 11:53 PM

## 2022-01-06 NOTE — PROGRESS NOTES
Hospitalist Progress Note      [facility]      Unit/Bed:5-19/019-A    YOB: 1940    MRN: [id number]       Acct: [de-identified]     PCP: Timi Calvin MD    Date of Admission: 1/5/2022    Date/Time of Evaluation:  1/6/2022 at 1:26 PM    Assessment:    1. Right brain lesion with associated cerebral edema -- MRI brain done with and w/o contrast 1/6/22 with \"Prominent 3.3 cm diffusion restricting lesion in the right frontal lobe with prominent thickened rim enhancement with extension into the cribriform plate on the right with apparent contiguity with inflammatory tissue at the cribriform plate/residual ethmoid air cells. There is extensive edema adjacent to the lesion causing mass effect on the frontal horns of the ventricles and local leftward midline shift at the level of the falx. 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 A metastatic neoplastic process from recurrent sinus disease is less likely. Redemonstration of exenteration of the nasal airway, septum and sphenoid sinuses with prominent mucosal inflammation of the residual maxillary sinuses and ethmoid air cells, similar to prior MRI. Large mastoid effusions \" --> awaiting NS eval  -- has had prior debridements at [facility] in past --> follows with ENT Dr. Luis Crockett, NS/neuro-oncologist Dr. Saleem Lopez  2. Cerebral edema related to brain lesion -- ?steroids needed - await NS eval - cont neuro checks  3. Metabolic encephalopathy -- likely due to above - ?infection but afeb, WBC normal -> hold atbx -- u/a abn but Asx -- ??seizure with above -- check EEG. LFT 1/5 WNL thus unlikely hepatic --  await NS recs  4. Hypokalemia -- replace po 1/6  and monitor - holding home bumex and po supplement -- cont replace prn -- Mg 1/6 low 1.5 -> replace and monitor  5. Hypomagnesemia -- 1.5 on 1/6 -> replace per protocol and monitor  6.  IDALIA on CKD stage 2-3 -- POA - creat 1.2 on 1/5 -- improving 0.9 on 1/6 -- prior 0.9 on 9/2021 and was 0.6 in 3/2021 -- hold home bumex  7. Asymptomatic bacteriuria -- afeb, WBC normal, asx thus NO tx at this time  8. sinonasal adenocarcinoma -- Follows at [address] [place] [address] -- ?contrib to #1 -- ?tx to [address] [place] [address]  9. History of radionecrosis of frontal lobe -- follows with [facility]  10. Chronic diastolic CHF -- asx - Echo 3/2021 EF 55%, LV fxn normal, no valve abn -- fluid status stable - no decompensation - holding home bumex   11. Essential HTN -- cont hold home procardia, burmex as BP borderline - cont hold and resume prn  12. Type 2 DM -- holding home glucophage, januvia, cont SSI -- last A1C 9/2021 = 7.2 -- monitor and adjust prn  13. Chronic bilateral leg swelling with lymphedema -- follows with Dr. Kent Mohs at wound clinic for hx of leg wounds  -- holding home bumex -- cont monitor and elevate - wrap prn    14. HLD -- no current tx  15. Hx covid infection 12/2020  16. SWAPNA  17. OA  18. Morbid obesity Body mass index is 41.21 kg/m². 19. Generalized weakness -- PT/OT c/s    Plan 1/6:  -- awaiting CRUZ Vides recs -> brain lesion ? ?abscess vs related to malignancy --> ?need atbx or ID c/s - ?need transfer to [address] [place] [address] as follows with NS, ENT there for this issue -- hold atbx, replace K, advance diet if ok with NS. Check EEG r/o seizure with hallucinations and brain lesion. ?need steroid with cerebral edema - await NS recs. Chief Complaint: lethargy, confusion, hallucinations    Hospital Course: Mich Blanco is a [de-identified] y.o. female with history of primary adenocarcinoma of the ethmoid sinus and chronic maxillary sinusitis followed at [address] [place] [address] with ENT and NS, oncology, DM2, HTN, HLD, chronic diastolic CHF, iron deficiency anemia, dysphagia, SWAPNA who presented to [address] with increasing lethargy and confusion since the weekend. In the ED, patient was afebrile. Patient had no leukocytosis.   Urinalysis demonstrated trace leukocyte esterase but negative for nitrites. Bacteria was seen, but there was no urinary WBC. CT of the head was performed, and demonstrated new masslike density in the right frontal lobe exerting mass-effect on adjacent brain parenchyma and resulting in 1.6 cm right to left midline shift. Dr. Krista Vides was consulted by ED physician, and who reviewed the patient's imaging studies. He was not convinced that there was midline shift as described on report. Given patient's above history, differential diagnosis may include infectious cause versus malignancy. Dr. Krista Vides recommended admission to our service, and obtaining a MRI of the brain with and without contrast.  MRI brain done with and w/o contrast 1/6/22 with \"Prominent 3.3 cm diffusion restricting lesion in the right frontal lobe with prominent thickened rim enhancement with extension into the cribriform plate on the right with apparent contiguity with inflammatory tissue at the cribriform plate/residual   ethmoid air cells. There is extensive edema adjacent to the lesion causing mass effect on the frontal horns of the ventricles and local leftward midline shift at the level of the falx. 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 A metastatic neoplastic process from recurrent sinus disease is less likely. Redemonstration of exenteration of the nasal airway, septum and sphenoid sinuses with prominent mucosal inflammation of the residual maxillary sinuses and ethmoid air cells, similar to prior MRI. Large mastoid effusions \"  --1/6 -> awaiting NS Dr. Krista Vides recs for further plans    Subjective/HPI:   -- 1/6 --> pt c/o right sided headache - per family pt still having visual hallucinations, at times unable to identify family members who she normally could last week. Denies cp, sob. Denies abd pain, n/v.  Denies dysuria. Denies f/c. NPO - wants to eat. On RA. Afebrile.        PMH, SURGICAL HX, FH, SOCIAL HX reviewed and updated as needed. Medications:  Reviewed    Infusion Medications    dextrose      sodium chloride       Scheduled Medications    miconazole   Topical BID    insulin lispro  0-12 Units SubCUTAneous TID WC    insulin lispro  0-6 Units SubCUTAneous Nightly    potassium replacement protocol   Other RX Placeholder    magnesium replacement protocol   Other RX Placeholder    sodium chloride flush  5-40 mL IntraVENous 2 times per day     PRN Meds: glucose, dextrose, glucagon (rDNA), dextrose, sodium chloride flush, sodium chloride, polyethylene glycol, acetaminophen **OR** acetaminophen      Intake/Output Summary (Last 24 hours) at 1/6/2022 1326  Last data filed at 1/6/2022 0414  Gross per 24 hour   Intake 120 ml   Output 350 ml   Net -230 ml       Diet:  Diet NPO    Exam:  /86   Pulse 75   Temp 99.1 °F (37.3 °C) (Oral)   Resp 18   Ht 5' (1.524 m)   Wt 211 lb (95.7 kg)   LMP  (LMP Unknown)   SpO2 94%   BMI 41.21 kg/m²     General appearance: No apparent distress, appears stated age and cooperative. Oriented x  Person, time, \"hospital\", chronically ill appearing, up in chair. HEENT: Pupils equal, round, and reactive to light. Conjunctivae/corneas clear. MMM. Neck: Supple, with full range of motion. Trachea midline. Respiratory:  Normal respiratory effort. Clear to auscultation, bilaterally without Rales/Wheezes/Rhonchi. No respiratory distress or accessory muscle use. Cardiovascular: Regular rate and rhythm with normal S1/S2 without murmurs, rubs or gallops. No JVD. Abdomen: Soft, non-tender, non-distended with normal bowel sounds. No rebound or guarding  Musculoskeletal: large BLE,  No calf tenderness palpation  Skin: Skin color, texture, turgor normal.  No rashes or lesions. Neurologic:  Neurovascularly intact without any focal sensory/motor deficits.  Cranial nerves: II-XII intact, grossly non-focal.  Psychiatric: Alert and oriented, occasional hallucination and forgetful to identify family at bedside which is unusual, poor insight  Capillary Refill: Brisk,< 3 seconds   Peripheral Pulses: +palpable, equal bilaterally       All labs reviewed and interpreted by me:  Labs:   Recent Labs     01/05/22 1930 01/06/22  0548   WBC 10.3 8.4   HGB 14.6 12.4   HCT 44.1 37.6    167     Recent Labs     01/05/22 1930 01/06/22  0548    144   K 3.3* 3.4*    105   CO2 23 24   BUN 25* 22   CREATININE 1.2 0.9   CALCIUM 10.2 9.4     Recent Labs     01/05/22 1930   AST 13   ALT 9*   BILITOT 0.5   ALKPHOS 112     No results for input(s): INR in the last 72 hours. Recent Labs     01/05/22 1930   TROPONINT < 0.010       Urinalysis:      Lab Results   Component Value Date    NITRU NEGATIVE 01/05/2022    WBCUA 10-15 01/05/2022    BACTERIA MANY 01/05/2022    RBCUA 0-2 01/05/2022    BLOODU TRACE 01/05/2022    SPECGRAV 1.014 01/25/2021    GLUCOSEU NEGATIVE 01/05/2022       All radiology images and reports reviewed and interpreted by me:  Radiology:  MRI BRAIN [address]   Final Result       1. Prominent 3.3 cm diffusion restricting lesion in the right frontal lobe with prominent thickened rim enhancement with extension into the cribriform plate on the right with apparent contiguity with inflammatory tissue at the cribriform plate/residual    ethmoid air cells. There is extensive edema adjacent to the lesion causing mass effect on the frontal horns of the ventricles and local leftward midline shift at the level of the falx. 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 A metastatic neoplastic process from recurrent sinus disease is less likely. 2. Redemonstration of exenteration of the nasal airway, septum and sphenoid sinuses with prominent mucosal inflammation of the residual maxillary sinuses and ethmoid air cells, similar to prior MRI. 3. Large mastoid effusions.                **This report has been created using voice recognition software. It may contain minor errors which are inherent in voice recognition technology. **         Final report electronically signed by Dr. Cherie Cook MD on 1/6/2022 9:40 AM      CT Head WO Contrast   Final Result   1. New masslike density in the right frontal lobe exerting mass effect on adjacent brain parenchyma and resulting in 1.6 cm right-to-left midline shift. This finding is suspicious for an intraparenchymal lesion. Brain MRI is recommended for further    evaluation. 2. 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 3. Chronic periventricular small vessel ischemic changes and cerebral atrophy. **This report has been created using voice recognition software. It may contain minor errors which are inherent in voice recognition technology. **      Final report electronically signed by Dr. Chi Lewis on 1/5/2022 8:30 PM      CT Cervical Spine WO Contrast   Final Result   1. No fracture or spondylolisthesis of the cervical spine. 2. Multilevel degenerative disc disease, neural foraminal narrowing and central canal stenosis as detailed above.       Final report electronically signed by Dr. Chi Lewis on 1/5/2022 8:35 PM          Diet: Diet NPO    Anderson: no - external catheter prn    Microbiology:  Urine cx 1/5 (p) -- u/a with many bacteria, trace LE, no nitrite, 10-15 WBC    Tele review last 24 hrs:  Off/none    DVT prophylaxis: [] Lovenox                                 [x] SCDs                                 [] SQ Heparin                                 [] Encourage ambulation           [] Already on Anticoagulation     Disposition:    [x] Home       [] TCU       [] Rehab       [] Psych       [] SNF       [] [place]       [] Other-    Code Status: Full Code    PT/OT Eval Status: consulted      Electronically signed by Terry Jimenez MD on 1/6/2022 at 1:26 PM when pt evaluated - final note filed late

## 2022-01-06 NOTE — PROGRESS NOTES
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 Sarthak Vides. Dr. Sarthak Vides in contact with Dr. Betty Staples team at [address] [place] [address] for further discussion   Family to discuss surgical intervention- Daughter wants patient to be transferred to [address] [place] [address] [address] is a [de-identified] y.o. female, admitted on :1/5/2022  7:18 PM with increasing lethargy and confusion since the weekend. Patient is drowsy and daughter at side was able to give history of patient. Patient has a significant history of adenocarcinoma of the ethmoid sinus and chronic maxillary sinusitis. CT of head was done due to clinical condition and symptoms which demonstrated a new masslike density in the right frontal lobe exerting mass-effect all on the adjacent brain parenchymal and resulting in a 1.6 cm right to left midline shift. Neurosurgery was consulted for further evaluation and recommendations. Dr. Sarthak Vides recommended the admission to hospitalist service with neurosurgery consulting. Dr. Sarthak Vides also recommended obtaining an MRI with and without contrast of the brain. SUBJECTIVE:  Complains of frequent headache, weakness in the right upper extremity- reports right shoulder rotator cuff. Patient and family reports history of bilateral lower extremity lymphedema with skin breakdown    OBJECTIVE: Awake and oriented to person and place, intermittent drowsiness. Fund of knowledge intact. Follows commands with ease.  CN II-XII grossly intact. PERRL, EOMI. +1 reflexes. Normal sensory exam to soft touch throughout. Bilateral lower extremity skin breakdown. Right upper extremity weakness due to shoulder disease. Generalized weakness in remaining extremities. VITALS:    Vitals:    01/06/22 0045 01/06/22 0934 01/06/22 1210 01/06/22 1550   BP: 130/77 (!) 155/75 131/86 (!) 141/75   Pulse: 79 86 75 83   Resp: 18 22 18 16   Temp: 97.7 °F (36.5 °C) 98.3 °F (36.8 °C) 99.1 °F (37.3 °C) 98.1 °F (36.7 °C)   TempSrc: Oral Oral Oral Oral   SpO2: 97% 95% 94% 97%   Weight:       Height:           CBC:   Lab Results   Component Value Date    WBC 8.4 01/06/2022    RBC 4.30 01/06/2022    HGB 12.4 01/06/2022    HCT 37.6 01/06/2022    MCV 87.4 01/06/2022    RDW 16.7 04/23/2018     01/06/2022     BMP:    Lab Results   Component Value Date     01/06/2022    K 3.4 01/06/2022     01/06/2022    CO2 24 01/06/2022    BUN 22 01/06/2022       RADIOLOGY:  Pertinent images have been reviewed. PROCEDURE: MRI BRAIN W WO CONTRAST   INDICATION:brain mass. Possible new brain mass seen on CT. History of sinus cancer. COMPARISON: CT head dated 1/5/2022 and MR brain dated 1/26/2021. TECHNIQUE: Sagittal T1 3-D with axial and coronal reconstructions, axial T1 3-D Estela pre and postcontrast, axial T1 fast postcontrast sequence, axial T2, FLAIR, GRE and DWI sequences of the brain with post processed ADC map. 20 mL ProHance was injected in the left AC. FINDINGS: There is an irregularly-shaped geographic focal area of low T1 and intermediate T2/FLAIR signal centered in the right frontal lobe measuring 3.3 x 3.3 x 2.3 cm in greatest CC, AP and ML dimensions, respectively. The lesion shows diffuse central restricted diffusion and prominent thickened rim enhancement following contrast administration. There is a smaller inferior component which extends to the gyrus rectus and appears contiguous with mucosal inflammation of residual ethmoid air cells.  There is extensive hyperintense T2/FLAIR signal and edema surrounding the lesion with expansion of the gyri and mass effect on the frontal horns of the ventricles, right greater than left with focal leftward midline shift of approximately 8 mm at the level of the anterior falx. Edema involves large portion of the right frontal lobe, January of the corpus callosum and extends into the right insular region, anterior temporal region and right basal ganglia. No other focal areas of restricted diffusion are present. No other focal areas of abnormal parenchymal or meningeal enhancement are identified. Redemonstration of exenteration of the nasal cavity and sphenoid sinuses. The right maxillary sinus is completely opacified. There is moderate mucosal thickening in the left maxillary sinus, similar to prior MRI in 2021. There is mucosal thickening in the residual ethmoid air cells and along the cribriform plate. The major vascular flow voids appear patent. The patient is status post bilateral lens extractions. Orbits are otherwise unremarkable. There are large mastoid effusions, right greater than left. IMPRESSION   1. Prominent 3.3 cm diffusion restricting lesion in the right frontal lobe with prominent thickened rim enhancement with extension into the cribriform plate on the right with apparent contiguity with inflammatory tissue at the cribriform plate/residual ethmoid air cells. There is extensive edema adjacent to the lesion causing mass effect on the frontal horns of the ventricles and local leftward midline shift at the level of the falx. This most likely represents cerebritis with a cerebral abscess likely  extending from sinusitis from the residual ethmoid air cells. A metastatic neoplastic process from recurrent sinus disease is less likely.  2. Redemonstration of exenteration of the nasal airway, septum and sphenoid sinuses with prominent mucosal inflammation of the residual maxillary sinuses and ethmoid air cells, similar to prior MRI. 3. Large mastoid effusions. **This report has been created using voice recognition software. It may contain minor errors which are inherent in voice recognition technology. **     Final report electronically signed by Dr. Sherif Jordan MD on 1/6/2022 9:40 AM       PROBLEM LIST:  Patient Active Problem List   Diagnosis    Hypercholesterolemia    Osteoarthritis    Osteoporosis    Type 2 diabetes mellitus without complication, without long-term current use of insulin ([address]    DDD (degenerative disc disease), lumbar    Benign essential HTN    SWAPNA on CPAP    Diabetic peripheral neuropathy ([address]    Primary thunderclap headache    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HCC)    Skin plaque    Bilateral lower leg cellulitis    CKD (chronic kidney disease) stage 3, GFR 30-59 ml/min ([facility])    Iron deficiency anemia    Acute eczema    Venous insufficiency of both lower extremities    Lymphedema of both lower extremities    Eczematous dermatitis    Dystrophic nail    Angio-edema    Osteoradionecrosis of skull/sinus    Late effect of radiation    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HCC)    Cataract of both eyes    History of ventral hernia repair    Other cervical disc degeneration, mid-cervical region    Spondylolisthesis of cervical region    Spondylolisthesis of thoracic region    Chronic rhinitis    Closed fracture of head of humerus    Dysphagia    Laryngopharyngeal reflux    Malignant neoplasm of ethmoidal sinus (HCC)    Obesity, Class II, BMI 35-39.9    COVID-19    Brain mass    Folliculitis    Pneumonia due to organism    Acute on chronic diastolic congestive heart failure (HCC)    Acute respiratory failure with hypoxia (HCC)    Chronic ethmoidal sinusitis    Chronic frontal sinusitis    Chronic maxillary sinusitis    Chronic sphenoidal sinusitis       MEDICATIONS:   Prior to Admission medications    Medication Sig Start Date End Date Taking? Placard MISC by Does not apply route Dx:I187.2,M81.0. Duration: 5 years. 4/23/21   Gregorio Mueller MD   insulin glargine (LANTUS) 100 UNIT/ML injection vial Inject 12 Units into the skin every morning 3/31/21   Geovanny Orozco MD   Insulin Pen Needle (PEN NEEDLES) 31G X 6 MM MISC 1 each by Does not apply route daily 3/4/21   Mariana Tuttle MD   Misc. Devices (WALKER) MISC 1 each by Does not apply route daily Requests stand up walker due to heart failure and generalized OA. I50.23. 1/5/21   Gregorio Mueller MD   Handicap Placard MISC by Does not apply route Dx:I187.2,M81.0. Duration: 5 years.  10/18/19 4/23/21  AMANDA Ferrera - CNP       Current Facility-Administered Medications   Medication Dose Route Frequency Provider Last Rate Last Admin    miconazole (MICOTIN) 2 % powder   Topical BID Shaun Rhein, DO   Given at 01/06/22 0458    insulin lispro (HUMALOG) injection vial 0-12 Units  0-12 Units SubCUTAneous TID WC Shira Tavarez MD        insulin lispro (HUMALOG) injection vial 0-6 Units  0-6 Units SubCUTAneous Nightly Shira Tavarez MD        glucose (GLUTOSE) 40 % oral gel 15 g  15 g Oral PRN Shira Tavarez MD        dextrose 50 % IV solution  12.5 g IntraVENous PRN Shira Tavarez MD        glucagon (rDNA) injection 1 mg  1 mg IntraMUSCular PRN Shira Tavarez MD        dextrose 5 % solution  100 mL/hr IntraVENous PRN Shira Tavarez MD        potassium chloride (KLOR-CON M) extended release tablet 40 mEq  40 mEq Oral PRN Idalia Chirinos MD        Or    potassium bicarb-citric acid (EFFER-K) effervescent tablet 40 mEq  40 mEq Oral PRN Idalia Chirinos MD        Or    potassium chloride 10 mEq/100 mL IVPB (Peripheral Line)  10 mEq IntraVENous PRN Idalia Chirinos MD        magnesium sulfate 2000 mg in dextrose 50 mL IVPB  2,000 mg IntraVENous PRN Idalia Chirinos MD        sodium chloride flush 0.9 % injection 5-40 mL  5-40 mL IntraVENous 2 times per day Shira Tavarez MD   10 mL at 01/06/22 0937    sodium chloride flush 0.9 % injection 5-40 mL  5-40 mL IntraVENous PRN Vivi Ballesteros MD        0.9 % sodium chloride infusion  25 mL IntraVENous PRN Vivi Ballesteros MD        polyethylene glycol [facility]) packet 17 g  17 g Oral Daily PRN Vivi Ballesteros MD        acetaminophen (TYLENOL) tablet 650 mg  650 mg Oral Q6H PRN Vivi Ballesteros MD   650 mg at 01/06/22 1753    Or    acetaminophen (TYLENOL) suppository 650 mg  650 mg Rectal Q6H PRN Vivi Ballesteros MD            glucose, 15 g, PRN  dextrose, 12.5 g, PRN  glucagon (rDNA), 1 mg, PRN  dextrose, 100 mL/hr, PRN  potassium chloride, 40 mEq, PRN   Or  potassium alternative oral replacement, 40 mEq, PRN   Or  potassium chloride, 10 mEq, PRN  magnesium sulfate, 2,000 mg, PRN  sodium chloride flush, 5-40 mL, PRN  sodium chloride, 25 mL, PRN  polyethylene glycol, 17 g, Daily PRN  acetaminophen, 650 mg, Q6H PRN   Or  acetaminophen, 650 mg, Q6H PRN         dextrose      sodium chloride            AMANDA Turcios-CNP   Electronically signed 1/6/2022 at  6:19 PM    I have seen and examined this patient on 1/06/2022. I have reviewed pertinent images. I have fully participated in the patient's care including review of all pertinent clinical information. Agree with the above assessment and plan. Discussed with Dr Maribel Bustos and she will make the arrangements to transfer to a tertiary center per the daughter's request.   Will sign off. Re-contact Neurosurgery as needed in future.         Linda Inman MD, MD  Electronically signed 1/7/2022 at 7:47 AM

## 2022-01-06 NOTE — ED NOTES
Pt resting comfortably in bed daughter at bedside. Reviewed home medications with daughter and completed MRI checklist. Pt okay to have water per dr. Halie Myrick. Given water. Denies further needs at this time.  Will continue to monitor      Jennifer Van RN  01/05/22 8404

## 2022-01-06 NOTE — ED PROVIDER NOTES
[facility] [address] [address] EMERGENCY DEPT    EMERGENCY MEDICINE     Pt Name: Caterina Cuevas  MRN: [id number]  Armscyndigfjune 1940  Date of evaluation: 1/5/2022  Provider: Herbie Self MD,     [address] [place] [address]       Chief Complaint   Patient presents with   Yarely    Caterina Cuevas is a pleasant [de-identified] y.o. female who presents to the emergency department from home for evaluation of a slip and fall. Patient was in the shower and she said that she slid down. She denies hitting the back of her head though she complains of headache and neck pain. Family states that she has been intermittently confused over the past week. It has been waxing and waning. They deny any recent fever. 1 daughter states that she has been confused about the ages of her grandchildren. They deny any recent cough or congestion. Patient denies any chest pain, difficulty breathing, or abdominal pain. Triage notes and Nursing notes were reviewed by myself. Any discrepancies are addressed above. 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 [address]     diagnoses approx 2000    Dysphagia, pharyngoesophageal 09/26/2016    Eczema 03/2018    Fibrocystic breast     H/O ventral hernia repair     Headache 02/09/2017    History of COVID-19 12/2020    Hyperlipidemia     Hypertension     Iron deficiency anemia     LPRD (laryngopharyngeal reflux disease) 09/26/2016    Neuropathy     peripheral    Obesity     Orbital abscess     Orbital cellulitis 01/22/2014    SWAPNA (obstructive sleep apnea)     Osteoarthritis     Osteoporosis     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       Sinusitis     Velopharyngeal incompetence 09/26/2016       SURGICAL HISTORY       Past Surgical History:   Procedure Laterality Date    ABDOMEN SURGERY      APPENDECTOMY      CARPAL TUNNEL RELEASE Bilateral 2008, 2009    CATARACT REMOVAL  2011    bilat    CHOLECYSTECTOMY  03/1988    COLONOSCOPY  2016    COLONOSCOPY  10/11/2018    COLONOSCOPY POLYPECTOMY SNARE/COLD BIOPSY performed by Luther Muñoz MD at [address] ESOPHAGUS      ENDOSCOPY, COLON, DIAGNOSTIC      EYE SURGERY Left 01/30/2015    EYE SURGERY      CATARACT REMOVAL- BILATERAL    HEMORRHOID SURGERY  1980s    HERNIA REPAIR      HYSTERECTOMY, TOTAL ABDOMINAL  1980    JOINT REPLACEMENT  bilat 2005/ 2007    knees    TN COLSC FLX WITH DIRECTED SUBMUCOSAL NJX ANY SBST  10/11/2018    COLONOSCOPY SUBMUCOSAL/BOTOX INJECTION performed by Luther Muñoz MD at [facility]   [address]  last 2009    removed a bone (2)--2 times no construction     SINUS SURGERY  02/01/2016    SINUS SURGERY  2016 x 2    SINUS SURGERY  09/18/2017    [facility] - Dr Samira Connors SINUS SURGERY  08/09/2017 [facility]17[facility]2017 [facility]    TONSILLECTOMY      TOTAL KNEE ARTHROPLASTY  08/2003    Left TKR    VENTRAL HERNIA REPAIR  1992       CURRENT MEDICATIONS       Previous Medications    ACETAMINOPHEN 500 MG CAPS    Take 1,000 mg by mouth every 6 hours as needed for Pain    ASCORBIC ACID (VITAMIN C) 1000 MG TABLET    Take 1 tablet by mouth daily    BUMETANIDE (BUMEX) 1 MG TABLET    Take 1 tablet by mouth 2 times daily    FERROUS SULFATE (IRON 325) 325 (65 FE) MG TABLET    Take 325 mg by mouth 2 times daily     HANDICAP PLACARD MISC    by Does not apply route Dx:I187.2,M81.0. Duration: 5 years. INSULIN GLARGINE (LANTUS) 100 UNIT/ML INJECTION VIAL    Inject 12 Units into the skin every morning    INSULIN PEN NEEDLE (PEN NEEDLES) 31G X 6 MM MISC    1 each by Does not apply route daily    METFORMIN (GLUCOPHAGE) 1000 MG TABLET    TAKE ONE TABLET IN THE EVENING    MISC.  DEVICES (WALKER) MISC    1 each by Does not apply route daily Requests stand up walker due to heart failure and generalized OA. I50.23. MUPIROCIN (BACTROBAN) 2 % OINTMENT    2 times daily Toothpaste ribbon added to nasal rinse    NIFEDIPINE (ADALAT CC) 60 MG EXTENDED RELEASE TABLET    Take 1 tablet by mouth daily    OMEPRAZOLE (PRILOSEC) 20 MG DELAYED RELEASE CAPSULE    TAKE ONE CAPSULE BY MOUTH ONCE DAILY    OXYGEN    Inhale 1.5 L into the lungs     POTASSIUM CHLORIDE (KLOR-CON M) 20 MEQ EXTENDED RELEASE TABLET    Take 1 tablet by mouth daily    PREGABALIN (LYRICA) 150 MG CAPSULE    Take 2 capsules by mouth 2 times daily for 30 days. SITAGLIPTIN (JANUVIA) 100 MG TABLET    Take 1 tablet by mouth daily    SODIUM CHLORIDE (OCEAN, BABY AYR) 0.65 % NASAL SPRAY    1 spray by Nasal route as needed for Congestion     SODIUM CHLORIDE-SODIUM BICARB (NETI POT SINUS WASH NA)    by Nasal route 2 times daily Use baby shampoo in initial rinse, then mupirocin in second rinse.  [employer] wants 3-4 times a day    VITAMIN D (CHOLECALCIFEROL) 50 MCG (2000 UT) TABS TABLET    Take 1 tablet by mouth daily       ALLERGIES     Lisinopril, Sulfamethoxazole-trimethoprim, Morphine, Codeine, Hydrocodone, Percocet [oxycodone-acetaminophen], and Tape [adhesive tape]    FAMILY HISTORY       Family History   Problem Relation Age of Onset    Diabetes Mother     Heart Disease Father         whooping cough as child    Obesity Sister     Other Brother         tumor on back    Obesity Sister     Cancer Sister         Skin     Cancer Brother         Bone cancer from metal    Other Brother         passed as a child    Heart Disease Brother     Other Brother         tremor    Heart Attack Brother     No Known Problems Brother     Heart Disease Brother     No Known Problems Brother     No Known Problems Brother         SOCIAL HISTORY       Social History     Socioeconomic History    Marital status:      Spouse name: Not on file    Number of children: Not on file    Years of education: Not on file   Martin Highest education level: Not on file   Occupational History    Not on file   Tobacco Use    Smoking status: Never Smoker    Smokeless tobacco: Never Used   Vaping Use    Vaping Use: Never used   Substance and Sexual Activity    Alcohol use: No    Drug use: No    Sexual activity: Not Currently   Other Topics Concern    Not on file   Social History Narrative    Not on file     Social Determinants of Health     Financial Resource Strain: Low Risk     Difficulty of Paying Living Expenses: Not very hard   Food Insecurity: No Food Insecurity    Worried About Running Out of Food in the Last Year: Never true    Kiah of Food in the Last Year: Never true   Transportation Needs:     Lack of Transportation (Medical): Not on file    Lack of Transportation (Non-Medical): Not on file   Physical Activity:     Days of Exercise per Week: Not on file    Minutes of Exercise per Session: Not on file   Stress:     Feeling of Stress : Not on file   Social Connections:     Frequency of Communication with Friends and Family: Not on file    Frequency of Social Gatherings with Friends and Family: Not on file    Attends Spiritism Services: Not on file    Active Member of [address] [place] [address] or Organizations: Not on file    Attends Club or Organization Meetings: Not on file    Marital Status: Not on file   Intimate Partner Violence:     Fear of Current or Ex-Partner: Not on file    Emotionally Abused: Not on file    Physically Abused: Not on file    Sexually Abused: Not on file   Housing Stability:     Unable to Pay for Housing in the Last Year: Not on file    Number of [place] in the Last Year: Not on file    Unstable Housing in the Last Year: Not on file       REVIEW OF SYSTEMS     Review of Systems   Constitutional: Negative for fatigue and fever. HENT: Negative for congestion and trouble swallowing. Eyes: Negative for redness. Respiratory: Negative for cough and shortness of breath.     Cardiovascular: Negative for chest pain. Gastrointestinal: Negative for abdominal pain, nausea and vomiting. Genitourinary: Negative for dysuria. Musculoskeletal: Positive for neck pain. Negative for back pain. Skin: Negative for rash. Allergic/Immunologic: Negative for immunocompromised state. Neurological: Positive for headaches. Negative for light-headedness. Hematological: Does not bruise/bleed easily. Psychiatric/Behavioral: Positive for confusion. Except as noted above the remainder of the review of systems was reviewed and is. PHYSICAL EXAM    (up to 7 for level 4, 8 or more for level 5)     ED Triage Vitals [01/05/22 1927]   BP Temp Temp Source Pulse Resp SpO2 Height Weight   133/76 97.4 °F (36.3 °C) Oral 94 18 95 % 5' (1.524 m) 211 lb (95.7 kg)       Physical Exam  Vitals and nursing note reviewed. Exam conducted with a chaperone present. Constitutional:       General: She is not in acute distress. Appearance: She is normal weight. She is not ill-appearing. HENT:      Head: Normocephalic and atraumatic. Nose: Nose normal. No congestion. Mouth/Throat:      Mouth: Mucous membranes are moist.      Pharynx: Oropharynx is clear. No oropharyngeal exudate or posterior oropharyngeal erythema. Eyes:      Extraocular Movements: Extraocular movements intact. Pupils: Pupils are equal, round, and reactive to light. Neck:      Vascular: No JVD. Trachea: Trachea normal. No tracheal deviation. Cardiovascular:      Rate and Rhythm: Normal rate and regular rhythm. Pulses: Normal pulses. Heart sounds: Normal heart sounds. No murmur heard. No friction rub. Pulmonary:      Effort: Pulmonary effort is normal. No respiratory distress. Breath sounds: Normal breath sounds. No wheezing or rales. Abdominal:      General: Abdomen is flat. There is no distension. Palpations: Abdomen is soft. Tenderness: There is no abdominal tenderness. There is no guarding or rebound. Musculoskeletal:         General: Normal range of motion. Right shoulder: Deformity present. No bony tenderness. Cervical back: Neck supple. Spinous process tenderness present. No muscular tenderness. Right lower leg: No edema. Left lower leg: No edema. Comments: Old right shoulder deformity   Skin:     General: Skin is warm and dry. Capillary Refill: Capillary refill takes less than 2 seconds. Neurological:      General: No focal deficit present. Mental Status: She is alert and oriented to person, place, and time. She is confused. GCS: GCS eye subscore is 4. GCS verbal subscore is 5. GCS motor subscore is 6. Cranial Nerves: No dysarthria or facial asymmetry. Motor: Tremor present. No weakness. Psychiatric:         Attention and Perception: Attention normal.         Mood and Affect: Mood normal.         Speech: Speech normal.         Behavior: Behavior normal. Behavior is cooperative. DIAGNOSTIC RESULTS     EKG:(none if blank)  All EKG's are interpreted by the[facility] Physician who either signs or Co-signs this chart in the absence of a cardiologist.        RADIOLOGY: (none if blank)   Interpretation per the Radiologistbelow, if available at the time of this note:    CT Head WO Contrast   Final Result   1. New masslike density in the right frontal lobe exerting mass effect on adjacent brain parenchyma and resulting in 1.6 cm right-to-left midline shift. This finding is suspicious for an intraparenchymal lesion. Brain MRI is recommended for further    evaluation. 2. 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 3. Chronic periventricular small vessel ischemic changes and cerebral atrophy. **This report has been created using voice recognition software. It may contain minor errors which are inherent in voice recognition technology. **      Final report electronically signed by Dr. Burt Boards on 1/5/2022 8:30 PM      CT Cervical Spine WO Contrast   Final Result   1. No fracture or spondylolisthesis of the cervical spine. 2. Multilevel degenerative disc disease, neural foraminal narrowing and central canal stenosis as detailed above. Final report electronically signed by Dr. Casandra Medrano on 1/5/2022 8:35 PM          LABS:  Labs Reviewed   CBC WITH AUTO DIFFERENTIAL - Abnormal; Notable for the following components:       Result Value    RDW-CV 15.4 (*)     RDW-SD 48.7 (*)     All other components within normal limits   COMPREHENSIVE METABOLIC PANEL W/ REFLEX TO MG FOR LOW K - Abnormal; Notable for the following components:    Glucose 219 (*)     BUN 25 (*)     Potassium reflex Magnesium 3.3 (*)     Total Protein 8.4 (*)     ALT 9 (*)     All other components within normal limits   ANION GAP - Abnormal; Notable for the following components:    Anion Gap 17.0 (*)     All other components within normal limits   GLOMERULAR FILTRATION RATE, ESTIMATED - Abnormal; Notable for the following components:    Est, Glom Filt Rate 43 (*)     All other components within normal limits   POCT GLUCOSE - Abnormal; Notable for the following components:    POC Glucose 211 (*)     All other components within normal limits   TROPONIN   MAGNESIUM   OSMOLALITY   URINE RT REFLEX TO CULTURE       All other labs were within normal range or not returned as of this dictation. Please note, any cultures that may have been sent were not resulted at the time of this patient visit. EMERGENCY DEPARTMENT COURSE andMedical Decision Making:     MDM  Number of Diagnoses or Management Options  Brain mass  Diagnosis management comments: 23-year-old female presents emergency room after a slip and fall in the bathroom. Differential to include intracranial hemorrhage, skull fracture, neck fracture, concussion, infection, electrolyte abnormality, ACS.   Will evaluate with CBC, CMP, UA, troponin, CT head, CT neck.  /  ED Course as of 01/05/22 2120 Wed Jan 05, 2022 2112 CT scan shows new mass with surrounding edema. Dr. Krista Vides, neurosurgeon, happened to be in the emergency room for another patient and I asked him to review the images and consult on the patient. He is recommending an MRI of the brain with and without contrast.  He will agree to see the patient in consult. He is recommending a medical admission. I have spoken with Dr. Xiang Curtis and he is agreeable for admission. Dr. Krista Vides did not want any Decadron at this time as it interferes with a brain biopsy. [DD]      ED Course User Index  [DD] Shailehs Ho MD         The patient was evaluated during the global COVID-19 pandemic, and that diagnosis was considered upon their initial presentation. Their evaluation, treatment and testing was consistent with current guidelines for patients who present with complaints or symptoms that may be related to COVID-19.     Strict returnprecautions and follow up instructions were discussed with the patient with which the patient agrees        ED Medications administered this visit:  Medications - No data to display      Critical Care  Performed by: Shailesh Ho MD  Authorized by: Shailesh Ho MD     Critical care provider statement:     Critical care time (minutes):  30    Critical care time was exclusive of:  Separately billable procedures and treating other patients and teaching time    Critical care was necessary to treat or prevent imminent or life-threatening deterioration of the following conditions:  CNS failure or compromise    Critical care was time spent personally by me on the following activities:  Blood draw for specimens, development of treatment plan with patient or surrogate, discussions with consultants, examination of patient, obtaining history from patient or surrogate, ordering and performing treatments and interventions, ordering and review of laboratory studies, ordering and review of radiographic studies, pulse oximetry, re-evaluation of patient's condition and review of old charts    : (None if blank)       CLINICAL       1. Brain mass          DISPOSITION/PLAN   DISPOSITION Decision To Admit 01/05/2022 09:08:12 PM      PATIENT REFERRED TO:  No follow-up provider specified.     DISCHARGE MEDICATIONS:  New Prescriptions    No medications on file              (Please note that portions of this note were completed with a voice recognition program.  Efforts were made to edit the dictations but occasionallywords are mis-transcribed.)      Electronically signed by Luis Alfredo Souza MD on 1/5/22 at 9:11 PM EST    Attending Physician, Emergency Department       Olivia Schulz MD  01/05/22 [id number]

## 2022-01-06 NOTE — ED TRIAGE NOTES
Per daughter states slid in bathtub at unknown time but was before 3pm. Pt states head and neck pain, unable to place ccollar d/t anatomy. Placed towel blocks on either side of neck. Per daughter thinks patient is slightly altered. States is intermittent. Also has had diarrhea x2 after falling.

## 2022-01-06 NOTE — PROGRESS NOTES
Patient arrived at this time to this unit via stretcher with daughter at bedside. Patient oriented to room, call light and staff. Patient VSS and skin assessment X2 nurses (Unknown Stager) was completed. (See Flowsheet) Patient voices no c/o increased pain or discomfort at this time. Patient offered snacks and fluids encouraged per diet recommendation. Patient voiced no other concerns to writer during this time. Patient bed is in lowest position, call light in reach and room is adequately lit. Writer will continue to monitor.     Aleida Hughes RN

## 2022-01-06 NOTE — ED NOTES
ED to inpatient nurses report    Chief Complaint   Patient presents with    Fall      Present to ED from home  LOC: alert and orientated to name, place, date  Vital signs   Vitals:    01/05/22 2316 01/05/22 2322 01/05/22 2330 01/05/22 2332   BP: 114/69  100/64    Pulse:    88   Resp:       Temp:       TempSrc:       SpO2:  93%     Weight:       Height:          Oxygen Baseline RA    Current needs required RA Bipap/Cpap No  LDAs:   Peripheral IV 01/05/22 Left Antecubital (Active)   Site Assessment Clean;Dry; Intact 01/05/22 1931   Line Status Blood return noted;Brisk blood return;Specimen collected 01/05/22 1931   Dressing Status Dry;Clean; Intact 01/05/22 1931   Dressing Intervention New 01/05/22 1931     Mobility: Requires assistance * 1  Pending ED orders: NA  Present condition: Stable  Person of contract, phone number   Our promise was given to patient    Electronically signed by Claudine Call RN on 1/5/2022 at 11:41 PM       Claudine Call RN  01/05/22 2751

## 2022-01-07 LAB
ANION GAP SERPL CALCULATED.3IONS-SCNC: 15 MEQ/L (ref 8–16)
BUN BLDV-MCNC: 23 MG/DL (ref 7–22)
CALCIUM SERPL-MCNC: 9.1 MG/DL (ref 8.5–10.5)
CHLORIDE BLD-SCNC: 105 MEQ/L (ref 98–111)
CO2: 22 MEQ/L (ref 23–33)
CREAT SERPL-MCNC: 1.1 MG/DL (ref 0.4–1.2)
GFR SERPL CREATININE-BSD FRML MDRD: 48 ML/MIN/1.73M2
GLUCOSE BLD-MCNC: 156 MG/DL (ref 70–108)
GLUCOSE BLD-MCNC: 165 MG/DL (ref 70–108)
GLUCOSE BLD-MCNC: 169 MG/DL (ref 70–108)
GLUCOSE BLD-MCNC: 172 MG/DL (ref 70–108)
GLUCOSE BLD-MCNC: 186 MG/DL (ref 70–108)
MAGNESIUM: 1.6 MG/DL (ref 1.6–2.4)
ORGANISM: ABNORMAL
POTASSIUM SERPL-SCNC: 3.3 MEQ/L (ref 3.5–5.2)
SODIUM BLD-SCNC: 142 MEQ/L (ref 135–145)
URINE CULTURE REFLEX: ABNORMAL

## 2022-01-07 PROCEDURE — 83735 ASSAY OF MAGNESIUM: CPT

## 2022-01-07 PROCEDURE — 95816 EEG AWAKE AND DROWSY: CPT

## 2022-01-07 PROCEDURE — 6370000000 HC RX 637 (ALT 250 FOR IP): Performed by: FAMILY MEDICINE

## 2022-01-07 PROCEDURE — 82948 REAGENT STRIP/BLOOD GLUCOSE: CPT

## 2022-01-07 PROCEDURE — 36415 COLL VENOUS BLD VENIPUNCTURE: CPT

## 2022-01-07 PROCEDURE — 92610 EVALUATE SWALLOWING FUNCTION: CPT

## 2022-01-07 PROCEDURE — 97535 SELF CARE MNGMENT TRAINING: CPT

## 2022-01-07 PROCEDURE — 97162 PT EVAL MOD COMPLEX 30 MIN: CPT

## 2022-01-07 PROCEDURE — 1200000000 HC SEMI PRIVATE

## 2022-01-07 PROCEDURE — 97530 THERAPEUTIC ACTIVITIES: CPT

## 2022-01-07 PROCEDURE — 99233 SBSQ HOSP IP/OBS HIGH 50: CPT | Performed by: FAMILY MEDICINE

## 2022-01-07 PROCEDURE — 2580000003 HC RX 258: Performed by: HOSPITALIST

## 2022-01-07 PROCEDURE — 97167 OT EVAL HIGH COMPLEX 60 MIN: CPT

## 2022-01-07 PROCEDURE — 92612 ENDOSCOPY SWALLOW (FEES) VID: CPT

## 2022-01-07 PROCEDURE — 6370000000 HC RX 637 (ALT 250 FOR IP): Performed by: HOSPITALIST

## 2022-01-07 PROCEDURE — 80048 BASIC METABOLIC PNL TOTAL CA: CPT

## 2022-01-07 RX ORDER — NIFEDIPINE 60 MG/1
60 TABLET, EXTENDED RELEASE ORAL DAILY
Status: DISCONTINUED | OUTPATIENT
Start: 2022-01-07 | End: 2022-01-08 | Stop reason: HOSPADM

## 2022-01-07 RX ADMIN — INSULIN LISPRO 1 UNITS: 100 INJECTION, SOLUTION INTRAVENOUS; SUBCUTANEOUS at 20:49

## 2022-01-07 RX ADMIN — ACETAMINOPHEN 650 MG: 325 TABLET ORAL at 00:13

## 2022-01-07 RX ADMIN — INSULIN LISPRO 1 UNITS: 100 INJECTION, SOLUTION INTRAVENOUS; SUBCUTANEOUS at 09:23

## 2022-01-07 RX ADMIN — SODIUM CHLORIDE, PRESERVATIVE FREE 10 ML: 5 INJECTION INTRAVENOUS at 09:30

## 2022-01-07 RX ADMIN — INSULIN LISPRO 2 UNITS: 100 INJECTION, SOLUTION INTRAVENOUS; SUBCUTANEOUS at 16:53

## 2022-01-07 RX ADMIN — ACETAMINOPHEN 650 MG: 325 TABLET ORAL at 08:30

## 2022-01-07 RX ADMIN — POTASSIUM CHLORIDE 40 MEQ: 1500 TABLET, EXTENDED RELEASE ORAL at 16:56

## 2022-01-07 RX ADMIN — MICONAZOLE NITRATE: 20 POWDER TOPICAL at 20:49

## 2022-01-07 RX ADMIN — MICONAZOLE NITRATE: 20 POWDER TOPICAL at 09:25

## 2022-01-07 RX ADMIN — NIFEDIPINE 60 MG: 60 TABLET, EXTENDED RELEASE ORAL at 14:04

## 2022-01-07 RX ADMIN — SODIUM CHLORIDE, PRESERVATIVE FREE 10 ML: 5 INJECTION INTRAVENOUS at 20:49

## 2022-01-07 RX ADMIN — ACETAMINOPHEN 650 MG: 325 TABLET ORAL at 20:49

## 2022-01-07 RX ADMIN — ACETAMINOPHEN 650 MG: 325 TABLET ORAL at 14:04

## 2022-01-07 ASSESSMENT — PAIN SCALES - GENERAL
PAINLEVEL_OUTOF10: 2
PAINLEVEL_OUTOF10: 0
PAINLEVEL_OUTOF10: 3
PAINLEVEL_OUTOF10: 0
PAINLEVEL_OUTOF10: 3
PAINLEVEL_OUTOF10: 3
PAINLEVEL_OUTOF10: 4
PAINLEVEL_OUTOF10: 4
PAINLEVEL_OUTOF10: 0

## 2022-01-07 ASSESSMENT — PAIN DESCRIPTION - PAIN TYPE: TYPE: ACUTE PAIN

## 2022-01-07 ASSESSMENT — PAIN DESCRIPTION - LOCATION: LOCATION: GENERALIZED

## 2022-01-07 NOTE — PROGRESS NOTES
[address] [place] [address]  SPEECH THERAPY  STRZ ONC MED 5K  Clinical Swallow Evaluation      SLP Individual Minutes  Time In: 0930  Time Out: 9829  Minutes: 8  Timed Code Treatment Minutes: 0 Minutes       Date: 2022  Patient Name: Mary Garcia      CSN: [id number]   : 1940  ([de-identified] y.o.)  Gender: female   Referring Physician: Alicia Martinez MD  Diagnosis: Brain Mass   Secondary Diagnosis: Dysphagia     History of Present Illness/Injury: Patient admit to [facility] with above medical dx. Per physician H&P, Mary Garcia is a [de-identified] y.o. female with PMHx of primary adenocarcinoma of the ethmoid sinus and chronic maxillary sinusitis who presented to [address] [place] [address] with increasing lethargy and confusion since the weekend. Patient is a very poor historian, and is accompanied by her daughter who was able to provide majority history. Patient's daughter noted that patient has been having increasing lethargy and confusion, and this morning noted people at home who were not there. Patient was therefore brought to the hospital for further evaluation.       In the ED, patient was afebrile. Patient had no leukocytosis. Urinalysis demonstrated trace leukocyte esterase but negative for nitrites. Bacteria was seen, but there was no urinary WBC.     CT of the head was performed, and demonstrated new masslike density in the right frontal lobe exerting mass-effect on adjacent brain parenchyma and resulting in 1.6 cm right to left midline shift. Dr. Amy Glass was consulted by ED physician, and who reviewed the patient's imaging studies. He was not convinced that there was midline shift as described on report. Given patient's above history, differential diagnosis may include infectious cause versus malignancy. Dr. Amy Glass recommended admission to our service, and obtaining a MRI of the brain with and without contrast. MRI results indicative of:   Impression       1.  Prominent 3.3 cm diffusion restricting lesion in the right frontal lobe with prominent thickened rim enhancement with extension into the cribriform plate on the right with apparent contiguity with inflammatory tissue at the cribriform plate/residual    ethmoid air cells. There is extensive edema adjacent to the lesion causing mass effect on the frontal horns of the ventricles and local leftward midline shift at the level of the falx. 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 A metastatic neoplastic process from recurrent sinus disease is less likely. 2. Redemonstration of exenteration of the nasal airway, septum and sphenoid sinuses with prominent mucosal inflammation of the residual maxillary sinuses and ethmoid air cells, similar to prior MRI. 3. Large mastoid effusions. Palliative care consulted. Family would like patient transfer to [facility] for possible surgical intervention. Patient remains a full code. ST consult for CSE to further assess and determine appropriate dietary recommendations. Past Medical History:   Diagnosis Date    Cancer 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       Sinusitis     Velopharyngeal incompetence 09/26/2016 SUBJECTIVE:  Session approved by RNDawna. Patient seen upright in bed. Alert and cooperative. OBJECTIVE:    Pain:  No pain reported. Current Diet: Regular diet and thin liquids     Respiratory Status:  Independent    Behavioral Observation:  Alert and cooperative    Oral Mechanism Evaluation:      Facial / Labial WFL    Lingual WFL    Dentition Impaired Edentulous with no dentures present    Velum Impaired Deviation of uvula to R    Vocal Quality WFL    Sensation Not Tested    Cough Not Tested      Patient Evaluated Using:  Puree, minced/moist solid, soft/bite sized solid, thin liquids by cup/straw     Oral Phase:  Impaired:  Impaired AP Movement and Impaired Mastication    Pharyngeal Phase: Impaired: Audible Swallow    Signs and Symptoms of Laryngeal Penetration/Aspiration: Delayed Cough    Impresssions: Patient presents with mild-moderate oral dysphagia with inability to fully discern potential presence of pharyngeal phase deficits without formal instrumentation. Demonstration of slow/impaired bolus formation/transit with reliance on liquid wash and extra time to enhance oral clearance. Concerns for decreased oral bolus control resulting in likely premature spillage with audible swallow to follow liquid consumption. Presence of delayed cough following thin liquids by straw and mixed bolus. Recommendations for diet change to NPO and FEES to further assess and r/o pharyngeal dysfunction. **Pending patient/family wishes to proceed with surgical intervention for brain mass, would recommend consideration for pre/post cognitive testing for establishment of Goals/POC.      RECOMMENDATIONS/ASSESSMENT:  Instrumental Evaluation: Fiberoptic Endoscopic Evaluation of the Swallow (FEES)  Diet Recommendations:  NPO   Strategies:  Recommend NPO and Strategies pending FEES   Rehabilitation Potential: guarded    EDUCATION:  Learner: Patient  Education:  Reviewed results and recommendations of this evaluation, Reviewed diet and strategies, Reviewed ST goals and Plan of Care and Reviewed recommendations for follow-up  Evaluation of Education: Verbalizes understanding, Demonstrates with assistance, Needs further instruction and Family not present    PLAN:  Recommendations pending FEES    PATIENT GOAL:    Did not state. Will further assess during treatment.     SHORT TERM GOALS:  Short-term Goals  Timeframe for Short-term Goals: 1-2 sessions  Goal 1: Complete FEES    LONG TERM GOALS:  No established LTG's given short ELOS       Rowan Little M.S. Rachael Pap [id number] 1/7/2022

## 2022-01-07 NOTE — PROGRESS NOTES
[facility]  Fiberoptic Endoscopic Evaluation of Swallowing  STRZ ONC MED 5K      SLP Individual Minutes  Time In:   Time Out:   Minutes: 26  Timed Code Treatment Minutes: 0 Minutes       Date: 2022  Patient Name: Buddy Encinas       CSN: [id number]   : 1940  ([de-identified] y.o.)  Gender: female   Referring Physician: Stefano De Paz MD   Diagnosis: Brain Mass   Secondary Diagnosis:  Dysphagia   Date of Last MBS/FEES:  N/a   Diet:  NPO     History of Present Illness/Injury: Patient admit to [facility] with above medical dx.  Per physician H&P, Regenia Brandon a [de-identified] y.o. female with PMHx of primary adenocarcinoma of the ethmoid sinus and chronic maxillary sinusitis who presented to [facility] with increasing lethargy and confusion since the weekend. Ritesh Hess is a very poor historian, and is accompanied by her daughter who was able to provide majority history.  Patient's daughter noted that patient has been having increasing lethargy and confusion, and this morning noted people at home who were not there.  Patient was therefore brought to the hospital for further evaluation.       In the ED, patient was afebrile.  Patient had no leukocytosis.  Urinalysis demonstrated trace leukocyte esterase but negative for nitrites.  Bacteria was seen, but there was no urinary WBC.     CT of the head was performed, and demonstrated new masslike density in the right frontal lobe exerting mass-effect on adjacent brain parenchyma and resulting in 1.6 cm right to left midline shift.  Dr. Forrest Moar was consulted by ED physician, and who reviewed the patient's imaging studies. [facility] was not convinced that there was midline shift as described on report. Tanvi Kaushik patient's above history, differential diagnosis may include infectious cause versus malignancy.  Dr. Forrest Mora recommended admission to our service, and obtaining a MRI of the brain with and without contrast. MRI results indicative of:   Impression     1. Prominent 3.3 cm diffusion restricting lesion in the right frontal lobe with prominent thickened rim enhancement with extension into the cribriform plate on the right with apparent contiguity with inflammatory tissue at the cribriform plate/residual    ethmoid air cells. There is extensive edema adjacent to the lesion causing mass effect on the frontal horns of the ventricles and local leftward midline shift at the level of the falx. 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 A metastatic neoplastic process from recurrent sinus disease is less likely. 2. Redemonstration of exenteration of the nasal airway, septum and sphenoid sinuses with prominent mucosal inflammation of the residual maxillary sinuses and ethmoid air cells, similar to prior MRI. 3. Large mastoid effusions.      Palliative care consulted. Family would like patient transfer to [facility] for possible surgical intervention. Patient remains a full code. ST recommendations for FEES to further assess and r/o pharyngeal dysfunction. 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     Reason for Study: Rule out pharyngeal dysfunction  Prior Swallowing Evaluation/Results: N/a   Pain:  None reported     Current Diet: NPO    Respiratory Status: Independent    Behavioral Observation:alert and cooperative    Cognition: Exam not limited by cognition    ORAL MECHANISM EXAM:  Facial Symmetry WFL    Labial/Facial WFL    Lingual WFL    Oral Mucosa WFL    Mandibular Movement WFL    Sensation Impaired    Cough/Reflexes Not Tested      FEES PROCEDURE DETAILS:  Procedure performed by: Lester Mcgee M.S. [id number] [id number]   Feeder/Assistant: Ron Contreras   Scope/Serial Number: Chip Tip Scope - Red #[id number]  Topical Anesthetic Used: No  Patient Positioning: Bed    Procedure explained and education provided to patient and daughter including purpose, benefits and risks of testing. Understanding and agreement with testing was Verbalized/demonstrated. A flexible fiber-optic nasoendoscope was passed transnasally to view the nasopharynx, oropharynx, hypopharynx, larynx through the left nares without difficuty.     Patient tolerance of procedure: No complications or complaints observed or reported    ANATOMIC/PHYSIOLIGIC ASSESSMENT:  Nasal Cavity Impaired H/o adenocarcinoma of sinuses, sinus surgery 2009/2016/2017; dry crusted yellow tinged secretions    Velopharyngeal Port Impaired Decreased  function    Base of Tongue Impaired Decreased retraction    Lingual Tonsils Not Tested    Vocal Fold Margin - Right WFL    Vocal Fold Margin - Left WFL    Abductory/Adductory Movement [facility]    Ventricular Folds WFL    Pharyngeal Contraction for Pitch Glide Not Tested    Epiglottis   Impaired Limited inversion    Valleculae WFL    Secretions - Appearance N/a    Secretions - Location    N/a    Pyriform Sinus   WFL    Glottal Closure   WFL    Arytenoids WFL    Posterior Commisure Impaired Mild hypertrophy      PATIENT WAS EVALUATED USING: Ice Chips, Thin, Mildly Thick, Puree, Soft and Solid    SWALLOW OBSERVATION: Delayed oral transit, Premature spillage, Incomplete epiglottic inversion, Residue - Vallecular, Residue - Pyriform and Residue - Posterior pharyngeal wall    LARYNGEAL PENETRATION/ASPIRATION: No evidence of aspiration and Laryngeal penetration evident before and during swallow    PHARYNGEAL PHASE SUZIE SCORE (dysphagia outcome and severity score)  3 = Moderate Dysphagia - Two or more diet consistencies restricted - May exhibit one or more of the following: Moderate residue clears with cue, Airway penetration to the level of the vocal folds wihtout cough with tow or more consistencies, Aspiration with two consistencies with weak or no reflexive cough, Aspiration of one consistency, no cough and airway penetration with one consistency, no cough    PENETRATION-ASPIRATION SCALE (PAS)   Ice Chips: 1 = Material does not enter the airway  Thin Liquids: 5 = Material enters the airway, contacts the vocal folds, and is not ejected from the airway  Mildly Thick Liquids:  1 = Material does not enter the airway  Puree:  1 = Material does not enter the airway  Soft Solid:  5 = Material enters the airway, contacts the vocal folds, and is not ejected from the airway  Hard Solid: 3 = Material enters the airway, remains above the vocal folds, and is not ejected from the airway    ESOPHAGEAL PHASE: No significant findings     ATTEMPTED TECHNIQUES:  Small Bolus Size Effective and Ineffective *effective for thickened liquids   Straw Effective and Ineffective *effective for thickened liquids    Cup Ineffective    Chin Tuck Not Attempted    Head Turn Not Attempted    Spoon Presentations Effective    Volitional Cough Partially effective *off VF's; however, remained in laryngeal vestibule    Spontaneous Cough Not Attempted           DIAGNOSTIC IMPRESSIONS:  Patient presents with mild-moderate oral dysphagia and moderate pharyngeal dysphagia evidenced by the above skilled level findings. Demonstration of slow/impaired mastication with prolonged oral bolus breakdown/formation and slow AP transit. Patient with decreased oral bolus control resulting in premature spillage to the pyriforms for all PO trials.  Patient with a delayed swallow, mild pooling in the pyriforms, decreased TBR, suspected reduced pharyngeal shortening with limited epiglottic inversion and reduced pharyngeal constriction. Silent laryngeal penetration of thin liquids BEFORE/DURING the swallow and juices from mixed bolus DURING the swallow. Depth of penetration reaching patient's VF's. Additional laryngeal penetration of hard solids with depth of penetration midway to VF's. No observed aspiration. Volitional cough ineffective in expelling material from out of laryngeal vestibule. Presence of swallow decompensation with fatigue evident. Recommendations for advancement to minced/moist diet and mildly thick liquids with direct 1:1 assistance with meals. Recommendations for palliative care to address code status with family. Diet Recommendations:  Minced/moist diet and mildly thick liquids   Strategies:  Full Upright Position, Small Bite/Sip, Multiple Swallow, Pulmonary Monitoring, Medication in Applesauce, Direct 1:1 Supervision, Limit Distractions and Monitor for Fatigue   Rehabilitation Potential: fair    EDUCATION:  Learner: Patient and daughter   Education:  Reviewed results and recommendations of this evaluation, Reviewed diet and strategies, Reviewed signs, symptoms and risks of aspiration, Reviewed ST goals and Plan of Care, Reviewed recommendations for follow-up, Education Related to Potential Risks and Complications Due to Impairment/Illness/Injury and Education Related to Prevention of Recurrence of Impairment/Illness/Injury  Evaluation of Education: Verbalizes understanding, Demonstrates with assistance, Needs further instruction and No evidence of learning    PLAN:  Skilled SLP intervention on acute care 3-5 x per week or until goals met and/or pt plateaus in function. Specific interventions for next session may include: dysphagia management/intervention. PATIENT GOAL:    Did not state. Will further assess during treatment.     SHORT TERM GOALS:  Short-term Goals  Timeframe for Short-term Goals: 2 weeks  Goal 1: Patient will consume a minced/moist diet and mildly thick liquids (1:1 assist, energy conservation, double swallows) without overt s/s of airway invasion and/or pulmonary compromise to meet nutritional/hydration measures safely. Goal 2: Patient will consume advanced PO solids with ST only with appropriate oral timeliness and without overt s/s of airway invasion for potential advancement to LRD. Goal 3: Consider repeat instrumental assessment s/p surgery for crani (if proceeded by patient) as clinically indicated. Goal 4: Patient will complete pharyngeal strengthening exercises (i.e. effortful, julee, CTAR) x10 for improved pharngeal shortening, TBR and pharyngeal stripping. Goal 5: Consider pre/post cognitive assessment if patient/family decide to proceed with surgical removal of brain mass.       LONG TERM GOALS:  No established LTG's given short ELOS Merilyn Claude, M.S. Berenda Goltz 1/7/2022

## 2022-01-07 NOTE — PROGRESS NOTES
Hospitalist Progress Note      Patient: Portillo Rush Hill      Unit/Bed:5K-19/019-A    YOB: 1940    MRN: [id number]       Acct: [de-identified]     PCP: Sarabjit Wynn MD    Date of Admission: 1/5/2022    Date/Time of Evaluation:  1/7/2022 at 12:15 PM    Assessment/Plan:  1. Right brain lesion with associated cerebral edema -- MRI brain done with and w/o contrast 1/6/22 with \"Prominent 3.3 cm diffusion restricting lesion in the right frontal lobe with prominent thickened rim enhancement with extension into the cribriform plate on the right with apparent contiguity with inflammatory tissue at the cribriform plate/residual ethmoid air cells. There is extensive edema adjacent to the lesion causing mass effect on the frontal horns of the ventricles and local leftward midline shift at the level of the falx. 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 A metastatic neoplastic process from recurrent sinus disease is less likely. Redemonstration of exenteration of the nasal airway, septum and sphenoid sinuses with prominent mucosal inflammation of the residual maxillary sinuses and ethmoid air cells, similar to prior MRI. Large mastoid effusions \" --> ?malignancy with hx of cancer but more likely abscess per [facility] -- no atbx started yet as awaiting tx to [address] [place] [address] as needs bx/debridement for further treatment - pt is afeb, WBC normal since admission. -- has had prior debridements at [address] [place] [address] in past --> follows with ENT [facility]/neuro-oncologist Dr. Deborah Yu --> per d/w NS Dr. Ernie Sanders who d/w [address] [place] [address] team that all in agreement to transfer to [address] [place] [address] --> d/w transfer center 1/6 and pt accepted and awaiting bed 1/7  2. Cerebral edema related to brain lesion -- ?steroids needed - await NS eval - cont neuro checks  3.  Metabolic encephalopathy -- likely due to above - ?infection but afeb, WBC normal -> hold atbx -- u/a abn but Asx -- ?? seizure with above -- EEG ordered 1/6 and being done 1/7 (p) --  LFT 1/5 WNL thus unlikely hepatic --  Awaiting transfer to [facility]  4. Hypokalemia -- replace po 1/6 and again 1/7 and monitor - holding home bumex and po supplement -- cont replace prn -- Mg 1/6 low 1.5 -> replace and monitor  5. Hypomagnesemia -- 1.5 on 1/6  And 1.6 on 1/7 --> replace per protocol and monitor  6. 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 Asymptomatic bacteriuria -- cx 1/5 with E coli but with afeb, WBC normal, asx thus NO tx at this time  8. Dysphagia -- ST following and currently NPO and awaiting FEES eval  9. sinonasal adenocarcinoma -- Follows at [facility] -- ?contrib to #1 -- awaiting tx to [facility]  10. History of radionecrosis of frontal lobe -- follows with [facility]  11. Chronic diastolic CHF -- asx - Echo 3/2021 EF 55%, LV fxn normal, no valve abn -- fluid status stable - no decompensation - holding home bumex   12. Essential HTN -- BP trending up 1/7 thus resumed home procardia 60 mg daily 1/7 --> cont hold home burmex as BP borderline - cont monitor and adjust prn  13. Type 2 DM -- holding home glucophage, januvia -- cont SSI with variable po intake -- last A1C 9/2021 = 7.2 -- monitor and adjust prn  14. Chronic bilateral leg swelling with lymphedema -- follows with Dr. Delphine Longo at wound clinic for hx of leg wounds  -- holding home bumex -- cont monitor and elevate - wrap prn    15. HLD -- no current tx  16. Hx covid infection 12/2020  17. SWAPNA  18. OA  19. Morbid obesity Body mass index is 41.21 kg/m². 20. Generalized weakness -- PT/OT c/s    Plan 1/7:  -- d/w Dr. Kevin Urena yesterday who d/w pt's neuro-oncologist team at [facility] Dr. Debbie Salinas recommended transfer to [facility] -> transfer center contacted and per [facility] transfer center pt was d/w her ENT and has been accepted when bed opens.   Cont monitor neuro status - ?need to add atbx but ??if General appearance: No apparent distress, appears stated age and cooperative. Oriented x person, year - currently groggy and not recognizing grandson  HEENT: Pupils equal, round, and reactive to light. Conjunctivae/corneas clear. MMM. Neck: Supple, with full range of motion. Trachea midline. Respiratory:  Normal respiratory effort. Diminished but Clear to auscultation, bilaterally without Rales/Wheezes/Rhonchi. No respiratory distress or accessory muscle use. Cardiovascular: Regular rate and rhythm with normal S1/S2 without murmurs, rubs or gallops. No JVD. Abdomen: Soft, non-tender, non-distended with normal bowel sounds. No rebound or guarding  Musculoskeletal: No clubbing, cyanosis or edema bilaterally. Full range of motion without deformity. No calf tenderness palpation  Skin: Skin color, texture, turgor normal.  Chronic BLE changes, thickened skin - no open lesions. Neurologic:  Neurovascularly intact without any focal sensory/motor deficits. Cranial nerves: II-XII intact, grossly non-focal.  Psychiatric: groggy, oriented x person, year, poor memory, still some hallucinations per family  Capillary Refill: Brisk,< 3 seconds   Peripheral Pulses: +2 palpable, equal bilaterally       All labs reviewed and interpreted by me:  Labs:   Recent Labs     01/05/22 1930 01/06/22  0548   WBC 10.3 8.4   HGB 14.6 12.4   HCT 44.1 37.6    167     Recent Labs     01/05/22  1930 01/06/22  0548 01/07/22  0510    144 142   K 3.3* 3.4* 3.3*    105 105   CO2 23 24 22*   BUN 25* 22 23*   CREATININE 1.2 0.9 1.1   CALCIUM 10.2 9.4 9.1     Recent Labs     01/05/22 1930   AST 13   ALT 9*   BILITOT 0.5   ALKPHOS 112     No results for input(s): INR in the last 72 hours.   Recent Labs     01/05/22 1930   TROPONINT < 0.010       Urinalysis:      Lab Results   Component Value Date    NITRU NEGATIVE 01/05/2022    WBCUA 10-15 01/05/2022    BACTERIA MANY 01/05/2022    RBCUA 0-2 01/05/2022    BLOODU TRACE 01/05/2022    SPECGRAV 1.014 01/25/2021    GLUCOSEU NEGATIVE 01/05/2022       All radiology images and reports reviewed and interpreted by me:  Radiology:  MRI C/ [facility] 29   Final Result       1. Prominent 3.3 cm diffusion restricting lesion in the right frontal lobe with prominent thickened rim enhancement with extension into the cribriform plate on the right with apparent contiguity with inflammatory tissue at the cribriform plate/residual    ethmoid air cells. There is extensive edema adjacent to the lesion causing mass effect on the frontal horns of the ventricles and local leftward midline shift at the level of the falx. 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 A metastatic neoplastic process from recurrent sinus disease is less likely. 2. Redemonstration of exenteration of the nasal airway, septum and sphenoid sinuses with prominent mucosal inflammation of the residual maxillary sinuses and ethmoid air cells, similar to prior MRI. 3. Large mastoid effusions. **This report has been created using voice recognition software. It may contain minor errors which are inherent in voice recognition technology. **         Final report electronically signed by Dr. Rich Carrera MD on 1/6/2022 9:40 AM      CT Head WO Contrast   Final Result   1. New masslike density in the right frontal lobe exerting mass effect on adjacent brain parenchyma and resulting in 1.6 cm right-to-left midline shift. This finding is suspicious for an intraparenchymal lesion. Brain MRI is recommended for further    evaluation. 2. 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 3. Chronic periventricular small vessel ischemic changes and cerebral atrophy. **This report has been created using voice recognition software.  It may contain minor errors which are inherent in voice recognition technology. **      Final report electronically signed by Dr. Carmel Loya on 1/5/2022 8:30 PM      CT Cervical Spine WO Contrast   Final Result   1. No fracture or spondylolisthesis of the cervical spine. 2. Multilevel degenerative disc disease, neural foraminal narrowing and central canal stenosis as detailed above.       Final report electronically signed by Dr. Carmel Loya on 1/5/2022 8:35 PM          Diet: Diet NPO    Anderson: no - external prn    Microbiology:  Urine cx 1/5 = E coli resistant to ampicillin otherwise sensitive to everything else    Tele review last 24 hrs:  Off/none    DVT prophylaxis: [] Lovenox                                 [x] SCDs                                 [] SQ Heparin                                 [] Encourage ambulation           [] Already on Anticoagulation     Disposition:    [] Home       [] TCU       [] Rehab       [] Psych       [] SNF       [] [facility]       [x] Other- -awaiting transfer to [facility]    Code Status: Full Code    PT/OT Eval Status: follwoing      Electronically signed by Antonia Vaca MD on 1/7/2022 at 12:15 PM when pt evaluated - final note filed late

## 2022-01-07 NOTE — PROGRESS NOTES
[address] [place] [address]  INPATIENT PHYSICAL THERAPY  EVALUATION  [facility] ONC MED 5K - 1P-75/482-L    Time In: 0940  Time Out: 3298  Timed Code Treatment Minutes: 10 Minutes  Minutes: 18          Date: 2022  Patient Name: Soledad Lamar,  Gender:  female        MRN: [id number]  : 1940  ([de-identified] y.o.)      Referring Practitioner: Holger Ayala MD  Diagnosis: brain mass  Additional Pertinent Hx: Soledad Lamar is a [de-identified] y.o. female with PMHx of primary adenocarcinoma of the ethmoid sinus and chronic maxillary sinusitis who presented to [address] [place] [address] with increasing lethargy and confusion since the weekend. Patient is a very poor historian, and is accompanied by her daughter who was able to provide majority history. Patient's daughter noted that patient has been having increasing lethargy and confusion, and this morning noted people at home who were not there. Patient was therefore brought to the hospital for further evaluation. In the ED, patient was afebrile. Patient had no leukocytosis. Urinalysis demonstrated trace leukocyte esterase but negative for nitrites. Bacteria was seen, but there was no urinary WBC. CT of the head was performed, and demonstrated new masslike density in the right frontal lobe exerting mass-effect on adjacent brain parenchyma and resulting in 1.6 cm right to left midline shift. Dr. Simon Shen was consulted by ED physician, and who reviewed the patient's imaging studies. He was not convinced that there was midline shift as described on report. Given patient's above history, differential diagnosis may include infectious cause versus malignancy.   Dr. Simon Shen recommended admission to our service, and obtaining a MRI of the brain with and without contrast.     Restrictions/Precautions:  Restrictions/Precautions: General Precautions,Fall Risk    Subjective:  Chart Reviewed: Yes  Patient assessed for rehabilitation services?: Yes  Family / Caregiver Present: No  Subjective: RN approved session. Pt pleasant and agreeable to therapy    General:  Overall Orientation Status: Within Functional Limits  Follows Commands: Within Functional Limits    Vision: Impaired  Vision Exceptions: Wears glasses for reading    Hearing: Exceptions to Neotropix  Hearing Exceptions: Hard of hearing/hearing concerns         Pain: 0/10: denies pain    Vitals: Vitals not assessed per clinical judgement, see nursing flowsheet    Social/Functional History:    Lives With: Daughter  Type of Home: House  Home Layout: Two level,Able to Live on Main level with bedroom/bathroom  Home Access: Stairs to enter without rails  Entrance Stairs - Number of Steps: 4  Home Equipment: Rolling walker                   Ambulation Assistance: Independent (RW)  Transfer Assistance: Independent    Active : No          OBJECTIVE:  Range of Motion:  Bilateral Lower Extremity: WFL    Strength:  Bilateral Lower Extremity: Impaired - MMT grossly 4/5     Balance:  Static Sitting Balance:  Contact Guard Assistance  Static Standing Balance: Minimal Assistance    Bed Mobility:  Rolling to Left: Moderate Assistance, with verbal cues    Supine to Sit: Moderate Assistance, with rail, with verbal cues , with increased time for completion    Transfers:  Sit to Stand: Moderate Assistance  Stand to Sit:Contact Guard Assistance    Ambulation:  Minimal Assistance  Distance: 3'   Surface: Level Tile  Device:Rolling Walker  Gait Deviations: Forward Flexed Posture, Slow Jen, Decreased Step Length Bilaterally, Decreased Gait Speed, Decreased Heel Strike Bilaterally, Wide Base of Support, Moderate Path Deviations and Unsteady Gait    Functional Outcome Measures: Completed  AM-PAC Inpatient Mobility Raw Score : 13  AM-PAC Inpatient T-Scale Score : 36.74    ASSESSMENT:  Activity Tolerance:  Patient tolerance of  treatment: good. Treatment Initiated: Treatment and education initiated within context of evaluation.   Evaluation time included review of current medical information, gathering information related to past medical, social and functional history, completion of standardized testing, formal and informal observation of tasks, assessment of data and development of plan of care and goals. Treatment time included skilled education and facilitation of tasks to increase safety and independence with functional mobility for improved independence and quality of life. Assessment: Body structures, Functions, Activity limitations: Decreased functional mobility ,Decreased balance,Decreased posture,Decreased strength,Decreased endurance  Assessment: Pt demonstrates a decrease in baseline by way of bed mobility, transfers and ambulation secondary to decreased activity tolerance, strength, fatigue, and balance deficits. Pt will benefit from skilled PT services throughout admission and beyond hospital discharge for improvements in functional mobility and in order to decrease fall risk and return pt to [facility].      Prognosis: Good    REQUIRES PT FOLLOW UP: Yes    Discharge Recommendations:  Discharge Recommendations: Continue to assess pending progress,24 hour supervision or assist    Patient Education:  PT Education: Esthela Lock of Care,Functional Mobility Training    Equipment Recommendations:  Equipment Needed: No    Plan:  Times per week: 5x GM  Times per day: Daily  Current Treatment Recommendations: Lino Downer Training,Transfer Training,Gait Training,Functional Mobility Training    Goals:  Patient goals : none stated  Short term goals  Time Frame for Short term goals: by discharge  Short term goal 1: bed mobility with HOB flat, no rails, mod I for increased functional ind  Short term goal 2: sit<>stand from various surfaces with LRD mod I for safe transfers  Short term goal 3: ambulate 48' with LRD mod I for safe household distances  Long term goals  Time Frame for Long term goals : NA d/t short ELOS    Following session, patient left in safe position with all fall risk precautions in place.     Evette Knapp PT, DPT

## 2022-01-07 NOTE — CONSULTS
[address] [id number]               NEUROSURGERY CONSULTATION      PATIENT NAME: Schuyler Apple                       :        1940  MED REC NO:   [id number]                           ROOM:       0019  ACCOUNT NO:   [de-identified]                           ADMIT DATE: 2022  PROVIDER:     Joanna Reza. VITA Thomas Lied:  2022    TIME OF CONSULTATION:  08:55 p.m. HISTORY OF PRESENT ILLNESS:  The patient is an [de-identified] female. Neurosurgery is consulted because of mass lesion in the right frontal  area that was seen. I saw the patient in the emergency room shortly  after I was consulted. She was in room 9 and her daughter was at the  bedside. The patient has had previous sinus surgeries at [facility] due  to cancer. She was being followed with the neuro-oncologist at [facility]. I have recommended that the patient be admitted to the hospitalist's  service and to have an MRI as well. PHYSICAL EXAMINATION:  GENERAL:  The patient is awake and alert. She follows simple commands. EXTREMITIES:  Moving all four extremities with no unequal paresis. NEUROLOGIC:  Pupils are equal and reactive. ASSESSMENT:  This patient with a mass lesion in the right frontal area  which is either tumor versus an abscess. She is not running any fevers  and does not appear toxic. PLAN:  MRI of the brain and admission to the hospitalist's  service. Recommendations will be forthcoming.         Jamison Lozoya M.D.    D: 2022 5:18:42       T: 2022 8:50:10     [email or web address]  Job#: [id number]     Doc#: [id number]    CC:                                            ADDENDUM TO THE ABOVE H&P/CONSULTATION     Caron Olivarez   YOB: 1940  Account Number: [de-identified]       HISTORY OF PRESENT ILLNESS:  Caron Olivarez is a [de-identified] y.o. female, admitted on :2022  7:18 PM      PROBLEM LIST:  Patient Active Problem List   Diagnosis    Hypercholesterolemia    Osteoarthritis    Osteoporosis    Type 2 diabetes mellitus without complication, without long-term current use of insulin (HCC)    DDD (degenerative disc disease), lumbar    Benign essential HTN    SWAPNA on CPAP    Diabetic peripheral neuropathy (HCC)    Primary thunderclap headache    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HCC)    Skin plaque    Bilateral lower leg cellulitis    CKD (chronic kidney disease) stage 3, GFR 30-59 ml/min (HCC)    Iron deficiency anemia    Acute eczema    Venous insufficiency of both lower extremities    Lymphedema of both lower extremities    Eczematous dermatitis    Dystrophic nail    Angio-edema    Osteoradionecrosis of skull/sinus    Late effect of radiation    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HCC)    Cataract of both eyes    History of ventral hernia repair    Other cervical disc degeneration, mid-cervical region    Spondylolisthesis of cervical region    Spondylolisthesis of thoracic region    Chronic rhinitis    Closed fracture of head of humerus    Dysphagia    Laryngopharyngeal reflux    Malignant neoplasm of ethmoidal sinus (HCC)    Obesity, Class II, BMI 35-39.9    COVID-19    Brain abscess    Folliculitis    Pneumonia due to organism    Acute on chronic diastolic congestive heart failure (HCC)    Acute respiratory failure with hypoxia (HCC)    Chronic ethmoidal sinusitis    Chronic frontal sinusitis    Chronic maxillary sinusitis    Chronic sphenoidal sinusitis    Brain compression (HCC)    Metabolic encephalopathy       MEDICATIONS: []None []Unknown  Prior to Admission medications    Medication Sig Start Date End Date Taking?  Authorizing Provider   zinc sulfate (ZINCATE) 220 (50 Zn) MG capsule Take 50 mg by mouth daily   Yes Historical Provider, MD   potassium chloride (KLOR-CON M) 20 MEQ extended release tablet Take 1 tablet by mouth daily 4/23/21  Yes Karen Zuleta MD   metFORMIN (GLUCOPHAGE) 1000 MG tablet TAKE ONE TABLET IN THE EVENING 4/23/21  Yes Noris Denis MD   SITagliptin (JANUVIA) 100 MG tablet Take 1 tablet by mouth daily 4/23/21  Yes Noris Denis MD   omeprazole (PRILOSEC) 20 MG delayed release capsule TAKE ONE CAPSULE BY MOUTH ONCE DAILY 4/23/21  Yes Noris Denis MD   bumetanide (BUMEX) 1 MG tablet Take 1 tablet by mouth 2 times daily 4/23/21  Yes Noris Denis MD   NIFEdipine (ADALAT CC) 60 MG extended release tablet Take 1 tablet by mouth daily 4/23/21  Yes Noris Denis MD   pregabalin (LYRICA) 150 MG capsule Take 2 capsules by mouth 2 times daily for 30 days. 4/23/21 1/5/22 Yes Noris Denis MD   Acetaminophen 500 MG CAPS Take 1,000 mg by mouth every 6 hours as needed for Pain   Yes Historical Provider, MD   Sodium Chloride-Sodium Bicarb (NETI [facility]OT [facility] NA) by Nasal route 2 times daily Use baby shampoo in initial rinse, then mupirocin in second rinse. [facility] [employer] wants 3-4 times a day   Yes Historical Provider, MD   ascorbic acid (VITAMIN C) 1000 MG tablet Take 1 tablet by mouth daily 12/30/20  Yes Jemima Villalta PA-C   Vitamin D (CHOLECALCIFEROL) 50 MCG (2000 UT) TABS tablet Take 1 tablet by mouth daily 12/30/20  Yes Jemima Villalta PA-C   mupirocin (BACTROBAN) 2 % ointment 2 times daily Toothpaste ribbon added to nasal rinse 3/19/20  Yes Historical Provider, MD   ferrous sulfate (IRON 325) 325 (65 Fe) MG tablet Take 325 mg by mouth 2 times daily    Yes Historical Provider, MD   sodium chloride (OCEAN, BABY AYR) 0.65 % nasal spray 1 spray by Nasal route as needed for Congestion    Yes Historical Provider, MD   OXYGEN Inhale 1.5 L into the lungs     Historical Provider, MD   Handicap Placard MISC by Does not apply route Dx:I187.2,M81.0. Duration: 5 years.  4/23/21   Noris Denis MD   insulin glargine (LANTUS) 100 UNIT/ML injection vial Inject 12 Units into the skin every morning 3/31/21   Marcelo Ortega MD   Insulin Pen Needle (PEN NEEDLES) 31G X 6 MM MISC 1 each by Does not apply route daily 3/4/21   Kyle Andino MD   [facility]. Devices (WALKER) MISC 1 each by Does not apply route daily Requests stand up walker due to heart failure and generalized OA. I50.23. 1/5/21   Ramón Garcia MD   Handicap Placard MISC by Does not apply route Dx:I187.2,M81.0. Duration: 5 years.  10/18/19 4/23/21  AMANDA Jimenez - CNP       Current Facility-Administered Medications   Medication Dose Route Frequency Provider Last Rate Last Admin    NIFEdipine (PROCARDIA XL) extended release tablet 60 mg  60 mg Oral Daily Idalia Chirinos MD   60 mg at 01/07/22 1404    miconazole (MICOTIN) 2 % powder   Topical BID Geoff Mixon DO   Given at 01/07/22 0925    insulin lispro (HUMALOG) injection vial 0-12 Units  0-12 Units SubCUTAneous TID WC Gonzalez Sheffield MD   1 Units at 01/07/22 3339    insulin lispro (HUMALOG) injection vial 0-6 Units  0-6 Units SubCUTAneous Nightly Gonzalez Sheffield MD   2 Units at 01/06/22 2113    glucose (GLUTOSE) 40 % oral gel 15 g  15 g Oral PRN Gonzalez Sheffield MD        dextrose 50 % IV solution  12.5 g IntraVENous PRN Gonzalez Sheffield MD        glucagon (rDNA) injection 1 mg  1 mg IntraMUSCular PRN Gonzalez Sheffield MD        dextrose 5 % solution  100 mL/hr IntraVENous PRN Gonzalez Sheffield MD        potassium chloride (KLOR-CON M) extended release tablet 40 mEq  40 mEq Oral PRN Idalia Chirinos MD        Or    potassium bicarb-citric acid (EFFER-K) effervescent tablet 40 mEq  40 mEq Oral PRN Idalia Chirinos MD        Or    potassium chloride 10 mEq/100 mL IVPB (Peripheral Line)  10 mEq IntraVENous PRN Idalia Chirinos MD        magnesium sulfate 2000 mg in dextrose 50 mL IVPB  2,000 mg IntraVENous PRN Idalia Chirinos MD        sodium chloride flush 0.9 % injection 5-40 mL  5-40 mL IntraVENous 2 times per day Gonzalez Sheffield MD   10 mL at 01/07/22 0930    sodium chloride flush 0.9 % injection 5-40 mL  5-40 mL IntraVENous PRN Gonzalez Sheffield MD        0.9 % sodium chloride infusion  25 mL IntraVENous PRN Edward Butler MD        polyethylene glycol [facility]) packet 17 g  17 g Oral Daily PRN Edward Butler MD        acetaminophen (TYLENOL) tablet 650 mg  650 mg Oral Q6H PRN Edward Butler MD   650 mg at 01/07/22 1404    Or    acetaminophen (TYLENOL) suppository 650 mg  650 mg Rectal Q6H PRN Edward Butler MD            glucose, 15 g, PRN  dextrose, 12.5 g, PRN  glucagon (rDNA), 1 mg, PRN  dextrose, 100 mL/hr, PRN  potassium chloride, 40 mEq, PRN   Or  potassium alternative oral replacement, 40 mEq, PRN   Or  potassium chloride, 10 mEq, PRN  magnesium sulfate, 2,000 mg, PRN  sodium chloride flush, 5-40 mL, PRN  sodium chloride, 25 mL, PRN  polyethylene glycol, 17 g, Daily PRN  acetaminophen, 650 mg, Q6H PRN   Or  acetaminophen, 650 mg, Q6H PRN         dextrose      sodium chloride           ALLERGIES: []None []Unknown   Lisinopril, Sulfamethoxazole-trimethoprim, Morphine, Codeine, Hydrocodone, Percocet [oxycodone-acetaminophen], and Tape [adhesive tape]    PAST MEDICAL  HISTORY: []None []Unknown    has a past medical history of Cancer ([address] Cataract of both eyes, COVID-19, DDD (degenerative disc disease), lumbar, DM2 (diabetes mellitus, type 2) ([address] Dysphagia, pharyngoesophageal, Eczema, Fibrocystic breast, H/O ventral hernia repair, Headache, History of COVID-19, Hyperlipidemia, Hypertension, Iron deficiency anemia, LPRD (laryngopharyngeal reflux disease), Neuropathy, Obesity, Orbital abscess, Orbital cellulitis, SWAPNA (obstructive sleep apnea), Osteoarthritis, Osteoporosis, Persistent proteinuria associated with type 2 diabetes mellitus ([address] Sinusitis, and Velopharyngeal incompetence. PAST SURGICAL  HISTORY: []None []Unknown   has a past surgical history that includes ventral hernia repair (1992); Cholecystectomy (03/1988); Hysterectomy, total abdominal (1980); Hemorrhoid surgery (1980s); Total knee arthroplasty (08/2003);  Carpal tunnel release (Bilateral, 2008, 2009); sinus surgery (last 2009); Cataract removal (2011); Eye surgery (Left, 01/30/2015); sinus surgery (02/01/2016); sinus surgery (2016 x 2); joint replacement (bilat 2005/ 2007); Colonoscopy (2016); Endoscopy, colon, diagnostic; eye surgery; hernia repair; Tonsillectomy; sinus surgery (09/18/2017); sinus surgery (08/09/2017); Abdomen surgery; Dilatation, esophagus; Appendectomy; pr colsc flx with directed submucosal njx any sbst (10/11/2018); and Colonoscopy (10/11/2018).     SOCIAL HISTORY:   Social History     Tobacco Use    Smoking status: Never Smoker    Smokeless tobacco: Never Used   Substance Use Topics    Alcohol use: No       FAMILY HISTORY:  Family History   Problem Relation Age of Onset    Diabetes Mother     Heart Disease Father         whooping cough as child    Obesity Sister     Other Brother         tumor on back    Obesity Sister     Cancer Sister         Skin     Cancer Brother         Bone cancer from metal    Other Brother         passed as a child    Heart Disease Brother     Other Brother         tremor    Heart Attack Brother     No Known Problems Brother     Heart Disease Brother     No Known Problems Brother     No Known Problems Brother        LABS  CBC:   Lab Results   Component Value Date    WBC 8.4 01/06/2022    RBC 4.30 01/06/2022    HGB 12.4 01/06/2022    HCT 37.6 01/06/2022    MCV 87.4 01/06/2022    MCH 28.8 01/06/2022    MCHC 33.0 01/06/2022    RDW 16.7 04/23/2018     01/06/2022    MPV 10.1 01/06/2022     BMP:    Lab Results   Component Value Date     01/07/2022    K 3.3 01/07/2022    K 3.4 01/06/2022     01/07/2022    CO2 22 01/07/2022    BUN 23 01/07/2022    LABALBU 4.8 01/05/2022    CREATININE 1.1 01/07/2022    CALCIUM 9.1 01/07/2022    LABGLOM 48 01/07/2022    GLUCOSE 169 01/07/2022    GLUCOSE 146 08/11/2021     PT/INR:    Lab Results   Component Value Date    INR 1.39 03/24/2021     PTT:    Lab Results   Component Value Date    APTT 28.2 03/24/2021 [APTT}  Troponin:    Lab Results   Component Value Date    TROPONINI <0.006 01/21/2014       Nhi Lynn MD,MD  Electronically signed 1/7/2022 at 3:41 PM

## 2022-01-07 NOTE — PROGRESS NOTES
[facility]  [facility]PATIENT [facility]CCUPATIONAL THERAPY  [facility] ONC MED 5K  EVALUATION    Time:   Time In: 2646  Time Out: 1439  Timed Code Treatment Minutes: 32 Minutes  Minutes: 42          Date: 2022  Patient Name: Clara Gonzales,   Gender: female      MRN: [id number]  : 1940  ([de-identified] y.o.)  Referring Practitioner: Nathaly Ibanez. Rayray Joseph MD  Diagnosis: brain abscess  Additional Pertinent Hx: per chart review; Clara Gonzales is a [de-identified] y.o. female with PMHx of primary adenocarcinoma of the ethmoid sinus and chronic maxillary sinusitis who [facility] to [facility] with increasing lethargy and confusion since the weekend. Patient is a very poor historian, and is accompanied by her daughter who was able to provide majority history. Patient's daughter noted that patient has been having increasing lethargy and confusion, and this morning noted people at home who were not there. Patient was therefore brought to the hospital for further evaluation. In the ED, patient was afebrile. Patient had no leukocytosis. Urinalysis demonstrated trace leukocyte esterase but negative for nitrites. Bacteria was seen, but there was no urinary WBC. CT of the head was performed, and demonstrated new masslike density in the right frontal lobe exerting mass-effect on adjacent brain parenchyma and resulting in 1.6 cm right to left midline shift. Dr. Castro Garcia was consulted by ED physician, and who reviewed the patient's imaging studies. He was not convinced that there was midline shift as described on report. Given patient's above history, differential diagnosis may include infectious cause versus malignancy.   Dr. Castro Garcia recommended admission to our service, and obtaining a MRI of the brain with and without contrast.    Restrictions/Precautions:  Restrictions/Precautions: General Precautions,Fall Risk    Subjective  Chart Reviewed: Bandar Maher and Physical,Imaging  Patient assessed for rehabilitation services?: Yes  Response to previous treatment: Patient with no complaints from previous session  Family / Caregiver Present: Yes (daughter; very involved during evaluation and provided extensive hx of patient's PLOF and medical hx)    Subjective: RN approved session-states that patient is not bedrest any more. patient supine in bed upon OT arrival with daughter present. patient alert and agreeable to eval. A & O x 2. Pain:  Pain Assessment  Patient Currently in Pain: Denies (reports she has a headache)    Vitals: Vitals not assessed per clinical judgement, see nursing flowsheet    Social/Functional History:  Lives With: Daughter  Type of Home: House  Home Layout: Two level,Able to Live on Main level with bedroom/bathroom  Home Access: Stairs to enter without rails  Entrance Stairs - Number of Steps: 4  Home Equipment: Rolling walker,4 wheeled walker,Quad cane,Lift chair   Bathroom Shower/Tub: Tub/Shower unit  Bathroom Toilet: Standard  Bathroom Equipment: Shower chair  Bathroom Accessibility: Walker accessible    [address] Help From: Family  ADL Assistance: Needs assistance (SBA as needed)  Homemaking Assistance: Needs assistance (completed light meal prep when daughter was at work)  Ambulation Assistance: Independent  Transfer Assistance: Independent    Active : No  Patient's  Info: daughters     Additional Comments: daughter works outside of home during day and up until recently, patient was able to take care of herself during the day. mainly used 4 w/w during the day. frequently goes to [facility] to manage cancer. VISION:WFL    HEARING:  WFL    COGNITION: Slow Processing, Decreased Recall, Decreased Insight, Impaired Memory, Decreased Safety Awareness and Difficulty Following Commands    RANGE OF MOTION:  Right Upper Extremity: Impaired - palpation of glenohumeral joint feels like dislocation and daughter states it has been injured for approx 5 years.  demo little to no AROM in R shldr flex  Left Upper Extremity:  [facility] [address] [facility]  Patient is R hand dominant. States she feeds herself with no difficulty    STRENGTH:  Right Upper Extremity: elbow flex 4-/5 ext 3+/5  (F)  Left Upper Extremity:  shldr 3+/5 elbow flex 4-/5 ext 3+/5  (F)    SENSATION:   WFL    ADL:   Grooming: Contact Guard Assistance. with cues for 2 w/w placement to stand at sink to wash hands and assist to retrieve soap  Lower Extremity Dressing: Maximum Assistance. to doff/don depends and difficulty managing when attempting to change soiled depend seated on toilet. Toileting: Moderate Assistance. for cleanliness and unable to fully reach. required cues for cleanliness and assist to fully clean BM and assist to pull up depend fully  Toilet Transfer: Minimal Assistance. with use of grab bars and [place] A to reach for grab bar to assist with sit to stand x 2 trials due to not fully finished with BM after first sit to stand. BALANCE:  Sitting Balance:  Contact Guard Assistance. due to safety awareness/cognition   Standing Balance: Contact Guard Assistance, Minimal Assistance. with post lean and use of 2 w/w for support    BED MOBILITY:  Supine to Sit: Moderate Assistance cues for sequencing and tech  Sit to Supine: Maximum Assistance cues for sequencing     TRANSFERS:  Sit to Stand:  Minimal Assistance. with post COG    FUNCTIONAL MOBILITY:  Assistive Device: Rolling Walker  Assist Level:  Contact Guard Assistance. Distance: To and from bathroom  Cues for directing 2 w/w safely     Exercise:  not completed    Activity Tolerance:  Patient tolerance of  treatment: fair. De-conditioned, weakness, variable cognition      Assessment:  Assessment: patient has limited AROM in dominant R UE that is from an old shldr injury and affects her ability to complete effective BM toilet hygiene and functional reaching t/o day. patient demonstrates weakness and de-conditioning impacting her ability to complete ADLs and functional transfers as prior.   Performance deficits / Impairments: Decreased cognition,Decreased ADL status,Decreased endurance,Decreased ROM,Decreased strength  Prognosis: Fair  REQUIRES OT FOLLOW UP: Yes  Decision Making: High Complexity    Treatment Initiated: Treatment and education initiated within context of evaluation. Evaluation time included review of current medical information, gathering information related to past medical, social and functional history, completion of standardized testing, formal and informal observation of tasks, assessment of data and development of plan of care and goals. Treatment time included skilled education and facilitation of tasks to increase safety and independence with ADL's for improved functional independence and quality of life. Discharge Recommendations:  Continue to assess pending progress,Patient would benefit from continued therapy after discharge,24 hour supervision or assist    Patient Education:  OT Education: OT Consuelo Yarbrough of Care,Precautions,ADL Adaptive Strategies  Barriers to Learning: cognition    Equipment Recommendations:  Equipment Needed: No    Plan:  Times per week: 5x  Times per day: Daily  Current Treatment Recommendations: Rolinda Skill Re-education,Patient/Caregiver Education & Training,Self-Care / ADL,Safety Education & Training. See long-term goal time frame for expected duration of plan of care. If no long-term goals established, a short length of stay is anticipated. Goals:  Patient goals : return home at [facility]  Short term goals  Time Frame for Short term goals: by discharge  Short term goal 1: patient will tolerate 6 min functional standing with one hand release with CGA to increase ease with toileting. Short term goal 2: patient will participate in distal UB strengthening to increase bicep/tricep strength for self care routine.   Short term goal 3: patient will complete toileting routine with SBA and 1-2 verbal cues for sequencing and hygiene/cleanliness. Short term goal 4: patient will complete LB dressing with MIN GIUSEPPE. Following session, patient left in safe position with all fall risk precautions in place.

## 2022-01-07 NOTE — CARE COORDINATION
Discharge plan update      Barriers: planning tx to [facility] with MRI + brain mass. SLP, PT/OT. Palliative care.       Discharge plan:  Await transfer to [facility]

## 2022-01-07 NOTE — PROGRESS NOTES
[facility] Laboratory Technician Worksheet      EEG Date: 2022    Name: Fernanda Khan   : 1940   Age: [de-identified] y.o. SEX: female    ROOM: [facility] MRN: [id number]           CSN: [id number]      Ordering Provider: Taran  EEG Number: [id number] Time of Test:  1566    Hand: Right   Sedation: no    H.V. Done: No age protocol Photic: Yes    Sleep: Yes  Drowsy: Yes   Sleep Deprived: Yes    Seizures observed: no    Mentality: lethargic, confused at times    Clinical History:fell at home in shower, intracan mass, c/o of back of head hurt,  Family states headache and weak r. Arm. Noted patient had troubles lifting for bld sugar check. Mri  Impression       1. Prominent 3.3 cm diffusion restricting lesion in the right frontal lobe with prominent thickened rim enhancement with extension into the cribriform plate on the right with apparent contiguity with inflammatory tissue at the cribriform plate/residual    ethmoid air cells. There is extensive edema adjacent to the lesion causing mass effect on the frontal horns of the ventricles and local leftward midline shift at the level of the falx. 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 A metastatic neoplastic process from recurrent sinus disease is less likely. 2. Redemonstration of exenteration of the nasal airway, septum and sphenoid sinuses with prominent mucosal inflammation of the residual maxillary sinuses and ethmoid air cells, similar to prior MRI.    3. Large mastoid effusions.             Past Medical History:       Diagnosis Date    Cancer [facility])     adenocarcinoma of her sinuses    Cataract of both eyes     COVID-19     DDD (degenerative disc disease), lumbar     DM2 (diabetes mellitus, type 2) ([facility] [address]     diagnoses approx     Dysphagia, pharyngoesophageal 2016    Eczema 2018    Fibrocystic breast     H/O ventral hernia repair     Headache 02/09/2017    History of COVID-19 12/2020    Hyperlipidemia     Hypertension     Iron deficiency anemia     LPRD (laryngopharyngeal reflux disease) 09/26/2016    Neuropathy     peripheral    Obesity     Orbital abscess     Orbital cellulitis 01/22/2014    SWAPNA (obstructive sleep apnea)     Osteoarthritis     Osteoporosis     Persistent proteinuria associated with type 2 diabetes mellitus ([facility] [address] [address] 01/2017    urine micral of 50.       Sinusitis     Velopharyngeal incompetence 09/26/2016       Scheduled Meds:   NIFEdipine  60 mg Oral Daily    miconazole   Topical BID    insulin lispro  0-12 Units SubCUTAneous TID WC    insulin lispro  0-6 Units SubCUTAneous Nightly    sodium chloride flush  5-40 mL IntraVENous 2 times per day     Continuous Infusions:   dextrose      sodium chloride       PRN Meds:.glucose, dextrose, glucagon (rDNA), dextrose, potassium chloride **OR** potassium alternative oral replacement **OR** potassium chloride, magnesium sulfate, sodium chloride flush, sodium chloride, polyethylene glycol, acetaminophen **OR** acetaminophen    Technician: Bee Wall 1/7/2022

## 2022-01-08 VITALS
HEART RATE: 80 BPM | SYSTOLIC BLOOD PRESSURE: 164 MMHG | TEMPERATURE: 98 F | WEIGHT: 211 LBS | BODY MASS INDEX: 41.43 KG/M2 | RESPIRATION RATE: 18 BRPM | OXYGEN SATURATION: 95 % | DIASTOLIC BLOOD PRESSURE: 74 MMHG | HEIGHT: 60 IN

## 2022-01-08 LAB
ANION GAP SERPL CALCULATED.3IONS-SCNC: 13 MEQ/L (ref 8–16)
BUN BLDV-MCNC: 15 MG/DL (ref 7–22)
CALCIUM SERPL-MCNC: 9.3 MG/DL (ref 8.5–10.5)
CHLORIDE BLD-SCNC: 104 MEQ/L (ref 98–111)
CO2: 21 MEQ/L (ref 23–33)
CREAT SERPL-MCNC: 0.7 MG/DL (ref 0.4–1.2)
GFR SERPL CREATININE-BSD FRML MDRD: 80 ML/MIN/1.73M2
GLUCOSE BLD-MCNC: 184 MG/DL (ref 70–108)
GLUCOSE BLD-MCNC: 194 MG/DL (ref 70–108)
GLUCOSE BLD-MCNC: 251 MG/DL (ref 70–108)
MAGNESIUM: 1.6 MG/DL (ref 1.6–2.4)
POTASSIUM SERPL-SCNC: 3.5 MEQ/L (ref 3.5–5.2)
SODIUM BLD-SCNC: 138 MEQ/L (ref 135–145)

## 2022-01-08 PROCEDURE — 36415 COLL VENOUS BLD VENIPUNCTURE: CPT

## 2022-01-08 PROCEDURE — 82948 REAGENT STRIP/BLOOD GLUCOSE: CPT

## 2022-01-08 PROCEDURE — 2580000003 HC RX 258: Performed by: HOSPITALIST

## 2022-01-08 PROCEDURE — 6370000000 HC RX 637 (ALT 250 FOR IP): Performed by: HOSPITALIST

## 2022-01-08 PROCEDURE — 83735 ASSAY OF MAGNESIUM: CPT

## 2022-01-08 PROCEDURE — 99239 HOSP IP/OBS DSCHRG MGMT >30: CPT | Performed by: FAMILY MEDICINE

## 2022-01-08 PROCEDURE — 6370000000 HC RX 637 (ALT 250 FOR IP): Performed by: FAMILY MEDICINE

## 2022-01-08 PROCEDURE — 95819 EEG AWAKE AND ASLEEP: CPT | Performed by: PSYCHIATRY & NEUROLOGY

## 2022-01-08 PROCEDURE — 80048 BASIC METABOLIC PNL TOTAL CA: CPT

## 2022-01-08 RX ORDER — POTASSIUM CHLORIDE 20 MEQ/1
20 TABLET, EXTENDED RELEASE ORAL DAILY
Qty: 90 TABLET | Refills: 3 | DISCHARGE
Start: 2022-01-08 | End: 2022-05-03 | Stop reason: SDUPTHER

## 2022-01-08 RX ORDER — BUMETANIDE 1 MG/1
1 TABLET ORAL 2 TIMES DAILY
Qty: 180 TABLET | Refills: 3 | DISCHARGE
Start: 2022-01-08 | End: 2022-05-03 | Stop reason: SDUPTHER

## 2022-01-08 RX ORDER — INSULIN GLARGINE 100 [IU]/ML
12 INJECTION, SOLUTION SUBCUTANEOUS EVERY MORNING
DISCHARGE
Start: 2022-01-08 | End: 2022-05-03 | Stop reason: ALTCHOICE

## 2022-01-08 RX ADMIN — ACETAMINOPHEN 650 MG: 325 TABLET ORAL at 06:19

## 2022-01-08 RX ADMIN — MICONAZOLE NITRATE: 20 POWDER TOPICAL at 10:09

## 2022-01-08 RX ADMIN — INSULIN LISPRO 2 UNITS: 100 INJECTION, SOLUTION INTRAVENOUS; SUBCUTANEOUS at 09:18

## 2022-01-08 RX ADMIN — NIFEDIPINE 60 MG: 60 TABLET, EXTENDED RELEASE ORAL at 07:57

## 2022-01-08 RX ADMIN — SODIUM CHLORIDE, PRESERVATIVE FREE 10 ML: 5 INJECTION INTRAVENOUS at 07:57

## 2022-01-08 ASSESSMENT — PAIN SCALES - GENERAL
PAINLEVEL_OUTOF10: 3
PAINLEVEL_OUTOF10: 0

## 2022-01-08 NOTE — DISCHARGE SUMMARY
Hospitalist Discharge Summary     Patient Identification:  Mary Garcia  : 1940  MRN: [id number]   Account: [de-identified]     Admit date: 2022  Discharge date: 2022   Attending provider: Alicia Mratinez MD        Primary care provider: Renetta Noel MD     Discharge Diagnoses:   1. Right brain lesion with associated cerebral edema -- MRI brain done with and w/o contrast 22 with \"Prominent 3.3 cm diffusion restricting lesion in the right frontal lobe with prominent thickened rim enhancement with extension into the cribriform plate on the right with apparent contiguity with inflammatory tissue at the cribriform plate/residual ethmoid air cells. There is extensive edema adjacent to the lesion causing mass effect on the frontal horns of the ventricles and local leftward midline shift at the level of the falx. 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 A metastatic neoplastic process from recurrent sinus disease is less likely. Redemonstration of exenteration of the nasal airway, septum and sphenoid sinuses with prominent mucosal inflammation of the residual maxillary sinuses and ethmoid air cells, similar to prior MRI. Large mastoid effusions \" --> ?malignancy with hx of cancer but more likely abscess per NS Dr. Amy Glass -- no atbx started yet as awaiting tx to [facility] as needs bx/debridement for further treatment - pt is afeb, WBC normal since admission. -- has had prior debridements at [facility] in past --> follows with ENT Dr. Kendra Scales, NS/neuro-oncologist Dr. Gabe Jamison --> per d/w NS Dr. Amy Glass who d/w [facility] team that all in agreement to transfer to [facility] --> d/w transfer center  and pt accepted and awaiting bed   2. Cerebral edema related to brain lesion -- ?steroids needed - await NS eval - cont neuro checks  3.  Metabolic encephalopathy -- likely due to above - ?infection but afeb, WBC normal -> hold atbx -- u/a abn but Asx -- ??seizure with above -- EEG ordered 1/6 and being done 1/7 (p) --  LFT 1/5 WNL thus unlikely hepatic --  Awaiting transfer to [facility]  4. Hypokalemia -- replace po 1/6 and again 1/7 and monitor - holding home bumex and po supplement -- cont replace prn -- Mg 1/6 low 1.5 -> replace and monitor  5. Hypomagnesemia -- 1.5 on 1/6  And 1.6 on 1/7 --> replace per protocol and monitor  6. 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 Asymptomatic bacteriuria -- cx 1/5 with E coli but with afeb, WBC normal, asx thus NO tx at this time  8. Dysphagia -- ST following and currently NPO and awaiting FEES eval  9. sinonasal adenocarcinoma -- Follows at [facility] -- ?contrib to #1 -- awaiting tx to [facility]  10. History of radionecrosis of frontal lobe -- follows with [facility]  11. Chronic diastolic CHF -- asx - Echo 3/2021 EF 55%, LV fxn normal, no valve abn -- fluid status stable - no decompensation - holding home bumex   12. Essential HTN  13. Type 2 DM   14. Chronic bilateral leg swelling with lymphedema  15. HLD  16. Hx covid infection 12/2020  17. SWAPNA  18. OA  19. Morbid obesity Body mass index is 41.21 kg/m². 20. Generalized weakness          Hospital Course: Caron Olivarez is a [de-identified] y.o. female admitted to [address] [place] [address] on 1/5/2022 for confusion, lethargy - found to have right brain lesion. See H&P by Douglas Boyce MD on 1/5/22 for admitting details. 21. Right brain lesion with associated cerebral edema -- MRI brain done with and w/o contrast 1/6/22 with \"Prominent 3.3 cm diffusion restricting lesion in the right frontal lobe with prominent thickened rim enhancement with extension into the cribriform plate on the right with apparent contiguity with inflammatory tissue at the cribriform plate/residual ethmoid air cells.  There is extensive edema adjacent to the lesion causing mass effect on the frontal horns of the ventricles and local leftward midline shift at the level of the falx. 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 A metastatic neoplastic process from recurrent sinus disease is less likely. Redemonstration of exenteration of the nasal airway, septum and sphenoid sinuses with prominent mucosal inflammation of the residual maxillary sinuses and ethmoid air cells, similar to prior MRI. Large mastoid effusions \" --> ?malignancy with hx of cancer but more likely abscess per NS Dr. Ray Goode -- no atbx started yet as awaiting tx to [address] [place] [address] as needs bx/debridement for further treatment - pt is afeb, WBC normal since admission. -- has had prior debridements at [address] [place] [address] in past --> follows with ENT Dr. Lemuel Cortes, NS/neuro-oncologist Dr. Taco Matos --> per d/w NS Dr. Ray Goode who d/w [address] [place] [address] team that all in agreement to transfer to [address] [place] [address] --> d/w transfer center 1/6 and pt accepted and awaiting bed 1/7  22. Cerebral edema related to brain lesion -- ?steroids needed - await NS eval - cont neuro checks  23. Metabolic encephalopathy -- likely due to above - ?infection but afeb, WBC normal -> hold atbx -- u/a abn but Asx -- ??seizure with above   -- EEG ordered 1/6 and being done 1/7  And read 1/8 = abn with presence of right frontal sharp waves intermittently during recording that may be epileptogenic or indicate seizure tendency with also excessive slowing c/w cortical dysfxn --> ? ??need seizure proph he with above --> pt being transferred to [address] [place] [address] thus will leave up to [address] [place] [address] specialists if need tx   --  LFT 1/5 WNL thus unlikely hepatic   24. Hypokalemia -- replace po 1/6 and again 1/7 and monitor - holding home bumex and po supplement -- cont replace prn -- replaced Mg during admission  25. Hypomagnesemia -- 1.5 on 1/6  And 1.6 on 1/7 --> replace per protocol and monitor  26.  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 Asymptomatic bacteriuria -- cx 1/5 with E coli but with afeb, WBC normal, asx thus NO tx at this time  28. Dysphagia -- ST following and currently NPO and awaiting FEES eval  29. sinonasal adenocarcinoma -- Follows at [facility] -- ?contrib to #1 -- awaiting tx to [facility]  30. History of radionecrosis of frontal lobe -- follows with [facility]  31. Chronic diastolic CHF -- asx - Echo 3/2021 EF 55%, LV fxn normal, no valve abn -- fluid status stable - no decompensation - holding home bumex   32. Essential HTN -- BP trending up 1/7 thus resumed home procardia 60 mg daily 1/7 --> cont hold home burmex as BP borderline - cont monitor and adjust prn  33. Type 2 DM -- holding home glucophage, januvia -- cont SSI with variable po intake -- last A1C 9/2021 = 7.2 -- monitor and adjust prn  34. Chronic bilateral leg swelling with lymphedema -- follows with Dr. Jese Ring at wound clinic for hx of leg wounds  -- holding home bumex -- cont monitor and elevate - wrap prn    35. HLD -- no current tx  36. Hx covid infection 12/2020  37. SWAPNA  38. OA  39. Morbid obesity Body mass index is 41.21 kg/m². 40. Generalized weakness -- PT/OT c/s    Pt was accepted in transfer to [facility]. She was HD stable, afebrile and transferred to [facility] in stable condition for further treatment of her right brain lesion. Discharge Medications:     Medication List      START taking these medications    miconazole 2 % powder  Commonly known as: MICOTIN  Apply topically 2 times daily.         CHANGE how you take these medications    bumetanide 1 MG tablet  Commonly known as: BUMEX  Take 1 tablet by mouth 2 times daily -- held during admission  What changed: additional instructions     insulin glargine 100 UNIT/ML injection vial  Commonly known as: LANTUS  Inject 12 Units into the skin every morning - held during admission  What changed: additional instructions     metFORMIN 1000 MG tablet  Commonly known as: GLUCOPHAGE  TAKE ONE TABLET IN THE EVENING - on hold at transfer  What changed: additional instructions     potassium chloride 20 MEQ extended release tablet  Commonly known as: KLOR-CON M  Take 1 tablet by mouth daily - held during admission  What changed: additional instructions     SITagliptin 100 MG tablet  Commonly known as: JANUVIA  Take 1 tablet by mouth daily - held during admission  What changed: additional instructions        CONTINUE taking these medications    Acetaminophen 500 MG Caps     ascorbic acid 1000 MG tablet  Commonly known as: VITAMIN C  Take 1 tablet by mouth daily     ferrous sulfate 325 (65 Fe) MG tablet  Commonly known as: IRON 325     Handicap Placard Misc  by Does not apply route Dx:I187.2,M81.0. Duration: 5 years. mupirocin 2 % ointment  Commonly known as: BACTROBAN     NETI POT SINUS [address] NA     NIFEdipine 60 MG extended release tablet  Commonly known as: ADALAT CC  Take 1 tablet by mouth daily     omeprazole 20 MG delayed release capsule  Commonly known as: PRILOSEC  TAKE ONE CAPSULE BY MOUTH ONCE DAILY     OXYGEN     Pen Needles 31G X 6 MM Misc  1 each by Does not apply route daily     sodium chloride 0.65 % nasal spray  Commonly known as: OCEAN, BABY AYR     vitamin D 50 MCG (2000 UT) Tabs tablet  Commonly known as: CHOLECALCIFEROL  Take 1 tablet by mouth daily     Walker Misc  1 each by Does not apply route daily Requests stand up walker due to heart failure and generalized OA.   I50.23.     zinc sulfate 220 (50 Zn) MG capsule  Commonly known as: ZINCATE        STOP taking these medications    pregabalin 150 MG capsule  Commonly known as: LYRICA           Where to Get Your Medications      Information about where to get these medications is not yet available    Ask your nurse or doctor about these medications  · bumetanide 1 MG tablet  · insulin glargine 100 UNIT/ML injection vial  · metFORMIN 1000 MG tablet  · miconazole 2 % powder  · potassium chloride 20 MEQ extended release tablet  · SITagliptin 100 MG tablet         Patient Instructions:    Discharge lab work: per [facility]  Activity: activity as tolerated  Diet: ADULT DIET; Dysphagia - Minced and Moist; Mildly Thick (Nectar)    Code Status: Full Code    Follow-up visits:   -- Per [facility]    Procedures:   -- EEG 1/7/22 = IMPRESSION:  This is an abnormal EEG due to presence of right frontal  sharp waves intermittently during the recording that may be  epileptogenic in nature, or indicate seizure tendency in the proper  clinical context.   In addition, excessive slowing was seen in the theta,  and delta range activity with a failure to achieve a satisfactory  background rhythm suggestive of cortical dysfunction such as seen with  encephalopathy.              SHORTY Turpin MD       Consults:   PALLIATIVE CARE EVAL  IP CONSULT TO SOCIAL WORK      Examination:  Vitals:  Vitals:    01/07/22 1630 01/07/22 1945 01/08/22 0430 01/08/22 0751   BP: 119/77 (!) 150/70 (!) 145/72 (!) 164/74   Pulse: 97 77 76 80   Resp: 19 18 18 18   Temp: 98.5 °F (36.9 °C) 98 °F (36.7 °C) 98.2 °F (36.8 °C) 98 °F (36.7 °C)   TempSrc: Oral Oral Oral Oral   SpO2: 97% 97% 97% 95%   Weight:       Height:         Weight: Weight: 211 lb (95.7 kg)     24 hour intake/output:    Intake/Output Summary (Last 24 hours) at 1/8/2022 1106  Last data filed at 1/7/2022 2023  Gross per 24 hour   Intake 240 ml   Output --   Net 240 ml       General appearance - oriented to person, place, and time and chronically ill appearing  Chest - clear to auscultation, no wheezes, rales or rhonchi, symmetric air entry, no tachypnea, retractions or cyanosis  Heart - normal rate, regular rhythm, normal S1, S2, no murmurs, rubs, clicks or gallops  Abdomen - soft, diffuse TTP, just had soft/slightly liquidy BM, no rebound tenderness noted  Obese: Yes; Protuberant: No   Neurological - alert, oriented, normal speech, no focal findings or movement disorder noted, cranial nerves II through XII intact  Extremities - peripheral pulses normal, no pedal edema, no clubbing or cyanosis  Skin - normal coloration and turgor, no rashes, no suspicious skin lesions noted    Significant Diagnostics:   Radiology:   MRI BRAIN W WO CONTRAST   Final Result       1. Prominent 3.3 cm diffusion restricting lesion in the right frontal lobe with prominent thickened rim enhancement with extension into the cribriform plate on the right with apparent contiguity with inflammatory tissue at the cribriform plate/residual    ethmoid air cells. There is extensive edema adjacent to the lesion causing mass effect on the frontal horns of the ventricles and local leftward midline shift at the level of the falx. 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 A metastatic neoplastic process from recurrent sinus disease is less likely. 2. Redemonstration of exenteration of the nasal airway, septum and sphenoid sinuses with prominent mucosal inflammation of the residual maxillary sinuses and ethmoid air cells, similar to prior MRI. 3. Large mastoid effusions. **This report has been created using voice recognition software. It may contain minor errors which are inherent in voice recognition technology. **         Final report electronically signed by Dr. Gail Whelan MD on 1/6/2022 9:40 AM      CT Head WO Contrast   Final Result   1. New masslike density in the right frontal lobe exerting mass effect on adjacent brain parenchyma and resulting in 1.6 cm right-to-left midline shift. This finding is suspicious for an intraparenchymal lesion. Brain MRI is recommended for further    evaluation. 2. 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 3. Chronic periventricular small vessel ischemic changes and cerebral atrophy. **This report has been created using voice recognition software.  It may contain minor errors which are inherent in voice recognition technology. **      Final report electronically signed by Dr. Francisco Javier Taveras on 1/5/2022 8:30 PM      CT Cervical Spine WO Contrast   Final Result   1. No fracture or spondylolisthesis of the cervical spine. 2. Multilevel degenerative disc disease, neural foraminal narrowing and central canal stenosis as detailed above.       Final report electronically signed by Dr. Francisco Javier Taveras on 1/5/2022 8:35 PM          Labs:   Recent Results (from the past 67 hour(s))   POCT Glucose    Collection Time: 01/05/22  7:24 PM   Result Value Ref Range    POC Glucose 211 (H) 70 - 108 mg/dl   CBC Auto Differential    Collection Time: 01/05/22  7:30 PM   Result Value Ref Range    WBC 10.3 4.8 - 10.8 thou/mm3    RBC 5.07 4.20 - 5.40 mill/mm3    Hemoglobin 14.6 12.0 - 16.0 gm/dl    Hematocrit 44.1 37.0 - 47.0 %    MCV 87.0 81.0 - 99.0 fL    MCH 28.8 26.0 - 33.0 pg    MCHC 33.1 32.2 - 35.5 gm/dl    RDW-CV 15.4 (H) 11.5 - 14.5 %    RDW-SD 48.7 (H) 35.0 - 45.0 fL    Platelets 904 431 - 246 thou/mm3    MPV 9.8 9.4 - 12.4 fL    Seg Neutrophils 68.9 %    Lymphocytes 19.6 %    Monocytes 8.7 %    Eosinophils 1.8 %    Basophils 0.5 %    Immature Granulocytes 0.5 %    Segs Absolute 7.1 1.8 - 7.7 thou/mm3    Lymphocytes Absolute 2.0 1.0 - 4.8 thou/mm3    Monocytes Absolute 0.9 0.4 - 1.3 thou/mm3    Eosinophils Absolute 0.2 0.0 - 0.4 thou/mm3    Basophils Absolute 0.1 0.0 - 0.1 thou/mm3    Immature Grans (Abs) 0.05 0.00 - 0.07 thou/mm3    nRBC 0 /100 wbc   Comprehensive Metabolic Panel w/ Reflex to MG    Collection Time: 01/05/22  7:30 PM   Result Value Ref Range    Glucose 219 (H) 70 - 108 mg/dL    CREATININE 1.2 0.4 - 1.2 mg/dL    BUN 25 (H) 7 - 22 mg/dL    Sodium 141 135 - 145 meq/L    Potassium reflex Magnesium 3.3 (L) 3.5 - 5.2 meq/L    Chloride 101 98 - 111 meq/L    CO2 23 23 - 33 meq/L    Calcium 10.2 8.5 - 10.5 mg/dL    AST 13 5 - 40 U/L    Alkaline Phosphatase 112 38 - 126 U/L Total Protein 8.4 (H) 6.1 - 8.0 g/dL    Albumin 4.8 3.5 - 5.1 g/dL    Total Bilirubin 0.5 0.3 - 1.2 mg/dL    ALT 9 (L) 11 - 66 U/L   Troponin    Collection Time: 01/05/22  7:30 PM   Result Value Ref Range    Troponin T < 0.010 ng/ml   Anion Gap    Collection Time: 01/05/22  7:30 PM   Result Value Ref Range    Anion Gap 17.0 (H) 8.0 - 16.0 meq/L   Glomerular Filtration Rate, Estimated    Collection Time: 01/05/22  7:30 PM   Result Value Ref Range    Est, Glom Filt Rate 43 (A) ml/min/1.73m2   Magnesium    Collection Time: 01/05/22  7:30 PM   Result Value Ref Range    Magnesium 1.6 1.6 - 2.4 mg/dL   Osmolality    Collection Time: 01/05/22  7:30 PM   Result Value Ref Range    Osmolality Calc 292.4 275.0 - 300.0 mOsmol/kg   Urine with Reflexed Micro    Collection Time: 01/05/22  8:00 PM   Result Value Ref Range    Glucose, Ur NEGATIVE NEGATIVE mg/dl    Bilirubin Urine NEGATIVE NEGATIVE    Ketones, Urine NEGATIVE NEGATIVE    Specific Gravity, Urine 1.010 1.002 - 1.030    Blood, Urine TRACE (A) NEGATIVE    pH, UA 5.5 5.0 - 9.0    Protein,  (A) NEGATIVE    Urobilinogen, Urine 0.2 0.0 - 1.0 eu/dl    Nitrite, Urine NEGATIVE NEGATIVE    Leukocyte Esterase, Urine TRACE (A) NEGATIVE    Color, UA YELLOW STRAW-YELLOW    Character, Urine CLEAR CLEAR-SL CLOUD    RBC, UA 0-2 0-2/hpf /hpf    WBC, UA 10-15 0-4/hpf /hpf    Epithelial Cells, UA 0-2 3-5/hpf /hpf    Bacteria, UA MANY FEW/NONE SEEN /hpf    Casts UA NONE SEEN NONE SEEN /lpf    Crystals, UA NONE SEEN NONE SEEN    Renal Epithelial, UA NONE SEEN NONE SEEN    Yeast, UA NONE SEEN NONE SEEN    CASTS 2 NONE SEEN NONE SEEN /lpf    MISCELLANEOUS 2 NONE SEEN    Culture, Reflexed, Urine    Collection Time: 01/05/22  8:00 PM    Specimen: Urine, clean catch   Result Value Ref Range    Organism Escherichia coli (A)     Urine Culture Reflex [place] count: >100,000 CFU/mL         Susceptibility    Escherichia coli - BACTERIAL SUSCEPTIBILITY PANEL BY CARI     amoxicillin-clavulanate 16 Intermediate mcg/mL     cefOXitin <=4 Sensitive mcg/mL     cefTRIAXone <=1 Sensitive mcg/mL     ampicillin >=32 Resistant mcg/mL     gentamicin <=1 Sensitive mcg/mL     trimethoprim-sulfamethoxazole <=20 Sensitive mcg/mL     ciprofloxacin 0.5 Sensitive mcg/mL     tetracycline <=1 Sensitive mcg/mL     nitrofurantoin <=16 Sensitive mcg/mL   Basic Metabolic Panel w/ Reflex to MG    Collection Time: 01/06/22  5:48 AM   Result Value Ref Range    Sodium 144 135 - 145 meq/L    Potassium reflex Magnesium 3.4 (L) 3.5 - 5.2 meq/L    Chloride 105 98 - 111 meq/L    CO2 24 23 - 33 meq/L    Glucose 164 (H) 70 - 108 mg/dL    BUN 22 7 - 22 mg/dL    CREATININE 0.9 0.4 - 1.2 mg/dL    Calcium 9.4 8.5 - 10.5 mg/dL   CBC    Collection Time: 01/06/22  5:48 AM   Result Value Ref Range    WBC 8.4 4.8 - 10.8 thou/mm3    RBC 4.30 4.20 - 5.40 mill/mm3    Hemoglobin 12.4 12.0 - 16.0 gm/dl    Hematocrit 37.6 37.0 - 47.0 %    MCV 87.4 81.0 - 99.0 fL    MCH 28.8 26.0 - 33.0 pg    MCHC 33.0 32.2 - 35.5 gm/dl    RDW-CV 15.4 (H) 11.5 - 14.5 %    RDW-SD 49.2 (H) 35.0 - 45.0 fL    Platelets 830 619 - 933 thou/mm3    MPV 10.1 9.4 - 12.4 fL   Anion Gap    Collection Time: 01/06/22  5:48 AM   Result Value Ref Range    Anion Gap 15.0 8.0 - 16.0 meq/L   Glomerular Filtration Rate, Estimated    Collection Time: 01/06/22  5:48 AM   Result Value Ref Range    Est, Glom Filt Rate 60 (A) ml/min/1.73m2   Magnesium    Collection Time: 01/06/22  5:48 AM   Result Value Ref Range    Magnesium 1.5 (L) 1.6 - 2.4 mg/dL   Osmolality    Collection Time: 01/06/22  5:48 AM   Result Value Ref Range    Osmolality Calc 293.8 275.0 - 300.0 mOsmol/kg   POCT glucose    Collection Time: 01/06/22 11:25 AM   Result Value Ref Range    POC Glucose 170 (H) 70 - 108 mg/dl   POCT glucose    Collection Time: 01/06/22  4:15 PM   Result Value Ref Range    POC Glucose 166 (H) 70 - 108 mg/dl   POCT glucose    Collection Time: 01/06/22  8:07 PM   Result Value Ref Range    POC Glucose 231 (H) 70 - 108 mg/dl   Basic Metabolic Panel    Collection Time: 01/07/22  5:10 AM   Result Value Ref Range    Sodium 142 135 - 145 meq/L    Potassium 3.3 (L) 3.5 - 5.2 meq/L    Chloride 105 98 - 111 meq/L    CO2 22 (L) 23 - 33 meq/L    Glucose 169 (H) 70 - 108 mg/dL    BUN 23 (H) 7 - 22 mg/dL    CREATININE 1.1 0.4 - 1.2 mg/dL    Calcium 9.1 8.5 - 10.5 mg/dL   Magnesium    Collection Time: 01/07/22  5:10 AM   Result Value Ref Range    Magnesium 1.6 1.6 - 2.4 mg/dL   Anion Gap    Collection Time: 01/07/22  5:10 AM   Result Value Ref Range    Anion Gap 15.0 8.0 - 16.0 meq/L   Glomerular Filtration Rate, Estimated    Collection Time: 01/07/22  5:10 AM   Result Value Ref Range    Est, Glom Filt Rate 48 (A) ml/min/1.73m2   POCT glucose    Collection Time: 01/07/22 11:52 AM   Result Value Ref Range    POC Glucose 186 (H) 70 - 108 mg/dl   POCT glucose    Collection Time: 01/07/22  3:48 PM   Result Value Ref Range    POC Glucose 156 (H) 70 - 108 mg/dl   POCT glucose    Collection Time: 01/07/22  4:59 PM   Result Value Ref Range    POC Glucose 172 (H) 70 - 108 mg/dl   POCT glucose    Collection Time: 01/07/22  8:23 PM   Result Value Ref Range    POC Glucose 165 (H) 70 - 108 mg/dl   Basic Metabolic Panel    Collection Time: 01/08/22  4:54 AM   Result Value Ref Range    Sodium 138 135 - 145 meq/L    Potassium 3.5 3.5 - 5.2 meq/L    Chloride 104 98 - 111 meq/L    CO2 21 (L) 23 - 33 meq/L    Glucose 194 (H) 70 - 108 mg/dL    BUN 15 7 - 22 mg/dL    CREATININE 0.7 0.4 - 1.2 mg/dL    Calcium 9.3 8.5 - 10.5 mg/dL   Magnesium    Collection Time: 01/08/22  4:54 AM   Result Value Ref Range    Magnesium 1.6 1.6 - 2.4 mg/dL   Anion Gap    Collection Time: 01/08/22  4:54 AM   Result Value Ref Range    Anion Gap 13.0 8.0 - 16.0 meq/L   Glomerular Filtration Rate, Estimated    Collection Time: 01/08/22  4:54 AM   Result Value Ref Range    Est, Glom Filt Rate 80 (A) ml/min/1.73m2   POCT glucose    Collection Time: 01/08/22  8:16 AM   Result Value Ref Range    POC Glucose 184 (H) 70 - 108 mg/dl       Discharge condition: stable  Disposition:  To a non-[facility] facility - [facility]  Time spent on discharge: 40 min    Electronically signed by Ebonie Mcdermott MD on 1/8/2022 at 11:06 AM

## 2022-01-08 NOTE — PROCEDURES
[address] [id number]                          ELECTROENCEPHALOGRAM REPORT    PATIENT NAME: Luis Card                       :        1940  MED REC NO:   [id number]                           ROOM:       0019  ACCOUNT NO:   [de-identified]                           ADMIT DATE: 2022  PROVIDER:     Sylwia Caldwell. Cleveland Adams MD    DATE OF EE2022    REFERRING PROVIDER:  Idalia Chirinos M.D. CLINICAL HISTORY:  An 60-year-old female presenting with a fall at home  in the shower. The patient has history of intracranial mass in the  right frontal area, headache, weakness in the right arm, evaluate for  seizure. Medications listed are nifedipine, miconazole, insulin,  Dextrose. CLINICAL INTERPRETATION:  This is a 17-channel EEG performed without  sleep deprivation. Hyperventilation was not performed. Photic  stimulation was performed. The patient is described as lethargic. The background rhythm of activity is noted to be slowed, poorly  organized in the theta, and delta range activity. This is suggestive of  a cortical dysfunction such as seen with encephalopathy. Intermittent  right frontal sharp waves were noted during the recording that are of  questionable significance though they maybe epileptogenic in nature, or  indicate seizure tendency. Hyperventilation was not performed. There  was no satisfactory background rhythm reached throughout this recording. Photic stimulation was performed with no driving seen. IMPRESSION:  This is an abnormal EEG due to presence of right frontal  sharp waves intermittently during the recording that may be  epileptogenic in nature, or indicate seizure tendency in the proper  clinical context.   In addition, excessive slowing was seen in the theta,  and delta range activity with a failure to achieve a satisfactory  background rhythm suggestive of cortical dysfunction such as seen with  encephalopathy.           Naina Marroquin MD    D: 01/08/2022 10:44:36       T: 01/08/2022 10:46:52     [id number]  Job#: [id number]     Doc#: [id number]    CC:

## 2022-01-08 NOTE — PROGRESS NOTES
0323-Call received from [address] access. Patient has a bed at [address] called  at [facility]- This Rn notified Dipesh Jay (Primary) regarding transfer to [facility].      Electronically signed by Jefferson Coleman RN on 1/8/2022 at 6:40 AM

## 2022-01-12 ENCOUNTER — TELEPHONE (OUTPATIENT)
Dept: WOUND CARE | Age: 82
End: 2022-01-12

## 2022-01-12 NOTE — TELEPHONE ENCOUNTER
----- Message from Heide Soto sent at 1/12/2022  1:28 PM EST -----  ARDEN'S DAUGHTER CALLED TO [facility] HER 1/19 APPT @ 10:30, WITH DR Lana Walsh. STATES THAT ARDEN IS IN NEURO ICU AT [facility], FOR A BRAIN ABSCESS, AND WILL NOT MAKE IT TO THE APPOINTMENT, AND HER WOUNDS LOOK GOOD.

## 2022-03-07 LAB
BASOPHILS ABSOLUTE: 0.1 /ΜL
BASOPHILS RELATIVE PERCENT: 1.2 %
BUN BLDV-MCNC: 19 MG/DL
CALCIUM SERPL-MCNC: NORMAL MG/DL
CHLORIDE BLD-SCNC: 106 MMOL/L
CO2: 24 MMOL/L
CREAT SERPL-MCNC: 1.2 MG/DL
EOSINOPHILS ABSOLUTE: 0.3 /ΜL
EOSINOPHILS RELATIVE PERCENT: 5.2 %
GFR CALCULATED: NORMAL
GLUCOSE BLD-MCNC: 81 MG/DL
HCT VFR BLD CALC: 29.2 % (ref 36–46)
HEMOGLOBIN: 9.5 G/DL (ref 12–16)
LYMPHOCYTES ABSOLUTE: 1.2 /ΜL
LYMPHOCYTES RELATIVE PERCENT: 24.6 %
MCH RBC QN AUTO: 27.5 PG
MCHC RBC AUTO-ENTMCNC: 32.4 G/DL
MCV RBC AUTO: 84.7 FL
MONOCYTES ABSOLUTE: 0.4 /ΜL
MONOCYTES RELATIVE PERCENT: 7.8 %
NEUTROPHILS ABSOLUTE: 3 /ΜL
NEUTROPHILS RELATIVE PERCENT: 61.2 %
PDW BLD-RTO: 17.9 %
PLATELET # BLD: 139 K/ΜL
PMV BLD AUTO: 8.7 FL
POTASSIUM SERPL-SCNC: 3.5 MMOL/L
RBC # BLD: 3.44 10^6/ΜL
SODIUM BLD-SCNC: 140 MMOL/L
WBC # BLD: 5 10^3/ML

## 2022-03-31 ENCOUNTER — TELEPHONE (OUTPATIENT)
Dept: FAMILY MEDICINE CLINIC | Age: 82
End: 2022-03-31

## 2022-03-31 NOTE — TELEPHONE ENCOUNTER
Pt due for A1C. Pt's daughter Nidhi Kinsey answered, states pt is at [facility]. Called [facility] and left message for them to call us back and let us know if they have a recent A1C, and who the provider is for Kervin Campos at their facility.

## 2022-04-29 ENCOUNTER — TELEPHONE (OUTPATIENT)
Dept: FAMILY MEDICINE CLINIC | Age: 82
End: 2022-04-29

## 2022-04-29 NOTE — TELEPHONE ENCOUNTER
[facility] Transitions Initial Follow Up Call    Outreach made within 2 business days of discharge: Yes    Patient: Karie Pickard Patient : 1940   MRN: [id number]  Reason for Admission: There are no discharge diagnoses documented for the most recent discharge. Discharge Date: 22       Spoke with: Cal Hall    Discharge department/facility: [facility] Interactive Patient Contact:  Was patient able to fill all prescriptions: Yes  Was patient instructed to bring all medications to the follow-up visit: Yes  Is patient taking all medications as directed in the discharge summary?  Yes  Does patient understand their discharge instructions: Yes  Does patient have questions or concerns that need addressed prior to 7-14 day follow up office visit: no    Scheduled appointment with PCP within 7-14 days    Follow Up  Future Appointments   Date Time Provider David Adler   5/3/2022 10:45 Rozanna Sacks,  [facility]   2022 10:00 AM Jose Cummings MD  [address]

## 2022-05-02 NOTE — ED NOTES
Bed: 017A  Expected date:   Expected time:   Means of arrival: [facility] Dept  Comments:     Catalina Jasmine RN  01/25/21 5145
Pt given food at this time per request. Daughter remains at bedside.  Updated on admission      Magali Nguyễn RN  01/25/21 2515
Pt placed on NC at this time      Unique Rosenthal, POPPY  01/25/21 9837
Pt resting in bed with eyes closed, IV established at this time and medicated per [facility]. Daughter at bedside updated on plan of care. Call light within reach.      Maria Isabel Hood RN  01/25/21 2756
Pt resting on cot comfortably at this time. Pt denies additional needs currently. Pts daughter remains at bedside. Will continue to monitor.       Richmond Jj RN  01/25/21 2747
Detail Level: Detailed

## 2022-05-03 ENCOUNTER — OFFICE VISIT (OUTPATIENT)
Dept: FAMILY MEDICINE CLINIC | Age: 82
End: 2022-05-03
Payer: MEDICARE

## 2022-05-03 VITALS
DIASTOLIC BLOOD PRESSURE: 78 MMHG | BODY MASS INDEX: 38.67 KG/M2 | WEIGHT: 198 LBS | TEMPERATURE: 97 F | SYSTOLIC BLOOD PRESSURE: 126 MMHG | RESPIRATION RATE: 20 BRPM | HEART RATE: 78 BPM | OXYGEN SATURATION: 98 %

## 2022-05-03 DIAGNOSIS — R80.9 TYPE 2 DIABETES MELLITUS WITH MICROALBUMINURIA, WITHOUT LONG-TERM CURRENT USE OF INSULIN (HCC): ICD-10-CM

## 2022-05-03 DIAGNOSIS — K21.9 GASTROESOPHAGEAL REFLUX DISEASE, UNSPECIFIED WHETHER ESOPHAGITIS PRESENT: ICD-10-CM

## 2022-05-03 DIAGNOSIS — E87.6 HYPOKALEMIA: ICD-10-CM

## 2022-05-03 DIAGNOSIS — J30.9 ALLERGIC RHINITIS, UNSPECIFIED SEASONALITY, UNSPECIFIED TRIGGER: ICD-10-CM

## 2022-05-03 DIAGNOSIS — E11.29 TYPE 2 DIABETES MELLITUS WITH MICROALBUMINURIA, WITHOUT LONG-TERM CURRENT USE OF INSULIN (HCC): ICD-10-CM

## 2022-05-03 DIAGNOSIS — E11.40 TYPE 2 DIABETES MELLITUS WITH DIABETIC NEUROPATHY, WITHOUT LONG-TERM CURRENT USE OF INSULIN (HCC): ICD-10-CM

## 2022-05-03 DIAGNOSIS — I16.9 HYPERTENSIVE CRISIS: ICD-10-CM

## 2022-05-03 DIAGNOSIS — I10 BENIGN ESSENTIAL HTN: Primary | ICD-10-CM

## 2022-05-03 DIAGNOSIS — R56.9 CONVULSIONS, UNSPECIFIED CONVULSION TYPE (HCC): ICD-10-CM

## 2022-05-03 PROBLEM — G06.0 CEREBRAL ABSCESS: Status: ACTIVE | Noted: 2022-01-06

## 2022-05-03 PROCEDURE — 99397 PER PM REEVAL EST PAT 65+ YR: CPT | Performed by: FAMILY MEDICINE

## 2022-05-03 RX ORDER — IBUPROFEN 200 MG
200 TABLET ORAL EVERY 6 HOURS PRN
COMMUNITY

## 2022-05-03 RX ORDER — LORATADINE 10 MG/1
10 TABLET ORAL DAILY
COMMUNITY
End: 2022-05-03 | Stop reason: SDUPTHER

## 2022-05-03 RX ORDER — AMLODIPINE BESYLATE 5 MG/1
2.5 TABLET ORAL DAILY
Qty: 45 TABLET | Refills: 3 | Status: SHIPPED | OUTPATIENT
Start: 2022-05-03

## 2022-05-03 RX ORDER — AMLODIPINE BESYLATE 5 MG/1
2.5 TABLET ORAL DAILY
COMMUNITY
End: 2022-05-03 | Stop reason: SDUPTHER

## 2022-05-03 RX ORDER — LORATADINE 10 MG/1
10 TABLET ORAL DAILY
Qty: 90 TABLET | Refills: 3 | Status: SHIPPED | OUTPATIENT
Start: 2022-05-03

## 2022-05-03 RX ORDER — ACETAMINOPHEN 500 MG
500 TABLET ORAL DAILY
COMMUNITY

## 2022-05-03 RX ORDER — LANOLIN ALCOHOL/MO/W.PET/CERES
6 CREAM (GRAM) TOPICAL NIGHTLY PRN
COMMUNITY

## 2022-05-03 RX ORDER — BUMETANIDE 1 MG/1
1 TABLET ORAL 2 TIMES DAILY
Qty: 180 TABLET | Refills: 3 | Status: SHIPPED | OUTPATIENT
Start: 2022-05-03

## 2022-05-03 RX ORDER — OMEPRAZOLE 20 MG/1
CAPSULE, DELAYED RELEASE ORAL
Qty: 90 CAPSULE | Refills: 3 | Status: SHIPPED | OUTPATIENT
Start: 2022-05-03

## 2022-05-03 RX ORDER — POTASSIUM CHLORIDE 20 MEQ/1
40 TABLET, EXTENDED RELEASE ORAL DAILY
Qty: 180 TABLET | Refills: 3 | Status: SHIPPED | OUTPATIENT
Start: 2022-05-03

## 2022-05-03 RX ORDER — LEVETIRACETAM 1000 MG/1
1000 TABLET ORAL 2 TIMES DAILY
Qty: 180 TABLET | Refills: 3 | Status: SHIPPED | OUTPATIENT
Start: 2022-05-03

## 2022-05-03 RX ORDER — LEVETIRACETAM 1000 MG/1
1000 TABLET ORAL 2 TIMES DAILY
COMMUNITY
End: 2022-05-03 | Stop reason: SDUPTHER

## 2022-05-03 ASSESSMENT — ENCOUNTER SYMPTOMS
RHINORRHEA: 0
VOMITING: 0
SHORTNESS OF BREATH: 0
SORE THROAT: 0
BLOOD IN STOOL: 0
COUGH: 0
NAUSEA: 0
CONSTIPATION: 0
EYE PAIN: 0
WHEEZING: 0
ABDOMINAL PAIN: 0
BACK PAIN: 0
DIARRHEA: 0
CHEST TIGHTNESS: 0

## 2022-05-03 NOTE — PROGRESS NOTES
[address] ANTIONE (:  1940) is a [de-identified] y.o. female, here for a preventive medicine evaluation.     Patient Active Problem List   Diagnosis    Hypercholesterolemia    Osteoarthritis    Osteoporosis    Diabetes mellitus ([address]    DDD (degenerative disc disease), lumbar    Essential hypertension    SWAPNA on CPAP    Diabetic peripheral neuropathy (HCC)    Primary thunderclap headache    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HCC)    Skin plaque    Bilateral lower leg cellulitis    CKD (chronic kidney disease) stage 3, GFR 30-59 ml/min (HCC)    Iron deficiency anemia    Hypomagnesemia    Acute eczema    Venous insufficiency of both lower extremities    Chronic acquired lymphedema    Eczematous dermatitis    Dystrophic nail    Angio-edema    Osteoradionecrosis of skull/sinus    Late effect of radiation    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HCC)    Cataract of both eyes    History of ventral hernia repair    Other cervical disc degeneration, mid-cervical region    Spondylolisthesis of cervical region    Spondylolisthesis of thoracic region    Chronic rhinitis    Closed fracture of head of humerus    Dysphagia    Laryngopharyngeal reflux    Primary adenocarcinoma of ethmoidal sinus (HCC)    Obesity, Class II, BMI 35-39.9    COVID-19    Brain mass    Folliculitis    Pneumonia due to organism    Chronic diastolic CHF (congestive heart failure) (HCC)    Acute respiratory failure with hypoxia (HCC)    Chronic ethmoidal sinusitis    Chronic frontal sinusitis    Chronic maxillary sinusitis    Chronic sphenoidal sinusitis    Brain compression (HCC)    Metabolic encephalopathy    Hypokalemia    IDALIA (acute kidney injury) (HCC)    CKD (chronic kidney disease), stage II    Cerebral edema (HCC)    Asymptomatic bacteriuria    Morbid obesity (HCC)    Generalized weakness    Cerebral abscess    Unspecified convulsions       Review of Systems   Constitutional: Negative for chills, fatigue, fever and unexpected weight change. HENT: Negative for congestion, ear pain, rhinorrhea and sore throat. Eyes: Negative for pain and visual disturbance. Respiratory: Negative for cough, chest tightness, shortness of breath and wheezing. Cardiovascular: Negative for chest pain and palpitations. Gastrointestinal: Negative for abdominal pain, blood in stool, constipation, diarrhea, nausea and vomiting. Genitourinary: Negative for difficulty urinating, frequency, hematuria and urgency. Musculoskeletal: Negative for back pain, joint swelling, myalgias and neck pain. Skin: Negative for rash. Neurological: Negative for dizziness and headaches. Hematological: Negative for adenopathy. Does not bruise/bleed easily. Psychiatric/Behavioral: Negative for behavioral problems and sleep disturbance. The patient is not nervous/anxious. Prior to Visit Medications    Medication Sig Taking?  Authorizing Provider   ibuprofen (ADVIL;MOTRIN) 200 MG tablet Take 200 mg by mouth every 6 hours as needed for Pain Yes Historical Provider, MD   melatonin 3 MG TABS tablet Take 6 mg by mouth nightly as needed Yes Historical Provider, MD   acetaminophen (TYLENOL) 500 MG tablet Take 500 mg by mouth daily Yes Historical Provider, MD   amLODIPine (NORVASC) 5 MG tablet Take 0.5 tablets by mouth daily Hold if BP<110 or HR <70 Yes Hugo Valentin MD   bumetanide (BUMEX) 1 MG tablet Take 1 tablet by mouth 2 times daily Yes Hugo Valentin MD   loratadine (CLARITIN) 10 MG tablet Take 1 tablet by mouth daily Yes Hugo Valentin MD   metFORMIN (GLUCOPHAGE) 1000 MG tablet TAKE ONE TABLET IN THE EVENING Yes Hugo Valentin MD   levETIRAcetam (KEPPRA) 1000 MG tablet Take 1 tablet by mouth 2 times daily Yes Hugo Valentin MD   omeprazole (PRILOSEC) 20 MG delayed release capsule TAKE ONE CAPSULE BY MOUTH ONCE DAILY Yes Hugo Valentin MD   potassium chloride (KLOR-CON M) 20 MEQ extended release tablet Take 2 tablets by mouth daily Yes Sally Meyers MD   SITagliptin (JANUVIA) 100 MG tablet Take 1 tablet by mouth daily Yes Sally Meyers MD   zinc sulfate (ZINCATE) 220 (50 Zn) MG capsule Take 50 mg by mouth daily Yes Historical Provider, MD   Handicap Placard MISC by Does not apply route Dx:I187.2,M81.0. Duration: 5 years. Yes Sally Meyers MD   Misc. Devices (WALKER) MISC 1 each by Does not apply route daily Requests stand up walker due to heart failure and generalized OA. I50.23. Yes Sally Meyers MD   ascorbic acid (VITAMIN C) 1000 MG tablet Take 1 tablet by mouth daily  Patient taking differently: Take 250 mg by mouth 3 times daily Along with iron tablet Yes Delilah Plaza PA-C   Vitamin D (CHOLECALCIFEROL) 50 MCG (2000 UT) TABS tablet Take 1 tablet by mouth daily Yes Abdullahi Robert PA-C   ferrous sulfate (IRON 325) 325 (65 Fe) MG tablet Take 325 mg by mouth 3 times daily (with meals)  Yes Historical Provider, MD   sodium chloride (OCEAN, BABY AYR) 0.65 % nasal spray 1 spray by Nasal route as needed for Congestion  Yes Historical Provider, MD   Handicap Placard MISC by Does not apply route Dx:I187.2,M81.0. Duration: 5 years.   Jono Almonte APRN - JUAN MANUEL        Allergies   Allergen Reactions    Lisinopril Swelling and Anaphylaxis    Sulfamethoxazole-Trimethoprim Rash    Morphine Other (See Comments)     headaches    Codeine      Other reaction(s): AOF    Hydrocodone      headaches    Percocet [Oxycodone-Acetaminophen] Other (See Comments)     Headaches    Tape Wendi [place] Tape] Rash       Past Medical History:   Diagnosis Date    Cancer [facility])     adenocarcinoma of her sinuses    Cataract of both eyes     COVID-19     DDD (degenerative disc disease), lumbar     DM2 (diabetes mellitus, type 2) ([facility]     diagnoses approx 2000    Dysphagia, pharyngoesophageal 09/26/2016    Eczema 03/2018    Fibrocystic breast     H/O ventral hernia repair     Headache 02/09/2017    History of COVID-19 12/2020    Hyperlipidemia     Hypertension     Iron deficiency anemia     LPRD (laryngopharyngeal reflux disease) 09/26/2016    Neuropathy     peripheral    Obesity     Orbital abscess     Orbital cellulitis 01/22/2014    SWAPNA (obstructive sleep apnea)     Osteoarthritis     Osteoporosis     Persistent proteinuria associated with type 2 diabetes mellitus ([facility] [facility] 01/2017    urine micral of 50.       Sinusitis     Velopharyngeal incompetence 09/26/2016       Past Surgical History:   Procedure Laterality Date    ABDOMEN SURGERY      APPENDECTOMY      CARPAL TUNNEL RELEASE Bilateral 2008, 2009    CATARACT REMOVAL  2011    bilat    CHOLECYSTECTOMY  03/1988    COLONOSCOPY  2016    COLONOSCOPY  10/11/2018    COLONOSCOPY POLYPECTOMY SNARE/COLD BIOPSY performed by Js Singh MD at [address] ESOPHAGUS      ENDOSCOPY, COLON, DIAGNOSTIC      EYE SURGERY Left 01/30/2015    EYE SURGERY      CATARACT REMOVAL- BILATERAL    HEMORRHOID SURGERY  1980s    HERNIA REPAIR      HYSTERECTOMY, TOTAL ABDOMINAL  1980    JOINT REPLACEMENT  bilat 2005/ 2007    knees    [place] COLSC FLX WITH DIRECTED SUBMUCOSAL NJX ANY SBST  10/11/2018    COLONOSCOPY SUBMUCOSAL/BOTOX INJECTION performed by Js Singh MD at [address]  last 2009    removed a bone (2)--2 times no construction     SINUS SURGERY  02/01/2016    SINUS SURGERY  2016 x 2    SINUS SURGERY  09/18/2017    [facility] - Dr Arredondo Connor SINUS SURGERY  08/09/2017 8/17/2017 - [facility]    TONSILLECTOMY      TOTAL KNEE ARTHROPLASTY  08/2003    Left TKR    VENTRAL HERNIA REPAIR  1992       Social History     Socioeconomic History    Marital status:      Spouse name: Not on file    Number of children: Not on file    Years of education: Not on file    Highest education level: Not on file   Occupational History    Not on file   Tobacco Use    Smoking status: Never Smoker    Smokeless tobacco: Never Used Vaping Use    Vaping Use: Never used   Substance and Sexual Activity    Alcohol use: No    Drug use: No    Sexual activity: Not Currently   Other Topics Concern    Not on file   Social History Narrative    Not on file     Social Determinants of Health     Financial Resource Strain: Low Risk     Difficulty of Paying Living Expenses: Not very hard   Food Insecurity: No Food Insecurity    Worried About Running Out of Food in the Last Year: Never true    Kiah of Food in the Last Year: Never true   Transportation Needs:     Lack of Transportation (Medical): Not on file    Lack of Transportation (Non-Medical):  Not on file   Physical Activity:     Days of Exercise per Week: Not on file    Minutes of Exercise per Session: Not on file   Stress:     Feeling of Stress : Not on file   Social Connections:     Frequency of Communication with Friends and Family: Not on file    Frequency of Social Gatherings with Friends and Family: Not on file    Attends Scientology Services: Not on file    Active Member of [address] [place] [address] or Organizations: Not on file    Attends Club or Organization Meetings: Not on file    Marital Status: Not on file   Intimate Partner Violence:     Fear of Current or Ex-Partner: Not on file    Emotionally Abused: Not on file    Physically Abused: Not on file    Sexually Abused: Not on file   Housing Stability:     Unable to Pay for Housing in the Last Year: Not on file    Number of [place] in the Last Year: Not on file    Unstable Housing in the Last Year: Not on file        Family History   Problem Relation Age of Onset    Diabetes Mother     Heart Disease Father         whooping cough as child    Obesity Sister     Other Brother         tumor on back    Obesity Sister     Cancer Sister         Skin     Cancer Brother         Bone cancer from metal    Other Brother         passed as a child    Heart Disease Brother     Other Brother         tremor    Heart Attack Brother  No Known Problems Brother     Heart Disease Brother     No Known Problems Brother     No Known Problems Brother        ADVANCE DIRECTIVE: N, <no information>    Vitals:    05/03/22 1101   BP: 126/78   Pulse: 78   Resp: 20   Temp: 97 °F (36.1 °C)   TempSrc: Infrared   SpO2: 98%   Weight: 198 lb (89.8 kg)     Estimated body mass index is 38.67 kg/m² as calculated from the following:    Height as of 1/5/22: 5' (1.524 m). Weight as of this encounter: 198 lb (89.8 kg). Physical Exam  Vitals and nursing note reviewed. Constitutional:       Appearance: She is well-developed. HENT:      Head: Normocephalic and atraumatic. Right Ear: External ear normal.      Left Ear: External ear normal.      Nose: Nose normal.      Mouth/Throat:      Mouth: Mucous membranes are moist.   Eyes:      Pupils: Pupils are equal, round, and reactive to light. Neck:      Thyroid: No thyromegaly. Vascular: No carotid bruit. Cardiovascular:      Rate and Rhythm: Normal rate and regular rhythm. Heart sounds: Normal heart sounds. Pulmonary:      Breath sounds: Normal breath sounds. No wheezing or rales. Abdominal:      General: Bowel sounds are normal.      Palpations: Abdomen is soft. Tenderness: There is no abdominal tenderness. There is no guarding or rebound. Musculoskeletal:         General: Normal range of motion. Cervical back: Neck supple. Lymphadenopathy:      Cervical: No cervical adenopathy. Skin:     General: Skin is warm and dry. Findings: No rash. Neurological:      Mental Status: She is alert and oriented to person, place, and time. Cranial Nerves: No cranial nerve deficit. Deep Tendon Reflexes: Reflexes are normal and symmetric. No flowsheet data found.     Lab Results   Component Value Date    CHOL 144 09/13/2021    CHOL 144 01/26/2021    CHOL 140 02/26/2020    TRIG 123 09/13/2021    TRIG 128 01/26/2021    TRIG 127 02/26/2020    HDL 44 09/13/2021    HDL 40 01/26/2021    HDL 41 02/26/2020    HDL 34 05/04/2012    LDLCALC 75 09/13/2021    LDLCALC 78 01/26/2021    LDLCALC 74 02/26/2020    GLUCOSE 81 03/07/2022    GLUCOSE 146 08/11/2021    LABA1C 7.2 09/13/2021    LABA1C 7.8 12/28/2020    LABA1C 6.5 02/26/2020       The ASCVD Risk score (Tay Vaughn., et al., 2013) failed to calculate for the following reasons:     The 2013 ASCVD risk score is only valid for ages 36 to 78    Immunization History   Administered Date(s) Administered    DTaP 03/07/1997    DTaP (Infanrix) 03/07/1997    Hib vaccine 09/21/2017    Hib, unspecified 09/21/2017    Influenza 10/12/2011, 11/02/2012    Influenza Vaccine, unspecified formulation 02/02/2016, 11/03/2016    Influenza Virus Vaccine 10/04/2014    Influenza Whole 10/08/1999, 11/28/2006, 10/01/2007, 10/16/2008, 08/25/2009, 12/03/2010    Influenza, High Dose (Fluzone 65 yrs and older) 09/21/2017    Influenza, Kaylah Nestor, 6 mo and older, IM, PF (Flulaval, Fluarix) 10/01/2018    Influenza, Triv, inactivated, subunit, adjuvanted, IM (Fluad 65 yrs and older) 02/26/2020    Meningococcal MCV4O (Menveo) 09/21/2017    Meningococcal MCV4P (Menactra) 09/21/2017    PPD Test 10/01/2018    Pneumococcal Conjugate 13-valent ([id number]) 11/03/2016    Pneumococcal Polysaccharide ([id number]) 10/08/1995, 09/21/2017    Tdap (Boostrix, Adacel) 10/03/2017    Tetanus 03/07/1997, 09/15/2008    Unknown Immunization 10/01/2018       Health Maintenance   Topic Date Due    COVID-19 Vaccine (1) Never done    Shingles vaccine (1 of 2) Never done   [employer] Visit (AWV)  04/22/2021    Depression Screen  05/21/2022    Flu vaccine (Season Ended) 09/01/2022    Potassium  03/07/2023    Creatinine  03/07/2023    DTaP/Tdap/Td vaccine (3 - Td or Tdap) 10/03/2027    Pneumococcal 65+ years Vaccine  Completed    DEXA (modify frequency per FRAX score)  Addressed    Hepatitis A vaccine  Aged Out    Hib vaccine  Aged Out    Meningococcal (ACWY) vaccine  Aged Out       Assessment & Plan   Benign essential HTN  -     amLODIPine (NORVASC) 5 MG tablet; Take 0.5 tablets by mouth daily Hold if BP<110 or HR <70, Disp-45 tablet, R-3Normal  Hypertensive crisis  -     bumetanide (BUMEX) 1 MG tablet; Take 1 tablet by mouth 2 times daily, Disp-180 tablet, R-3Normal  Type 2 diabetes mellitus with diabetic neuropathy, without long-term current use of insulin (HCC)  -     metFORMIN (GLUCOPHAGE) 1000 MG tablet; TAKE ONE TABLET IN THE EVENING, Disp-90 tablet, R-3Normal  Gastroesophageal reflux disease, unspecified whether esophagitis present  -     omeprazole (PRILOSEC) 20 MG delayed release capsule; TAKE ONE CAPSULE BY MOUTH ONCE DAILY, Disp-90 capsule, R-3Normal  Hypokalemia  -     potassium chloride (KLOR-CON M) 20 MEQ extended release tablet; Take 2 tablets by mouth daily, Disp-180 tablet, R-3Normal  Type 2 diabetes mellitus with microalbuminuria, without long-term current use of insulin (HCC)  -     SITagliptin (JANUVIA) 100 MG tablet; Take 1 tablet by mouth daily, Disp-90 tablet, R-3Normal  Allergic rhinitis, unspecified seasonality, unspecified trigger  -     loratadine (CLARITIN) 10 MG tablet; Take 1 tablet by mouth daily, Disp-90 tablet, R-3Normal  Convulsions, unspecified convulsion type (HCC)  -     levETIRAcetam (KEPPRA) 1000 MG tablet; Take 1 tablet by mouth 2 times daily, Disp-180 tablet, R-3Normal  Encouraged diet, exercise and weight loss. Reviewed health maintenance      No follow-ups on file.          --Carrington Greer MD

## 2022-05-05 ENCOUNTER — HOSPITAL ENCOUNTER (INPATIENT)
Age: 82
LOS: 4 days | Discharge: HOME | DRG: 682 | End: 2022-05-10
Attending: FAMILY MEDICINE | Admitting: FAMILY MEDICINE
Payer: MEDICARE

## 2022-05-05 ENCOUNTER — APPOINTMENT (OUTPATIENT)
Dept: GENERAL RADIOLOGY | Age: 82
DRG: 682 | End: 2022-05-05
Payer: MEDICARE

## 2022-05-05 DIAGNOSIS — R53.1 GENERALIZED WEAKNESS: Primary | ICD-10-CM

## 2022-05-05 DIAGNOSIS — N30.00 ACUTE CYSTITIS WITHOUT HEMATURIA: ICD-10-CM

## 2022-05-05 DIAGNOSIS — R53.83 FATIGUE, UNSPECIFIED TYPE: ICD-10-CM

## 2022-05-05 LAB
ALBUMIN SERPL-MCNC: 3.9 G/DL (ref 3.5–5.1)
ALP BLD-CCNC: 146 U/L (ref 38–126)
ALT SERPL-CCNC: 24 U/L (ref 11–66)
ANION GAP SERPL CALCULATED.3IONS-SCNC: 12 MEQ/L (ref 8–16)
AST SERPL-CCNC: 20 U/L (ref 5–40)
BACTERIA: ABNORMAL
BASOPHILS # BLD: 0.2 %
BASOPHILS ABSOLUTE: 0 THOU/MM3 (ref 0–0.1)
BILIRUB SERPL-MCNC: 0.3 MG/DL (ref 0.3–1.2)
BILIRUBIN DIRECT: < 0.2 MG/DL (ref 0–0.3)
BILIRUBIN URINE: NEGATIVE
BLOOD, URINE: ABNORMAL
BUN BLDV-MCNC: 29 MG/DL (ref 7–22)
CALCIUM SERPL-MCNC: 8.8 MG/DL (ref 8.5–10.5)
CASTS: ABNORMAL /LPF
CASTS: ABNORMAL /LPF
CHARACTER, URINE: ABNORMAL
CHLORIDE BLD-SCNC: 106 MEQ/L (ref 98–111)
CO2: 23 MEQ/L (ref 23–33)
COLOR: YELLOW
CREAT SERPL-MCNC: 1.4 MG/DL (ref 0.4–1.2)
CRYSTALS: ABNORMAL
EOSINOPHIL # BLD: 1.6 %
EOSINOPHILS ABSOLUTE: 0.1 THOU/MM3 (ref 0–0.4)
EPITHELIAL CELLS, UA: ABNORMAL /HPF
ERYTHROCYTE [DISTWIDTH] IN BLOOD BY AUTOMATED COUNT: 15.3 % (ref 11.5–14.5)
ERYTHROCYTE [DISTWIDTH] IN BLOOD BY AUTOMATED COUNT: 49.4 FL (ref 35–45)
FLU A ANTIGEN: NEGATIVE
FLU B ANTIGEN: NEGATIVE
GFR SERPL CREATININE-BSD FRML MDRD: 36 ML/MIN/1.73M2
GLUCOSE BLD-MCNC: 210 MG/DL (ref 70–108)
GLUCOSE BLD-MCNC: 220 MG/DL (ref 70–108)
GLUCOSE, URINE: NEGATIVE MG/DL
HCT VFR BLD CALC: 31.2 % (ref 37–47)
HEMOGLOBIN: 9.8 GM/DL (ref 12–16)
IMMATURE GRANS (ABS): 0.05 THOU/MM3 (ref 0–0.07)
IMMATURE GRANULOCYTES: 0.9 %
KETONES, URINE: NEGATIVE
LACTIC ACID, SEPSIS: 1.8 MMOL/L (ref 0.5–1.9)
LEUKOCYTE ESTERASE, URINE: ABNORMAL
LIPASE: 32.8 U/L (ref 5.6–51.3)
LYMPHOCYTES # BLD: 9.1 %
LYMPHOCYTES ABSOLUTE: 0.5 THOU/MM3 (ref 1–4.8)
MCH RBC QN AUTO: 28.1 PG (ref 26–33)
MCHC RBC AUTO-ENTMCNC: 31.4 GM/DL (ref 32.2–35.5)
MCV RBC AUTO: 89.4 FL (ref 81–99)
MISCELLANEOUS LAB TEST RESULT: ABNORMAL
MONOCYTES # BLD: 7 %
MONOCYTES ABSOLUTE: 0.4 THOU/MM3 (ref 0.4–1.3)
NITRITE, URINE: POSITIVE
NUCLEATED RED BLOOD CELLS: 0 /100 WBC
OSMOLALITY CALCULATION: 293.3 MOSMOL/KG (ref 275–300)
PH UA: 6 (ref 5–9)
PLATELET # BLD: 109 THOU/MM3 (ref 130–400)
PMV BLD AUTO: 10.4 FL (ref 9.4–12.4)
POTASSIUM REFLEX MAGNESIUM: 4.5 MEQ/L (ref 3.5–5.2)
PRO-BNP: 677.9 PG/ML (ref 0–1800)
PROTEIN UA: 30 MG/DL
RBC # BLD: 3.49 MILL/MM3 (ref 4.2–5.4)
RBC URINE: ABNORMAL /HPF
REASON FOR REJECTION: NORMAL
REJECTED TEST: NORMAL
RENAL EPITHELIAL, UA: ABNORMAL
SEG NEUTROPHILS: 81.2 %
SEGMENTED NEUTROPHILS ABSOLUTE COUNT: 4.5 THOU/MM3 (ref 1.8–7.7)
SODIUM BLD-SCNC: 141 MEQ/L (ref 135–145)
SPECIFIC GRAVITY UA: 1.01 (ref 1–1.03)
TOTAL PROTEIN: 6.6 G/DL (ref 6.1–8)
TROPONIN T: < 0.01 NG/ML
UROBILINOGEN, URINE: 0.2 EU/DL (ref 0–1)
WBC # BLD: 5.6 THOU/MM3 (ref 4.8–10.8)
WBC UA: ABNORMAL /HPF
YEAST: ABNORMAL

## 2022-05-05 PROCEDURE — 80076 HEPATIC FUNCTION PANEL: CPT

## 2022-05-05 PROCEDURE — 71045 X-RAY EXAM CHEST 1 VIEW: CPT

## 2022-05-05 PROCEDURE — 83690 ASSAY OF LIPASE: CPT

## 2022-05-05 PROCEDURE — 99285 EMERGENCY DEPT VISIT HI MDM: CPT

## 2022-05-05 PROCEDURE — 85025 COMPLETE CBC W/AUTO DIFF WBC: CPT

## 2022-05-05 PROCEDURE — 82948 REAGENT STRIP/BLOOD GLUCOSE: CPT

## 2022-05-05 PROCEDURE — 36415 COLL VENOUS BLD VENIPUNCTURE: CPT

## 2022-05-05 PROCEDURE — 81001 URINALYSIS AUTO W/SCOPE: CPT

## 2022-05-05 PROCEDURE — 83605 ASSAY OF LACTIC ACID: CPT

## 2022-05-05 PROCEDURE — 80048 BASIC METABOLIC PNL TOTAL CA: CPT

## 2022-05-05 PROCEDURE — 6370000000 HC RX 637 (ALT 250 FOR IP): Performed by: NURSE PRACTITIONER

## 2022-05-05 PROCEDURE — 87086 URINE CULTURE/COLONY COUNT: CPT

## 2022-05-05 PROCEDURE — 87186 SC STD MICRODIL/AGAR DIL: CPT

## 2022-05-05 PROCEDURE — 87804 INFLUENZA ASSAY W/OPTIC: CPT

## 2022-05-05 PROCEDURE — 83880 ASSAY OF NATRIURETIC PEPTIDE: CPT

## 2022-05-05 PROCEDURE — 87077 CULTURE AEROBIC IDENTIFY: CPT

## 2022-05-05 PROCEDURE — 93005 ELECTROCARDIOGRAM TRACING: CPT | Performed by: FAMILY MEDICINE

## 2022-05-05 PROCEDURE — 84484 ASSAY OF TROPONIN QUANT: CPT

## 2022-05-05 RX ORDER — ACETAMINOPHEN 325 MG/1
650 TABLET ORAL ONCE
Status: DISCONTINUED | OUTPATIENT
Start: 2022-05-05 | End: 2022-05-05

## 2022-05-05 RX ORDER — IBUPROFEN 200 MG
600 TABLET ORAL ONCE
Status: COMPLETED | OUTPATIENT
Start: 2022-05-05 | End: 2022-05-05

## 2022-05-05 RX ADMIN — IBUPROFEN 600 MG: 200 TABLET, FILM COATED ORAL at 23:26

## 2022-05-05 ASSESSMENT — PAIN - FUNCTIONAL ASSESSMENT
PAIN_FUNCTIONAL_ASSESSMENT: NONE - DENIES PAIN

## 2022-05-06 PROBLEM — N17.9 ACUTE KIDNEY INJURY (HCC): Status: ACTIVE | Noted: 2022-05-06

## 2022-05-06 LAB
ALBUMIN SERPL-MCNC: 3.1 G/DL (ref 3.5–5.1)
ALP BLD-CCNC: 100 U/L (ref 38–126)
ALT SERPL-CCNC: 17 U/L (ref 11–66)
ANION GAP SERPL CALCULATED.3IONS-SCNC: 12 MEQ/L (ref 8–16)
AST SERPL-CCNC: 17 U/L (ref 5–40)
BILIRUB SERPL-MCNC: 0.2 MG/DL (ref 0.3–1.2)
BUN BLDV-MCNC: 30 MG/DL (ref 7–22)
CALCIUM SERPL-MCNC: 8 MG/DL (ref 8.5–10.5)
CHLORIDE BLD-SCNC: 108 MEQ/L (ref 98–111)
CO2: 23 MEQ/L (ref 23–33)
CREAT SERPL-MCNC: 1.4 MG/DL (ref 0.4–1.2)
EKG ATRIAL RATE: 109 BPM
EKG P AXIS: -14 DEGREES
EKG P-R INTERVAL: 164 MS
EKG Q-T INTERVAL: 352 MS
EKG QRS DURATION: 120 MS
EKG QTC CALCULATION (BAZETT): 474 MS
EKG R AXIS: 140 DEGREES
EKG T AXIS: -13 DEGREES
EKG VENTRICULAR RATE: 109 BPM
GFR SERPL CREATININE-BSD FRML MDRD: 36 ML/MIN/1.73M2
GLUCOSE BLD-MCNC: 112 MG/DL (ref 70–108)
GLUCOSE BLD-MCNC: 112 MG/DL (ref 70–108)
GLUCOSE BLD-MCNC: 116 MG/DL (ref 70–108)
GLUCOSE BLD-MCNC: 117 MG/DL (ref 70–108)
GLUCOSE BLD-MCNC: 140 MG/DL (ref 70–108)
GLUCOSE BLD-MCNC: 156 MG/DL (ref 70–108)
MRSA SCREEN RT-PCR: POSITIVE
POTASSIUM REFLEX MAGNESIUM: 3.6 MEQ/L (ref 3.5–5.2)
SARS-COV-2, NAAT: DETECTED
SODIUM BLD-SCNC: 143 MEQ/L (ref 135–145)
TOTAL PROTEIN: 5 G/DL (ref 6.1–8)

## 2022-05-06 PROCEDURE — 96365 THER/PROPH/DIAG IV INF INIT: CPT

## 2022-05-06 PROCEDURE — 87075 CULTR BACTERIA EXCEPT BLOOD: CPT

## 2022-05-06 PROCEDURE — 2580000003 HC RX 258

## 2022-05-06 PROCEDURE — 97530 THERAPEUTIC ACTIVITIES: CPT

## 2022-05-06 PROCEDURE — 87635 SARS-COV-2 COVID-19 AMP PRB: CPT

## 2022-05-06 PROCEDURE — 80053 COMPREHEN METABOLIC PANEL: CPT

## 2022-05-06 PROCEDURE — 96372 THER/PROPH/DIAG INJ SC/IM: CPT

## 2022-05-06 PROCEDURE — 1200000003 HC TELEMETRY R&B

## 2022-05-06 PROCEDURE — 87186 SC STD MICRODIL/AGAR DIL: CPT

## 2022-05-06 PROCEDURE — 2500000003 HC RX 250 WO HCPCS: Performed by: NURSE PRACTITIONER

## 2022-05-06 PROCEDURE — 6370000000 HC RX 637 (ALT 250 FOR IP)

## 2022-05-06 PROCEDURE — 96366 THER/PROPH/DIAG IV INF ADDON: CPT

## 2022-05-06 PROCEDURE — 87205 SMEAR GRAM STAIN: CPT

## 2022-05-06 PROCEDURE — 87070 CULTURE OTHR SPECIMN AEROBIC: CPT

## 2022-05-06 PROCEDURE — 36415 COLL VENOUS BLD VENIPUNCTURE: CPT

## 2022-05-06 PROCEDURE — 97167 OT EVAL HIGH COMPLEX 60 MIN: CPT

## 2022-05-06 PROCEDURE — 96375 TX/PRO/DX INJ NEW DRUG ADDON: CPT

## 2022-05-06 PROCEDURE — 87641 MR-STAPH DNA AMP PROBE: CPT

## 2022-05-06 PROCEDURE — 82948 REAGENT STRIP/BLOOD GLUCOSE: CPT

## 2022-05-06 PROCEDURE — 6360000002 HC RX W HCPCS

## 2022-05-06 PROCEDURE — 2500000003 HC RX 250 WO HCPCS

## 2022-05-06 PROCEDURE — 6370000000 HC RX 637 (ALT 250 FOR IP): Performed by: FAMILY MEDICINE

## 2022-05-06 PROCEDURE — 93010 ELECTROCARDIOGRAM REPORT: CPT | Performed by: INTERNAL MEDICINE

## 2022-05-06 PROCEDURE — 99223 1ST HOSP IP/OBS HIGH 75: CPT

## 2022-05-06 PROCEDURE — 87077 CULTURE AEROBIC IDENTIFY: CPT

## 2022-05-06 PROCEDURE — 6360000002 HC RX W HCPCS: Performed by: NURSE PRACTITIONER

## 2022-05-06 RX ORDER — LANOLIN ALCOHOL/MO/W.PET/CERES
6 CREAM (GRAM) TOPICAL NIGHTLY PRN
Status: DISCONTINUED | OUTPATIENT
Start: 2022-05-06 | End: 2022-05-10 | Stop reason: HOSPADM

## 2022-05-06 RX ORDER — INSULIN LISPRO 100 [IU]/ML
0-12 INJECTION, SOLUTION INTRAVENOUS; SUBCUTANEOUS
Status: DISCONTINUED | OUTPATIENT
Start: 2022-05-06 | End: 2022-05-10 | Stop reason: HOSPADM

## 2022-05-06 RX ORDER — LINEZOLID 2 MG/ML
600 INJECTION, SOLUTION INTRAVENOUS EVERY 12 HOURS
Status: DISCONTINUED | OUTPATIENT
Start: 2022-05-06 | End: 2022-05-06

## 2022-05-06 RX ORDER — ALBUTEROL SULFATE 2.5 MG/3ML
2.5 SOLUTION RESPIRATORY (INHALATION) EVERY 6 HOURS PRN
Status: DISCONTINUED | OUTPATIENT
Start: 2022-05-06 | End: 2022-05-10 | Stop reason: HOSPADM

## 2022-05-06 RX ORDER — PREGABALIN 150 MG/1
300 CAPSULE ORAL 2 TIMES DAILY
COMMUNITY
End: 2022-06-06 | Stop reason: SDUPTHER

## 2022-05-06 RX ORDER — DEXTROSE MONOHYDRATE 50 MG/ML
100 INJECTION, SOLUTION INTRAVENOUS PRN
Status: DISCONTINUED | OUTPATIENT
Start: 2022-05-06 | End: 2022-05-10 | Stop reason: HOSPADM

## 2022-05-06 RX ORDER — SODIUM CHLORIDE 0.9 % (FLUSH) 0.9 %
5-40 SYRINGE (ML) INJECTION PRN
Status: DISCONTINUED | OUTPATIENT
Start: 2022-05-06 | End: 2022-05-10 | Stop reason: HOSPADM

## 2022-05-06 RX ORDER — ZINC SULFATE 50(220)MG
50 CAPSULE ORAL DAILY
Status: DISCONTINUED | OUTPATIENT
Start: 2022-05-06 | End: 2022-05-10 | Stop reason: HOSPADM

## 2022-05-06 RX ORDER — INSULIN LISPRO 100 [IU]/ML
0-6 INJECTION, SOLUTION INTRAVENOUS; SUBCUTANEOUS NIGHTLY
Status: DISCONTINUED | OUTPATIENT
Start: 2022-05-06 | End: 2022-05-10 | Stop reason: HOSPADM

## 2022-05-06 RX ORDER — SODIUM CHLORIDE 9 MG/ML
INJECTION, SOLUTION INTRAVENOUS PRN
Status: DISCONTINUED | OUTPATIENT
Start: 2022-05-06 | End: 2022-05-10 | Stop reason: HOSPADM

## 2022-05-06 RX ORDER — ACETAMINOPHEN 325 MG/1
650 TABLET ORAL EVERY 6 HOURS PRN
Status: DISCONTINUED | OUTPATIENT
Start: 2022-05-06 | End: 2022-05-10 | Stop reason: HOSPADM

## 2022-05-06 RX ORDER — HEPARIN SODIUM 5000 [USP'U]/ML
5000 INJECTION, SOLUTION INTRAVENOUS; SUBCUTANEOUS EVERY 8 HOURS SCHEDULED
Status: DISCONTINUED | OUTPATIENT
Start: 2022-05-06 | End: 2022-05-10 | Stop reason: HOSPADM

## 2022-05-06 RX ORDER — LEVETIRACETAM 500 MG/1
1000 TABLET ORAL 2 TIMES DAILY
Status: DISCONTINUED | OUTPATIENT
Start: 2022-05-06 | End: 2022-05-10 | Stop reason: HOSPADM

## 2022-05-06 RX ORDER — SODIUM CHLORIDE 9 MG/ML
INJECTION, SOLUTION INTRAVENOUS CONTINUOUS
Status: DISCONTINUED | OUTPATIENT
Start: 2022-05-06 | End: 2022-05-10 | Stop reason: HOSPADM

## 2022-05-06 RX ORDER — ACETAMINOPHEN 650 MG/1
650 SUPPOSITORY RECTAL EVERY 6 HOURS PRN
Status: DISCONTINUED | OUTPATIENT
Start: 2022-05-06 | End: 2022-05-10 | Stop reason: HOSPADM

## 2022-05-06 RX ORDER — FERROUS SULFATE 325(65) MG
325 TABLET ORAL
Status: DISCONTINUED | OUTPATIENT
Start: 2022-05-06 | End: 2022-05-10 | Stop reason: HOSPADM

## 2022-05-06 RX ORDER — POTASSIUM CHLORIDE 20 MEQ/1
40 TABLET, EXTENDED RELEASE ORAL DAILY
Status: DISCONTINUED | OUTPATIENT
Start: 2022-05-06 | End: 2022-05-10 | Stop reason: HOSPADM

## 2022-05-06 RX ORDER — DEXTROSE MONOHYDRATE 25 G/50ML
12.5 INJECTION, SOLUTION INTRAVENOUS PRN
Status: DISCONTINUED | OUTPATIENT
Start: 2022-05-06 | End: 2022-05-06 | Stop reason: CLARIF

## 2022-05-06 RX ORDER — ASCORBIC ACID 500 MG
500 TABLET ORAL DAILY
Status: DISCONTINUED | OUTPATIENT
Start: 2022-05-06 | End: 2022-05-10 | Stop reason: HOSPADM

## 2022-05-06 RX ORDER — AMLODIPINE BESYLATE 2.5 MG/1
2.5 TABLET ORAL DAILY
Status: DISCONTINUED | OUTPATIENT
Start: 2022-05-06 | End: 2022-05-10 | Stop reason: HOSPADM

## 2022-05-06 RX ORDER — PANTOPRAZOLE SODIUM 40 MG/1
40 TABLET, DELAYED RELEASE ORAL
Status: DISCONTINUED | OUTPATIENT
Start: 2022-05-06 | End: 2022-05-10 | Stop reason: HOSPADM

## 2022-05-06 RX ORDER — SODIUM CHLORIDE 0.9 % (FLUSH) 0.9 %
5-40 SYRINGE (ML) INJECTION EVERY 12 HOURS SCHEDULED
Status: DISCONTINUED | OUTPATIENT
Start: 2022-05-06 | End: 2022-05-10 | Stop reason: HOSPADM

## 2022-05-06 RX ORDER — BUMETANIDE 1 MG/1
1 TABLET ORAL 2 TIMES DAILY
Status: DISCONTINUED | OUTPATIENT
Start: 2022-05-06 | End: 2022-05-10 | Stop reason: HOSPADM

## 2022-05-06 RX ORDER — VITAMIN B COMPLEX
1000 TABLET ORAL DAILY
Status: DISCONTINUED | OUTPATIENT
Start: 2022-05-06 | End: 2022-05-10 | Stop reason: HOSPADM

## 2022-05-06 RX ORDER — BENZONATATE 100 MG/1
100 CAPSULE ORAL 3 TIMES DAILY PRN
Status: DISCONTINUED | OUTPATIENT
Start: 2022-05-06 | End: 2022-05-10 | Stop reason: HOSPADM

## 2022-05-06 RX ORDER — NICOTINE POLACRILEX 4 MG
15 LOZENGE BUCCAL PRN
Status: DISCONTINUED | OUTPATIENT
Start: 2022-05-06 | End: 2022-05-06 | Stop reason: CLARIF

## 2022-05-06 RX ORDER — CETIRIZINE HYDROCHLORIDE 10 MG/1
10 TABLET ORAL DAILY
Status: DISCONTINUED | OUTPATIENT
Start: 2022-05-06 | End: 2022-05-10 | Stop reason: HOSPADM

## 2022-05-06 RX ADMIN — FERROUS SULFATE TAB 325 MG (65 MG ELEMENTAL FE) 325 MG: 325 (65 FE) TAB at 09:13

## 2022-05-06 RX ADMIN — SODIUM CHLORIDE: 9 INJECTION, SOLUTION INTRAVENOUS at 17:27

## 2022-05-06 RX ADMIN — HEPARIN SODIUM 5000 UNITS: 5000 INJECTION INTRAVENOUS; SUBCUTANEOUS at 13:29

## 2022-05-06 RX ADMIN — PANTOPRAZOLE SODIUM 40 MG: 40 TABLET, DELAYED RELEASE ORAL at 09:23

## 2022-05-06 RX ADMIN — LEVETIRACETAM 1000 MG: 500 TABLET, FILM COATED ORAL at 09:14

## 2022-05-06 RX ADMIN — AMLODIPINE BESYLATE 2.5 MG: 2.5 TABLET ORAL at 09:14

## 2022-05-06 RX ADMIN — BENZONATATE 100 MG: 100 CAPSULE ORAL at 14:28

## 2022-05-06 RX ADMIN — CETIRIZINE HYDROCHLORIDE 10 MG: 10 TABLET, FILM COATED ORAL at 09:14

## 2022-05-06 RX ADMIN — LEVETIRACETAM 1000 MG: 500 TABLET, FILM COATED ORAL at 22:14

## 2022-05-06 RX ADMIN — HEPARIN SODIUM 5000 UNITS: 5000 INJECTION INTRAVENOUS; SUBCUTANEOUS at 22:13

## 2022-05-06 RX ADMIN — CEFTRIAXONE SODIUM 1000 MG: 10 INJECTION, POWDER, FOR SOLUTION INTRAVENOUS at 01:01

## 2022-05-06 RX ADMIN — DOXYCYCLINE 100 MG: 100 INJECTION, POWDER, LYOPHILIZED, FOR SOLUTION INTRAVENOUS at 05:55

## 2022-05-06 RX ADMIN — SODIUM CHLORIDE: 9 INJECTION, SOLUTION INTRAVENOUS at 04:22

## 2022-05-06 RX ADMIN — HEPARIN SODIUM 5000 UNITS: 5000 INJECTION INTRAVENOUS; SUBCUTANEOUS at 05:55

## 2022-05-06 RX ADMIN — FERROUS SULFATE TAB 325 MG (65 MG ELEMENTAL FE) 325 MG: 325 (65 FE) TAB at 13:30

## 2022-05-06 RX ADMIN — ACETAMINOPHEN 650 MG: 325 TABLET ORAL at 17:16

## 2022-05-06 RX ADMIN — OXYCODONE HYDROCHLORIDE AND ACETAMINOPHEN 500 MG: 500 TABLET ORAL at 14:29

## 2022-05-06 RX ADMIN — FERROUS SULFATE TAB 325 MG (65 MG ELEMENTAL FE) 325 MG: 325 (65 FE) TAB at 17:17

## 2022-05-06 RX ADMIN — DOXYCYCLINE 100 MG: 100 INJECTION, POWDER, LYOPHILIZED, FOR SOLUTION INTRAVENOUS at 18:36

## 2022-05-06 RX ADMIN — Medication 50 MG: at 14:28

## 2022-05-06 RX ADMIN — Medication 1000 UNITS: at 14:28

## 2022-05-06 ASSESSMENT — ENCOUNTER SYMPTOMS
NAUSEA: 0
RHINORRHEA: 0
ABDOMINAL PAIN: 0
COUGH: 0
EYE PAIN: 0
DIARRHEA: 0
CONSTIPATION: 0
SHORTNESS OF BREATH: 0
WHEEZING: 0
BLOOD IN STOOL: 0
VOMITING: 0

## 2022-05-06 ASSESSMENT — PAIN - FUNCTIONAL ASSESSMENT
PAIN_FUNCTIONAL_ASSESSMENT: NONE - DENIES PAIN
PAIN_FUNCTIONAL_ASSESSMENT: NONE - DENIES PAIN

## 2022-05-06 ASSESSMENT — PAIN DESCRIPTION - LOCATION: LOCATION: OTHER (COMMENT)

## 2022-05-06 ASSESSMENT — PAIN SCALES - GENERAL: PAINLEVEL_OUTOF10: 5

## 2022-05-06 ASSESSMENT — PAIN DESCRIPTION - DESCRIPTORS: DESCRIPTORS: ACHING

## 2022-05-06 ASSESSMENT — PAIN DESCRIPTION - ORIENTATION: ORIENTATION: MID

## 2022-05-06 NOTE — H&P
History & Physical    Patient: Rochelle Belcher  YOB: 1940  Date of Service: 5/6/2022  MRN: [id number]   Acct:  [de-identified]   Primary Care Physician: Geoff Anderson MD        Chief Complaint: Fatigue and weakness    Assessment and plan:    1. 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 GFR 36. Widened BUN to creatinine ratio suggesting prerenal etiology. However, patient does report that she has had good oral intake following discharge from the ECF. IDALIA likely compounded by home medications including Bumex and metformin. Pyelonephritis is on the differential secondary to #2. Trial IV fluids and repeat BMP. Hold Bumex and metformin. Avoid nephrotoxic agents. 2. Uncomplicated urinary tract infection: UA showing positive nitrates, moderate leukocyte esterase, WBC , many bacteria. Patient with symptoms of fatigue, weakness, and dysuria. Urine cultures pending. Patient received dose of Rocephin in ER. We will continue Rocephin at this time and monitor urine culture for speciation and sensitivities. 3. Open wound to left shin: Per patient's daughter, wound was noted on patient's left shin prior to discharge from ECF. Patient had apparently run into another patient's wheelchair. On removal of dressing, wound appears erythematous with minimal purulent drainage. Wound consult has been placed. Wound cultures ordered. Zyvox has been ordered to cover strep, MSSA, MRSA as patient has history of MRSA. We will avoid ordering Vanco in light of IDALIA. 4. SIRS 2/4, likely secondary to #2 versus #3: EKG on arrival showing sinus tachycardia heart rate of 109, patient febrile with a temp of 101.2. Lactic acid and WBC within normal limits. 5. Dyspnea on exertion: Chest x-ray showing Stable cardiomegaly without pulmonary vascular congestion. Expiratory wheezing heard posteriorly. Albuterol nebulizers ordered as needed.   6. COVID-19 positive, without hypoxia: SPO2 96% on room air. Continue to monitor. 7. Chronic normocytic anemia likely secondary to chronic disease: Hemoglobin on arrival 9.8. Baseline appears to be around 10-12. No active signs of bleeding. Will monitor with daily CBC. 8. NIDDMII: Hemoglobin A1c 7.2 in September 2021. Glucose on arrival 210. Will hold home oral antidiabetic medications. Carb controlled diet, sliding scale insulin, hypoglycemia treatment protocol ordered. 9. Mild thrombocytopenia, may be reactionary COVID-19 infection:  Platelets on arrival 109. Will monitor with daily CBC. 10. History of primary hypertension, controlled: Blood pressure currently 136/62. Continue amlodipine. 11. Unspecified convulsions, per PCP note: Continue Keppra. 12. CODE STATUS: Discussed with patient and daughter. Daughter reports that patient does not have any signed paperwork regarding power of  or CODE STATUS. Patient is currently a full code. Palliative consult placed for CODE STATUS discussion. History of Present Illness:   History obtained from child, chart review and the patient. The patient is a [de-identified] y.o. female with a past medical history of hypertension, hyperlipidemia, and diabetes mellitus type II who presents with complaints of increased fatigue and weakness. Patient was discharged from SNF approximately 1 week ago and per her daughter has been doing well. Patient is attending outpatient physical therapy. She underwent physical therapy yesterday and today. Daughter reports the patient had increased fatigue and weakness today following therapy session. She reported feeling short of breath when transferring from her wheelchair to recliner chair. Patient also developed fever at home with complaints of chills.   Daughter reports that she gave her mother Tylenol and then brought patient to ER to be evaluated. Upon arrival to ER patient was found to have a urinary tract infection and was administered Rocephin. Patient was also febrile post Tylenol administration with a temp of 101.2. Ibuprofen was administered. 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    Cataract of both eyes     COVID-19     DDD (degenerative disc disease), lumbar     DM2 (diabetes mellitus, type 2) ([address]     diagnoses approx 2000    Dysphagia, pharyngoesophageal 09/26/2016    Eczema 03/2018    Fibrocystic breast     H/O ventral hernia repair     Headache 02/09/2017    History of COVID-19 12/2020    Hyperlipidemia     Hypertension     Iron deficiency anemia     LPRD (laryngopharyngeal reflux disease) 09/26/2016    Neuropathy     peripheral    Obesity     Orbital abscess     Orbital cellulitis 01/22/2014    SWAPNA (obstructive sleep apnea)     Osteoarthritis     Osteoporosis     Persistent proteinuria associated with type 2 diabetes mellitus ([facility] 01/2017    urine micral of 50.       Sinusitis     Velopharyngeal incompetence 09/26/2016       Past Surgical History:        Procedure Laterality Date    ABDOMEN SURGERY      APPENDECTOMY      CARPAL TUNNEL RELEASE Bilateral 2008, 2009    CATARACT REMOVAL  2011    bilat    CHOLECYSTECTOMY  03/1988    COLONOSCOPY  2016    COLONOSCOPY  10/11/2018    COLONOSCOPY POLYPECTOMY SNARE/COLD BIOPSY performed by aRmona Mao MD at [address] ESOPHAGUS      ENDOSCOPY, COLON, DIAGNOSTIC      EYE SURGERY Left 01/30/2015    EYE SURGERY      CATARACT REMOVAL- BILATERAL    HEMORRHOID SURGERY  1980s    HERNIA REPAIR      HYSTERECTOMY, TOTAL ABDOMINAL  1980    JOINT REPLACEMENT  bilat 2005/ 2007    knees    [place] COLSC FLX WITH DIRECTED SUBMUCOSAL Simone Oskar [address] ANY SBST  10/11/2018    COLONOSCOPY SUBMUCOSAL/BOTOX INJECTION performed by Ramona Mao MD at [facility]    SINUS SURGERY  last 2009    removed a bone (2)--2 times no construction     SINUS SURGERY  02/01/2016    SINUS SURGERY  2016 x 2    SINUS SURGERY  09/18/2017    [facility] - Dr Jony Baldwin SINUS SURGERY  08/09/2017 [facility]17/2017 [facility]    TONSILLECTOMY      TOTAL KNEE ARTHROPLASTY  08/2003    Left TKR    VENTRAL HERNIA REPAIR  1992       Home Medications:   No current facility-administered medications on file prior to encounter. Current Outpatient Medications on File Prior to Encounter   Medication Sig Dispense Refill    pregabalin (LYRICA) 150 MG capsule Take 300 mg by mouth 2 times daily.  ibuprofen (ADVIL;MOTRIN) 200 MG tablet Take 200 mg by mouth every 6 hours as needed for Pain      melatonin 3 MG TABS tablet Take 6 mg by mouth nightly as needed      acetaminophen (TYLENOL) 500 MG tablet Take 500 mg by mouth daily      amLODIPine (NORVASC) 5 MG tablet Take 0.5 tablets by mouth daily Hold if BP<110 or HR <70 45 tablet 3    bumetanide (BUMEX) 1 MG tablet Take 1 tablet by mouth 2 times daily 180 tablet 3    loratadine (CLARITIN) 10 MG tablet Take 1 tablet by mouth daily 90 tablet 3    metFORMIN (GLUCOPHAGE) 1000 MG tablet TAKE ONE TABLET IN THE EVENING 90 tablet 3    levETIRAcetam (KEPPRA) 1000 MG tablet Take 1 tablet by mouth 2 times daily 180 tablet 3    omeprazole (PRILOSEC) 20 MG delayed release capsule TAKE ONE CAPSULE BY MOUTH ONCE DAILY 90 capsule 3    potassium chloride (KLOR-CON M) 20 MEQ extended release tablet Take 2 tablets by mouth daily 180 tablet 3    SITagliptin (JANUVIA) 100 MG tablet Take 1 tablet by mouth daily 90 tablet 3    zinc sulfate (ZINCATE) 220 (50 Zn) MG capsule Take 50 mg by mouth daily      Handicap Placard MISC by Does not apply route Dx:I187.2,M81.0. Duration: 5 years. 2 each 0    Misc. Devices (WALKER) MISC 1 each by Does not apply route daily Requests stand up walker due to heart failure and generalized OA.   I50.23. 1 each 0    ascorbic acid (VITAMIN C) 1000 MG tablet Take 1 tablet by mouth daily (Patient taking differently: Take 250 mg by mouth 3 times daily Along with iron tablet) 30 tablet 3    Vitamin D (CHOLECALCIFEROL) 50 MCG (2000 UT) TABS tablet Take 1 tablet by mouth daily 60 tablet 0    ferrous sulfate (IRON 325) 325 (65 Fe) MG tablet Take 325 mg by mouth 3 times daily (with meals)       [DISCONTINUED] Handicap Placard MISC by Does not apply route Dx:I187.2,M81.0. Duration: 5 years. 2 each 0    sodium chloride (OCEAN, BABY AYR) 0.65 % nasal spray 1 spray by Nasal route as needed for Congestion          Allergies:  Lisinopril, Sulfamethoxazole-trimethoprim, Morphine, Codeine, Hydrocodone, Percocet [oxycodone-acetaminophen], and Tape [adhesive tape]    Social History:    reports that she has never smoked. She has never used smokeless tobacco. She reports that she does not drink alcohol and does not use drugs. Family History:       Problem Relation Age of Onset    Diabetes Mother     Heart Disease Father         whooping cough as child    Obesity Sister     Other Brother         tumor on back    Obesity Sister     Cancer Sister         Skin     Cancer Brother         Bone cancer from metal    Other Brother         passed as a child    Heart Disease Brother     Other Brother         tremor    Heart Attack Brother     No Known Problems Brother     Heart Disease Brother     No Known Problems Brother     No Known Problems Brother        Review of systems:  Constitutional: no night sweats, positive for fatigue, weakness, fever  Head: no headache, no head injury, no migranes. Eye: no blurring of vision, no double vision.   Ears: no hearing difficulty, no tinnitus  Mouth/throat: no ulceration, dental caries, dysphagia  Lungs: no cough, no shortness of breath, no wheeze  CVS: no palpitation, no chest pain, positive for dyspnea on exertion  GI: no abdominal pain, no nausea , no vomiting, no constipation  ABA: no frequency and urgency, no hematuria, no kidney stones positive for burning with urination  Musculoskeletal: no joint pain, swelling , stiffness  Endocrine: no polyuria, polydypsia, no cold or heat intolerence  Hematology: no anemia, no easy brusing or bleeding, no hx of clotting disorder  Dermatology: no skin rash, no eczema, no prurities,  Psychiatry: no depression, no anxiety,no panic attacks, no suicide ideation  Neurology: no syncope, no seizures, no numbness or tingling of hands, no numbness or tingling of feet, no paresis    10 point review of systems completed, all other than noted above are negative. Vitals:   Vitals:    05/06/22 0128   BP: (!) 135/58   Pulse: 80   Resp: 18   Temp: 99.5 °F (37.5 °C)   SpO2: 96%      BMI: Body mass index is 38.84 kg/m².                 Exam:  Physical Examination: General appearance - chronically ill appearing  Mental status -arousable to voice and touch, oriented but hard of hearing  Chest -scattered expiratory wheezing posteriorly  Heart - normal rate, regular rhythm, normal S1, S2  Abdomen - soft, nontender, nondistended, no masses or organomegaly  Neurological -arousable to name, follows commands, very [place]  Musculoskeletal - no joint tenderness, deformity or swelling  Extremities - peripheral pulses normal, no pedal edema, no clubbing or cyanosis  Skin -open lesion to left shin with erythema and minimal purulent drainage      Review of Labs and Diagnostic Testing:    Recent Results (from the past 24 hour(s))   EKG 12 Lead    Collection Time: 05/05/22  8:53 PM   Result Value Ref Range    Ventricular Rate 109 BPM    Atrial Rate 109 BPM    P-R Interval 164 ms    QRS Duration 120 ms    Q-T Interval 352 ms    QTc Calculation (Bazett) 474 ms    P Axis -14 degrees    R Axis 140 degrees    T Axis -13 degrees   POCT Glucose    Collection Time: 05/05/22  8:57 PM   Result Value Ref Range    POC Glucose 220 (H) 70 - 108 mg/dl   CBC with Auto Differential    Collection Time: 05/05/22  9:15 PM   Result Value Ref Range    WBC 5.6 4.8 - 10.8 thou/mm3    RBC 3.49 (L) 4.20 - 5.40 mill/mm3    Hemoglobin 9.8 (L) 12.0 - 16.0 gm/dl    Hematocrit 31.2 (L) 37.0 - 47.0 %    MCV 89.4 81.0 - 99.0 fL    MCH 28.1 26.0 - 33.0 pg    MCHC 31.4 (L) 32.2 - 35.5 gm/dl    RDW-CV 15.3 (H) 11.5 - 14.5 %    RDW-SD 49.4 (H) 35.0 - 45.0 fL    Platelets 470 (L) 063 - 400 thou/mm3    MPV 10.4 9.4 - 12.4 fL    Seg Neutrophils 81.2 %    Lymphocytes 9.1 %    Monocytes 7.0 %    Eosinophils 1.6 %    Basophils 0.2 %    Immature Granulocytes 0.9 %    Segs Absolute 4.5 1.8 - 7.7 thou/mm3    Lymphocytes Absolute 0.5 (L) 1.0 - 4.8 thou/mm3    Monocytes Absolute 0.4 0.4 - 1.3 thou/mm3    Eosinophils Absolute 0.1 0.0 - 0.4 thou/mm3    Basophils Absolute 0.0 0.0 - 0.1 thou/mm3    Immature Grans (Abs) 0.05 0.00 - 0.07 thou/mm3    nRBC 0 /100 wbc   Basic Metabolic Panel w/ Reflex to MG    Collection Time: 05/05/22  9:15 PM   Result Value Ref Range    Sodium 141 135 - 145 meq/L    Potassium reflex Magnesium 4.5 3.5 - 5.2 meq/L    Chloride 106 98 - 111 meq/L    CO2 23 23 - 33 meq/L    Glucose 210 (H) 70 - 108 mg/dL    BUN 29 (H) 7 - 22 mg/dL    CREATININE 1.4 (H) 0.4 - 1.2 mg/dL    Calcium 8.8 8.5 - 10.5 mg/dL   Hepatic Function Panel    Collection Time: 05/05/22  9:15 PM   Result Value Ref Range    Albumin 3.9 3.5 - 5.1 g/dL    Total Bilirubin 0.3 0.3 - 1.2 mg/dL    Bilirubin, Direct <0.2 0.0 - 0.3 mg/dL    Alkaline Phosphatase 146 (H) 38 - 126 U/L    AST 20 5 - 40 U/L    ALT 24 11 - 66 U/L    Total Protein 6.6 6.1 - 8.0 g/dL   Lipase    Collection Time: 05/05/22  9:15 PM   Result Value Ref Range    Lipase 32.8 5.6 - 51.3 U/L   Troponin    Collection Time: 05/05/22  9:15 PM   Result Value Ref Range    Troponin T < 0.010 ng/ml   Brain Natriuretic Peptide    Collection Time: 05/05/22  9:15 PM   Result Value Ref Range    Pro-.9 0.0 - 1800.0 pg/mL   Anion Gap    Collection Time: 05/05/22  9:15 PM   Result Value Ref Range    Anion Gap 12.0 8.0 - 16.0 meq/L Osmolality    Collection Time: 05/05/22  9:15 PM   Result Value Ref Range    Osmolality Calc 293.3 275.0 - 300.0 mOsmol/kg   Glomerular Filtration Rate, Estimated    Collection Time: 05/05/22  9:15 PM   Result Value Ref Range    Est, Glom Filt Rate 36 (A) ml/min/1.73m2   Lactate, Sepsis    Collection Time: 05/05/22  9:21 PM   Result Value Ref Range    Lactic Acid, Sepsis 1.8 0.5 - 1.9 mmol/L   Rapid influenza A/B antigens    Collection Time: 05/05/22 10:36 PM    Specimen: Nasopharyngeal   Result Value Ref Range    Flu A Antigen Negative NEGATIVE    Flu B Antigen Negative NEGATIVE   Urinalysis with Microscopic    Collection Time: 05/05/22 10:48 PM   Result Value Ref Range    Glucose, Urine NEGATIVE NEGATIVE mg/dl    Bilirubin Urine NEGATIVE NEGATIVE    Ketones, Urine NEGATIVE NEGATIVE    Specific Gravity, UA 1.012 1.002 - 1.030    Blood, Urine TRACE (A) NEGATIVE    pH, UA 6.0 5.0 - 9.0    Protein, UA 30 (A) NEGATIVE mg/dl    Urobilinogen, Urine 0.2 0.0 - 1.0 eu/dl    Nitrite, Urine POSITIVE (A) NEGATIVE    Leukocyte Esterase, Urine MODERATE (A) NEGATIVE    Color, UA YELLOW YELLOW-STRAW    Character, Urine CLOUDY (A) [id number]    RBC, UA 5-10 0-2/hpf /hpf    WBC, UA  0-4/hpf /hpf    Epithelial Cells, UA 3-5 3-5/hpf /hpf    Bacteria, UA MANY FEW/NONE SEEN    Casts NONE SEEN NONE SEEN /lpf    Crystals NONE SEEN NONE SEEN    Renal Epithelial, UA NONE SEEN NONE SEEN    Yeast, UA NONE SEEN NONE SEEN    Casts NONE SEEN /lpf    Miscellaneous Lab Test Result NONE SEEN    SPECIMEN REJECTION    Collection Time: 05/05/22 11:17 PM   Result Value Ref Range    Rejected Test Covid     Reason for Rejection see below    COVID-19, Rapid    Collection Time: 05/06/22  1:40 AM    Specimen: Nasopharyngeal Swab   Result Value Ref Range    SARS-CoV-2, NAAT DETECTED (AA) NOT DETECTED       Radiology:     XR CHEST PORTABLE    Result Date: 5/5/2022  1 view chest x-ray Comparison: SR AILIN - XR CHEST PORTABLE - 03/29/2021 12:48 AM EDT Findings: The lungs are clear. Cardiac silhouette is enlarged. No pulmonary vascular congestion. Calcification in the aortic knob. No acute fracture. Stable cardiomegaly without pulmonary vascular congestion.  This document has been electronically signed by: Cassius Villar MD on 05/05/2022 10:19 PM        EKG: Sinus Tachycardia      DVT prophylaxis: [] Lovenox                                 [] SCDs                                 [x] SQ Heparin                                 [] Encourage ambulation, low risk for DVT, no chemical or mechanical prophylaxis necessary              [] Already on Anticoagulation                Anticipated Disposition upon discharge: [x] Home                                                                         [] Home with Home Health                                                                         [] [facility]                                                                         [] [address]          Electronically signed by AMANDA Melton CNP on 5/6/2022 at 3:10 AM

## 2022-05-06 NOTE — PROGRESS NOTES
Initial Evaluation          Patient: Lacho Christensen  YOB: 1940  Age:  [de-identified] y.o. Room:  [address] [place] [address]  MRN:  [id number]   Acct: [de-identified]    Date of Admission:  2022  8:52 PM  Date of Service:  2022  Completed By:  Stevan Carrasco RN                 Reason for Palliative Care Evaluation:-             [x] Code Status Discussion              [] Goals of Care              [] Pain/Symptom Management               [] Emotional Support              [] Other:                   Current Issues:-pt states no distressing symptoms  []  Pain  []  Fatigue  []  Nausea  []  Anxiety  []  Depression  []  Shortness of Breath  []  Constipation  []  Appetite  []  Other:             Advance Directives:-forms on file are marked as \"\"  [] [address] [place] [address] DNR Form  [] Living Will  [] Medical POA             Current Code Status:-  [x] Full Resuscitation  [] DNR-Comfort Care-Arrest  [] DNR-Comfort Care       [] Limited Resuscitation             [] No CPR            [] No shock            [] No ET intubation/reintubation            [] No resuscitative medications            [] Other limitation:              Palliative Performance Status:          [] 60%  Ambulation reduced; Significant disease;Can't do hobbies/housework; intake normal or reduced; occasional assist; LOC full/confusion        [x] 50%  Mainly sit/lie; Extensive disease; Can't do any work; Considerable assist; intake normal or reduced; LOC full/confusion        [] 40%  Mainly in bed; Extensive disease; Mainly assist; intake normal or reduced; LOC full/confusion         [] 30%  Bed Bound; Extensive disease; Total care; intake reduced; LOC full/confusion        [] 20%  Bed Bound; Extensive disease; Total care; intake minimal; Drowsy/coma        [] 10%  Bed Bound; Extensive disease;  Total care; Mouth care only; Drowsy/coma        [] 0  Death        Goals of care evaluation:-        The patient goals of care are to provide comfort care/supportive services/palliation & relieve suffering:  Goals of care discussed with:  [] Patient independently  [x] Patient and Family  [] Family or Healthcare DPOA independently  [] Unable to discuss with patient, family/DPOA not present         Family/Patient Discussion:  Pt is resting quietly in bedside chair dozing at intervals. Pt's daughter, Cal Hall is here. Palliative care introduced, Cal Hall states recall of this nurse seeing pt when she was here in January and was ultimately transferred to [facility]. Cal Hall is able to accurately explain current plan of care. Writer explained purpose of code status discussion. Cal Hall states that she would not want her mom to have \"heroic\" efforts but we discussed that pt is able to make that decision. Pt is very [place], writer used whiteboard that Cal Hall brought in to assist with communicating with pt. Much time spent writing out about what code status means, Full code versus DNR-CCA and MIV for resp failure. Writer also explained that pt at high risk for very negative impact on pt's quality of life, there's a real chance that she would survive but not have meaningful life. Pt states understanding, states that it's \"too much to think about right now\", she will consider options. Respect expressed for pt's wishes. Writer discussed with Cal Hall that she may want to revisit this discussion once pt is out of the hospital and not as fearful. Cal Hall states agreement. Pt and Cal Longshayla state no further questions or needs. Emotional support and encouragement given. Pt's nurse,POPPY Medellin updated. Plan/Follow-Up:  Palliative care will remain available, floor to notify PRN for further needs.          Electronically signed by Fito Sutton RN on 5/6/2022 at 5:11 PM           Palliative Care Office: [phone]

## 2022-05-06 NOTE — ED NOTES
ED to inpatient nurses report    Chief Complaint   Patient presents with    Fatigue      Present to ED from home  LOC: alert and orientated to name, place, date  Vital signs   Vitals:    05/05/22 2234 05/05/22 2253 05/05/22 2329 05/06/22 0052   BP: 130/72  136/66 122/60   Pulse: 96  90 81   Resp: 16  18 16   Temp: 99.1 °F (37.3 °C) 101.2 °F (38.4 °C)     TempSrc:  Rectal     SpO2: 97%  97% 98%   Weight:  198 lb (89.8 kg)        Oxygen Baseline room air    Current needs required room air  LDAs:   Peripheral IV 05/05/22 Right Antecubital (Active)   Site Assessment Clean, dry & intact 05/05/22 2106   Line Status Flushed;Specimen collected 05/05/22 2106     Mobility: Requires assistance * 1  Pending ED orders: ED orders complete  Present condition: stable      Electronically signed by Radha Gottlieb RN on 5/6/2022 at 12:53 AM        Radha Gottlieb RN  05/06/22 [id number]

## 2022-05-06 NOTE — CARE COORDINATION
5/6/22, 8:06 AM EDT  DISCHARGE PLANNING EVALUATION:    Yudy Kimball       Admitted: 5/5/2022/ 2052   Hospital day: 0   Location: [facility]62[facility] Reason for admit: Generalized weakness [R53.1]  Acute kidney injury ([address] [N17.9]  Acute cystitis without hematuria [N30.00]  Fatigue, unspecified type [R53.83]   PMH:  has a past medical history of Cancer ([address] Cataract of both eyes, COVID-19, DDD (degenerative disc disease), lumbar, DM2 (diabetes mellitus, type 2) ([address] Dysphagia, pharyngoesophageal, Eczema, Fibrocystic breast, H/O ventral hernia repair, Headache, History of COVID-19, Hyperlipidemia, Hypertension, Iron deficiency anemia, LPRD (laryngopharyngeal reflux disease), Neuropathy, Obesity, Orbital abscess, Orbital cellulitis, SWAPNA (obstructive sleep apnea), Osteoarthritis, Osteoporosis, Persistent proteinuria associated with type 2 diabetes mellitus ([address] Sinusitis, and Velopharyngeal incompetence. Procedure: none  Barriers to Discharge:  Creat 1.4, covid+, UA abn. Tmax 101.2. IVF, nebs, IV doxycycline, IV rocephin. PT/OT. Wound care RN to see. Palliative care eval.   PCP: Jim Sheppard MD  Readmission Risk Score: 15.2 ( )%  Patient's Healthcare Decision Maker: Named in [address] [place] [address]    Patient Goals/Plan/Treatment Preferences: Met with Rupal Wood and her daughter Payton Rosa, they plan to return home together at discharge. Rupal Wood was discharged from an ECF a week ago and is taking OP OT. She has a walker and wheelchair at home. Denies all needs for discharge. Transportation/Food Security/Housekeeping Addressed:  No issues identified.

## 2022-05-06 NOTE — PROGRESS NOTES
Physician Progress Note      PATIENT:               Brianna Muro  CSN #:                  [id number]  :                       1940  ADMIT DATE:       2022 8:52 PM  [address] [place] DATE:  RESPONDING  PROVIDER #:        Sunita Graham          QUERY TEXT:    Pt admitted with COVID-19 and noted to have (SIRS, other signs of sepsis)   +COVID, + nitrate in Urine, UTI, WBC 5.6, LA 1.8, temp 101.2, Tachy  96-99. If possible, please document in progress notes and discharge summary if you   are evaluating and/or treating: The medical record reflects the following:  Risk Factors: COVID and UTI  Clinical Indicators: + nitrate in Urine, UTI, WBC 5.6, LA 1.8, temp 101.2,   Tachy  96-99  Treatment: Isolation, IV Rocephin    Thank you. Please call if you have any questions. (P) [phone]. Signed   by Magalys Goodson RN Clinical , CRCR  Options provided:  -- Sepsis present on admission due to COVID-19 infection  -- Sepsis not present on admission due to COVID-19 infection  -- Covid-19 infection without sepsis  -- Other - I will add my own diagnosis  -- Disagree - Not applicable / Not valid  -- Disagree - Clinically unable to determine / Unknown  -- Refer to Clinical Documentation Reviewer    PROVIDER RESPONSE TEXT:    This patient has Covid-19 infection without sepsis.     Query created by: Chavo Shaffer on 2022 9:54 AM      Electronically signed by:  Sunita Graham 2022 1:34 PM

## 2022-05-06 NOTE — ED PROVIDER NOTES
[address] [facility] [address] [address] EMERGENCY DEPT  EMERGENCY MEDICINE     Pt Name: Marine Magallanes  MRN: [id number]  Albertgfjune 1940  Date of evaluation: 5/5/2022  PCP:    Karen White MD  Provider: AMANDA Wallace - JUAN MANUEL    CHIEF COMPLAINT       Chief Complaint   Patient presents with    Fatigue           HISTORY OF PRESENT ILLNESS    Marine Magallanes is a [de-identified] y.o. female patient that presents to ER with complaint of fatigue and weakness the family noticed today. Patient was seen and discharged from the hospital to the nursing home on 1/8/2022. Her daughter states that she was discharged home 1 week ago. She has been doing outpatient physical therapy which just started. Daughter states that patient did 3 hours of physical therapy yesterday and 2 hours today. States after that time she came home and she was fatigued and has been weak since. She also states that she became short of breath while just sitting in the wheelchair. She denies chest pain, nausea, vomiting, diarrhea or fever. Assessment is essentially unremarkable for patient. She does have bilateral lower extremity edema and a wound to her left shin that was evaluated 5/3/2022 by patient's primary care provider Dr. April Espinoza. Triage notes and Nursing notes were reviewed by myself. Any discrepancies are addressed above.     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    Cataract of both eyes     COVID-19     DDD (degenerative disc disease), lumbar     DM2 [facility]diabetes mellitus, type 2) ([facility]     diagnoses approx 2000    Dysphagia, pharyngoesophageal 09/26/2016    Eczema 03/2018    Fibrocystic breast     H/O ventral hernia repair     Headache 02/09/2017    History of COVID-19 12/2020    Hyperlipidemia     Hypertension     Iron deficiency anemia     LPRD (laryngopharyngeal reflux disease) 09/26/2016    Neuropathy     peripheral    Obesity     Orbital abscess     Orbital cellulitis 01/22/2014    SWAPNA (obstructive sleep apnea)     Osteoarthritis     Osteoporosis     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       Sinusitis     Velopharyngeal incompetence 09/26/2016       SURGICAL HISTORY       Past Surgical History:   Procedure Laterality Date    ABDOMEN SURGERY      APPENDECTOMY      CARPAL TUNNEL RELEASE Bilateral 2008, 2009    CATARACT REMOVAL  2011    bilat    CHOLECYSTECTOMY  03/1988    COLONOSCOPY  2016    COLONOSCOPY  10/11/2018    COLONOSCOPY POLYPECTOMY SNARE/COLD BIOPSY performed by Santos Linder MD at [address] ESOPHAGUS      ENDOSCOPY, COLON, DIAGNOSTIC      EYE SURGERY Left 01/30/2015    EYE SURGERY      CATARACT REMOVAL- BILATERAL    HEMORRHOID SURGERY  1980s    HERNIA REPAIR      HYSTERECTOMY, TOTAL ABDOMINAL  1980    JOINT REPLACEMENT  bilat 2005/ 2007    knees    CA COLSC FLX WITH DIRECTED SUBMUCOSAL NJX ANY SBST  10/11/2018    COLONOSCOPY SUBMUCOSAL/BOTOX INJECTION performed by Santos Linder MD at [facility]   [facility]  last 2009    removed a bone (2)--2 times no construction     SINUS SURGERY  02/01/2016    SINUS SURGERY  2016 x 2    SINUS SURGERY  09/18/2017    [facility] - Dr Derek Beatty SINUS SURGERY  08/09/2017 [facility]17/2017 [facility]    TONSILLECTOMY      TOTAL KNEE ARTHROPLASTY  08/2003    Left TKR    VENTRAL HERNIA REPAIR  1992       CURRENT MEDICATIONS       Previous Medications    ACETAMINOPHEN (TYLENOL) 500 MG TABLET    Take 500 mg by mouth daily    AMLODIPINE (NORVASC) 5 MG TABLET    Take 0.5 tablets by mouth daily Hold if BP<110 or HR <70    ASCORBIC ACID (VITAMIN C) 1000 MG TABLET    Take 1 tablet by mouth daily    BUMETANIDE (BUMEX) 1 MG TABLET    Take 1 tablet by mouth 2 times daily    FERROUS SULFATE (IRON 325) 325 (65 FE) MG TABLET    Take 325 mg by mouth 3 times daily (with meals)     HANDICAP PLACARD MISC    by Does not apply route Dx:I187.2,M81.0. Duration: 5 years. IBUPROFEN (ADVIL;MOTRIN) 200 MG TABLET    Take 200 mg by mouth every 6 hours as needed for Pain    LEVETIRACETAM (KEPPRA) 1000 MG TABLET    Take 1 tablet by mouth 2 times daily    LORATADINE (CLARITIN) 10 MG TABLET    Take 1 tablet by mouth daily    MELATONIN 3 MG TABS TABLET    Take 6 mg by mouth nightly as needed    METFORMIN (GLUCOPHAGE) 1000 MG TABLET    TAKE ONE TABLET IN THE EVENING    MISC. DEVICES (WALKER) MISC    1 each by Does not apply route daily Requests stand up walker due to heart failure and generalized OA. I50.23.     OMEPRAZOLE (PRILOSEC) 20 MG DELAYED RELEASE CAPSULE    TAKE ONE CAPSULE BY MOUTH ONCE DAILY    POTASSIUM CHLORIDE (KLOR-CON M) 20 MEQ EXTENDED RELEASE TABLET    Take 2 tablets by mouth daily    SITAGLIPTIN (JANUVIA) 100 MG TABLET    Take 1 tablet by mouth daily    SODIUM CHLORIDE (OCEAN, BABY AYR) 0.65 % NASAL SPRAY    1 spray by Nasal route as needed for Congestion     VITAMIN D (CHOLECALCIFEROL) 50 MCG (2000 UT) TABS TABLET    Take 1 tablet by mouth daily    ZINC SULFATE (ZINCATE) 220 (50 ZN) MG CAPSULE    Take 50 mg by mouth daily       ALLERGIES       Allergies   Allergen Reactions    Lisinopril Swelling and Anaphylaxis    Sulfamethoxazole-Trimethoprim Rash    Morphine Other (See Comments)     headaches    Codeine      Other reaction(s): AOF    Hydrocodone      headaches    Percocet [Oxycodone-Acetaminophen] Other (See Comments)     Headaches    Tape [Adhesive Tape] Rash       FAMILY HISTORY       Family History   Problem Relation Age of Onset    Diabetes Mother     Heart Disease Father         whooping cough as child    Obesity Sister     Other Brother         tumor on back    Obesity Sister     Cancer Sister         Skin     Cancer Brother         Bone cancer from metal    Other Brother         passed as a child    Heart Disease Brother     Other Brother         tremor    Heart Attack Brother     No Known Problems Brother  Heart Disease Brother     No Known Problems Brother     No Known Problems Brother         SOCIAL HISTORY       Social History     Socioeconomic History    Marital status:      Spouse name: None    Number of children: None    Years of education: None    Highest education level: None   Occupational History    None   Tobacco Use    Smoking status: Never Smoker    Smokeless tobacco: Never Used   Vaping Use    Vaping Use: Never used   Substance and Sexual Activity    Alcohol use: No    Drug use: No    Sexual activity: Not Currently   Other Topics Concern    None   Social History Narrative    None     Social Determinants of Health     Financial Resource Strain: Low Risk     Difficulty of Paying Living Expenses: Not very hard   Food Insecurity: No Food Insecurity    Worried About Running Out of Food in the Last Year: Never true    Kiah of Food in the Last Year: Never true   Transportation Needs:     Lack of Transportation (Medical): Not on file    Lack of Transportation (Non-Medical):  Not on file   Physical Activity:     Days of Exercise per Week: Not on file    Minutes of Exercise per Session: Not on file   Stress:     Feeling of Stress : Not on file   Social Connections:     Frequency of Communication with Friends and Family: Not on file    Frequency of Social Gatherings with Friends and Family: Not on file    Attends Jew Services: Not on file    Active Member of [address] [place] [address] or Organizations: Not on file    Attends Club or Organization Meetings: Not on file    Marital Status: Not on file   Intimate Partner Violence:     Fear of Current or Ex-Partner: Not on file    Emotionally Abused: Not on file    Physically Abused: Not on file    Sexually Abused: Not on file   Housing Stability:     Unable to Pay for Housing in the Last Year: Not on file    Number of [place] in the Last Year: Not on file    Unstable Housing in the Last Year: Not on file       REVIEW OF SYSTEMS Review of Systems   Constitutional: Positive for chills and fatigue. Negative for appetite change, fever and unexpected weight change. HENT: Negative for ear pain and rhinorrhea. Eyes: Negative for pain and visual disturbance. Respiratory: Negative for cough, shortness of breath and wheezing. Cardiovascular: Negative for chest pain, palpitations and leg swelling. Gastrointestinal: Negative for abdominal pain, blood in stool, constipation, diarrhea, nausea and vomiting. Genitourinary: Negative for dysuria, frequency and hematuria. Musculoskeletal: Negative for arthralgias, joint swelling and neck stiffness. Skin: Negative for rash. Neurological: Positive for weakness. Negative for dizziness, syncope, light-headedness and headaches. Hematological: Does not bruise/bleed easily. Except as noted above the remainder of the review of systems was reviewed and is negative. SCREENINGS        Lexus Coma Scale  Eye Opening: Spontaneous  Best Verbal Response: Oriented  Best Motor Response: Obeys commands  Filion Coma Scale Score: 15               PHYSICAL EXAM    (up to 7 for level 4, 8 or more for level 5)     ED Triage Vitals [02/19/22 1512]   BP Temp Temp Source Pulse Resp SpO2 Height Weight   (!) 134/95 98.2 °F (36.8 °C) Oral 124 16 100 % -- --       Physical Exam  Vitals and nursing note reviewed. Constitutional:       Appearance: She is well-developed. HENT:      Head: Normocephalic and atraumatic. Eyes:      Conjunctiva/sclera: Conjunctivae normal.      Pupils: Pupils are equal, round, and reactive to light. Cardiovascular:      Rate and Rhythm: Normal rate and regular rhythm. Heart sounds: Normal heart sounds. No murmur heard. No gallop. Pulmonary:      Effort: Pulmonary effort is normal. No respiratory distress. Breath sounds: Normal breath sounds. No wheezing or rales. Abdominal:      General: Bowel sounds are normal.      Palpations: Abdomen is soft. Musculoskeletal:         General: Normal range of motion. Cervical back: Normal range of motion. Skin:     General: Skin is warm and dry. Neurological:      Mental Status: She is alert. Mental status is at baseline. Motor: No weakness. DIAGNOSTIC RESULTS     EKG:(none if blank)  All EKGs are interpreted by the Emergency Department Physician who either signs or Co-signs this chart in the absence of a cardiologist.    Sinus tachycardia with a right bundle branch block, left fascicular block and a ventricular rate of 109. IL interval 164 ms,  ms,  ms and  ms. This EKG has no significant change from previous EKG on 3/23/2021. RADIOLOGY: (none if blank)   I directly visualized the following images and reviewed the radiologist interpretations. Interpretation per the Radiologist below, if available at the time of this note:  XR CHEST PORTABLE   Final Result   Stable cardiomegaly without pulmonary vascular congestion.       This document has been electronically signed by: Destin Macias MD on    05/05/2022 10:19 PM          LABS:  Labs Reviewed   CBC WITH AUTO DIFFERENTIAL - Abnormal; Notable for the following components:       Result Value    RBC 3.49 (*)     Hemoglobin 9.8 (*)     Hematocrit 31.2 (*)     MCHC 31.4 (*)     RDW-CV 15.3 (*)     RDW-SD 49.4 (*)     Platelets 731 (*)     Lymphocytes Absolute 0.5 (*)     All other components within normal limits   BASIC METABOLIC PANEL W/ REFLEX TO MG FOR LOW K - Abnormal; Notable for the following components:    Glucose 210 (*)     BUN 29 (*)     CREATININE 1.4 (*)     All other components within normal limits   HEPATIC FUNCTION PANEL - Abnormal; Notable for the following components:    Alkaline Phosphatase 146 (*)     All other components within normal limits   URINALYSIS WITH MICROSCOPIC - Abnormal; Notable for the following components:    Blood, Urine TRACE (*)     Protein, UA 30 (*)     Nitrite, Urine POSITIVE (*) Leukocyte Esterase, Urine MODERATE (*)     Character, Urine CLOUDY (*)     All other components within normal limits   GLOMERULAR FILTRATION RATE, ESTIMATED - Abnormal; Notable for the following components:    Est, Glom Filt Rate 36 (*)     All other components within normal limits   POCT GLUCOSE - Abnormal; Notable for the following components:    POC Glucose 220 (*)     All other components within normal limits   RAPID INFLUENZA A/B ANTIGENS   LIPASE   TROPONIN   BRAIN NATRIURETIC PEPTIDE   LACTATE, SEPSIS   ANION GAP   OSMOLALITY   SPECIMEN REJECTION       All other labs were within normal range or not returned as of this dictation. Please note, any cultures that may have been sent were not resulted at the time of this patient visit. EMERGENCY DEPARTMENT COURSE and Medical Decision Making:     Vitals:    Vitals:    05/05/22 2103 05/05/22 2234 05/05/22 2253 05/05/22 2329   BP: (!) 162/84 130/72  136/66   Pulse: 99 96  90   Resp: 14 16  18   Temp: 99.6 °F (37.6 °C) 99.1 °F (37.3 °C) 101.2 °F (38.4 °C)    TempSrc: Oral  Rectal    SpO2: 96% 97%  97%   Weight:   198 lb (89.8 kg)        PROCEDURES: (None if blank)  Procedures    ED Course as of 05/06/22 0026   Fri May 06, 2022   0001 Reviewed case with Dr. Lisandro Mari. He feels that admission will be most appropriate for this patient. [NW]   0018 Discussed case with patient's daughter at the bedside. Discussed need for IV antibiotics.   Daughter is agreeable to this plan for admission. [NW]      ED Course User Index  [NW] Carol Mcbride APRN - CNP     MDM  Number of Diagnoses or Management Options  Acute cystitis without hematuria: new, needed workup  Fatigue, unspecified type: new, needed workup  Generalized weakness: new, needed workup     Amount and/or Complexity of Data Reviewed  Clinical lab tests: ordered and reviewed  Tests in the radiology section of CPT®: ordered and reviewed  Tests in the medicine section of CPT®: ordered and reviewed    Risk of Complications, Morbidity, and/or Mortality  Presenting problems: moderate  Diagnostic procedures: moderate  Management options: moderate      Patient that presents to ER with complaint of fatigue and weakness the family noticed today. Differential diagnosis includes but not limited to COVID-19, influenza, pneumonia, urinary tract infection, electrolyte imbalance or acute kidney injury. Labs reveal an elevated creatinine of 1.4. Patient also has a UTI. Patient did spike a fever and require additional dosage of antipyretics. Decision was made to admit for IV antibiotics. Hospitalist has graciously admitted to admit. Strict return precautions and follow up instructions were discussed with the patient with which the patient agrees    ED Medications administered this visit:    Medications   cefTRIAXone (ROCEPHIN) 1,000 mg in sterile water 10 mL IV syringe (has no administration in time range)   ibuprofen (ADVIL;MOTRIN) tablet 600 mg (600 mg Oral Given 5/5/22 1099)         FINAL IMPRESSION      1. Generalized weakness    2. Fatigue, unspecified type    3. Acute cystitis without hematuria          DISPOSITION/PLAN   DISPOSITION        PATIENT REFERRED TO:  No follow-up provider specified.     DISCHARGE MEDICATIONS:  New Prescriptions    No medications on file              AMANDA Garvey CNP (electronically signed)           AMANDA Garvey CNP  05/06/22 101 [facility], AMANDA Ramos CNP  05/06/22 [id number]

## 2022-05-06 NOTE — PROGRESS NOTES
[facility] Continence Nurse  Progress Note       Jorge Cowart  AGE: [de-identified] y.o. GENDER: female  : 1940  UNIT: 6K-09/009-A  TODAY'S DATE:  2022  ADMISSION DATE: 2022  8:52 PM  Subjective:     Reason for [facility] Evaluation and Assessment: LLE wound      Jorge Cowart is a [de-identified] y.o. female referred by:   [] Physician/PA/APRN  [x] Nursing  [] Other:     Wound Identification:  Wound Type: traumatic  Contributing Factors: edema and lymphedema    Objective:     Sergei Risk Score: Sergei Scale Score: 18    Assessment:     Encounter: Present to pt room for consult of lower extremity wound. Pt daughter in room, states that the wound developed at nursing home from scraping against a wheel chair. Pt noted to have bordered foam dressing in place, removed this. Cleansed wound with normal saline and gauze. Pat dry with clean gauze. Applied bordered foam dressing. Pt wound is superficial with no s/s of infection. Pt does have edema to BLE. At this time recommend pt keep legs up in bed on pillows. Recommend bordered foam dressings with compression when pt at home. Daughter states pt spends a lot of time in lift chair with legs in dependent position. Pt to follow up with wound care clinic if needed upon discharge if wound is not healing. Bed in low, call light in reach. Wound type: LLE traumatic wound  Wound size: 7cm x 6cm x 0.05cm  Undermining or Tunneling: none  Wound assessment/color: red  Drainage amount: small  Drainage description: serosanguinous  Odor: none  Margins: attached  Marlin wound: intact, edema  Exposed structure: none        Response to treatment:  Well tolerated by patient. Plan:     Treatment Recommendations:   LLE wound: Cleanse wound with normal saline or wound cleanser and gauze. Pat dry with clean gauze. Apply bordered foam dressing. Change every other day and as needed. Elevate legs on pillow support.     Specialty Bed Required :   [x] Low Air Loss   [x] Pressure Redistribution  [] Fluid Immersion- Dolphin  [] Bariatric  [] RotoProne   [] Other:     Discharge Plan:  Placement for patient upon discharge: home with family support  Patient appropriate for Outpatient [address]: follow up as needed if wound does not heal

## 2022-05-06 NOTE — PLAN OF CARE
Patient evaluated after midnight. I have reviewed labs and imaging and agree with current plan of care. Will increase IVF to 100 cc/hr and repeat BMP in AM. Continue IV Rocephin pending final urine culture. Add Vitamin C, D, and Zinc. Will be available for any questions/concerns, contact me directly.      Electronically signed by Alex Bonilla PA-C on 5/6/2022 at 8:34 AM

## 2022-05-06 NOTE — PROGRESS NOTES
[facility]IENT OCC[facility]ER[facility]  [facility] RENAL TELEMETRY 6K  EVALUATION    Time:   Time In: 3845  Time Out: 2497  Timed Code Treatment Minutes: 29 Minutes  Minutes: 37          Date: 2022  Patient Name: Sinan Hernandez,   Gender: female      MRN: [id number]  : 1940  ([de-identified] y.o.)  Referring Practitioner: AMANDA Barahona CNP  Diagnosis: Generalized weakness  Additional Pertinent Hx: Per EMR, \"The patient is a [de-identified] y.o. female with a past medical history of hypertension, hyperlipidemia, and diabetes mellitus type II who presents with complaints of increased fatigue and weakness. Patient was discharged from SNF approximately 1 week ago and per her daughter has been doing well. Patient is attending outpatient physical therapy. She underwent physical therapy yesterday and today. Daughter reports the patient had increased fatigue and weakness today following therapy session. She reported feeling short of breath when transferring from her wheelchair to recliner chair. Patient also developed fever at home with complaints of chills. Daughter reports that she gave her mother Tylenol and then brought patient to ER to be evaluated. Upon arrival to ER patient was found to have a urinary tract infection and was administered Rocephin. Patient was also febrile post Tylenol administration with a temp of 101.2. Ibuprofen was administered. \"    Restrictions/Precautions:  Restrictions/Precautions: Contact Precautions,General Precautions,Fall Risk  Position Activity Restriction  Other position/activity restrictions: Covid +    Subjective  Chart Reviewed: Yes,Orders,Progress Notes,History and Physical  Patient assessed for rehabilitation services?: Yes    Subjective: RN okayed OT session. Upon arrival patient was sitting up in bed. Pt was agreeable to OT session. Pain: did not report. Vitals: Oxygen: Pt is on room air. Pt maintained O2 stats above 90% throughout.      Social/Functional History:  Lives With: Daughter  Type of Home: House  Home Layout: One level  Home Access: Stairs to enter without rails  Entrance Stairs - Number of Steps: 1 ERNESTO  Home Equipment: [employer], standard   Bathroom Shower/Tub: Tub/Shower unit,Shower chair with back  Bathroom Toilet: Standard  Bathroom Equipment: Commode  Bathroom Accessibility: Accessible       ADL Assistance: Independent  Homemaking Assistance: Needs assistance (Daughter completes all cooking, cleaning, and laundry.)  Homemaking Responsibilities: No  Ambulation Assistance: Independent (Pt ambulates short distances only. Uses w/c mostly around home.)  Transfer Assistance: Independent    Active : No  Patient's  Info: Daughters          VISION:WFL    HEARING:  very [place] [address] board in room to increase communication. COGNITION: Slow Processing, Decreased Problem Solving and Decreased Safety Awareness    RANGE OF MOTION:  Right Upper Extremity: Impaired - decreased shoulder flexion   Left Upper Extremity:  WFL    STRENGTH:  Bilateral Upper Extremity:  Impaired - deconditioned       ADL:   Lower Extremity Dressing: Dependent. socks, . BALANCE:  Sitting Balance:  Stand By Assistance. seated EOB x 10 minutes with 1 UE release   Standing Balance: Contact Guard Assistance. BED MOBILITY:  Supine to Sit: Minimal Assistance    Scooting: Contact Guard Assistance      TRANSFERS:  Sit to Stand:  Minimal Assistance, with increased time for completion, cues for hand placement. Stand to Sit: Contact Guard Assistance. FUNCTIONAL MOBILITY:  Assistive Device: Walker  Assist Level:  Minimal Assistance. Distance: EOB to recliner & 3 feet in room. Slow pace, No LOB, kyphotic posture. Activity Tolerance:  Patient tolerance of  treatment: fair. Assessment:  Assessment: This [de-identified]year old presents with covid 19 and weakness.  Pt requires skilled OT intervention to increase indep and endurance with all self cares, transfers, mobility, and IADLS to return to Indep PLOF. Without skilled OT intervention pt is at increased risk for falls and caregiver burden. Performance deficits / Impairments: Decreased functional mobility ,Decreased ADL status,Decreased endurance,Decreased strength,Decreased safe awareness,Decreased high-level IADLs,Decreased balance  Prognosis: Good  REQUIRES OT FOLLOW-UP: Yes    Treatment Initiated: Treatment and education initiated within context of evaluation. Evaluation time included review of current medical information, gathering information related to past medical, social and functional history, completion of standardized testing, formal and informal observation of tasks, assessment of data and development of plan of care and goals. Treatment time included skilled education and facilitation of tasks to increase safety and independence with ADL's for improved functional independence and quality of life. Discharge Recommendations:  Continue to assess pending progress,Home with Home health OT,24 hour supervision or assist    Patient Education:     Patient Education  Education Given To: Dez Jackson  Education Provided: Role of Therapy,Plan of Care,Precautions,ADL Adaptive Strategies,Transfer Training  Education Method: Demonstration,Verbal  Barriers to Learning: Hearing  Education Outcome: Continued education needed    Equipment Recommendations:  Equipment Needed: Yes  Mobility Devices: Walker    Plan:  Times per Week: 5x  Current Treatment Recommendations: Strengthening,Balance training,Functional mobility training,Endurance training,Wheelchair mobility training,Self-Care / ADL,Safety education & training,Equipment evaluation, education, & procurement. See long-term goal time frame for expected duration of plan of care. If no long-term goals established, a short length of stay is anticipated.     Goals:  Patient goals : Go Home with daughter  Short Term Goals  Time Frame for Short term goals: Until discharge  Short Term Goal 1: Pt will complete RUE AROM and LUE light resistive exercises with min vcs for technique to increase indep and endurance with all self cares. Short Term Goal 2: Pt will complete standing task x 3 minutes with 1 UE release and SBA to increase indep with toileting and grooming. Short Term Goal 3: Pt will complete functional mobility short distances with SBA to increase indep and safety with toileting. Short Term Goal 4: Pt will complete LB dressing with LHAE and mod A to increase indep and endurance in home environment. Following session, patient left in safe position with all fall risk precautions in place.

## 2022-05-06 NOTE — ED TRIAGE NOTES
Pt presents to the ED with complaints of fatigue. Pt was discharged form the nursing home one week ago and lives with daughter who is primary care giver. Pt was at therapy today and has two hour therapy appt and then family noticed increased generalized weakness. Pt was seen in hospital on January and diagnosed with brain mass. Pt is alert and oriented and hard hearing. Pt daughter states that patient has been complaining of feeling cold. Pt temp on arrival was 99.6. Pt mother states that patient hands been having shaking for the past few days.

## 2022-05-07 LAB
ANION GAP SERPL CALCULATED.3IONS-SCNC: 11 MEQ/L (ref 8–16)
BASOPHILS # BLD: 0.2 %
BASOPHILS ABSOLUTE: 0 THOU/MM3 (ref 0–0.1)
BUN BLDV-MCNC: 20 MG/DL (ref 7–22)
CALCIUM SERPL-MCNC: 8.6 MG/DL (ref 8.5–10.5)
CHLORIDE BLD-SCNC: 105 MEQ/L (ref 98–111)
CO2: 22 MEQ/L (ref 23–33)
CREAT SERPL-MCNC: 1.1 MG/DL (ref 0.4–1.2)
EOSINOPHIL # BLD: 3.9 %
EOSINOPHILS ABSOLUTE: 0.2 THOU/MM3 (ref 0–0.4)
ERYTHROCYTE [DISTWIDTH] IN BLOOD BY AUTOMATED COUNT: 15.3 % (ref 11.5–14.5)
ERYTHROCYTE [DISTWIDTH] IN BLOOD BY AUTOMATED COUNT: 49.3 FL (ref 35–45)
GFR SERPL CREATININE-BSD FRML MDRD: 48 ML/MIN/1.73M2
GLUCOSE BLD-MCNC: 129 MG/DL (ref 70–108)
GLUCOSE BLD-MCNC: 139 MG/DL (ref 70–108)
GLUCOSE BLD-MCNC: 164 MG/DL (ref 70–108)
GLUCOSE BLD-MCNC: 224 MG/DL (ref 70–108)
GLUCOSE BLD-MCNC: 99 MG/DL (ref 70–108)
HCT VFR BLD CALC: 27.2 % (ref 37–47)
HEMOGLOBIN: 8.6 GM/DL (ref 12–16)
IMMATURE GRANS (ABS): 0.04 THOU/MM3 (ref 0–0.07)
IMMATURE GRANULOCYTES: 0.9 %
LYMPHOCYTES # BLD: 23.1 %
LYMPHOCYTES ABSOLUTE: 1 THOU/MM3 (ref 1–4.8)
MCH RBC QN AUTO: 27.7 PG (ref 26–33)
MCHC RBC AUTO-ENTMCNC: 31.6 GM/DL (ref 32.2–35.5)
MCV RBC AUTO: 87.7 FL (ref 81–99)
MONOCYTES # BLD: 11.2 %
MONOCYTES ABSOLUTE: 0.5 THOU/MM3 (ref 0.4–1.3)
NUCLEATED RED BLOOD CELLS: 0 /100 WBC
PLATELET # BLD: 100 THOU/MM3 (ref 130–400)
PMV BLD AUTO: 11.4 FL (ref 9.4–12.4)
POTASSIUM SERPL-SCNC: 3.4 MEQ/L (ref 3.5–5.2)
RBC # BLD: 3.1 MILL/MM3 (ref 4.2–5.4)
SEG NEUTROPHILS: 60.7 %
SEGMENTED NEUTROPHILS ABSOLUTE COUNT: 2.7 THOU/MM3 (ref 1.8–7.7)
SODIUM BLD-SCNC: 138 MEQ/L (ref 135–145)
WBC # BLD: 4.4 THOU/MM3 (ref 4.8–10.8)

## 2022-05-07 PROCEDURE — 2500000003 HC RX 250 WO HCPCS

## 2022-05-07 PROCEDURE — 6370000000 HC RX 637 (ALT 250 FOR IP)

## 2022-05-07 PROCEDURE — 6370000000 HC RX 637 (ALT 250 FOR IP): Performed by: FAMILY MEDICINE

## 2022-05-07 PROCEDURE — 36415 COLL VENOUS BLD VENIPUNCTURE: CPT

## 2022-05-07 PROCEDURE — 6360000002 HC RX W HCPCS

## 2022-05-07 PROCEDURE — 2580000003 HC RX 258

## 2022-05-07 PROCEDURE — 96372 THER/PROPH/DIAG INJ SC/IM: CPT

## 2022-05-07 PROCEDURE — 99232 SBSQ HOSP IP/OBS MODERATE 35: CPT

## 2022-05-07 PROCEDURE — 96376 TX/PRO/DX INJ SAME DRUG ADON: CPT

## 2022-05-07 PROCEDURE — 1200000003 HC TELEMETRY R&B

## 2022-05-07 PROCEDURE — 85025 COMPLETE CBC W/AUTO DIFF WBC: CPT

## 2022-05-07 PROCEDURE — 82948 REAGENT STRIP/BLOOD GLUCOSE: CPT

## 2022-05-07 PROCEDURE — 96366 THER/PROPH/DIAG IV INF ADDON: CPT

## 2022-05-07 PROCEDURE — 80048 BASIC METABOLIC PNL TOTAL CA: CPT

## 2022-05-07 RX ADMIN — PANTOPRAZOLE SODIUM 40 MG: 40 TABLET, DELAYED RELEASE ORAL at 06:21

## 2022-05-07 RX ADMIN — Medication 50 MG: at 09:17

## 2022-05-07 RX ADMIN — CEFTRIAXONE SODIUM 1000 MG: 10 INJECTION, POWDER, FOR SOLUTION INTRAVENOUS at 01:35

## 2022-05-07 RX ADMIN — SODIUM CHLORIDE, PRESERVATIVE FREE 10 ML: 5 INJECTION INTRAVENOUS at 09:16

## 2022-05-07 RX ADMIN — FERROUS SULFATE TAB 325 MG (65 MG ELEMENTAL FE) 325 MG: 325 (65 FE) TAB at 12:06

## 2022-05-07 RX ADMIN — OXYCODONE HYDROCHLORIDE AND ACETAMINOPHEN 500 MG: 500 TABLET ORAL at 09:17

## 2022-05-07 RX ADMIN — AMLODIPINE BESYLATE 2.5 MG: 2.5 TABLET ORAL at 09:16

## 2022-05-07 RX ADMIN — Medication 1000 UNITS: at 09:16

## 2022-05-07 RX ADMIN — CETIRIZINE HYDROCHLORIDE 10 MG: 10 TABLET, FILM COATED ORAL at 09:15

## 2022-05-07 RX ADMIN — ACETAMINOPHEN 650 MG: 325 TABLET ORAL at 18:46

## 2022-05-07 RX ADMIN — HEPARIN SODIUM 5000 UNITS: 5000 INJECTION INTRAVENOUS; SUBCUTANEOUS at 21:36

## 2022-05-07 RX ADMIN — INSULIN LISPRO 4 UNITS: 100 INJECTION, SOLUTION INTRAVENOUS; SUBCUTANEOUS at 12:06

## 2022-05-07 RX ADMIN — HEPARIN SODIUM 5000 UNITS: 5000 INJECTION INTRAVENOUS; SUBCUTANEOUS at 06:21

## 2022-05-07 RX ADMIN — DOXYCYCLINE 100 MG: 100 INJECTION, POWDER, LYOPHILIZED, FOR SOLUTION INTRAVENOUS at 17:13

## 2022-05-07 RX ADMIN — DOXYCYCLINE 100 MG: 100 INJECTION, POWDER, LYOPHILIZED, FOR SOLUTION INTRAVENOUS at 06:30

## 2022-05-07 RX ADMIN — FERROUS SULFATE TAB 325 MG (65 MG ELEMENTAL FE) 325 MG: 325 (65 FE) TAB at 09:17

## 2022-05-07 RX ADMIN — FERROUS SULFATE TAB 325 MG (65 MG ELEMENTAL FE) 325 MG: 325 (65 FE) TAB at 16:08

## 2022-05-07 RX ADMIN — LEVETIRACETAM 1000 MG: 500 TABLET, FILM COATED ORAL at 21:36

## 2022-05-07 RX ADMIN — HEPARIN SODIUM 5000 UNITS: 5000 INJECTION INTRAVENOUS; SUBCUTANEOUS at 14:24

## 2022-05-07 RX ADMIN — LEVETIRACETAM 1000 MG: 500 TABLET, FILM COATED ORAL at 09:16

## 2022-05-07 ASSESSMENT — PAIN DESCRIPTION - ORIENTATION: ORIENTATION: RIGHT;LEFT

## 2022-05-07 ASSESSMENT — PAIN SCALES - GENERAL: PAINLEVEL_OUTOF10: 3

## 2022-05-07 ASSESSMENT — PAIN DESCRIPTION - LOCATION: LOCATION: HEAD

## 2022-05-07 ASSESSMENT — PAIN DESCRIPTION - DESCRIPTORS: DESCRIPTORS: ACHING

## 2022-05-07 NOTE — PLAN OF CARE
Problem: Safety - Adult  Goal: Free from fall injury  5/7/2022 1258 by Patrick Miramontes RN  Outcome: Not Progressing   Call light within reach, bed alarm on, hourly rounding, patient free from falls     Problem: Pain  Goal: Verbalizes/displays adequate comfort level or baseline comfort level  5/7/2022 1258 by Patrick Miramontes RN  Outcome: Not Progressing   Patient denied any pain

## 2022-05-07 NOTE — PLAN OF CARE
Problem: Discharge Planning  Goal: Discharge to home or other facility with appropriate resources  5/7/2022 0520 by Rose Rebollar  Outcome: Progressing  Note: Patient plans to be discharged to home with daughter when medically stable. Problem: Skin/Tissue Integrity  Goal: Absence of new skin breakdown  Description: 1. Monitor for areas of redness and/or skin breakdown  2. Assess vascular access sites hourly  3. Every 4-6 hours minimum:  Change oxygen saturation probe site  4. Every 4-6 hours:  If on nasal continuous positive airway pressure, respiratory therapy assess nares and determine need for appliance change or resting period. 5/7/2022 0520 by Rose Rebollar  Outcome: Progressing  Note: No new signs or symptoms of skin breakdown noted this shift, encouraging patient to turn and reposition self in bed q2h    Problem: Safety - Adult  Goal: Free from fall injury  5/7/2022 0520 by Rose Rebollar  Outcome: Progressing  Note: No falls noted this shift. Continue falling star program. Bed alarm on, bed in low position. Call light and personal belongings in reach. Patient uses call light appropriately. Problem: Chronic Conditions and Co-morbidities  Goal: Patient's chronic conditions and co-morbidity symptoms are monitored and maintained or improved  5/7/2022 0520 by Rose Rebollar  Outcome: Progressing    Problem: Pain  Goal: Verbalizes/displays adequate comfort level or baseline comfort level  Outcome: Progressing  Note: Patient free from pain this shift. Pain rated on 0-10 pain rating scale. Will continue to reassess. Care plan reviewed with patient. Patient verbalize understanding of the plan of care and contribute to goal setting.

## 2022-05-07 NOTE — PROGRESS NOTES
Hospitalist Progress Note      Patient: Avery Amador    Unit/Bed:6K-09/009-A  YOB: 1940  MRN: [id number]   Acct: [de-identified]   PCP: Pratik Turner MD  Date of Admission: 5/5/2022    Assessment/Plan:    1. Urinary tract infection:  · UA positive on admission, positive nitrates, LEs, many bacteria. Patient endorsed symptoms of fatigue, weakness, dysuria. Continue IV Rocephin pending final culture results. 2. COVID-19 infection:  · Patient has remained on room air through the duration of her stay, continue to monitor. No intervention warranted at this time. 3. IDALIA on CKD stage III, resolved:  · Patient's creatinine 1.4 on admission, her baseline appears to be around 0.7-0.9. IDALIA likely compounded by home medications including Bumex and metformin. These medications are currently held, IV fluids were initiated. Patient's creatinine is much improved today, now WNL. We will continue to assess with daily BMP. 4. Hypokalemia:  · Replace per protocol, repeat BMP tomorrow morning  5. Open wound to left shin:  · Per patient's daughter, wound was noted on patient's left shin prior to discharge from the F. Patient had apparently run into another patient's wheelchair. On removal of dressing, wound appears erythematous with minimal purulent drainage. Consult to wound care had been placed on admission. Continue doxycycline. 6. NIDDMII:  · Hemoglobin A1c 7.2 and 09/2021. Glucose relatively controlled, currently holding home metformin. Continue SSI, hypoglycemia protocol, carb controlled diet. Accu-Cheks. 7. Chronic diastolic heart failure:  · Echo 03/29/2021 with EF of 55%. Patient takes Bumex daily, however on hold due to #3. Patient does not appear to be decompensated at this time. 8. Essential hypertension:  · BP stable at this time, continue home medications and adjust as needed.   9. Unspecified convulsions, per PCP note:  · Continue home [address]        Chief Complaint: Fatigue and weakness    Initial H and P:-    Per chart review: \"The patient is a [de-identified] y.o. female with a past medical history of hypertension, hyperlipidemia, and diabetes mellitus type II who presents with complaints of increased fatigue and weakness. Patient was discharged from SNF approximately 1 week ago and per her daughter has been doing well. Patient is attending outpatient physical therapy. She underwent physical therapy yesterday and today. Daughter reports the patient had increased fatigue and weakness today following therapy session. She reported feeling short of breath when transferring from her wheelchair to recliner chair. Patient also developed fever at home with complaints of chills. Daughter reports that she gave her mother Tylenol and then brought patient to ER to be evaluated. Upon arrival to ER patient was found to have a urinary tract infection and was administered Rocephin. Patient was also febrile post Tylenol administration with a temp of 101.2. Ibuprofen was administered. \"    Subjective (past 24 hours):   Patient resting in no acute distress, no acute vents overnight. Patient denies any pain at this time, no chest pain, shortness of breath, abdominal pain, nausea, vomiting. No urinary complaints. We will continue to treat for urinary tract infection, pending final urine culture. Continue IV Rocephin. Patient's renal function much improved today, continue to monitor. Past medical history, family history, social history and allergies reviewed again and is unchanged since admission. ROS (All review of systems completed. Pertinent positives noted.  Otherwise All other systems reviewed and negative.)     Medications:  Reviewed    Infusion Medications    sodium chloride      dextrose      sodium chloride 100 mL/hr at 05/06/22 2728     Scheduled Medications    amLODIPine  2.5 mg Oral Daily    [Held by provider] bumetanide  1 mg Oral BID  ferrous sulfate  325 mg Oral TID     levETIRAcetam  1,000 mg Oral BID    cetirizine  10 mg Oral Daily    pantoprazole  40 mg Oral QAM AC    [Held by provider] potassium chloride  40 mEq Oral Daily    sodium chloride flush  5-40 mL IntraVENous 2 times per day    heparin (porcine)  5,000 Units SubCUTAneous 3 times per day    insulin lispro  0-12 Units SubCUTAneous TID     insulin lispro  0-6 Units SubCUTAneous Nightly    cefTRIAXone (ROCEPHIN) IV  1,000 mg IntraVENous Q24H    doxycycline (VIBRAMYCIN) IV  100 mg IntraVENous Q12H    ascorbic acid  500 mg Oral Daily    Vitamin D  1,000 Units Oral Daily    zinc sulfate  50 mg Oral Daily     PRN Meds: melatonin, sodium chloride flush, sodium chloride, acetaminophen **OR** acetaminophen, glucagon (rDNA), dextrose, albuterol, glucose, dextrose bolus **OR** dextrose bolus, benzonatate      Intake/Output Summary (Last 24 hours) at 5/7/2022 1237  Last data filed at 5/7/2022 0153  Gross per 24 hour   Intake 180 ml   Output 1900 ml   Net -1720 ml       Diet:  ADULT DIET; Regular; 4 carb choices (60 gm/meal)    Exam:  BP (!) 145/78   Pulse 79   Temp 99.1 °F (37.3 °C) (Axillary)   Resp 16   Ht 5' (1.524 m)   Wt 196 lb (88.9 kg)   LMP  (LMP Unknown)   SpO2 99%   BMI 38.28 kg/m²   General appearance: No apparent distress, chronically ill-appearing  HEENT: Patient very hard of hearing. Pupils equal, round, and reactive to light. Conjunctivae/corneas clear. Neck: Supple, with full range of motion. No jugular venous distention. Trachea midline. Respiratory:  Normal respiratory effort on RA. Expiratory wheezes noted in the bases. Cardiovascular: Regular rate and rhythm with normal S1/S2 without murmurs, rubs or gallops. Abdomen: Soft, non-tender, non-distended with normal bowel sounds. Musculoskeletal: passive and active ROM x 4 extremities. Skin: Skin color, texture, turgor normal.  No rashes or lesions.   Neurologic:  Neurovascularly intact without any focal sensory/motor deficits. Cranial nerves: II-XII intact, grossly non-focal.  Psychiatric: Alert and oriented, thought content appropriate, normal insight  Capillary Refill: Brisk,< 3 seconds   Peripheral Pulses: +2 palpable, equal bilaterally     Labs:   Recent Labs     05/05/22 2115 05/07/22  0558   WBC 5.6 4.4*   HGB 9.8* 8.6*   HCT 31.2* 27.2*   * 100*     Recent Labs     05/05/22 2115 05/06/22  0728 05/07/22  1001    143 138   K 4.5 3.6 3.4*    108 105   CO2 23 23 22*   BUN 29* 30* 20   CREATININE 1.4* 1.4* 1.1   CALCIUM 8.8 8.0* 8.6     Recent Labs     05/05/22 2115 05/06/22  0728   AST 20 17   ALT 24 17   BILIDIR <0.2  --    BILITOT 0.3 0.2*   ALKPHOS 146* 100     No results for input(s): INR in the last 72 hours. No results for input(s): Adan James in the last 72 hours. Microbiology:    Blood culture #1:   Lab Results   Component Value Date    BC No growth-preliminary No growth  03/24/2021       Blood culture #2:No results found for: Anjelica Ramsey    Organism:  Lab Results   Component Value Date    ORG gram negative bacilli 05/05/2022         Lab Results   Component Value Date    LABGRAM  05/06/2022     Few segmented neutrophils observed. No epithelial cells observed. Moderate gram negative bacilli. Rare gram positive cocci in pairs and clusters. MRSA culture only:No results found for: [facility]    Urine culture:   Lab Results   Component Value Date    LABURIN [place] count: >100,000 CFU/mL  05/05/2022       Respiratory culture: No results found for: CULTRESP    Aerobic and Anaerobic :  Lab Results   Component Value Date    LABAERO  03/01/2021     moderate growth This is a MRSA (Methicillin Resistant Staphylococcus aureus)isolate. Isolates of MRSA (ORSA) Methicillin (Oxacillin) ResistantStaphylococcus aureus (coagulase positive) require patientbe placed in CONTACT isolation. Methicillin(Oxacillin)resistant strains of staphylococci(MRSA)or(MRSE)should be considered resistant to all classesof cephalosporins, penems and beta-lactams. In the treatment of gram positive infections, GENTAMICINshould be CONSIDERED a SYNERGYSTIC agent ONLY. No results found for: LABANAE    Urinalysis:      Lab Results   Component Value Date    NITRU POSITIVE 05/05/2022    WBCUA  05/05/2022    BACTERIA MANY 05/05/2022    RBCUA 5-10 05/05/2022    BLOODU TRACE 05/05/2022    SPECGRAV 1.012 05/05/2022    GLUCOSEU NEGATIVE 01/05/2022       Radiology:  XR CHEST PORTABLE   Final Result   Stable cardiomegaly without pulmonary vascular congestion. This document has been electronically signed by: Jordyn Sosa MD on    05/05/2022 10:19 PM        XR CHEST PORTABLE    Result Date: 5/5/2022  1 view chest x-ray Comparison: CR,SR - XR CHEST PORTABLE - 03/29/2021 12:48 AM EDT Findings: The lungs are clear. Cardiac silhouette is enlarged. No pulmonary vascular congestion. Calcification in the aortic knob. No acute fracture. Stable cardiomegaly without pulmonary vascular congestion.  This document has been electronically signed by: Jordyn Sosa MD on 05/05/2022 10:19 PM      Electronically signed by Reynaldo Rodriguez PA-C on 5/7/2022 at 12:37 PM

## 2022-05-08 LAB
ANION GAP SERPL CALCULATED.3IONS-SCNC: 11 MEQ/L (ref 8–16)
BUN BLDV-MCNC: 17 MG/DL (ref 7–22)
CALCIUM SERPL-MCNC: 8.6 MG/DL (ref 8.5–10.5)
CHLORIDE BLD-SCNC: 106 MEQ/L (ref 98–111)
CO2: 24 MEQ/L (ref 23–33)
CREAT SERPL-MCNC: 0.9 MG/DL (ref 0.4–1.2)
GFR SERPL CREATININE-BSD FRML MDRD: 60 ML/MIN/1.73M2
GLUCOSE BLD-MCNC: 117 MG/DL (ref 70–108)
GLUCOSE BLD-MCNC: 118 MG/DL (ref 70–108)
GLUCOSE BLD-MCNC: 136 MG/DL (ref 70–108)
GLUCOSE BLD-MCNC: 139 MG/DL (ref 70–108)
GLUCOSE BLD-MCNC: 143 MG/DL (ref 70–108)
ORGANISM: ABNORMAL
POTASSIUM SERPL-SCNC: 3.3 MEQ/L (ref 3.5–5.2)
SODIUM BLD-SCNC: 141 MEQ/L (ref 135–145)
URINE CULTURE, ROUTINE: ABNORMAL

## 2022-05-08 PROCEDURE — 2580000003 HC RX 258

## 2022-05-08 PROCEDURE — 1200000003 HC TELEMETRY R&B

## 2022-05-08 PROCEDURE — 6370000000 HC RX 637 (ALT 250 FOR IP)

## 2022-05-08 PROCEDURE — 96372 THER/PROPH/DIAG INJ SC/IM: CPT

## 2022-05-08 PROCEDURE — 80048 BASIC METABOLIC PNL TOTAL CA: CPT

## 2022-05-08 PROCEDURE — 82948 REAGENT STRIP/BLOOD GLUCOSE: CPT

## 2022-05-08 PROCEDURE — 2500000003 HC RX 250 WO HCPCS

## 2022-05-08 PROCEDURE — 6360000002 HC RX W HCPCS

## 2022-05-08 PROCEDURE — 99232 SBSQ HOSP IP/OBS MODERATE 35: CPT

## 2022-05-08 PROCEDURE — 36415 COLL VENOUS BLD VENIPUNCTURE: CPT

## 2022-05-08 PROCEDURE — 96367 TX/PROPH/DG ADDL SEQ IV INF: CPT

## 2022-05-08 PROCEDURE — 6370000000 HC RX 637 (ALT 250 FOR IP): Performed by: FAMILY MEDICINE

## 2022-05-08 PROCEDURE — 96366 THER/PROPH/DIAG IV INF ADDON: CPT

## 2022-05-08 PROCEDURE — 96376 TX/PRO/DX INJ SAME DRUG ADON: CPT

## 2022-05-08 RX ORDER — PREGABALIN 75 MG/1
150 CAPSULE ORAL 2 TIMES DAILY
Status: DISCONTINUED | OUTPATIENT
Start: 2022-05-09 | End: 2022-05-10 | Stop reason: HOSPADM

## 2022-05-08 RX ORDER — PREGABALIN 75 MG/1
300 CAPSULE ORAL 2 TIMES DAILY
Status: DISCONTINUED | OUTPATIENT
Start: 2022-05-08 | End: 2022-05-08 | Stop reason: DRUGHIGH

## 2022-05-08 RX ADMIN — DOXYCYCLINE 100 MG: 100 INJECTION, POWDER, LYOPHILIZED, FOR SOLUTION INTRAVENOUS at 05:30

## 2022-05-08 RX ADMIN — HEPARIN SODIUM 5000 UNITS: 5000 INJECTION INTRAVENOUS; SUBCUTANEOUS at 14:02

## 2022-05-08 RX ADMIN — HEPARIN SODIUM 5000 UNITS: 5000 INJECTION INTRAVENOUS; SUBCUTANEOUS at 23:16

## 2022-05-08 RX ADMIN — Medication 1000 UNITS: at 07:54

## 2022-05-08 RX ADMIN — INSULIN LISPRO 2 UNITS: 100 INJECTION, SOLUTION INTRAVENOUS; SUBCUTANEOUS at 11:52

## 2022-05-08 RX ADMIN — OXYCODONE HYDROCHLORIDE AND ACETAMINOPHEN 500 MG: 500 TABLET ORAL at 07:54

## 2022-05-08 RX ADMIN — LEVETIRACETAM 1000 MG: 500 TABLET, FILM COATED ORAL at 07:54

## 2022-05-08 RX ADMIN — FERROUS SULFATE TAB 325 MG (65 MG ELEMENTAL FE) 325 MG: 325 (65 FE) TAB at 11:52

## 2022-05-08 RX ADMIN — VANCOMYCIN HYDROCHLORIDE 1250 MG: 5 INJECTION, POWDER, LYOPHILIZED, FOR SOLUTION INTRAVENOUS at 15:48

## 2022-05-08 RX ADMIN — ACETAMINOPHEN 650 MG: 325 TABLET ORAL at 23:15

## 2022-05-08 RX ADMIN — Medication 50 MG: at 07:54

## 2022-05-08 RX ADMIN — FERROUS SULFATE TAB 325 MG (65 MG ELEMENTAL FE) 325 MG: 325 (65 FE) TAB at 17:01

## 2022-05-08 RX ADMIN — CEFTRIAXONE SODIUM 1000 MG: 10 INJECTION, POWDER, FOR SOLUTION INTRAVENOUS at 01:31

## 2022-05-08 RX ADMIN — FERROUS SULFATE TAB 325 MG (65 MG ELEMENTAL FE) 325 MG: 325 (65 FE) TAB at 07:54

## 2022-05-08 RX ADMIN — SODIUM CHLORIDE, PRESERVATIVE FREE 10 ML: 5 INJECTION INTRAVENOUS at 23:16

## 2022-05-08 RX ADMIN — HEPARIN SODIUM 5000 UNITS: 5000 INJECTION INTRAVENOUS; SUBCUTANEOUS at 05:32

## 2022-05-08 RX ADMIN — PANTOPRAZOLE SODIUM 40 MG: 40 TABLET, DELAYED RELEASE ORAL at 05:31

## 2022-05-08 RX ADMIN — LEVETIRACETAM 1000 MG: 500 TABLET, FILM COATED ORAL at 23:16

## 2022-05-08 RX ADMIN — CETIRIZINE HYDROCHLORIDE 10 MG: 10 TABLET, FILM COATED ORAL at 07:54

## 2022-05-08 RX ADMIN — AMLODIPINE BESYLATE 2.5 MG: 2.5 TABLET ORAL at 07:54

## 2022-05-08 ASSESSMENT — PAIN DESCRIPTION - LOCATION
LOCATION: HEAD
LOCATION: HEAD

## 2022-05-08 ASSESSMENT — PAIN SCALES - GENERAL
PAINLEVEL_OUTOF10: 10
PAINLEVEL_OUTOF10: 10

## 2022-05-08 NOTE — PROGRESS NOTES
Hospitalist Progress Note      Patient: Consuelo Nguyen    Unit/Bed:6K-09/009-A  YOB: 1940  MRN: [id number]   Acct: [de-identified]   PCP: Ethan Ng MD  Date of Admission: 5/5/2022    Assessment/Plan:    1. Urinary tract infection:  · UA positive on admission, positive nitrates, LEs, many bacteria. Patient endorsed symptoms of fatigue, weakness, dysuria. Continue IV Rocephin, culture resulted and sensitive. 2. COVID-19 infection:  · Patient has remained on room air through the duration of her stay, continue to monitor. No intervention warranted at this time. 3. IDALIA on CKD stage III, resolved:  · Patient's creatinine 1.4 on admission, her baseline appears to be around 0.7-0.9. IDALIA likely compounded by home medications including Bumex and metformin. These medications are currently held, IV fluids were initiated. Patient's creatinine is much improved today, now WNL. We will continue to assess with daily BMP. 4. Hypokalemia:  · Replace per protocol, repeat BMP tomorrow morning  5. Open wound to left shin:  · Per patient's daughter, wound was noted on patient's left shin prior to discharge from the [facility]. Patient had apparently run into another patient's wheelchair. On removal of dressing, wound appears erythematous with minimal purulent drainage. Culture resulted, revealing MRSA. Will initiate vancomycin. 6. NIDDMII:  · Hemoglobin A1c 7.2 and 09/2021. Glucose relatively controlled, currently holding home metformin. Continue SSI, hypoglycemia protocol, carb controlled diet. Accu-Cheks. 7. Chronic diastolic heart failure:  · Echo 03/29/2021 with EF of 55%. Patient takes Bumex daily, however on hold due to #3. Patient does not appear to be decompensated at this time. 8. Essential hypertension:  · BP stable at this time, continue home medications and adjust as needed.   9. Unspecified convulsions, per PCP note:  · Continue home Keppra        Chief Complaint: Fatigue and weakness    Initial H and P:-    Per chart review: \"The patient is a [de-identified] y.o. female with a past medical history of hypertension, hyperlipidemia, and diabetes mellitus type II who presents with complaints of increased fatigue and weakness. Patient was discharged from SNF approximately 1 week ago and per her daughter has been doing well. Patient is attending outpatient physical therapy. She underwent physical therapy yesterday and today. Daughter reports the patient had increased fatigue and weakness today following therapy session. She reported feeling short of breath when transferring from her wheelchair to recliner chair. Patient also developed fever at home with complaints of chills. Daughter reports that she gave her mother Tylenol and then brought patient to ER to be evaluated. Upon arrival to ER patient was found to have a urinary tract infection and was administered Rocephin. Patient was also febrile post Tylenol administration with a temp of 101.2. Ibuprofen was administered. \"    5/7: Patient resting in no acute distress, no acute vents overnight. Patient denies any pain at this time, no chest pain, shortness of breath, abdominal pain, nausea, vomiting. No urinary complaints. We will continue to treat for urinary tract infection, pending final urine culture. Continue IV Rocephin. Patient's renal function much improved today, continue to monitor. Subjective (past 24 hours):   5/8: Patient reports that she is doing well, denies any pain other than chronic neck pain. She denies urinary complaints, nausea, vomiting, abdominal pain. Patient's culture of shin wound revealing MRSA. We will have to initiate vancomycin. Discontinue doxycycline. Past medical history, family history, social history and allergies reviewed again and is unchanged since admission. ROS (All review of systems completed. Pertinent positives noted.  Otherwise All other systems reviewed and negative.)     Medications:  Reviewed    Infusion Medications    sodium chloride      dextrose      sodium chloride 100 mL/hr at 05/06/22 1727     Scheduled Medications    vancomycin (VANCOCIN) intermittent dosing (placeholder)   Other RX Placeholder    vancomycin  1,250 mg IntraVENous Q24H    amLODIPine  2.5 mg Oral Daily    [Held by provider] bumetanide  1 mg Oral BID    ferrous sulfate  325 mg Oral TID WC    levETIRAcetam  1,000 mg Oral BID    cetirizine  10 mg Oral Daily    pantoprazole  40 mg Oral QAM AC    [Held by provider] potassium chloride  40 mEq Oral Daily    sodium chloride flush  5-40 mL IntraVENous 2 times per day    heparin (porcine)  5,000 Units SubCUTAneous 3 times per day    insulin lispro  0-12 Units SubCUTAneous TID WC    insulin lispro  0-6 Units SubCUTAneous Nightly    cefTRIAXone (ROCEPHIN) IV  1,000 mg IntraVENous Q24H    ascorbic acid  500 mg Oral Daily    Vitamin D  1,000 Units Oral Daily    zinc sulfate  50 mg Oral Daily     PRN Meds: melatonin, sodium chloride flush, sodium chloride, acetaminophen **OR** acetaminophen, glucagon (rDNA), dextrose, albuterol, glucose, dextrose bolus **OR** dextrose bolus, benzonatate      Intake/Output Summary (Last 24 hours) at 5/8/2022 1625  Last data filed at 5/7/2022 2132  Gross per 24 hour   Intake --   Output 1 ml   Net -1 ml       Diet:  ADULT DIET; Regular; 4 carb choices (60 gm/meal)    Exam:  BP (!) 165/83   Pulse 75   Temp 98.5 °F (36.9 °C) (Oral)   Resp 16   Ht 5' (1.524 m)   Wt 196 lb (88.9 kg)   LMP  (LMP Unknown)   SpO2 100%   BMI 38.28 kg/m²   General appearance: No apparent distress, chronically ill-appearing  HEENT: Patient very hard of hearing. Pupils equal, round, and reactive to light. Conjunctivae/corneas clear. Neck: Supple, with full range of motion. No jugular venous distention. Trachea midline. Respiratory:  Normal respiratory effort on RA.   Expiratory wheezes noted in the bases. Cardiovascular: Regular rate and rhythm with normal S1/S2 without murmurs, rubs or gallops. Abdomen: Soft, non-tender, non-distended with normal bowel sounds. Musculoskeletal: passive and active ROM x 4 extremities. Skin: Dressing in place over left shin wound. Dry, clean, intact. Neurologic:  Neurovascularly intact without any focal sensory/motor deficits. Cranial nerves: II-XII intact, grossly non-focal.  Psychiatric: Alert and oriented, thought content appropriate, normal insight  Capillary Refill: Brisk,< 3 seconds   Peripheral Pulses: +2 palpable, equal bilaterally     Labs:   Recent Labs     05/05/22 2115 05/07/22  0558   WBC 5.6 4.4*   HGB 9.8* 8.6*   HCT 31.2* 27.2*   * 100*     Recent Labs     05/06/22  0728 05/07/22  1001 05/08/22  1136    138 141   K 3.6 3.4* 3.3*    105 106   CO2 23 22* 24   BUN 30* 20 17   CREATININE 1.4* 1.1 0.9   CALCIUM 8.0* 8.6 8.6     Recent Labs     05/05/22 2115 05/06/22  0728   AST 20 17   ALT 24 17   BILIDIR <0.2  --    BILITOT 0.3 0.2*   ALKPHOS 146* 100     No results for input(s): INR in the last 72 hours. No results for input(s): [place] in the last 72 hours. Microbiology:    Blood culture #1:   Lab Results   Component Value Date    BC No growth-preliminary No growth  03/24/2021       Blood culture #2:No results found for: Nick Noratty    Organism:  Lab Results   Component Value Date    ORG Staphylococcus aureus 05/06/2022         Lab Results   Component Value Date    LABGRAM  05/06/2022     Few segmented neutrophils observed. No epithelial cells observed. Moderate gram negative bacilli. Rare gram positive cocci in pairs and clusters.         MRSA culture only:No results found for: [facility]    Urine culture:   Lab Results   Component Value Date    LABURIN [place] count: >100,000 CFU/mL  05/05/2022       Respiratory culture: No results found for: CULTRESP    Aerobic and Anaerobic :  Lab Results   Component Value Date    LABAERO 05/06/2022     Culture also yielded heavy growth of gram positive bacilli consistent with Corynebacterium species. The gram negative bacilli observed on the direct smear were not recovered. This may be due to antimicrobial suppression, a fastidious organism, or an anaerobic organism. If a true mixed aerobic and anaerobic infection is suspected, then broad spectrum empiric antibiotic therapy is indicated and should include coverage for anaerobic organisms. Current antibiotic therapy ineffective in vitro for Staphylococcus aureus (MRSA). LABAERO  05/06/2022     moderate growth This is a MRSA (Methicillin Resistant Staphylococcus aureus)isolate. Isolates of MRSA (ORSA) Methicillin (Oxacillin) Resistant Staphylococcus aureus (coagulase positive) require patient be placed in CONTACT isolation. Methicillin(Oxacillin)resistant strains of staphylococci (MRSA)or(MRSE)should be considered resistant to all classes of cephalosporins, penems and beta-lactams. In the treatment of gram positive infections, GENTAMICIN should be CONSIDERED a SYNERGYSTIC agent ONLY. No results found for: LABANAE    Urinalysis:      Lab Results   Component Value Date    NITRU POSITIVE 05/05/2022    WBCUA  05/05/2022    BACTERIA MANY 05/05/2022    RBCUA 5-10 05/05/2022    BLOODU TRACE 05/05/2022    SPECGRAV 1.012 05/05/2022    GLUCOSEU NEGATIVE 01/05/2022       Radiology:  XR CHEST PORTABLE   Final Result   Stable cardiomegaly without pulmonary vascular congestion. This document has been electronically signed by: Mara Bazzi MD on    05/05/2022 10:19 PM        XR CHEST PORTABLE    Result Date: 5/5/2022  1 view chest x-ray Comparison: CR,SR - XR CHEST PORTABLE - 03/29/2021 12:48 AM EDT Findings: The lungs are clear. Cardiac silhouette is enlarged. No pulmonary vascular congestion. Calcification in the aortic knob. No acute fracture. Stable cardiomegaly without pulmonary vascular congestion.  This document has been electronically signed by: Adair Fuller MD on 05/05/2022 10:19 PM      Electronically signed by Berenice Stewart PA-C on 5/8/2022 at 4:25 PM

## 2022-05-08 NOTE — PLAN OF CARE
Problem: Discharge Planning  Goal: Discharge to home or other facility with appropriate resources  Outcome: Progressing  Note: Patient plans to be discharged to her daughters house where she lives with her. Problem: Skin/Tissue Integrity  Goal: Absence of new skin breakdown  Description: 1. Monitor for areas of redness and/or skin breakdown  2. Assess vascular access sites hourly  3. Every 4-6 hours minimum:  Change oxygen saturation probe site  4. Every 4-6 hours:  If on nasal continuous positive airway pressure, respiratory therapy assess nares and determine need for appliance change or resting period. Outcome: Progressing  Note: No new signs or symptoms of skin breakdown noted this shift, encouraging patient to turn and reposition self in bed q2h       Problem: Safety - Adult  Goal: Free from fall injury  5/8/2022 0216 by Luis Armando Borden  Outcome: Progressing  Note: No falls noted this shift. Continue falling star program. Bed alarm on, bed in low position. Call light and personal belongings in reach. Patient uses call light appropriately. Problem: Chronic Conditions and Co-morbidities  Goal: Patient's chronic conditions and co-morbidity symptoms are monitored and maintained or improved  Outcome: Progressing     Problem: Pain  Goal: Verbalizes/displays adequate comfort level or baseline comfort level  5/8/2022 0216 by Luis Armando Borden  Outcome: Progressing  Note: Patient free from pain this shift. Pain rated on 0-10 pain rating scale. Will continue to reassess. Care plan reviewed with patient. Patient verbalize understanding of the plan of care and contribute to goal setting.

## 2022-05-08 NOTE — PLAN OF CARE
Problem: Safety - Adult  Goal: Free from fall injury  5/8/2022 0952 by Diego Alfaro RN  Outcome: Progressing  Flowsheets (Taken 5/8/2022 2167)  Free From Fall Injury:   Mika Ruffing family/caregiver on patient safety   Based on caregiver fall risk screen, instruct family/caregiver to ask for assistance with transferring infant if caregiver noted to have fall risk factors     Problem: Pain  Goal: Verbalizes/displays adequate comfort level or baseline comfort level  5/8/2022 0952 by Diego Alfaro RN  Flowsheets (Taken 5/8/2022 2332)  Verbalizes/displays adequate comfort level or baseline comfort level:   Encourage patient to monitor pain and request assistance   Assess pain using appropriate pain scale     Problem: Skin/Tissue Integrity  Goal: Absence of new skin breakdown  Description: 1. Monitor for areas of redness and/or skin breakdown  2. Assess vascular access sites hourly  3. Every 4-6 hours minimum:  Change oxygen saturation probe site  4. Every 4-6 hours:  If on nasal continuous positive airway pressure, respiratory therapy assess nares and determine need for appliance change or resting period.   5/8/2022 0952 by Diego Alfaro RN  Outcome: Progressing

## 2022-05-08 NOTE — PROGRESS NOTES
[facility] Pharmacokinetic Monitoring Service - Vancomycin     Yudy Kimball is a [de-identified] y.o. female starting on vancomycin therapy for MRSA + wound on shin. Pharmacy consulted by Evert Almeida PA-C for monitoring and adjustment. Target Concentration: Goal AUC/CARI 400-600 mg*hr/L    Additional Antimicrobials: ceftriaxone D3 (5/6-  (Doxycycline IV 5/6-5/8)    Pertinent Laboratory Values: Wt Readings from Last 1 Encounters:   05/07/22 196 lb (88.9 kg)     Temp Readings from Last 1 Encounters:   05/08/22 98.1 °F (36.7 °C) (Oral)     Estimated Creatinine Clearance: 50 mL/min (based on SCr of 0.9 mg/dL). Recent Labs     05/05/22  2115 05/06/22  0728 05/07/22  0558 05/07/22  1001 05/08/22  1136   CREATININE 1.4*   < >  --  1.1 0.9   WBC 5.6  --  4.4*  --   --     < > = values in this interval not displayed. Procalcitonin: n/a    Pertinent Cultures:  Culture Date Source Results   5/6/2022 Left Shin MRSA   5/8/2022 Urine  Klebsiella pneumoniae   MRSA Nasal Swab: N/A. Non-respiratory infection.     Plan:  Dosing recommendations based on Bayesian software  Start vancomycin 1250 mg Q24H  Anticipated AUC of 409 and trough concentration of 12 at steady state  Renal labs as indicated   Vancomycin concentration not ordered at this time  Pharmacy will continue to monitor patient and adjust therapy as indicated    Thank you for the consult,  Leisa Paul, [address] [place] [address]  5/8/2022 2:13 PM

## 2022-05-09 LAB
AEROBIC CULTURE: ABNORMAL
AEROBIC CULTURE: ABNORMAL
ANAEROBIC CULTURE: ABNORMAL
ANION GAP SERPL CALCULATED.3IONS-SCNC: 13 MEQ/L (ref 8–16)
BUN BLDV-MCNC: 16 MG/DL (ref 7–22)
CALCIUM SERPL-MCNC: 8.7 MG/DL (ref 8.5–10.5)
CHLORIDE BLD-SCNC: 107 MEQ/L (ref 98–111)
CO2: 22 MEQ/L (ref 23–33)
CREAT SERPL-MCNC: 0.9 MG/DL (ref 0.4–1.2)
ERYTHROCYTE [DISTWIDTH] IN BLOOD BY AUTOMATED COUNT: 14.7 % (ref 11.5–14.5)
ERYTHROCYTE [DISTWIDTH] IN BLOOD BY AUTOMATED COUNT: 47 FL (ref 35–45)
GFR SERPL CREATININE-BSD FRML MDRD: 60 ML/MIN/1.73M2
GLUCOSE BLD-MCNC: 130 MG/DL (ref 70–108)
GLUCOSE BLD-MCNC: 152 MG/DL (ref 70–108)
GLUCOSE BLD-MCNC: 168 MG/DL (ref 70–108)
GLUCOSE BLD-MCNC: 177 MG/DL (ref 70–108)
GRAM STAIN RESULT: ABNORMAL
HCT VFR BLD CALC: 28.6 % (ref 37–47)
HEMOGLOBIN: 8.9 GM/DL (ref 12–16)
MCH RBC QN AUTO: 27.2 PG (ref 26–33)
MCHC RBC AUTO-ENTMCNC: 31.1 GM/DL (ref 32.2–35.5)
MCV RBC AUTO: 87.5 FL (ref 81–99)
ORGANISM: ABNORMAL
PLATELET # BLD: 114 THOU/MM3 (ref 130–400)
PMV BLD AUTO: 11.1 FL (ref 9.4–12.4)
POTASSIUM SERPL-SCNC: 3 MEQ/L (ref 3.5–5.2)
RBC # BLD: 3.27 MILL/MM3 (ref 4.2–5.4)
SODIUM BLD-SCNC: 142 MEQ/L (ref 135–145)
WBC # BLD: 3.5 THOU/MM3 (ref 4.8–10.8)

## 2022-05-09 PROCEDURE — 36415 COLL VENOUS BLD VENIPUNCTURE: CPT

## 2022-05-09 PROCEDURE — 2580000003 HC RX 258

## 2022-05-09 PROCEDURE — 96372 THER/PROPH/DIAG INJ SC/IM: CPT

## 2022-05-09 PROCEDURE — 80048 BASIC METABOLIC PNL TOTAL CA: CPT

## 2022-05-09 PROCEDURE — 6370000000 HC RX 637 (ALT 250 FOR IP)

## 2022-05-09 PROCEDURE — 99232 SBSQ HOSP IP/OBS MODERATE 35: CPT

## 2022-05-09 PROCEDURE — 6360000002 HC RX W HCPCS

## 2022-05-09 PROCEDURE — 97162 PT EVAL MOD COMPLEX 30 MIN: CPT

## 2022-05-09 PROCEDURE — 85027 COMPLETE CBC AUTOMATED: CPT

## 2022-05-09 PROCEDURE — 97110 THERAPEUTIC EXERCISES: CPT

## 2022-05-09 PROCEDURE — 2500000003 HC RX 250 WO HCPCS

## 2022-05-09 PROCEDURE — 96366 THER/PROPH/DIAG IV INF ADDON: CPT

## 2022-05-09 PROCEDURE — 1200000003 HC TELEMETRY R&B

## 2022-05-09 PROCEDURE — 6370000000 HC RX 637 (ALT 250 FOR IP): Performed by: FAMILY MEDICINE

## 2022-05-09 PROCEDURE — 96376 TX/PRO/DX INJ SAME DRUG ADON: CPT

## 2022-05-09 PROCEDURE — 82948 REAGENT STRIP/BLOOD GLUCOSE: CPT

## 2022-05-09 RX ORDER — POTASSIUM CHLORIDE 7.45 MG/ML
10 INJECTION INTRAVENOUS PRN
Status: DISCONTINUED | OUTPATIENT
Start: 2022-05-09 | End: 2022-05-10 | Stop reason: HOSPADM

## 2022-05-09 RX ORDER — POTASSIUM CHLORIDE 20 MEQ/1
40 TABLET, EXTENDED RELEASE ORAL PRN
Status: DISCONTINUED | OUTPATIENT
Start: 2022-05-09 | End: 2022-05-10 | Stop reason: HOSPADM

## 2022-05-09 RX ORDER — POTASSIUM CHLORIDE 20 MEQ/1
60 TABLET, EXTENDED RELEASE ORAL ONCE
Status: COMPLETED | OUTPATIENT
Start: 2022-05-09 | End: 2022-05-09

## 2022-05-09 RX ADMIN — PREGABALIN 150 MG: 75 CAPSULE ORAL at 21:59

## 2022-05-09 RX ADMIN — AMLODIPINE BESYLATE 2.5 MG: 2.5 TABLET ORAL at 08:15

## 2022-05-09 RX ADMIN — ACETAMINOPHEN 650 MG: 325 TABLET ORAL at 06:07

## 2022-05-09 RX ADMIN — SODIUM CHLORIDE, PRESERVATIVE FREE 10 ML: 5 INJECTION INTRAVENOUS at 00:44

## 2022-05-09 RX ADMIN — HEPARIN SODIUM 5000 UNITS: 5000 INJECTION INTRAVENOUS; SUBCUTANEOUS at 21:59

## 2022-05-09 RX ADMIN — SODIUM CHLORIDE, PRESERVATIVE FREE 10 ML: 5 INJECTION INTRAVENOUS at 08:17

## 2022-05-09 RX ADMIN — CEFTRIAXONE SODIUM 1000 MG: 10 INJECTION, POWDER, FOR SOLUTION INTRAVENOUS at 00:43

## 2022-05-09 RX ADMIN — POTASSIUM CHLORIDE 60 MEQ: 1500 TABLET, EXTENDED RELEASE ORAL at 12:05

## 2022-05-09 RX ADMIN — OXYCODONE HYDROCHLORIDE AND ACETAMINOPHEN 500 MG: 500 TABLET ORAL at 08:15

## 2022-05-09 RX ADMIN — VANCOMYCIN HYDROCHLORIDE 1250 MG: 5 INJECTION, POWDER, LYOPHILIZED, FOR SOLUTION INTRAVENOUS at 15:33

## 2022-05-09 RX ADMIN — LEVETIRACETAM 1000 MG: 500 TABLET, FILM COATED ORAL at 21:59

## 2022-05-09 RX ADMIN — SODIUM CHLORIDE, PRESERVATIVE FREE 10 ML: 5 INJECTION INTRAVENOUS at 22:00

## 2022-05-09 RX ADMIN — CETIRIZINE HYDROCHLORIDE 10 MG: 10 TABLET, FILM COATED ORAL at 08:16

## 2022-05-09 RX ADMIN — Medication 1000 UNITS: at 08:15

## 2022-05-09 RX ADMIN — HEPARIN SODIUM 5000 UNITS: 5000 INJECTION INTRAVENOUS; SUBCUTANEOUS at 15:34

## 2022-05-09 RX ADMIN — FERROUS SULFATE TAB 325 MG (65 MG ELEMENTAL FE) 325 MG: 325 (65 FE) TAB at 08:14

## 2022-05-09 RX ADMIN — INSULIN LISPRO 1 UNITS: 100 INJECTION, SOLUTION INTRAVENOUS; SUBCUTANEOUS at 21:58

## 2022-05-09 RX ADMIN — FERROUS SULFATE TAB 325 MG (65 MG ELEMENTAL FE) 325 MG: 325 (65 FE) TAB at 15:34

## 2022-05-09 RX ADMIN — HEPARIN SODIUM 5000 UNITS: 5000 INJECTION INTRAVENOUS; SUBCUTANEOUS at 06:05

## 2022-05-09 RX ADMIN — LEVETIRACETAM 1000 MG: 500 TABLET, FILM COATED ORAL at 08:14

## 2022-05-09 RX ADMIN — PREGABALIN 150 MG: 75 CAPSULE ORAL at 08:14

## 2022-05-09 RX ADMIN — PREGABALIN 150 MG: 75 CAPSULE ORAL at 00:44

## 2022-05-09 RX ADMIN — INSULIN LISPRO 2 UNITS: 100 INJECTION, SOLUTION INTRAVENOUS; SUBCUTANEOUS at 12:16

## 2022-05-09 RX ADMIN — Medication 50 MG: at 08:15

## 2022-05-09 RX ADMIN — FERROUS SULFATE TAB 325 MG (65 MG ELEMENTAL FE) 325 MG: 325 (65 FE) TAB at 12:10

## 2022-05-09 RX ADMIN — BENZONATATE 100 MG: 100 CAPSULE ORAL at 08:15

## 2022-05-09 RX ADMIN — PANTOPRAZOLE SODIUM 40 MG: 40 TABLET, DELAYED RELEASE ORAL at 06:05

## 2022-05-09 ASSESSMENT — PAIN SCALES - GENERAL
PAINLEVEL_OUTOF10: 0
PAINLEVEL_OUTOF10: 0
PAINLEVEL_OUTOF10: 10
PAINLEVEL_OUTOF10: 0
PAINLEVEL_OUTOF10: 0

## 2022-05-09 ASSESSMENT — PAIN DESCRIPTION - LOCATION: LOCATION: HEAD

## 2022-05-09 NOTE — PROGRESS NOTES
Pharmacy Renal Adjustment    Karie iPckard is a [de-identified] y.o. female. Pharmacy renally adjust the following medications per P&T approved policy: pregabalin    Height:   Ht Readings from Last 1 Encounters:   05/06/22 5' (1.524 m)     Weight:  Wt Readings from Last 1 Encounters:   05/07/22 196 lb (88.9 kg)     Recent Labs     05/07/22  1001 05/08/22  1136   BUN 20 17   CREATININE 1.1 0.9     Estimated Creatinine Clearance: 50 mL/min (based on SCr of 0.9 mg/dL).       Assessment:  IDALIA    Plan:   Decrease pregabalin from 300 mg bid down to 150 mg bid

## 2022-05-09 NOTE — PLAN OF CARE
Problem: Discharge Planning  Goal: Discharge to home or other facility with appropriate resources  Outcome: Progressing  Flowsheets (Taken 5/9/2022 1906)  Discharge to home or other facility with appropriate resources: Identify barriers to discharge with patient and caregiver     Problem: Skin/Tissue Integrity  Goal: Absence of new skin breakdown  Description: 1. Monitor for areas of redness and/or skin breakdown  2. Assess vascular access sites hourly  Note: No new signs or symptoms of skin breakdown noted this shift, encouraging patient to turn and reposition self in bed q2h       Problem: Safety - Adult  Goal: Free from fall injury  Flowsheets   Free From Fall Injury:   Instruct family/caregiver on patient safety   Based on caregiver fall risk screen, instruct family/caregiver to ask for assistance with transferring infant if caregiver noted to have fall risk factors  Note: No falls noted this shift. Continue falling star program. Bed alarm on, bed in low position. Call light and personal belongings in reach. Patient uses call light appropriately. Problem: Chronic Conditions and Co-morbidities  Goal: Patient's chronic conditions and co-morbidity symptoms are monitored and maintained or improved  Flowsheets (Taken 5/9/2022 1906)  Care Plan - Patient's Chronic Conditions and Co-Morbidity Symptoms are Monitored and Maintained or Improved: Monitor and assess patient's chronic conditions and comorbid symptoms for stability, deterioration, or improvement     Problem: Pain  Goal: Verbalizes/displays adequate comfort level or baseline comfort level  Flowsheets   Verbalizes/displays adequate comfort level or baseline comfort level: Assess pain using appropriate pain scale  Note: Patient pain goal is 0 out of 10 , non med measures are low lighting, distraction, and rest. Tylenol PRN available as well. Care plan reviewed with patient.   Patient verbalize understanding of the plan of care and contribute to goal setting.

## 2022-05-09 NOTE — PROGRESS NOTES
[facility] 61  OCCUPATIONAL THERAPY MISSED TREATMENT NOTE  [facility] 6K  6K-009-A      Date: 2022  Patient Name: James Shirley        CSN: [id number]   : 1940  ([de-identified] y.o.)  Gender: female   Referring Practitioner: AMANDA Farley CNP  Diagnosis: Generalized weakness         REASON FOR MISSED TREATMENT: OT attempted at this time. On arrival, pt sleeping, unable to be woken despite multiple attempts.  Will check back as able

## 2022-05-09 NOTE — FLOWSHEET NOTE
05/09/22 1145   Treatment Team Notification   Reason for Communication Review case   Team Member Name Boogie Oliveira   Treatment Team Role Physician Assistant   Method of Communication Face to face       Patient has poor IV access, would be beneficial in receiving potassium replacement orally. Spoke with provider regarding potassium via PO. Per Boogie Oliveira, One time dose of Potassium Chloride 60 MEQ.

## 2022-05-09 NOTE — PROGRESS NOTES
Hospitalist Progress Note      Patient: Flor Richter    Unit/Bed:6K-09/009-A  YOB: 1940  MRN: [id number]   Acct: [de-identified]   PCP: Rosalie Zamarripa MD  Date of Admission: 5/5/2022    Assessment/Plan:    1. Urinary tract infection:  · UA positive on admission, positive nitrates, LEs, many bacteria. Patient endorsed symptoms of fatigue, weakness, dysuria. Continue IV Rocephin, culture resulted and sensitive. 2. COVID-19 infection:  · Patient has remained on room air through the duration of her stay, continue to monitor. No intervention warranted at this time. 3. IDALIA on CKD stage III, resolved:  · Patient's creatinine 1.4 on admission, her baseline appears to be around 0.7-0.9. IDALIA likely compounded by home medications including Bumex and metformin. These medications are currently held, IV fluids were initiated. Patient's Cr is much improved, now WNL. Continue to assess with daily BMP. 4. Hypokalemia:  · Replace per protocol, repeat BMP tomorrow morning  5. Open wound to left shin:  · Per patient's daughter, wound was noted on patient's left shin prior to discharge from the F. Patient had apparently run into another patient's wheelchair. On removal of dressing, wound appears erythematous with minimal purulent drainage. Culture resulted, revealing MRSA. Vancomycin started 05/8. Consult placed to ID for abx discharge planning, appreciate assistance. 6. NIDDMII:  · Hemoglobin A1c 7.2 and 09/2021. Glucose relatively controlled, currently holding home metformin. Continue SSI, hypoglycemia protocol, carb controlled diet. Accu-Cheks. 7. Chronic diastolic heart failure:  · Echo 03/29/2021 with EF of 55%. Patient takes Bumex daily, however on hold due to #3. Patient does not appear to be decompensated at this time. 8. Essential hypertension:  · BP stable at this time, continue home medications and adjust as needed.   9. Unspecified convulsions, per PCP note:  · Continue home [address]        Chief Complaint: Fatigue and weakness    Initial H and P:-    Per chart review: \"The patient is a [de-identified] y.o. female with a past medical history of hypertension, hyperlipidemia, and diabetes mellitus type II who presents with complaints of increased fatigue and weakness. Patient was discharged from SNF approximately 1 week ago and per her daughter has been doing well. Patient is attending outpatient physical therapy. She underwent physical therapy yesterday and today. Daughter reports the patient had increased fatigue and weakness today following therapy session. She reported feeling short of breath when transferring from her wheelchair to recliner chair. Patient also developed fever at home with complaints of chills. Daughter reports that she gave her mother Tylenol and then brought patient to ER to be evaluated. Upon arrival to ER patient was found to have a urinary tract infection and was administered Rocephin. Patient was also febrile post Tylenol administration with a temp of 101.2. Ibuprofen was administered. \"    5/7: Patient resting in no acute distress, no acute vents overnight. Patient denies any pain at this time, no chest pain, shortness of breath, abdominal pain, nausea, vomiting. No urinary complaints. We will continue to treat for urinary tract infection, pending final urine culture. Continue IV Rocephin. Patient's renal function much improved today, continue to monitor. 5/8: Patient reports that she is doing well, denies any pain other than chronic neck pain. She denies urinary complaints, nausea, vomiting, abdominal pain. Patient's culture of shin wound revealing MRSA. We will have to initiate vancomycin. Discontinue doxycycline. Subjective (past 24 hours):   Patient resting in bed in no acute distress, family at bedside. Patient denies any acute pain, notes that her only pain is her chronic neck pain.   Vancomycin started 05/8, ID consulted today for antibiotic discharge planning. Patient denies chest pain, shortness of breath, abdominal pain, nausea, vomiting. Past medical history, family history, social history and allergies reviewed again and is unchanged since admission. ROS (All review of systems completed. Pertinent positives noted. Otherwise All other systems reviewed and negative.)     Medications:  Reviewed    Infusion Medications    sodium chloride      dextrose      sodium chloride 100 mL/hr at 05/06/22 1727     Scheduled Medications    vancomycin (VANCOCIN) intermittent dosing (placeholder)   Other RX Placeholder    vancomycin  1,250 mg IntraVENous Q24H    pregabalin  150 mg Oral BID    amLODIPine  2.5 mg Oral Daily    [Held by provider] bumetanide  1 mg Oral BID    ferrous sulfate  325 mg Oral TID WC    levETIRAcetam  1,000 mg Oral BID    cetirizine  10 mg Oral Daily    pantoprazole  40 mg Oral QAM AC    [Held by provider] potassium chloride  40 mEq Oral Daily    sodium chloride flush  5-40 mL IntraVENous 2 times per day    heparin (porcine)  5,000 Units SubCUTAneous 3 times per day    insulin lispro  0-12 Units SubCUTAneous TID     insulin lispro  0-6 Units SubCUTAneous Nightly    cefTRIAXone (ROCEPHIN) IV  1,000 mg IntraVENous Q24H    ascorbic acid  500 mg Oral Daily    Vitamin D  1,000 Units Oral Daily    zinc sulfate  50 mg Oral Daily     PRN Meds: melatonin, sodium chloride flush, sodium chloride, acetaminophen **OR** acetaminophen, glucagon (rDNA), dextrose, albuterol, glucose, dextrose bolus **OR** dextrose bolus, benzonatate      Intake/Output Summary (Last 24 hours) at 5/9/2022 0809  Last data filed at 5/9/2022 0449  Gross per 24 hour   Intake 460 ml   Output 225 ml   Net 235 ml       Diet:  ADULT DIET;  Regular; 4 carb choices (60 gm/meal)    Exam:  /71   Pulse 65   Temp 97.9 °F (36.6 °C) (Oral)   Resp 18   Ht 5' (1.524 m)   Wt 190 lb (86.2 kg)   LMP  (LMP Unknown)   SpO2 99%   BMI 37.11 kg/m²   General appearance: No apparent distress, chronically ill-appearing  HEENT: Patient very hard of hearing. Pupils equal, round, and reactive to light. Conjunctivae/corneas clear. Neck: Supple, with full range of motion. No jugular venous distention. Trachea midline. Respiratory:  Normal respiratory effort on RA. Expiratory wheezes noted in the bases. Cardiovascular: Regular rate and rhythm with normal S1/S2 without murmurs, rubs or gallops. Abdomen: Soft, non-tender, non-distended with normal bowel sounds. Musculoskeletal: passive and active ROM x 4 extremities. Skin: Dressing in place over left shin wound. Dry, clean, intact. Neurologic:  Neurovascularly intact without any focal sensory/motor deficits. Cranial nerves: II-XII intact, grossly non-focal.  Psychiatric: Alert and oriented, thought content appropriate, normal insight  Capillary Refill: Brisk,< 3 seconds   Peripheral Pulses: +2 palpable, equal bilaterally     Labs:   Recent Labs     05/07/22  0558   WBC 4.4*   HGB 8.6*   HCT 27.2*   *     Recent Labs     05/07/22  1001 05/08/22  1136    141   K 3.4* 3.3*    106   CO2 22* 24   BUN 20 17   CREATININE 1.1 0.9   CALCIUM 8.6 8.6     No results for input(s): AST, ALT, BILIDIR, BILITOT, ALKPHOS in the last 72 hours. No results for input(s): INR in the last 72 hours. No results for input(s): Towana Ball in the last 72 hours. Microbiology:    Blood culture #1:   Lab Results   Component Value Date    BC No growth-preliminary No growth  03/24/2021       Blood culture #2:No results found for: Felisha Mc    Organism:  Lab Results   Component Value Date    ORG Staphylococcus aureus 05/06/2022         Lab Results   Component Value Date    LABGRAM  05/06/2022     Few segmented neutrophils observed. No epithelial cells observed. Moderate gram negative bacilli. Rare gram positive cocci in pairs and clusters.         MRSA culture only:No results found for: [facility]    Urine culture:   Lab Results   Component Value Date    LABURIN [place] count: >100,000 CFU/mL  05/05/2022       Respiratory culture: No results found for: CULTRESP    Aerobic and Anaerobic :  Lab Results   Component Value Date    LABAERO  05/06/2022     Culture also yielded heavy growth of gram positive bacilli consistent with Corynebacterium species. The gram negative bacilli observed on the direct smear were not recovered. This may be due to antimicrobial suppression, a fastidious organism, or an anaerobic organism. If a true mixed aerobic and anaerobic infection is suspected, then broad spectrum empiric antibiotic therapy is indicated and should include coverage for anaerobic organisms. Current antibiotic therapy ineffective in vitro for Staphylococcus aureus (MRSA). LABAERO  05/06/2022     moderate growth This is a MRSA (Methicillin Resistant Staphylococcus aureus)isolate. Isolates of MRSA (ORSA) Methicillin (Oxacillin) Resistant Staphylococcus aureus (coagulase positive) require patient be placed in CONTACT isolation. Methicillin(Oxacillin)resistant strains of staphylococci (MRSA)or(MRSE)should be considered resistant to all classes of cephalosporins, penems and beta-lactams. In the treatment of gram positive infections, GENTAMICIN should be CONSIDERED a SYNERGYSTIC agent ONLY. No results found for: LABANAE    Urinalysis:      Lab Results   Component Value Date    NITRU POSITIVE 05/05/2022    WBCUA  05/05/2022    BACTERIA MANY 05/05/2022    RBCUA 5-10 05/05/2022    BLOODU TRACE 05/05/2022    SPECGRAV 1.012 05/05/2022    GLUCOSEU NEGATIVE 01/05/2022       Radiology:  XR CHEST PORTABLE   Final Result   Stable cardiomegaly without pulmonary vascular congestion.       This document has been electronically signed by: Andie Baer MD on    05/05/2022 10:19 PM        XR CHEST PORTABLE    Result Date: 5/5/2022  1 view chest x-ray Comparison: CR,SR - XR CHEST PORTABLE - 03/29/2021 12:48 AM EDT Findings: The lungs are clear. Cardiac silhouette is enlarged. No pulmonary vascular congestion. Calcification in the aortic knob. No acute fracture. Stable cardiomegaly without pulmonary vascular congestion.  This document has been electronically signed by: Andie Baer MD on 05/05/2022 10:19 PM      Electronically signed by Matt Wadsworth PA-C on 5/9/2022 at 8:34 AM

## 2022-05-09 NOTE — PROGRESS NOTES
[facility]  INPATIENT PHYSICAL THERAPY  EVALUATION  STRZ RENAL TELEMETRY 6K - 8W-34/687-H    Time In: 1010  Time Out: 1032  Timed Code Treatment Minutes: 15 Minutes  Minutes: 22          Date: 2022  Patient Name: Lynne Butler,  Gender:  female        MRN: [id number]  : 1940  ([de-identified] y.o.)      Referring Practitioner: AMANDA Yoder CNP  Diagnosis: Generalized weakness  Additional Pertinent Hx: Per EMR, \"The patient is a [de-identified] y.o. female with a past medical history of hypertension, hyperlipidemia, and diabetes mellitus type II who presents with complaints of increased fatigue and weakness. Patient was discharged from SNF approximately 1 week ago and per her daughter has been doing well. Patient is attending outpatient physical therapy. She underwent physical therapy yesterday and today. Daughter reports the patient had increased fatigue and weakness today following therapy session. She reported feeling short of breath when transferring from her wheelchair to recliner chair. Patient also developed fever at home with complaints of chills. Daughter reports that she gave her mother Tylenol and then brought patient to ER to be evaluated. Upon arrival to ER patient was found to have a urinary tract infection and was administered Rocephin. Patient was also febrile post Tylenol administration with a temp of 101.2. Ibuprofen was administered. \"     Restrictions/Precautions:  Restrictions/Precautions: Contact Precautions,General Precautions,Fall Risk,Isolation  Position Activity Restriction  Other position/activity restrictions: Covid +    Subjective:  Chart Reviewed: Yes  Patient assessed for rehabilitation services?: Yes  Subjective: Pt getting over to bed from [facility] with daughter assisting upon arrival to room. Pt and daughter agreed to therapy. RN approved session.     General:        Hearing  Hearing: Exceptions to [facility]  Hearing Exceptions: Hard of hearing/hearing concerns  Hearing: Exceptions to [facility]  Hearing Exceptions: Hard of hearing/hearing concerns       Pain: not reported      Vitals: Vitals not assessed per clinical judgement, see nursing flowsheet    Social/Functional History:    Lives With: Daughter  Type of Home: House  Home Layout: One level  Home Access: Stairs to enter without rails  Entrance Stairs - Number of Steps: 1 ERNESTO  Home Equipment: Casie Budd, standard     Bathroom Shower/Tub: Tub/Shower unit,Shower chair with back  Bathroom Toilet: Standard  Bathroom Equipment: Commode  Bathroom Accessibility: Accessible       ADL Assistance: Independent  Homemaking Assistance: Needs assistance (Daughter completes all cooking, cleaning, and laundry.)  Homemaking Responsibilities: No  Ambulation Assistance: Independent (Pt ambulates short distances only. Uses w/c mostly around home.)  Transfer Assistance: Independent    Active : No     Additional Comments: Pt's daughter reported that pt generally uses wheelchair around home, but has to use walker to get into and around bathroom. Pt had been home from SNF for \"about a week and a half\" and had started Outpt Therapies last week. OBJECTIVE:  Range of Motion:  Bilateral Lower Extremity: WFL    Strength:  Bilateral Lower Extremity: grossly 4-/5    Balance:  Static Sitting Balance:  Stand By Assistance  Dynamic Sitting Balance: Stand By Assistance, Contact Guard Assistance  Static Standing Balance: Contact Guard Assistance  Dynamic Standing Balance: Contact Guard Assistance    Bed Mobility:  Sit to Supine: Stand By Assistance, [employer], with head of bed flat, with rail, with increased time for completion     Transfers:  Sit to Stand: Contact Guard Assistance  Stand to [employer] Assistance    Ambulation:  Contact Guard Assistance  Distance: 12 ft  Surface: Level Tile  Device:Standard Walker  Gait Deviations:   Forward Flexed Posture, Decreased Step Length Bilaterally, Decreased Weight Shift Bilaterally, Decreased Gait Speed, Decreased Heel Strike Bilaterally and Unsteady Gait    Exercise:  Patient was guided in 1 set(s) 10-12 reps of exercise to both lower extremities. Ankle pumps, Heelslides, Short arc quads, Hip abduction/adduction, Straight leg raises, Seated marches and Long arc quads. Exercises were completed for increased independence with functional mobility. Functional Outcome Measures: Completed  AM-PAC Inpatient Mobility Raw Score : 18  AM-PAC Inpatient T-Scale Score : 43.63    ASSESSMENT:  Activity Tolerance:  Patient tolerance of  treatment: good. Treatment Initiated: Treatment and education initiated within context of evaluation. Evaluation time included review of current medical information, gathering information related to past medical, social and functional history, completion of standardized testing, formal and informal observation of tasks, assessment of data and development of plan of care and goals. Treatment time included skilled education and facilitation of tasks to increase safety and independence with functional mobility for improved independence and quality of life. Assessment: Body Structures, Functions, Activity Limitations Requiring Skilled Therapeutic Intervention: Decreased functional mobility ,Decreased endurance,Decreased balance,Decreased strength  Assessment: Pt is an [de-identified] yo female that is deconditioned and participated well with mobility and exercises. Pt has very attentive family. Pt would benefit from continued skilled PT to address strengthening, balance, and functional mobility. Therapy Prognosis: Good    Requires PT Follow-Up: Yes    Discharge Recommendations:  Discharge Recommendations: Continue to assess pending progress,24 hour supervision or assist,Therapy recommended at discharge,Patient would benefit from continued therapy after discharge,Outpatient PT,Home with Home health PT    Patient Education:      .     Patient Education  Education Given To: Patient  Education Provided: Role of Therapy,Plan of Care,Home Exercise Program  Education Method: Demonstration,Verbal  Barriers to Learning: Hearing  Education Outcome: Verbalized understanding       Equipment Recommendations:  Equipment Needed: No    Plan:  Current Treatment Recommendations: Strengthening,Balance training,Functional mobility training,Gait training,Stair training,Transfer training,Endurance training,Patient/Caregiver education & training,Safety education & training,Home exercise program,Equipment evaluation, education, & procurement,Therapeutic activities  Plan:  (5x GM)    Goals:  Patient goals : go home  Short Term Goals  Time Frame for Short term goals: at discharge  Short term goal 1: Pt to be Mod I for supine <> sit to get in/out of bed  Short term goal 2: Pt to be Mod I for sit <> stand to get up to ambulate  Short term goal 3: Pt to ambulate > 20 ft with SW with SBA for household distances  Short term goal 4: Pt to negotiate 1 step with SW with CGA for home access  Long Term Goals  Time Frame for Long term goals : not set due to short ELOS    Following session, patient left in safe position with all fall risk precautions in place. Daniella Reveles.  [employer], [place] [address]

## 2022-05-09 NOTE — PLAN OF CARE
Problem: Discharge Planning  Goal: Discharge to home or other facility with appropriate resources  Outcome: Progressing  Flowsheets (Taken 5/8/2022 2130)  Discharge to home or other facility with appropriate resources: Identify barriers to discharge with patient and caregiver  Note: Patient is planning to be discharged with daughter to their private residence. She is currently on a course of Vancomycin. Problem: Skin/Tissue Integrity  Goal: Absence of new skin breakdown  Description: 1. Monitor for areas of redness and/or skin breakdown  2. Assess vascular access sites hourly  3. Every 4-6 hours minimum:  Change oxygen saturation probe site  4. Every 4-6 hours:  If on nasal continuous positive airway pressure, respiratory therapy assess nares and determine need for appliance change or resting period. Outcome: Progressing  Note: No signs of new skin breakdown with each assessment. Skin remains warm, dry, intact. Mucous membranes pink & moist. Patient is able to turn self but needs reminded. Barrier cream is being applied to bottom to help prevent bed sores. Feet/ heels elevated on pillows. Problem: Pain  Goal: Verbalizes/displays adequate comfort level or baseline comfort level  Outcome: Progressing  Flowsheets (Taken 5/8/2022 2215)  Verbalizes/displays adequate comfort level or baseline comfort level: Assess pain using appropriate pain scale  Note: Patient able to use 0-10 pain scale. She is agreeable to take PRN pain medications. I am doing hourly rounding to evaluate for pain.

## 2022-05-10 VITALS
RESPIRATION RATE: 18 BRPM | DIASTOLIC BLOOD PRESSURE: 62 MMHG | WEIGHT: 190 LBS | HEIGHT: 60 IN | OXYGEN SATURATION: 97 % | HEART RATE: 70 BPM | TEMPERATURE: 97.7 F | SYSTOLIC BLOOD PRESSURE: 138 MMHG | BODY MASS INDEX: 37.3 KG/M2

## 2022-05-10 LAB
ANION GAP SERPL CALCULATED.3IONS-SCNC: 13 MEQ/L (ref 8–16)
BUN BLDV-MCNC: 15 MG/DL (ref 7–22)
CALCIUM SERPL-MCNC: 8.8 MG/DL (ref 8.5–10.5)
CHLORIDE BLD-SCNC: 111 MEQ/L (ref 98–111)
CO2: 20 MEQ/L (ref 23–33)
CREAT SERPL-MCNC: 1 MG/DL (ref 0.4–1.2)
GFR SERPL CREATININE-BSD FRML MDRD: 53 ML/MIN/1.73M2
GLUCOSE BLD-MCNC: 122 MG/DL (ref 70–108)
GLUCOSE BLD-MCNC: 125 MG/DL (ref 70–108)
GLUCOSE BLD-MCNC: 169 MG/DL (ref 70–108)
POTASSIUM SERPL-SCNC: 3.7 MEQ/L (ref 3.5–5.2)
SODIUM BLD-SCNC: 144 MEQ/L (ref 135–145)
VANCOMYCIN RANDOM: 12.9 UG/ML (ref 0.1–39.9)

## 2022-05-10 PROCEDURE — 6370000000 HC RX 637 (ALT 250 FOR IP)

## 2022-05-10 PROCEDURE — 80048 BASIC METABOLIC PNL TOTAL CA: CPT

## 2022-05-10 PROCEDURE — 36415 COLL VENOUS BLD VENIPUNCTURE: CPT

## 2022-05-10 PROCEDURE — 80202 ASSAY OF VANCOMYCIN: CPT

## 2022-05-10 PROCEDURE — 99239 HOSP IP/OBS DSCHRG MGMT >30: CPT

## 2022-05-10 PROCEDURE — 6360000002 HC RX W HCPCS

## 2022-05-10 PROCEDURE — 2500000003 HC RX 250 WO HCPCS

## 2022-05-10 PROCEDURE — 6370000000 HC RX 637 (ALT 250 FOR IP): Performed by: FAMILY MEDICINE

## 2022-05-10 PROCEDURE — 96376 TX/PRO/DX INJ SAME DRUG ADON: CPT

## 2022-05-10 PROCEDURE — 96372 THER/PROPH/DIAG INJ SC/IM: CPT

## 2022-05-10 PROCEDURE — 82948 REAGENT STRIP/BLOOD GLUCOSE: CPT

## 2022-05-10 RX ORDER — CEFDINIR 300 MG/1
300 CAPSULE ORAL 2 TIMES DAILY
Qty: 10 CAPSULE | Refills: 0 | Status: SHIPPED | OUTPATIENT
Start: 2022-05-10 | End: 2022-05-15

## 2022-05-10 RX ORDER — ASCORBIC ACID 500 MG
500 TABLET ORAL DAILY
Qty: 30 TABLET | Refills: 3 | Status: SHIPPED | OUTPATIENT
Start: 2022-05-11

## 2022-05-10 RX ADMIN — CEFTRIAXONE SODIUM 1000 MG: 10 INJECTION, POWDER, FOR SOLUTION INTRAVENOUS at 01:20

## 2022-05-10 RX ADMIN — PREGABALIN 150 MG: 75 CAPSULE ORAL at 09:17

## 2022-05-10 RX ADMIN — FERROUS SULFATE TAB 325 MG (65 MG ELEMENTAL FE) 325 MG: 325 (65 FE) TAB at 09:17

## 2022-05-10 RX ADMIN — INSULIN LISPRO 2 UNITS: 100 INJECTION, SOLUTION INTRAVENOUS; SUBCUTANEOUS at 12:52

## 2022-05-10 RX ADMIN — Medication 1000 UNITS: at 09:17

## 2022-05-10 RX ADMIN — Medication 50 MG: at 09:18

## 2022-05-10 RX ADMIN — PANTOPRAZOLE SODIUM 40 MG: 40 TABLET, DELAYED RELEASE ORAL at 06:31

## 2022-05-10 RX ADMIN — FERROUS SULFATE TAB 325 MG (65 MG ELEMENTAL FE) 325 MG: 325 (65 FE) TAB at 12:52

## 2022-05-10 RX ADMIN — LEVETIRACETAM 1000 MG: 500 TABLET, FILM COATED ORAL at 09:17

## 2022-05-10 RX ADMIN — OXYCODONE HYDROCHLORIDE AND ACETAMINOPHEN 500 MG: 500 TABLET ORAL at 09:18

## 2022-05-10 RX ADMIN — CETIRIZINE HYDROCHLORIDE 10 MG: 10 TABLET, FILM COATED ORAL at 09:17

## 2022-05-10 RX ADMIN — HEPARIN SODIUM 5000 UNITS: 5000 INJECTION INTRAVENOUS; SUBCUTANEOUS at 06:31

## 2022-05-10 RX ADMIN — AMLODIPINE BESYLATE 2.5 MG: 2.5 TABLET ORAL at 09:18

## 2022-05-10 ASSESSMENT — PAIN SCALES - GENERAL: PAINLEVEL_OUTOF10: 0

## 2022-05-10 NOTE — PROGRESS NOTES
CLINICAL PHARMACY: DISCHARGE MED RECONCILIATION/REVIEW    [facility]) Select Patient?: Yes  Total # of Interventions Recommended: 0   -   Total # Interventions Accepted: 0  Intervention Severity:   - Level 1 Intervention Present?: No   - Level 2 #: 0   - Level 3 #: 0   Time Spent (min): 15    Additional Documentation:

## 2022-05-10 NOTE — CONSULTS
CONSULTATION NOTE :ID       Patient - Gama Palmer,  Age - [de-identified] y.o.    - 1940      Room Number - 2Q-62/289-M   N -  [id number]   Acct # - [de-identified]  Date of Admission -  2022  8:52 PM  Patient's PCP: Trey Stephens MD     Requesting Physician: Berenice Stewart PA-C    REASON FOR CONSULTATION   Assistance with IV Abx for wound  CHIEF COMPLAINT   Fever and chills with weakness    HISTORY OF PRESENT ILLNESS     This is a very pleasant [de-identified] y.o. female who was admitted to the hospital with a chief complaints of fever and chills with increased fatigue and weakness. Has history of HTN, HLD, T2DM, CHF, and sinus adenocarcinoma s/p radiation, complicated by necrosis. Was recently discharged from SNF after hospitalization for right brain lesion with cerebral edema and was transferred to [facility]. On presentation to ED on , had Tmax 101.2, found to have UTI and IDALIA. Started on Rocephin and admitted for further management. Open wound on left shin was also noted on admission that was reportedly present prior to her last discharge. Per ED documentation, wound was evaluated by her PCP, Dr. Kailee Berman on 2022. COVID test was positive on admission but on room air. UCx  positive for Klebsiella pneumoniae. Wound culture  growing MRSA. On Vancomycin (started on ). ID consulted for assistance with IV Abx plan. On evaluation, patient reports no dysuria at this time. No other issues. Per nursing staff, she was confused and did complain of dysuria on .     PAST MEDICAL  HISTORY       Past Medical History:   Diagnosis Date    Cancer [facility])     adenocarcinoma of her sinuses    Cataract of both eyes     COVID-19     DDD (degenerative disc disease), lumbar     DM2 (diabetes mellitus, type 2) ([facility] [address]     diagnoses approx     Dysphagia, pharyngoesophageal 2016    Eczema 2018    Fibrocystic breast     H/O ventral hernia repair     Headache 2017    History of COVID-19 12/2020    Hyperlipidemia     Hypertension     Iron deficiency anemia     LPRD (laryngopharyngeal reflux disease) 09/26/2016    Neuropathy     peripheral    Obesity     Orbital abscess     Orbital cellulitis 01/22/2014    SWAPNA (obstructive sleep apnea)     Osteoarthritis     Osteoporosis     Persistent proteinuria associated with type 2 diabetes mellitus ([facility] [address] 01/2017    urine micral of 50.       Sinusitis     Velopharyngeal incompetence 09/26/2016       PAST SURGICAL HISTORY     Past Surgical History:   Procedure Laterality Date    ABDOMEN SURGERY      APPENDECTOMY      CARPAL TUNNEL RELEASE Bilateral 2008, 2009    CATARACT REMOVAL  2011    bilat    CHOLECYSTECTOMY  03/1988    COLONOSCOPY  2016    COLONOSCOPY  10/11/2018    COLONOSCOPY POLYPECTOMY SNARE/COLD BIOPSY performed by Seferino Potter MD at [facility]    DILATATION, ESOPHAGUS      ENDOSCOPY, COLON, DIAGNOSTIC      EYE SURGERY Left 01/30/2015    EYE SURGERY      CATARACT REMOVAL- BILATERAL    HEMORRHOID SURGERY  1980s    HERNIA REPAIR      HYSTERECTOMY, TOTAL ABDOMINAL  1980    JOINT REPLACEMENT  bilat 2005/ 2007    knees    PA COLSC FLX WITH DIRECTED SUBMUCOSAL Simone [address] ANY SBST  10/11/2018    COLONOSCOPY SUBMUCOSAL/BOTOX INJECTION performed by Seferino Potter MD at [address] [place] [address]  last 2009    removed a bone (2)--2 times no construction     SINUS SURGERY  02/01/2016    SINUS SURGERY  2016 x 2    SINUS SURGERY  09/18/2017    [facility] - Dr Manning Sic    SINUS SURGERY  08/09/2017 8/17/2017 - [facility]    TONSILLECTOMY      TOTAL KNEE ARTHROPLASTY  08/2003    Left TKR    VENTRAL HERNIA REPAIR  1992         MEDICATIONS:       Scheduled Meds:   vancomycin (VANCOCIN) intermittent dosing (placeholder)   Other RX Placeholder    vancomycin  1,250 mg IntraVENous Q24H    pregabalin  150 mg Oral BID    amLODIPine  2.5 mg Oral Daily    [Held by provider] bumetanide  1 mg Oral BID    ferrous sulfate  325 mg Oral TID     levETIRAcetam  1,000 mg Oral BID cetirizine  10 mg Oral Daily    pantoprazole  40 mg Oral QAM AC    [Held by provider] potassium chloride  40 mEq Oral Daily    sodium chloride flush  5-40 mL IntraVENous 2 times per day    heparin (porcine)  5,000 Units SubCUTAneous 3 times per day    insulin lispro  0-12 Units SubCUTAneous TID WC    insulin lispro  0-6 Units SubCUTAneous Nightly    cefTRIAXone (ROCEPHIN) IV  1,000 mg IntraVENous Q24H    ascorbic acid  500 mg Oral Daily    Vitamin D  1,000 Units Oral Daily    zinc sulfate  50 mg Oral Daily     Continuous Infusions:   sodium chloride      dextrose      sodium chloride 100 mL/hr at 05/06/22 1727     PRN Meds:potassium chloride **OR** potassium alternative oral replacement **OR** potassium chloride, melatonin, sodium chloride flush, sodium chloride, acetaminophen **OR** acetaminophen, glucagon (rDNA), dextrose, albuterol, glucose, dextrose bolus **OR** dextrose bolus, benzonatate  Allergies:   ALLERGIES:    Lisinopril, Sulfamethoxazole-trimethoprim, Morphine, Codeine, Hydrocodone, Percocet [oxycodone-acetaminophen], and Tape [adhesive tape]        SOCIAL HISTORY:     TOBACCO:   reports that she has never smoked. She has never used smokeless tobacco.     ETOH:   reports no history of alcohol use. Patient currently lives with family with daughter      FAMILY HISTORY:         Problem Relation Age of Onset    Diabetes Mother     Heart Disease Father         whooping cough as child    Obesity Sister     Other Brother         tumor on back    Obesity Sister     Cancer Sister         Skin     Cancer Brother         Bone cancer from metal    Other Brother         passed as a child    Heart Disease Brother     Other Brother         tremor    Heart Attack Brother     No Known Problems Brother     Heart Disease Brother     No Known Problems Brother     No Known Problems Brother        REVIEW OF SYSTEMS:     Constitutional: no fever, no night sweats, no fatigue, no weight loss.   Head: no head ache , no head injury, no migranes. Eye: no eye discharge, blurring of vision, no double vision,no eye pain. Ears: no hearing difficulty, no tinnitus  Mouth/throat: no ulceration, dental caries , dysphagia, no hoarseness and voice change  Respiratory: no cough no chest pain,no shortness of breath,no wheezing  CVS: no palpitation, no chest pain,   GI: no abdominal pain, no nausea , no vomiting, no constipation,no diarrhea. ABA: no dysuria, frequency and urgency, no hematuria, no kidney stones  Musculoskeletal: no joint pain, swelling , stiffness,  Endocrine: no polyuria, polydipsia, no cold or heat intolerance  Hematology: no anemia, no easy brusing or bleeding, no hx of clotting disorder  Dermatology: no skin rash, no skin lesions, no pruritis,  Neurological:no headaches,no dizziness, no seizure, no numbness. Psychiatry: no depression, no anxiety,no panic attacks, no suicide ideation    PHYSICAL EXAM:     BP (!) 147/76   Pulse 74   Temp 98.7 °F (37.1 °C) (Oral)   Resp 18   Ht 5' (1.524 m)   Wt 190 lb (86.2 kg)   LMP  (LMP Unknown)   SpO2 97%   BMI 37.11 kg/m²   General apperance:  Awake, alert, not in distress. HEENT: pink conjunctiva, unicteric sclera, moist oral mucosa. Chest:  RRR, no murmur  Cardiovascular:  RRR ,S1S2, no murmur or gallop. Abdomen:  Soft, non tender to palpation. Extremities: normal ROM, no swelling  Skin:  Warm and dry.  Area of healed wound with no signs of active infection  CNS: CN grossly without deficits except worsened deafness compared to prior (about a year ago) noted, sensation and motor intact    LABS:     CBC:   Recent Labs     05/09/22  0849   WBC 3.5*   HGB 8.9*   *     BMP:    Recent Labs     05/08/22  1136 05/09/22  0849 05/10/22  0640    142 144   K 3.3* 3.0* 3.7    107 111   CO2 24 22* 20*   BUN 17 16 15   CREATININE 0.9 0.9 1.0   GLUCOSE 118* 177* 122*     Calcium:  Recent Labs     05/10/22  0640   CALCIUM 8.8     Ionized Calcium:No results for input(s): IONCA in the last 72 hours. Magnesium:No results for input(s): MG in the last 72 hours. Phosphorus:No results for input(s): PHOS in the last 72 hours. BNP:No results for input(s): BNP in the last 72 hours. Glucose:  Recent Labs     05/09/22  1550 05/09/22  2151 05/10/22  0724   POCGLU 130* 152* 125*     HgbA1C: No results for input(s): LABA1C in the last 72 hours. INR: No results for input(s): INR in the last 72 hours. Hepatic: No results for input(s): ALKPHOS, ALT, AST, PROT, BILITOT, BILIDIR, LABALBU in the last 72 hours. Amylase and Lipase:No results for input(s): LACTA, AMYLASE in the last 72 hours. Lactic Acid: No results for input(s): LACTA in the last 72 hours. Troponin: No results for input(s): CKTOTAL, CKMB, TROPONINI in the last 72 hours. BNP: No results for input(s): BNP in the last 72 hours. Lipids: No results for input(s): CHOL, TRIG, HDL, LDL, LDLCALC in the last 72 hours. ABGs: No results found for: PHART, PO2ART, KVN3JJB, EBE6VON, BEART    Cultures:      CXR:       UA: No results for input(s): SPECGRAV, PHUR, COLORU, CLARITYU, MUCUS, PROTEINU, BLOODU, RBCUA, [address], BACTERIA, NITRU, GLUCOSEU, BILIRUBINUR, UROBILINOGEN, KETUA, LABCAST, LABCASTTY, AMORPHOS in the last 72 hours.     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 XXXS    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[facility]) C30.0    Cataract of both eyes H26.9    History of ventral hernia repair Z98.890, Z87.19    Other cervical disc degeneration, mid-cervical region M50.320    Spondylolisthesis of cervical region M43.12    Spondylolisthesis of thoracic region M43.14    Chronic rhinitis J31.0    Closed fracture of head of humerus S42.293A    Dysphagia R13.10    Laryngopharyngeal reflux K21.9    Primary adenocarcinoma of ethmoidal sinus ([facility]) C31.1    Obesity, Class II, BMI 35-39.9 E66.9    COVID-19 U07.1    Brain mass N73.28    Folliculitis V95.9    Pneumonia due to organism J18.9    Chronic diastolic CHF (congestive heart failure) ([facility]) I50.32    Acute respiratory failure with hypoxia ([facility]) J96.01    Chronic ethmoidal sinusitis J32.2    Chronic frontal sinusitis J32.1    Chronic maxillary sinusitis J32.0    Chronic sphenoidal sinusitis J32.3    Brain compression ([facility]) W63.4    Metabolic encephalopathy W07.75    Hypokalemia E87.6    IDALIA (acute kidney injury) ([facility]) N17.9    CKD (chronic kidney disease), stage II N18.2    Cerebral edema ([facility]) G93.6    Asymptomatic bacteriuria R82.71    Morbid obesity ([facility]) E66.01    Generalized weakness R53.1    Cerebral abscess G06.0    Unspecified convulsions R56.9    Acute kidney injury ([address] N17.9           Impression and Recommendation:   UTI with SIRS on admission: appears to be improving. No symptoms at this time. S/p 4 days of Rocephin. Discontinue Rocephin and monitor. COVID-19 infection: positive test on admission 5/6. On room air  Healed left shin wound: will do daily baths with hibiclens soap for wound care.    No signs of active infection. MRSA positive wound culture likely colonization. Patient has history of MRSA. Discontinue Vancomycin. Hx sinus adenocarcinoma with necrosis s/p surgical resection and debridement. 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 Sylvie Cortez MD, MD,FACP 5/10/2022 8:35 AM   Patient was seen and examined face-to-face by me  The chart, progress notes, labs and radiographs were reviewed. Case discussed with the nurse. Questions and concerns were addressed. I agree with the progress note.

## 2022-05-10 NOTE — PLAN OF CARE
Problem: Discharge Planning  Goal: Discharge to home or other facility with appropriate resources  Outcome: Completed     Problem: Skin/Tissue Integrity  Goal: Absence of new skin breakdown  Description: 1. Monitor for areas of redness and/or skin breakdown  2. Assess vascular access sites hourly  3. Every 4-6 hours minimum:  Change oxygen saturation probe site  4. Every 4-6 hours:  If on nasal continuous positive airway pressure, respiratory therapy assess nares and determine need for appliance change or resting period.   Outcome: Completed     Problem: Safety - Adult  Goal: Free from fall injury  Outcome: Completed     Problem: Chronic Conditions and Co-morbidities  Goal: Patient's chronic conditions and co-morbidity symptoms are monitored and maintained or improved  Outcome: Completed     Problem: Pain  Goal: Verbalizes/displays adequate comfort level or baseline comfort level  Outcome: Completed

## 2022-05-10 NOTE — DISCHARGE SUMMARY
Hospitalist Discharge Summary        Patient: Rody Wade  YOB: 1940  MRN: [id number]   Acct: [de-identified]    Primary Care Physician: Kerry Clark MD    Admit date  5/5/2022    Discharge date: 5/10/2022  2:05 PM    Chief Complaint on presentation :-  Fatigue and weakness     Discharge Assessment and Plan:-   1. Urinary tract infection:  ? UA positive on admission, positive nitrates, LEs, many bacteria. Patient endorsed symptoms of fatigue, weakness, dysuria. Rocephin was initiated on admission, final urine culture growing Klebsiella pneumonia. Patient discharged with 5 more days of Omnicef, patient to follow-up with her PCP in 1 week for hospital recheck. She is in stable condition, nontoxic-appearing on day of discharge. 2. COVID-19 infection:  ? Patient remained on room air through the duration of her stay. No intervention indicated during this hospital admission. 3. IDALIA on CKD stage III, resolved:  ? Patient's creatinine 1.4 on admission, her baseline appears to be around 0.7-0.9. IDALIA likely compounded by home medications including Bumex and metformin. These medications were held through the duration of her stay. IV fluids were received, creatinine improved and WNL on day of discharge. 4. Open wound to left shin:  ? Per patient's daughter, wound was noted on patient's left shin prior to discharge from the [facility]. Patient had apparently run into another patient's wheelchair. On removal of dressing, wound appears erythematous with minimal purulent drainage. ? Culture resulted, MRSA positive wound culture likely colonization per ID. Patient has history of MRSA. No indication for antibiotic treatment at this time. Patient stable condition and nontoxic-appearing. 5. NIDDMII:  ? Resume home medications on discharge. 6. Chronic diastolic heart failure:  ? Echo 03/29/2021 with EF of 55%.   Patient compensated through duration of her stay, will resume home medications on discharge. 7. Essential hypertension:  ? BP stable on day of discharge, resume home medications. 8. Unspecified convulsions, per PCP note:  ? Resume home Keppra                        Initial H and P and Hospital course:-  Per chart review: \"The patient is [de-identified] y.o. female with a past medical history of hypertension, hyperlipidemia, and diabetes mellitus type II who presents with complaints of increased fatigue and weakness.  Patient was discharged from SNF approximately 1 week ago and per her daughter has been doing well. Geoffreyjuvenal Samantha is attending outpatient physical therapy.  She underwent physical therapy yesterday and today. Darling Chao reports the patient had increased fatigue and weakness today following therapy session.  She reported feeling short of breath when transferring from her wheelchair to recliner chair.  Patient also developed fever at home with complaints of chills.  Daughter reports that she gave her mother Tylenol and then brought patient to ER to be evaluated. Ella Prasad arrival to ER patient was found to have a urinary tract infection and was administered Rocephin.  Patient was also febrile post Tylenol administration with a temp of 101. 2.  Ibuprofen was administered. \"     5/7: Patient resting in no acute distress, no acute vents overnight. Patient denies any pain at this time, no chest pain, shortness of breath, abdominal pain, nausea, vomiting. No urinary complaints. We will continue to treat for urinary tract infection, pending final urine culture. Continue IV Rocephin. Patient's renal function much improved today, continue to monitor.     5/8: Patient reports that she is doing well, denies any pain other than chronic neck pain. She denies urinary complaints, nausea, vomiting, abdominal pain. Patient's culture of shin wound revealing MRSA. We will have to initiate vancomycin. Discontinue doxycycline.       5/9: Patient resting in bed in no acute distress, family at bedside.   Patient denies any acute pain, notes that her only pain is her chronic neck pain. Vancomycin started 05/8, ID consulted today for antibiotic discharge planning. Patient denies chest pain, shortness of breath, abdominal pain, nausea, vomiting.    5/10: Patient resting in bed in no acute distress this morning, she reports that she is doing well her only concern is her chronic neck pain. She denies chest pain, shortness of breath, abdominal pain, nausea, vomiting, urinary complaints. Patient received Rocephin through the duration of her stay for UTI, will send patient with 5 more days of Omnicef as urine culture growing Klebsiella pneumonia sensitive to Rocephin. ID was consulted due to patient's wound on left shin, does not feel this is an active infection. Patient is likely colonized as she has a history of MRSA. No indication for further treatment for this at this time. Patient is to follow-up with her PCP in 1 week, I discussed this with patient and her family at bedside. They voiced their understanding, return precautions were discussed. Physical Exam:-  General appearance: No apparent distress, chronically ill-appearing  HEENT: Patient very hard of hearing. Pupils equal, round, and reactive to light. Conjunctivae/corneas clear. Neck: Supple, with full range of motion. No jugular venous distention. Trachea midline. Respiratory:  Normal respiratory effort on RA. Expiratory wheezes noted in the bases. Cardiovascular: Regular rate and rhythm with normal S1/S2 without murmurs, rubs or gallops. Abdomen: Soft, non-tender, non-distended with normal bowel sounds. Musculoskeletal: passive and active ROM x 4 extremities. Skin: Dressing in place over left shin wound. Dry, clean, intact. Neurologic:  Neurovascularly intact without any focal sensory/motor deficits.  Cranial nerves: II-XII intact, grossly non-focal.  Psychiatric: Alert and oriented, thought content appropriate, normal insight  Capillary Refill: Brisk,< 3 seconds   Peripheral Pulses: +2 palpable, equal bilaterally     Vitals:   No data found. Weight:   Weight: 190 lb (86.2 kg)   24 hour intake/output:   No intake or output data in the 24 hours ending 05/22/22 1740          Discharge Medications:-      Medication List      CHANGE how you take these medications    ascorbic acid 500 MG tablet  Commonly known as: VITAMIN C  Take 1 tablet by mouth daily  What changed:   · medication strength  · how much to take        CONTINUE taking these medications    acetaminophen 500 MG tablet  Commonly known as: TYLENOL     amLODIPine 5 MG tablet  Commonly known as: NORVASC  Take 0.5 tablets by mouth daily Hold if BP<110 or HR <70     bumetanide 1 MG tablet  Commonly known as: BUMEX  Take 1 tablet by mouth 2 times daily     ferrous sulfate 325 (65 Fe) MG tablet  Commonly known as: IRON 325     Handicap Placard Misc  by Does not apply route Dx:I187.2,M81.0. Duration: 5 years.      ibuprofen 200 MG tablet  Commonly known as: ADVIL;MOTRIN     levETIRAcetam 1000 MG tablet  Commonly known as: KEPPRA  Take 1 tablet by mouth 2 times daily     loratadine 10 MG tablet  Commonly known as: Claritin  Take 1 tablet by mouth daily     melatonin 3 MG Tabs tablet     metFORMIN 1000 MG tablet  Commonly known as: GLUCOPHAGE  TAKE ONE TABLET IN THE EVENING     omeprazole 20 MG delayed release capsule  Commonly known as: PRILOSEC  TAKE ONE CAPSULE BY MOUTH ONCE DAILY     potassium chloride 20 MEQ extended release tablet  Commonly known as: KLOR-CON M  Take 2 tablets by mouth daily     pregabalin 150 MG capsule  Commonly known as: LYRICA     SITagliptin 100 MG tablet  Commonly known as: JANUVIA  Take 1 tablet by mouth daily     sodium chloride 0.65 % nasal spray  Commonly known as: OCEAN, BABY AYR     vitamin D 50 MCG (2000 UT) Tabs tablet  Commonly known as: CHOLECALCIFEROL  Take 1 tablet by mouth daily     Walker Misc  1 each by Does not apply route daily Requests stand up walker due to heart failure and generalized OA. I50.23.     zinc sulfate 220 (50 Zn) MG capsule  Commonly known as: ZINCATE        ASK your doctor about these medications    cefdinir 300 MG capsule  Commonly known as: OMNICEF  Take 1 capsule by mouth 2 times daily for 5 days  Ask about: Should I take this medication? Where to Get Your Medications      These medications were sent to [address], [address] [facility]  [address] [place] [address] [facility] [address]    Phone: [phone]   · ascorbic acid 500 MG tablet  · cefdinir 300 MG capsule          Labs :-  No results found for this or any previous visit (from the past 72 hour(s)). Microbiology:    Blood culture #1:   Lab Results   Component Value Date    BC No growth-preliminary No growth  03/24/2021       Blood culture #2:No results found for: Marizol Davidson    Organism:    Lab Results   Component Value Date    LABGRAM  05/06/2022     Few segmented neutrophils observed. No epithelial cells observed. Moderate gram negative bacilli. Rare gram positive cocci in pairs and clusters. MRSA culture only:No results found for: [facility]    Urine culture:   Lab Results   Component Value Date    LABURIN Emerson count: >100,000 CFU/mL  05/05/2022     Lab Results   Component Value Date    ORG Staphylococcus aureus 05/06/2022        Respiratory culture: No results found for: CULTRESP    Aerobic and Anaerobic :  Lab Results   Component Value Date    LABAERO  05/06/2022     Culture also yielded heavy growth of gram positive bacilli consistent with Corynebacterium species. The gram negative bacilli observed on the direct smear were not recovered. This may be due to antimicrobial suppression, a fastidious organism, or an anaerobic organism. If a true mixed aerobic and anaerobic infection is suspected, then broad spectrum empiric antibiotic therapy is indicated and should include coverage for anaerobic organisms.  Current antibiotic therapy ineffective in vitro for Staphylococcus aureus (MRSA). LABAERO  05/06/2022     moderate growth This is a MRSA (Methicillin Resistant Staphylococcus aureus)isolate. Isolates of MRSA (ORSA) Methicillin (Oxacillin) Resistant Staphylococcus aureus (coagulase positive) require patient be placed in CONTACT isolation. Methicillin(Oxacillin)resistant strains of staphylococci (MRSA)or(MRSE)should be considered resistant to all classes of cephalosporins, penems and beta-lactams. In the treatment of gram positive infections, GENTAMICIN should be CONSIDERED a SYNERGYSTIC agent ONLY. Lab Results   Component Value Date    LABANAE  05/06/2022     Culture yielded moderate mixed aerobic and anaerobic growth of gram positive cocci, gram positive bacilli most consistent with Corynebacterium species, gram negative cocci and gram negative bacilli. If a true mixed aerobic and anaerobic infection is suspected, then broad spectrum empiric antibiotic therapy is indicated and should include coverage for anaerobic organisms. Urinalysis:      Lab Results   Component Value Date    NITRU POSITIVE 05/05/2022    WBCUA  05/05/2022    BACTERIA MANY 05/05/2022    RBCUA 5-10 05/05/2022    BLOODU TRACE 05/05/2022    SPECGRAV 1.012 05/05/2022    GLUCOSEU NEGATIVE 01/05/2022       Radiology:-  XR CHEST PORTABLE    Result Date: 5/5/2022  1 view chest x-ray Comparison: CR,SR - XR CHEST PORTABLE - 03/29/2021 12:48 AM EDT Findings: The lungs are clear. Cardiac silhouette is enlarged. No pulmonary vascular congestion. Calcification in the aortic knob. No acute fracture. Stable cardiomegaly without pulmonary vascular congestion.  This document has been electronically signed by: Jacklyn Gonzales MD on 05/05/2022 10:19 PM       Follow-up scheduled after discharge :-    in 1 weeks with Sally Meyers MD    Consultations during this hospital stay:-  [] NONE [] Cardiology  [] Nephrology  [] Hemo onco   [] GI   [x] ID  [] Endocrine  [] Pulm    [] Neuro    [] Psych   [] Urology  [] ENT   [] G SURGERY   []Ortho    []CV surg    [] Palliative  [] Hospice [] Pain management   []    []TCU   [] PT/OT  OTHERS:-    Disposition: home  Condition at Discharge: Stable    Time Spent:- 45 minutes    Electronically signed by Jones Doyle PA-C on 5/10/22 at 10:51 AM EDT   Discharging Hospitalist

## 2022-05-10 NOTE — PROGRESS NOTES
[address] [facility] Pharmacokinetic Monitoring Service - Vancomycin    Consulting Provider: Kenzie Castelan PA-C   Indication: MRSA wound on shin  Target Concentration: Goal AUC/CARI 400-600 mg*hr/L  Day of Therapy: 3    Additional Antimicrobials: Ceftriaxone for UTI (5 days therapy)    Pertinent Laboratory Values: Wt Readings from Last 1 Encounters:   05/09/22 190 lb (86.2 kg)     Temp Readings from Last 1 Encounters:   05/10/22 98.7 °F (37.1 °C) (Oral)     Estimated Creatinine Clearance: 44 mL/min (based on SCr of 1 mg/dL). Recent Labs     05/09/22  0849 05/10/22  0640   CREATININE 0.9 1.0   WBC 3.5*  --          Pertinent Cultures:  Culture Date Source Results   5/6 Left shin MRSA   MRSA Nasal Swab: N/A. Non-respiratory infection.     Recent vancomycin administrations                     vancomycin (VANCOCIN) 1250 mg in dextrose 5 % 250 mL IVPB (mg) 1,250 mg New Bag 05/09/22 1533     1,250 mg New Bag 05/08/22 1548                    Assessment:  Date/Time Current Dose Concentration Timing of Concentration (h) AUC   5/10/22 1250mg IV q24h 12.9 (random) +15 hours 7min 481   Note: Serum concentrations collected for AUC dosing may appear elevated if collected in close proximity to the dose administered, this is not necessarily an indication of toxicity    Plan:  Current dosing regimen is therapeutic  Continue current dose  Repeat vancomycin concentration not ordered  ID consulted to determine outpatient treatment  Pharmacy will continue to monitor patient and adjust therapy as indicated    Thank you for the consult,  Kervin Farris, [facility]  5/10/2022 8:08 AM

## 2022-05-10 NOTE — PROGRESS NOTES
Discharge teaching and instructions for diagnosis/procedure of SOB completed with patient using teachback method. AVS reviewed. Printed prescriptions given to patient. Patient voiced understanding regarding prescriptions, follow up appointments, and care of self at home.  Discharged in a wheelchair to  home with support per family

## 2022-05-10 NOTE — CARE COORDINATION
5/10/22, 11:53 AM EDT    Discharge ordered. Placed call to daughter Agata Casillas to confirm that there are still no needs for discharge, Agata Casillas agrees they have everything they need, and she feels confident in her ability to care for Maria Teresa Sims. Patient goals/plan/ treatment preferences discussed by  and . Patient goals/plan/ treatment preferences reviewed with patient/ family. Patient/ family verbalize understanding of discharge plan and are in agreement with goal/plan/treatment preferences. Understanding was demonstrated using the teach back method. AVS provided by RN at time of discharge, which includes all necessary medical information pertaining to the patients current course of illness, treatment, post-discharge goals of care, and treatment preferences.      Services At/After Discharge: None       IMM Letter  IMM Letter given to Patient/Family/Significant other/Guardian/POA/by[de-identified] Freddie Fernández CM  IMM Letter date given[de-identified] 05/10/22  IMM Letter time given[de-identified] 8043

## 2022-05-11 ENCOUNTER — CARE COORDINATION (OUTPATIENT)
Dept: CASE MANAGEMENT | Age: 82
End: 2022-05-11

## 2022-05-11 NOTE — CARE COORDINATION
Care Transitions Outreach Attempt    Call within 2 business days of discharge: Yes   Patient: Jorge Cowart Patient : 1940 MRN: <[id number]>    Last Discharge [address] [address]       Complaint Diagnosis Description Type Department Provider    22 Fatigue Generalized weakness . .. ED to Hosp-Admission (Discharged) (ADMITTED) ALEKSANDRA [address] Johns PA-C; [place]. .. Was this an external facility discharge? No Discharge Facility: [facility]    Noted following upcoming appointments from discharge chart review:   [facility] follow up appointment(s):   Future Appointments   Date Time Provider David Adler   2022  1:00 PM Radha Bronson MD [facility]   2022 10:00 AM Jose Ramos MD [facility]     1st attempt to reach for Care Transition discharge call unsuccessful. HIPAA compliant message left requesting call back and reminder to schedule 7 day f/u appt w/ PCP.    [address] [place] [address] [phone]

## 2022-05-11 NOTE — ADT AUTH CERT
Utilization Reviews         Urinary Tract Infection (UTI) - Care Day 4 (5/9/2022) by David Britton RN       Review Status Review Entered   Completed 5/11/2022 11:28      Criteria Review      Care Day: 4 Care Date: 5/9/2022 Level of Care: Telemetry    Guideline Day 3    Clinical Status    (X) * Hemodynamic stability    5/11/2022 11:28 AM EDT by Brad Morris      05/09/22 2145 98.8 (37.1) -- 65 140/73 -- Semi fowlers -- -- 99 None (Room air)    (X) * Mental status at baseline    ( ) * Antibiotic regimen for next level of care established    5/11/2022 11:28 AM EDT by Kaila Howe IV  VANCO IV    (X) * Urine output adequate    ( ) * Renal function at baseline or acceptable for next level of care    (X) * Pain absent or managed    (X) * Oral intake adequate    (X) * Afebrile or temperature acceptable for next level of care    (X) * Vomiting absent    ( ) * Discharge plans and education understood    Activity    (X) * Ambulatory or acceptable for next level of care    Routes    ( ) * Oral hydration    5/11/2022 11:28 AM EDT by Brad Morris      IVF     (X) * Oral medications or regimen acceptable for next level of care    (X) * Oral diet or acceptable for next level of care    Interventions    (X) Renal function tests, urinalysis    5/11/2022 11:28 AM EDT by Brad Morris      Hemoglobin8.9gm/dl   Sfjcokxomc02.6%  Potassium 3.0    * Milestone   Additional Notes   DATE: 5/9      Pertinent Updates:   Continued IV abx, IVF      Vitals:    05/09/22 2145 98.8 (37.1) - 65 140/73 - Semi fowlers - - 99 None (Room air)       Abnl/Pertinent Labs/Radiology/Diagnostic Studies:     Hemoglobin8.9gm/dl    Ahytpnpkhz38.6%   Potassium 3.0       Physical Exam:   Skin: Dressing in place over left shin wound.       MD Consults/Assessments & Plans:   MEDICINE:   1. Urinary tract infection:   UA positive on admission, positive nitrates, LEs, many bacteria.  Patient endorsed symptoms of fatigue, weakness, dysuria.  Continue IV Rocephin, culture resulted and sensitive. 2. COVID-19 infection:   Patient has remained on room air through the duration of her stay, continue to monitor.  No intervention warranted at this time. 3. IDALIA on CKD stage III, resolved:   Patient's creatinine 1.4 on admission, her baseline appears to be around 0.7-0.9. Anabella Bonine likely compounded by home medications including Bumex and metformin.  These medications are currently held, IV fluids were initiated.  Patient's Cr is much improved, now WNL.  Continue to assess with daily BMP. 4. Hypokalemia:   Replace per protocol, repeat BMP tomorrow morning   5. Open wound to left shin:   Per patient's daughter, wound was noted on patient's left shin prior to discharge from the [facility].  Patient had apparently run into another patient's wheelchair.  On removal of dressing, wound appears erythematous with minimal purulent drainage.  Culture resulted, revealing MRSA. Vancomycin started 05/8. Consult placed to ID for abx discharge planning, appreciate assistance. 6. NIDDMII:   Hemoglobin A1c 7.2 and 09/2021.  Glucose relatively controlled, currently holding home metformin.  Continue SSI, hypoglycemia protocol, carb controlled diet.  Accu-Cheks. 7. Chronic diastolic heart failure:   Echo 03/29/2021 with EF of 55%.  Patient takes Bumex daily, however on hold due to #3.  Patient does not appear to be decompensated at this time. 8. Essential hypertension:   BP stable at this time, continue home medications and adjust as needed.    9. Unspecified convulsions, per PCP note:   Continue home Keppra                       Medications:      0.9 % sodium chloride infusion   Rate: 100 mL/hr Freq: CONTINUOUS Route: IV   Last Dose: Stopped (05/10/22 1138)   Start: 05/06/22 0330 End: 05/10/22 1606      vancomycin (VANCOCIN) 1250 mg in dextrose 5 % 250 mL IVPB   Dose: 1,250 mg   Freq: EVERY 24 HOURS Route: IV   Last Dose: Stopped (05/09/22 1804)   Start: 05/08/22 1500 End: 05/10/22 2650 cefTRIAXone (ROCEPHIN) 1,000 mg in sterile water 10 mL IV syringe   Dose: 1,000 mg   Freq: EVERY 24 HOURS Route: IV   Start: 05/07/22 0100 End: 05/10/22 4270        Urinary Tract Infection (UTI) - Care Day 3 (5/8/2022) by Ignacio Rivers RN       Review Status Review Entered   Completed 5/11/2022 11:22      Criteria Review      Care Day: 3 Care Date: 5/8/2022 Level of Care: Telemetry    Guideline Day 3    Clinical Status    (X) * Hemodynamic stability    5/11/2022 11:22 AM EDT by Suzie Jimenez      05/08/22 1550 98.5 (36.9) 16 75 165/83 -- Supine -- -- 100 None (Room air) --    (X) * Mental status at baseline    ( ) * Antibiotic regimen for next level of care established    5/11/2022 11:22 AM EDT by Suzie Jimenez      roceph iv  doxy iv  vanco iv    (X) * Urine output adequate    ( ) * Renal function at baseline or acceptable for next level of care    5/11/2022 11:22 AM EDT by Earlene Gaming      Potassium 3.3    (X) * Pain absent or managed    (X) * Oral intake adequate    (X) * Afebrile or temperature acceptable for next level of care    (X) * Vomiting absent    ( ) * Discharge plans and education understood    Activity    (X) * Ambulatory or acceptable for next level of care    Routes    ( ) * Oral hydration    5/11/2022 11:22 AM EDT by Suzie Jimenez      ivf at 100    (X) * Oral medications or regimen acceptable for next level of care    (X) * Oral diet or acceptable for next level of care    * Milestone   Additional Notes   DATE: 5/8      Pertinent Updates:   Patient's culture of shin wound revealing MRSA. We will have to initiate vancomycin.  Discontinue doxycycline.        Vitals:     05/08/22 1550 98.5 (36.9) 16 75 165/83 - Supine - - 100 None (Room air) -       Abnl/Pertinent Labs/Radiology/Diagnostic Studies:     Potassium 3.3       Physical Exam:   Skin: Dressing in place over left shin wound   Expiratory wheezes noted in the bases.       MD Consults/Assessments & Plans:   MEDICINE:   1. Urinary tract infection: UA positive on admission, positive nitrates, LEs, many bacteria.  Patient endorsed symptoms of fatigue, weakness, dysuria.  Continue IV Rocephin, culture resulted and sensitive. 2. COVID-19 infection:   Patient has remained on room air through the duration of her stay, continue to monitor.  No intervention warranted at this time. 3. IDALIA on CKD stage III, resolved:   Patient's creatinine 1.4 on admission, her baseline appears to be around 0.7-0.9. Maribel Colleen likely compounded by home medications including Bumex and metformin.  These medications are currently held, IV fluids were initiated.  Patient's creatinine is much improved today, now WNL.  We will continue to assess with daily BMP. 4. Hypokalemia:   Replace per protocol, repeat BMP tomorrow morning   5. Open wound to left shin:   Per patient's daughter, wound was noted on patient's left shin prior to discharge from the F.  Patient had apparently run into another patient's wheelchair.  On removal of dressing, wound appears erythematous with minimal purulent drainage.  Culture resulted, revealing MRSA.  Will initiate vancomycin. 6. NIDDMII:   Hemoglobin A1c 7.2 and 09/2021.  Glucose relatively controlled, currently holding home metformin.  Continue SSI, hypoglycemia protocol, carb controlled diet.  Accu-Cheks. 7. Chronic diastolic heart failure:   Echo 03/29/2021 with EF of 55%.  Patient takes Bumex daily, however on hold due to #3.  Patient does not appear to be decompensated at this time. 8. Essential hypertension:   BP stable at this time, continue home medications and adjust as needed.    9. Unspecified convulsions, per PCP note:   Continue home Keppra                       Medications:      cefTRIAXone (ROCEPHIN) 1,000 mg in sterile water 10 mL IV syringe   Dose: 1,000 mg   Freq: EVERY 24 HOURS Route: IV   Start: 05/07/22 0100 End: 05/10/22 0937      doxycycline (VIBRAMYCIN) 100 mg in dextrose 5 % 100 mL IVPB   Dose: 100 mg   Freq: EVERY 12 HOURS Route:  IV   Last Dose: Stopped (05/08/22 0630)   Start: 05/06/22 0600 End: 05/08/22 1410      vancomycin (VANCOCIN) 1250 mg in dextrose 5 % 250 mL IVPB   Dose: 1,250 mg   Freq: EVERY 24 HOURS Route: IV   Last Dose: Stopped (05/09/22 1804)   Start: 05/08/22 1500 End: 05/10/22 0937      0.9 % sodium chloride infusion   Rate: 100 mL/hr Freq: CONTINUOUS Route: IV   Last Dose: Stopped (05/10/22 1138)   Start: 05/06/22 0330 End: 05/10/22 1606

## 2022-05-12 ENCOUNTER — CARE COORDINATION (OUTPATIENT)
Dept: CASE MANAGEMENT | Age: 82
End: 2022-05-12

## 2022-05-12 NOTE — CARE COORDINATION
Care Transitions Outreach Attempt    Call within 2 business days of discharge: Yes   Patient: Marques Jimenez Patient : 1940 MRN: <[id number]>    Last Discharge [facility]       Complaint Diagnosis Description Type Department Provider    22 Fatigue Generalized weakness . .. ED to Hosp-Admission (Discharged) (ADMITTED) ALEKSANDRA AdamsK Vale Hartley PA-C; [place]. .. Was this an external facility discharge? No [facility]    Noted following upcoming appointments from discharge chart review:   [facility] follow up appointment(s):   Future Appointments   Date Time Provider David Adler   2022  1:00 PM Reyes Quiver,  [facility]   2022 10:00 AM Jonah Harrie Goodpasture, MD [facility]     2nd unsuccessful attempt to reach for Care Transition discharge call. HIPAA compliant message left requesting call back. Episode closed per protocol, no further outreach scheduled.  [address] [place] [address] [phone]

## 2022-05-19 NOTE — PROGRESS NOTES
[facility] Application   Below Knee    NAME:  Luis Miramontes  YOB: 1940  MEDICAL RECORD NUMBER:  [id number]  DATE:  6/16/2021    Dennison Real boot: Applied moisturizing agent to dry skin as needed. Appied primary and secondary dressing as ordered. Applied Unna roll from toes to knee overlapping each time. Applied ace wrap or coban from toes to below the knee. Secured with tape and/or metal clips covered with tape. Instructed patient/caregiver to keep dressing dry and intact. DO NOT REMOVE DRESSING. Instructed pt/family/caregiver to report excessive draining, loose bandage, wet dressing, severe pain or tingling in toes. Applied [facility] dressing below the knee to right lower leg. Applied [facility] dressing below the knee to left lower leg. Unna Boot(s) were applied per  Guidelines.      Electronically signed by Diane Lindsay RN on 6/16/2021 at 12:56 PM 98

## 2022-05-24 ENCOUNTER — OFFICE VISIT (OUTPATIENT)
Dept: FAMILY MEDICINE CLINIC | Age: 82
End: 2022-05-24
Payer: MEDICARE

## 2022-05-24 VITALS
DIASTOLIC BLOOD PRESSURE: 78 MMHG | TEMPERATURE: 97.5 F | OXYGEN SATURATION: 98 % | HEART RATE: 78 BPM | RESPIRATION RATE: 18 BRPM | BODY MASS INDEX: 39.92 KG/M2 | SYSTOLIC BLOOD PRESSURE: 124 MMHG | WEIGHT: 204.4 LBS

## 2022-05-24 DIAGNOSIS — N39.0 UTI DUE TO KLEBSIELLA SPECIES: Primary | ICD-10-CM

## 2022-05-24 DIAGNOSIS — R53.1 GENERALIZED WEAKNESS: ICD-10-CM

## 2022-05-24 DIAGNOSIS — B96.89 UTI DUE TO KLEBSIELLA SPECIES: Primary | ICD-10-CM

## 2022-05-24 DIAGNOSIS — Z09 HOSPITAL DISCHARGE FOLLOW-UP: ICD-10-CM

## 2022-05-24 DIAGNOSIS — C31.1: ICD-10-CM

## 2022-05-24 PROCEDURE — 99495 TRANSJ CARE MGMT MOD F2F 14D: CPT | Performed by: FAMILY MEDICINE

## 2022-05-24 PROCEDURE — 1111F DSCHRG MED/CURRENT MED MERGE: CPT | Performed by: FAMILY MEDICINE

## 2022-05-24 ASSESSMENT — ENCOUNTER SYMPTOMS
CONSTIPATION: 0
NAUSEA: 0
ABDOMINAL PAIN: 0
CHEST TIGHTNESS: 0
COUGH: 0
WHEEZING: 0
VOMITING: 0
EYE PAIN: 0
RHINORRHEA: 0
SORE THROAT: 0
BLOOD IN STOOL: 0
DIARRHEA: 0
SHORTNESS OF BREATH: 0
BACK PAIN: 0

## 2022-05-24 ASSESSMENT — PATIENT HEALTH QUESTIONNAIRE - PHQ9
SUM OF ALL RESPONSES TO PHQ QUESTIONS 1-9: 2
SUM OF ALL RESPONSES TO PHQ9 QUESTIONS 1 & 2: 2
2. FEELING DOWN, DEPRESSED OR HOPELESS: 1
SUM OF ALL RESPONSES TO PHQ QUESTIONS 1-9: 2
1. LITTLE INTEREST OR PLEASURE IN DOING THINGS: 1
SUM OF ALL RESPONSES TO PHQ QUESTIONS 1-9: 2
SUM OF ALL RESPONSES TO PHQ QUESTIONS 1-9: 2

## 2022-05-24 NOTE — PROGRESS NOTES
Post-Discharge Transitional Care Follow Up      Karie Pickard   YOB: 1940    Date of Office Visit:  5/24/2022  Date of Hospital Admission: 5/5/22  Date of Hospital Discharge: 5/10/22  Readmission Risk Score (high >=14%. Medium >=10%):Readmission Risk Score: 15.1 ( )      Care management risk score Rising risk (score 2-5) and Complex Care (Scores >=6): 11     Non face to face  following discharge, date last encounter closed (first attempt may have been earlier): 5/12/2022  2:04 PM     Call initiated 2 business days of discharge: Yes     UTI due to Klebsiella species  -improved, finished omnicef, no uti sxs persist.  Generalized weakness  -improving, continue PT sessions, can ambulate on own with walker. Primary adenocarcinoma of ethmoidal sinus (HCC)  -stable, follows with oncology, recent MRI shows decreased enhancement  Hospital discharge follow-up  -     SC DISCHARGE MEDS RECONCILED W/ CURRENT OUTPATIENT MED LIST      Medical Decision Making: moderate complexity  Return if symptoms worsen or fail to improve. Subjective:   HPI  Presented to [facility] with weakness and tremors    Inpatient course: Discharge summary reviewed- see chart. Interval history/Current status: Diagnosed with UTI and Covid 19. Treat with IV rocephin, switched to PO omnicef. No treatment needed for the covid. Back to therapy. Appetite is good. Seems to be back to baseline. , non smoker, pmh reviewed.        Patient Active Problem List   Diagnosis    Hypercholesterolemia    Osteoarthritis    Osteoporosis    Diabetes mellitus ([facility]    DDD (degenerative disc disease), lumbar    Essential hypertension    SWAPNA on CPAP    Diabetic peripheral neuropathy (HCC)    Primary thunderclap headache    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HCC)    Skin plaque    Bilateral lower leg cellulitis    CKD (chronic kidney disease) stage 3, GFR 30-59 ml/min (HCC)    Iron deficiency anemia    Hypomagnesemia  Acute eczema    Venous insufficiency of both lower extremities    Chronic acquired lymphedema    Eczematous dermatitis    Dystrophic nail    Angio-edema    Osteoradionecrosis of skull/sinus    Late effect of radiation    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HCC)    Cataract of both eyes    History of ventral hernia repair    Other cervical disc degeneration, mid-cervical region    Spondylolisthesis of cervical region    Spondylolisthesis of thoracic region    Chronic rhinitis    Closed fracture of head of humerus    Dysphagia    Laryngopharyngeal reflux    Primary adenocarcinoma of ethmoidal sinus (HCC)    Obesity, Class II, BMI 35-39.9    COVID-19    Brain mass    Folliculitis    Pneumonia due to organism    Chronic diastolic CHF (congestive heart failure) (HCC)    Acute respiratory failure with hypoxia (HCC)    Chronic ethmoidal sinusitis    Chronic frontal sinusitis    Chronic maxillary sinusitis    Chronic sphenoidal sinusitis    Brain compression (HCC)    Metabolic encephalopathy    Hypokalemia    IDALIA (acute kidney injury) ([address]    CKD (chronic kidney disease), stage II    Cerebral edema (HCC)    Asymptomatic bacteriuria    Morbid obesity (HCC)    Generalized weakness    Cerebral abscess    Unspecified convulsions    Acute kidney injury ([address]       Medications listed as ordered at the time of discharge from hospital     Medication List          Accurate as of May 24, 2022 11:07 AM. If you have any questions, ask your nurse or doctor.             CONTINUE taking these medications    acetaminophen 500 MG tablet  Commonly known as: TYLENOL     amLODIPine 5 MG tablet  Commonly known as: NORVASC  Take 0.5 tablets by mouth daily Hold if BP<110 or HR <70     ascorbic acid 500 MG tablet  Commonly known as: VITAMIN C  Take 1 tablet by mouth daily     bumetanide 1 MG tablet  Commonly known as: BUMEX  Take 1 tablet by mouth 2 times daily     ferrous sulfate 325 (65 Fe) MG tablet  Commonly known as: IRON 325     Handicap Placard Misc  by Does not apply route Dx:I187.2,M81.0. Duration: 5 years. ibuprofen 200 MG tablet  Commonly known as: ADVIL;MOTRIN     levETIRAcetam 1000 MG tablet  Commonly known as: KEPPRA  Take 1 tablet by mouth 2 times daily     loratadine 10 MG tablet  Commonly known as: Claritin  Take 1 tablet by mouth daily     melatonin 3 MG Tabs tablet     metFORMIN 1000 MG tablet  Commonly known as: GLUCOPHAGE  TAKE ONE TABLET IN THE EVENING     omeprazole 20 MG delayed release capsule  Commonly known as: PRILOSEC  TAKE ONE CAPSULE BY MOUTH ONCE DAILY     potassium chloride 20 MEQ extended release tablet  Commonly known as: KLOR-CON M  Take 2 tablets by mouth daily     pregabalin 150 MG capsule  Commonly known as: LYRICA     SITagliptin 100 MG tablet  Commonly known as: JANUVIA  Take 1 tablet by mouth daily     sodium chloride 0.65 % nasal spray  Commonly known as: OCEAN, BABY AYR     vitamin D 50 MCG (2000 UT) Tabs tablet  Commonly known as: CHOLECALCIFEROL  Take 1 tablet by mouth daily     Walker Misc  1 each by Does not apply route daily Requests stand up walker due to heart failure and generalized OA. I50.23.     zinc sulfate 220 (50 Zn) MG capsule  Commonly known as: ZINCATE             Medications marked \"taking\" at this time  Outpatient Medications Marked as Taking for the 5/24/22 encounter (Office Visit) with Reyes Quiver, MD   Medication Sig Dispense Refill    ascorbic acid (VITAMIN C) 500 MG tablet Take 1 tablet by mouth daily 30 tablet 3    pregabalin (LYRICA) 150 MG capsule Take 300 mg by mouth 2 times daily.       ibuprofen (ADVIL;MOTRIN) 200 MG tablet Take 200 mg by mouth every 6 hours as needed for Pain      melatonin 3 MG TABS tablet Take 6 mg by mouth nightly as needed      acetaminophen (TYLENOL) 500 MG tablet Take 500 mg by mouth daily      amLODIPine (NORVASC) 5 MG tablet Take 0.5 tablets by mouth daily Hold if BP<110 or HR <70 45 tablet 3    bumetanide (BUMEX) 1 MG tablet Take 1 tablet by mouth 2 times daily 180 tablet 3    loratadine (CLARITIN) 10 MG tablet Take 1 tablet by mouth daily 90 tablet 3    metFORMIN (GLUCOPHAGE) 1000 MG tablet TAKE ONE TABLET IN THE EVENING 90 tablet 3    levETIRAcetam (KEPPRA) 1000 MG tablet Take 1 tablet by mouth 2 times daily 180 tablet 3    omeprazole (PRILOSEC) 20 MG delayed release capsule TAKE ONE CAPSULE BY MOUTH ONCE DAILY 90 capsule 3    potassium chloride (KLOR-CON M) 20 MEQ extended release tablet Take 2 tablets by mouth daily 180 tablet 3    SITagliptin (JANUVIA) 100 MG tablet Take 1 tablet by mouth daily 90 tablet 3    zinc sulfate (ZINCATE) 220 (50 Zn) MG capsule Take 50 mg by mouth daily      Handicap Placard MISC by Does not apply route Dx:I187.2,M81.0. Duration: 5 years. 2 each 0    Misc. Devices (WALKER) MISC 1 each by Does not apply route daily Requests stand up walker due to heart failure and generalized OA. I50.23. 1 each 0    Vitamin D (CHOLECALCIFEROL) 50 MCG (2000 UT) TABS tablet Take 1 tablet by mouth daily 60 tablet 0    ferrous sulfate (IRON 325) 325 (65 Fe) MG tablet Take 325 mg by mouth 3 times daily (with meals)       sodium chloride (OCEAN, BABY AYR) 0.65 % nasal spray 1 spray by Nasal route as needed for Congestion           Medications patient taking as of now reconciled against medications ordered at time of hospital discharge: Yes    Review of Systems   Constitutional: Negative for chills, fatigue, fever and unexpected weight change. HENT: Negative for congestion, ear pain, rhinorrhea and sore throat. Eyes: Negative for pain and visual disturbance. Respiratory: Negative for cough, chest tightness, shortness of breath and wheezing. Cardiovascular: Negative for chest pain and palpitations. Gastrointestinal: Negative for abdominal pain, blood in stool, constipation, diarrhea, nausea and vomiting.    Genitourinary: Negative for difficulty urinating, frequency, hematuria and urgency. Musculoskeletal: Negative for back pain, joint swelling, myalgias and neck pain. Skin: Negative for rash. Neurological: Positive for weakness. Negative for dizziness and headaches. Hematological: Negative for adenopathy. Does not bruise/bleed easily. Psychiatric/Behavioral: Negative for behavioral problems and sleep disturbance. The patient is not nervous/anxious. Objective:    /78   Pulse 78   Temp 97.5 °F (36.4 °C) (Infrared)   Resp 18   Wt 204 lb 6.4 oz (92.7 kg)   LMP  (LMP Unknown)   SpO2 98%   BMI 39.92 kg/m²   Physical Exam  Vitals and nursing note reviewed. Constitutional:       Appearance: She is well-developed. HENT:      Head: Normocephalic and atraumatic. Right Ear: External ear normal.      Left Ear: External ear normal.      Nose: Nose normal.      Mouth/Throat:      Mouth: Mucous membranes are moist.   Eyes:      Pupils: Pupils are equal, round, and reactive to light. Neck:      Thyroid: No thyromegaly. Cardiovascular:      Rate and Rhythm: Normal rate and regular rhythm. Heart sounds: Normal heart sounds. Pulmonary:      Breath sounds: Normal breath sounds. No wheezing or rales. Abdominal:      General: Bowel sounds are normal.      Palpations: Abdomen is soft. Tenderness: There is no abdominal tenderness. There is no guarding or rebound. Musculoskeletal:         General: Normal range of motion. Cervical back: Neck supple. Lymphadenopathy:      Cervical: No cervical adenopathy. Skin:     General: Skin is warm and dry. Findings: No rash. Neurological:      Mental Status: She is alert and oriented to person, place, and time. Cranial Nerves: No cranial nerve deficit. Deep Tendon Reflexes: Reflexes are normal and symmetric. An electronic signature was used to authenticate this note.   --Yvon Slater MD

## 2022-05-24 NOTE — PATIENT INSTRUCTIONS
Patient Education        Urinary Tract Infection (UTI) in Women: Care Instructions  Overview     A urinary tract infection, or UTI, is a general term for an infection anywhere between the kidneys and the urethra (where urine comes out). Most UTIs arebladder infections. They often cause pain or burning when you urinate. UTIs are caused by bacteria and can be cured with antibiotics. Be sure tocomplete your treatment so that the infection does not get worse. Follow-up care is a key part of your treatment and safety. Be sure to make and go to all appointments, and call your doctor if you are having problems. It's also a good idea to know your test results and keep alist of the medicines you take. How can you care for yourself at home?  Take your antibiotics as directed. Do not stop taking them just because you feel better. You need to take the full course of antibiotics.  Drink extra water and other fluids for the next day or two. This will help make the urine less concentrated and help wash out the bacteria that are causing the infection. (If you have kidney, heart, or liver disease and have to limit fluids, talk with your doctor before you increase the amount of fluids you drink.)   Avoid drinks that are carbonated or have caffeine. They can irritate the bladder.  Urinate often. Try to empty your bladder each time.  To relieve pain, take a hot bath or lay a heating pad set on low over your lower belly or genital area. Never go to sleep with a heating pad in place. To prevent UTIs   Drink plenty of water each day. This helps you urinate often, which clears bacteria from your system. (If you have kidney, heart, or liver disease and have to limit fluids, talk with your doctor before you increase the amount of fluids you drink.)   Urinate when you need to.  If you are sexually active, urinate right after you have sex.  Change sanitary pads often.    Avoid douches, bubble baths, feminine hygiene sprays, and other feminine hygiene products that have deodorants.  After going to the bathroom, wipe from front to back. When should you call for help? Call your doctor now or seek immediate medical care if:     Symptoms such as fever, chills, nausea, or vomiting get worse or appear for the first time.      You have new pain in your back just below your rib cage. This is called flank pain.      There is new blood or pus in your urine.      You have any problems with your antibiotic medicine. Watch closely for changes in your health, and be sure to contact your doctor if:     You are not getting better after taking an antibiotic for 2 days.      Your symptoms go away but then come back. Where can you learn more? Go to [email or web address] and sign in to your [employer] account. Enter V662 in the esolidar box to learn more about \"Urinary Tract Infection (UTI) in Women: Care Instructions. \"     If you do not have an account, please click on the \"Sign Up Now\" link. Current as of: October 18, 2021               Content Version: 13.2  © 4486-1055 [employer]. Care instructions adapted under license by [facility] [place] ([facility]). If you have questions about a medical condition or this instruction, always ask your healthcare professional. [place] [address] any warranty or liability for your use of this information. Patient Education        Weakness: Care Instructions  Your Care Instructions     Weakness is a lack of physical or muscle strength. You may feel that you need to make extra effort to move your arms, legs, or other muscles. Generalized weakness means that you feel weak in most areas of your body. Another type ofweakness may affect just one muscle or group of muscles. You may feel weak and tired after you have done too much activity, such as taking an extra-long hike. This is not a serious problem. It often goes away onits own.   Feeling weak can also be caused by medical conditions like thyroid problems, depression, or a virus. Sometimes the cause can be serious. Your doctor Anne Chand to do more tests to try to find the cause of the weakness. The doctor has checked you carefully, but problems can develop later. If you notice any problems or new symptoms, get medical treatment right away. Follow-up care is a key part of your treatment and safety. Be sure to make and go to all appointments, and call your doctor if you are having problems. It's also a good idea to know your test results and keep alist of the medicines you take. How can you care for yourself at home?  Rest when you feel tired.  Be safe with medicines. If your doctor prescribed medicine, take it exactly as prescribed. Call your doctor if you think you are having a problem with your medicine. You will get more details on the specific medicines your doctor prescribes.  Do not skip meals. Eating a balanced diet may increase your energy level.  Get some physical activity every day, but do not get too tired. When should you call for help? Call your doctor now or seek immediate medical care if:     You have new or worse weakness.      You are dizzy or lightheaded, or you feel like you may faint. Watch closely for changes in your health, and be sure to contact your doctor if:     You do not get better as expected. Where can you learn more? Go to [email or web address] and sign in to your [employer] account. Enter [id number] in the [facility] box to learn more about \"Weakness: Care Instructions. \"     If you do not have an account, please click on the \"Sign Up Now\" link. Current as of: July 1, 2021               Content Version: 13.2  © 1858-4686 [employer]. Care instructions adapted under license by [facility] ([facility]).  If you have questions about a medical condition or this instruction, always ask your healthcare professional. [employer] disclaims any warranty or liability for your use of this information.

## 2022-06-06 ENCOUNTER — TELEPHONE (OUTPATIENT)
Dept: FAMILY MEDICINE CLINIC | Age: 82
End: 2022-06-06

## 2022-06-06 DIAGNOSIS — E11.40 TYPE 2 DIABETES MELLITUS WITH DIABETIC NEUROPATHY, WITHOUT LONG-TERM CURRENT USE OF INSULIN (HCC): Primary | ICD-10-CM

## 2022-06-06 RX ORDER — PREGABALIN 150 MG/1
300 CAPSULE ORAL 2 TIMES DAILY
Qty: 120 CAPSULE | Refills: 5 | Status: SHIPPED | OUTPATIENT
Start: 2022-06-06 | End: 2022-12-03

## 2022-06-06 NOTE — TELEPHONE ENCOUNTER
Leland Villagomez Marco needs refill of   Requested Prescriptions     Pending Prescriptions Disp Refills    pregabalin (LYRICA) 150 MG capsule 60 capsule      Sig: Take 2 capsules by mouth 2 times daily. Last Filled on:  5/6/22    Last Visit Date: 5/24/2022 U    Next Visit Date:  Visit date not found    Pt took her last 1 this morning. Daughter Inocencia Ramirez reports this refill must have been missed at her last appt, but believes since her last hospitalization it was 300 mg 1 pill qd; she does fine with 2 pills if it's cheaper this way. Pls call Donna at [phone] only if probs.

## 2022-06-06 NOTE — TELEPHONE ENCOUNTER
ep'ed lyrica to pharm requested    Controlled Substance Monitoring:    Acute and Chronic Pain Monitoring:   RX Monitoring 6/6/2022   Attestation -   Periodic Controlled Substance Monitoring No signs of potential drug abuse or diversion identified.

## 2022-08-08 ENCOUNTER — OFFICE VISIT (OUTPATIENT)
Dept: NEPHROLOGY | Age: 82
End: 2022-08-08
Payer: MEDICARE

## 2022-08-08 VITALS
BODY MASS INDEX: 41.91 KG/M2 | HEART RATE: 90 BPM | SYSTOLIC BLOOD PRESSURE: 163 MMHG | OXYGEN SATURATION: 96 % | DIASTOLIC BLOOD PRESSURE: 103 MMHG | WEIGHT: 214.6 LBS

## 2022-08-08 DIAGNOSIS — I10 PRIMARY HYPERTENSION: ICD-10-CM

## 2022-08-08 DIAGNOSIS — N18.32 STAGE 3B CHRONIC KIDNEY DISEASE (HCC): ICD-10-CM

## 2022-08-08 DIAGNOSIS — E87.1 HYPONATREMIA: Primary | ICD-10-CM

## 2022-08-08 PROBLEM — U07.1 COVID-19: Status: RESOLVED | Noted: 2020-12-28 | Resolved: 2022-08-08

## 2022-08-08 PROBLEM — L30.9 ECZEMATOUS DERMATITIS: Status: RESOLVED | Noted: 2019-01-24 | Resolved: 2022-08-08

## 2022-08-08 PROBLEM — N17.9 ACUTE KIDNEY INJURY (HCC): Status: RESOLVED | Noted: 2022-05-06 | Resolved: 2022-08-08

## 2022-08-08 PROBLEM — T78.3XXA ANGIO-EDEMA: Status: RESOLVED | Noted: 2019-02-24 | Resolved: 2022-08-08

## 2022-08-08 PROBLEM — J18.9 PNEUMONIA DUE TO ORGANISM: Status: RESOLVED | Noted: 2021-03-24 | Resolved: 2022-08-08

## 2022-08-08 PROCEDURE — G8400 PT W/DXA NO RESULTS DOC: HCPCS | Performed by: INTERNAL MEDICINE

## 2022-08-08 PROCEDURE — 1036F TOBACCO NON-USER: CPT | Performed by: INTERNAL MEDICINE

## 2022-08-08 PROCEDURE — 99213 OFFICE O/P EST LOW 20 MIN: CPT | Performed by: INTERNAL MEDICINE

## 2022-08-08 PROCEDURE — 1123F ACP DISCUSS/DSCN MKR DOCD: CPT | Performed by: INTERNAL MEDICINE

## 2022-08-08 PROCEDURE — G8427 DOCREV CUR MEDS BY ELIG CLIN: HCPCS | Performed by: INTERNAL MEDICINE

## 2022-08-08 PROCEDURE — 1090F PRES/ABSN URINE INCON ASSESS: CPT | Performed by: INTERNAL MEDICINE

## 2022-08-08 PROCEDURE — G8417 CALC BMI ABV UP PARAM F/U: HCPCS | Performed by: INTERNAL MEDICINE

## 2022-08-08 RX ORDER — OFLOXACIN 3 MG/ML
5 SOLUTION AURICULAR (OTIC) 2 TIMES DAILY
COMMUNITY
Start: 2022-06-09

## 2022-08-08 NOTE — PROGRESS NOTES
Renal Progress Note    Assessment and Plan:      Diagnosis Orders   1. Hyponatremia        2. Primary hypertension        3. Stage 3b chronic kidney disease ([facility] [address]                  PLAN:  Lab results reviewed with the patient and family. They understood. We went through the report with her in epic. Serum creatinine slightly increased to 1.27 mg/dL from 0.9 mg/dL. Vitamin D level is normal at 45.   Medications reviewed  No changes  Return within 12 months with labs          Patient Active Problem List   Diagnosis    Hypercholesterolemia    Osteoarthritis    Osteoporosis    Diabetes mellitus ([facility] [address]    DDD (degenerative disc disease), lumbar    Essential hypertension    SWAPNA on CPAP    Diabetic peripheral neuropathy (HCC)    Primary thunderclap headache    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HCC)    Skin plaque    Bilateral lower leg cellulitis    CKD (chronic kidney disease) stage 3, GFR 30-59 ml/min ([facility])    Iron deficiency anemia    Hypomagnesemia    Acute eczema    Venous insufficiency of both lower extremities    Chronic acquired lymphedema    Eczematous dermatitis    Dystrophic nail    Angio-edema    Osteoradionecrosis of skull/sinus    Late effect of radiation    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[facility])    Cataract of both eyes    History of ventral hernia repair    Other cervical disc degeneration, mid-cervical region    Spondylolisthesis of cervical region    Spondylolisthesis of thoracic region    Chronic rhinitis    Closed fracture of head of humerus    Dysphagia    Laryngopharyngeal reflux    Primary adenocarcinoma of ethmoidal sinus (HCC)    Obesity, Class II, BMI 35-39.9    COVID-19    Brain mass    Folliculitis    Pneumonia due to organism    Chronic diastolic CHF (congestive heart failure) (HCC)    Acute respiratory failure with hypoxia (HCC)    Chronic ethmoidal sinusitis    Chronic frontal sinusitis    Chronic maxillary sinusitis    Chronic sphenoidal sinusitis    Brain compression (HCC)    Metabolic encephalopathy    Hypokalemia    IDALIA (acute kidney injury) ([facility] [address]    CKD (chronic kidney disease), stage II    Cerebral edema (HCC)    Asymptomatic bacteriuria    Morbid obesity (HCC)    Generalized weakness    Cerebral abscess    Unspecified convulsions    Acute kidney injury ([facility] [address]           Subjective:   Chief complaint:  Chief Complaint   Patient presents with    Chronic Kidney Disease     Stage IIIb      HPI:This is a follow up visit for Ms. Cadence Pace who is here today for return appointment. We see her for hyponatremia and chronic kidney disease. She was last seen about a year ago by Ms. Ron Marroquin, CNP now retired. She ambulates with a walker due to abnormal gait. No chest pain or shortness of breath otherwise. No nausea or vomiting. She was hospitalized at [facility]) in May 2022 for weakness. She was treated and discharged in good condition. .  Blood pressure is high here in the office today but is normal at the skilled care facility. ROS:  Pertinent positives stated above in HPI. All other systems were reviewed and were negative. Medications:     Current Outpatient Medications   Medication Sig Dispense Refill    ofloxacin (FLOXIN) 0.3 % otic solution Place 5 drops in ear(s) in the morning and 5 drops before bedtime. pregabalin (LYRICA) 150 MG capsule Take 2 capsules by mouth 2 times daily for 180 days.  120 capsule 5    ascorbic acid (VITAMIN C) 500 MG tablet Take 1 tablet by mouth daily 30 tablet 3    ibuprofen (ADVIL;MOTRIN) 200 MG tablet Take 200 mg by mouth every 6 hours as needed for Pain      melatonin 3 MG TABS tablet Take 6 mg by mouth nightly as needed      acetaminophen (TYLENOL) 500 MG tablet Take 500 mg by mouth daily      amLODIPine (NORVASC) 5 MG tablet Take 0.5 tablets by mouth daily Hold if BP<110 or HR <70 45 tablet 3    bumetanide (BUMEX) 1 MG tablet Take 1 tablet by mouth 2 times daily 180 tablet 3    loratadine (CLARITIN) 10 MG tablet Take 1 tablet by mouth daily 90 tablet 3    metFORMIN (GLUCOPHAGE) 1000 MG tablet TAKE ONE TABLET IN THE EVENING 90 tablet 3    levETIRAcetam (KEPPRA) 1000 MG tablet Take 1 tablet by mouth 2 times daily 180 tablet 3    omeprazole (PRILOSEC) 20 MG delayed release capsule TAKE ONE CAPSULE BY MOUTH ONCE DAILY 90 capsule 3    potassium chloride (KLOR-CON M) 20 MEQ extended release tablet Take 2 tablets by mouth daily 180 tablet 3    SITagliptin (JANUVIA) 100 MG tablet Take 1 tablet by mouth daily 90 tablet 3    zinc sulfate (ZINCATE) 220 (50 Zn) MG capsule Take 50 mg by mouth daily      Handicap Placard MISC by Does not apply route Dx:I187.2,M81.0. Duration: 5 years. 2 each 0    Misc. Devices (WALKER) MISC 1 each by Does not apply route daily Requests stand up walker due to heart failure and generalized OA. I50.23. 1 each 0    Vitamin D (CHOLECALCIFEROL) 50 MCG (2000 UT) TABS tablet Take 1 tablet by mouth daily 60 tablet 0    ferrous sulfate (IRON 325) 325 (65 Fe) MG tablet Take 325 mg by mouth 3 times daily (with meals)       sodium chloride (OCEAN, BABY AYR) 0.65 % nasal spray 1 spray by Nasal route as needed for Congestion        No current facility-administered medications for this visit.        Lab Results:    CBC:   Lab Results   Component Value Date    WBC 3.5 (L) 05/09/2022    HGB 8.9 (L) 05/09/2022    HCT 28.6 (L) 05/09/2022    MCV 87.5 05/09/2022     (L) 05/09/2022     BMP:    Lab Results   Component Value Date     08/04/2022     05/10/2022     05/09/2022    K 4.5 08/04/2022    K 3.7 05/10/2022    K 3.0 (L) 05/09/2022     08/04/2022     05/10/2022     05/09/2022    CO2 27 08/04/2022    CO2 20 (L) 05/10/2022    CO2 22 (L) 05/09/2022    BUN 26 (H) 08/04/2022    BUN 15 05/10/2022    BUN 16 05/09/2022    CREATININE 1.27 08/04/2022    CREATININE 1.0 05/10/2022    CREATININE 0.9 05/09/2022    GLUCOSE 139 (H) 08/04/2022    GLUCOSE 122 (H) 05/10/2022    GLUCOSE 177 (H) 05/09/2022      Hepatic:   Lab Results   Component Value Date    AST 17 05/06/2022    AST 20 05/05/2022    AST 13 01/05/2022    ALT 17 05/06/2022    ALT 24 05/05/2022    ALT 9 (L) 01/05/2022    BILITOT 0.2 (L) 05/06/2022    BILITOT 0.3 05/05/2022    BILITOT 0.5 01/05/2022    ALKPHOS 100 05/06/2022    ALKPHOS 146 (H) 05/05/2022    ALKPHOS 112 01/05/2022     BNP: No results found for: BNP  Lipids:   Lab Results   Component Value Date    CHOL 144 09/13/2021    HDL 44 09/13/2021     INR:   Lab Results   Component Value Date    INR 1.39 (H) 03/24/2021    INR 0.94 02/28/2021    INR 1.03 08/25/2016     URINE:   Lab Results   Component Value Date/Time    NAUR 82 04/20/2018 01:00 AM    PROTUR < 4.0 03/07/2018 06:02 PM     Lab Results   Component Value Date/Time    NITRU POSITIVE 05/05/2022 10:48 PM    COLORU YELLOW 05/05/2022 10:48 PM    PHUR 6.0 05/05/2022 10:48 PM    LABCAST NONE SEEN 05/05/2022 10:48 PM    LABCAST NONE SEEN 05/05/2022 10:48 PM    WBCUA  05/05/2022 10:48 PM    RBCUA 5-10 05/05/2022 10:48 PM    MUCUS THREADS 03/28/2018 09:41 PM    YEAST NONE SEEN 05/05/2022 10:48 PM    BACTERIA MANY 05/05/2022 10:48 PM    SPECGRAV 1.012 05/05/2022 10:48 PM    LEUKOCYTESUR MODERATE 05/05/2022 10:48 PM    UROBILINOGEN 0.2 05/05/2022 10:48 PM    BILIRUBINUR NEGATIVE 05/05/2022 10:48 PM    BILIRUBINUR - 09/05/2012 08:48 AM    BLOODU TRACE 05/05/2022 10:48 PM    GLUCOSEU NEGATIVE 01/05/2022 08:00 PM    Lin Dapper NEGATIVE 05/05/2022 10:48 PM      Microalbumen/Creatinine ratio:  No components found for: RUCREAT    Objective:   Vitals: BP (!) 163/103 (Site: Left Upper Arm, Position: Sitting, Cuff Size: Medium Adult)   Pulse 90   Wt 214 lb 9.6 oz (97.3 kg)   LMP  (LMP Unknown)   SpO2 96%   BMI 41.91 kg/m²      Constitutional:  Alert, awake, no apparent distress  Skin:normal with no rash or any significant lesions  HEENT:Pupils are reactive . Throat is clear.   Oral mucosa is moist.  Neck:supple with no thyromegaly, JVD, lymphadenopathy or bruit Cardiovascular: Regular sinus rhythm without murmur, rubs or gallops   Respiratory:  Clear to auscultation with no wheezes or rales  Abdomen: Good bowel sound, soft, non tender and no bruit  Ext: No LE edema on the right. The left 1+ edema with some erythema. Musculoskeletal:Intact  Neuro:Alert, awake and oriented with no obvious focal deficit. Speech is normal    Electronically signed by Micheal Carbajal MD on 8/8/2022 at 10:34 AM   **This report has been created using voice recognition software. It maycontain minor  errors which are inherent in voice recognition technology. **

## 2022-11-12 ENCOUNTER — APPOINTMENT (OUTPATIENT)
Dept: CT IMAGING | Age: 82
DRG: 092 | End: 2022-11-12
Payer: MEDICARE

## 2022-11-12 ENCOUNTER — APPOINTMENT (OUTPATIENT)
Dept: GENERAL RADIOLOGY | Age: 82
DRG: 092 | End: 2022-11-12
Payer: MEDICARE

## 2022-11-12 ENCOUNTER — HOSPITAL ENCOUNTER (INPATIENT)
Age: 82
LOS: 5 days | Discharge: ANOTHER HOSPITAL | DRG: 092 | End: 2022-11-18
Attending: STUDENT IN AN ORGANIZED HEALTH CARE EDUCATION/TRAINING PROGRAM | Admitting: INTERNAL MEDICINE
Payer: MEDICARE

## 2022-11-12 DIAGNOSIS — G93.40 ACUTE ENCEPHALOPATHY: Primary | ICD-10-CM

## 2022-11-12 DIAGNOSIS — N17.9 ACUTE KIDNEY INJURY (HCC): ICD-10-CM

## 2022-11-12 LAB
ALBUMIN SERPL-MCNC: 4.2 G/DL (ref 3.5–5.1)
ALP BLD-CCNC: 116 U/L (ref 38–126)
ALT SERPL-CCNC: 8 U/L (ref 11–66)
ANION GAP SERPL CALCULATED.3IONS-SCNC: 14 MEQ/L (ref 8–16)
AST SERPL-CCNC: 11 U/L (ref 5–40)
BASE EXCESS MIXED: 2 MMOL/L (ref -2–3)
BASOPHILS # BLD: 0.3 %
BASOPHILS ABSOLUTE: 0 THOU/MM3 (ref 0–0.1)
BILIRUB SERPL-MCNC: 0.3 MG/DL (ref 0.3–1.2)
BUN BLDV-MCNC: 27 MG/DL (ref 7–22)
CALCIUM SERPL-MCNC: 9.2 MG/DL (ref 8.5–10.5)
CHLORIDE BLD-SCNC: 101 MEQ/L (ref 98–111)
CO2: 25 MEQ/L (ref 23–33)
COLLECTED BY:: NORMAL
CREAT SERPL-MCNC: 1.6 MG/DL (ref 0.4–1.2)
EOSINOPHIL # BLD: 1.8 %
EOSINOPHILS ABSOLUTE: 0.2 THOU/MM3 (ref 0–0.4)
ERYTHROCYTE [DISTWIDTH] IN BLOOD BY AUTOMATED COUNT: 13.9 % (ref 11.5–14.5)
ERYTHROCYTE [DISTWIDTH] IN BLOOD BY AUTOMATED COUNT: 43.8 FL (ref 35–45)
GFR SERPL CREATININE-BSD FRML MDRD: 32 ML/MIN/1.73M2
GLUCOSE BLD-MCNC: 184 MG/DL (ref 70–108)
HCO3, MIXED: 27 MMOL/L (ref 23–28)
HCT VFR BLD CALC: 35.5 % (ref 37–47)
HEMOGLOBIN: 11.7 GM/DL (ref 12–16)
IMMATURE GRANS (ABS): 0.03 THOU/MM3 (ref 0–0.07)
IMMATURE GRANULOCYTES: 0.3 %
INFLUENZA A: NOT DETECTED
INFLUENZA B: NOT DETECTED
LACTIC ACID: 2.1 MMOL/L (ref 0.5–2)
LYMPHOCYTES # BLD: 20.3 %
LYMPHOCYTES ABSOLUTE: 1.8 THOU/MM3 (ref 1–4.8)
MCH RBC QN AUTO: 28.7 PG (ref 26–33)
MCHC RBC AUTO-ENTMCNC: 33 GM/DL (ref 32.2–35.5)
MCV RBC AUTO: 87.2 FL (ref 81–99)
MONOCYTES # BLD: 7.4 %
MONOCYTES ABSOLUTE: 0.7 THOU/MM3 (ref 0.4–1.3)
NUCLEATED RED BLOOD CELLS: 0 /100 WBC
O2 SAT, MIXED: 73 %
OSMOLALITY CALCULATION: 289.3 MOSMOL/KG (ref 275–300)
PCO2, MIXED VENOUS: 42 MMHG (ref 41–51)
PH, MIXED: 7.41 (ref 7.31–7.41)
PLATELET # BLD: 161 THOU/MM3 (ref 130–400)
PMV BLD AUTO: 10 FL (ref 9.4–12.4)
PO2 MIXED: 38 MMHG (ref 25–40)
POTASSIUM SERPL-SCNC: 4.1 MEQ/L (ref 3.5–5.2)
PRO-BNP: 744 PG/ML (ref 0–1800)
RBC # BLD: 4.07 MILL/MM3 (ref 4.2–5.4)
SARS-COV-2 RNA, RT PCR: NOT DETECTED
SEG NEUTROPHILS: 69.9 %
SEGMENTED NEUTROPHILS ABSOLUTE COUNT: 6.4 THOU/MM3 (ref 1.8–7.7)
SODIUM BLD-SCNC: 140 MEQ/L (ref 135–145)
TOTAL PROTEIN: 6.7 G/DL (ref 6.1–8)
TROPONIN T: < 0.01 NG/ML
WBC # BLD: 9.1 THOU/MM3 (ref 4.8–10.8)

## 2022-11-12 PROCEDURE — 36415 COLL VENOUS BLD VENIPUNCTURE: CPT

## 2022-11-12 PROCEDURE — 85025 COMPLETE CBC W/AUTO DIFF WBC: CPT

## 2022-11-12 PROCEDURE — 70450 CT HEAD/BRAIN W/O DYE: CPT

## 2022-11-12 PROCEDURE — 83605 ASSAY OF LACTIC ACID: CPT

## 2022-11-12 PROCEDURE — 51701 INSERT BLADDER CATHETER: CPT

## 2022-11-12 PROCEDURE — 93005 ELECTROCARDIOGRAM TRACING: CPT | Performed by: STUDENT IN AN ORGANIZED HEALTH CARE EDUCATION/TRAINING PROGRAM

## 2022-11-12 PROCEDURE — 71045 X-RAY EXAM CHEST 1 VIEW: CPT

## 2022-11-12 PROCEDURE — 87040 BLOOD CULTURE FOR BACTERIA: CPT

## 2022-11-12 PROCEDURE — 80053 COMPREHEN METABOLIC PANEL: CPT

## 2022-11-12 PROCEDURE — 87636 SARSCOV2 & INF A&B AMP PRB: CPT

## 2022-11-12 PROCEDURE — 2580000003 HC RX 258: Performed by: STUDENT IN AN ORGANIZED HEALTH CARE EDUCATION/TRAINING PROGRAM

## 2022-11-12 PROCEDURE — 82803 BLOOD GASES ANY COMBINATION: CPT

## 2022-11-12 PROCEDURE — 99285 EMERGENCY DEPT VISIT HI MDM: CPT

## 2022-11-12 PROCEDURE — 83880 ASSAY OF NATRIURETIC PEPTIDE: CPT

## 2022-11-12 PROCEDURE — 84484 ASSAY OF TROPONIN QUANT: CPT

## 2022-11-12 PROCEDURE — 96374 THER/PROPH/DIAG INJ IV PUSH: CPT

## 2022-11-12 PROCEDURE — 81001 URINALYSIS AUTO W/SCOPE: CPT

## 2022-11-12 RX ORDER — 0.9 % SODIUM CHLORIDE 0.9 %
500 INTRAVENOUS SOLUTION INTRAVENOUS ONCE
Status: COMPLETED | OUTPATIENT
Start: 2022-11-13 | End: 2022-11-13

## 2022-11-12 RX ADMIN — SODIUM CHLORIDE 500 ML: 9 INJECTION, SOLUTION INTRAVENOUS at 23:42

## 2022-11-12 ASSESSMENT — PAIN DESCRIPTION - LOCATION: LOCATION: BACK

## 2022-11-12 ASSESSMENT — PAIN DESCRIPTION - PAIN TYPE: TYPE: CHRONIC PAIN

## 2022-11-12 ASSESSMENT — PAIN DESCRIPTION - FREQUENCY: FREQUENCY: CONTINUOUS

## 2022-11-12 ASSESSMENT — PAIN SCALES - GENERAL: PAINLEVEL_OUTOF10: 5

## 2022-11-12 ASSESSMENT — PAIN DESCRIPTION - DESCRIPTORS: DESCRIPTORS: ACHING

## 2022-11-12 ASSESSMENT — PAIN - FUNCTIONAL ASSESSMENT: PAIN_FUNCTIONAL_ASSESSMENT: 0-10

## 2022-11-13 ENCOUNTER — APPOINTMENT (OUTPATIENT)
Dept: ULTRASOUND IMAGING | Age: 82
DRG: 092 | End: 2022-11-13
Payer: MEDICARE

## 2022-11-13 ENCOUNTER — APPOINTMENT (OUTPATIENT)
Dept: INTERVENTIONAL RADIOLOGY/VASCULAR | Age: 82
DRG: 092 | End: 2022-11-13
Payer: MEDICARE

## 2022-11-13 PROBLEM — L03.116 CELLULITIS OF LEFT LEG: Status: ACTIVE | Noted: 2022-11-13

## 2022-11-13 PROBLEM — E86.0 DEHYDRATION: Status: ACTIVE | Noted: 2022-11-13

## 2022-11-13 PROBLEM — R41.82 ALTERED MENTAL STATE: Status: ACTIVE | Noted: 2022-11-13

## 2022-11-13 PROBLEM — G93.41 ENCEPHALOPATHY, METABOLIC: Status: ACTIVE | Noted: 2022-11-13

## 2022-11-13 PROBLEM — N17.9 AKI (ACUTE KIDNEY INJURY) (HCC): Status: ACTIVE | Noted: 2022-11-13

## 2022-11-13 LAB
AMMONIA: 22 UMOL/L (ref 11–60)
ANION GAP SERPL CALCULATED.3IONS-SCNC: 12 MEQ/L (ref 8–16)
AVERAGE GLUCOSE: 141 MG/DL (ref 70–126)
BACTERIA: ABNORMAL /HPF
BILIRUBIN URINE: NEGATIVE
BLOOD, URINE: NEGATIVE
BUN BLDV-MCNC: 23 MG/DL (ref 7–22)
CALCIUM SERPL-MCNC: 9.1 MG/DL (ref 8.5–10.5)
CASTS 2: ABNORMAL /LPF
CASTS UA: ABNORMAL /LPF
CHARACTER, URINE: CLEAR
CHLORIDE BLD-SCNC: 103 MEQ/L (ref 98–111)
CHLORIDE, URINE: 109 MEQ/L
CO2: 25 MEQ/L (ref 23–33)
COLOR: YELLOW
CREAT SERPL-MCNC: 1.2 MG/DL (ref 0.4–1.2)
CREATININE URINE: 26.7 MG/DL
CRYSTALS, UA: ABNORMAL
EKG ATRIAL RATE: 83 BPM
EKG P AXIS: 12 DEGREES
EKG P-R INTERVAL: 164 MS
EKG Q-T INTERVAL: 406 MS
EKG QRS DURATION: 138 MS
EKG QTC CALCULATION (BAZETT): 477 MS
EKG R AXIS: 110 DEGREES
EKG T AXIS: -21 DEGREES
EKG VENTRICULAR RATE: 83 BPM
EOSINOPHIL SMEAR, URINE: NORMAL
EPITHELIAL CELLS, UA: ABNORMAL /HPF
GFR SERPL CREATININE-BSD FRML MDRD: 45 ML/MIN/1.73M2
GLUCOSE BLD-MCNC: 143 MG/DL (ref 70–108)
GLUCOSE BLD-MCNC: 152 MG/DL (ref 70–108)
GLUCOSE BLD-MCNC: 180 MG/DL (ref 70–108)
GLUCOSE BLD-MCNC: 191 MG/DL (ref 70–108)
GLUCOSE BLD-MCNC: 211 MG/DL (ref 70–108)
GLUCOSE URINE: NEGATIVE MG/DL
HBA1C MFR BLD: 6.7 % (ref 4.4–6.4)
KETONES, URINE: ABNORMAL
LACTIC ACID: 2 MMOL/L (ref 0.5–2)
LEUKOCYTE ESTERASE, URINE: NEGATIVE
MAGNESIUM: 1.8 MG/DL (ref 1.6–2.4)
MISCELLANEOUS 2: ABNORMAL
MRSA NASAL SCREEN RT-PCR: POSITIVE
NITRITE, URINE: NEGATIVE
PH UA: 6 (ref 5–9)
POTASSIUM SERPL-SCNC: 3.3 MEQ/L (ref 3.5–5.2)
POTASSIUM, URINE: 19.4 MEQ/L
PROCALCITONIN: 0.06 NG/ML (ref 0.01–0.09)
PROTEIN UA: 300
RBC URINE: ABNORMAL /HPF
RENAL EPITHELIAL, UA: ABNORMAL
SODIUM BLD-SCNC: 140 MEQ/L (ref 135–145)
SODIUM URINE: 135 MEQ/L
SPECIFIC GRAVITY, URINE: 1.02 (ref 1–1.03)
STAPH AUREUS SCREEN RT-PCR: POSITIVE
UROBILINOGEN, URINE: 0.2 EU/DL (ref 0–1)
VANCOMYCIN RESISTANT ENTEROCOCCUS: NEGATIVE
WBC UA: ABNORMAL /HPF
YEAST: ABNORMAL

## 2022-11-13 PROCEDURE — 83735 ASSAY OF MAGNESIUM: CPT

## 2022-11-13 PROCEDURE — 87040 BLOOD CULTURE FOR BACTERIA: CPT

## 2022-11-13 PROCEDURE — 87500 VANOMYCIN DNA AMP PROBE: CPT

## 2022-11-13 PROCEDURE — 1200000000 HC SEMI PRIVATE

## 2022-11-13 PROCEDURE — 82948 REAGENT STRIP/BLOOD GLUCOSE: CPT

## 2022-11-13 PROCEDURE — 87641 MR-STAPH DNA AMP PROBE: CPT

## 2022-11-13 PROCEDURE — 80177 DRUG SCRN QUAN LEVETIRACETAM: CPT

## 2022-11-13 PROCEDURE — 80048 BASIC METABOLIC PNL TOTAL CA: CPT

## 2022-11-13 PROCEDURE — 82436 ASSAY OF URINE CHLORIDE: CPT

## 2022-11-13 PROCEDURE — 6360000002 HC RX W HCPCS: Performed by: INTERNAL MEDICINE

## 2022-11-13 PROCEDURE — 36415 COLL VENOUS BLD VENIPUNCTURE: CPT

## 2022-11-13 PROCEDURE — 93010 ELECTROCARDIOGRAM REPORT: CPT | Performed by: NUCLEAR MEDICINE

## 2022-11-13 PROCEDURE — 93971 EXTREMITY STUDY: CPT

## 2022-11-13 PROCEDURE — 84145 PROCALCITONIN (PCT): CPT

## 2022-11-13 PROCEDURE — 2500000003 HC RX 250 WO HCPCS: Performed by: STUDENT IN AN ORGANIZED HEALTH CARE EDUCATION/TRAINING PROGRAM

## 2022-11-13 PROCEDURE — 82570 ASSAY OF URINE CREATININE: CPT

## 2022-11-13 PROCEDURE — 84300 ASSAY OF URINE SODIUM: CPT

## 2022-11-13 PROCEDURE — 83605 ASSAY OF LACTIC ACID: CPT

## 2022-11-13 PROCEDURE — 2580000003 HC RX 258: Performed by: INTERNAL MEDICINE

## 2022-11-13 PROCEDURE — 87640 STAPH A DNA AMP PROBE: CPT

## 2022-11-13 PROCEDURE — 1200000003 HC TELEMETRY R&B

## 2022-11-13 PROCEDURE — 6370000000 HC RX 637 (ALT 250 FOR IP): Performed by: INTERNAL MEDICINE

## 2022-11-13 PROCEDURE — 83036 HEMOGLOBIN GLYCOSYLATED A1C: CPT

## 2022-11-13 PROCEDURE — 76770 US EXAM ABDO BACK WALL COMP: CPT

## 2022-11-13 PROCEDURE — 84133 ASSAY OF URINE POTASSIUM: CPT

## 2022-11-13 PROCEDURE — 89190 NASAL SMEAR FOR EOSINOPHILS: CPT

## 2022-11-13 PROCEDURE — 82140 ASSAY OF AMMONIA: CPT

## 2022-11-13 RX ORDER — PREGABALIN 75 MG/1
75 CAPSULE ORAL 2 TIMES DAILY
Status: DISCONTINUED | OUTPATIENT
Start: 2022-11-13 | End: 2022-11-18 | Stop reason: HOSPADM

## 2022-11-13 RX ORDER — SODIUM CHLORIDE 0.9 % (FLUSH) 0.9 %
5-40 SYRINGE (ML) INJECTION PRN
Status: DISCONTINUED | OUTPATIENT
Start: 2022-11-13 | End: 2022-11-18 | Stop reason: HOSPADM

## 2022-11-13 RX ORDER — HEPARIN SODIUM 5000 [USP'U]/ML
5000 INJECTION, SOLUTION INTRAVENOUS; SUBCUTANEOUS EVERY 8 HOURS SCHEDULED
Status: DISCONTINUED | OUTPATIENT
Start: 2022-11-13 | End: 2022-11-18 | Stop reason: HOSPADM

## 2022-11-13 RX ORDER — INSULIN LISPRO 100 [IU]/ML
0-8 INJECTION, SOLUTION INTRAVENOUS; SUBCUTANEOUS
Status: DISCONTINUED | OUTPATIENT
Start: 2022-11-13 | End: 2022-11-18 | Stop reason: HOSPADM

## 2022-11-13 RX ORDER — FAMOTIDINE 20 MG/1
20 TABLET, FILM COATED ORAL DAILY
Status: DISCONTINUED | OUTPATIENT
Start: 2022-11-13 | End: 2022-11-18 | Stop reason: HOSPADM

## 2022-11-13 RX ORDER — ONDANSETRON 4 MG/1
4 TABLET, ORALLY DISINTEGRATING ORAL EVERY 8 HOURS PRN
Status: DISCONTINUED | OUTPATIENT
Start: 2022-11-13 | End: 2022-11-18 | Stop reason: HOSPADM

## 2022-11-13 RX ORDER — LABETALOL 20 MG/4 ML (5 MG/ML) INTRAVENOUS SYRINGE
10 ONCE
Status: DISCONTINUED | OUTPATIENT
Start: 2022-11-13 | End: 2022-11-13

## 2022-11-13 RX ORDER — ACETAMINOPHEN 325 MG/1
650 TABLET ORAL EVERY 4 HOURS PRN
Status: DISCONTINUED | OUTPATIENT
Start: 2022-11-13 | End: 2022-11-18 | Stop reason: HOSPADM

## 2022-11-13 RX ORDER — AMLODIPINE BESYLATE 2.5 MG/1
2.5 TABLET ORAL DAILY
Status: DISCONTINUED | OUTPATIENT
Start: 2022-11-13 | End: 2022-11-14 | Stop reason: DRUGHIGH

## 2022-11-13 RX ORDER — FERROUS SULFATE 325(65) MG
325 TABLET ORAL
Status: DISCONTINUED | OUTPATIENT
Start: 2022-11-13 | End: 2022-11-18 | Stop reason: HOSPADM

## 2022-11-13 RX ORDER — SODIUM CHLORIDE 0.9 % (FLUSH) 0.9 %
5-40 SYRINGE (ML) INJECTION EVERY 12 HOURS SCHEDULED
Status: DISCONTINUED | OUTPATIENT
Start: 2022-11-13 | End: 2022-11-18 | Stop reason: HOSPADM

## 2022-11-13 RX ORDER — MAGNESIUM SULFATE IN WATER 40 MG/ML
2000 INJECTION, SOLUTION INTRAVENOUS PRN
Status: DISCONTINUED | OUTPATIENT
Start: 2022-11-13 | End: 2022-11-18 | Stop reason: HOSPADM

## 2022-11-13 RX ORDER — DOXYCYCLINE HYCLATE 100 MG/1
100 CAPSULE ORAL 2 TIMES DAILY
COMMUNITY

## 2022-11-13 RX ORDER — POTASSIUM CHLORIDE 20 MEQ/1
40 TABLET, EXTENDED RELEASE ORAL PRN
Status: DISCONTINUED | OUTPATIENT
Start: 2022-11-13 | End: 2022-11-18 | Stop reason: HOSPADM

## 2022-11-13 RX ORDER — DEXTROSE MONOHYDRATE 100 MG/ML
INJECTION, SOLUTION INTRAVENOUS CONTINUOUS PRN
Status: DISCONTINUED | OUTPATIENT
Start: 2022-11-13 | End: 2022-11-18 | Stop reason: HOSPADM

## 2022-11-13 RX ORDER — SODIUM CHLORIDE 9 MG/ML
INJECTION, SOLUTION INTRAVENOUS PRN
Status: DISCONTINUED | OUTPATIENT
Start: 2022-11-13 | End: 2022-11-18 | Stop reason: HOSPADM

## 2022-11-13 RX ORDER — POTASSIUM CHLORIDE 7.45 MG/ML
10 INJECTION INTRAVENOUS PRN
Status: DISCONTINUED | OUTPATIENT
Start: 2022-11-13 | End: 2022-11-18 | Stop reason: HOSPADM

## 2022-11-13 RX ORDER — ALOGLIPTIN 12.5 MG/1
12.5 TABLET, FILM COATED ORAL DAILY
Status: DISCONTINUED | OUTPATIENT
Start: 2022-11-13 | End: 2022-11-18 | Stop reason: HOSPADM

## 2022-11-13 RX ORDER — HYDRALAZINE HYDROCHLORIDE 20 MG/ML
10 INJECTION INTRAMUSCULAR; INTRAVENOUS EVERY 8 HOURS PRN
Status: DISCONTINUED | OUTPATIENT
Start: 2022-11-13 | End: 2022-11-18

## 2022-11-13 RX ORDER — ASCORBIC ACID 500 MG
500 TABLET ORAL DAILY
Status: DISCONTINUED | OUTPATIENT
Start: 2022-11-13 | End: 2022-11-18 | Stop reason: HOSPADM

## 2022-11-13 RX ORDER — VITAMIN B COMPLEX
2000 TABLET ORAL DAILY
Status: DISCONTINUED | OUTPATIENT
Start: 2022-11-13 | End: 2022-11-18 | Stop reason: HOSPADM

## 2022-11-13 RX ORDER — LEVETIRACETAM 500 MG/1
1000 TABLET ORAL 2 TIMES DAILY
Status: DISCONTINUED | OUTPATIENT
Start: 2022-11-13 | End: 2022-11-18 | Stop reason: HOSPADM

## 2022-11-13 RX ORDER — LABETALOL HYDROCHLORIDE 5 MG/ML
10 INJECTION, SOLUTION INTRAVENOUS ONCE
Status: COMPLETED | OUTPATIENT
Start: 2022-11-13 | End: 2022-11-13

## 2022-11-13 RX ORDER — INSULIN LISPRO 100 [IU]/ML
0-4 INJECTION, SOLUTION INTRAVENOUS; SUBCUTANEOUS NIGHTLY
Status: DISCONTINUED | OUTPATIENT
Start: 2022-11-13 | End: 2022-11-18 | Stop reason: HOSPADM

## 2022-11-13 RX ORDER — ZINC SULFATE 50(220)MG
50 CAPSULE ORAL DAILY
Status: DISCONTINUED | OUTPATIENT
Start: 2022-11-13 | End: 2022-11-18 | Stop reason: HOSPADM

## 2022-11-13 RX ORDER — TRIAMCINOLONE ACETONIDE 1 MG/G
CREAM TOPICAL 2 TIMES DAILY
COMMUNITY

## 2022-11-13 RX ORDER — SODIUM CHLORIDE 9 MG/ML
INJECTION, SOLUTION INTRAVENOUS CONTINUOUS
Status: DISCONTINUED | OUTPATIENT
Start: 2022-11-13 | End: 2022-11-18 | Stop reason: HOSPADM

## 2022-11-13 RX ORDER — ONDANSETRON 2 MG/ML
4 INJECTION INTRAMUSCULAR; INTRAVENOUS EVERY 6 HOURS PRN
Status: DISCONTINUED | OUTPATIENT
Start: 2022-11-13 | End: 2022-11-18 | Stop reason: HOSPADM

## 2022-11-13 RX ADMIN — SODIUM CHLORIDE: 9 INJECTION, SOLUTION INTRAVENOUS at 21:40

## 2022-11-13 RX ADMIN — FERROUS SULFATE TAB 325 MG (65 MG ELEMENTAL FE) 325 MG: 325 (65 FE) TAB at 12:58

## 2022-11-13 RX ADMIN — PREGABALIN 75 MG: 75 CAPSULE ORAL at 21:54

## 2022-11-13 RX ADMIN — Medication 50 MG: at 09:17

## 2022-11-13 RX ADMIN — LABETALOL HYDROCHLORIDE 10 MG: 5 INJECTION, SOLUTION INTRAVENOUS at 01:31

## 2022-11-13 RX ADMIN — AMLODIPINE BESYLATE 2.5 MG: 2.5 TABLET ORAL at 09:18

## 2022-11-13 RX ADMIN — SODIUM CHLORIDE, PRESERVATIVE FREE 10 ML: 5 INJECTION INTRAVENOUS at 21:59

## 2022-11-13 RX ADMIN — SODIUM CHLORIDE: 9 INJECTION, SOLUTION INTRAVENOUS at 04:29

## 2022-11-13 RX ADMIN — OXYCODONE HYDROCHLORIDE AND ACETAMINOPHEN 500 MG: 500 TABLET ORAL at 09:18

## 2022-11-13 RX ADMIN — LEVETIRACETAM 1000 MG: 500 TABLET, FILM COATED ORAL at 09:17

## 2022-11-13 RX ADMIN — FAMOTIDINE 20 MG: 20 TABLET ORAL at 09:18

## 2022-11-13 RX ADMIN — LEVETIRACETAM 1000 MG: 500 TABLET, FILM COATED ORAL at 22:07

## 2022-11-13 RX ADMIN — FERROUS SULFATE TAB 325 MG (65 MG ELEMENTAL FE) 325 MG: 325 (65 FE) TAB at 09:18

## 2022-11-13 RX ADMIN — ACETAMINOPHEN 650 MG: 325 TABLET ORAL at 21:54

## 2022-11-13 RX ADMIN — PREGABALIN 75 MG: 75 CAPSULE ORAL at 09:18

## 2022-11-13 RX ADMIN — SODIUM CHLORIDE, PRESERVATIVE FREE 10 ML: 5 INJECTION INTRAVENOUS at 09:18

## 2022-11-13 RX ADMIN — HEPARIN SODIUM 5000 UNITS: 5000 INJECTION INTRAVENOUS; SUBCUTANEOUS at 05:20

## 2022-11-13 RX ADMIN — HEPARIN SODIUM 5000 UNITS: 5000 INJECTION INTRAVENOUS; SUBCUTANEOUS at 22:02

## 2022-11-13 RX ADMIN — Medication 2000 UNITS: at 09:17

## 2022-11-13 RX ADMIN — CEFTRIAXONE SODIUM 1000 MG: 1 INJECTION, POWDER, FOR SOLUTION INTRAMUSCULAR; INTRAVENOUS at 12:57

## 2022-11-13 ASSESSMENT — PAIN DESCRIPTION - DESCRIPTORS: DESCRIPTORS: ACHING

## 2022-11-13 ASSESSMENT — PAIN DESCRIPTION - LOCATION: LOCATION: HEAD

## 2022-11-13 ASSESSMENT — PAIN SCALES - GENERAL
PAINLEVEL_OUTOF10: 0
PAINLEVEL_OUTOF10: 0
PAINLEVEL_OUTOF10: 7

## 2022-11-13 NOTE — H&P
Internal Medicine Specialties  H&P  11/13/2022  10:31 AM    Patient: Severa Raker  YOB: 1940    MRN: [id number]   Acct:  [id number]   [id number]  Primary Care Physician: Katelynn Perera MD    Chief Complaint:  Chief Complaint   Patient presents with    Altered Mental Status    Fatigue       History of Present Illness: The patient is a 81 y.o. female with pmhx of HTN, Hyperlipidemia, DM2  who presents with altered mental status. No family present at time of exam. Pt states she was brought in because she fell. Per ED note \"Daughter states that today the patient was hallucinating being with another daughter and talking to the daughter. Patient was also having difficulty ambulating, which is new from her baseline. 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 Patient was functionally back to walking on her own and completing her ADLs. Patient was unable to walk out of the house to get to the car today. Patient also continues to believe that she had a full conversation with her daughter. \" Pt stated she lives with her daughters and goes between both their houses. Pt able to tell me place, president and what holiday is coming up. In the emergency department Lab abnormal BUN 27 and creat 1.6, Glucose 184, ALT 8  CT head-   Impression:   1. No acute intracranial abnormality. Age-appropriate exam.   2. Bifrontal postoperative changes with inferior frontal lobe    encephalomalacia. Postoperative changes are new compared to the previous    exam, noting an evidence of a right frontal lobe mass on the previous    exam.   3. Extensive sinonasal surgery. Moderate chronic sinusitis. These findings    are similar to previous. US Renal-   1. Bilateral benign-appearing renal cysts. Slightly complex appearing lesion inferior pole right kidney, likely a partially collapsed cyst. If further evaluation is desired, a CT urogram might be considered.    2. No acute findings. EKG-Normal sinus rhythm  Right bundle branch block  Left posterior fascicular block   Bifascicular block   Inferior infarct (cited on or before 04-FEB-2018)  T wave abnormality, consider lateral ischemia  Abnormal ECG  When compared with ECG of 05-MAY-2022 20:53,  Minimal criteria for Anterior infarct are no longer Present  T wave inversion more evident in Anterolateral leads    Patient admitted for Altered mental status. 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    Cataract of both eyes     COVID-19     DDD (degenerative disc disease), lumbar     DM2 (diabetes mellitus, type 2) ([facility] [address]     diagnoses approx 2000    Dysphagia, pharyngoesophageal 09/26/2016    Eczema 03/2018    Fibrocystic breast     H/O ventral hernia repair     Headache 02/09/2017    History of COVID-19 12/2020    Hyperlipidemia     Hypertension     Iron deficiency anemia     LPRD (laryngopharyngeal reflux disease) 09/26/2016    Neuropathy     peripheral    Obesity     Orbital abscess     Orbital cellulitis 01/22/2014    SWAPNA (obstructive sleep apnea)     Osteoarthritis     Osteoporosis     Persistent proteinuria associated with type 2 diabetes mellitus ([facility] [address] 01/2017    urine micral of 50.       Sinusitis     Velopharyngeal incompetence 09/26/2016       Past Surgical History:        Procedure Laterality Date    ABDOMEN SURGERY      APPENDECTOMY      CARPAL TUNNEL RELEASE Bilateral 2008, 2009    CATARACT REMOVAL  2011    bilat    CHOLECYSTECTOMY  03/1988    COLONOSCOPY  2016    COLONOSCOPY  10/11/2018    COLONOSCOPY POLYPECTOMY SNARE/COLD BIOPSY performed by Stephanie Araya MD at [address] [place] [address], [facility]      ENDOSCOPY, COLON, DIAGNOSTIC      EYE SURGERY Left 01/30/2015    EYE SURGERY      CATARACT REMOVAL- BILATERAL    HEMORRHOID SURGERY  1980s    HERNIA REPAIR      HYSTERECTOMY, TOTAL ABDOMINAL (CERVIX REMOVED)  1980    JOINT REPLACEMENT  bilat 2005/ 2007    knees TX COLSC FLX WITH DIRECTED SUBMUCOSAL NJX ANY SBST  10/11/2018    COLONOSCOPY SUBMUCOSAL/BOTOX INJECTION performed by Beverly Solorio MD at [address]  last 2009    removed a bone (2)--2 times no construction     SINUS SURGERY  02/01/2016    SINUS SURGERY  2016 x 2    SINUS SURGERY  09/18/2017    [facility] - Dr Rod Singh    SINUS SURGERY  08/09/2017 8/17/2017 - [facility]    TONSILLECTOMY      TOTAL KNEE ARTHROPLASTY  08/2003    Left TKR    VENTRAL HERNIA REPAIR  1992       Home Medications: Medications Prior to Admission: doxycycline hyclate (VIBRAMYCIN) 100 MG capsule, Take 100 mg by mouth 2 times daily  triamcinolone (KENALOG) 0.1 % cream, Apply topically 2 times daily Apply topically 2 times daily. ofloxacin (FLOXIN) 0.3 % otic solution, Place 5 drops in ear(s) in the morning and 5 drops before bedtime. (Patient not taking: Reported on 11/13/2022)  pregabalin (LYRICA) 150 MG capsule, Take 2 capsules by mouth 2 times daily for 180 days.   ascorbic acid (VITAMIN C) 500 MG tablet, Take 1 tablet by mouth daily  ibuprofen (ADVIL;MOTRIN) 200 MG tablet, Take 200 mg by mouth every 6 hours as needed for Pain  melatonin 3 MG TABS tablet, Take 6 mg by mouth nightly as needed (Patient not taking: Reported on 11/13/2022)  acetaminophen (TYLENOL) 500 MG tablet, Take 500 mg by mouth daily  amLODIPine (NORVASC) 5 MG tablet, Take 0.5 tablets by mouth daily Hold if BP<110 or HR <70  bumetanide (BUMEX) 1 MG tablet, Take 1 tablet by mouth 2 times daily  loratadine (CLARITIN) 10 MG tablet, Take 1 tablet by mouth daily  metFORMIN (GLUCOPHAGE) 1000 MG tablet, TAKE ONE TABLET IN THE EVENING  levETIRAcetam (KEPPRA) 1000 MG tablet, Take 1 tablet by mouth 2 times daily  omeprazole (PRILOSEC) 20 MG delayed release capsule, TAKE ONE CAPSULE BY MOUTH ONCE DAILY  potassium chloride (KLOR-CON M) 20 MEQ extended release tablet, Take 2 tablets by mouth daily  SITagliptin (JANUVIA) 100 MG tablet, Take 1 tablet by mouth daily (Patient not taking: Reported on 11/13/2022)  zinc sulfate (ZINCATE) 220 (50 Zn) MG capsule, Take 50 mg by mouth daily  Handicap Placard MISC, by Does not apply route Dx:I187.2,M81.0. Duration: 5 years. Misc. Devices (WALKER) MISC, 1 each by Does not apply route daily Requests stand up walker due to heart failure and generalized OA. I50.23. Vitamin D (CHOLECALCIFEROL) 50 MCG (2000 UT) TABS tablet, Take 1 tablet by mouth daily  ferrous sulfate (IRON 325) 325 (65 Fe) MG tablet, Take 325 mg by mouth 3 times daily (with meals)   sodium chloride (OCEAN, BABY AYR) 0.65 % nasal spray, 1 spray by Nasal route as needed for Congestion       Allergies:  Lisinopril, Sulfamethoxazole-trimethoprim, Morphine, Codeine, Hydrocodone, Percocet [oxycodone-acetaminophen], and Tape [adhesive tape]    Social History:    reports that she has never smoked. She has never used smokeless tobacco. She reports that she does not drink alcohol and does not use drugs.     Occupation:     Family History:       Problem Relation Age of Onset    Diabetes Mother     Heart Disease Father         whooping cough as child    Obesity Sister     Other Brother         tumor on back    Obesity Sister     Cancer Sister         Skin     Cancer Brother         Bone cancer from metal    Other Brother         passed as a child    Heart Disease Brother     Other Brother         tremor    Heart Attack Brother     No Known Problems Brother     Heart Disease Brother     No Known Problems Brother     No Known Problems Brother        Review of Systems: Pt had no concerns at this time except to get cleaned up from being incontinent     General ROS: negative  Psychological ROS: negative  Hematological and Lymphatic ROS: negative  Endocrine ROS: negative  Vision:negative  ENT ROS: Denies  Respiratory ROS: no cough, shortness of breath, or wheezing  Cardiovascular ROS: no chest pain or dyspnea on exertion  Gastrointestinal ROS: no abdominal pain, change in bowel habits, or black or bloody stools  Genito-Urinary ROS: no dysuria, trouble voiding, or hematuria  Musculoskeletal ROS: negative  Neurological ROS: no TIA or stroke symptoms  Dermatological ROS: negative  Weight:no change in weight  Appetite:ok      Physical Exam:    Vitals:Patient Vitals for the past 24 hrs:   BP Temp Temp src Pulse Resp SpO2 Height Weight   11/13/22 0900 (!) 189/78 97.7 °F (36.5 °C) Oral 74 18 95 % -- --   11/13/22 0312 (!) 190/89 97.5 °F (36.4 °C) Oral 65 22 98 % 5' (1.524 m) 208 lb 12.4 oz (94.7 kg)   11/13/22 0201 (!) 190/108 -- -- 68 -- 98 % -- --   11/13/22 0151 (!) 181/108 -- -- 69 -- 94 % -- --   11/13/22 0145 (!) 196/95 -- -- 69 -- 93 % -- --   11/13/22 0130 (!) 212/113 -- -- 72 -- 95 % -- --   11/13/22 0118 (!) 200/94 -- -- 70 -- 93 % -- --   11/13/22 0023 (!) 181/101 -- -- 72 -- 97 % -- --   11/13/22 0008 (!) 183/95 -- -- 68 -- 96 % -- --   11/13/22 0001 (!) 197/102 -- -- 70 -- 93 % -- --   11/12/22 2252 (!) 154/87 -- -- 73 19 94 % -- --   11/12/22 2237 (!) 159/86 -- -- 78 16 91 % -- --   11/12/22 2224 (!) 170/97 97.9 °F (36.6 °C) Oral 78 18 92 % 5' (1.524 m) 222 lb 12.8 oz (101.1 kg)     Weight: Weight: 208 lb 12.4 oz (94.7 kg)     24 hour intake/output:  Intake/Output Summary (Last 24 hours) at 11/13/2022 1031  Last data filed at 11/13/2022 0947  Gross per 24 hour   Intake 500 ml   Output 1000 ml   Net -500 ml       General appearance - alert  Eyes - pupils equal and reactive, extraocular eye movements intact  Ears - bilateral TM's and external ear canals normal  Nose - normal and patent, no erythema, discharge or polyps  Mouth - mucous membranes moist, pharynx normal without lesions  Neck - supple, no significant adenopathy  Chest - clear to auscultation, no wheezes, rales or rhonchi, symmetric air entry on room air  Heart - normal rate, regular rhythm, normal S1, S2  Abdomen - soft, nontender, nondistended, no masses or organomegaly  Neurological - alert, oriented to questions asked, normal speech, no focal findings or movement disorder noted  Musculoskeletal - no joint tenderness, deformity or swelling  Extremities - peripheral pulses normal, no pedal edema, no clubbing or cyanosis  Skin - warm and dry, LLE redness an swelling and scabs throughout no drainage noted. Review of Labs and Diagnostic Testing:    CBC:   Recent Labs     11/12/22 2256   WBC 9.1   HGB 11.7*   HCT 35.5*   MCV 87.2        BMP:   Recent Labs     11/13/22  0918      K 3.3*      CO2 25   BUN 23*   CREATININE 1.2   CALCIUM 9.1   GLUCOSE 211*     PT/INR: No results for input(s): PROTIME, INR in the last 72 hours. APTT: No results for input(s): APTT in the last 72 hours. Lipids:   Recent Labs     11/12/22 2256   ALKPHOS 116   ALT 8*   AST 11   BILITOT 0.3   LABALBU 4.2     Troponin:   Recent Labs     11/12/22 2256   TROPONINT < 0.010        Imaging:  CT Head W/O Contrast    Result Date: 11/12/2022  CT head without: Comparison: CT/KO/SR - CT HEAD WO CONTRAST - 01/05/2022 08:09 PM EST Findings: Age-appropriate sulcation. Bifrontal craniectomy change with underlying anterior inferior bifrontal encephalomalacia, worse on the right. No intracranial mass, midline shift, hydrocephalus, acute hemorrhage or infarct. Unremarkable orbits. Diffuse ethmoidectomy and turbinectomy with bilateral sinonasal \"windows\". Opacified right maxillary sinus. Because of thickening of the sphenoid and left maxillary sinus. Mastoid air cells are clear. No acute skull fracture. No significant scalp soft tissue swelling. Impression: 1. No acute intracranial abnormality. Age-appropriate exam. 2. Bifrontal postoperative changes with inferior frontal lobe encephalomalacia. Postoperative changes are new compared to the previous exam, noting an evidence of a right frontal lobe mass on the previous exam. 3. Extensive sinonasal surgery. Moderate chronic sinusitis. These findings are similar to previous.  This document has been electronically signed by: Wendy Marin MD on 11/12/2022 11:24 PM All CTs at this facility use dose modulation techniques and iterative reconstructions, and/or weight-based dosing when appropriate to reduce radiation to a low as reasonably achievable. US RENAL COMPLETE    Result Date: 11/13/2022  BILATERAL RENAL ULTRASOUND: CLINICAL INFORMATION: IDALIA COMPARISON: 10/26/2011 TECHNIQUE: Multiple sonographic images of both kidneys were obtained. Images of the urinary bladder were also obtained. FINDINGS: RIGHT KIDNEY - 11.3 x 4.1 x 3.7 cm Resistive Index - 0.60 Cortical Thickness - 1.3 cm LEFT KIDNEY - 11.8 x 5.1 x 5.4 cm Resistive Index - 0.68 Cortical Thickness - 1.2 cm URINARY BLADDER Pre-Void - 644.4 mL Post-Void - 134.6 mL  KIDNEYS:  Both kidneys are normal in size and contour. The resistive indices are normal.  MASS/CYST: There are a few benign-appearing cysts both kidneys, largest located on the left side, 6.9 cm. On the right side, there is a very difficult to visualize 3.9 x 1.5 x 1.2 cm slightly complex appearing structure, possibly a collapsed cyst.  HYDRONEPHROSIS:  There is no hydronephrosis. CALCULI:  No calculi are seen. BLADDER:  The urinary bladder is unremarkable. .     1. Bilateral benign-appearing renal cysts. Slightly complex appearing lesion inferior pole right kidney, likely a partially collapsed cyst. If further evaluation is desired, a CT urogram might be considered. 2. No acute findings. **This report has been created using voice recognition software. It may contain minor errors which are inherent in voice recognition technology. ** Final report electronically signed by Dr. Jacinto Lopez on 11/13/2022 7:27 AM    XR CHEST PORTABLE    Result Date: 11/12/2022  1 view chest Comparison: CR/SR - XR CHEST PORTABLE - 05/05/2022 09:17 PM EDT Findings: No consolidation or lung nodules. Heart is moderately enlarged. No pulmonary edema. No pleural effusion. No acute fracture. Impression: 1.  Moderate cardiomegaly, without failure. This document has been electronically signed by: Willis Paris MD on 11/12/2022 10:59 PM      Assessment  Altered mental status  Encephalopathy, metabolic  IDALIA on CKD stage 3  Cellulitis  Diabetes   HTN    Plan:  Neurology consulted  EEG  Lactic acid, A1C, Mag, Pro jackie today  CBC in am  US LLE  K+ replacement protocol      Electronically signed by AMANDA Alves - CNP on 11/13/2022 at 10:31 AM      Assessment and plan of care discussed with supervising physician, Dr Waldo Muro. Addendum/attestation by Luz Meckel, MD:  I have seen and examined the patient independently. Face to face evaluation and examination was performed. The above evaluation and note has been reviewed. Laboratory and radiological data were reviewed. I Have discussed with Lin Flores CNP about this patient in detail. The above assessment and plan has been reviewed. Please see my modifications mentioned below.       My modifications:  Electronically signed by Niharika Henson MD on 11/13/2022 at 11:36 AM      Time spent over 35 mins           Copy: Primary Care Physician: Enedelia Badillo MD

## 2022-11-13 NOTE — PLAN OF CARE
Problem: Discharge Planning  Goal: Discharge to home or other facility with appropriate resources  11/13/2022 1103 by Cuca Valdez RN  Outcome: Progressing  Flowsheets (Taken 11/13/2022 0900)  Discharge to home or other facility with appropriate resources:   Identify barriers to discharge with patient and caregiver   Arrange for needed discharge resources and transportation as appropriate   Identify discharge learning needs (meds, wound care, etc)   Refer to discharge planning if patient needs post-hospital services based on physician order or complex needs related to functional status, cognitive ability or social support system     Problem: Pain  Goal: Verbalizes/displays adequate comfort level or baseline comfort level  11/13/2022 1103 by Cuca Valdez RN  Outcome: Progressing  Flowsheets (Taken 11/13/2022 1103)  Verbalizes/displays adequate comfort level or baseline comfort level:   Encourage patient to monitor pain and request assistance   Assess pain using appropriate pain scale   Administer analgesics based on type and severity of pain and evaluate response   Implement non-pharmacological measures as appropriate and evaluate response     Problem: Skin/Tissue Integrity  Goal: Absence of new skin breakdown  Description: 1. Monitor for areas of redness and/or skin breakdown  2. Assess vascular access sites hourly  3. Every 4-6 hours minimum:  Change oxygen saturation probe site  4. Every 4-6 hours:  If on nasal continuous positive airway pressure, respiratory therapy assess nares and determine need for appliance change or resting period.   11/13/2022 1103 by Cuca Valdez RN  Outcome: Progressing     Problem: Safety - Adult  Goal: Free from fall injury  11/13/2022 1103 by Cuca Valdez RN  Outcome: Progressing     Problem: ABCDS Injury Assessment  Goal: Absence of physical injury  11/13/2022 1103 by Cuca Valdez RN  Outcome: Progressing  Flowsheets (Taken 11/13/2022 1103)  Absence of Physical Injury: Implement safety measures based on patient assessment     Problem: Neurosensory - Adult  Goal: Achieves stable or improved neurological status  11/13/2022 1103 by Danika Grigsby RN  Outcome: Progressing  Flowsheets (Taken 11/13/2022 0900)  Achieves stable or improved neurological status:   Assess for and report changes in neurological status   Initiate measures to prevent increased intracranial pressure   Maintain blood pressure and fluid volume within ordered parameters to optimize cerebral perfusion and minimize risk of hemorrhage   Monitor temperature, glucose, and sodium. Initiate appropriate interventions as ordered     Problem: Chronic Conditions and Co-morbidities  Goal: Patient's chronic conditions and co-morbidity symptoms are monitored and maintained or improved  11/13/2022 1103 by Danika Grigsby RN  Outcome: Progressing  Flowsheets (Taken 11/13/2022 0900)  Care Plan - Patient's Chronic Conditions and Co-Morbidity Symptoms are Monitored and Maintained or Improved:   Monitor and assess patient's chronic conditions and comorbid symptoms for stability, deterioration, or improvement   Collaborate with multidisciplinary team to address chronic and comorbid conditions and prevent exacerbation or deterioration   Update acute care plan with appropriate goals if chronic or comorbid symptoms are exacerbated and prevent overall improvement and discharge     Problem: Cognitive:  Goal: Knowledge of wound care  Description: Knowledge of wound care  11/13/2022 1103 by Danika Grigsby RN  Outcome: Progressing     Problem: Cognitive:  Goal: Understands risk factors for wounds  Description: Understands risk factors for wounds  11/13/2022 1103 by Danika Grigsby RN  Outcome: Progressing   Care plan reviewed with patient. Patient verbalize understanding of the plan of care and contribute to goal setting.

## 2022-11-13 NOTE — PLAN OF CARE
Problem: Discharge Planning  Goal: Discharge to home or other facility with appropriate resources  Outcome: Progressing  Flowsheets (Taken 11/13/2022 0403)  Discharge to home or other facility with appropriate resources:   Identify barriers to discharge with patient and caregiver   Identify discharge learning needs (meds, wound care, etc)     Problem: Pain  Goal: Verbalizes/displays adequate comfort level or baseline comfort level  Outcome: Progressing  Flowsheets (Taken 11/13/2022 0403)  Verbalizes/displays adequate comfort level or baseline comfort level:   Encourage patient to monitor pain and request assistance   Assess pain using appropriate pain scale   Administer analgesics based on type and severity of pain and evaluate response   Implement non-pharmacological measures as appropriate and evaluate response     Problem: Skin/Tissue Integrity  Goal: Absence of new skin breakdown  Description: 1. Monitor for areas of redness and/or skin breakdown  2. Assess vascular access sites hourly  3. Every 4-6 hours minimum:  Change oxygen saturation probe site  4. Every 4-6 hours:  If on nasal continuous positive airway pressure, respiratory therapy assess nares and determine need for appliance change or resting period.   Outcome: Progressing  Note: Skin noted to have open pressure injuries on buttocks (x2) as well as various excoriation within abdominal folds, under breasts, and in corinne area     Problem: Safety - Adult  Goal: Free from fall injury  Outcome: Progressing  Flowsheets (Taken 11/13/2022 0403)  Free From Fall Injury: Instruct family/caregiver on patient safety     Problem: ABCDS Injury Assessment  Goal: Absence of physical injury  Outcome: Progressing  Flowsheets (Taken 11/13/2022 0403)  Absence of Physical Injury: Implement safety measures based on patient assessment     Problem: Neurosensory - Adult  Goal: Achieves stable or improved neurological status  Outcome: Progressing  Flowsheets (Taken 11/13/2022 0403)  Achieves stable or improved neurological status:   Assess for and report changes in neurological status   Maintain blood pressure and fluid volume within ordered parameters to optimize cerebral perfusion and minimize risk of hemorrhage   Monitor temperature, glucose, and sodium. Initiate appropriate interventions as ordered     Problem: Chronic Conditions and Co-morbidities  Goal: Patient's chronic conditions and co-morbidity symptoms are monitored and maintained or improved  Outcome: Progressing  Flowsheets (Taken 11/13/2022 0403)  Care Plan - Patient's Chronic Conditions and Co-Morbidity Symptoms are Monitored and Maintained or Improved:   Monitor and assess patient's chronic conditions and comorbid symptoms for stability, deterioration, or improvement   Collaborate with multidisciplinary team to address chronic and comorbid conditions and prevent exacerbation or deterioration   Update acute care plan with appropriate goals if chronic or comorbid symptoms are exacerbated and prevent overall improvement and discharge     Problem: Cognitive:  Goal: Knowledge of wound care  Description: Knowledge of wound care  Outcome: Progressing  Goal: Understands risk factors for wounds  Description: Understands risk factors for wounds  Outcome: Progressing     Patient and family updated on care measures and goals. Patient confused.

## 2022-11-13 NOTE — ED NOTES
ED to inpatient nurses report    Chief Complaint   Patient presents with    Altered Mental Status    Fatigue      Present to ED from home  LOC: alert and orientated to name, place, date  Vital signs   Vitals:    11/13/22 0130 11/13/22 0145 11/13/22 0151 11/13/22 0201   BP: (!) 212/113 (!) 196/95 (!) 181/108 (!) 190/108   Pulse: 72 69 69 68   Resp:       Temp:       TempSrc:       SpO2: 95% 93% 94% 98%   Weight:       Height:          Oxygen Baseline 0    Current needs required 0   LDAs:   Peripheral IV 11/12/22 Left Antecubital (Active)     Mobility: Requires assistance * 2  Pending ED orders: Ammonia Level Pending Result  Present condition: Stable      C-SSRS Risk of Suicide: No Risk  Preferred Language: [place]     Electronically signed by Nury York, RN on 11/13/2022 at 2:46 AM     Nury York RN  11/13/22 8138

## 2022-11-13 NOTE — FLOWSHEET NOTE
11/13/22 1157   Safe Environment   Safety Measures Standard Safety Measures; Other (comment)  (Virtual Safety Check)   Patient asleep in bed. Respirations even, unlabored. Call light in reach. No safety concerns at this time.

## 2022-11-13 NOTE — PROGRESS NOTES
Virtual Nurse completed admission questions with daughter Cee Mane at this time. Pt resting in bed with daughter at bedside. No voiced questions or concerns.

## 2022-11-13 NOTE — ED PROVIDER NOTES
[address] [facility] [address] [address] EMERGENCY DEPT        Pt Name: Brandon Lewis  MRN: [id number]  [place] 1940  Date of evaluation: 11/12/2022  Physician: Collin Lozoya, [facility]       Chief Complaint   Patient presents with    Altered Mental Status    Fatigue     History obtained from child. HISTORY OF PRESENT ILLNESS    HPI  Brandon Lewis is a 81 y.o. female who presents to the emergency department for evaluation of acute encephalopathy and progressive weakness. Patient lives with daughter baseline. Daughter states that today the patient was hallucinating being with another daughter and talking to the daughter. Patient was also having difficulty ambulating, which is new from her baseline. 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 Patient was functionally back to walking on her own and completing her ADLs. Patient was unable to walk out of the house to get to the car today. Patient also continues to believe that she had a full conversation with her daughter. Patient denies any pain. The patient has no other acute complaints at this time.           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    Cataract of both eyes     COVID-19     DDD (degenerative disc disease), lumbar     DM2 [facility]diabetes mellitus, type 2) ([facility]     diagnoses approx 2000    Dysphagia, pharyngoesophageal 09/26/2016    Eczema 03/2018    Fibrocystic breast     H/O ventral hernia repair     Headache 02/09/2017    History of COVID-19 12/2020    Hyperlipidemia     Hypertension     Iron deficiency anemia     LPRD (laryngopharyngeal reflux disease) 09/26/2016    Neuropathy     peripheral    Obesity     Orbital abscess     Orbital cellulitis 01/22/2014    SWAPNA (obstructive sleep apnea)     Osteoarthritis     Osteoporosis Persistent proteinuria associated with type 2 diabetes mellitus ([facility] 01/2017    urine micral of 50. Sinusitis     Velopharyngeal incompetence 09/26/2016     Past Surgical History:   Procedure Laterality Date    ABDOMEN SURGERY      APPENDECTOMY      CARPAL TUNNEL RELEASE Bilateral 2008, 2009    CATARACT REMOVAL  2011    bilat    CHOLECYSTECTOMY  03/1988    COLONOSCOPY  2016    COLONOSCOPY  10/11/2018    COLONOSCOPY POLYPECTOMY SNARE/COLD BIOPSY performed by Stephanie Araya MD at [address]      ENDOSCOPY, COLON, DIAGNOSTIC      EYE SURGERY Left 01/30/2015    EYE SURGERY      CATARACT REMOVAL- BILATERAL    HEMORRHOID SURGERY  1980s    HERNIA REPAIR      HYSTERECTOMY, TOTAL ABDOMINAL (CERVIX REMOVED)  1980    JOINT REPLACEMENT  bilat 2005/ 2007    knees    OH COLSC FLX WITH DIRECTED SUBMUCOSAL Simone Oskar Jacey [address] ANY SBST  10/11/2018    COLONOSCOPY SUBMUCOSAL/BOTOX INJECTION performed by Stephanie Araya MD at [facility]  last 2009    removed a bone (2)--2 times no construction     SINUS SURGERY  02/01/2016    SINUS SURGERY  2016 x 2    SINUS SURGERY  09/18/2017    [facility] - Dr Ethan Hardwick    SINUS SURGERY  08/09/2017 8/17/2017 - [facility]    TONSILLECTOMY      TOTAL KNEE ARTHROPLASTY  08/2003    Left TKR    VENTRAL HERNIA REPAIR  1992         MEDICATIONS   No current facility-administered medications for this encounter. Current Outpatient Medications:     ofloxacin (FLOXIN) 0.3 % otic solution, Place 5 drops in ear(s) in the morning and 5 drops before bedtime. , Disp: , Rfl:     pregabalin (LYRICA) 150 MG capsule, Take 2 capsules by mouth 2 times daily for 180 days. , Disp: 120 capsule, Rfl: 5    ascorbic acid (VITAMIN C) 500 MG tablet, Take 1 tablet by mouth daily, Disp: 30 tablet, Rfl: 3    ibuprofen (ADVIL;MOTRIN) 200 MG tablet, Take 200 mg by mouth every 6 hours as needed for Pain, Disp: , Rfl:     melatonin 3 MG TABS tablet, Take 6 mg by mouth nightly as needed, Disp: , Rfl: acetaminophen (TYLENOL) 500 MG tablet, Take 500 mg by mouth daily, Disp: , Rfl:     amLODIPine (NORVASC) 5 MG tablet, Take 0.5 tablets by mouth daily Hold if BP<110 or HR <70, Disp: 45 tablet, Rfl: 3    bumetanide (BUMEX) 1 MG tablet, Take 1 tablet by mouth 2 times daily, Disp: 180 tablet, Rfl: 3    loratadine (CLARITIN) 10 MG tablet, Take 1 tablet by mouth daily, Disp: 90 tablet, Rfl: 3    metFORMIN (GLUCOPHAGE) 1000 MG tablet, TAKE ONE TABLET IN THE EVENING, Disp: 90 tablet, Rfl: 3    levETIRAcetam (KEPPRA) 1000 MG tablet, Take 1 tablet by mouth 2 times daily, Disp: 180 tablet, Rfl: 3    omeprazole (PRILOSEC) 20 MG delayed release capsule, TAKE ONE CAPSULE BY MOUTH ONCE DAILY, Disp: 90 capsule, Rfl: 3    potassium chloride (KLOR-CON M) 20 MEQ extended release tablet, Take 2 tablets by mouth daily, Disp: 180 tablet, Rfl: 3    SITagliptin (JANUVIA) 100 MG tablet, Take 1 tablet by mouth daily, Disp: 90 tablet, Rfl: 3    zinc sulfate (ZINCATE) 220 (50 Zn) MG capsule, Take 50 mg by mouth daily, Disp: , Rfl:     Handicap Placard MISC, by Does not apply route Dx:I187.2,M81.0. Duration: 5 years. , Disp: 2 each, Rfl: 0    Misc. Devices (WALKER) MISC, 1 each by Does not apply route daily Requests stand up walker due to heart failure and generalized OA.   I50.23., Disp: 1 each, Rfl: 0    Vitamin D (CHOLECALCIFEROL) 50 MCG (2000 UT) TABS tablet, Take 1 tablet by mouth daily, Disp: 60 tablet, Rfl: 0    ferrous sulfate (IRON 325) 325 (65 Fe) MG tablet, Take 325 mg by mouth 3 times daily (with meals) , Disp: , Rfl:     sodium chloride (OCEAN, BABY AYR) 0.65 % nasal spray, 1 spray by Nasal route as needed for Congestion , Disp: , Rfl:       SOCIAL HISTORY     Social History     Social History Narrative    Not on file     Social History     Tobacco Use    Smoking status: Never    Smokeless tobacco: Never   Vaping Use    Vaping Use: Never used   Substance Use Topics    Alcohol use: No    Drug use: No         ALLERGIES     Allergies Allergen Reactions    Lisinopril Swelling and Anaphylaxis    Sulfamethoxazole-Trimethoprim Rash    Morphine Other (See Comments)     headaches    Codeine      Other reaction(s): AOF    Hydrocodone      headaches    Percocet [Oxycodone-Acetaminophen] Other (See Comments)     Headaches    Tape [Adhesive Tape] Rash         FAMILY HISTORY     Family History   Problem Relation Age of Onset    Diabetes Mother     Heart Disease Father         whooping cough as child    Obesity Sister     Other Brother         tumor on back    Obesity Sister     Cancer Sister         Skin     Cancer Brother         Bone cancer from metal    Other Brother         passed as a child    Heart Disease Brother     Other Brother         tremor    Heart Attack Brother     No Known Problems Brother     Heart Disease Brother     No Known Problems Brother     No Known Problems Brother          PREVIOUS RECORDS   Previous records reviewed:   Admission for generalized weakness on 5/5/2022 . PHYSICAL EXAM     ED Triage Vitals   BP Temp Temp src Pulse Resp SpO2 Height Weight   -- -- -- -- -- -- -- --     Initial vital signs and nursing assessment reviewed and normal. Body mass index is 43.51 kg/m². Pulsoximetry is normal per my interpretation. Additional Vital Signs:  Vitals:    11/13/22 0201   BP: (!) 190/108   Pulse: 68   Resp:    Temp:    SpO2: 98%       Physical Exam  Vitals reviewed. Constitutional:       General: She is not in acute distress. Appearance: Normal appearance. She is normal weight. She is ill-appearing. She is not toxic-appearing. HENT:      Head: Normocephalic and atraumatic. Right Ear: External ear normal.      Left Ear: External ear normal.      Nose: Nose normal.      Mouth/Throat:      Mouth: Mucous membranes are moist.      Pharynx: Oropharynx is clear. Eyes:      Extraocular Movements: Extraocular movements intact. Pupils: Pupils are equal, round, and reactive to light.    Cardiovascular:      Rate and Rhythm: Normal rate and regular rhythm. Pulmonary:      Effort: Pulmonary effort is normal.      Breath sounds: Normal breath sounds. Abdominal:      General: Abdomen is flat. There is no distension. Palpations: Abdomen is soft. Tenderness: There is no abdominal tenderness. There is no guarding or rebound. Musculoskeletal:         General: Normal range of motion. Cervical back: Normal range of motion and neck supple. No rigidity or tenderness. Right lower leg: No edema. Left lower leg: No edema. Skin:     General: Skin is warm and dry. Capillary Refill: Capillary refill takes less than 2 seconds. Neurological:      General: No focal deficit present. Mental Status: She is alert. She is confused. MEDICAL DECISION MAKING   Initial Assessment:   Acute encephalopathy  Plan:   CBC, CMP, lactic acid, urinalysis, COVID-19 and flu, troponin, proBNP, ammonia, CT of the head, chest x-ray    Patient work-up initiated emergency department for symptoms. Lactic acid was 2.1. Creatinine is 1.6. Patient's baseline is approximately 1, and this is worse from her baseline. There are concerns for IDALIA. BUN was also slightly elevated. There may be an aspect of prerenal azotemia. Remainder of laboratory work did not show any significant acute abnormalities. Due to patient's fatigue, weakness, and persistent encephalopathy, is felt the patient required admission to the hospital.  Case discussed with the on-call hospitalist agreed to admit the patient to their service. Patient will be admitted at this time.     ED RESULTS   Laboratory results:  Labs Reviewed   CBC WITH AUTO DIFFERENTIAL - Abnormal; Notable for the following components:       Result Value    RBC 4.07 (*)     Hemoglobin 11.7 (*)     Hematocrit 35.5 (*)     All other components within normal limits   COMPREHENSIVE METABOLIC PANEL - Abnormal; Notable for the following components:    Glucose 184 (*)     Creatinine 1.6 (*)     BUN 27 (*)     ALT 8 (*)     All other components within normal limits   LACTIC ACID - Abnormal; Notable for the following components:    Lactic Acid 2.1 (*)     All other components within normal limits   GLOMERULAR FILTRATION RATE, ESTIMATED - Abnormal; Notable for the following components:    Est, Glom Filt Rate 32 (*)     All other components within normal limits   URINE WITH REFLEXED MICRO - Abnormal; Notable for the following components:    Ketones, Urine TRACE (*)     Protein,  (*)     All other components within normal limits   COVID-19 & INFLUENZA COMBO   BLOOD GAS, VENOUS   TROPONIN   BRAIN NATRIURETIC PEPTIDE   ANION GAP   OSMOLALITY   AMMONIA       Radiologic studies results:  CT Head W/O Contrast   Final Result   Impression:   1. No acute intracranial abnormality. Age-appropriate exam.   2. Bifrontal postoperative changes with inferior frontal lobe    encephalomalacia. Postoperative changes are new compared to the previous    exam, noting an evidence of a right frontal lobe mass on the previous    exam.   3. Extensive sinonasal surgery. Moderate chronic sinusitis. These findings    are similar to previous. This document has been electronically signed by: Karina Faria MD on    11/12/2022 11:24 PM      All CTs at this facility use dose modulation techniques and iterative    reconstructions, and/or weight-based dosing   when appropriate to reduce radiation to a low as reasonably achievable. XR CHEST PORTABLE   Final Result   Impression:   1. Moderate cardiomegaly, without failure.       This document has been electronically signed by: Karina Faria MD on    11/12/2022 10:59 PM                ED COURSE   ED Medications administered this visit:   Medications   0.9 % sodium chloride bolus (0 mLs IntraVENous Stopped 11/13/22 0214)   labetalol (NORMODYNE;TRANDATE) injection 10 mg (10 mg IntraVENous Given 11/13/22 0131)       ED Course as of 11/13/22 0225   Sat Nov 12, 2022   [facility] MIXED: 7.41 [GG]      ED Course User Index  [GG] Jose Alfredo White DO           MEDICATION CHANGES     New Prescriptions    No medications on file         FINAL DISPOSITION     Final diagnoses:   Acute encephalopathy   Acute kidney injury ([facility]     Condition: condition: stable  Dispo: Admit to med/surg floor      This transcription was electronically signed. It was dictated by use of voice recognition software and electronically transcribed. The transcription may contain errors not detected in proofreading.        Jose Alfredo White DO  11/13/22 0225

## 2022-11-13 NOTE — ED TRIAGE NOTES
Patient arrives via private vehicle with reprots of generalized weakness, fatigue and confusion over the past few weeks.  Patient arrives A&O; RR even and unlabored

## 2022-11-13 NOTE — ED NOTES
Pt transferred to [address] [place] [address] room 009 nurse informed prior to leaving the floor.       Gretel Rogers  11/13/22 0248

## 2022-11-14 ENCOUNTER — APPOINTMENT (OUTPATIENT)
Dept: MRI IMAGING | Age: 82
DRG: 092 | End: 2022-11-14
Payer: MEDICARE

## 2022-11-14 LAB
BASOPHILS # BLD: 0.5 %
BASOPHILS ABSOLUTE: 0 THOU/MM3 (ref 0–0.1)
EOSINOPHIL # BLD: 3.5 %
EOSINOPHILS ABSOLUTE: 0.2 THOU/MM3 (ref 0–0.4)
ERYTHROCYTE [DISTWIDTH] IN BLOOD BY AUTOMATED COUNT: 14.2 % (ref 11.5–14.5)
ERYTHROCYTE [DISTWIDTH] IN BLOOD BY AUTOMATED COUNT: 44.5 FL (ref 35–45)
GLUCOSE BLD-MCNC: 132 MG/DL (ref 70–108)
GLUCOSE BLD-MCNC: 133 MG/DL (ref 70–108)
GLUCOSE BLD-MCNC: 150 MG/DL (ref 70–108)
GLUCOSE BLD-MCNC: 257 MG/DL (ref 70–108)
HCT VFR BLD CALC: 35.7 % (ref 37–47)
HEMOGLOBIN: 11.8 GM/DL (ref 12–16)
IMMATURE GRANS (ABS): 0.02 THOU/MM3 (ref 0–0.07)
IMMATURE GRANULOCYTES: 0.4 %
LYMPHOCYTES # BLD: 29.1 %
LYMPHOCYTES ABSOLUTE: 1.6 THOU/MM3 (ref 1–4.8)
MAGNESIUM: 1.9 MG/DL (ref 1.6–2.4)
MCH RBC QN AUTO: 28.9 PG (ref 26–33)
MCHC RBC AUTO-ENTMCNC: 33.1 GM/DL (ref 32.2–35.5)
MCV RBC AUTO: 87.3 FL (ref 81–99)
MONOCYTES # BLD: 8.2 %
MONOCYTES ABSOLUTE: 0.5 THOU/MM3 (ref 0.4–1.3)
NUCLEATED RED BLOOD CELLS: 0 /100 WBC
PLATELET # BLD: 139 THOU/MM3 (ref 130–400)
PMV BLD AUTO: 10 FL (ref 9.4–12.4)
RBC # BLD: 4.09 MILL/MM3 (ref 4.2–5.4)
SEG NEUTROPHILS: 58.3 %
SEGMENTED NEUTROPHILS ABSOLUTE COUNT: 3.2 THOU/MM3 (ref 1.8–7.7)
WBC # BLD: 5.5 THOU/MM3 (ref 4.8–10.8)

## 2022-11-14 PROCEDURE — 6360000004 HC RX CONTRAST MEDICATION

## 2022-11-14 PROCEDURE — 95819 EEG AWAKE AND ASLEEP: CPT | Performed by: PSYCHIATRY & NEUROLOGY

## 2022-11-14 PROCEDURE — 6360000002 HC RX W HCPCS: Performed by: INTERNAL MEDICINE

## 2022-11-14 PROCEDURE — 6370000000 HC RX 637 (ALT 250 FOR IP)

## 2022-11-14 PROCEDURE — 1200000000 HC SEMI PRIVATE

## 2022-11-14 PROCEDURE — 36415 COLL VENOUS BLD VENIPUNCTURE: CPT

## 2022-11-14 PROCEDURE — 6360000002 HC RX W HCPCS: Performed by: PHARMACIST

## 2022-11-14 PROCEDURE — 70553 MRI BRAIN STEM W/O & W/DYE: CPT

## 2022-11-14 PROCEDURE — 95819 EEG AWAKE AND ASLEEP: CPT

## 2022-11-14 PROCEDURE — 1200000003 HC TELEMETRY R&B

## 2022-11-14 PROCEDURE — 83735 ASSAY OF MAGNESIUM: CPT

## 2022-11-14 PROCEDURE — 2580000003 HC RX 258: Performed by: PHARMACIST

## 2022-11-14 PROCEDURE — A9579 GAD-BASE MR CONTRAST NOS,1ML: HCPCS

## 2022-11-14 PROCEDURE — 85025 COMPLETE CBC W/AUTO DIFF WBC: CPT

## 2022-11-14 PROCEDURE — 6370000000 HC RX 637 (ALT 250 FOR IP): Performed by: INTERNAL MEDICINE

## 2022-11-14 PROCEDURE — 82948 REAGENT STRIP/BLOOD GLUCOSE: CPT

## 2022-11-14 PROCEDURE — 2580000003 HC RX 258: Performed by: INTERNAL MEDICINE

## 2022-11-14 PROCEDURE — 87205 SMEAR GRAM STAIN: CPT

## 2022-11-14 RX ORDER — AMLODIPINE BESYLATE 2.5 MG/1
2.5 TABLET ORAL ONCE
Status: COMPLETED | OUTPATIENT
Start: 2022-11-14 | End: 2022-11-14

## 2022-11-14 RX ORDER — AMLODIPINE BESYLATE 5 MG/1
5 TABLET ORAL DAILY
Status: DISCONTINUED | OUTPATIENT
Start: 2022-11-15 | End: 2022-11-17

## 2022-11-14 RX ADMIN — INSULIN LISPRO 4 UNITS: 100 INJECTION, SOLUTION INTRAVENOUS; SUBCUTANEOUS at 13:54

## 2022-11-14 RX ADMIN — HYDRALAZINE HYDROCHLORIDE 10 MG: 20 INJECTION INTRAMUSCULAR; INTRAVENOUS at 20:58

## 2022-11-14 RX ADMIN — VANCOMYCIN HYDROCHLORIDE 1750 MG: 5 INJECTION, POWDER, LYOPHILIZED, FOR SOLUTION INTRAVENOUS at 14:47

## 2022-11-14 RX ADMIN — Medication 50 MG: at 10:24

## 2022-11-14 RX ADMIN — GADOTERIDOL 20 ML: 279.3 INJECTION, SOLUTION INTRAVENOUS at 09:32

## 2022-11-14 RX ADMIN — ACYCLOVIR SODIUM 650 MG: 50 INJECTION, SOLUTION INTRAVENOUS at 13:46

## 2022-11-14 RX ADMIN — FERROUS SULFATE TAB 325 MG (65 MG ELEMENTAL FE) 325 MG: 325 (65 FE) TAB at 13:34

## 2022-11-14 RX ADMIN — Medication 2000 UNITS: at 10:23

## 2022-11-14 RX ADMIN — SODIUM CHLORIDE: 9 INJECTION, SOLUTION INTRAVENOUS at 06:06

## 2022-11-14 RX ADMIN — LEVETIRACETAM 1000 MG: 500 TABLET, FILM COATED ORAL at 10:24

## 2022-11-14 RX ADMIN — AMLODIPINE BESYLATE 2.5 MG: 2.5 TABLET ORAL at 13:34

## 2022-11-14 RX ADMIN — FERROUS SULFATE TAB 325 MG (65 MG ELEMENTAL FE) 325 MG: 325 (65 FE) TAB at 16:53

## 2022-11-14 RX ADMIN — FAMOTIDINE 20 MG: 20 TABLET ORAL at 10:26

## 2022-11-14 RX ADMIN — OXYCODONE HYDROCHLORIDE AND ACETAMINOPHEN 500 MG: 500 TABLET ORAL at 10:29

## 2022-11-14 RX ADMIN — SODIUM CHLORIDE, PRESERVATIVE FREE 10 ML: 5 INJECTION INTRAVENOUS at 20:59

## 2022-11-14 RX ADMIN — ACETAMINOPHEN 650 MG: 325 TABLET ORAL at 16:57

## 2022-11-14 RX ADMIN — ALOGLIPTIN 12.5 MG: 12.5 TABLET, FILM COATED ORAL at 10:24

## 2022-11-14 RX ADMIN — SODIUM CHLORIDE: 9 INJECTION, SOLUTION INTRAVENOUS at 18:58

## 2022-11-14 RX ADMIN — SODIUM CHLORIDE, PRESERVATIVE FREE 10 ML: 5 INJECTION INTRAVENOUS at 10:29

## 2022-11-14 RX ADMIN — CEFTRIAXONE 2000 MG: 2 INJECTION, POWDER, FOR SOLUTION INTRAMUSCULAR; INTRAVENOUS at 13:34

## 2022-11-14 RX ADMIN — LEVETIRACETAM 1000 MG: 500 TABLET, FILM COATED ORAL at 20:58

## 2022-11-14 RX ADMIN — HEPARIN SODIUM 5000 UNITS: 5000 INJECTION INTRAVENOUS; SUBCUTANEOUS at 05:47

## 2022-11-14 RX ADMIN — FERROUS SULFATE TAB 325 MG (65 MG ELEMENTAL FE) 325 MG: 325 (65 FE) TAB at 10:23

## 2022-11-14 RX ADMIN — PREGABALIN 75 MG: 75 CAPSULE ORAL at 10:26

## 2022-11-14 RX ADMIN — PREGABALIN 75 MG: 75 CAPSULE ORAL at 20:58

## 2022-11-14 RX ADMIN — HEPARIN SODIUM 5000 UNITS: 5000 INJECTION INTRAVENOUS; SUBCUTANEOUS at 13:34

## 2022-11-14 RX ADMIN — AMLODIPINE BESYLATE 2.5 MG: 2.5 TABLET ORAL at 10:24

## 2022-11-14 ASSESSMENT — ENCOUNTER SYMPTOMS
RHINORRHEA: 0
SHORTNESS OF BREATH: 0
NAUSEA: 0
VOMITING: 0
SORE THROAT: 0
ABDOMINAL PAIN: 0
COUGH: 0

## 2022-11-14 NOTE — CONSULTS
Neurology Consult Note    Date:11/14/2022       CWLR:[id number]  Patient Name:Cate Ogden     YOB: 1940     Age:82 y.o. Requesting Physician: Jah Knowles MD     Reason for Consult:  Evaluate for Altered mental status      Chief Complaint:   Chief Complaint   Patient presents with    Altered Mental Status    Fatigue       Subjective     Mamadou Macedo is a 81 y.o. female with a history of adenocarcinoma of the sinuses, CKD 3, lumbar DDD with neuropathy, DM 2, hyperlipidemia, hypertension, SWAPNA, unspecified convulsions -on Keppra, history of cerebral abscess (Staph). Since presentation to [facility] ED 11/12/22, patient has been consistently hypertensive, at times reaching 084 systolic and 116 diastolic. She is currently infected with MRSA and staph of her nasal cavity. During this hospital admission, she is also being treated for IDALIA, dehydration, metabolic encephalopathy. Per last BMP, BUN 23 and creatinine 1.2, improved from 27 and 1.6 respectively. CT head without contrast negative for acute intracranial abnormalities, revealing bifrontal postoperative changes with inferior frontal lobe encephalomalacia secondary to drainage of previous right frontal lobe abscess. She denies complaints today. She denies dizziness, headaches, vision changes, difficulty speaking or understanding, chest pain, shortness of breath, numbness or tingling, weakness, tremors, confusion. No family at bedside for supplementation of past medical history. Due to her history of both cancer and cerebral abscess, MRI brain with and without contrast ordered for further evaluation. EEG ordered. Keppra level in process. She does complain of left lower extremity pain. Review of Systems   Review of Systems   Constitutional:  Negative for chills and fever. HENT:  Negative for rhinorrhea and sore throat. Eyes:  Negative for visual disturbance. Respiratory:  Negative for cough and shortness of breath. Cardiovascular:  Negative for chest pain and palpitations. Gastrointestinal:  Negative for abdominal pain, nausea and vomiting. Genitourinary:  Negative for dysuria. Musculoskeletal:  Positive for myalgias. Negative for arthralgias. Skin:  Negative for rash. Neurological:  Negative for dizziness, weakness, numbness and headaches. Psychiatric/Behavioral:  Positive for confusion. The patient is not nervous/anxious. Medications   Scheduled Meds:    [START ON 11/15/2022] amLODIPine  5 mg Oral Daily    amLODIPine  2.5 mg Oral Once    cefTRIAXone (ROCEPHIN) IV  2,000 mg IntraVENous Q24H    acyclovir  10 mg/kg (Adjusted) IntraVENous Q12H    vancomycin (VANCOCIN) intermittent dosing (placeholder)   Other RX Placeholder    vancomycin  1,750 mg IntraVENous Once    [START ON 11/15/2022] vancomycin  1,250 mg IntraVENous Q24H    ascorbic acid  500 mg Oral Daily    ferrous sulfate  325 mg Oral TID WC    levETIRAcetam  1,000 mg Oral BID    pregabalin  75 mg Oral BID    alogliptin  12.5 mg Oral Daily    Vitamin D  2,000 Units Oral Daily    zinc sulfate  50 mg Oral Daily    sodium chloride flush  5-40 mL IntraVENous 2 times per day    heparin (porcine)  5,000 Units SubCUTAneous 3 times per day    famotidine  20 mg Oral Daily    insulin lispro  0-8 Units SubCUTAneous TID WC    insulin lispro  0-4 Units SubCUTAneous Nightly    [Held by provider] metFORMIN  1,000 mg Oral QPM     Continuous Infusions:    sodium chloride      sodium chloride 100 mL/hr at 11/14/22 0606    dextrose       PRN Meds: acetaminophen, sodium chloride, sodium chloride flush, sodium chloride, ondansetron **OR** ondansetron, glucose, dextrose bolus **OR** dextrose bolus, glucagon (rDNA), dextrose, hydrALAZINE, potassium chloride **OR** potassium alternative oral replacement **OR** potassium chloride, magnesium sulfate  Medications Prior to Admission:   No current facility-administered medications on file prior to encounter.      Current Outpatient Medications on File Prior to Encounter   Medication Sig Dispense Refill    doxycycline hyclate (VIBRAMYCIN) 100 MG capsule Take 100 mg by mouth 2 times daily      triamcinolone (KENALOG) 0.1 % cream Apply topically 2 times daily Apply topically 2 times daily. ofloxacin (FLOXIN) 0.3 % otic solution Place 5 drops in ear(s) in the morning and 5 drops before bedtime. (Patient not taking: Reported on 11/13/2022)      pregabalin (LYRICA) 150 MG capsule Take 2 capsules by mouth 2 times daily for 180 days. 120 capsule 5    ascorbic acid (VITAMIN C) 500 MG tablet Take 1 tablet by mouth daily 30 tablet 3    ibuprofen (ADVIL;MOTRIN) 200 MG tablet Take 200 mg by mouth every 6 hours as needed for Pain      melatonin 3 MG TABS tablet Take 6 mg by mouth nightly as needed (Patient not taking: Reported on 11/13/2022)      acetaminophen (TYLENOL) 500 MG tablet Take 500 mg by mouth daily      amLODIPine (NORVASC) 5 MG tablet Take 0.5 tablets by mouth daily Hold if BP<110 or HR <70 45 tablet 3    bumetanide (BUMEX) 1 MG tablet Take 1 tablet by mouth 2 times daily 180 tablet 3    loratadine (CLARITIN) 10 MG tablet Take 1 tablet by mouth daily 90 tablet 3    metFORMIN (GLUCOPHAGE) 1000 MG tablet TAKE ONE TABLET IN THE EVENING 90 tablet 3    levETIRAcetam (KEPPRA) 1000 MG tablet Take 1 tablet by mouth 2 times daily 180 tablet 3    omeprazole (PRILOSEC) 20 MG delayed release capsule TAKE ONE CAPSULE BY MOUTH ONCE DAILY 90 capsule 3    potassium chloride (KLOR-CON M) 20 MEQ extended release tablet Take 2 tablets by mouth daily 180 tablet 3    SITagliptin (JANUVIA) 100 MG tablet Take 1 tablet by mouth daily (Patient not taking: Reported on 11/13/2022) 90 tablet 3    zinc sulfate (ZINCATE) 220 (50 Zn) MG capsule Take 50 mg by mouth daily      Handicap Placard MISC by Does not apply route Dx:I187.2,M81.0. Duration: 5 years. 2 each 0    Misc.  Devices (WALKER) MISC 1 each by Does not apply route daily Requests stand up walker due to heart failure and generalized OA. I50.23. 1 each 0    Vitamin D (CHOLECALCIFEROL) 50 MCG (2000 UT) TABS tablet Take 1 tablet by mouth daily 60 tablet 0    ferrous sulfate (IRON 325) 325 (65 Fe) MG tablet Take 325 mg by mouth 3 times daily (with meals)       [DISCONTINUED] Handicap Placard MISC by Does not apply route Dx:I187.2,M81.0. Duration: 5 years. 2 each 0    sodium chloride (OCEAN, BABY AYR) 0.65 % nasal spray 1 spray by Nasal route as needed for Congestion        Past History    Past Medical History:   has a past medical history of Cancer ([facility] Cataract of both eyes, COVID-19, DDD (degenerative disc disease), lumbar, DM2 (diabetes mellitus, type 2) ([facility] Dysphagia, pharyngoesophageal, Eczema, Fibrocystic breast, H/O ventral hernia repair, Headache, History of COVID-19, Hyperlipidemia, Hypertension, Iron deficiency anemia, LPRD (laryngopharyngeal reflux disease), Neuropathy, Obesity, Orbital abscess, Orbital cellulitis, SWAPNA (obstructive sleep apnea), Osteoarthritis, Osteoporosis, Persistent proteinuria associated with type 2 diabetes mellitus ([facility] Sinusitis, and Velopharyngeal incompetence. Social History:   reports that she has never smoked. She has never used smokeless tobacco. She reports that she does not drink alcohol and does not use drugs.      Family History:   Family History   Problem Relation Age of Onset    Diabetes Mother     Heart Disease Father         whooping cough as child    Obesity Sister     Other Brother         tumor on back    Obesity Sister     Cancer Sister         Skin     Cancer Brother         Bone cancer from metal    Other Brother         passed as a child    Heart Disease Brother     Other Brother         tremor    Heart Attack Brother     No Known Problems Brother     Heart Disease Brother     No Known Problems Brother     No Known Problems Brother        Physical Examination      Vitals:  BP (!) 159/80   Pulse 82   Temp 98.1 °F (36.7 °C) (Axillary)   Resp 18   Ht 5' (1.524 m)   Wt 201 lb 15.1 oz (91.6 kg)   LMP  (LMP Unknown)   SpO2 96%   BMI 39.44 kg/m²   Temp (24hrs), Av.2 °F (36.8 °C), Min:98 °F (36.7 °C), Max:98.8 °F (37.1 °C)      I/O (24Hr): Intake/Output Summary (Last 24 hours) at 2022 1323  Last data filed at 2022 4245  Gross per 24 hour   Intake 50 ml   Output 800 ml   Net -750 ml         Physical Exam  Vitals (Hypertensive) reviewed. Constitutional:       General: She is not in acute distress. Appearance: She is ill-appearing. HENT:      Head: Normocephalic and atraumatic. Right Ear: External ear normal.      Left Ear: External ear normal.      Nose: Nose normal.      Mouth/Throat:      Mouth: Mucous membranes are dry. Pharynx: No oropharyngeal exudate or posterior oropharyngeal erythema. Eyes:      Extraocular Movements: Extraocular movements intact and EOM normal.      Pupils: Pupils are equal, round, and reactive to light. Cardiovascular:      Rate and Rhythm: Normal rate and regular rhythm. Heart sounds: Normal heart sounds. No murmur heard. Pulmonary:      Effort: Pulmonary effort is normal. No respiratory distress. Breath sounds: Normal breath sounds. No wheezing. Abdominal:      General: Bowel sounds are normal.      Palpations: Abdomen is soft. Tenderness: There is no abdominal tenderness. Musculoskeletal:      Right lower leg: Edema present. Left lower leg: Edema present. Comments: Erythema and warmth of left lower extremity, +pain with calf squeeze, bilaterally   Skin:     General: Skin is warm. Findings: No rash. Neurological:      Mental Status: She is alert and oriented to person, place, and time. Psychiatric:         Mood and Affect: Mood normal.         Speech: Speech normal.         Behavior: Behavior normal.     Neurologic Exam     Mental Status   Oriented to person, place, and time. Follows 1 step commands. Attention: decreased. Concentration: decreased.    Speech: speech is normal   Level of consciousness: alert  Able to repeat. Cranial Nerves     CN II   Visual fields full to confrontation. Right visual field deficit: none  Left visual field deficit: none     CN III, IV, VI   Pupils are equal, round, and reactive to light. Extraocular motions are normal.     CN V   Facial sensation intact. Right facial sensation deficit: none  Left facial sensation deficit: none    CN VII   Facial expression full, symmetric. Right facial weakness: none  Left facial weakness: none    CN VIII   Hearing: impaired    CN IX, X   CN IX normal.   CN X normal.   Palate: symmetric    CN XI   CN XI normal.   Right sternocleidomastoid strength: normal  Left sternocleidomastoid strength: normal  Right trapezius strength: normal  Left trapezius strength: normal    CN XII   CN XII normal.   Tongue: not atrophic  Fasciculations: absent  Tongue deviation: none    Motor Exam   Right arm pronator drift: absent  Left arm pronator drift: absent  Muscle strength 4+/5 bilateral upper extremities, 4-/5 RLE, 3/5 LLE. Sensory Exam   Light touch normal.     Gait, Coordination, and Reflexes     Tremor   Resting tremor: absent     **Exam documented was exam completed on 11/13/22. **    Labs/Imaging/Diagnostics   Labs:  CBC:  Recent Labs     11/12/22 2256 11/14/22  0616   WBC 9.1 5.5   RBC 4.07* 4.09*   HGB 11.7* 11.8*   HCT 35.5* 35.7*   MCV 87.2 87.3    139     CHEMISTRIES:  Recent Labs     11/12/22  2256 11/13/22  0918 11/13/22  1101 11/14/22  0616    140  --   --    K 4.1 3.3*  --   --     103  --   --    CO2 25 25  --   --    BUN 27* 23*  --   --    CREATININE 1.6* 1.2  --   --    GLUCOSE 184* 211*  --   --    MG  --   --  1.8 1.9     COAGULATION STUDIES:No results for input(s): PROTIME, INR, APTT in the last 72 hours.   LIVER PROFILE:  Recent Labs     11/12/22  2256   AST 11   ALT 8*   BILITOT 0.3   ALKPHOS 116     CHOLESTEROL AND A1C:  Recent Labs     11/13/22  1101   LABA1C 6.7* Imaging Last 24 Hours:  CT Head W/O Contrast    Result Date: 11/12/2022  CT head without: Comparison: CT/KO/SR - CT HEAD WO CONTRAST - 01/05/2022 08:09 PM EST Findings: Age-appropriate sulcation. Bifrontal craniectomy change with underlying anterior inferior bifrontal encephalomalacia, worse on the right. No intracranial mass, midline shift, hydrocephalus, acute hemorrhage or infarct. Unremarkable orbits. Diffuse ethmoidectomy and turbinectomy with bilateral sinonasal \"windows\". Opacified right maxillary sinus. Because of thickening of the sphenoid and left maxillary sinus. Mastoid air cells are clear. No acute skull fracture. No significant scalp soft tissue swelling. Impression: 1. No acute intracranial abnormality. Age-appropriate exam. 2. Bifrontal postoperative changes with inferior frontal lobe encephalomalacia. Postoperative changes are new compared to the previous exam, noting an evidence of a right frontal lobe mass on the previous exam. 3. Extensive sinonasal surgery. Moderate chronic sinusitis. These findings are similar to previous. This document has been electronically signed by: Cleta Hodgkins, MD on 11/12/2022 11:24 PM All CTs at this facility use dose modulation techniques and iterative reconstructions, and/or weight-based dosing when appropriate to reduce radiation to a low as reasonably achievable. US RENAL COMPLETE    Result Date: 11/13/2022  BILATERAL RENAL ULTRASOUND: CLINICAL INFORMATION: IDALIA COMPARISON: 10/26/2011 TECHNIQUE: Multiple sonographic images of both kidneys were obtained. Images of the urinary bladder were also obtained. FINDINGS: RIGHT KIDNEY - 11.3 x 4.1 x 3.7 cm Resistive Index - 0.60 Cortical Thickness - 1.3 cm LEFT KIDNEY - 11.8 x 5.1 x 5.4 cm Resistive Index - 0.68 Cortical Thickness - 1.2 cm URINARY BLADDER Pre-Void - 644.4 mL Post-Void - 134.6 mL  KIDNEYS:  Both kidneys are normal in size and contour.   The resistive indices are normal.  MASS/CYST: There are a few benign-appearing cysts both kidneys, largest located on the left side, 6.9 cm. On the right side, there is a very difficult to visualize 3.9 x 1.5 x 1.2 cm slightly complex appearing structure, possibly a collapsed cyst.  HYDRONEPHROSIS:  There is no hydronephrosis. CALCULI:  No calculi are seen. BLADDER:  The urinary bladder is unremarkable. .     1. Bilateral benign-appearing renal cysts. Slightly complex appearing lesion inferior pole right kidney, likely a partially collapsed cyst. If further evaluation is desired, a CT urogram might be considered. 2. No acute findings. **This report has been created using voice recognition software. It may contain minor errors which are inherent in voice recognition technology. ** Final report electronically signed by Dr. Ciaran Curry on 11/13/2022 7:27 AM    XR CHEST PORTABLE    Result Date: 11/12/2022  1 view chest Comparison: CR/SR - XR CHEST PORTABLE - 05/05/2022 09:17 PM EDT Findings: No consolidation or lung nodules. Heart is moderately enlarged. No pulmonary edema. No pleural effusion. No acute fracture. Impression: 1. Moderate cardiomegaly, without failure. This document has been electronically signed by: Chico Hernandes MD on 11/12/2022 10:59 PM    VL DUP LOWER EXTREMITY VENOUS LEFT    Result Date: 11/13/2022  PROCEDURE: VL DUP LOWER EXTREMITY VENOUS LEFT CLINICAL INFORMATION: Edema TECHNIQUE: High-resolution duplex ultrasound with spectral analysis of the left lower extremity was performed. The common femoral, femoral, popliteal and calf vein segments were studied to the ankle. COMPARISON: Bilateral lower extremity venous duplex ultrasound 9/27/2018 FINDINGS: There is normal compressibility with no evidence of thrombus. Flow study shows normal color flow, augmentation and phasic response. No evidence of deep venous thrombosis in the left lower extremity.  Final report electronically signed by Dr. Kenroy Reyna on 11/13/2022 2:10 PM    MRI BRAIN W WO CONTRAST    Result Date: 11/14/2022  PROCEDURE: MRI BRAIN W WO CONTRAST INDICATION:AMS, hx of cancer & cranial abscess. COMPARISON: CT head dated 11/12/2022 and MR brain dated 1/6/2022. TECHNIQUE: Multiplanar and multiple spin echo T1 and T2-weighted images were obtained through the brain before and after the administration of intravenous contrast. 20 mL ProHance was injected in the right AC. FINDINGS: Redemonstration of bifrontal craniotomy with prominent encephalomalacia in the right frontal lobe at the convexity at site of previous intracerebral abscess. There is also left frontal encephalomalacia at the superolateral gyrus which has appeared in the  interval since prior MRI. There is associated ex vacuo dilatation of the right frontal horn. Redemonstration of exenteration of the nasal airway and ethmoid air cells as well as the sphenoid sinuses. There is prominent T2/FLAIR signal in the frontal calvarial plate. There is bandlike enhancement within the frontal plate centrally. In addition, there is prominent thickening of the subjacent frontal dura with prominent enhancement. This extends inferiorly subjacent to the gyri recti. There also appears to be a focal area of circumscribed hemosiderin deposition at the right frontal convexity with rim enhancement. There is an area of adjacent subarachnoid enhancement. No focal areas restricted diffusion are present. There are mild fluid areas of T2/FLAIR signal in the periventricular white matter. There are chronic lacunar infarcts in the right basal ganglia and right thalamus with the basal ganglia infarct, stable compared to prior exam. The thalamic infarct has appeared in the interval since prior MRI. There is moderate volume loss. The patient is status post bilateral lens extractions. Orbits are otherwise unremarkable. There is prominent mucosal thickening in the right maxillary sinus. There are moderate bilateral mastoid effusions.  The major vascular flow voids appear patent. 1. Redemonstration of bifrontal craniotomy for prior intracerebral abscess surgery with prominent area of encephalomalacia in the right frontal convexity and smaller area of encephalomalacia at the left gyrus rectus but with extensive edema in the frontal craniotomy plate with bandlike enhancement and prominently thickened enhancement of the subjacent dura along with a prominent area of sulcal enhancement in the right frontal lobe and rim enhancing focus. 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 2. Chronic lacunar infarcts in the right basal ganglia and thalamus with the thalamic lesion having appeared in the interval since prior MRI. 3. Redemonstration of exenteration of the nasal passage along with the ethmoid air cells and sphenoid sinuses. **This report has been created using voice recognition software. It may contain minor errors which are inherent in voice recognition technology. ** Final report electronically signed by Dr. Taniya Vizcarra MD on 11/14/2022 11:38 AM     Assessment and Plan:        Toxic Metabolic Encephalopathy  CT head WO: Negative for acute intracranial abnormalities. Revealed bifrontal postoperative changes with inferior frontal lobe encephalomalacia secondary to drainage of previous right frontal lobe abscess. 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 Chronic lacunar infarcts in the right basal ganglia and thalamus. EEG completed, official read pending. Left lower extremity venous Doppler negative for DVT. Recommend continued infectious work-up with treatment as applicable. LP ordered by primary team for further evaluation. Maintain adequate fluid hydration.   Neurology following peripherally, pending EEG final read. Please call with questions or concerns. This patient was seen and evaluated with Dr. Belia Patel and he is in agreement with the assessment and plan.     Electronically signed by Rossy Perez PA-C on 11/14/22 at 6:23 PM EST

## 2022-11-14 NOTE — CONSULTS
CONSULTATION NOTE :ID       Patient - Heather Ma,  Age - 81 y.o.    - 1940      Room Number - 6K-06/006-A   N -  [id number]   [facility]  Date of Admission -  2022 10:15 PM  Patient's PCP: Den Hodge MD     Requesting Physician: Kathya Lazo MD    REASON FOR CONSULTATION   Change in mental state: Assist with treatment  CHIEF COMPLAINT   Change in mental state    HISTORY OF PRESENT ILLNESS       This is a very pleasant 81 y.o. female who was admitted to the hospital with a chief complaints of change in mental state. It started 1 day prior to her admission. 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 She had multiple debridement to remove necrotic tissue and at the beginning of the year she was treated for brain abscess requiring surgery. She follows at [facility]. She was brought from home due to change in mental state patient is still confused at she denies any fever or chills there was not any vomiting. She was admitted for further investigation her CT scan shows postsurgical changes has extensive changes on her sinuses there is no cough no abdominal pain and no urinary complaints. Had history of MRSA infection in the past.  No seizure.     PAST MEDICAL  HISTORY       Past Medical History:   Diagnosis Date    Cancer [facility])     adenocarcinoma of her sinuses    Cataract of both eyes     COVID-19     DDD (degenerative disc disease), lumbar     DM2 (diabetes mellitus, type 2) ([facility]     diagnoses approx 2000    Dysphagia, pharyngoesophageal 2016    Eczema 2018    Fibrocystic breast     H/O ventral hernia repair     Headache 2017    History of COVID-19 2020    Hyperlipidemia     Hypertension     Iron deficiency anemia     LPRD (laryngopharyngeal reflux disease) 2016    Neuropathy     peripheral    Obesity     Orbital abscess     Orbital cellulitis 01/22/2014    SWAPNA (obstructive sleep apnea)     Osteoarthritis     Osteoporosis     Persistent proteinuria associated with type 2 diabetes mellitus ([facility] 01/2017    urine micral of 50.       Sinusitis     Velopharyngeal incompetence 09/26/2016       PAST SURGICAL HISTORY     Past Surgical History:   Procedure Laterality Date    ABDOMEN SURGERY      APPENDECTOMY      CARPAL TUNNEL RELEASE Bilateral 2008, 2009    CATARACT REMOVAL  2011    bilat    CHOLECYSTECTOMY  03/1988    COLONOSCOPY  2016    COLONOSCOPY  10/11/2018    COLONOSCOPY POLYPECTOMY SNARE/COLD BIOPSY performed by Nick Hale MD at [facility]    DILATATION, ESOPHAGUS      ENDOSCOPY, COLON, DIAGNOSTIC      EYE SURGERY Left 01/30/2015    EYE SURGERY      CATARACT REMOVAL- BILATERAL    HEMORRHOID SURGERY  1980s    HERNIA REPAIR      HYSTERECTOMY, TOTAL ABDOMINAL (CERVIX REMOVED)  1980    JOINT REPLACEMENT  bilat 2005/ 2007    knees    AK COLSC FLX WITH DIRECTED SUBMUCOSAL NJX ANY SBST  10/11/2018    COLONOSCOPY SUBMUCOSAL/BOTOX INJECTION performed by Nick Hale MD at [address] [place] [address]  last 2009    removed a bone (2)--2 times no construction     SINUS SURGERY  02/01/2016    SINUS SURGERY  2016 x 2    SINUS SURGERY  09/18/2017    [facility] - Dr Cassie Fortune    SINUS SURGERY  08/09/2017 8/17/2017 - [facility]    TONSILLECTOMY      TOTAL KNEE ARTHROPLASTY  08/2003    Left TKR    VENTRAL HERNIA REPAIR  1992         MEDICATIONS:       Scheduled Meds:   amLODIPine  2.5 mg Oral Daily    ascorbic acid  500 mg Oral Daily    ferrous sulfate  325 mg Oral TID WC    levETIRAcetam  1,000 mg Oral BID    pregabalin  75 mg Oral BID    alogliptin  12.5 mg Oral Daily    Vitamin D  2,000 Units Oral Daily    zinc sulfate  50 mg Oral Daily    sodium chloride flush  5-40 mL IntraVENous 2 times per day    heparin (porcine)  5,000 Units SubCUTAneous 3 times per day    famotidine  20 mg Oral Daily    insulin lispro  0-8 Units SubCUTAneous TID WC    insulin lispro  0-4 Units SubCUTAneous Nightly    [Held by provider] metFORMIN  1,000 mg Oral QPM    cefTRIAXone (ROCEPHIN) IV  1,000 mg IntraVENous Q24H     Continuous Infusions:   sodium chloride      sodium chloride 100 mL/hr at 11/14/22 0606    dextrose       PRN Meds:acetaminophen, sodium chloride, sodium chloride flush, sodium chloride, ondansetron **OR** ondansetron, glucose, dextrose bolus **OR** dextrose bolus, glucagon (rDNA), dextrose, hydrALAZINE, potassium chloride **OR** potassium alternative oral replacement **OR** potassium chloride, magnesium sulfate  Allergies:   ALLERGIES:    Lisinopril, Sulfamethoxazole-trimethoprim, Morphine, Codeine, Hydrocodone, Percocet [oxycodone-acetaminophen], and Tape [adhesive tape]        SOCIAL HISTORY:     TOBACCO:   reports that she has never smoked. She has never used smokeless tobacco.     ETOH:   reports no history of alcohol use. Patient currently lives with family       FAMILY HISTORY:         Problem Relation Age of Onset    Diabetes Mother     Heart Disease Father         whooping cough as child    Obesity Sister     Other Brother         tumor on back    Obesity Sister     Cancer Sister         Skin     Cancer Brother         Bone cancer from metal    Other Brother         passed as a child    Heart Disease Brother     Other Brother         tremor    Heart Attack Brother     No Known Problems Brother     Heart Disease Brother     No Known Problems Brother     No Known Problems Brother        REVIEW OF SYSTEMS:     Unable to obtain from patient due to her confusion  PHYSICAL EXAM:     BP (!) 184/87   Pulse 79   Temp 98.1 °F (36.7 °C) (Axillary)   Resp 18   Ht 5' (1.524 m)   Wt 201 lb 15.1 oz (91.6 kg)   LMP  (LMP Unknown)   SpO2 95%   BMI 39.44 kg/m²   General apperance:  Awake, alert, confused  HEENT: Slightly pale conjunctiva, unicteric sclera, moist oral mucosa.   Chest: Bilateral air entry  Cardiovascular:  RRR ,S1S2, no murmur or gallop. Abdomen:  Soft, non tender to palpation. Extremities: Has scaly skin on both lower extremities dry skin with dystrophic nails  Skin:  Warm and dry. CNS: Awake but confused, she has torticollis but no neck stiffness        LABS:     CBC:   Recent Labs     11/12/22 2256 11/14/22  0616   WBC 9.1 5.5   HGB 11.7* 11.8*    139     BMP:    Recent Labs     11/12/22 2256 11/13/22  0918    140   K 4.1 3.3*    103   CO2 25 25   BUN 27* 23*   CREATININE 1.6* 1.2   GLUCOSE 184* 211*     Calcium:  Recent Labs     11/13/22  0918   CALCIUM 9.1     Ionized Calcium:No results for input(s): IONCA in the last 72 hours. Magnesium:  Recent Labs     11/14/22  0616   MG 1.9     Phosphorus:No results for input(s): PHOS in the last 72 hours. BNP:No results for input(s): BNP in the last 72 hours. Glucose:  Recent Labs     11/13/22 2129 11/14/22  0607 11/14/22  1044   POCGLU 152* 150* 257*     HgbA1C:   Recent Labs     11/13/22  1101   LABA1C 6.7*     INR: No results for input(s): INR in the last 72 hours. Hepatic:   Recent Labs     11/12/22 2256   ALKPHOS 116   ALT 8*   AST 11   PROT 6.7   BILITOT 0.3   LABALBU 4.2     Amylase and Lipase:  Recent Labs     11/13/22  1101   LACTA 2.0     Lactic Acid:   Recent Labs     11/13/22  1101   LACTA 2.0   UA:   Recent Labs     11/12/22  2352   PHUR 6.0   COLORU YELLOW   PROTEINU 300*   BLOODU NEGATIVE   RBCUA NONE SEEN   WBCUA 0-2   BACTERIA NONE SEEN   NITRU NEGATIVE   GLUCOSEU NEGATIVE   BILIRUBINUR NEGATIVE   UROBILINOGEN 0.2   KETUA TRACE*         IMAGING:    Micro:   Lab Results   Component Value Date/Time    BC No growth 24 hours.  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 XXXS    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[facility]) C30.0    Cataract of both eyes H26.9    History of ventral hernia repair Z98.890, Z87.19    Other cervical disc degeneration, mid-cervical region M50.320    Spondylolisthesis of cervical region M43.12    Spondylolisthesis of thoracic region M43.14    Chronic rhinitis J31.0    Closed fracture of head of humerus S42.293A    Dysphagia R13.10    Laryngopharyngeal reflux K21.9    Primary adenocarcinoma of ethmoidal sinus ([facility]) C31.1    Obesity, Class II, BMI 35-39.9 E66.9    Brain mass G93.89    Chronic diastolic CHF (congestive heart failure) ([facility]) I50.32    Chronic ethmoidal sinusitis J32.2    Chronic frontal sinusitis J32.1    Chronic maxillary sinusitis J32.0    Chronic sphenoidal sinusitis J32.3    Brain compression ([facility]) N25.1    Metabolic encephalopathy S66.20    Hypokalemia E87.6    Cerebral edema ([facility]) G93.6    Morbid obesity ([facility]) E66.01    Cerebral abscess G06.0    Unspecified convulsions R56.9    Altered mental state R41.82    IDALIA (acute kidney injury) ([facility] N17.9    Encephalopathy, metabolic E62.38    Dehydration E86.0    Cellulitis of left leg L03.116           Impression and Recommendation:   Change in mental state: Cause is not clear. Due to her previous history of CNS infection recommend to continue broad-spectrum antibiotic, including antiviral will arrange for lumbar puncture.   We will have EEG to rule out any seizure Infection Control:   Contact isolation  Discharge Planning (Coordination of out patient care: To be determined  Thank you Endy Johnson MD for allowing me to participate in this patient's care.     Fide Garcia MD, MD,FACP 11/14/2022 11:27 AM

## 2022-11-14 NOTE — FLOWSHEET NOTE
11/14/22 1030   Safe Environment   Safety Measures Other (comment)  (Virtual nurse round complete)   Per audio conversation heard at bedside discussing POC. Camera not turned on at this time as to not interrupt.

## 2022-11-14 NOTE — PROGRESS NOTES
[facility] Laboratory Technician Worksheet      EEG Date: 2022    Name: Karmen Chaudhary   : 1940   Age: 81 y.o. SEX: female    ROOM: [facility] MRN: [id number]           CSN: [id number]      Ordering Provider: zack  EEG Number: [id number] Time of Test:  1552    Hand: Right   Sedation: no    H.V. Done: No  age protocol  Photic: Yes    Sleep: No  Drowsy: No   Sleep Deprived: No    Seizures observed: no    Mentality: confused    Clinical History: altered mental status, convulsions, adenocarcinoma of sinus,  mri  Impression       1. 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 2. Chronic lacunar infarcts in the right basal ganglia and thalamus with the thalamic lesion having appeared in the interval since prior MRI.    3. Redemonstration of exenteration of the nasal passage along with the ethmoid air cells and sphenoid sinuses     Past Medical History:       Diagnosis Date    Cancer [facility])     adenocarcinoma of her sinuses    Cataract of both eyes     COVID-19     DDD (degenerative disc disease), lumbar     DM2 [facility]diabetes mellitus, type 2) [facility]     diagnoses approx 2000    Dysphagia, pharyngoesophageal 2016    Eczema 2018    Fibrocystic breast     H/O ventral hernia repair     Headache 2017    History of COVID-19 2020    Hyperlipidemia     Hypertension     Iron deficiency anemia     LPRD (laryngopharyngeal reflux disease) 09/26/2016    Neuropathy     peripheral    Obesity     Orbital abscess     Orbital cellulitis 01/22/2014    SWAPNA (obstructive sleep apnea)     Osteoarthritis     Osteoporosis     Persistent proteinuria associated with type 2 diabetes mellitus ([facility] 01/2017    urine micral of 50.       Sinusitis     Velopharyngeal incompetence 09/26/2016       Scheduled Meds:   [START ON 11/15/2022] amLODIPine  5 mg Oral Daily    cefTRIAXone (ROCEPHIN) IV  2,000 mg IntraVENous Q24H    acyclovir  10 mg/kg (Adjusted) IntraVENous Q12H    vancomycin (VANCOCIN) intermittent dosing (placeholder)   Other RX Placeholder    vancomycin  1,750 mg IntraVENous Once    [START ON 11/15/2022] vancomycin  1,250 mg IntraVENous Q24H    ascorbic acid  500 mg Oral Daily    ferrous sulfate  325 mg Oral TID WC    levETIRAcetam  1,000 mg Oral BID    pregabalin  75 mg Oral BID    alogliptin  12.5 mg Oral Daily    Vitamin D  2,000 Units Oral Daily    zinc sulfate  50 mg Oral Daily    sodium chloride flush  5-40 mL IntraVENous 2 times per day    heparin (porcine)  5,000 Units SubCUTAneous 3 times per day    famotidine  20 mg Oral Daily    insulin lispro  0-8 Units SubCUTAneous TID WC    insulin lispro  0-4 Units SubCUTAneous Nightly    [Held by provider] metFORMIN  1,000 mg Oral QPM     Continuous Infusions:   sodium chloride      sodium chloride 100 mL/hr at 11/14/22 0606    dextrose       PRN Meds:.acetaminophen, sodium chloride, sodium chloride flush, sodium chloride, ondansetron **OR** ondansetron, glucose, dextrose bolus **OR** dextrose bolus, glucagon (rDNA), dextrose, hydrALAZINE, potassium chloride **OR** potassium alternative oral replacement **OR** potassium chloride, magnesium sulfate    Technician: Jim Umana 11/14/2022

## 2022-11-14 NOTE — PROGRESS NOTES
Comprehensive Nutrition Assessment    Type and Reason for Visit:  Initial, Positive Nutrition Screen    Nutrition Recommendations/Plan:   Diet as per physician  ONS; Keenan BID to aid in wound healing  Continue Vitamin D, Vitamin C, and Zinc     Malnutrition Assessment:  Malnutrition Status:  No malnutrition (11/14/22 1141)    Context:  Acute Illness     Findings of the 6 clinical characteristics of malnutrition:  Energy Intake:  No significant decrease in energy intake  Weight Loss:  No significant weight loss     Body Fat Loss:  No significant body fat loss     Muscle Mass Loss:  No significant muscle mass loss    Fluid Accumulation:  Unable to assess (h/o CHF)     Strength:  Not Performed    Nutrition Assessment:     Pt. nutritionally compromised AEB wounds. At risk for further nutrition compromise r/t increased nutrient needs for wound healing, AMS (h/o staph infection in the brain requiring surgery- neurology following), and underlying medical condition (hx sinonasal adenocarcinoma, DM, COVID+ 12/20, HTN, HLD, LPRD, neuropathy). Nutrition Related Findings:    Pt. Report/Treatments/Miscellaneous: Pt confused- good po intake during LOS %; Will trial Keenan BID to aid in would healing. GI Status: BM 11/14  Pertinent Labs: 11/13 BUN 23, Cr 1.2, A1C 6.7 11/14 POC glucose 150-257  Pertinent Meds: Vitamin C, Norvasc, Abx, Pepcid, Iron, Humalog, Keppra, Vitamin D, Zinc     Wound Type: Stage II (buttock)       Current Nutrition Intake & Therapies:    Average Meal Intake: %  Average Supplements Intake:  (just ordered)  ADULT DIET; Regular; 4 carb choices (60 gm/meal); Low Fat/Low Chol/High Fiber/2 gm Na  ADULT ORAL NUTRITION SUPPLEMENT; Breakfast, Dinner;  Wound Healing Oral Supplement    Anthropometric Measures:  Height: 5' (152.4 cm)  Ideal Body Weight (IBW): 100 lbs (45 kg)    Admission Body Weight: 222 lb 13 oz (101.1 kg) (11/12)  Current Body Weight: 201 lb 15 oz (91.6 kg) (11/14; BLE +2, +3), 201.9 % IBW. Weight Source: Bed Scale  Current BMI (kg/m2): 39.4  Usual Body Weight:  (pt unsure; denies weight loss; per EMR: 6/4/20: 205# 6.4 oz, 1/25/21: 213# 1.6 oz, 2/28/21: 195# 9.6 oz, 5/6/22 198 lbs 14 oz)     Weight Adjustment For: No Adjustment                 BMI Categories: Obese Class 2 (BMI 35.0 -39.9)    Estimated Daily Nutrient Needs:  Energy Requirements Based On: Kcal/kg  Weight Used for Energy Requirements: Current (91.6 kg)  Energy (kcal/day): 6299-8399 (15-18 kcal/kg)  Weight Used for Protein Requirements: Ideal (45 kg)  Protein (g/day): 63 grams or more (1.4 g/kg) will monitor renal status         Nutrition Diagnosis:   Increased nutrient needs related to increase demand for energy/nutrients as evidenced by wounds    Nutrition Interventions:   Food and/or Nutrient Delivery: Continue Current Diet, Start Oral Nutrition Supplement  Nutrition Education/Counseling: No recommendation at this time  Coordination of Nutrition Care: Continue to monitor while inpatient       Goals:     Goals: PO intake 75% or greater, by next RD assessment       Nutrition Monitoring and Evaluation:   Behavioral-Environmental Outcomes: None Identified  Food/Nutrient Intake Outcomes: Food and Nutrient Intake, Supplement Intake, Vitamin/Mineral Intake  Physical Signs/Symptoms Outcomes: Biochemical Data, GI Status, Fluid Status or Edema, Weight, Skin    Discharge Planning:     Too soon to determine     Melisa Benitez [address] [facility] [address]  Contact: [phone]

## 2022-11-14 NOTE — PROGRESS NOTES
[id number] [facility] Pharmacokinetic Monitoring Service - Vancomycin     Raza Alfredo is a 81 y.o. female starting on vancomycin therapy for Central Nervous System Infection. Pharmacy consulted by Dr Makenna Albright for monitoring and adjustment. Target Concentration: Goal AUC/CARI 400-600 mg*hr/L    Additional Antimicrobials: Ceftriaxone, Acyclovir    Pertinent Laboratory Values: Wt Readings from Last 1 Encounters:   11/14/22 201 lb 15.1 oz (91.6 kg)     Temp Readings from Last 1 Encounters:   11/14/22 98.1 °F (36.7 °C) (Axillary)     Estimated Creatinine Clearance: 37 mL/min (based on SCr of 1.2 mg/dL). Recent Labs     11/12/22  2256 11/13/22  0918 11/14/22  0616   CREATININE 1.6* 1.2  --    WBC 9.1  --  5.5     Procalcitonin: 0.06    Pertinent Cultures:  Culture Date Source Results   11/13/22 BC X 2 NGTD        MRSA Nasal Swab: N/A. Non-respiratory infection.     Plan:  Dosing recommendations based on Bayesian software  Start vancomycin 1750 mg IV X 1, then 1250 mg IV Q24H   Anticipated AUC of 510 and trough concentration of 16.1 at steady state  Renal labs as indicated   Vancomycin concentration within 24-36 hours maintenance dosing   Pharmacy will continue to monitor patient and adjust therapy as indicated    Thank you for the consult,  Sudeep Oconnor [facility]  11/14/2022 12:12 PM

## 2022-11-14 NOTE — PLAN OF CARE
Problem: Discharge Planning  Goal: Discharge to home or other facility with appropriate resources  Outcome: Progressing  Flowsheets (Taken 11/14/2022 1645)  Discharge to home or other facility with appropriate resources: Identify barriers to discharge with patient and caregiver  Note: Patient preference to discharge home with family support      Problem: Pain  Goal: Verbalizes/displays adequate comfort level or baseline comfort level  Outcome: Progressing  Flowsheets (Taken 11/14/2022 1645)  Verbalizes/displays adequate comfort level or baseline comfort level: Encourage patient to monitor pain and request assistance  Note: Patient denies pain at this time. Problem: Skin/Tissue Integrity  Goal: Absence of new skin breakdown  Description: 1. Monitor for areas of redness and/or skin breakdown  2. Assess vascular access sites hourly  3. Every 4-6 hours minimum:  Change oxygen saturation probe site  4. Every 4-6 hours:  If on nasal continuous positive airway pressure, respiratory therapy assess nares and determine need for appliance change or resting period. Outcome: Progressing  Note: No new signs or symptoms of skin breakdown noted this shift, encouraging patient to turn and reposition self in bed q2h       Problem: Safety - Adult  Goal: Free from fall injury  Outcome: Progressing  Flowsheets (Taken 11/14/2022 1645)  Free From Fall Injury: Instruct family/caregiver on patient safety  Note: No falls noted this shift. Continue falling star program. Bed alarm on, bed in low position. Call light and personal belongings in reach. Patient uses call light appropriately.        Problem: Neurosensory - Adult  Goal: Achieves stable or improved neurological status  Outcome: Progressing  Flowsheets (Taken 11/14/2022 1645)  Achieves stable or improved neurological status: Assess for and report changes in neurological status  Note: Patient alert and oriented X2  person and place , periods of confusion and visual hallucinations. Neurology consulted, EEG and MRI Brain ordered. Problem: Chronic Conditions and Co-morbidities  Goal: Patient's chronic conditions and co-morbidity symptoms are monitored and maintained or improved  Outcome: Progressing  Flowsheets (Taken 11/14/2022 1645)  Care Plan - Patient's Chronic Conditions and Co-Morbidity Symptoms are Monitored and Maintained or Improved:   Monitor and assess patient's chronic conditions and comorbid symptoms for stability, deterioration, or improvement   Collaborate with multidisciplinary team to address chronic and comorbid conditions and prevent exacerbation or deterioration     Problem: Cognitive:  Goal: Knowledge of wound care  Description: Knowledge of wound care  Outcome: Progressing  Note: Patient has non open blisters present on the buttocks. Redness present on left lower extremity and swelling. Problem: Nutrition Deficit:  Goal: Optimize nutritional status  Outcome: Progressing  Flowsheets (Taken 11/14/2022 1645)  Nutrient intake appropriate for improving, restoring, or maintaining nutritional needs:   Assess nutritional status and recommend course of action   Monitor oral intake, labs, and treatment plans     Care plan reviewed with patient and daughter Figueroa Blakely . Patient and daughter  Julia Nicely ) verbalize understanding of the plan of care and contribute to goal setting.

## 2022-11-14 NOTE — PROGRESS NOTES
Progress note   [facility]             Patient: Mamadou Macedo  YOB: 1940    MRN: [id number]   Acct:  [de-identified]   6K-06/006-A  Primary Care Physician: Lucille Mon MD    Admit Date: 11/12/2022           Subjective: Pt in bed with daughter at side. Pt confused today talking about being in [place] and not sleeping because the phone was ringing all night. Pt does not appear to be in any distress. Objective:      Physical Exam:    Vitals:Patient Vitals for the past 24 hrs:   BP Temp Temp src Pulse Resp SpO2 Weight   11/14/22 1030 (!) 184/87 -- -- -- -- -- --   11/14/22 0839 (!) 183/91 98.1 °F (36.7 °C) Axillary 79 18 95 % --   11/14/22 0600 (!) 156/78 98 °F (36.7 °C) Axillary 70 18 98 % 201 lb 15.1 oz (91.6 kg)   11/13/22 2340 (!) 144/98 98 °F (36.7 °C) Axillary 68 16 98 % --   11/13/22 2000 (!) 167/85 98.8 °F (37.1 °C) Axillary 65 16 96 % --   11/13/22 1533 (!) 188/91 98 °F (36.7 °C) Oral 70 18 93 % --   11/13/22 1200 (!) 155/94 97.7 °F (36.5 °C) Oral 78 17 96 % --     Weight: Weight: 201 lb 15.1 oz (91.6 kg)     24 hour intake/output:  Intake/Output Summary (Last 24 hours) at 11/14/2022 1117  Last data filed at 11/14/2022 3608  Gross per 24 hour   Intake 50 ml   Output 1100 ml   Net -1050 ml       General appearance - alert and in no distress  Eyes - pupils equal and reactive, extraocular eye movements intact  Mouth - mucous membranes moist, pharynx normal without lesions  Neck - supple, no significant adenopathy  Chest - clear to auscultation, no wheezes, rales or rhonchi, symmetric air entry  Heart - normal rate, regular rhythm, normal S1, S2, no murmurs, rubs, clicks or gallops  Abdomen - soft, nontender, nondistended, no masses or organomegaly, pos bs.   Neurological - alert, not oriented  Musculoskeletal - no joint tenderness, deformity or swelling  Extremities - peripheral pulses normal, LLE red more swollen than RLE  Skin - warm and dry    Review of Labs and Diagnostic Testing:    CBC:   Recent Labs     11/14/22  0616   WBC 5.5   HGB 11.8*   HCT 35.7*   MCV 87.3        BMP:   Recent Labs     11/13/22  0918      K 3.3*      CO2 25   BUN 23*   CREATININE 1.2   CALCIUM 9.1   GLUCOSE 211*     PT/INR: No results for input(s): PROTIME, INR in the last 72 hours. APTT: No results for input(s): APTT in the last 72 hours. Lipids:   Recent Labs     11/12/22 2256   ALKPHOS 116   ALT 8*   AST 11   BILITOT 0.3   LABALBU 4.2     Troponin:   Recent Labs     11/12/22 2256   TROPONINT < 0.010        Imaging:  [unfilled]    G:      Diet: ADULT DIET; Regular; 4 carb choices (60 gm/meal); Low Fat/Low Chol/High Fiber/2 gm Na  ADULT ORAL NUTRITION SUPPLEMENT; Breakfast, Dinner;  Wound Healing Oral Supplement        Data:   Scheduled Meds: Scheduled Meds:   amLODIPine  2.5 mg Oral Daily    ascorbic acid  500 mg Oral Daily    ferrous sulfate  325 mg Oral TID WC    levETIRAcetam  1,000 mg Oral BID    pregabalin  75 mg Oral BID    alogliptin  12.5 mg Oral Daily    Vitamin D  2,000 Units Oral Daily    zinc sulfate  50 mg Oral Daily    sodium chloride flush  5-40 mL IntraVENous 2 times per day    heparin (porcine)  5,000 Units SubCUTAneous 3 times per day    famotidine  20 mg Oral Daily    insulin lispro  0-8 Units SubCUTAneous TID WC    insulin lispro  0-4 Units SubCUTAneous Nightly    [Held by provider] metFORMIN  1,000 mg Oral QPM    cefTRIAXone (ROCEPHIN) IV  1,000 mg IntraVENous Q24H     Continuous Infusions:   sodium chloride      sodium chloride 100 mL/hr at 11/14/22 0606    dextrose       PRN Meds:.acetaminophen, sodium chloride, sodium chloride flush, sodium chloride, ondansetron **OR** ondansetron, glucose, dextrose bolus **OR** dextrose bolus, glucagon (rDNA), dextrose, hydrALAZINE, potassium chloride **OR** potassium alternative oral replacement **OR** potassium chloride, magnesium sulfate  Continuous Infusions:   sodium chloride sodium chloride 100 mL/hr at 11/14/22 0606    dextrose           Assessment   Altered mental status  Encephalopathy, metabolic  IDALIA on CKD stage 3  Cellulitis  Diabetes   HTN    CT head-   Impression:   1. No acute intracranial abnormality. Age-appropriate exam.   2. Bifrontal postoperative changes with inferior frontal lobe    encephalomalacia. Postoperative changes are new compared to the previous    exam, noting an evidence of a right frontal lobe mass on the previous    exam.   3. Extensive sinonasal surgery. Moderate chronic sinusitis. These findings    are similar to previous. US LLE-      No evidence of deep venous thrombosis in the left lower extremity.       Latest Reference Range & Units 11/13/22 11:01   Hemoglobin A1C 4.4 - 6.4 % 6.7 (H)     Plan   Infectious disease following  Neurology following  Waiting EEG and MRI results   Continue ATB  Monitor blood sugar- continue to use sliding scale and Metformin on hold  Monitor BPs- Increase Norvasc to 5mg daily  BMP, lactic acid in am    Electronically signed by AMANDA Lawson - CNP on 11/14/2022 at 11:17 AM    Assessment and plan of care discussed with supervising physician, Dr Layla Gonzalez.    Pt seen and examined by me  Pt still quite confused  D/w daughter at bedside  Will consult ID in view of h/o prior inf and recommendations for LP  Await EEG    Electronically signed by Leo Anthony MD on 11/14/2022 at 6:20 PM

## 2022-11-14 NOTE — PROGRESS NOTES
Pharmacy Renal Adjustment    Rena Ring is a 81 y.o. female. Pharmacy renally adjust the following medications per P&T approved policy: Acyclovir    Height:   Ht Readings from Last 1 Encounters:   11/14/22 5' (1.524 m)     Weight:  Wt Readings from Last 1 Encounters:   11/14/22 201 lb 15.1 oz (91.6 kg)     Recent Labs     11/12/22  2256 11/13/22  0918   BUN 27* 23*   CREATININE 1.6* 1.2     Estimated Creatinine Clearance: 37 mL/min (based on SCr of 1.2 mg/dL). Calculated CrCl:    Assessment:  CKD    Plan:   Decrease Acyclovir 10 mg/kg IV Q8H to 10 mg/kg IV Q12H.  Use adjusted body weight = 63.9 kg due to BMI > 30    [facility] PharmD 11/14/2022 12:09 PM

## 2022-11-14 NOTE — CONSULTS
Our service was consulted for altered mental status x a few weeks. Past medical history includes adenocarcinoma of the sinuses, CKD 3, lumbar DDD with neuropathy, DM 2, hyperlipidemia, hypertension, SWAPNA, unspecified convulsions -on Keppra, history of cerebral abscess (Staph). Since presentation to the ED last night, patient has been consistently hypertensive into the 180-200/. She is also noted to currently be infected with MRSA and staph inside of her nasal cavity. Currently being treated for IDALIA, dehydration, metabolic encephalopathy. BUN 23, creatinine 1.2, EGFR improved to 45 from 32 yesterday. CT head without contrast negative for acute intracranial abnormalities, revealing bifrontal postoperative changes with inferior frontal lobe encephalomalacia secondary to drainage of previous right frontal lobe abscess. Due to her history of both cancer and cerebral abscess, MRI brain with and without contrast ordered for tomorrow. EEG ordered. Keppra level in process. On my exam today, patient alert and oriented x4 without focal neurologic deficits. She she is ill-appearing, with erythema and warmth of left lower extremity. Venous Doppler ordered by primary team and negative for DVT. Formal consult note to follow tomorrow. Please call with any questions or concerns. This patient was seen and evaluated with Dr. Heather Holder and he is in agreement with the assessment and plan.     Electronically signed by Jaya Rodríguez PA-C on 11/13/22 at 7:19 PM EST

## 2022-11-14 NOTE — PROCEDURES
EEG REPORT       Patient: Lavenia Eisenmenger Age: 81 y.o. MRN: [id number]      Referring Provider: No ref. provider found    History: This routine 30 minute scalp EEG was recorded with video- monitoring for a 81 y.o. Nola Chain female who presented due to a fall. This EEG was performed to evaluate for focal and epileptiform abnormalities.      Lavenia Eisenmenger   Current Facility-Administered Medications   Medication Dose Route Frequency Provider Last Rate Last Admin    [START ON 11/15/2022] amLODIPine (NORVASC) tablet 5 mg  5 mg Oral Daily Elizabeth Linder APRN - JUAN MANUEL        cefTRIAXone (ROCEPHIN) 2,000 mg in dextrose 5 % 50 mL IVPB mini-bag  2,000 mg IntraVENous Q24H Milan Hart  mL/hr at 11/14/22 1334 2,000 mg at 11/14/22 1334    acyclovir (ZOVIRAX) 650 mg in dextrose 5 % 100 mL IVPB  10 mg/kg (Adjusted) IntraVENous Q12H Milan Hart MD        vancomycin [facility]) intermittent dosing (placeholder)   Other RX Placeholder Milan Hart MD        vancomycin (VANCOCIN) 1,750 mg in dextrose 5 % 500 mL IVPB  1,750 mg IntraVENous Once José Luis Delval[facility]        [START ON 11/15/2022] vancomycin (VANCOCIN) 1250 mg in dextrose 5 % 250 mL IVPB  1,250 mg IntraVENous Q24H Bill Forte [facility]        acetaminophen (TYLENOL) tablet 650 mg  650 mg Oral Q4H PRN Darshan Arreola MD   650 mg at 11/13/22 2154    ascorbic acid (VITAMIN C) tablet 500 mg  500 mg Oral Daily Darshan Arreola MD   500 mg at 11/14/22 1029    ferrous sulfate (IRON 325) tablet 325 mg  325 mg Oral TID WC Darshan Arreola MD   325 mg at 11/14/22 1334    levETIRAcetam (KEPPRA) tablet 1,000 mg  1,000 mg Oral BID Darshan Arreola MD   1,000 mg at 11/14/22 1024    pregabalin (LYRICA) capsule 75 mg  75 mg Oral BID aDrshan Arreola MD   75 mg at 11/14/22 1026    alogliptin (NESINA) tablet 12.5 mg  12.5 mg Oral Daily Darshan Arreola MD   12.5 mg at 11/14/22 1024    sodium chloride (OCEAN, BABY AYR) 0.65 % nasal spray 1 spray  1 spray Nasal PRN Felicia Jin MD        vitamin D (CHOLECALCIFEROL) tablet 2,000 Units  2,000 Units Oral Daily Felicia Jin MD   2,000 Units at 11/14/22 1023    zinc sulfate (ZINCATE) capsule 50 mg  50 mg Oral Daily Felicia Jin MD   50 mg at 11/14/22 1024    sodium chloride flush 0.9 % injection 5-40 mL  5-40 mL IntraVENous 2 times per day Felicia Jin MD   10 mL at 11/14/22 1029    sodium chloride flush 0.9 % injection 5-40 mL  5-40 mL IntraVENous PRN Felicia Jin MD        0.9 % sodium chloride infusion   IntraVENous PRN Felicia Jin MD        ondansetron (ZOFRAN-ODT) disintegrating tablet 4 mg  4 mg Oral Q8H PRN Felicia Jin MD        Or    ondansetron (ZOFRAN) injection 4 mg  4 mg IntraVENous Q6H PRN Felicia Jin MD        0.9 % sodium chloride infusion   IntraVENous Continuous Felicia Jin  mL/hr at 11/14/22 0606 New Bag at 11/14/22 0606    heparin (porcine) injection 5,000 Units  5,000 Units SubCUTAneous 3 times per day Felicia Jin MD   5,000 Units at 11/14/22 1334    famotidine (PEPCID) tablet 20 mg  20 mg Oral Daily Felicia Jin MD   20 mg at 11/14/22 1026    insulin lispro (HUMALOG) injection vial 0-8 Units  0-8 Units SubCUTAneous TID WC Felicia Jin MD        insulin lispro (HUMALOG) injection vial 0-4 Units  0-4 Units SubCUTAneous Nightly Felicia Jin MD        glucose chewable tablet 16 g  4 tablet Oral PRN Felicia Jin MD        dextrose bolus 10% 125 mL  125 mL IntraVENous PRN Felicia Jin MD        Or    dextrose bolus 10% 250 mL  250 mL IntraVENous PRN Felicia Jin MD        glucagon (rDNA) injection 1 mg  1 mg SubCUTAneous PRN Felicia Jin MD        dextrose 10 % infusion   IntraVENous Continuous PRN Felicia Jin MD        [Held by provider] metFORMIN (GLUCOPHAGE) tablet 1,000 mg  1,000 mg Oral QPM Felicia Jin MD hydrALAZINE (APRESOLINE) injection 10 mg  10 mg IntraVENous Q8H PRN Cooper Teran MD        potassium chloride (KLOR-CON M) extended release tablet 40 mEq  40 mEq Oral PRN Niharika Henson MD        Or    potassium bicarb-citric acid (EFFER-K) effervescent tablet 40 mEq  40 mEq Oral PRN Niharika Henson MD        Or    potassium chloride 10 mEq/100 mL IVPB (Peripheral Line)  10 mEq IntraVENous PRN Niharika Henson MD        magnesium sulfate 2000 mg in 50 mL IVPB premix  2,000 mg IntraVENous PRN Niharika Henson MD           Technical Description: This is a 21 channel digital EEG recording without video recording. Electrodes were placed in accordance with the 10-20 International System of Electrode Placement. Temporal electrodes were included. There was no EKG recording. The patient was not sleep deprived. This recording was obtained during wakefulness. EEG Description: The dominant background activity during maximal recorded wakefulness consisted of 7-8 Hz of polymorphic theta activity of 15-25 µV. The background was symmetric and continuous. Frequent runs of left frontal polymorphic delta and theta slowing of 3 to 4 Hz, 60 to 70 µV, were seen. Occasional left frontotemporal sharp waves were seen, most prominent in the T3 lead. Background was reactive to eye opening. There was poor anterior posterior amplitude gradient. No state changes were seen and sleep architecture was also not seen. Photic stimulation - stepwise photic stimulation at 2-30 Hz was performed and there was a biposterior, symmetric, driving response. Hyperventilation - was not preformed. Interpretation  This EEG was abnormal due to disorganized and slow background in polymorphic theta frequency with frequent runs of left frontal polymorphic delta and theta slowing. Occasional left frontotemporal sharp waves were seen maximal at F7 and T3.     Clinical correlation  This EEG was abnormal. Disorganized background with polymorphic delta and theta slowing suggested mild to moderate encephalopathy of non specific etiology. Left frontotemporal sharp waves conferred an increased risk of focal onset seizures. Rena Drummond MD  PGY-3 Neurology Resident  11/14/2022  1:45 PM    I have personally reviewed the EEG of Vasyl Ayala. I agree with the documentation by the resident with changes made to the note as needed. Kelly Rodriguez MD  Neurology    This note is created with the assistance of a speech-recognition program. While intending to generate a document that actually reflects the content of the visit, the document can still have some errors including those of syntax and sound a- like substitutions which may escape proofreading. In such instances, actual meaning can be extrapolated by contextual derivation.

## 2022-11-15 ENCOUNTER — APPOINTMENT (OUTPATIENT)
Dept: INTERVENTIONAL RADIOLOGY/VASCULAR | Age: 82
DRG: 092 | End: 2022-11-15
Payer: MEDICARE

## 2022-11-15 LAB
ANION GAP SERPL CALCULATED.3IONS-SCNC: 14 MEQ/L (ref 8–16)
BUN BLDV-MCNC: 19 MG/DL (ref 7–22)
CALCIUM SERPL-MCNC: 8.6 MG/DL (ref 8.5–10.5)
CHLORIDE BLD-SCNC: 106 MEQ/L (ref 98–111)
CO2: 21 MEQ/L (ref 23–33)
CREAT SERPL-MCNC: 1 MG/DL (ref 0.4–1.2)
CRYPTOCOCCUS NEOFORMANS/GATTI CSF FILM ARR.: NOT DETECTED
CYTOMEGALOVIRUS (CMV) CSF FILM ARRAY: NOT DETECTED
ENTEROVIRUS DETECTION PCR: NOT DETECTED
ESCHERICHIA COLI K1 CSF FILM ARRAY: NOT DETECTED
GFR SERPL CREATININE-BSD FRML MDRD: 57 ML/MIN/1.73M2
GLUCOSE BLD-MCNC: 156 MG/DL (ref 70–108)
GLUCOSE BLD-MCNC: 156 MG/DL (ref 70–108)
GLUCOSE BLD-MCNC: 170 MG/DL (ref 70–108)
GLUCOSE BLD-MCNC: 180 MG/DL (ref 70–108)
GLUCOSE BLD-MCNC: 196 MG/DL (ref 70–108)
GLUCOSE, CSF: 84 MG/DL (ref 40–80)
HAEMOPHILUS INFLUENZA CSF FILM ARRAY: NOT DETECTED
HHV-6 (HERPESVIRUS 6) CSF FILM ARRAY: NOT DETECTED
HSV-1 CSF FILM ARRAY: NOT DETECTED
HSV-2 CSF FILM ARRAY: NOT DETECTED
KEPPRA: 25 UG/ML (ref 10–40)
LACTIC ACID: 0.8 MMOL/L (ref 0.5–2)
LISTERIA MONOCYTOGENES CSF FILM ARRAY: NOT DETECTED
NEISSERIA MENIGITIDIS CSF FILM ARRAY: NOT DETECTED
PARECHOVIRUS CSF FILM ARRAY: NOT DETECTED
POTASSIUM SERPL-SCNC: 3.2 MEQ/L (ref 3.5–5.2)
PROTEIN CSF: 108 MG/DL (ref 12–60)
SARS-COV-2, NAAT: NOT  DETECTED
SODIUM BLD-SCNC: 141 MEQ/L (ref 135–145)
STREPTOCOCCUS AGALACTIAE CSF FILM ARRAY: NOT DETECTED
STREPTOCOCCUS PNEUMONIAE CSF FILM ARRAY: NOT DETECTED
VARICELLA-ZOSTER, PCR: NOT DETECTED

## 2022-11-15 PROCEDURE — 82945 GLUCOSE OTHER FLUID: CPT

## 2022-11-15 PROCEDURE — 83605 ASSAY OF LACTIC ACID: CPT

## 2022-11-15 PROCEDURE — 6370000000 HC RX 637 (ALT 250 FOR IP): Performed by: INTERNAL MEDICINE

## 2022-11-15 PROCEDURE — 89051 BODY FLUID CELL COUNT: CPT

## 2022-11-15 PROCEDURE — 87075 CULTR BACTERIA EXCEPT BLOOD: CPT

## 2022-11-15 PROCEDURE — 6360000002 HC RX W HCPCS: Performed by: INTERNAL MEDICINE

## 2022-11-15 PROCEDURE — 36415 COLL VENOUS BLD VENIPUNCTURE: CPT

## 2022-11-15 PROCEDURE — 62328 DX LMBR SPI PNXR W/FLUOR/CT: CPT

## 2022-11-15 PROCEDURE — 2709999900 IR LUMBAR PUNCTURE FOR DIAGNOSIS

## 2022-11-15 PROCEDURE — 1200000000 HC SEMI PRIVATE

## 2022-11-15 PROCEDURE — 84157 ASSAY OF PROTEIN OTHER: CPT

## 2022-11-15 PROCEDURE — 82948 REAGENT STRIP/BLOOD GLUCOSE: CPT

## 2022-11-15 PROCEDURE — 87798 DETECT AGENT NOS DNA AMP: CPT

## 2022-11-15 PROCEDURE — 80048 BASIC METABOLIC PNL TOTAL CA: CPT

## 2022-11-15 PROCEDURE — 2580000003 HC RX 258: Performed by: INTERNAL MEDICINE

## 2022-11-15 PROCEDURE — 6360000002 HC RX W HCPCS: Performed by: PHARMACIST

## 2022-11-15 PROCEDURE — 6370000000 HC RX 637 (ALT 250 FOR IP)

## 2022-11-15 PROCEDURE — 87635 SARS-COV-2 COVID-19 AMP PRB: CPT

## 2022-11-15 PROCEDURE — 1200000003 HC TELEMETRY R&B

## 2022-11-15 PROCEDURE — 88108 CYTOPATH CONCENTRATE TECH: CPT

## 2022-11-15 PROCEDURE — 2580000003 HC RX 258: Performed by: PHARMACIST

## 2022-11-15 PROCEDURE — 009U3ZZ DRAINAGE OF SPINAL CANAL, PERCUTANEOUS APPROACH: ICD-10-PCS | Performed by: RADIOLOGY

## 2022-11-15 RX ADMIN — ACYCLOVIR SODIUM 650 MG: 50 INJECTION, SOLUTION INTRAVENOUS at 13:25

## 2022-11-15 RX ADMIN — HYDRALAZINE HYDROCHLORIDE 10 MG: 20 INJECTION INTRAMUSCULAR; INTRAVENOUS at 22:14

## 2022-11-15 RX ADMIN — FERROUS SULFATE TAB 325 MG (65 MG ELEMENTAL FE) 325 MG: 325 (65 FE) TAB at 16:11

## 2022-11-15 RX ADMIN — AMLODIPINE BESYLATE 5 MG: 5 TABLET ORAL at 08:44

## 2022-11-15 RX ADMIN — PREGABALIN 75 MG: 75 CAPSULE ORAL at 08:44

## 2022-11-15 RX ADMIN — OXYCODONE HYDROCHLORIDE AND ACETAMINOPHEN 500 MG: 500 TABLET ORAL at 08:43

## 2022-11-15 RX ADMIN — ACYCLOVIR SODIUM 650 MG: 50 INJECTION, SOLUTION INTRAVENOUS at 00:45

## 2022-11-15 RX ADMIN — POTASSIUM BICARBONATE 40 MEQ: 782 TABLET, EFFERVESCENT ORAL at 08:44

## 2022-11-15 RX ADMIN — Medication 2000 UNITS: at 08:43

## 2022-11-15 RX ADMIN — LEVETIRACETAM 1000 MG: 500 TABLET, FILM COATED ORAL at 22:14

## 2022-11-15 RX ADMIN — CEFTRIAXONE 2000 MG: 2 INJECTION, POWDER, FOR SOLUTION INTRAMUSCULAR; INTRAVENOUS at 12:01

## 2022-11-15 RX ADMIN — SODIUM CHLORIDE: 9 INJECTION, SOLUTION INTRAVENOUS at 06:18

## 2022-11-15 RX ADMIN — Medication 50 MG: at 08:43

## 2022-11-15 RX ADMIN — ALOGLIPTIN 12.5 MG: 12.5 TABLET, FILM COATED ORAL at 08:43

## 2022-11-15 RX ADMIN — HEPARIN SODIUM 5000 UNITS: 5000 INJECTION INTRAVENOUS; SUBCUTANEOUS at 22:14

## 2022-11-15 RX ADMIN — SODIUM CHLORIDE: 9 INJECTION, SOLUTION INTRAVENOUS at 17:26

## 2022-11-15 RX ADMIN — FERROUS SULFATE TAB 325 MG (65 MG ELEMENTAL FE) 325 MG: 325 (65 FE) TAB at 08:43

## 2022-11-15 RX ADMIN — FAMOTIDINE 20 MG: 20 TABLET ORAL at 08:44

## 2022-11-15 RX ADMIN — VANCOMYCIN HYDROCHLORIDE 1250 MG: 5 INJECTION, POWDER, LYOPHILIZED, FOR SOLUTION INTRAVENOUS at 17:08

## 2022-11-15 RX ADMIN — LEVETIRACETAM 1000 MG: 500 TABLET, FILM COATED ORAL at 08:42

## 2022-11-15 RX ADMIN — PREGABALIN 75 MG: 75 CAPSULE ORAL at 22:14

## 2022-11-15 NOTE — PROGRESS NOTES
Progress note: Infectious diseases    Patient - Yadiel Keys,  Age - 81 y.o.    - 1940      Room Number - 6K-06/006-A   MRN -  [id number]   Acct # - [de-identified]  Date of Admission -  2022 10:15 PM    SUBJECTIVE:   She is still confused. OBJECTIVE   VITALS    height is 5' (1.524 m) and weight is 201 lb (91.2 kg). Her axillary temperature is 98.6 °F (37 °C). Her blood pressure is 170/83 (abnormal) and her pulse is 98. Her respiration is 18 and oxygen saturation is 98%.        Wt Readings from Last 3 Encounters:   11/15/22 201 lb (91.2 kg)   22 208 lb 12.4 oz (94.7 kg)   22 214 lb 9.6 oz (97.3 kg)       I/O (24 Hours)    Intake/Output Summary (Last 24 hours) at 11/15/2022 8271  Last data filed at 2022 1554  Gross per 24 hour   Intake 480 ml   Output --   Net 480 ml       General Appearance  Awake, but confused  HEENT - normocephalic, atraumatic, slighlty pale conjunctiva,  anicteric sclera  Neck - Supple, no mass  Lungs -  Bilateral good air entry, no rhonchi, no wheeze  Cardiovascular - Heart sounds are normal.     Abdomen - soft, not distended, nontender,   Neurologic -awake, confused  Skin - No bruising or bleeding  Extremities - No edema, no cyanosis, clubbing     MEDICATIONS:      amLODIPine  5 mg Oral Daily    cefTRIAXone (ROCEPHIN) IV  2,000 mg IntraVENous Q24H    acyclovir  10 mg/kg (Adjusted) IntraVENous Q12H    vancomycin (VANCOCIN) intermittent dosing (placeholder)   Other RX Placeholder    vancomycin  1,250 mg IntraVENous Q24H    ascorbic acid  500 mg Oral Daily    ferrous sulfate  325 mg Oral TID WC    levETIRAcetam  1,000 mg Oral BID    pregabalin  75 mg Oral BID    alogliptin  12.5 mg Oral Daily    Vitamin D  2,000 Units Oral Daily    zinc sulfate  50 mg Oral Daily    sodium chloride flush  5-40 mL IntraVENous 2 times per day    heparin (porcine)  5,000 Units SubCUTAneous 3 times per day    famotidine  20 mg Oral Daily    insulin lispro  0-8 Units SubCUTAneous TID WC    insulin lispro  0-4 Units SubCUTAneous Nightly    [Held by provider] metFORMIN  1,000 mg Oral QPM      sodium chloride      sodium chloride 100 mL/hr at 11/15/22 0618    dextrose       acetaminophen, sodium chloride, sodium chloride flush, sodium chloride, ondansetron **OR** ondansetron, glucose, dextrose bolus **OR** dextrose bolus, glucagon (rDNA), dextrose, hydrALAZINE, potassium chloride **OR** potassium alternative oral replacement **OR** potassium chloride, magnesium sulfate      LABS:     CBC:   Recent Labs     11/12/22 2256 11/14/22  0616   WBC 9.1 5.5   HGB 11.7* 11.8*    139     BMP:    Recent Labs     11/12/22 2256 11/13/22  0918 11/15/22  0409    140 141   K 4.1 3.3* 3.2*    103 106   CO2 25 25 21*   BUN 27* 23* 19   CREATININE 1.6* 1.2 1.0   GLUCOSE 184* 211* 170*     Calcium:  Recent Labs     11/15/22  0409   CALCIUM 8.6     Ionized Calcium:No results for input(s): IONCA in the last 72 hours. Magnesium:  Recent Labs     11/14/22  0616   MG 1.9     Phosphorus:No results for input(s): PHOS in the last 72 hours. BNP:No results for input(s): BNP in the last 72 hours. Glucose:  Recent Labs     11/14/22  1558 11/14/22  2017 11/15/22  0634   POCGLU 132* 133* 180*     HgbA1C:   Recent Labs     11/13/22  1101   LABA1C 6.7*     INR: No results for input(s): INR in the last 72 hours. Hepatic:   Recent Labs     11/12/22 2256   ALKPHOS 116   ALT 8*   AST 11   PROT 6.7   BILITOT 0.3   LABALBU 4.2     Amylase and Lipase:  Recent Labs     11/15/22  0409   LACTA 0.8     Lactic Acid:   Recent Labs     11/15/22  0409   LACTA 0.8     Troponin: No results for input(s): CKTOTAL, CKMB, TROPONINI in the last 72 hours. BNP: No results for input(s): BNP in the last 72 hours.     CULTURES:   UA:   Recent Labs     11/12/22  2352   PHUR 6.0   COLORU YELLOW   PROTEINU 300*   BLOODU NEGATIVE   RBCUA NONE SEEN   WBCUA 0-2 BACTERIA NONE SEEN   NITRU NEGATIVE   GLUCOSEU NEGATIVE   BILIRUBINUR NEGATIVE   UROBILINOGEN 0.2   KETUA TRACE*     Micro:   Lab Results   Component Value Date/Time    BC No growth 24 hours. No growth 48 hours. 11/13/2022 06:15 AM   MRI:  1. 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 2. Chronic lacunar infarcts in the right basal ganglia and thalamus with the thalamic lesion having appeared in the interval since prior MRI. 3. Redemonstration of exenteration of the nasal passage along with the ethmoid air cells and sphenoid sinuses.                   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    Obesity, Class II, BMI 35-39.9 E66.9    Brain mass G93.89    Chronic diastolic CHF (congestive heart failure) ([facility]) I50.32    Chronic ethmoidal sinusitis J32.2    Chronic frontal sinusitis J32.1    Chronic maxillary sinusitis J32.0    Chronic sphenoidal sinusitis J32.3    Brain compression ([facility]) O32.6    Metabolic encephalopathy Y06.58    Hypokalemia E87.6    Cerebral edema ([facility]) G93.6    Morbid obesity ([facility]) E66.01    Cerebral abscess G06.0    Unspecified convulsions R56.9    Altered mental state R41.82    IDALIA (acute kidney injury) ([facility] [address] N17.9    Encephalopathy, metabolic Z46.11    Dehydration E86.0    Cellulitis of left leg L03.116         ASSESSMENT/PLAN   Change in mental state/encephalopathy  Hx of brain abcess: MRI report noted.  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

## 2022-11-15 NOTE — DISCHARGE SUMMARY
Discharge/Transfer Summary  11/18/22  1:08 PM    Patient: Rebecca Ewing  YOB: 1940    MRN: [id number]   Acct: [de-identified]   [id number]   Primary Care Physician: Melony Crowell MD    Admit date:  11/12/2022    Discharge date:  11/18/22    Discharge Diagnoses: Altered mental status  Toxic Encephalopathy, metabolic  IDALIA on CKD stage 3  Cellulitis  Diabetes   HTN       Discharge Medications:        Medication List        CONTINUE taking these medications      Handicap Placard Misc  by Does not apply route Dx:I187.2,M81.0. Duration: 5 years. Shivani Parcel  1 each by Does not apply route daily Requests stand up walker due to heart failure and generalized OA. I50.23.             ASK your doctor about these medications      acetaminophen 500 MG tablet  Commonly known as: TYLENOL     amLODIPine 5 MG tablet  Commonly known as: NORVASC  Take 0.5 tablets by mouth daily Hold if BP<110 or HR <70     ascorbic acid 500 MG tablet  Commonly known as: VITAMIN C  Take 1 tablet by mouth daily     bumetanide 1 MG tablet  Commonly known as: BUMEX  Take 1 tablet by mouth 2 times daily     doxycycline hyclate 100 MG capsule  Commonly known as: VIBRAMYCIN     ferrous sulfate 325 (65 Fe) MG tablet  Commonly known as: IRON 325     ibuprofen 200 MG tablet  Commonly known as: ADVIL;MOTRIN     levETIRAcetam 1000 MG tablet  Commonly known as: KEPPRA  Take 1 tablet by mouth 2 times daily     loratadine 10 MG tablet  Commonly known as: Claritin  Take 1 tablet by mouth daily     melatonin 3 MG Tabs tablet     metFORMIN 1000 MG tablet  Commonly known as: GLUCOPHAGE  TAKE ONE TABLET IN THE EVENING     ofloxacin 0.3 % otic solution  Commonly known as: FLOXIN     omeprazole 20 MG delayed release capsule  Commonly known as: PRILOSEC  TAKE ONE CAPSULE BY MOUTH ONCE DAILY     potassium chloride 20 MEQ extended release tablet  Commonly known as: KLOR-CON M  Take 2 tablets by mouth daily     pregabalin 150 MG capsule  Commonly known as: LYRICA  Take 2 capsules by mouth 2 times daily for 180 days. SITagliptin 100 MG tablet  Commonly known as: JANUVIA  Take 1 tablet by mouth daily     sodium chloride 0.65 % nasal spray  Commonly known as: OCEAN, BABY AYR     triamcinolone 0.1 % cream  Commonly known as: KENALOG     vitamin D 50 MCG (2000 UT) Tabs tablet  Commonly known as: CHOLECALCIFEROL  Take 1 tablet by mouth daily     zinc sulfate 220 (50 Zn) MG capsule  Commonly known as: ZINCATE             Scheduled Meds: Scheduled Meds:  REM    Continuous Infusions:  REM    PRN Meds:. REM  Continuous Infusions:  REM    No intake or output data in the 24 hours ending 11/28/22 1308      Diet:  No diet orders on file    Consultants:  Neurology, Infectious disease    Procedures:  LP    Diagnostic Test:  CT, MRI, EEG  Objective:  Lab Results   Component Value Date/Time    WBC 5.1 11/18/2022 10:54 AM    RBC 3.79 11/18/2022 10:54 AM    HGB 11.2 11/18/2022 10:54 AM    HCT 33.7 11/18/2022 10:54 AM    MCV 88.9 11/18/2022 10:54 AM    MCH 29.6 11/18/2022 10:54 AM    MCHC 33.2 11/18/2022 10:54 AM    RDW 17.9 03/07/2022 12:00 AM     11/18/2022 10:54 AM    MPV 9.6 11/18/2022 10:54 AM     Lab Results   Component Value Date/Time     11/18/2022 10:54 AM    K 3.6 11/18/2022 10:54 AM    K 3.6 05/06/2022 07:28 AM     11/18/2022 10:54 AM    CO2 22 11/18/2022 10:54 AM    BUN 13 11/18/2022 10:54 AM    CREATININE 1.0 11/18/2022 10:54 AM    GLUCOSE 191 11/18/2022 10:54 AM    GLUCOSE 139 08/04/2022 03:42 PM    CALCIUM 8.4 11/18/2022 10:54 AM     Lab Results   Component Value Date/Time    CALCIUM 8.4 11/18/2022 10:54 AM     No results found for: IONCA  Lab Results   Component Value Date/Time    MG 1.9 11/14/2022 06:16 AM     Lab Results   Component Value Date/Time    PHOS 3.4 04/22/2018 08:11 AM     No results found for: BNP  Lab Results   Component Value Date/Time    ALKPHOS 116 11/12/2022 10:56 PM    ALT 8 11/12/2022 10:56 PM    AST 11 11/12/2022 10:56 PM PROT 6.7 11/12/2022 10:56 PM    BILITOT 0.3 11/12/2022 10:56 PM    BILIDIR <0.2 05/05/2022 09:15 PM    LABALBU 4.2 11/12/2022 10:56 PM     Lab Results   Component Value Date/Time    LACTA 0.8 11/15/2022 04:09 AM     No results found for: AMYLASE  Lab Results   Component Value Date/Time    LIPASE 32.8 05/05/2022 09:15 PM     Lab Results   Component Value Date/Time    CHOL 144 09/13/2021 10:13 AM    TRIG 123 09/13/2021 10:13 AM    HDL 44 09/13/2021 10:13 AM    HDL 34 05/04/2012 12:00 AM    LDLCALC 75 09/13/2021 10:13 AM     No results for input(s): POCGLU in the last 72 hours. No results for input(s): CKTOTAL, CKMB, TROPONINI in the last 72 hours. Lab Results   Component Value Date/Time    LABA1C 6.7 11/13/2022 11:01 AM     Lab Results   Component Value Date/Time    INR 1.39 03/24/2021 03:50 AM     No results found for: PHART, PO2ART, TUW3NAP, TBO4NHJ, [facility] Course: Kathya Ellis is a 81 y.o. female with a history of adenocarcinoma of the sinuses, CKD 3, lumbar DDD with neuropathy, DM 2, hyperlipidemia, hypertension, SWAPNA, unspecified convulsions -on Keppra, history of cerebral abscess (Staph). Since presentation to [facility] ED 11/12/22, patient has been consistently hypertensive, at times reaching 637 systolic and 948 diastolic. She is currently infected with MRSA and staph of her nasal cavity. During this hospital admission, she is also being treated for IDALIA, dehydration, metabolic encephalopathy. Per last BMP, BUN 19 and creatinine 1.0, improved from 27 and 1.6 respectively. CT head without contrast   Impression:   1. No acute intracranial abnormality. Age-appropriate exam.   2. Bifrontal postoperative changes with inferior frontal lobe    encephalomalacia. Postoperative changes are new compared to the previous    exam, noting an evidence of a right frontal lobe mass on the previous    exam.   3. Extensive sinonasal surgery. Moderate chronic sinusitis. These findings    are similar to previous. MRI   Redemonstration of bifrontal craniotomy for prior intracerebral abscess surgery with prominent area of encephalomalacia in the right frontal convexity and smaller area of encephalomalacia at the left gyrus rectus but with extensive edema in the    frontal craniotomy plate with bandlike enhancement and prominently thickened enhancement of the subjacent dura along with a prominent area of sulcal enhancement in the right frontal lobe and rim enhancing focus. 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 2. Chronic lacunar infarcts in the right basal ganglia and thalamus with the thalamic lesion having appeared in the interval since prior MRI. 3. Redemonstration of exenteration of the nasal passage along with the ethmoid air cells and sphenoid sinuses. EEG Interpretation  This EEG was abnormal due to disorganized and slow background in polymorphic theta frequency with frequent runs of left frontal polymorphic delta and theta slowing. Occasional left frontotemporal sharp waves were seen maximal at F7 and T3. Clinical correlation  This EEG was abnormal. Disorganized background with polymorphic delta and theta slowing suggested mild to moderate encephalopathy of non specific etiology. Left frontotemporal sharp waves conferred an increased risk of focal onset seizures. Blood Cultures- no growth 24 & 48 hours    UA- Trace ketones, Protein 300    CSF-   Gram Stain Result Rare segmented neutrophils observed. No bacteria seen.  performed on cytospun specimen        Vitals: BP (!) 175/81   Pulse 78   Temp 98.5 °F (36.9 °C) (Oral)   Resp 18   Ht 5' (1.524 m)   Wt 201 lb (91.2 kg)   LMP  (LMP Unknown)   SpO2 97%   BMI 39.26 kg/m²     Physical Exam: on 11/18/22  General appearance - alert, ill appearing  Eyes - pupils equal and reactive, extraocular eye movements intact  Neck - supple, no significant adenopathy  Chest - clear to auscultation, no wheezes, rales or rhonchi, symmetric air entry  Heart - normal rate, regular rhythm, normal S1, S2, no murmurs, rubs, clicks or gallops  Abdomen - soft, nontender, nondistended, no masses or organomegaly pos bs:   Neurological - alert, NOT oriented, mumbled speech  Extremities - peripheral pulses normal, LLE redness       Disposition: [facility]- pt left 11/18/22 at 2130    Condition:  unchanged     Over 35 minutes spent on encounter      AMANDA Donis - CNP, CNP     Assessment and plan of care discussed with supervising physician, Dr Collin Johnson.          Copy: Primary Care Physician: Casi Ramno MD  Internal Medicine

## 2022-11-15 NOTE — PLAN OF CARE
Problem: Neurosensory - Adult  Goal: Achieves stable or improved neurological status  11/15/2022 1108 by Deana Hall RN  Outcome: Not Progressing  Flowsheets (Taken 11/15/2022 0830)  Achieves stable or improved neurological status: Assess for and report changes in neurological status  Note: Disoriented at times   11/15/2022 0005 by Tammie Venegas RN  Outcome: Progressing  Flowsheets (Taken 11/15/2022 0005)  Achieves stable or improved neurological status:   Assess for and report changes in neurological status   Maintain blood pressure and fluid volume within ordered parameters to optimize cerebral perfusion and minimize risk of hemorrhage     Problem: Discharge Planning  Goal: Discharge to home or other facility with appropriate resources  11/15/2022 1108 by Deana Hall RN  Outcome: Progressing  Flowsheets (Taken 11/15/2022 0830)  Discharge to home or other facility with appropriate resources: Identify barriers to discharge with patient and caregiver  Note: To be discharged once medically stable   11/15/2022 0005 by Tammie Venegas RN  Outcome: Progressing  Flowsheets (Taken 11/15/2022 0005)  Discharge to home or other facility with appropriate resources:   Identify barriers to discharge with patient and caregiver   Arrange for needed discharge resources and transportation as appropriate   Identify discharge learning needs (meds, wound care, etc)   Arrange for interpreters to assist at discharge as needed     Problem: Pain  Goal: Verbalizes/displays adequate comfort level or baseline comfort level  11/15/2022 1108 by Deana Hall RN  Outcome: Progressing  Flowsheets (Taken 11/15/2022 0815)  Verbalizes/displays adequate comfort level or baseline comfort level: Encourage patient to monitor pain and request assistance  Note: Observe for non verbal cues as patient is disoriented at times   11/15/2022 0005 by Tammie Venegas RN  Outcome: Progressing  Flowsheets (Taken 11/15/2022 0005)  Verbalizes/displays adequate comfort level or baseline comfort level:   Encourage patient to monitor pain and request assistance   Administer analgesics based on type and severity of pain and evaluate response   Notify Licensed Independent Practitioner if interventions unsuccessful or patient reports new pain     Problem: Skin/Tissue Integrity  Goal: Absence of new skin breakdown  Description: 1. Monitor for areas of redness and/or skin breakdown  2. Assess vascular access sites hourly  3. Every 4-6 hours minimum:  Change oxygen saturation probe site  4. Every 4-6 hours:  If on nasal continuous positive airway pressure, respiratory therapy assess nares and determine need for appliance change or resting period.   11/15/2022 1108 by Saud Pacheco RN  Outcome: Progressing  Note: Noted with redness and dry skin at buttocks area, with wound dressing at mid back   11/15/2022 0005 by Afshan Gutierrez RN  Outcome: Progressing     Problem: Safety - Adult  Goal: Free from fall injury  11/15/2022 1108 by Saud Pacheco RN  Outcome: Progressing  Flowsheets (Taken 11/15/2022 0005 by Afshan Gutierrez RN)  Free From Fall Injury:   Instruct family/caregiver on patient safety   Based on caregiver fall risk screen, instruct family/caregiver to ask for assistance with transferring infant if caregiver noted to have fall risk factors  Note: With tele sitter   11/15/2022 0005 by Afshan Gutierrez RN  Outcome: Progressing  Flowsheets (Taken 11/15/2022 0005)  Free From Fall Injury:   Instruct family/caregiver on patient safety   Based on caregiver fall risk screen, instruct family/caregiver to ask for assistance with transferring infant if caregiver noted to have fall risk factors     Problem: ABCDS Injury Assessment  Goal: Absence of physical injury  11/15/2022 0005 by Afshan Gutierrez RN  Outcome: Progressing  Flowsheets (Taken 11/15/2022 0005)  Absence of Physical Injury: Implement safety measures based on patient assessment     Problem: Chronic Conditions and Co-morbidities  Goal: Patient's chronic conditions and co-morbidity symptoms are monitored and maintained or improved  11/15/2022 1108 by Ihsan Maldonado RN  Outcome: Progressing  Flowsheets (Taken 11/15/2022 0830)  Care Plan - Patient's Chronic Conditions and Co-Morbidity Symptoms are Monitored and Maintained or Improved: Monitor and assess patient's chronic conditions and comorbid symptoms for stability, deterioration, or improvement  11/15/2022 0005 by Beth Pritchard RN  Outcome: Progressing  Flowsheets (Taken 11/15/2022 0005)  Care Plan - Patient's Chronic Conditions and Co-Morbidity Symptoms are Monitored and Maintained or Improved:   Monitor and assess patient's chronic conditions and comorbid symptoms for stability, deterioration, or improvement   Collaborate with multidisciplinary team to address chronic and comorbid conditions and prevent exacerbation or deterioration   Update acute care plan with appropriate goals if chronic or comorbid symptoms are exacerbated and prevent overall improvement and discharge     Problem: Cognitive:  Goal: Knowledge of wound care  Description: Knowledge of wound care  11/15/2022 0005 by Beth Pritchard RN  Outcome: Progressing  Goal: Understands risk factors for wounds  Description: Understands risk factors for wounds  11/15/2022 0005 by Beth Pritchard RN  Outcome: Progressing     Problem: Nutrition Deficit:  Goal: Optimize nutritional status  11/15/2022 0005 by Beth Pritchard RN  Outcome: Progressing     Problem: Infection - Adult  Goal: Absence of infection at discharge  11/15/2022 1108 by Ihsan Maldonado RN  Outcome: Progressing  Flowsheets (Taken 11/15/2022 0830)  Absence of infection at discharge: Assess and monitor for signs and symptoms of infection  11/15/2022 0005 by Beth Pritchard RN  Outcome: Progressing  Flowsheets (Taken 11/15/2022 0005)  Absence of infection at discharge:   Assess and monitor for signs and symptoms of infection   Monitor all insertion sites i.e., indwelling lines, tubes and drains   [place] appropriate cooling/warming therapies per order     Problem: Metabolic/Fluid and Electrolytes - Adult  Goal: Electrolytes maintained within normal limits  11/15/2022 1108 by Dheeraj Schumacher RN  Outcome: Progressing  Flowsheets (Taken 11/15/2022 1108)  Electrolytes maintained within normal limits:   Monitor labs and assess patient for signs and symptoms of electrolyte imbalances   Administer electrolyte replacement as ordered   Monitor response to electrolyte replacements, including repeat lab results as appropriate  Note: Noted with low potassium , potassium replacement given per protocol  11/15/2022 0005 by Vance Omer RN  Outcome: Progressing  Goal: Hemodynamic stability and optimal renal function maintained  11/15/2022 0005 by Vance Omer RN  Outcome: Progressing     Problem: Coping  Goal: Pt/Family able to verbalize concerns and demonstrate effective coping strategies  Description: INTERVENTIONS:  1. Assist patient/family to identify coping skills, available support systems and cultural and spiritual values  2. Provide emotional support, including active listening and acknowledgement of concerns of patient and caregivers  3. Reduce environmental stimuli, as able  4. Instruct patient/family in relaxation techniques, as appropriate  5.  Assess for spiritual pain/suffering and initiate Spiritual Care, Psychosocial Clinical Specialist consults as needed  Recent Flowsheet Documentation  Taken 11/15/2022 0830 by Dheeraj Schumacher RN  Patient/family able to verbalize anxieties, fears, and concerns, and demonstrate effective coping: Assist patient/family to identify coping skills, available support systems and cultural and spiritual values  11/15/2022 0005 by Vance Omer RN  Outcome: Progressing  Flowsheets (Taken 11/15/2022 0005)  Patient/family able to verbalize anxieties, fears, and concerns, and demonstrate effective coping:   Assist patient/family to identify coping skills, available support systems and cultural and spiritual values   Provide emotional support, including active listening and acknowledgement of concerns of patient and caregivers   Reduce environmental stimuli, as able   Instruct patient/family in relaxation techniques, as appropriate   Assess for spiritual pain/suffering and initiate Spiritual Care, Psychosocial Clinical Specialist consults as needed     Problem: Confusion  Goal: Confusion, delirium, dementia, or psychosis is improved or at baseline  Description: INTERVENTIONS:  1. Assess for possible contributors to thought disturbance, including medications, impaired vision or hearing, underlying metabolic abnormalities, dehydration, psychiatric diagnoses, and notify attending LIP  2. [place] high risk fall precautions, as indicated  3. Provide frequent short contacts to provide reality reorientation, refocusing and direction  4. Decrease environmental stimuli, including noise as appropriate  5. Monitor and intervene to maintain adequate nutrition, hydration, elimination, sleep and activity  6. If unable to ensure safety without constant attention obtain sitter and review sitter guidelines with assigned personnel  7.  Initiate Psychosocial CNS and Spiritual Care consult, as indicated  Recent Flowsheet Documentation  Taken 11/15/2022 0830 by Arsalan Henson RN  Effect of thought disturbance (confusion, delirium, dementia, or psychosis) are managed with adequate functional status: Assess for contributors to thought disturbance, including medications, impaired vision or hearing, underlying metabolic abnormalities, dehydration, psychiatric diagnoses, notify LIP  11/15/2022 0005 by Brad Ambriz RN  Outcome: Progressing  Flowsheets (Taken 11/15/2022 0005)  Effect of thought disturbance (confusion, delirium, dementia, or psychosis) are managed with adequate functional status:   Assess for contributors to thought disturbance, including medications, impaired vision or hearing, underlying metabolic abnormalities, dehydration, psychiatric diagnoses, notify [facility] high risk fall precautions, as indicated   Provide frequent short contacts to provide reality reorientation, refocusing and direction   Decrease environmental stimuli, including noise as appropriate   Monitor and intervene to maintain adequate nutrition, hydration, elimination, sleep and activity   If unable to ensure safety without constant attention obtain sitter and review sitter guidelines with assigned personnel   Initiate Psychosocial Clinical Nurse Specialist and [facility] consult, as indicated     Problem: Neurosensory - Adult  Goal: Achieves stable or improved neurological status  11/15/2022 1108 by Francesca Jorge RN  Outcome: Not Progressing  Flowsheets (Taken 11/15/2022 0830)  Achieves stable or improved neurological status: Assess for and report changes in neurological status  Note: Disoriented at times   11/15/2022 0005 by Alis Antoine RN  Outcome: Progressing  Flowsheets (Taken 11/15/2022 0005)  Achieves stable or improved neurological status:   Assess for and report changes in neurological status   Maintain blood pressure and fluid volume within ordered parameters to optimize cerebral perfusion and minimize risk of hemorrhage

## 2022-11-15 NOTE — FLOWSHEET NOTE
11/15/22 1739   Safe Environment   Safety Measures Other (comment)  (vn round)   Pt surrounded by many family members audible conversation from room , did not interrupt

## 2022-11-15 NOTE — SIGNIFICANT EVENT
Neurology consulted on 11/13/2022 for altered mental status times a few weeks. Past medical history includes adenocarcinoma of the sinuses, CKD 3, lumbar DDD with neuropathy, DM 2, hyperlipidemia, hypertension, SWAPNA, unspecified convulsions -on Keppra, history of cerebral abscess (Staph). Admission, patient has been consistently hypertensive. She is also noted to currently be infected with MRSA and staph inside of her nasal cavity. Currently being treated for IDALIA, dehydration, metabolic encephalopathy during this admission. CT head without contrast negative for acute intracranial abnormalities, revealing bifrontal postoperative changes with inferior frontal lobe encephalomalacia secondary to drainage of previous right frontal lobe abscess. 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 MRI brain negative for acute infarct. EEG suggesting mild to moderate encephalopathy. EEG monitoring also revealed left frontotemporal sharp waves. She is on Keppra 1 g twice daily and should continue. Due to critical MRI results, recommend STAT neurosurgery consult and likely transfer to the neurosurgeon at [address] [place] [address] who placed her craniotomy plate. I personally spoke with Dr. Sang Lilly regarding our recommendations. Assessment & Plan:  Toxic Metabolic Encephalopathy  CT head WO: Negative for acute intracranial abnormalities. Revealed bifrontal postoperative changes with inferior frontal lobe encephalomalacia secondary to drainage of previous right frontal lobe abscess. 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 Chronic lacunar infarcts in the right basal ganglia and thalamus.   EEG suggesting mild to moderate encephalopathy and left frontotemporal sharp waves, which confer an increased risk for focal seizures. Recommend continuing home Keppra 1 g BID. Left lower extremity venous Doppler negative for DVT. 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 Patient may require transfer to neurosurgeon at [facility] for further intervention, pending neurosurgery recommendations. Maintain adequate fluid hydration. Inpatient neurology work-up complete at this time. Neurology will sign off. Please call with any questions or concerns. Thank you for this consult. This case was discussed with Dr. Gloria Cardenas and he is in agreement with the assessment and plan.     Electronically signed by Guadalupe Fung PA-C on 11/15/22 at 1:34 PM EST

## 2022-11-15 NOTE — PROGRESS NOTES
1300 This nurse spoke with Stas Storye and patient in need of transfer to [facility]. Transfer paperwork printed and signed by provider and this nurse will fill in remaining information. 1343 This nurse called [facility]) Access to give information on patient transfer. Access center will update this nurse and provider when bed becomes available. This nurse also called the Hub with patient update. Will alert provider when transfer is set up  [phone] This nurse received call from Stas Storey asking for transfer update. No new information at this time. This nurse will call Access line to get update. 1643 This nurse called Access line and asked if bed is available for patient transfer. No bed is available today and [facility] has pending discharges. Access Line will update this nurse when bed becomes available. Accepting physician, Clarice Larose at [facility] when bed becomes available.

## 2022-11-15 NOTE — PLAN OF CARE
Problem: Discharge Planning  Goal: Discharge to home or other facility with appropriate resources  11/15/2022 0005 by Vance Omer RN  Outcome: Progressing  Flowsheets (Taken 11/15/2022 0005)  Discharge to home or other facility with appropriate resources:   Identify barriers to discharge with patient and caregiver   Arrange for needed discharge resources and transportation as appropriate   Identify discharge learning needs (meds, wound care, etc)   Arrange for interpreters to assist at discharge as needed     Problem: Pain  Goal: Verbalizes/displays adequate comfort level or baseline comfort level  11/15/2022 0005 by Vance Omer RN  Outcome: Progressing  Flowsheets (Taken 11/15/2022 0005)  Verbalizes/displays adequate comfort level or baseline comfort level:   Encourage patient to monitor pain and request assistance   Administer analgesics based on type and severity of pain and evaluate response   Notify Licensed Independent Practitioner if interventions unsuccessful or patient reports new pain     Problem: Skin/Tissue Integrity  Goal: Absence of new skin breakdown  Description: 1. Monitor for areas of redness and/or skin breakdown  2. Assess vascular access sites hourly  3. Every 4-6 hours minimum:  Change oxygen saturation probe site  4. Every 4-6 hours:  If on nasal continuous positive airway pressure, respiratory therapy assess nares and determine need for appliance change or resting period.   11/15/2022 0005 by Vance Omer RN  Outcome: Progressing     Problem: Safety - Adult  Goal: Free from fall injury  11/15/2022 0005 by Vance Omer RN  Outcome: Progressing  Flowsheets (Taken 11/15/2022 0005)  Free From Fall Injury:   Instruct family/caregiver on patient safety   Based on caregiver fall risk screen, instruct family/caregiver to ask for assistance with transferring infant if caregiver noted to have fall risk factors     Problem: ABCDS Injury Assessment  Goal: Absence of physical injury  11/15/2022 0005 by Gissel Espinal RN  Outcome: Progressing  Flowsheets (Taken 11/15/2022 0005)  Absence of Physical Injury: Implement safety measures based on patient assessment     Problem: Neurosensory - Adult  Goal: Achieves stable or improved neurological status  11/15/2022 0005 by Gissel Espinal RN  Outcome: Progressing  Flowsheets (Taken 11/15/2022 0005)  Achieves stable or improved neurological status:   Assess for and report changes in neurological status   Maintain blood pressure and fluid volume within ordered parameters to optimize cerebral perfusion and minimize risk of hemorrhage     Problem: Infection - Adult  Goal: Absence of infection at discharge  Outcome: Progressing  Flowsheets (Taken 11/15/2022 0005)  Absence of infection at discharge:   Assess and monitor for signs and symptoms of infection   Monitor all insertion sites i.e., indwelling lines, tubes and drains   [place] appropriate cooling/warming therapies per order     Problem: Metabolic/Fluid and Electrolytes - Adult  Goal: Electrolytes maintained within normal limits  Outcome: Progressing     Problem: Metabolic/Fluid and Electrolytes - Adult  Goal: Hemodynamic stability and optimal renal function maintained  Outcome: Progressing     Problem: Chronic Conditions and Co-morbidities  Goal: Patient's chronic conditions and co-morbidity symptoms are monitored and maintained or improved  11/15/2022 0005 by Gissel Espinal RN  Outcome: Progressing  Flowsheets (Taken 11/15/2022 0005)  Care Plan - Patient's Chronic Conditions and Co-Morbidity Symptoms are Monitored and Maintained or Improved:   Monitor and assess patient's chronic conditions and comorbid symptoms for stability, deterioration, or improvement   Collaborate with multidisciplinary team to address chronic and comorbid conditions and prevent exacerbation or deterioration   Update acute care plan with appropriate goals if chronic or comorbid symptoms are exacerbated and prevent overall improvement and discharge     Problem: Cognitive:  Goal: Knowledge of wound care  Description: Knowledge of wound care  11/15/2022 0005 by Castro Jiang RN  Outcome: Progressing     Problem: Cognitive:  Goal: Understands risk factors for wounds  Description: Understands risk factors for wounds  11/15/2022 0005 by Castro Jiang RN  Outcome: Progressing     Problem: Nutrition Deficit:  Goal: Optimize nutritional status  11/15/2022 0005 by Castro Jiang RN  Outcome: Progressing     Problem: Coping  Goal: Pt/Family able to verbalize concerns and demonstrate effective coping strategies  Description: INTERVENTIONS:  1. Assist patient/family to identify coping skills, available support systems and cultural and spiritual values  2. Provide emotional support, including active listening and acknowledgement of concerns of patient and caregivers  3. Reduce environmental stimuli, as able  4. Instruct patient/family in relaxation techniques, as appropriate  5. Assess for spiritual pain/suffering and initiate Spiritual Care, Psychosocial Clinical Specialist consults as needed  Outcome: Progressing  Flowsheets (Taken 11/15/2022 0005)  Patient/family able to verbalize anxieties, fears, and concerns, and demonstrate effective coping:   Assist patient/family to identify coping skills, available support systems and cultural and spiritual values   Provide emotional support, including active listening and acknowledgement of concerns of patient and caregivers   Reduce environmental stimuli, as able   Instruct patient/family in relaxation techniques, as appropriate   Assess for spiritual pain/suffering and initiate Spiritual Care, Psychosocial Clinical Specialist consults as needed     Problem: Confusion  Goal: Confusion, delirium, dementia, or psychosis is improved or at baseline  Description: INTERVENTIONS:  1.  Assess for possible contributors to thought disturbance, including medications, impaired vision or hearing, underlying metabolic abnormalities, dehydration, psychiatric diagnoses, and notify attending LIP  2. [place] high risk fall precautions, as indicated  3. Provide frequent short contacts to provide reality reorientation, refocusing and direction  4. Decrease environmental stimuli, including noise as appropriate  5. Monitor and intervene to maintain adequate nutrition, hydration, elimination, sleep and activity  6. If unable to ensure safety without constant attention obtain sitter and review sitter guidelines with assigned personnel  7. Initiate Psychosocial CNS and Spiritual Care consult, as indicated  Outcome: Progressing  Flowsheets (Taken 11/15/2022 0005)  Effect of thought disturbance (confusion, delirium, dementia, or psychosis) are managed with adequate functional status:   Assess for contributors to thought disturbance, including medications, impaired vision or hearing, underlying metabolic abnormalities, dehydration, psychiatric diagnoses, notify [facility] high risk fall precautions, as indicated   Provide frequent short contacts to provide reality reorientation, refocusing and direction   Decrease environmental stimuli, including noise as appropriate   Monitor and intervene to maintain adequate nutrition, hydration, elimination, sleep and activity   If unable to ensure safety without constant attention obtain sitter and review sitter guidelines with assigned personnel   Initiate Psychosocial Clinical Nurse Specialist and [facility] consult, as indicated  Care plan reviewed with patient and daughters. Patient and daughters verbalize understanding of the plan of care and contribute to goal setting.

## 2022-11-15 NOTE — PROGRESS NOTES
[address] to daughter and informed consent obtained per phone for Lumbar puncture with myself and CULLEN Fabian RT.  1028 Pt in specials radiology for LP. Pt opens eyes to name. Pt alert to person and place and time. 1045 Pt positioned prone on table. 1051 LP complete. Pt tolerated well. 1058 Pt positioned on bed for comfort. No change in status. 1101 Pt transferred to 6 per bed.

## 2022-11-15 NOTE — H&P
Formulation and discussion of sedation / procedure plans, risks, benefits, side effects and alternatives with patient and/or responsible adult completed.     Electronically signed by Dank Carrion MD on 11/15/2022 at 10:53 AM

## 2022-11-15 NOTE — OP NOTE
Department of Radiology  Post Procedure Progress Note      Pre-Procedure Diagnosis:  Altered mental status    Procedure Performed:  Lumbar puncture    Anesthesia: local     Findings: successful    Immediate Complications:  None    Estimated Blood Loss: minimal    SEE DICTATED PROCEDURE NOTE FOR COMPLETE DETAILS.     Electronically signed by Bandar Sosa MD on 11/15/2022 at 11:00 AM

## 2022-11-15 NOTE — FLOWSHEET NOTE
Call placed and answered via staff , denied any needs at this time   Stated she was just getting the pt all situated

## 2022-11-15 NOTE — PROGRESS NOTES
Progress note   [facility]             Patient: Jonathan Womack  YOB: 1940    MRN: [id number]   Acct:  [de-identified]   [id number]  Primary Care Physician: Shira Donis MD    Admit Date: 11/12/2022           Subjective: Pt seen this afternoon. Daughter at bedside. Pt quite confused with mumbled speech. When asked if any pain, pt states has headache and sinus pain. Objective:      Physical Exam:    Vitals:Patient Vitals for the past 24 hrs:   BP Temp Temp src Pulse Resp SpO2 Weight   11/15/22 1315 (!) 163/95 97.2 °F (36.2 °C) Oral 81 18 96 % --   11/15/22 0815 (!) 173/90 97.6 °F (36.4 °C) Oral 97 18 98 % --   11/15/22 0335 (!) 170/83 98.6 °F (37 °C) Axillary 98 18 98 % 201 lb (91.2 kg)   11/14/22 2310 (!) 186/111 99.8 °F (37.7 °C) Axillary 87 18 98 % --   11/14/22 2053 (!) 183/87 98.1 °F (36.7 °C) Oral 72 -- 99 % --     Weight: Weight: 201 lb (91.2 kg)     24 hour intake/output:No intake or output data in the 24 hours ending 11/15/22 1646    Physical Exam:  General appearance - alert, ill appearing  Eyes - pupils equal and reactive, extraocular eye movements intact  Neck - supple, no significant adenopathy  Chest - clear to auscultation, no wheezes, rales or rhonchi, symmetric air entry  Heart - normal rate, regular rhythm, normal S1, S2, no murmurs, rubs, clicks or gallops  Abdomen - soft, nontender, nondistended, no masses or organomegaly pos bs:   Neurological - alert, NOT oriented, mumbled speech  Extremities - peripheral pulses normal, LLE redness    Review of Labs and Diagnostic Testing:    CBC:   Recent Labs     11/14/22  0616   WBC 5.5   HGB 11.8*   HCT 35.7*   MCV 87.3        BMP:   Recent Labs     11/15/22  0409      K 3.2*      CO2 21*   BUN 19   CREATININE 1.0   CALCIUM 8.6   GLUCOSE 170*     PT/INR: No results for input(s): PROTIME, INR in the last 72 hours. APTT: No results for input(s): APTT in the last 72 hours. Lipids:   Recent Labs     11/12/22 2256   ALKPHOS 116   ALT 8*   AST 11   BILITOT 0.3   LABALBU 4.2     Troponin:   Recent Labs     11/12/22 2256   TROPONINT < 0.010        Imaging:  [unfilled]    EKG:      Diet: ADULT DIET; Regular; 4 carb choices (60 gm/meal); Low Fat/Low Chol/High Fiber/2 gm Na  ADULT ORAL NUTRITION SUPPLEMENT; Breakfast, Dinner;  Wound Healing Oral Supplement        Data:   Scheduled Meds: Scheduled Meds:   amLODIPine  5 mg Oral Daily    cefTRIAXone (ROCEPHIN) IV  2,000 mg IntraVENous Q24H    vancomycin (VANCOCIN) intermittent dosing (placeholder)   Other RX Placeholder    vancomycin  1,250 mg IntraVENous Q24H    ascorbic acid  500 mg Oral Daily    ferrous sulfate  325 mg Oral TID WC    levETIRAcetam  1,000 mg Oral BID    pregabalin  75 mg Oral BID    alogliptin  12.5 mg Oral Daily    Vitamin D  2,000 Units Oral Daily    zinc sulfate  50 mg Oral Daily    sodium chloride flush  5-40 mL IntraVENous 2 times per day    heparin (porcine)  5,000 Units SubCUTAneous 3 times per day    famotidine  20 mg Oral Daily    insulin lispro  0-8 Units SubCUTAneous TID WC    insulin lispro  0-4 Units SubCUTAneous Nightly    [Held by provider] metFORMIN  1,000 mg Oral QPM     Continuous Infusions:   sodium chloride      sodium chloride 100 mL/hr at 11/15/22 0618    dextrose       PRN Meds:.acetaminophen, sodium chloride, sodium chloride flush, sodium chloride, ondansetron **OR** ondansetron, glucose, dextrose bolus **OR** dextrose bolus, glucagon (rDNA), dextrose, hydrALAZINE, potassium chloride **OR** potassium alternative oral replacement **OR** potassium chloride, magnesium sulfate  Continuous Infusions:   sodium chloride      sodium chloride 100 mL/hr at 11/15/22 0618    dextrose           Assessment   Altered mental status  Encephalopathy, metabolic  IDALIA on CKD stage 3  Cellulitis  Diabetes   HTN    EEG-Interpretation   This EEG was abnormal due to disorganized and slow background in   polymorphic theta frequency with frequent runs of left frontal   polymorphic delta and theta slowing. Occasional left   frontotemporal sharp waves were seen maximal at F7 and T3. MRI-   1. 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 2. Chronic lacunar infarcts in the right basal ganglia and thalamus with the thalamic lesion having appeared in the interval since prior MRI. 3. Redemonstration of exenteration of the nasal passage along with the ethmoid air cells and sphenoid sinuses. Plan   LP done today  Call to [facility] for transfer, waiting for acceptance  Neurology updated    4:55pm- Update from one call transfer- [facility] waiting for bed to open with dc pending. Pt has been accepted by  Dr. Marsha Hale    Electronically signed by AMANDA Machado CNP on 11/15/2022 at 4:46 PM    Assessment and plan of care discussed with supervising physician, Dr Jeremy Kauffman.     Pt seen earlier this am  D/w Austin Bobby  D/w Rossy Perez who recommended NS consult in view of MRI results.  Pt had surgery in [facility] and hence transfer to [facility] initiated  D/w Dr Raudel Ojeda and antimicrobial per ID     Electronically signed by Gabriela Cruz MD on 11/15/2022 at 9:09 PM

## 2022-11-16 LAB
CHARACTER, CSF: CLEAR
COLOR CSF: COLORLESS
GLUCOSE BLD-MCNC: 111 MG/DL (ref 70–108)
GLUCOSE BLD-MCNC: 151 MG/DL (ref 70–108)
GLUCOSE BLD-MCNC: 157 MG/DL (ref 70–108)
GLUCOSE BLD-MCNC: 209 MG/DL (ref 70–108)
LYMPHS CSF: 78 % (ref 0–90)
MONOCYTE, CSF: 20 % (ref 0–45)
PATHOLOGIST REVIEW: NORMAL
POTASSIUM SERPL-SCNC: 3.2 MEQ/L (ref 3.5–5.2)
RBC CSF: 118 /CUMM
SEGS, CSF: 2 % (ref 0–6)
TOTAL NUCLEATED CELLS CSF: 3 /CUMM (ref 0–5)
TUBE VOLUME RECEIVED CSF: 3 ML
VANCOMYCIN RANDOM: 11.9 UG/ML (ref 0.1–39.9)

## 2022-11-16 PROCEDURE — 2580000003 HC RX 258: Performed by: PHARMACIST

## 2022-11-16 PROCEDURE — 2580000003 HC RX 258: Performed by: INTERNAL MEDICINE

## 2022-11-16 PROCEDURE — 36415 COLL VENOUS BLD VENIPUNCTURE: CPT

## 2022-11-16 PROCEDURE — 6360000002 HC RX W HCPCS: Performed by: INTERNAL MEDICINE

## 2022-11-16 PROCEDURE — 1200000000 HC SEMI PRIVATE

## 2022-11-16 PROCEDURE — 6370000000 HC RX 637 (ALT 250 FOR IP): Performed by: INTERNAL MEDICINE

## 2022-11-16 PROCEDURE — 6360000002 HC RX W HCPCS: Performed by: PHARMACIST

## 2022-11-16 PROCEDURE — 84132 ASSAY OF SERUM POTASSIUM: CPT

## 2022-11-16 PROCEDURE — 80202 ASSAY OF VANCOMYCIN: CPT

## 2022-11-16 PROCEDURE — 82948 REAGENT STRIP/BLOOD GLUCOSE: CPT

## 2022-11-16 PROCEDURE — 6370000000 HC RX 637 (ALT 250 FOR IP)

## 2022-11-16 RX ORDER — LANOLIN ALCOHOL/MO/W.PET/CERES
3 CREAM (GRAM) TOPICAL NIGHTLY PRN
Status: DISCONTINUED | OUTPATIENT
Start: 2022-11-16 | End: 2022-11-18 | Stop reason: HOSPADM

## 2022-11-16 RX ORDER — HYDRALAZINE HYDROCHLORIDE 20 MG/ML
10 INJECTION INTRAMUSCULAR; INTRAVENOUS ONCE
Status: COMPLETED | OUTPATIENT
Start: 2022-11-17 | End: 2022-11-17

## 2022-11-16 RX ADMIN — VANCOMYCIN HYDROCHLORIDE 1250 MG: 5 INJECTION, POWDER, LYOPHILIZED, FOR SOLUTION INTRAVENOUS at 13:31

## 2022-11-16 RX ADMIN — LEVETIRACETAM 1000 MG: 500 TABLET, FILM COATED ORAL at 08:30

## 2022-11-16 RX ADMIN — SODIUM CHLORIDE: 9 INJECTION, SOLUTION INTRAVENOUS at 05:56

## 2022-11-16 RX ADMIN — HEPARIN SODIUM 5000 UNITS: 5000 INJECTION INTRAVENOUS; SUBCUTANEOUS at 05:17

## 2022-11-16 RX ADMIN — ALOGLIPTIN 12.5 MG: 12.5 TABLET, FILM COATED ORAL at 08:30

## 2022-11-16 RX ADMIN — Medication 3 MG: at 23:57

## 2022-11-16 RX ADMIN — Medication 2000 UNITS: at 08:31

## 2022-11-16 RX ADMIN — OXYCODONE HYDROCHLORIDE AND ACETAMINOPHEN 500 MG: 500 TABLET ORAL at 08:31

## 2022-11-16 RX ADMIN — ACETAMINOPHEN 650 MG: 325 TABLET ORAL at 23:57

## 2022-11-16 RX ADMIN — AMLODIPINE BESYLATE 5 MG: 5 TABLET ORAL at 08:31

## 2022-11-16 RX ADMIN — ACETAMINOPHEN 650 MG: 325 TABLET ORAL at 01:54

## 2022-11-16 RX ADMIN — FERROUS SULFATE TAB 325 MG (65 MG ELEMENTAL FE) 325 MG: 325 (65 FE) TAB at 08:30

## 2022-11-16 RX ADMIN — HEPARIN SODIUM 5000 UNITS: 5000 INJECTION INTRAVENOUS; SUBCUTANEOUS at 21:21

## 2022-11-16 RX ADMIN — SODIUM CHLORIDE: 9 INJECTION, SOLUTION INTRAVENOUS at 21:19

## 2022-11-16 RX ADMIN — LEVETIRACETAM 1000 MG: 500 TABLET, FILM COATED ORAL at 21:21

## 2022-11-16 RX ADMIN — HYDRALAZINE HYDROCHLORIDE 10 MG: 20 INJECTION INTRAMUSCULAR; INTRAVENOUS at 21:13

## 2022-11-16 RX ADMIN — FAMOTIDINE 20 MG: 20 TABLET ORAL at 08:28

## 2022-11-16 RX ADMIN — INSULIN LISPRO 2 UNITS: 100 INJECTION, SOLUTION INTRAVENOUS; SUBCUTANEOUS at 16:58

## 2022-11-16 RX ADMIN — HEPARIN SODIUM 5000 UNITS: 5000 INJECTION INTRAVENOUS; SUBCUTANEOUS at 13:31

## 2022-11-16 RX ADMIN — PREGABALIN 75 MG: 75 CAPSULE ORAL at 08:28

## 2022-11-16 RX ADMIN — FERROUS SULFATE TAB 325 MG (65 MG ELEMENTAL FE) 325 MG: 325 (65 FE) TAB at 12:05

## 2022-11-16 RX ADMIN — CEFTRIAXONE 2000 MG: 2 INJECTION, POWDER, FOR SOLUTION INTRAMUSCULAR; INTRAVENOUS at 11:43

## 2022-11-16 RX ADMIN — Medication 50 MG: at 08:31

## 2022-11-16 RX ADMIN — PREGABALIN 75 MG: 75 CAPSULE ORAL at 21:21

## 2022-11-16 RX ADMIN — ACETAMINOPHEN 650 MG: 325 TABLET ORAL at 15:59

## 2022-11-16 RX ADMIN — POTASSIUM CHLORIDE 40 MEQ: 1500 TABLET, EXTENDED RELEASE ORAL at 12:05

## 2022-11-16 NOTE — PLAN OF CARE
Problem: Discharge Planning  Goal: Discharge to home or other facility with appropriate resources  11/16/2022 1114 by Francesca Jorge RN  Outcome: Progressing  Flowsheets (Taken 11/16/2022 0830)  Discharge to home or other facility with appropriate resources: Identify barriers to discharge with patient and caregiver  11/16/2022 0147 by Alis Antoine RN  Outcome: Progressing  Flowsheets (Taken 11/16/2022 0147)  Discharge to home or other facility with appropriate resources:   Identify barriers to discharge with patient and caregiver   Identify discharge learning needs (meds, wound care, etc)   Refer to discharge planning if patient needs post-hospital services based on physician order or complex needs related to functional status, cognitive ability or social support system     Problem: Pain  Goal: Verbalizes/displays adequate comfort level or baseline comfort level  11/16/2022 1114 by Francesca Jorge RN  Outcome: Progressing  11/16/2022 0147 by Alis Antoine RN  Outcome: Progressing  Flowsheets  Taken 11/16/2022 0147 by Alis Antoine RN  Verbalizes/displays adequate comfort level or baseline comfort level:   Encourage patient to monitor pain and request assistance   Administer analgesics based on type and severity of pain and evaluate response   Assess pain using appropriate pain scale   Notify Licensed Independent Practitioner if interventions unsuccessful or patient reports new pain  Taken 11/15/2022 1315 by Francesca Jorge RN  Verbalizes/displays adequate comfort level or baseline comfort level: Encourage patient to monitor pain and request assistance     Problem: Skin/Tissue Integrity  Goal: Absence of new skin breakdown  Description: 1. Monitor for areas of redness and/or skin breakdown  2. Assess vascular access sites hourly  3. Every 4-6 hours minimum:  Change oxygen saturation probe site  4.   Every 4-6 hours:  If on nasal continuous positive airway pressure, respiratory therapy assess nares and determine need for appliance change or resting period.   11/16/2022 1114 by Ihsan Maldonado RN  Outcome: Progressing  11/16/2022 0147 by Beth Pritchard RN  Outcome: Progressing     Problem: Safety - Adult  Goal: Free from fall injury  11/16/2022 1114 by Ihsan Maldonado RN  Outcome: Progressing  11/16/2022 0147 by Beth Pritchard RN  Outcome: Progressing  Flowsheets (Taken 11/16/2022 0147)  Free From Fall Injury:   Instruct family/caregiver on patient safety   Based on caregiver fall risk screen, instruct family/caregiver to ask for assistance with transferring infant if caregiver noted to have fall risk factors     Problem: ABCDS Injury Assessment  Goal: Absence of physical injury  11/16/2022 1114 by Ihsan Maldonado RN  Outcome: Progressing  11/16/2022 0147 by Beth Pritchard RN  Outcome: Progressing  Flowsheets (Taken 11/16/2022 0147)  Absence of Physical Injury: Implement safety measures based on patient assessment     Problem: Chronic Conditions and Co-morbidities  Goal: Patient's chronic conditions and co-morbidity symptoms are monitored and maintained or improved  11/16/2022 1114 by Ihsan Maldonado RN  Outcome: Progressing  Flowsheets (Taken 11/16/2022 0830)  Care Plan - Patient's Chronic Conditions and Co-Morbidity Symptoms are Monitored and Maintained or Improved: Monitor and assess patient's chronic conditions and comorbid symptoms for stability, deterioration, or improvement  11/16/2022 0147 by Beth Pritchard RN  Outcome: Progressing     Problem: Cognitive:  Goal: Knowledge of wound care  Description: Knowledge of wound care  11/16/2022 1114 by Ihsan Maldonado RN  Outcome: Progressing  11/16/2022 0147 by Beth Pritchard RN  Outcome: Progressing  Goal: Understands risk factors for wounds  Description: Understands risk factors for wounds  11/16/2022 1114 by Ihsan Maldonado RN  Outcome: Progressing  11/16/2022 0147 by Beth Pritchard RN  Outcome: Progressing     Problem: Nutrition Deficit:  Goal: Optimize nutritional status  11/16/2022 1114 by Eva Pickett RN  Outcome: Progressing  11/16/2022 0147 by Jana Dave RN  Outcome: Progressing     Problem: Infection - Adult  Goal: Absence of infection at discharge  11/16/2022 1114 by Eva Pickett RN  Outcome: Progressing  Flowsheets (Taken 11/16/2022 0830)  Absence of infection at discharge: Assess and monitor for signs and symptoms of infection  11/16/2022 0147 by Jana Dave RN  Outcome: Progressing  Flowsheets (Taken 11/16/2022 0147)  Absence of infection at discharge:   Assess and monitor for signs and symptoms of infection   Monitor lab/diagnostic results   Monitor all insertion sites i.e., indwelling lines, tubes and drains   Monitor endotracheal (as able) and nasal secretions for changes in amount and color   [place] appropriate cooling/warming therapies per order   Administer medications as ordered   Instruct and encourage patient and family to use good hand hygiene technique     Problem: Metabolic/Fluid and Electrolytes - Adult  Goal: Hemodynamic stability and optimal renal function maintained  11/16/2022 1114 by Eva Pickett RN  Outcome: Progressing  11/16/2022 0147 by Jana Dave RN  Outcome: Progressing     Problem: Coping  Goal: Pt/Family able to verbalize concerns and demonstrate effective coping strategies  Description: INTERVENTIONS:  1. Assist patient/family to identify coping skills, available support systems and cultural and spiritual values  2. Provide emotional support, including active listening and acknowledgement of concerns of patient and caregivers  3. Reduce environmental stimuli, as able  4. Instruct patient/family in relaxation techniques, as appropriate  5.  Assess for spiritual pain/suffering and initiate Spiritual Care, Psychosocial Clinical Specialist consults as needed  11/16/2022 1114 by Eva Pickett RN  Outcome: Progressing  11/16/2022 0147 by Jana Dave RN  Outcome: Progressing  Flowsheets (Taken 11/16/2022 12)  Patient/family able to verbalize anxieties, fears, and concerns, and demonstrate effective coping:   Assist patient/family to identify coping skills, available support systems and cultural and spiritual values   Provide emotional support, including active listening and acknowledgement of concerns of patient and caregivers   Reduce environmental stimuli, as able     Problem: Neurosensory - Adult  Goal: Achieves stable or improved neurological status  11/16/2022 1114 by Rocky Jose RN  Outcome: Not Progressing  Flowsheets (Taken 11/16/2022 0147 by Va Hernandez RN)  Achieves stable or improved neurological status:   Assess for and report changes in neurological status   Initiate measures to prevent increased intracranial pressure   Maintain blood pressure and fluid volume within ordered parameters to optimize cerebral perfusion and minimize risk of hemorrhage   Monitor temperature, glucose, and sodium. Initiate appropriate interventions as ordered  Note: Disoriented   11/16/2022 0147 by Va Hernandez RN  Outcome: Progressing  Flowsheets (Taken 11/16/2022 0147)  Achieves stable or improved neurological status:   Assess for and report changes in neurological status   Initiate measures to prevent increased intracranial pressure   Maintain blood pressure and fluid volume within ordered parameters to optimize cerebral perfusion and minimize risk of hemorrhage   Monitor temperature, glucose, and sodium.  Initiate appropriate interventions as ordered     Problem: Metabolic/Fluid and Electrolytes - Adult  Goal: Electrolytes maintained within normal limits  11/16/2022 1114 by Rocky Jose RN  Outcome: Not Progressing  Flowsheets (Taken 11/16/2022 0830)  Electrolytes maintained within normal limits: Monitor labs and assess patient for signs and symptoms of electrolyte imbalances  Note: Potasium still on low side   11/16/2022 0147 by Va Hernandez RN  Outcome: Progressing  Flowsheets (Taken 11/16/2022 0147)  Electrolytes maintained within normal limits:   Monitor labs and assess patient for signs and symptoms of electrolyte imbalances   Administer electrolyte replacement as ordered     Problem: Confusion  Goal: Confusion, delirium, dementia, or psychosis is improved or at baseline  Description: INTERVENTIONS:  1. Assess for possible contributors to thought disturbance, including medications, impaired vision or hearing, underlying metabolic abnormalities, dehydration, psychiatric diagnoses, and notify attending LIP  2. [place] high risk fall precautions, as indicated  3. Provide frequent short contacts to provide reality reorientation, refocusing and direction  4. Decrease environmental stimuli, including noise as appropriate  5. Monitor and intervene to maintain adequate nutrition, hydration, elimination, sleep and activity  6. If unable to ensure safety without constant attention obtain sitter and review sitter guidelines with assigned personnel  7.  Initiate Psychosocial CNS and Spiritual Care consult, as indicated  11/16/2022 1114 by Santy Gutiérrez RN  Outcome: Not Progressing  Flowsheets (Taken 11/16/2022 0147 by Stacy Fraser RN)  Effect of thought disturbance (confusion, delirium, dementia, or psychosis) are managed with adequate functional status:   Assess for contributors to thought disturbance, including medications, impaired vision or hearing, underlying metabolic abnormalities, dehydration, psychiatric diagnoses, notify [facility] high risk fall precautions, as indicated   Provide frequent short contacts to provide reality reorientation, refocusing and direction   Decrease environmental stimuli, including noise as appropriate   Monitor and intervene to maintain adequate nutrition, hydration, elimination, sleep and activity  Note: Disoriented   11/16/2022 0147 by Stacy Fraser RN  Outcome: Progressing  Flowsheets (Taken 11/16/2022 0147)  Effect of thought disturbance (confusion, delirium, dementia, or psychosis) are managed with adequate functional status:   Assess for contributors to thought disturbance, including medications, impaired vision or hearing, underlying metabolic abnormalities, dehydration, psychiatric diagnoses, notify [place] high risk fall precautions, as indicated   Provide frequent short contacts to provide reality reorientation, refocusing and direction   Decrease environmental stimuli, including noise as appropriate   Monitor and intervene to maintain adequate nutrition, hydration, elimination, sleep and activity

## 2022-11-16 NOTE — PROGRESS NOTES
Patient very confused today and was not able to sleep throughout the night per night shift RN. Patient is continuing to get out of bed, pulled out IV, and unable to re-direct. [id number] This nurse perfect served Dr. Jimi Fuller about patient behaviors. Awaiting response. 1520 Call back received by Dr. Jimi Fuller to continue to monitor patient. PRN meds not adequate for patient due to meningitis. This nurse will give AM meds and continue to check in on patient and sitter.

## 2022-11-16 NOTE — PROGRESS NOTES
[facility] Pharmacokinetic Monitoring Service - Vancomycin    Consulting Provider: Dr. 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 Recent Labs     11/14/22  0616 11/15/22  0409   CREATININE  --  1.0   WBC 5.5  --        Pertinent Cultures:  Culture Date Source Results   11/15/22 Cerebrospinal fluid NG- preliminary   11/13/22 Blood x 2 NGTD   MRSA Nasal Swab: N/A. Non-respiratory infection.     Recent vancomycin administrations                     vancomycin (VANCOCIN) 1250 mg in dextrose 5 % 250 mL IVPB (mg) 1,250 mg New Bag 11/16/22 1331     1,250 mg New Bag 11/15/22 1708    vancomycin (VANCOCIN) 1,750 mg in dextrose 5 % 500 mL IVPB (mg) 1,750 mg New Bag 11/14/22 1447                    Assessment:  Date/Time Current Dose Concentration Timing of Concentration (h) AUC   11/16/22 @ 1205 1250 mg q24h 11.9 18 hours 56 min 454   Note: Serum concentrations collected for AUC dosing may appear elevated if collected in close proximity to the dose administered, this is not necessarily an indication of toxicity    Plan:  Current dosing regimen is therapeutic  Increase dose to 1500 mg IV q24h for predicted AUC  of 539  Repeat vancomycin concentration will be ordered 48 hours after dose adjustment    Pharmacy will continue to monitor patient and adjust therapy as indicated    Thank you for the consult,  Monet Evans, [facility]  11/16/2022 1:44 PM

## 2022-11-16 NOTE — PROGRESS NOTES
This nurse contacted [facility] and talked with Daniel Augustine about transfer and patient bed availability. Still no bed available and waiting on discharges from [address] [place] [address]. Will continue to update.

## 2022-11-16 NOTE — PROGRESS NOTES
Progress note: Infectious diseases    Patient - Severa Raker,  Age - 81 y.o.    - 1940      Room Number - 6K-06/006-A   MRN -  [id number]   Acct # - [de-identified]  Date of Admission -  2022 10:15 PM    SUBJECTIVE:   She is still confused. A sitter at bed side  Awaiting transfer to [address] [place] [address]    height is 5' (1.524 m) and weight is 201 lb (91.2 kg). Her oral temperature is 98.1 °F (36.7 °C). Her blood pressure is 157/82 (abnormal) and her pulse is 73. Her respiration is 18 and oxygen saturation is 96%.        Wt Readings from Last 3 Encounters:   11/15/22 201 lb (91.2 kg)   22 208 lb 12.4 oz (94.7 kg)   22 214 lb 9.6 oz (97.3 kg)       I/O (24 Hours)    Intake/Output Summary (Last 24 hours) at 2022 1415  Last data filed at 2022 1322  Gross per 24 hour   Intake 60 ml   Output 0 ml   Net 60 ml         General Appearance   confused  HEENT - normocephalic, atraumatic, slighlty pale conjunctiva,  anicteric sclera  Neck - Supple, no mass  Lungs -  Bilateral good air entry, no rhonchi, no wheeze  Cardiovascular - Heart sounds are normal.     Abdomen - soft, not distended, nontender,   Neurologic -awake, confused  Skin - No bruising or bleeding  Extremities - No edema, no cyanosis, clubbing     MEDICATIONS:      [START ON 2022] vancomycin  1,500 mg IntraVENous Q24H    amLODIPine  5 mg Oral Daily    cefTRIAXone (ROCEPHIN) IV  2,000 mg IntraVENous Q24H    vancomycin (VANCOCIN) intermittent dosing (placeholder)   Other RX Placeholder    ascorbic acid  500 mg Oral Daily    ferrous sulfate  325 mg Oral TID     levETIRAcetam  1,000 mg Oral BID    pregabalin  75 mg Oral BID    alogliptin  12.5 mg Oral Daily    Vitamin D  2,000 Units Oral Daily    zinc sulfate  50 mg Oral Daily    sodium chloride flush  5-40 mL IntraVENous 2 times per day    heparin (porcine)  5,000 Units SubCUTAneous 3 times per day    famotidine  20 mg Oral Daily    insulin lispro  0-8 Units SubCUTAneous TID     insulin lispro  0-4 Units SubCUTAneous Nightly    [Held by provider] metFORMIN  1,000 mg Oral QPM      sodium chloride      sodium chloride 100 mL/hr at 11/16/22 0556    dextrose       melatonin, acetaminophen, sodium chloride, sodium chloride flush, sodium chloride, ondansetron **OR** ondansetron, glucose, dextrose bolus **OR** dextrose bolus, glucagon (rDNA), dextrose, hydrALAZINE, potassium chloride **OR** potassium alternative oral replacement **OR** potassium chloride, magnesium sulfate      LABS:     CBC:   Recent Labs     11/14/22  0616   WBC 5.5   HGB 11.8*          BMP:    Recent Labs     11/15/22  0409 11/16/22  0846     --    K 3.2* 3.2*     --    CO2 21*  --    BUN 19  --    CREATININE 1.0  --    GLUCOSE 170*  --        Calcium:  Recent Labs     11/15/22  0409   CALCIUM 8.6       Ionized Calcium:No results for input(s): IONCA in the last 72 hours. Magnesium:  Recent Labs     11/14/22  0616   MG 1.9       Phosphorus:No results for input(s): PHOS in the last 72 hours. BNP:No results for input(s): BNP in the last 72 hours. Glucose:  Recent Labs     11/15/22  2102 11/16/22  0812 11/16/22  1033   POCGLU 156* 151* 157*        Amylase and Lipase:  Recent Labs     11/15/22  0409   LACTA 0.8       Lactic Acid:   Recent Labs     11/15/22  0409   LACTA 0.8       Troponin: No results for input(s): CKTOTAL, CKMB, TROPONINI in the last 72 hours. BNP: No results for input(s): BNP in the last 72 hours. CULTURES:   UA:   No results for input(s): SPECGRAV, PHUR, COLORU, CLARITYU, MUCUS, PROTEINU, BLOODU, RBCUA, WBCUA, BACTERIA, NITRU, GLUCOSEU, BILIRUBINUR, UROBILINOGEN, KETUA, LABCAST, LABCASTTY, AMORPHOS in the last 72 hours. Invalid input(s): CRYSTALS    Micro:   Lab Results   Component Value Date/Time    BC No growth 24 hours. No growth 48 hours. 11/13/2022 06:15 AM     MRI:  1. 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 2. Chronic lacunar infarcts in the right basal ganglia and thalamus with the thalamic lesion having appeared in the interval since prior MRI. 3. Redemonstration of exenteration of the nasal passage along with the ethmoid air cells and sphenoid sinuses. 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    Obesity, Class II, BMI 35-39.9 E66.9    Brain mass G93.89    Chronic diastolic CHF (congestive heart failure) ([facility]) I50.32    Chronic ethmoidal sinusitis J32.2    Chronic frontal sinusitis J32.1    Chronic maxillary sinusitis J32.0    Chronic sphenoidal sinusitis J32.3    Brain compression ([facility]) X25.5    Metabolic encephalopathy R41.59    Hypokalemia E87.6    Cerebral edema ([facility]) G93.6    Morbid obesity ([facility]) E66.01    Cerebral abscess G06.0    Unspecified convulsions R56.9    Altered mental state R41.82    IDALIA (acute kidney injury) ([facility] N17.9    Encephalopathy, metabolic Q45.05    Dehydration E86.0    Cellulitis of left leg L03.116         ASSESSMENT/PLAN   Change in mental state/encephalopathy  Hx of brain abcess: MRI report noted.  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

## 2022-11-16 NOTE — FLOWSHEET NOTE
11/16/22 1014   Safe Environment   Safety Measures Other (comment)  (vn rounding complete)   Pt resting with eyes closed , call placed to pt room , sitter present and stated no concerns at this time ,

## 2022-11-16 NOTE — PROGRESS NOTES
Progress note   [facility] Special[facility]             Patient: Karmen Chaudhary  YOB: 1940    MRN: [id number]   Acct:  [de-identified]   6K-06/006-A  Primary Care Physician: Vamshi Hernandez MD    Admit Date: 11/12/2022           Subjective: Pt sleeping in no acute distress. Nursing states patient continues to be confused and restless and needing a sitter. Pt has has been accepted by [facility], waiting for bed to open up. Dr. Seema Núñez updated on bed status for [facility] and will wait for them due to her history at [facility] and that they are very familiar with her extensive history. Objective:      Physical Exam:    Vitals:Patient Vitals for the past 24 hrs:   BP Temp Temp src Pulse Resp SpO2   11/16/22 1035 (!) 157/82 98.1 °F (36.7 °C) Oral 73 18 96 %   11/16/22 0826 (!) 190/88 -- -- -- -- --   11/16/22 0815 (!) 201/80 97.5 °F (36.4 °C) Oral 74 -- 96 %   11/16/22 0513 (!) 163/102 97.5 °F (36.4 °C) Oral 95 -- 100 %   11/16/22 0122 (!) 169/81 97.9 °F (36.6 °C) Oral 97 20 95 %   11/15/22 2200 (!) 185/90 97.9 °F (36.6 °C) Oral 84 18 96 %   11/15/22 1500 (!) 165/88 97.2 °F (36.2 °C) Oral 80 18 --   11/15/22 1315 (!) 163/95 97.2 °F (36.2 °C) Oral 81 18 96 %     Weight: Weight: 201 lb (91.2 kg)     24 hour intake/output:  Intake/Output Summary (Last 24 hours) at 11/16/2022 1104  Last data filed at 11/16/2022 1035  Gross per 24 hour   Intake 60 ml   Output --   Net 60 ml       General appearance - sleeping in no distress  Neck - supple, no significant adenopathy  Chest - clear to auscultation, no wheezes, rales or rhonchi, symmetric air entry  Heart - normal rate, regular rhythm, normal S1, S2, no murmurs, rubs, clicks or gallops  Abdomen - soft, nontender, nondistended, no masses or organomegaly, pos bs.   Neurological - disoriented when awake and restless  Musculoskeletal - no joint tenderness, deformity or swelling  Extremities - peripheral pulses normal  Skin - warm and dry    Review of Labs and Diagnostic Testing:    CBC:   Recent Labs     11/14/22  0616   WBC 5.5   HGB 11.8*   HCT 35.7*   MCV 87.3        BMP:   Recent Labs     11/15/22  0409 11/16/22  0846     --    K 3.2* 3.2*     --    CO2 21*  --    BUN 19  --    CREATININE 1.0  --    CALCIUM 8.6  --    GLUCOSE 170*  --      PT/INR: No results for input(s): PROTIME, INR in the last 72 hours. APTT: No results for input(s): APTT in the last 72 hours. Lipids: No results for input(s): ALKPHOS, ALT, AST, BILITOT, BILIDIR, LABALBU, AMYLASE, LIPASE in the last 72 hours. Troponin: No results for input(s): TROPONINT in the last 72 hours. Imaging:  [unfilled]    EKG:      Diet: ADULT DIET; Regular; 4 carb choices (60 gm/meal); Low Fat/Low Chol/High Fiber/2 gm Na  ADULT ORAL NUTRITION SUPPLEMENT; Breakfast, Dinner;  Wound Healing Oral Supplement        Data:   Scheduled Meds: Scheduled Meds:   amLODIPine  5 mg Oral Daily    cefTRIAXone (ROCEPHIN) IV  2,000 mg IntraVENous Q24H    vancomycin (VANCOCIN) intermittent dosing (placeholder)   Other RX Placeholder    vancomycin  1,250 mg IntraVENous Q24H    ascorbic acid  500 mg Oral Daily    ferrous sulfate  325 mg Oral TID WC    levETIRAcetam  1,000 mg Oral BID    pregabalin  75 mg Oral BID    alogliptin  12.5 mg Oral Daily    Vitamin D  2,000 Units Oral Daily    zinc sulfate  50 mg Oral Daily    sodium chloride flush  5-40 mL IntraVENous 2 times per day    heparin (porcine)  5,000 Units SubCUTAneous 3 times per day    famotidine  20 mg Oral Daily    insulin lispro  0-8 Units SubCUTAneous TID WC    insulin lispro  0-4 Units SubCUTAneous Nightly    [Held by provider] metFORMIN  1,000 mg Oral QPM     Continuous Infusions:   sodium chloride      sodium chloride 100 mL/hr at 11/16/22 0556    dextrose       PRN Meds:.melatonin, acetaminophen, sodium chloride, sodium chloride flush, sodium chloride, ondansetron **OR** ondansetron, glucose, dextrose bolus **OR** dextrose bolus, glucagon (rDNA), dextrose, hydrALAZINE, potassium chloride **OR** potassium alternative oral replacement **OR** potassium chloride, magnesium sulfate  Continuous Infusions:   sodium chloride      sodium chloride 100 mL/hr at 11/16/22 0556    dextrose           Assessment   Altered mental status  Encephalopathy, metabolic  IDALIA on CKD stage 3  Cellulitis  Diabetes   HTN    Plan   Infectious disease following  Continue current treatment  Melatonin for sleep aide  Heparin for DVT prophylaxis  Await for [facility] transfer    Electronically signed by AMANDA Dunlap CNP on 11/16/2022 at 11:04 AM    Assessment and plan of care discussed with supervising physician, Dr Colette Platt.    Pt seen and examined by me  FLORENCIO/w Bhavya Ingram  Still awaiting bed at [facility]  Pt finally resting, was restless earlier  Cont antibiotics per ID    Electronically signed by Adonay Corbett MD on 11/16/2022 at 1:59 PM

## 2022-11-16 NOTE — PLAN OF CARE
Problem: Discharge Planning  Goal: Discharge to home or other facility with appropriate resources  Outcome: Progressing  Flowsheets (Taken 11/16/2022 0147)  Discharge to home or other facility with appropriate resources:   Identify barriers to discharge with patient and caregiver   Identify discharge learning needs (meds, wound care, etc)   Refer to discharge planning if patient needs post-hospital services based on physician order or complex needs related to functional status, cognitive ability or social support system     Problem: Pain  Goal: Verbalizes/displays adequate comfort level or baseline comfort level  Outcome: Progressing  Flowsheets  Taken 11/16/2022 0147 by Beth Pritchard RN  Verbalizes/displays adequate comfort level or baseline comfort level:   Encourage patient to monitor pain and request assistance   Administer analgesics based on type and severity of pain and evaluate response   Assess pain using appropriate pain scale   Notify Licensed Independent Practitioner if interventions unsuccessful or patient reports new pain  Problem: Skin/Tissue Integrity  Goal: Absence of new skin breakdown  Description: 1. Monitor for areas of redness and/or skin breakdown  2. Assess vascular access sites hourly  3. Every 4-6 hours minimum:  Change oxygen saturation probe site  4. Every 4-6 hours:  If on nasal continuous positive airway pressure, respiratory therapy assess nares and determine need for appliance change or resting period.   Outcome: Progressing     Problem: Safety - Adult  Goal: Free from fall injury  Outcome: Progressing  Flowsheets (Taken 11/16/2022 0147)  Free From Fall Injury:   Instruct family/caregiver on patient safety   Based on caregiver fall risk screen, instruct family/caregiver to ask for assistance with transferring infant if caregiver noted to have fall risk factors     Problem: ABCDS Injury Assessment  Goal: Absence of physical injury  Outcome: Progressing  Flowsheets (Taken 11/16/2022 0147)  Absence of Physical Injury: Implement safety measures based on patient assessment     Problem: Neurosensory - Adult  Goal: Achieves stable or improved neurological status  Outcome: Progressing  Flowsheets (Taken 11/16/2022 0147)  Achieves stable or improved neurological status:   Assess for and report changes in neurological status   Initiate measures to prevent increased intracranial pressure   Maintain blood pressure and fluid volume within ordered parameters to optimize cerebral perfusion and minimize risk of hemorrhage   Monitor temperature, glucose, and sodium.  Initiate appropriate interventions as ordered     Problem: Infection - Adult  Goal: Absence of infection at discharge  Outcome: Progressing  Flowsheets (Taken 11/16/2022 0147)  Absence of infection at discharge:   Assess and monitor for signs and symptoms of infection   Monitor lab/diagnostic results   Monitor all insertion sites i.e., indwelling lines, tubes and drains   Monitor endotracheal (as able) and nasal secretions for changes in amount and color   [place] appropriate cooling/warming therapies per order   Administer medications as ordered   Instruct and encourage patient and family to use good hand hygiene technique     Problem: Metabolic/Fluid and Electrolytes - Adult  Goal: Electrolytes maintained within normal limits  Outcome: Progressing  Flowsheets (Taken 11/16/2022 0147)  Electrolytes maintained within normal limits:   Monitor labs and assess patient for signs and symptoms of electrolyte imbalances   Administer electrolyte replacement as ordered     Problem: Metabolic/Fluid and Electrolytes - Adult  Goal: Hemodynamic stability and optimal renal function maintained  Outcome: Progressing     Problem: Chronic Conditions and Co-morbidities  Goal: Patient's chronic conditions and co-morbidity symptoms are monitored and maintained or improved  Outcome: Progressing     Problem: Cognitive:  Goal: Knowledge of wound care  Description: Knowledge of wound care  Outcome: Progressing  Care plan reviewed with patient. Patient verbalize understanding of the plan of care and contribute to goal setting.

## 2022-11-16 NOTE — FLOWSHEET NOTE
11/16/22 1818   Safe Environment   Safety Measures Other (comment)  (call answered by raciel cash asleep no concerns at this time)

## 2022-11-16 NOTE — PROGRESS NOTES
6022 This nurse received call from [facility] and spoke with Jamir. Update given that no bed is available for this patient at this time. Access Line will continue to update this nurse.

## 2022-11-17 LAB
GLUCOSE BLD-MCNC: 153 MG/DL (ref 70–108)
GLUCOSE BLD-MCNC: 171 MG/DL (ref 70–108)
GLUCOSE BLD-MCNC: 182 MG/DL (ref 70–108)
GLUCOSE BLD-MCNC: 222 MG/DL (ref 70–108)
POTASSIUM SERPL-SCNC: 3.4 MEQ/L (ref 3.5–5.2)

## 2022-11-17 PROCEDURE — 36415 COLL VENOUS BLD VENIPUNCTURE: CPT

## 2022-11-17 PROCEDURE — 6360000002 HC RX W HCPCS: Performed by: INTERNAL MEDICINE

## 2022-11-17 PROCEDURE — 1200000000 HC SEMI PRIVATE

## 2022-11-17 PROCEDURE — 82948 REAGENT STRIP/BLOOD GLUCOSE: CPT

## 2022-11-17 PROCEDURE — 84132 ASSAY OF SERUM POTASSIUM: CPT

## 2022-11-17 PROCEDURE — 6370000000 HC RX 637 (ALT 250 FOR IP): Performed by: INTERNAL MEDICINE

## 2022-11-17 PROCEDURE — 2580000003 HC RX 258: Performed by: INTERNAL MEDICINE

## 2022-11-17 RX ORDER — AMLODIPINE BESYLATE 10 MG/1
10 TABLET ORAL DAILY
Status: DISCONTINUED | OUTPATIENT
Start: 2022-11-18 | End: 2022-11-17

## 2022-11-17 RX ORDER — AMLODIPINE BESYLATE 10 MG/1
10 TABLET ORAL DAILY
Status: DISCONTINUED | OUTPATIENT
Start: 2022-11-17 | End: 2022-11-18 | Stop reason: HOSPADM

## 2022-11-17 RX ADMIN — HEPARIN SODIUM 5000 UNITS: 5000 INJECTION INTRAVENOUS; SUBCUTANEOUS at 14:44

## 2022-11-17 RX ADMIN — FERROUS SULFATE TAB 325 MG (65 MG ELEMENTAL FE) 325 MG: 325 (65 FE) TAB at 16:50

## 2022-11-17 RX ADMIN — OXYCODONE HYDROCHLORIDE AND ACETAMINOPHEN 500 MG: 500 TABLET ORAL at 11:03

## 2022-11-17 RX ADMIN — HYDRALAZINE HYDROCHLORIDE 10 MG: 20 INJECTION INTRAMUSCULAR; INTRAVENOUS at 20:29

## 2022-11-17 RX ADMIN — Medication 2000 UNITS: at 11:03

## 2022-11-17 RX ADMIN — CEFTRIAXONE 2000 MG: 2 INJECTION, POWDER, FOR SOLUTION INTRAMUSCULAR; INTRAVENOUS at 12:38

## 2022-11-17 RX ADMIN — SODIUM CHLORIDE: 9 INJECTION, SOLUTION INTRAVENOUS at 18:52

## 2022-11-17 RX ADMIN — LEVETIRACETAM 1000 MG: 500 TABLET, FILM COATED ORAL at 10:13

## 2022-11-17 RX ADMIN — ALOGLIPTIN 12.5 MG: 12.5 TABLET, FILM COATED ORAL at 10:13

## 2022-11-17 RX ADMIN — INSULIN LISPRO 2 UNITS: 100 INJECTION, SOLUTION INTRAVENOUS; SUBCUTANEOUS at 12:32

## 2022-11-17 RX ADMIN — FAMOTIDINE 20 MG: 20 TABLET ORAL at 10:13

## 2022-11-17 RX ADMIN — VANCOMYCIN HYDROCHLORIDE 1500 MG: 5 INJECTION, POWDER, LYOPHILIZED, FOR SOLUTION INTRAVENOUS at 14:49

## 2022-11-17 RX ADMIN — PREGABALIN 75 MG: 75 CAPSULE ORAL at 20:28

## 2022-11-17 RX ADMIN — AMLODIPINE BESYLATE 10 MG: 10 TABLET ORAL at 11:03

## 2022-11-17 RX ADMIN — SODIUM CHLORIDE: 9 INJECTION, SOLUTION INTRAVENOUS at 08:16

## 2022-11-17 RX ADMIN — HEPARIN SODIUM 5000 UNITS: 5000 INJECTION INTRAVENOUS; SUBCUTANEOUS at 20:28

## 2022-11-17 RX ADMIN — ACETAMINOPHEN 650 MG: 325 TABLET ORAL at 08:16

## 2022-11-17 RX ADMIN — FERROUS SULFATE TAB 325 MG (65 MG ELEMENTAL FE) 325 MG: 325 (65 FE) TAB at 12:33

## 2022-11-17 RX ADMIN — HYDRALAZINE HYDROCHLORIDE 10 MG: 20 INJECTION INTRAMUSCULAR; INTRAVENOUS at 04:17

## 2022-11-17 RX ADMIN — FERROUS SULFATE TAB 325 MG (65 MG ELEMENTAL FE) 325 MG: 325 (65 FE) TAB at 10:12

## 2022-11-17 RX ADMIN — SODIUM CHLORIDE, PRESERVATIVE FREE 10 ML: 5 INJECTION INTRAVENOUS at 21:51

## 2022-11-17 RX ADMIN — Medication 50 MG: at 11:03

## 2022-11-17 RX ADMIN — PREGABALIN 75 MG: 75 CAPSULE ORAL at 10:13

## 2022-11-17 RX ADMIN — LEVETIRACETAM 1000 MG: 500 TABLET, FILM COATED ORAL at 20:28

## 2022-11-17 ASSESSMENT — PAIN DESCRIPTION - ORIENTATION: ORIENTATION: LEFT

## 2022-11-17 ASSESSMENT — PAIN DESCRIPTION - LOCATION: LOCATION: KNEE

## 2022-11-17 ASSESSMENT — PAIN SCALES - GENERAL: PAINLEVEL_OUTOF10: 0

## 2022-11-17 NOTE — PROGRESS NOTES
Progress note   Internal Medicine Specialities             Patient: Brandon Lewis  YOB: 1940    MRN: [id number]   Acct:  [de-identified]   6K-06/006-A  Primary Care Physician: Aubrey Smalls MD    Admit Date: 11/12/2022           Subjective: Pt in bed alert, able to follow commands. Continued disorientation and mumbled speech. Family at side. Nursing states patient was kicking at staff during the night. Objective:      Physical Exam:    Vitals:Patient Vitals for the past 24 hrs:   BP Temp Temp src Pulse Resp SpO2   11/17/22 1145 127/77 98.6 °F (37 °C) Oral 96 18 96 %   11/17/22 0814 (!) 167/98 -- -- 97 18 97 %   11/17/22 0600 (!) 162/94 -- -- 93 -- 93 %   11/17/22 0401 (!) 196/93 97.9 °F (36.6 °C) Oral 89 16 95 %   11/17/22 0116 (!) 169/83 -- -- -- -- --   11/16/22 2344 (!) 182/88 -- -- -- -- --   11/16/22 2339 (!) 186/83 98 °F (36.7 °C) Oral 80 16 95 %   11/16/22 2109 (!) 193/84 -- -- -- -- --   11/16/22 2107 (!) 198/94 -- -- -- -- --   11/16/22 2054 (!) 205/87 98.1 °F (36.7 °C) Oral 73 15 98 %   11/16/22 1537 (!) 158/82 97.4 °F (36.3 °C) Oral 80 18 97 %     Weight: Weight: 201 lb (91.2 kg)     24 hour intake/output:  Intake/Output Summary (Last 24 hours) at 11/17/2022 1253  Last data filed at 11/16/2022 2339  Gross per 24 hour   Intake 120 ml   Output 200 ml   Net -80 ml       General appearance - alert  Neck - supple, no significant adenopathy  Chest - clear to auscultation,bilateral air entry  Heart - normal rate, regular rhythm, normal S1, S2, no murmurs, rubs, clicks or gallops  Abdomen - soft, nontender, nondistended, no masses or organomegaly, pos bs.   Neurological - disoriented when awake and restless  Musculoskeletal - no joint tenderness, deformity or swelling  Extremities - peripheral pulses normal, LLE redness and very dry no drainage noted  Skin - warm and dry    Review of Labs and Diagnostic Testing:    CBC: No results for input(s): WBC, HGB, HCT, MCV, PLT in the last 72 hours. BMP:   Recent Labs     11/15/22  0409 11/16/22  0846 11/17/22  0427     --   --    K 3.2*   < > 3.4*     --   --    CO2 21*  --   --    BUN 19  --   --    CREATININE 1.0  --   --    CALCIUM 8.6  --   --    GLUCOSE 170*  --   --     < > = values in this interval not displayed. PT/INR: No results for input(s): PROTIME, INR in the last 72 hours. APTT: No results for input(s): APTT in the last 72 hours. Lipids: No results for input(s): ALKPHOS, ALT, AST, BILITOT, BILIDIR, LABALBU, AMYLASE, LIPASE in the last 72 hours. Troponin: No results for input(s): TROPONINT in the last 72 hours. Imaging:  [unfilled]    EKG:      Diet: ADULT DIET; Regular; 4 carb choices (60 gm/meal); Low Fat/Low Chol/High Fiber/2 gm Na  ADULT ORAL NUTRITION SUPPLEMENT; Breakfast, Dinner;  Wound Healing Oral Supplement        Data:   Scheduled Meds: Scheduled Meds:   amLODIPine  10 mg Oral Daily    vancomycin  1,500 mg IntraVENous Q24H    cefTRIAXone (ROCEPHIN) IV  2,000 mg IntraVENous Q24H    vancomycin (VANCOCIN) intermittent dosing (placeholder)   Other RX Placeholder    ascorbic acid  500 mg Oral Daily    ferrous sulfate  325 mg Oral TID WC    levETIRAcetam  1,000 mg Oral BID    pregabalin  75 mg Oral BID    alogliptin  12.5 mg Oral Daily    Vitamin D  2,000 Units Oral Daily    zinc sulfate  50 mg Oral Daily    sodium chloride flush  5-40 mL IntraVENous 2 times per day    heparin (porcine)  5,000 Units SubCUTAneous 3 times per day    famotidine  20 mg Oral Daily    insulin lispro  0-8 Units SubCUTAneous TID WC    insulin lispro  0-4 Units SubCUTAneous Nightly    [Held by provider] metFORMIN  1,000 mg Oral QPM     Continuous Infusions:   sodium chloride      sodium chloride 100 mL/hr at 11/17/22 0816    dextrose       PRN Meds:.melatonin, acetaminophen, sodium chloride, sodium chloride flush, sodium chloride, ondansetron **OR** ondansetron, glucose, dextrose bolus **OR** dextrose bolus, glucagon (rDNA), dextrose, hydrALAZINE, potassium chloride **OR** potassium alternative oral replacement **OR** potassium chloride, magnesium sulfate  Continuous Infusions:   sodium chloride      sodium chloride 100 mL/hr at 11/17/22 0816    dextrose           Assessment   Altered mental status  Encephalopathy, metabolic  IDALIA on CKD stage 3  Cellulitis  Diabetes   HTN    Plan   Await [facility] transfer- will have update at 3pm  Infectious disease following- continue ATB  Norvasc adjusted for increased BPs  Potassium replacement per protocol  Monitor blood sugars    Electronically signed by AMANDA Levi - CNP on 11/17/2022 at 12:53 PM    Assessment and plan of care discussed with supervising physician, Dr Linus Gama.    Pt seen and examined by me  D/w [facility]  Pt resting   Did not sleep last night  Await transfer to [facility]     Electronically signed by Keith gM MD on 11/17/2022 at 8:54 PM

## 2022-11-17 NOTE — PROGRESS NOTES
This patient has been fighting and angry with nursing staff and sitter in room. This patient is angry and mad the sitter and staff keep bothering her throughout the night. This patient keep fighting us doing vital and this nurse keeps educating her that her blood pressure is very high and we need to be watching closely, pt angry and verbally aggressive. IV medication given to help bring blood pressure down.

## 2022-11-17 NOTE — PROGRESS NOTES
Progress note: Infectious diseases    Patient - Danilo Robison,  Age - 81 y.o.    - 1940      Room Number - 6K-06/006-A   MRN -  [id number]   Acct # - [de-identified]  Date of Admission -  2022 10:15 PM    SUBJECTIVE:   She is still confused, no fever  OBJECTIVE   VITALS    height is 5' (1.524 m) and weight is 201 lb (91.2 kg). Her oral temperature is 97.9 °F (36.6 °C). Her blood pressure is 167/98 (abnormal) and her pulse is 97. Her respiration is 18 and oxygen saturation is 97%.        Wt Readings from Last 3 Encounters:   11/15/22 201 lb (91.2 kg)   22 208 lb 12.4 oz (94.7 kg)   22 214 lb 9.6 oz (97.3 kg)       I/O (24 Hours)    Intake/Output Summary (Last 24 hours) at 2022 1284  Last data filed at 2022 2339  Gross per 24 hour   Intake 180 ml   Output 200 ml   Net -20 ml         General Appearance   confused  HEENT - normocephalic, atraumatic, slighlty pale conjunctiva,  anicteric sclera  Neck - Supple, no mass  Lungs -  Bilateral good air entry, no rhonchi, no wheeze  Cardiovascular - Heart sounds are normal.     Abdomen - soft, not distended, nontender,   Neurologic -awake, confused  Skin - No bruising or bleeding  Extremities - No edema, no cyanosis, clubbing     MEDICATIONS:      vancomycin  1,500 mg IntraVENous Q24H    amLODIPine  5 mg Oral Daily    cefTRIAXone (ROCEPHIN) IV  2,000 mg IntraVENous Q24H    vancomycin (VANCOCIN) intermittent dosing (placeholder)   Other RX Placeholder    ascorbic acid  500 mg Oral Daily    ferrous sulfate  325 mg Oral TID WC    levETIRAcetam  1,000 mg Oral BID    pregabalin  75 mg Oral BID    alogliptin  12.5 mg Oral Daily    Vitamin D  2,000 Units Oral Daily    zinc sulfate  50 mg Oral Daily    sodium chloride flush  5-40 mL IntraVENous 2 times per day    heparin (porcine)  5,000 Units SubCUTAneous 3 times per day    famotidine  20 mg Oral Daily    insulin lispro  0-8 Units SubCUTAneous TID     insulin lispro  0-4 Units SubCUTAneous Nightly    [Held by provider] metFORMIN  1,000 mg Oral QPM      sodium chloride      sodium chloride 100 mL/hr at 11/17/22 0816    dextrose       melatonin, acetaminophen, sodium chloride, sodium chloride flush, sodium chloride, ondansetron **OR** ondansetron, glucose, dextrose bolus **OR** dextrose bolus, glucagon (rDNA), dextrose, hydrALAZINE, potassium chloride **OR** potassium alternative oral replacement **OR** potassium chloride, magnesium sulfate      LABS:     CBC:   No results for input(s): WBC, HGB, PLT in the last 72 hours. BMP:    Recent Labs     11/15/22  0409 11/16/22  0846 11/17/22  0427     --   --    K 3.2* 3.2* 3.4*     --   --    CO2 21*  --   --    BUN 19  --   --    CREATININE 1.0  --   --    GLUCOSE 170*  --   --        Calcium:  Recent Labs     11/15/22  0409   CALCIUM 8.6        Glucose:  Recent Labs     11/16/22  1604 11/16/22 2021 11/17/22  0729   POCGLU 209* 111* 171*        Amylase and Lipase:  Recent Labs     11/15/22  0409   LACTA 0.8       Lactic Acid:   Recent Labs     11/15/22  0409   LACTA 0.8       Troponin: No results for input(s): CKTOTAL, CKMB, TROPONINI in the last 72 hours. BNP: No results for input(s): BNP in the last 72 hours. CULTURES:   UA:   No results for input(s): SPECGRAV, PHUR, COLORU, CLARITYU, MUCUS, PROTEINU, BLOODU, RBCUA, WBCUA, BACTERIA, NITRU, GLUCOSEU, BILIRUBINUR, UROBILINOGEN, KETUA, LABCAST, LABCASTTY, AMORPHOS in the last 72 hours. Invalid input(s): CRYSTALS    Micro:   Lab Results   Component Value Date/Time    BC No growth 24 hours. No growth 48 hours. 11/13/2022 06:15 AM     MRI:  1.  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 2. Chronic lacunar infarcts in the right basal ganglia and thalamus with the thalamic lesion having appeared in the interval since prior MRI. 3. Redemonstration of exenteration of the nasal passage along with the ethmoid air cells and sphenoid sinuses. 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    Obesity, Class II, BMI 35-39.9 E66.9    Brain mass G93.89    Chronic diastolic CHF (congestive heart failure) ([facility]) I50.32    Chronic ethmoidal sinusitis J32.2    Chronic frontal sinusitis J32.1    Chronic maxillary sinusitis J32.0    Chronic sphenoidal sinusitis J32.3    Brain compression ([facility]) K68.2    Metabolic encephalopathy M21.52    Hypokalemia E87.6    Cerebral edema ([facility]) G93.6    Morbid obesity ([facility]) E66.01    Cerebral abscess G06.0    Unspecified convulsions R56.9    Altered mental state R41.82    IDALIA (acute kidney injury) ([facility] [address] N17.9    Encephalopathy, metabolic X44.13    Dehydration E86.0    Cellulitis of left leg L03.116         ASSESSMENT/PLAN   Change in mental state/encephalopathy  Hx of brain abcess: MRI report noted.  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

## 2022-11-17 NOTE — PLAN OF CARE
Problem: Pain  Goal: Verbalizes/displays adequate comfort level or baseline comfort level  11/17/2022 1730 by Vernon Penny RN  Outcome: Progressing  Flowsheets (Taken 11/17/2022 1730)  Verbalizes/displays adequate comfort level or baseline comfort level:   Encourage patient to monitor pain and request assistance   Assess pain using appropriate pain scale   Administer analgesics based on type and severity of pain and evaluate response     Problem: Skin/Tissue Integrity  Goal: Absence of new skin breakdown  Description: 1. Monitor for areas of redness and/or skin breakdown  2. Assess vascular access sites hourly  3. Every 4-6 hours minimum:  Change oxygen saturation probe site  4. Every 4-6 hours:  If on nasal continuous positive airway pressure, respiratory therapy assess nares and determine need for appliance change or resting period. 11/17/2022 1730 by Vernon Penny RN  Outcome: Progressing  Note: Skin assessed throughout the shift.       Problem: Safety - Adult  Goal: Free from fall injury  11/17/2022 1730 by Vernon Penny RN  Outcome: Progressing  Flowsheets (Taken 11/17/2022 1730)  Free From Fall Injury: Instruct family/caregiver on patient safety     Problem: ABCDS Injury Assessment  Goal: Absence of physical injury  11/17/2022 1730 by Venron Penny RN  Outcome: Progressing  Flowsheets (Taken 11/17/2022 1730)  Absence of Physical Injury: Implement safety measures based on patient assessment     Problem: Neurosensory - Adult  Goal: Achieves stable or improved neurological status  11/17/2022 1730 by Vernon Penny RN  Outcome: Progressing  Flowsheets (Taken 11/17/2022 1730)  Achieves stable or improved neurological status:   Assess for and report changes in neurological status   Initiate measures to prevent increased intracranial pressure     Problem: Chronic Conditions and Co-morbidities  Goal: Patient's chronic conditions and co-morbidity symptoms are monitored and maintained or improved  11/17/2022 1730 by Angel Newman RN  Outcome: Progressing  Flowsheets (Taken 11/17/2022 1730)  Care Plan - Patient's Chronic Conditions and Co-Morbidity Symptoms are Monitored and Maintained or Improved:   Monitor and assess patient's chronic conditions and comorbid symptoms for stability, deterioration, or improvement   Collaborate with multidisciplinary team to address chronic and comorbid conditions and prevent exacerbation or deterioration     Problem: Cognitive:  Goal: Knowledge of wound care  Description: Knowledge of wound care  11/17/2022 1730 by Angel Newman RN  Outcome: Progressing  Note: Pt disoriented. Family educated on wound care and how to prevent.      Problem: Cognitive:  Goal: Understands risk factors for wounds  Description: Understands risk factors for wounds  11/17/2022 1730 by Angel Newman RN  Outcome: Progressing     Problem: Infection - Adult  Goal: Absence of infection at discharge  11/17/2022 1730 by Angel Newman RN  Outcome: Progressing  Flowsheets (Taken 11/17/2022 1730)  Absence of infection at discharge:   Assess and monitor for signs and symptoms of infection   Monitor lab/diagnostic results   Monitor all insertion sites i.e., indwelling lines, tubes and drains     Problem: Metabolic/Fluid and Electrolytes - Adult  Goal: Electrolytes maintained within normal limits  11/17/2022 1730 by Angel Newman RN  Outcome: Progressing  Flowsheets (Taken 11/17/2022 1730)  Electrolytes maintained within normal limits:   Monitor labs and assess patient for signs and symptoms of electrolyte imbalances   Administer electrolyte replacement as ordered     Problem: Metabolic/Fluid and Electrolytes - Adult  Goal: Hemodynamic stability and optimal renal function maintained  11/17/2022 1730 by Angel Newman RN  Outcome: Progressing  Flowsheets (Taken 11/17/2022 1730)  Hemodynamic stability and optimal renal function maintained:   Monitor labs and assess for signs and symptoms of volume excess or deficit Monitor intake, output and patient weight   Monitor urine specific gravity, serum osmolarity and serum sodium as indicated or ordered     Problem: Coping  Goal: Pt/Family able to verbalize concerns and demonstrate effective coping strategies  Description: INTERVENTIONS:  1. Assist patient/family to identify coping skills, available support systems and cultural and spiritual values  2. Provide emotional support, including active listening and acknowledgement of concerns of patient and caregivers  3. Reduce environmental stimuli, as able  4. Instruct patient/family in relaxation techniques, as appropriate  5. Assess for spiritual pain/suffering and initiate Spiritual Care, Psychosocial Clinical Specialist consults as needed  11/17/2022 1730 by Amy Irwin RN  Outcome: Progressing  Flowsheets (Taken 11/17/2022 1730)  Patient/family able to verbalize anxieties, fears, and concerns, and demonstrate effective coping:   Provide emotional support, including active listening and acknowledgement of concerns of patient and caregivers   Assist patient/family to identify coping skills, available support systems and cultural and spiritual values   Reduce environmental stimuli, as able     Problem: Confusion  Goal: Confusion, delirium, dementia, or psychosis is improved or at baseline  Description: INTERVENTIONS:  1. Assess for possible contributors to thought disturbance, including medications, impaired vision or hearing, underlying metabolic abnormalities, dehydration, psychiatric diagnoses, and notify attending LIP  2. [place] high risk fall precautions, as indicated  3. Provide frequent short contacts to provide reality reorientation, refocusing and direction  4. Decrease environmental stimuli, including noise as appropriate  5. Monitor and intervene to maintain adequate nutrition, hydration, elimination, sleep and activity  6.  If unable to ensure safety without constant attention obtain sitter and review sitter guidelines with assigned personnel  7.  Initiate Psychosocial CNS and Spiritual Care consult, as indicated  11/17/2022 1730 by Cori Guzman RN  Outcome: Progressing  Flowsheets (Taken 11/17/2022 1730)  Effect of thought disturbance (confusion, delirium, dementia, or psychosis) are managed with adequate functional status:   Assess for contributors to thought disturbance, including medications, impaired vision or hearing, underlying metabolic abnormalities, dehydration, psychiatric diagnoses, notify [place] high risk fall precautions, as indicated   Decrease environmental stimuli, including noise as appropriate

## 2022-11-17 NOTE — FLOWSHEET NOTE
11/17/22 1146   Safe Environment   Safety Measures Other (comment)  (VN safety round)   VN called into patients room and introduced myself and role. Visitor permitted video. Video activated. Staff at bedside and visitor preparing patient breakfast. No needs identified at this time. Call light within reach.

## 2022-11-17 NOTE — PLAN OF CARE
Problem: Discharge Planning  Goal: Discharge to home or other facility with appropriate resources  Outcome: Progressing     Problem: Pain  Goal: Verbalizes/displays adequate comfort level or baseline comfort level  Outcome: Progressing     Problem: Skin/Tissue Integrity  Goal: Absence of new skin breakdown  Description: 1. Monitor for areas of redness and/or skin breakdown  2. Assess vascular access sites hourly  3. Every 4-6 hours minimum:  Change oxygen saturation probe site  4. Every 4-6 hours:  If on nasal continuous positive airway pressure, respiratory therapy assess nares and determine need for appliance change or resting period.   Outcome: Progressing     Problem: Safety - Adult  Goal: Free from fall injury  Outcome: Progressing     Problem: ABCDS Injury Assessment  Goal: Absence of physical injury  Outcome: Progressing     Problem: Neurosensory - Adult  Goal: Achieves stable or improved neurological status  Outcome: Progressing     Problem: Chronic Conditions and Co-morbidities  Goal: Patient's chronic conditions and co-morbidity symptoms are monitored and maintained or improved  Outcome: Progressing     Problem: Cognitive:  Goal: Knowledge of wound care  Description: Knowledge of wound care  Outcome: Progressing     Problem: Cognitive:  Goal: Understands risk factors for wounds  Description: Understands risk factors for wounds  Outcome: Progressing     Problem: Nutrition Deficit:  Goal: Optimize nutritional status  Outcome: Progressing     Problem: Infection - Adult  Goal: Absence of infection at discharge  Outcome: Progressing     Problem: Metabolic/Fluid and Electrolytes - Adult  Goal: Electrolytes maintained within normal limits  Outcome: Progressing     Problem: Metabolic/Fluid and Electrolytes - Adult  Goal: Hemodynamic stability and optimal renal function maintained  Outcome: Progressing     Problem: Coping  Goal: Pt/Family able to verbalize concerns and demonstrate effective coping strategies  Description: INTERVENTIONS:  1. Assist patient/family to identify coping skills, available support systems and cultural and spiritual values  2. Provide emotional support, including active listening and acknowledgement of concerns of patient and caregivers  3. Reduce environmental stimuli, as able  4. Instruct patient/family in relaxation techniques, as appropriate  5. Assess for spiritual pain/suffering and initiate Spiritual Care, Psychosocial Clinical Specialist consults as needed  Outcome: Progressing     Problem: Confusion  Goal: Confusion, delirium, dementia, or psychosis is improved or at baseline  Description: INTERVENTIONS:  1. Assess for possible contributors to thought disturbance, including medications, impaired vision or hearing, underlying metabolic abnormalities, dehydration, psychiatric diagnoses, and notify attending LIP  2. [place] high risk fall precautions, as indicated  3. Provide frequent short contacts to provide reality reorientation, refocusing and direction  4. Decrease environmental stimuli, including noise as appropriate  5. Monitor and intervene to maintain adequate nutrition, hydration, elimination, sleep and activity  6. If unable to ensure safety without constant attention obtain sitter and review sitter guidelines with assigned personnel  7.  Initiate Psychosocial CNS and Spiritual Care consult, as indicated  Outcome: Progressing

## 2022-11-18 VITALS
SYSTOLIC BLOOD PRESSURE: 175 MMHG | RESPIRATION RATE: 18 BRPM | WEIGHT: 201 LBS | BODY MASS INDEX: 39.46 KG/M2 | OXYGEN SATURATION: 97 % | TEMPERATURE: 98.5 F | DIASTOLIC BLOOD PRESSURE: 81 MMHG | HEIGHT: 60 IN | HEART RATE: 78 BPM

## 2022-11-18 LAB
ANION GAP SERPL CALCULATED.3IONS-SCNC: 9 MEQ/L (ref 8–16)
BLOOD CULTURE, ROUTINE: NORMAL
BLOOD CULTURE, ROUTINE: NORMAL
BUN BLDV-MCNC: 13 MG/DL (ref 7–22)
CALCIUM SERPL-MCNC: 8.4 MG/DL (ref 8.5–10.5)
CHLORIDE BLD-SCNC: 111 MEQ/L (ref 98–111)
CO2: 22 MEQ/L (ref 23–33)
CREAT SERPL-MCNC: 1 MG/DL (ref 0.4–1.2)
ERYTHROCYTE [DISTWIDTH] IN BLOOD BY AUTOMATED COUNT: 14.9 % (ref 11.5–14.5)
ERYTHROCYTE [DISTWIDTH] IN BLOOD BY AUTOMATED COUNT: 47.7 FL (ref 35–45)
GFR SERPL CREATININE-BSD FRML MDRD: 57 ML/MIN/1.73M2
GLUCOSE BLD-MCNC: 158 MG/DL (ref 70–108)
GLUCOSE BLD-MCNC: 179 MG/DL (ref 70–108)
GLUCOSE BLD-MCNC: 191 MG/DL (ref 70–108)
GLUCOSE BLD-MCNC: 247 MG/DL (ref 70–108)
HCT VFR BLD CALC: 33.7 % (ref 37–47)
HEMOGLOBIN: 11.2 GM/DL (ref 12–16)
MCH RBC QN AUTO: 29.6 PG (ref 26–33)
MCHC RBC AUTO-ENTMCNC: 33.2 GM/DL (ref 32.2–35.5)
MCV RBC AUTO: 88.9 FL (ref 81–99)
PLATELET # BLD: 123 THOU/MM3 (ref 130–400)
PMV BLD AUTO: 9.6 FL (ref 9.4–12.4)
POTASSIUM SERPL-SCNC: 3.6 MEQ/L (ref 3.5–5.2)
RBC # BLD: 3.79 MILL/MM3 (ref 4.2–5.4)
SODIUM BLD-SCNC: 142 MEQ/L (ref 135–145)
WBC # BLD: 5.1 THOU/MM3 (ref 4.8–10.8)

## 2022-11-18 PROCEDURE — 2580000003 HC RX 258: Performed by: INTERNAL MEDICINE

## 2022-11-18 PROCEDURE — 36415 COLL VENOUS BLD VENIPUNCTURE: CPT

## 2022-11-18 PROCEDURE — 6360000002 HC RX W HCPCS: Performed by: INTERNAL MEDICINE

## 2022-11-18 PROCEDURE — 85027 COMPLETE CBC AUTOMATED: CPT

## 2022-11-18 PROCEDURE — 6370000000 HC RX 637 (ALT 250 FOR IP): Performed by: INTERNAL MEDICINE

## 2022-11-18 PROCEDURE — 80048 BASIC METABOLIC PNL TOTAL CA: CPT

## 2022-11-18 PROCEDURE — 82948 REAGENT STRIP/BLOOD GLUCOSE: CPT

## 2022-11-18 RX ORDER — HYDRALAZINE HYDROCHLORIDE 20 MG/ML
10 INJECTION INTRAMUSCULAR; INTRAVENOUS EVERY 6 HOURS PRN
Status: DISCONTINUED | OUTPATIENT
Start: 2022-11-18 | End: 2022-11-18 | Stop reason: HOSPADM

## 2022-11-18 RX ORDER — HYDRALAZINE HYDROCHLORIDE 25 MG/1
25 TABLET, FILM COATED ORAL EVERY 8 HOURS SCHEDULED
Status: DISCONTINUED | OUTPATIENT
Start: 2022-11-18 | End: 2022-11-18 | Stop reason: HOSPADM

## 2022-11-18 RX ADMIN — HYDRALAZINE HYDROCHLORIDE 25 MG: 25 TABLET, FILM COATED ORAL at 21:24

## 2022-11-18 RX ADMIN — ACETAMINOPHEN 650 MG: 325 TABLET ORAL at 15:19

## 2022-11-18 RX ADMIN — LEVETIRACETAM 1000 MG: 500 TABLET, FILM COATED ORAL at 21:24

## 2022-11-18 RX ADMIN — Medication 50 MG: at 09:49

## 2022-11-18 RX ADMIN — FERROUS SULFATE TAB 325 MG (65 MG ELEMENTAL FE) 325 MG: 325 (65 FE) TAB at 09:49

## 2022-11-18 RX ADMIN — HYDRALAZINE HYDROCHLORIDE 25 MG: 25 TABLET, FILM COATED ORAL at 15:19

## 2022-11-18 RX ADMIN — FERROUS SULFATE TAB 325 MG (65 MG ELEMENTAL FE) 325 MG: 325 (65 FE) TAB at 15:19

## 2022-11-18 RX ADMIN — OXYCODONE HYDROCHLORIDE AND ACETAMINOPHEN 500 MG: 500 TABLET ORAL at 09:49

## 2022-11-18 RX ADMIN — PREGABALIN 75 MG: 75 CAPSULE ORAL at 21:24

## 2022-11-18 RX ADMIN — ACETAMINOPHEN 650 MG: 325 TABLET ORAL at 02:02

## 2022-11-18 RX ADMIN — FAMOTIDINE 20 MG: 20 TABLET ORAL at 09:49

## 2022-11-18 RX ADMIN — HEPARIN SODIUM 5000 UNITS: 5000 INJECTION INTRAVENOUS; SUBCUTANEOUS at 05:44

## 2022-11-18 RX ADMIN — CEFTRIAXONE 2000 MG: 2 INJECTION, POWDER, FOR SOLUTION INTRAMUSCULAR; INTRAVENOUS at 12:43

## 2022-11-18 RX ADMIN — Medication 2000 UNITS: at 09:49

## 2022-11-18 RX ADMIN — ALOGLIPTIN 12.5 MG: 12.5 TABLET, FILM COATED ORAL at 09:44

## 2022-11-18 RX ADMIN — VANCOMYCIN HYDROCHLORIDE 1500 MG: 5 INJECTION, POWDER, LYOPHILIZED, FOR SOLUTION INTRAVENOUS at 14:13

## 2022-11-18 RX ADMIN — INSULIN LISPRO 2 UNITS: 100 INJECTION, SOLUTION INTRAVENOUS; SUBCUTANEOUS at 16:56

## 2022-11-18 RX ADMIN — PREGABALIN 75 MG: 75 CAPSULE ORAL at 09:44

## 2022-11-18 RX ADMIN — LEVETIRACETAM 1000 MG: 500 TABLET, FILM COATED ORAL at 09:43

## 2022-11-18 RX ADMIN — HEPARIN SODIUM 5000 UNITS: 5000 INJECTION INTRAVENOUS; SUBCUTANEOUS at 21:32

## 2022-11-18 RX ADMIN — SODIUM CHLORIDE, PRESERVATIVE FREE 10 ML: 5 INJECTION INTRAVENOUS at 21:32

## 2022-11-18 RX ADMIN — HYDRALAZINE HYDROCHLORIDE 10 MG: 20 INJECTION INTRAMUSCULAR; INTRAVENOUS at 02:01

## 2022-11-18 RX ADMIN — AMLODIPINE BESYLATE 10 MG: 10 TABLET ORAL at 09:43

## 2022-11-18 RX ADMIN — ACETAMINOPHEN 650 MG: 325 TABLET ORAL at 21:23

## 2022-11-18 RX ADMIN — ONDANSETRON 4 MG: 4 TABLET, ORALLY DISINTEGRATING ORAL at 00:53

## 2022-11-18 ASSESSMENT — PAIN DESCRIPTION - DESCRIPTORS
DESCRIPTORS: ACHING
DESCRIPTORS: ACHING

## 2022-11-18 ASSESSMENT — PAIN SCALES - GENERAL
PAINLEVEL_OUTOF10: 0
PAINLEVEL_OUTOF10: 0
PAINLEVEL_OUTOF10: 8
PAINLEVEL_OUTOF10: 6

## 2022-11-18 ASSESSMENT — PAIN - FUNCTIONAL ASSESSMENT: PAIN_FUNCTIONAL_ASSESSMENT: ACTIVITIES ARE NOT PREVENTED

## 2022-11-18 ASSESSMENT — PAIN DESCRIPTION - LOCATION
LOCATION: HEAD
LOCATION: GENERALIZED

## 2022-11-18 NOTE — FLOWSHEET NOTE
11/18/22 1148   Safe Environment   Safety Measures Other (comment)  (VN safety round)   VN called into patients room and introduced myself and role. Staff at bedside. Patient unable to hear me after several attempts. Staff at bedside stated she is okay and safe at this time. VN will return to check on patient.

## 2022-11-18 NOTE — PROGRESS NOTES
11/18/22 1036   Encounter Summary   Encounter Overview/Reason  Attempted Encounter   Service Provided For: Patient   Last Encounter  11/18/22   Assessment/Intervention/Outcome   Intervention Prayer (assurance of)/[place]   Encountered patient with sitter at bedside. Patient was sleeping. Silent prayer offered for patient.

## 2022-11-18 NOTE — PROGRESS NOTES
Progress note: Infectious diseases    Patient - Rena Ring,  Age - 81 y.o.    - 1940      Room Number - 6K-06/006-A   MRN -  [id number]   Acct # - [de-identified]  Date of Admission -  2022 10:15 PM    SUBJECTIVE:   No new issues  OBJECTIVE   VITALS    height is 5' (1.524 m) and weight is 201 lb (91.2 kg). Her oral temperature is 98.5 °F (36.9 °C). Her blood pressure is 132/95 (abnormal) and her pulse is 80. Her respiration is 18 and oxygen saturation is 96%.        Wt Readings from Last 3 Encounters:   11/15/22 201 lb (91.2 kg)   22 208 lb 12.4 oz (94.7 kg)   22 214 lb 9.6 oz (97.3 kg)       I/O (24 Hours)    Intake/Output Summary (Last 24 hours) at 2022 1358  Last data filed at 2022 1341  Gross per 24 hour   Intake 5966.82 ml   Output 450 ml   Net 5516.82 ml         General Appearance   asleep  HEENT - normocephalic, atraumatic, slighlty pale conjunctiva,  anicteric sclera  Foul smelling (from her sinuses)  Neck - Supple, no mass  Lungs -  Bilateral good air entry, no rhonchi, no wheeze  Cardiovascular - Heart sounds are normal.     Abdomen - soft, not distended, nontender,   Neurologic -asleep  Skin - No bruising or bleeding  Extremities - No edema, no cyanosis, clubbing     MEDICATIONS:      hydrALAZINE  25 mg Oral 3 times per day    amLODIPine  10 mg Oral Daily    vancomycin  1,500 mg IntraVENous Q24H    cefTRIAXone (ROCEPHIN) IV  2,000 mg IntraVENous Q24H    vancomycin (VANCOCIN) intermittent dosing (placeholder)   Other RX Placeholder    ascorbic acid  500 mg Oral Daily    ferrous sulfate  325 mg Oral TID     levETIRAcetam  1,000 mg Oral BID    pregabalin  75 mg Oral BID    alogliptin  12.5 mg Oral Daily    Vitamin D  2,000 Units Oral Daily    zinc sulfate  50 mg Oral Daily    sodium chloride flush  5-40 mL IntraVENous 2 times per day    heparin (porcine)  5,000 Units SubCUTAneous 3 times per day    famotidine  20 mg Oral Daily    insulin lispro  0-8 Units SubCUTAneous TID WC    insulin lispro  0-4 Units SubCUTAneous Nightly    [Held by provider] metFORMIN  1,000 mg Oral QPM      sodium chloride      sodium chloride 100 mL/hr at 11/18/22 1341    dextrose       hydrALAZINE, melatonin, acetaminophen, sodium chloride, sodium chloride flush, sodium chloride, ondansetron **OR** ondansetron, glucose, dextrose bolus **OR** dextrose bolus, glucagon (rDNA), dextrose, potassium chloride **OR** potassium alternative oral replacement **OR** potassium chloride, magnesium sulfate      LABS:     CBC:   Recent Labs     11/18/22  1054   WBC 5.1   HGB 11.2*   *       BMP:    Recent Labs     11/16/22  0846 11/17/22  0427 11/18/22  1054   NA  --   --  142   K 3.2* 3.4* 3.6   CL  --   --  111   CO2  --   --  22*   BUN  --   --  13   CREATININE  --   --  1.0   GLUCOSE  --   --  191*       Calcium:  Recent Labs     11/18/22  1054   CALCIUM 8.4*        Glucose:  Recent Labs     11/17/22  1549 11/17/22 2027 11/18/22  1113   POCGLU 182* 153* 179*         CULTURES:   UA:   No results for input(s): SPECGRAV, PHUR, COLORU, CLARITYU, MUCUS, PROTEINU, BLOODU, RBCUA, WBCUA, BACTERIA, NITRU, GLUCOSEU, BILIRUBINUR, UROBILINOGEN, KETUA, LABCAST, LABCASTTY, AMORPHOS in the last 72 hours. Invalid input(s): CRYSTALS    Micro:   Lab Results   Component Value Date/Time    [facility]  11/13/2022 06:15 AM     No growth 24 hours. No growth 48 hours. No growth at 5 days     MRI:  1. 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 2. Chronic lacunar infarcts in the right basal ganglia and thalamus with the thalamic lesion having appeared in the interval since prior MRI. 3. Redemonstration of exenteration of the nasal passage along with the ethmoid air cells and sphenoid sinuses. 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    Obesity, Class II, BMI 35-39.9 E66.9    Brain mass G93.89    Chronic diastolic CHF (congestive heart failure) ([facility]) I50.32    Chronic ethmoidal sinusitis J32.2    Chronic frontal sinusitis J32.1    Chronic maxillary sinusitis J32.0    Chronic sphenoidal sinusitis J32.3    Brain compression (HCC) R65.4    Metabolic encephalopathy D09.84    Hypokalemia E87.6    Cerebral edema (HCC) G93.6    Morbid obesity (HCC) E66.01    Cerebral abscess 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

## 2022-11-18 NOTE — PROGRESS NOTES
Patient has become very paranoid within the last hour. Multiple attempts to help relax and redirect the patient before calling the family all which were unsuccessful. Patient thinks that people are stealing from her and will not let people get her food and drink because she said they will put poison in it. Refused her heparin shot. Refuses to let staff check glucose. Attempts multiple times to pull out iv. Redirection attempted in multiple ways without trying to agitate patient.  Redirection unsuccessful

## 2022-11-18 NOTE — CARE COORDINATION
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 Planning transfer to [address] [place] [address].   PCP: Den Hodge MD  Readmission Risk Score: 16.4%  Patient Goals/Plan/Treatment Preferences: waiting on acceptance and bed availability at [address] [place] [address]. *Due to [facility] following with neurosurgery at [address] [place] [address], [place] check not completed.
11/16/22, 11:47 AM EST    DISCHARGE ON GOING EVALUATION    [facility] day: 3  Location: [facility]06/006-A Reason for admit: Altered mental state [R41.82]  Acute kidney injury ([address] [N17.9]  Acute encephalopathy [G93.40]   Barriers to Discharge: await bed availability at [address] [place] [address]  PCP: Aubrey Smalls MD  Readmission Risk Score: 16.6%  Patient Goals/Plan/Treatment Preferences: [facility]
11/18/22, 12:05 PM EST    DISCHARGE ON GOING EVALUATION    [facility] day: 5  Location: -06/006-A Reason for admit: Altered mental state [R41.82]  Acute kidney injury ([address] [N17.9]  Acute encephalopathy [G93.40]   Barriers to Discharge: Hypertensive, 168/80. IVF, IV rocephin, IV vanc. Await bed availability at [facility].   PCP: Shilpa Cuellar MD  Readmission Risk Score: 15.5%  Patient Goals/Plan/Treatment Preferences: transfer to [facility]      11/18/22, 12:06 PM EST    Possible weekend discharge. Patient goals/plan/ treatment preferences discussed by  and . Patient goals/plan/ treatment preferences reviewed with patient/ family. Patient/ family verbalize understanding of discharge plan and are in agreement with goal/plan/treatment preferences. Understanding was demonstrated using the teach back method. AVS provided by RN at time of discharge, which includes all necessary medical information pertaining to the patients current course of illness, treatment, post-discharge goals of care, and treatment preferences.      Services At/After Discharge: Acute Hospital       IMM Letter  IMM Letter given to Patient/Family/Significant other/Guardian/POA/by[de-identified] Staff  IMM Letter date given[de-identified] 11/13/22  IMM Letter time given[de-identified] 2129
request:        [facility] [place], [address] [phone]  [address]  [place] [address]  Phone: [phone] Fax: [phone]    [address] [phone] - F [phone]  [address] [facility] [place] [address]  Phone: [phone] Fax: [phone]    [address]  [facility] [place] [address]  Phone: [phone] Fax: [phone]    [id number], [address] [facility] [address] [facility]  [address] [facility]  Phone: [phone] Fax: [phone]      Factors facilitating achievement of predicted outcomes: Family support, Cooperative, and Pleasant      Additional Case Management Notes: Neuro following. MRI brain and EEG pending. IVF, IV rocephin in use. The Plan for Transition of Care is related to the following treatment goals of Altered mental state [R41.82]  Acute kidney injury ([facility] [N17.9]  Acute encephalopathy [G93.40]    Patient Goals/Plan/Treatment Preferences: Met with Kyung Heath and one of her daughters. Kyung Heath will return home with her other daughter as PTA. Her daughter states someone in the family is always with Kyung Heath. They decline discharge needs at this time. Transportation/Food Security/Housekeeping Addressed: No issues identified.      Patti Pringle RN  Case Management Department

## 2022-11-18 NOTE — PROGRESS NOTES
IM Progress Note  Dr. Jeremy Kauffman  11/18/2022 10:20 AM      Patient name Severa Raker  [id number]  PCP: Katelynn Perera MD  Admit Date: 11/12/2022  Acct No. [de-identified]    Subjective: Interval History:   Pt up in chair  More oriented  Ate breakfast  D/w daughter at bedside      Diet: ADULT DIET; Regular; 4 carb choices (60 gm/meal); Low Fat/Low Chol/High Fiber/2 gm Na  ADULT ORAL NUTRITION SUPPLEMENT; Breakfast, Dinner; Wound Healing Oral Supplement    I/O last 3 completed shifts: In: 4378.2 [I.V.:3779; IV Piggyback:599.2]  Out: 200 [Urine:200]  I/O this shift:  In: 120 [P.O.:120]  Out: 300 [Urine:300]        Admission weight: 222 lb 12.8 oz (101.1 kg) as of 11/12/2022 10:15 PM  Wt Readings from Last 3 Encounters:   11/15/22 201 lb (91.2 kg)   11/13/22 208 lb 12.4 oz (94.7 kg)   08/08/22 214 lb 9.6 oz (97.3 kg)     Body mass index is 39.26 kg/m². Medications:   Scheduled Meds:   amLODIPine  10 mg Oral Daily    vancomycin  1,500 mg IntraVENous Q24H    cefTRIAXone (ROCEPHIN) IV  2,000 mg IntraVENous Q24H    vancomycin (VANCOCIN) intermittent dosing (placeholder)   Other RX Placeholder    ascorbic acid  500 mg Oral Daily    ferrous sulfate  325 mg Oral TID WC    levETIRAcetam  1,000 mg Oral BID    pregabalin  75 mg Oral BID    alogliptin  12.5 mg Oral Daily    Vitamin D  2,000 Units Oral Daily    zinc sulfate  50 mg Oral Daily    sodium chloride flush  5-40 mL IntraVENous 2 times per day    heparin (porcine)  5,000 Units SubCUTAneous 3 times per day    famotidine  20 mg Oral Daily    insulin lispro  0-8 Units SubCUTAneous TID WC    insulin lispro  0-4 Units SubCUTAneous Nightly    [Held by provider] metFORMIN  1,000 mg Oral QPM     Continuous Infusions:   sodium chloride      sodium chloride 100 mL/hr at 11/18/22 0226    dextrose         Labs :     CBC: No results for input(s): WBC, HGB, PLT in the last 72 hours.   BMP:    Recent Labs     11/16/22  0846 11/17/22  0427   K 3.2* 3.4*     Hepatic: No results for input(s): AST, ALT, ALB, BILITOT, ALKPHOS in the last 72 hours. Troponin: No results for input(s): TROPONINI in the last 72 hours. BNP: No results for input(s): BNP in the last 72 hours. Lipids: No results for input(s): CHOL, HDL in the last 72 hours. Invalid input(s): LDLCALCU  INR: No results for input(s): INR in the last 72 hours. 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 Diabetes,  Hypertension. Hyperlipidemia. Chronic lymphedema. Sleep apnea. Obesity. Neuropathy. Prior history of COVID.     Plan:    Cont antibiotics pwr ID  PT OT if pt can co operate  Await bed at [address] [facility] [facility] hydralazine as BP still elevated  Pt allergic to ACEI  DVT prophylaxis    Cooper Teran MD, MD

## 2022-11-18 NOTE — PLAN OF CARE
Problem: Discharge Planning  Goal: Discharge to home or other facility with appropriate resources  Outcome: Not Progressing  Flowsheets (Taken 11/16/2022 0830 by Jhon Marcus RN)  Discharge to home or other facility with appropriate resources: Identify barriers to discharge with patient and caregiver     Problem: Pain  Goal: Verbalizes/displays adequate comfort level or baseline comfort level  11/18/2022 0224 by Rajan Lu RN  Outcome: Not Progressing  Flowsheets (Taken 11/17/2022 1730 by Yola Payne RN)  Verbalizes/displays adequate comfort level or baseline comfort level:   Encourage patient to monitor pain and request assistance   Assess pain using appropriate pain scale   Administer analgesics based on type and severity of pain and evaluate response  11/17/2022 1730 by Yola Payne RN  Outcome: Progressing  Flowsheets (Taken 11/17/2022 1730)  Verbalizes/displays adequate comfort level or baseline comfort level:   Encourage patient to monitor pain and request assistance   Assess pain using appropriate pain scale   Administer analgesics based on type and severity of pain and evaluate response     Problem: Skin/Tissue Integrity  Goal: Absence of new skin breakdown  Description: 1. Monitor for areas of redness and/or skin breakdown  2. Assess vascular access sites hourly  3. Every 4-6 hours minimum:  Change oxygen saturation probe site  4. Every 4-6 hours:  If on nasal continuous positive airway pressure, respiratory therapy assess nares and determine need for appliance change or resting period. 11/18/2022 0224 by Rajan Lu RN  Outcome: Not Progressing  11/17/2022 1730 by Yola Payne RN  Outcome: Progressing  Note: Skin assessed throughout the shift.       Problem: Safety - Adult  Goal: Free from fall injury  11/18/2022 0224 by Rajan Lu RN  Outcome: Not Progressing  Flowsheets (Taken 11/17/2022 1730 by Yola Payne RN)  Free From Fall Injury: Instruct family/caregiver on patient safety  11/17/2022 1730 by Melody Taylor RN  Outcome: Progressing  Flowsheets (Taken 11/17/2022 1730)  Free From Fall Injury: Instruct family/caregiver on patient safety     Problem: ABCDS Injury Assessment  Goal: Absence of physical injury  11/18/2022 0224 by Lui Womack RN  Outcome: Not Progressing  Flowsheets (Taken 11/17/2022 1730 by Melody Taylor RN)  Absence of Physical Injury: Implement safety measures based on patient assessment  11/17/2022 1730 by Melody Taylor RN  Outcome: Progressing  Flowsheets (Taken 11/17/2022 1730)  Absence of Physical Injury: Implement safety measures based on patient assessment     Problem: Neurosensory - Adult  Goal: Achieves stable or improved neurological status  11/18/2022 0224 by Lui Womack RN  Outcome: Not Progressing  Flowsheets (Taken 11/17/2022 1730 by Melody Taylor RN)  Achieves stable or improved neurological status:   Assess for and report changes in neurological status   Initiate measures to prevent increased intracranial pressure  11/17/2022 1730 by Melody Taylor RN  Outcome: Progressing  Flowsheets (Taken 11/17/2022 1730)  Achieves stable or improved neurological status:   Assess for and report changes in neurological status   Initiate measures to prevent increased intracranial pressure     Problem: Infection - Adult  Goal: Absence of infection at discharge  11/18/2022 0224 by Lui Womack RN  Outcome: Not Progressing  Flowsheets (Taken 11/17/2022 1730 by Melody Taylor RN)  Absence of infection at discharge:   Assess and monitor for signs and symptoms of infection   Monitor lab/diagnostic results   Monitor all insertion sites i.e., indwelling lines, tubes and drains  11/17/2022 1730 by Melody Tayolr RN  Outcome: Progressing  Flowsheets (Taken 11/17/2022 1730)  Absence of infection at discharge:   Assess and monitor for signs and symptoms of infection   Monitor lab/diagnostic results   Monitor all insertion sites i.e., indwelling lines, tubes and drains     Problem: Metabolic/Fluid and Electrolytes - Adult  Goal: Electrolytes maintained within normal limits  11/18/2022 0224 by Edmund Amaro RN  Outcome: Not Progressing  Flowsheets (Taken 11/17/2022 1730 by Ginny Telles RN)  Electrolytes maintained within normal limits:   Monitor labs and assess patient for signs and symptoms of electrolyte imbalances   Administer electrolyte replacement as ordered  11/17/2022 1730 by Ginny Telles RN  Outcome: Progressing  Flowsheets (Taken 11/17/2022 1730)  Electrolytes maintained within normal limits:   Monitor labs and assess patient for signs and symptoms of electrolyte imbalances   Administer electrolyte replacement as ordered  Goal: Hemodynamic stability and optimal renal function maintained  11/18/2022 0224 by Edmund Amaro RN  Outcome: Not Progressing  Flowsheets (Taken 11/17/2022 1730 by Ginny Telles RN)  Hemodynamic stability and optimal renal function maintained:   Monitor labs and assess for signs and symptoms of volume excess or deficit   Monitor intake, output and patient weight   Monitor urine specific gravity, serum osmolarity and serum sodium as indicated or ordered  11/17/2022 1730 by Ginny Telles RN  Outcome: Progressing  Flowsheets (Taken 11/17/2022 1730)  Hemodynamic stability and optimal renal function maintained:   Monitor labs and assess for signs and symptoms of volume excess or deficit   Monitor intake, output and patient weight   Monitor urine specific gravity, serum osmolarity and serum sodium as indicated or ordered     Problem: Chronic Conditions and Co-morbidities  Goal: Patient's chronic conditions and co-morbidity symptoms are monitored and maintained or improved  11/18/2022 0224 by Edmund Amaro RN  Outcome: Not Progressing  Flowsheets (Taken 11/17/2022 1730 by Ginny Telles RN)  Care Plan - Patient's Chronic Conditions and Co-Morbidity Symptoms are Monitored and Maintained or Improved:   Monitor and assess patient's chronic conditions and comorbid symptoms for stability, deterioration, or improvement   Collaborate with multidisciplinary team to address chronic and comorbid conditions and prevent exacerbation or deterioration  11/17/2022 1730 by Krystal Pearson RN  Outcome: Progressing  Flowsheets (Taken 11/17/2022 1730)  Care Plan - Patient's Chronic Conditions and Co-Morbidity Symptoms are Monitored and Maintained or Improved:   Monitor and assess patient's chronic conditions and comorbid symptoms for stability, deterioration, or improvement   Collaborate with multidisciplinary team to address chronic and comorbid conditions and prevent exacerbation or deterioration     Problem: Cognitive:  Goal: Knowledge of wound care  Description: Knowledge of wound care  11/18/2022 0224 by Michaele Goltz, RN  Outcome: Not Progressing  11/17/2022 1730 by Krystal Pearson RN  Outcome: Progressing  Note: Pt disoriented. Family educated on wound care and how to prevent. Goal: Understands risk factors for wounds  Description: Understands risk factors for wounds  11/18/2022 0224 by Michaele Goltz, RN  Outcome: Not Progressing  11/17/2022 1730 by Krystal Pearson RN  Outcome: Progressing     Problem: Nutrition Deficit:  Goal: Optimize nutritional status  Outcome: Not Progressing  Flowsheets (Taken 11/14/2022 1645 by Sal Alvarado RN)  Nutrient intake appropriate for improving, restoring, or maintaining nutritional needs:   Assess nutritional status and recommend course of action   Monitor oral intake, labs, and treatment plans     Problem: Coping  Goal: Pt/Family able to verbalize concerns and demonstrate effective coping strategies  Description: INTERVENTIONS:  1. Assist patient/family to identify coping skills, available support systems and cultural and spiritual values  2. Provide emotional support, including active listening and acknowledgement of concerns of patient and caregivers  3. Reduce environmental stimuli, as able  4. Instruct patient/family in relaxation techniques, as appropriate  5. Assess for spiritual pain/suffering and initiate Spiritual Care, Psychosocial Clinical Specialist consults as needed  11/18/2022 0224 by Floyce Denver, RN  Outcome: Not Progressing  Flowsheets (Taken 11/17/2022 1730 by Mike Duffy RN)  Patient/family able to verbalize anxieties, fears, and concerns, and demonstrate effective coping:   Provide emotional support, including active listening and acknowledgement of concerns of patient and caregivers   Assist patient/family to identify coping skills, available support systems and cultural and spiritual values   Reduce environmental stimuli, as able  11/17/2022 1730 by Mike Duffy RN  Outcome: Progressing  Flowsheets (Taken 11/17/2022 1730)  Patient/family able to verbalize anxieties, fears, and concerns, and demonstrate effective coping:   Provide emotional support, including active listening and acknowledgement of concerns of patient and caregivers   Assist patient/family to identify coping skills, available support systems and cultural and spiritual values   Reduce environmental stimuli, as able     Problem: Confusion  Goal: Confusion, delirium, dementia, or psychosis is improved or at baseline  Description: INTERVENTIONS:  1. Assess for possible contributors to thought disturbance, including medications, impaired vision or hearing, underlying metabolic abnormalities, dehydration, psychiatric diagnoses, and notify attending LIP  2. [place] high risk fall precautions, as indicated  3. Provide frequent short contacts to provide reality reorientation, refocusing and direction  4. Decrease environmental stimuli, including noise as appropriate  5. Monitor and intervene to maintain adequate nutrition, hydration, elimination, sleep and activity  6. If unable to ensure safety without constant attention obtain sitter and review sitter guidelines with assigned personnel  7.  Initiate Psychosocial CNS and Spiritual Care consult, as indicated  11/18/2022 0224 by Rex Angeles RN  Outcome: Not Progressing  Flowsheets (Taken 11/17/2022 1730 by Pia Edwards, RN)  Effect of thought disturbance (confusion, delirium, dementia, or psychosis) are managed with adequate functional status:   Assess for contributors to thought disturbance, including medications, impaired vision or hearing, underlying metabolic abnormalities, dehydration, psychiatric diagnoses, notify [place] high risk fall precautions, as indicated   Decrease environmental stimuli, including noise as appropriate  11/17/2022 1730 by Pia Edwards RN  Outcome: Progressing  Flowsheets (Taken 11/17/2022 1730)  Effect of thought disturbance (confusion, delirium, dementia, or psychosis) are managed with adequate functional status:   Assess for contributors to thought disturbance, including medications, impaired vision or hearing, underlying metabolic abnormalities, dehydration, psychiatric diagnoses, notify [place] high risk fall precautions, as indicated   Decrease environmental stimuli, including noise as appropriate     Problem: Discharge Planning  Goal: Discharge to home or other facility with appropriate resources  Outcome: Not Progressing  Flowsheets (Taken 11/16/2022 0830 by Carissa North RN)  Discharge to home or other facility with appropriate resources: Identify barriers to discharge with patient and caregiver     Problem: Pain  Goal: Verbalizes/displays adequate comfort level or baseline comfort level  11/18/2022 0224 by Rex Angeles RN  Outcome: Not Progressing  Flowsheets (Taken 11/17/2022 1730 by Pia Edwards RN)  Verbalizes/displays adequate comfort level or baseline comfort level:   Encourage patient to monitor pain and request assistance   Assess pain using appropriate pain scale   Administer analgesics based on type and severity of pain and evaluate response  11/17/2022 1730 by Pia Edwards RN  Outcome: Progressing  Flowsheets (Taken 11/17/2022 1730)  Verbalizes/displays adequate comfort level or baseline comfort level:   Encourage patient to monitor pain and request assistance   Assess pain using appropriate pain scale   Administer analgesics based on type and severity of pain and evaluate response     Problem: Skin/Tissue Integrity  Goal: Absence of new skin breakdown  Description: 1. Monitor for areas of redness and/or skin breakdown  2. Assess vascular access sites hourly  3. Every 4-6 hours minimum:  Change oxygen saturation probe site  4. Every 4-6 hours:  If on nasal continuous positive airway pressure, respiratory therapy assess nares and determine need for appliance change or resting period. 11/18/2022 0224 by Rody Jara RN  Outcome: Not Progressing  11/17/2022 1730 by Sarah Rondon RN  Outcome: Progressing  Note: Skin assessed throughout the shift.       Problem: Safety - Adult  Goal: Free from fall injury  11/18/2022 0224 by Rody Jara RN  Outcome: Not Progressing  Flowsheets (Taken 11/17/2022 1730 by Sarah Rondon RN)  Free From Fall Injury: Instruct family/caregiver on patient safety  11/17/2022 1730 by Sarah Rondon RN  Outcome: Progressing  Flowsheets (Taken 11/17/2022 1730)  Free From Fall Injury: Instruct family/caregiver on patient safety     Problem: ABCDS Injury Assessment  Goal: Absence of physical injury  11/18/2022 0224 by Rody Jara RN  Outcome: Not Progressing  Flowsheets (Taken 11/17/2022 1730 by Sarah Rondon RN)  Absence of Physical Injury: Implement safety measures based on patient assessment  11/17/2022 1730 by Sarah Rondon RN  Outcome: Progressing  Flowsheets (Taken 11/17/2022 1730)  Absence of Physical Injury: Implement safety measures based on patient assessment     Problem: Neurosensory - Adult  Goal: Achieves stable or improved neurological status  11/18/2022 0224 by Rody Jara RN  Outcome: Not Progressing  Flowsheets (Taken 11/17/2022 1730 by Franco Akbar RN)  Achieves stable or improved neurological status:   Assess for and report changes in neurological status   Initiate measures to prevent increased intracranial pressure  11/17/2022 1730 by Franco Akbar RN  Outcome: Progressing  Flowsheets (Taken 11/17/2022 1730)  Achieves stable or improved neurological status:   Assess for and report changes in neurological status   Initiate measures to prevent increased intracranial pressure     Problem: Chronic Conditions and Co-morbidities  Goal: Patient's chronic conditions and co-morbidity symptoms are monitored and maintained or improved  11/18/2022 0224 by Chelsi Ayala RN  Outcome: Not Progressing  Flowsheets (Taken 11/17/2022 1730 by Franco Akbar RN)  Care Plan - Patient's Chronic Conditions and Co-Morbidity Symptoms are Monitored and Maintained or Improved:   Monitor and assess patient's chronic conditions and comorbid symptoms for stability, deterioration, or improvement   Collaborate with multidisciplinary team to address chronic and comorbid conditions and prevent exacerbation or deterioration  11/17/2022 1730 by Franco Akbar RN  Outcome: Progressing  Flowsheets (Taken 11/17/2022 1730)  Care Plan - Patient's Chronic Conditions and Co-Morbidity Symptoms are Monitored and Maintained or Improved:   Monitor and assess patient's chronic conditions and comorbid symptoms for stability, deterioration, or improvement   Collaborate with multidisciplinary team to address chronic and comorbid conditions and prevent exacerbation or deterioration     Problem: Cognitive:  Goal: Knowledge of wound care  Description: Knowledge of wound care  11/18/2022 0224 by Chelsi Ayala RN  Outcome: Not Progressing  11/17/2022 1730 by Franco Akbar RN  Outcome: Progressing  Note: Pt disoriented. Family educated on wound care and how to prevent.   Goal: Understands risk factors for wounds  Description: Understands risk factors for wounds  11/18/2022 0224 by Sandra Mckeon Talya Mason RN  Outcome: Not Progressing  11/17/2022 1730 by Ferne Angelucci, RN  Outcome: Progressing     Problem: Nutrition Deficit:  Goal: Optimize nutritional status  Outcome: Not Progressing  Flowsheets (Taken 11/14/2022 1645 by Norma Walsh RN)  Nutrient intake appropriate for improving, restoring, or maintaining nutritional needs:   Assess nutritional status and recommend course of action   Monitor oral intake, labs, and treatment plans     Problem: Infection - Adult  Goal: Absence of infection at discharge  11/18/2022 0224 by Elo Mcgarry RN  Outcome: Not Progressing  Flowsheets (Taken 11/17/2022 1730 by Ferne Angelucci, RN)  Absence of infection at discharge:   Assess and monitor for signs and symptoms of infection   Monitor lab/diagnostic results   Monitor all insertion sites i.e., indwelling lines, tubes and drains  11/17/2022 1730 by Ferne Angelucci, RN  Outcome: Progressing  Flowsheets (Taken 11/17/2022 1730)  Absence of infection at discharge:   Assess and monitor for signs and symptoms of infection   Monitor lab/diagnostic results   Monitor all insertion sites i.e., indwelling lines, tubes and drains     Problem: Metabolic/Fluid and Electrolytes - Adult  Goal: Electrolytes maintained within normal limits  11/18/2022 0224 by Elo Mcgarry RN  Outcome: Not Progressing  Flowsheets (Taken 11/17/2022 1730 by Ferne Angelucci, RN)  Electrolytes maintained within normal limits:   Monitor labs and assess patient for signs and symptoms of electrolyte imbalances   Administer electrolyte replacement as ordered  11/17/2022 1730 by Ferne Angelucci, RN  Outcome: Progressing  Flowsheets (Taken 11/17/2022 1730)  Electrolytes maintained within normal limits:   Monitor labs and assess patient for signs and symptoms of electrolyte imbalances   Administer electrolyte replacement as ordered  Goal: Hemodynamic stability and optimal renal function maintained  11/18/2022 0224 by Elo Mcgarry RN  Outcome: Not Progressing  Flowsheets (Taken 11/17/2022 1730 by Franco Akbar RN)  Hemodynamic stability and optimal renal function maintained:   Monitor labs and assess for signs and symptoms of volume excess or deficit   Monitor intake, output and patient weight   Monitor urine specific gravity, serum osmolarity and serum sodium as indicated or ordered  11/17/2022 1730 by Franco Akbar RN  Outcome: Progressing  Flowsheets (Taken 11/17/2022 1730)  Hemodynamic stability and optimal renal function maintained:   Monitor labs and assess for signs and symptoms of volume excess or deficit   Monitor intake, output and patient weight   Monitor urine specific gravity, serum osmolarity and serum sodium as indicated or ordered     Problem: Coping  Goal: Pt/Family able to verbalize concerns and demonstrate effective coping strategies  Description: INTERVENTIONS:  1. Assist patient/family to identify coping skills, available support systems and cultural and spiritual values  2. Provide emotional support, including active listening and acknowledgement of concerns of patient and caregivers  3. Reduce environmental stimuli, as able  4. Instruct patient/family in relaxation techniques, as appropriate  5.  Assess for spiritual pain/suffering and initiate Spiritual Care, Psychosocial Clinical Specialist consults as needed  11/18/2022 0224 by Chelsi Ayala RN  Outcome: Not Progressing  Flowsheets (Taken 11/17/2022 1730 by Franco Akbar RN)  Patient/family able to verbalize anxieties, fears, and concerns, and demonstrate effective coping:   Provide emotional support, including active listening and acknowledgement of concerns of patient and caregivers   Assist patient/family to identify coping skills, available support systems and cultural and spiritual values   Reduce environmental stimuli, as able  11/17/2022 1730 by Franco Akbar RN  Outcome: Progressing  Flowsheets (Taken 11/17/2022 1730)  Patient/family able to verbalize anxieties, fears, and concerns, and demonstrate effective coping:   Provide emotional support, including active listening and acknowledgement of concerns of patient and caregivers   Assist patient/family to identify coping skills, available support systems and cultural and spiritual values   Reduce environmental stimuli, as able     Problem: Confusion  Goal: Confusion, delirium, dementia, or psychosis is improved or at baseline  Description: INTERVENTIONS:  1. Assess for possible contributors to thought disturbance, including medications, impaired vision or hearing, underlying metabolic abnormalities, dehydration, psychiatric diagnoses, and notify attending LIP  2. [place] high risk fall precautions, as indicated  3. Provide frequent short contacts to provide reality reorientation, refocusing and direction  4. Decrease environmental stimuli, including noise as appropriate  5. Monitor and intervene to maintain adequate nutrition, hydration, elimination, sleep and activity  6. If unable to ensure safety without constant attention obtain sitter and review sitter guidelines with assigned personnel  7.  Initiate Psychosocial CNS and Spiritual Care consult, as indicated  11/18/2022 0224 by Angelica Butts RN  Outcome: Not Progressing  Flowsheets (Taken 11/17/2022 1730 by Nancy Jara RN)  Effect of thought disturbance (confusion, delirium, dementia, or psychosis) are managed with adequate functional status:   Assess for contributors to thought disturbance, including medications, impaired vision or hearing, underlying metabolic abnormalities, dehydration, psychiatric diagnoses, notify [facility] high risk fall precautions, as indicated   Decrease environmental stimuli, including noise as appropriate  11/17/2022 1730 by Nancy Jara RN  Outcome: Progressing  Flowsheets (Taken 11/17/2022 1730)  Effect of thought disturbance (confusion, delirium, dementia, or psychosis) are managed with adequate functional status:   Assess for contributors to thought disturbance, including medications, impaired vision or hearing, underlying metabolic abnormalities, dehydration, psychiatric diagnoses, notify [place] high risk fall precautions, as indicated   Decrease environmental stimuli, including noise as appropriate

## 2022-11-19 NOTE — PROGRESS NOTES
Outside EMS transport services [facility] with patient approx [phone]. Daughter Flavio Hammans was at bedside, but started that she would not be going to [facility] but would be there tomorrow, if the nurses needed anything they were more than okay to call her or donna if they needed anything at all. Patient was pleasantly confused at the time of departure. Patient received all of her night time medication prior to her leaving along with some PRN tylenol for generalized pain. Left leg was wrapped with ace bandage for the chronic redness and swelling. Orders okay to do so if doppler on the 13th was negative for DVT and it was negative. Report was called into [address]. Report was given to EMS team transporting patient. Family had no furter concerns. Patient calm.  [facility] nurse left with nurses number in case there is any questions the arise later upon arrival.

## 2022-11-20 LAB
ANAEROBIC CULTURE: NORMAL
CSF CULTURE: NORMAL
GRAM STAIN RESULT: NORMAL

## 2022-12-13 PROBLEM — E86.0 DEHYDRATION: Status: RESOLVED | Noted: 2022-11-13 | Resolved: 2022-12-13

## 2023-04-22 ENCOUNTER — HOSPITAL ENCOUNTER (INPATIENT)
Age: 83
LOS: 12 days | Discharge: NURSING, REHAB OR LONG-TERM CARE | End: 2023-05-05
Attending: EMERGENCY MEDICINE
Payer: MEDICARE

## 2023-04-22 ENCOUNTER — APPOINTMENT (OUTPATIENT)
Dept: CT IMAGING | Age: 83
End: 2023-04-22
Payer: MEDICARE

## 2023-04-22 DIAGNOSIS — R80.9 TYPE 2 DIABETES MELLITUS WITH MICROALBUMINURIA, WITHOUT LONG-TERM CURRENT USE OF INSULIN (HCC): ICD-10-CM

## 2023-04-22 DIAGNOSIS — A41.9 SEPSIS, DUE TO UNSPECIFIED ORGANISM, UNSPECIFIED WHETHER ACUTE ORGAN DYSFUNCTION PRESENT (HCC): Primary | ICD-10-CM

## 2023-04-22 DIAGNOSIS — E11.29 TYPE 2 DIABETES MELLITUS WITH MICROALBUMINURIA, WITHOUT LONG-TERM CURRENT USE OF INSULIN (HCC): ICD-10-CM

## 2023-04-22 DIAGNOSIS — L02.211 ABDOMINAL WALL ABSCESS: ICD-10-CM

## 2023-04-22 LAB
ALBUMIN SERPL BCG-MCNC: 3.2 G/DL (ref 3.5–5.1)
ALP SERPL-CCNC: 134 U/L (ref 38–126)
ALT SERPL W/O P-5'-P-CCNC: 12 U/L (ref 11–66)
ANION GAP SERPL CALC-SCNC: 13 MEQ/L (ref 8–16)
AST SERPL-CCNC: 11 U/L (ref 5–40)
BACTERIA URNS QL MICRO: ABNORMAL /HPF
BASOPHILS ABSOLUTE: 0 THOU/MM3 (ref 0–0.1)
BASOPHILS NFR BLD AUTO: 0.3 %
BILIRUB SERPL-MCNC: 0.3 MG/DL (ref 0.3–1.2)
BILIRUB UR QL STRIP.AUTO: NEGATIVE
BUN SERPL-MCNC: 17 MG/DL (ref 7–22)
CALCIUM SERPL-MCNC: 9.1 MG/DL (ref 8.5–10.5)
CASTS #/AREA URNS LPF: ABNORMAL /LPF
CASTS 2: ABNORMAL /LPF
CHARACTER UR: CLEAR
CHLORIDE SERPL-SCNC: 100 MEQ/L (ref 98–111)
CO2 SERPL-SCNC: 20 MEQ/L (ref 23–33)
COLOR: YELLOW
CREAT SERPL-MCNC: 1 MG/DL (ref 0.4–1.2)
CRYSTALS URNS MICRO: ABNORMAL
DEPRECATED RDW RBC AUTO: 43.7 FL (ref 35–45)
EOSINOPHIL NFR BLD AUTO: 0.8 %
EOSINOPHILS ABSOLUTE: 0.1 THOU/MM3 (ref 0–0.4)
EPITHELIAL CELLS, UA: ABNORMAL /HPF
ERYTHROCYTE [DISTWIDTH] IN BLOOD BY AUTOMATED COUNT: 13.6 % (ref 11.5–14.5)
FLUAV RNA RESP QL NAA+PROBE: NOT DETECTED
FLUBV RNA RESP QL NAA+PROBE: NOT DETECTED
GFR SERPL CREATININE-BSD FRML MDRD: 56 ML/MIN/1.73M2
GLUCOSE BLD STRIP.AUTO-MCNC: 231 MG/DL (ref 70–108)
GLUCOSE SERPL-MCNC: 225 MG/DL (ref 70–108)
GLUCOSE UR QL STRIP.AUTO: NEGATIVE MG/DL
HCT VFR BLD AUTO: 29.6 % (ref 37–47)
HGB BLD-MCNC: 9.4 GM/DL (ref 12–16)
HGB UR QL STRIP.AUTO: NEGATIVE
IMM GRANULOCYTES # BLD AUTO: 0.08 THOU/MM3 (ref 0–0.07)
IMM GRANULOCYTES NFR BLD AUTO: 0.7 %
KETONES UR QL STRIP.AUTO: NEGATIVE
LACTIC ACID, SEPSIS: 1.9 MMOL/L (ref 0.5–1.9)
LYMPHOCYTES ABSOLUTE: 1.2 THOU/MM3 (ref 1–4.8)
LYMPHOCYTES NFR BLD AUTO: 10.5 %
MCH RBC QN AUTO: 28 PG (ref 26–33)
MCHC RBC AUTO-ENTMCNC: 31.8 GM/DL (ref 32.2–35.5)
MCV RBC AUTO: 88.1 FL (ref 81–99)
MISCELLANEOUS 2: ABNORMAL
MONOCYTES ABSOLUTE: 1 THOU/MM3 (ref 0.4–1.3)
MONOCYTES NFR BLD AUTO: 8.7 %
NEUTROPHILS NFR BLD AUTO: 79 %
NITRITE UR QL STRIP: NEGATIVE
NRBC BLD AUTO-RTO: 0 /100 WBC
NT-PROBNP SERPL IA-MCNC: 1357 PG/ML (ref 0–449)
OSMOLALITY SERPL CALC.SUM OF ELEC: 275 MOSMOL/KG (ref 275–300)
PH UR STRIP.AUTO: 5 [PH] (ref 5–9)
PLATELET # BLD AUTO: 294 THOU/MM3 (ref 130–400)
PMV BLD AUTO: 9.6 FL (ref 9.4–12.4)
POTASSIUM SERPL-SCNC: 4 MEQ/L (ref 3.5–5.2)
PROT SERPL-MCNC: 6.8 G/DL (ref 6.1–8)
PROT UR STRIP.AUTO-MCNC: 100 MG/DL
RBC # BLD AUTO: 3.36 MILL/MM3 (ref 4.2–5.4)
RBC URINE: ABNORMAL /HPF
RENAL EPI CELLS #/AREA URNS HPF: ABNORMAL /[HPF]
SARS-COV-2 RNA RESP QL NAA+PROBE: NOT DETECTED
SEGMENTED NEUTROPHILS ABSOLUTE COUNT: 8.7 THOU/MM3 (ref 1.8–7.7)
SODIUM SERPL-SCNC: 133 MEQ/L (ref 135–145)
SP GR UR REFRACT.AUTO: 1.02 (ref 1–1.03)
UROBILINOGEN, URINE: 0.2 EU/DL (ref 0–1)
WBC # BLD AUTO: 11 THOU/MM3 (ref 4.8–10.8)
WBC #/AREA URNS HPF: ABNORMAL /HPF
WBC #/AREA URNS HPF: NEGATIVE /[HPF]
YEAST LIKE FUNGI URNS QL MICRO: ABNORMAL

## 2023-04-22 PROCEDURE — 99285 EMERGENCY DEPT VISIT HI MDM: CPT

## 2023-04-22 PROCEDURE — 80053 COMPREHEN METABOLIC PANEL: CPT

## 2023-04-22 PROCEDURE — 36415 COLL VENOUS BLD VENIPUNCTURE: CPT

## 2023-04-22 PROCEDURE — 82948 REAGENT STRIP/BLOOD GLUCOSE: CPT

## 2023-04-22 PROCEDURE — 83605 ASSAY OF LACTIC ACID: CPT

## 2023-04-22 PROCEDURE — 83880 ASSAY OF NATRIURETIC PEPTIDE: CPT

## 2023-04-22 PROCEDURE — 85025 COMPLETE CBC W/AUTO DIFF WBC: CPT

## 2023-04-22 PROCEDURE — 87636 SARSCOV2 & INF A&B AMP PRB: CPT

## 2023-04-22 PROCEDURE — 93010 ELECTROCARDIOGRAM REPORT: CPT | Performed by: INTERNAL MEDICINE

## 2023-04-22 PROCEDURE — 93005 ELECTROCARDIOGRAM TRACING: CPT | Performed by: EMERGENCY MEDICINE

## 2023-04-22 PROCEDURE — 81001 URINALYSIS AUTO W/SCOPE: CPT

## 2023-04-22 PROCEDURE — 74177 CT ABD & PELVIS W/CONTRAST: CPT

## 2023-04-22 PROCEDURE — 6370000000 HC RX 637 (ALT 250 FOR IP): Performed by: EMERGENCY MEDICINE

## 2023-04-22 PROCEDURE — 87040 BLOOD CULTURE FOR BACTERIA: CPT

## 2023-04-22 PROCEDURE — 6360000004 HC RX CONTRAST MEDICATION: Performed by: EMERGENCY MEDICINE

## 2023-04-22 RX ORDER — ACETAMINOPHEN 500 MG
1000 TABLET ORAL ONCE
Status: COMPLETED | OUTPATIENT
Start: 2023-04-22 | End: 2023-04-22

## 2023-04-22 RX ADMIN — IOPAMIDOL 80 ML: 755 INJECTION, SOLUTION INTRAVENOUS at 21:53

## 2023-04-22 RX ADMIN — ACETAMINOPHEN 1000 MG: 500 TABLET ORAL at 22:50

## 2023-04-22 ASSESSMENT — PAIN SCALES - GENERAL
PAINLEVEL_OUTOF10: 7
PAINLEVEL_OUTOF10: 8

## 2023-04-22 ASSESSMENT — PAIN DESCRIPTION - LOCATION
LOCATION: HEAD
LOCATION: HEAD

## 2023-04-22 ASSESSMENT — PAIN DESCRIPTION - PAIN TYPE: TYPE: ACUTE PAIN

## 2023-04-22 ASSESSMENT — PAIN - FUNCTIONAL ASSESSMENT: PAIN_FUNCTIONAL_ASSESSMENT: 0-10

## 2023-04-23 PROBLEM — L02.211 ABDOMINAL WALL ABSCESS: Status: ACTIVE | Noted: 2023-04-23

## 2023-04-23 LAB
ALBUMIN SERPL BCG-MCNC: 2.4 G/DL (ref 3.5–5.1)
ALP SERPL-CCNC: 110 U/L (ref 38–126)
ALT SERPL W/O P-5'-P-CCNC: 8 U/L (ref 11–66)
ANION GAP SERPL CALC-SCNC: 10 MEQ/L (ref 8–16)
AST SERPL-CCNC: 11 U/L (ref 5–40)
BASOPHILS ABSOLUTE: 0 THOU/MM3 (ref 0–0.1)
BASOPHILS NFR BLD AUTO: 0.3 %
BILIRUB SERPL-MCNC: 0.3 MG/DL (ref 0.3–1.2)
BUN SERPL-MCNC: 15 MG/DL (ref 7–22)
CALCIUM SERPL-MCNC: 8.8 MG/DL (ref 8.5–10.5)
CHLORIDE SERPL-SCNC: 105 MEQ/L (ref 98–111)
CO2 SERPL-SCNC: 23 MEQ/L (ref 23–33)
CREAT SERPL-MCNC: 0.9 MG/DL (ref 0.4–1.2)
DEPRECATED RDW RBC AUTO: 44 FL (ref 35–45)
EOSINOPHIL NFR BLD AUTO: 2.2 %
EOSINOPHILS ABSOLUTE: 0.2 THOU/MM3 (ref 0–0.4)
ERYTHROCYTE [DISTWIDTH] IN BLOOD BY AUTOMATED COUNT: 13.6 % (ref 11.5–14.5)
FERRITIN SERPL IA-MCNC: 634 NG/ML (ref 10–291)
GFR SERPL CREATININE-BSD FRML MDRD: > 60 ML/MIN/1.73M2
GLUCOSE BLD STRIP.AUTO-MCNC: 165 MG/DL (ref 70–108)
GLUCOSE BLD STRIP.AUTO-MCNC: 167 MG/DL (ref 70–108)
GLUCOSE SERPL-MCNC: 151 MG/DL (ref 70–108)
HCT VFR BLD AUTO: 29.4 % (ref 37–47)
HGB BLD-MCNC: 8.8 GM/DL (ref 12–16)
HGB RETIC QN AUTO: 22.5 PG (ref 28.2–35.7)
IMM GRANULOCYTES # BLD AUTO: 0.12 THOU/MM3 (ref 0–0.07)
IMM GRANULOCYTES NFR BLD AUTO: 1.1 %
IMM RETICS NFR: 19.3 % (ref 3–15.9)
IRON SATN MFR SERPL: 20 % (ref 20–50)
IRON SERPL-MCNC: 16 UG/DL (ref 50–170)
LYMPHOCYTES ABSOLUTE: 1.4 THOU/MM3 (ref 1–4.8)
LYMPHOCYTES NFR BLD AUTO: 13.1 %
MCH RBC QN AUTO: 26.4 PG (ref 26–33)
MCHC RBC AUTO-ENTMCNC: 29.9 GM/DL (ref 32.2–35.5)
MCV RBC AUTO: 88.3 FL (ref 81–99)
MONOCYTES ABSOLUTE: 1 THOU/MM3 (ref 0.4–1.3)
MONOCYTES NFR BLD AUTO: 9.1 %
NEUTROPHILS NFR BLD AUTO: 74.2 %
NRBC BLD AUTO-RTO: 0 /100 WBC
PLATELET # BLD AUTO: 289 THOU/MM3 (ref 130–400)
PMV BLD AUTO: 9.5 FL (ref 9.4–12.4)
POTASSIUM SERPL-SCNC: 3.9 MEQ/L (ref 3.5–5.2)
PROT SERPL-MCNC: 5.9 G/DL (ref 6.1–8)
RBC # BLD AUTO: 3.33 MILL/MM3 (ref 4.2–5.4)
RETICS # AUTO: 38 THOU/MM3 (ref 20–115)
RETICS/RBC NFR AUTO: 1.2 % (ref 0.5–2)
SEGMENTED NEUTROPHILS ABSOLUTE COUNT: 8.2 THOU/MM3 (ref 1.8–7.7)
SODIUM SERPL-SCNC: 138 MEQ/L (ref 135–145)
T4 FREE SERPL-MCNC: 1.07 NG/DL (ref 0.93–1.76)
TIBC SERPL-MCNC: 81 UG/DL (ref 171–450)
TSH SERPL DL<=0.005 MIU/L-ACNC: 0.7 UIU/ML (ref 0.4–4.2)
WBC # BLD AUTO: 11 THOU/MM3 (ref 4.8–10.8)

## 2023-04-23 PROCEDURE — 99222 1ST HOSP IP/OBS MODERATE 55: CPT | Performed by: SURGERY

## 2023-04-23 PROCEDURE — 80053 COMPREHEN METABOLIC PANEL: CPT

## 2023-04-23 PROCEDURE — 2580000003 HC RX 258: Performed by: STUDENT IN AN ORGANIZED HEALTH CARE EDUCATION/TRAINING PROGRAM

## 2023-04-23 PROCEDURE — 2580000003 HC RX 258: Performed by: INTERNAL MEDICINE

## 2023-04-23 PROCEDURE — 84443 ASSAY THYROID STIM HORMONE: CPT

## 2023-04-23 PROCEDURE — 83550 IRON BINDING TEST: CPT

## 2023-04-23 PROCEDURE — 85025 COMPLETE CBC W/AUTO DIFF WBC: CPT

## 2023-04-23 PROCEDURE — 99223 1ST HOSP IP/OBS HIGH 75: CPT | Performed by: INTERNAL MEDICINE

## 2023-04-23 PROCEDURE — 6360000002 HC RX W HCPCS: Performed by: STUDENT IN AN ORGANIZED HEALTH CARE EDUCATION/TRAINING PROGRAM

## 2023-04-23 PROCEDURE — 84439 ASSAY OF FREE THYROXINE: CPT

## 2023-04-23 PROCEDURE — 6370000000 HC RX 637 (ALT 250 FOR IP): Performed by: STUDENT IN AN ORGANIZED HEALTH CARE EDUCATION/TRAINING PROGRAM

## 2023-04-23 PROCEDURE — 6360000002 HC RX W HCPCS: Performed by: INTERNAL MEDICINE

## 2023-04-23 PROCEDURE — 36415 COLL VENOUS BLD VENIPUNCTURE: CPT

## 2023-04-23 PROCEDURE — 87205 SMEAR GRAM STAIN: CPT

## 2023-04-23 PROCEDURE — 1200000000 HC SEMI PRIVATE

## 2023-04-23 PROCEDURE — 82948 REAGENT STRIP/BLOOD GLUCOSE: CPT

## 2023-04-23 PROCEDURE — 82728 ASSAY OF FERRITIN: CPT

## 2023-04-23 PROCEDURE — 96365 THER/PROPH/DIAG IV INF INIT: CPT

## 2023-04-23 PROCEDURE — 85046 RETICYTE/HGB CONCENTRATE: CPT

## 2023-04-23 PROCEDURE — 83540 ASSAY OF IRON: CPT

## 2023-04-23 PROCEDURE — 1200000003 HC TELEMETRY R&B

## 2023-04-23 RX ORDER — PANTOPRAZOLE SODIUM 40 MG/1
40 TABLET, DELAYED RELEASE ORAL
Status: DISCONTINUED | OUTPATIENT
Start: 2023-04-23 | End: 2023-05-05 | Stop reason: HOSPADM

## 2023-04-23 RX ORDER — DEXTROSE MONOHYDRATE 100 MG/ML
INJECTION, SOLUTION INTRAVENOUS CONTINUOUS PRN
Status: DISCONTINUED | OUTPATIENT
Start: 2023-04-23 | End: 2023-04-23

## 2023-04-23 RX ORDER — ONDANSETRON 4 MG/1
4 TABLET, ORALLY DISINTEGRATING ORAL EVERY 8 HOURS PRN
Status: DISCONTINUED | OUTPATIENT
Start: 2023-04-23 | End: 2023-05-05 | Stop reason: HOSPADM

## 2023-04-23 RX ORDER — PREGABALIN 50 MG/1
50 CAPSULE ORAL 2 TIMES DAILY
Status: DISCONTINUED | OUTPATIENT
Start: 2023-04-23 | End: 2023-04-27

## 2023-04-23 RX ORDER — INSULIN LISPRO 100 [IU]/ML
0-4 INJECTION, SOLUTION INTRAVENOUS; SUBCUTANEOUS EVERY 6 HOURS
Status: DISCONTINUED | OUTPATIENT
Start: 2023-04-23 | End: 2023-04-25

## 2023-04-23 RX ORDER — POLYETHYLENE GLYCOL 3350 17 G/17G
17 POWDER, FOR SOLUTION ORAL DAILY PRN
Status: DISCONTINUED | OUTPATIENT
Start: 2023-04-23 | End: 2023-05-05 | Stop reason: HOSPADM

## 2023-04-23 RX ORDER — ACETAMINOPHEN 325 MG/1
650 TABLET ORAL EVERY 6 HOURS PRN
Status: DISCONTINUED | OUTPATIENT
Start: 2023-04-23 | End: 2023-05-05 | Stop reason: HOSPADM

## 2023-04-23 RX ORDER — INSULIN LISPRO 100 [IU]/ML
0-4 INJECTION, SOLUTION INTRAVENOUS; SUBCUTANEOUS EVERY 6 HOURS
Status: DISCONTINUED | OUTPATIENT
Start: 2023-04-23 | End: 2023-04-23

## 2023-04-23 RX ORDER — ENOXAPARIN SODIUM 100 MG/ML
40 INJECTION SUBCUTANEOUS DAILY
Status: DISCONTINUED | OUTPATIENT
Start: 2023-04-23 | End: 2023-04-26

## 2023-04-23 RX ORDER — SODIUM CHLORIDE 9 MG/ML
INJECTION, SOLUTION INTRAVENOUS CONTINUOUS
Status: DISCONTINUED | OUTPATIENT
Start: 2023-04-23 | End: 2023-04-23

## 2023-04-23 RX ORDER — DEXTROSE, SODIUM CHLORIDE, SODIUM LACTATE, POTASSIUM CHLORIDE, AND CALCIUM CHLORIDE 5; .6; .31; .03; .02 G/100ML; G/100ML; G/100ML; G/100ML; G/100ML
INJECTION, SOLUTION INTRAVENOUS CONTINUOUS
Status: DISCONTINUED | OUTPATIENT
Start: 2023-04-23 | End: 2023-04-26

## 2023-04-23 RX ORDER — DEXTROSE MONOHYDRATE 100 MG/ML
INJECTION, SOLUTION INTRAVENOUS CONTINUOUS PRN
Status: DISCONTINUED | OUTPATIENT
Start: 2023-04-23 | End: 2023-05-05 | Stop reason: HOSPADM

## 2023-04-23 RX ORDER — LANOLIN ALCOHOL/MO/W.PET/CERES
4.5 CREAM (GRAM) TOPICAL NIGHTLY
Status: DISCONTINUED | OUTPATIENT
Start: 2023-04-23 | End: 2023-04-24

## 2023-04-23 RX ORDER — SODIUM CHLORIDE 0.9 % (FLUSH) 0.9 %
10 SYRINGE (ML) INJECTION PRN
Status: DISCONTINUED | OUTPATIENT
Start: 2023-04-23 | End: 2023-05-05 | Stop reason: HOSPADM

## 2023-04-23 RX ORDER — ACETAMINOPHEN 650 MG/1
650 SUPPOSITORY RECTAL EVERY 6 HOURS PRN
Status: DISCONTINUED | OUTPATIENT
Start: 2023-04-23 | End: 2023-05-05 | Stop reason: HOSPADM

## 2023-04-23 RX ORDER — SODIUM CHLORIDE 0.9 % (FLUSH) 0.9 %
5-40 SYRINGE (ML) INJECTION EVERY 12 HOURS SCHEDULED
Status: DISCONTINUED | OUTPATIENT
Start: 2023-04-23 | End: 2023-05-05 | Stop reason: HOSPADM

## 2023-04-23 RX ORDER — ONDANSETRON 2 MG/ML
4 INJECTION INTRAMUSCULAR; INTRAVENOUS EVERY 6 HOURS PRN
Status: DISCONTINUED | OUTPATIENT
Start: 2023-04-23 | End: 2023-05-05 | Stop reason: HOSPADM

## 2023-04-23 RX ORDER — SODIUM CHLORIDE 9 MG/ML
INJECTION, SOLUTION INTRAVENOUS PRN
Status: DISCONTINUED | OUTPATIENT
Start: 2023-04-23 | End: 2023-05-05 | Stop reason: HOSPADM

## 2023-04-23 RX ORDER — LEVETIRACETAM 500 MG/1
1000 TABLET ORAL 2 TIMES DAILY
Status: DISCONTINUED | OUTPATIENT
Start: 2023-04-23 | End: 2023-05-05 | Stop reason: HOSPADM

## 2023-04-23 RX ADMIN — Medication 4.5 MG: at 20:48

## 2023-04-23 RX ADMIN — ACETAMINOPHEN 650 MG: 325 TABLET ORAL at 10:12

## 2023-04-23 RX ADMIN — SODIUM CHLORIDE: 9 INJECTION, SOLUTION INTRAVENOUS at 04:18

## 2023-04-23 RX ADMIN — LEVETIRACETAM 1000 MG: 100 INJECTION, SOLUTION INTRAVENOUS at 12:41

## 2023-04-23 RX ADMIN — PIPERACILLIN AND TAZOBACTAM 3375 MG: 3; .375 INJECTION, POWDER, LYOPHILIZED, FOR SOLUTION INTRAVENOUS at 04:59

## 2023-04-23 RX ADMIN — SODIUM CHLORIDE, PRESERVATIVE FREE 10 ML: 5 INJECTION INTRAVENOUS at 09:11

## 2023-04-23 RX ADMIN — PIPERACILLIN AND TAZOBACTAM 4500 MG: 4; .5 INJECTION, POWDER, LYOPHILIZED, FOR SOLUTION INTRAVENOUS at 00:36

## 2023-04-23 RX ADMIN — PIPERACILLIN AND TAZOBACTAM 3375 MG: 3; .375 INJECTION, POWDER, LYOPHILIZED, FOR SOLUTION INTRAVENOUS at 13:48

## 2023-04-23 RX ADMIN — ACETAMINOPHEN 650 MG: 325 TABLET ORAL at 16:53

## 2023-04-23 RX ADMIN — PREGABALIN 50 MG: 50 CAPSULE ORAL at 10:12

## 2023-04-23 RX ADMIN — SODIUM CHLORIDE, SODIUM LACTATE, POTASSIUM CHLORIDE, CALCIUM CHLORIDE AND DEXTROSE MONOHYDRATE: 5; 600; 310; 30; 20 INJECTION, SOLUTION INTRAVENOUS at 12:07

## 2023-04-23 RX ADMIN — PREGABALIN 50 MG: 50 CAPSULE ORAL at 20:48

## 2023-04-23 RX ADMIN — PIPERACILLIN AND TAZOBACTAM 3375 MG: 3; .375 INJECTION, POWDER, LYOPHILIZED, FOR SOLUTION INTRAVENOUS at 20:49

## 2023-04-23 ASSESSMENT — PAIN DESCRIPTION - DESCRIPTORS
DESCRIPTORS: ACHING
DESCRIPTORS: ACHING

## 2023-04-23 ASSESSMENT — PAIN DESCRIPTION - ORIENTATION
ORIENTATION: RIGHT;LEFT

## 2023-04-23 ASSESSMENT — PAIN DESCRIPTION - LOCATION
LOCATION: HEAD;KNEE
LOCATION: HEAD
LOCATION: LEG
LOCATION: LEG

## 2023-04-23 ASSESSMENT — PAIN - FUNCTIONAL ASSESSMENT
PAIN_FUNCTIONAL_ASSESSMENT: ACTIVITIES ARE NOT PREVENTED
PAIN_FUNCTIONAL_ASSESSMENT: ACTIVITIES ARE NOT PREVENTED

## 2023-04-23 ASSESSMENT — PAIN SCALES - GENERAL
PAINLEVEL_OUTOF10: 5
PAINLEVEL_OUTOF10: 10
PAINLEVEL_OUTOF10: 8
PAINLEVEL_OUTOF10: 10
PAINLEVEL_OUTOF10: 8
PAINLEVEL_OUTOF10: 8

## 2023-04-23 ASSESSMENT — PAIN SCALES - WONG BAKER: WONGBAKER_NUMERICALRESPONSE: 6

## 2023-04-23 ASSESSMENT — PAIN DESCRIPTION - FREQUENCY: FREQUENCY: INTERMITTENT

## 2023-04-23 ASSESSMENT — PAIN DESCRIPTION - PAIN TYPE: TYPE: ACUTE PAIN

## 2023-04-24 ENCOUNTER — APPOINTMENT (OUTPATIENT)
Dept: CT IMAGING | Age: 83
End: 2023-04-24
Payer: MEDICARE

## 2023-04-24 ENCOUNTER — APPOINTMENT (OUTPATIENT)
Dept: GENERAL RADIOLOGY | Age: 83
End: 2023-04-24
Payer: MEDICARE

## 2023-04-24 LAB
ANION GAP SERPL CALC-SCNC: 11 MEQ/L (ref 8–16)
BASOPHILS ABSOLUTE: 0 THOU/MM3 (ref 0–0.1)
BASOPHILS NFR BLD AUTO: 0.4 %
BUN SERPL-MCNC: 10 MG/DL (ref 7–22)
CALCIUM SERPL-MCNC: 8.2 MG/DL (ref 8.5–10.5)
CHLORIDE SERPL-SCNC: 110 MEQ/L (ref 98–111)
CO2 SERPL-SCNC: 22 MEQ/L (ref 23–33)
CREAT SERPL-MCNC: 0.9 MG/DL (ref 0.4–1.2)
DEPRECATED RDW RBC AUTO: 47 FL (ref 35–45)
EOSINOPHIL NFR BLD AUTO: 1.8 %
EOSINOPHILS ABSOLUTE: 0.2 THOU/MM3 (ref 0–0.4)
ERYTHROCYTE [DISTWIDTH] IN BLOOD BY AUTOMATED COUNT: 13.8 % (ref 11.5–14.5)
GFR SERPL CREATININE-BSD FRML MDRD: > 60 ML/MIN/1.73M2
GLUCOSE BLD STRIP.AUTO-MCNC: 141 MG/DL (ref 70–108)
GLUCOSE BLD STRIP.AUTO-MCNC: 146 MG/DL (ref 70–108)
GLUCOSE BLD STRIP.AUTO-MCNC: 179 MG/DL (ref 70–108)
GLUCOSE BLD STRIP.AUTO-MCNC: 182 MG/DL (ref 70–108)
GLUCOSE SERPL-MCNC: 177 MG/DL (ref 70–108)
HCT VFR BLD AUTO: 29.6 % (ref 37–47)
HGB BLD-MCNC: 8.6 GM/DL (ref 12–16)
IMM GRANULOCYTES # BLD AUTO: 0.1 THOU/MM3 (ref 0–0.07)
IMM GRANULOCYTES NFR BLD AUTO: 1 %
LYMPHOCYTES ABSOLUTE: 1.1 THOU/MM3 (ref 1–4.8)
LYMPHOCYTES NFR BLD AUTO: 10.8 %
MCH RBC QN AUTO: 26.8 PG (ref 26–33)
MCHC RBC AUTO-ENTMCNC: 29.1 GM/DL (ref 32.2–35.5)
MCV RBC AUTO: 92.2 FL (ref 81–99)
MONOCYTES ABSOLUTE: 0.9 THOU/MM3 (ref 0.4–1.3)
MONOCYTES NFR BLD AUTO: 9.1 %
NEUTROPHILS NFR BLD AUTO: 76.9 %
NRBC BLD AUTO-RTO: 0 /100 WBC
PLATELET # BLD AUTO: 287 THOU/MM3 (ref 130–400)
PMV BLD AUTO: 9.4 FL (ref 9.4–12.4)
POTASSIUM SERPL-SCNC: 3.5 MEQ/L (ref 3.5–5.2)
RBC # BLD AUTO: 3.21 MILL/MM3 (ref 4.2–5.4)
SEGMENTED NEUTROPHILS ABSOLUTE COUNT: 7.8 THOU/MM3 (ref 1.8–7.7)
SODIUM SERPL-SCNC: 143 MEQ/L (ref 135–145)
VANCOMYCIN SERPL-MCNC: 10.9 UG/ML (ref 0.1–39.9)
WBC # BLD AUTO: 10.2 THOU/MM3 (ref 4.8–10.8)

## 2023-04-24 PROCEDURE — 80048 BASIC METABOLIC PNL TOTAL CA: CPT

## 2023-04-24 PROCEDURE — 2500000003 HC RX 250 WO HCPCS: Performed by: INTERNAL MEDICINE

## 2023-04-24 PROCEDURE — 80202 ASSAY OF VANCOMYCIN: CPT

## 2023-04-24 PROCEDURE — 2580000003 HC RX 258: Performed by: INTERNAL MEDICINE

## 2023-04-24 PROCEDURE — 92610 EVALUATE SWALLOWING FUNCTION: CPT

## 2023-04-24 PROCEDURE — 6360000002 HC RX W HCPCS: Performed by: STUDENT IN AN ORGANIZED HEALTH CARE EDUCATION/TRAINING PROGRAM

## 2023-04-24 PROCEDURE — 87102 FUNGUS ISOLATION CULTURE: CPT

## 2023-04-24 PROCEDURE — 82948 REAGENT STRIP/BLOOD GLUCOSE: CPT

## 2023-04-24 PROCEDURE — 6360000002 HC RX W HCPCS: Performed by: INTERNAL MEDICINE

## 2023-04-24 PROCEDURE — 1200000000 HC SEMI PRIVATE

## 2023-04-24 PROCEDURE — 2580000003 HC RX 258: Performed by: STUDENT IN AN ORGANIZED HEALTH CARE EDUCATION/TRAINING PROGRAM

## 2023-04-24 PROCEDURE — 0W9F30Z DRAINAGE OF ABDOMINAL WALL WITH DRAINAGE DEVICE, PERCUTANEOUS APPROACH: ICD-10-PCS | Performed by: RADIOLOGY

## 2023-04-24 PROCEDURE — 6370000000 HC RX 637 (ALT 250 FOR IP): Performed by: STUDENT IN AN ORGANIZED HEALTH CARE EDUCATION/TRAINING PROGRAM

## 2023-04-24 PROCEDURE — 87077 CULTURE AEROBIC IDENTIFY: CPT

## 2023-04-24 PROCEDURE — 36415 COLL VENOUS BLD VENIPUNCTURE: CPT

## 2023-04-24 PROCEDURE — 99233 SBSQ HOSP IP/OBS HIGH 50: CPT | Performed by: INTERNAL MEDICINE

## 2023-04-24 PROCEDURE — 87070 CULTURE OTHR SPECIMN AEROBIC: CPT

## 2023-04-24 PROCEDURE — 6360000002 HC RX W HCPCS: Performed by: RADIOLOGY

## 2023-04-24 PROCEDURE — 2709999900 CT ABSCESS DRAINAGE W CATH PLACEMENT S&I

## 2023-04-24 PROCEDURE — 85025 COMPLETE CBC W/AUTO DIFF WBC: CPT

## 2023-04-24 PROCEDURE — 87075 CULTR BACTERIA EXCEPT BLOOD: CPT

## 2023-04-24 PROCEDURE — 92611 MOTION FLUOROSCOPY/SWALLOW: CPT

## 2023-04-24 PROCEDURE — 74230 X-RAY XM SWLNG FUNCJ C+: CPT

## 2023-04-24 PROCEDURE — 87186 SC STD MICRODIL/AGAR DIL: CPT

## 2023-04-24 PROCEDURE — 87205 SMEAR GRAM STAIN: CPT

## 2023-04-24 PROCEDURE — 1200000003 HC TELEMETRY R&B

## 2023-04-24 PROCEDURE — 77012 CT SCAN FOR NEEDLE BIOPSY: CPT

## 2023-04-24 RX ORDER — TRAZODONE HYDROCHLORIDE 50 MG/1
25 TABLET ORAL NIGHTLY
Status: DISCONTINUED | OUTPATIENT
Start: 2023-04-24 | End: 2023-05-05 | Stop reason: HOSPADM

## 2023-04-24 RX ORDER — FENTANYL CITRATE 50 UG/ML
INJECTION, SOLUTION INTRAMUSCULAR; INTRAVENOUS PRN
Status: COMPLETED | OUTPATIENT
Start: 2023-04-24 | End: 2023-04-24

## 2023-04-24 RX ORDER — LANOLIN ALCOHOL/MO/W.PET/CERES
4.5 CREAM (GRAM) TOPICAL NIGHTLY PRN
Status: DISCONTINUED | OUTPATIENT
Start: 2023-04-24 | End: 2023-05-05 | Stop reason: HOSPADM

## 2023-04-24 RX ORDER — NIFEDIPINE 30 MG/1
30 TABLET, FILM COATED, EXTENDED RELEASE ORAL DAILY
COMMUNITY

## 2023-04-24 RX ORDER — MAGNESIUM HYDROXIDE/ALUMINUM HYDROXICE/SIMETHICONE 120; 1200; 1200 MG/30ML; MG/30ML; MG/30ML
5 SUSPENSION ORAL EVERY 6 HOURS PRN
COMMUNITY

## 2023-04-24 RX ORDER — ACETAMINOPHEN 650 MG/1
650 SUPPOSITORY RECTAL EVERY 4 HOURS PRN
COMMUNITY

## 2023-04-24 RX ORDER — BISACODYL 10 MG
10 SUPPOSITORY, RECTAL RECTAL DAILY
COMMUNITY

## 2023-04-24 RX ORDER — NYSTATIN 10B UNIT
POWDER (EA) MISCELLANEOUS 2 TIMES DAILY
COMMUNITY

## 2023-04-24 RX ORDER — EZETIMIBE 10 MG/1
10 TABLET ORAL DAILY
COMMUNITY

## 2023-04-24 RX ORDER — PREGABALIN 50 MG/1
50 CAPSULE ORAL 2 TIMES DAILY
COMMUNITY

## 2023-04-24 RX ORDER — SENNA PLUS 8.6 MG/1
2 TABLET ORAL DAILY
COMMUNITY

## 2023-04-24 RX ADMIN — LEVETIRACETAM 1000 MG: 100 INJECTION, SOLUTION INTRAVENOUS at 01:34

## 2023-04-24 RX ADMIN — TRAZODONE HYDROCHLORIDE 25 MG: 50 TABLET ORAL at 22:56

## 2023-04-24 RX ADMIN — ACETAMINOPHEN 650 MG: 325 TABLET ORAL at 18:52

## 2023-04-24 RX ADMIN — PREGABALIN 50 MG: 50 CAPSULE ORAL at 09:17

## 2023-04-24 RX ADMIN — FENTANYL CITRATE 25 MCG: 50 INJECTION, SOLUTION INTRAMUSCULAR; INTRAVENOUS at 16:26

## 2023-04-24 RX ADMIN — VANCOMYCIN HYDROCHLORIDE 1250 MG: 5 INJECTION, POWDER, LYOPHILIZED, FOR SOLUTION INTRAVENOUS at 01:57

## 2023-04-24 RX ADMIN — BARIUM SULFATE 20 ML: 0.81 POWDER, FOR SUSPENSION ORAL at 10:11

## 2023-04-24 RX ADMIN — BARIUM SULFATE 10 ML: 400 SUSPENSION ORAL at 10:11

## 2023-04-24 RX ADMIN — ACETAMINOPHEN 650 MG: 325 TABLET ORAL at 06:07

## 2023-04-24 RX ADMIN — PIPERACILLIN AND TAZOBACTAM 3375 MG: 3; .375 INJECTION, POWDER, LYOPHILIZED, FOR SOLUTION INTRAVENOUS at 06:07

## 2023-04-24 RX ADMIN — PIPERACILLIN AND TAZOBACTAM 3375 MG: 3; .375 INJECTION, POWDER, LYOPHILIZED, FOR SOLUTION INTRAVENOUS at 22:59

## 2023-04-24 RX ADMIN — FENTANYL CITRATE 25 MCG: 50 INJECTION, SOLUTION INTRAMUSCULAR; INTRAVENOUS at 16:35

## 2023-04-24 RX ADMIN — PREGABALIN 50 MG: 50 CAPSULE ORAL at 22:56

## 2023-04-24 RX ADMIN — LEVETIRACETAM 1000 MG: 100 INJECTION, SOLUTION INTRAVENOUS at 12:29

## 2023-04-24 RX ADMIN — SODIUM CHLORIDE, SODIUM LACTATE, POTASSIUM CHLORIDE, CALCIUM CHLORIDE AND DEXTROSE MONOHYDRATE: 5; 600; 310; 30; 20 INJECTION, SOLUTION INTRAVENOUS at 01:37

## 2023-04-24 RX ADMIN — ACETAMINOPHEN 650 MG: 325 TABLET ORAL at 12:23

## 2023-04-24 RX ADMIN — SODIUM CHLORIDE, SODIUM LACTATE, POTASSIUM CHLORIDE, CALCIUM CHLORIDE AND DEXTROSE MONOHYDRATE: 5; 600; 310; 30; 20 INJECTION, SOLUTION INTRAVENOUS at 17:02

## 2023-04-24 RX ADMIN — BARIUM SULFATE 10 ML: 400 PASTE ORAL at 10:10

## 2023-04-24 RX ADMIN — ONDANSETRON 4 MG: 2 INJECTION INTRAMUSCULAR; INTRAVENOUS at 17:25

## 2023-04-24 RX ADMIN — PIPERACILLIN AND TAZOBACTAM 3375 MG: 3; .375 INJECTION, POWDER, LYOPHILIZED, FOR SOLUTION INTRAVENOUS at 12:49

## 2023-04-24 ASSESSMENT — PAIN DESCRIPTION - LOCATION
LOCATION: ABDOMEN
LOCATION: HEAD

## 2023-04-24 ASSESSMENT — PAIN SCALES - GENERAL: PAINLEVEL_OUTOF10: 10

## 2023-04-24 ASSESSMENT — PAIN DESCRIPTION - DESCRIPTORS: DESCRIPTORS: SORE

## 2023-04-25 LAB
ANION GAP SERPL CALC-SCNC: 11 MEQ/L (ref 8–16)
BASOPHILS ABSOLUTE: 0 THOU/MM3 (ref 0–0.1)
BASOPHILS NFR BLD AUTO: 0.2 %
BUN SERPL-MCNC: 10 MG/DL (ref 7–22)
CALCIUM SERPL-MCNC: 8 MG/DL (ref 8.5–10.5)
CHLORIDE SERPL-SCNC: 111 MEQ/L (ref 98–111)
CO2 SERPL-SCNC: 21 MEQ/L (ref 23–33)
CREAT SERPL-MCNC: 0.8 MG/DL (ref 0.4–1.2)
DEPRECATED RDW RBC AUTO: 46.5 FL (ref 35–45)
EOSINOPHIL NFR BLD AUTO: 1.9 %
EOSINOPHILS ABSOLUTE: 0.2 THOU/MM3 (ref 0–0.4)
ERYTHROCYTE [DISTWIDTH] IN BLOOD BY AUTOMATED COUNT: 13.9 % (ref 11.5–14.5)
FUNGUS SPEC CULT: NORMAL
FUNGUS SPEC FUNGUS STN: NORMAL
GFR SERPL CREATININE-BSD FRML MDRD: > 60 ML/MIN/1.73M2
GLUCOSE BLD STRIP.AUTO-MCNC: 144 MG/DL (ref 70–108)
GLUCOSE BLD STRIP.AUTO-MCNC: 151 MG/DL (ref 70–108)
GLUCOSE BLD STRIP.AUTO-MCNC: 167 MG/DL (ref 70–108)
GLUCOSE BLD STRIP.AUTO-MCNC: 214 MG/DL (ref 70–108)
GLUCOSE SERPL-MCNC: 158 MG/DL (ref 70–108)
HCT VFR BLD AUTO: 26.2 % (ref 37–47)
HCT VFR BLD AUTO: 29.2 % (ref 37–47)
HGB BLD-MCNC: 7.6 GM/DL (ref 12–16)
HGB BLD-MCNC: 8.3 GM/DL (ref 12–16)
IMM GRANULOCYTES # BLD AUTO: 0.2 THOU/MM3 (ref 0–0.07)
IMM GRANULOCYTES NFR BLD AUTO: 2 %
LYMPHOCYTES ABSOLUTE: 1.3 THOU/MM3 (ref 1–4.8)
LYMPHOCYTES NFR BLD AUTO: 12.7 %
MAGNESIUM SERPL-MCNC: 1.5 MG/DL (ref 1.6–2.4)
MAGNESIUM SERPL-MCNC: 2.2 MG/DL (ref 1.6–2.4)
MCH RBC QN AUTO: 26.4 PG (ref 26–33)
MCHC RBC AUTO-ENTMCNC: 29 GM/DL (ref 32.2–35.5)
MCV RBC AUTO: 91 FL (ref 81–99)
MONOCYTES ABSOLUTE: 0.7 THOU/MM3 (ref 0.4–1.3)
MONOCYTES NFR BLD AUTO: 7.2 %
NEUTROPHILS NFR BLD AUTO: 76 %
NRBC BLD AUTO-RTO: 0 /100 WBC
PLATELET # BLD AUTO: 267 THOU/MM3 (ref 130–400)
PMV BLD AUTO: 9.5 FL (ref 9.4–12.4)
POTASSIUM SERPL-SCNC: 3.1 MEQ/L (ref 3.5–5.2)
POTASSIUM SERPL-SCNC: 3.9 MEQ/L (ref 3.5–5.2)
RBC # BLD AUTO: 2.88 MILL/MM3 (ref 4.2–5.4)
SEGMENTED NEUTROPHILS ABSOLUTE COUNT: 7.5 THOU/MM3 (ref 1.8–7.7)
SODIUM SERPL-SCNC: 143 MEQ/L (ref 135–145)
WBC # BLD AUTO: 9.9 THOU/MM3 (ref 4.8–10.8)

## 2023-04-25 PROCEDURE — 6370000000 HC RX 637 (ALT 250 FOR IP): Performed by: STUDENT IN AN ORGANIZED HEALTH CARE EDUCATION/TRAINING PROGRAM

## 2023-04-25 PROCEDURE — 85025 COMPLETE CBC W/AUTO DIFF WBC: CPT

## 2023-04-25 PROCEDURE — 6360000002 HC RX W HCPCS: Performed by: INTERNAL MEDICINE

## 2023-04-25 PROCEDURE — 2580000003 HC RX 258: Performed by: STUDENT IN AN ORGANIZED HEALTH CARE EDUCATION/TRAINING PROGRAM

## 2023-04-25 PROCEDURE — 36415 COLL VENOUS BLD VENIPUNCTURE: CPT

## 2023-04-25 PROCEDURE — 6360000002 HC RX W HCPCS: Performed by: STUDENT IN AN ORGANIZED HEALTH CARE EDUCATION/TRAINING PROGRAM

## 2023-04-25 PROCEDURE — 83735 ASSAY OF MAGNESIUM: CPT

## 2023-04-25 PROCEDURE — 82948 REAGENT STRIP/BLOOD GLUCOSE: CPT

## 2023-04-25 PROCEDURE — 2580000003 HC RX 258: Performed by: INTERNAL MEDICINE

## 2023-04-25 PROCEDURE — 99231 SBSQ HOSP IP/OBS SF/LOW 25: CPT | Performed by: SURGERY

## 2023-04-25 PROCEDURE — 84132 ASSAY OF SERUM POTASSIUM: CPT

## 2023-04-25 PROCEDURE — 85018 HEMOGLOBIN: CPT

## 2023-04-25 PROCEDURE — 92526 ORAL FUNCTION THERAPY: CPT

## 2023-04-25 PROCEDURE — 99233 SBSQ HOSP IP/OBS HIGH 50: CPT | Performed by: INTERNAL MEDICINE

## 2023-04-25 PROCEDURE — 80048 BASIC METABOLIC PNL TOTAL CA: CPT

## 2023-04-25 PROCEDURE — 1200000000 HC SEMI PRIVATE

## 2023-04-25 PROCEDURE — 85014 HEMATOCRIT: CPT

## 2023-04-25 PROCEDURE — 1200000003 HC TELEMETRY R&B

## 2023-04-25 PROCEDURE — 6370000000 HC RX 637 (ALT 250 FOR IP): Performed by: INTERNAL MEDICINE

## 2023-04-25 RX ORDER — INSULIN LISPRO 100 [IU]/ML
0-4 INJECTION, SOLUTION INTRAVENOUS; SUBCUTANEOUS
Status: DISCONTINUED | OUTPATIENT
Start: 2023-04-25 | End: 2023-04-29

## 2023-04-25 RX ORDER — POTASSIUM CHLORIDE 20 MEQ/1
20 TABLET, EXTENDED RELEASE ORAL
Status: DISCONTINUED | OUTPATIENT
Start: 2023-04-25 | End: 2023-05-05 | Stop reason: HOSPADM

## 2023-04-25 RX ORDER — MAGNESIUM SULFATE IN WATER 40 MG/ML
2000 INJECTION, SOLUTION INTRAVENOUS PRN
Status: DISCONTINUED | OUTPATIENT
Start: 2023-04-25 | End: 2023-05-05 | Stop reason: HOSPADM

## 2023-04-25 RX ORDER — LIDOCAINE 4 G/G
2 PATCH TOPICAL DAILY
Status: DISCONTINUED | OUTPATIENT
Start: 2023-04-25 | End: 2023-05-05 | Stop reason: HOSPADM

## 2023-04-25 RX ORDER — MAGNESIUM SULFATE IN WATER 40 MG/ML
2000 INJECTION, SOLUTION INTRAVENOUS ONCE
Status: DISCONTINUED | OUTPATIENT
Start: 2023-04-25 | End: 2023-04-25

## 2023-04-25 RX ORDER — POTASSIUM CHLORIDE 7.45 MG/ML
10 INJECTION INTRAVENOUS
Status: DISCONTINUED | OUTPATIENT
Start: 2023-04-25 | End: 2023-04-25

## 2023-04-25 RX ORDER — MAGNESIUM SULFATE IN WATER 40 MG/ML
2000 INJECTION, SOLUTION INTRAVENOUS ONCE
Status: COMPLETED | OUTPATIENT
Start: 2023-04-25 | End: 2023-04-25

## 2023-04-25 RX ADMIN — PIPERACILLIN AND TAZOBACTAM 3375 MG: 3; .375 INJECTION, POWDER, LYOPHILIZED, FOR SOLUTION INTRAVENOUS at 08:05

## 2023-04-25 RX ADMIN — SODIUM CHLORIDE, SODIUM LACTATE, POTASSIUM CHLORIDE, CALCIUM CHLORIDE AND DEXTROSE MONOHYDRATE: 5; 600; 310; 30; 20 INJECTION, SOLUTION INTRAVENOUS at 08:04

## 2023-04-25 RX ADMIN — POTASSIUM CHLORIDE 10 MEQ: 7.46 INJECTION, SOLUTION INTRAVENOUS at 11:01

## 2023-04-25 RX ADMIN — PIPERACILLIN AND TAZOBACTAM 3375 MG: 3; .375 INJECTION, POWDER, LYOPHILIZED, FOR SOLUTION INTRAVENOUS at 18:46

## 2023-04-25 RX ADMIN — PREGABALIN 50 MG: 50 CAPSULE ORAL at 12:12

## 2023-04-25 RX ADMIN — VANCOMYCIN HYDROCHLORIDE 1250 MG: 5 INJECTION, POWDER, LYOPHILIZED, FOR SOLUTION INTRAVENOUS at 02:58

## 2023-04-25 RX ADMIN — TRAZODONE HYDROCHLORIDE 25 MG: 50 TABLET ORAL at 22:02

## 2023-04-25 RX ADMIN — INSULIN LISPRO 1 UNITS: 100 INJECTION, SOLUTION INTRAVENOUS; SUBCUTANEOUS at 12:33

## 2023-04-25 RX ADMIN — POTASSIUM CHLORIDE 20 MEQ: 1500 TABLET, EXTENDED RELEASE ORAL at 12:11

## 2023-04-25 RX ADMIN — POTASSIUM CHLORIDE 10 MEQ: 7.46 INJECTION, SOLUTION INTRAVENOUS at 12:34

## 2023-04-25 RX ADMIN — LEVETIRACETAM 1000 MG: 100 INJECTION, SOLUTION INTRAVENOUS at 15:13

## 2023-04-25 RX ADMIN — ACETAMINOPHEN 650 MG: 325 TABLET ORAL at 02:35

## 2023-04-25 RX ADMIN — PREGABALIN 50 MG: 50 CAPSULE ORAL at 22:02

## 2023-04-25 RX ADMIN — ACETAMINOPHEN 650 MG: 325 TABLET ORAL at 12:11

## 2023-04-25 RX ADMIN — ACETAMINOPHEN 650 MG: 325 TABLET ORAL at 18:46

## 2023-04-25 RX ADMIN — LEVETIRACETAM 1000 MG: 100 INJECTION, SOLUTION INTRAVENOUS at 02:34

## 2023-04-25 RX ADMIN — MAGNESIUM SULFATE HEPTAHYDRATE 2000 MG: 40 INJECTION, SOLUTION INTRAVENOUS at 10:56

## 2023-04-25 ASSESSMENT — PAIN DESCRIPTION - ORIENTATION: ORIENTATION: RIGHT

## 2023-04-25 ASSESSMENT — PAIN - FUNCTIONAL ASSESSMENT
PAIN_FUNCTIONAL_ASSESSMENT: PREVENTS OR INTERFERES SOME ACTIVE ACTIVITIES AND ADLS
PAIN_FUNCTIONAL_ASSESSMENT: PREVENTS OR INTERFERES SOME ACTIVE ACTIVITIES AND ADLS

## 2023-04-25 ASSESSMENT — PAIN SCALES - GENERAL
PAINLEVEL_OUTOF10: 7
PAINLEVEL_OUTOF10: 0
PAINLEVEL_OUTOF10: 2

## 2023-04-25 ASSESSMENT — PAIN DESCRIPTION - LOCATION
LOCATION: KNEE
LOCATION: LEG;KNEE

## 2023-04-26 LAB
ANION GAP SERPL CALC-SCNC: 11 MEQ/L (ref 8–16)
BASOPHILS ABSOLUTE: 0 THOU/MM3 (ref 0–0.1)
BASOPHILS NFR BLD AUTO: 0.4 %
BUN SERPL-MCNC: 10 MG/DL (ref 7–22)
CALCIUM SERPL-MCNC: 8.2 MG/DL (ref 8.5–10.5)
CHLORIDE SERPL-SCNC: 109 MEQ/L (ref 98–111)
CO2 SERPL-SCNC: 22 MEQ/L (ref 23–33)
CREAT SERPL-MCNC: 0.8 MG/DL (ref 0.4–1.2)
DEPRECATED RDW RBC AUTO: 46.1 FL (ref 35–45)
EOSINOPHIL NFR BLD AUTO: 2.9 %
EOSINOPHILS ABSOLUTE: 0.3 THOU/MM3 (ref 0–0.4)
ERYTHROCYTE [DISTWIDTH] IN BLOOD BY AUTOMATED COUNT: 13.8 % (ref 11.5–14.5)
GFR SERPL CREATININE-BSD FRML MDRD: > 60 ML/MIN/1.73M2
GLUCOSE BLD STRIP.AUTO-MCNC: 124 MG/DL (ref 70–108)
GLUCOSE BLD STRIP.AUTO-MCNC: 172 MG/DL (ref 70–108)
GLUCOSE BLD STRIP.AUTO-MCNC: 186 MG/DL (ref 70–108)
GLUCOSE BLD STRIP.AUTO-MCNC: 224 MG/DL (ref 70–108)
GLUCOSE SERPL-MCNC: 128 MG/DL (ref 70–108)
HCT VFR BLD AUTO: 27.3 % (ref 37–47)
HGB BLD-MCNC: 8 GM/DL (ref 12–16)
IMM GRANULOCYTES # BLD AUTO: 0.37 THOU/MM3 (ref 0–0.07)
IMM GRANULOCYTES NFR BLD AUTO: 3.7 %
LYMPHOCYTES ABSOLUTE: 1.5 THOU/MM3 (ref 1–4.8)
LYMPHOCYTES NFR BLD AUTO: 14.9 %
MAGNESIUM SERPL-MCNC: 1.9 MG/DL (ref 1.6–2.4)
MCH RBC QN AUTO: 26.8 PG (ref 26–33)
MCHC RBC AUTO-ENTMCNC: 29.3 GM/DL (ref 32.2–35.5)
MCV RBC AUTO: 91.3 FL (ref 81–99)
MONOCYTES ABSOLUTE: 0.6 THOU/MM3 (ref 0.4–1.3)
MONOCYTES NFR BLD AUTO: 6.5 %
NEUTROPHILS NFR BLD AUTO: 71.6 %
NRBC BLD AUTO-RTO: 0 /100 WBC
PLATELET # BLD AUTO: 277 THOU/MM3 (ref 130–400)
PMV BLD AUTO: 9.6 FL (ref 9.4–12.4)
POTASSIUM SERPL-SCNC: 3.9 MEQ/L (ref 3.5–5.2)
RBC # BLD AUTO: 2.99 MILL/MM3 (ref 4.2–5.4)
SEGMENTED NEUTROPHILS ABSOLUTE COUNT: 7.1 THOU/MM3 (ref 1.8–7.7)
SODIUM SERPL-SCNC: 142 MEQ/L (ref 135–145)
WBC # BLD AUTO: 9.9 THOU/MM3 (ref 4.8–10.8)

## 2023-04-26 PROCEDURE — 6360000002 HC RX W HCPCS: Performed by: INTERNAL MEDICINE

## 2023-04-26 PROCEDURE — 97530 THERAPEUTIC ACTIVITIES: CPT

## 2023-04-26 PROCEDURE — 36415 COLL VENOUS BLD VENIPUNCTURE: CPT

## 2023-04-26 PROCEDURE — 6360000002 HC RX W HCPCS: Performed by: PSYCHIATRY & NEUROLOGY

## 2023-04-26 PROCEDURE — 2580000003 HC RX 258: Performed by: STUDENT IN AN ORGANIZED HEALTH CARE EDUCATION/TRAINING PROGRAM

## 2023-04-26 PROCEDURE — 82948 REAGENT STRIP/BLOOD GLUCOSE: CPT

## 2023-04-26 PROCEDURE — 2580000003 HC RX 258: Performed by: INTERNAL MEDICINE

## 2023-04-26 PROCEDURE — 97166 OT EVAL MOD COMPLEX 45 MIN: CPT

## 2023-04-26 PROCEDURE — 83735 ASSAY OF MAGNESIUM: CPT

## 2023-04-26 PROCEDURE — 92526 ORAL FUNCTION THERAPY: CPT

## 2023-04-26 PROCEDURE — 80048 BASIC METABOLIC PNL TOTAL CA: CPT

## 2023-04-26 PROCEDURE — 97535 SELF CARE MNGMENT TRAINING: CPT

## 2023-04-26 PROCEDURE — 6370000000 HC RX 637 (ALT 250 FOR IP): Performed by: STUDENT IN AN ORGANIZED HEALTH CARE EDUCATION/TRAINING PROGRAM

## 2023-04-26 PROCEDURE — 85025 COMPLETE CBC W/AUTO DIFF WBC: CPT

## 2023-04-26 PROCEDURE — 6360000002 HC RX W HCPCS: Performed by: STUDENT IN AN ORGANIZED HEALTH CARE EDUCATION/TRAINING PROGRAM

## 2023-04-26 PROCEDURE — 99233 SBSQ HOSP IP/OBS HIGH 50: CPT | Performed by: INTERNAL MEDICINE

## 2023-04-26 PROCEDURE — 6370000000 HC RX 637 (ALT 250 FOR IP): Performed by: INTERNAL MEDICINE

## 2023-04-26 PROCEDURE — 1200000003 HC TELEMETRY R&B

## 2023-04-26 PROCEDURE — 1200000000 HC SEMI PRIVATE

## 2023-04-26 PROCEDURE — 97162 PT EVAL MOD COMPLEX 30 MIN: CPT

## 2023-04-26 RX ORDER — KETOROLAC TROMETHAMINE 30 MG/ML
15 INJECTION, SOLUTION INTRAMUSCULAR; INTRAVENOUS ONCE
Status: COMPLETED | OUTPATIENT
Start: 2023-04-26 | End: 2023-04-26

## 2023-04-26 RX ORDER — ENOXAPARIN SODIUM 100 MG/ML
40 INJECTION SUBCUTANEOUS NIGHTLY
Status: DISCONTINUED | OUTPATIENT
Start: 2023-04-26 | End: 2023-04-29

## 2023-04-26 RX ADMIN — LEVETIRACETAM 1000 MG: 500 TABLET, FILM COATED ORAL at 23:05

## 2023-04-26 RX ADMIN — SODIUM CHLORIDE, PRESERVATIVE FREE 10 ML: 5 INJECTION INTRAVENOUS at 23:30

## 2023-04-26 RX ADMIN — PIPERACILLIN AND TAZOBACTAM 3375 MG: 3; .375 INJECTION, POWDER, LYOPHILIZED, FOR SOLUTION INTRAVENOUS at 08:49

## 2023-04-26 RX ADMIN — Medication 4.5 MG: at 23:06

## 2023-04-26 RX ADMIN — SODIUM CHLORIDE, PRESERVATIVE FREE 10 ML: 5 INJECTION INTRAVENOUS at 21:02

## 2023-04-26 RX ADMIN — ENOXAPARIN SODIUM 40 MG: 100 INJECTION SUBCUTANEOUS at 23:06

## 2023-04-26 RX ADMIN — PIPERACILLIN AND TAZOBACTAM 3375 MG: 3; .375 INJECTION, POWDER, LYOPHILIZED, FOR SOLUTION INTRAVENOUS at 02:49

## 2023-04-26 RX ADMIN — TRAZODONE HYDROCHLORIDE 25 MG: 50 TABLET ORAL at 20:52

## 2023-04-26 RX ADMIN — PREGABALIN 50 MG: 50 CAPSULE ORAL at 08:39

## 2023-04-26 RX ADMIN — LEVETIRACETAM 1000 MG: 100 INJECTION, SOLUTION INTRAVENOUS at 06:05

## 2023-04-26 RX ADMIN — INSULIN LISPRO 1 UNITS: 100 INJECTION, SOLUTION INTRAVENOUS; SUBCUTANEOUS at 12:07

## 2023-04-26 RX ADMIN — PIPERACILLIN AND TAZOBACTAM 3375 MG: 3; .375 INJECTION, POWDER, LYOPHILIZED, FOR SOLUTION INTRAVENOUS at 16:55

## 2023-04-26 RX ADMIN — POTASSIUM CHLORIDE 20 MEQ: 1500 TABLET, EXTENDED RELEASE ORAL at 08:39

## 2023-04-26 RX ADMIN — ACETAMINOPHEN 650 MG: 325 TABLET ORAL at 14:18

## 2023-04-26 RX ADMIN — VANCOMYCIN HYDROCHLORIDE 1250 MG: 5 INJECTION, POWDER, LYOPHILIZED, FOR SOLUTION INTRAVENOUS at 04:28

## 2023-04-26 RX ADMIN — PREGABALIN 50 MG: 50 CAPSULE ORAL at 20:52

## 2023-04-26 RX ADMIN — KETOROLAC TROMETHAMINE 15 MG: 30 INJECTION, SOLUTION INTRAMUSCULAR at 23:30

## 2023-04-26 RX ADMIN — ACETAMINOPHEN 650 MG: 325 TABLET ORAL at 20:52

## 2023-04-26 ASSESSMENT — PAIN SCALES - GENERAL
PAINLEVEL_OUTOF10: 3
PAINLEVEL_OUTOF10: 8
PAINLEVEL_OUTOF10: 0

## 2023-04-26 ASSESSMENT — PAIN SCALES - WONG BAKER
WONGBAKER_NUMERICALRESPONSE: 0

## 2023-04-26 ASSESSMENT — PAIN DESCRIPTION - DESCRIPTORS
DESCRIPTORS: ACHING

## 2023-04-26 ASSESSMENT — PAIN DESCRIPTION - LOCATION
LOCATION: KNEE
LOCATION: LEG
LOCATION: LEG

## 2023-04-26 ASSESSMENT — PAIN DESCRIPTION - ORIENTATION: ORIENTATION: LEFT

## 2023-04-27 ENCOUNTER — APPOINTMENT (OUTPATIENT)
Dept: CT IMAGING | Age: 83
End: 2023-04-27
Payer: MEDICARE

## 2023-04-27 LAB
ANION GAP SERPL CALC-SCNC: 12 MEQ/L (ref 8–16)
BACTERIA BLD AEROBE CULT: NORMAL
BASOPHILS ABSOLUTE: 0.1 THOU/MM3 (ref 0–0.1)
BASOPHILS NFR BLD AUTO: 0.7 %
BUN SERPL-MCNC: 11 MG/DL (ref 7–22)
CALCIUM SERPL-MCNC: 8.4 MG/DL (ref 8.5–10.5)
CHLORIDE SERPL-SCNC: 109 MEQ/L (ref 98–111)
CO2 SERPL-SCNC: 22 MEQ/L (ref 23–33)
CREAT SERPL-MCNC: 0.8 MG/DL (ref 0.4–1.2)
DEPRECATED RDW RBC AUTO: 45.5 FL (ref 35–45)
EOSINOPHIL NFR BLD AUTO: 3.9 %
EOSINOPHILS ABSOLUTE: 0.3 THOU/MM3 (ref 0–0.4)
ERYTHROCYTE [DISTWIDTH] IN BLOOD BY AUTOMATED COUNT: 13.5 % (ref 11.5–14.5)
GFR SERPL CREATININE-BSD FRML MDRD: > 60 ML/MIN/1.73M2
GLUCOSE BLD STRIP.AUTO-MCNC: 108 MG/DL (ref 70–108)
GLUCOSE BLD STRIP.AUTO-MCNC: 151 MG/DL (ref 70–108)
GLUCOSE BLD STRIP.AUTO-MCNC: 160 MG/DL (ref 70–108)
GLUCOSE BLD STRIP.AUTO-MCNC: 184 MG/DL (ref 70–108)
GLUCOSE SERPL-MCNC: 113 MG/DL (ref 70–108)
HCT VFR BLD AUTO: 29.3 % (ref 37–47)
HGB BLD-MCNC: 8.6 GM/DL (ref 12–16)
IMM GRANULOCYTES # BLD AUTO: 0.39 THOU/MM3 (ref 0–0.07)
IMM GRANULOCYTES NFR BLD AUTO: 4.6 %
LYMPHOCYTES ABSOLUTE: 1.6 THOU/MM3 (ref 1–4.8)
LYMPHOCYTES NFR BLD AUTO: 18.7 %
MCH RBC QN AUTO: 27 PG (ref 26–33)
MCHC RBC AUTO-ENTMCNC: 29.4 GM/DL (ref 32.2–35.5)
MCV RBC AUTO: 91.8 FL (ref 81–99)
MONOCYTES ABSOLUTE: 0.7 THOU/MM3 (ref 0.4–1.3)
MONOCYTES NFR BLD AUTO: 8 %
NEUTROPHILS NFR BLD AUTO: 64.1 %
NRBC BLD AUTO-RTO: 0 /100 WBC
PLATELET # BLD AUTO: 276 THOU/MM3 (ref 130–400)
PMV BLD AUTO: 9.5 FL (ref 9.4–12.4)
POTASSIUM SERPL-SCNC: 4 MEQ/L (ref 3.5–5.2)
RBC # BLD AUTO: 3.19 MILL/MM3 (ref 4.2–5.4)
SEGMENTED NEUTROPHILS ABSOLUTE COUNT: 5.4 THOU/MM3 (ref 1.8–7.7)
SODIUM SERPL-SCNC: 143 MEQ/L (ref 135–145)
WBC # BLD AUTO: 8.4 THOU/MM3 (ref 4.8–10.8)

## 2023-04-27 PROCEDURE — 2580000003 HC RX 258: Performed by: STUDENT IN AN ORGANIZED HEALTH CARE EDUCATION/TRAINING PROGRAM

## 2023-04-27 PROCEDURE — 93005 ELECTROCARDIOGRAM TRACING: CPT | Performed by: PSYCHIATRY & NEUROLOGY

## 2023-04-27 PROCEDURE — 6360000002 HC RX W HCPCS: Performed by: STUDENT IN AN ORGANIZED HEALTH CARE EDUCATION/TRAINING PROGRAM

## 2023-04-27 PROCEDURE — 72192 CT PELVIS W/O DYE: CPT

## 2023-04-27 PROCEDURE — 6370000000 HC RX 637 (ALT 250 FOR IP): Performed by: STUDENT IN AN ORGANIZED HEALTH CARE EDUCATION/TRAINING PROGRAM

## 2023-04-27 PROCEDURE — 6370000000 HC RX 637 (ALT 250 FOR IP): Performed by: INTERNAL MEDICINE

## 2023-04-27 PROCEDURE — 97530 THERAPEUTIC ACTIVITIES: CPT

## 2023-04-27 PROCEDURE — 82948 REAGENT STRIP/BLOOD GLUCOSE: CPT

## 2023-04-27 PROCEDURE — 36415 COLL VENOUS BLD VENIPUNCTURE: CPT

## 2023-04-27 PROCEDURE — 93010 ELECTROCARDIOGRAM REPORT: CPT | Performed by: INTERNAL MEDICINE

## 2023-04-27 PROCEDURE — 99233 SBSQ HOSP IP/OBS HIGH 50: CPT | Performed by: INTERNAL MEDICINE

## 2023-04-27 PROCEDURE — 1200000000 HC SEMI PRIVATE

## 2023-04-27 PROCEDURE — 85025 COMPLETE CBC W/AUTO DIFF WBC: CPT

## 2023-04-27 PROCEDURE — 80048 BASIC METABOLIC PNL TOTAL CA: CPT

## 2023-04-27 RX ORDER — SENNA AND DOCUSATE SODIUM 50; 8.6 MG/1; MG/1
1 TABLET, FILM COATED ORAL NIGHTLY
Status: DISCONTINUED | OUTPATIENT
Start: 2023-04-27 | End: 2023-04-27

## 2023-04-27 RX ORDER — CETIRIZINE HYDROCHLORIDE 5 MG/1
5 TABLET ORAL DAILY PRN
Status: DISCONTINUED | OUTPATIENT
Start: 2023-04-27 | End: 2023-05-05 | Stop reason: HOSPADM

## 2023-04-27 RX ORDER — SENNA AND DOCUSATE SODIUM 50; 8.6 MG/1; MG/1
1 TABLET, FILM COATED ORAL 2 TIMES DAILY
Status: DISCONTINUED | OUTPATIENT
Start: 2023-04-27 | End: 2023-05-05 | Stop reason: HOSPADM

## 2023-04-27 RX ORDER — OXYCODONE HYDROCHLORIDE 5 MG/1
2.5 TABLET ORAL EVERY 6 HOURS PRN
Status: DISCONTINUED | OUTPATIENT
Start: 2023-04-27 | End: 2023-05-03

## 2023-04-27 RX ORDER — OXYCODONE HYDROCHLORIDE 5 MG/1
5 TABLET ORAL EVERY 6 HOURS PRN
Status: DISCONTINUED | OUTPATIENT
Start: 2023-04-27 | End: 2023-05-03

## 2023-04-27 RX ADMIN — SODIUM CHLORIDE, PRESERVATIVE FREE 10 ML: 5 INJECTION INTRAVENOUS at 21:56

## 2023-04-27 RX ADMIN — SODIUM CHLORIDE: 9 INJECTION, SOLUTION INTRAVENOUS at 00:56

## 2023-04-27 RX ADMIN — SODIUM CHLORIDE, PRESERVATIVE FREE 10 ML: 5 INJECTION INTRAVENOUS at 09:11

## 2023-04-27 RX ADMIN — PREGABALIN 75 MG: 25 CAPSULE ORAL at 21:55

## 2023-04-27 RX ADMIN — ACETAMINOPHEN 650 MG: 325 TABLET ORAL at 21:33

## 2023-04-27 RX ADMIN — PIPERACILLIN AND TAZOBACTAM 3375 MG: 3; .375 INJECTION, POWDER, LYOPHILIZED, FOR SOLUTION INTRAVENOUS at 11:06

## 2023-04-27 RX ADMIN — PREGABALIN 50 MG: 50 CAPSULE ORAL at 09:08

## 2023-04-27 RX ADMIN — ONDANSETRON 4 MG: 2 INJECTION INTRAMUSCULAR; INTRAVENOUS at 12:46

## 2023-04-27 RX ADMIN — LEVETIRACETAM 1000 MG: 500 TABLET, FILM COATED ORAL at 09:10

## 2023-04-27 RX ADMIN — ENOXAPARIN SODIUM 40 MG: 100 INJECTION SUBCUTANEOUS at 21:32

## 2023-04-27 RX ADMIN — TRAZODONE HYDROCHLORIDE 25 MG: 50 TABLET ORAL at 21:33

## 2023-04-27 RX ADMIN — SENNOSIDES AND DOCUSATE SODIUM 1 TABLET: 50; 8.6 TABLET ORAL at 17:26

## 2023-04-27 RX ADMIN — OXYCODONE 5 MG: 5 TABLET ORAL at 13:46

## 2023-04-27 RX ADMIN — LEVETIRACETAM 1000 MG: 500 TABLET, FILM COATED ORAL at 21:36

## 2023-04-27 RX ADMIN — PIPERACILLIN AND TAZOBACTAM 3375 MG: 3; .375 INJECTION, POWDER, LYOPHILIZED, FOR SOLUTION INTRAVENOUS at 15:39

## 2023-04-27 RX ADMIN — PIPERACILLIN AND TAZOBACTAM 3375 MG: 3; .375 INJECTION, POWDER, LYOPHILIZED, FOR SOLUTION INTRAVENOUS at 00:56

## 2023-04-27 RX ADMIN — POTASSIUM CHLORIDE 20 MEQ: 1500 TABLET, EXTENDED RELEASE ORAL at 09:08

## 2023-04-27 ASSESSMENT — PAIN SCALES - WONG BAKER
WONGBAKER_NUMERICALRESPONSE: 0

## 2023-04-27 ASSESSMENT — PAIN DESCRIPTION - DESCRIPTORS
DESCRIPTORS: CRAMPING
DESCRIPTORS: STABBING

## 2023-04-27 ASSESSMENT — PAIN SCALES - GENERAL
PAINLEVEL_OUTOF10: 4
PAINLEVEL_OUTOF10: 3
PAINLEVEL_OUTOF10: 0
PAINLEVEL_OUTOF10: 9

## 2023-04-27 ASSESSMENT — PAIN DESCRIPTION - ORIENTATION
ORIENTATION: ANTERIOR
ORIENTATION: MID

## 2023-04-27 ASSESSMENT — PAIN DESCRIPTION - LOCATION
LOCATION: HEAD
LOCATION: ABDOMEN

## 2023-04-28 ENCOUNTER — APPOINTMENT (OUTPATIENT)
Dept: CT IMAGING | Age: 83
End: 2023-04-28
Payer: MEDICARE

## 2023-04-28 LAB
ANION GAP SERPL CALC-SCNC: 12 MEQ/L (ref 8–16)
BACTERIA BLD AEROBE CULT: ABNORMAL
BACTERIA BLD AEROBE CULT: ABNORMAL
BASOPHILS ABSOLUTE: 0.1 THOU/MM3 (ref 0–0.1)
BASOPHILS NFR BLD AUTO: 0.7 %
BUN SERPL-MCNC: 11 MG/DL (ref 7–22)
CALCIUM SERPL-MCNC: 8.6 MG/DL (ref 8.5–10.5)
CHLORIDE SERPL-SCNC: 106 MEQ/L (ref 98–111)
CO2 SERPL-SCNC: 22 MEQ/L (ref 23–33)
CREAT SERPL-MCNC: 0.8 MG/DL (ref 0.4–1.2)
DEPRECATED RDW RBC AUTO: 45.3 FL (ref 35–45)
EKG ATRIAL RATE: 107 BPM
EKG ATRIAL RATE: 64 BPM
EKG P AXIS: -22 DEGREES
EKG P AXIS: 6 DEGREES
EKG P-R INTERVAL: 164 MS
EKG P-R INTERVAL: 194 MS
EKG Q-T INTERVAL: 356 MS
EKG Q-T INTERVAL: 438 MS
EKG QRS DURATION: 110 MS
EKG QRS DURATION: 128 MS
EKG QTC CALCULATION (BAZETT): 451 MS
EKG QTC CALCULATION (BAZETT): 475 MS
EKG R AXIS: 13 DEGREES
EKG R AXIS: 87 DEGREES
EKG T AXIS: -21 DEGREES
EKG T AXIS: -25 DEGREES
EKG VENTRICULAR RATE: 107 BPM
EKG VENTRICULAR RATE: 64 BPM
EOSINOPHIL NFR BLD AUTO: 4.6 %
EOSINOPHILS ABSOLUTE: 0.4 THOU/MM3 (ref 0–0.4)
ERYTHROCYTE [DISTWIDTH] IN BLOOD BY AUTOMATED COUNT: 13.7 % (ref 11.5–14.5)
GFR SERPL CREATININE-BSD FRML MDRD: > 60 ML/MIN/1.73M2
GLUCOSE BLD STRIP.AUTO-MCNC: 127 MG/DL (ref 70–108)
GLUCOSE BLD STRIP.AUTO-MCNC: 149 MG/DL (ref 70–108)
GLUCOSE BLD STRIP.AUTO-MCNC: 176 MG/DL (ref 70–108)
GLUCOSE SERPL-MCNC: 97 MG/DL (ref 70–108)
HCT VFR BLD AUTO: 28.5 % (ref 37–47)
HGB BLD-MCNC: 8.3 GM/DL (ref 12–16)
IMM GRANULOCYTES # BLD AUTO: 0.44 THOU/MM3 (ref 0–0.07)
IMM GRANULOCYTES NFR BLD AUTO: 5.7 %
LYMPHOCYTES ABSOLUTE: 2 THOU/MM3 (ref 1–4.8)
LYMPHOCYTES NFR BLD AUTO: 26 %
MCH RBC QN AUTO: 26.4 PG (ref 26–33)
MCHC RBC AUTO-ENTMCNC: 29.1 GM/DL (ref 32.2–35.5)
MCV RBC AUTO: 90.8 FL (ref 81–99)
MONOCYTES ABSOLUTE: 0.6 THOU/MM3 (ref 0.4–1.3)
MONOCYTES NFR BLD AUTO: 8.1 %
NEUTROPHILS NFR BLD AUTO: 54.9 %
NRBC BLD AUTO-RTO: 0 /100 WBC
ORGANISM: ABNORMAL
PLATELET # BLD AUTO: 293 THOU/MM3 (ref 130–400)
PMV BLD AUTO: 9.2 FL (ref 9.4–12.4)
POTASSIUM SERPL-SCNC: 4.2 MEQ/L (ref 3.5–5.2)
RBC # BLD AUTO: 3.14 MILL/MM3 (ref 4.2–5.4)
SEGMENTED NEUTROPHILS ABSOLUTE COUNT: 4.2 THOU/MM3 (ref 1.8–7.7)
SODIUM SERPL-SCNC: 140 MEQ/L (ref 135–145)
WBC # BLD AUTO: 7.7 THOU/MM3 (ref 4.8–10.8)

## 2023-04-28 PROCEDURE — 6370000000 HC RX 637 (ALT 250 FOR IP): Performed by: INTERNAL MEDICINE

## 2023-04-28 PROCEDURE — 2580000003 HC RX 258: Performed by: STUDENT IN AN ORGANIZED HEALTH CARE EDUCATION/TRAINING PROGRAM

## 2023-04-28 PROCEDURE — 85025 COMPLETE CBC W/AUTO DIFF WBC: CPT

## 2023-04-28 PROCEDURE — 74177 CT ABD & PELVIS W/CONTRAST: CPT

## 2023-04-28 PROCEDURE — 1200000000 HC SEMI PRIVATE

## 2023-04-28 PROCEDURE — 97110 THERAPEUTIC EXERCISES: CPT

## 2023-04-28 PROCEDURE — 97116 GAIT TRAINING THERAPY: CPT

## 2023-04-28 PROCEDURE — 82948 REAGENT STRIP/BLOOD GLUCOSE: CPT

## 2023-04-28 PROCEDURE — 80048 BASIC METABOLIC PNL TOTAL CA: CPT

## 2023-04-28 PROCEDURE — 6370000000 HC RX 637 (ALT 250 FOR IP): Performed by: STUDENT IN AN ORGANIZED HEALTH CARE EDUCATION/TRAINING PROGRAM

## 2023-04-28 PROCEDURE — 97530 THERAPEUTIC ACTIVITIES: CPT

## 2023-04-28 PROCEDURE — 6360000002 HC RX W HCPCS: Performed by: STUDENT IN AN ORGANIZED HEALTH CARE EDUCATION/TRAINING PROGRAM

## 2023-04-28 PROCEDURE — 92526 ORAL FUNCTION THERAPY: CPT

## 2023-04-28 PROCEDURE — 6360000004 HC RX CONTRAST MEDICATION: Performed by: INTERNAL MEDICINE

## 2023-04-28 PROCEDURE — 36415 COLL VENOUS BLD VENIPUNCTURE: CPT

## 2023-04-28 PROCEDURE — 99233 SBSQ HOSP IP/OBS HIGH 50: CPT | Performed by: INTERNAL MEDICINE

## 2023-04-28 RX ADMIN — OXYCODONE 5 MG: 5 TABLET ORAL at 21:04

## 2023-04-28 RX ADMIN — SODIUM CHLORIDE, PRESERVATIVE FREE 10 ML: 5 INJECTION INTRAVENOUS at 20:36

## 2023-04-28 RX ADMIN — CETIRIZINE HYDROCHLORIDE 5 MG: 5 TABLET ORAL at 20:35

## 2023-04-28 RX ADMIN — LEVETIRACETAM 1000 MG: 500 TABLET, FILM COATED ORAL at 20:35

## 2023-04-28 RX ADMIN — SENNOSIDES AND DOCUSATE SODIUM 1 TABLET: 50; 8.6 TABLET ORAL at 20:36

## 2023-04-28 RX ADMIN — PREGABALIN 75 MG: 25 CAPSULE ORAL at 20:36

## 2023-04-28 RX ADMIN — SODIUM CHLORIDE, PRESERVATIVE FREE 10 ML: 5 INJECTION INTRAVENOUS at 08:08

## 2023-04-28 RX ADMIN — PREGABALIN 75 MG: 25 CAPSULE ORAL at 08:08

## 2023-04-28 RX ADMIN — LEVETIRACETAM 1000 MG: 500 TABLET, FILM COATED ORAL at 08:08

## 2023-04-28 RX ADMIN — PIPERACILLIN AND TAZOBACTAM 3375 MG: 3; .375 INJECTION, POWDER, LYOPHILIZED, FOR SOLUTION INTRAVENOUS at 08:13

## 2023-04-28 RX ADMIN — ACETAMINOPHEN 650 MG: 325 TABLET ORAL at 04:59

## 2023-04-28 RX ADMIN — PIPERACILLIN AND TAZOBACTAM 3375 MG: 3; .375 INJECTION, POWDER, LYOPHILIZED, FOR SOLUTION INTRAVENOUS at 01:51

## 2023-04-28 RX ADMIN — POTASSIUM CHLORIDE 20 MEQ: 1500 TABLET, EXTENDED RELEASE ORAL at 08:08

## 2023-04-28 RX ADMIN — ENOXAPARIN SODIUM 40 MG: 100 INJECTION SUBCUTANEOUS at 20:36

## 2023-04-28 RX ADMIN — ONDANSETRON 4 MG: 4 TABLET, ORALLY DISINTEGRATING ORAL at 21:04

## 2023-04-28 RX ADMIN — TRAZODONE HYDROCHLORIDE 25 MG: 50 TABLET ORAL at 20:36

## 2023-04-28 RX ADMIN — SENNOSIDES AND DOCUSATE SODIUM 1 TABLET: 50; 8.6 TABLET ORAL at 08:08

## 2023-04-28 RX ADMIN — IOPAMIDOL 80 ML: 755 INJECTION, SOLUTION INTRAVENOUS at 10:23

## 2023-04-28 RX ADMIN — PIPERACILLIN AND TAZOBACTAM 3375 MG: 3; .375 INJECTION, POWDER, LYOPHILIZED, FOR SOLUTION INTRAVENOUS at 16:39

## 2023-04-28 ASSESSMENT — PAIN SCALES - GENERAL
PAINLEVEL_OUTOF10: 3
PAINLEVEL_OUTOF10: 4
PAINLEVEL_OUTOF10: 0
PAINLEVEL_OUTOF10: 0
PAINLEVEL_OUTOF10: 10
PAINLEVEL_OUTOF10: 0
PAINLEVEL_OUTOF10: 4

## 2023-04-28 ASSESSMENT — PAIN DESCRIPTION - DESCRIPTORS
DESCRIPTORS: ACHING
DESCRIPTORS: ACHING
DESCRIPTORS: CRUSHING

## 2023-04-28 ASSESSMENT — PAIN DESCRIPTION - ORIENTATION
ORIENTATION: RIGHT;LEFT
ORIENTATION: ANTERIOR
ORIENTATION: RIGHT;LEFT

## 2023-04-28 ASSESSMENT — PAIN DESCRIPTION - LOCATION
LOCATION: HEAD
LOCATION: ARM;LEG
LOCATION: ARM;LEG

## 2023-04-28 ASSESSMENT — PAIN DESCRIPTION - FREQUENCY: FREQUENCY: INTERMITTENT

## 2023-04-28 ASSESSMENT — PAIN DESCRIPTION - ONSET: ONSET: GRADUAL

## 2023-04-28 ASSESSMENT — PAIN DESCRIPTION - PAIN TYPE: TYPE: ACUTE PAIN

## 2023-04-29 LAB
BACTERIA SPEC AEROBE CULT: ABNORMAL
BACTERIA SPEC ANAEROBE CULT: ABNORMAL
GLUCOSE BLD STRIP.AUTO-MCNC: 117 MG/DL (ref 70–108)
GLUCOSE BLD STRIP.AUTO-MCNC: 218 MG/DL (ref 70–108)
GLUCOSE BLD STRIP.AUTO-MCNC: 219 MG/DL (ref 70–108)
GLUCOSE BLD STRIP.AUTO-MCNC: 234 MG/DL (ref 70–108)
GRAM STN SPEC: ABNORMAL
ORGANISM: ABNORMAL

## 2023-04-29 PROCEDURE — 6370000000 HC RX 637 (ALT 250 FOR IP): Performed by: INTERNAL MEDICINE

## 2023-04-29 PROCEDURE — 6360000002 HC RX W HCPCS: Performed by: STUDENT IN AN ORGANIZED HEALTH CARE EDUCATION/TRAINING PROGRAM

## 2023-04-29 PROCEDURE — 6360000002 HC RX W HCPCS: Performed by: INTERNAL MEDICINE

## 2023-04-29 PROCEDURE — 99232 SBSQ HOSP IP/OBS MODERATE 35: CPT | Performed by: INTERNAL MEDICINE

## 2023-04-29 PROCEDURE — 82948 REAGENT STRIP/BLOOD GLUCOSE: CPT

## 2023-04-29 PROCEDURE — 2580000003 HC RX 258: Performed by: STUDENT IN AN ORGANIZED HEALTH CARE EDUCATION/TRAINING PROGRAM

## 2023-04-29 PROCEDURE — 1200000000 HC SEMI PRIVATE

## 2023-04-29 PROCEDURE — 6370000000 HC RX 637 (ALT 250 FOR IP): Performed by: STUDENT IN AN ORGANIZED HEALTH CARE EDUCATION/TRAINING PROGRAM

## 2023-04-29 RX ORDER — ENOXAPARIN SODIUM 100 MG/ML
40 INJECTION SUBCUTANEOUS
Status: DISCONTINUED | OUTPATIENT
Start: 2023-04-29 | End: 2023-04-29

## 2023-04-29 RX ORDER — INSULIN LISPRO 100 [IU]/ML
0-4 INJECTION, SOLUTION INTRAVENOUS; SUBCUTANEOUS 2 TIMES DAILY WITH MEALS
Status: DISCONTINUED | OUTPATIENT
Start: 2023-04-29 | End: 2023-05-02

## 2023-04-29 RX ORDER — ENOXAPARIN SODIUM 100 MG/ML
40 INJECTION SUBCUTANEOUS
Status: DISCONTINUED | OUTPATIENT
Start: 2023-04-29 | End: 2023-05-05 | Stop reason: HOSPADM

## 2023-04-29 RX ADMIN — SENNOSIDES AND DOCUSATE SODIUM 1 TABLET: 50; 8.6 TABLET ORAL at 09:22

## 2023-04-29 RX ADMIN — SODIUM CHLORIDE, PRESERVATIVE FREE 10 ML: 5 INJECTION INTRAVENOUS at 21:11

## 2023-04-29 RX ADMIN — TRAZODONE HYDROCHLORIDE 25 MG: 50 TABLET ORAL at 21:11

## 2023-04-29 RX ADMIN — POTASSIUM CHLORIDE 20 MEQ: 1500 TABLET, EXTENDED RELEASE ORAL at 09:22

## 2023-04-29 RX ADMIN — PANTOPRAZOLE SODIUM 40 MG: 40 TABLET, DELAYED RELEASE ORAL at 06:18

## 2023-04-29 RX ADMIN — PREGABALIN 75 MG: 25 CAPSULE ORAL at 21:11

## 2023-04-29 RX ADMIN — ENOXAPARIN SODIUM 40 MG: 100 INJECTION SUBCUTANEOUS at 12:04

## 2023-04-29 RX ADMIN — PIPERACILLIN AND TAZOBACTAM 3375 MG: 3; .375 INJECTION, POWDER, LYOPHILIZED, FOR SOLUTION INTRAVENOUS at 09:22

## 2023-04-29 RX ADMIN — LEVETIRACETAM 1000 MG: 500 TABLET, FILM COATED ORAL at 21:11

## 2023-04-29 RX ADMIN — INSULIN LISPRO 1 UNITS: 100 INJECTION, SOLUTION INTRAVENOUS; SUBCUTANEOUS at 16:23

## 2023-04-29 RX ADMIN — PIPERACILLIN AND TAZOBACTAM 3375 MG: 3; .375 INJECTION, POWDER, LYOPHILIZED, FOR SOLUTION INTRAVENOUS at 01:06

## 2023-04-29 RX ADMIN — LEVETIRACETAM 1000 MG: 500 TABLET, FILM COATED ORAL at 09:22

## 2023-04-29 RX ADMIN — PREGABALIN 75 MG: 25 CAPSULE ORAL at 09:22

## 2023-04-29 RX ADMIN — PIPERACILLIN AND TAZOBACTAM 3375 MG: 3; .375 INJECTION, POWDER, LYOPHILIZED, FOR SOLUTION INTRAVENOUS at 16:22

## 2023-04-29 RX ADMIN — SODIUM CHLORIDE, PRESERVATIVE FREE 10 ML: 5 INJECTION INTRAVENOUS at 08:22

## 2023-04-29 RX ADMIN — SENNOSIDES AND DOCUSATE SODIUM 1 TABLET: 50; 8.6 TABLET ORAL at 21:11

## 2023-04-29 ASSESSMENT — PAIN SCALES - GENERAL
PAINLEVEL_OUTOF10: 0

## 2023-04-29 ASSESSMENT — PAIN SCALES - WONG BAKER

## 2023-04-30 LAB
GLUCOSE BLD STRIP.AUTO-MCNC: 129 MG/DL (ref 70–108)
GLUCOSE BLD STRIP.AUTO-MCNC: 147 MG/DL (ref 70–108)
GLUCOSE BLD STRIP.AUTO-MCNC: 170 MG/DL (ref 70–108)
GLUCOSE BLD STRIP.AUTO-MCNC: 179 MG/DL (ref 70–108)

## 2023-04-30 PROCEDURE — 6360000002 HC RX W HCPCS: Performed by: INTERNAL MEDICINE

## 2023-04-30 PROCEDURE — 2580000003 HC RX 258: Performed by: STUDENT IN AN ORGANIZED HEALTH CARE EDUCATION/TRAINING PROGRAM

## 2023-04-30 PROCEDURE — 6360000002 HC RX W HCPCS: Performed by: STUDENT IN AN ORGANIZED HEALTH CARE EDUCATION/TRAINING PROGRAM

## 2023-04-30 PROCEDURE — 6370000000 HC RX 637 (ALT 250 FOR IP): Performed by: STUDENT IN AN ORGANIZED HEALTH CARE EDUCATION/TRAINING PROGRAM

## 2023-04-30 PROCEDURE — 82948 REAGENT STRIP/BLOOD GLUCOSE: CPT

## 2023-04-30 PROCEDURE — 99223 1ST HOSP IP/OBS HIGH 75: CPT | Performed by: INTERNAL MEDICINE

## 2023-04-30 PROCEDURE — 6370000000 HC RX 637 (ALT 250 FOR IP): Performed by: INTERNAL MEDICINE

## 2023-04-30 PROCEDURE — 1200000000 HC SEMI PRIVATE

## 2023-04-30 RX ADMIN — SODIUM CHLORIDE, PRESERVATIVE FREE 10 ML: 5 INJECTION INTRAVENOUS at 10:47

## 2023-04-30 RX ADMIN — PIPERACILLIN AND TAZOBACTAM 3375 MG: 3; .375 INJECTION, POWDER, LYOPHILIZED, FOR SOLUTION INTRAVENOUS at 15:43

## 2023-04-30 RX ADMIN — PREGABALIN 75 MG: 25 CAPSULE ORAL at 20:20

## 2023-04-30 RX ADMIN — PIPERACILLIN AND TAZOBACTAM 3375 MG: 3; .375 INJECTION, POWDER, LYOPHILIZED, FOR SOLUTION INTRAVENOUS at 09:34

## 2023-04-30 RX ADMIN — POTASSIUM CHLORIDE 20 MEQ: 1500 TABLET, EXTENDED RELEASE ORAL at 09:34

## 2023-04-30 RX ADMIN — SENNOSIDES AND DOCUSATE SODIUM 1 TABLET: 50; 8.6 TABLET ORAL at 20:20

## 2023-04-30 RX ADMIN — SODIUM CHLORIDE, PRESERVATIVE FREE 10 ML: 5 INJECTION INTRAVENOUS at 20:20

## 2023-04-30 RX ADMIN — Medication 4.5 MG: at 20:20

## 2023-04-30 RX ADMIN — ENOXAPARIN SODIUM 40 MG: 100 INJECTION SUBCUTANEOUS at 06:08

## 2023-04-30 RX ADMIN — PANTOPRAZOLE SODIUM 40 MG: 40 TABLET, DELAYED RELEASE ORAL at 06:08

## 2023-04-30 RX ADMIN — OXYCODONE 5 MG: 5 TABLET ORAL at 06:08

## 2023-04-30 RX ADMIN — PIPERACILLIN AND TAZOBACTAM 3375 MG: 3; .375 INJECTION, POWDER, LYOPHILIZED, FOR SOLUTION INTRAVENOUS at 00:31

## 2023-04-30 RX ADMIN — LEVETIRACETAM 1000 MG: 500 TABLET, FILM COATED ORAL at 20:20

## 2023-04-30 RX ADMIN — LEVETIRACETAM 1000 MG: 500 TABLET, FILM COATED ORAL at 09:34

## 2023-04-30 RX ADMIN — CETIRIZINE HYDROCHLORIDE 5 MG: 5 TABLET ORAL at 15:09

## 2023-04-30 RX ADMIN — PREGABALIN 75 MG: 25 CAPSULE ORAL at 09:34

## 2023-04-30 RX ADMIN — OXYCODONE 5 MG: 5 TABLET ORAL at 15:17

## 2023-04-30 RX ADMIN — ACETAMINOPHEN 650 MG: 325 TABLET ORAL at 04:17

## 2023-04-30 RX ADMIN — SENNOSIDES AND DOCUSATE SODIUM 1 TABLET: 50; 8.6 TABLET ORAL at 09:34

## 2023-04-30 RX ADMIN — TRAZODONE HYDROCHLORIDE 25 MG: 50 TABLET ORAL at 20:20

## 2023-04-30 ASSESSMENT — PAIN SCALES - WONG BAKER

## 2023-04-30 ASSESSMENT — PAIN DESCRIPTION - LOCATION
LOCATION: LEG
LOCATION: ABDOMEN
LOCATION: HEAD

## 2023-04-30 ASSESSMENT — PAIN SCALES - GENERAL
PAINLEVEL_OUTOF10: 4
PAINLEVEL_OUTOF10: 10
PAINLEVEL_OUTOF10: 0
PAINLEVEL_OUTOF10: 2

## 2023-04-30 ASSESSMENT — PAIN DESCRIPTION - ORIENTATION
ORIENTATION: RIGHT
ORIENTATION: RIGHT;LEFT

## 2023-04-30 ASSESSMENT — PAIN DESCRIPTION - DESCRIPTORS
DESCRIPTORS: ACHING
DESCRIPTORS: SHOOTING;SHARP

## 2023-05-01 LAB
GLUCOSE BLD STRIP.AUTO-MCNC: 132 MG/DL (ref 70–108)
GLUCOSE BLD STRIP.AUTO-MCNC: 168 MG/DL (ref 70–108)
GLUCOSE BLD STRIP.AUTO-MCNC: 181 MG/DL (ref 70–108)
GLUCOSE BLD STRIP.AUTO-MCNC: 209 MG/DL (ref 70–108)
MRSA DNA SPEC QL NAA+PROBE: POSITIVE
POTASSIUM SERPL-SCNC: 4.2 MEQ/L (ref 3.5–5.2)
VANA ISLT/SPM QL: POSITIVE

## 2023-05-01 PROCEDURE — 97530 THERAPEUTIC ACTIVITIES: CPT

## 2023-05-01 PROCEDURE — 6370000000 HC RX 637 (ALT 250 FOR IP): Performed by: STUDENT IN AN ORGANIZED HEALTH CARE EDUCATION/TRAINING PROGRAM

## 2023-05-01 PROCEDURE — 36415 COLL VENOUS BLD VENIPUNCTURE: CPT

## 2023-05-01 PROCEDURE — 6370000000 HC RX 637 (ALT 250 FOR IP): Performed by: INTERNAL MEDICINE

## 2023-05-01 PROCEDURE — 84132 ASSAY OF SERUM POTASSIUM: CPT

## 2023-05-01 PROCEDURE — 2580000003 HC RX 258: Performed by: STUDENT IN AN ORGANIZED HEALTH CARE EDUCATION/TRAINING PROGRAM

## 2023-05-01 PROCEDURE — 82948 REAGENT STRIP/BLOOD GLUCOSE: CPT

## 2023-05-01 PROCEDURE — 92526 ORAL FUNCTION THERAPY: CPT

## 2023-05-01 PROCEDURE — 99232 SBSQ HOSP IP/OBS MODERATE 35: CPT | Performed by: INTERNAL MEDICINE

## 2023-05-01 PROCEDURE — 1200000000 HC SEMI PRIVATE

## 2023-05-01 PROCEDURE — 87500 VANOMYCIN DNA AMP PROBE: CPT

## 2023-05-01 PROCEDURE — 87641 MR-STAPH DNA AMP PROBE: CPT

## 2023-05-01 PROCEDURE — 6360000002 HC RX W HCPCS: Performed by: STUDENT IN AN ORGANIZED HEALTH CARE EDUCATION/TRAINING PROGRAM

## 2023-05-01 PROCEDURE — 6360000002 HC RX W HCPCS: Performed by: INTERNAL MEDICINE

## 2023-05-01 RX ADMIN — TRAZODONE HYDROCHLORIDE 25 MG: 50 TABLET ORAL at 22:33

## 2023-05-01 RX ADMIN — INSULIN LISPRO 1 UNITS: 100 INJECTION, SOLUTION INTRAVENOUS; SUBCUTANEOUS at 16:34

## 2023-05-01 RX ADMIN — OXYCODONE 5 MG: 5 TABLET ORAL at 13:47

## 2023-05-01 RX ADMIN — SODIUM CHLORIDE, PRESERVATIVE FREE 10 ML: 5 INJECTION INTRAVENOUS at 08:32

## 2023-05-01 RX ADMIN — SENNOSIDES AND DOCUSATE SODIUM 1 TABLET: 50; 8.6 TABLET ORAL at 22:34

## 2023-05-01 RX ADMIN — PANTOPRAZOLE SODIUM 40 MG: 40 TABLET, DELAYED RELEASE ORAL at 06:08

## 2023-05-01 RX ADMIN — POTASSIUM CHLORIDE 20 MEQ: 1500 TABLET, EXTENDED RELEASE ORAL at 08:30

## 2023-05-01 RX ADMIN — CETIRIZINE HYDROCHLORIDE 5 MG: 5 TABLET ORAL at 12:20

## 2023-05-01 RX ADMIN — ACETAMINOPHEN 650 MG: 325 TABLET ORAL at 11:34

## 2023-05-01 RX ADMIN — LEVETIRACETAM 1000 MG: 500 TABLET, FILM COATED ORAL at 08:31

## 2023-05-01 RX ADMIN — PIPERACILLIN AND TAZOBACTAM 3375 MG: 3; .375 INJECTION, POWDER, LYOPHILIZED, FOR SOLUTION INTRAVENOUS at 16:30

## 2023-05-01 RX ADMIN — SENNOSIDES AND DOCUSATE SODIUM 1 TABLET: 50; 8.6 TABLET ORAL at 08:30

## 2023-05-01 RX ADMIN — PREGABALIN 75 MG: 25 CAPSULE ORAL at 08:31

## 2023-05-01 RX ADMIN — PIPERACILLIN AND TAZOBACTAM 3375 MG: 3; .375 INJECTION, POWDER, LYOPHILIZED, FOR SOLUTION INTRAVENOUS at 08:33

## 2023-05-01 RX ADMIN — LEVETIRACETAM 1000 MG: 500 TABLET, FILM COATED ORAL at 22:33

## 2023-05-01 RX ADMIN — SODIUM CHLORIDE, PRESERVATIVE FREE 10 ML: 5 INJECTION INTRAVENOUS at 22:38

## 2023-05-01 RX ADMIN — PREGABALIN 75 MG: 25 CAPSULE ORAL at 22:34

## 2023-05-01 RX ADMIN — ENOXAPARIN SODIUM 40 MG: 100 INJECTION SUBCUTANEOUS at 06:08

## 2023-05-01 RX ADMIN — ACETAMINOPHEN 650 MG: 325 TABLET ORAL at 17:18

## 2023-05-01 RX ADMIN — PIPERACILLIN AND TAZOBACTAM 3375 MG: 3; .375 INJECTION, POWDER, LYOPHILIZED, FOR SOLUTION INTRAVENOUS at 00:07

## 2023-05-01 ASSESSMENT — PAIN SCALES - GENERAL
PAINLEVEL_OUTOF10: 0
PAINLEVEL_OUTOF10: 9
PAINLEVEL_OUTOF10: 0
PAINLEVEL_OUTOF10: 7
PAINLEVEL_OUTOF10: 10

## 2023-05-01 ASSESSMENT — PAIN DESCRIPTION - LOCATION
LOCATION: LEG
LOCATION: HEAD
LOCATION: HEAD

## 2023-05-01 ASSESSMENT — PAIN DESCRIPTION - DESCRIPTORS
DESCRIPTORS: ACHING
DESCRIPTORS: ACHING;DISCOMFORT
DESCRIPTORS: ACHING;DISCOMFORT

## 2023-05-01 ASSESSMENT — PAIN DESCRIPTION - ORIENTATION
ORIENTATION: MID
ORIENTATION: RIGHT;LEFT
ORIENTATION: MID

## 2023-05-01 ASSESSMENT — PAIN SCALES - WONG BAKER
WONGBAKER_NUMERICALRESPONSE: 0

## 2023-05-01 NOTE — CARE COORDINATION
5/1/23, 2:11 PM EDT    DISCHARGE ON GOING EVALUATION    [id number] day: 8  Location: -14/014-A Reason for admit: Abdominal wall abscess [L02.211]  Sepsis, due to unspecified organism, unspecified whether acute organ dysfunction present [facility]) [A41.9]   Procedure: 4/24 CT guided drain placed into abscess  4/30 truclose drain exchanged  Barriers to Discharge: Remains on IV zosyn, pain control. Wound care. Plan to transition to oral atb in 1-2 days. General surgery and ID following. PCP: Chani Serna  Readmission Risk Score: 16.8%  Patient Goals/Plan/Treatment Preferences: Return to [facility].

## 2023-05-01 NOTE — CARE COORDINATION
5/1/23, 2:29 PM EDT    DISCHARGE PLANNING EVALUATION       SW updated Becky at [facility], [facility] and they will review wound note and will let SW know if they prefer a pre-cert or not to return at this time. FABIOLA spoke with Riya Marshall later in the day and they are able to meet Patient's need at discharge and will work on starting pre-cert but do need updated PT/OT notes to start.

## 2023-05-02 LAB
GLUCOSE BLD STRIP.AUTO-MCNC: 137 MG/DL (ref 70–108)
GLUCOSE BLD STRIP.AUTO-MCNC: 144 MG/DL (ref 70–108)
GLUCOSE BLD STRIP.AUTO-MCNC: 158 MG/DL (ref 70–108)
GLUCOSE BLD STRIP.AUTO-MCNC: 174 MG/DL (ref 70–108)

## 2023-05-02 PROCEDURE — 6370000000 HC RX 637 (ALT 250 FOR IP): Performed by: STUDENT IN AN ORGANIZED HEALTH CARE EDUCATION/TRAINING PROGRAM

## 2023-05-02 PROCEDURE — 97110 THERAPEUTIC EXERCISES: CPT

## 2023-05-02 PROCEDURE — 97530 THERAPEUTIC ACTIVITIES: CPT

## 2023-05-02 PROCEDURE — 6360000002 HC RX W HCPCS: Performed by: STUDENT IN AN ORGANIZED HEALTH CARE EDUCATION/TRAINING PROGRAM

## 2023-05-02 PROCEDURE — 97535 SELF CARE MNGMENT TRAINING: CPT

## 2023-05-02 PROCEDURE — 2580000003 HC RX 258: Performed by: STUDENT IN AN ORGANIZED HEALTH CARE EDUCATION/TRAINING PROGRAM

## 2023-05-02 PROCEDURE — 6360000002 HC RX W HCPCS: Performed by: INTERNAL MEDICINE

## 2023-05-02 PROCEDURE — 6370000000 HC RX 637 (ALT 250 FOR IP): Performed by: INTERNAL MEDICINE

## 2023-05-02 PROCEDURE — 1200000000 HC SEMI PRIVATE

## 2023-05-02 PROCEDURE — 82948 REAGENT STRIP/BLOOD GLUCOSE: CPT

## 2023-05-02 RX ORDER — INSULIN LISPRO 100 [IU]/ML
0-4 INJECTION, SOLUTION INTRAVENOUS; SUBCUTANEOUS
Status: DISCONTINUED | OUTPATIENT
Start: 2023-05-02 | End: 2023-05-05 | Stop reason: HOSPADM

## 2023-05-02 RX ADMIN — TRAZODONE HYDROCHLORIDE 25 MG: 50 TABLET ORAL at 20:08

## 2023-05-02 RX ADMIN — PREGABALIN 75 MG: 25 CAPSULE ORAL at 10:57

## 2023-05-02 RX ADMIN — PIPERACILLIN AND TAZOBACTAM 3375 MG: 3; .375 INJECTION, POWDER, LYOPHILIZED, FOR SOLUTION INTRAVENOUS at 20:04

## 2023-05-02 RX ADMIN — SODIUM CHLORIDE, PRESERVATIVE FREE 10 ML: 5 INJECTION INTRAVENOUS at 20:08

## 2023-05-02 RX ADMIN — OXYCODONE 5 MG: 5 TABLET ORAL at 18:45

## 2023-05-02 RX ADMIN — PIPERACILLIN AND TAZOBACTAM 3375 MG: 3; .375 INJECTION, POWDER, LYOPHILIZED, FOR SOLUTION INTRAVENOUS at 11:04

## 2023-05-02 RX ADMIN — POTASSIUM CHLORIDE 20 MEQ: 1500 TABLET, EXTENDED RELEASE ORAL at 10:57

## 2023-05-02 RX ADMIN — LEVETIRACETAM 1000 MG: 500 TABLET, FILM COATED ORAL at 20:08

## 2023-05-02 RX ADMIN — PIPERACILLIN AND TAZOBACTAM 3375 MG: 3; .375 INJECTION, POWDER, LYOPHILIZED, FOR SOLUTION INTRAVENOUS at 02:05

## 2023-05-02 RX ADMIN — ENOXAPARIN SODIUM 40 MG: 100 INJECTION SUBCUTANEOUS at 06:09

## 2023-05-02 RX ADMIN — SENNOSIDES AND DOCUSATE SODIUM 1 TABLET: 50; 8.6 TABLET ORAL at 20:08

## 2023-05-02 RX ADMIN — CETIRIZINE HYDROCHLORIDE 5 MG: 5 TABLET ORAL at 11:05

## 2023-05-02 RX ADMIN — LEVETIRACETAM 1000 MG: 500 TABLET, FILM COATED ORAL at 11:05

## 2023-05-02 RX ADMIN — SENNOSIDES AND DOCUSATE SODIUM 1 TABLET: 50; 8.6 TABLET ORAL at 10:57

## 2023-05-02 RX ADMIN — PREGABALIN 75 MG: 25 CAPSULE ORAL at 20:08

## 2023-05-02 RX ADMIN — PANTOPRAZOLE SODIUM 40 MG: 40 TABLET, DELAYED RELEASE ORAL at 05:43

## 2023-05-02 RX ADMIN — SODIUM CHLORIDE, PRESERVATIVE FREE 10 ML: 5 INJECTION INTRAVENOUS at 11:04

## 2023-05-02 ASSESSMENT — PAIN SCALES - GENERAL: PAINLEVEL_OUTOF10: 8

## 2023-05-02 ASSESSMENT — PAIN SCALES - WONG BAKER
WONGBAKER_NUMERICALRESPONSE: 0
WONGBAKER_NUMERICALRESPONSE: 2
WONGBAKER_NUMERICALRESPONSE: 0

## 2023-05-02 ASSESSMENT — PAIN DESCRIPTION - LOCATION
LOCATION: SHOULDER
LOCATION: SHOULDER

## 2023-05-02 ASSESSMENT — PAIN DESCRIPTION - ORIENTATION
ORIENTATION: RIGHT
ORIENTATION: RIGHT

## 2023-05-02 ASSESSMENT — PAIN DESCRIPTION - DESCRIPTORS
DESCRIPTORS: ACHING
DESCRIPTORS: ACHING

## 2023-05-02 NOTE — FLOWSHEET NOTE
05/02/23 0500   Handoff   Communication Given Transfer Handoff   Handoff Given To Radha MCWILLIAMS   Handoff Received From [facility] Communication Face to Face   Time Handoff Given 0500

## 2023-05-02 NOTE — CARE COORDINATION
5/2/23, 1:13 PM EDT    DISCHARGE PLANNING EVALUATION    Spoke with Tonny Mccracken with [facility], [facility], they are waiting on therapy notes to start precert. SW did let her know when she sees this is in will contact her. 2:38 PM EDT  Call to Tonny Mccracken with [facility], [facility], left a voicemail regarding therapy notes available for precert to start.

## 2023-05-02 NOTE — CARE COORDINATION
5/2/23, 7:15 AM EDT    DISCHARGE BARRIERS        Patient transferred to [place] [address]. Report given to unit Kermit Reich, regarding discharge plan for this patient.

## 2023-05-02 NOTE — CARE COORDINATION
Transferred to [place] [id number]. Handoff report given to Elena TOTH.  Electronically signed by Ba Casper RN on 5/2/2023 at 7:15 AM

## 2023-05-03 LAB
GLUCOSE BLD STRIP.AUTO-MCNC: 147 MG/DL (ref 70–108)
GLUCOSE BLD STRIP.AUTO-MCNC: 172 MG/DL (ref 70–108)
GLUCOSE BLD STRIP.AUTO-MCNC: 177 MG/DL (ref 70–108)
GLUCOSE BLD STRIP.AUTO-MCNC: 195 MG/DL (ref 70–108)

## 2023-05-03 PROCEDURE — 97530 THERAPEUTIC ACTIVITIES: CPT

## 2023-05-03 PROCEDURE — 6360000002 HC RX W HCPCS: Performed by: STUDENT IN AN ORGANIZED HEALTH CARE EDUCATION/TRAINING PROGRAM

## 2023-05-03 PROCEDURE — 92526 ORAL FUNCTION THERAPY: CPT

## 2023-05-03 PROCEDURE — 2580000003 HC RX 258: Performed by: STUDENT IN AN ORGANIZED HEALTH CARE EDUCATION/TRAINING PROGRAM

## 2023-05-03 PROCEDURE — 6370000000 HC RX 637 (ALT 250 FOR IP): Performed by: INTERNAL MEDICINE

## 2023-05-03 PROCEDURE — 6370000000 HC RX 637 (ALT 250 FOR IP): Performed by: STUDENT IN AN ORGANIZED HEALTH CARE EDUCATION/TRAINING PROGRAM

## 2023-05-03 PROCEDURE — 6360000002 HC RX W HCPCS: Performed by: INTERNAL MEDICINE

## 2023-05-03 PROCEDURE — 99232 SBSQ HOSP IP/OBS MODERATE 35: CPT | Performed by: INTERNAL MEDICINE

## 2023-05-03 PROCEDURE — 97535 SELF CARE MNGMENT TRAINING: CPT

## 2023-05-03 PROCEDURE — 1200000000 HC SEMI PRIVATE

## 2023-05-03 PROCEDURE — 82948 REAGENT STRIP/BLOOD GLUCOSE: CPT

## 2023-05-03 RX ORDER — OXYCODONE HYDROCHLORIDE 5 MG/1
5 TABLET ORAL EVERY 6 HOURS PRN
Status: DISCONTINUED | OUTPATIENT
Start: 2023-05-03 | End: 2023-05-05 | Stop reason: HOSPADM

## 2023-05-03 RX ORDER — OXYCODONE HYDROCHLORIDE 5 MG/1
2.5 TABLET ORAL EVERY 6 HOURS PRN
Status: DISCONTINUED | OUTPATIENT
Start: 2023-05-03 | End: 2023-05-05 | Stop reason: HOSPADM

## 2023-05-03 RX ADMIN — PANTOPRAZOLE SODIUM 40 MG: 40 TABLET, DELAYED RELEASE ORAL at 05:17

## 2023-05-03 RX ADMIN — PIPERACILLIN AND TAZOBACTAM 3375 MG: 3; .375 INJECTION, POWDER, LYOPHILIZED, FOR SOLUTION INTRAVENOUS at 04:06

## 2023-05-03 RX ADMIN — ENOXAPARIN SODIUM 40 MG: 100 INJECTION SUBCUTANEOUS at 05:17

## 2023-05-03 RX ADMIN — LEVETIRACETAM 1000 MG: 500 TABLET, FILM COATED ORAL at 08:35

## 2023-05-03 RX ADMIN — PREGABALIN 75 MG: 25 CAPSULE ORAL at 08:44

## 2023-05-03 RX ADMIN — PREGABALIN 75 MG: 25 CAPSULE ORAL at 20:43

## 2023-05-03 RX ADMIN — POTASSIUM CHLORIDE 20 MEQ: 1500 TABLET, EXTENDED RELEASE ORAL at 08:44

## 2023-05-03 RX ADMIN — SODIUM CHLORIDE, PRESERVATIVE FREE 10 ML: 5 INJECTION INTRAVENOUS at 20:43

## 2023-05-03 RX ADMIN — TRAZODONE HYDROCHLORIDE 25 MG: 50 TABLET ORAL at 20:43

## 2023-05-03 RX ADMIN — OXYCODONE HYDROCHLORIDE 5 MG: 5 TABLET ORAL at 20:43

## 2023-05-03 RX ADMIN — PIPERACILLIN AND TAZOBACTAM 3375 MG: 3; .375 INJECTION, POWDER, LYOPHILIZED, FOR SOLUTION INTRAVENOUS at 20:40

## 2023-05-03 RX ADMIN — PIPERACILLIN AND TAZOBACTAM 3375 MG: 3; .375 INJECTION, POWDER, LYOPHILIZED, FOR SOLUTION INTRAVENOUS at 13:55

## 2023-05-03 RX ADMIN — LEVETIRACETAM 1000 MG: 500 TABLET, FILM COATED ORAL at 20:43

## 2023-05-03 RX ADMIN — CETIRIZINE HYDROCHLORIDE 5 MG: 5 TABLET ORAL at 17:48

## 2023-05-03 RX ADMIN — SODIUM CHLORIDE, PRESERVATIVE FREE 10 ML: 5 INJECTION INTRAVENOUS at 08:45

## 2023-05-03 ASSESSMENT — PAIN SCALES - WONG BAKER

## 2023-05-03 ASSESSMENT — PAIN DESCRIPTION - FREQUENCY
FREQUENCY: CONTINUOUS
FREQUENCY: INTERMITTENT

## 2023-05-03 ASSESSMENT — PAIN DESCRIPTION - ORIENTATION
ORIENTATION: RIGHT
ORIENTATION: RIGHT

## 2023-05-03 ASSESSMENT — PAIN DESCRIPTION - ONSET
ONSET: ON-GOING
ONSET: GRADUAL

## 2023-05-03 ASSESSMENT — PAIN DESCRIPTION - DESCRIPTORS
DESCRIPTORS: SHARP
DESCRIPTORS: ACHING

## 2023-05-03 ASSESSMENT — PAIN SCALES - GENERAL
PAINLEVEL_OUTOF10: 10
PAINLEVEL_OUTOF10: 6

## 2023-05-03 ASSESSMENT — PAIN DESCRIPTION - LOCATION
LOCATION: SHOULDER
LOCATION: LEG

## 2023-05-03 ASSESSMENT — PAIN DESCRIPTION - PAIN TYPE
TYPE: ACUTE PAIN
TYPE: CHRONIC PAIN

## 2023-05-03 NOTE — CARE COORDINATION
5/3/23, 11:42 AM EDT    DISCHARGE PLANNING EVALUATION       Spoke with Leonela Soto and confirmed pre-cert was started.

## 2023-05-04 LAB
GLUCOSE BLD STRIP.AUTO-MCNC: 142 MG/DL (ref 70–108)
GLUCOSE BLD STRIP.AUTO-MCNC: 155 MG/DL (ref 70–108)
GLUCOSE BLD STRIP.AUTO-MCNC: 190 MG/DL (ref 70–108)
GLUCOSE BLD STRIP.AUTO-MCNC: 206 MG/DL (ref 70–108)

## 2023-05-04 PROCEDURE — 97110 THERAPEUTIC EXERCISES: CPT

## 2023-05-04 PROCEDURE — 97530 THERAPEUTIC ACTIVITIES: CPT

## 2023-05-04 PROCEDURE — 2580000003 HC RX 258: Performed by: STUDENT IN AN ORGANIZED HEALTH CARE EDUCATION/TRAINING PROGRAM

## 2023-05-04 PROCEDURE — 6370000000 HC RX 637 (ALT 250 FOR IP): Performed by: STUDENT IN AN ORGANIZED HEALTH CARE EDUCATION/TRAINING PROGRAM

## 2023-05-04 PROCEDURE — 6370000000 HC RX 637 (ALT 250 FOR IP): Performed by: INTERNAL MEDICINE

## 2023-05-04 PROCEDURE — 82948 REAGENT STRIP/BLOOD GLUCOSE: CPT

## 2023-05-04 PROCEDURE — 1200000000 HC SEMI PRIVATE

## 2023-05-04 PROCEDURE — 6360000002 HC RX W HCPCS: Performed by: STUDENT IN AN ORGANIZED HEALTH CARE EDUCATION/TRAINING PROGRAM

## 2023-05-04 PROCEDURE — 6360000002 HC RX W HCPCS

## 2023-05-04 PROCEDURE — 99232 SBSQ HOSP IP/OBS MODERATE 35: CPT | Performed by: INTERNAL MEDICINE

## 2023-05-04 PROCEDURE — 6360000002 HC RX W HCPCS: Performed by: INTERNAL MEDICINE

## 2023-05-04 RX ORDER — AMOXICILLIN AND CLAVULANATE POTASSIUM 875; 125 MG/1; MG/1
1 TABLET, FILM COATED ORAL 2 TIMES DAILY
Qty: 14 TABLET | Refills: 0 | DISCHARGE
Start: 2023-05-04 | End: 2023-05-11

## 2023-05-04 RX ORDER — LIDOCAINE 4 G/G
2 PATCH TOPICAL DAILY
DISCHARGE
Start: 2023-05-04

## 2023-05-04 RX ORDER — DIPHENHYDRAMINE HYDROCHLORIDE 50 MG/ML
25 INJECTION INTRAMUSCULAR; INTRAVENOUS ONCE
Status: COMPLETED | OUTPATIENT
Start: 2023-05-04 | End: 2023-05-04

## 2023-05-04 RX ADMIN — DIPHENHYDRAMINE HYDROCHLORIDE 25 MG: 50 INJECTION, SOLUTION INTRAMUSCULAR; INTRAVENOUS at 02:51

## 2023-05-04 RX ADMIN — LEVETIRACETAM 1000 MG: 500 TABLET, FILM COATED ORAL at 09:38

## 2023-05-04 RX ADMIN — PIPERACILLIN AND TAZOBACTAM 3375 MG: 3; .375 INJECTION, POWDER, LYOPHILIZED, FOR SOLUTION INTRAVENOUS at 12:46

## 2023-05-04 RX ADMIN — PIPERACILLIN AND TAZOBACTAM 3375 MG: 3; .375 INJECTION, POWDER, LYOPHILIZED, FOR SOLUTION INTRAVENOUS at 20:30

## 2023-05-04 RX ADMIN — PANTOPRAZOLE SODIUM 40 MG: 40 TABLET, DELAYED RELEASE ORAL at 06:49

## 2023-05-04 RX ADMIN — LEVETIRACETAM 1000 MG: 500 TABLET, FILM COATED ORAL at 20:37

## 2023-05-04 RX ADMIN — ENOXAPARIN SODIUM 40 MG: 100 INJECTION SUBCUTANEOUS at 06:49

## 2023-05-04 RX ADMIN — PREGABALIN 75 MG: 25 CAPSULE ORAL at 09:38

## 2023-05-04 RX ADMIN — POTASSIUM CHLORIDE 20 MEQ: 1500 TABLET, EXTENDED RELEASE ORAL at 09:38

## 2023-05-04 RX ADMIN — TRAZODONE HYDROCHLORIDE 25 MG: 50 TABLET ORAL at 20:39

## 2023-05-04 RX ADMIN — PIPERACILLIN AND TAZOBACTAM 3375 MG: 3; .375 INJECTION, POWDER, LYOPHILIZED, FOR SOLUTION INTRAVENOUS at 04:41

## 2023-05-04 RX ADMIN — SODIUM CHLORIDE, PRESERVATIVE FREE 10 ML: 5 INJECTION INTRAVENOUS at 20:39

## 2023-05-04 RX ADMIN — OXYCODONE HYDROCHLORIDE 5 MG: 5 TABLET ORAL at 19:29

## 2023-05-04 RX ADMIN — PREGABALIN 75 MG: 25 CAPSULE ORAL at 20:38

## 2023-05-04 RX ADMIN — SENNOSIDES AND DOCUSATE SODIUM 1 TABLET: 50; 8.6 TABLET ORAL at 20:38

## 2023-05-04 ASSESSMENT — PAIN DESCRIPTION - LOCATION: LOCATION: ABDOMEN

## 2023-05-04 ASSESSMENT — PAIN SCALES - GENERAL
PAINLEVEL_OUTOF10: 10
PAINLEVEL_OUTOF10: 2
PAINLEVEL_OUTOF10: 5
PAINLEVEL_OUTOF10: 2

## 2023-05-04 ASSESSMENT — PAIN DESCRIPTION - PAIN TYPE: TYPE: ACUTE PAIN

## 2023-05-04 ASSESSMENT — PAIN DESCRIPTION - ORIENTATION: ORIENTATION: ANTERIOR;MID

## 2023-05-04 ASSESSMENT — PAIN SCALES - WONG BAKER
WONGBAKER_NUMERICALRESPONSE: 2
WONGBAKER_NUMERICALRESPONSE: 0
WONGBAKER_NUMERICALRESPONSE: 2

## 2023-05-04 ASSESSMENT — PAIN DESCRIPTION - DESCRIPTORS: DESCRIPTORS: SHARP;STABBING;THROBBING

## 2023-05-04 ASSESSMENT — PAIN - FUNCTIONAL ASSESSMENT: PAIN_FUNCTIONAL_ASSESSMENT: PREVENTS OR INTERFERES SOME ACTIVE ACTIVITIES AND ADLS

## 2023-05-04 NOTE — CARE COORDINATION
5/4/23, 8:56 AM EDT    DISCHARGE PLANNING EVALUATION    Call to Lucho with [facility], [facility], reports they are needing updates for mahendra, she is sending this in the morning and hoping ot hear back on determination. 2:28 PM EDT  Call from Lucho with [facility] reports insurance intends to deny SNF, asked to have a peer to peer completed before 9am tomorrow [phone] option 5, SW did message provider regarding this.

## 2023-05-04 NOTE — CARE COORDINATION
5/4/23, 2:29 PM EDT    DISCHARGE ON GOING EVALUATION    [address] day: 11  Location: -23/023-A Reason for admit: Abdominal wall abscess [L02.211]  Sepsis, due to unspecified organism, unspecified whether acute organ dysfunction present [facility]) [A41.9]   Procedure: 4/24 CT guided drain placed into abscess  4/30 truclose drain exchanged  Barriers to Discharge: ID continues to follow. IV Zosyn continues. Wound care per ID direction. ST/PT/OT cont to follow. PCP: Bubba Ahumada  Readmission Risk Score: 17.1%  Patient Goals/Plan/Treatment Preferences: Plan return to [facility].

## 2023-05-05 VITALS
TEMPERATURE: 99 F | DIASTOLIC BLOOD PRESSURE: 74 MMHG | WEIGHT: 190 LBS | OXYGEN SATURATION: 94 % | HEIGHT: 60 IN | HEART RATE: 68 BPM | RESPIRATION RATE: 16 BRPM | SYSTOLIC BLOOD PRESSURE: 175 MMHG | BODY MASS INDEX: 37.3 KG/M2

## 2023-05-05 LAB
ANION GAP SERPL CALC-SCNC: 8 MEQ/L (ref 8–16)
BUN SERPL-MCNC: 15 MG/DL (ref 7–22)
CALCIUM SERPL-MCNC: 8.1 MG/DL (ref 8.5–10.5)
CHLORIDE SERPL-SCNC: 108 MEQ/L (ref 98–111)
CO2 SERPL-SCNC: 24 MEQ/L (ref 23–33)
CREAT SERPL-MCNC: 0.8 MG/DL (ref 0.4–1.2)
GFR SERPL CREATININE-BSD FRML MDRD: > 60 ML/MIN/1.73M2
GLUCOSE BLD STRIP.AUTO-MCNC: 144 MG/DL (ref 70–108)
GLUCOSE BLD STRIP.AUTO-MCNC: 224 MG/DL (ref 70–108)
GLUCOSE SERPL-MCNC: 139 MG/DL (ref 70–108)
POTASSIUM SERPL-SCNC: 4 MEQ/L (ref 3.5–5.2)
SODIUM SERPL-SCNC: 140 MEQ/L (ref 135–145)

## 2023-05-05 PROCEDURE — 80048 BASIC METABOLIC PNL TOTAL CA: CPT

## 2023-05-05 PROCEDURE — 99238 HOSP IP/OBS DSCHRG MGMT 30/<: CPT | Performed by: INTERNAL MEDICINE

## 2023-05-05 PROCEDURE — 6360000002 HC RX W HCPCS: Performed by: STUDENT IN AN ORGANIZED HEALTH CARE EDUCATION/TRAINING PROGRAM

## 2023-05-05 PROCEDURE — 6370000000 HC RX 637 (ALT 250 FOR IP): Performed by: STUDENT IN AN ORGANIZED HEALTH CARE EDUCATION/TRAINING PROGRAM

## 2023-05-05 PROCEDURE — 6370000000 HC RX 637 (ALT 250 FOR IP): Performed by: INTERNAL MEDICINE

## 2023-05-05 PROCEDURE — 82948 REAGENT STRIP/BLOOD GLUCOSE: CPT

## 2023-05-05 PROCEDURE — 6360000002 HC RX W HCPCS: Performed by: INTERNAL MEDICINE

## 2023-05-05 PROCEDURE — 36415 COLL VENOUS BLD VENIPUNCTURE: CPT

## 2023-05-05 PROCEDURE — 2580000003 HC RX 258: Performed by: STUDENT IN AN ORGANIZED HEALTH CARE EDUCATION/TRAINING PROGRAM

## 2023-05-05 PROCEDURE — 92526 ORAL FUNCTION THERAPY: CPT

## 2023-05-05 RX ADMIN — ENOXAPARIN SODIUM 40 MG: 100 INJECTION SUBCUTANEOUS at 09:19

## 2023-05-05 RX ADMIN — LEVETIRACETAM 1000 MG: 500 TABLET, FILM COATED ORAL at 09:14

## 2023-05-05 RX ADMIN — PREGABALIN 75 MG: 25 CAPSULE ORAL at 09:19

## 2023-05-05 RX ADMIN — CETIRIZINE HYDROCHLORIDE 5 MG: 5 TABLET ORAL at 09:13

## 2023-05-05 RX ADMIN — OXYCODONE HYDROCHLORIDE 5 MG: 5 TABLET ORAL at 11:18

## 2023-05-05 RX ADMIN — SENNOSIDES AND DOCUSATE SODIUM 1 TABLET: 50; 8.6 TABLET ORAL at 09:16

## 2023-05-05 RX ADMIN — POTASSIUM CHLORIDE 20 MEQ: 1500 TABLET, EXTENDED RELEASE ORAL at 09:19

## 2023-05-05 RX ADMIN — PIPERACILLIN AND TAZOBACTAM 3375 MG: 3; .375 INJECTION, POWDER, LYOPHILIZED, FOR SOLUTION INTRAVENOUS at 04:34

## 2023-05-05 RX ADMIN — SODIUM CHLORIDE, PRESERVATIVE FREE 10 ML: 5 INJECTION INTRAVENOUS at 09:16

## 2023-05-05 RX ADMIN — PIPERACILLIN AND TAZOBACTAM 3375 MG: 3; .375 INJECTION, POWDER, LYOPHILIZED, FOR SOLUTION INTRAVENOUS at 11:16

## 2023-05-05 RX ADMIN — PANTOPRAZOLE SODIUM 40 MG: 40 TABLET, DELAYED RELEASE ORAL at 09:13

## 2023-05-05 ASSESSMENT — PAIN SCALES - GENERAL: PAINLEVEL_OUTOF10: 8

## 2023-05-05 ASSESSMENT — PAIN DESCRIPTION - LOCATION: LOCATION: ABDOMEN;BACK;HEAD

## 2023-05-05 NOTE — PROGRESS NOTES
05/02/2023 at 0515H: Patient transferred to [facility] 23, all valuables hand over to receiving department. Relative / Karoline Abreu was also informed prior transfer.
05/04/23 1640   Encounter Summary   Encounter Overview/Reason  Spiritual/Emotional Needs   Service Provided For: Patient   Referral/Consult From: [address] Family members   Last Encounter  05/04/23   Complexity of Encounter Low   Begin Time 1620   Grief, Loss, and Adjustments   Type Life Adjustments   Assessment/Intervention/Outcome   Assessment Calm;Sad   Intervention Discussed illness injury and its impact;Prayer (assurance of)/[place]   Outcome Engaged in conversation;Coping        made a follow up visit to assess spiritual needs and offer support. Patient rested in her recliner chair looking outside the window. It was difficult to understand patient as she had mumbled speech. She stated she wished to return home, but did not specify where home was. She stated she was a member of the [facility] were shared and she thanked staff for stopping. Spiritual health remains available.
201 [facility] 5K  Occupational Therapy  Daily Note  Time:   Time In: 3146  Time Out: 1413  Timed Code Treatment Minutes: 28 Minutes  Minutes: 28          Date: 2023  Patient Name: Sierra Jefferson,   Gender: female      Room: [facility]023-A  MRN: [id number]  : 1940  (81 y.o.)  Referring Practitioner: Dalia Olivas DO  Diagnosis: abdominal wall abscess  Additional Pertinent Hx: per chart review; 81-year-old female with PMHx of MRSA meningitis, maxillary sinus adenocarcinoma, CKD 3, SWAPNA, HLD, DM 2, fusion of cervical vertebra presents to ED today with CC: AMS. Nursing home reports patient was requiring 2 person assist and unable to be understood today. Patient apparently is able to ambulate by herself normally. Daughter states that her mental status seems to be normal. 1. CT A/P showed abnormal fluid collection in left rectus abdominis muscle underlying omentum and overlying abdominal wall w/rim enhancement and soft tissue gas (Measures [id number]). Hx of Ventral hernia repair w/ mesh. IR placed drain and abscess-, still draining green/gray pus. Restrictions/Precautions:  Restrictions/Precautions: General Precautions, Fall Risk, Contact Precautions  Position Activity Restriction  Other position/activity restrictions: PEG tube     SUBJECTIVE: Rn okayed OT session. Pt. In room and agreeable to participate. Pt. Reports fatigue. PAIN: no pain reported    Vitals: Vitals not assessed per clinical judgement, see nursing flowsheet    COGNITION: Slow Processing, Decreased Recall, Decreased Problem Solving, and Decreased Safety Awareness    ADL:   Footwear Management: Dependent. To don socks . BALANCE:  Sitting Balance:  Stand By Assistance. Standing Balance: Contact Guard Assistance/Min A     BED MOBILITY:  Supine to Sit: Maximum Assistance, X 1 with head of bed raised and grab rail used. TRANSFERS:  Sit to Stand: Moderate Assistance. Stand to Sit: Minimal Assistance.
201 [facility] 5  Occupational Therapy  Daily Note  Time:   Time In: 3531  Time Out: 5988  Timed Code Treatment Minutes: 41 Minutes  Minutes: 41          Date: 5/3/2023  Patient Name: Kane Waller,   Gender: female      Room: [facility]023-A  MRN: [id number]  : 1940  (81 y.o.)  Referring Practitioner: Viraj Gtz DO  Diagnosis: abdominal wall abscess  Additional Pertinent Hx: per chart review; 81-year-old female with PMHx of MRSA meningitis, maxillary sinus adenocarcinoma, CKD 3, SWAPNA, HLD, DM 2, fusion of cervical vertebra presents to ED today with CC: AMS. Nursing home reports patient was requiring 2 person assist and unable to be understood today. Patient apparently is able to ambulate by herself normally. Daughter states that her mental status seems to be normal. 1. CT A/P showed abnormal fluid collection in left rectus abdominis muscle underlying omentum and overlying abdominal wall w/rim enhancement and soft tissue gas (Measures [id number]). Hx of Ventral hernia repair w/ mesh. IR placed drain and abscess-, still draining green/gray pus. Restrictions/Precautions:  Restrictions/Precautions: General Precautions, Fall Risk, Contact Precautions  Position Activity Restriction  Other position/activity restrictions: PEG tube     SUBJECTIVE: Pt on bedpan upon arrival, agreeable to OT session. PAIN: Did not rate: Neck    Vitals: Vitals not assessed per clinical judgement, see nursing flowsheet    COGNITION: Slow Processing, Decreased Recall, Decreased Problem Solving, and Decreased Safety Awareness. Pt is min lethargic and moves at VERY slow pace. ADL:   Grooming: Minimal Assistance. For hair care while seated EOB  Bathing: Maximum Assistance. For bed bath in supine with nursing assistant completing > 75% of task following bedpan use. Footwear Management: Dependent. Donning socks onto B feet. Toileting: Dependent.   For bedpan use and hygiene after BM
201 [facility] 5K  Occupational Therapy  Daily Note  Time:   Time In: 0710  Time Out: 0804  Timed Code Treatment Minutes: 47 Minutes  Minutes: 54          Date: 2023  Patient Name: Gilford Meres,   Gender: female      Room: [facility]023-A  MRN: [id number]  : 1940  (81 y.o.)  Referring Practitioner: Cherylin Merlin, DO  Diagnosis: abdominal wall abscess  Additional Pertinent Hx: per chart review; 55-year-old female with PMHx of MRSA meningitis, maxillary sinus adenocarcinoma, CKD 3, SWAPNA, HLD, DM 2, fusion of cervical vertebra presents to ED today with CC: AMS. Nursing home reports patient was requiring 2 person assist and unable to be understood today. Patient apparently is able to ambulate by herself normally. Daughter states that her mental status seems to be normal. 1. CT A/P showed abnormal fluid collection in left rectus abdominis muscle underlying omentum and overlying abdominal wall w/rim enhancement and soft tissue gas (Measures [id number]). Hx of Ventral hernia repair w/ mesh. IR placed drain and abscess-, still draining green/gray pus. Restrictions/Precautions:  Restrictions/Precautions: General Precautions, Fall Risk, Contact Precautions  Position Activity Restriction  Other position/activity restrictions: PEG tube     SUBJECTIVE: Nurse ok'd session. Patient lying in bed upon arrival. Reports being fatigued this AM due to transferring to a different floor earlier AM. Patient agreeable to OT session    PAIN: Patient complains of pain in LEs and neck/back. Did not rate    Vitals: Vitals not assessed per clinical judgement, see nursing flowsheet    COGNITION: Slow Processing, Decreased Recall, Decreased Insight, and Decreased Safety Awareness    ADL:   Feeding: with set-up. For breakfast tray, assist provided to thicken water to mildly thick. Patient required increased time to bring silverware/drinks to mouth.  Rearranged breakfast tray and placed items on L hand side for
[facility] THERAPY MISSED TREATMENT NOTE  STRZ ONC MED 5K      Date: 2023  Patient Name: Sharri Marin        MRN: [id number]    : 1940  (81 y.o.)    REASON FOR MISSED TREATMENT:    Attempted to see patient for follow up dysphagia therapy services. Patient deferred treatment at this time stating that she was tired. ST to re-attempt as able. Marsha Howard M.A.  CCC-SLP
[address] [address]  INPATIENT SPEECH THERAPY  STRZ RENAL TELEMETRY 6K  DAILY NOTE    TIME   SLP Individual Minutes  Time In: 8029  Time Out: 1408  Minutes: 18  Timed Code Treatment Minutes: 0 Minutes       Date: 2023  Patient Name: Matt Escalera      CSN: [id number]   : 1940  (81 y.o.)  Gender: female   Referring Physician:  Jose Pena DO  Diagnosis: Abdominal wall abscess  Precautions: Fall risk, aspiration risk  Current Diet: Puree, Mildly thick liquids  Swallowing Strategies:   Full Upright Position, Pulmonary Monitoring, Limit Distractions, and Monitor for Fatigue      Date of Last MBS/FEES:  23    Pain:  No pain reported. Subjective:  POPPY Daigle approved completion of dysphagia tx this date. Upon arrival to room, patient awake in bed with daughter at bedside. Patient agreeable to skilled dysphagia tx. Pleasant and cooperative; however, required frequent re-direction and rationale for services. Short-Term Goals:  SHORT TERM GOAL #1:  NEW GOAL: Patient will consume a minced/moist diet and mildly thick liquids without overt s/s of aspiration and/or pulmonary compromise to meet nutritional/hydration measures safely. INTERVENTIONS: ST utilized PO trials of puree and mildly thick liquids via straw in conjunction with effortful swallows this date. Patient required mod cues to utilize small bites/sips throughout. Patient with evidence of appropriate labial seal and generation of intraoral pressure for suction via straw. Concerns for decreased bolus control and manipulation. Consistent swallow initiation achieved across all trials; however, concerns for decreased airway protection given immediate cough following puree trials x4 and mildly thick liquids via straw x1. Of note, patient with baseline cough prior to PO trials this date and POPPY Daigle reporting clear lung sounds this date.  Per most recent MBS completed on , patient with transient penetration of mildly thick liquids and no
[address] [address]  INPATIENT PHYSICAL THERAPY  DAILY NOTE  STRZ ONC MED 5K - 5K-23/023-A    Time In: 4050  Time Out: 1152  Timed Code Treatment Minutes: 25 Minutes  Minutes: 25          Date: 2023  Patient Name: Felisha Morrison,  Gender:  female        MRN: [id number]  : 1940  (81 y.o.)     Referring Practitioner: Donny Penn DO  Diagnosis: Abdominal wall abscess  Additional Pertinent Hx: 62-year-old female with PMHx of MRSA meningitis, maxillary sinus adenocarcinoma, CKD 3, SWAPNA, HLD, DM 2, fusion of cervical vertebra presents to ED today with CC: AMS. Nursing home reports patient was requiring 2 person assist and unable to be understood today. Patient apparently is able to ambulate by herself normally. Daughter states that her mental status seems to be normal. 1. CT A/P showed abnormal fluid collection in left rectus abdominis muscle underlying omentum and overlying abdominal wall w/rim enhancement and soft tissue gas (Measures [id number]). Hx of Ventral hernia repair w/ mesh. IR placed drain and abscess-, still draining green/gray pus. Prior Level of Function:  Lives With: Daughter  Type of Home: House  Home Layout: One level  Home Access: Stairs to enter with rails  Entrance Stairs - Number of Steps: 2-3  Home Equipment: Walker, rolling   Bathroom Shower/Tub: Walk-in shower  Bathroom Toilet: Standard  Bathroom Equipment: Grab bars in shower, Shower chair  Bathroom Accessibility: Accessible    Receives Help From: Family  ADL Assistance: Needs assistance  Ambulation Assistance: Independent  Transfer Assistance: Independent  Active : No  Additional Comments: patient was at an F for therapy prior to admit, however would like to return home. patient used no AD up until a few months ago for functional mobility. sharifa's daughter works outside the home whom she lives with.     Restrictions/Precautions:  Restrictions/Precautions: General Precautions, Fall Risk, Contact
[address]  RENAL TELEMETRY 6K  Occupational Therapy  Daily Note  Time:   Time In: 4126  Time Out: 5458  Timed Code Treatment Minutes: 28 Minutes  Minutes: 35          Date: 2023  Patient Name: Kane Waller,   Gender: female      Room: [facility]14/014-A  MRN: [id number]  : 1940  (81 y.o.)  Referring Practitioner: Viraj Gtz DO  Diagnosis: abdominal wall abscess  Additional Pertinent Hx: per chart review; 81-year-old female with PMHx of MRSA meningitis, maxillary sinus adenocarcinoma, CKD 3, SWAPNA, HLD, DM 2, fusion of cervical vertebra presents to ED today with CC: AMS. Nursing home reports patient was requiring 2 person assist and unable to be understood today. Patient apparently is able to ambulate by herself normally. Daughter states that her mental status seems to be normal. 1. CT A/P showed abnormal fluid collection in left rectus abdominis muscle underlying omentum and overlying abdominal wall w/rim enhancement and soft tissue gas (Measures [id number]). Hx of Ventral hernia repair w/ mesh. IR placed drain and abscess-, still draining green/gray pus. Restrictions/Precautions:  Restrictions/Precautions: General Precautions, Fall Risk, Contact Precautions  Position Activity Restriction  Other position/activity restrictions: PEG tube     SUBJECTIVE: Nurse Juli Lyons ok'd session, In bed upon arrival, agreeable to OT session, daughter present    PAIN: Neck did not rate number    Vitals: Vitals not assessed per clinical judgement, see nursing flowsheet    COGNITION: Slow Processing, Decreased Insight, Impaired Memory, and Decreased Problem Solving    ADL:   Footwear Management: Dependent. For B slipper socks . BALANCE:  Sitting Balance:  Contact Guard Assistance.  At EOB approx 5 minutes    BED MOBILITY:  Supine to Sit: Moderate Assistance HOB elevated, used bedrail, A for trunk  Sit to Supine: Maximum Assistance A for B LE, HOB flat, used bedrail    Patient did not completed
[facility]  SPEECH THERAPY MISSED TREATMENT NOTE  STRZ ONC MED 5K      Date: 2023  Patient Name: Tsering Greene        MRN: [id number]    : 1940  (81 y.o.)    REASON FOR MISSED TREATMENT:  ST attempted to see patient this afternoon for completion of dysphagia tx. POPPY Patterson approved attempt and reported good success with current diet level. Upon arrival to room, patient sleeping in bed with daughter at bedside. Daughter requesting ST let patient rest at this time. ST to re-attempt at a later date/time as patient is medically appropriate and available.      [facility]) [address] Caro Hernandez M.A., [address]
Comprehensive Nutrition Assessment    Type and Reason for Visit:  Reassess (po/supplement)    Nutrition Recommendations/Plan:   Consider MVI. Continue current diet, note upgraded by SLP 4/26/23, patient still not fond of dysphagia diet/texture. Tired of ONS: Magic Cup BID taking only a bite once in a while, will stop and provide cottage cheese and yogurt with meals. Recommend flush PEG with 30 mls water daily to keep patent. Patient's daughter reports PEG was scheduled to be removed next week. Malnutrition Assessment:  Malnutrition Status: At risk for malnutrition (Comment) (04/24/23 1330)    Context:  Acute Illness     Findings of the 6 clinical characteristics of malnutrition:  Energy Intake:  Mild decrease in energy intake (Comment)  Weight Loss:  No significant weight loss     Body Fat Loss:  No significant body fat loss     Muscle Mass Loss:  No significant muscle mass loss    Fluid Accumulation:  No significant fluid accumulation     Strength:  Not Performed    Nutrition Assessment:     Pt with some improvement from a nutritional standpoint AEB patient report of improved appetite and intake. Remains at risk for further nutritional compromise r/t sepsis r/t abd wall abscess around PEG site-fistula, PEG tube in place (was making plans to remove it PTA per daughter), increased nutrient needs to support wound healing, dysphagia and dislike of diet, CKD, and underlying medical condition (Hx; sinonasal adenocarcinoma, DM- A1C 6.7 11/13/22, COVID-19, Dysphagia, h/o ventral hernia repair, HTN, HLD). Nutrition Related Findings:    Pt. Report/Treatments/Miscellaneous: Patient and daughter seen. Patient reports her appetite is getting better despite not liking dysphagia diet. Note more meal intake 51-75%. She is tired of magic cups-not eating much of them, agreed to yogurt and cottage cheese with meals instead.     GI Status: BM 4/27/23, reports only has belly pain \"when they are messing with
Comprehensive Nutrition Assessment    Type and Reason for Visit:  Reassess (po/supplement)    Nutrition Recommendations/Plan:   Continue current diet, note upgraded by SLP 4/26/23, patient still not fond of dysphagia diet/texture. Likes variety ONS: Magic Cup once a day; cottage cheese and yogurt BID   Recommend flush PEG with 30 mls water daily to keep patent. Patient's daughter reports PEG was scheduled to be removed next week. Malnutrition Assessment:  Malnutrition Status: At risk for malnutrition (Comment) (04/24/23 1330)    Context:  Acute Illness     Findings of the 6 clinical characteristics of malnutrition:  Energy Intake:  Mild decrease in energy intake (Comment)  Weight Loss:  No significant weight loss     Body Fat Loss:  No significant body fat loss     Muscle Mass Loss:  No significant muscle mass loss    Fluid Accumulation:  No significant fluid accumulation     Strength:  Not Performed    Nutrition Assessment:     At risk for further nutritional compromise r/t sepsis r/t abd wall abscess around PEG site-fistula, PEG tube in place (was making plans to remove it PTA per daughter), increased nutrient needs to support wound healing, dysphagia and dislike of diet, CKD, and underlying medical condition (Hx; sinonasal adenocarcinoma, DM- A1C 6.7 11/13/22, COVID-19, Dysphagia, h/o ventral hernia repair, HTN, HLD). Nutrition Related Findings:    Pt. Report/Treatments/Miscellaneous: Pt seen, ate well for breakfast (100%) likes the yogurt and cottage cheese, however needs variety- agreed to decrease to BID and add back one Magic Cup a day; Po intake varies depending on what she is served as she doesn't like overly sweet things; Encouraged po intake at best efforts.    GI Status: BM 5/4  Pertinent Labs: Glucose 139, POC Glucose 144  Pertinent Meds: Humalog, Keppra, Protonix, Zoysn, Senokot-S     Wound Type:  (DTI Buttocks, abdominal fistula)       Current Nutrition Intake & Therapies:    Average
Discharge paperwork faxed to facility at 1130. Belongings packed and taken with family. IV was removed. Report called to facility. No other concerns.
Dressing changed per order at this time.  Pt tolerated well
Ginny Stack MD  Daily Progress Note  Pt Name: Shailesh Díaz  Medical Record Number: [id number]  Date of Birth 1940   Today's Date: 5/1/2023    HD#9    CHIEF COMPLAINT abdominal wall infection probable infected mesh    SUBJECTIVE  Patient okay still having abdominal pain    OBJECTIVE  CURRENT VITALS /72   Pulse 73   Temp 97.7 °F (36.5 °C) (Oral)   Resp 16   Ht 5' (1.524 m)   Wt 190 lb 14.7 oz (86.6 kg)   LMP  (LMP Unknown)   SpO2 97%   BMI 37.29 kg/m²   LUNGS: Lungs clear   ABDOMEN: Soft cellulitis is better drain has been removed  WOUNDS: Open wound  24 HR INTAKE/OUTPUT :   Intake/Output Summary (Last 24 hours) at 5/1/2023 1942  Last data filed at 5/1/2023 1619  Gross per 24 hour   Intake 360 ml   Output 450 ml   Net -90 ml     DRAIN/TUBE OUTPUT :      LABS  CBC :   Lab Results   Component Value Date/Time    WBC 7.7 04/28/2023 05:01 AM    HGB 8.3 04/28/2023 05:01 AM    HCT 28.5 04/28/2023 05:01 AM     04/28/2023 05:01 AM     BMP:   Lab Results   Component Value Date/Time     04/28/2023 05:01 AM    K 4.2 05/01/2023 06:01 AM    K 3.6 05/06/2022 07:28 AM     04/28/2023 05:01 AM    CO2 22 04/28/2023 05:01 AM    BUN 11 04/28/2023 05:01 AM    CREATININE 0.8 04/28/2023 05:01 AM    MG 1.9 04/26/2023 05:17 AM    PHOS 3.4 04/22/2018 08:11 AM   Extremities - + edema, no cyanosis, clubbing          ASSESSMENT  1. Picture shows wound prior to drain removal again cellulitis is improved. Reviewed Dr.Berakis note he has some concern this may have been a diverticulitis nonetheless patient is really not a surgical candidate continue IV antibiotics for at least another 24 to 48-hour     PLAN  1.   As above      Elana Stack MD  Electronically signed 5/1/2023 at 7:42 PM
Hospitalist Progress Note      Patient: Khari Allison    Unit/Bed:5K-23/023-A  YOB: 1940  MRN: [id number]   Acct: [de-identified]   PCP: Malachi Mckinney  Date of Admission: 4/22/2023    Assessment/Plan:  Sepsis 2/2 to abdominal wall abscess, POA: CT A/P showed abnormal fluid collection in left rectus abdominis muscle underlying omentum and overlying abdominal wall w/rim enhancement and soft tissue gas (Measures [id number]). Hx of Ventral hernia repair w/ mesh. With infected mesh. Unclear etiology. Less likely related to prior infection (growing strep agilectaiae, which  was in none of the intraop cultures from 11/2022 cerebritis/sinonasal infection). Hx PEG tube placement in 12/2022? Maria Eugenia Mendiola However PEG site itself does not appear clinically infected, and location of fluid collection is not contiguous with the peg tub on CT scan (yet, may have been introduced during procedure and seeded onto the mesh). Repeat CT A/P 04/30 showed improvement and significantly decreased size of the more superficial anterior abdominal wall fluid collection which is now measuring 32 x 25 mm. The deeper anterior abdominal wall abscess centered within the anterior wall in the anterior aspect of the abdomen is not significantly changed in size measuring 39 x 99 x 65 mm. Fistula present on CT scan with gas bubbles are likely associated diverticular perforation. 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 Abscess culture growing Group B Strep. Fungus culture negative preliminarily. Gram-positive bacilli in blood culture x1 likely contaminant. Will continue covering broadly for polymicrobial as cannot rule out intestinal source given close proximity (for anaerobic coverage, due to presence of gas). Continue Zosyn (started 04/23/23).  Continue dressing
Hospitalist Progress Note      Patient: Sierra Jefferson    Unit/Bed:5K-23/023-A  YOB: 1940  MRN: [id number]   Acct: [de-identified]   PCP: Shirley Leung  Date of Admission: 4/22/2023    Assessment/Plan:  Sepsis 2/2 to abdominal wall abscess, POA: CT A/P showed abnormal fluid collection in left rectus abdominis muscle underlying omentum and overlying abdominal wall w/rim enhancement and soft tissue gas (Measures [id number]). Hx of Ventral hernia repair w/ mesh. With infected mesh. Unclear etiology. Less likely related to prior infection (growing strep agilectaiae, which  was in none of the intraop cultures from 11/2022 cerebritis/sinonasal infection). Hx PEG tube placement in 12/2022? Dagmar Christensen However PEG site itself does not appear clinically infected, and location of fluid collection is not contiguous with the peg tub on CT scan (yet, may have been introduced during procedure and seeded onto the mesh). Repeat CT A/P 04/30 showed improvement and significantly decreased size of the more superficial anterior abdominal wall fluid collection which is now measuring 32 x 25 mm. The deeper anterior abdominal wall abscess centered within the anterior wall in the anterior aspect of the abdomen is not significantly changed in size measuring 39 x 99 x 65 mm. Fistula present on CT scan with gas bubbles are likely associated diverticular perforation. 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 Abscess culture growing Group B Strep. Fungus culture negative preliminarily. Gram-positive bacilli in blood culture x1 likely contaminant. Will continue covering broadly for polymicrobial as cannot rule out intestinal source given close proximity (for anaerobic coverage, due to presence of gas). Continue Zosyn (started 04/23/23).  Continue dressing
[facility]  INPATIENT SPEECH THERAPY  STRZ ONC MED 5K  DAILY NOTE    TIME   SLP Individual Minutes  Time In: 4550  Time Out: 4941  Minutes: 15  Timed Code Treatment Minutes: 0 Minutes       Date: 5/3/2023  Patient Name: Wilian Blandon      CSN: [id number]   : 1940  (81 y.o.)  Gender: female   Referring Physician:  Gorge Chang DO  Diagnosis: Abdominal wall abscess  Precautions: Fall risk, aspiration risk  Current Diet: Minced/moist, Mildly thick liquids  Swallowing Strategies:   Full Upright Position, Pulmonary Monitoring, Limit Distractions, and Monitor for Fatigue      Date of Last MBS/FEES:  23    Pain:  No pain reported. Subjective:  RN [place] approved session. Patient seen resting in bed with daughter present. Easily awakened and agreeable to intervention. Dr. [facility] in during session for completion of physician rounding to attend to abdominal wound and provide education re: importance for continued PEG tube placement with daughter reports of physician removal once patient leaves the hospital. Spoke with patient/family re: importance for PEG given limited patient intake of PO, waxing/waning swallowing performance according to medical status/cognitive performance and importance for use of tube as supplemental means of nutrition. Short-Term Goals:  SHORT TERM GOAL #1:  Goal 1: Patient will consume a minced/moist diet and mildly thick liquids without overt s/s of aspiration and/or pulmonary compromise to meet nutritional/hydration measures safely. INTERVENTIONS: DNT d/t focus on other goals       SHORT TERM GOAL #2:  Goal 2: Patient will complete advanced PO trials as clinically indicated to determine potential readiness for dietary upgrade.   INTERVENTIONS: DNT d/t focus on other goals     SHORT TERM GOAL #3:  NEW GOAL3 : Patient will complete pharyngeal strengthening exercises x10 for improved pharyngeal shortening and enhanced airway protection to progress towards least
Patient is in bed asleep, vitals within normal limits, prn oxycodone 5mg given for pain rated at 8, wound treatment done to right lower abdomen, area washed with normal saline packed with saline soaked gauze and covered with dry dressing, procedure tolerated well. Will continue to monitor.
[facility]  INPATIENT SPEECH THERAPY  [facility] ON[facility] MED 5K  DAILY NOTE    TIME   SLP Individual Minutes  Time In: 3351  Time Out: 0501  Minutes: 8  Timed Code Treatment Minutes: 0 Minutes       Date: 2023  Patient Name: Khari Allison      CSN: [id number]   : 1940  (81 y.o.)  Gender: female   Referring Physician:  Amy Jon DO  Diagnosis: Abdominal wall abscess  Precautions: Fall risk, aspiration risk  Current Diet: Minced/moist, Mildly thick liquids  Swallowing Strategies:   Full Upright Position, Pulmonary Monitoring, Limit Distractions, and Monitor for Fatigue      Date of Last MBS/FEES:  23    Pain:  No pain reported. Subjective:  RN approved ST session. RN reports patient will likely be discharged back to SNF on this date. Patient seen upright in bed with daughter present at bedside. Patient inquiring why she is currently on this diet level. Education provided; however, suspect limited functional carryover. Short-Term Goals:  SHORT TERM GOAL #1:  Goal 1: Patient will consume a minced/moist diet and mildly thick liquids without overt s/s of aspiration and/or pulmonary compromise to meet nutritional/hydration measures safely. INTERVENTIONS: Mildly thick liquids: x5; largely unremarkable - no overt s/s of aspiration or pulmonary distress at bedside. Recommend continuation of minced and moist diet and mildly thick liquids       SHORT TERM GOAL #2:  Goal 2: Patient will complete advanced PO trials as clinically indicated to determine potential readiness for dietary upgrade. INTERVENTIONS: DNT d/t focus on other goals     SHORT TERM GOAL #3:  NEW GOAL3 : Patient will complete pharyngeal strengthening exercises x10 for improved pharyngeal shortening and enhanced airway protection to progress towards least restrictive PO diet.    INTERVENTIONS:   Effortful Swallow: x7 fair success, decreased endurance evident    Long-Term Goals:  No LTG's due to short
Progress note: Infectious diseases    Patient - Chandrakant Venegas,  Age - 81 y.o.    - 1940      Room Number - 3V-50/518-U   MRN -  [id number]   Acct # - [de-identified]  Date of Admission -  2023  8:33 PM    SUBJECTIVE:   She has a full thickness open wound around the umblicus, I am able to see the draiange tube inside the wound. no stool noted  OBJECTIVE   VITALS    height is 5' (1.524 m) and weight is 190 lb 14.7 oz (86.6 kg). Her oral temperature is 97.7 °F (36.5 °C). Her blood pressure is 153/82 (abnormal) and her pulse is 69. Her respiration is 16 and oxygen saturation is 97%.        Wt Readings from Last 3 Encounters:   23 190 lb 14.7 oz (86.6 kg)   11/15/22 201 lb (91.2 kg)   22 208 lb 12.4 oz (94.7 kg)       I/O (24 Hours)    Intake/Output Summary (Last 24 hours) at 2023 0916  Last data filed at 2023 6200  Gross per 24 hour   Intake 360 ml   Output 1450 ml   Net -1090 ml         General Appearance  Awake, alert, oriented,  chronically sick looking  HEENT - normocephalic, atraumatic, pale conjunctiva,  anicteric sclera  Neck - Supple, no mass  Lungs -  Bilateral   air entry, no rhonchi, no wheeze  Cardiovascular - Heart sounds are normal.    Abdomen - there is an open wound at the center of the abdomen, a drainage tube was noted inside the open wound, less indurated, no stool noted  Neurologic -awake and oriented  Skin - No bruising or bleeding  Extremities - + edema, no cyanosis, clubbing       MEDICATIONS:      insulin lispro  0-4 Units SubCUTAneous BID WC    enoxaparin  40 mg SubCUTAneous QAM AC    pregabalin  75 mg Oral BID    sennosides-docusate sodium  1 tablet Oral BID    potassium chloride  20 mEq Oral Daily with breakfast    lidocaine  2 patch TransDERmal Daily    traZODone  25 mg Oral Nightly    sodium chloride flush  5-40 mL IntraVENous 2 times per day    levETIRAcetam  1,000 mg Oral
Progress note: Infectious diseases    Patient - Conchita Jin,  Age - 81 y.o.    - 1940      Room Number - 8B-25/395-B   MRN -  [id number]   Acct # - [de-identified]  Date of Admission -  2023  8:33 PM    SUBJECTIVE:   No new issues. I spoke with her daughter  OBJECTIVE   VITALS    height is 5' (1.524 m) and weight is 190 lb (86.2 kg). Her oral temperature is 97.6 °F (36.4 °C). Her blood pressure is 128/63 and her pulse is 75. Her respiration is 22 and oxygen saturation is 98%.        Wt Readings from Last 3 Encounters:   23 190 lb (86.2 kg)   11/15/22 201 lb (91.2 kg)   22 208 lb 12.4 oz (94.7 kg)       I/O (24 Hours)    Intake/Output Summary (Last 24 hours) at 5/3/2023 1505  Last data filed at 5/3/2023 1004  Gross per 24 hour   Intake 2241.83 ml   Output 1300 ml   Net 941.83 ml         General Appearance  Awake, alert, oriented,  chronically sick looking  HEENT - normocephalic, atraumatic, pale conjunctiva,  anicteric sclera  Neck - Supple, no mass  Abdomen - open wound on the mid abdomen wound, peg tube in place  Neurologic -awake and oriented  Skin - No bruising or bleeding  Extremities - + edema, no cyanosis, clubbing        MEDICATIONS:      insulin lispro  0-4 Units SubCUTAneous QAM AC    enoxaparin  40 mg SubCUTAneous QAM AC    pregabalin  75 mg Oral BID    sennosides-docusate sodium  1 tablet Oral BID    potassium chloride  20 mEq Oral Daily with breakfast    lidocaine  2 patch TransDERmal Daily    traZODone  25 mg Oral Nightly    sodium chloride flush  5-40 mL IntraVENous 2 times per day    levETIRAcetam  1,000 mg Oral BID    pantoprazole  40 mg Oral QAM AC    piperacillin-tazobactam  3,375 mg IntraVENous Q8H      sodium chloride Stopped (23 0004)    dextrose       oxyCODONE **OR** oxyCODONE, cetirizine, magnesium sulfate, melatonin, sodium chloride flush, sodium chloride, ondansetron **OR**
Progress note: Infectious diseases    Patient - Ky Fang,  Age - 81 y.o.    - 1940      Room Number - 1Z-53/937-Q   MRN -  [id number]   Acct # - [de-identified]  Date of Admission -  2023  8:33 PM    SUBJECTIVE:   No new issues. OBJECTIVE   VITALS    height is 5' (1.524 m) and weight is 190 lb (86.2 kg). Her oral temperature is 97.3 °F (36.3 °C). Her blood pressure is 148/64 (abnormal) and her pulse is 73. Her respiration is 18 and oxygen saturation is 96%.        Wt Readings from Last 3 Encounters:   23 190 lb (86.2 kg)   11/15/22 201 lb (91.2 kg)   22 208 lb 12.4 oz (94.7 kg)       I/O (24 Hours)    Intake/Output Summary (Last 24 hours) at 2023 1204  Last data filed at 2023 0702  Gross per 24 hour   Intake 10 ml   Output 760 ml   Net -750 ml         General Appearance  Awake, alert, oriented,  chronically sick looking  HEENT - normocephalic, atraumatic, pale conjunctiva,  anicteric sclera  Neck - Supple, no mass  Abdomen - open wound on the mid abdomen wound, peg tube in place  Neurologic -awake and oriented  Skin - No bruising or bleeding  Extremities - + edema, no cyanosis, clubbing        MEDICATIONS:      insulin lispro  0-4 Units SubCUTAneous QAM AC    enoxaparin  40 mg SubCUTAneous QAM AC    pregabalin  75 mg Oral BID    sennosides-docusate sodium  1 tablet Oral BID    potassium chloride  20 mEq Oral Daily with breakfast    lidocaine  2 patch TransDERmal Daily    traZODone  25 mg Oral Nightly    sodium chloride flush  5-40 mL IntraVENous 2 times per day    levETIRAcetam  1,000 mg Oral BID    pantoprazole  40 mg Oral QAM AC    piperacillin-tazobactam  3,375 mg IntraVENous Q8H      sodium chloride Stopped (23 0004)    dextrose       oxyCODONE **OR** oxyCODONE, cetirizine, magnesium sulfate, melatonin, sodium chloride flush, sodium chloride, ondansetron **OR** ondansetron,
Progress note: Infectious diseases    Patient - Luis Carmona,  Age - 81 y.o.    - 1940      Room Number - 3I-75/807-K   MRN -  [id number]   Acct # - [de-identified]  Date of Admission -  2023  8:33 PM    SUBJECTIVE:   No new issues  OBJECTIVE   VITALS    height is 5' (1.524 m) and weight is 190 lb (86.2 kg). Her oral temperature is 97.9 °F (36.6 °C). Her blood pressure is 154/76 (abnormal) and her pulse is 80. Her respiration is 16 and oxygen saturation is 96%.        Wt Readings from Last 3 Encounters:   23 190 lb (86.2 kg)   11/15/22 201 lb (91.2 kg)   22 208 lb 12.4 oz (94.7 kg)       I/O (24 Hours)    Intake/Output Summary (Last 24 hours) at 2023 1056  Last data filed at 2023 1619  Gross per 24 hour   Intake 240 ml   Output --   Net 240 ml         General Appearance  Awake, alert, oriented,  chronically sick looking  HEENT - normocephalic, atraumatic, pale conjunctiva,  anicteric sclera  Neck - Supple, no mass  Lungs -  Bilateral   air entry, no rhonchi, no wheeze  Cardiovascular - Heart sounds are normal.    Abdomen - open wound on the mid abdomen wound  Neurologic -awake and oriented  Skin - No bruising or bleeding  Extremities - + edema, no cyanosis, clubbing        MEDICATIONS:      insulin lispro  0-4 Units SubCUTAneous QAM AC    enoxaparin  40 mg SubCUTAneous QAM AC    pregabalin  75 mg Oral BID    sennosides-docusate sodium  1 tablet Oral BID    potassium chloride  20 mEq Oral Daily with breakfast    lidocaine  2 patch TransDERmal Daily    traZODone  25 mg Oral Nightly    sodium chloride flush  5-40 mL IntraVENous 2 times per day    levETIRAcetam  1,000 mg Oral BID    pantoprazole  40 mg Oral QAM AC    piperacillin-tazobactam  3,375 mg IntraVENous Q8H      sodium chloride 5 mL/hr at 23 0056    dextrose       oxyCODONE **OR** oxyCODONE, cetirizine, magnesium sulfate, melatonin,
Pt admitted to  5K23 by bed from 6K. IV none infusing into the forearm right, condition patent and no redness at all. IV site free of s/s of infection or infiltration. Vital signs obtained. Assessment complete. Oriented to room. All questions answered with no further questions at this time. Two nurse skin assessment performed by Florence Fleming and Campbell Bone. Oriented to room. Policies and procedures for  explained. A Fall prevention and safety brochure discussed with patient. Bed alarm on. Call light in reach.
RN called patient's family member, Aishwarya Jimenez, about patient transferring floors to 5K room 23 due to patient not having a telemetry order and the floor needing a telemetry bed. Family member states this is okay and that she would be in later today to see her.
Result   Please see the CT abscess drainage report            **This report has been created using voice recognition software. It may contain minor errors which are inherent in voice recognition technology. **      Final report electronically signed by Dr. Hua Reveles on 4/24/2023 5:09 PM      FL MODIFIED BARIUM SWALLOW W VIDEO   Final Result   1. Laryngeal penetration of thin, mildly thick and mixed barium with aspiration of thin barium. 2. Additional recommendations from the speech therapist will follow. **This report has been created using voice recognition software. It may contain minor errors which are inherent in voice recognition technology. **         Final report electronically signed by Dr. Galo Mon on 4/24/2023 10:38 AM      CT ABDOMEN PELVIS W IV CONTRAST Additional Contrast? None   Final Result   1. Large abnormal fluid collection involving the left abdomen suspicious    for abscess. 2. Extensive diverticulosis coli without diverticulitis. 3. Lobulated/tubular subhepatic fluid collection detailed above. This is    smaller compared to the previous CT exam.   4. other findings above. This document has been electronically signed by: Kizzy Garcia MD on    04/22/2023 10:29 PM      All CTs at this facility use dose modulation techniques and iterative    reconstructions, and/or weight-based dosing   when appropriate to reduce radiation to a low as reasonably achievable. CT ABDOMEN PELVIS W IV CONTRAST Additional Contrast? None    Result Date: 4/22/2023  CT abdomen and pelvis with IV contrast. Comparison: US/SR - US RENAL COMPLETE - 11/13/2022 05:30 AM EST CT/KO/SR - CT ABDOMEN PELVIS W IV CONTRAST - 03/26/2021 12:36 PM EDT FINDINGS: Lung bases: Mild scarring. Calcified right hilar lymph nodes. Upper Abdomen: Liver, spleen and pancreas are unremarkable. Status post cholecystectomy. No intra-or extrahepatic biliary dilatation.  There is a elongated lobulated fluid collection
voice recognition software. It may contain minor errors which are inherent in voice recognition technology. **      Final report electronically signed by Dr. Damian Sierra on 4/27/2023 2:14 PM      CT ABSCESS DRAINAGE W CATH PLACEMENT S&I   Final Result   Uncomplicated placement of an 10 Wolof all-purpose drainage catheter. **This report has been created using voice recognition software. It may contain minor errors which are inherent in voice recognition technology. **      Final report electronically signed by Dr. Damian Sierra on 4/24/2023 4:59 PM      CT GUIDED NEEDLE PLACEMENT   Final Result   Please see the CT abscess drainage report            **This report has been created using voice recognition software. It may contain minor errors which are inherent in voice recognition technology. **      Final report electronically signed by Dr. Damian Sierra on 4/24/2023 5:09 PM      FL MODIFIED BARIUM SWALLOW W VIDEO   Final Result   1. Laryngeal penetration of thin, mildly thick and mixed barium with aspiration of thin barium. 2. Additional recommendations from the speech therapist will follow. **This report has been created using voice recognition software. It may contain minor errors which are inherent in voice recognition technology. **         Final report electronically signed by Dr. Radha Singh on 4/24/2023 10:38 AM      CT ABDOMEN PELVIS W IV CONTRAST Additional Contrast? None   Final Result   1. Large abnormal fluid collection involving the left abdomen suspicious    for abscess. 2. Extensive diverticulosis coli without diverticulitis. 3. Lobulated/tubular subhepatic fluid collection detailed above. This is    smaller compared to the previous CT exam.   4. other findings above.       This document has been electronically signed by: Pretty Quick MD on    04/22/2023 10:29 PM      All CTs at this facility use dose modulation techniques and iterative    reconstructions, and/or weight-based

## 2023-05-05 NOTE — PLAN OF CARE
Problem: Discharge Planning  Goal: Discharge to home or other facility with appropriate resources  5/2/2023 0258 by Scot Boateng RN  Outcome: Progressing  5/2/2023 0256 by Scot Boateng RN  Outcome: Progressing  Flowsheets (Taken 5/1/2023 2000)  Discharge to home or other facility with appropriate resources: Identify barriers to discharge with patient and caregiver     Problem: Safety - Adult  Goal: Free from fall injury  5/2/2023 0258 by Scot Boateng RN  Outcome: Progressing  5/2/2023 0256 by Scot Boateng RN  Outcome: Progressing     Problem: Pain  Goal: Verbalizes/displays adequate comfort level or baseline comfort level  5/2/2023 0258 by Scot Boateng RN  Outcome: Progressing  5/2/2023 0256 by Scot Boateng RN  Outcome: Progressing     Problem: Skin/Tissue Integrity  Goal: Absence of new skin breakdown  Description: 1. Monitor for areas of redness and/or skin breakdown  2. Assess vascular access sites hourly  3. Every 4-6 hours minimum:  Change oxygen saturation probe site  4. Every 4-6 hours:  If on nasal continuous positive airway pressure, respiratory therapy assess nares and determine need for appliance change or resting period.   5/2/2023 0258 by Scot Boateng RN  Outcome: Progressing  5/2/2023 0256 by Scot Boateng RN  Outcome: Progressing  Goal: Incisions, wounds, or drain sites healing without S/S of infection  Description: INTERVENTIONS:  5/2/2023 0258 by Scot Boateng RN  Outcome: Progressing  5/2/2023 0256 by Scot Boateng RN  Outcome: Progressing     Problem: Skin/Tissue Integrity  Goal: Incisions, wounds, or drain sites healing without S/S of infection  Description: INTERVENTIONS:  5/2/2023 0258 by Scot Boateng RN  Outcome: Progressing  5/2/2023 0256 by Scot Boateng RN  Outcome: Progressing     Problem: Chronic Conditions and Co-morbidities  Goal: Patient's chronic conditions and co-morbidity symptoms are monitored and maintained or improved  5/2/2023 0258 by Daniella Rosario
Problem: Discharge Planning  Goal: Discharge to home or other facility with appropriate resources  5/5/2023 0959 by Idalia Lucas RN  Outcome: Progressing  Flowsheets (Taken 5/5/2023 6078)  Discharge to home or other facility with appropriate resources: Identify barriers to discharge with patient and caregiver     Problem: Safety - Adult  Goal: Free from fall injury  5/5/2023 0959 by Idalia Lucas RN  Outcome: Progressing   Fall assessment completed. Patient using call light appropriately to call for assistance with ambulation to bathroom. Personal items within reach. Patient is also compliant with use of non-skid slippers. Problem: Pain  Goal: Verbalizes/displays adequate comfort level or baseline comfort level  5/5/2023 0959 by Idalia Lucas RN  Outcome: Progressing   Pain Assessment: None - Denies Pain  Pain Level: 2   Patient's Stated Pain Goal: 0 - No pain   Is pain goal met at this time? Yes     Non-Pharmaceutical Pain Intervention(s): Repositioned, Rest    Problem: Skin/Tissue Integrity  Goal: Absence of new skin breakdown  Description: 1. Monitor for areas of redness and/or skin breakdown  2. Assess vascular access sites hourly  3. Every 4-6 hours minimum:  Change oxygen saturation probe site  4. Every 4-6 hours:  If on nasal continuous positive airway pressure, respiratory therapy assess nares and determine need for appliance change or resting period. 5/5/2023 0959 by Idalia Lucas RN  Outcome: Progressing     Problem: Skin/Tissue Integrity  Goal: Incisions, wounds, or drain sites healing without S/S of infection  Description: INTERVENTIONS:  5/5/2023 0959 by Idalia Lucas RN  Outcome: Progressing  Flowsheets (Taken 5/5/2023 4954)  Incisions, Wounds, or Drain Sites Healing Without Sign and Symptoms of Infection: ADMISSION and DAILY: Assess and document risk factors for pressure ulcer development   No skin breakdown this shift. Patient being assisted with turning.  Patients states
Problem: Discharge Planning  Goal: Discharge to home or other facility with appropriate resources  Outcome: Progressing  Flowsheets (Taken 5/1/2023 2000 by Santos Perla, RN)  Discharge to home or other facility with appropriate resources: Identify barriers to discharge with patient and caregiver     Problem: Safety - Adult  Goal: Free from fall injury  Outcome: Progressing  Flowsheets (Taken 4/26/2023 0948 by Barbara Mojica, RN)  Free From Fall Injury: Instruct family/caregiver on patient safety     Problem: Pain  Goal: Verbalizes/displays adequate comfort level or baseline comfort level  Outcome: Progressing  Flowsheets (Taken 4/23/2023 0757 by Pearl Maciel RN)  Verbalizes/displays adequate comfort level or baseline comfort level:   Encourage patient to monitor pain and request assistance   Assess pain using appropriate pain scale   Administer analgesics based on type and severity of pain and evaluate response     Problem: Skin/Tissue Integrity  Goal: Absence of new skin breakdown  Description: 1. Monitor for areas of redness and/or skin breakdown  2. Assess vascular access sites hourly  3. Every 4-6 hours minimum:  Change oxygen saturation probe site  4. Every 4-6 hours:  If on nasal continuous positive airway pressure, respiratory therapy assess nares and determine need for appliance change or resting period.   Outcome: Progressing  Goal: Incisions, wounds, or drain sites healing without S/S of infection  Description: INTERVENTIONS:  Outcome: Progressing  Flowsheets (Taken 5/2/2023 2155)  Incisions, Wounds, or Drain Sites Healing Without Sign and Symptoms of Infection: ADMISSION and DAILY: Assess and document risk factors for pressure ulcer development     Problem: Chronic Conditions and Co-morbidities  Goal: Patient's chronic conditions and co-morbidity symptoms are monitored and maintained or improved  Outcome: Progressing  Flowsheets (Taken 5/1/2023 2000 by Santos Perla, POPPY)  Care Plan -
2025)  Oral Mucous Membranes Remain Intact: Assess oral mucosa and hygiene practices     Problem: Gastrointestinal - Adult  Goal: Minimal or absence of nausea and vomiting  Outcome: Progressing  Flowsheets (Taken 5/3/2023 2025)  Minimal or absence of nausea and vomiting:   Administer IV fluids as ordered to ensure adequate hydration   Administer ordered antiemetic medications as needed   Provide nonpharmacologic comfort measures as appropriate  Goal: Maintains or returns to baseline bowel function  Outcome: Progressing  Flowsheets (Taken 5/3/2023 2025)  Maintains or returns to baseline bowel function:   Assess bowel function   Encourage oral fluids to ensure adequate hydration   Administer IV fluids as ordered to ensure adequate hydration   Administer ordered medications as needed   Encourage mobilization and activity  Goal: Maintains adequate nutritional intake  Outcome: Progressing  Flowsheets (Taken 5/3/2023 2025)  Maintains adequate nutritional intake:   Monitor percentage of each meal consumed   Identify factors contributing to decreased intake, treat as appropriate   Assist with meals as needed     Problem: Genitourinary - Adult  Goal: Absence of urinary retention  Outcome: Progressing  Flowsheets (Taken 5/3/2023 2025)  Absence of urinary retention:   Assess patients ability to void and empty bladder   Monitor intake/output and perform bladder scan as needed     Problem: Infection - Adult  Goal: Absence of infection at discharge  Outcome: Progressing  Flowsheets (Taken 5/3/2023 2025)  Absence of infection at discharge:   Assess and monitor for signs and symptoms of infection   Monitor lab/diagnostic results   Monitor all insertion sites i.e., indwelling lines, tubes and drains   Administer medications as ordered   Instruct and encourage patient and family to use good hand hygiene technique  Goal: Absence of infection during hospitalization  Outcome: Progressing  Flowsheets (Taken 5/3/2023 2025)  Absence of
good hand hygiene technique  5/4/2023 0524 by Vinicius Chris RN  Outcome: Progressing  Flowsheets (Taken 5/3/2023 2025)  Absence of infection during hospitalization:   Assess and monitor for signs and symptoms of infection   Monitor lab/diagnostic results   Monitor all insertion sites i.e., indwelling lines, tubes and drains   Administer medications as ordered   Instruct and encourage patient and family to use good hand hygiene technique     Problem: Metabolic/Fluid and Electrolytes - Adult  Goal: Electrolytes maintained within normal limits  5/4/2023 1159 by Jailyn Florence RN  Outcome: Progressing  Flowsheets (Taken 5/3/2023 2025 by Vinicius Chris RN)  Electrolytes maintained within normal limits:   Monitor labs and assess patient for signs and symptoms of electrolyte imbalances   Administer electrolyte replacement as ordered   Monitor response to electrolyte replacements, including repeat lab results as appropriate  5/4/2023 0524 by Vinicius Chris RN  Outcome: Progressing  Flowsheets (Taken 5/3/2023 2025)  Electrolytes maintained within normal limits:   Monitor labs and assess patient for signs and symptoms of electrolyte imbalances   Administer electrolyte replacement as ordered   Monitor response to electrolyte replacements, including repeat lab results as appropriate  Goal: Hemodynamic stability and optimal renal function maintained  5/4/2023 1159 by Jailyn Florence RN  Outcome: Progressing  Flowsheets (Taken 5/3/2023 2025 by Vinicius Chris RN)  Hemodynamic stability and optimal renal function maintained:   Monitor labs and assess for signs and symptoms of volume excess or deficit   Monitor intake, output and patient weight   Monitor response to interventions for patient's volume status, including labs, urine output, blood pressure (other measures as available)   Encourage oral intake as appropriate  5/4/2023 0524 by Vinicius Chris RN  Outcome: Progressing  Flowsheets (Taken 5/3/2023 2025)  Hemodynamic
patients ability to void and empty bladder   Monitor intake/output and perform bladder scan as needed     Problem: Infection - Adult  Goal: Absence of infection at discharge  5/4/2023 2011 by Micaela Goldman RN  Outcome: Progressing  5/4/2023 1159 by Raad Gray RN  Outcome: Progressing  Flowsheets (Taken 5/3/2023 2025 by Libertad Ho, RN)  Absence of infection at discharge:   Assess and monitor for signs and symptoms of infection   Monitor lab/diagnostic results   Monitor all insertion sites i.e., indwelling lines, tubes and drains   Administer medications as ordered   Instruct and encourage patient and family to use good hand hygiene technique  Goal: Absence of infection during hospitalization  5/4/2023 2011 by Micaela Goldman RN  Outcome: Progressing  5/4/2023 1159 by Raad Gray RN  Outcome: Progressing  Flowsheets (Taken 5/3/2023 2025 by Libertad Ho, RN)  Absence of infection during hospitalization:   Assess and monitor for signs and symptoms of infection   Monitor lab/diagnostic results   Monitor all insertion sites i.e., indwelling lines, tubes and drains   Administer medications as ordered   Instruct and encourage patient and family to use good hand hygiene technique     Problem: Metabolic/Fluid and Electrolytes - Adult  Goal: Electrolytes maintained within normal limits  5/4/2023 2011 by Micaela Goldman RN  Outcome: Progressing  5/4/2023 1159 by Raad Gray RN  Outcome: Progressing  Flowsheets (Taken 5/3/2023 2025 by Libertad Ho, RN)  Electrolytes maintained within normal limits:   Monitor labs and assess patient for signs and symptoms of electrolyte imbalances   Administer electrolyte replacement as ordered   Monitor response to electrolyte replacements, including repeat lab results as appropriate  Goal: Hemodynamic stability and optimal renal function maintained  5/4/2023 2011 by Micaela Goldman RN  Outcome: Progressing  5/4/2023 1159 by Raad Gray RN  Outcome:

## 2023-05-05 NOTE — DISCHARGE SUMMARY
Misc  1 each by Does not apply route daily Requests stand up walker due to heart failure and generalized OA. I50.23.     zinc sulfate 220 (50 Zn) MG capsule  Commonly known as: ZINCATE           * This list has 4 medication(s) that are the same as other medications prescribed for you. Read the directions carefully, and ask your doctor or other care provider to review them with you.                 STOP taking these medications      ibuprofen 200 MG tablet  Commonly known as: ADVIL;MOTRIN            ASK your doctor about these medications      doxycycline hyclate 100 MG capsule  Commonly known as: VIBRAMYCIN     metFORMIN 1000 MG tablet  Commonly known as: GLUCOPHAGE  TAKE ONE TABLET IN THE EVENING     ofloxacin 0.3 % otic solution  Commonly known as: FLOXIN     SITagliptin 100 MG tablet  Commonly known as: JANUVIA  Take 1 tablet by mouth daily     triamcinolone 0.1 % cream  Commonly known as: KENALOG               Where to Get Your Medications        Information about where to get these medications is not yet available    Ask your nurse or doctor about these medications  amoxicillin-clavulanate 875-125 MG per tablet  lidocaine 4 % external patch            Signed:    Electronically signed by Brayan Murphy MD on 5/5/2023 at 2:59 PM

## 2023-05-05 NOTE — CARE COORDINATION
5/5/23, 10:26 AM EDT    IMM Letter  IMM Letter given to Patient/Family/Significant other/Guardian/POA/by[de-identified] Rita Lawler RN Case Mgr. IMM Letter date given[de-identified] 05/05/23  IMM Letter time given[de-identified] 1   Pt verbalized understanding and gave permission for possible discharge within 4 hours of receiving IMM.

## 2023-05-05 NOTE — CARE COORDINATION
5/5/23, 9:32 AM EDT    DISCHARGE PLANNING EVALUATION    Per CM, provider completed peer to peer and approved for SNF. SW did call [address] [employer], she had not heard on this, but states they can take today. SW messaged provider regarding this. Call to [facility], transport set for 3pm. SW faxed transport paperwork to [address] [facility]. SW notified nurse of transport time. SW met with pt and notified her of transport time. Sw did ask if SW could notify family, pt okay with SW calling [facility]. Call to [facility], provided update on plans for discharge and transport time. [facility] reports her sister will be up to see pt soon. Call to [facility] with Naida Howard, updated on transport time. 5/5/23, 10:49 AM EDT    Patient goals/plan/ treatment preferences discussed by  and . Patient goals/plan/ treatment preferences reviewed with patient/ family. Patient/ family verbalize understanding of discharge plan and are in agreement with goal/plan/treatment preferences. Understanding was demonstrated using the teach back method. AVS provided by RN at time of discharge, which includes all necessary medical information pertaining to the patients current course of illness, treatment, post-discharge goals of care, and treatment preferences. Services At/After Discharge: [facility] (SNF) and In ambulance [facility] Ambulance       IMM Letter  IMM Letter given to Patient/Family/Significant other/Guardian/POA/by[de-identified] Ana Ba RN Case Mgr.   IMM Letter date given[de-identified] 05/05/23  IMM Letter time given[de-identified] 6637

## 2023-05-22 LAB
FUNGUS SPEC CULT: NORMAL
FUNGUS SPEC FUNGUS STN: NORMAL

## 2023-08-08 ENCOUNTER — APPOINTMENT (OUTPATIENT)
Dept: CT IMAGING | Age: 83
DRG: 439 | End: 2023-08-08
Payer: MEDICARE

## 2023-08-08 ENCOUNTER — HOSPITAL ENCOUNTER (INPATIENT)
Age: 83
LOS: 7 days | Discharge: HOME | DRG: 439 | End: 2023-08-15
Attending: EMERGENCY MEDICINE | Admitting: HOSPITALIST
Payer: MEDICARE

## 2023-08-08 DIAGNOSIS — K85.90 ACUTE PANCREATITIS, UNSPECIFIED COMPLICATION STATUS, UNSPECIFIED PANCREATITIS TYPE: Primary | ICD-10-CM

## 2023-08-08 DIAGNOSIS — I10 BENIGN ESSENTIAL HTN: ICD-10-CM

## 2023-08-08 DIAGNOSIS — E87.6 ACUTE HYPOKALEMIA: ICD-10-CM

## 2023-08-08 DIAGNOSIS — E11.42 DIABETIC PERIPHERAL NEUROPATHY (HCC): ICD-10-CM

## 2023-08-08 DIAGNOSIS — E87.6 HYPOKALEMIA: ICD-10-CM

## 2023-08-08 DIAGNOSIS — E87.20 METABOLIC ACIDOSIS: ICD-10-CM

## 2023-08-08 DIAGNOSIS — N17.9 AKI (ACUTE KIDNEY INJURY) (HCC): ICD-10-CM

## 2023-08-08 DIAGNOSIS — R56.9 CONVULSIONS, UNSPECIFIED CONVULSION TYPE (HCC): ICD-10-CM

## 2023-08-08 LAB
ALBUMIN SERPL BCG-MCNC: 3 G/DL (ref 3.5–5.1)
ALP SERPL-CCNC: 132 U/L (ref 38–126)
ALT SERPL W/O P-5'-P-CCNC: 21 U/L (ref 11–66)
ANION GAP SERPL CALC-SCNC: 17 MEQ/L (ref 8–16)
APTT PPP: 33.7 SECONDS (ref 22–38)
AST SERPL-CCNC: 22 U/L (ref 5–40)
BASE EXCESS BLDA CALC-SCNC: -12.8 MMOL/L (ref -2–3)
BASOPHILS ABSOLUTE: 0 THOU/MM3 (ref 0–0.1)
BASOPHILS NFR BLD AUTO: 0.4 %
BILIRUB CONJ SERPL-MCNC: < 0.2 MG/DL (ref 0–0.3)
BILIRUB SERPL-MCNC: 0.3 MG/DL (ref 0.3–1.2)
BUN SERPL-MCNC: 42 MG/DL (ref 7–22)
CALCIUM SERPL-MCNC: 8.4 MG/DL (ref 8.5–10.5)
CHLORIDE SERPL-SCNC: 116 MEQ/L (ref 98–111)
CO2 SERPL-SCNC: 11 MEQ/L (ref 23–33)
COLLECTED BY:: ABNORMAL
CREAT SERPL-MCNC: 1.9 MG/DL (ref 0.4–1.2)
DEPRECATED RDW RBC AUTO: 49.1 FL (ref 35–45)
EOSINOPHIL NFR BLD AUTO: 0.6 %
EOSINOPHILS ABSOLUTE: 0.1 THOU/MM3 (ref 0–0.4)
ERYTHROCYTE [DISTWIDTH] IN BLOOD BY AUTOMATED COUNT: 15.4 % (ref 11.5–14.5)
GFR SERPL CREATININE-BSD FRML MDRD: 26 ML/MIN/1.73M2
GLUCOSE SERPL-MCNC: 197 MG/DL (ref 70–108)
HCO3 BLDA-SCNC: 10 MMOL/L (ref 23–28)
HCT VFR BLD AUTO: 36.4 % (ref 37–47)
HGB BLD-MCNC: 11.8 GM/DL (ref 12–16)
IMM GRANULOCYTES # BLD AUTO: 0.04 THOU/MM3 (ref 0–0.07)
IMM GRANULOCYTES NFR BLD AUTO: 0.4 %
INR PPP: 1.12 (ref 0.85–1.13)
LIPASE SERPL-CCNC: 1413 U/L (ref 5.6–51.3)
LYMPHOCYTES ABSOLUTE: 1.5 THOU/MM3 (ref 1–4.8)
LYMPHOCYTES NFR BLD AUTO: 16.6 %
MCH RBC QN AUTO: 28.4 PG (ref 26–33)
MCHC RBC AUTO-ENTMCNC: 32.4 GM/DL (ref 32.2–35.5)
MCV RBC AUTO: 87.7 FL (ref 81–99)
MONOCYTES ABSOLUTE: 0.5 THOU/MM3 (ref 0.4–1.3)
MONOCYTES NFR BLD AUTO: 5.6 %
NEUTROPHILS NFR BLD AUTO: 76.4 %
NRBC BLD AUTO-RTO: 0 /100 WBC
OSMOLALITY SERPL CALC.SUM OF ELEC: 302.8 MOSMOL/KG (ref 275–300)
PCO2 TEMP ADJ BLDMV: 17 MMHG (ref 41–51)
PH BLDMV: 7.38 [PH] (ref 7.31–7.41)
PLATELET # BLD AUTO: 229 THOU/MM3 (ref 130–400)
PMV BLD AUTO: 10.1 FL (ref 9.4–12.4)
PO2 BLDMV: 34 MMHG (ref 25–40)
POTASSIUM SERPL-SCNC: 2.8 MEQ/L (ref 3.5–5.2)
PROT SERPL-MCNC: 6.1 G/DL (ref 6.1–8)
RBC # BLD AUTO: 4.15 MILL/MM3 (ref 4.2–5.4)
SAO2 % BLDMV: 66 %
SEGMENTED NEUTROPHILS ABSOLUTE COUNT: 7 THOU/MM3 (ref 1.8–7.7)
SODIUM SERPL-SCNC: 144 MEQ/L (ref 135–145)
TROPONIN, HIGH SENSITIVITY: 40 NG/L (ref 0–12)
WBC # BLD AUTO: 9.1 THOU/MM3 (ref 4.8–10.8)

## 2023-08-08 PROCEDURE — 85730 THROMBOPLASTIN TIME PARTIAL: CPT

## 2023-08-08 PROCEDURE — 6360000002 HC RX W HCPCS: Performed by: EMERGENCY MEDICINE

## 2023-08-08 PROCEDURE — 80053 COMPREHEN METABOLIC PANEL: CPT

## 2023-08-08 PROCEDURE — 2580000003 HC RX 258: Performed by: EMERGENCY MEDICINE

## 2023-08-08 PROCEDURE — 82803 BLOOD GASES ANY COMBINATION: CPT

## 2023-08-08 PROCEDURE — 93005 ELECTROCARDIOGRAM TRACING: CPT | Performed by: EMERGENCY MEDICINE

## 2023-08-08 PROCEDURE — 84484 ASSAY OF TROPONIN QUANT: CPT

## 2023-08-08 PROCEDURE — 36415 COLL VENOUS BLD VENIPUNCTURE: CPT

## 2023-08-08 PROCEDURE — 99223 1ST HOSP IP/OBS HIGH 75: CPT | Performed by: HOSPITALIST

## 2023-08-08 PROCEDURE — 82248 BILIRUBIN DIRECT: CPT

## 2023-08-08 PROCEDURE — 83690 ASSAY OF LIPASE: CPT

## 2023-08-08 PROCEDURE — 83615 LACTATE (LD) (LDH) ENZYME: CPT

## 2023-08-08 PROCEDURE — 2060000000 HC ICU INTERMEDIATE R&B

## 2023-08-08 PROCEDURE — 85025 COMPLETE CBC W/AUTO DIFF WBC: CPT

## 2023-08-08 PROCEDURE — 83735 ASSAY OF MAGNESIUM: CPT

## 2023-08-08 PROCEDURE — 85610 PROTHROMBIN TIME: CPT

## 2023-08-08 PROCEDURE — 99285 EMERGENCY DEPT VISIT HI MDM: CPT

## 2023-08-08 PROCEDURE — 96374 THER/PROPH/DIAG INJ IV PUSH: CPT

## 2023-08-08 PROCEDURE — 74176 CT ABD & PELVIS W/O CONTRAST: CPT

## 2023-08-08 RX ORDER — SODIUM CHLORIDE 0.9 % (FLUSH) 0.9 %
10 SYRINGE (ML) INJECTION PRN
Status: DISCONTINUED | OUTPATIENT
Start: 2023-08-08 | End: 2023-08-10

## 2023-08-08 RX ORDER — SODIUM CHLORIDE 0.9 % (FLUSH) 0.9 %
5-40 SYRINGE (ML) INJECTION EVERY 12 HOURS SCHEDULED
Status: DISCONTINUED | OUTPATIENT
Start: 2023-08-09 | End: 2023-08-10

## 2023-08-08 RX ORDER — ONDANSETRON 2 MG/ML
4 INJECTION INTRAMUSCULAR; INTRAVENOUS EVERY 6 HOURS PRN
Status: DISCONTINUED | OUTPATIENT
Start: 2023-08-08 | End: 2023-08-09

## 2023-08-08 RX ORDER — POLYETHYLENE GLYCOL 3350 17 G/17G
17 POWDER, FOR SOLUTION ORAL DAILY PRN
Status: DISCONTINUED | OUTPATIENT
Start: 2023-08-08 | End: 2023-08-15 | Stop reason: HOSPADM

## 2023-08-08 RX ORDER — 0.9 % SODIUM CHLORIDE 0.9 %
30 INTRAVENOUS SOLUTION INTRAVENOUS ONCE
Status: COMPLETED | OUTPATIENT
Start: 2023-08-08 | End: 2023-08-08

## 2023-08-08 RX ORDER — POTASSIUM CHLORIDE 7.45 MG/ML
10 INJECTION INTRAVENOUS
Status: COMPLETED | OUTPATIENT
Start: 2023-08-08 | End: 2023-08-09

## 2023-08-08 RX ORDER — ONDANSETRON 4 MG/1
4 TABLET, ORALLY DISINTEGRATING ORAL EVERY 8 HOURS PRN
Status: DISCONTINUED | OUTPATIENT
Start: 2023-08-08 | End: 2023-08-09

## 2023-08-08 RX ORDER — ENOXAPARIN SODIUM 100 MG/ML
30 INJECTION SUBCUTANEOUS DAILY
Status: DISCONTINUED | OUTPATIENT
Start: 2023-08-09 | End: 2023-08-11

## 2023-08-08 RX ORDER — SODIUM CHLORIDE 9 MG/ML
INJECTION, SOLUTION INTRAVENOUS PRN
Status: DISCONTINUED | OUTPATIENT
Start: 2023-08-08 | End: 2023-08-10

## 2023-08-08 RX ADMIN — SODIUM CHLORIDE 1365 ML: 9 INJECTION, SOLUTION INTRAVENOUS at 20:02

## 2023-08-08 RX ADMIN — POTASSIUM CHLORIDE 10 MEQ: 10 INJECTION, SOLUTION INTRAVENOUS at 23:13

## 2023-08-08 RX ADMIN — HYDROMORPHONE HYDROCHLORIDE 0.5 MG: 1 INJECTION, SOLUTION INTRAMUSCULAR; INTRAVENOUS; SUBCUTANEOUS at 21:30

## 2023-08-08 RX ADMIN — POTASSIUM CHLORIDE 10 MEQ: 10 INJECTION, SOLUTION INTRAVENOUS at 22:14

## 2023-08-08 ASSESSMENT — PAIN SCALES - GENERAL
PAINLEVEL_OUTOF10: 5
PAINLEVEL_OUTOF10: 4
PAINLEVEL_OUTOF10: 2
PAINLEVEL_OUTOF10: 4

## 2023-08-08 ASSESSMENT — PAIN - FUNCTIONAL ASSESSMENT
PAIN_FUNCTIONAL_ASSESSMENT: NONE - DENIES PAIN
PAIN_FUNCTIONAL_ASSESSMENT: 0-10
PAIN_FUNCTIONAL_ASSESSMENT: 0-10
PAIN_FUNCTIONAL_ASSESSMENT: NONE - DENIES PAIN
PAIN_FUNCTIONAL_ASSESSMENT: ACTIVITIES ARE NOT PREVENTED

## 2023-08-08 ASSESSMENT — PAIN DESCRIPTION - ONSET: ONSET: ON-GOING

## 2023-08-08 ASSESSMENT — PAIN DESCRIPTION - DESCRIPTORS: DESCRIPTORS: ACHING;SHARP

## 2023-08-08 ASSESSMENT — PAIN DESCRIPTION - LOCATION
LOCATION: ABDOMEN
LOCATION: ABDOMEN

## 2023-08-08 ASSESSMENT — PAIN DESCRIPTION - PAIN TYPE: TYPE: ACUTE PAIN

## 2023-08-08 ASSESSMENT — PAIN DESCRIPTION - ORIENTATION: ORIENTATION: MID

## 2023-08-08 ASSESSMENT — PAIN DESCRIPTION - FREQUENCY: FREQUENCY: CONTINUOUS

## 2023-08-08 NOTE — ED NOTES
Patient to the ED with fatigue and abdominal pain. Patient's daughter states that patient had small hernia repair via surgeon at [facility] due to mesh being infected. Patient noted that she has been having abdominal pain for the past 2 days. Patient complains of nausea. Patient is resting in bed with easy and unlabored respirations. Call light in reach. Side rails up x2. Patient denies further complaints or concerns. Will monitor.         Jon Oliva RN  08/08/23 2027

## 2023-08-08 NOTE — ED PROVIDER NOTES
[address] ENCOUNTER        PATIENT NAME: Hubert Tobar  MRN: [id number]  : 1940  LOPEZ: 2023  PROVIDER: Faiza Aguirre MD    CHIEF COMPLAINT       Chief Complaint   Patient presents with    Fatigue       Patient is seen and evaluated in a timely fashion. Nurses Notes are reviewed and I agree except as noted in the HPI. HISTORY OF PRESENT ILLNESS   Hubert Tobar is a 81 y.o. female who presents to Emergency Department with Fatigue     Patient is brought in from SNF for evaluation of poor appetite, fatigue, and abdominal pain since today. PMH remarkable of NIDDM, COVID-19, HLD, HTN, obesity, SWAPNA, CKD, sinonasal CA s/p multiple resections, remote ventral hernia repair with mesh (5010) complicated with abdominal wall abscess recent admission  at [facility] for sepsis s/p IR drainage 2023, treated w/ Augmentin, daily wound packing, and SNF discharged. Family took here to [facility] and she was admitted 2023-2023 at [facility] with CT abdomen and pelvis on admission demonstrated a 11.5 x 2.1 x 12.1 cm superficial fluid and gas collection closely associated with the mesh repair. She was placed on broad-spectrum antibiotics and taken to the OR on 6/10/23 for exploratory laparotomy with Dr. Marilynn Osuna. Intra-op findings included an abdominal wall abscess, which was opened and irrigated. The patient subsequently underwent explantation of infected mesh and small bowel resection with primary anastomosis on 6/15/23 with Dr. Gia Conrad. She tolerated the procedure well without complications. Her diet was advanced with return of bowel function. She did develop a postoperative acute kidney injury with Cr peaking at 3.28 on 23. Nephrology was consulted who recommended conservative measures such as adequate nutrition and fluid. The patient tolerated PO intake without nausea and vomiting. He creatinine continued to trend down.  The patient complained of liquid bowel

## 2023-08-09 ENCOUNTER — APPOINTMENT (OUTPATIENT)
Dept: MRI IMAGING | Age: 83
DRG: 439 | End: 2023-08-09
Payer: MEDICARE

## 2023-08-09 ENCOUNTER — APPOINTMENT (OUTPATIENT)
Dept: ULTRASOUND IMAGING | Age: 83
DRG: 439 | End: 2023-08-09
Payer: MEDICARE

## 2023-08-09 PROBLEM — E87.3 RESPIRATORY ALKALOSIS: Status: ACTIVE | Noted: 2023-08-09

## 2023-08-09 PROBLEM — E87.20 METABOLIC ACIDOSIS: Status: ACTIVE | Noted: 2023-08-09

## 2023-08-09 LAB
ANION GAP SERPL CALC-SCNC: 11 MEQ/L (ref 8–16)
ANION GAP SERPL CALC-SCNC: 15 MEQ/L (ref 8–16)
ANION GAP SERPL CALC-SCNC: 20 MEQ/L (ref 8–16)
BACTERIA URNS QL MICRO: ABNORMAL /HPF
BASE EXCESS BLDA CALC-SCNC: -12.6 MMOL/L (ref -2–3)
BILIRUB UR QL STRIP.AUTO: NEGATIVE
BUN SERPL-MCNC: 31 MG/DL (ref 7–22)
BUN SERPL-MCNC: 37 MG/DL (ref 7–22)
BUN SERPL-MCNC: 40 MG/DL (ref 7–22)
CALCIUM SERPL-MCNC: 7.8 MG/DL (ref 8.5–10.5)
CALCIUM SERPL-MCNC: 7.8 MG/DL (ref 8.5–10.5)
CALCIUM SERPL-MCNC: 8.3 MG/DL (ref 8.5–10.5)
CASTS #/AREA URNS LPF: ABNORMAL /LPF
CASTS 2: ABNORMAL /LPF
CHARACTER UR: ABNORMAL
CHLORIDE SERPL-SCNC: 117 MEQ/L (ref 98–111)
CHLORIDE SERPL-SCNC: 120 MEQ/L (ref 98–111)
CHLORIDE SERPL-SCNC: 120 MEQ/L (ref 98–111)
CHOLEST SERPL-MCNC: 94 MG/DL (ref 100–199)
CO2 SERPL-SCNC: 11 MEQ/L (ref 23–33)
CO2 SERPL-SCNC: 12 MEQ/L (ref 23–33)
CO2 SERPL-SCNC: 7 MEQ/L (ref 23–33)
COLLECTED BY:: ABNORMAL
COLOR: YELLOW
CREAT SERPL-MCNC: 1.5 MG/DL (ref 0.4–1.2)
CREAT SERPL-MCNC: 1.8 MG/DL (ref 0.4–1.2)
CREAT SERPL-MCNC: 1.9 MG/DL (ref 0.4–1.2)
CRYSTALS URNS MICRO: ABNORMAL
EPITHELIAL CELLS, UA: ABNORMAL /HPF
FIO2 ON VENT O2 ANALYZER: 21 %
GFR SERPL CREATININE-BSD FRML MDRD: 26 ML/MIN/1.73M2
GFR SERPL CREATININE-BSD FRML MDRD: 28 ML/MIN/1.73M2
GFR SERPL CREATININE-BSD FRML MDRD: 35 ML/MIN/1.73M2
GLUCOSE BLD STRIP.AUTO-MCNC: 103 MG/DL (ref 70–108)
GLUCOSE BLD STRIP.AUTO-MCNC: 109 MG/DL (ref 70–108)
GLUCOSE BLD STRIP.AUTO-MCNC: 110 MG/DL (ref 70–108)
GLUCOSE BLD STRIP.AUTO-MCNC: 113 MG/DL (ref 70–108)
GLUCOSE BLD STRIP.AUTO-MCNC: 117 MG/DL (ref 70–108)
GLUCOSE SERPL-MCNC: 108 MG/DL (ref 70–108)
GLUCOSE SERPL-MCNC: 111 MG/DL (ref 70–108)
GLUCOSE SERPL-MCNC: 122 MG/DL (ref 70–108)
GLUCOSE UR QL STRIP.AUTO: NEGATIVE MG/DL
HCO3 BLDA-SCNC: 10 MMOL/L (ref 23–28)
HDLC SERPL-MCNC: 20 MG/DL
HGB UR QL STRIP.AUTO: ABNORMAL
KETONES UR QL STRIP.AUTO: ABNORMAL
LACTATE SERPL-SCNC: 1.1 MMOL/L (ref 0.5–2)
LDH SERPL L TO P-CCNC: 152 U/L (ref 100–190)
LDLC SERPL CALC-MCNC: 56 MG/DL
MAGNESIUM SERPL-MCNC: 1.5 MG/DL (ref 1.6–2.4)
MAGNESIUM SERPL-MCNC: 1.5 MG/DL (ref 1.6–2.4)
MAGNESIUM SERPL-MCNC: 2.7 MG/DL (ref 1.6–2.4)
MISCELLANEOUS 2: ABNORMAL
NITRITE UR QL STRIP: NEGATIVE
PCO2 TEMP ADJ BLDMV: 15 MMHG (ref 41–51)
PH BLDMV: 7.41 [PH] (ref 7.31–7.41)
PH UR STRIP.AUTO: 6.5 [PH] (ref 5–9)
PHOSPHATE SERPL-MCNC: 2.8 MG/DL (ref 2.4–4.7)
PO2 BLDMV: 93 MMHG (ref 25–40)
POTASSIUM SERPL-SCNC: 3.2 MEQ/L (ref 3.5–5.2)
POTASSIUM SERPL-SCNC: 3.3 MEQ/L (ref 3.5–5.2)
POTASSIUM SERPL-SCNC: 3.3 MEQ/L (ref 3.5–5.2)
PROT UR STRIP.AUTO-MCNC: 100 MG/DL
RBC URINE: ABNORMAL /HPF
RENAL EPI CELLS #/AREA URNS HPF: ABNORMAL /[HPF]
SAO2 % BLDMV: 98 %
SODIUM SERPL-SCNC: 140 MEQ/L (ref 135–145)
SODIUM SERPL-SCNC: 146 MEQ/L (ref 135–145)
SODIUM SERPL-SCNC: 147 MEQ/L (ref 135–145)
SP GR UR REFRACT.AUTO: 1.02 (ref 1–1.03)
TRIGL SERPL-MCNC: 89 MG/DL (ref 0–199)
TRIGL SERPL-MCNC: 92 MG/DL (ref 0–199)
TROPONIN, HIGH SENSITIVITY: 40 NG/L (ref 0–12)
UROBILINOGEN, URINE: 1 EU/DL (ref 0–1)
WBC #/AREA URNS HPF: ABNORMAL /HPF
WBC #/AREA URNS HPF: ABNORMAL /[HPF]
YEAST LIKE FUNGI URNS QL MICRO: ABNORMAL

## 2023-08-09 PROCEDURE — 6360000002 HC RX W HCPCS: Performed by: STUDENT IN AN ORGANIZED HEALTH CARE EDUCATION/TRAINING PROGRAM

## 2023-08-09 PROCEDURE — 83605 ASSAY OF LACTIC ACID: CPT

## 2023-08-09 PROCEDURE — 6370000000 HC RX 637 (ALT 250 FOR IP): Performed by: INTERNAL MEDICINE

## 2023-08-09 PROCEDURE — 82803 BLOOD GASES ANY COMBINATION: CPT

## 2023-08-09 PROCEDURE — 2580000003 HC RX 258

## 2023-08-09 PROCEDURE — 87186 SC STD MICRODIL/AGAR DIL: CPT

## 2023-08-09 PROCEDURE — 6360000002 HC RX W HCPCS: Performed by: EMERGENCY MEDICINE

## 2023-08-09 PROCEDURE — 2500000003 HC RX 250 WO HCPCS: Performed by: INTERNAL MEDICINE

## 2023-08-09 PROCEDURE — 6360000002 HC RX W HCPCS

## 2023-08-09 PROCEDURE — 93010 ELECTROCARDIOGRAM REPORT: CPT | Performed by: INTERNAL MEDICINE

## 2023-08-09 PROCEDURE — 86038 ANTINUCLEAR ANTIBODIES: CPT

## 2023-08-09 PROCEDURE — 82948 REAGENT STRIP/BLOOD GLUCOSE: CPT

## 2023-08-09 PROCEDURE — 82787 IGG 1 2 3 OR 4 EACH: CPT

## 2023-08-09 PROCEDURE — 83735 ASSAY OF MAGNESIUM: CPT

## 2023-08-09 PROCEDURE — 93005 ELECTROCARDIOGRAM TRACING: CPT

## 2023-08-09 PROCEDURE — 80061 LIPID PANEL: CPT

## 2023-08-09 PROCEDURE — 2060000000 HC ICU INTERMEDIATE R&B

## 2023-08-09 PROCEDURE — 87077 CULTURE AEROBIC IDENTIFY: CPT

## 2023-08-09 PROCEDURE — 74181 MRI ABDOMEN W/O CONTRAST: CPT

## 2023-08-09 PROCEDURE — 80048 BASIC METABOLIC PNL TOTAL CA: CPT

## 2023-08-09 PROCEDURE — 2500000003 HC RX 250 WO HCPCS

## 2023-08-09 PROCEDURE — 36415 COLL VENOUS BLD VENIPUNCTURE: CPT

## 2023-08-09 PROCEDURE — 84484 ASSAY OF TROPONIN QUANT: CPT

## 2023-08-09 PROCEDURE — 6370000000 HC RX 637 (ALT 250 FOR IP)

## 2023-08-09 PROCEDURE — 2580000003 HC RX 258: Performed by: INTERNAL MEDICINE

## 2023-08-09 PROCEDURE — 84100 ASSAY OF PHOSPHORUS: CPT

## 2023-08-09 PROCEDURE — 99233 SBSQ HOSP IP/OBS HIGH 50: CPT | Performed by: INTERNAL MEDICINE

## 2023-08-09 PROCEDURE — 76705 ECHO EXAM OF ABDOMEN: CPT

## 2023-08-09 PROCEDURE — 87086 URINE CULTURE/COLONY COUNT: CPT

## 2023-08-09 PROCEDURE — 81001 URINALYSIS AUTO W/SCOPE: CPT

## 2023-08-09 RX ORDER — LEVETIRACETAM 500 MG/1
500 TABLET ORAL 2 TIMES DAILY
Status: DISCONTINUED | OUTPATIENT
Start: 2023-08-09 | End: 2023-08-15 | Stop reason: HOSPADM

## 2023-08-09 RX ORDER — SODIUM CHLORIDE 0.9 % (FLUSH) 0.9 %
5-40 SYRINGE (ML) INJECTION EVERY 12 HOURS SCHEDULED
Status: DISCONTINUED | OUTPATIENT
Start: 2023-08-09 | End: 2023-08-15 | Stop reason: HOSPADM

## 2023-08-09 RX ORDER — INSULIN LISPRO 100 [IU]/ML
0-4 INJECTION, SOLUTION INTRAVENOUS; SUBCUTANEOUS NIGHTLY
Status: DISCONTINUED | OUTPATIENT
Start: 2023-08-09 | End: 2023-08-12

## 2023-08-09 RX ORDER — LANOLIN ALCOHOL/MO/W.PET/CERES
6 CREAM (GRAM) TOPICAL NIGHTLY PRN
Status: DISCONTINUED | OUTPATIENT
Start: 2023-08-09 | End: 2023-08-15 | Stop reason: HOSPADM

## 2023-08-09 RX ORDER — BUMETANIDE 1 MG/1
1 TABLET ORAL 2 TIMES DAILY
Status: DISCONTINUED | OUTPATIENT
Start: 2023-08-09 | End: 2023-08-09

## 2023-08-09 RX ORDER — PREGABALIN 25 MG/1
25 CAPSULE ORAL DAILY
Status: DISCONTINUED | OUTPATIENT
Start: 2023-08-09 | End: 2023-08-13

## 2023-08-09 RX ORDER — MAGNESIUM SULFATE IN WATER 40 MG/ML
2000 INJECTION, SOLUTION INTRAVENOUS PRN
Status: DISCONTINUED | OUTPATIENT
Start: 2023-08-09 | End: 2023-08-15 | Stop reason: HOSPADM

## 2023-08-09 RX ORDER — AMLODIPINE BESYLATE 10 MG/1
10 TABLET ORAL DAILY
Status: DISCONTINUED | OUTPATIENT
Start: 2023-08-09 | End: 2023-08-15

## 2023-08-09 RX ORDER — ONDANSETRON 2 MG/ML
4 INJECTION INTRAMUSCULAR; INTRAVENOUS EVERY 6 HOURS PRN
Status: DISCONTINUED | OUTPATIENT
Start: 2023-08-08 | End: 2023-08-15 | Stop reason: HOSPADM

## 2023-08-09 RX ORDER — ONDANSETRON 4 MG/1
4 TABLET, ORALLY DISINTEGRATING ORAL EVERY 8 HOURS PRN
Status: DISCONTINUED | OUTPATIENT
Start: 2023-08-08 | End: 2023-08-15 | Stop reason: HOSPADM

## 2023-08-09 RX ORDER — DEXTROSE MONOHYDRATE 100 MG/ML
INJECTION, SOLUTION INTRAVENOUS CONTINUOUS PRN
Status: DISCONTINUED | OUTPATIENT
Start: 2023-08-09 | End: 2023-08-15 | Stop reason: HOSPADM

## 2023-08-09 RX ORDER — LIDOCAINE 4 G/G
1 PATCH TOPICAL DAILY PRN
Status: DISCONTINUED | OUTPATIENT
Start: 2023-08-09 | End: 2023-08-15 | Stop reason: HOSPADM

## 2023-08-09 RX ORDER — ENOXAPARIN SODIUM 100 MG/ML
30 INJECTION SUBCUTANEOUS DAILY
Status: DISCONTINUED | OUTPATIENT
Start: 2023-08-09 | End: 2023-08-09 | Stop reason: SDUPTHER

## 2023-08-09 RX ORDER — POTASSIUM CHLORIDE 7.45 MG/ML
10 INJECTION INTRAVENOUS
Status: COMPLETED | OUTPATIENT
Start: 2023-08-09 | End: 2023-08-09

## 2023-08-09 RX ORDER — LEVETIRACETAM 500 MG/1
1000 TABLET ORAL 2 TIMES DAILY
Status: DISCONTINUED | OUTPATIENT
Start: 2023-08-09 | End: 2023-08-09

## 2023-08-09 RX ORDER — SODIUM CHLORIDE 0.9 % (FLUSH) 0.9 %
5-40 SYRINGE (ML) INJECTION PRN
Status: DISCONTINUED | OUTPATIENT
Start: 2023-08-08 | End: 2023-08-15 | Stop reason: HOSPADM

## 2023-08-09 RX ORDER — SODIUM CHLORIDE, SODIUM LACTATE, POTASSIUM CHLORIDE, CALCIUM CHLORIDE 600; 310; 30; 20 MG/100ML; MG/100ML; MG/100ML; MG/100ML
INJECTION, SOLUTION INTRAVENOUS CONTINUOUS
Status: DISCONTINUED | OUTPATIENT
Start: 2023-08-09 | End: 2023-08-09

## 2023-08-09 RX ORDER — ALOGLIPTIN 6.25 MG/1
6.25 TABLET, FILM COATED ORAL DAILY
Status: DISCONTINUED | OUTPATIENT
Start: 2023-08-09 | End: 2023-08-15

## 2023-08-09 RX ORDER — MAGNESIUM SULFATE IN WATER 40 MG/ML
2000 INJECTION, SOLUTION INTRAVENOUS ONCE
Status: COMPLETED | OUTPATIENT
Start: 2023-08-09 | End: 2023-08-09

## 2023-08-09 RX ORDER — POTASSIUM CHLORIDE 20 MEQ/1
40 TABLET, EXTENDED RELEASE ORAL ONCE
Status: COMPLETED | OUTPATIENT
Start: 2023-08-09 | End: 2023-08-09

## 2023-08-09 RX ORDER — INSULIN LISPRO 100 [IU]/ML
0-8 INJECTION, SOLUTION INTRAVENOUS; SUBCUTANEOUS
Status: DISCONTINUED | OUTPATIENT
Start: 2023-08-09 | End: 2023-08-12

## 2023-08-09 RX ORDER — PREGABALIN 75 MG/1
300 CAPSULE ORAL 2 TIMES DAILY
Status: DISCONTINUED | OUTPATIENT
Start: 2023-08-09 | End: 2023-08-09

## 2023-08-09 RX ORDER — SODIUM CHLORIDE 9 MG/ML
INJECTION, SOLUTION INTRAVENOUS PRN
Status: DISCONTINUED | OUTPATIENT
Start: 2023-08-08 | End: 2023-08-15 | Stop reason: HOSPADM

## 2023-08-09 RX ORDER — MAGNESIUM HYDROXIDE/ALUMINUM HYDROXICE/SIMETHICONE 120; 1200; 1200 MG/30ML; MG/30ML; MG/30ML
5 SUSPENSION ORAL EVERY 6 HOURS PRN
Status: DISCONTINUED | OUTPATIENT
Start: 2023-08-09 | End: 2023-08-15 | Stop reason: HOSPADM

## 2023-08-09 RX ORDER — AMLODIPINE BESYLATE 2.5 MG/1
2.5 TABLET ORAL DAILY
Status: DISCONTINUED | OUTPATIENT
Start: 2023-08-09 | End: 2023-08-09

## 2023-08-09 RX ADMIN — SODIUM CHLORIDE, POTASSIUM CHLORIDE, SODIUM LACTATE AND CALCIUM CHLORIDE: 600; 310; 30; 20 INJECTION, SOLUTION INTRAVENOUS at 00:31

## 2023-08-09 RX ADMIN — SODIUM BICARBONATE: 84 INJECTION, SOLUTION INTRAVENOUS at 12:46

## 2023-08-09 RX ADMIN — PREGABALIN 25 MG: 25 CAPSULE ORAL at 08:51

## 2023-08-09 RX ADMIN — SODIUM CHLORIDE, POTASSIUM CHLORIDE, SODIUM LACTATE AND CALCIUM CHLORIDE: 600; 310; 30; 20 INJECTION, SOLUTION INTRAVENOUS at 08:43

## 2023-08-09 RX ADMIN — MAGNESIUM SULFATE HEPTAHYDRATE 2000 MG: 40 INJECTION, SOLUTION INTRAVENOUS at 03:43

## 2023-08-09 RX ADMIN — POTASSIUM CHLORIDE 10 MEQ: 10 INJECTION, SOLUTION INTRAVENOUS at 00:25

## 2023-08-09 RX ADMIN — POTASSIUM CHLORIDE 10 MEQ: 10 INJECTION, SOLUTION INTRAVENOUS at 01:33

## 2023-08-09 RX ADMIN — ENOXAPARIN SODIUM 30 MG: 100 INJECTION SUBCUTANEOUS at 08:51

## 2023-08-09 RX ADMIN — LEVETIRACETAM 500 MG: 500 TABLET, FILM COATED ORAL at 20:12

## 2023-08-09 RX ADMIN — POTASSIUM CHLORIDE 10 MEQ: 10 INJECTION, SOLUTION INTRAVENOUS at 09:01

## 2023-08-09 RX ADMIN — POTASSIUM CHLORIDE 10 MEQ: 10 INJECTION, SOLUTION INTRAVENOUS at 07:49

## 2023-08-09 RX ADMIN — LEVETIRACETAM 500 MG: 500 TABLET, FILM COATED ORAL at 08:54

## 2023-08-09 RX ADMIN — POTASSIUM CHLORIDE 10 MEQ: 10 INJECTION, SOLUTION INTRAVENOUS at 06:43

## 2023-08-09 RX ADMIN — SODIUM CHLORIDE, PRESERVATIVE FREE 10 ML: 5 INJECTION INTRAVENOUS at 20:12

## 2023-08-09 RX ADMIN — HYDROMORPHONE HYDROCHLORIDE 0.25 MG: 1 INJECTION, SOLUTION INTRAMUSCULAR; INTRAVENOUS; SUBCUTANEOUS at 20:58

## 2023-08-09 RX ADMIN — FLUOCINONIDE: 0.5 CREAM TOPICAL at 20:58

## 2023-08-09 RX ADMIN — Medication 6 MG: at 20:23

## 2023-08-09 RX ADMIN — POTASSIUM CHLORIDE 40 MEQ: 1500 TABLET, EXTENDED RELEASE ORAL at 18:17

## 2023-08-09 RX ADMIN — MAGNESIUM SULFATE HEPTAHYDRATE 2000 MG: 40 INJECTION, SOLUTION INTRAVENOUS at 09:12

## 2023-08-09 RX ADMIN — MICONAZOLE NITRATE: 2 POWDER TOPICAL at 21:02

## 2023-08-09 RX ADMIN — POTASSIUM CHLORIDE 10 MEQ: 10 INJECTION, SOLUTION INTRAVENOUS at 10:14

## 2023-08-09 ASSESSMENT — PAIN SCALES - GENERAL
PAINLEVEL_OUTOF10: 0
PAINLEVEL_OUTOF10: 9
PAINLEVEL_OUTOF10: 0

## 2023-08-09 ASSESSMENT — PAIN DESCRIPTION - ONSET: ONSET: ON-GOING

## 2023-08-09 ASSESSMENT — PAIN DESCRIPTION - ORIENTATION: ORIENTATION: MID

## 2023-08-09 ASSESSMENT — PAIN DESCRIPTION - DESCRIPTORS: DESCRIPTORS: ACHING

## 2023-08-09 ASSESSMENT — PAIN DESCRIPTION - PAIN TYPE: TYPE: ACUTE PAIN

## 2023-08-09 ASSESSMENT — PAIN - FUNCTIONAL ASSESSMENT: PAIN_FUNCTIONAL_ASSESSMENT: ACTIVITIES ARE NOT PREVENTED

## 2023-08-09 ASSESSMENT — PAIN DESCRIPTION - LOCATION: LOCATION: ABDOMEN;LEG

## 2023-08-09 ASSESSMENT — PAIN DESCRIPTION - FREQUENCY: FREQUENCY: CONTINUOUS

## 2023-08-09 NOTE — ED NOTES
Pt medicated per [facility] with family at the bedside. Hospitalist at bedside performing assessment at this time. Pt and family updated on room assignment. Call light in reach.       Daryl Holm RN  08/08/23 6510

## 2023-08-09 NOTE — H&P
History & Physical       Patient: Richi Castellano  YOB: 1940    MRN: [id number]     Acct: [de-identified]    PCP: Christina Mathur MD    Date of Admission: 8/8/2023    Date of Service: Patient seen / examined on 08/09/23 and admitted to Inpatient with expected LOS greater than two midnights due to medical therapy. ASSESSMENT / PLAN:  Acute pancreatitis: Lipase 1413, CT abdomen pelvis shows small amount of stranding in the pancreatic tail. Patient is having diffuse abdominal pain without peritonitic signs. Per waterfall trial we will begin lactated Ringer's at a rate of 1.5 ml/kg/hr which equals 125 mL/hr. patient received greater than 10 mg/kg fluid bolus in the emergency department. Ultrasound of liver and pancreas showed a common bile duct measuring 1.0 cm, likely related to prior cholecystectomy. Nonspecific increased echogenicity of the pancreatic parenchyma, and borderline hepatomegaly, liver measuring 20.4 cm. Acute kidney injury: Likely prerenal.  BUN 37. Creatinine 1.8. Estimated GFR 28. we will avoid nephrotoxic agents and pursue further workup if indicated. Abdominal pain: s/p I&D of abdominal wall abscess and small bowel resection with primary anastomosis 6/15/2023. This included removal of mesh from prior abdominal wall herniorrhaphy. This is also in the setting of acute pancreatitis. CT abdomen pelvis without contrast showed interval resolution of previous abscess in anterior abdominal wall. Lactate dehydrogenase 152. NIDDM2: Holding all diabetes medications in the setting of acute pancreatitis. Sliding scale insulin. Hypomagnesemia: Mag 1.5, 2000 mg IV given. Magnesium replacement protocol placed. Elevated high-sensitivity troponin. Initial high-sensitivity troponin 40, repeat 6 hours later 40. in the setting of hypovolemia and pancreatitis. Not likely due to ACS.       Chief Complaint: Abdominal pain    History of Present Illness:  81 y.o. female with

## 2023-08-09 NOTE — ED NOTES
Spoke to [facility] [employer] to approve pt transport to [facility] in stable condition.      Iam Greene LPN  40/21/58 0693

## 2023-08-09 NOTE — DISCHARGE INSTR - COC
Continuity of Care Form    Patient Name: Zaheer Damon   :  1940  MRN:  [id number]    Admit date:  2023  Discharge date:  ***    Code Status Order: Full Code   Advance Directives:     Admitting Physician:  No admitting provider for patient encounter. PCP: No primary care provider on file.     Discharging Nurse: [facility] Unit/Room#: 4A-05/005-A  Discharging Unit Phone Number: ***    Emergency Contact:   Extended Emergency Contact Information  Primary Emergency Contact: [address] of [address] [place] Phone: [phone]  Relation: Child  Preferred language: English  Secondary Emergency Contact: Anaya Leblanc of [address] [place] Phone: [phone]  Relation: Child  Preferred language: English    Past Surgical History:  Past Surgical History:   Procedure Laterality Date    ABDOMEN SURGERY      APPENDECTOMY      CARPAL TUNNEL RELEASE Bilateral ,     CATARACT REMOVAL      bilat    CHOLECYSTECTOMY  1988    COLONOSCOPY  2016    COLONOSCOPY  10/11/2018    COLONOSCOPY POLYPECTOMY SNARE/COLD BIOPSY performed by Adis Pérez MD at [address] [place] [address], [facility]      ENDOSCOPY, COLON, DIAGNOSTIC      EYE SURGERY Left 2015    EYE SURGERY      CATARACT REMOVAL- [address], TOTAL ABDOMINAL (CERVIX REMOVED)  1980    JOINT REPLACEMENT  bilat 2007    knees    DE COLSC FLX WITH DIRECTED SUBMUCOSAL [place] ANY SBST  10/11/2018    COLONOSCOPY SUBMUCOSAL/BOTOX INJECTION performed by Adis Pérez MD at [address] [facility]  last     removed a bone (2)--2 times no construction     SINUS SURGERY  2016    SINUS SURGERY  2016 x 2    SINUS SURGERY  2017    [facility] - Dr Bonilla Jessenia SURGERY  2017 - [facility]    TONSILLECTOMY      TOTAL KNEE ARTHROPLASTY  2003    Left TKR    [address]       Immunization History:   Immunization History

## 2023-08-09 NOTE — ED NOTES
Fluids started at this time. Pt resting on cot with eyes closed with family at the bedside. Call light in reach.       Dariana Junior RN  08/08/23 2004

## 2023-08-10 LAB
ANION GAP SERPL CALC-SCNC: 9 MEQ/L (ref 8–16)
BACTERIA UR CULT: ABNORMAL
BUN SERPL-MCNC: 28 MG/DL (ref 7–22)
C DIFF GDH STL QL: NEGATIVE
CALCIUM SERPL-MCNC: 7.5 MG/DL (ref 8.5–10.5)
CHLORIDE SERPL-SCNC: 119 MEQ/L (ref 98–111)
CO2 SERPL-SCNC: 16 MEQ/L (ref 23–33)
CREAT SERPL-MCNC: 1.3 MG/DL (ref 0.4–1.2)
GFR SERPL CREATININE-BSD FRML MDRD: 41 ML/MIN/1.73M2
GLUCOSE BLD STRIP.AUTO-MCNC: 101 MG/DL (ref 70–108)
GLUCOSE BLD STRIP.AUTO-MCNC: 117 MG/DL (ref 70–108)
GLUCOSE BLD STRIP.AUTO-MCNC: 117 MG/DL (ref 70–108)
GLUCOSE BLD STRIP.AUTO-MCNC: 173 MG/DL (ref 70–108)
GLUCOSE SERPL-MCNC: 110 MG/DL (ref 70–108)
MAGNESIUM SERPL-MCNC: 2.5 MG/DL (ref 1.6–2.4)
ORGANISM: ABNORMAL
PHOSPHATE SERPL-MCNC: 1.8 MG/DL (ref 2.4–4.7)
POTASSIUM SERPL-SCNC: 3.4 MEQ/L (ref 3.5–5.2)
SODIUM SERPL-SCNC: 144 MEQ/L (ref 135–145)

## 2023-08-10 PROCEDURE — 97116 GAIT TRAINING THERAPY: CPT

## 2023-08-10 PROCEDURE — 97535 SELF CARE MNGMENT TRAINING: CPT

## 2023-08-10 PROCEDURE — 97162 PT EVAL MOD COMPLEX 30 MIN: CPT

## 2023-08-10 PROCEDURE — 99232 SBSQ HOSP IP/OBS MODERATE 35: CPT | Performed by: INTERNAL MEDICINE

## 2023-08-10 PROCEDURE — 82948 REAGENT STRIP/BLOOD GLUCOSE: CPT

## 2023-08-10 PROCEDURE — 36415 COLL VENOUS BLD VENIPUNCTURE: CPT

## 2023-08-10 PROCEDURE — 80048 BASIC METABOLIC PNL TOTAL CA: CPT

## 2023-08-10 PROCEDURE — 2580000003 HC RX 258

## 2023-08-10 PROCEDURE — 6370000000 HC RX 637 (ALT 250 FOR IP): Performed by: INTERNAL MEDICINE

## 2023-08-10 PROCEDURE — 83735 ASSAY OF MAGNESIUM: CPT

## 2023-08-10 PROCEDURE — 6360000002 HC RX W HCPCS

## 2023-08-10 PROCEDURE — 6370000000 HC RX 637 (ALT 250 FOR IP)

## 2023-08-10 PROCEDURE — 2060000000 HC ICU INTERMEDIATE R&B

## 2023-08-10 PROCEDURE — 97166 OT EVAL MOD COMPLEX 45 MIN: CPT

## 2023-08-10 PROCEDURE — 2580000003 HC RX 258: Performed by: INTERNAL MEDICINE

## 2023-08-10 PROCEDURE — 84100 ASSAY OF PHOSPHORUS: CPT

## 2023-08-10 PROCEDURE — 87449 NOS EACH ORGANISM AG IA: CPT

## 2023-08-10 PROCEDURE — 6360000002 HC RX W HCPCS: Performed by: STUDENT IN AN ORGANIZED HEALTH CARE EDUCATION/TRAINING PROGRAM

## 2023-08-10 PROCEDURE — 2500000003 HC RX 250 WO HCPCS: Performed by: INTERNAL MEDICINE

## 2023-08-10 RX ORDER — LOPERAMIDE HYDROCHLORIDE 2 MG/1
2 CAPSULE ORAL 4 TIMES DAILY PRN
Status: DISCONTINUED | OUTPATIENT
Start: 2023-08-10 | End: 2023-08-15 | Stop reason: HOSPADM

## 2023-08-10 RX ORDER — ACETAMINOPHEN 325 MG/1
650 TABLET ORAL EVERY 4 HOURS PRN
Status: DISCONTINUED | OUTPATIENT
Start: 2023-08-10 | End: 2023-08-10 | Stop reason: ALTCHOICE

## 2023-08-10 RX ORDER — ACETAMINOPHEN 325 MG/1
650 TABLET ORAL EVERY 6 HOURS PRN
Status: DISCONTINUED | OUTPATIENT
Start: 2023-08-10 | End: 2023-08-15 | Stop reason: HOSPADM

## 2023-08-10 RX ORDER — SODIUM PHOSPHATE, MONOBASIC, MONOHYDRATE 276; 142 MG/ML; MG/ML
15 INJECTION, SOLUTION INTRAVENOUS ONCE
Status: DISCONTINUED | OUTPATIENT
Start: 2023-08-10 | End: 2023-08-10

## 2023-08-10 RX ORDER — CALCIUM POLYCARBOPHIL 625 MG 625 MG/1
625 TABLET ORAL DAILY
Status: DISCONTINUED | OUTPATIENT
Start: 2023-08-10 | End: 2023-08-13

## 2023-08-10 RX ORDER — POTASSIUM CHLORIDE 20 MEQ/1
40 TABLET, EXTENDED RELEASE ORAL ONCE
Status: COMPLETED | OUTPATIENT
Start: 2023-08-10 | End: 2023-08-10

## 2023-08-10 RX ADMIN — LEVETIRACETAM 500 MG: 500 TABLET, FILM COATED ORAL at 20:39

## 2023-08-10 RX ADMIN — FLUOCINONIDE: 0.5 CREAM TOPICAL at 08:57

## 2023-08-10 RX ADMIN — LEVETIRACETAM 500 MG: 500 TABLET, FILM COATED ORAL at 08:58

## 2023-08-10 RX ADMIN — PREGABALIN 25 MG: 25 CAPSULE ORAL at 08:56

## 2023-08-10 RX ADMIN — SODIUM BICARBONATE: 84 INJECTION, SOLUTION INTRAVENOUS at 23:00

## 2023-08-10 RX ADMIN — ENOXAPARIN SODIUM 30 MG: 100 INJECTION SUBCUTANEOUS at 08:57

## 2023-08-10 RX ADMIN — MICONAZOLE NITRATE: 2 POWDER TOPICAL at 20:38

## 2023-08-10 RX ADMIN — HYDROMORPHONE HYDROCHLORIDE 0.25 MG: 1 INJECTION, SOLUTION INTRAMUSCULAR; INTRAVENOUS; SUBCUTANEOUS at 03:47

## 2023-08-10 RX ADMIN — SODIUM CHLORIDE, PRESERVATIVE FREE 10 ML: 5 INJECTION INTRAVENOUS at 20:39

## 2023-08-10 RX ADMIN — MICONAZOLE NITRATE: 2 POWDER TOPICAL at 08:59

## 2023-08-10 RX ADMIN — AMLODIPINE BESYLATE 10 MG: 10 TABLET ORAL at 08:58

## 2023-08-10 RX ADMIN — POTASSIUM CHLORIDE 40 MEQ: 1500 TABLET, EXTENDED RELEASE ORAL at 15:18

## 2023-08-10 RX ADMIN — SODIUM BICARBONATE: 84 INJECTION, SOLUTION INTRAVENOUS at 03:45

## 2023-08-10 RX ADMIN — SODIUM PHOSPHATE, MONOBASIC, MONOHYDRATE AND SODIUM PHOSPHATE, DIBASIC, ANHYDROUS 15 MMOL: 276; 142 INJECTION, SOLUTION INTRAVENOUS at 16:01

## 2023-08-10 RX ADMIN — ACETAMINOPHEN 650 MG: 325 TABLET ORAL at 16:18

## 2023-08-10 RX ADMIN — FLUOCINONIDE: 0.5 CREAM TOPICAL at 20:37

## 2023-08-10 ASSESSMENT — PAIN DESCRIPTION - PAIN TYPE
TYPE: ACUTE PAIN
TYPE: CHRONIC PAIN

## 2023-08-10 ASSESSMENT — PAIN SCALES - GENERAL
PAINLEVEL_OUTOF10: 6
PAINLEVEL_OUTOF10: 6
PAINLEVEL_OUTOF10: 7
PAINLEVEL_OUTOF10: 0
PAINLEVEL_OUTOF10: 6

## 2023-08-10 ASSESSMENT — PAIN DESCRIPTION - ORIENTATION
ORIENTATION: LOWER
ORIENTATION: INNER

## 2023-08-10 ASSESSMENT — PAIN DESCRIPTION - LOCATION
LOCATION: ABDOMEN
LOCATION: GENERALIZED
LOCATION: GENERALIZED
LOCATION: ABDOMEN;LEG

## 2023-08-10 ASSESSMENT — PAIN DESCRIPTION - DESCRIPTORS
DESCRIPTORS: DISCOMFORT
DESCRIPTORS: ACHING;DISCOMFORT
DESCRIPTORS: DISCOMFORT
DESCRIPTORS: ACHING

## 2023-08-10 ASSESSMENT — PAIN DESCRIPTION - FREQUENCY
FREQUENCY: INTERMITTENT

## 2023-08-10 ASSESSMENT — PAIN - FUNCTIONAL ASSESSMENT
PAIN_FUNCTIONAL_ASSESSMENT: PREVENTS OR INTERFERES SOME ACTIVE ACTIVITIES AND ADLS
PAIN_FUNCTIONAL_ASSESSMENT: ACTIVITIES ARE NOT PREVENTED
PAIN_FUNCTIONAL_ASSESSMENT: PREVENTS OR INTERFERES SOME ACTIVE ACTIVITIES AND ADLS
PAIN_FUNCTIONAL_ASSESSMENT: PREVENTS OR INTERFERES SOME ACTIVE ACTIVITIES AND ADLS

## 2023-08-10 ASSESSMENT — PAIN DESCRIPTION - ONSET
ONSET: ON-GOING

## 2023-08-11 LAB
ANION GAP SERPL CALC-SCNC: 8 MEQ/L (ref 8–16)
BASOPHILS ABSOLUTE: 0 THOU/MM3 (ref 0–0.1)
BASOPHILS NFR BLD AUTO: 0.7 %
BUN SERPL-MCNC: 20 MG/DL (ref 7–22)
CA-I BLD ISE-SCNC: 1.01 MMOL/L (ref 1.12–1.32)
CA-I BLD ISE-SCNC: 1.04 MMOL/L (ref 1.12–1.32)
CALCIUM SERPL-MCNC: 7.3 MG/DL (ref 8.5–10.5)
CHLORIDE SERPL-SCNC: 116 MEQ/L (ref 98–111)
CO2 SERPL-SCNC: 19 MEQ/L (ref 23–33)
CREAT SERPL-MCNC: 1.1 MG/DL (ref 0.4–1.2)
DEPRECATED RDW RBC AUTO: 50.8 FL (ref 35–45)
EKG ATRIAL RATE: 91 BPM
EKG ATRIAL RATE: 97 BPM
EKG P AXIS: 14 DEGREES
EKG P-R INTERVAL: 160 MS
EKG P-R INTERVAL: 166 MS
EKG Q-T INTERVAL: 446 MS
EKG Q-T INTERVAL: 456 MS
EKG QRS DURATION: 136 MS
EKG QRS DURATION: 138 MS
EKG QTC CALCULATION (BAZETT): 548 MS
EKG QTC CALCULATION (BAZETT): 579 MS
EKG R AXIS: 100 DEGREES
EKG R AXIS: 161 DEGREES
EKG T AXIS: -17 DEGREES
EKG T AXIS: -34 DEGREES
EKG VENTRICULAR RATE: 91 BPM
EKG VENTRICULAR RATE: 97 BPM
EOSINOPHIL NFR BLD AUTO: 6.4 %
EOSINOPHILS ABSOLUTE: 0.3 THOU/MM3 (ref 0–0.4)
ERYTHROCYTE [DISTWIDTH] IN BLOOD BY AUTOMATED COUNT: 15.7 % (ref 11.5–14.5)
GFR SERPL CREATININE-BSD FRML MDRD: 50 ML/MIN/1.73M2
GLUCOSE BLD STRIP.AUTO-MCNC: 103 MG/DL (ref 70–108)
GLUCOSE BLD STRIP.AUTO-MCNC: 106 MG/DL (ref 70–108)
GLUCOSE BLD STRIP.AUTO-MCNC: 118 MG/DL (ref 70–108)
GLUCOSE BLD STRIP.AUTO-MCNC: 121 MG/DL (ref 70–108)
GLUCOSE BLD STRIP.AUTO-MCNC: 123 MG/DL (ref 70–108)
GLUCOSE SERPL-MCNC: 104 MG/DL (ref 70–108)
HCT VFR BLD AUTO: 31.1 % (ref 37–47)
HGB BLD-MCNC: 10 GM/DL (ref 12–16)
IMM GRANULOCYTES # BLD AUTO: 0.02 THOU/MM3 (ref 0–0.07)
IMM GRANULOCYTES NFR BLD AUTO: 0.5 %
LACTATE SERPL-SCNC: 1.4 MMOL/L (ref 0.5–2)
LDH SERPL L TO P-CCNC: 142 U/L (ref 100–190)
LIPASE SERPL-CCNC: 141.7 U/L (ref 5.6–51.3)
LYMPHOCYTES ABSOLUTE: 1 THOU/MM3 (ref 1–4.8)
LYMPHOCYTES NFR BLD AUTO: 24.3 %
MCH RBC QN AUTO: 28.5 PG (ref 26–33)
MCHC RBC AUTO-ENTMCNC: 32.2 GM/DL (ref 32.2–35.5)
MCV RBC AUTO: 88.6 FL (ref 81–99)
MONOCYTES ABSOLUTE: 0.4 THOU/MM3 (ref 0.4–1.3)
MONOCYTES NFR BLD AUTO: 9.3 %
NEUTROPHILS NFR BLD AUTO: 58.8 %
NRBC BLD AUTO-RTO: 0 /100 WBC
NUCLEAR IGG SER QL IA: NORMAL
PHOSPHATE SERPL-MCNC: 2 MG/DL (ref 2.4–4.7)
PLATELET # BLD AUTO: 149 THOU/MM3 (ref 130–400)
PMV BLD AUTO: 10 FL (ref 9.4–12.4)
POTASSIUM SERPL-SCNC: 3.2 MEQ/L (ref 3.5–5.2)
RBC # BLD AUTO: 3.51 MILL/MM3 (ref 4.2–5.4)
SEGMENTED NEUTROPHILS ABSOLUTE COUNT: 2.5 THOU/MM3 (ref 1.8–7.7)
SODIUM SERPL-SCNC: 143 MEQ/L (ref 135–145)
WBC # BLD AUTO: 4.2 THOU/MM3 (ref 4.8–10.8)

## 2023-08-11 PROCEDURE — 6360000002 HC RX W HCPCS: Performed by: STUDENT IN AN ORGANIZED HEALTH CARE EDUCATION/TRAINING PROGRAM

## 2023-08-11 PROCEDURE — 84100 ASSAY OF PHOSPHORUS: CPT

## 2023-08-11 PROCEDURE — 2060000000 HC ICU INTERMEDIATE R&B

## 2023-08-11 PROCEDURE — 6370000000 HC RX 637 (ALT 250 FOR IP)

## 2023-08-11 PROCEDURE — 82948 REAGENT STRIP/BLOOD GLUCOSE: CPT

## 2023-08-11 PROCEDURE — 2580000003 HC RX 258

## 2023-08-11 PROCEDURE — 2580000003 HC RX 258: Performed by: INTERNAL MEDICINE

## 2023-08-11 PROCEDURE — 6370000000 HC RX 637 (ALT 250 FOR IP): Performed by: INTERNAL MEDICINE

## 2023-08-11 PROCEDURE — 36415 COLL VENOUS BLD VENIPUNCTURE: CPT

## 2023-08-11 PROCEDURE — 82330 ASSAY OF CALCIUM: CPT

## 2023-08-11 PROCEDURE — 6360000002 HC RX W HCPCS: Performed by: INTERNAL MEDICINE

## 2023-08-11 PROCEDURE — 83690 ASSAY OF LIPASE: CPT

## 2023-08-11 PROCEDURE — 2500000003 HC RX 250 WO HCPCS: Performed by: INTERNAL MEDICINE

## 2023-08-11 PROCEDURE — 85025 COMPLETE CBC W/AUTO DIFF WBC: CPT

## 2023-08-11 PROCEDURE — 94761 N-INVAS EAR/PLS OXIMETRY MLT: CPT

## 2023-08-11 PROCEDURE — 80048 BASIC METABOLIC PNL TOTAL CA: CPT

## 2023-08-11 PROCEDURE — 83605 ASSAY OF LACTIC ACID: CPT

## 2023-08-11 PROCEDURE — 83615 LACTATE (LD) (LDH) ENZYME: CPT

## 2023-08-11 PROCEDURE — 99232 SBSQ HOSP IP/OBS MODERATE 35: CPT | Performed by: INTERNAL MEDICINE

## 2023-08-11 RX ORDER — POTASSIUM CHLORIDE 20 MEQ/1
40 TABLET, EXTENDED RELEASE ORAL PRN
Status: DISCONTINUED | OUTPATIENT
Start: 2023-08-11 | End: 2023-08-15 | Stop reason: HOSPADM

## 2023-08-11 RX ORDER — ENOXAPARIN SODIUM 100 MG/ML
40 INJECTION SUBCUTANEOUS DAILY
Status: DISCONTINUED | OUTPATIENT
Start: 2023-08-12 | End: 2023-08-15 | Stop reason: HOSPADM

## 2023-08-11 RX ORDER — CALCIUM GLUCONATE 20 MG/ML
2000 INJECTION, SOLUTION INTRAVENOUS ONCE
Status: COMPLETED | OUTPATIENT
Start: 2023-08-11 | End: 2023-08-11

## 2023-08-11 RX ORDER — BENZONATATE 100 MG/1
100 CAPSULE ORAL 3 TIMES DAILY PRN
Status: DISCONTINUED | OUTPATIENT
Start: 2023-08-11 | End: 2023-08-15 | Stop reason: HOSPADM

## 2023-08-11 RX ORDER — POTASSIUM CHLORIDE 7.45 MG/ML
10 INJECTION INTRAVENOUS PRN
Status: DISCONTINUED | OUTPATIENT
Start: 2023-08-11 | End: 2023-08-15 | Stop reason: HOSPADM

## 2023-08-11 RX ORDER — HYDROCODONE BITARTRATE AND ACETAMINOPHEN 5; 217 MG/10ML; MG/10ML
5 SOLUTION ORAL EVERY 4 HOURS PRN
Status: DISCONTINUED | OUTPATIENT
Start: 2023-08-11 | End: 2023-08-13

## 2023-08-11 RX ADMIN — POTASSIUM CHLORIDE 40 MEQ: 1500 TABLET, EXTENDED RELEASE ORAL at 11:39

## 2023-08-11 RX ADMIN — LOPERAMIDE HYDROCHLORIDE 2 MG: 2 CAPSULE ORAL at 12:14

## 2023-08-11 RX ADMIN — LEVETIRACETAM 500 MG: 500 TABLET, FILM COATED ORAL at 20:05

## 2023-08-11 RX ADMIN — SODIUM PHOSPHATE, MONOBASIC, MONOHYDRATE AND SODIUM PHOSPHATE, DIBASIC, ANHYDROUS 15 MMOL: 276; 142 INJECTION, SOLUTION INTRAVENOUS at 14:15

## 2023-08-11 RX ADMIN — CALCIUM POLYCARBOPHIL 625 MG: 625 TABLET, FILM COATED ORAL at 09:53

## 2023-08-11 RX ADMIN — MICONAZOLE NITRATE: 2 POWDER TOPICAL at 09:55

## 2023-08-11 RX ADMIN — ENOXAPARIN SODIUM 30 MG: 100 INJECTION SUBCUTANEOUS at 09:53

## 2023-08-11 RX ADMIN — MICONAZOLE NITRATE: 2 POWDER TOPICAL at 20:06

## 2023-08-11 RX ADMIN — FLUOCINONIDE: 0.5 CREAM TOPICAL at 20:05

## 2023-08-11 RX ADMIN — ACETAMINOPHEN 650 MG: 325 TABLET ORAL at 11:38

## 2023-08-11 RX ADMIN — PREGABALIN 25 MG: 25 CAPSULE ORAL at 09:53

## 2023-08-11 RX ADMIN — HYDROCODONE BITARTRATE AND ACETAMINOPHEN 5 ML: 5; 217 SOLUTION ORAL at 16:22

## 2023-08-11 RX ADMIN — LEVETIRACETAM 500 MG: 500 TABLET, FILM COATED ORAL at 09:53

## 2023-08-11 RX ADMIN — FLUOCINONIDE: 0.5 CREAM TOPICAL at 09:53

## 2023-08-11 RX ADMIN — SODIUM CHLORIDE, PRESERVATIVE FREE 10 ML: 5 INJECTION INTRAVENOUS at 09:53

## 2023-08-11 RX ADMIN — CALCIUM GLUCONATE 2000 MG: 20 INJECTION, SOLUTION INTRAVENOUS at 11:31

## 2023-08-11 RX ADMIN — AMLODIPINE BESYLATE 10 MG: 10 TABLET ORAL at 09:53

## 2023-08-11 RX ADMIN — ACETAMINOPHEN 650 MG: 325 TABLET ORAL at 05:52

## 2023-08-11 ASSESSMENT — PAIN SCALES - WONG BAKER
WONGBAKER_NUMERICALRESPONSE: 0

## 2023-08-11 ASSESSMENT — PAIN - FUNCTIONAL ASSESSMENT
PAIN_FUNCTIONAL_ASSESSMENT: PREVENTS OR INTERFERES SOME ACTIVE ACTIVITIES AND ADLS
PAIN_FUNCTIONAL_ASSESSMENT: PREVENTS OR INTERFERES SOME ACTIVE ACTIVITIES AND ADLS
PAIN_FUNCTIONAL_ASSESSMENT: ACTIVITIES ARE NOT PREVENTED

## 2023-08-11 ASSESSMENT — PAIN DESCRIPTION - DESCRIPTORS
DESCRIPTORS: ACHING;DISCOMFORT
DESCRIPTORS: ACHING;DISCOMFORT

## 2023-08-11 ASSESSMENT — PAIN DESCRIPTION - LOCATION
LOCATION: GENERALIZED
LOCATION: GENERALIZED
LOCATION: ABDOMEN
LOCATION: ABDOMEN
LOCATION: ABDOMEN;LEG

## 2023-08-11 ASSESSMENT — PAIN DESCRIPTION - PAIN TYPE: TYPE: CHRONIC PAIN

## 2023-08-11 ASSESSMENT — PAIN DESCRIPTION - FREQUENCY
FREQUENCY: INTERMITTENT

## 2023-08-11 ASSESSMENT — PAIN DESCRIPTION - ONSET
ONSET: ON-GOING

## 2023-08-11 ASSESSMENT — PAIN SCALES - GENERAL
PAINLEVEL_OUTOF10: 2
PAINLEVEL_OUTOF10: 2
PAINLEVEL_OUTOF10: 7
PAINLEVEL_OUTOF10: 4
PAINLEVEL_OUTOF10: 2
PAINLEVEL_OUTOF10: 7
PAINLEVEL_OUTOF10: 2
PAINLEVEL_OUTOF10: 0
PAINLEVEL_OUTOF10: 3
PAINLEVEL_OUTOF10: 7
PAINLEVEL_OUTOF10: 3
PAINLEVEL_OUTOF10: 4

## 2023-08-11 ASSESSMENT — PAIN DESCRIPTION - ORIENTATION: ORIENTATION: LOWER

## 2023-08-12 ENCOUNTER — APPOINTMENT (OUTPATIENT)
Dept: GENERAL RADIOLOGY | Age: 83
DRG: 439 | End: 2023-08-12
Payer: MEDICARE

## 2023-08-12 PROBLEM — L08.9 CHRONIC ABDOMINAL WOUND INFECTION, SEQUELA: Status: ACTIVE | Noted: 2023-08-12

## 2023-08-12 PROBLEM — S31.109S CHRONIC ABDOMINAL WOUND INFECTION, SEQUELA: Status: ACTIVE | Noted: 2023-08-12

## 2023-08-12 PROBLEM — R19.7 DIARRHEA: Status: ACTIVE | Noted: 2023-08-12

## 2023-08-12 LAB
GLUCOSE BLD STRIP.AUTO-MCNC: 100 MG/DL (ref 70–108)
GLUCOSE BLD STRIP.AUTO-MCNC: 115 MG/DL (ref 70–108)
GLUCOSE BLD STRIP.AUTO-MCNC: 127 MG/DL (ref 70–108)
GLUCOSE BLD STRIP.AUTO-MCNC: 94 MG/DL (ref 70–108)
IGG SUBCLASSES: NORMAL

## 2023-08-12 PROCEDURE — 83520 IMMUNOASSAY QUANT NOS NONAB: CPT

## 2023-08-12 PROCEDURE — 87070 CULTURE OTHR SPECIMN AEROBIC: CPT

## 2023-08-12 PROCEDURE — 82948 REAGENT STRIP/BLOOD GLUCOSE: CPT

## 2023-08-12 PROCEDURE — 99232 SBSQ HOSP IP/OBS MODERATE 35: CPT | Performed by: INTERNAL MEDICINE

## 2023-08-12 PROCEDURE — 71045 X-RAY EXAM CHEST 1 VIEW: CPT

## 2023-08-12 PROCEDURE — 87077 CULTURE AEROBIC IDENTIFY: CPT

## 2023-08-12 PROCEDURE — 6370000000 HC RX 637 (ALT 250 FOR IP): Performed by: INTERNAL MEDICINE

## 2023-08-12 PROCEDURE — 87075 CULTR BACTERIA EXCEPT BLOOD: CPT

## 2023-08-12 PROCEDURE — 6370000000 HC RX 637 (ALT 250 FOR IP)

## 2023-08-12 PROCEDURE — 87186 SC STD MICRODIL/AGAR DIL: CPT

## 2023-08-12 PROCEDURE — 2060000000 HC ICU INTERMEDIATE R&B

## 2023-08-12 PROCEDURE — 2580000003 HC RX 258

## 2023-08-12 PROCEDURE — 87205 SMEAR GRAM STAIN: CPT

## 2023-08-12 PROCEDURE — 6360000002 HC RX W HCPCS

## 2023-08-12 PROCEDURE — 6360000002 HC RX W HCPCS: Performed by: INTERNAL MEDICINE

## 2023-08-12 PROCEDURE — 82705 FATS/LIPIDS FECES QUAL: CPT

## 2023-08-12 PROCEDURE — 6360000002 HC RX W HCPCS: Performed by: STUDENT IN AN ORGANIZED HEALTH CARE EDUCATION/TRAINING PROGRAM

## 2023-08-12 RX ORDER — CALCIUM GLUCONATE 20 MG/ML
2000 INJECTION, SOLUTION INTRAVENOUS ONCE
Status: COMPLETED | OUTPATIENT
Start: 2023-08-12 | End: 2023-08-12

## 2023-08-12 RX ORDER — ONDANSETRON 4 MG/1
4 TABLET, ORALLY DISINTEGRATING ORAL
Status: DISCONTINUED | OUTPATIENT
Start: 2023-08-12 | End: 2023-08-14

## 2023-08-12 RX ORDER — PANTOPRAZOLE SODIUM 40 MG/1
40 TABLET, DELAYED RELEASE ORAL
Status: DISCONTINUED | OUTPATIENT
Start: 2023-08-12 | End: 2023-08-15 | Stop reason: HOSPADM

## 2023-08-12 RX ADMIN — CALCIUM POLYCARBOPHIL 625 MG: 625 TABLET, FILM COATED ORAL at 10:01

## 2023-08-12 RX ADMIN — MICONAZOLE NITRATE: 2 POWDER TOPICAL at 10:00

## 2023-08-12 RX ADMIN — PREGABALIN 25 MG: 25 CAPSULE ORAL at 10:01

## 2023-08-12 RX ADMIN — SODIUM CHLORIDE, PRESERVATIVE FREE 10 ML: 5 INJECTION INTRAVENOUS at 10:04

## 2023-08-12 RX ADMIN — SODIUM CHLORIDE, PRESERVATIVE FREE 10 ML: 5 INJECTION INTRAVENOUS at 19:44

## 2023-08-12 RX ADMIN — ONDANSETRON 4 MG: 4 TABLET, ORALLY DISINTEGRATING ORAL at 15:48

## 2023-08-12 RX ADMIN — FLUOCINONIDE: 0.5 CREAM TOPICAL at 19:43

## 2023-08-12 RX ADMIN — ENOXAPARIN SODIUM 40 MG: 100 INJECTION SUBCUTANEOUS at 09:50

## 2023-08-12 RX ADMIN — PANTOPRAZOLE SODIUM 40 MG: 40 TABLET, DELAYED RELEASE ORAL at 12:30

## 2023-08-12 RX ADMIN — ACETAMINOPHEN 650 MG: 325 TABLET ORAL at 19:43

## 2023-08-12 RX ADMIN — CALCIUM GLUCONATE 2000 MG: 20 INJECTION, SOLUTION INTRAVENOUS at 07:06

## 2023-08-12 RX ADMIN — ONDANSETRON 4 MG: 4 TABLET, ORALLY DISINTEGRATING ORAL at 12:30

## 2023-08-12 RX ADMIN — AMLODIPINE BESYLATE 10 MG: 10 TABLET ORAL at 10:03

## 2023-08-12 RX ADMIN — LEVETIRACETAM 500 MG: 500 TABLET, FILM COATED ORAL at 19:43

## 2023-08-12 RX ADMIN — HYDROCODONE BITARTRATE AND ACETAMINOPHEN 5 ML: 5; 217 SOLUTION ORAL at 02:19

## 2023-08-12 RX ADMIN — MICONAZOLE NITRATE: 2 POWDER TOPICAL at 19:43

## 2023-08-12 RX ADMIN — FLUOCINONIDE: 0.5 CREAM TOPICAL at 10:00

## 2023-08-12 RX ADMIN — ONDANSETRON 4 MG: 2 INJECTION INTRAMUSCULAR; INTRAVENOUS at 23:29

## 2023-08-12 RX ADMIN — LEVETIRACETAM 500 MG: 500 TABLET, FILM COATED ORAL at 09:50

## 2023-08-12 RX ADMIN — ONDANSETRON 4 MG: 2 INJECTION INTRAMUSCULAR; INTRAVENOUS at 00:01

## 2023-08-12 ASSESSMENT — PAIN SCALES - WONG BAKER
WONGBAKER_NUMERICALRESPONSE: 2
WONGBAKER_NUMERICALRESPONSE: 4
WONGBAKER_NUMERICALRESPONSE: 2
WONGBAKER_NUMERICALRESPONSE: 4
WONGBAKER_NUMERICALRESPONSE: 4
WONGBAKER_NUMERICALRESPONSE: 2

## 2023-08-12 ASSESSMENT — PAIN SCALES - GENERAL
PAINLEVEL_OUTOF10: 10
PAINLEVEL_OUTOF10: 7
PAINLEVEL_OUTOF10: 7
PAINLEVEL_OUTOF10: 3
PAINLEVEL_OUTOF10: 2
PAINLEVEL_OUTOF10: 3
PAINLEVEL_OUTOF10: 4
PAINLEVEL_OUTOF10: 3

## 2023-08-12 ASSESSMENT — PAIN DESCRIPTION - DESCRIPTORS: DESCRIPTORS: ACHING;DISCOMFORT

## 2023-08-12 ASSESSMENT — PAIN DESCRIPTION - LOCATION: LOCATION: GENERALIZED

## 2023-08-13 LAB
ANION GAP SERPL CALC-SCNC: 13 MEQ/L (ref 8–16)
BUN SERPL-MCNC: 12 MG/DL (ref 7–22)
CA-I BLD ISE-SCNC: 1.18 MMOL/L (ref 1.12–1.32)
CALCIUM SERPL-MCNC: 8.2 MG/DL (ref 8.5–10.5)
CHLORIDE SERPL-SCNC: 111 MEQ/L (ref 98–111)
CO2 SERPL-SCNC: 18 MEQ/L (ref 23–33)
CREAT SERPL-MCNC: 1 MG/DL (ref 0.4–1.2)
GFR SERPL CREATININE-BSD FRML MDRD: 56 ML/MIN/1.73M2
GLUCOSE BLD STRIP.AUTO-MCNC: 120 MG/DL (ref 70–108)
GLUCOSE BLD STRIP.AUTO-MCNC: 126 MG/DL (ref 70–108)
GLUCOSE BLD STRIP.AUTO-MCNC: 150 MG/DL (ref 70–108)
GLUCOSE BLD STRIP.AUTO-MCNC: 95 MG/DL (ref 70–108)
GLUCOSE SERPL-MCNC: 112 MG/DL (ref 70–108)
MAGNESIUM SERPL-MCNC: 1.6 MG/DL (ref 1.6–2.4)
MAGNESIUM SERPL-MCNC: 1.6 MG/DL (ref 1.6–2.4)
PHOSPHATE SERPL-MCNC: 2.6 MG/DL (ref 2.4–4.7)
POTASSIUM SERPL-SCNC: 2.1 MEQ/L (ref 3.5–5.2)
POTASSIUM SERPL-SCNC: 2.8 MEQ/L (ref 3.5–5.2)
POTASSIUM SERPL-SCNC: 2.9 MEQ/L (ref 3.5–5.2)
SODIUM SERPL-SCNC: 142 MEQ/L (ref 135–145)

## 2023-08-13 PROCEDURE — 84132 ASSAY OF SERUM POTASSIUM: CPT

## 2023-08-13 PROCEDURE — 6370000000 HC RX 637 (ALT 250 FOR IP): Performed by: INTERNAL MEDICINE

## 2023-08-13 PROCEDURE — 6370000000 HC RX 637 (ALT 250 FOR IP)

## 2023-08-13 PROCEDURE — 6360000002 HC RX W HCPCS: Performed by: INTERNAL MEDICINE

## 2023-08-13 PROCEDURE — 36415 COLL VENOUS BLD VENIPUNCTURE: CPT

## 2023-08-13 PROCEDURE — 6360000002 HC RX W HCPCS

## 2023-08-13 PROCEDURE — 99233 SBSQ HOSP IP/OBS HIGH 50: CPT | Performed by: INTERNAL MEDICINE

## 2023-08-13 PROCEDURE — 80048 BASIC METABOLIC PNL TOTAL CA: CPT

## 2023-08-13 PROCEDURE — 82948 REAGENT STRIP/BLOOD GLUCOSE: CPT

## 2023-08-13 PROCEDURE — 2580000003 HC RX 258

## 2023-08-13 PROCEDURE — 84100 ASSAY OF PHOSPHORUS: CPT

## 2023-08-13 PROCEDURE — 6360000002 HC RX W HCPCS: Performed by: STUDENT IN AN ORGANIZED HEALTH CARE EDUCATION/TRAINING PROGRAM

## 2023-08-13 PROCEDURE — 83735 ASSAY OF MAGNESIUM: CPT

## 2023-08-13 PROCEDURE — 1200000000 HC SEMI PRIVATE

## 2023-08-13 PROCEDURE — 82330 ASSAY OF CALCIUM: CPT

## 2023-08-13 RX ORDER — PREGABALIN 25 MG/1
25 CAPSULE ORAL 3 TIMES DAILY
Status: DISCONTINUED | OUTPATIENT
Start: 2023-08-13 | End: 2023-08-15 | Stop reason: HOSPADM

## 2023-08-13 RX ORDER — GABAPENTIN 100 MG/1
100 CAPSULE ORAL 2 TIMES DAILY
Status: DISCONTINUED | OUTPATIENT
Start: 2023-08-13 | End: 2023-08-13

## 2023-08-13 RX ORDER — CALCIUM POLYCARBOPHIL 625 MG 625 MG/1
625 TABLET ORAL 2 TIMES DAILY
Status: DISCONTINUED | OUTPATIENT
Start: 2023-08-13 | End: 2023-08-15 | Stop reason: HOSPADM

## 2023-08-13 RX ADMIN — Medication 6 MG: at 20:02

## 2023-08-13 RX ADMIN — FLUOCINONIDE: 0.5 CREAM TOPICAL at 09:45

## 2023-08-13 RX ADMIN — PREGABALIN 25 MG: 25 CAPSULE ORAL at 09:32

## 2023-08-13 RX ADMIN — CALCIUM POLYCARBOPHIL 625 MG: 625 TABLET, FILM COATED ORAL at 20:01

## 2023-08-13 RX ADMIN — ACETAMINOPHEN 650 MG: 325 TABLET ORAL at 19:58

## 2023-08-13 RX ADMIN — POTASSIUM CHLORIDE 10 MEQ: 10 INJECTION, SOLUTION INTRAVENOUS at 09:09

## 2023-08-13 RX ADMIN — POTASSIUM CHLORIDE 10 MEQ: 10 INJECTION, SOLUTION INTRAVENOUS at 08:06

## 2023-08-13 RX ADMIN — LOPERAMIDE HYDROCHLORIDE 2 MG: 2 CAPSULE ORAL at 01:17

## 2023-08-13 RX ADMIN — ACETAMINOPHEN 650 MG: 325 TABLET ORAL at 05:24

## 2023-08-13 RX ADMIN — POTASSIUM CHLORIDE 10 MEQ: 10 INJECTION, SOLUTION INTRAVENOUS at 06:41

## 2023-08-13 RX ADMIN — PREGABALIN 25 MG: 25 CAPSULE ORAL at 15:14

## 2023-08-13 RX ADMIN — POTASSIUM CHLORIDE 10 MEQ: 10 INJECTION, SOLUTION INTRAVENOUS at 21:05

## 2023-08-13 RX ADMIN — AMLODIPINE BESYLATE 10 MG: 10 TABLET ORAL at 09:35

## 2023-08-13 RX ADMIN — PREGABALIN 25 MG: 25 CAPSULE ORAL at 20:11

## 2023-08-13 RX ADMIN — POTASSIUM CHLORIDE 10 MEQ: 10 INJECTION, SOLUTION INTRAVENOUS at 05:45

## 2023-08-13 RX ADMIN — PANTOPRAZOLE SODIUM 40 MG: 40 TABLET, DELAYED RELEASE ORAL at 06:09

## 2023-08-13 RX ADMIN — ONDANSETRON 4 MG: 4 TABLET, ORALLY DISINTEGRATING ORAL at 06:09

## 2023-08-13 RX ADMIN — ENOXAPARIN SODIUM 40 MG: 100 INJECTION SUBCUTANEOUS at 09:30

## 2023-08-13 RX ADMIN — SODIUM CHLORIDE: 9 INJECTION, SOLUTION INTRAVENOUS at 22:41

## 2023-08-13 RX ADMIN — LEVETIRACETAM 500 MG: 500 TABLET, FILM COATED ORAL at 09:35

## 2023-08-13 RX ADMIN — POTASSIUM CHLORIDE 10 MEQ: 10 INJECTION, SOLUTION INTRAVENOUS at 11:25

## 2023-08-13 RX ADMIN — POTASSIUM BICARBONATE 40 MEQ: 391 TABLET, EFFERVESCENT ORAL at 22:12

## 2023-08-13 RX ADMIN — LOPERAMIDE HYDROCHLORIDE 2 MG: 2 CAPSULE ORAL at 09:35

## 2023-08-13 RX ADMIN — POTASSIUM CHLORIDE 10 MEQ: 10 INJECTION, SOLUTION INTRAVENOUS at 23:17

## 2023-08-13 RX ADMIN — MAGNESIUM SULFATE HEPTAHYDRATE 2000 MG: 40 INJECTION, SOLUTION INTRAVENOUS at 20:18

## 2023-08-13 RX ADMIN — CALCIUM POLYCARBOPHIL 625 MG: 625 TABLET, FILM COATED ORAL at 09:36

## 2023-08-13 RX ADMIN — POTASSIUM CHLORIDE 10 MEQ: 10 INJECTION, SOLUTION INTRAVENOUS at 20:17

## 2023-08-13 RX ADMIN — SODIUM CHLORIDE, PRESERVATIVE FREE 10 ML: 5 INJECTION INTRAVENOUS at 20:14

## 2023-08-13 RX ADMIN — POTASSIUM CHLORIDE 10 MEQ: 10 INJECTION, SOLUTION INTRAVENOUS at 22:10

## 2023-08-13 RX ADMIN — FLUOCINONIDE: 0.5 CREAM TOPICAL at 19:59

## 2023-08-13 RX ADMIN — SODIUM CHLORIDE, PRESERVATIVE FREE 10 ML: 5 INJECTION INTRAVENOUS at 09:37

## 2023-08-13 RX ADMIN — SODIUM CHLORIDE, PRESERVATIVE FREE 10 ML: 5 INJECTION INTRAVENOUS at 09:44

## 2023-08-13 RX ADMIN — LEVETIRACETAM 500 MG: 500 TABLET, FILM COATED ORAL at 20:01

## 2023-08-13 RX ADMIN — MICONAZOLE NITRATE: 2 POWDER TOPICAL at 09:34

## 2023-08-13 RX ADMIN — MICONAZOLE NITRATE: 2 POWDER TOPICAL at 20:00

## 2023-08-13 ASSESSMENT — PAIN SCALES - WONG BAKER
WONGBAKER_NUMERICALRESPONSE: 4
WONGBAKER_NUMERICALRESPONSE: 8
WONGBAKER_NUMERICALRESPONSE: 8
WONGBAKER_NUMERICALRESPONSE: 4
WONGBAKER_NUMERICALRESPONSE: 8
WONGBAKER_NUMERICALRESPONSE: 8
WONGBAKER_NUMERICALRESPONSE: 4
WONGBAKER_NUMERICALRESPONSE: 8
WONGBAKER_NUMERICALRESPONSE: 8
WONGBAKER_NUMERICALRESPONSE: 4
WONGBAKER_NUMERICALRESPONSE: 4
WONGBAKER_NUMERICALRESPONSE: 8
WONGBAKER_NUMERICALRESPONSE: 4
WONGBAKER_NUMERICALRESPONSE: 8
WONGBAKER_NUMERICALRESPONSE: 4
WONGBAKER_NUMERICALRESPONSE: 4

## 2023-08-13 ASSESSMENT — PAIN DESCRIPTION - PAIN TYPE: TYPE: CHRONIC PAIN

## 2023-08-13 ASSESSMENT — PAIN DESCRIPTION - DESCRIPTORS
DESCRIPTORS: ACHING;DULL
DESCRIPTORS: ACHING;DISCOMFORT

## 2023-08-13 ASSESSMENT — PAIN DESCRIPTION - FREQUENCY: FREQUENCY: CONTINUOUS

## 2023-08-13 ASSESSMENT — PAIN DESCRIPTION - ONSET: ONSET: ON-GOING

## 2023-08-13 ASSESSMENT — PAIN DESCRIPTION - ORIENTATION: ORIENTATION: MID

## 2023-08-13 ASSESSMENT — PAIN DESCRIPTION - LOCATION
LOCATION: HEAD
LOCATION: GENERALIZED

## 2023-08-13 ASSESSMENT — PAIN SCALES - GENERAL
PAINLEVEL_OUTOF10: 3
PAINLEVEL_OUTOF10: 8
PAINLEVEL_OUTOF10: 0
PAINLEVEL_OUTOF10: 7

## 2023-08-13 ASSESSMENT — PAIN - FUNCTIONAL ASSESSMENT: PAIN_FUNCTIONAL_ASSESSMENT: PREVENTS OR INTERFERES SOME ACTIVE ACTIVITIES AND ADLS

## 2023-08-14 LAB
ALBUMIN SERPL BCG-MCNC: 2 G/DL (ref 3.5–5.1)
ALP SERPL-CCNC: 124 U/L (ref 38–126)
ALT SERPL W/O P-5'-P-CCNC: 12 U/L (ref 11–66)
ANION GAP SERPL CALC-SCNC: 8 MEQ/L (ref 8–16)
AST SERPL-CCNC: 20 U/L (ref 5–40)
BILIRUB SERPL-MCNC: 0.2 MG/DL (ref 0.3–1.2)
BUN SERPL-MCNC: 9 MG/DL (ref 7–22)
CALCIUM SERPL-MCNC: 7.7 MG/DL (ref 8.5–10.5)
CHLORIDE SERPL-SCNC: 111 MEQ/L (ref 98–111)
CO2 SERPL-SCNC: 19 MEQ/L (ref 23–33)
CREAT SERPL-MCNC: 0.9 MG/DL (ref 0.4–1.2)
DEPRECATED RDW RBC AUTO: 49.1 FL (ref 35–45)
ERYTHROCYTE [DISTWIDTH] IN BLOOD BY AUTOMATED COUNT: 15.1 % (ref 11.5–14.5)
GFR SERPL CREATININE-BSD FRML MDRD: > 60 ML/MIN/1.73M2
GLUCOSE BLD STRIP.AUTO-MCNC: 113 MG/DL (ref 70–108)
GLUCOSE BLD STRIP.AUTO-MCNC: 128 MG/DL (ref 70–108)
GLUCOSE SERPL-MCNC: 117 MG/DL (ref 70–108)
HCT VFR BLD AUTO: 29 % (ref 37–47)
HGB BLD-MCNC: 9.2 GM/DL (ref 12–16)
LIPASE SERPL-CCNC: 55.1 U/L (ref 5.6–51.3)
MAGNESIUM SERPL-MCNC: 2 MG/DL (ref 1.6–2.4)
MCH RBC QN AUTO: 28 PG (ref 26–33)
MCHC RBC AUTO-ENTMCNC: 31.7 GM/DL (ref 32.2–35.5)
MCV RBC AUTO: 88.4 FL (ref 81–99)
PLATELET # BLD AUTO: 144 THOU/MM3 (ref 130–400)
PMV BLD AUTO: 9.7 FL (ref 9.4–12.4)
POTASSIUM SERPL-SCNC: 4 MEQ/L (ref 3.5–5.2)
POTASSIUM SERPL-SCNC: 4.3 MEQ/L (ref 3.5–5.2)
PROT SERPL-MCNC: 4.7 G/DL (ref 6.1–8)
RBC # BLD AUTO: 3.28 MILL/MM3 (ref 4.2–5.4)
SODIUM SERPL-SCNC: 138 MEQ/L (ref 135–145)
WBC # BLD AUTO: 4.5 THOU/MM3 (ref 4.8–10.8)

## 2023-08-14 PROCEDURE — 6370000000 HC RX 637 (ALT 250 FOR IP)

## 2023-08-14 PROCEDURE — 36415 COLL VENOUS BLD VENIPUNCTURE: CPT

## 2023-08-14 PROCEDURE — 1200000000 HC SEMI PRIVATE

## 2023-08-14 PROCEDURE — 83690 ASSAY OF LIPASE: CPT

## 2023-08-14 PROCEDURE — 6370000000 HC RX 637 (ALT 250 FOR IP): Performed by: STUDENT IN AN ORGANIZED HEALTH CARE EDUCATION/TRAINING PROGRAM

## 2023-08-14 PROCEDURE — 6370000000 HC RX 637 (ALT 250 FOR IP): Performed by: INTERNAL MEDICINE

## 2023-08-14 PROCEDURE — 83735 ASSAY OF MAGNESIUM: CPT

## 2023-08-14 PROCEDURE — 6360000002 HC RX W HCPCS: Performed by: STUDENT IN AN ORGANIZED HEALTH CARE EDUCATION/TRAINING PROGRAM

## 2023-08-14 PROCEDURE — 84132 ASSAY OF SERUM POTASSIUM: CPT

## 2023-08-14 PROCEDURE — 87507 IADNA-DNA/RNA PROBE TQ 12-25: CPT

## 2023-08-14 PROCEDURE — 2500000003 HC RX 250 WO HCPCS

## 2023-08-14 PROCEDURE — 83993 ASSAY FOR CALPROTECTIN FECAL: CPT

## 2023-08-14 PROCEDURE — 80053 COMPREHEN METABOLIC PANEL: CPT

## 2023-08-14 PROCEDURE — 85027 COMPLETE CBC AUTOMATED: CPT

## 2023-08-14 PROCEDURE — 2580000003 HC RX 258

## 2023-08-14 PROCEDURE — 82948 REAGENT STRIP/BLOOD GLUCOSE: CPT

## 2023-08-14 PROCEDURE — 99233 SBSQ HOSP IP/OBS HIGH 50: CPT | Performed by: INTERNAL MEDICINE

## 2023-08-14 PROCEDURE — 6360000002 HC RX W HCPCS: Performed by: INTERNAL MEDICINE

## 2023-08-14 RX ORDER — SODIUM HYPOCHLORITE 1.25 MG/ML
SOLUTION TOPICAL 2 TIMES DAILY
Status: DISCONTINUED | OUTPATIENT
Start: 2023-08-14 | End: 2023-08-15 | Stop reason: HOSPADM

## 2023-08-14 RX ORDER — GRANULES FOR ORAL 3 G/1
3 POWDER ORAL ONCE
Status: COMPLETED | OUTPATIENT
Start: 2023-08-14 | End: 2023-08-14

## 2023-08-14 RX ORDER — POTASSIUM CHLORIDE 750 MG/1
20 TABLET, FILM COATED, EXTENDED RELEASE ORAL ONCE
Status: COMPLETED | OUTPATIENT
Start: 2023-08-14 | End: 2023-08-14

## 2023-08-14 RX ADMIN — PANTOPRAZOLE SODIUM 40 MG: 40 TABLET, DELAYED RELEASE ORAL at 05:01

## 2023-08-14 RX ADMIN — Medication 6 MG: at 20:42

## 2023-08-14 RX ADMIN — MICONAZOLE NITRATE: 2 POWDER TOPICAL at 13:11

## 2023-08-14 RX ADMIN — ACETAMINOPHEN 650 MG: 325 TABLET ORAL at 05:00

## 2023-08-14 RX ADMIN — POTASSIUM CHLORIDE 10 MEQ: 10 INJECTION, SOLUTION INTRAVENOUS at 01:30

## 2023-08-14 RX ADMIN — ENOXAPARIN SODIUM 40 MG: 100 INJECTION SUBCUTANEOUS at 08:10

## 2023-08-14 RX ADMIN — POTASSIUM CHLORIDE 10 MEQ: 10 INJECTION, SOLUTION INTRAVENOUS at 00:26

## 2023-08-14 RX ADMIN — SODIUM CHLORIDE, PRESERVATIVE FREE 20 ML: 5 INJECTION INTRAVENOUS at 08:10

## 2023-08-14 RX ADMIN — PREGABALIN 25 MG: 25 CAPSULE ORAL at 08:09

## 2023-08-14 RX ADMIN — ONDANSETRON 4 MG: 4 TABLET, ORALLY DISINTEGRATING ORAL at 13:10

## 2023-08-14 RX ADMIN — LEVETIRACETAM 500 MG: 500 TABLET, FILM COATED ORAL at 08:09

## 2023-08-14 RX ADMIN — SODIUM CHLORIDE, PRESERVATIVE FREE 10 ML: 5 INJECTION INTRAVENOUS at 20:43

## 2023-08-14 RX ADMIN — CALCIUM POLYCARBOPHIL 625 MG: 625 TABLET, FILM COATED ORAL at 08:09

## 2023-08-14 RX ADMIN — POTASSIUM BICARBONATE 40 MEQ: 782 TABLET, EFFERVESCENT ORAL at 08:09

## 2023-08-14 RX ADMIN — MICONAZOLE NITRATE: 2 POWDER TOPICAL at 20:42

## 2023-08-14 RX ADMIN — POTASSIUM CHLORIDE 20 MEQ: 750 TABLET, EXTENDED RELEASE ORAL at 18:54

## 2023-08-14 RX ADMIN — DAKIN'S SOLUTION 0.125% (QUARTER STRENGTH): 0.12 SOLUTION at 15:26

## 2023-08-14 RX ADMIN — FLUOCINONIDE: 0.5 CREAM TOPICAL at 08:09

## 2023-08-14 RX ADMIN — AMLODIPINE BESYLATE 10 MG: 10 TABLET ORAL at 08:09

## 2023-08-14 RX ADMIN — GRANULES FOR ORAL SOLUTION 1 PACKET: 3 POWDER ORAL at 15:20

## 2023-08-14 RX ADMIN — LEVETIRACETAM 500 MG: 500 TABLET, FILM COATED ORAL at 20:42

## 2023-08-14 RX ADMIN — ONDANSETRON 4 MG: 4 TABLET, ORALLY DISINTEGRATING ORAL at 05:00

## 2023-08-14 RX ADMIN — FLUOCINONIDE: 0.5 CREAM TOPICAL at 20:42

## 2023-08-14 RX ADMIN — PREGABALIN 25 MG: 25 CAPSULE ORAL at 13:12

## 2023-08-14 RX ADMIN — BISMUTH SUBSALICYLATE 30 ML: 525 LIQUID ORAL at 18:56

## 2023-08-14 RX ADMIN — DAKIN'S SOLUTION 0.125% (QUARTER STRENGTH): 0.12 SOLUTION at 20:43

## 2023-08-14 RX ADMIN — PREGABALIN 25 MG: 25 CAPSULE ORAL at 20:42

## 2023-08-14 ASSESSMENT — PAIN SCALES - GENERAL: PAINLEVEL_OUTOF10: 2

## 2023-08-14 ASSESSMENT — PAIN DESCRIPTION - ONSET: ONSET: ON-GOING

## 2023-08-14 ASSESSMENT — PAIN DESCRIPTION - LOCATION: LOCATION: GENERALIZED

## 2023-08-14 ASSESSMENT — PAIN - FUNCTIONAL ASSESSMENT: PAIN_FUNCTIONAL_ASSESSMENT: ACTIVITIES ARE NOT PREVENTED

## 2023-08-14 ASSESSMENT — PAIN DESCRIPTION - PAIN TYPE: TYPE: CHRONIC PAIN

## 2023-08-14 ASSESSMENT — PAIN DESCRIPTION - DESCRIPTORS: DESCRIPTORS: ACHING;DISCOMFORT

## 2023-08-14 ASSESSMENT — PAIN DESCRIPTION - FREQUENCY: FREQUENCY: CONTINUOUS

## 2023-08-14 NOTE — PALLIATIVE CARE
Follow Up / Progress Note        Patient: Peng Dos Santos  YOB: 1940  Age:  81 y.o. Room:  [address] [place] [address]  MRN:  [id number]         Family/Patient Discussion:  Patient resting in bed with eyes open. Palliative care introduced. Discussion about code status levels and what each level entails. Discussed complications of rib fractures, brain and organ damage associated with resuscitative measures. Patient indicates that she is not sure that she would want resuscitated and she is not sure that she would want placed on a ventilator. Patient shares that she used to want to be a full code but that she has been through a lot and now she is not so sure. Patient states that she feels like everything is \"torture\" at this time. Patient shares that she hates the ECF. Patient states, \"I just would like to get back to where I used to be. \"  When asked what she means by this, patient indicates that she would like to be able to go outside and play with the grandkids. Discussed with patient that healing and getting better takes a lot of effort and internal drive. Discussed that she has been through a lot and that she may not be able to get back to where she used to be. Patient does give this RN consent to call her daughter, Timur Garcia. Much emotional support provided. Patient with lunch tray in front of her and she states that she cannot eat it due to it being cold. Lunch warmed up for the patient. Plan/Follow-Up:  Phone call made to Timur Garcia and palliative care introduced. Shared the conversation that this RN had with her mother and discussed that the patient is indicating that she may no longer desire to be a full code. Timur Garcia shares that she has noticed a change in her mother and that she would be supportive of a code status change if her mother is feeling this way.   Encouraged Timur Jose to have a discussion with the patient and if a change in code status is desired, staff must be

## 2023-08-14 NOTE — CONSULTS
CONSULTATION NOTE :ID       Patient - Farhana Garrison,  Age - 81 y.o.    - 1940      Room Number - 4A-05/005-A   MRN -  [id number]   Acct # - [de-identified]  Date of Admission -  2023  6:31 PM  Patient's PCP: No primary care provider on file. Requesting Physician: Adi Talavera DO    REASON FOR CONSULTATION   Abdominal wound  CHIEF COMPLAINT   Vomiting and abdominal pain. HISTORY OF PRESENT ILLNESS       This is a very pleasant 81 y.o. female who was admitted to the hospital with a chief complaints of abdominal pain. She is on treatment for pancreatites. ID service was asked to see patient due to non healing surgical wound. She had surgery on  at [facility] to remove a mesh. She has a non healing wound at the mid abdomen. I was asked to assist with treatment, she denies any fever or chills. She came from the nursing home, has adenocarcinoma of the sinues, OA and neuropathy. he wound swab shows few neutrophils ,has multiple bacteria, she is diabetic    PAST MEDICAL  HISTORY       Past Medical History:   Diagnosis Date    Cancer [facility])     adenocarcinoma of her sinuses    Cataract of both eyes     COVID-19     DDD (degenerative disc disease), lumbar     DM2 (diabetes mellitus, type 2) ([address])     diagnoses approx 2000    Dysphagia, pharyngoesophageal 2016    Eczema 2018    Fibrocystic breast     H/O ventral hernia repair     Headache 2017    History of COVID-19 2020    Hyperlipidemia     Hypertension     Iron deficiency anemia     LPRD (laryngopharyngeal reflux disease) 2016    Neuropathy     peripheral    Obesity     Orbital abscess     Orbital cellulitis 2014    SWAPNA (obstructive sleep apnea)     Osteoarthritis     Osteoporosis     Persistent proteinuria associated with type 2 diabetes mellitus ([address]) 2017    urine micral of 50.       Sinusitis     Velopharyngeal incompetence 2016       PAST SURGICAL HISTORY     Past Surgical History:

## 2023-08-14 NOTE — PALLIATIVE CARE
Initial Evaluation        Patient: Alva Mederos  YOB: 1940  Age:  81 y.o. Room:  [address] [place] [address]  MRN:  [id number]   Acct: [de-identified]    Date of Admission:  8/8/2023  6:31 PM  Date of Service:  8/14/2023  Completed By:  Adi Lazo RN                 Reason for Palliative Care Evaluation:-               [x] Code Status Discussion              [x] Goals of Care              [] Pain/Symptom Management               [] Emotional Support              [] Other:                    Current Issues:- patient resting quietly with eyes closed  []  Pain  []  Fatigue  []  Nausea  []  Anxiety  []  Depression  []  Shortness of Breath  []  Constipation  []  Appetite  []  Other:              Advance Directives:-  none on file  [] [place] DNR Form  [] Living Will  [] Medical POA              Current Code Status:-     [x] Full Resuscitation  [] DNR-Comfort Care-Arrest  [] DNR-Comfort Care       [] Limited Resuscitation             [] No CPR            [] No shock            [] No ET intubation/reintubation            [] No resuscitative medications            [] Other limitation:               Palliative Performance Status:-      [] 100%  Full ambulation; normal activity and work; no evidence of disease; able to do own self care; normal intake; fully conscious     [] 90%   Full ambulation; normal activity and work; some evidence of disease; able to do own self care; normal intake; fully conscious    [] 80%   Full ambulation; normal activity with effort; some evidence of disease; able to do own self care; normal or reduced intake; fully conscious    [] 70%  Ambulation reduced; unable to perform normal job/work; significant  disease; able to do own self care; normal or reduced intake; fully conscious    [] 60%  Ambulation reduced; Significant disease;Can't do hobbies/housework; intake normal or reduced; occasional assist; LOC full/confusion    [x] 50%  Mainly sit/lie;  Extensive disease; Can't do any work;

## 2023-08-15 VITALS
SYSTOLIC BLOOD PRESSURE: 125 MMHG | HEART RATE: 77 BPM | WEIGHT: 155.65 LBS | DIASTOLIC BLOOD PRESSURE: 70 MMHG | OXYGEN SATURATION: 99 % | BODY MASS INDEX: 30.56 KG/M2 | RESPIRATION RATE: 18 BRPM | HEIGHT: 60 IN | TEMPERATURE: 97.5 F

## 2023-08-15 LAB
ANION GAP SERPL CALC-SCNC: 10 MEQ/L (ref 8–16)
BACTERIA SPEC AEROBE CULT: ABNORMAL
BACTERIA SPEC AEROBE CULT: ABNORMAL
BACTERIA SPEC ANAEROBE CULT: ABNORMAL
BUN SERPL-MCNC: 10 MG/DL (ref 7–22)
C CAYETANENSIS DNA SPEC QL NAA+PROBE: NOT DETECTED
CALCIUM SERPL-MCNC: 8.2 MG/DL (ref 8.5–10.5)
CAMPY SP DNA.DIARRHEA STL QL NAA+PROBE: NOT DETECTED
CHLORIDE SERPL-SCNC: 109 MEQ/L (ref 98–111)
CO2 SERPL-SCNC: 19 MEQ/L (ref 23–33)
CREAT SERPL-MCNC: 1 MG/DL (ref 0.4–1.2)
CRYPTOSP DNA SPEC QL NAA+PROBE: NOT DETECTED
E COLI O157H7 DNA SPEC QL NAA+PROBE: NORMAL
E HISTOLYT DNA SPEC QL NAA+PROBE: NOT DETECTED
EAEC PAA PLAS AGGR+AATA ST NAA+NON-PRB: NOT DETECTED
EC STX1+STX2 + H7 FLIC SPEC NAA+PROBE: NOT DETECTED
EPEC EAE GENE STL QL NAA+NON-PROBE: NOT DETECTED
ETEC LTA+ST1A+ST1B TOX ST NAA+NON-PROBE: NOT DETECTED
G LAMBLIA DNA SPEC QL NAA+PROBE: NOT DETECTED
GFR SERPL CREATININE-BSD FRML MDRD: 56 ML/MIN/1.73M2
GLUCOSE BLD STRIP.AUTO-MCNC: 101 MG/DL (ref 70–108)
GLUCOSE BLD STRIP.AUTO-MCNC: 134 MG/DL (ref 70–108)
GLUCOSE SERPL-MCNC: 113 MG/DL (ref 70–108)
GRAM STN SPEC: ABNORMAL
HADV DNA SPEC QL NAA+PROBE: NOT DETECTED
HASTV RNA SPEC QL NAA+PROBE: NOT DETECTED
MAGNESIUM SERPL-MCNC: 1.8 MG/DL (ref 1.6–2.4)
NOROVIRUS GI + GII RNA STL NAA+PROBE: NOT DETECTED
ORGANISM: ABNORMAL
P SHIGELLOIDES DNA STL QL NAA+PROBE: NOT DETECTED
POTASSIUM SERPL-SCNC: 3.7 MEQ/L (ref 3.5–5.2)
RV RNA SPEC QL NAA+PROBE: NOT DETECTED
SALMONELLA DNA SPEC QL NAA+PROBE: NOT DETECTED
SAPOVIRUS RNA SPEC QL NAA+PROBE: NOT DETECTED
SHIGELLA SP+EIEC IPAH ST NAA+NON-PROBE: NOT DETECTED
SODIUM SERPL-SCNC: 138 MEQ/L (ref 135–145)
V CHOLERAE DNA SPEC QL NAA+PROBE: NOT DETECTED
VIBRIO DNA SPEC NAA+PROBE: NOT DETECTED
Y ENTERO RECN STL QL NAA+PROBE: NOT DETECTED

## 2023-08-15 PROCEDURE — 36415 COLL VENOUS BLD VENIPUNCTURE: CPT

## 2023-08-15 PROCEDURE — 6370000000 HC RX 637 (ALT 250 FOR IP)

## 2023-08-15 PROCEDURE — 80048 BASIC METABOLIC PNL TOTAL CA: CPT

## 2023-08-15 PROCEDURE — 83735 ASSAY OF MAGNESIUM: CPT

## 2023-08-15 PROCEDURE — 2500000003 HC RX 250 WO HCPCS

## 2023-08-15 PROCEDURE — 6370000000 HC RX 637 (ALT 250 FOR IP): Performed by: INTERNAL MEDICINE

## 2023-08-15 PROCEDURE — 99239 HOSP IP/OBS DSCHRG MGMT >30: CPT | Performed by: INTERNAL MEDICINE

## 2023-08-15 PROCEDURE — 82948 REAGENT STRIP/BLOOD GLUCOSE: CPT

## 2023-08-15 PROCEDURE — 6360000002 HC RX W HCPCS: Performed by: STUDENT IN AN ORGANIZED HEALTH CARE EDUCATION/TRAINING PROGRAM

## 2023-08-15 PROCEDURE — 2580000003 HC RX 258

## 2023-08-15 RX ORDER — LOPERAMIDE HYDROCHLORIDE 2 MG/1
2 CAPSULE ORAL 4 TIMES DAILY PRN
Qty: 30 CAPSULE | Refills: 0 | DISCHARGE
Start: 2023-08-15 | End: 2023-08-25

## 2023-08-15 RX ORDER — FERROUS SULFATE 325(65) MG
325 TABLET ORAL
Qty: 30 TABLET | Refills: 3 | DISCHARGE
Start: 2023-08-15

## 2023-08-15 RX ORDER — PREGABALIN 25 MG/1
25 CAPSULE ORAL 3 TIMES DAILY
Qty: 90 CAPSULE | Refills: 0 | Status: SHIPPED | DISCHARGE
Start: 2023-08-15 | End: 2023-09-14

## 2023-08-15 RX ORDER — CHOLESTYRAMINE LIGHT 4 G/5.7G
4 POWDER, FOR SUSPENSION ORAL 2 TIMES DAILY WITH MEALS
Status: DISCONTINUED | OUTPATIENT
Start: 2023-08-15 | End: 2023-08-15 | Stop reason: HOSPADM

## 2023-08-15 RX ORDER — ONDANSETRON 4 MG/1
4 TABLET, ORALLY DISINTEGRATING ORAL EVERY 8 HOURS PRN
Qty: 12 TABLET | Refills: 0 | DISCHARGE
Start: 2023-08-15 | End: 2023-08-20

## 2023-08-15 RX ORDER — POTASSIUM CHLORIDE 750 MG/1
20 TABLET, FILM COATED, EXTENDED RELEASE ORAL DAILY
Status: DISCONTINUED | OUTPATIENT
Start: 2023-08-15 | End: 2023-08-15 | Stop reason: HOSPADM

## 2023-08-15 RX ORDER — AMLODIPINE BESYLATE 5 MG/1
5 TABLET ORAL DAILY
Qty: 30 TABLET | Refills: 1 | DISCHARGE
Start: 2023-08-15

## 2023-08-15 RX ORDER — CHOLESTYRAMINE LIGHT 4 G/5.7G
4 POWDER, FOR SUSPENSION ORAL 2 TIMES DAILY
Status: DISCONTINUED | OUTPATIENT
Start: 2023-08-15 | End: 2023-08-15

## 2023-08-15 RX ORDER — SODIUM HYPOCHLORITE 1.25 MG/ML
SOLUTION TOPICAL 2 TIMES DAILY
Qty: 1 EACH | Refills: 0 | DISCHARGE
Start: 2023-08-15

## 2023-08-15 RX ORDER — LEVETIRACETAM 1000 MG/1
500 TABLET ORAL 2 TIMES DAILY
Qty: 30 TABLET | Refills: 1 | DISCHARGE
Start: 2023-08-15

## 2023-08-15 RX ORDER — ACETAMINOPHEN 325 MG/1
650 TABLET ORAL EVERY 4 HOURS PRN
Qty: 120 TABLET | Refills: 3 | DISCHARGE
Start: 2023-08-15

## 2023-08-15 RX ORDER — POLYETHYLENE GLYCOL 3350 17 G/17G
17 POWDER, FOR SOLUTION ORAL DAILY PRN
Qty: 527 G | Refills: 1 | DISCHARGE
Start: 2023-08-15 | End: 2023-09-14

## 2023-08-15 RX ORDER — CHOLESTYRAMINE LIGHT 4 G/5.7G
4 POWDER, FOR SUSPENSION ORAL 2 TIMES DAILY WITH MEALS
Qty: 60 PACKET | Refills: 3 | DISCHARGE
Start: 2023-08-15

## 2023-08-15 RX ORDER — AMLODIPINE BESYLATE 5 MG/1
5 TABLET ORAL DAILY
Status: DISCONTINUED | OUTPATIENT
Start: 2023-08-15 | End: 2023-08-15 | Stop reason: HOSPADM

## 2023-08-15 RX ADMIN — ENOXAPARIN SODIUM 40 MG: 100 INJECTION SUBCUTANEOUS at 08:21

## 2023-08-15 RX ADMIN — CHOLESTYRAMINE 4 G: 4 POWDER, FOR SUSPENSION ORAL at 08:34

## 2023-08-15 RX ADMIN — ONDANSETRON 4 MG: 4 TABLET, ORALLY DISINTEGRATING ORAL at 08:12

## 2023-08-15 RX ADMIN — MICONAZOLE NITRATE: 2 POWDER TOPICAL at 11:14

## 2023-08-15 RX ADMIN — LEVETIRACETAM 500 MG: 500 TABLET, FILM COATED ORAL at 08:22

## 2023-08-15 RX ADMIN — ACETAMINOPHEN 650 MG: 325 TABLET ORAL at 08:12

## 2023-08-15 RX ADMIN — AMLODIPINE BESYLATE 5 MG: 10 TABLET ORAL at 08:20

## 2023-08-15 RX ADMIN — SODIUM CHLORIDE, PRESERVATIVE FREE 10 ML: 5 INJECTION INTRAVENOUS at 08:33

## 2023-08-15 RX ADMIN — PREGABALIN 25 MG: 25 CAPSULE ORAL at 08:22

## 2023-08-15 RX ADMIN — DAKIN'S SOLUTION 0.125% (QUARTER STRENGTH): 0.12 SOLUTION at 08:32

## 2023-08-15 RX ADMIN — PANTOPRAZOLE SODIUM 40 MG: 40 TABLET, DELAYED RELEASE ORAL at 05:32

## 2023-08-15 RX ADMIN — PREGABALIN 25 MG: 25 CAPSULE ORAL at 13:11

## 2023-08-15 RX ADMIN — POTASSIUM CHLORIDE 20 MEQ: 750 TABLET, EXTENDED RELEASE ORAL at 08:12

## 2023-08-15 RX ADMIN — FLUOCINONIDE: 0.5 CREAM TOPICAL at 08:22

## 2023-08-15 ASSESSMENT — PAIN SCALES - GENERAL
PAINLEVEL_OUTOF10: 0
PAINLEVEL_OUTOF10: 3

## 2023-08-15 ASSESSMENT — PAIN DESCRIPTION - LOCATION: LOCATION: ABDOMEN

## 2023-08-15 NOTE — DISCHARGE SUMMARY
the prior study. This document has been electronically signed by: Karyna Ybarra DO    on 08/12/2023 06:39 AM      MRI ABDOMEN WO CONTRAST MRCP   Final Result   1. Peripancreatic inflammatory changes are noted relating to acute pancreatitis. No loculated peripancreatic collection. 2. Multiple T2 hyperintense lesions are seen in both kidneys that are not well characterized on the unenhanced exam but statistically can represent cysts. 3. A 2 x 2.6 cm complex cystic lesion is seen in the midpole of the right kidney. 4. Small ascites. 5. Other findings as described above. **This report has been created using voice recognition software. It may contain minor errors which are inherent in voice recognition technology. **      Final report electronically signed by Dr Lyle Chawla on 8/9/2023 5:17 PM      [address] [place] [address]? LIVER, PANCREAS   Final Result   1. Reported cholecystectomy. Distal common bile duct is dilated measuring    1.0 cm likely related to prior cholecystectomy. 2. Nonspecific increased echogenicity of the pancreatic parenchyma. 3. Borderline hepatomegaly, the liver measures 20.4 cm. This document has been electronically signed by: Sanna James DO on    08/09/2023 03:42 AM      CT ABDOMEN PELVIS WO CONTRAST Additional Contrast? None   Final Result   1. Small amount of stranding near the pancreatic tail can be correlated    with serum lipase levels to exclude a mild pancreatitis. 2. Interval resolution of the previous abscess involving the repair mesh    in the anterior lower abdominal/pelvic wall. 3. Colonic diverticula.    4. Possible right renal caliceal diverticulum containing layering stones    or retained contrast.      This document has been electronically signed by: Evelia Nolasco MD on    08/08/2023 08:32 PM      All CTs at this facility use dose modulation techniques and iterative    reconstructions, and/or weight-based dosing   when

## 2023-08-15 NOTE — PLAN OF CARE
Consult received. Please see SW note dated 8/9/2023.
Problem: Discharge Planning  Goal: Discharge to home or other facility with appropriate resources  8/10/2023 2112 by Araceli Alanis RN  Outcome: Progressing  Flowsheets (Taken 8/10/2023 2112)  Discharge to home or other facility with appropriate resources:   Identify barriers to discharge with patient and caregiver   Arrange for needed discharge resources and transportation as appropriate   Identify discharge learning needs (meds, wound care, etc)   Refer to discharge planning if patient needs post-hospital services based on physician order or complex needs related to functional status, cognitive ability or social support system     Problem: Pain  Goal: Verbalizes/displays adequate comfort level or baseline comfort level  8/10/2023 2112 by Araceli Alanis RN  Outcome: Progressing  Flowsheets (Taken 8/10/2023 2112)  Verbalizes/displays adequate comfort level or baseline comfort level:   Encourage patient to monitor pain and request assistance   Assess pain using appropriate pain scale   Administer analgesics based on type and severity of pain and evaluate response   Implement non-pharmacological measures as appropriate and evaluate response     Problem: Safety - Adult  Goal: Free from fall injury  8/10/2023 2112 by Araceli Alanis RN  Outcome: Progressing  Note: Patient remains free from falls this shift. Fall precautions in place with bed/chair exit alarmed. Fall sign posted and fall armband in place. Nonskid footwear used with transferring. Educated patient to use call light when in need of staff assistance with transferring, ambulating, and other activities of daily living. Patient appropriately uses call light this shift. Problem: Safety - Adult  Goal: Isolation precautions  Description: Isolation precautions  8/10/2023 2112 by Araceli Alanis RN  Outcome: Progressing  Note: Contact precautions for VRE and MRSA     Problem: Skin/Tissue Integrity  Goal: Absence of new skin breakdown  Description: 1.
Problem: Discharge Planning  Goal: Discharge to home or other facility with appropriate resources  8/12/2023 1128 by Harrison Stapleton RN  Outcome: Progressing  8/11/2023 2305 by Vicki George RN  Outcome: Progressing  Flowsheets (Taken 8/11/2023 2305)  Discharge to home or other facility with appropriate resources:   Identify barriers to discharge with patient and caregiver   Arrange for needed discharge resources and transportation as appropriate   Identify discharge learning needs (meds, wound care, etc)   Refer to discharge planning if patient needs post-hospital services based on physician order or complex needs related to functional status, cognitive ability or social support system     Problem: Pain  Goal: Verbalizes/displays adequate comfort level or baseline comfort level  8/12/2023 1128 by Harrison Stapleton RN  Outcome: Progressing  Flowsheets (Taken 8/11/2023 2305 by Vicki George RN)  Verbalizes/displays adequate comfort level or baseline comfort level:   Encourage patient to monitor pain and request assistance   Assess pain using appropriate pain scale   Administer analgesics based on type and severity of pain and evaluate response   Implement non-pharmacological measures as appropriate and evaluate response   Consider cultural and social influences on pain and pain management   Notify Licensed Independent Practitioner if interventions unsuccessful or patient reports new pain  8/11/2023 2305 by Vicki George RN  Outcome: Progressing  Flowsheets (Taken 8/11/2023 2305)  Verbalizes/displays adequate comfort level or baseline comfort level:   Encourage patient to monitor pain and request assistance   Assess pain using appropriate pain scale   Administer analgesics based on type and severity of pain and evaluate response   Implement non-pharmacological measures as appropriate and evaluate response   Consider cultural and social influences on pain and pain management   Notify Licensed
Problem: Discharge Planning  Goal: Discharge to home or other facility with appropriate resources  8/13/2023 2246 by Jayne Encinas RN  Outcome: Progressing  Flowsheets (Taken 8/13/2023 2246)  Discharge to home or other facility with appropriate resources:   Identify barriers to discharge with patient and caregiver   Identify discharge learning needs (meds, wound care, etc)     Problem: Pain  Goal: Verbalizes/displays adequate comfort level or baseline comfort level  8/13/2023 2246 by Jayne Encinas RN  Outcome: Progressing  Flowsheets (Taken 8/13/2023 2246)  Verbalizes/displays adequate comfort level or baseline comfort level:   Encourage patient to monitor pain and request assistance   Assess pain using appropriate pain scale   Administer analgesics based on type and severity of pain and evaluate response   Implement non-pharmacological measures as appropriate and evaluate response   Consider cultural and social influences on pain and pain management     Problem: Safety - Adult  Goal: Free from fall injury  8/13/2023 2246 by Jayne Encinas RN  Outcome: Progressing  Flowsheets (Taken 8/13/2023 2246)  Free From Fall Injury: Instruct family/caregiver on patient safety  Note: Patient educated on and encouraged to use the call light for assistance from staff with needs. Patient verbalizes an understanding of this. Bed wheels locked and bed in lowest position; bed alarm on. Patient possessions within reach; pathways clear at this time. Problem: Safety - Adult  Goal: Isolation precautions  Description: Isolation precautions  8/13/2023 2246 by Jayne Encinas RN  Outcome: Progressing     Problem: Skin/Tissue Integrity  Goal: Absence of new skin breakdown  Description: 1. Monitor for areas of redness and/or skin breakdown  2. Assess vascular access sites hourly  3. Every 4-6 hours minimum:  Change oxygen saturation probe site  4.   Every 4-6 hours:  If on nasal continuous positive airway pressure, respiratory
Problem: Discharge Planning  Goal: Discharge to home or other facility with appropriate resources  8/9/2023 1813 by Santosh Cervantes  Outcome: Progressing  Flowsheets (Taken 8/9/2023 1724)  Discharge to home or other facility with appropriate resources:   Identify barriers to discharge with patient and caregiver   Arrange for needed discharge resources and transportation as appropriate   Identify discharge learning needs (meds, wound care, etc)   Refer to discharge planning if patient needs post-hospital services based on physician order or complex needs related to functional status, cognitive ability or social support system   Arrange for interpreters to assist at discharge as needed  8/9/2023 1723 by Santosh Cervantes  Outcome: Progressing  8/9/2023 1117 by DINORAH Quiles  Outcome: Progressing     Problem: Pain  Goal: Verbalizes/displays adequate comfort level or baseline comfort level  8/9/2023 1813 by Santosh Cervantes  Outcome: Progressing  Flowsheets (Taken 8/9/2023 1724)  Verbalizes/displays adequate comfort level or baseline comfort level:   Encourage patient to monitor pain and request assistance   Assess pain using appropriate pain scale   Administer analgesics based on type and severity of pain and evaluate response   Implement non-pharmacological measures as appropriate and evaluate response  8/9/2023 1723 by Santosh Cervantes  Outcome: Progressing     Problem: Safety - Adult  Goal: Free from fall injury  8/9/2023 1813 by Santosh Cervantes  Outcome: Progressing  Flowsheets (Taken 8/9/2023 1724)  Free From Fall Injury: Instruct family/caregiver on patient safety  8/9/2023 1723 by Santosh Cervantes  Outcome: Progressing  Goal: Isolation precautions  Description: Isolation precautions  Outcome: Progressing  Note: Continue contact precautions     Problem: Skin/Tissue Integrity  Goal: Absence of new skin breakdown  Description: 1. Monitor for areas of redness and/or skin breakdown  2.
Problem: Discharge Planning  Goal: Discharge to home or other facility with appropriate resources  Outcome: Progressing     Problem: Pain  Goal: Verbalizes/displays adequate comfort level or baseline comfort level  Outcome: Progressing     Problem: Safety - Adult  Goal: Free from fall injury  Outcome: Progressing  Goal: Isolation precautions  Description: Isolation precautions  Outcome: Progressing     Problem: Skin/Tissue Integrity  Goal: Absence of new skin breakdown  Description: 1. Monitor for areas of redness and/or skin breakdown  2. Assess vascular access sites hourly  3. Every 4-6 hours minimum:  Change oxygen saturation probe site  4. Every 4-6 hours:  If on nasal continuous positive airway pressure, respiratory therapy assess nares and determine need for appliance change or resting period.   Outcome: Progressing     Problem: Cardiovascular - Adult  Goal: Maintains optimal cardiac output and hemodynamic stability  Outcome: Progressing  Goal: Absence of cardiac dysrhythmias or at baseline  Outcome: Progressing     Problem: Gastrointestinal - Adult  Goal: Maintains or returns to baseline bowel function  Outcome: Progressing  Goal: Maintains adequate nutritional intake  Outcome: Progressing     Problem: Infection - Adult  Goal: Absence of infection at discharge  Outcome: Progressing  Goal: Absence of infection during hospitalization  Outcome: Progressing     Problem: Metabolic/Fluid and Electrolytes - Adult  Goal: Electrolytes maintained within normal limits  Outcome: Progressing  Goal: Hemodynamic stability and optimal renal function maintained  Outcome: Progressing  Goal: Glucose maintained within prescribed range  Outcome: Progressing     Problem: Chronic Conditions and Co-morbidities  Goal: Patient's chronic conditions and co-morbidity symptoms are monitored and maintained or improved  Outcome: Progressing
Problem: Discharge Planning  Goal: Discharge to home or other facility with appropriate resources  Outcome: Progressing  Flowsheets   Discharge to home or other facility with appropriate resources:   Identify barriers to discharge with patient and caregiver   Identify discharge learning needs (meds, wound care, etc)     Problem: Pain  Goal: Verbalizes/displays adequate comfort level or baseline comfort level  Outcome: Progressing  Flowsheets   Verbalizes/displays adequate comfort level or baseline comfort level:   Encourage patient to monitor pain and request assistance   Assess pain using appropriate pain scale   Administer analgesics based on type and severity of pain and evaluate response   Implement non-pharmacological measures as appropriate and evaluate response   Consider cultural and social influences on pain and pain management     Problem: Safety - Adult  Goal: Free from fall injury  Outcome: Progressing  Flowsheets   Free From Fall Injury: Instruct family/caregiver on patient safety  Note: Patient educated on and encouraged to use the call light for assistance from staff with needs. Patient verbalizes an understanding of this. Bed wheels locked and bed in lowest position; bed alarm on. Patient possessions within reach; pathways clear at this time. Problem: Safety - Adult  Goal: Isolation precautions  Description: Isolation precautions  Outcome: Progressing     Problem: Skin/Tissue Integrity  Goal: Absence of new skin breakdown  Description: 1. Monitor for areas of redness and/or skin breakdown  2. Assess vascular access sites hourly  3. Every 4-6 hours minimum:  Change oxygen saturation probe site  4. Every 4-6 hours:  If on nasal continuous positive airway pressure, respiratory therapy assess nares and determine need for appliance change or resting period. Outcome: Progressing  Note: No signs and/or symptoms of infection noted during this shift. Patient afebrile during this shift.  No new skin
Problem: Discharge Planning  Goal: Discharge to home or other facility with appropriate resources  Outcome: Progressing  Flowsheets (Taken 8/11/2023 2305)  Discharge to home or other facility with appropriate resources:   Identify barriers to discharge with patient and caregiver   Arrange for needed discharge resources and transportation as appropriate   Identify discharge learning needs (meds, wound care, etc)   Refer to discharge planning if patient needs post-hospital services based on physician order or complex needs related to functional status, cognitive ability or social support system     Problem: Pain  Goal: Verbalizes/displays adequate comfort level or baseline comfort level  Outcome: Progressing  Flowsheets (Taken 8/11/2023 2305)  Verbalizes/displays adequate comfort level or baseline comfort level:   Encourage patient to monitor pain and request assistance   Assess pain using appropriate pain scale   Administer analgesics based on type and severity of pain and evaluate response   Implement non-pharmacological measures as appropriate and evaluate response   Consider cultural and social influences on pain and pain management   Notify Licensed Independent Practitioner if interventions unsuccessful or patient reports new pain     Problem: Safety - Adult  Goal: Free from fall injury  Outcome: Progressing  Flowsheets (Taken 8/11/2023 2305)  Free From Fall Injury: Instruct family/caregiver on patient safety     Problem: Safety - Adult  Goal: Isolation precautions  Description: Isolation precautions  Outcome: Progressing     Problem: Skin/Tissue Integrity  Goal: Absence of new skin breakdown  Description: 1. Monitor for areas of redness and/or skin breakdown  2. Assess vascular access sites hourly  3. Every 4-6 hours minimum:  Change oxygen saturation probe site  4.   Every 4-6 hours:  If on nasal continuous positive airway pressure, respiratory therapy assess nares and determine need for appliance change or
Problem: Discharge Planning  Goal: Discharge to home or other facility with appropriate resources  Outcome: Progressing  Flowsheets (Taken 8/13/2023 2246 by Mendez Hernández, RN)  Discharge to home or other facility with appropriate resources:   Identify barriers to discharge with patient and caregiver   Identify discharge learning needs (meds, wound care, etc)     Problem: Pain  Goal: Verbalizes/displays adequate comfort level or baseline comfort level  Outcome: Progressing  Flowsheets (Taken 8/13/2023 2246 by Mendez Hernández, RN)  Verbalizes/displays adequate comfort level or baseline comfort level:   Encourage patient to monitor pain and request assistance   Assess pain using appropriate pain scale   Administer analgesics based on type and severity of pain and evaluate response   Implement non-pharmacological measures as appropriate and evaluate response   Consider cultural and social influences on pain and pain management     Problem: Safety - Adult  Goal: Free from fall injury  Outcome: Progressing  Flowsheets (Taken 8/13/2023 2246 by Mendez Hernández RN)  Free From Fall Injury: Instruct family/caregiver on patient safety     Problem: Safety - Adult  Goal: Isolation precautions  Description: Isolation precautions  Outcome: Progressing  Note: Continue contact precautions     Problem: Skin/Tissue Integrity  Goal: Absence of new skin breakdown  Description: 1. Monitor for areas of redness and/or skin breakdown  2. Assess vascular access sites hourly  3. Every 4-6 hours minimum:  Change oxygen saturation probe site  4. Every 4-6 hours:  If on nasal continuous positive airway pressure, respiratory therapy assess nares and determine need for appliance change or resting period. Outcome: Progressing  Note: No signs of new skin breakdown. Skin warm, dry, and intact. Mucous membranes pink and moist.  Assistance with turns/ambulation provided q2hr and PRN. Will continue to monitor.       Problem: Cardiovascular -
resting period.   Outcome: Progressing     Problem: Cardiovascular - Adult  Goal: Maintains optimal cardiac output and hemodynamic stability  Outcome: Progressing  Flowsheets (Taken 8/12/2023 2213)  Maintains optimal cardiac output and hemodynamic stability:   Monitor blood pressure and heart rate   Monitor urine output and notify Licensed Independent Practitioner for values outside of normal range   Assess for signs of decreased cardiac output     Problem: Cardiovascular - Adult  Goal: Absence of cardiac dysrhythmias or at baseline  Outcome: Progressing  Flowsheets (Taken 8/12/2023 2213)  Absence of cardiac dysrhythmias or at baseline:   Monitor cardiac rate and rhythm   Assess for signs of decreased cardiac output     Problem: Gastrointestinal - Adult  Goal: Maintains or returns to baseline bowel function  Outcome: Progressing  Flowsheets (Taken 8/12/2023 2213)  Maintains or returns to baseline bowel function:   Assess bowel function   Encourage oral fluids to ensure adequate hydration   Administer IV fluids as ordered to ensure adequate hydration   Administer ordered medications as needed   Encourage mobilization and activity     Problem: Gastrointestinal - Adult  Goal: Maintains adequate nutritional intake  Outcome: Progressing  Flowsheets (Taken 8/12/2023 2213)  Maintains adequate nutritional intake:   Monitor percentage of each meal consumed   Identify factors contributing to decreased intake, treat as appropriate   Assist with meals as needed     Problem: Infection - Adult  Goal: Absence of infection at discharge  Outcome: Progressing  Flowsheets (Taken 8/12/2023 2213)  Absence of infection at discharge:   Assess and monitor for signs and symptoms of infection   Monitor lab/diagnostic results   Monitor all insertion sites i.e., indwelling lines, tubes and drains   Administer medications as ordered   Instruct and encourage patient and family to use good hand hygiene technique   Identify and instruct in
RN  Outcome: Completed  8/15/2023 0952 by Marino Giraldo RN  Outcome: Progressing  Flowsheets (Taken 8/13/2023 2246 by Agustin Canales RN)  Absence of infection at discharge:   Assess and monitor for signs and symptoms of infection   Monitor lab/diagnostic results   Monitor all insertion sites i.e., indwelling lines, tubes and drains   Monitor endotracheal (as able) and nasal secretions for changes in amount and color   [place] appropriate cooling/warming therapies per order   Administer medications as ordered   Identify and instruct in appropriate isolation precautions for identified infection/condition   Instruct and encourage patient and family to use good hand hygiene technique  Goal: Absence of infection during hospitalization  8/15/2023 1324 by Marino Giraldo RN  Outcome: Completed  8/15/2023 0952 by Marino Giraldo RN  Outcome: Progressing  Flowsheets (Taken 8/13/2023 2246 by Agustin Canales RN)  Absence of infection during hospitalization:   Assess and monitor for signs and symptoms of infection   Monitor lab/diagnostic results   Monitor all insertion sites i.e., indwelling lines, tubes and drains   Monitor endotracheal (as able) and nasal secretions for changes in amount and color   [place] appropriate cooling/warming therapies per order   Administer medications as ordered   Instruct and encourage patient and family to use good hand hygiene technique   Identify and instruct in appropriate isolation precautions for identified infection/condition     Problem: Metabolic/Fluid and Electrolytes - Adult  Goal: Electrolytes maintained within normal limits  8/15/2023 1324 by Marino Giraldo RN  Outcome: Completed  8/15/2023 0952 by Marino Giraldo RN  Outcome: Progressing  Flowsheets (Taken 8/13/2023 2246 by Agustin Canales RN)  Electrolytes maintained within normal limits:   Monitor labs and assess patient for signs and symptoms of electrolyte imbalances   Administer electrolyte replacement as ordered
infection during hospitalization:   Monitor lab/diagnostic results   Assess and monitor for signs and symptoms of infection     Problem: Metabolic/Fluid and Electrolytes - Adult  Goal: Electrolytes maintained within normal limits  8/9/2023 2152 by Adarsh Macedo RN  Outcome: Progressing  Flowsheets (Taken 8/9/2023 2152)  Electrolytes maintained within normal limits:   Monitor labs and assess patient for signs and symptoms of electrolyte imbalances   Administer electrolyte replacement as ordered     Problem: Metabolic/Fluid and Electrolytes - Adult  Goal: Hemodynamic stability and optimal renal function maintained  8/9/2023 2152 by Adarsh Macedo RN  Outcome: Progressing  Flowsheets (Taken 8/9/2023 2152)  Hemodynamic stability and optimal renal function maintained:   Monitor labs and assess for signs and symptoms of volume excess or deficit   Monitor intake, output and patient weight     Problem: Metabolic/Fluid and Electrolytes - Adult  Goal: Glucose maintained within prescribed range  8/9/2023 2152 by Adarsh Macedo RN  Outcome: Progressing  Flowsheets (Taken 8/9/2023 2152)  Glucose maintained within prescribed range:   Monitor blood glucose as ordered   Assess for signs and symptoms of hyperglycemia and hypoglycemia   Administer ordered medications to maintain glucose within target range     Problem: Chronic Conditions and Co-morbidities  Goal: Patient's chronic conditions and co-morbidity symptoms are monitored and maintained or improved  8/9/2023 2152 by Adarsh Macedo RN  Outcome: Progressing  Flowsheets (Taken 8/9/2023 2152)  Care Plan - Patient's Chronic Conditions and Co-Morbidity Symptoms are Monitored and Maintained or Improved:   Monitor and assess patient's chronic conditions and comorbid symptoms for stability, deterioration, or improvement   Collaborate with multidisciplinary team to address chronic and comorbid conditions and prevent exacerbation or deterioration
Plan - Patient's Chronic Conditions and Co-Morbidity Symptoms are Monitored and Maintained or Improved:   Monitor and assess patient's chronic conditions and comorbid symptoms for stability, deterioration, or improvement   Collaborate with multidisciplinary team to address chronic and comorbid conditions and prevent exacerbation or deterioration   Update acute care plan with appropriate goals if chronic or comorbid symptoms are exacerbated and prevent overall improvement and discharge

## 2023-08-15 NOTE — DISCHARGE INSTR - DIET

## 2023-08-15 NOTE — CARE COORDINATION
8/10/23, 8:37 AM EDT    DISCHARGE ON GOING EVALUATION    [facility] day: 2  Location: 4A-05/005-A Reason for admit: Acute hypokalemia [I40.1]  Metabolic acidosis [Y55.05]  IDALIA (acute kidney injury) ([address]) [N17.9]  Acute pancreatitis, unspecified complication status, unspecified pancreatitis type [K85.90]   Procedure:   8/8 CT Abdomen Pelvis WO Contrast   1. Small amount of stranding near the pancreatic tail can be correlated   with serum lipase levels to exclude a mild pancreatitis. 2. Interval resolution of the previous abscess involving the repair mesh   in the anterior lower abdominal/pelvic wall. 3. Colonic diverticula. 4. Possible right renal caliceal diverticulum containing layering stones   or retained contrast.      8/9 US Abdomen limited   1. Reported cholecystectomy. Distal common bile duct is dilated measuring   1.0 cm likely related to prior cholecystectomy. 2. Nonspecific increased echogenicity of the pancreatic parenchyma. 3. Borderline hepatomegaly, the liver measures 20.4 cm.     8/9 MRI Abdomen WO Contrast   1. Peripancreatic inflammatory changes are noted relating to acute pancreatitis. No loculated peripancreatic collection. 2. Multiple T2 hyperintense lesions are seen in both kidneys that are not well characterized on the unenhanced exam but statistically can represent cysts. 3. A 2 x 2.6 cm complex cystic lesion is seen in the midpole of the right kidney. 4. Small ascites. 5. Other findings as described above. Barriers to Discharge: Urine (+) gram negative bacilli. Creatinine 1.3, K+ 3.4, PT/OT, diabetes management, pain and nausea control, Norvasc, Lovenox, Keppra, Bicarb drip, Lyrica. PCP: No primary care provider on file. Readmission Risk Score: 18.1%  Patient Goals/Plan/Treatment Preferences: Plan is to return to [employer]. SW following.
8/11/23, 9:08 AM EDT    DISCHARGE PLANNING EVALUATION    SW left a message for Tani Bernstein at [facility] asking for her to start precert on [facility] today. 2:54 PM- This SW spoke with Vasyl Stacy from [facility], she reports that Tani Bernstein did start the precert yesterday afternoon for [facility]. Updated CM Lalita Lopez.
8/14/23, 8:05 AM EDT    DISCHARGE PLANNING EVALUATION    Received a message that patients precert was denied. Reached out to Dr. Red Bojorquez to ask if he wants to do a peer to peer. Update 8:30 pm: Dr. Red Bojorquez is not ready to discharge [facility]. Called the facility and spoke with [facility]. She told  that she can come back Medicaid at discharge.
8/14/23, 8:39 AM EDT    DISCHARGE ON GOING [address] day: 6  Location: -05/005-A Reason for admit: Acute hypokalemia [P71.1]  Metabolic acidosis [Q60.20]  IDALIA (acute kidney injury) ([address]) [N17.9]  Acute pancreatitis, unspecified complication status, unspecified pancreatitis type [K85.90]   Procedure:   8/8 CT Abdomen Pelvis WO Contrast   1. Small amount of stranding near the pancreatic tail can be correlated   with serum lipase levels to exclude a mild pancreatitis. 2. Interval resolution of the previous abscess involving the repair mesh   in the anterior lower abdominal/pelvic wall. 3. Colonic diverticula. 4. Possible right renal caliceal diverticulum containing layering stones   or retained contrast.      8/9 US Abdomen limited   1. Reported cholecystectomy. Distal common bile duct is dilated measuring   1.0 cm likely related to prior cholecystectomy. 2. Nonspecific increased echogenicity of the pancreatic parenchyma. 3. Borderline hepatomegaly, the liver measures 20.4 cm.      8/9 MRI Abdomen WO Contrast   1. Peripancreatic inflammatory changes are noted relating to acute pancreatitis. No loculated peripancreatic collection. 2. Multiple T2 hyperintense lesions are seen in both kidneys that are not well characterized on the unenhanced exam but statistically can represent cysts. 3. A 2 x 2.6 cm complex cystic lesion is seen in the midpole of the right kidney. 4. Small ascites. 5. Other findings as described above    5/12 CXR Overall not significantly changed from the prior study. Barriers to Discharge: Diabetes management, ID consult, Palliative Care eval, Minced and moist diet, PT/OT, Norvasc, Lovenox, Keppra, Zofran, Protonix, electrolyte replacement protocols. PCP: No primary care provider on file. Readmission Risk Score: 19.6%  Patient Goals/Plan/Treatment Preferences: Plan is to return to [employer] [address]. SW following. Refer to SW note.
8/15/23, 1:16 PM EDT    Patient goals/plan/ treatment preferences discussed by  and . Patient goals/plan/ treatment preferences reviewed with patient/ family. Patient/ family verbalize understanding of discharge plan and are in agreement with goal/plan/treatment preferences. Understanding was demonstrated using the teach back method. AVS provided by RN at time of discharge, which includes all necessary medical information pertaining to the patients current course of illness, treatment, post-discharge goals of care, and treatment preferences. Services At/After Discharge: [address] \[facility]"" (SNF), Aide services, In ambulance, and Nursing service       IMM Letter  IMM Letter given to Patient/Family/Significant other/Guardian/POA/by[de-identified] CM  IMM Letter date given[de-identified] 08/15/23  IMM Letter time given[de-identified] 56     Guille Patton will be discharged to [facility] today. She will return under her Medicaid benefit. She will be transported by ambulance. Discharge instructions and transport forms are attached to blue discharge packet. Spoke with patients daughter Larissa Berry and Torin Nicola at [facility] to make them aware transport is set up for 3:00 pm.  Spoke with David Abreu RN and she will be ready.
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    Cataract of both eyes     COVID-19     DDD (degenerative disc disease), lumbar     DM2 (diabetes mellitus, type 2) ([address])     diagnoses approx 2000    Dysphagia, pharyngoesophageal 09/26/2016    Eczema 03/2018    Fibrocystic breast     H/O ventral hernia repair     Headache 02/09/2017    History of COVID-19 12/2020    Hyperlipidemia     Hypertension     Iron deficiency anemia     LPRD (laryngopharyngeal reflux disease) 09/26/2016    Neuropathy     peripheral    Obesity     Orbital abscess     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 Sinusitis     Velopharyngeal incompetence 09/26/2016       Procedure:   8/8 CT Abdomen Pelvis WO Contrast   1. Small amount of stranding near the pancreatic tail can be correlated   with serum lipase levels to exclude a mild pancreatitis. 2. Interval resolution of the previous abscess involving the repair mesh   in the anterior lower abdominal/pelvic wall. 3. Colonic diverticula. 4. Possible right renal caliceal diverticulum containing layering stones   or retained contrast.     8/9 US Abdomen limited   1. Reported cholecystectomy. Distal common bile duct is dilated measuring   1.0 cm likely related to prior cholecystectomy. 2. Nonspecific increased echogenicity of the pancreatic parenchyma. 3. Borderline hepatomegaly, the liver measures 20.4 cm.    Barriers to Discharge: From ED,
ECF)  Would you like Case Management to discuss the discharge plan with any other family members/significant others, and if so, who?  Yes (children)  Plans to Return to Present Housing: Yes  Other Identified Issues/Barriers to RETURNING to current housing: none  Potential Assistance needed at discharge: N/A            Potential DME:    Patient expects to discharge to: [address] [place] [address] for transportation at discharge:      Financial  Payor: [address] [place] [address] / Plan: [address] [place] [address] Road / Product Type: *No Product type* /     Potential assistance Purchasing Medications: No
patient/ family. Patient/ family verbalize understanding of discharge plan and are in agreement with goal/plan/treatment preferences. Understanding was demonstrated using the teach back method. AVS provided by RN at time of discharge, which includes all necessary medical information pertaining to the patients current course of illness, treatment, post-discharge goals of care, and treatment preferences.      Services At/After Discharge: [address] \[facility]\"" ([facility])       IMM Letter  IMM Letter given to Patient/Family/Significant other/Guardian/POA/by[de-identified]   IMM Letter date given[de-identified] 08/11/23  IMM Letter time given[de-identified] 4615

## 2023-08-16 LAB — FECAL FAT, QUALITATIVE: NORMAL

## 2023-08-17 LAB — ELASTASE PANC STL-MCNT: 16 UG/G

## 2023-08-18 LAB — CALPROTECTIN STL-MCNT: 113 UG/G

## 2023-09-15 ENCOUNTER — CARE COORDINATION (OUTPATIENT)
Dept: CASE MANAGEMENT | Age: 83
End: 2023-09-15

## 2024-04-10 NOTE — PLAN OF CARE
Problem: Physical Regulation:  Goal: Tolerate HBO therapy and barotrauma will be prevented  Description  Tolerate HBO therapy and barotrauma will be prevented  Outcome: Ongoing     [facility] with her daughter for Hyperbaric Oxygen therapy evaluation. Patient has history of sinus cancer and multiple debridements of sinus cavity due to infection and non healing sinus. Patient discussed options with DENISE Traore CNP today for HBO therapy. Patient would like to proceed at this time. Educated her and daughter that we will begin insurance authorization for Hyperbaric oxygen treatments, and we will call once we hear back from insurance. Patient to have have EKG and chest xray completed before HBO therapy can begin. Patient and daughter educated that we will call with Hyperbaric Oxygen treatment start date/time. They should plan to follow up every 2 weeks while doing treatments. Care plan reviewed with patient and daughter. Patient and daughter verbalize understanding of the plan of care and contribute to goal setting. Protopic Pregnancy And Lactation Text: This medication is Pregnancy Category C. It is unknown if this medication is excreted in breast milk when applied topically.

## 2024-04-17 NOTE — TELEPHONE ENCOUNTER
Orders faxed. previous_biopsy_has_been_previously_biopsied Body Location Override (Optional): Left distal pretibial region

## (undated) DEVICE — PATIENT RETURN ELECTRODE, SINGLE-USE, CONTACT QUALITY MONITORING, ADULT, WITH 9FT CORD, FOR PATIENTS WEIGING OVER 33LBS. (15KG): Brand: MEGADYNE

## (undated) DEVICE — NEEDLE SCLERO 23GA L4MM CATH L240CM CNTRST SHTH DIA1.8MM

## (undated) DEVICE — ENDO KIT: Brand: MEDLINE INDUSTRIES, INC.

## (undated) DEVICE — TRAP POLYP ETRAP

## (undated) DEVICE — CONNECTOR TBNG AUX H2O JET DISP FOR OLY 160/180 SER

## (undated) DEVICE — SET LNR RED GRN W/ BASE CLEANASCOPE

## (undated) DEVICE — SNARE POLYP SM W13MMXL240CM SHTH DIA2.4MM OVL FLX DISP